# Patient Record
Sex: MALE | Race: WHITE | NOT HISPANIC OR LATINO | Employment: OTHER | URBAN - METROPOLITAN AREA
[De-identification: names, ages, dates, MRNs, and addresses within clinical notes are randomized per-mention and may not be internally consistent; named-entity substitution may affect disease eponyms.]

---

## 2017-01-04 RX ORDER — BACLOFEN 10 MG/1
TABLET ORAL
Qty: 90 TABLET | Refills: 3 | Status: SHIPPED | OUTPATIENT
Start: 2017-01-04 | End: 2017-05-17 | Stop reason: SDUPTHER

## 2017-01-09 ENCOUNTER — OFFICE VISIT (OUTPATIENT)
Dept: FAMILY MEDICINE CLINIC | Facility: CLINIC | Age: 72
End: 2017-01-09

## 2017-01-09 VITALS
BODY MASS INDEX: 26.68 KG/M2 | WEIGHT: 197 LBS | SYSTOLIC BLOOD PRESSURE: 130 MMHG | HEIGHT: 72 IN | HEART RATE: 91 BPM | OXYGEN SATURATION: 98 % | TEMPERATURE: 98.2 F | DIASTOLIC BLOOD PRESSURE: 80 MMHG

## 2017-01-09 DIAGNOSIS — R05.9 COUGH: ICD-10-CM

## 2017-01-09 DIAGNOSIS — J06.9 ACUTE URI: Primary | ICD-10-CM

## 2017-01-09 DIAGNOSIS — J43.9 PULMONARY EMPHYSEMA, UNSPECIFIED EMPHYSEMA TYPE (HCC): ICD-10-CM

## 2017-01-09 PROCEDURE — 99214 OFFICE O/P EST MOD 30 MIN: CPT | Performed by: NURSE PRACTITIONER

## 2017-01-09 RX ORDER — METHYLPREDNISOLONE 4 MG/1
TABLET ORAL
Qty: 21 TABLET | Refills: 0 | Status: SHIPPED | OUTPATIENT
Start: 2017-01-09 | End: 2017-03-02

## 2017-01-09 RX ORDER — AZITHROMYCIN 250 MG/1
TABLET, FILM COATED ORAL
Qty: 6 TABLET | Refills: 0 | Status: SHIPPED | OUTPATIENT
Start: 2017-01-09 | End: 2017-03-02

## 2017-01-09 NOTE — PROGRESS NOTES
Subjective   Cody Eldridge is a 71 y.o. male.     HPI Comments: Symptoms several days along the sinus pressure congestion tearing  Cough  Nonproductive  Without fever chills or body aches  Sinus pressure congestion mostly upper  Moderately uncomfortable no severe pain or tenderness    Ex-smoker, COPD stable        URI    This is a new problem. The current episode started in the past 7 days. The problem has been unchanged. There has been no fever. Associated symptoms include congestion, coughing, rhinorrhea, sinus pain, sneezing and a sore throat. Pertinent negatives include no neck pain, swollen glands or wheezing. He has tried nothing for the symptoms. The treatment provided no relief.        The following portions of the patient's history were reviewed and updated as appropriate: allergies, current medications, past family history, past social history, past surgical history and problem list.    Review of Systems   HENT: Positive for congestion, rhinorrhea, sneezing and sore throat.    Respiratory: Positive for cough. Negative for wheezing.    Musculoskeletal: Negative for neck pain.   All other systems reviewed and are negative.      Objective   Physical Exam   Constitutional: He is oriented to person, place, and time. He appears well-developed.   Nontoxic   HENT:   Head: Normocephalic.   Mouth/Throat: Oropharynx is clear and moist.   Turbinates congested 3-4+ clear rhinitis  Eyes mildly irritated bilateral tearing copious nasal secretions   Eyes: EOM are normal. Pupils are equal, round, and reactive to light.   Cardiovascular: Normal rate, regular rhythm and normal heart sounds.    Pulmonary/Chest: Effort normal and breath sounds normal.   Lymphadenopathy:     He has no cervical adenopathy.   Neurological: He is alert and oriented to person, place, and time.   Skin: Skin is warm and dry.   Psychiatric: He has a normal mood and affect. His behavior is normal. Judgment and thought content normal.   Vitals  reviewed.      Assessment/Plan   Cody was seen today for uri.    Diagnoses and all orders for this visit:    Acute URI    Cough    Pulmonary emphysema, unspecified emphysema type    Other orders  -     MethylPREDNISolone (MEDROL, VIPIN,) 4 MG tablet; follow package directions  -     azithromycin (ZITHROMAX Z-VIPIN) 250 MG tablet; Take 2 tablets the first day, then 1 tablet daily for 4 days. Start for productive cough                Risk-benefit Medrol discussed  Discharge instructions     Push fluids    Menthol cough drops as needed    Saline nasal spray when necessary    Medrol Dosepak for inflammation start now    Tylenol as needed    Mucinex if cough productive  DM if cough medicine needed    If cough returns more productive yellow-green phlegm start Zithromax    Chest pain shortness of breath significant worsening fever chills urgent care ER    Thank You,      James Epley,  NP

## 2017-01-09 NOTE — PATIENT INSTRUCTIONS
"Discharge instructions     Push fluids    Menthol cough drops as needed    Saline nasal spray when necessary    Medrol Dosepak for inflammation start now    Tylenol as needed    Mucinex if cough productive  DM if cough medicine needed    If cough returns more productive yellow-green phlegm start Zithromax    Chest pain shortness of breath significant worsening fever chills urgent care ER    Thank You,      James Epley, NP  Upper Respiratory Infection, Adult  Most upper respiratory infections (URIs) are a viral infection of the air passages leading to the lungs. A URI affects the nose, throat, and upper air passages. The most common type of URI is nasopharyngitis and is typically referred to as \"the common cold.\"  URIs run their course and usually go away on their own. Most of the time, a URI does not require medical attention, but sometimes a bacterial infection in the upper airways can follow a viral infection. This is called a secondary infection. Sinus and middle ear infections are common types of secondary upper respiratory infections.  Bacterial pneumonia can also complicate a URI. A URI can worsen asthma and chronic obstructive pulmonary disease (COPD). Sometimes, these complications can require emergency medical care and may be life threatening.   CAUSES  Almost all URIs are caused by viruses. A virus is a type of germ and can spread from one person to another.   RISKS FACTORS  You may be at risk for a URI if:   · You smoke.    · You have chronic heart or lung disease.  · You have a weakened defense (immune) system.    · You are very young or very old.    · You have nasal allergies or asthma.  · You work in crowded or poorly ventilated areas.  · You work in health care facilities or schools.  SIGNS AND SYMPTOMS   Symptoms typically develop 2-3 days after you come in contact with a cold virus. Most viral URIs last 7-10 days. However, viral URIs from the influenza virus (flu virus) can last 14-18 days and are " typically more severe. Symptoms may include:   · Runny or stuffy (congested) nose.    · Sneezing.    · Cough.    · Sore throat.    · Headache.    · Fatigue.    · Fever.    · Loss of appetite.    · Pain in your forehead, behind your eyes, and over your cheekbones (sinus pain).  · Muscle aches.    DIAGNOSIS   Your health care provider may diagnose a URI by:  · Physical exam.  · Tests to check that your symptoms are not due to another condition such as:  ¨ Strep throat.  ¨ Sinusitis.  ¨ Pneumonia.  ¨ Asthma.  TREATMENT   A URI goes away on its own with time. It cannot be cured with medicines, but medicines may be prescribed or recommended to relieve symptoms. Medicines may help:  · Reduce your fever.  · Reduce your cough.  · Relieve nasal congestion.  HOME CARE INSTRUCTIONS   · Take medicines only as directed by your health care provider.    · Gargle warm saltwater or take cough drops to comfort your throat as directed by your health care provider.  · Use a warm mist humidifier or inhale steam from a shower to increase air moisture. This may make it easier to breathe.  · Drink enough fluid to keep your urine clear or pale yellow.    · Eat soups and other clear broths and maintain good nutrition.    · Rest as needed.    · Return to work when your temperature has returned to normal or as your health care provider advises. You may need to stay home longer to avoid infecting others. You can also use a face mask and careful hand washing to prevent spread of the virus.  · Increase the usage of your inhaler if you have asthma.    · Do not use any tobacco products, including cigarettes, chewing tobacco, or electronic cigarettes. If you need help quitting, ask your health care provider.  PREVENTION   The best way to protect yourself from getting a cold is to practice good hygiene.   · Avoid oral or hand contact with people with cold symptoms.    · Wash your hands often if contact occurs.    There is no clear evidence that  vitamin C, vitamin E, echinacea, or exercise reduces the chance of developing a cold. However, it is always recommended to get plenty of rest, exercise, and practice good nutrition.   SEEK MEDICAL CARE IF:   · You are getting worse rather than better.    · Your symptoms are not controlled by medicine.    · You have chills.  · You have worsening shortness of breath.  · You have brown or red mucus.  · You have yellow or brown nasal discharge.  · You have pain in your face, especially when you bend forward.  · You have a fever.  · You have swollen neck glands.  · You have pain while swallowing.  · You have white areas in the back of your throat.  SEEK IMMEDIATE MEDICAL CARE IF:   · You have severe or persistent:    Headache.    Ear pain.    Sinus pain.    Chest pain.  · You have chronic lung disease and any of the following:    Wheezing.    Prolonged cough.    Coughing up blood.    A change in your usual mucus.  · You have a stiff neck.  · You have changes in your:    Vision.    Hearing.    Thinking.    Mood.  MAKE SURE YOU:   · Understand these instructions.  · Will watch your condition.  · Will get help right away if you are not doing well or get worse.     This information is not intended to replace advice given to you by your health care provider. Make sure you discuss any questions you have with your health care provider.     Document Released: 06/13/2002 Document Revised: 05/03/2016 Document Reviewed: 03/25/2015  [employer] Interactive Patient Education ©2016 [employer]

## 2017-01-09 NOTE — MR AVS SNAPSHOT
Cody Eldridge   1/9/2017 9:20 AM   Office Visit    Dept Phone:  [phone]   Encounter #:  [id number]    Provider:  James Epley, APRN   Department:  [facility]                Your Full Care Plan              Today's Medication Changes          These changes are accurate as of: 1/9/17 10:06 AM.  If you have any questions, ask your nurse or doctor.               New Medication(s)Ordered:     azithromycin 250 MG tablet   Commonly known as:  ZITHROMAX Z-VIPIN   Take 2 tablets the first day, then 1 tablet daily for 4 days. Start for productive cough   Started by:  James Epley, APRN       MethylPREDNISolone 4 MG tablet   Commonly known as:  MEDROL (VIPIN)   follow package directions   Started by:  James Epley, APRN         Stop taking medication(s)listed here:     ZOSTAVAX 02910 UNT/0.65ML injection   Generic drug:  zoster vaccine live PF   Stopped by:  James Epley, APRN                Where to Get Your Medications      These medications were sent to [place] [facility] [id number] [address] [address]     Phone:  [phone]     azithromycin 250 MG tablet    MethylPREDNISolone 4 MG tablet                  Your Updated Medication List          This list is accurate as of: 1/9/17 10:06 AM.  Always use your most recent med list.                albuterol 108 (90 BASE) MCG/ACT inhaler   Commonly known as:  PROVENTIL HFA;VENTOLIN HFA       amLODIPine 10 MG tablet   Commonly known as:  NORVASC   Take 1 tablet by mouth daily.       azelastine 0.1 % nasal spray   Commonly known as:  ASTELIN       azithromycin 250 MG tablet   Commonly known as:  ZITHROMAX Z-VIPIN   Take 2 tablets the first day, then 1 tablet daily for 4 days. Start for productive cough       baclofen 10 MG tablet   Commonly known as:  LIORESAL   take 1 tablet by mouth three times a day       fluticasone-salmeterol  "250-50 MCG/DOSE DISKUS   Commonly known as:  ADVAIR       HYDROcodone-acetaminophen 7.5-325 MG per tablet   Commonly known as:  NORCO   Take 1 tablet by mouth Every 4 (Four) Hours As Needed for moderate pain (4-6). take 1 tablet by mouth four times a day       lisinopril 20 MG tablet   Commonly known as:  PRINIVIL,ZESTRIL       MethylPREDNISolone 4 MG tablet   Commonly known as:  MEDROL (VIPIN)   follow package directions       metoprolol tartrate 50 MG tablet   Commonly known as:  LOPRESSOR   Take 1 tablet twice daily       montelukast 10 MG tablet   Commonly known as:  SINGULAIR   take 1 tablet by mouth once daily       omeprazole 40 MG capsule   Commonly known as:  priLOSEC   Take 1 capsule by mouth daily.       sertraline 50 MG tablet   Commonly known as:  ZOLOFT   take 1 tablet by mouth once daily       zolpidem 10 MG tablet   Commonly known as:  AMBIEN   take 1 tablet by mouth at bedtime               You Were Diagnosed With        Codes Comments    Acute URI    -  Primary ICD-10-CM: J06.9  ICD-9-CM: 465.9     Cough     ICD-10-CM: R05  ICD-9-CM: 786.2     Pulmonary emphysema, unspecified emphysema type     ICD-10-CM: J43.9  ICD-9-CM: 492.8       Instructions    Discharge instructions     Push fluids    Menthol cough drops as needed    Saline nasal spray when necessary    Medrol Dosepak for inflammation start now    Tylenol as needed    Mucinex if cough productive  DM if cough medicine needed    If cough returns more productive yellow-green phlegm start Zithromax    Chest pain shortness of breath significant worsening fever chills urgent care ER    Thank You,      James Epley, NP  Upper Respiratory Infection, Adult  Most upper respiratory infections (URIs) are a viral infection of the air passages leading to the lungs. A URI affects the nose, throat, and upper air passages. The most common type of URI is nasopharyngitis and is typically referred to as \"the common cold.\"  URIs run their course and usually go away " on their own. Most of the time, a URI does not require medical attention, but sometimes a bacterial infection in the upper airways can follow a viral infection. This is called a secondary infection. Sinus and middle ear infections are common types of secondary upper respiratory infections.  Bacterial pneumonia can also complicate a URI. A URI can worsen asthma and chronic obstructive pulmonary disease (COPD). Sometimes, these complications can require emergency medical care and may be life threatening.   CAUSES  Almost all URIs are caused by viruses. A virus is a type of germ and can spread from one person to another.   RISKS FACTORS  You may be at risk for a URI if:   · You smoke.    · You have chronic heart or lung disease.  · You have a weakened defense (immune) system.    · You are very young or very old.    · You have nasal allergies or asthma.  · You work in crowded or poorly ventilated areas.  · You work in health care facilities or schools.  SIGNS AND SYMPTOMS   Symptoms typically develop 2-3 days after you come in contact with a cold virus. Most viral URIs last 7-10 days. However, viral URIs from the influenza virus (flu virus) can last 14-18 days and are typically more severe. Symptoms may include:   · Runny or stuffy (congested) nose.    · Sneezing.    · Cough.    · Sore throat.    · Headache.    · Fatigue.    · Fever.    · Loss of appetite.    · Pain in your forehead, behind your eyes, and over your cheekbones (sinus pain).  · Muscle aches.    DIAGNOSIS   Your health care provider may diagnose a URI by:  · Physical exam.  · Tests to check that your symptoms are not due to another condition such as:  ¨ Strep throat.  ¨ Sinusitis.  ¨ Pneumonia.  ¨ Asthma.  TREATMENT   A URI goes away on its own with time. It cannot be cured with medicines, but medicines may be prescribed or recommended to relieve symptoms. Medicines may help:  · Reduce your fever.  · Reduce your cough.  · Relieve nasal congestion.  HOME  CARE INSTRUCTIONS   · Take medicines only as directed by your health care provider.    · Gargle warm saltwater or take cough drops to comfort your throat as directed by your health care provider.  · Use a warm mist humidifier or inhale steam from a shower to increase air moisture. This may make it easier to breathe.  · Drink enough fluid to keep your urine clear or pale yellow.    · Eat soups and other clear broths and maintain good nutrition.    · Rest as needed.    · Return to work when your temperature has returned to normal or as your health care provider advises. You may need to stay home longer to avoid infecting others. You can also use a face mask and careful hand washing to prevent spread of the virus.  · Increase the usage of your inhaler if you have asthma.    · Do not use any tobacco products, including cigarettes, chewing tobacco, or electronic cigarettes. If you need help quitting, ask your health care provider.  PREVENTION   The best way to protect yourself from getting a cold is to practice good hygiene.   · Avoid oral or hand contact with people with cold symptoms.    · Wash your hands often if contact occurs.    There is no clear evidence that vitamin C, vitamin E, echinacea, or exercise reduces the chance of developing a cold. However, it is always recommended to get plenty of rest, exercise, and practice good nutrition.   SEEK MEDICAL CARE IF:   · You are getting worse rather than better.    · Your symptoms are not controlled by medicine.    · You have chills.  · You have worsening shortness of breath.  · You have brown or red mucus.  · You have yellow or brown nasal discharge.  · You have pain in your face, especially when you bend forward.  · You have a fever.  · You have swollen neck glands.  · You have pain while swallowing.  · You have white areas in the back of your throat.  SEEK IMMEDIATE MEDICAL CARE IF:   · You have severe or persistent:    Headache.    Ear pain.    Sinus pain.    Chest  pain.  · You have chronic lung disease and any of the following:    Wheezing.    Prolonged cough.    Coughing up blood.    A change in your usual mucus.  · You have a stiff neck.  · You have changes in your:    Vision.    Hearing.    Thinking.    Mood.  MAKE SURE YOU:   · Understand these instructions.  · Will watch your condition.  · Will get help right away if you are not doing well or get worse.     This information is not intended to replace advice given to you by your health care provider. Make sure you discuss any questions you have with your health care provider.     Document Released: 2002 Document Revised: 2016 Document Reviewed: 2015  [employer] Interactive Patient Education © [employer]       Patient Instructions History      Upcoming Appointments     Visit Type Date Time Department    OFFICE VISIT 2017  9:20 AM NAVEED DIALLO      [facility] Signup     [employer] allows you to send messages to your doctor, view your test results, renew your prescriptions, schedule appointments, and more. To sign up, go to [employer] and click on the Sign Up Now link in the New User? box. Enter your [facility] Activation Code exactly as it appears below along with the last four digits of your Social Security Number and your Date of Birth () to complete the sign-up process. If you do not sign up before the expiration date, you must request a new code.    [facility] Activation Code: [email or web address]  Expires: 2017  5:38 AM    If you have questions, you can email [email or web address] or call [phone] to talk to our [facility] staff. Remember, [facility] is NOT to be used for urgent needs. For medical emergencies, dial 911.               Other Info from Your Visit           Allergies     No Known Allergies      Reason for Visit     URI right side of head, eyes watering, trouble breathing, stuffy nose      Vital Signs     Blood Pressure Pulse Temperature Height  "Weight Oxygen Saturation    130/80 (BP Location: Left arm, Patient Position: Sitting, Cuff Size: Adult) 91 98.2 °F (36.8 °C) (Oral) 72\" (182.9 cm) 197 lb (89.4 kg) 98%    Body Mass Index Smoking Status                26.72 kg/m2 Former Smoker          Problems and Diagnoses Noted     Acute upper respiratory infection    Chronic airway obstruction    Cough        "

## 2017-01-20 DIAGNOSIS — M54.2 NECK PAIN: ICD-10-CM

## 2017-01-22 RX ORDER — HYDROCODONE BITARTRATE AND ACETAMINOPHEN 7.5; 325 MG/1; MG/1
1 TABLET ORAL EVERY 4 HOURS PRN
Qty: 120 TABLET | Refills: 0 | Status: SHIPPED | OUTPATIENT
Start: 2017-01-22 | End: 2017-02-22 | Stop reason: SDUPTHER

## 2017-02-22 DIAGNOSIS — M54.2 NECK PAIN: ICD-10-CM

## 2017-02-22 RX ORDER — HYDROCODONE BITARTRATE AND ACETAMINOPHEN 7.5; 325 MG/1; MG/1
1 TABLET ORAL EVERY 4 HOURS PRN
Qty: 120 TABLET | Refills: 0 | Status: SHIPPED | OUTPATIENT
Start: 2017-02-22 | End: 2017-03-23 | Stop reason: SDUPTHER

## 2017-03-02 ENCOUNTER — OFFICE VISIT (OUTPATIENT)
Dept: FAMILY MEDICINE CLINIC | Facility: CLINIC | Age: 72
End: 2017-03-02

## 2017-03-02 VITALS
BODY MASS INDEX: 26.41 KG/M2 | RESPIRATION RATE: 17 BRPM | DIASTOLIC BLOOD PRESSURE: 70 MMHG | SYSTOLIC BLOOD PRESSURE: 140 MMHG | HEART RATE: 74 BPM | OXYGEN SATURATION: 97 % | HEIGHT: 72 IN | WEIGHT: 195 LBS

## 2017-03-02 DIAGNOSIS — J20.9 ACUTE BRONCHITIS, UNSPECIFIED ORGANISM: Primary | ICD-10-CM

## 2017-03-02 DIAGNOSIS — J43.9 PULMONARY EMPHYSEMA, UNSPECIFIED EMPHYSEMA TYPE (HCC): ICD-10-CM

## 2017-03-02 PROBLEM — R05.9 COUGH: Status: RESOLVED | Noted: 2017-01-09 | Resolved: 2017-03-02

## 2017-03-02 PROCEDURE — 99213 OFFICE O/P EST LOW 20 MIN: CPT | Performed by: FAMILY MEDICINE

## 2017-03-02 RX ORDER — CEFUROXIME AXETIL 250 MG/1
TABLET ORAL
Qty: 14 TABLET | Refills: 0 | Status: SHIPPED | OUTPATIENT
Start: 2017-03-02 | End: 2017-08-08

## 2017-03-02 NOTE — PROGRESS NOTES
"Subjective   Cody Eldridge is a 71 y.o. male.     History of Present Illness Ill for 5 days, coughing yellow brown sputum, hoarse voice. No cigs for 17 years. IRAHETA is new. Using albuterol is partially helpful Nasal discharge and sneezinzg episodes are 15 min long. No facial pain. No fever.  The following portions of the patient's history were reviewed and updated as appropriate: allergies, current medications, past social history and problem list.    Review of Systems   Constitutional: Positive for fatigue. Negative for activity change and unexpected weight change.   HENT: Positive for congestion, postnasal drip and sore throat. Negative for ear pain.    Eyes: Negative for pain and discharge.   Respiratory: Positive for cough. Negative for chest tightness, shortness of breath and wheezing.    Cardiovascular: Negative for chest pain and palpitations.   Gastrointestinal: Negative for abdominal pain, diarrhea and vomiting.   Endocrine: Negative.    Genitourinary: Negative.    Musculoskeletal: Negative for joint swelling.   Skin: Negative for color change, rash and wound.   Allergic/Immunologic: Negative.    Neurological: Negative for seizures and syncope.   Psychiatric/Behavioral: Negative.        Objective   Visit Vitals   • /70   • Pulse 74   • Resp 17   • Ht 72\" (182.9 cm)   • Wt 195 lb (88.5 kg)   • SpO2 97%   • BMI 26.45 kg/m2     Physical Exam   Constitutional: He appears well-developed and well-nourished.   HENT:   Head: Normocephalic and atraumatic.   Right Ear: Tympanic membrane, external ear and ear canal normal.   Left Ear: Tympanic membrane, external ear and ear canal normal.   Mouth/Throat: Oropharynx is clear and moist.   Eyes: Conjunctivae are normal. Right eye exhibits no discharge. Left eye exhibits no discharge.   Neck: Normal range of motion. Neck supple. No thyromegaly present.   Cardiovascular: Normal rate, regular rhythm and normal heart sounds.    Pulmonary/Chest: Effort normal and breath " sounds normal. No respiratory distress. He has no wheezes. He has no rales.   Lymphadenopathy:     He has no cervical adenopathy.   Skin: Skin is warm and dry.   Psychiatric: He has a normal mood and affect. Judgment normal.   Vitals reviewed.      Assessment/Plan   Problem List Items Addressed This Visit     None      Visit Diagnoses     Acute bronchitis, unspecified organism    -  Primary    Relevant Medications    cefuroxime (CEFTIN) 250 MG tablet           continue mucinex

## 2017-03-12 DIAGNOSIS — R10.816 EPIGASTRIC ABDOMINAL TENDERNESS: ICD-10-CM

## 2017-03-13 RX ORDER — OMEPRAZOLE 40 MG/1
CAPSULE, DELAYED RELEASE ORAL
Qty: 30 CAPSULE | Refills: 5 | Status: SHIPPED | OUTPATIENT
Start: 2017-03-13 | End: 2017-05-02 | Stop reason: SDUPTHER

## 2017-03-14 DIAGNOSIS — R10.816 EPIGASTRIC ABDOMINAL TENDERNESS: ICD-10-CM

## 2017-03-14 RX ORDER — AMLODIPINE BESYLATE 10 MG/1
TABLET ORAL
Qty: 90 TABLET | Refills: 1 | Status: SHIPPED | OUTPATIENT
Start: 2017-03-14 | End: 2017-09-01 | Stop reason: SDUPTHER

## 2017-03-15 RX ORDER — OMEPRAZOLE 40 MG/1
CAPSULE, DELAYED RELEASE ORAL
Qty: 30 CAPSULE | Refills: 5 | Status: SHIPPED | OUTPATIENT
Start: 2017-03-15 | End: 2017-08-08

## 2017-03-15 RX ORDER — LISINOPRIL 20 MG/1
20 TABLET ORAL DAILY
Qty: 90 TABLET | Refills: 1 | Status: SHIPPED | OUTPATIENT
Start: 2017-03-15 | End: 2017-07-18 | Stop reason: SDUPTHER

## 2017-03-22 ENCOUNTER — TELEPHONE (OUTPATIENT)
Dept: FAMILY MEDICINE CLINIC | Facility: CLINIC | Age: 72
End: 2017-03-22

## 2017-03-22 NOTE — TELEPHONE ENCOUNTER
Patient is requesting refill on Hydrocodone last seen 3/2/17 last written 2/22/17 last Cyrus 12/17/16 (needs Cyrus)

## 2017-03-23 DIAGNOSIS — M54.2 NECK PAIN: ICD-10-CM

## 2017-03-23 RX ORDER — HYDROCODONE BITARTRATE AND ACETAMINOPHEN 7.5; 325 MG/1; MG/1
1 TABLET ORAL EVERY 4 HOURS PRN
Qty: 120 TABLET | Refills: 0 | Status: SHIPPED | OUTPATIENT
Start: 2017-03-23 | End: 2017-04-26 | Stop reason: SDUPTHER

## 2017-04-26 DIAGNOSIS — M54.2 NECK PAIN: ICD-10-CM

## 2017-04-26 RX ORDER — HYDROCODONE BITARTRATE AND ACETAMINOPHEN 7.5; 325 MG/1; MG/1
1 TABLET ORAL EVERY 4 HOURS PRN
Qty: 120 TABLET | Refills: 0 | Status: SHIPPED | OUTPATIENT
Start: 2017-04-26 | End: 2017-05-24 | Stop reason: SDUPTHER

## 2017-05-02 DIAGNOSIS — R10.816 EPIGASTRIC ABDOMINAL TENDERNESS WITHOUT REBOUND TENDERNESS: ICD-10-CM

## 2017-05-02 RX ORDER — OMEPRAZOLE 40 MG/1
40 CAPSULE, DELAYED RELEASE ORAL DAILY
Qty: 90 CAPSULE | Refills: 3 | Status: SHIPPED | OUTPATIENT
Start: 2017-05-02 | End: 2017-12-20 | Stop reason: SDUPTHER

## 2017-05-03 RX ORDER — METOPROLOL TARTRATE 50 MG/1
50 TABLET, FILM COATED ORAL 2 TIMES DAILY
Qty: 180 TABLET | Refills: 3 | Status: SHIPPED | OUTPATIENT
Start: 2017-05-03 | End: 2017-12-05 | Stop reason: SDUPTHER

## 2017-05-05 ENCOUNTER — OFFICE VISIT (OUTPATIENT)
Dept: FAMILY MEDICINE CLINIC | Facility: CLINIC | Age: 72
End: 2017-05-05

## 2017-05-05 VITALS
OXYGEN SATURATION: 98 % | BODY MASS INDEX: 26.82 KG/M2 | SYSTOLIC BLOOD PRESSURE: 124 MMHG | WEIGHT: 198 LBS | HEIGHT: 72 IN | DIASTOLIC BLOOD PRESSURE: 86 MMHG | HEART RATE: 58 BPM | TEMPERATURE: 97.8 F

## 2017-05-05 DIAGNOSIS — M79.10 MYALGIA: ICD-10-CM

## 2017-05-05 DIAGNOSIS — R25.2 CRAMP OF BOTH LOWER EXTREMITIES: Primary | ICD-10-CM

## 2017-05-05 PROCEDURE — 99213 OFFICE O/P EST LOW 20 MIN: CPT | Performed by: NURSE PRACTITIONER

## 2017-05-05 RX ORDER — METHYLPREDNISOLONE 4 MG/1
TABLET ORAL
Qty: 21 TABLET | Refills: 0 | Status: SHIPPED | OUTPATIENT
Start: 2017-05-05 | End: 2017-06-16

## 2017-05-05 RX ORDER — ROSUVASTATIN CALCIUM 20 MG/1
10 TABLET, COATED ORAL DAILY
Qty: 30 TABLET | Refills: 5 | COMMUNITY
Start: 2017-05-05

## 2017-05-05 RX ORDER — CHLORZOXAZONE 500 MG/1
TABLET ORAL
Qty: 20 TABLET | Refills: 0 | Status: SHIPPED | OUTPATIENT
Start: 2017-05-05 | End: 2017-10-11

## 2017-05-06 LAB
BUN SERPL-MCNC: 13 MG/DL (ref 8–23)
BUN/CREAT SERPL: 14.4 (ref 7–25)
CALCIUM SERPL-MCNC: 9.8 MG/DL (ref 8.6–10.5)
CHLORIDE SERPL-SCNC: 98 MMOL/L (ref 98–107)
CK SERPL-CCNC: 192 U/L (ref 20–200)
CO2 SERPL-SCNC: 28.9 MMOL/L (ref 22–29)
CREAT SERPL-MCNC: 0.9 MG/DL (ref 0.76–1.27)
GLUCOSE SERPL-MCNC: 92 MG/DL (ref 65–99)
POTASSIUM SERPL-SCNC: 4.7 MMOL/L (ref 3.5–5.2)
SODIUM SERPL-SCNC: 139 MMOL/L (ref 136–145)

## 2017-05-08 RX ORDER — METOPROLOL TARTRATE 50 MG/1
TABLET, FILM COATED ORAL
Qty: 60 TABLET | Refills: 4 | Status: SHIPPED | OUTPATIENT
Start: 2017-05-08 | End: 2017-06-16

## 2017-05-09 ENCOUNTER — TELEPHONE (OUTPATIENT)
Dept: FAMILY MEDICINE CLINIC | Facility: CLINIC | Age: 72
End: 2017-05-09

## 2017-05-09 RX ORDER — ZOLPIDEM TARTRATE 10 MG/1
TABLET ORAL
Qty: 30 TABLET | Refills: 2 | Status: SHIPPED | OUTPATIENT
Start: 2017-05-09 | End: 2018-01-05

## 2017-05-17 RX ORDER — BACLOFEN 10 MG/1
TABLET ORAL
Qty: 90 TABLET | Refills: 3 | Status: SHIPPED | OUTPATIENT
Start: 2017-05-17 | End: 2017-09-24 | Stop reason: SDUPTHER

## 2017-05-24 DIAGNOSIS — M54.2 NECK PAIN: ICD-10-CM

## 2017-05-24 RX ORDER — HYDROCODONE BITARTRATE AND ACETAMINOPHEN 7.5; 325 MG/1; MG/1
1 TABLET ORAL EVERY 4 HOURS PRN
Qty: 120 TABLET | Refills: 0 | Status: SHIPPED | OUTPATIENT
Start: 2017-05-24 | End: 2017-06-23 | Stop reason: SDUPTHER

## 2017-06-04 ENCOUNTER — APPOINTMENT (OUTPATIENT)
Dept: GENERAL RADIOLOGY | Facility: HOSPITAL | Age: 72
End: 2017-06-04

## 2017-06-04 ENCOUNTER — HOSPITAL ENCOUNTER (EMERGENCY)
Facility: HOSPITAL | Age: 72
Discharge: HOME | End: 2017-06-04
Attending: EMERGENCY MEDICINE | Admitting: EMERGENCY MEDICINE

## 2017-06-04 VITALS
BODY MASS INDEX: 26.55 KG/M2 | SYSTOLIC BLOOD PRESSURE: 148 MMHG | DIASTOLIC BLOOD PRESSURE: 85 MMHG | HEIGHT: 72 IN | HEART RATE: 81 BPM | RESPIRATION RATE: 16 BRPM | WEIGHT: 196 LBS | TEMPERATURE: 97.6 F | OXYGEN SATURATION: 95 %

## 2017-06-04 DIAGNOSIS — S66.911A WRIST STRAIN, RIGHT, INITIAL ENCOUNTER: Primary | ICD-10-CM

## 2017-06-04 PROCEDURE — 73110 X-RAY EXAM OF WRIST: CPT

## 2017-06-04 PROCEDURE — 99283 EMERGENCY DEPT VISIT LOW MDM: CPT

## 2017-06-04 RX ORDER — NAPROXEN 250 MG/1
250 TABLET ORAL 2 TIMES DAILY PRN
Qty: 30 TABLET | Refills: 0 | Status: SHIPPED | OUTPATIENT
Start: 2017-06-04 | End: 2017-10-11

## 2017-06-04 NOTE — ED PROVIDER NOTES
"Pt is a 71 y.o. male who presents c/o R wrist pain onset yesterday. Pt states he was lifting a heavy trash can and felt a \"pop' in his wrist. Pt denies any numbness or tingling.     PE:  Resting comfortably, no distress  Vitals stable  R wrist tenderness and pain  NV intact    XR R Wrist: Negative acute    Plan to discharge with splint for follow up with orthopedist.     I supervised care provided by the midlevel provider Viky Cazares PA-C.    We have discussed this patient's history, physical exam, and treatment plan.   I have reviewed the note and personally saw and examined the patient and agree with the plan of care.    Documentation assistance provided by abdias Beard for Dr. Alvares.  Information recorded by the scribe was done at my direction and has been verified and validated by me.         Dalila Beard  06/04/17 0312       Teodoro Alvares MD  06/04/17 9903    "

## 2017-06-04 NOTE — ED TRIAGE NOTES
Pt c/o right erist pain that started about 15 hours ago. Pt reports pain started after lifting his garbage can and felt a pop in his wrist. Pain is entire wrist and he denies numbness/ tingling.

## 2017-06-04 NOTE — DISCHARGE INSTRUCTIONS
PLEASE READ AND REVIEW ALL DISCHARGE INSTRUCTIONS.     Please follow up with your primary care physician for any further evaluation/treatment and further management of your blood pressure.     Recheck in emergency department for any worsening and/or concerning symptoms.     Take all prescribed medicine as written and continue chronic medication.    Wear splint until follow-up with Dr. Cervantes.

## 2017-06-04 NOTE — ED PROVIDER NOTES
"EMERGENCY DEPARTMENT ENCOUNTER    CHIEF COMPLAINT  Chief Complaint: upper extremity pain   History given by: patient  History limited by: N/A  Room Number: 03/03  PMD: Mona Kirk MD      HPI:  Pt is a 71 y.o. male who presents with upper extremity pain. He states that at about 1830 last night, while he was throwing out heavy trash bag, he felt a \"pop\" in his right wrist and has had pain at the site since then. The pain is exacerbated by right wrist movement. He denies sensory loss, right hand pain, right forearm pain, and sustaining any other injury. Pt has no other complaints at this time.     Duration: started last night at about 1830  Timing: constant   Location: right wrist   Radiation: none  Quality: pain  Intensity/Severity: moderate  Progression: unchanged  Associated Symptoms: none  Aggravating Factors: right wrist movement  Alleviating Factors: keeping RUE still  Previous Episodes: none  Treatment before arrival: none mentioned    MEDICAL RECORD REVIEW  Pt has hx of COPD.    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   • Benign colonic polyp 01/19/2016   • Neck pain 01/19/2016   • Chronic obstructive pulmonary disease 01/19/2016   • Mixed anxiety depressive disorder 01/19/2016   • Degeneration of intervertebral disc of lumbosacral region 01/19/2016   • Elevated prostate specific antigen (PSA) 01/19/2016   • Hyperlipidemia 01/19/2016   • Hypertension 01/19/2016   • Insomnia 01/19/2016   • Lumbar radiculopathy 01/19/2016   • Osteoarthritis 01/19/2016   • Vitamin D deficiency 01/19/2016   • Abdominal pain 10/04/2016   • Acute URI 01/09/2017   • Myalgia 05/05/2017   • Cramp of both lower extremities 05/05/2017     Resolved Ambulatory Problems     Diagnosis Date Noted   • Cough 01/09/2017     Past Medical History:   Diagnosis Date   • COPD (chronic obstructive pulmonary disease)    • " Depression with anxiety    • Drug therapy    • Hyperlipidemia    • Insomnia        PAST SURGICAL HISTORY  Past Surgical History:   Procedure Laterality Date   • APPENDECTOMY     • HAND SURGERY         FAMILY HISTORY  Family History   Problem Relation Age of Onset   • Heart attack Mother    • Diabetes Mother    • Diabetes Father    • Heart failure Father    • Cancer Brother      colon       SOCIAL HISTORY  Social History     Social History   • Marital status:      Spouse name: N/A   • Number of children: N/A   • Years of education: N/A     Occupational History   • Not on file.     Social History Main Topics   • Smoking status: Former Smoker   • Smokeless tobacco: Not on file   • Alcohol use No      Comment: alcoholism in recovery   • Drug use: Not on file   • Sexual activity: Not on file     Other Topics Concern   • Not on file     Social History Narrative   • No narrative on file       ALLERGIES  Benadryl [diphenhydramine hcl]    REVIEW OF SYSTEMS  Review of Systems   Constitutional: Negative for chills and fever.   Musculoskeletal:        Right wrist pain   Neurological: Negative for numbness.   All other systems reviewed and are negative.      PHYSICAL EXAM  ED Triage Vitals   Temp Heart Rate Resp BP SpO2   06/04/17 1213 06/04/17 1213 06/04/17 1215 06/04/17 1215 06/04/17 1213   97.6 °F (36.4 °C) 97 16 141/81 94 %      Temp src Heart Rate Source Patient Position BP Location FiO2 (%)   06/04/17 1213 06/04/17 1213 06/04/17 1215 06/04/17 1215 --   Tympanic Monitor Sitting Left arm        Physical Exam   Constitutional: He is oriented to person, place, and time and well-developed, well-nourished, and in no distress. No distress.   HENT:   Head: Normocephalic and atraumatic.   Mouth/Throat: Oropharynx is clear and moist.   Eyes: EOM are normal.   Neck: Normal range of motion.   Cardiovascular:   Pulses:       Radial pulses are 2+ on the right side   Pulmonary/Chest: Effort normal. No respiratory distress.    Musculoskeletal:   Tenderness at right distal radius and right distal ulna with right snuffbox tenderness; decreased ROM to right wrist secondary to pain; decreased  strength to RUE secondary to pain; normal exam of right hand, right fingers, and right forearm; NVID to RUE   Neurological: He is alert and oriented to person, place, and time. He has normal sensation.   Skin: Skin is warm and dry. No rash noted. No pallor.   Psychiatric: Mood, memory, affect and judgment normal.   Nursing note and vitals reviewed.        RADIOLOGY  XR Wrist 3+ View Right   Final Result   Negative right wrist radiographs.       This report was finalized on 6/4/2017 4:08 PM by Dr. Godfrey Thapa MD.            Right wrist xray- no acute process, no fracture    I ordered the above noted radiological studies and reviewed the images on the PACS system.         PROCEDURES    Splint Application:  Splint Type: velcro thumb spica splint  Indication: concern for underlying right scaphoid injury   Splint placed by nurse, rechecked by me  Post splint application:   1) neurovascularly intact   2) good position  Discussed splint care with patient  Discussed PMD/orthopedic follow up      COURSE & MEDICAL DECISION MAKING  Pertinent Labs/Imaging studies that were ordered and reviewed are noted above.  Results were reviewed/discussed with the patient and they were also made aware of online assess.  Pt also made aware that some labs, such as cultures, will not be resulted during ER visit and follow up with PMD is necessary.       PROGRESS AND CONSULTS    Progress Notes:    1329- Ordered right wrist xray for further evaluation.     1600- Reviewed pt's history and workup with Dr. Alvares.  After a bedside evaluation; Dr Alvares agrees with the plan of care.    1620- Because pt has right snuffbox tenderness on exam, there is concern for underlying right scaphoid injury. Will place RUE in thumb spica splint for support, comfort, and stabilization.  "    1622- Rechecked pt. He is resting comfortably and is in no acute distress. Discussed with pt about all pertinent results (nothing acute indicated on right wrist xray), dx and plan for discharge. Informed pt of plan to place RUE in thumb spica splint as there is concern for underlying right scaphoid injury. Informed pt of plan to prescribe NSAID. Instructed to f/u with PMD and referred hand specialist for recheck and for further testing/treatment. RTER warnings given. Pt understands and agrees with plan. Addressed all questions.    The patient's history, physical exam, and lab findings were discussed with the physician, who also performed a face to face history and physical exam.  I discussed all results and noted any abnormalities with patient.  Discussed absoute need to recheck abnormalities with their family physician and referred hand specialist.  I answered any of the patient's questions.  Discussed plan for discharge, as there is no emergent indication for admission.  Pt is agreeable and understands need for follow up and repeat testing.  Pt is aware that discharge does not mean that nothing is wrong but it indicates no emergency is present and they must continue care with their family physician and referred hand specialist.  Pt is discharged with instructions to follow up with primary care doctor to have their blood pressure rechecked.         MEDICATIONS GIVEN IN ER  Medications - No data to display    /79 (BP Location: Right arm, Patient Position: Lying)  Pulse 68  Temp 97.6 °F (36.4 °C) (Tympanic)   Resp 16  Ht 72\" (182.9 cm)  Wt 196 lb (88.9 kg)  SpO2 95%  BMI 26.58 kg/m2      DIAGNOSIS  Final diagnoses:   Wrist strain, right, initial encounter       FOLLOW UP   Mona Kirk MD  [address] [facility]  [place] [id number] [phone]          Bronson Cervantes MD  [address] [facility] [address]  [place] [address]  [phone]            RX     Medication List      New " Prescriptions          naproxen 250 MG tablet   Commonly known as:  NAPROSYN   Take 1 tablet by mouth 2 (Two) Times a Day As Needed for Mild Pain (1-3).              I personally scribed for Viky Cazares PA-C on 6/4/2017 at 4:37 PM.  Electronically signed by Ilsa Almanza on 6/4/2017 at time 4:37 PM            Ilsa Almanza  06/04/17 1550       Viky Cazares PA-C  06/04/17 4043

## 2017-06-07 ENCOUNTER — TELEPHONE (OUTPATIENT)
Dept: SOCIAL WORK | Facility: HOSPITAL | Age: 72
End: 2017-06-07

## 2017-06-16 ENCOUNTER — OFFICE VISIT (OUTPATIENT)
Dept: FAMILY MEDICINE CLINIC | Facility: CLINIC | Age: 72
End: 2017-06-16

## 2017-06-16 VITALS
HEIGHT: 72 IN | DIASTOLIC BLOOD PRESSURE: 80 MMHG | OXYGEN SATURATION: 99 % | BODY MASS INDEX: 26.95 KG/M2 | WEIGHT: 199 LBS | SYSTOLIC BLOOD PRESSURE: 130 MMHG | TEMPERATURE: 97.8 F | HEART RATE: 90 BPM

## 2017-06-16 DIAGNOSIS — IMO0002 STRAIN OF TENDON OF THUMB: Primary | ICD-10-CM

## 2017-06-16 PROCEDURE — 99212 OFFICE O/P EST SF 10 MIN: CPT | Performed by: FAMILY MEDICINE

## 2017-06-16 NOTE — PROGRESS NOTES
"Subjective   Cody Eldridge is a 71 y.o. male.     History of Present Illness   June 4 pciked up a garbage can that was very heavy, and heard a pop like a cork comng out of a bottle. Severe pain for 3 hours.  The following portions of the patient's history were reviewed and updated as appropriate: allergies, current medications, past social history and problem list.    Review of Systems   Constitutional: Negative.    Respiratory: Negative.    Cardiovascular: Negative.    Musculoskeletal: Arthralgias: improved R wrist pain.   Skin: Negative.    All other systems reviewed and are negative.      Objective   /80 (BP Location: Right arm, Patient Position: Sitting, Cuff Size: Adult)  Pulse 90  Temp 97.8 °F (36.6 °C) (Oral)   Ht 72\" (182.9 cm)  Wt 199 lb (90.3 kg)  SpO2 99%  BMI 26.99 kg/m2  Physical Exam   Constitutional: He appears well-developed and well-nourished.   Cardiovascular: Normal rate.    Musculoskeletal:   R wrist nontender. FROM first and second digits. Strngth first digit 5/5. Muscle tendons themselves nontender as well. No edema or ecchymoses   Nursing note and vitals reviewed.      Assessment/Plan   Problem List Items Addressed This Visit     None      Visit Diagnoses     Strain of tendon of thumb    -  Primary           Wear splint now only for heavy jobs, leave it off for all other activities.  "

## 2017-06-23 DIAGNOSIS — M54.2 NECK PAIN: ICD-10-CM

## 2017-06-23 RX ORDER — HYDROCODONE BITARTRATE AND ACETAMINOPHEN 7.5; 325 MG/1; MG/1
1 TABLET ORAL EVERY 4 HOURS PRN
Qty: 120 TABLET | Refills: 0 | Status: SHIPPED | OUTPATIENT
Start: 2017-06-23 | End: 2017-07-26 | Stop reason: SDUPTHER

## 2017-07-19 RX ORDER — LISINOPRIL 20 MG/1
TABLET ORAL
Qty: 90 TABLET | Refills: 1 | Status: SHIPPED | OUTPATIENT
Start: 2017-07-19 | End: 2017-07-25 | Stop reason: SDUPTHER

## 2017-07-24 ENCOUNTER — TELEPHONE (OUTPATIENT)
Dept: FAMILY MEDICINE CLINIC | Facility: CLINIC | Age: 72
End: 2017-07-24

## 2017-07-25 RX ORDER — LISINOPRIL 20 MG/1
TABLET ORAL
Qty: 90 TABLET | Refills: 4 | Status: SHIPPED | OUTPATIENT
Start: 2017-07-25 | End: 2017-10-11 | Stop reason: SDUPTHER

## 2017-07-26 DIAGNOSIS — M54.2 NECK PAIN: ICD-10-CM

## 2017-07-26 RX ORDER — HYDROCODONE BITARTRATE AND ACETAMINOPHEN 7.5; 325 MG/1; MG/1
1 TABLET ORAL EVERY 4 HOURS PRN
Qty: 120 TABLET | Refills: 0 | Status: SHIPPED | OUTPATIENT
Start: 2017-07-26 | End: 2017-08-23 | Stop reason: SDUPTHER

## 2017-08-08 ENCOUNTER — OFFICE VISIT (OUTPATIENT)
Dept: FAMILY MEDICINE CLINIC | Facility: CLINIC | Age: 72
End: 2017-08-08

## 2017-08-08 VITALS
RESPIRATION RATE: 18 BRPM | DIASTOLIC BLOOD PRESSURE: 70 MMHG | SYSTOLIC BLOOD PRESSURE: 140 MMHG | BODY MASS INDEX: 26.95 KG/M2 | HEIGHT: 72 IN | WEIGHT: 199 LBS | HEART RATE: 95 BPM | OXYGEN SATURATION: 98 %

## 2017-08-08 DIAGNOSIS — M79.10 MYALGIA: ICD-10-CM

## 2017-08-08 DIAGNOSIS — K05.10 GINGIVITIS: ICD-10-CM

## 2017-08-08 DIAGNOSIS — M54.2 NECK PAIN: Primary | ICD-10-CM

## 2017-08-08 DIAGNOSIS — M54.16 LUMBAR RADICULOPATHY: ICD-10-CM

## 2017-08-08 DIAGNOSIS — J43.9 PULMONARY EMPHYSEMA, UNSPECIFIED EMPHYSEMA TYPE (HCC): ICD-10-CM

## 2017-08-08 PROCEDURE — 99214 OFFICE O/P EST MOD 30 MIN: CPT | Performed by: FAMILY MEDICINE

## 2017-08-08 RX ORDER — BUDESONIDE AND FORMOTEROL FUMARATE DIHYDRATE 160; 4.5 UG/1; UG/1
2 AEROSOL RESPIRATORY (INHALATION)
Qty: 1 INHALER | Refills: 0 | COMMUNITY
Start: 2017-08-08 | End: 2017-10-05 | Stop reason: ALTCHOICE

## 2017-08-08 NOTE — PROGRESS NOTES
"Subjective   Cody Eldridge is a 72 y.o. male.     History of Present Illness Here for fu on lowback and neck pain.No other major pains.  Wears a low back brace.     Wants to discuss breathing problems. Using only ventolin only every 12 hours.  Wheezes at work.    Gums hurt when he bites down on dentures: lower, bilat.  The following portions of the patient's history were reviewed and updated as appropriate: allergies, current medications, past social history and problem list.    Review of Systems   Constitutional: Negative for activity change, appetite change and unexpected weight change.   HENT: Positive for mouth sores (lower gums hurt. Full dentures). Negative for nosebleeds and trouble swallowing.    Eyes: Negative for pain and visual disturbance.   Respiratory: Positive for shortness of breath and wheezing. Negative for chest tightness.    Cardiovascular: Negative for chest pain and palpitations.   Gastrointestinal: Negative for abdominal pain and blood in stool.   Endocrine: Negative.    Genitourinary: Negative for difficulty urinating and hematuria.   Musculoskeletal: Positive for back pain, neck pain and neck stiffness. Negative for joint swelling.   Skin: Negative for color change and rash.   Allergic/Immunologic: Negative.    Neurological: Negative for syncope and speech difficulty.   Hematological: Negative for adenopathy.   Psychiatric/Behavioral: Negative for agitation and confusion.   All other systems reviewed and are negative.      Objective   /70  Pulse 95  Resp 18  Ht 72\" (182.9 cm)  Wt 199 lb (90.3 kg)  SpO2 98%  BMI 26.99 kg/m2  Physical Exam   Constitutional: He is oriented to person, place, and time. Vital signs are normal. He appears well-developed and well-nourished. No distress.   HENT:   Head: Normocephalic.   Slight eryth  Lower gums bilat   Neck: Carotid bruit is not present. No thyromegaly present.   Cardiovascular: Normal rate, regular rhythm and normal heart sounds.  "   Pulmonary/Chest: Effort normal and breath sounds normal.   Musculoskeletal: He exhibits tenderness (C456 and L 45S1 with SIs bilat).   C spine decreased extension and lateral rota both R and L.    Neurological: He is alert and oriented to person, place, and time. Gait normal.   Psychiatric: He has a normal mood and affect. His behavior is normal. Judgment and thought content normal.   Vitals reviewed.      Assessment/Plan   Problem List Items Addressed This Visit        Respiratory    Chronic obstructive pulmonary disease    Relevant Medications    budesonide-formoterol (SYMBICORT) 160-4.5 MCG/ACT inhaler       Nervous and Auditory    Neck pain - Primary    Lumbar radiculopathy    Myalgia       Other    Gingivitis           Gave him my low back and neck exercise sheets. Explained all exercises to him.  Encourage skipping the AM hydrocodone if at all possible- don't just take it automatically.  Consider a PFT., I watched his inhaler technique and he does hesitater too long before inhaling after he has puffed. He needs to work on that. Also the ventolin is to be used every 4 hours prn. Symbicort is every 12 hours.  Visit dentist. Soak dentures once a month in bleach.

## 2017-08-23 ENCOUNTER — TELEPHONE (OUTPATIENT)
Dept: FAMILY MEDICINE CLINIC | Facility: CLINIC | Age: 72
End: 2017-08-23

## 2017-08-23 DIAGNOSIS — M54.2 NECK PAIN: ICD-10-CM

## 2017-08-23 RX ORDER — HYDROCODONE BITARTRATE AND ACETAMINOPHEN 7.5; 325 MG/1; MG/1
1 TABLET ORAL EVERY 4 HOURS PRN
Qty: 120 TABLET | Refills: 0 | Status: SHIPPED | OUTPATIENT
Start: 2017-08-23 | End: 2017-09-20 | Stop reason: SDUPTHER

## 2017-09-01 RX ORDER — AMLODIPINE BESYLATE 10 MG/1
TABLET ORAL
Qty: 90 TABLET | Refills: 4 | Status: SHIPPED | OUTPATIENT
Start: 2017-09-01 | End: 2018-01-19 | Stop reason: SDUPTHER

## 2017-09-11 RX ORDER — MONTELUKAST SODIUM 10 MG/1
TABLET ORAL
Qty: 90 TABLET | Refills: 5 | Status: SHIPPED | OUTPATIENT
Start: 2017-09-11 | End: 2018-09-27 | Stop reason: SDUPTHER

## 2017-09-12 ENCOUNTER — TELEPHONE (OUTPATIENT)
Dept: FAMILY MEDICINE CLINIC | Facility: CLINIC | Age: 72
End: 2017-09-12

## 2017-09-12 NOTE — TELEPHONE ENCOUNTER
Called Ms. Rai back and informed her that Dr. Kirk thinks patient should go to ER patient declined and stated he would wait until Thursday Sept 14, 2017  appointment at 8:45am.

## 2017-09-12 NOTE — TELEPHONE ENCOUNTER
Ms Rai called about patient at 3pm stated patient was very sick and needed to see Dr. Kirk.  His symptoms were off balance, running into walls and knew that Dr. Kirk would be out on Wednesday.  Offered appointment Thursday 8:45am to be seen.  Per Dr. Kirk patient needs to go to ER.

## 2017-09-12 NOTE — TELEPHONE ENCOUNTER
Elise Hernandez says Sammy is not acting right, walking into walls, I told them viaDawit that he should go to the ER as this is odd. Pt declined and apparently plans to see ;me 9/14 at 8:45 am.

## 2017-09-14 ENCOUNTER — OFFICE VISIT (OUTPATIENT)
Dept: FAMILY MEDICINE CLINIC | Facility: CLINIC | Age: 72
End: 2017-09-14

## 2017-09-14 VITALS
SYSTOLIC BLOOD PRESSURE: 140 MMHG | HEIGHT: 72 IN | DIASTOLIC BLOOD PRESSURE: 80 MMHG | RESPIRATION RATE: 17 BRPM | OXYGEN SATURATION: 98 % | BODY MASS INDEX: 26.82 KG/M2 | HEART RATE: 96 BPM | WEIGHT: 198 LBS

## 2017-09-14 DIAGNOSIS — R25.2 CRAMP OF BOTH LOWER EXTREMITIES: ICD-10-CM

## 2017-09-14 DIAGNOSIS — R06.83 SNORING: ICD-10-CM

## 2017-09-14 DIAGNOSIS — M54.16 LUMBAR RADICULAR PAIN: Primary | ICD-10-CM

## 2017-09-14 DIAGNOSIS — Z23 ENCOUNTER FOR IMMUNIZATION: ICD-10-CM

## 2017-09-14 DIAGNOSIS — Z79.899 DRUG THERAPY: ICD-10-CM

## 2017-09-14 PROCEDURE — 99214 OFFICE O/P EST MOD 30 MIN: CPT | Performed by: FAMILY MEDICINE

## 2017-09-14 PROCEDURE — G0008 ADMIN INFLUENZA VIRUS VAC: HCPCS | Performed by: FAMILY MEDICINE

## 2017-09-14 NOTE — PROGRESS NOTES
"Subjective   Cody Eldridge is a 72 y.o. male.     History of Present Illness 2 days ago Huma Capone says he was falling over, he says just sleepy. She also says he was \"walking into walls\".Says does not sleep well.  Was working 70-80 hours a week. 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  R leg sciatica. Pain meds barely \"knock the edge off\" the pain.  No alcohol. \"I take too many pills\", and quit drinking 40 years ago.  Quit smoking 20 years ago.  Calf spasms at night.    The following portions of the patient's history were reviewed and updated as appropriate: allergies, current medications, past social history and problem list.    Review of Systems   Constitutional: Positive for fatigue. Negative for activity change, appetite change and unexpected weight change.   HENT: Negative for nosebleeds and trouble swallowing.    Eyes: Negative for pain and visual disturbance.   Respiratory: Negative for chest tightness, shortness of breath and wheezing.    Cardiovascular: Negative for chest pain and palpitations.   Gastrointestinal: Negative for abdominal pain and blood in stool.   Endocrine: Negative.    Genitourinary: Negative for difficulty urinating and hematuria.   Musculoskeletal: Negative for joint swelling.   Skin: Negative for color change and rash.   Allergic/Immunologic: Negative.    Neurological: Positive for weakness (Only as relates to fatigue). Negative for syncope and speech difficulty.   Hematological: Negative for adenopathy.   Psychiatric/Behavioral: Positive for sleep disturbance. Negative for agitation, confusion and decreased concentration. The patient is not nervous/anxious.    All other systems reviewed and are negative.      Objective   /80  Pulse 96  Resp 17  Ht 72\" (182.9 cm)  Wt 198 lb (89.8 kg)  SpO2 98%  BMI 26.85 kg/m2  Physical Exam   Constitutional: He is oriented to person, place, and time. Vital signs are normal. He appears well-developed and well-nourished. No " distress.   HENT:   Head: Normocephalic.   Neck: Carotid bruit is not present. No thyromegaly present.   Cardiovascular: Normal rate, regular rhythm and normal heart sounds.    Pulmonary/Chest: Effort normal and breath sounds normal.   Musculoskeletal: He exhibits tenderness (lumbar tenderness spine and muscles.).   Neurological: He is alert and oriented to person, place, and time. He displays normal reflexes. He exhibits normal muscle tone. Gait normal.   Romberg neg.  Good FTN.  Good tandem gait. Gait itself wnl   Psychiatric: He has a normal mood and affect. His behavior is normal. Judgment and thought content normal.   Vitals reviewed.      Assessment/Plan   Problem List Items Addressed This Visit        Musculoskeletal and Integument    Cramp of both lower extremities      Other Visit Diagnoses     Lumbar radicular pain    -  Primary    Relevant Orders    CT lumbar spine wo contrast    Ambulatory Referral to Pain Management    Snoring        Relevant Orders    Ambulatory Referral to Sleep Medicine    Drug therapy        Relevant Orders    ToxASSURE Select 13 (MW)    Encounter for immunization        Relevant Orders    Flu Vaccine High Dose PF 65YR+ (FLUZONE [id number]) (Completed)           Taught pt stretches of feet, ankles and calf muscles to be done twice a day.  Try not to work more than 30 hours per week.

## 2017-09-19 ENCOUNTER — HOSPITAL ENCOUNTER (OUTPATIENT)
Dept: CT IMAGING | Facility: HOSPITAL | Age: 72
Discharge: HOME | End: 2017-09-19
Admitting: FAMILY MEDICINE

## 2017-09-19 DIAGNOSIS — M54.16 LUMBAR RADICULAR PAIN: ICD-10-CM

## 2017-09-19 PROCEDURE — 72131 CT LUMBAR SPINE W/O DYE: CPT

## 2017-09-20 ENCOUNTER — TELEPHONE (OUTPATIENT)
Dept: FAMILY MEDICINE CLINIC | Facility: CLINIC | Age: 72
End: 2017-09-20

## 2017-09-20 DIAGNOSIS — M54.2 NECK PAIN: ICD-10-CM

## 2017-09-20 RX ORDER — HYDROCODONE BITARTRATE AND ACETAMINOPHEN 7.5; 325 MG/1; MG/1
1 TABLET ORAL EVERY 4 HOURS PRN
Qty: 120 TABLET | Refills: 0 | Status: SHIPPED | OUTPATIENT
Start: 2017-09-20 | End: 2017-10-23 | Stop reason: SDUPTHER

## 2017-09-21 DIAGNOSIS — J44.9 CHRONIC OBSTRUCTIVE PULMONARY DISEASE, UNSPECIFIED COPD TYPE (HCC): Primary | ICD-10-CM

## 2017-09-22 LAB — DRUGS UR: NORMAL

## 2017-09-25 ENCOUNTER — TELEPHONE (OUTPATIENT)
Dept: CARDIOLOGY | Facility: CLINIC | Age: 72
End: 2017-09-25

## 2017-09-25 ENCOUNTER — HOSPITAL ENCOUNTER (OUTPATIENT)
Dept: GENERAL RADIOLOGY | Facility: HOSPITAL | Age: 72
Discharge: HOME | End: 2017-09-25
Admitting: FAMILY MEDICINE

## 2017-09-25 ENCOUNTER — OFFICE VISIT (OUTPATIENT)
Dept: FAMILY MEDICINE CLINIC | Facility: CLINIC | Age: 72
End: 2017-09-25

## 2017-09-25 VITALS
TEMPERATURE: 98.2 F | HEIGHT: 72 IN | HEART RATE: 64 BPM | BODY MASS INDEX: 26.95 KG/M2 | WEIGHT: 199 LBS | DIASTOLIC BLOOD PRESSURE: 80 MMHG | SYSTOLIC BLOOD PRESSURE: 150 MMHG | OXYGEN SATURATION: 98 % | RESPIRATION RATE: 18 BRPM

## 2017-09-25 DIAGNOSIS — Z79.899 DRUG THERAPY: ICD-10-CM

## 2017-09-25 DIAGNOSIS — R06.02 SHORTNESS OF BREATH: ICD-10-CM

## 2017-09-25 DIAGNOSIS — I48.21 PERMANENT ATRIAL FIBRILLATION (HCC): Primary | ICD-10-CM

## 2017-09-25 DIAGNOSIS — I50.21 ACUTE SYSTOLIC HEART FAILURE (HCC): ICD-10-CM

## 2017-09-25 PROBLEM — I48.91 ATRIAL FIBRILLATION: Status: ACTIVE | Noted: 2017-09-25

## 2017-09-25 PROCEDURE — 99214 OFFICE O/P EST MOD 30 MIN: CPT | Performed by: FAMILY MEDICINE

## 2017-09-25 PROCEDURE — 93000 ELECTROCARDIOGRAM COMPLETE: CPT | Performed by: FAMILY MEDICINE

## 2017-09-25 PROCEDURE — 71020 HC CHEST PA AND LATERAL: CPT

## 2017-09-25 RX ORDER — BACLOFEN 10 MG/1
TABLET ORAL
Qty: 90 TABLET | Refills: 3 | Status: SHIPPED | OUTPATIENT
Start: 2017-09-25 | End: 2018-01-05

## 2017-09-25 RX ORDER — FUROSEMIDE 40 MG/1
40 TABLET ORAL 2 TIMES DAILY
Qty: 90 TABLET | Refills: 3 | Status: SHIPPED | OUTPATIENT
Start: 2017-09-25 | End: 2018-01-05

## 2017-09-25 NOTE — PROGRESS NOTES
Procedure     ECG 12 Lead  Date/Time: 9/25/2017 4:54 PM  Performed by: NIKOLAY HAMMOND  Authorized by: NIKOLAY HAMMOND   Comparison: not compared with previous ECG   Previous ECG: no previous ECG available  Rhythm: atrial fibrillation  Rate: tachycardic  Rate comments: rate 100  Conduction: conduction normal  ST Segments: ST segments normal  T Waves: T waves normal  QRS axis: normal  Q waves: V1 and V2  Clinical impression: abnormal ECG

## 2017-09-25 NOTE — TELEPHONE ENCOUNTER
09/25/17  11:45 AM  Cody Eldridge  1945    Home Phone [phone]   Mobile [phone]     Mr. Eldridge states he has been having episodes of his heart racing for about one week. He has one episode in which his , but he states this only lasted a few minutes. Otherwise, his HR 70s-90s with -150s/80-100s.     With these episodes he feels SOA and has been using inhalers. He reports feeling fatigued. He denies lightheadedness or chest pain. He takes 20mg lisinopril BID, 50mg metoprolol BID, 10mg amlodipine daily, and aspirin daily.    He states he is patient of Dr. Travis's. There are no records in [facility] or [facility]; he states it has been over 3 years since he was last seen. Dr. Travis does not have any appts available for a new patient until November 1st. I offered him our first available appt which is with Dr. Omer on 10/5 at 2pm at the  location; he accepted this appt.     I advised him to go to ER for sustained HR>120. He states he is seeing Dr. Kirk today.    Mary FLORIAN RN

## 2017-09-25 NOTE — PROGRESS NOTES
"Subjective   Cody Eldridge is a 72 y.o. male.     History of Present Illness Says his home /148, . That was last night.Says this has been happening all weekend. No chest pain or dizziness. Has had a HA.Gets out of breath. Chills. Hot. Is in pain when he feels hot, back and knees both. Is \"[id number]\" to work less- see the last visit to me.  Using inhalers that help \"a little\". Has not sleep bc of knee and back pain.  Used to see Dr Travis but not in 3 years. Says ankles are more swollen than usual.    The following portions of the patient's history were reviewed and updated as appropriate: allergies, current medications, past social history and problem list.    Review of Systems   Constitutional: Negative for activity change, appetite change and unexpected weight change.        BP has been elevated- hpi   HENT: Negative for nosebleeds and trouble swallowing.    Eyes: Negative for pain and visual disturbance.   Respiratory: Positive for chest tightness and shortness of breath (IRAHETA). Negative for wheezing.    Cardiovascular: Positive for leg swelling. Negative for chest pain and palpitations.   Gastrointestinal: Negative for abdominal pain and blood in stool.   Endocrine: Positive for heat intolerance.   Genitourinary: Negative for difficulty urinating and hematuria.   Musculoskeletal: Negative for joint swelling.   Skin: Negative for color change and rash.   Allergic/Immunologic: Negative.    Neurological: Positive for weakness. Negative for syncope and speech difficulty.   Hematological: Negative for adenopathy.   Psychiatric/Behavioral: Negative for agitation and confusion.   All other systems reviewed and are negative.      Objective   /80  Pulse 64  Temp 98.2 °F (36.8 °C)  Resp 18  Ht 72\" (182.9 cm)  Wt 199 lb (90.3 kg)  SpO2 98%  BMI 26.99 kg/m2  Physical Exam   Constitutional: He is oriented to person, place, and time. Vital signs are normal. He appears well-developed and " well-nourished. No distress.   HENT:   Head: Normocephalic.   Neck: No JVD present. Carotid bruit is not present. No thyromegaly present.   No bruits   Cardiovascular: Normal rate and normal heart sounds.  Exam reveals no gallop and no friction rub.    No murmur heard.  irreg irreg HR   Pulmonary/Chest: Effort normal. He has rales (bilateral bases).   Musculoskeletal: Edema: 1/2 ankle edema bilat.   Neurological: He is alert and oriented to person, place, and time. Gait normal.   Psychiatric: He has a normal mood and affect. His behavior is normal. Judgment and thought content normal.   Vitals reviewed.      Assessment/Plan   Problem List Items Addressed This Visit        Cardiovascular and Mediastinum    Atrial fibrillation - Primary      Other Visit Diagnoses     Acute systolic heart failure        Relevant Orders    proBNP    Shortness of breath        Relevant Medications    furosemide (LASIX) 40 MG tablet    Other Relevant Orders    XR Chest 2 View    Drug therapy        Relevant Orders    Comprehensive Metabolic Panel        Dx is acute CHF. He is tolerating this at this time. Hasnot been on any diruetic and have started BID lasix for now.   Has a cardiology appt Oct 2 with Dr Omer at [facility].  No work for now  Call me noon tomorrow re his breathing  I may consult cardiology tomorrow after this, and the labs and CXRT report available.

## 2017-09-26 ENCOUNTER — TELEPHONE (OUTPATIENT)
Dept: FAMILY MEDICINE CLINIC | Facility: CLINIC | Age: 72
End: 2017-09-26

## 2017-09-26 DIAGNOSIS — R59.0 HILAR ADENOPATHY: Primary | ICD-10-CM

## 2017-09-26 LAB
ALBUMIN SERPL-MCNC: 4.1 G/DL (ref 3.5–5.2)
ALBUMIN/GLOB SERPL: 1.3 G/DL
ALP SERPL-CCNC: 77 U/L (ref 39–117)
ALT SERPL-CCNC: 43 U/L (ref 1–41)
AST SERPL-CCNC: 38 U/L (ref 1–40)
BILIRUB SERPL-MCNC: 0.6 MG/DL (ref 0.1–1.2)
BUN SERPL-MCNC: 11 MG/DL (ref 8–23)
BUN/CREAT SERPL: 11.7 (ref 7–25)
CALCIUM SERPL-MCNC: 9.8 MG/DL (ref 8.6–10.5)
CHLORIDE SERPL-SCNC: 102 MMOL/L (ref 98–107)
CO2 SERPL-SCNC: 25.7 MMOL/L (ref 22–29)
CREAT SERPL-MCNC: 0.94 MG/DL (ref 0.76–1.27)
GLOBULIN SER CALC-MCNC: 3.2 GM/DL
GLUCOSE SERPL-MCNC: 98 MG/DL (ref 65–99)
NT-PROBNP SERPL-MCNC: 2547 PG/ML (ref 0–376)
POTASSIUM SERPL-SCNC: 4 MMOL/L (ref 3.5–5.2)
PROT SERPL-MCNC: 7.3 G/DL (ref 6–8.5)
SODIUM SERPL-SCNC: 141 MMOL/L (ref 136–145)

## 2017-09-26 NOTE — TELEPHONE ENCOUNTER
Spoke with pt, and he is breathing better, slept well. Not working, as I had asked. Stll awaiting lab results. Told him CXR showed enlarge lymph nodes central chest. Will order CT Chest now. Continue with furosemide, and not working for now. Rest.

## 2017-09-27 ENCOUNTER — APPOINTMENT (OUTPATIENT)
Dept: GENERAL RADIOLOGY | Facility: HOSPITAL | Age: 72
End: 2017-09-27

## 2017-09-29 ENCOUNTER — HOSPITAL ENCOUNTER (OUTPATIENT)
Dept: CT IMAGING | Facility: HOSPITAL | Age: 72
End: 2017-09-29

## 2017-10-02 ENCOUNTER — TELEPHONE (OUTPATIENT)
Dept: FAMILY MEDICINE CLINIC | Facility: CLINIC | Age: 72
End: 2017-10-02

## 2017-10-02 ENCOUNTER — HOSPITAL ENCOUNTER (OUTPATIENT)
Dept: CT IMAGING | Facility: HOSPITAL | Age: 72
Discharge: HOME | End: 2017-10-02
Admitting: FAMILY MEDICINE

## 2017-10-02 LAB — CREAT BLDA-MCNC: 1 MG/DL (ref 0.6–1.3)

## 2017-10-02 PROCEDURE — 0 IOPAMIDOL 61 % SOLUTION: Performed by: FAMILY MEDICINE

## 2017-10-02 PROCEDURE — 82565 ASSAY OF CREATININE: CPT

## 2017-10-02 PROCEDURE — 71260 CT THORAX DX C+: CPT

## 2017-10-02 RX ADMIN — IOPAMIDOL 85 ML: 612 INJECTION, SOLUTION INTRAVENOUS at 15:15

## 2017-10-02 NOTE — TELEPHONE ENCOUNTER
Patient's CT Scan is still under medical review. [facility] wants to know if they should cancel Scan or not?

## 2017-10-03 NOTE — TELEPHONE ENCOUNTER
"I don't know what the question means \"the CT scan is under review, should they cancel the Ct scan\". Try to find out. The questio came from Viky, but likely some one has to call to find out what they mean.  "

## 2017-10-05 ENCOUNTER — LAB (OUTPATIENT)
Dept: LAB | Facility: HOSPITAL | Age: 72
End: 2017-10-05

## 2017-10-05 ENCOUNTER — OFFICE VISIT (OUTPATIENT)
Dept: CARDIOLOGY | Facility: CLINIC | Age: 72
End: 2017-10-05

## 2017-10-05 VITALS
WEIGHT: 198 LBS | SYSTOLIC BLOOD PRESSURE: 120 MMHG | HEIGHT: 73 IN | RESPIRATION RATE: 16 BRPM | DIASTOLIC BLOOD PRESSURE: 86 MMHG | BODY MASS INDEX: 26.24 KG/M2 | HEART RATE: 86 BPM

## 2017-10-05 DIAGNOSIS — I48.0 PAROXYSMAL ATRIAL FIBRILLATION (HCC): ICD-10-CM

## 2017-10-05 DIAGNOSIS — I10 ESSENTIAL HYPERTENSION: ICD-10-CM

## 2017-10-05 DIAGNOSIS — I48.0 PAROXYSMAL ATRIAL FIBRILLATION (HCC): Primary | ICD-10-CM

## 2017-10-05 DIAGNOSIS — R06.02 SHORTNESS OF BREATH: ICD-10-CM

## 2017-10-05 DIAGNOSIS — E78.5 HYPERLIPIDEMIA, UNSPECIFIED HYPERLIPIDEMIA TYPE: ICD-10-CM

## 2017-10-05 DIAGNOSIS — J43.9 PULMONARY EMPHYSEMA, UNSPECIFIED EMPHYSEMA TYPE (HCC): ICD-10-CM

## 2017-10-05 LAB
ANION GAP SERPL CALCULATED.3IONS-SCNC: 12.8 MMOL/L
BUN BLD-MCNC: 12 MG/DL (ref 8–23)
BUN/CREAT SERPL: 14.6 (ref 7–25)
CALCIUM SPEC-SCNC: 9.3 MG/DL (ref 8.6–10.5)
CHLORIDE SERPL-SCNC: 101 MMOL/L (ref 98–107)
CO2 SERPL-SCNC: 27.2 MMOL/L (ref 22–29)
CREAT BLD-MCNC: 0.82 MG/DL (ref 0.76–1.27)
GFR SERPL CREATININE-BSD FRML MDRD: 92 ML/MIN/1.73
GLUCOSE BLD-MCNC: 103 MG/DL (ref 65–99)
NT-PROBNP SERPL-MCNC: 1783 PG/ML (ref 5–900)
POTASSIUM BLD-SCNC: 3.6 MMOL/L (ref 3.5–5.2)
SODIUM BLD-SCNC: 141 MMOL/L (ref 136–145)

## 2017-10-05 PROCEDURE — 99204 OFFICE O/P NEW MOD 45 MIN: CPT | Performed by: INTERNAL MEDICINE

## 2017-10-05 PROCEDURE — 93000 ELECTROCARDIOGRAM COMPLETE: CPT | Performed by: INTERNAL MEDICINE

## 2017-10-05 PROCEDURE — 83880 ASSAY OF NATRIURETIC PEPTIDE: CPT | Performed by: INTERNAL MEDICINE

## 2017-10-05 PROCEDURE — 80048 BASIC METABOLIC PNL TOTAL CA: CPT

## 2017-10-05 PROCEDURE — 36415 COLL VENOUS BLD VENIPUNCTURE: CPT

## 2017-10-05 RX ORDER — WARFARIN SODIUM 2 MG/1
4 TABLET ORAL DAILY
Qty: 70 TABLET | Refills: 3 | Status: SHIPPED | OUTPATIENT
Start: 2017-10-05 | End: 2018-05-09 | Stop reason: SDUPTHER

## 2017-10-05 NOTE — PROGRESS NOTES
PATIENTINFORMATION    Date of Office Visit: 10/05/2017  Encounter Provider: Myriam Omer MD  Place of Service: [facility] CARDIOLOGY  Patient Name: Cody Eldridge  : 1945    Subjective:     Encounter Date:10/05/2017      Patient ID: Cody Eldridge is a 72 y.o. male.      History of Present Illness    This is a nice gentleman who saw Dr. Travis in the remote past. He is a difficult historian. Per Dr. Travis's note from  he had SV[facility] with [facility]. He also has COPD, hypertension and hyperlipidemia. To me, he denies coronary disease but his old records show that he has had a non-ST elevation myocardial infarction in the past. He had an echo in 10/2009, which showed normal left ventricular systolic function with an ejection fraction of 64%. There was mild right and left atrial enlargement and mild mitral and tricuspid regurgitation with a right ventricular systolic pressure of 39 mmHg.     He was in seeing Dr. Kirk last week with shortness of breath and some swelling. His ProBNP was elevated. He was referred to see me and started on Lasix 20 mg b.i.d. He had a chest x-ray which showed mild cardiac enlargement with pleural thickening and a mass-like nodularity. This led to a CT of the chest with contrast which showed prominent changes of emphysema with scarring. There was a small pericardial effusion noted. No mention is made of changes consistent with heart failure.     He thinks he is feeling a little better since starting the Lasix. Again, it is hard to get a history out of him and he has got a very flat affect. He thinks the swelling is better. He denies any lightheadedness or chest pain. He does complain of weakness and fatigue.       Review of Systems   Constitution: Negative for fever, malaise/fatigue, weight gain and weight loss.   HENT: Negative for ear pain, hearing loss, nosebleeds and sore throat.    Eyes:  "Negative for double vision, pain, vision loss in left eye and vision loss in right eye.   Cardiovascular:        See history of present illness.   Respiratory: Positive for shortness of breath. Negative for cough, sleep disturbances due to breathing, snoring and wheezing.    Endocrine: Negative for cold intolerance, heat intolerance and polyuria.   Skin: Negative for itching, poor wound healing and rash.   Musculoskeletal: Negative for joint pain, joint swelling and myalgias.   Gastrointestinal: Negative for abdominal pain, diarrhea, hematochezia, nausea and vomiting.   Genitourinary: Negative for hematuria and hesitancy.   Neurological: Negative for numbness, paresthesias and seizures.   Psychiatric/Behavioral: Negative for depression. The patient is not nervous/anxious.            ECG 12 Lead  Date/Time: 10/5/2017 2:49 PM  Performed by: SHILOH GRIMALDO  Authorized by: SHILOH GRIMALDO   Comparison: compared with previous ECG from 2/21/2012  Comparison to previous ECG: Atrial fibrillation is new  Rhythm: atrial fibrillation  BPM: 86  Conduction: conduction normal  ST Segments: ST segments normal  T Waves: T waves normal  Clinical impression: abnormal ECG               Objective:     /86 (BP Location: Right arm, Patient Position: Sitting, Cuff Size: Adult)  Pulse 86  Resp 16  Ht 73\" (185.4 cm)  Wt 198 lb (89.8 kg)  BMI 26.12 kg/m2 Body mass index is 26.12 kg/(m^2).     Physical Exam   Constitutional: He appears well-developed.   HENT:   Head: Normocephalic and atraumatic.   Eyes: Conjunctivae and lids are normal. Pupils are equal, round, and reactive to light. Lids are everted and swept, no foreign bodies found.   Neck: Normal range of motion. No JVD present. Carotid bruit is not present. No tracheal deviation present. No thyroid mass present.   Cardiovascular: Normal rate and normal heart sounds.  An irregularly irregular rhythm present.   Pulses:       Dorsalis pedis pulses are 2+ on the right side, " and 2+ on the left side.   Pulmonary/Chest: Effort normal and breath sounds normal.   Abdominal: Normal appearance and bowel sounds are normal.   Musculoskeletal: Normal range of motion.   Neurological: He is alert. He has normal strength.   Skin: Skin is warm, dry and intact.   Psychiatric: He has a normal mood and affect. His behavior is normal.   Vitals reviewed.          Assessment/Plan:       1. Possible heart failure. I am going to check a basic metabolic panel on him today and make sure his kidneys are doing okay with the diuretic. I am going to arrange for an echocardiogram. I wonder if he may not have worsening of his pulmonary hypertension due to his lung disease and this has caused him to retain fluid.   2. Atrial fibrillation. He is in rate-controlled atrial fibrillation today. He has an elevated [id number] score. We discussed anticoagulation, and they feel most comfortable with warfarin. So I am starting him on warfarin 4 mg a day and he is going to have an INR in our office next week.   3. COPD. He is supposed to see Dr. Segovia tomorrow.   4. Coronary artery disease. I can certainly see coronary artery calcification on his CT scan. In the remote records, it is mentioned that he had a non-ST elevation myocardial infarction in the past.   5. Right heart enlargement noted on CT scan.     I will follow up with him on the results of his blood work and the echo. I am going to see him back in 3 months.       Orders Placed This Encounter   Procedures   • Basic Metabolic Panel     Standing Status:   Future     Number of Occurrences:   1     Standing Expiration Date:   10/5/2018   • proBNP     Standing Status:   Future     Number of Occurrences:   1     Standing Expiration Date:   10/5/2018   • ECG 12 Lead     This order was created via procedure documentation   • Adult Transthoracic Echo Complete W/ Cont if Necessary Per Protocol     Standing Status:   Future     Standing Expiration Date:   10/5/2018      Scheduling Instructions:      Schedule of 10/10 or 10/11 so pt can get INR     Order Specific Question:   Reason for exam?     Answer:   Dyspnea      ChenteCody   Home Medication Instructions LING:    Printed on:10/06/17 1359   Medication Information                      albuterol (PROVENTIL HFA;VENTOLIN HFA) 108 (90 BASE) MCG/ACT inhaler  ProAir  (90 Base) MCG/ACT Inhalation Aerosol Solution; Patient Sig: ProAir  (90 Base) MCG/ACT Inhalation Aerosol Solution inhale 2 puffs by mouth every 4 hours if needed; 8.5; 11; 07-Apr-2014; Active             amLODIPine (NORVASC) 10 MG tablet  take 1 tablet by mouth once daily             azelastine (ASTELIN) 0.1 % nasal spray  2 Squirts into each nostril 2 (two) times a day.             baclofen (LIORESAL) 10 MG tablet  take 1 tablet by mouth three times a day             chlorzoxazone (PARAFON FORTE DSC) 500 MG tablet  1-2 at bedtime as needed leg cramping, hold baclofen if taking             fluticasone-salmeterol (ADVAIR) 250-50 MCG/DOSE DISKUS  Advair Diskus 250-50 MCG/DOSE Inhalation Aerosol Powder Breath Activated; Patient Sig: Advair Diskus 250-50 MCG/DOSE Inhalation Aerosol Powder Breath Activated ; 0; 30-Sep-2014; Active             furosemide (LASIX) 40 MG tablet  Take 1 tablet by mouth 2 (Two) Times a Day.             HYDROcodone-acetaminophen (NORCO) 7.5-325 MG per tablet  Take 1 tablet by mouth Every 4 (Four) Hours As Needed for Moderate Pain . take 1 tablet by mouth four times a day             lisinopril (PRINIVIL,ZESTRIL) 20 MG tablet  Takes 2 tablets daily             metoprolol tartrate (LOPRESSOR) 50 MG tablet  Take 1 tablet by mouth 2 (Two) Times a Day.             montelukast (SINGULAIR) 10 MG tablet  take 1 tablet by mouth once daily             naproxen (NAPROSYN) 250 MG tablet  Take 1 tablet by mouth 2 (Two) Times a Day As Needed for Mild Pain (1-3).             omeprazole (priLOSEC) 40 MG capsule  Take 1 capsule by mouth Daily.              rosuvastatin (CRESTOR) 20 MG tablet  Take 0.5 tablets by mouth Daily.             sertraline (ZOLOFT) 50 MG tablet  Take 1 tablet by mouth Daily.             warfarin (COUMADIN) 2 MG tablet  Take 2 tablets by mouth Daily.             zolpidem (AMBIEN) 10 MG tablet  take 1 tablet by mouth at bedtime                        Myriam Omer MD  10/06/17  12:49 PM

## 2017-10-11 ENCOUNTER — OFFICE VISIT (OUTPATIENT)
Dept: PAIN MEDICINE | Facility: CLINIC | Age: 72
End: 2017-10-11

## 2017-10-11 VITALS
DIASTOLIC BLOOD PRESSURE: 89 MMHG | TEMPERATURE: 98 F | HEIGHT: 73 IN | BODY MASS INDEX: 25.92 KG/M2 | RESPIRATION RATE: 18 BRPM | WEIGHT: 195.6 LBS | SYSTOLIC BLOOD PRESSURE: 130 MMHG | OXYGEN SATURATION: 95 % | HEART RATE: 82 BPM

## 2017-10-11 DIAGNOSIS — G89.29 OTHER CHRONIC PAIN: Primary | ICD-10-CM

## 2017-10-11 DIAGNOSIS — M51.37 DEGENERATION OF INTERVERTEBRAL DISC OF LUMBOSACRAL REGION: ICD-10-CM

## 2017-10-11 DIAGNOSIS — M54.16 LUMBAR RADICULOPATHY: ICD-10-CM

## 2017-10-11 LAB
POC AMPHETAMINES: NEGATIVE
POC BARBITURATES: NEGATIVE
POC BENZODIAZEPHINES: NEGATIVE
POC COCAINE: NEGATIVE
POC METHADONE: NEGATIVE
POC METHAMPHETAMINE SCREEN URINE: NEGATIVE
POC OPIATES: POSITIVE
POC OXYCODONE: NEGATIVE
POC PHENCYCLIDINE: NEGATIVE
POC PROPOXYPHENE: NEGATIVE
POC THC: NEGATIVE
POC TRICYCLIC ANTIDEPRESSANTS: NEGATIVE

## 2017-10-11 PROCEDURE — 99214 OFFICE O/P EST MOD 30 MIN: CPT | Performed by: NURSE PRACTITIONER

## 2017-10-11 PROCEDURE — 80305 DRUG TEST PRSMV DIR OPT OBS: CPT | Performed by: NURSE PRACTITIONER

## 2017-10-11 NOTE — PATIENT INSTRUCTIONS
Epidural Steroid Injection  An epidural steroid injection is given to relieve pain in your neck, back, or legs that is caused by the irritation or swelling of a nerve root. This procedure involves injecting a steroid and numbing medicine (anesthetic) into the epidural space. The epidural space is the space between the outer covering of your spinal cord and the bones that form your backbone (vertebra).   LET YOUR HEALTH CARE PROVIDER KNOW ABOUT:   · Any allergies you have.  · All medicines you are taking, including vitamins, herbs, eye drops, creams, and over-the-counter medicines such as aspirin.  · Previous problems you or members of your family have had with the use of anesthetics.  · Any blood disorders or blood clotting disorders you have.  · Previous surgeries you have had.  · Medical conditions you have.  RISKS AND COMPLICATIONS  Generally, this is a safe procedure. However, as with any procedure, complications can occur. Possible complications of epidural steroid injection include:  · Headache.  · Bleeding.  · Infection.  · Allergic reaction to the medicines.  · Damage to your nerves.  The response to this procedure depends on the underlying cause of the pain and its duration. People who have long-term (chronic) pain are less likely to benefit from epidural steroids than are those people whose pain comes on strong and suddenly.  BEFORE THE PROCEDURE   · Ask your health care provider about changing or stopping your regular medicines. You may be advised to stop taking blood-thinning medicines a few days before the procedure.  · You may be given medicines to reduce anxiety.  · Arrange for someone to take you home after the procedure.  PROCEDURE   · You will remain awake during the procedure. You may receive medicine to make you relaxed.  · You will be asked to lie on your stomach.  · The injection site will be cleaned.  · The injection site will be numbed with a medicine (local anesthetic).  · A needle will be  injected through your skin into the epidural space.  · Your health care provider will use an X-ray machine to ensure that the steroid is delivered closest to the affected nerve. You may have minimal discomfort at this time.  · Once the needle is in the right position, the local anesthetic and the steroid will be injected into the epidural space.  · The needle will then be removed and a bandage will be applied to the injection site.  AFTER THE PROCEDURE   · You may be monitored for a short time before you go home.  · You may feel weakness or numbness in your arm or leg, which disappears within hours.  · You may be allowed to eat, drink, and take your regular medicine.  · You may have soreness at the site of the injection.     This information is not intended to replace advice given to you by your health care provider. Make sure you discuss any questions you have with your health care provider.     Document Released: 03/26/2009 Document Revised: 08/20/2014 Document Reviewed: 06/06/2014  [employer] Interactive Patient Education ©2017 [employer]
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
## 2017-10-12 ENCOUNTER — TELEPHONE (OUTPATIENT)
Dept: PAIN MEDICINE | Facility: CLINIC | Age: 72
End: 2017-10-12

## 2017-10-12 ENCOUNTER — TELEPHONE (OUTPATIENT)
Dept: CARDIOLOGY | Facility: CLINIC | Age: 72
End: 2017-10-12

## 2017-10-12 ENCOUNTER — HOSPITAL ENCOUNTER (OUTPATIENT)
Dept: CARDIOLOGY | Facility: HOSPITAL | Age: 72
Discharge: HOME | End: 2017-10-12
Attending: INTERNAL MEDICINE | Admitting: INTERNAL MEDICINE

## 2017-10-12 VITALS
DIASTOLIC BLOOD PRESSURE: 80 MMHG | WEIGHT: 195 LBS | HEART RATE: 73 BPM | BODY MASS INDEX: 25.84 KG/M2 | HEIGHT: 73 IN | SYSTOLIC BLOOD PRESSURE: 120 MMHG

## 2017-10-12 DIAGNOSIS — R06.02 SHORTNESS OF BREATH: ICD-10-CM

## 2017-10-12 DIAGNOSIS — E78.5 HYPERLIPIDEMIA, UNSPECIFIED HYPERLIPIDEMIA TYPE: ICD-10-CM

## 2017-10-12 DIAGNOSIS — I48.0 PAROXYSMAL ATRIAL FIBRILLATION (HCC): ICD-10-CM

## 2017-10-12 LAB
ASCENDING AORTA: 2.9 CM
BH CV ECHO MEAS - ACS: 2 CM
BH CV ECHO MEAS - AO MAX PG (FULL): 1.3 MMHG
BH CV ECHO MEAS - AO MAX PG: 3.2 MMHG
BH CV ECHO MEAS - AO MEAN PG (FULL): 0.65 MMHG
BH CV ECHO MEAS - AO MEAN PG: 1.8 MMHG
BH CV ECHO MEAS - AO ROOT AREA (BSA CORRECTED): 1.6
BH CV ECHO MEAS - AO ROOT AREA: 9.4 CM^2
BH CV ECHO MEAS - AO ROOT DIAM: 3.5 CM
BH CV ECHO MEAS - AO V2 MAX: 89.2 CM/SEC
BH CV ECHO MEAS - AO V2 MEAN: 62.2 CM/SEC
BH CV ECHO MEAS - AO V2 VTI: 17.1 CM
BH CV ECHO MEAS - AVA(I,A): 2.8 CM^2
BH CV ECHO MEAS - AVA(I,D): 2.8 CM^2
BH CV ECHO MEAS - AVA(V,A): 2.7 CM^2
BH CV ECHO MEAS - AVA(V,D): 2.7 CM^2
BH CV ECHO MEAS - BSA(HAYCOCK): 2.1 M^2
BH CV ECHO MEAS - BSA: 2.1 M^2
BH CV ECHO MEAS - BZI_BMI: 25.7 KILOGRAMS/M^2
BH CV ECHO MEAS - BZI_METRIC_HEIGHT: 185.4 CM
BH CV ECHO MEAS - BZI_METRIC_WEIGHT: 88.5 KG
BH CV ECHO MEAS - CONTRAST EF (2CH): 59.8 ML/M^2
BH CV ECHO MEAS - CONTRAST EF 4CH: 56.3 ML/M^2
BH CV ECHO MEAS - EDV(MOD-SP2): 102 ML
BH CV ECHO MEAS - EDV(MOD-SP4): 80 ML
BH CV ECHO MEAS - EDV(TEICH): 67.4 ML
BH CV ECHO MEAS - EF(CUBED): 44.8 %
BH CV ECHO MEAS - EF(MOD-SP2): 59.8 %
BH CV ECHO MEAS - EF(MOD-SP4): 56.3 %
BH CV ECHO MEAS - EF(TEICH): 37.9 %
BH CV ECHO MEAS - ESV(MOD-SP2): 41 ML
BH CV ECHO MEAS - ESV(MOD-SP4): 35 ML
BH CV ECHO MEAS - ESV(TEICH): 41.9 ML
BH CV ECHO MEAS - FS: 18 %
BH CV ECHO MEAS - IVS/LVPW: 0.89
BH CV ECHO MEAS - IVSD: 0.98 CM
BH CV ECHO MEAS - LAT PEAK E' VEL: 14 CM/SEC
BH CV ECHO MEAS - LV DIASTOLIC VOL/BSA (35-75): 37.6 ML/M^2
BH CV ECHO MEAS - LV MASS(C)D: 132.1 GRAMS
BH CV ECHO MEAS - LV MASS(C)DI: 62 GRAMS/M^2
BH CV ECHO MEAS - LV MAX PG: 1.8 MMHG
BH CV ECHO MEAS - LV MEAN PG: 1.1 MMHG
BH CV ECHO MEAS - LV SYSTOLIC VOL/BSA (12-30): 16.4 ML/M^2
BH CV ECHO MEAS - LV V1 MAX: 67.9 CM/SEC
BH CV ECHO MEAS - LV V1 MEAN: 50.2 CM/SEC
BH CV ECHO MEAS - LV V1 VTI: 13.2 CM
BH CV ECHO MEAS - LVIDD: 3.9 CM
BH CV ECHO MEAS - LVIDS: 3.2 CM
BH CV ECHO MEAS - LVLD AP2: 7.8 CM
BH CV ECHO MEAS - LVLD AP4: 7.9 CM
BH CV ECHO MEAS - LVLS AP2: 7 CM
BH CV ECHO MEAS - LVLS AP4: 6.7 CM
BH CV ECHO MEAS - LVOT AREA (M): 3.5 CM^2
BH CV ECHO MEAS - LVOT AREA: 3.6 CM^2
BH CV ECHO MEAS - LVOT DIAM: 2.1 CM
BH CV ECHO MEAS - LVPWD: 1.1 CM
BH CV ECHO MEAS - MED PEAK E' VEL: 11 CM/SEC
BH CV ECHO MEAS - MR MAX PG: 82 MMHG
BH CV ECHO MEAS - MR MAX VEL: 452.7 CM/SEC
BH CV ECHO MEAS - MV A MAX VEL: 96 CM/SEC
BH CV ECHO MEAS - MV DEC SLOPE: 668.1 CM/SEC^2
BH CV ECHO MEAS - MV DEC TIME: 0.14 SEC
BH CV ECHO MEAS - MV E MAX VEL: 96 CM/SEC
BH CV ECHO MEAS - MV MAX PG: 3.7 MMHG
BH CV ECHO MEAS - MV MEAN PG: 1.3 MMHG
BH CV ECHO MEAS - MV P1/2T MAX VEL: 97 CM/SEC
BH CV ECHO MEAS - MV P1/2T: 42.5 MSEC
BH CV ECHO MEAS - MV V2 MAX: 95.6 CM/SEC
BH CV ECHO MEAS - MV V2 MEAN: 52.3 CM/SEC
BH CV ECHO MEAS - MV V2 VTI: 23.6 CM
BH CV ECHO MEAS - MVA P1/2T LCG: 2.3 CM^2
BH CV ECHO MEAS - MVA(P1/2T): 5.2 CM^2
BH CV ECHO MEAS - MVA(VTI): 2 CM^2
BH CV ECHO MEAS - PA ACC TIME: 0.11 SEC
BH CV ECHO MEAS - PA MAX PG (FULL): 1.1 MMHG
BH CV ECHO MEAS - PA MAX PG: 2.5 MMHG
BH CV ECHO MEAS - PA PR(ACCEL): 29.9 MMHG
BH CV ECHO MEAS - PA V2 MAX: 78.5 CM/SEC
BH CV ECHO MEAS - PVA(V,A): 2.4 CM^2
BH CV ECHO MEAS - PVA(V,D): 2.4 CM^2
BH CV ECHO MEAS - QP/QS: 0.76
BH CV ECHO MEAS - RAP SYSTOLE: 8 MMHG
BH CV ECHO MEAS - RV MAX PG: 1.4 MMHG
BH CV ECHO MEAS - RV MEAN PG: 0.77 MMHG
BH CV ECHO MEAS - RV V1 MAX: 58.2 CM/SEC
BH CV ECHO MEAS - RV V1 MEAN: 41.9 CM/SEC
BH CV ECHO MEAS - RV V1 VTI: 11.1 CM
BH CV ECHO MEAS - RVOT AREA: 3.3 CM^2
BH CV ECHO MEAS - RVOT DIAM: 2 CM
BH CV ECHO MEAS - RVSP: 47 MMHG
BH CV ECHO MEAS - SI(AO): 75 ML/M^2
BH CV ECHO MEAS - SI(CUBED): 12.8 ML/M^2
BH CV ECHO MEAS - SI(LVOT): 22.1 ML/M^2
BH CV ECHO MEAS - SI(MOD-SP2): 28.7 ML/M^2
BH CV ECHO MEAS - SI(MOD-SP4): 21.1 ML/M^2
BH CV ECHO MEAS - SI(TEICH): 12 ML/M^2
BH CV ECHO MEAS - SUP REN AO DIAM: 2.1 CM
BH CV ECHO MEAS - SV(AO): 159.7 ML
BH CV ECHO MEAS - SV(CUBED): 27.3 ML
BH CV ECHO MEAS - SV(LVOT): 47.1 ML
BH CV ECHO MEAS - SV(MOD-SP2): 61 ML
BH CV ECHO MEAS - SV(MOD-SP4): 45 ML
BH CV ECHO MEAS - SV(RVOT): 36 ML
BH CV ECHO MEAS - SV(TEICH): 25.5 ML
BH CV ECHO MEAS - TR MAX VEL: 312.2 CM/SEC
BH CV XLRA - RV BASE: 4.2 CM
BH CV XLRA - TDI S': 11 CM/SEC
E/E' RATIO: 8
LEFT ATRIUM VOLUME INDEX: 31 ML/M2
LV EF 2D ECHO EST: 56 %
SINUS: 3.7 CM
STJ: 2.8 CM

## 2017-10-12 PROCEDURE — 93306 TTE W/DOPPLER COMPLETE: CPT

## 2017-10-12 PROCEDURE — 93306 TTE W/DOPPLER COMPLETE: CPT | Performed by: INTERNAL MEDICINE

## 2017-10-12 RX ORDER — LISINOPRIL 20 MG/1
TABLET ORAL
Qty: 360 TABLET | Refills: 0 | Status: SHIPPED | OUTPATIENT
Start: 2017-10-12 | End: 2018-01-19 | Stop reason: SDUPTHER

## 2017-10-12 NOTE — TELEPHONE ENCOUNTER
Pt needs a cardiac clearance to be sent to pain management for upcoming procedure, not yet scheduled, stating he can have epidural shots in his back. They also want to know if he can hold any and all anticoagulants 5 days prior to the procedure. Please advise    Myriam [phone]  Fax, [phone]

## 2017-10-13 ENCOUNTER — TELEPHONE (OUTPATIENT)
Dept: CARDIOLOGY | Facility: CLINIC | Age: 72
End: 2017-10-13

## 2017-10-23 DIAGNOSIS — M54.2 NECK PAIN: ICD-10-CM

## 2017-10-23 RX ORDER — HYDROCODONE BITARTRATE AND ACETAMINOPHEN 7.5; 325 MG/1; MG/1
1 TABLET ORAL EVERY 4 HOURS PRN
Qty: 120 TABLET | Refills: 0 | Status: SHIPPED | OUTPATIENT
Start: 2017-10-23 | End: 2017-11-16 | Stop reason: SDUPTHER

## 2017-10-24 ENCOUNTER — OFFICE VISIT (OUTPATIENT)
Dept: FAMILY MEDICINE CLINIC | Facility: CLINIC | Age: 72
End: 2017-10-24

## 2017-10-24 VITALS
HEART RATE: 76 BPM | BODY MASS INDEX: 25.58 KG/M2 | WEIGHT: 193 LBS | HEIGHT: 73 IN | RESPIRATION RATE: 18 BRPM | OXYGEN SATURATION: 97 % | DIASTOLIC BLOOD PRESSURE: 82 MMHG | SYSTOLIC BLOOD PRESSURE: 134 MMHG

## 2017-10-24 DIAGNOSIS — J43.9 PULMONARY EMPHYSEMA, UNSPECIFIED EMPHYSEMA TYPE (HCC): ICD-10-CM

## 2017-10-24 DIAGNOSIS — I48.0 PAROXYSMAL ATRIAL FIBRILLATION (HCC): Primary | ICD-10-CM

## 2017-10-24 DIAGNOSIS — I10 ESSENTIAL HYPERTENSION: ICD-10-CM

## 2017-10-24 DIAGNOSIS — I36.1 NON-RHEUMATIC TRICUSPID VALVE INSUFFICIENCY: ICD-10-CM

## 2017-10-24 DIAGNOSIS — M54.16 LUMBAR RADICULOPATHY: ICD-10-CM

## 2017-10-24 DIAGNOSIS — I50.42 CHRONIC COMBINED SYSTOLIC AND DIASTOLIC HEART FAILURE (HCC): ICD-10-CM

## 2017-10-24 PROCEDURE — 99214 OFFICE O/P EST MOD 30 MIN: CPT | Performed by: FAMILY MEDICINE

## 2017-10-24 NOTE — PROGRESS NOTES
"Subjective   Cody Eldridge is a 72 y.o. male.     History of Present Illness Has not worked since Sept 22.  Seeing Dr Omer for cardiology- 2 visits.   Checks PT at cardiology.  Will have epidural Nov 1 and will need to stop the warfarin then. Will be done under xray, placing a gel around the nerve (Pain Mgt)  Still has leg and back pain on the left.  Admits to shortness of breath. However he says it is not extreme, though it is not the baselfine he remembers for the last many years. No recent URI's or coughs. Can sleep in the bed, 1 pillow.  Walking to check other people's work, but not doing real work himself.  The following portions of the patient's history were reviewed and updated as appropriate: allergies, current medications, past social history and problem list.    Review of Systems   Constitutional: Positive for fatigue. Negative for activity change, appetite change and unexpected weight change.   HENT: Negative for nosebleeds and trouble swallowing.    Eyes: Negative for pain and visual disturbance.   Respiratory: Positive for shortness of breath. Negative for chest tightness and wheezing.    Cardiovascular: Positive for palpitations. Negative for chest pain and leg swelling.   Gastrointestinal: Negative for abdominal pain and blood in stool.   Endocrine: Negative.    Genitourinary: Negative for difficulty urinating and hematuria.   Musculoskeletal: Positive for arthralgias, back pain and myalgias. Negative for joint swelling.   Skin: Negative for color change and rash.   Allergic/Immunologic: Negative.    Neurological: Negative for syncope and speech difficulty.   Hematological: Negative for adenopathy.   Psychiatric/Behavioral: Negative for agitation and confusion.   All other systems reviewed and are negative.      Objective   /82  Pulse 76  Resp 18  Ht 73\" (185.4 cm)  Wt 193 lb (87.5 kg)  SpO2 97%  BMI 25.46 kg/m2  Physical Exam   Constitutional: He is oriented to person, place, and time. " He appears well-developed and well-nourished. No distress.   HENT:   Head: Normocephalic and atraumatic.   Eyes: Conjunctivae and EOM are normal. Pupils are equal, round, and reactive to light. Right eye exhibits no discharge. Left eye exhibits no discharge. No scleral icterus.   Neck: Normal range of motion. Neck supple. No tracheal deviation present. No thyromegaly present.   Cardiovascular: Normal heart sounds, intact distal pulses and normal pulses.  Exam reveals no gallop.    No murmur heard.  irreg rhythm at rate hovering    Pulmonary/Chest: Effort normal and breath sounds normal. No respiratory distress. He has no wheezes. He has no rales.   Musculoskeletal: Normal range of motion. He exhibits tenderness (Lumbar back). He exhibits no edema.   Neurological: He is alert and oriented to person, place, and time. He exhibits normal muscle tone. Coordination normal.   Skin: Skin is warm. No rash noted. No erythema. No pallor.   Psychiatric: He has a normal mood and affect. His behavior is normal. Judgment and thought content normal.   Nursing note and vitals reviewed.      Assessment/Plan   Problem List Items Addressed This Visit        Cardiovascular and Mediastinum    Hypertension    Atrial fibrillation - Primary    Non-rheumatic tricuspid valve insufficiency       Respiratory    Chronic obstructive pulmonary disease       Nervous and Auditory    Lumbar radiculopathy      Other Visit Diagnoses     Chronic combined systolic and diastolic heart failure        Relevant Orders    proBNP           Check PT at cardiology office.  Certainly he cannot be cleared for work until he finishes epidurals and is not scheduled to start until Nov 1. Given his cardiac eval I am not certain at this point that he should have a job which involves labor.He is not released at this point to return to work.

## 2017-10-25 LAB — NT-PROBNP SERPL-MCNC: 2104 PG/ML (ref 0–376)

## 2017-10-27 ENCOUNTER — HOSPITAL ENCOUNTER (OUTPATIENT)
Dept: CARDIOLOGY | Facility: HOSPITAL | Age: 72
Setting detail: RECURRING SERIES
Discharge: HOME | End: 2017-10-27

## 2017-10-27 PROCEDURE — 36416 COLLJ CAPILLARY BLOOD SPEC: CPT

## 2017-10-27 PROCEDURE — 85610 PROTHROMBIN TIME: CPT

## 2017-11-10 ENCOUNTER — APPOINTMENT (OUTPATIENT)
Dept: SLEEP MEDICINE | Facility: HOSPITAL | Age: 72
End: 2017-11-10
Attending: INTERNAL MEDICINE

## 2017-11-16 ENCOUNTER — TELEPHONE (OUTPATIENT)
Dept: FAMILY MEDICINE CLINIC | Facility: CLINIC | Age: 72
End: 2017-11-16

## 2017-11-16 DIAGNOSIS — M54.2 NECK PAIN: ICD-10-CM

## 2017-11-16 RX ORDER — HYDROCODONE BITARTRATE AND ACETAMINOPHEN 7.5; 325 MG/1; MG/1
1 TABLET ORAL EVERY 4 HOURS PRN
Qty: 120 TABLET | Refills: 0 | Status: SHIPPED | OUTPATIENT
Start: 2017-11-16 | End: 2017-12-11 | Stop reason: SDUPTHER

## 2017-11-20 ENCOUNTER — HOSPITAL ENCOUNTER (OUTPATIENT)
Dept: CARDIOLOGY | Facility: HOSPITAL | Age: 72
Setting detail: RECURRING SERIES
Discharge: HOME | End: 2017-11-20

## 2017-11-20 PROCEDURE — 85610 PROTHROMBIN TIME: CPT

## 2017-11-20 PROCEDURE — 36416 COLLJ CAPILLARY BLOOD SPEC: CPT

## 2017-11-27 ENCOUNTER — HOSPITAL ENCOUNTER (OUTPATIENT)
Dept: CARDIOLOGY | Facility: HOSPITAL | Age: 72
Setting detail: RECURRING SERIES
Discharge: HOME | End: 2017-11-27

## 2017-11-27 PROCEDURE — 85610 PROTHROMBIN TIME: CPT

## 2017-11-27 PROCEDURE — 36416 COLLJ CAPILLARY BLOOD SPEC: CPT

## 2017-11-28 ENCOUNTER — OFFICE VISIT (OUTPATIENT)
Dept: CARDIOLOGY | Facility: CLINIC | Age: 72
End: 2017-11-28

## 2017-11-28 VITALS
HEART RATE: 94 BPM | DIASTOLIC BLOOD PRESSURE: 80 MMHG | RESPIRATION RATE: 16 BRPM | SYSTOLIC BLOOD PRESSURE: 126 MMHG | HEIGHT: 73 IN | WEIGHT: 198.6 LBS | BODY MASS INDEX: 26.32 KG/M2

## 2017-11-28 DIAGNOSIS — I48.0 PAROXYSMAL ATRIAL FIBRILLATION (HCC): ICD-10-CM

## 2017-11-28 DIAGNOSIS — J43.9 PULMONARY EMPHYSEMA, UNSPECIFIED EMPHYSEMA TYPE (HCC): ICD-10-CM

## 2017-11-28 DIAGNOSIS — I10 ESSENTIAL HYPERTENSION: ICD-10-CM

## 2017-11-28 DIAGNOSIS — E78.5 HYPERLIPIDEMIA, UNSPECIFIED HYPERLIPIDEMIA TYPE: ICD-10-CM

## 2017-11-28 DIAGNOSIS — G47.33 OSA (OBSTRUCTIVE SLEEP APNEA): Primary | ICD-10-CM

## 2017-11-28 PROCEDURE — 99214 OFFICE O/P EST MOD 30 MIN: CPT | Performed by: INTERNAL MEDICINE

## 2017-11-28 PROCEDURE — 93000 ELECTROCARDIOGRAM COMPLETE: CPT | Performed by: INTERNAL MEDICINE

## 2017-12-05 ENCOUNTER — HOSPITAL ENCOUNTER (OUTPATIENT)
Dept: CARDIOLOGY | Facility: HOSPITAL | Age: 72
Setting detail: RECURRING SERIES
Discharge: HOME | End: 2017-12-05

## 2017-12-05 PROCEDURE — 36416 COLLJ CAPILLARY BLOOD SPEC: CPT

## 2017-12-05 PROCEDURE — 85610 PROTHROMBIN TIME: CPT

## 2017-12-05 RX ORDER — METOPROLOL TARTRATE 50 MG/1
50 TABLET, FILM COATED ORAL 2 TIMES DAILY
Qty: 180 TABLET | Refills: 3 | Status: SHIPPED | OUTPATIENT
Start: 2017-12-05 | End: 2018-05-29 | Stop reason: SDUPTHER

## 2017-12-11 DIAGNOSIS — M54.2 NECK PAIN: ICD-10-CM

## 2017-12-11 RX ORDER — HYDROCODONE BITARTRATE AND ACETAMINOPHEN 7.5; 325 MG/1; MG/1
1 TABLET ORAL EVERY 4 HOURS PRN
Qty: 120 TABLET | Refills: 0 | Status: SHIPPED | OUTPATIENT
Start: 2017-12-11 | End: 2018-01-05

## 2017-12-14 ENCOUNTER — HOSPITAL ENCOUNTER (OUTPATIENT)
Dept: CARDIOLOGY | Facility: HOSPITAL | Age: 72
Setting detail: RECURRING SERIES
Discharge: HOME | End: 2017-12-14

## 2017-12-14 PROCEDURE — 85610 PROTHROMBIN TIME: CPT

## 2017-12-14 PROCEDURE — 36416 COLLJ CAPILLARY BLOOD SPEC: CPT

## 2017-12-20 DIAGNOSIS — R10.816 EPIGASTRIC ABDOMINAL TENDERNESS WITHOUT REBOUND TENDERNESS: ICD-10-CM

## 2017-12-20 RX ORDER — OMEPRAZOLE 40 MG/1
40 CAPSULE, DELAYED RELEASE ORAL DAILY
Qty: 90 CAPSULE | Refills: 3 | Status: SHIPPED | OUTPATIENT
Start: 2017-12-20 | End: 2018-01-05

## 2018-01-05 ENCOUNTER — OFFICE VISIT (OUTPATIENT)
Dept: CARDIOLOGY | Facility: CLINIC | Age: 73
End: 2018-01-05

## 2018-01-05 VITALS
SYSTOLIC BLOOD PRESSURE: 130 MMHG | DIASTOLIC BLOOD PRESSURE: 80 MMHG | HEART RATE: 125 BPM | HEIGHT: 73 IN | BODY MASS INDEX: 26.51 KG/M2 | WEIGHT: 200 LBS

## 2018-01-05 DIAGNOSIS — E78.5 HYPERLIPIDEMIA, UNSPECIFIED HYPERLIPIDEMIA TYPE: ICD-10-CM

## 2018-01-05 DIAGNOSIS — I48.21 PERMANENT ATRIAL FIBRILLATION (HCC): Primary | ICD-10-CM

## 2018-01-05 DIAGNOSIS — G47.33 OSA (OBSTRUCTIVE SLEEP APNEA): ICD-10-CM

## 2018-01-05 DIAGNOSIS — I10 ESSENTIAL HYPERTENSION: ICD-10-CM

## 2018-01-05 PROCEDURE — 93000 ELECTROCARDIOGRAM COMPLETE: CPT | Performed by: INTERNAL MEDICINE

## 2018-01-05 PROCEDURE — 99214 OFFICE O/P EST MOD 30 MIN: CPT | Performed by: INTERNAL MEDICINE

## 2018-01-05 RX ORDER — RANITIDINE 150 MG/1
150 TABLET ORAL DAILY
COMMUNITY
End: 2018-07-19 | Stop reason: ALTCHOICE

## 2018-01-05 RX ORDER — MELOXICAM 15 MG/1
15 TABLET ORAL DAILY
COMMUNITY
End: 2018-07-19 | Stop reason: ALTCHOICE

## 2018-01-05 RX ORDER — HYDROCODONE BITARTRATE AND ACETAMINOPHEN 10; 325 MG/1; MG/1
1 TABLET ORAL EVERY 4 HOURS PRN
COMMUNITY
End: 2018-01-19

## 2018-01-05 RX ORDER — ASPIRIN 81 MG/1
81 TABLET ORAL DAILY
COMMUNITY
End: 2019-07-02

## 2018-01-05 RX ORDER — CARISOPRODOL 350 MG/1
350 TABLET ORAL 4 TIMES DAILY PRN
COMMUNITY
End: 2018-01-19

## 2018-01-05 NOTE — PROGRESS NOTES
PATIENTINFORMATION    Date of Office Visit: 2018  Encounter Provider: Myriam Omer MD  Place of Service: [facility] CARDIOLOGY  Patient Name: Cody Eldridge  : 1945    Subjective:     Encounter Date:2018      Patient ID: Cody Eldridge is a 72 y.o. male.      History of Present Illness    This is a nice gentleman who saw Dr. Travis in the remote past. He is a difficult historian. Per Dr. Travis's note from  he had SVT with ablation in  at the [facility]. He also has COPD, hypertension and hyperlipidemia. To me, he denies coronary disease but his old records show that he has had a non-ST elevation myocardial infarction in the past. He had an echo in 10/2009, which showed normal left ventricular systolic function with an ejection fraction of 64%. There was mild right and left atrial enlargement and mild mitral and tricuspid regurgitation with a right ventricular systolic pressure of 39 mmHg.       He was seeing Dr. Kirk in 10/2017 and was noted to be short of breath with leg swelling and an elevated proBNP.  He was started on Lasix.  A chest x-ray revealed mild cardiac enlargement.  A CT scan of the chest revealed prominent changes of emphysema with scarring.  When I saw him, I ordered an echocardiogram in 10/2017 which revealed normal left ventricular systolic function with an ejection fraction of 56%, mild-to-moderate left ventricular hypertrophy.  The right ventricle was moderate-to severely dilated with mildly reduced function.  The right atrium was also moderate-to-severely dilated.  There was moderate tricuspid regurgitation with a right ventricular systolic pressure of 47 mmHg.  He saw Dr. Segovia in the office and his medications were changed around.      He comes in today for follow-up.  He has no new symptoms.  He denies chest pain.  He says he has chronic edema of the left leg.  He does not wear compression  "socks.  Occasionally he feels his heart racing and it's better if he sits down and rests.  He feels like his breathing is stable or maybe a little bit worse than baseline.    Review of Systems   Constitution: Negative for fever, malaise/fatigue, weight gain and weight loss.   HENT: Negative for ear pain, hearing loss, nosebleeds and sore throat.    Eyes: Negative for double vision, pain, vision loss in left eye and vision loss in right eye.   Cardiovascular:        See history of present illness.   Respiratory: Negative for cough, shortness of breath, sleep disturbances due to breathing, snoring and wheezing.    Endocrine: Negative for cold intolerance, heat intolerance and polyuria.   Skin: Negative for itching, poor wound healing and rash.   Musculoskeletal: Negative for joint pain, joint swelling and myalgias.   Gastrointestinal: Negative for abdominal pain, diarrhea, hematochezia, nausea and vomiting.   Genitourinary: Negative for hematuria and hesitancy.   Neurological: Negative for numbness, paresthesias and seizures.   Psychiatric/Behavioral: Negative for depression. The patient is not nervous/anxious.            ECG 12 Lead  Date/Time: 1/5/2018 11:01 AM  Performed by: SHILOH GRIMALDO  Authorized by: SHILOH GRIMALDO   Comparison: compared with previous ECG from 11/28/2017  Comparison to previous ECG: Heart rate is faster  Rhythm: atrial fibrillation  BPM: 125  Other findings: PRWP  Clinical impression: abnormal ECG               Objective:     /80 (BP Location: Left arm)  Pulse (!) 125  Ht 185.4 cm (73\")  Wt 90.7 kg (200 lb)  BMI 26.39 kg/m2 Body mass index is 26.39 kg/(m^2).     Physical Exam   Constitutional: He appears well-developed.   HENT:   Head: Normocephalic and atraumatic.   Eyes: Conjunctivae and lids are normal. Pupils are equal, round, and reactive to light. Lids are everted and swept, no foreign bodies found.   Neck: Normal range of motion. No JVD present. Carotid bruit is not " present. No tracheal deviation present. No thyroid mass present.   Cardiovascular: Normal heart sounds.  An irregularly irregular rhythm present. Tachycardia present.    Pulses:       Dorsalis pedis pulses are 2+ on the right side, and 2+ on the left side.   Pulmonary/Chest: Effort normal and breath sounds normal.   Abdominal: Normal appearance and bowel sounds are normal.   Musculoskeletal: Normal range of motion.   Neurological: He is alert. He has normal strength.   Skin: Skin is warm, dry and intact.   Psychiatric: He has a normal mood and affect. His behavior is normal.   Vitals reviewed.          Assessment/Plan:       1. 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   he looks euvolemic today.  If a right heart catheterization with adenosine challenge as needed, I will be happy to arrange it.  2. Atrial fibrillation.   his heart rate is high today.  I am going to have him wear a 24-hour Holter monitor.  His medication may need to be adjusted.  I would most likely stop amlodipine and put him on diltiazem.  3. Chronic obstructive pulmonary disease.  He follows with Dr. Segovia.  4. History of coronary artery disease.  In remote records, it was mentioned that he had a non-ST-elevation myocardial infarction in the past.  He is not having anginal symptoms at this point in time.  Most of his shortness of breath is less with exertion and more at rest and at night.    Coronary Artery Disease  Assessment  • The patient has no angina    Subjective - Objective  • There is a history of past MI      I will call him to go over the results of his Holter monitor and determine if I need to adjust his medication.    Orders Placed This Encounter   Procedures   • Holter Monitor - 24 Hour     Standing Status:   Future     Standing Expiration Date:   1/5/2019     Order Specific Question:   Reason for exam?     Answer:   AFib   • ECG 12 Lead     This order was created via procedure documentation      Cody Eldridge   Home Medication  Instructions LING:    Printed on:01/05/18 0334   Medication Information                      albuterol (PROVENTIL HFA;VENTOLIN HFA) 108 (90 BASE) MCG/ACT inhaler  ProAir  (90 Base) MCG/ACT Inhalation Aerosol Solution; Patient Sig: ProAir  (90 Base) MCG/ACT Inhalation Aerosol Solution inhale 2 puffs by mouth every 4 hours if needed; 8.5; 11; 07-Apr-2014; Active             amLODIPine (NORVASC) 10 MG tablet  take 1 tablet by mouth once daily             ANORO ELLIPTA 62.5-25 MCG/INH aerosol powder                aspirin 81 MG EC tablet  Take 81 mg by mouth Daily.             carisoprodol (SOMA) 350 MG tablet  Take 350 mg by mouth 4 (Four) Times a Day As Needed for Muscle Spasms.             HYDROcodone-acetaminophen (NORCO)  MG per tablet  Take 1 tablet by mouth Every 4 (Four) Hours As Needed for Moderate Pain .             lisinopril (PRINIVIL,ZESTRIL) 20 MG tablet  take 2 tablets by mouth once daily             meloxicam (MOBIC) 15 MG tablet  Take 15 mg by mouth Daily.             metoprolol tartrate (LOPRESSOR) 50 MG tablet  Take 1 tablet by mouth 2 (Two) Times a Day.             montelukast (SINGULAIR) 10 MG tablet  take 1 tablet by mouth once daily             raNITIdine (ZANTAC) 150 MG tablet  Take 150 mg by mouth Daily.             rosuvastatin (CRESTOR) 20 MG tablet  Take 0.5 tablets by mouth Daily.             sertraline (ZOLOFT) 50 MG tablet  Take 1 tablet by mouth Daily.             warfarin (COUMADIN) 2 MG tablet  Take 2 tablets by mouth Daily.                        Myriam Omer MD  01/05/18  11:05 AM

## 2018-01-10 ENCOUNTER — TELEPHONE (OUTPATIENT)
Dept: CARDIOLOGY | Facility: CLINIC | Age: 73
End: 2018-01-10

## 2018-01-17 ENCOUNTER — HOSPITAL ENCOUNTER (OUTPATIENT)
Dept: CARDIOLOGY | Facility: HOSPITAL | Age: 73
Setting detail: RECURRING SERIES
Discharge: HOME | End: 2018-01-17

## 2018-01-17 PROCEDURE — 36416 COLLJ CAPILLARY BLOOD SPEC: CPT

## 2018-01-17 PROCEDURE — 85610 PROTHROMBIN TIME: CPT

## 2018-01-19 ENCOUNTER — OFFICE VISIT (OUTPATIENT)
Dept: FAMILY MEDICINE CLINIC | Facility: CLINIC | Age: 73
End: 2018-01-19

## 2018-01-19 VITALS
HEIGHT: 73 IN | HEART RATE: 100 BPM | SYSTOLIC BLOOD PRESSURE: 130 MMHG | OXYGEN SATURATION: 93 % | BODY MASS INDEX: 26.11 KG/M2 | WEIGHT: 197 LBS | TEMPERATURE: 98.2 F | DIASTOLIC BLOOD PRESSURE: 82 MMHG

## 2018-01-19 DIAGNOSIS — I48.91 ATRIAL FIBRILLATION, UNSPECIFIED TYPE (HCC): ICD-10-CM

## 2018-01-19 DIAGNOSIS — M51.37 DEGENERATION OF INTERVERTEBRAL DISC OF LUMBOSACRAL REGION: ICD-10-CM

## 2018-01-19 DIAGNOSIS — I10 ESSENTIAL HYPERTENSION: ICD-10-CM

## 2018-01-19 DIAGNOSIS — M54.16 LUMBAR RADICULOPATHY: Primary | ICD-10-CM

## 2018-01-19 PROCEDURE — 99213 OFFICE O/P EST LOW 20 MIN: CPT | Performed by: NURSE PRACTITIONER

## 2018-01-19 RX ORDER — LISINOPRIL 20 MG/1
20 TABLET ORAL DAILY
Qty: 90 TABLET | Refills: 3 | Status: SHIPPED | OUTPATIENT
Start: 2018-01-19 | End: 2018-03-07 | Stop reason: SDUPTHER

## 2018-01-19 RX ORDER — BACLOFEN 10 MG/1
10 TABLET ORAL 3 TIMES DAILY
Qty: 90 TABLET | Refills: 1 | Status: SHIPPED | OUTPATIENT
Start: 2018-01-19 | End: 2018-02-13 | Stop reason: SDUPTHER

## 2018-01-19 RX ORDER — AMLODIPINE BESYLATE 10 MG/1
10 TABLET ORAL DAILY
Qty: 90 TABLET | Refills: 3 | Status: SHIPPED | OUTPATIENT
Start: 2018-01-19 | End: 2018-05-29

## 2018-01-19 RX ORDER — HYDROCODONE BITARTRATE AND ACETAMINOPHEN 7.5; 325 MG/1; MG/1
1 TABLET ORAL 4 TIMES DAILY PRN
Qty: 120 TABLET | Refills: 0 | Status: SHIPPED | OUTPATIENT
Start: 2018-01-19 | End: 2018-02-19 | Stop reason: SDUPTHER

## 2018-01-19 NOTE — PROGRESS NOTES
Subjective   Cody Eldridge is a 72 y.o. male.     HPI Comments: Patient needs new primary care here for hypertension chronic back pain  Refills previously patient Dr. Kirk to retire      Blood pressures controlled.    Atrial fibrillation rate controlled  Takes warfarin INR checked this week 1.9 and he will follow-up in 2 weeks with cardiology    COPD presently stable  Checks his O2 sat at home    Sees Dr. Paulson    He has chronic low back pain  Degenerative disc disease lumbar radiculopathy  Bilateral osteoarthritis of his knees  He's had low back pain for 20 years at least  Anti-inflammatories analgesics such as Tylenol have been ineffective alone  His quality life is greatly affected by his back pain  Presently ranges between 6 and 8  Increases at times with flexion stooping bending as well as lying flat  Pain radiates down his right back sharp pain down his right leg elevated his toes at times  No bowel or bladder change no weakness  No incontinence related to his back  He has had appropriate studies MRI the last year or 2 of his back lumbar spine    He went to pain management to try injections, approximately last year unfortunately  His copayment was out ranges; temp several $100 per injection and he cannot afford this    He had an appointment with Dr. Mehnaz erickson with [facility] [place] today, unfortunately he got there and they would not accept his insurance    He's had a long establishment in relationship with Dr. Kirk who recently retired  I has well work together with Dr. Kirk I have known patient and his wife  Both very pleasant and have long established relationship with him    He has one day remaining today of his hydrocodone  He takes 7.5 mg 4 times a day  He will need a refill to prevent withdrawal symptoms which may be potentially life-threatening for him due to his age           The following portions of the patient's history were reviewed and updated as appropriate: allergies,  current medications, past family history, past medical history, past surgical history and problem list.    Review of Systems   Musculoskeletal: Positive for back pain.   All other systems reviewed and are negative.      Objective   Physical Exam   Constitutional: He appears well-developed.   HENT:   Mouth/Throat: Oropharynx is clear and moist.   Eyes: Pupils are equal, round, and reactive to light.   Cardiovascular: Normal rate, regular rhythm and normal heart sounds.    Pulmonary/Chest: Effort normal and breath sounds normal.   Abdominal: Soft. Bowel sounds are normal. He exhibits no distension and no mass. There is no tenderness. No hernia.   Musculoskeletal:   But the stiffness on exam table  Negative straight leg raises  Normal plantar flexion dorsal flexion   from sit to stand apparent stiffness, but normal gait for his age   Skin: Skin is warm.   Psychiatric: He has a normal mood and affect. His behavior is normal. Judgment normal.   Vitals reviewed.      Assessment/Plan   Cody was seen today for transfer of dr. conn and medication refill.    Diagnoses and all orders for this visit:    Lumbar radiculopathy  -     Ambulatory Referral to Pain Management    Atrial fibrillation, unspecified type    Essential hypertension    Degeneration of intervertebral disc of lumbosacral region  -     Ambulatory Referral to Pain Management    Other orders  -     amLODIPine (NORVASC) 10 MG tablet; Take 1 tablet by mouth Daily.  -     lisinopril (PRINIVIL,ZESTRIL) 20 MG tablet; Take 1 tablet by mouth Daily.  -     baclofen (LIORESAL) 10 MG tablet; Take 1 tablet by mouth 3 (Three) Times a Day.  -     HYDROcodone-acetaminophen (NORCO) 7.5-325 MG per tablet; Take 1 tablet by mouth 4 (Four) Times a Day As Needed for Moderate Pain  or Severe Pain .                  Patient is been taking hydrocodone for a very long time, abrupt withdrawal would most certainly end up with significant illness  I have a long history with patient  his wife and his previous prescribing provider Dr. Kirk  I reviewed his Levar  I have agreed to temporarily write his narcotic pain medication Norco  Until he sees pain management  Pain management does not take over his medication or does not believe it is in his best interest  I will need to start titrating his dose and discontinue slowly until discontinuing  Patient agrees to this and understands  Proper storage disposal  Risk and benefit  Risk of overdose not to combine with other sedating medications    It does take Ambien will need to discuss this further  Unlikely that I will continue to write Ambien as well  Office visit 40 minutes    As part of this patient's treatment plan I am prescribing controlled substances. The patient has been made aware of appropriate use of such medications, including potential risk of somnolence, limited ability to drive and/or work safely, and potential for dependence or overdose. It has also been made clear that these medications are for use by this patient only, without concomitant use of alcohol or other substances unless prescribed.    Patient has completed prescribing agreement detailing terms of continued prescribing of controlled substances, including monitoring [place] reports, urine drug screening, and pill counts if necessary. The patient is aware that inappropriate use will result in cessation of prescribing such medications.

## 2018-02-12 ENCOUNTER — HOSPITAL ENCOUNTER (OUTPATIENT)
Dept: CARDIOLOGY | Facility: HOSPITAL | Age: 73
Setting detail: RECURRING SERIES
Discharge: HOME | End: 2018-02-12

## 2018-02-12 PROCEDURE — 36416 COLLJ CAPILLARY BLOOD SPEC: CPT

## 2018-02-12 PROCEDURE — 85610 PROTHROMBIN TIME: CPT

## 2018-02-14 RX ORDER — BACLOFEN 10 MG/1
TABLET ORAL
Qty: 90 TABLET | Refills: 1 | Status: SHIPPED | OUTPATIENT
Start: 2018-02-14 | End: 2018-04-07 | Stop reason: SDUPTHER

## 2018-02-19 ENCOUNTER — OFFICE VISIT (OUTPATIENT)
Dept: FAMILY MEDICINE CLINIC | Facility: CLINIC | Age: 73
End: 2018-02-19

## 2018-02-19 ENCOUNTER — TELEPHONE (OUTPATIENT)
Dept: FAMILY MEDICINE CLINIC | Facility: CLINIC | Age: 73
End: 2018-02-19

## 2018-02-19 VITALS
TEMPERATURE: 98.5 F | BODY MASS INDEX: 25.98 KG/M2 | OXYGEN SATURATION: 96 % | DIASTOLIC BLOOD PRESSURE: 84 MMHG | HEIGHT: 73 IN | SYSTOLIC BLOOD PRESSURE: 130 MMHG | WEIGHT: 196 LBS | HEART RATE: 88 BPM

## 2018-02-19 DIAGNOSIS — M51.37 DEGENERATION OF INTERVERTEBRAL DISC OF LUMBOSACRAL REGION: ICD-10-CM

## 2018-02-19 DIAGNOSIS — R61 SWEATING INCREASE: ICD-10-CM

## 2018-02-19 DIAGNOSIS — I10 ESSENTIAL HYPERTENSION: ICD-10-CM

## 2018-02-19 DIAGNOSIS — I48.91 ATRIAL FIBRILLATION, UNSPECIFIED TYPE (HCC): Primary | ICD-10-CM

## 2018-02-19 DIAGNOSIS — M54.16 LUMBAR RADICULOPATHY: ICD-10-CM

## 2018-02-19 PROCEDURE — 93000 ELECTROCARDIOGRAM COMPLETE: CPT | Performed by: NURSE PRACTITIONER

## 2018-02-19 PROCEDURE — 99214 OFFICE O/P EST MOD 30 MIN: CPT | Performed by: NURSE PRACTITIONER

## 2018-02-19 RX ORDER — FLUTICASONE PROPIONATE 50 MCG
2 SPRAY, SUSPENSION (ML) NASAL DAILY
COMMUNITY
Start: 2018-02-19 | End: 2018-07-19 | Stop reason: ALTCHOICE

## 2018-02-19 RX ORDER — HYDROCODONE BITARTRATE AND ACETAMINOPHEN 7.5; 325 MG/1; MG/1
1 TABLET ORAL 4 TIMES DAILY PRN
Qty: 120 TABLET | Refills: 0 | Status: SHIPPED | OUTPATIENT
Start: 2018-02-19 | End: 2018-02-19 | Stop reason: SDUPTHER

## 2018-02-19 RX ORDER — CROMOLYN SODIUM 5.2 MG
1 AEROSOL, SPRAY WITH PUMP (ML) NASAL 2 TIMES DAILY PRN
Refills: 12 | COMMUNITY
Start: 2018-02-19 | End: 2018-07-19 | Stop reason: ALTCHOICE

## 2018-02-19 RX ORDER — HYDROCODONE BITARTRATE AND ACETAMINOPHEN 7.5; 325 MG/1; MG/1
1 TABLET ORAL 4 TIMES DAILY PRN
Qty: 120 TABLET | Refills: 0 | Status: SHIPPED | OUTPATIENT
Start: 2018-02-19 | End: 2018-03-21 | Stop reason: SDUPTHER

## 2018-02-19 NOTE — TELEPHONE ENCOUNTER
----- Message from Juju Chau sent at 2/19/2018  2:01 PM EST -----  Pt is an esathblished pt with Dr Wade all he needs to do is call his office to schedule . Or did you want him referred to someone else other than Dr Wade ?   ----- Message -----     From: James Epley, APRN     Sent: 2/19/2018   1:42 PM       To: Juju Chau    Referred patient to pain management last month  I don't think he received anything, this may not be correct however  If you consistent with appreciate it to make sure he has all the information  Thank you

## 2018-02-19 NOTE — PATIENT INSTRUCTIONS
Discharge instructions    If fever increased chills and weakness chest pain shortness of breath emergency room    If sweats does not improve in 3 or 4 days return office for chest x-ray and labs    For nasal allergies OTC Flonase  And if needed Nasalcrom  1-2 sprays each near twice daily    Recheck in one month  Schedule pain management service possible

## 2018-02-19 NOTE — PROGRESS NOTES
Follow-up chronic degenerative disc disease chronic low back pain opiate dependent  Previously seen Dr. Kirk he has been referred to pain management, he has not received a call  I clarified this actually is an existing patient is not followed up in some time  I don't believe they were writing  He will see pain management follow their advice, until then I'll continue to write hydrocodone with follow-ups as long as he is appropriate  If pain management does not believe he should be taking these medications then I'll need to slowly wean him to avoid withdrawal    He's had some insomnia for 3 or 4 days and some sweats at night without chest pain shortness of breath or increased weakness or fatigue  He's had no rapid heart rate no palpitations  He does have known atrial fibrillation for which he takes warfarin  He checks it once a month and it was therapeutic 2/12/2018      No sleep this week    146/94  128/94    Then normal    Sweats  No cp  No soa  No cough or fever      Feels fine  No    No increased fatigue      EKG atrial fibrillation rate controlled 96 otherwise normal  Physical exam normal    Plan if fevers chills chest pain shortness of breath weakness emergency room  If sweats continue return office in 3-4 days for further evaluation including chest x-ray  And blood work    But for now he has not had these complaints just some insomnia and sweats at night only ×3 days  No increasing fatigue or belly pain or back pain      Follow-up 1 month

## 2018-02-20 ENCOUNTER — TELEPHONE (OUTPATIENT)
Dept: FAMILY MEDICINE CLINIC | Facility: CLINIC | Age: 73
End: 2018-02-20

## 2018-02-20 NOTE — TELEPHONE ENCOUNTER
----- Message from Juju Chau sent at 2/20/2018  8:52 AM EST -----  Spoke w/ pt 2/20/18 @ 8:50 informed him to call Dr Hamm office to schedule an appt / pt said he is going to call them today   ----- Message -----     From: James Epley, APRN     Sent: 2/19/2018   4:51 PM       To: Juju Chau    If you could just notify patient call for an appointment  To make an official  Thanks!!!  ----- Message -----     From: Juju Chau     Sent: 2/19/2018   2:01 PM       To: James Epley, APRN    Pt is an esathblished pt with Dr Wade all he needs to do is call his office to schedule . Or did you want him referred to someone else other than Dr Wade ?   ----- Message -----     From: James Epley, APRN     Sent: 2/19/2018   1:42 PM       To: Juju Chau    Referred patient to pain management last month  I don't think he received anything, this may not be correct however  If you consistent with appreciate it to make sure he has all the information  Thank you

## 2018-02-22 NOTE — PROGRESS NOTES
Subjective   Cody Eldridge is a 72 y.o. male.     HPI Comments: Follow-up chronic degenerative disc disease chronic low back pain opiate dependent  Previously seen Dr. Kirk he has been referred to pain management, he has not received a call  I clarified this actually is an existing patient is not followed up in some time  I don't believe they were writing  He will see pain management follow their advice, until then I'll continue to write hydrocodone with follow-ups as long as he is appropriate  If pain management does not believe he should be taking these medications then I'll need to slowly wean him to avoid withdrawal    He's had some insomnia for 3 or 4 days and some sweats at night without chest pain shortness of breath or increased weakness or fatigue  He's had no rapid heart rate no palpitations  He does have known atrial fibrillation for which he takes warfarin  He checks it once a month and it was therapeutic 2/12/2018      No sleep this week    146/94  128/94    Then normal    Sweats  No cp  No soa  No cough or fever      Feels fine  No    No increased fatigue               The following portions of the patient's history were reviewed and updated as appropriate: allergies, current medications, past medical history, past social history, past surgical history and problem list.    Review of Systems   Constitutional: Positive for diaphoresis.   Respiratory: Negative for cough and shortness of breath.    Cardiovascular: Negative for chest pain, palpitations and leg swelling.   Musculoskeletal: Positive for back pain.        Chronic no change   All other systems reviewed and are negative.      Objective   Physical Exam   Constitutional: He is oriented to person, place, and time. He appears well-developed and well-nourished.   Pleasant appears well   HENT:   Head: Normocephalic.   Nose: Nose normal.   Mouth/Throat: Oropharynx is clear and moist.   Tm clear rober no mass canal patent without d/c   Eyes:  Conjunctivae are normal. Pupils are equal, round, and reactive to light. No scleral icterus.   Neck: Neck supple. No JVD present. No thyromegaly present.   Cardiovascular: Normal rate, regular rhythm and normal heart sounds.  Exam reveals no gallop and no friction rub.    No murmur heard.  Pulmonary/Chest: Effort normal and breath sounds normal. No stridor. No respiratory distress. He has no wheezes. He has no rales.   Abdominal: Soft. Bowel sounds are normal. He exhibits no distension. There is no tenderness.   No hepatosplenomegaly, no ascites,   Musculoskeletal: He exhibits no edema or tenderness.   Pain with lying to sitting position sitting to lying negative straight leg raise plantar flexion dorsal flexion normal     Lymphadenopathy:     He has no cervical adenopathy.   Neurological: He is alert and oriented to person, place, and time. He has normal reflexes.   Skin: Skin is warm and dry. No rash noted. No erythema.   Psychiatric: He has a normal mood and affect. His behavior is normal. Judgment and thought content normal.   Vitals reviewed.      Assessment/Plan   Cody was seen today for follow-up and night sweats.    Diagnoses and all orders for this visit:    Atrial fibrillation, unspecified type  -     ECG 12 Lead    Other orders  -     Discontinue: HYDROcodone-acetaminophen (NORCO) 7.5-325 MG per tablet; Take 1 tablet by mouth 4 (Four) Times a Day As Needed for Moderate Pain  or Severe Pain .  -     HYDROcodone-acetaminophen (NORCO) 7.5-325 MG per tablet; Take 1 tablet by mouth 4 (Four) Times a Day As Needed for Moderate Pain  or Severe Pain .  -     fluticasone (FLONASE) 50 MCG/ACT nasal spray; 2 sprays into each nostril Daily.  -     cromolyn (NASALCROM) 5.2 MG/ACT nasal spray; 1 spray into each nostril 2 (Two) Times a Day As Needed for Allergies.                  Patient's without fever chills chest pain or shortness of breath  Has some insomnia, he thinks it calls the night sweats    EKG atrial  fibrillation rate controlled 96 otherwise normal  Physical exam normal    Plan if fevers chills chest pain shortness of breath weakness emergency room  If sweats continue return office in 3-4 days for further evaluation including chest x-ray  And blood work    But for now he has not had these complaints just some insomnia and sweats at night only ×3 days  No increasing fatigue or belly pain or back pain      Follow-up 1 month    He's been referred to pain management  He will need to call us as possible to get this set up  Hydrocodone refills temporary until he sees pain management and he will follow-up with their  plan

## 2018-02-22 NOTE — PROGRESS NOTES
Procedure   Procedures     Adult ECG Report     Name: Cody Eldridge   Age: 72 y.o.   Gender: male       Rate: 96   Rhythm: atrial fibrillation   QRS Axis: nml   OK Interval: .   QRS Duration: 90   QTc: 384   Voltages: .   Conduction Disturbances: none   Other Abnormalities: none     Narrative Interpretation: Atrial fibrillation with controlled rate, no acute ST changes, indication history of A. Fib, complaints of sweats insomnia ×3 nights, no change from previous EKG 1/05/2018, other than rate controlled today

## 2018-03-07 RX ORDER — LISINOPRIL 20 MG/1
20 TABLET ORAL 2 TIMES DAILY
Qty: 180 TABLET | Refills: 1 | Status: SHIPPED | OUTPATIENT
Start: 2018-03-07 | End: 2018-08-17 | Stop reason: SDUPTHER

## 2018-03-07 RX ORDER — LISINOPRIL 20 MG/1
20 TABLET ORAL 2 TIMES DAILY
Qty: 180 TABLET | Refills: 1 | Status: SHIPPED | OUTPATIENT
Start: 2018-03-07 | End: 2018-03-07 | Stop reason: SDUPTHER

## 2018-03-13 ENCOUNTER — HOSPITAL ENCOUNTER (OUTPATIENT)
Dept: CARDIOLOGY | Facility: HOSPITAL | Age: 73
Setting detail: RECURRING SERIES
Discharge: HOME | End: 2018-03-13

## 2018-03-13 PROCEDURE — 36416 COLLJ CAPILLARY BLOOD SPEC: CPT

## 2018-03-13 PROCEDURE — 85610 PROTHROMBIN TIME: CPT

## 2018-03-21 ENCOUNTER — OFFICE VISIT (OUTPATIENT)
Dept: FAMILY MEDICINE CLINIC | Facility: CLINIC | Age: 73
End: 2018-03-21

## 2018-03-21 VITALS
HEART RATE: 87 BPM | WEIGHT: 193.2 LBS | SYSTOLIC BLOOD PRESSURE: 124 MMHG | OXYGEN SATURATION: 94 % | BODY MASS INDEX: 25.49 KG/M2 | DIASTOLIC BLOOD PRESSURE: 70 MMHG

## 2018-03-21 DIAGNOSIS — M51.37 DEGENERATION OF INTERVERTEBRAL DISC OF LUMBOSACRAL REGION: Primary | ICD-10-CM

## 2018-03-21 PROCEDURE — 99213 OFFICE O/P EST LOW 20 MIN: CPT | Performed by: NURSE PRACTITIONER

## 2018-03-21 RX ORDER — HYDROCODONE BITARTRATE AND ACETAMINOPHEN 7.5; 325 MG/1; MG/1
1 TABLET ORAL 4 TIMES DAILY PRN
Qty: 120 TABLET | Refills: 0 | Status: SHIPPED | OUTPATIENT
Start: 2018-03-21 | End: 2018-04-23 | Stop reason: SDUPTHER

## 2018-03-21 NOTE — PROGRESS NOTES
ddd  ls  Pain relieve taking the edge off  10 without 5-6 with    Was unable to afford injections previously with Dr. Wade  He will reschedule with Dr. Wade      Apt maintenance

## 2018-03-25 NOTE — PROGRESS NOTES
Subjective   Cody Eldridge is a 72 y.o. male.     Follow-up degenerative joint diseaseLumbar spine is presurgery  Pain relieve taking the edge off  10 without 5-6 with Norco    Was unable to afford injections previously with Dr. Wade  He will reschedule with Dr. Wade      Apt maintenance    Presently continuing previous plan Dr. Kirk  Until evaluation with pain management  If they do not believe this is appropriate for him  Either I, or pain management will need to wean patient from [place]               The following portions of the patient's history were reviewed and updated as appropriate: allergies, current medications, past family history, past social history, past surgical history and problem list.    Review of Systems   Constitutional: Negative.    HENT: Negative.    Respiratory: Negative.    Cardiovascular: Negative.    Gastrointestinal: Negative.    Genitourinary: Negative.    Musculoskeletal: Positive for back pain and gait problem.   Skin: Negative.    Psychiatric/Behavioral: Negative.        Objective   Physical Exam   Constitutional: He appears well-developed and well-nourished.   HENT:   Head: Normocephalic and atraumatic.   Eyes: Conjunctivae are normal. Pupils are equal, round, and reactive to light.   Neck: Neck supple.   Pulmonary/Chest: Effort normal.   Musculoskeletal:   Negative straight leg raise plantarflexion dorsal flexion normal  Pain however position change on table as well as raising legs just not radiating pain  Gait consistent with history  No focal weakness Norvasc entire     Skin: Skin is warm and dry. No rash noted. No erythema.   Psychiatric: He has a normal mood and affect. His behavior is normal. Judgment and thought content normal.   Vitals reviewed.        Assessment/Plan   Cody was seen today for med refill.    Diagnoses and all orders for this visit:    Degeneration of intervertebral disc of lumbosacral region    Other orders  -     HYDROcodone-acetaminophen  (NORCO) 7.5-325 MG per tablet; Take 1 tablet by mouth 4 (Four) Times a Day As Needed for Moderate Pain  or Severe Pain .                Reschedule appointment pain management  Continue present care  Follow-up one month

## 2018-03-29 ENCOUNTER — HOSPITAL ENCOUNTER (OUTPATIENT)
Dept: CARDIOLOGY | Facility: HOSPITAL | Age: 73
Setting detail: RECURRING SERIES
Discharge: HOME | End: 2018-03-29

## 2018-03-29 PROCEDURE — 36416 COLLJ CAPILLARY BLOOD SPEC: CPT

## 2018-03-29 PROCEDURE — 85610 PROTHROMBIN TIME: CPT

## 2018-04-05 ENCOUNTER — HOSPITAL ENCOUNTER (OUTPATIENT)
Dept: CARDIOLOGY | Facility: HOSPITAL | Age: 73
Setting detail: RECURRING SERIES
Discharge: HOME | End: 2018-04-05

## 2018-04-05 PROCEDURE — 85610 PROTHROMBIN TIME: CPT

## 2018-04-05 PROCEDURE — 36416 COLLJ CAPILLARY BLOOD SPEC: CPT

## 2018-04-09 RX ORDER — BACLOFEN 10 MG/1
TABLET ORAL
Qty: 90 TABLET | Refills: 1 | Status: SHIPPED | OUTPATIENT
Start: 2018-04-09 | End: 2022-09-30 | Stop reason: ALTCHOICE

## 2018-04-12 ENCOUNTER — HOSPITAL ENCOUNTER (OUTPATIENT)
Dept: CARDIOLOGY | Facility: HOSPITAL | Age: 73
Setting detail: RECURRING SERIES
Discharge: HOME | End: 2018-04-12

## 2018-04-12 PROCEDURE — 85610 PROTHROMBIN TIME: CPT

## 2018-04-12 PROCEDURE — 36416 COLLJ CAPILLARY BLOOD SPEC: CPT

## 2018-04-23 ENCOUNTER — OFFICE VISIT (OUTPATIENT)
Dept: FAMILY MEDICINE CLINIC | Facility: CLINIC | Age: 73
End: 2018-04-23

## 2018-04-23 VITALS
BODY MASS INDEX: 26.03 KG/M2 | HEART RATE: 77 BPM | DIASTOLIC BLOOD PRESSURE: 74 MMHG | OXYGEN SATURATION: 93 % | TEMPERATURE: 98.3 F | SYSTOLIC BLOOD PRESSURE: 126 MMHG | WEIGHT: 197.3 LBS

## 2018-04-23 DIAGNOSIS — R60.9 EDEMA, UNSPECIFIED TYPE: ICD-10-CM

## 2018-04-23 DIAGNOSIS — R06.00 DYSPNEA, UNSPECIFIED TYPE: ICD-10-CM

## 2018-04-23 DIAGNOSIS — R97.20 ELEVATED PROSTATE SPECIFIC ANTIGEN (PSA): ICD-10-CM

## 2018-04-23 DIAGNOSIS — M54.16 LUMBAR RADICULOPATHY: ICD-10-CM

## 2018-04-23 DIAGNOSIS — Z12.5 SCREENING PSA (PROSTATE SPECIFIC ANTIGEN): ICD-10-CM

## 2018-04-23 DIAGNOSIS — M51.37 DEGENERATION OF INTERVERTEBRAL DISC OF LUMBOSACRAL REGION: Primary | ICD-10-CM

## 2018-04-23 DIAGNOSIS — I10 ESSENTIAL HYPERTENSION: ICD-10-CM

## 2018-04-23 PROCEDURE — 99213 OFFICE O/P EST LOW 20 MIN: CPT | Performed by: NURSE PRACTITIONER

## 2018-04-23 RX ORDER — FUROSEMIDE 40 MG/1
40 TABLET ORAL 2 TIMES DAILY
Qty: 180 TABLET | Refills: 0 | Status: SHIPPED | OUTPATIENT
Start: 2018-04-23 | End: 2019-06-29 | Stop reason: HOSPADM

## 2018-04-23 RX ORDER — HYDROCODONE BITARTRATE AND ACETAMINOPHEN 7.5; 325 MG/1; MG/1
1 TABLET ORAL 4 TIMES DAILY PRN
Qty: 120 TABLET | Refills: 0 | Status: SHIPPED | OUTPATIENT
Start: 2018-04-23 | End: 2019-08-20 | Stop reason: HOSPADM

## 2018-04-23 RX ORDER — POTASSIUM CHLORIDE 20 MEQ/1
20 TABLET, EXTENDED RELEASE ORAL DAILY
Qty: 90 TABLET | Refills: 0 | Status: SHIPPED | OUTPATIENT
Start: 2018-04-23 | End: 2019-06-29 | Stop reason: HOSPADM

## 2018-04-23 NOTE — PROGRESS NOTES
F/u  ddd lumbar   Chronic pain  Not agreeing pain mgmnt wants inj pt ndoes not want          pulm  specialists today Dr. Khurram barker    Neb  Am    Hs            Edema 1 plus  Pain mgnt  In 30 days decrese to wean        Taking lasix 2x week

## 2018-04-23 NOTE — PATIENT INSTRUCTIONS
Discussed with Joan Chau referrals  Regarding pain management  Other locations which may be option

## 2018-04-24 LAB
ALBUMIN SERPL-MCNC: 4.1 G/DL (ref 3.5–5.2)
ALBUMIN/GLOB SERPL: 1.3 G/DL
ALP SERPL-CCNC: 67 U/L (ref 39–117)
ALT SERPL-CCNC: 20 U/L (ref 1–41)
AST SERPL-CCNC: 25 U/L (ref 1–40)
BASOPHILS # BLD AUTO: 0.05 10*3/MM3 (ref 0–0.2)
BASOPHILS NFR BLD AUTO: 0.7 % (ref 0–1.5)
BILIRUB SERPL-MCNC: 0.5 MG/DL (ref 0.1–1.2)
BUN SERPL-MCNC: 11 MG/DL (ref 8–23)
BUN/CREAT SERPL: 12.9 (ref 7–25)
CALCIUM SERPL-MCNC: 9.1 MG/DL (ref 8.6–10.5)
CHLORIDE SERPL-SCNC: 104 MMOL/L (ref 98–107)
CO2 SERPL-SCNC: 26.2 MMOL/L (ref 22–29)
CREAT SERPL-MCNC: 0.85 MG/DL (ref 0.76–1.27)
EOSINOPHIL # BLD AUTO: 0.11 10*3/MM3 (ref 0–0.7)
EOSINOPHIL NFR BLD AUTO: 1.4 % (ref 0.3–6.2)
ERYTHROCYTE [DISTWIDTH] IN BLOOD BY AUTOMATED COUNT: 13.2 % (ref 11.5–14.5)
GFR SERPLBLD CREATININE-BSD FMLA CKD-EPI: 107 ML/MIN/1.73
GFR SERPLBLD CREATININE-BSD FMLA CKD-EPI: 88 ML/MIN/1.73
GLOBULIN SER CALC-MCNC: 3.1 GM/DL
GLUCOSE SERPL-MCNC: 94 MG/DL (ref 65–99)
HCT VFR BLD AUTO: 47.6 % (ref 40.4–52.2)
HGB BLD-MCNC: 15.2 G/DL (ref 13.7–17.6)
IMM GRANULOCYTES # BLD: 0.03 10*3/MM3 (ref 0–0.03)
IMM GRANULOCYTES NFR BLD: 0.4 % (ref 0–0.5)
LYMPHOCYTES # BLD AUTO: 2.28 10*3/MM3 (ref 0.9–4.8)
LYMPHOCYTES NFR BLD AUTO: 29.8 % (ref 19.6–45.3)
MCH RBC QN AUTO: 30.4 PG (ref 27–32.7)
MCHC RBC AUTO-ENTMCNC: 31.9 G/DL (ref 32.6–36.4)
MCV RBC AUTO: 95.2 FL (ref 79.8–96.2)
MONOCYTES # BLD AUTO: 0.95 10*3/MM3 (ref 0.2–1.2)
MONOCYTES NFR BLD AUTO: 12.4 % (ref 5–12)
NEUTROPHILS # BLD AUTO: 4.22 10*3/MM3 (ref 1.9–8.1)
NEUTROPHILS NFR BLD AUTO: 55.3 % (ref 42.7–76)
NT-PROBNP SERPL-MCNC: 1871 PG/ML (ref 0–376)
PLATELET # BLD AUTO: 236 10*3/MM3 (ref 140–500)
POTASSIUM SERPL-SCNC: 3.9 MMOL/L (ref 3.5–5.2)
PROT SERPL-MCNC: 7.2 G/DL (ref 6–8.5)
PSA SERPL-MCNC: 12.78 NG/ML (ref 0–4)
RBC # BLD AUTO: 5 10*6/MM3 (ref 4.6–6)
SODIUM SERPL-SCNC: 144 MMOL/L (ref 136–145)
TSH SERPL DL<=0.005 MIU/L-ACNC: 3.12 MIU/ML (ref 0.27–4.2)
WBC # BLD AUTO: 7.64 10*3/MM3 (ref 4.5–10.7)

## 2018-04-25 ENCOUNTER — TELEPHONE (OUTPATIENT)
Dept: FAMILY MEDICINE CLINIC | Facility: CLINIC | Age: 73
End: 2018-04-25

## 2018-04-25 NOTE — TELEPHONE ENCOUNTER
----- Message from James Epley, APRN sent at 4/25/2018  9:39 AM EDT -----  Please call patient  Make sure he is taking Lasix twice a day  Is proBNP was elevated  Not quite as high as previous  He could be a little heart failure so make sure we diurese some of his fluid off  Any chest pain shortness of breath emergency room  He should recheck in a couple weeks  If he does not notice it any better in a few days he should let me know  So I can increase his diuretics    Thank you

## 2018-04-25 NOTE — TELEPHONE ENCOUNTER
----- Message from James Epley, APRN sent at 4/25/2018  9:42 AM EDT -----  Please also discussed PSA with patient  12.7  A little higher than a couple years ago it was 9.4 October 2015  Make sure he follows up with urology as we discussed  Thank you     Please document that you  spoke to him

## 2018-04-25 NOTE — PROGRESS NOTES
Subjective   Cody Eldridge is a 72 y.o. male.     Follow-up chronic low back pain degenerative disc disease lumbar  He has not followed up with pain management does not want injections  We had the same discussion last month he will need to see a new pain management if he is not happy with his present previously  I will not be able to continue to write these medications will start stepping down next month to discontinue    Not agreeing pain mgmnt wants inj pt ndoes not want    He saw pulmonary today  For some shortness of breath  No chest pain  pulm  specialists today Dr. Khurram barker  [facility]  [facility]  [facility]  Pulmonary function test    He did have some heart failure last summer   Physical exam notice he has increased edema lower extremities.  He has had some  Increased swelling over the last several weeks  He's presently coagulated warfarin and therapeutic  Procedure fibrillation  He's having no palpitations or rapid heart rate            Edema 1 plus  Pain mgnt  In 30 days decrese to wean        Taking lasix 2x week           The following portions of the patient's history were reviewed and updated as appropriate: allergies, current medications, past medical history, past social history, past surgical history and problem list.    Review of Systems   Constitutional: Negative for chills and fever.   Respiratory: Positive for shortness of breath.    Cardiovascular: Positive for leg swelling. Negative for chest pain and palpitations.   Musculoskeletal: Positive for back pain.   All other systems reviewed and are negative.      Objective   Physical Exam   Constitutional: He is oriented to person, place, and time. He appears well-developed and well-nourished. No distress.   HENT:   Head: Normocephalic and atraumatic.   Nose: Nose normal.   Mouth/Throat: Oropharynx is clear and moist.   Eyes: Conjunctivae are normal. Pupils are equal, round, and reactive to light.   Neck: Neck supple.   Cardiovascular: Normal rate, regular rhythm  and normal heart sounds.    No murmur heard.  Pulmonary/Chest: Effort normal and breath sounds normal. No respiratory distress. He has no wheezes.   Abdominal: Soft. Bowel sounds are normal. He exhibits no distension and no mass. There is no tenderness. There is no guarding. No hernia.   Musculoskeletal: He exhibits tenderness. He exhibits no edema.   Lower paravertebral tenderness no lumbar tenderness  Negative straight leg raise  One plus distal tibia edema  Or thigh tenderness  No thigh edema   Lymphadenopathy:     He has no cervical adenopathy.   Neurological: He is alert and oriented to person, place, and time.   Skin: Skin is warm and dry. He is not diaphoretic.   Psychiatric: He has a normal mood and affect. His behavior is normal. Judgment and thought content normal.   Vitals reviewed.        Assessment/Plan   Cody was seen today for follow-up.    Diagnoses and all orders for this visit:    Degeneration of intervertebral disc of lumbosacral region  -     Ambulatory Referral to Pain Management    Lumbar radiculopathy  -     Ambulatory Referral to Pain Management    Essential hypertension  -     Comprehensive Metabolic Panel  -     CBC & Differential  -     TSH Rfx On Abnormal To Free T4  -     proBNP    Dyspnea, unspecified type  -     Comprehensive Metabolic Panel  -     CBC & Differential  -     TSH Rfx On Abnormal To Free T4  -     proBNP    Elevated prostate specific antigen (PSA)  -     PSA Screen    Screening PSA (prostate specific antigen)  -     PSA Screen    Edema, unspecified type    Other orders  -     furosemide (LASIX) 40 MG tablet; Take 1 tablet by mouth 2 (Two) Times a Day.  -     potassium chloride (K-DUR,KLOR-CON) 20 MEQ CR tablet; Take 1 tablet by mouth Daily. With food if taking furosemide  -     HYDROcodone-acetaminophen (NORCO) 7.5-325 MG per tablet; Take 1 tablet by mouth 4 (Four) Times a Day As Needed for Moderate Pain  or Severe Pain .  -     CBC & Differential  -     Comprehensive  Metabolic Panel  -     proBNP  -     TSH Rfx On Abnormal To Free T4                  If chest pain increased shortness of breath  Increased leg pain swelling emergency room    Increase Lasix from 40 mg daily to twice a day  Potassium 20  Labs today  Recheck in 3 weeks  Elevate extremities  NENO hose  Refill prescription requested Cyrus  Next refill will start decreasing dose to titrate to discontinue hydrocodone as planned    Discuss with Joan Chau referrals  Regarding pain management  Other locations which may be option    He's been instructed to follow-up urology on go ahead and get the PSA today

## 2018-05-09 RX ORDER — WARFARIN SODIUM 2 MG/1
TABLET ORAL
Qty: 70 TABLET | Refills: 3 | Status: CANCELLED | OUTPATIENT
Start: 2018-05-09

## 2018-05-09 RX ORDER — WARFARIN SODIUM 2 MG/1
4 TABLET ORAL DAILY
Qty: 70 TABLET | Refills: 0 | Status: SHIPPED | OUTPATIENT
Start: 2018-05-09 | End: 2018-05-18 | Stop reason: SDUPTHER

## 2018-05-15 ENCOUNTER — OFFICE VISIT (OUTPATIENT)
Dept: FAMILY MEDICINE CLINIC | Facility: CLINIC | Age: 73
End: 2018-05-15

## 2018-05-15 VITALS
DIASTOLIC BLOOD PRESSURE: 76 MMHG | WEIGHT: 194 LBS | BODY MASS INDEX: 25.71 KG/M2 | SYSTOLIC BLOOD PRESSURE: 118 MMHG | HEART RATE: 87 BPM | HEIGHT: 73 IN | OXYGEN SATURATION: 94 %

## 2018-05-15 DIAGNOSIS — R60.9 EDEMA, UNSPECIFIED TYPE: Primary | ICD-10-CM

## 2018-05-15 DIAGNOSIS — Z13.6 ENCOUNTER FOR ABDOMINAL AORTIC ANEURYSM SCREENING: ICD-10-CM

## 2018-05-15 DIAGNOSIS — Z12.11 COLON CANCER SCREENING: ICD-10-CM

## 2018-05-15 PROCEDURE — 99213 OFFICE O/P EST LOW 20 MIN: CPT | Performed by: NURSE PRACTITIONER

## 2018-05-15 NOTE — PROGRESS NOTES
Dmaeon gentleman here today follow-up  Lower extremity edema elevated proBNP 1800s history of heart failure  Over several weeks patient had increased in lower extremities edema he was seen approximate 3 weeks ago  He was taken Lasix once a day at that time although prescription was written for twice a day but due to his occupation  Frequently unable take both doses  Since he has increased to 40 mg twice daily  And over the weekend he took a third dose as well for several days    He's had decreased swelling in his lower extremities  Presently no acute chest pain or shortness of breath  He still has some exertional dyspnea likely related more to his COPD    He has a follow-up with cardiology in a couple weeks  He's having no issues with the change in medication    Recent PSA        Dr Nik Berg mgnt   Taking over rx  inj in back      Dameon gentleman here today follow-up  Lower extremity edema elevated proBNP 1800s history of heart failure  Over several weeks patient had increased in lower extremities edema he was seen approximate 3 weeks ago  He was taken Lasix once a day at that time although prescription was written for twice a day but due to his occupation  Frequently unable take both doses  Since he has increased to 40 mg twice daily  And over the weekend he took a third dose as well for several days    He's had decreased swelling in his lower extremities  Presently no acute chest pain or shortness of breath  He still has some exertional dyspnea likely related more to his COPD    He has a follow-up with cardiology in a couple weeks  He's having no issues with the change in medication    Recent PSA

## 2018-05-15 NOTE — PATIENT INSTRUCTIONS
Discharge instructions      Continue Lasix 40 mg twice daily    May increase to 80 mg a.m. 40 mg afternoon  If needed    Keep appointment cardiology    If increased fluid retention and weight gain increased shortness of breath urgent recheck ER    Reschedule appointment with urology  Call if need referral regarding elevated PSA    Outpatient aortic aneurysm screen should be covered 100% with Medicare  However make every effort to double check with your insurance as I cannot guarantee this unfortunately    Otherwise I would be happy to send you for vascular screening bundle  Including carotid aortic and lower extremity circulation  Test which is self-pay for $75    Just let me know

## 2018-05-17 NOTE — PROGRESS NOTES
Subjective   Cody Eldridge is a 72 y.o. male.     Pleasant gentleman here today follow-up  Lower extremity edema elevated proBNP 1800s history of heart failure  Over several weeks patient had increased in lower extremities edema he was seen approximate 3 weeks ago  He was taken Lasix once a day at that time although prescription was written for twice a day but due to his occupation  Frequently unable take both doses  Since he has increased to 40 mg twice daily  And over the weekend he took a third dose as well for several days    He's had decreased swelling in his lower extremities  Presently no acute chest pain or shortness of breath  He still has some exertional dyspnea likely related more to his COPD    He has a follow-up with cardiology in a couple weeks  He's having no issues with the change in medication    Recent PSA elevate pt making f/u apt urology        Dr Zaragoza  Pain mgnt   Taking over rx  inj in back           The following portions of the patient's history were reviewed and updated as appropriate: allergies, current medications, past family history, past medical history, past social history, past surgical history and problem list.    Review of Systems   Respiratory: Negative for cough and shortness of breath.    Cardiovascular: Positive for leg swelling. Negative for chest pain and palpitations.   Musculoskeletal: Positive for back pain.   Psychiatric/Behavioral: Negative.    All other systems reviewed and are negative.      Objective   Physical Exam   Constitutional: He appears well-developed and well-nourished.   HENT:   Head: Normocephalic and atraumatic.   Eyes: Conjunctivae are normal. Pupils are equal, round, and reactive to light.   Neck: Neck supple. No JVD present.   Cardiovascular: Normal rate, regular rhythm and normal heart sounds.    Pulmonary/Chest: Effort normal.   Musculoskeletal: He exhibits edema. He exhibits no tenderness or deformity.   Significantly improved  Mild edema no ten    Skin: Skin is warm and dry. No rash noted. No erythema.   Psychiatric: He has a normal mood and affect. His behavior is normal. Judgment and thought content normal.   Vitals reviewed.        Assessment/Plan   Cody was seen today for feet and legs follow-up.    Diagnoses and all orders for this visit:    Edema, unspecified type    Encounter for abdominal aortic aneurysm screening  -     Cancel: Abdominal Aortic Aneurysm Screening Medicare CAR  -     Cancel:  AAA Screen Limited; Future  -      aaa screen limited    Colon cancer screening  -     Ambulatory Referral For Screening Colonoscopy              Discharge instructions      Continue Lasix 40 mg twice daily    May increase to 80 mg a.m. 40 mg afternoon  If needed    Keep appointment cardiology    If increased fluid retention and weight gain increased shortness of breath urgent recheck ER    Reschedule appointment with urology  Call if need referral regarding elevated PSA    Outpatient aortic aneurysm screen should be covered 100% with Medicare  However make every effort to double check with your insurance as I cannot guarantee this unfortunately    Otherwise I would be happy to send you for vascular screening bundle  Including carotid aortic and lower extremity circulation  Test which is self-pay for $75    Just let me know

## 2018-05-18 RX ORDER — WARFARIN SODIUM 2 MG/1
4 TABLET ORAL DAILY
Qty: 14 TABLET | Refills: 0 | Status: SHIPPED | OUTPATIENT
Start: 2018-05-18 | End: 2018-07-21 | Stop reason: SDUPTHER

## 2018-05-23 ENCOUNTER — HOSPITAL ENCOUNTER (OUTPATIENT)
Dept: CARDIOLOGY | Facility: HOSPITAL | Age: 73
Setting detail: RECURRING SERIES
Discharge: HOME | End: 2018-05-23

## 2018-05-23 ENCOUNTER — HOSPITAL ENCOUNTER (OUTPATIENT)
Dept: ULTRASOUND IMAGING | Facility: HOSPITAL | Age: 73
Discharge: HOME | End: 2018-05-23
Admitting: NURSE PRACTITIONER

## 2018-05-23 PROCEDURE — 36416 COLLJ CAPILLARY BLOOD SPEC: CPT

## 2018-05-23 PROCEDURE — 85610 PROTHROMBIN TIME: CPT

## 2018-05-23 PROCEDURE — 76706 US ABDL AORTA SCREEN AAA: CPT

## 2018-05-25 ENCOUNTER — HOSPITAL ENCOUNTER (OUTPATIENT)
Dept: CARDIOLOGY | Facility: HOSPITAL | Age: 73
Setting detail: RECURRING SERIES
Discharge: HOME | End: 2018-05-25

## 2018-05-25 PROCEDURE — 85610 PROTHROMBIN TIME: CPT

## 2018-05-25 PROCEDURE — 36416 COLLJ CAPILLARY BLOOD SPEC: CPT

## 2018-05-29 ENCOUNTER — TRANSCRIBE ORDERS (OUTPATIENT)
Dept: CARDIOLOGY | Facility: CLINIC | Age: 73
End: 2018-05-29

## 2018-05-29 ENCOUNTER — OFFICE VISIT (OUTPATIENT)
Dept: CARDIOLOGY | Facility: CLINIC | Age: 73
End: 2018-05-29

## 2018-05-29 VITALS
SYSTOLIC BLOOD PRESSURE: 108 MMHG | RESPIRATION RATE: 16 BRPM | WEIGHT: 195.2 LBS | BODY MASS INDEX: 25.87 KG/M2 | DIASTOLIC BLOOD PRESSURE: 74 MMHG | HEART RATE: 113 BPM | HEIGHT: 73 IN

## 2018-05-29 DIAGNOSIS — R07.2 PRECORDIAL PAIN: ICD-10-CM

## 2018-05-29 DIAGNOSIS — I25.118 CORONARY ARTERY DISEASE OF NATIVE ARTERY OF NATIVE HEART WITH STABLE ANGINA PECTORIS (HCC): Primary | ICD-10-CM

## 2018-05-29 DIAGNOSIS — Z13.6 SCREENING FOR ISCHEMIC HEART DISEASE: ICD-10-CM

## 2018-05-29 DIAGNOSIS — I48.91 ATRIAL FIBRILLATION, UNSPECIFIED TYPE (HCC): ICD-10-CM

## 2018-05-29 DIAGNOSIS — Z79.01 LONG-TERM (CURRENT) USE OF ANTICOAGULANTS: ICD-10-CM

## 2018-05-29 DIAGNOSIS — R06.09 DOE (DYSPNEA ON EXERTION): ICD-10-CM

## 2018-05-29 DIAGNOSIS — Z01.810 PRE-OPERATIVE CARDIOVASCULAR EXAMINATION: Primary | ICD-10-CM

## 2018-05-29 DIAGNOSIS — I10 ESSENTIAL HYPERTENSION: ICD-10-CM

## 2018-05-29 DIAGNOSIS — E78.5 HYPERLIPIDEMIA, UNSPECIFIED HYPERLIPIDEMIA TYPE: ICD-10-CM

## 2018-05-29 PROCEDURE — 93000 ELECTROCARDIOGRAM COMPLETE: CPT | Performed by: INTERNAL MEDICINE

## 2018-05-29 PROCEDURE — 99214 OFFICE O/P EST MOD 30 MIN: CPT | Performed by: INTERNAL MEDICINE

## 2018-05-29 RX ORDER — METOPROLOL TARTRATE 100 MG/1
100 TABLET ORAL EVERY 12 HOURS SCHEDULED
Qty: 60 TABLET | Refills: 11 | Status: SHIPPED | OUTPATIENT
Start: 2018-05-29 | End: 2018-11-06

## 2018-05-29 NOTE — PROGRESS NOTES
PATIENTINFORMATION    Date of Office Visit: 2018  Encounter Provider: Myriam Omer MD  Place of Service: [facility]  Patient Name: Cody Eldridge  : 1945    Subjective:     Encounter Date:2018      Patient ID: Cody Eldridge is a 72 y.o. male.      History of Present Illness    This is a nice gentleman who saw Dr. Travis in the remote past. He is a difficult historian. Per Dr. Travis's note from  he had SVT with ablation in  at the [facility]. He also has COPD, hypertension and hyperlipidemia. To me, he denies coronary disease but his old records show that he has had a non-ST elevation myocardial infarction in the past. He had an echo in 10/2009, which showed normal left ventricular systolic function with an ejection fraction of 64%. There was mild right and left atrial enlargement and mild mitral and tricuspid regurgitation with a right ventricular systolic pressure of 39 mmHg.       He was seeing Dr. Kirk in 10/2017 and was noted to be short of breath with leg swelling and an elevated proBNP.  He was started on Lasix.  A chest x-ray revealed mild cardiac enlargement.  A CT scan of the chest revealed prominent changes of emphysema with scarring.  When I saw him, I ordered an echocardiogram in 10/2017 which revealed normal left ventricular systolic function with an ejection fraction of 56%, mild-to-moderate left ventricular hypertrophy.  The right ventricle was moderate-to severely dilated with mildly reduced function.  The right atrium was also moderate-to-severely dilated.  There was moderate tricuspid regurgitation with a right ventricular systolic pressure of 47 mmHg.  He saw Dr. Segovia in the office and his medications were changed around.      He comes in today and says he's been feeling really bad.  He has shortness of breath most notable with exertion but can also occur at rest.  He has orthopnea.  He  "cannot lie flat.  He has some wheezing.  He denies cough.  His heart rate has been elevated.  He has chronic edema in his legs which is not responding to diuretics.  He is complaining of chest pain that she describes as a dull, deep pain.  This does not have exacerbating factors.  Will last for a few minutes and go away on its own.    Review of Systems   Constitution: Positive for malaise/fatigue. Negative for fever, weight gain and weight loss.   HENT: Negative for ear pain, hearing loss, nosebleeds and sore throat.    Eyes: Negative for double vision, pain, vision loss in left eye and vision loss in right eye.   Cardiovascular: Positive for dyspnea on exertion and leg swelling.        See history of present illness.   Respiratory: Positive for shortness of breath. Negative for cough, sleep disturbances due to breathing, snoring and wheezing.    Endocrine: Negative for cold intolerance, heat intolerance and polyuria.   Skin: Negative for itching, poor wound healing and rash.   Musculoskeletal: Negative for joint pain, joint swelling and myalgias.   Gastrointestinal: Negative for abdominal pain, diarrhea, hematochezia, nausea and vomiting.   Genitourinary: Negative for hematuria and hesitancy.   Neurological: Negative for numbness, paresthesias and seizures.   Psychiatric/Behavioral: Negative for depression. The patient is not nervous/anxious.            ECG 12 Lead  Date/Time: 5/29/2018 1:21 PM  Performed by: SHILOH GRIMALDO  Authorized by: SHILOH GRIMALDO   Comparison: compared with previous ECG from 2/19/2018  Rhythm: atrial fibrillation  BPM: 113  Conduction: conduction normal  ST Segments: ST segments normal  Clinical impression: abnormal ECG               Objective:     /74 (BP Location: Right arm, Patient Position: Sitting, Cuff Size: Adult)   Pulse 113   Resp 16   Ht 185.4 cm (73\")   Wt 88.5 kg (195 lb 3.2 oz)   BMI 25.75 kg/m²  Body mass index is 25.75 kg/m².     Physical Exam   Constitutional: " He appears well-developed.   HENT:   Head: Normocephalic and atraumatic.   Eyes: Conjunctivae and lids are normal. Pupils are equal, round, and reactive to light. Lids are everted and swept, no foreign bodies found.   Neck: Normal range of motion. No JVD present. Carotid bruit is not present. No tracheal deviation present. No thyroid mass present.   Cardiovascular: Normal rate, regular rhythm and normal heart sounds.    Pulses:       Dorsalis pedis pulses are 2+ on the right side, and 2+ on the left side.   Pulmonary/Chest: Effort normal and breath sounds normal.   Abdominal: Normal appearance and bowel sounds are normal.   Musculoskeletal: Normal range of motion.   Neurological: He is alert. He has normal strength.   Skin: Skin is warm, dry and intact.   Psychiatric: He has a normal mood and affect. His behavior is normal.   Vitals reviewed.          Assessment/Plan:       1.  Chest pain and shortness of breath.  I recommend a right and left heart catheterization with a possible adenosine challenge.  He's really miserable.  It's hard to tell how much of this is coming from lung disease and what portion of this may be cardiac.  Hopefully the right and left heart catheterization will help answer those questions.  2.  History of coronary artery disease with remote myocardial infarction.  Coronary Artery Disease  Assessment  • The patient has CCS class IV - angina with minimal activity, or at rest    Subjective - Objective  • There is a history of past MI      3.  Lung disease/COPD with right heart failure.  This is followed by Dr. Segovia.  4.  Right heart failure  5. Atrial Fibrillation and Atrial Flutter  Assessment  • The patient has permanent atrial fibrillation  • The patient's CHADS2-VASc score is 3  • A [id number] score of 2 or more is considered a high risk for a thromboembolic event    Plan  • Continue in atrial fibrillation with rate control  • Continue warfarin for antithrombotic therapy, bleeding issues  discussed  I'm going to discontinue amlodipine so we have more blood pressure to work with and increase metoprolol to 100 mg twice a day to attempt better rate control.  6.  Hypertension.  As above.    Orders Placed This Encounter   Procedures   • ECG 12 Lead     This order was created via procedure documentation      Cody Eldridge   Home Medication Instructions LING:    Printed on:05/29/18 9159   Medication Information                      albuterol (PROVENTIL HFA;VENTOLIN HFA) 108 (90 BASE) MCG/ACT inhaler  ProAir  (90 Base) MCG/ACT Inhalation Aerosol Solution; Patient Sig: ProAir  (90 Base) MCG/ACT Inhalation Aerosol Solution inhale 2 puffs by mouth every 4 hours if needed; 8.5; 11; 07-Apr-2014; Active             ANORO ELLIPTA 62.5-25 MCG/INH aerosol powder                aspirin 81 MG EC tablet  Take 81 mg by mouth Daily.             baclofen (LIORESAL) 10 MG tablet  take 1 tablet by mouth three times a day             cromolyn (NASALCROM) 5.2 MG/ACT nasal spray  1 spray into each nostril 2 (Two) Times a Day As Needed for Allergies.             fluticasone (FLONASE) 50 MCG/ACT nasal spray  2 sprays into each nostril Daily.             furosemide (LASIX) 40 MG tablet  Take 1 tablet by mouth 2 (Two) Times a Day.             HYDROcodone-acetaminophen (NORCO) 7.5-325 MG per tablet  Take 1 tablet by mouth 4 (Four) Times a Day As Needed for Moderate Pain  or Severe Pain .             lisinopril (PRINIVIL,ZESTRIL) 20 MG tablet  Take 1 tablet by mouth 2 (Two) Times a Day.             meloxicam (MOBIC) 15 MG tablet  Take 15 mg by mouth Daily.             metoprolol tartrate (LOPRESSOR) 100 MG tablet  Take 1 tablet by mouth Every 12 (Twelve) Hours.             montelukast (SINGULAIR) 10 MG tablet  take 1 tablet by mouth once daily             potassium chloride (K-DUR,KLOR-CON) 20 MEQ CR tablet  Take 1 tablet by mouth Daily. With food if taking furosemide             raNITIdine (ZANTAC) 150 MG  tablet  Take 150 mg by mouth Daily.             rosuvastatin (CRESTOR) 20 MG tablet  Take 0.5 tablets by mouth Daily.             sertraline (ZOLOFT) 50 MG tablet  Take 1 tablet by mouth Daily.             warfarin (COUMADIN) 2 MG tablet  Take 2 tablets by mouth Daily.                        Myriam Omer MD  05/29/18  2:26 PM

## 2018-06-01 ENCOUNTER — HOSPITAL ENCOUNTER (OUTPATIENT)
Dept: CARDIOLOGY | Facility: HOSPITAL | Age: 73
Setting detail: RECURRING SERIES
Discharge: HOME | End: 2018-06-01

## 2018-06-01 PROCEDURE — 36416 COLLJ CAPILLARY BLOOD SPEC: CPT

## 2018-06-01 PROCEDURE — 85610 PROTHROMBIN TIME: CPT

## 2018-06-06 ENCOUNTER — LAB (OUTPATIENT)
Dept: LAB | Facility: HOSPITAL | Age: 73
End: 2018-06-06

## 2018-06-06 DIAGNOSIS — Z13.6 SCREENING FOR ISCHEMIC HEART DISEASE: ICD-10-CM

## 2018-06-06 DIAGNOSIS — Z79.01 LONG-TERM (CURRENT) USE OF ANTICOAGULANTS: ICD-10-CM

## 2018-06-06 DIAGNOSIS — Z01.810 PRE-OPERATIVE CARDIOVASCULAR EXAMINATION: ICD-10-CM

## 2018-06-06 LAB
ANION GAP SERPL CALCULATED.3IONS-SCNC: 14.5 MMOL/L
BASOPHILS # BLD AUTO: 0.01 10*3/MM3 (ref 0–0.2)
BASOPHILS NFR BLD AUTO: 0.1 % (ref 0–1.5)
BUN BLD-MCNC: 24 MG/DL (ref 8–23)
BUN/CREAT SERPL: 25 (ref 7–25)
CALCIUM SPEC-SCNC: 8.9 MG/DL (ref 8.6–10.5)
CHLORIDE SERPL-SCNC: 99 MMOL/L (ref 98–107)
CO2 SERPL-SCNC: 26.5 MMOL/L (ref 22–29)
CREAT BLD-MCNC: 0.96 MG/DL (ref 0.76–1.27)
DEPRECATED RDW RBC AUTO: 49.7 FL (ref 37–54)
EOSINOPHIL # BLD AUTO: 0.01 10*3/MM3 (ref 0–0.7)
EOSINOPHIL NFR BLD AUTO: 0.1 % (ref 0.3–6.2)
ERYTHROCYTE [DISTWIDTH] IN BLOOD BY AUTOMATED COUNT: 13.9 % (ref 11.5–14.5)
GFR SERPL CREATININE-BSD FRML MDRD: 77 ML/MIN/1.73
GLUCOSE BLD-MCNC: 129 MG/DL (ref 65–99)
HCT VFR BLD AUTO: 53.5 % (ref 40.4–52.2)
HGB BLD-MCNC: 17 G/DL (ref 13.7–17.6)
IMM GRANULOCYTES # BLD: 0.03 10*3/MM3 (ref 0–0.03)
IMM GRANULOCYTES NFR BLD: 0.2 % (ref 0–0.5)
INR PPP: 1.76 (ref 0.9–1.1)
LYMPHOCYTES # BLD AUTO: 2.06 10*3/MM3 (ref 0.9–4.8)
LYMPHOCYTES NFR BLD AUTO: 14.9 % (ref 19.6–45.3)
MCH RBC QN AUTO: 31.1 PG (ref 27–32.7)
MCHC RBC AUTO-ENTMCNC: 31.8 G/DL (ref 32.6–36.4)
MCV RBC AUTO: 98 FL (ref 79.8–96.2)
MONOCYTES # BLD AUTO: 1.32 10*3/MM3 (ref 0.2–1.2)
MONOCYTES NFR BLD AUTO: 9.6 % (ref 5–12)
NEUTROPHILS # BLD AUTO: 10.38 10*3/MM3 (ref 1.9–8.1)
NEUTROPHILS NFR BLD AUTO: 75.1 % (ref 42.7–76)
PLATELET # BLD AUTO: 258 10*3/MM3 (ref 140–500)
PMV BLD AUTO: 10.6 FL (ref 6–12)
POTASSIUM BLD-SCNC: 4.3 MMOL/L (ref 3.5–5.2)
PROTHROMBIN TIME: 20.2 SECONDS (ref 11.7–14.2)
RBC # BLD AUTO: 5.46 10*6/MM3 (ref 4.6–6)
SODIUM BLD-SCNC: 140 MMOL/L (ref 136–145)
WBC NRBC COR # BLD: 13.81 10*3/MM3 (ref 4.5–10.7)

## 2018-06-06 PROCEDURE — 85610 PROTHROMBIN TIME: CPT

## 2018-06-06 PROCEDURE — 36415 COLL VENOUS BLD VENIPUNCTURE: CPT

## 2018-06-06 PROCEDURE — 85025 COMPLETE CBC W/AUTO DIFF WBC: CPT

## 2018-06-06 PROCEDURE — 80048 BASIC METABOLIC PNL TOTAL CA: CPT

## 2018-06-13 ENCOUNTER — HOSPITAL ENCOUNTER (OUTPATIENT)
Facility: HOSPITAL | Age: 73
Setting detail: HOSPITAL OUTPATIENT SURGERY
Discharge: HOME | End: 2018-06-13
Attending: INTERNAL MEDICINE | Admitting: INTERNAL MEDICINE

## 2018-06-13 VITALS
HEART RATE: 95 BPM | SYSTOLIC BLOOD PRESSURE: 113 MMHG | HEIGHT: 71 IN | TEMPERATURE: 96.9 F | WEIGHT: 183.31 LBS | BODY MASS INDEX: 25.66 KG/M2 | DIASTOLIC BLOOD PRESSURE: 71 MMHG | OXYGEN SATURATION: 93 % | RESPIRATION RATE: 18 BRPM

## 2018-06-13 DIAGNOSIS — R07.2 PRECORDIAL PAIN: ICD-10-CM

## 2018-06-13 DIAGNOSIS — I25.118 CORONARY ARTERY DISEASE OF NATIVE ARTERY OF NATIVE HEART WITH STABLE ANGINA PECTORIS (HCC): ICD-10-CM

## 2018-06-13 DIAGNOSIS — R06.09 DOE (DYSPNEA ON EXERTION): ICD-10-CM

## 2018-06-13 LAB
INR PPP: 1.2 (ref 0.8–1.2)
INR PPP: 1.76 (ref 0.9–1.1)
PROTHROMBIN TIME: 13.8 SECONDS
PROTHROMBIN TIME: 13.8 SECONDS (ref 12.8–15.2)

## 2018-06-13 PROCEDURE — 85610 PROTHROMBIN TIME: CPT

## 2018-06-13 RX ORDER — SODIUM CHLORIDE 9 MG/ML
75 INJECTION, SOLUTION INTRAVENOUS CONTINUOUS
Status: DISCONTINUED | OUTPATIENT
Start: 2018-06-13 | End: 2018-06-13 | Stop reason: HOSPADM

## 2018-06-13 RX ORDER — SODIUM CHLORIDE 0.9 % (FLUSH) 0.9 %
1-10 SYRINGE (ML) INJECTION AS NEEDED
Status: DISCONTINUED | OUTPATIENT
Start: 2018-06-13 | End: 2018-06-13 | Stop reason: HOSPADM

## 2018-06-13 RX ADMIN — SODIUM CHLORIDE 75 ML/HR: 9 INJECTION, SOLUTION INTRAVENOUS at 08:35

## 2018-06-13 NOTE — H&P (VIEW-ONLY)
PATIENTINFORMATION    Date of Office Visit: 2018  Encounter Provider: Myriam Omer MD  Place of Service: [facility] CARDIOLOGY  Patient Name: Cody Eldridge  : 1945    Subjective:     Encounter Date:2018      Patient ID: Cody Eldridge is a 72 y.o. male.      History of Present Illness    This is a nice gentleman who saw Dr. Travis in the remote past. He is a difficult historian. Per Dr. Travis's note from  he had SV[facility] with [facility]  [facility] [place]. He also has COPD, hypertension and hyperlipidemia. To me, he denies coronary disease but his old records show that he has had a non-ST elevation myocardial infarction in the past. He had an echo in 10/2009, which showed normal left ventricular systolic function with an ejection fraction of 64%. There was mild right and left atrial enlargement and mild mitral and tricuspid regurgitation with a right ventricular systolic pressure of 39 mmHg.       He was seeing Dr. Kirk in 10/2017 and was noted to be short of breath with leg swelling and an elevated proBNP.  He was started on Lasix.  A chest x-ray revealed mild cardiac enlargement.  A CT scan of the chest revealed prominent changes of emphysema with scarring.  When I saw him, I ordered an echocardiogram in 10/2017 which revealed normal left ventricular systolic function with an ejection fraction of 56%, mild-to-moderate left ventricular hypertrophy.  The right ventricle was moderate-to severely dilated with mildly reduced function.  The right atrium was also moderate-to-severely dilated.  There was moderate tricuspid regurgitation with a right ventricular systolic pressure of 47 mmHg.  He saw Dr. Segovia in the office and his medications were changed around.      He comes in today and says he's been feeling really bad.  He has shortness of breath most notable with exertion but can also occur at rest.  He has orthopnea.  He  "cannot lie flat.  He has some wheezing.  He denies cough.  His heart rate has been elevated.  He has chronic edema in his legs which is not responding to diuretics.  He is complaining of chest pain that she describes as a dull, deep pain.  This does not have exacerbating factors.  Will last for a few minutes and go away on its own.    Review of Systems   Constitution: Positive for malaise/fatigue. Negative for fever, weight gain and weight loss.   HENT: Negative for ear pain, hearing loss, nosebleeds and sore throat.    Eyes: Negative for double vision, pain, vision loss in left eye and vision loss in right eye.   Cardiovascular: Positive for dyspnea on exertion and leg swelling.        See history of present illness.   Respiratory: Positive for shortness of breath. Negative for cough, sleep disturbances due to breathing, snoring and wheezing.    Endocrine: Negative for cold intolerance, heat intolerance and polyuria.   Skin: Negative for itching, poor wound healing and rash.   Musculoskeletal: Negative for joint pain, joint swelling and myalgias.   Gastrointestinal: Negative for abdominal pain, diarrhea, hematochezia, nausea and vomiting.   Genitourinary: Negative for hematuria and hesitancy.   Neurological: Negative for numbness, paresthesias and seizures.   Psychiatric/Behavioral: Negative for depression. The patient is not nervous/anxious.            ECG 12 Lead  Date/Time: 5/29/2018 1:21 PM  Performed by: SHILOH GRIMALDO  Authorized by: SHILOH GRIMALDO   Comparison: compared with previous ECG from 2/19/2018  Rhythm: atrial fibrillation  BPM: 113  Conduction: conduction normal  ST Segments: ST segments normal  Clinical impression: abnormal ECG               Objective:     /74 (BP Location: Right arm, Patient Position: Sitting, Cuff Size: Adult)   Pulse 113   Resp 16   Ht 185.4 cm (73\")   Wt 88.5 kg (195 lb 3.2 oz)   BMI 25.75 kg/m²  Body mass index is 25.75 kg/m².     Physical Exam   Constitutional: " He appears well-developed.   HENT:   Head: Normocephalic and atraumatic.   Eyes: Conjunctivae and lids are normal. Pupils are equal, round, and reactive to light. Lids are everted and swept, no foreign bodies found.   Neck: Normal range of motion. No JVD present. Carotid bruit is not present. No tracheal deviation present. No thyroid mass present.   Cardiovascular: Normal rate, regular rhythm and normal heart sounds.    Pulses:       Dorsalis pedis pulses are 2+ on the right side, and 2+ on the left side.   Pulmonary/Chest: Effort normal and breath sounds normal.   Abdominal: Normal appearance and bowel sounds are normal.   Musculoskeletal: Normal range of motion.   Neurological: He is alert. He has normal strength.   Skin: Skin is warm, dry and intact.   Psychiatric: He has a normal mood and affect. His behavior is normal.   Vitals reviewed.          Assessment/Plan:       1.  Chest pain and shortness of breath.  I recommend a right and left heart catheterization with a possible adenosine challenge.  He's really miserable.  It's hard to tell how much of this is coming from lung disease and what portion of this may be cardiac.  Hopefully the right and left heart catheterization will help answer those questions.  2.  History of coronary artery disease with remote myocardial infarction.  Coronary Artery Disease  Assessment  • The patient has CCS class IV - angina with minimal activity, or at rest    Subjective - Objective  • There is a history of past MI      3.  Lung disease/COPD with right heart failure.  This is followed by Dr. Segovia.  4.  Right heart failure  5. Atrial Fibrillation and Atrial Flutter  Assessment  • The patient has permanent atrial fibrillation  • The patient's CHADS2-VASc score is 3  • A [id number] score of 2 or more is considered a high risk for a thromboembolic event    Plan  • Continue in atrial fibrillation with rate control  • Continue warfarin for antithrombotic therapy, bleeding issues  discussed  I'm going to discontinue amlodipine so we have more blood pressure to work with and increase metoprolol to 100 mg twice a day to attempt better rate control.  6.  Hypertension.  As above.    Orders Placed This Encounter   Procedures   • ECG 12 Lead     This order was created via procedure documentation      Cody Eldridge   Home Medication Instructions LING:    Printed on:05/29/18 9109   Medication Information                      albuterol (PROVENTIL HFA;VENTOLIN HFA) 108 (90 BASE) MCG/ACT inhaler  ProAir  (90 Base) MCG/ACT Inhalation Aerosol Solution; Patient Sig: ProAir  (90 Base) MCG/ACT Inhalation Aerosol Solution inhale 2 puffs by mouth every 4 hours if needed; 8.5; 11; 07-Apr-2014; Active             ANORO ELLIPTA 62.5-25 MCG/INH aerosol powder                aspirin 81 MG EC tablet  Take 81 mg by mouth Daily.             baclofen (LIORESAL) 10 MG tablet  take 1 tablet by mouth three times a day             cromolyn (NASALCROM) 5.2 MG/ACT nasal spray  1 spray into each nostril 2 (Two) Times a Day As Needed for Allergies.             fluticasone (FLONASE) 50 MCG/ACT nasal spray  2 sprays into each nostril Daily.             furosemide (LASIX) 40 MG tablet  Take 1 tablet by mouth 2 (Two) Times a Day.             HYDROcodone-acetaminophen (NORCO) 7.5-325 MG per tablet  Take 1 tablet by mouth 4 (Four) Times a Day As Needed for Moderate Pain  or Severe Pain .             lisinopril (PRINIVIL,ZESTRIL) 20 MG tablet  Take 1 tablet by mouth 2 (Two) Times a Day.             meloxicam (MOBIC) 15 MG tablet  Take 15 mg by mouth Daily.             metoprolol tartrate (LOPRESSOR) 100 MG tablet  Take 1 tablet by mouth Every 12 (Twelve) Hours.             montelukast (SINGULAIR) 10 MG tablet  take 1 tablet by mouth once daily             potassium chloride (K-DUR,KLOR-CON) 20 MEQ CR tablet  Take 1 tablet by mouth Daily. With food if taking furosemide             raNITIdine (ZANTAC) 150 MG  tablet  Take 150 mg by mouth Daily.             rosuvastatin (CRESTOR) 20 MG tablet  Take 0.5 tablets by mouth Daily.             sertraline (ZOLOFT) 50 MG tablet  Take 1 tablet by mouth Daily.             warfarin (COUMADIN) 2 MG tablet  Take 2 tablets by mouth Daily.                        Myriam Omer MD  05/29/18  2:26 PM

## 2018-07-06 ENCOUNTER — TELEPHONE (OUTPATIENT)
Dept: CARDIOLOGY | Facility: CLINIC | Age: 73
End: 2018-07-06

## 2018-07-06 NOTE — TELEPHONE ENCOUNTER
"07/06/18  3:02 PM  Cody Eldridge  1945    Home Phone [phone]   Mobile [phone]       Cody Eldridge is a patient of Dr Omer.  Inbound call from pts granddaughter, Jo, who stated they were just seen at pulmonologist, Dr Paulson office and they obtained a chest xray for c/o SOA, increase swelling to BILLE.  Jo stated Dr Paulson saw \"fluid in the vessels\" and requested him to be seen ASAP.    Patient has apt with Silvina 7/19/18    Cardiac meds reviewed with patient  Metoprolol tartrate 100mg every 12 hours  Coumadin as directed  Lasix 40mg BID (he has not been taking lasix  for about a month, he thinks. Stated he was taken off lasix during his last visit.  Dr Paulson told him to take 40mg tonight and tomorrow)- patient doesn't weigh daily  kdur  Lisinopril 20mg bid  asacrestor    Should he resume Lasix?  Does he need to be seen sooner?    I will call and request x ray from Dr Paulson.    Lana Lim RN  Triage nurse    "

## 2018-07-06 NOTE — TELEPHONE ENCOUNTER
Reviewed note and records from pulmonologist with Dr Omer.  She would like patient to resume lasix 40mg bid.  It is ok to wait for his apt with Silvina 7/19/18.  instructions given to patient and with his permission to his granddaughter, Jo.  Instructed on med change, requested patient weigh daily and call us back if the soa is not better in a few days.  R/v instructions    Lana Lim RN  Triage nurse

## 2018-07-13 ENCOUNTER — HOSPITAL ENCOUNTER (EMERGENCY)
Facility: HOSPITAL | Age: 73
Discharge: HOME | End: 2018-07-13
Attending: EMERGENCY MEDICINE | Admitting: EMERGENCY MEDICINE

## 2018-07-13 ENCOUNTER — APPOINTMENT (OUTPATIENT)
Dept: GENERAL RADIOLOGY | Facility: HOSPITAL | Age: 73
End: 2018-07-13

## 2018-07-13 VITALS
RESPIRATION RATE: 20 BRPM | HEIGHT: 72 IN | DIASTOLIC BLOOD PRESSURE: 92 MMHG | HEART RATE: 115 BPM | TEMPERATURE: 98 F | BODY MASS INDEX: 26.07 KG/M2 | WEIGHT: 192.5 LBS | SYSTOLIC BLOOD PRESSURE: 142 MMHG | OXYGEN SATURATION: 95 %

## 2018-07-13 DIAGNOSIS — I50.31 ACUTE DIASTOLIC CONGESTIVE HEART FAILURE (HCC): Primary | ICD-10-CM

## 2018-07-13 DIAGNOSIS — I48.20 CHRONIC ATRIAL FIBRILLATION (HCC): ICD-10-CM

## 2018-07-13 LAB
ALBUMIN SERPL-MCNC: 3.5 G/DL (ref 3.5–5.2)
ALBUMIN/GLOB SERPL: 1.2 G/DL
ALP SERPL-CCNC: 80 U/L (ref 39–117)
ALT SERPL W P-5'-P-CCNC: 36 U/L (ref 1–41)
ANION GAP SERPL CALCULATED.3IONS-SCNC: 9 MMOL/L
ARTERIAL PATENCY WRIST A: POSITIVE
AST SERPL-CCNC: 36 U/L (ref 1–40)
ATMOSPHERIC PRESS: 757 MMHG
BASE EXCESS BLDA CALC-SCNC: -0.7 MMOL/L (ref 0–2)
BASOPHILS # BLD AUTO: 0.04 10*3/MM3 (ref 0–0.2)
BASOPHILS NFR BLD AUTO: 0.4 % (ref 0–1.5)
BDY SITE: ABNORMAL
BILIRUB SERPL-MCNC: 0.5 MG/DL (ref 0.1–1.2)
BUN BLD-MCNC: 20 MG/DL (ref 8–23)
BUN/CREAT SERPL: 19.8 (ref 7–25)
CALCIUM SPEC-SCNC: 8.6 MG/DL (ref 8.6–10.5)
CHLORIDE SERPL-SCNC: 103 MMOL/L (ref 98–107)
CO2 SERPL-SCNC: 27 MMOL/L (ref 22–29)
CREAT BLD-MCNC: 1.01 MG/DL (ref 0.76–1.27)
DEPRECATED RDW RBC AUTO: 51 FL (ref 37–54)
EOSINOPHIL # BLD AUTO: 0.07 10*3/MM3 (ref 0–0.7)
EOSINOPHIL NFR BLD AUTO: 0.7 % (ref 0.3–6.2)
ERYTHROCYTE [DISTWIDTH] IN BLOOD BY AUTOMATED COUNT: 14.2 % (ref 11.5–14.5)
GFR SERPL CREATININE-BSD FRML MDRD: 72 ML/MIN/1.73
GLOBULIN UR ELPH-MCNC: 3 GM/DL
GLUCOSE BLD-MCNC: 150 MG/DL (ref 65–99)
HCO3 BLDA-SCNC: 24.3 MMOL/L (ref 22–28)
HCT VFR BLD AUTO: 47.7 % (ref 40.4–52.2)
HGB BLD-MCNC: 15.4 G/DL (ref 13.7–17.6)
IMM GRANULOCYTES # BLD: 0.03 10*3/MM3 (ref 0–0.03)
IMM GRANULOCYTES NFR BLD: 0.3 % (ref 0–0.5)
INR PPP: 2.99 (ref 0.9–1.1)
LYMPHOCYTES # BLD AUTO: 1.4 10*3/MM3 (ref 0.9–4.8)
LYMPHOCYTES NFR BLD AUTO: 14.4 % (ref 19.6–45.3)
MCH RBC QN AUTO: 31.8 PG (ref 27–32.7)
MCHC RBC AUTO-ENTMCNC: 32.3 G/DL (ref 32.6–36.4)
MCV RBC AUTO: 98.6 FL (ref 79.8–96.2)
MODALITY: ABNORMAL
MONOCYTES # BLD AUTO: 0.98 10*3/MM3 (ref 0.2–1.2)
MONOCYTES NFR BLD AUTO: 10.1 % (ref 5–12)
NEUTROPHILS # BLD AUTO: 7.24 10*3/MM3 (ref 1.9–8.1)
NEUTROPHILS NFR BLD AUTO: 74.4 % (ref 42.7–76)
NT-PROBNP SERPL-MCNC: 4590 PG/ML (ref 5–900)
PCO2 BLDA: 40.4 MM HG (ref 35–45)
PH BLDA: 7.39 PH UNITS (ref 7.35–7.45)
PLATELET # BLD AUTO: 227 10*3/MM3 (ref 140–500)
PMV BLD AUTO: 11.5 FL (ref 6–12)
PO2 BLDA: 65 MM HG (ref 80–100)
POTASSIUM BLD-SCNC: 3.7 MMOL/L (ref 3.5–5.2)
PROT SERPL-MCNC: 6.5 G/DL (ref 6–8.5)
PROTHROMBIN TIME: 30.6 SECONDS (ref 11.7–14.2)
RBC # BLD AUTO: 4.84 10*6/MM3 (ref 4.6–6)
SAO2 % BLDCOA: 92.1 % (ref 92–99)
SODIUM BLD-SCNC: 139 MMOL/L (ref 136–145)
TOTAL RATE: 18 BREATHS/MINUTE
TROPONIN T SERPL-MCNC: <0.01 NG/ML (ref 0–0.03)
WBC NRBC COR # BLD: 9.73 10*3/MM3 (ref 4.5–10.7)

## 2018-07-13 PROCEDURE — 25010000002 FUROSEMIDE PER 20 MG: Performed by: EMERGENCY MEDICINE

## 2018-07-13 PROCEDURE — 71046 X-RAY EXAM CHEST 2 VIEWS: CPT

## 2018-07-13 PROCEDURE — 82803 BLOOD GASES ANY COMBINATION: CPT

## 2018-07-13 PROCEDURE — 93005 ELECTROCARDIOGRAM TRACING: CPT | Performed by: EMERGENCY MEDICINE

## 2018-07-13 PROCEDURE — 36600 WITHDRAWAL OF ARTERIAL BLOOD: CPT

## 2018-07-13 PROCEDURE — 85610 PROTHROMBIN TIME: CPT | Performed by: EMERGENCY MEDICINE

## 2018-07-13 PROCEDURE — 84484 ASSAY OF TROPONIN QUANT: CPT | Performed by: EMERGENCY MEDICINE

## 2018-07-13 PROCEDURE — 80053 COMPREHEN METABOLIC PANEL: CPT | Performed by: EMERGENCY MEDICINE

## 2018-07-13 PROCEDURE — 93010 ELECTROCARDIOGRAM REPORT: CPT | Performed by: INTERNAL MEDICINE

## 2018-07-13 PROCEDURE — 99284 EMERGENCY DEPT VISIT MOD MDM: CPT

## 2018-07-13 PROCEDURE — 83880 ASSAY OF NATRIURETIC PEPTIDE: CPT | Performed by: EMERGENCY MEDICINE

## 2018-07-13 PROCEDURE — 85025 COMPLETE CBC W/AUTO DIFF WBC: CPT | Performed by: EMERGENCY MEDICINE

## 2018-07-13 PROCEDURE — 96374 THER/PROPH/DIAG INJ IV PUSH: CPT

## 2018-07-13 RX ORDER — AMLODIPINE BESYLATE 10 MG/1
10 TABLET ORAL DAILY
Status: ON HOLD | COMMUNITY
End: 2018-08-23 | Stop reason: ALTCHOICE

## 2018-07-13 RX ORDER — ECHINACEA PURPUREA EXTRACT 125 MG
1 TABLET ORAL AS NEEDED
Status: ON HOLD | COMMUNITY
End: 2018-08-23

## 2018-07-13 RX ORDER — DIGOXIN 125 MCG
125 TABLET ORAL
Status: DISCONTINUED | OUTPATIENT
Start: 2018-07-13 | End: 2018-07-13 | Stop reason: HOSPADM

## 2018-07-13 RX ORDER — FUROSEMIDE 10 MG/ML
60 INJECTION INTRAMUSCULAR; INTRAVENOUS ONCE
Status: COMPLETED | OUTPATIENT
Start: 2018-07-13 | End: 2018-07-13

## 2018-07-13 RX ORDER — OMEPRAZOLE 40 MG/1
40 CAPSULE, DELAYED RELEASE ORAL DAILY
Status: ON HOLD | COMMUNITY
End: 2018-08-23

## 2018-07-13 RX ORDER — SODIUM CHLORIDE 0.9 % (FLUSH) 0.9 %
10 SYRINGE (ML) INJECTION AS NEEDED
Status: DISCONTINUED | OUTPATIENT
Start: 2018-07-13 | End: 2018-07-13 | Stop reason: HOSPADM

## 2018-07-13 RX ORDER — DIGOXIN 125 MCG
125 TABLET ORAL
Qty: 30 TABLET | Refills: 0 | Status: SHIPPED | OUTPATIENT
Start: 2018-07-13 | End: 2018-08-13 | Stop reason: SDUPTHER

## 2018-07-13 RX ADMIN — FUROSEMIDE 60 MG: 10 INJECTION, SOLUTION INTRAMUSCULAR; INTRAVENOUS at 10:49

## 2018-07-13 RX ADMIN — DIGOXIN 125 MCG: 0.12 TABLET ORAL at 11:19

## 2018-07-13 NOTE — ED NOTES
[facility] states SOB began on 7/6. Pt noted 5lb weight gain since last Friday. Pt states he visited the pulmonologist and was told to start taking 40 mg of Lasix BID. Pt has been taking prescribed Lasix but shortness of breath has continued to worsen. Pt also reports generalized weakness and increased drowsiness.                 Hyacinth Dixon RN  07/13/18 3045

## 2018-07-13 NOTE — ED PROVIDER NOTES
EMERGENCY DEPARTMENT ENCOUNTER    CHIEF COMPLAINT  Chief Complaint: SOA  History given by: Pt  History limited by: none  Room Number: HALG/G  PMD: James Epley, APRN  Pulmonologist:   Cardiologist:     HPI:  Pt is a 73 y.o. male who presents complaining of SOA that began a week ago. Pt states he also had weight gain at the time. Pt states he saw his pulmonologist, , a week ago who consulted his cardiologist,, and put pt on Lasix which gave him relief from the weight gain. He now states his weight is back up. Pt has hx of Afib and CHF.      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   • Benign colonic polyp 01/19/2016   • Neck pain 01/19/2016   • Chronic obstructive pulmonary disease (CMS/HCC) 01/19/2016   • Mixed anxiety depressive disorder 01/19/2016   • Degeneration of intervertebral disc of lumbosacral region 01/19/2016   • Elevated prostate specific antigen (PSA) 01/19/2016   • Hyperlipidemia 01/19/2016   • Hypertension 01/19/2016   • Insomnia 01/19/2016   • Lumbar radiculopathy 01/19/2016   • Osteoarthritis 01/19/2016   • Vitamin D deficiency 01/19/2016   • Abdominal pain 10/04/2016   • Acute URI 01/09/2017   • Myalgia 05/05/2017   • Cramp of both lower extremities 05/05/2017   • Gingivitis 08/08/2017   • Atrial fibrillation (CMS/HCC) 09/25/2017   • Non-rheumatic tricuspid valve insufficiency 10/24/2017   • SHAR (obstructive sleep apnea)  11/28/2017   • Precordial pain 05/29/2018   • IRAHETA (dyspnea on exertion) 05/29/2018   • Coronary artery disease of native artery of native heart with stable angina pectoris (CMS/[facility]) 05/29/2018     Resolved Ambulatory Problems     Diagnosis Date Noted   • Cough 01/09/2017     Past Medical History:   Diagnosis Date   • CHF (congestive heart failure) (CMS/[facility])    • Chronic bronchitis (CMS/[facility])    • COPD (chronic obstructive pulmonary disease) (CMS/[facility])    • Coronary artery disease of native artery of native heart with stable angina pectoris (CMS/[facility])    • Cramps of lower extremity    • Daytime hypersomnia    • Depression with anxiety    • Drug therapy    • Dyspnea on exertion    • Encounter for immunization    • Enlarged prostate    • Fatigue    • Frequent nocturnal awakening    • Gout    • H/O cardiac radiofrequency ablation 2006   • Hyperlipidemia    • Hypertension    • Insomnia    • Lumbar radicular pain    • MI (myocardial infarction)    • Right leg pain    • Shortness of breath    • Snoring    • SVT (supraventricular tachycardia) (CMS/[facility])    • Syncope        PAST SURGICAL HISTORY  Past Surgical History:   Procedure Laterality Date   • APPENDECTOMY     • CARDIAC ABLATION  2006    [facility].   • FINGER SURGERY     • FOOT SURGERY     • HAND SURGERY         FAMILY HISTORY  Family History   Problem Relation Age of Onset   • Heart attack Mother    • Diabetes Mother    • Diabetes Father    • Heart failure Father    • Cancer Brother         colon   • Diabetes Sister    • COPD Other    • Heart failure Other    • Heart disease Other        SOCIAL HISTORY  Social History     Social History   • Marital status:      Spouse name: N/A   • Number of children: N/A   • Years of education: Some College     Occupational History   • Not on file.     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 Quit 40 yrs. ago   • Drug use: No   • Sexual activity: Defer     Other Topics Concern   • Not on file     Social History Narrative   • No narrative on file       ALLERGIES  Benadryl [diphenhydramine hcl]    REVIEW OF SYSTEMS  Review of Systems   Constitutional: Positive for unexpected weight change (gain). Negative for activity change, appetite change and fever.   HENT: Negative for congestion and sore throat.    Eyes: Negative.    Respiratory: Positive for shortness of breath. Negative for cough.    Cardiovascular: Negative for chest pain and leg swelling.   Gastrointestinal: Negative for abdominal pain, diarrhea and vomiting.   Endocrine: Negative.    Genitourinary: Negative for decreased urine volume and dysuria.   Musculoskeletal: Negative for neck pain.   Skin: Negative for rash and wound.   Allergic/Immunologic: Negative.    Neurological: Negative for weakness, numbness and headaches.   Hematological: Negative.    Psychiatric/Behavioral: Negative.    All other systems reviewed and are negative.      PHYSICAL EXAM  ED Triage Vitals   Temp Heart Rate Resp BP SpO2   07/13/18 0802 07/13/18 0802 07/13/18 0802 07/13/18 0807 07/13/18 0802   96 °F (35.6 °C) 92 22 143/95 96 %      Temp src Heart Rate Source Patient Position BP Location FiO2 (%)   07/13/18 0802 07/13/18 0802 07/13/18 0807 07/13/18 0807 --   Oral Monitor Lying Right arm        Physical Exam   Constitutional: He is oriented to person, place, and time. No distress.   HENT:   Head: Normocephalic and atraumatic.   Eyes: EOM are normal. Pupils are equal, round, and reactive to light.   Neck: Normal range of motion. Neck supple. JVD (BL) present.   Cardiovascular: Normal heart sounds.  An irregular rhythm present. Tachycardia present.    Pulmonary/Chest: Effort normal. No respiratory distress. He has rales (BL).   Abdominal: Soft.  There is no tenderness. There is no rebound and no guarding.   Musculoskeletal: Normal range of motion. He exhibits no edema.   No pedal edema   Neurological: He is alert and oriented to person, place, and time. He has normal sensation and normal strength.   Skin: Skin is warm and dry.   Psychiatric: Mood and affect normal.   Nursing note and vitals reviewed.      LAB RESULTS  Lab Results (last 24 hours)     Procedure Component Value Units Date/Time    Protime-INR [[id number]]  (Abnormal) Collected:  07/13/18 0824    Specimen:  Blood Updated:  07/13/18 0908     Protime 30.6 (H) Seconds      INR 2.99 (H)    CBC & Differential [[id number]] Collected:  07/13/18 0824    Specimen:  Blood Updated:  07/13/18 0853    Narrative:       The following orders were created for panel order CBC & Differential.  Procedure                               Abnormality         Status                     ---------                               -----------         ------                     CBC Auto Differential[[id number]]        Abnormal            Final result                 Please view results for these tests on the individual orders.    Comprehensive Metabolic Panel [[id number]]  (Abnormal) Collected:  07/13/18 0824    Specimen:  Blood Updated:  07/13/18 0907     Glucose 150 (H) mg/dL      BUN 20 mg/dL      Creatinine 1.01 mg/dL      Sodium 139 mmol/L      Potassium 3.7 mmol/L      Chloride 103 mmol/L      CO2 27.0 mmol/L      Calcium 8.6 mg/dL      Total Protein 6.5 g/dL      Albumin 3.50 g/dL      ALT (SGPT) 36 U/L      AST (SGOT) 36 U/L      Alkaline Phosphatase 80 U/L      Total Bilirubin 0.5 mg/dL      eGFR Non African Amer 72 mL/min/1.73      Globulin 3.0 gm/dL      A/G Ratio 1.2 g/dL      BUN/Creatinine Ratio 19.8     Anion Gap 9.0 mmol/L     Narrative:       The MDRD GFR formula is only valid for adults with stable renal function between ages 18 and 70.    BNP [[id number]]  (Abnormal) Collected:  07/13/18 0824    Specimen:  Blood  Updated:  07/13/18 0903     proBNP 4,590.0 (H) pg/mL     Narrative:       Among patients with dyspnea, NT-proBNP is highly sensitive for the detection of acute congestive heart failure. In addition NT-proBNP of <300 pg/ml effectively rules out acute congestive heart failure with 99% negative predictive value.    Troponin [[id number]]  (Normal) Collected:  07/13/18 0824    Specimen:  Blood Updated:  07/13/18 0906     Troponin T <0.010 ng/mL     Narrative:       Troponin T Reference Ranges:  Less than 0.03 ng/mL:    Negative for AMI  0.03 to 0.09 ng/mL:      Indeterminant for AMI  Greater than 0.09 ng/mL: Positive for AMI    CBC Auto Differential [[id number]]  (Abnormal) Collected:  07/13/18 0824    Specimen:  Blood Updated:  07/13/18 0853     WBC 9.73 10*3/mm3      RBC 4.84 10*6/mm3      Hemoglobin 15.4 g/dL      Hematocrit 47.7 %      MCV 98.6 (H) fL      MCH 31.8 pg      MCHC 32.3 (L) g/dL      RDW 14.2 %      RDW-SD 51.0 fl      MPV 11.5 fL      Platelets 227 10*3/mm3      Neutrophil % 74.4 %      Lymphocyte % 14.4 (L) %      Monocyte % 10.1 %      Eosinophil % 0.7 %      Basophil % 0.4 %      Immature Grans % 0.3 %      Neutrophils, Absolute 7.24 10*3/mm3      Lymphocytes, Absolute 1.40 10*3/mm3      Monocytes, Absolute 0.98 10*3/mm3      Eosinophils, Absolute 0.07 10*3/mm3      Basophils, Absolute 0.04 10*3/mm3      Immature Grans, Absolute 0.03 10*3/mm3     Blood Gas, Arterial [[id number]]  (Abnormal) Collected:  07/13/18 0857    Specimen:  Arterial Blood Updated:  07/13/18 0859     Site Arterial: right radial     Kyle's Test Positive     pH, Arterial 7.387 pH units      pCO2, Arterial 40.4 mm Hg      pO2, Arterial 65.0 (L) mm Hg      HCO3, Arterial 24.3 mmol/L      Base Excess, Arterial -0.7 (L) mmol/L      O2 Saturation Calculated 92.1 %      Barometric Pressure for Blood Gas 757.0 mmHg      Modality Room Air     Rate 18 Breaths/minute     Narrative:       Meter: [id number] : [id number] Rui Bliss           I ordered the above labs and reviewed the results    RADIOLOGY  CXR-NAD     I ordered the above noted radiological studies. Interpreted by radiologist. Reviewed by me in PACS.       PROCEDURES  Procedures  EKG           EKG time: 0813  Rhythm/Rate: Afib 125  QRS, axis: Left axis deviation, Poor R wave progression anteriorally   ST and T waves: nonspecific ST wave changes     Interpreted Contemporaneously by me, independently viewed  unchanged compared to prior 5/29/18      PROGRESS AND CONSULTS  9:10 AM  Ordered Lasix due to exam and proBNP level.    10:26 AM  BP- 136/98 HR- 116 Temp- 96 °F (35.6 °C) (Oral) O2 sat- 94%  Rechecked the patient who is in NAD and is resting comfortably. Informed pt of results of all testing being NAD but that it does indicate CHF. I then informed him of the plan to consult with cardiology.Pt understands and agrees with the plan, all questions answered.    10:59 AM  Discussed pt's case with Dr Omer ([facility]), who wants to add 0.125mg of digoxin and is okay with the pt going home. He already has an appointment on 7/19 for follow up    11:04 AM  Informed pt of the consult with  and the plan for discharge with digoxin. He is to keep his appointment with [facility].Pt understands and agrees with the plan, all questions answered.    MEDICAL DECISION MAKING  Results were reviewed/discussed with the patient and they were also made aware of online access. Pt also made aware that some labs, such as cultures, will not be resulted during ER visit and follow up with PMD is necessary.     MDM  Number of Diagnoses or Management Options     Amount and/or Complexity of Data Reviewed  Clinical lab tests: reviewed and ordered (proBNP 4,590.0 (H)   Troponin is <0.010  )  Tests in the radiology section of CPT®: ordered and reviewed (CXR-NAD)  Tests in the medicine section of CPT®: reviewed and ordered (See Procedure Note)  Decide to obtain previous medical records or to obtain history from someone other  than the patient: yes  Review and summarize past medical records: yes  Discuss the patient with other providers: yes  Independent visualization of images, tracings, or specimens: yes           DIAGNOSIS  Final diagnoses:   Acute diastolic congestive heart failure (CMS/HCC)   Chronic atrial fibrillation (CMS/HCC)       DISPOSITION  DISCHARGE    Patient discharged in stable condition.    Reviewed implications of results, diagnosis, meds, responsibility to follow up, warning signs and symptoms of possible worsening, potential complications and reasons to return to ER.    Patient/Family voiced understanding of above instructions.    Discussed plan for discharge, as there is no emergent indication for admission. Patient referred to primary care provider for BP management due to today's BP. Pt/family is agreeable and understands need for follow up and repeat testing.  Pt is aware that discharge does not mean that nothing is wrong but it indicates no emergency is present that requires admission and they must continue care with follow-up as given below or physician of their choice.     FOLLOW-UP  Myriam Omer MD  [address] [address]  [phone]      keep scheduled appointmtent         Medication List      New Prescriptions    digoxin 125 MCG tablet  Commonly known as:  LANOXIN  Take 1 tablet by mouth Daily.              Latest Documented Vital Signs:  As of 11:56 AM  BP- 142/92 HR- 115 Temp- 98 °F (36.7 °C) (Oral) O2 sat- 95%    --  Documentation assistance provided by abdias Fierro for .  Information recorded by the scribe was done at my direction and has been verified and validated by me.     Faisal Fierro  07/13/18 8902       Cody Ramos MD  07/13/18 8852

## 2018-07-13 NOTE — ED NOTES
Pt okay to leave ER at 1140 to ensure no reaction to newly administered Digoxin.      Hyacinth Dixon RN  07/13/18 6282

## 2018-07-16 ENCOUNTER — TELEPHONE (OUTPATIENT)
Dept: SOCIAL WORK | Facility: HOSPITAL | Age: 73
End: 2018-07-16

## 2018-07-19 ENCOUNTER — LAB (OUTPATIENT)
Dept: LAB | Facility: HOSPITAL | Age: 73
End: 2018-07-19

## 2018-07-19 ENCOUNTER — OFFICE VISIT (OUTPATIENT)
Dept: CARDIOLOGY | Facility: CLINIC | Age: 73
End: 2018-07-19

## 2018-07-19 ENCOUNTER — HOSPITAL ENCOUNTER (OUTPATIENT)
Dept: CARDIOLOGY | Facility: HOSPITAL | Age: 73
Setting detail: RECURRING SERIES
Discharge: HOME | End: 2018-07-19

## 2018-07-19 ENCOUNTER — TELEPHONE (OUTPATIENT)
Dept: CARDIOLOGY | Facility: CLINIC | Age: 73
End: 2018-07-19

## 2018-07-19 VITALS
HEART RATE: 80 BPM | BODY MASS INDEX: 24.52 KG/M2 | SYSTOLIC BLOOD PRESSURE: 120 MMHG | DIASTOLIC BLOOD PRESSURE: 70 MMHG | HEIGHT: 72 IN | WEIGHT: 181 LBS

## 2018-07-19 DIAGNOSIS — I50.31 ACUTE DIASTOLIC CONGESTIVE HEART FAILURE (HCC): ICD-10-CM

## 2018-07-19 DIAGNOSIS — Z51.81 THERAPEUTIC DRUG MONITORING: ICD-10-CM

## 2018-07-19 DIAGNOSIS — I25.118 CORONARY ARTERY DISEASE OF NATIVE ARTERY OF NATIVE HEART WITH STABLE ANGINA PECTORIS (HCC): ICD-10-CM

## 2018-07-19 DIAGNOSIS — R07.2 PRECORDIAL PAIN: ICD-10-CM

## 2018-07-19 DIAGNOSIS — G47.33 OSA (OBSTRUCTIVE SLEEP APNEA): ICD-10-CM

## 2018-07-19 DIAGNOSIS — R06.02 SHORTNESS OF BREATH: Primary | ICD-10-CM

## 2018-07-19 DIAGNOSIS — R20.9 COLD HANDS AND FEET: ICD-10-CM

## 2018-07-19 DIAGNOSIS — J43.9 PULMONARY EMPHYSEMA, UNSPECIFIED EMPHYSEMA TYPE (HCC): ICD-10-CM

## 2018-07-19 DIAGNOSIS — I48.21 PERMANENT ATRIAL FIBRILLATION (HCC): ICD-10-CM

## 2018-07-19 DIAGNOSIS — R06.09 DOE (DYSPNEA ON EXERTION): ICD-10-CM

## 2018-07-19 LAB
ANION GAP SERPL CALCULATED.3IONS-SCNC: 8.1 MMOL/L
BUN BLD-MCNC: 14 MG/DL (ref 8–23)
BUN/CREAT SERPL: 15.9 (ref 7–25)
CALCIUM SPEC-SCNC: 8.8 MG/DL (ref 8.6–10.5)
CHLORIDE SERPL-SCNC: 98 MMOL/L (ref 98–107)
CO2 SERPL-SCNC: 31.9 MMOL/L (ref 22–29)
CREAT BLD-MCNC: 0.88 MG/DL (ref 0.76–1.27)
GFR SERPL CREATININE-BSD FRML MDRD: 85 ML/MIN/1.73
GLUCOSE BLD-MCNC: 102 MG/DL (ref 65–99)
POTASSIUM BLD-SCNC: 3.6 MMOL/L (ref 3.5–5.2)
SODIUM BLD-SCNC: 138 MMOL/L (ref 136–145)
T-UPTAKE NFR SERPL: 0.97 TBI (ref 0.8–1.3)
T4 SERPL-MCNC: 5.43 MCG/DL (ref 4.5–11.7)
TSH SERPL DL<=0.05 MIU/L-ACNC: 1.5 MIU/ML (ref 0.27–4.2)

## 2018-07-19 PROCEDURE — 93000 ELECTROCARDIOGRAM COMPLETE: CPT | Performed by: NURSE PRACTITIONER

## 2018-07-19 PROCEDURE — 85610 PROTHROMBIN TIME: CPT

## 2018-07-19 PROCEDURE — 84443 ASSAY THYROID STIM HORMONE: CPT

## 2018-07-19 PROCEDURE — 84479 ASSAY OF THYROID (T3 OR T4): CPT

## 2018-07-19 PROCEDURE — 36416 COLLJ CAPILLARY BLOOD SPEC: CPT

## 2018-07-19 PROCEDURE — 36415 COLL VENOUS BLD VENIPUNCTURE: CPT

## 2018-07-19 PROCEDURE — 84436 ASSAY OF TOTAL THYROXINE: CPT

## 2018-07-19 PROCEDURE — 99214 OFFICE O/P EST MOD 30 MIN: CPT | Performed by: NURSE PRACTITIONER

## 2018-07-19 PROCEDURE — 80048 BASIC METABOLIC PNL TOTAL CA: CPT

## 2018-07-19 NOTE — PROGRESS NOTES
Date of Office Visit: 2018  Encounter Provider: JONELLE Polanco  Place of Service: [facility] CARDIOLOGY  Patient Name: Cody Eldridge  :1945    Chief Complaint   Patient presents with   • Coronary Artery Disease   • Congestive Heart Failure   • Atrial Fibrillation   • Palpitations   • Fatigue   :     HPI: Cody Eldridge is a 73 y.o. male is a patient of Dr. Omer. I am seeing him today for the first time and have reviewed his record.     Hispast medical history is significant of hypertension, hyperlipidemia, supraventricular tachycardia, syncope, coronary artery disease with History of myocardial infarction, congestive heart failure, COPD, depression and anxiety.      In  patient had a SVT ablation at the [facility].  There is some questionable history of non-STEMI in the past.  He had an echocardiogram in 2000 showed normal left ventricular systolic function with an EF of 64%.  There was mild right and left atrial enlargement as well as mild mitral and tricuspid regurgitation with a right ventricular systolic pressure 39 mmHg.    In 2017 he was short of breath with leg swelling and had an elevated proBNP.  He was started on Lasix.  Chest x-ray revealed mild cardiac enlargement.  CT scan of the chest reveal prominent changes of emphysema with scarring.  He had an echocardiogram which revealed normal left ventricle systolic function with EF 56%, mild to moderate left ventricular hypertrophy.  Right ventricle is moderate to severely dilated with mildly reduced function.  The right atrium was also moderate to severely dilated.  There was moderate tricuspid regurgitation with RVSP of 47 mmHg.  He saw Dr. Segovia with Pulmonary and his medications were adjusted.     He was last seen on 2018.  At that time he complained of chest pain and shortness of breath and was recommended that he have a left and right heart  "catheterization bulging effusion challenge.  His amlodipine was discontinued and metoprolol was increased to 100 mg twice a day to attempt better rate control his heart rate was 113.    On 6/13/2018 he was to have heart catheterization which was aborted.      On 7/13/2018 and presented to the emergency department with increased shortness of breath and weight gain.  He was given one dose of 80 mg IV Lasix.  He was discharged in stable condition and was told to take 20 mg extra at home.  Then he was to resume 40 mg twice a day.    Patient presents today for reassessment.  He has occasional palpitations.  Shortness of breath continues however this has been improved since his ER visit.  His weights have been slowly trending down since then as well.  He has some swelling in the feet and ankles which is not a new symptom and has returned to his baseline.  He does have some fatigue which is waxing and waning.  He complains that his hands and feet have been abnormally cold recently.  I do not have on the air conditioning at home.  He tells me that he has claustrophobia and was afraid inside of the catheter lab.  He reported that he went \"a little crazy\" and that that is why the case was aborted in June.      Allergies   Allergen Reactions   • Benadryl [Diphenhydramine Hcl] Other (See Comments) and Dizziness     \"I sleep for about a day\"       Past Medical History:   Diagnosis Date   • CHF (congestive heart failure) (CMS/HCC)    • Chronic bronchitis (CMS/[facility])    • COPD (chronic obstructive pulmonary disease) (CMS/[facility])    • Coronary artery disease of native artery of native heart with stable angina pectoris (CMS/HCC)    • Cramps of lower extremity    • Daytime hypersomnia    • Depression with anxiety    • Drug therapy    • Dyspnea on exertion    • Encounter for immunization     flu vaccine high dose PF 65+   • Enlarged prostate    • Fatigue    • Frequent nocturnal awakening    • Gout    • H/O cardiac radiofrequency ablation " "2006    [facility].   • Hyperlipidemia    • Hypertension    • Insomnia    • Lumbar radicular pain    • MI (myocardial infarction)    • Right leg pain     sciatica nerve pain   • Shortness of breath    • Snoring    • SVT (supraventricular tachycardia) (CMS/HCC)    • Syncope        Past Surgical History:   Procedure Laterality Date   • APPENDECTOMY     • CARDIAC ABLATION  2006    [facility].   • FINGER SURGERY     • FOOT SURGERY     • HAND SURGERY           Family and social history reviewed.     Review of Systems   Constitution: Positive for malaise/fatigue.   Cardiovascular: Positive for dyspnea on exertion (improved), leg swelling (feet and ankles - unchanged) and palpitations.     All other systems were reviewed and are negative          Objective:     Vitals:    07/19/18 0939   BP: 120/70   BP Location: Left arm   Patient Position: Sitting   Pulse: 80   Weight: 82.1 kg (181 lb)   Height: 182.9 cm (72\")     Body mass index is 24.55 kg/m².    PHYSICAL EXAM:  Physical Exam   Constitutional: He is oriented to person, place, and time. He appears well-developed and well-nourished. No distress.   HENT:   Head: Normocephalic.   Eyes: Conjunctivae are normal.   Neck: Normal range of motion. No JVD present.   Cardiovascular: Normal rate, normal heart sounds and intact distal pulses.  An irregular rhythm present.   No murmur heard.  Pulses:       Carotid pulses are 2+ on the right side, and 2+ on the left side.       Radial pulses are 2+ on the right side, and 2+ on the left side.        Posterior tibial pulses are 2+ on the right side, and 2+ on the left side.   Pulmonary/Chest: Effort normal and breath sounds normal. No respiratory distress. He has no wheezes. He has no rhonchi. He has no rales. He exhibits no tenderness.   Abdominal: Soft. Bowel sounds are normal. He exhibits no distension.   Musculoskeletal: Normal range of motion. He exhibits no edema.   Neurological: He is alert and oriented to person, place, and " time.   Skin: Skin is warm, dry and intact. No rash noted. He is not diaphoretic. No cyanosis.   Psychiatric: He has a normal mood and affect. His behavior is normal. Judgment and thought content normal.         ECG 12 Lead  Date/Time: 7/19/2018 9:49 AM  Performed by: TIFFANIE MAYEN  Authorized by: TIFFANIE MAYEN   Comparison: compared with previous ECG from 5/29/2018  Similar to previous ECG  Rhythm: atrial fibrillation  Rate: normal  BPM: 80  QRS axis: left  Other findings: PRWP  Clinical impression: abnormal ECG            Current Outpatient Prescriptions   Medication Sig Dispense Refill   • albuterol (PROVENTIL HFA;VENTOLIN HFA) 108 (90 BASE) MCG/ACT inhaler 2 puffs. ProAir  (90 Base) MCG/ACT Inhalation Aerosol Solution; Patient Sig: ProAir  (90 Base) MCG/ACT Inhalation Aerosol Solution inhale 2 puffs by mouth every 4 hours if needed; 8.5; 11; 07-Apr-2014; Act     • amLODIPine (NORVASC) 10 MG tablet Take 10 mg by mouth Daily.     • aspirin 81 MG EC tablet Take 81 mg by mouth Daily.     • baclofen (LIORESAL) 10 MG tablet take 1 tablet by mouth three times a day 90 tablet 1   • digoxin (LANOXIN) 125 MCG tablet Take 1 tablet by mouth Daily. 30 tablet 0   • furosemide (LASIX) 40 MG tablet Take 1 tablet by mouth 2 (Two) Times a Day. 180 tablet 0   • HYDROcodone-acetaminophen (NORCO) 7.5-325 MG per tablet Take 1 tablet by mouth 4 (Four) Times a Day As Needed for Moderate Pain  or Severe Pain . 120 tablet 0   • lisinopril (PRINIVIL,ZESTRIL) 20 MG tablet Take 1 tablet by mouth 2 (Two) Times a Day. 180 tablet 1   • metoprolol tartrate (LOPRESSOR) 100 MG tablet Take 1 tablet by mouth Every 12 (Twelve) Hours. 60 tablet 11   • montelukast (SINGULAIR) 10 MG tablet take 1 tablet by mouth once daily 90 tablet 5   • omeprazole (priLOSEC) 40 MG capsule Take 40 mg by mouth Daily.     • potassium chloride (K-DUR,KLOR-CON) 20 MEQ CR tablet Take 1 tablet by mouth Daily. With food if taking furosemide 90 tablet 0   •  rosuvastatin (CRESTOR) 20 MG tablet Take 0.5 tablets by mouth Daily. 30 tablet 5   • sertraline (ZOLOFT) 50 MG tablet Take 1 tablet by mouth Daily. 90 tablet 1   • sodium chloride (OCEAN) 0.65 % nasal spray 1 spray into each nostril As Needed for Congestion.     • warfarin (COUMADIN) 2 MG tablet Take 2 tablets by mouth Daily. 14 tablet 0     No current facility-administered medications for this visit.      Assessment:       Diagnosis Plan   1. Shortness of breath  Case Request Cath Lab: Right and Left Heart Cath   2. IRAHETA (dyspnea on exertion)  Case Request Cath Lab: Right and Left Heart Cath   3. Precordial pain  Case Request Cath Lab: Right and Left Heart Cath   4. Cold hands and feet  Thyroid Panel With TSH   5. Coronary artery disease of native artery of native heart with stable angina pectoris (CMS/HCC)     6. Permanent atrial fibrillation (CMS/HCC)     7. Pulmonary emphysema, unspecified emphysema type (CMS/HCC)     8. SHAR (obstructive sleep apnea)     9. Acute diastolic congestive heart failure (CMS/HCC)  Basic Metabolic Panel   10. Therapeutic drug monitoring  Basic Metabolic Panel        Orders Placed This Encounter   Procedures   • Basic Metabolic Panel     Standing Status:   Future     Standing Expiration Date:   7/20/2019   • Thyroid Panel With TSH     Standing Status:   Future     Standing Expiration Date:   7/19/2019   • ECG 12 Lead     This order was created via procedure documentation         Plan:     1.Chronic atrial fibrillation rate controlled on metoprolol tartrate 100 mg twice daily and digoxin 125 mcg.  Atrial Fibrillation and Atrial Flutter  Assessment  • The patient has permanent atrial fibrillation  • The patient's CHADS2-VASc score is 3  • A [id number]-[id number] score of 2 or more is considered a high risk for a thromboembolic event    Plan  • Continue in atrial fibrillation with rate control  • Continue warfarin for antithrombotic therapy, bleeding issues discussed    2.  History of coronary  artery disease with remote myocardial infarction.  He initially had complaints of chest discomfort in May 2018 and was set up for right and left heart catheterization.  This was aborted due to claustrophobia with the patient and in adequate sedation.  Left and right heart catheterization reorder with request for general anesthesia due to the above.  Coronary Artery Disease  Assessment  • The patient has CCS class I - angina only during strenuous or prolonged physical activity    Plan  • Lifestyle modifications discussed include avoidance of tobacco products and medication compliance    Subjective - Objective  • There is a history of past MI  • Current antiplatelet therapy includes aspirin 81 mg      3.  COPD with emphysema- continuous complaint of shortness of breath with exertion.  He did have RV dilation on echocardiogram in October 2017.  Right heart catheterization would be beneficial to obtain pulmonary pressures  4.  Acute diastolic congestive heart failure.  EF was normal in October 2017.  He required IV Lasix 80 mg last week.  He is taking 40 mg of Lasix twice daily. BMP today.  5.History of SVT with ablation remains in a fib  6.  Hypertension-well-controlled 120/70  7.  Complaint of cold hands and feet-last TSH was in 2016.  Will obtain new value.  I also think that be beneficial to get the results of left heart catheterization.  8. SHAR    Follow up in 6 weeks with Dr. Omer    Patient was instructed to call the office if new symptoms develop or report to nearest ER if heart attack or stroke is suspected.        It has been a pleasure to participate in this patient's care.      Thank you,  JNOELLE Polanco      **Bessy Disclaimer:**  Much of this encounter note is an electronic transcription/translation of spoken language to printed text. The electronic translation of spoken language may permit erroneous, or at times, nonsensical words or phrases to be inadvertently transcribed. Although I have reviewed  the note for such errors, some may still exist.

## 2018-07-23 RX ORDER — WARFARIN SODIUM 2 MG/1
TABLET ORAL
Qty: 180 TABLET | Refills: 1 | Status: SHIPPED | OUTPATIENT
Start: 2018-07-23 | End: 2018-10-28

## 2018-07-23 RX ORDER — WARFARIN SODIUM 2 MG/1
TABLET ORAL
Qty: 70 TABLET | Refills: 0 | Status: SHIPPED | OUTPATIENT
Start: 2018-07-23 | End: 2018-10-07 | Stop reason: SDUPTHER

## 2018-07-26 ENCOUNTER — HOSPITAL ENCOUNTER (OUTPATIENT)
Dept: CARDIOLOGY | Facility: HOSPITAL | Age: 73
Setting detail: RECURRING SERIES
Discharge: HOME | End: 2018-07-26

## 2018-07-26 PROCEDURE — 85610 PROTHROMBIN TIME: CPT

## 2018-07-26 PROCEDURE — 36416 COLLJ CAPILLARY BLOOD SPEC: CPT

## 2018-07-30 ENCOUNTER — HOSPITAL ENCOUNTER (OUTPATIENT)
Dept: CARDIOLOGY | Facility: HOSPITAL | Age: 73
Setting detail: RECURRING SERIES
Discharge: HOME | End: 2018-07-30

## 2018-07-30 PROCEDURE — 36416 COLLJ CAPILLARY BLOOD SPEC: CPT

## 2018-07-30 PROCEDURE — 85610 PROTHROMBIN TIME: CPT

## 2018-08-02 ENCOUNTER — TELEPHONE (OUTPATIENT)
Dept: CARDIOLOGY | Facility: CLINIC | Age: 73
End: 2018-08-02

## 2018-08-02 ENCOUNTER — TRANSCRIBE ORDERS (OUTPATIENT)
Dept: CARDIOLOGY | Facility: CLINIC | Age: 73
End: 2018-08-02

## 2018-08-02 DIAGNOSIS — R07.2 PRECORDIAL PAIN: ICD-10-CM

## 2018-08-02 DIAGNOSIS — Z01.810 PREOP CARDIOVASCULAR EXAM: Primary | ICD-10-CM

## 2018-08-02 DIAGNOSIS — Z13.6 SCREENING FOR CARDIOVASCULAR CONDITION: ICD-10-CM

## 2018-08-02 PROBLEM — R06.02 SHORTNESS OF BREATH: Status: ACTIVE | Noted: 2018-08-02

## 2018-08-02 NOTE — TELEPHONE ENCOUNTER
Dr Uribe    Mr Chente will be seeing you on 8/16 for a R&L heart cath.  He is currently on Warfarin.  What instructions should I give him for this med?    Thank you  Bernarda CROFT

## 2018-08-06 ENCOUNTER — HOSPITAL ENCOUNTER (OUTPATIENT)
Dept: CARDIOLOGY | Facility: HOSPITAL | Age: 73
Setting detail: RECURRING SERIES
Discharge: HOME | End: 2018-08-06

## 2018-08-06 PROCEDURE — 85610 PROTHROMBIN TIME: CPT

## 2018-08-06 PROCEDURE — 36416 COLLJ CAPILLARY BLOOD SPEC: CPT

## 2018-08-13 ENCOUNTER — HOSPITAL ENCOUNTER (OUTPATIENT)
Dept: CARDIOLOGY | Facility: HOSPITAL | Age: 73
Setting detail: RECURRING SERIES
Discharge: HOME | End: 2018-08-13

## 2018-08-13 ENCOUNTER — LAB (OUTPATIENT)
Dept: LAB | Facility: HOSPITAL | Age: 73
End: 2018-08-13

## 2018-08-13 DIAGNOSIS — Z13.6 SCREENING FOR CARDIOVASCULAR CONDITION: ICD-10-CM

## 2018-08-13 DIAGNOSIS — Z01.810 PREOP CARDIOVASCULAR EXAM: ICD-10-CM

## 2018-08-13 DIAGNOSIS — R07.2 PRECORDIAL PAIN: ICD-10-CM

## 2018-08-13 LAB
ANION GAP SERPL CALCULATED.3IONS-SCNC: 9.8 MMOL/L
BASOPHILS # BLD AUTO: 0.03 10*3/MM3 (ref 0–0.2)
BASOPHILS NFR BLD AUTO: 0.3 % (ref 0–1.5)
BUN BLD-MCNC: 12 MG/DL (ref 8–23)
BUN/CREAT SERPL: 12.9 (ref 7–25)
CALCIUM SPEC-SCNC: 9.4 MG/DL (ref 8.6–10.5)
CHLORIDE SERPL-SCNC: 102 MMOL/L (ref 98–107)
CO2 SERPL-SCNC: 30.2 MMOL/L (ref 22–29)
CREAT BLD-MCNC: 0.93 MG/DL (ref 0.76–1.27)
DEPRECATED RDW RBC AUTO: 46.7 FL (ref 37–54)
EOSINOPHIL # BLD AUTO: 0.21 10*3/MM3 (ref 0–0.7)
EOSINOPHIL NFR BLD AUTO: 2.4 % (ref 0.3–6.2)
ERYTHROCYTE [DISTWIDTH] IN BLOOD BY AUTOMATED COUNT: 13.1 % (ref 11.5–14.5)
GFR SERPL CREATININE-BSD FRML MDRD: 79 ML/MIN/1.73
GLUCOSE BLD-MCNC: 110 MG/DL (ref 65–99)
HCT VFR BLD AUTO: 48.1 % (ref 40.4–52.2)
HGB BLD-MCNC: 15.4 G/DL (ref 13.7–17.6)
IMM GRANULOCYTES # BLD: 0 10*3/MM3 (ref 0–0.03)
IMM GRANULOCYTES NFR BLD: 0 % (ref 0–0.5)
INR PPP: 2.39 (ref 0.9–1.1)
LYMPHOCYTES # BLD AUTO: 1.95 10*3/MM3 (ref 0.9–4.8)
LYMPHOCYTES NFR BLD AUTO: 22.1 % (ref 19.6–45.3)
MCH RBC QN AUTO: 31.3 PG (ref 27–32.7)
MCHC RBC AUTO-ENTMCNC: 32 G/DL (ref 32.6–36.4)
MCV RBC AUTO: 97.8 FL (ref 79.8–96.2)
MONOCYTES # BLD AUTO: 1.14 10*3/MM3 (ref 0.2–1.2)
MONOCYTES NFR BLD AUTO: 12.9 % (ref 5–12)
NEUTROPHILS # BLD AUTO: 5.48 10*3/MM3 (ref 1.9–8.1)
NEUTROPHILS NFR BLD AUTO: 62.3 % (ref 42.7–76)
PLATELET # BLD AUTO: 235 10*3/MM3 (ref 140–500)
PMV BLD AUTO: 11.2 FL (ref 6–12)
POTASSIUM BLD-SCNC: 4.2 MMOL/L (ref 3.5–5.2)
PROTHROMBIN TIME: 25.7 SECONDS (ref 11.7–14.2)
RBC # BLD AUTO: 4.92 10*6/MM3 (ref 4.6–6)
SODIUM BLD-SCNC: 142 MMOL/L (ref 136–145)
WBC NRBC COR # BLD: 8.81 10*3/MM3 (ref 4.5–10.7)

## 2018-08-13 PROCEDURE — 85610 PROTHROMBIN TIME: CPT

## 2018-08-13 PROCEDURE — 85025 COMPLETE CBC W/AUTO DIFF WBC: CPT

## 2018-08-13 PROCEDURE — 36415 COLL VENOUS BLD VENIPUNCTURE: CPT

## 2018-08-13 PROCEDURE — 80048 BASIC METABOLIC PNL TOTAL CA: CPT

## 2018-08-13 PROCEDURE — 36416 COLLJ CAPILLARY BLOOD SPEC: CPT

## 2018-08-13 RX ORDER — DIGOXIN 125 UG/1
TABLET ORAL
Qty: 30 TABLET | Refills: 3 | Status: SHIPPED | OUTPATIENT
Start: 2018-08-13 | End: 2018-11-07 | Stop reason: SDUPTHER

## 2018-08-17 RX ORDER — LISINOPRIL 20 MG/1
TABLET ORAL
Qty: 180 TABLET | Refills: 1 | Status: SHIPPED | OUTPATIENT
Start: 2018-08-17 | End: 2019-02-04 | Stop reason: SDUPTHER

## 2018-08-23 ENCOUNTER — HOSPITAL ENCOUNTER (OUTPATIENT)
Facility: HOSPITAL | Age: 73
Setting detail: HOSPITAL OUTPATIENT SURGERY
Discharge: HOME | End: 2018-08-23
Attending: INTERNAL MEDICINE | Admitting: INTERNAL MEDICINE

## 2018-08-23 VITALS
RESPIRATION RATE: 18 BRPM | SYSTOLIC BLOOD PRESSURE: 163 MMHG | HEIGHT: 72 IN | TEMPERATURE: 98.6 F | WEIGHT: 178.5 LBS | BODY MASS INDEX: 24.18 KG/M2 | OXYGEN SATURATION: 96 % | DIASTOLIC BLOOD PRESSURE: 94 MMHG | HEART RATE: 77 BPM

## 2018-08-23 DIAGNOSIS — R06.09 DOE (DYSPNEA ON EXERTION): ICD-10-CM

## 2018-08-23 DIAGNOSIS — R06.02 SHORTNESS OF BREATH: ICD-10-CM

## 2018-08-23 DIAGNOSIS — R07.2 PRECORDIAL PAIN: ICD-10-CM

## 2018-08-23 RX ORDER — LIDOCAINE HYDROCHLORIDE 10 MG/ML
0.1 INJECTION, SOLUTION EPIDURAL; INFILTRATION; INTRACAUDAL; PERINEURAL ONCE AS NEEDED
Status: DISCONTINUED | OUTPATIENT
Start: 2018-08-23 | End: 2018-08-23 | Stop reason: HOSPADM

## 2018-08-23 RX ORDER — SODIUM CHLORIDE 0.9 % (FLUSH) 0.9 %
1-10 SYRINGE (ML) INJECTION AS NEEDED
Status: DISCONTINUED | OUTPATIENT
Start: 2018-08-23 | End: 2018-08-23 | Stop reason: HOSPADM

## 2018-08-23 RX ORDER — SODIUM CHLORIDE 9 MG/ML
75 INJECTION, SOLUTION INTRAVENOUS CONTINUOUS
Status: DISCONTINUED | OUTPATIENT
Start: 2018-08-23 | End: 2018-08-23 | Stop reason: HOSPADM

## 2018-08-27 ENCOUNTER — HOSPITAL ENCOUNTER (OUTPATIENT)
Dept: CARDIOLOGY | Facility: HOSPITAL | Age: 73
Setting detail: RECURRING SERIES
Discharge: HOME | End: 2018-08-27

## 2018-08-27 PROCEDURE — 36416 COLLJ CAPILLARY BLOOD SPEC: CPT

## 2018-08-27 PROCEDURE — 85610 PROTHROMBIN TIME: CPT

## 2018-08-29 ENCOUNTER — HOSPITAL ENCOUNTER (OUTPATIENT)
Facility: HOSPITAL | Age: 73
Setting detail: HOSPITAL OUTPATIENT SURGERY
Discharge: HOME | End: 2018-08-29
Attending: INTERNAL MEDICINE | Admitting: INTERNAL MEDICINE

## 2018-08-29 ENCOUNTER — ANESTHESIA (OUTPATIENT)
Dept: CARDIOLOGY | Facility: HOSPITAL | Age: 73
End: 2018-08-29

## 2018-08-29 ENCOUNTER — ANESTHESIA EVENT (OUTPATIENT)
Dept: CARDIOLOGY | Facility: HOSPITAL | Age: 73
End: 2018-08-29

## 2018-08-29 VITALS
OXYGEN SATURATION: 95 % | SYSTOLIC BLOOD PRESSURE: 151 MMHG | RESPIRATION RATE: 20 BRPM | HEIGHT: 72 IN | BODY MASS INDEX: 24.71 KG/M2 | DIASTOLIC BLOOD PRESSURE: 93 MMHG | WEIGHT: 182.44 LBS | TEMPERATURE: 97 F | HEART RATE: 74 BPM

## 2018-08-29 DIAGNOSIS — R06.09 DOE (DYSPNEA ON EXERTION): ICD-10-CM

## 2018-08-29 DIAGNOSIS — R06.02 SHORTNESS OF BREATH: ICD-10-CM

## 2018-08-29 DIAGNOSIS — R07.2 PRECORDIAL PAIN: ICD-10-CM

## 2018-08-29 LAB
INR PPP: 1 (ref 0.8–1.2)
INR PPP: 1 (ref 0.9–1.1)
PROTHROMBIN TIME: 12 SECONDS
PROTHROMBIN TIME: 12 SECONDS (ref 12.8–15.2)

## 2018-08-29 PROCEDURE — 25010000002 FENTANYL CITRATE (PF) 100 MCG/2ML SOLUTION: Performed by: ANESTHESIOLOGY

## 2018-08-29 PROCEDURE — 25010000002 HEPARIN (PORCINE) PER 1000 UNITS: Performed by: INTERNAL MEDICINE

## 2018-08-29 PROCEDURE — 0 IOPAMIDOL PER 1 ML: Performed by: INTERNAL MEDICINE

## 2018-08-29 PROCEDURE — 93458 L HRT ARTERY/VENTRICLE ANGIO: CPT | Performed by: INTERNAL MEDICINE

## 2018-08-29 PROCEDURE — 25010000002 PROPOFOL 10 MG/ML EMULSION: Performed by: ANESTHESIOLOGY

## 2018-08-29 PROCEDURE — C1769 GUIDE WIRE: HCPCS | Performed by: INTERNAL MEDICINE

## 2018-08-29 PROCEDURE — 25010000002 MIDAZOLAM PER 1 MG: Performed by: ANESTHESIOLOGY

## 2018-08-29 PROCEDURE — S0260 H&P FOR SURGERY: HCPCS | Performed by: INTERNAL MEDICINE

## 2018-08-29 PROCEDURE — C1894 INTRO/SHEATH, NON-LASER: HCPCS | Performed by: INTERNAL MEDICINE

## 2018-08-29 PROCEDURE — 85610 PROTHROMBIN TIME: CPT

## 2018-08-29 RX ORDER — FENTANYL CITRATE 50 UG/ML
INJECTION, SOLUTION INTRAMUSCULAR; INTRAVENOUS AS NEEDED
Status: DISCONTINUED | OUTPATIENT
Start: 2018-08-29 | End: 2018-08-29 | Stop reason: SURG

## 2018-08-29 RX ORDER — SODIUM CHLORIDE 0.9 % (FLUSH) 0.9 %
1-10 SYRINGE (ML) INJECTION AS NEEDED
Status: DISCONTINUED | OUTPATIENT
Start: 2018-08-29 | End: 2018-08-29 | Stop reason: HOSPADM

## 2018-08-29 RX ORDER — SODIUM CHLORIDE 9 MG/ML
75 INJECTION, SOLUTION INTRAVENOUS CONTINUOUS
Status: DISCONTINUED | OUTPATIENT
Start: 2018-08-29 | End: 2018-08-29 | Stop reason: HOSPADM

## 2018-08-29 RX ORDER — LIDOCAINE HYDROCHLORIDE 10 MG/ML
0.1 INJECTION, SOLUTION EPIDURAL; INFILTRATION; INTRACAUDAL; PERINEURAL ONCE AS NEEDED
Status: DISCONTINUED | OUTPATIENT
Start: 2018-08-29 | End: 2018-08-29 | Stop reason: HOSPADM

## 2018-08-29 RX ORDER — PROPOFOL 10 MG/ML
VIAL (ML) INTRAVENOUS AS NEEDED
Status: DISCONTINUED | OUTPATIENT
Start: 2018-08-29 | End: 2018-08-29 | Stop reason: SURG

## 2018-08-29 RX ORDER — PROPOFOL 10 MG/ML
VIAL (ML) INTRAVENOUS CONTINUOUS PRN
Status: DISCONTINUED | OUTPATIENT
Start: 2018-08-29 | End: 2018-08-29 | Stop reason: SURG

## 2018-08-29 RX ORDER — SODIUM CHLORIDE 9 MG/ML
100 INJECTION, SOLUTION INTRAVENOUS CONTINUOUS
Status: DISCONTINUED | OUTPATIENT
Start: 2018-08-29 | End: 2018-08-29 | Stop reason: HOSPADM

## 2018-08-29 RX ORDER — MIDAZOLAM HYDROCHLORIDE 1 MG/ML
INJECTION INTRAMUSCULAR; INTRAVENOUS AS NEEDED
Status: DISCONTINUED | OUTPATIENT
Start: 2018-08-29 | End: 2018-08-29 | Stop reason: SURG

## 2018-08-29 RX ORDER — LIDOCAINE HYDROCHLORIDE 20 MG/ML
INJECTION, SOLUTION INFILTRATION; PERINEURAL AS NEEDED
Status: DISCONTINUED | OUTPATIENT
Start: 2018-08-29 | End: 2018-08-29 | Stop reason: HOSPADM

## 2018-08-29 RX ADMIN — FENTANYL CITRATE 50 MCG: 50 INJECTION INTRAMUSCULAR; INTRAVENOUS at 08:34

## 2018-08-29 RX ADMIN — PROPOFOL 100 MCG/KG/MIN: 10 INJECTION, EMULSION INTRAVENOUS at 08:34

## 2018-08-29 RX ADMIN — SODIUM CHLORIDE: 9 INJECTION, SOLUTION INTRAVENOUS at 08:28

## 2018-08-29 RX ADMIN — SODIUM CHLORIDE 75 ML/HR: 9 INJECTION, SOLUTION INTRAVENOUS at 07:34

## 2018-08-29 RX ADMIN — Medication 1 MG: at 08:34

## 2018-08-29 RX ADMIN — PROPOFOL 20 MG: 10 INJECTION, EMULSION INTRAVENOUS at 08:34

## 2018-08-29 NOTE — ANESTHESIA POSTPROCEDURE EVALUATION
Patient: Cody Eldridge    Procedure Summary     Date:  08/29/18 Room / Location:  [facility]  /  [facility] INVASIVE LOC[facility]    Anesthesia Start:  0828 Anesthesia Stop:  0924    Procedures:       Left Heart Cath (N/A )      Coronary angiography (N/A )      Left ventriculography (N/A ) Diagnosis:       Shortness of breath      Precordial pain      IRAHETA (dyspnea on exertion)    Provider:  Yousif Uribe MD Provider:  Hamlet Anand MD    Anesthesia Type:  MAC ASA Status:  4          Anesthesia Type: MAC  Last vitals  BP   151/93 (08/29/18 1030)   Temp   36.1 °C (97 °F) (08/29/18 0725)   Pulse   74 (08/29/18 1030)   Resp   20 (08/29/18 1030)     SpO2   95 % (08/29/18 1030)     Post Anesthesia Care and Evaluation    Patient location during evaluation: PHASE II  Patient participation: complete - patient participated  Level of consciousness: awake and alert  Pain management: adequate  Airway patency: patent  Anesthetic complications: No anesthetic complications  PONV Status: none  Cardiovascular status: acceptable  Respiratory status: acceptable  Hydration status: acceptable

## 2018-08-29 NOTE — ANESTHESIA PREPROCEDURE EVALUATION
Anesthesia Evaluation     Patient summary reviewed and Nursing notes reviewed                Airway   Mallampati: II  TM distance: >3 FB  Neck ROM: full  No difficulty expected  Dental    (+) lower dentures and upper dentures    Pulmonary - normal exam   (+) a smoker Former, COPD, shortness of breath, recent URI, sleep apnea,   Cardiovascular     Rhythm: irregular  Rate: normal    (+) hypertension, valvular problems/murmurs TI, past MI , CAD, dysrhythmias Atrial Fib, angina, CHF, IRAHETA, hyperlipidemia,     PE comment: Afib    Neuro/Psych  (+) syncope, numbness, psychiatric history Anxiety and Depression,       ROS Comment: PTSD  GI/Hepatic/Renal/Endo      Musculoskeletal     (+) myalgias, neck pain, radiculopathy Right lower extremity  Abdominal  - normal exam   Substance History      OB/GYN          Other   (+) arthritis                   Anesthesia Plan    ASA 4     MAC     intravenous induction   Anesthetic plan and risks discussed with patient.

## 2018-09-04 ENCOUNTER — HOSPITAL ENCOUNTER (OUTPATIENT)
Dept: CARDIOLOGY | Facility: HOSPITAL | Age: 73
Setting detail: RECURRING SERIES
Discharge: HOME | End: 2018-09-04

## 2018-09-04 ENCOUNTER — TELEPHONE (OUTPATIENT)
Dept: CARDIOLOGY | Facility: CLINIC | Age: 73
End: 2018-09-04

## 2018-09-04 PROCEDURE — 85610 PROTHROMBIN TIME: CPT

## 2018-09-04 PROCEDURE — 36416 COLLJ CAPILLARY BLOOD SPEC: CPT

## 2018-09-04 NOTE — TELEPHONE ENCOUNTER
Requesting to hold coumadin 5 days prior to RIGO for lumbar pain, will restart coumadin 24 hrs after injection. Date has not been scheduled. Requesting clearance before this is scheduled. Recommendations please

## 2018-09-07 ENCOUNTER — OFFICE VISIT (OUTPATIENT)
Dept: FAMILY MEDICINE CLINIC | Facility: CLINIC | Age: 73
End: 2018-09-07

## 2018-09-07 ENCOUNTER — HOSPITAL ENCOUNTER (OUTPATIENT)
Dept: CARDIOLOGY | Facility: HOSPITAL | Age: 73
Setting detail: RECURRING SERIES
Discharge: HOME | End: 2018-09-07

## 2018-09-07 VITALS
SYSTOLIC BLOOD PRESSURE: 124 MMHG | DIASTOLIC BLOOD PRESSURE: 80 MMHG | BODY MASS INDEX: 25.23 KG/M2 | OXYGEN SATURATION: 93 % | WEIGHT: 186 LBS | HEART RATE: 67 BPM

## 2018-09-07 DIAGNOSIS — J43.9 PULMONARY EMPHYSEMA, UNSPECIFIED EMPHYSEMA TYPE (HCC): ICD-10-CM

## 2018-09-07 DIAGNOSIS — E78.5 HYPERLIPIDEMIA, UNSPECIFIED HYPERLIPIDEMIA TYPE: ICD-10-CM

## 2018-09-07 DIAGNOSIS — I10 ESSENTIAL HYPERTENSION: Primary | ICD-10-CM

## 2018-09-07 DIAGNOSIS — R97.20 ELEVATED PROSTATE SPECIFIC ANTIGEN (PSA): ICD-10-CM

## 2018-09-07 DIAGNOSIS — R97.20 ELEVATED PSA: ICD-10-CM

## 2018-09-07 PROCEDURE — 99213 OFFICE O/P EST LOW 20 MIN: CPT | Performed by: NURSE PRACTITIONER

## 2018-09-07 PROCEDURE — 36416 COLLJ CAPILLARY BLOOD SPEC: CPT

## 2018-09-07 PROCEDURE — 85610 PROTHROMBIN TIME: CPT

## 2018-09-07 NOTE — PROGRESS NOTES
Subjective   Cody Eldridge is a 73 y.o. male.     Follow-up hypertension controlled essential    Recent card cath  circumflec 30% otherwise     COPD chronic dyspnea  Albuterol and nebulizer helps  Taking another inhaler blue not sure what  Will follow-up with pulmonary  Doing well overall    Quit smoking over 15 years ago    History of elevated PSA  I don't think he's followed up on his           The following portions of the patient's history were reviewed and updated as appropriate: allergies, current medications, past family history, past medical history, past surgical history and problem list.    Review of Systems   Musculoskeletal: Positive for back pain.   All other systems reviewed and are negative.      Objective   Physical Exam   Constitutional: He is oriented to person, place, and time. He appears well-developed and well-nourished. No distress.   HENT:   Head: Normocephalic and atraumatic.   Nose: Nose normal.   Mouth/Throat: Oropharynx is clear and moist.   Eyes: Pupils are equal, round, and reactive to light. Conjunctivae are normal. No scleral icterus.   Neck: Neck supple. No JVD present. No thyromegaly present.   Cardiovascular: Normal rate, regular rhythm and normal heart sounds.  Exam reveals no gallop and no friction rub.    No murmur heard.  Pulmonary/Chest: Effort normal and breath sounds normal. No respiratory distress. He has no wheezes. He has no rales.   Abdominal: Soft. Bowel sounds are normal. He exhibits no distension and no mass. There is no tenderness. There is no guarding. No hernia.   Musculoskeletal: He exhibits edema. He exhibits no tenderness.   Trace edema   Lymphadenopathy:     He has no cervical adenopathy.   Neurological: He is alert and oriented to person, place, and time. He has normal reflexes.   Skin: Skin is warm and dry. No rash noted. He is not diaphoretic. No erythema.   Psychiatric: He has a normal mood and affect. His behavior is normal. Judgment and thought content  normal.   Vitals reviewed.        Assessment/Plan   Cody was seen today for 4 mos check-up.    Diagnoses and all orders for this visit:    Essential hypertension  -     Lipid Panel With LDL / HDL Ratio; Future    Elevated PSA  -     Ambulatory Referral to Urology    Elevated prostate specific antigen (PSA)  -     Lipid Panel With LDL / HDL Ratio; Future  -     PSA DIAGNOSTIC ONLY; Future    Pulmonary emphysema, unspecified emphysema type (CMS/HCC)  -     Lipid Panel With LDL / HDL Ratio; Future    Hyperlipidemia, unspecified hyperlipidemia type  -     Lipid Panel With LDL / HDL Ratio; Future                  Discharge instructions    Outpatient urology follow-up elevated PSA  Continue present care  Fasting labs next week outpatient  Recheck in 4-6 mos

## 2018-09-07 NOTE — PATIENT INSTRUCTIONS
Discharge instructions    Outpatient urology follow-up elevated PSA  Continue present care  Fasting labs next week outpatient  Recheck in 4-6 mos

## 2018-09-12 ENCOUNTER — RESULTS ENCOUNTER (OUTPATIENT)
Dept: FAMILY MEDICINE CLINIC | Facility: CLINIC | Age: 73
End: 2018-09-12

## 2018-09-12 DIAGNOSIS — J43.9 PULMONARY EMPHYSEMA, UNSPECIFIED EMPHYSEMA TYPE (HCC): ICD-10-CM

## 2018-09-12 DIAGNOSIS — E78.5 HYPERLIPIDEMIA, UNSPECIFIED HYPERLIPIDEMIA TYPE: ICD-10-CM

## 2018-09-12 DIAGNOSIS — I10 ESSENTIAL HYPERTENSION: ICD-10-CM

## 2018-09-12 DIAGNOSIS — R97.20 ELEVATED PROSTATE SPECIFIC ANTIGEN (PSA): ICD-10-CM

## 2018-09-20 ENCOUNTER — HOSPITAL ENCOUNTER (OUTPATIENT)
Dept: CARDIOLOGY | Facility: HOSPITAL | Age: 73
Setting detail: RECURRING SERIES
Discharge: HOME | End: 2018-09-20

## 2018-09-20 ENCOUNTER — OFFICE VISIT (OUTPATIENT)
Dept: CARDIOLOGY | Facility: CLINIC | Age: 73
End: 2018-09-20

## 2018-09-20 VITALS
BODY MASS INDEX: 25.52 KG/M2 | SYSTOLIC BLOOD PRESSURE: 130 MMHG | DIASTOLIC BLOOD PRESSURE: 78 MMHG | WEIGHT: 188.4 LBS | HEART RATE: 72 BPM | HEIGHT: 72 IN

## 2018-09-20 DIAGNOSIS — I25.118 CORONARY ARTERY DISEASE OF NATIVE ARTERY OF NATIVE HEART WITH STABLE ANGINA PECTORIS (HCC): Primary | ICD-10-CM

## 2018-09-20 DIAGNOSIS — I48.21 PERMANENT ATRIAL FIBRILLATION (HCC): ICD-10-CM

## 2018-09-20 DIAGNOSIS — E78.5 HYPERLIPIDEMIA, UNSPECIFIED HYPERLIPIDEMIA TYPE: ICD-10-CM

## 2018-09-20 DIAGNOSIS — R06.09 DOE (DYSPNEA ON EXERTION): ICD-10-CM

## 2018-09-20 DIAGNOSIS — I10 ESSENTIAL HYPERTENSION: ICD-10-CM

## 2018-09-20 PROCEDURE — 85610 PROTHROMBIN TIME: CPT

## 2018-09-20 PROCEDURE — 36416 COLLJ CAPILLARY BLOOD SPEC: CPT

## 2018-09-20 PROCEDURE — 93000 ELECTROCARDIOGRAM COMPLETE: CPT | Performed by: NURSE PRACTITIONER

## 2018-09-20 PROCEDURE — 99214 OFFICE O/P EST MOD 30 MIN: CPT | Performed by: NURSE PRACTITIONER

## 2018-09-24 ENCOUNTER — HOSPITAL ENCOUNTER (OUTPATIENT)
Dept: CARDIOLOGY | Facility: HOSPITAL | Age: 73
Setting detail: RECURRING SERIES
Discharge: HOME | End: 2018-09-24

## 2018-09-24 PROCEDURE — 85610 PROTHROMBIN TIME: CPT

## 2018-09-24 PROCEDURE — 36416 COLLJ CAPILLARY BLOOD SPEC: CPT

## 2018-09-27 RX ORDER — MONTELUKAST SODIUM 10 MG/1
10 TABLET ORAL DAILY
Qty: 90 TABLET | Refills: 2 | Status: SHIPPED | OUTPATIENT
Start: 2018-09-27 | End: 2019-07-04 | Stop reason: SDUPTHER

## 2018-10-05 ENCOUNTER — HOSPITAL ENCOUNTER (OUTPATIENT)
Dept: CARDIOLOGY | Facility: HOSPITAL | Age: 73
Setting detail: RECURRING SERIES
Discharge: HOME | End: 2018-10-05

## 2018-10-05 PROCEDURE — 36416 COLLJ CAPILLARY BLOOD SPEC: CPT

## 2018-10-05 PROCEDURE — 85610 PROTHROMBIN TIME: CPT

## 2018-10-08 RX ORDER — WARFARIN SODIUM 2 MG/1
TABLET ORAL
Qty: 70 TABLET | Refills: 0 | Status: SHIPPED | OUTPATIENT
Start: 2018-10-08 | End: 2018-11-07 | Stop reason: SDUPTHER

## 2018-10-11 ENCOUNTER — TELEPHONE (OUTPATIENT)
Dept: FAMILY MEDICINE CLINIC | Facility: CLINIC | Age: 73
End: 2018-10-11

## 2018-10-11 NOTE — TELEPHONE ENCOUNTER
THERE IS AN ORDER FOR PSA AND A LIPID, ACCORDING TO MARIO KUO PT WAS TO COME BACK THAT NEXT WEEK TO HAVE THIS DRAWN LAST MONTH. CAN YOU CALL YOUR PT   THANK YOU  THIS IS IN OVERDUE TASKS

## 2018-10-17 ENCOUNTER — FLU SHOT (OUTPATIENT)
Dept: FAMILY MEDICINE CLINIC | Facility: CLINIC | Age: 73
End: 2018-10-17

## 2018-10-17 DIAGNOSIS — Z23 NEED FOR IMMUNIZATION AGAINST INFLUENZA: Primary | ICD-10-CM

## 2018-10-17 PROCEDURE — 90662 IIV NO PRSV INCREASED AG IM: CPT | Performed by: NURSE PRACTITIONER

## 2018-10-17 PROCEDURE — G0008 ADMIN INFLUENZA VIRUS VAC: HCPCS | Performed by: NURSE PRACTITIONER

## 2018-10-26 ENCOUNTER — ANTICOAGULATION VISIT (OUTPATIENT)
Dept: PHARMACY | Facility: HOSPITAL | Age: 73
End: 2018-10-26

## 2018-10-26 ENCOUNTER — TELEPHONE (OUTPATIENT)
Dept: FAMILY MEDICINE CLINIC | Facility: CLINIC | Age: 73
End: 2018-10-26

## 2018-10-26 DIAGNOSIS — I48.21 PERMANENT ATRIAL FIBRILLATION (HCC): ICD-10-CM

## 2018-10-26 LAB
INR PPP: 1.4 (ref 0.91–1.09)
PROTHROMBIN TIME: 17 SECONDS (ref 10–13.8)

## 2018-10-26 PROCEDURE — 36416 COLLJ CAPILLARY BLOOD SPEC: CPT

## 2018-10-26 PROCEDURE — G0463 HOSPITAL OUTPT CLINIC VISIT: HCPCS

## 2018-10-26 PROCEDURE — 85610 PROTHROMBIN TIME: CPT

## 2018-10-26 NOTE — PROGRESS NOTES
Anticoagulation Clinic Progress Note  Anticoagulation Summary  As of 10/26/2018    INR goal:   2.0-3.0   TTR:   --   Today's INR:   1.4!   Warfarin maintenance plan:   2 mg on e, Thu, Sat; 4 mg all other days   Weekly warfarin total:   22 mg   Plan last modified:   Nallely Stoll RPH (10/26/2018)   Next INR check:   10/29/2018   Target end date:   Indefinite    Indications    Atrial fibrillation (CMS/HCC) [I48.91]             Anticoagulation Episode Summary     INR check location:       Preferred lab:       [facility]    Comments:         Anticoagulation Care Providers     Provider Role Specialty Phone number    Myriam Omer MD Referring Cardiology [phone]    Nallely Stoll RPH Responsible Pharmacy             Drug interactions: no new meds  Diet: consistent    Clinic Interview:  Patient Findings     Negatives:   Signs/symptoms of thrombosis, Signs/symptoms of bleeding,   Laboratory test error suspected, Change in health, Change in alcohol use,   Change in activity, Upcoming invasive procedure, Emergency department   visit, Upcoming dental procedure, Missed doses, Extra doses, Change in   medications, Change in diet/appetite, Hospital admission, Bruising, Other   complaints      Clinical Outcomes     Negatives:   Major bleeding event, Thromboembolic event,   Anticoagulation-related hospital admission, Anticoagulation-related ED   visit, Anticoagulation-related fatality        Education:  Cody Eldridge is a new start in the [facility]. We discussed the followin) Warfarin's indication, mechanism, and dosing  2) Enforced the importance of taking warfarin as instructed and at the same time every day, preferably in the evening so that we can make dose adjustments more easily following subsequent clinic visits  3) What he should do about a missed dose; pts can take missed doses within about 12 hours of their usual scheduled dose, but he was  instructed on the importance of not doubling up on doses unless told to do so by the [facility]  4) Explained possible side effects of warfarin therapy, including increased risk of bleeding, s/sx of bleeding and s/sx of any additional clots/PE/CVA.   5) Discussed monitoring of warfarin, the INR, goal INR range, and the frequency of monitoring  6) Reviewed drug/food/tobacco/EtOH interactions and provided written information covering these topics in more detail, explaining that green, leafy vegetables interact most heavily with warfarin  7) Instructed the pt not to take or discontinue any medications without informing his physician/pharmacist and reminded him to inform us of any dietary changes, as well  8) Explained that he would be coming into the clinic more frequently in these first few weeks of therapy as we try to adjust his dose and achieve a therapeutic INR x 2 consecutive readings. Once that is achieved, patient will follow up in clinic every 4 weeks, on average.    He stated no problems with transportation or scheduling clinic appts in this manner. he expressed understanding of the information provided and has no additional questions at this time.    Cody Eldridge was presented with a copy of the Patients Rights and Responsibilities. he expressed verbal consent and agreement to receive care in the [facility] under the current collaborative care agreement with [facility].       INR History:  Previous INR data from [facility] is recorded in [facility] and scanned into the patient's chart in [employer]. These results have been analyzed and reviewed.      Plan:  1. INR is Subtherapeutic today- see above in Anticoagulation Summary.   Will instruct Cody Eldridge to change their warfarin regimen- see above in Anticoagulation Summary.  2. Follow up on Monday  3. Patient declines warfarin refills.  4. Verbal and written information provided. Patient  expresses understanding and has no further questions at this time.    Nallely Stoll, RPH

## 2018-10-28 ENCOUNTER — APPOINTMENT (OUTPATIENT)
Dept: GENERAL RADIOLOGY | Facility: HOSPITAL | Age: 73
End: 2018-10-28

## 2018-10-28 ENCOUNTER — HOSPITAL ENCOUNTER (EMERGENCY)
Facility: HOSPITAL | Age: 73
Discharge: HOME | End: 2018-10-28
Attending: FAMILY MEDICINE | Admitting: FAMILY MEDICINE

## 2018-10-28 ENCOUNTER — APPOINTMENT (OUTPATIENT)
Dept: CT IMAGING | Facility: HOSPITAL | Age: 73
End: 2018-10-28

## 2018-10-28 VITALS
TEMPERATURE: 97.1 F | SYSTOLIC BLOOD PRESSURE: 187 MMHG | RESPIRATION RATE: 16 BRPM | BODY MASS INDEX: 25.12 KG/M2 | WEIGHT: 185.5 LBS | DIASTOLIC BLOOD PRESSURE: 89 MMHG | OXYGEN SATURATION: 98 % | HEART RATE: 75 BPM | HEIGHT: 72 IN

## 2018-10-28 DIAGNOSIS — G47.30 SLEEP APNEA, UNSPECIFIED TYPE: ICD-10-CM

## 2018-10-28 DIAGNOSIS — I10 HYPERTENSION, UNCONTROLLED: Primary | ICD-10-CM

## 2018-10-28 LAB
ALBUMIN SERPL-MCNC: 3.7 G/DL (ref 3.5–5.2)
ALBUMIN/GLOB SERPL: 1.2 G/DL
ALP SERPL-CCNC: 63 U/L (ref 39–117)
ALT SERPL W P-5'-P-CCNC: 18 U/L (ref 1–41)
ANION GAP SERPL CALCULATED.3IONS-SCNC: 11.1 MMOL/L
ARTERIAL PATENCY WRIST A: POSITIVE
AST SERPL-CCNC: 25 U/L (ref 1–40)
ATMOSPHERIC PRESS: 743.5 MMHG
BACTERIA UR QL AUTO: ABNORMAL /HPF
BASE EXCESS BLDA CALC-SCNC: 2 MMOL/L (ref 0–2)
BASOPHILS # BLD AUTO: 0.04 10*3/MM3 (ref 0–0.2)
BASOPHILS NFR BLD AUTO: 0.6 % (ref 0–1.5)
BDY SITE: ABNORMAL
BILIRUB SERPL-MCNC: 0.7 MG/DL (ref 0.1–1.2)
BILIRUB UR QL STRIP: NEGATIVE
BUN BLD-MCNC: 14 MG/DL (ref 8–23)
BUN/CREAT SERPL: 14.3 (ref 7–25)
CALCIUM SPEC-SCNC: 9 MG/DL (ref 8.6–10.5)
CHLORIDE SERPL-SCNC: 104 MMOL/L (ref 98–107)
CLARITY UR: CLEAR
CO2 SERPL-SCNC: 24.9 MMOL/L (ref 22–29)
COLOR UR: YELLOW
CREAT BLD-MCNC: 0.98 MG/DL (ref 0.76–1.27)
DEPRECATED RDW RBC AUTO: 45.8 FL (ref 37–54)
DIGOXIN SERPL-MCNC: 0.5 NG/ML (ref 0.6–1.2)
EOSINOPHIL # BLD AUTO: 0.08 10*3/MM3 (ref 0–0.7)
EOSINOPHIL NFR BLD AUTO: 1.1 % (ref 0.3–6.2)
ERYTHROCYTE [DISTWIDTH] IN BLOOD BY AUTOMATED COUNT: 12.8 % (ref 11.5–14.5)
GFR SERPL CREATININE-BSD FRML MDRD: 75 ML/MIN/1.73
GLOBULIN UR ELPH-MCNC: 3.2 GM/DL
GLUCOSE BLD-MCNC: 101 MG/DL (ref 65–99)
GLUCOSE UR STRIP-MCNC: NEGATIVE MG/DL
HCO3 BLDA-SCNC: 26.7 MMOL/L (ref 22–28)
HCT VFR BLD AUTO: 48.9 % (ref 40.4–52.2)
HGB BLD-MCNC: 15 G/DL (ref 13.7–17.6)
HGB UR QL STRIP.AUTO: ABNORMAL
HYALINE CASTS UR QL AUTO: ABNORMAL /LPF
IMM GRANULOCYTES # BLD: 0.02 10*3/MM3 (ref 0–0.03)
IMM GRANULOCYTES NFR BLD: 0.3 % (ref 0–0.5)
INR PPP: 1.83 (ref 0.9–1.1)
KETONES UR QL STRIP: NEGATIVE
LEUKOCYTE ESTERASE UR QL STRIP.AUTO: NEGATIVE
LYMPHOCYTES # BLD AUTO: 1.6 10*3/MM3 (ref 0.9–4.8)
LYMPHOCYTES NFR BLD AUTO: 22.1 % (ref 19.6–45.3)
MCH RBC QN AUTO: 30 PG (ref 27–32.7)
MCHC RBC AUTO-ENTMCNC: 30.7 G/DL (ref 32.6–36.4)
MCV RBC AUTO: 97.8 FL (ref 79.8–96.2)
MODALITY: ABNORMAL
MONOCYTES # BLD AUTO: 0.88 10*3/MM3 (ref 0.2–1.2)
MONOCYTES NFR BLD AUTO: 12.1 % (ref 5–12)
NEUTROPHILS # BLD AUTO: 4.63 10*3/MM3 (ref 1.9–8.1)
NEUTROPHILS NFR BLD AUTO: 63.8 % (ref 42.7–76)
NITRITE UR QL STRIP: NEGATIVE
NRBC BLD MANUAL-RTO: 0 /100 WBC (ref 0–0)
NT-PROBNP SERPL-MCNC: 3188 PG/ML (ref 0–900)
PCO2 BLDA: 40.9 MM HG (ref 35–45)
PH BLDA: 7.42 PH UNITS (ref 7.35–7.45)
PH UR STRIP.AUTO: 5.5 [PH] (ref 5–8)
PLATELET # BLD AUTO: 166 10*3/MM3 (ref 140–500)
PMV BLD AUTO: 12.2 FL (ref 6–12)
PO2 BLDA: 73.6 MM HG (ref 80–100)
POTASSIUM BLD-SCNC: 3.9 MMOL/L (ref 3.5–5.2)
PROT SERPL-MCNC: 6.9 G/DL (ref 6–8.5)
PROT UR QL STRIP: ABNORMAL
PROTHROMBIN TIME: 20.8 SECONDS (ref 11.7–14.2)
RBC # BLD AUTO: 5 10*6/MM3 (ref 4.6–6)
RBC # UR: ABNORMAL /HPF
REF LAB TEST METHOD: ABNORMAL
SAO2 % BLDCOA: 94.9 % (ref 92–99)
SET MECH RESP RATE: 16
SODIUM BLD-SCNC: 140 MMOL/L (ref 136–145)
SP GR UR STRIP: 1.02 (ref 1–1.03)
SQUAMOUS #/AREA URNS HPF: ABNORMAL /HPF
TROPONIN T SERPL-MCNC: <0.01 NG/ML (ref 0–0.03)
UROBILINOGEN UR QL STRIP: ABNORMAL
WBC NRBC COR # BLD: 7.25 10*3/MM3 (ref 4.5–10.7)
WBC UR QL AUTO: ABNORMAL /HPF

## 2018-10-28 PROCEDURE — 81001 URINALYSIS AUTO W/SCOPE: CPT | Performed by: FAMILY MEDICINE

## 2018-10-28 PROCEDURE — 85610 PROTHROMBIN TIME: CPT | Performed by: FAMILY MEDICINE

## 2018-10-28 PROCEDURE — 83880 ASSAY OF NATRIURETIC PEPTIDE: CPT | Performed by: FAMILY MEDICINE

## 2018-10-28 PROCEDURE — 85025 COMPLETE CBC W/AUTO DIFF WBC: CPT | Performed by: FAMILY MEDICINE

## 2018-10-28 PROCEDURE — 84484 ASSAY OF TROPONIN QUANT: CPT | Performed by: FAMILY MEDICINE

## 2018-10-28 PROCEDURE — 80053 COMPREHEN METABOLIC PANEL: CPT | Performed by: FAMILY MEDICINE

## 2018-10-28 PROCEDURE — 70450 CT HEAD/BRAIN W/O DYE: CPT

## 2018-10-28 PROCEDURE — 93005 ELECTROCARDIOGRAM TRACING: CPT | Performed by: FAMILY MEDICINE

## 2018-10-28 PROCEDURE — 36600 WITHDRAWAL OF ARTERIAL BLOOD: CPT

## 2018-10-28 PROCEDURE — 71046 X-RAY EXAM CHEST 2 VIEWS: CPT

## 2018-10-28 PROCEDURE — 99284 EMERGENCY DEPT VISIT MOD MDM: CPT

## 2018-10-28 PROCEDURE — 80162 ASSAY OF DIGOXIN TOTAL: CPT | Performed by: FAMILY MEDICINE

## 2018-10-28 PROCEDURE — 96374 THER/PROPH/DIAG INJ IV PUSH: CPT

## 2018-10-28 PROCEDURE — 82803 BLOOD GASES ANY COMBINATION: CPT

## 2018-10-28 PROCEDURE — 93010 ELECTROCARDIOGRAM REPORT: CPT | Performed by: INTERNAL MEDICINE

## 2018-10-28 RX ORDER — LABETALOL HYDROCHLORIDE 5 MG/ML
20 INJECTION, SOLUTION INTRAVENOUS ONCE
Status: COMPLETED | OUTPATIENT
Start: 2018-10-28 | End: 2018-10-28

## 2018-10-28 RX ORDER — AMLODIPINE BESYLATE 5 MG/1
5 TABLET ORAL DAILY
Qty: 14 TABLET | Refills: 0 | Status: SHIPPED | OUTPATIENT
Start: 2018-10-28 | End: 2018-11-06 | Stop reason: SDUPTHER

## 2018-10-28 RX ADMIN — LABETALOL HYDROCHLORIDE 20 MG: 5 INJECTION, SOLUTION INTRAVENOUS at 16:14

## 2018-10-29 ENCOUNTER — ANTICOAGULATION VISIT (OUTPATIENT)
Dept: PHARMACY | Facility: HOSPITAL | Age: 73
End: 2018-10-29

## 2018-10-29 DIAGNOSIS — I48.21 PERMANENT ATRIAL FIBRILLATION (HCC): ICD-10-CM

## 2018-10-29 LAB
INR PPP: 1.7 (ref 0.91–1.09)
PROTHROMBIN TIME: 20.8 SECONDS (ref 10–13.8)

## 2018-10-29 PROCEDURE — 85610 PROTHROMBIN TIME: CPT

## 2018-10-29 PROCEDURE — 36416 COLLJ CAPILLARY BLOOD SPEC: CPT

## 2018-10-29 PROCEDURE — G0463 HOSPITAL OUTPT CLINIC VISIT: HCPCS | Performed by: PHARMACIST

## 2018-10-29 NOTE — PROGRESS NOTES
Anticoagulation Clinic Progress Note    Anticoagulation Summary  As of 10/29/2018    INR goal:   2.0-3.0   TTR:   --   Today's INR:   1.7!   Warfarin maintenance plan:   2 mg on Mon, Wed, Fri; 4 mg all other days   Weekly warfarin total:   22 mg   Plan last modified:   Joanna Odom [facility] (10/29/2018)   Next INR check:   11/5/2018   Priority:   High   Target end date:   Indefinite    Indications    Atrial fibrillation (CMS/HCC) [I48.91]             Anticoagulation Episode Summary     INR check location:       Preferred lab:       Send INR reminders to:    [facility]    Comments:         Anticoagulation Care Providers     Provider Role Specialty Phone number    Myriam mOer MD Referring Cardiology [phone]    Nallely Stoll [facility] Responsible Pharmacy           Drug interactions: has remained unchanged.  Diet: has remained unchanged.    Clinic Interview:  Patient Findings     Positives:   Upcoming invasive procedure, Change in medications    Negatives:   Signs/symptoms of thrombosis, Signs/symptoms of bleeding,   Laboratory test error suspected, Change in health, Change in alcohol use,   Change in activity, Emergency department visit, Upcoming dental procedure,   Missed doses, Extra doses, Change in diet/appetite, Hospital admission,   Bruising, Other complaints    Comments:   Has nerve burning procedure on Thursday; advised NOT to hold   warfarin per the proceduralist; started norvasc      Clinical Outcomes     Negatives:   Major bleeding event, Thromboembolic event,   Anticoagulation-related hospital admission, Anticoagulation-related ED   visit, Anticoagulation-related fatality    Comments:   Has nerve burning procedure on Thursday; advised NOT to hold   warfarin per the proceduralist; started norvasc        INR History:  Anticoagulation Monitoring 10/26/2018 10/29/2018   INR 1.4 1.7   INR Date 10/26/2018 10/29/2018   INR Goal 2.0-3.0 2.0-3.0   Trend - Same   Last Week Total 0 mg 12 mg    Next Week Total 24 mg 24 mg   Sun 4 mg 4 mg   Mon - 4 mg (10/29)   Tue - 4 mg   Wed - 2 mg   Thu - 4 mg   Fri 6 mg (10/26) 2 mg   Sat 2 mg 4 mg   Visit Report - -       Plan:  1. INR is Subtherapeutic today- see above in Anticoagulation Summary.  Will instruct Cody Harringtonley to Increase their warfarin regimen- see above in Anticoagulation Summary.  2. Follow up in 1 week  3. Patient declines warfarin refills.  4. Verbal and written information provided. Patient expresses understanding and has no further questions at this time.    Joanna Odom [facility]

## 2018-11-02 DIAGNOSIS — G47.33 OSA (OBSTRUCTIVE SLEEP APNEA): Primary | ICD-10-CM

## 2018-11-05 ENCOUNTER — TELEPHONE (OUTPATIENT)
Dept: FAMILY MEDICINE CLINIC | Facility: CLINIC | Age: 73
End: 2018-11-05

## 2018-11-05 DIAGNOSIS — G47.33 OSA (OBSTRUCTIVE SLEEP APNEA): Primary | ICD-10-CM

## 2018-11-05 NOTE — TELEPHONE ENCOUNTER
Patient is needing a referral to see Dr Turner for sleep study can you put the referral in please.

## 2018-11-06 ENCOUNTER — OFFICE VISIT (OUTPATIENT)
Dept: CARDIOLOGY | Facility: CLINIC | Age: 73
End: 2018-11-06

## 2018-11-06 VITALS
RESPIRATION RATE: 16 BRPM | HEIGHT: 72 IN | SYSTOLIC BLOOD PRESSURE: 160 MMHG | DIASTOLIC BLOOD PRESSURE: 80 MMHG | WEIGHT: 182 LBS | BODY MASS INDEX: 24.65 KG/M2 | HEART RATE: 67 BPM

## 2018-11-06 DIAGNOSIS — I48.21 PERMANENT ATRIAL FIBRILLATION (HCC): ICD-10-CM

## 2018-11-06 DIAGNOSIS — I10 ESSENTIAL HYPERTENSION: ICD-10-CM

## 2018-11-06 DIAGNOSIS — J43.9 PULMONARY EMPHYSEMA, UNSPECIFIED EMPHYSEMA TYPE (HCC): ICD-10-CM

## 2018-11-06 DIAGNOSIS — I25.118 CORONARY ARTERY DISEASE OF NATIVE ARTERY OF NATIVE HEART WITH STABLE ANGINA PECTORIS (HCC): Primary | ICD-10-CM

## 2018-11-06 DIAGNOSIS — G47.33 OSA (OBSTRUCTIVE SLEEP APNEA): ICD-10-CM

## 2018-11-06 DIAGNOSIS — E78.5 HYPERLIPIDEMIA, UNSPECIFIED HYPERLIPIDEMIA TYPE: ICD-10-CM

## 2018-11-06 PROCEDURE — 99214 OFFICE O/P EST MOD 30 MIN: CPT | Performed by: INTERNAL MEDICINE

## 2018-11-06 PROCEDURE — 93000 ELECTROCARDIOGRAM COMPLETE: CPT | Performed by: INTERNAL MEDICINE

## 2018-11-06 RX ORDER — CARVEDILOL 25 MG/1
25 TABLET ORAL 2 TIMES DAILY
Qty: 180 TABLET | Refills: 3 | Status: SHIPPED | OUTPATIENT
Start: 2018-11-06 | End: 2019-07-02

## 2018-11-06 RX ORDER — AMLODIPINE BESYLATE 5 MG/1
5 TABLET ORAL DAILY
Qty: 90 TABLET | Refills: 3 | Status: SHIPPED | OUTPATIENT
Start: 2018-11-06 | End: 2018-12-31 | Stop reason: SDUPTHER

## 2018-11-06 NOTE — PROGRESS NOTES
PATIENTINFORMATION    Date of Office Visit: 2018  Encounter Provider: Myriam Omer MD  Place of Service: [facility] CARDIOLOGY  Patient Name: Cody Eldridge  : 1945    Subjective:     Encounter Date:2018      Patient ID: Cody Eldridge is a 73 y.o. male.      History of Present Illness    This is a nice gentleman who saw Dr. Travis in the remote past. He is a difficult historian. Per Dr. Travis's note from  he had SV[facility] with [facility]  [facility]. He also has COPD, hypertension and hyperlipidemia. To me, he denies coronary disease but his old records show that he has had a non-ST elevation myocardial infarction in the past. He had an echo in 10/2009, which showed normal left ventricular systolic function with an ejection fraction of 64%. There was mild right and left atrial enlargement and mild mitral and tricuspid regurgitation with a right ventricular systolic pressure of 39 mmHg.     In the spring of 2018, he was complaining of lots of shortness of breath.  He eventually had a heart catheterization in 2018 which showed mild nonobstructive coronary disease with a left ventricular end-diastolic pressure of 10.  He has had intermittent blood pressure issues.    He comes in today for follow-up.  He was recently in the ER with high blood pressure.  They started him on amlodipine 5 mg a day.  He has not been checking his blood pressure regularly at home but says when he does check it is always elevated.  He denies any chest pain or palpitations.  He feels like his breathing is at baseline.    Review of Systems   Constitution: Positive for malaise/fatigue. Negative for fever, weight gain and weight loss.   HENT: Negative for ear pain, hearing loss, nosebleeds and sore throat.    Eyes: Negative for double vision, pain, vision loss in left eye and vision loss in right eye.   Cardiovascular:        See history of  "present illness.   Respiratory: Positive for shortness of breath and wheezing. Negative for cough, sleep disturbances due to breathing and snoring.    Endocrine: Negative for cold intolerance, heat intolerance and polyuria.   Skin: Negative for itching, poor wound healing and rash.   Musculoskeletal: Negative for joint pain, joint swelling and myalgias.   Gastrointestinal: Negative for abdominal pain, diarrhea, hematochezia, nausea and vomiting.   Genitourinary: Negative for hematuria and hesitancy.   Neurological: Negative for numbness, paresthesias and seizures.   Psychiatric/Behavioral: Positive for depression. The patient is nervous/anxious.            ECG 12 Lead  Date/Time: 11/6/2018 2:19 PM  Performed by: SHILOH GRIMALDO  Authorized by: SHILOH GRIMALDO   Comparison: compared with previous ECG from 10/28/2018  Similar to previous ECG  Rhythm: atrial fibrillation  BPM: 67  Conduction: conduction normal  ST Segments: ST segments normal  T Waves: T waves normal  Clinical impression: normal ECG               Objective:     /80 (BP Location: Right arm, Patient Position: Sitting, Cuff Size: Adult)   Pulse 67   Resp 16   Ht 182.9 cm (72\")   Wt 82.6 kg (182 lb)   BMI 24.68 kg/m²  Body mass index is 24.68 kg/m².     Physical Exam   Constitutional: He appears well-developed.   HENT:   Head: Normocephalic and atraumatic.   Eyes: Pupils are equal, round, and reactive to light. Conjunctivae and lids are normal. Lids are everted and swept, no foreign bodies found.   Neck: Normal range of motion. No JVD present. Carotid bruit is not present. No tracheal deviation present. No thyroid mass present.   Cardiovascular: Normal rate, regular rhythm and normal heart sounds.    Pulses:       Dorsalis pedis pulses are 2+ on the right side, and 2+ on the left side.   Pulmonary/Chest: Effort normal and breath sounds normal.   Abdominal: Normal appearance and bowel sounds are normal.   Musculoskeletal: Normal range of " motion.   Neurological: He is alert. He has normal strength.   Skin: Skin is warm, dry and intact.   Psychiatric: He has a normal mood and affect. His behavior is normal.   Vitals reviewed.          Assessment/Plan:       1.  Nonobstructive coronary artery disease.  Continue with risk factor management.  Coronary Artery Disease    2.  Possible obstructive sleep apnea.  I asked him to speak with his pulmonologist about this.  He is supposed to have an appointment coming up.   3.  Lung disease/COPD with right heart failure.  This is followed by Dr. Segovia.  4.  Right heart failure  5. Atrial Fibrillation and Atrial Flutter  Assessment  • The patient has permanent atrial fibrillation  • The patient's CHADS2-VASc score is 3  • A [id number] score of 2 or more is considered a high risk for a thromboembolic event     Plan  • Continue in atrial fibrillation with rate control  • Continue warfarin for antithrombotic therapy, bleeding issues discussed  6.  Hypertension.  I'm going to stop metoprolol and put him on carvedilol 25 mg twice a day.  Continue amlodipine, lisinopril and Lasix.    RTC 6 weeks.       Orders Placed This Encounter   Procedures   • ECG 12 Lead     This order was created via procedure documentation   • ECG 12 Lead     This order was created via procedure documentation        Discharge Medications          Accurate as of 11/6/18  2:20 PM. If you have any questions, ask your nurse or doctor.               New Medications      Instructions Start Date   carvedilol 25 MG tablet  Commonly known as:  COREG  Started by:  Myriam Omer MD   25 mg, Oral, 2 Times Daily         Changes to Medications      Instructions Start Date   potassium chloride 20 MEQ CR tablet  Commonly known as:  K-DUR,KLOR-SAMANTHA  What changed:  · when to take this  · additional instructions   20 mEq, Oral, Daily, With food if taking furosemide         Continue These Medications      Instructions Start Date   albuterol 108 (90 Base)  MCG/ACT inhaler  Commonly known as:  PROVENTIL HFA;VENTOLIN HFA   2 puffs, Inhalation, ProAir  (90 Base) MCG/ACT Inhalation Aerosol Solution; Patient Sig: ProAir  (90 Base) MCG/ACT Inhalation Aerosol Solution inhale 2 puffs by mouth every 4 hours if needed; 8.5; 11; 07-Apr-2014; Act      amLODIPine 5 MG tablet  Commonly known as:  NORVASC   5 mg, Oral, Daily      aspirin 81 MG EC tablet   81 mg, Oral, Daily      baclofen 10 MG tablet  Commonly known as:  LIORESAL   take 1 tablet by mouth three times a day      CRESTOR 20 MG tablet  Generic drug:  rosuvastatin   20 mg, Oral, Daily      DIGOX 125 MCG tablet  Generic drug:  digoxin   TAKE 1 TABLET BY MOUTH EVERY DAY      furosemide 40 MG tablet  Commonly known as:  LASIX   40 mg, Oral, 2 Times Daily      HYDROcodone-acetaminophen 7.5-325 MG per tablet  Commonly known as:  NORCO   1 tablet, Oral, 4 Times Daily PRN      lisinopril 20 MG tablet  Commonly known as:  PRINIVIL,ZESTRIL   TAKE 1 TABLET BY MOUTH TWICE DAILY      montelukast 10 MG tablet  Commonly known as:  SINGULAIR   10 mg, Oral, Daily      sertraline 50 MG tablet  Commonly known as:  ZOLOFT   50 mg, Oral, Daily      warfarin 2 MG tablet  Commonly known as:  COUMADIN   TAKE 2 TABLETS BY MOUTH ONCE DAILY         Stop These Medications    metoprolol tartrate 100 MG tablet  Commonly known as:  LOPRESSOR  Stopped by:  MD Myriam Mandujano MD  11/06/18  2:20 PM

## 2018-11-06 NOTE — PROGRESS NOTES
PATIENTINFORMATION    Date of Office Visit: 2018  Encounter Provider: Shiloh Omer MD  Place of Service: [facility] CARDIOLOGY  Patient Name: Cody Eldridge  : 1945    Subjective:     Encounter Date:2018      Patient ID: Cody Eldridge is a 73 y.o. male.      History of Present Illness      ROS        ECG 12 Lead  Date/Time: 2018 1:49 PM  Performed by: SHILOH OMER  Authorized by: SHILOH OMER                  Objective:     There were no vitals taken for this visit. There is no height or weight on file to calculate BMI.     Physical Exam    Lab Review:       Assessment/Plan:         No orders of the defined types were placed in this encounter.       Discharge Medications          Accurate as of 18  1:49 PM. If you have any questions, ask your nurse or doctor.               Changes to Medications      Instructions Start Date   potassium chloride 20 MEQ CR tablet  Commonly known as:  K-DUR,KLOR-CON  What changed:  · when to take this  · additional instructions   20 mEq, Oral, Daily, With food if taking furosemide         Continue These Medications      Instructions Start Date   albuterol 108 (90 Base) MCG/ACT inhaler  Commonly known as:  PROVENTIL HFA;VENTOLIN HFA   2 puffs, Inhalation, ProAir  (90 Base) MCG/ACT Inhalation Aerosol Solution; Patient Sig: ProAir  (90 Base) MCG/ACT Inhalation Aerosol Solution inhale 2 puffs by mouth every 4 hours if needed; 8.5; 11; 2014; Act      amLODIPine 5 MG tablet  Commonly known as:  NORVASC   5 mg, Oral, Daily      aspirin 81 MG EC tablet   81 mg, Oral, Daily      baclofen 10 MG tablet  Commonly known as:  LIORESAL   take 1 tablet by mouth three times a day      CRESTOR 20 MG tablet  Generic drug:  rosuvastatin   20 mg, Oral, Daily      DIGOX 125 MCG tablet  Generic drug:  digoxin   TAKE 1 TABLET BY MOUTH EVERY DAY      furosemide 40 MG tablet  Commonly known as:  LASIX   40 mg,  Oral, 2 Times Daily      HYDROcodone-acetaminophen 7.5-325 MG per tablet  Commonly known as:  NORCO   1 tablet, Oral, 4 Times Daily PRN      lisinopril 20 MG tablet  Commonly known as:  PRINIVILZESTRIL   TAKE 1 TABLET BY MOUTH TWICE DAILY      metoprolol tartrate 100 MG tablet  Commonly known as:  LOPRESSOR   100 mg, Oral, Every 12 Hours Scheduled      montelukast 10 MG tablet  Commonly known as:  SINGULAIR   10 mg, Oral, Daily      sertraline 50 MG tablet  Commonly known as:  ZOLOFT   50 mg, Oral, Daily      warfarin 2 MG tablet  Commonly known as:  COUMADIN   TAKE 2 TABLETS BY MOUTH ONCE DAILY                    Rafaela Randolph MA  11/06/18  1:49 PM

## 2018-11-07 RX ORDER — WARFARIN SODIUM 2 MG/1
TABLET ORAL
Qty: 70 TABLET | Refills: 0 | Status: SHIPPED | OUTPATIENT
Start: 2018-11-07 | End: 2018-12-06 | Stop reason: SDUPTHER

## 2018-11-08 RX ORDER — DIGOXIN 125 UG/1
TABLET ORAL
Qty: 30 TABLET | Refills: 0 | Status: SHIPPED | OUTPATIENT
Start: 2018-11-08 | End: 2018-12-20

## 2018-11-28 ENCOUNTER — TELEPHONE (OUTPATIENT)
Dept: PHARMACY | Facility: HOSPITAL | Age: 73
End: 2018-11-28

## 2018-11-30 ENCOUNTER — ANTICOAGULATION VISIT (OUTPATIENT)
Dept: PHARMACY | Facility: HOSPITAL | Age: 73
End: 2018-11-30

## 2018-11-30 LAB
INR PPP: 2.4 (ref 0.91–1.09)
PROTHROMBIN TIME: 28.7 SECONDS (ref 10–13.8)

## 2018-11-30 PROCEDURE — 36416 COLLJ CAPILLARY BLOOD SPEC: CPT

## 2018-11-30 PROCEDURE — 85610 PROTHROMBIN TIME: CPT

## 2018-11-30 NOTE — PROGRESS NOTES
Anticoagulation Clinic Progress Note    Anticoagulation Summary  As of 2018    INR goal:   2.0-3.0   TTR:   71.8 % (3.6 wk)   INR used for dosin.4 (2018)   Warfarin maintenance plan:   2 mg on Mon, Wed, Fri; 4 mg all other days   Weekly warfarin total:   22 mg   No change documented:   Stephany Siu   Plan last modified:   Joanna Odom [facility] (10/29/2018)   Next INR check:   2018   Priority:   High   Target end date:   Indefinite    Indications    Atrial fibrillation (CMS/[facility]) [I48.91]             Anticoagulation Episode Summary     INR check location:       Preferred lab:       Send INR reminders to:    [facility] [place]    Comments:         Anticoagulation Care Providers     Provider Role Specialty Phone number    Myriam Omer MD Referring Cardiology [phone]    Nallely Stoll [facility] Responsible Pharmacy [phone]          Drug interactions: has remained unchanged.  Diet: has remained unchanged.    Clinic Interview:  Patient Findings     Negatives:   Signs/symptoms of thrombosis, Signs/symptoms of bleeding,   Laboratory test error suspected, Change in health, Change in alcohol use,   Change in activity, Upcoming invasive procedure, Emergency department   visit, Upcoming dental procedure, Missed doses, Extra doses, Change in   medications, Change in diet/appetite, Hospital admission, Bruising, Other   complaints      Clinical Outcomes     Negatives:   Major bleeding event, Thromboembolic event,   Anticoagulation-related hospital admission, Anticoagulation-related ED   visit, Anticoagulation-related fatality        INR History:  Anticoagulation Monitoring 10/26/2018 10/29/2018 2018   INR 1.4 1.7 2.4   INR Date 10/26/2018 10/29/2018 2018   INR Goal 2.0-3.0 2.0-3.0 2.0-3.0   Trend - Same Same   Last Week Total 0 mg 12 mg 22 mg   Next Week Total 24 mg 24 mg 22 mg   Sun 4 mg 4 mg 4 mg   Mon - 4 mg (10/29) 2 mg   Tue - 4 mg 4 mg   Wed - 2 mg 2 mg   Thu -  4 mg 4 mg   Fri 6 mg (10/26) 2 mg 2 mg   Sat 2 mg 4 mg 4 mg   Visit Report - - -   Some recent data might be hidden       Plan:  1. INR is therapeutic today- see above in Anticoagulation Summary.   Will instruct Cody Eldridge to continue their warfarin regimen- see above in Anticoagulation Summary.  2. Follow up in 2 weeks.  3. Patient declines warfarin refills.  4. Verbal and written information provided. Patient expresses understanding and has no further questions at this time.    Stephany Siu

## 2018-12-06 RX ORDER — WARFARIN SODIUM 2 MG/1
TABLET ORAL
Qty: 70 TABLET | Refills: 0 | Status: SHIPPED | OUTPATIENT
Start: 2018-12-06 | End: 2019-01-05 | Stop reason: SDUPTHER

## 2018-12-20 ENCOUNTER — APPOINTMENT (OUTPATIENT)
Dept: SLEEP MEDICINE | Facility: HOSPITAL | Age: 73
End: 2018-12-20
Attending: INTERNAL MEDICINE

## 2018-12-20 ENCOUNTER — OFFICE VISIT (OUTPATIENT)
Dept: CARDIOLOGY | Facility: CLINIC | Age: 73
End: 2018-12-20

## 2018-12-20 VITALS
DIASTOLIC BLOOD PRESSURE: 64 MMHG | BODY MASS INDEX: 25.06 KG/M2 | HEART RATE: 39 BPM | SYSTOLIC BLOOD PRESSURE: 130 MMHG | HEIGHT: 72 IN | RESPIRATION RATE: 16 BRPM | WEIGHT: 185 LBS

## 2018-12-20 DIAGNOSIS — I36.1 NON-RHEUMATIC TRICUSPID VALVE INSUFFICIENCY: ICD-10-CM

## 2018-12-20 DIAGNOSIS — I10 ESSENTIAL HYPERTENSION: ICD-10-CM

## 2018-12-20 DIAGNOSIS — I48.19 PERSISTENT ATRIAL FIBRILLATION (HCC): ICD-10-CM

## 2018-12-20 DIAGNOSIS — E78.5 HYPERLIPIDEMIA, UNSPECIFIED HYPERLIPIDEMIA TYPE: ICD-10-CM

## 2018-12-20 DIAGNOSIS — J43.9 PULMONARY EMPHYSEMA, UNSPECIFIED EMPHYSEMA TYPE (HCC): ICD-10-CM

## 2018-12-20 DIAGNOSIS — I25.118 CORONARY ARTERY DISEASE OF NATIVE ARTERY OF NATIVE HEART WITH STABLE ANGINA PECTORIS (HCC): Primary | ICD-10-CM

## 2018-12-20 DIAGNOSIS — G47.33 OSA (OBSTRUCTIVE SLEEP APNEA): ICD-10-CM

## 2018-12-20 PROCEDURE — 99214 OFFICE O/P EST MOD 30 MIN: CPT | Performed by: INTERNAL MEDICINE

## 2018-12-20 PROCEDURE — 93000 ELECTROCARDIOGRAM COMPLETE: CPT | Performed by: INTERNAL MEDICINE

## 2018-12-20 NOTE — PROGRESS NOTES
PATIENTINFORMATION    Date of Office Visit: 2018  Encounter Provider: Myriam Omer MD  Place of Service: [facility] CARDIOLOGY  Patient Name: Cody Eldridge  : 1945    Subjective:     Encounter Date:2018      Patient ID: Cody Eldridge is a 73 y.o. male.      History of Present Illness    This is a nice gentleman who saw Dr. Travis in the remote past. He is a difficult historian. Per Dr. Travis's note from  he [facility]. He also has COPD, hypertension and hyperlipidemia. To me, he denies coronary disease but his old records show that he has had a non-ST elevation myocardial infarction in the past. He had an echo in 10/2009, which showed normal left ventricular systolic function with an ejection fraction of 64%. There was mild right and left atrial enlargement and mild mitral and tricuspid regurgitation with a right ventricular systolic pressure of 39 mmHg.      In the spring of 2018, he was complaining of lots of shortness of breath.  He eventually had a heart catheterization in 2018 which showed mild nonobstructive coronary disease with a left ventricular end-diastolic pressure of 10.  He has had intermittent blood pressure issues.     I saw him in 2018 for an ER followup for blood pressure. I stopped metoprolol and put him on carvedilol 25 mg twice a day and continued the amlodipine, lisinopril, and Lasix. He comes in today with a long list of blood pressures, which show that his blood pressures overall have been pretty well controlled. It is somewhat labile, but I think when you average it out, it looks pretty good.  His heart rate has consistently been in the 60s and 70s with an occasional 50.  I do not really see any bradycardia of the numbers he is getting.  He feels fine. He denies palpitations, shortness of breath, lightheadedness, chest pain, or fatigue. He does have some  "chronic lower extremity edema which does not appear to be heart failure.        Review of Systems   Constitution: Negative for fever, malaise/fatigue, weight gain and weight loss.   HENT: Negative for ear pain, hearing loss, nosebleeds and sore throat.    Eyes: Negative for double vision, pain, vision loss in left eye and vision loss in right eye.   Cardiovascular:        See history of present illness.   Respiratory: Positive for wheezing. Negative for cough, shortness of breath, sleep disturbances due to breathing and snoring.    Endocrine: Negative for cold intolerance, heat intolerance and polyuria.   Skin: Positive for color change. Negative for itching, poor wound healing and rash.   Musculoskeletal: Positive for joint pain, joint swelling and myalgias.   Gastrointestinal: Negative for abdominal pain, diarrhea, hematochezia, nausea and vomiting.   Genitourinary: Negative for hematuria and hesitancy.   Neurological: Negative for numbness, paresthesias and seizures.   Psychiatric/Behavioral: Positive for depression. The patient is nervous/anxious.            ECG 12 Lead  Date/Time: 12/20/2018 10:56 AM  Performed by: Myriam Omer MD  Authorized by: Myriam Omer MD   Comparison: compared with previous ECG from 11/6/2018  Comparison to previous ECG: Heart rate is slower    Rhythm: atrial fibrillation  BPM: 39  Conduction: conduction normal  ST Segments: ST segments normal  Clinical impression: abnormal ECG               Objective:     /64 (BP Location: Right arm, Patient Position: Sitting, Cuff Size: Adult)   Pulse (!) 39   Resp 16   Ht 182.9 cm (72\")   Wt 83.9 kg (185 lb)   BMI 25.09 kg/m²  Body mass index is 25.09 kg/m².     Physical Exam   Constitutional: He appears well-developed.   HENT:   Head: Normocephalic and atraumatic.   Eyes: Conjunctivae and lids are normal. Pupils are equal, round, and reactive to light. Lids are everted and swept, no foreign bodies found.   Neck: Normal range of " motion. No JVD present. Carotid bruit is not present. No tracheal deviation present. No thyroid mass present.   Cardiovascular: Normal heart sounds. An irregularly irregular rhythm present. Bradycardia present.   Pulses:       Dorsalis pedis pulses are 2+ on the right side, and 2+ on the left side.   Pulmonary/Chest: Effort normal and breath sounds normal.   Abdominal: Normal appearance and bowel sounds are normal.   Musculoskeletal: Normal range of motion.   Neurological: He is alert. He has normal strength.   Skin: Skin is warm, dry and intact.   Psychiatric: He has a normal mood and affect. His behavior is normal.   Vitals reviewed.      Lab Review:       Assessment/Plan:       1.  Nonobstructive coronary artery disease.  Continue with risk factor management.  Coronary Artery Disease     2.  Possible obstructive sleep apnea.  I asked him to speak with his pulmonologist about this.  He is supposed to have an appointment coming up.   3.  Lung disease/COPD with right heart failure.  This is followed by Dr. Segovia.  4.  Right heart failure  5. Atrial Fibrillation and Atrial Flutter  Assessment  • The patient has permanent atrial fibrillation  • The patient's CHADS2-VASc score is 3  • A [id number] score of 2 or more is considered a high risk for a thromboembolic event     Plan  • Continue in atrial fibrillation with rate control  • Continue warfarin for antithrombotic therapy, bleeding issues discussed  He is bradycardic today though I don't see any significant bradycardia on his home readings.  I am going to have him stop digoxin though.    6.  Hypertension.  His blood pressure appears to be well controlled at this point in time I'm not going to make any changes.    I will see him back in 6 months unless he has any problems sooner.      Orders Placed This Encounter   Procedures   • ECG 12 Lead     This order was created via procedure documentation        Discharge Medications           Accurate as of 12/20/18  12:51 PM. If you have any questions, ask your nurse or doctor.               Changes to Medications      Instructions Start Date   potassium chloride 20 MEQ CR tablet  Commonly known as:  K-DUR,KLOR-CON  What changed:    · when to take this  · additional instructions   20 mEq, Oral, Daily, With food if taking furosemide         Continue These Medications      Instructions Start Date   albuterol sulfate  (90 Base) MCG/ACT inhaler  Commonly known as:  PROVENTIL HFA;VENTOLIN HFA;PROAIR HFA   2 puffs, Inhalation, ProAir  (90 Base) MCG/ACT Inhalation Aerosol Solution; Patient Sig: ProAir  (90 Base) MCG/ACT Inhalation Aerosol Solution inhale 2 puffs by mouth every 4 hours if needed; 8.5; 11; 07-Apr-2014; Act      amLODIPine 5 MG tablet  Commonly known as:  NORVASC   5 mg, Oral, Daily      aspirin 81 MG EC tablet   81 mg, Oral, Daily      baclofen 10 MG tablet  Commonly known as:  LIORESAL   take 1 tablet by mouth three times a day      carvedilol 25 MG tablet  Commonly known as:  COREG   25 mg, Oral, 2 Times Daily      CRESTOR 20 MG tablet  Generic drug:  rosuvastatin   20 mg, Oral, Daily      furosemide 40 MG tablet  Commonly known as:  LASIX   40 mg, Oral, 2 Times Daily      HYDROcodone-acetaminophen 7.5-325 MG per tablet  Commonly known as:  NORCO   1 tablet, Oral, 4 Times Daily PRN      lisinopril 20 MG tablet  Commonly known as:  PRINIVIL,ZESTRIL   TAKE 1 TABLET BY MOUTH TWICE DAILY      montelukast 10 MG tablet  Commonly known as:  SINGULAIR   10 mg, Oral, Daily      sertraline 50 MG tablet  Commonly known as:  ZOLOFT   50 mg, Oral, Daily      warfarin 2 MG tablet  Commonly known as:  COUMADIN   TAKE 2 TABLETS BY MOUTH ONCE DAILY         Stop These Medications    DIGOX 125 MCG tablet  Generic drug:  digoxin  Stopped by:  MD Myriam Mandujano MD  12/20/18  12:51 PM

## 2018-12-21 ENCOUNTER — ANTICOAGULATION VISIT (OUTPATIENT)
Dept: PHARMACY | Facility: HOSPITAL | Age: 73
End: 2018-12-21

## 2018-12-21 DIAGNOSIS — I48.19 PERSISTENT ATRIAL FIBRILLATION (HCC): ICD-10-CM

## 2018-12-21 LAB
INR PPP: 2.3 (ref 0.91–1.09)
PROTHROMBIN TIME: 27.1 SECONDS (ref 10–13.8)

## 2018-12-21 PROCEDURE — 36416 COLLJ CAPILLARY BLOOD SPEC: CPT

## 2018-12-21 PROCEDURE — 85610 PROTHROMBIN TIME: CPT

## 2018-12-21 NOTE — PROGRESS NOTES
Anticoagulation Clinic Progress Note    Anticoagulation Summary  As of 2018    INR goal:   2.0-3.0   TTR:   84.5 % (1.5 mo)   INR used for dosin.3 (2018)   Warfarin maintenance plan:   2 mg on Mon, Wed, Fri; 4 mg all other days   Weekly warfarin total:   22 mg   No change documented:   Brigida Monique   Plan last modified:   Joanna Odom [facility] (10/29/2018)   Next INR check:   2019   Priority:   Maintenance   Target end date:   Indefinite    Indications    Atrial fibrillation (CMS/[facility]) [I48.91]             Anticoagulation Episode Summary     INR check location:       Preferred lab:       Send INR reminders to:    [facility]    Comments:         Anticoagulation Care Providers     Provider Role Specialty Phone number    Myriam Omer MD Referring Cardiology [phone]    Nallely Stoll [facility] Responsible Pharmacy [phone]          Clinic Interview:  Patient Findings     Negatives:   Signs/symptoms of thrombosis, Signs/symptoms of bleeding,   Laboratory test error suspected, Change in health, Change in alcohol use,   Change in activity, Upcoming invasive procedure, Emergency department   visit, Upcoming dental procedure, Missed doses, Extra doses, Change in   medications, Change in diet/appetite, Hospital admission, Bruising, Other   complaints      Clinical Outcomes     Negatives:   Major bleeding event, Thromboembolic event,   Anticoagulation-related hospital admission, Anticoagulation-related ED   visit, Anticoagulation-related fatality        INR History:  Anticoagulation Monitoring 10/29/2018 2018 2018   INR 1.7 2.4 2.3   INR Date 10/29/2018 2018 2018   INR Goal 2.0-3.0 2.0-3.0 2.0-3.0   Trend Same Same Same   Last Week Total 12 mg 22 mg 22 mg   Next Week Total 24 mg 22 mg 22 mg   Sun 4 mg 4 mg 4 mg   Mon 4 mg (10/29) 2 mg 2 mg   Tue 4 mg 4 mg 4 mg   Wed 2 mg 2 mg 2 mg   Thu 4 mg 4 mg 4 mg   Fri 2 mg 2 mg 2 mg   Sat 4 mg 4 mg 4 mg   Visit  Report - - -   Some recent data might be hidden       Plan:  1. INR is therapeutic today- see above in Anticoagulation Summary.   Will instruct Cody Harringtonley to continue their warfarin regimen- see above in Anticoagulation Summary.  2. Follow up in 4 weeks.  3. Patient declines warfarin refills.  4. Verbal and written information provided. Patient expresses understanding and has no further questions at this time.    Brigida Monique

## 2018-12-28 ENCOUNTER — TELEPHONE (OUTPATIENT)
Dept: CARDIOLOGY | Facility: CLINIC | Age: 73
End: 2018-12-28

## 2018-12-28 NOTE — TELEPHONE ENCOUNTER
Gregg is calling regarding patient's Amlodipine dose. He is stating he is on Amlodipine 5 mg bid, but his rx is for qd. I do not see anywhere that it was increased. Please advise. dmk

## 2018-12-28 NOTE — TELEPHONE ENCOUNTER
I would just confirm with him that he's sure it is 5mg BID. If that is how he has been taking it, I'm ok with him continuing at that dose.

## 2018-12-31 RX ORDER — AMLODIPINE BESYLATE 5 MG/1
10 TABLET ORAL DAILY
Qty: 180 TABLET | Refills: 3 | Status: SHIPPED | OUTPATIENT
Start: 2018-12-31 | End: 2019-06-29 | Stop reason: HOSPADM

## 2019-01-07 ENCOUNTER — OFFICE VISIT (OUTPATIENT)
Dept: FAMILY MEDICINE CLINIC | Facility: CLINIC | Age: 74
End: 2019-01-07

## 2019-01-07 VITALS
DIASTOLIC BLOOD PRESSURE: 80 MMHG | OXYGEN SATURATION: 94 % | TEMPERATURE: 97.9 F | WEIGHT: 184 LBS | HEIGHT: 72 IN | BODY MASS INDEX: 24.92 KG/M2 | HEART RATE: 61 BPM | SYSTOLIC BLOOD PRESSURE: 140 MMHG

## 2019-01-07 DIAGNOSIS — J43.9 PULMONARY EMPHYSEMA, UNSPECIFIED EMPHYSEMA TYPE (HCC): ICD-10-CM

## 2019-01-07 DIAGNOSIS — J20.9 ACUTE BRONCHITIS, UNSPECIFIED ORGANISM: Primary | ICD-10-CM

## 2019-01-07 PROCEDURE — 99214 OFFICE O/P EST MOD 30 MIN: CPT | Performed by: NURSE PRACTITIONER

## 2019-01-07 RX ORDER — DIGOXIN 125 UG/1
TABLET ORAL
COMMUNITY
Start: 2019-01-05 | End: 2019-01-07 | Stop reason: SDUPTHER

## 2019-01-07 RX ORDER — WARFARIN SODIUM 2 MG/1
TABLET ORAL
Qty: 180 TABLET | Refills: 0 | Status: SHIPPED | OUTPATIENT
Start: 2019-01-07 | End: 2019-08-03

## 2019-01-07 RX ORDER — AZITHROMYCIN 250 MG/1
TABLET, FILM COATED ORAL
Qty: 6 TABLET | Refills: 0 | Status: SHIPPED | OUTPATIENT
Start: 2019-01-07 | End: 2019-06-18 | Stop reason: HOSPADM

## 2019-01-07 RX ORDER — DIGOXIN 125 UG/1
TABLET ORAL
Qty: 90 TABLET | Refills: 2 | OUTPATIENT
Start: 2019-01-07

## 2019-01-07 RX ORDER — WARFARIN SODIUM 2 MG/1
TABLET ORAL
Qty: 70 TABLET | Refills: 0 | Status: SHIPPED | OUTPATIENT
Start: 2019-01-07 | End: 2019-01-07 | Stop reason: SDUPTHER

## 2019-01-07 NOTE — TELEPHONE ENCOUNTER
Spoke with patient he is not taking digoxin. He stop taking it when he went to see his cardiologist.

## 2019-01-07 NOTE — PATIENT INSTRUCTIONS
Discharge instructions  Push fluids  Plenty rest  Mucinex DM  Zithromax  INR  Friday call cardiology  Per their guidelines regarding Zithromax  Should be followed closer    If chest pain shortness of breath high fever  Worsening symptoms emergency room      Acute Bronchitis, Adult  Acute bronchitis is when air tubes (bronchi) in the lungs suddenly get swollen. The condition can make it hard to breathe. It can also cause these symptoms:  · A cough.  · Coughing up clear, yellow, or green mucus.  · Wheezing.  · Chest congestion.  · Shortness of breath.  · A fever.  · Body aches.  · Chills.  · A sore throat.    Follow these instructions at home:  Medicines  · Take over-the-counter and prescription medicines only as told by your doctor.  · If you were prescribed an antibiotic medicine, take it as told by your doctor. Do not stop taking the antibiotic even if you start to feel better.  General instructions  · Rest.  · Drink enough fluids to keep your pee (urine) clear or pale yellow.  · Avoid smoking and secondhand smoke. If you smoke and you need help quitting, ask your doctor. Quitting will help your lungs heal faster.  · Use an inhaler, cool mist vaporizer, or humidifier as told by your doctor.  · Keep all follow-up visits as told by your doctor. This is important.  How is this prevented?  To lower your risk of getting this condition again:  · Wash your hands often with soap and water. If you cannot use soap and water, use hand .  · Avoid contact with people who have cold symptoms.  · Try not to touch your hands to your mouth, nose, or eyes.  · Make sure to get the flu shot every year.    Contact a doctor if:  · Your symptoms do not get better in 2 weeks.  Get help right away if:  · You cough up blood.  · You have chest pain.  · You have very bad shortness of breath.  · You become dehydrated.  · You faint (pass out) or keep feeling like you are going to pass out.  · You keep throwing up (vomiting).  · You have  a very bad headache.  · Your fever or chills gets worse.  This information is not intended to replace advice given to you by your health care provider. Make sure you discuss any questions you have with your health care provider.  Document Released: 06/05/2009 Document Revised: 07/26/2017 Document Reviewed: 06/07/2017  [employer] Interactive Patient Education © 2018 [employer]

## 2019-01-07 NOTE — PROGRESS NOTES
Subjective   Cody Eldridge is a 73 y.o. male.     Cough congestion 4 days temperature 99 plus  No chest pain breathing fairly stable  Yellow-green phlegm  COPD  No longer smokes  Nebulizer helps some  Long time since taking antibiotic          Fever    Associated symptoms include coughing.        The following portions of the patient's history were reviewed and updated as appropriate: allergies, current medications, past family history, past medical history, past social history, past surgical history and problem list.    Review of Systems   Constitutional: Positive for fever.   HENT: Positive for postnasal drip.    Respiratory: Positive for cough. Negative for shortness of breath.    Genitourinary: Negative.    Skin: Negative.    Psychiatric/Behavioral: Negative.    All other systems reviewed and are negative.      Objective   Physical Exam   Constitutional: He is oriented to person, place, and time. He appears well-developed and well-nourished. No distress.   HENT:   Head: Normocephalic and atraumatic.   Nose: Nose normal.   Mouth/Throat: Oropharynx is clear and moist.   Turbinates congested   Eyes: Conjunctivae are normal. Pupils are equal, round, and reactive to light. No scleral icterus.   Neck: Neck supple. No JVD present. No thyromegaly present.   Cardiovascular: Normal rate, regular rhythm and normal heart sounds. Exam reveals no gallop and no friction rub.   No murmur heard.  Pulmonary/Chest: Effort normal. No respiratory distress. He has no wheezes. He has no rales.   Decreased breath sounds bilateral no crackles no wheezes  Unlabored respirations 18-20   Abdominal: Soft. Bowel sounds are normal. He exhibits no distension and no mass. There is no tenderness. There is no guarding. No hernia.   Musculoskeletal: He exhibits no edema or tenderness.   Lymphadenopathy:     He has no cervical adenopathy.   Neurological: He is alert and oriented to person, place, and time. He has normal reflexes.   Skin: Skin is  warm and dry. No rash noted. He is not diaphoretic. No erythema.   Psychiatric: He has a normal mood and affect. His behavior is normal. Judgment and thought content normal.   Vitals reviewed.        Assessment/Plan   Cody was seen today for head and chest congestion and fever.    Diagnoses and all orders for this visit:    Pulmonary emphysema, unspecified emphysema type (CMS/HCC)    Acute bronchitis, unspecified organism                  Discharge instructions  Push fluids  Plenty rest  Mucinex DM  Zithromax  INR  Friday call cardiology  Per their guidelines regarding Zithromax  Should be followed closer    If chest pain shortness of breath high fever  Worsening symptoms emergency room

## 2019-01-07 NOTE — TELEPHONE ENCOUNTER
I do not see this on patient's medication lists current or previous    Is this correct?    If not please clarify with patient if he is taking this and how long he's been on it

## 2019-02-04 RX ORDER — LISINOPRIL 20 MG/1
TABLET ORAL
Qty: 180 TABLET | Refills: 1 | Status: SHIPPED | OUTPATIENT
Start: 2019-02-04 | End: 2019-06-29 | Stop reason: HOSPADM

## 2019-02-05 ENCOUNTER — ANTICOAGULATION VISIT (OUTPATIENT)
Dept: PHARMACY | Facility: HOSPITAL | Age: 74
End: 2019-02-05

## 2019-02-05 DIAGNOSIS — I48.19 PERSISTENT ATRIAL FIBRILLATION (HCC): ICD-10-CM

## 2019-02-05 LAB
INR PPP: 1.5 (ref 0.91–1.09)
PROTHROMBIN TIME: 18 SECONDS (ref 10–13.8)

## 2019-02-05 PROCEDURE — G0463 HOSPITAL OUTPT CLINIC VISIT: HCPCS

## 2019-02-05 PROCEDURE — 85610 PROTHROMBIN TIME: CPT

## 2019-02-05 PROCEDURE — 36416 COLLJ CAPILLARY BLOOD SPEC: CPT

## 2019-02-05 NOTE — PROGRESS NOTES
Anticoagulation Clinic Progress Note    Anticoagulation Summary  As of 2019    INR goal:   2.0-3.0   TTR:   61.1 % (3.1 mo)   INR used for dosin.5! (2019)   Warfarin maintenance plan:   2 mg on Mon, Fri; 4 mg all other days   Weekly warfarin total:   24 mg   Plan last modified:   Kristal Cowart RPH (2019)   Next INR check:   2019   Priority:   Maintenance   Target end date:   Indefinite    Indications    Atrial fibrillation (CMS/HCC) [I48.91]             Anticoagulation Episode Summary     INR check location:       Preferred lab:       Send [facility] reminders [facility]:    [facility] [place]    Comments:         Anticoagulation Care Providers     Provider Role Specialty Phone number    Myriam Omer MD Referring Cardiology [phone]    Nallely Stoll RP Responsible Pharmacy [phone]          Clinic Interview:      INR History:  Anticoagulation Monitoring 2018   INR 2.4 2.3 1.5   INR Date 2018   INR Goal 2.0-3.0 2.0-3.0 2.0-3.0   Trend Same Same Up   Last Week Total 22 mg 22 mg 22 mg   Next Week Total 22 mg 22 mg 24 mg   Sun 4 mg 4 mg 4 mg   Mon 2 mg 2 mg 2 mg   Tue 4 mg 4 mg 4 mg   Wed 2 mg 2 mg 4 mg   Thu 4 mg 4 mg 4 mg   Fri 2 mg 2 mg 2 mg   Sat 4 mg 4 mg 4 mg   Visit Report - - -   Some recent data might be hidden       Plan:  1. INR is Subtherapeutic today- see above in Anticoagulation Summary.  Will instruct Cody SAMSON Chente to Increase their warfarin regimen- see above in Anticoagulation Summary.  2. Follow up in 2 weeks  3. Patient declines warfarin refills.  4. Verbal and written information provided. Patient expresses understanding and has no further questions at this time.    Kristal Cowart RPH

## 2019-02-08 RX ORDER — WARFARIN SODIUM 2 MG/1
TABLET ORAL
Qty: 70 TABLET | Refills: 0 | Status: SHIPPED | OUTPATIENT
Start: 2019-02-08 | End: 2019-03-16 | Stop reason: SDUPTHER

## 2019-02-19 ENCOUNTER — ANTICOAGULATION VISIT (OUTPATIENT)
Dept: PHARMACY | Facility: HOSPITAL | Age: 74
End: 2019-02-19

## 2019-02-19 DIAGNOSIS — I48.19 PERSISTENT ATRIAL FIBRILLATION (HCC): ICD-10-CM

## 2019-02-19 LAB
INR PPP: 2.6 (ref 0.91–1.09)
PROTHROMBIN TIME: 30.7 SECONDS (ref 10–13.8)

## 2019-02-19 PROCEDURE — 85610 PROTHROMBIN TIME: CPT

## 2019-02-19 PROCEDURE — 36416 COLLJ CAPILLARY BLOOD SPEC: CPT

## 2019-02-19 NOTE — PROGRESS NOTES
Anticoagulation Clinic Progress Note    Anticoagulation Summary  As of 2019    INR goal:   2.0-3.0   TTR:   60.3 % (3.5 mo)   INR used for dosin.6 (2019)   Warfarin maintenance plan:   2 mg on Mon, Fri; 4 mg all other days   Weekly warfarin total:   24 mg   Plan last modified:   Kristal Cowart RPH (2019)   Next INR check:   3/19/2019   Priority:   Maintenance   Target end date:   Indefinite    Indications    Atrial fibrillation (CMS/HCC) [I48.91]             Anticoagulation Episode Summary     INR check location:       Preferred lab:       [facility]    [facility] [place]    Comments:         Anticoagulation Care Providers     Provider Role Specialty Phone number    Myriam Omer MD Referring Cardiology [phone]    Nallely Stoll RP Responsible Pharmacy [phone]          Clinic Interview:      INR History:  Anticoagulation Monitoring 2018   INR 2.3 1.5 2.6   INR Date 2018   INR Goal 2.0-3.0 2.0-3.0 2.0-3.0   Trend Same Up Same   Last Week Total 22 mg 22 mg 24 mg   Next Week Total 22 mg 24 mg 24 mg   Sun 4 mg 4 mg 4 mg   Mon 2 mg 2 mg 2 mg   Tue 4 mg 4 mg 4 mg   Wed 2 mg 4 mg 4 mg   Thu 4 mg 4 mg 4 mg   Fri 2 mg 2 mg 2 mg   Sat 4 mg 4 mg 4 mg   Visit Report - - -   Some recent data might be hidden       Plan:  1. INR is Therapeutic today- see above in Anticoagulation Summary.  Will instruct Cody Eldridge to Continue their warfarin regimen- see above in Anticoagulation Summary.  2. Follow up in 1 month  3. Patient declines warfarin refills.  4. Verbal and written information provided. Patient expresses understanding and has no further questions at this time.    Kristal Cowart RPH

## 2019-03-18 ENCOUNTER — ANTICOAGULATION VISIT (OUTPATIENT)
Dept: PHARMACY | Facility: HOSPITAL | Age: 74
End: 2019-03-18

## 2019-03-18 LAB
INR PPP: 3.1 (ref 0.91–1.09)
PROTHROMBIN TIME: 37.5 SECONDS (ref 10–13.8)

## 2019-03-18 PROCEDURE — 85610 PROTHROMBIN TIME: CPT

## 2019-03-18 PROCEDURE — 36416 COLLJ CAPILLARY BLOOD SPEC: CPT

## 2019-03-18 PROCEDURE — G0463 HOSPITAL OUTPT CLINIC VISIT: HCPCS

## 2019-03-18 RX ORDER — WARFARIN SODIUM 2 MG/1
TABLET ORAL
Qty: 70 TABLET | Refills: 0 | Status: SHIPPED | OUTPATIENT
Start: 2019-03-18 | End: 2019-04-30 | Stop reason: SDUPTHER

## 2019-03-18 NOTE — PROGRESS NOTES
Anticoagulation Clinic Progress Note    Anticoagulation Summary  As of 3/18/2019    INR goal:   2.0-3.0   TTR:   64.3 % (4.4 mo)   INR used for dosing:   3.1! (3/18/2019)   Warfarin maintenance plan:   2 mg on Mon, Fri; 4 mg all other days   Weekly warfarin total:   24 mg   Plan last modified:   Kristal Cowart RPH (2/5/2019)   Next INR check:   4/8/2019   Priority:   Maintenance   Target end date:   Indefinite    Indications    Atrial fibrillation (CMS/HCC) [I48.91]             Anticoagulation Episode Summary     INR check location:       Preferred lab:       Send INR reminders to:    [facility]    Comments:         Anticoagulation Care Providers     Provider Role Specialty Phone number    Myriam Omer MD Referring Cardiology [phone]    Nallely Stoll [facility] Responsible Pharmacy [phone]          Clinic Interview:  Patient Findings     Negatives:   Signs/symptoms of thrombosis, Signs/symptoms of bleeding,   Laboratory test error suspected, Change in health, Change in alcohol use,   Change in activity, Upcoming invasive procedure, Emergency department   visit, Upcoming dental procedure, Missed doses, Extra doses, Change in   medications, Change in diet/appetite, Hospital admission, Bruising, Other   complaints      Clinical Outcomes     Negatives:   Major bleeding event, Thromboembolic event,   Anticoagulation-related hospital admission, Anticoagulation-related ED   visit, Anticoagulation-related fatality        INR History:  Anticoagulation Monitoring 2/5/2019 2/19/2019 3/18/2019   INR 1.5 2.6 3.1   INR Date 2/5/2019 2/19/2019 3/18/2019   INR Goal 2.0-3.0 2.0-3.0 2.0-3.0   Trend Up Same Same   Last Week Total 22 mg 24 mg 24 mg   Next Week Total 24 mg 24 mg 24 mg   Sun 4 mg 4 mg 4 mg   Mon 2 mg 2 mg 2 mg   Tue 4 mg 4 mg 4 mg   Wed 4 mg 4 mg 4 mg   Thu 4 mg 4 mg 4 mg   Fri 2 mg 2 mg 2 mg   Sat 4 mg 4 mg 4 mg   Visit Report - - -   Some recent data might be hidden       Plan:  1. INR is  Supratherapeutic today- see above in Anticoagulation Summary.  Will instruct Cody Harringtonley to Continue their warfarin regimen- see above in Anticoagulation Summary.  2. Follow up in 3 weeks  3. Patient declines warfarin refills.  4. Verbal and written information provided. Patient expresses understanding and has no further questions at this time.    Kristal Cowart [facility]

## 2019-03-29 RX ORDER — AMOXICILLIN AND CLAVULANATE POTASSIUM 875; 125 MG/1; MG/1
1 TABLET, FILM COATED ORAL 2 TIMES DAILY
Qty: 20 TABLET | Refills: 0 | Status: SHIPPED | OUTPATIENT
Start: 2019-03-29 | End: 2019-04-08

## 2019-04-03 ENCOUNTER — OFFICE VISIT (OUTPATIENT)
Dept: FAMILY MEDICINE CLINIC | Facility: CLINIC | Age: 74
End: 2019-04-03

## 2019-04-03 ENCOUNTER — TELEPHONE (OUTPATIENT)
Dept: FAMILY MEDICINE CLINIC | Facility: CLINIC | Age: 74
End: 2019-04-03

## 2019-04-03 VITALS
HEIGHT: 72 IN | OXYGEN SATURATION: 91 % | HEART RATE: 64 BPM | DIASTOLIC BLOOD PRESSURE: 81 MMHG | TEMPERATURE: 97.3 F | WEIGHT: 190 LBS | SYSTOLIC BLOOD PRESSURE: 135 MMHG | BODY MASS INDEX: 25.73 KG/M2

## 2019-04-03 DIAGNOSIS — R22.1 MASS OF RIGHT SIDE OF NECK: Primary | ICD-10-CM

## 2019-04-03 PROCEDURE — 99214 OFFICE O/P EST MOD 30 MIN: CPT | Performed by: NURSE PRACTITIONER

## 2019-04-08 ENCOUNTER — ANTICOAGULATION VISIT (OUTPATIENT)
Dept: PHARMACY | Facility: HOSPITAL | Age: 74
End: 2019-04-08

## 2019-04-08 LAB
INR PPP: 3.1 (ref 0.91–1.09)
PROTHROMBIN TIME: 37.3 SECONDS (ref 10–13.8)

## 2019-04-08 PROCEDURE — 36416 COLLJ CAPILLARY BLOOD SPEC: CPT

## 2019-04-08 PROCEDURE — 85610 PROTHROMBIN TIME: CPT

## 2019-04-08 NOTE — PROGRESS NOTES
Subjective   Cody Eldridge is a 73 y.o. male.     Follow-up neck  Right sided  Approximate two weeks  Tender no fever no difficulty swallowing  No sore throat  Given Augmentin last week and told to follow up here, as he stopped me while I was assessing his wife  And without serious complaints at that time  Not sure if he gets bigger at times or related to meals  It does not come and go    No history of parotid mass or problems or any prostatitis  No night sweats unexplained weight loss or history lymphoma    No change with Augmentin         The following portions of the patient's history were reviewed and updated as appropriate: allergies, current medications, past family history, past medical history, past social history, past surgical history and problem list.    Review of Systems   Constitutional: Negative for fever and unexpected weight loss.   HENT:        Neck mass   All other systems reviewed and are negative.      Objective   Physical Exam   Constitutional: He is oriented to person, place, and time. He appears well-developed and well-nourished. No distress.   HENT:   Head: Normocephalic and atraumatic.   Nose: Nose normal.   Mouth/Throat: Oropharynx is clear and moist. No oropharyngeal exudate.   TMJ normal speech clear  Dentures  No oral abscess  Palpated  Salivary duct's no thickening or nodules or stones   Eyes: Conjunctivae are normal. Pupils are equal, round, and reactive to light. No scleral icterus.   Neck: Neck supple. No JVD present. No thyromegaly present.   Moderate size    7 x 3  Cm  Rt  Rather firm tender without abcess increased heat or redness mass right lateral cervical region extending to the base of the proximal mandible  Does not extend to the pre-aur region  With likely parotid swelling       Cardiovascular: Normal rate, regular rhythm and normal heart sounds. Exam reveals no gallop and no friction rub.   No murmur heard.  Pulmonary/Chest: Effort normal and breath sounds normal. No  respiratory distress. He has no wheezes. He has no rales.   Abdominal: Soft. Bowel sounds are normal. He exhibits no distension and no mass. There is no tenderness. There is no guarding. No hernia.   Musculoskeletal: He exhibits no edema or tenderness.   Lymphadenopathy:     He has no cervical adenopathy.   Neurological: He is alert and oriented to person, place, and time. He has normal reflexes.   Skin: Skin is warm and dry. No rash noted. He is not diaphoretic. No erythema.   Psychiatric: He has a normal mood and affect. His behavior is normal. Judgment and thought content normal.   Vitals reviewed.        Assessment/Plan   Cody was seen today for follow-up on lymphnode.    Diagnoses and all orders for this visit:    Mass of right side of neck  Comments:  susp for parotid mass       Recommend soft tissue of the neck with contrast for further evaluation  It's quite firm and I suspect it's a parotid mass, although he does not have a history of a prolonged enlargement    Warm compresses  Try lemon drops  4 times a day  Follow-up one week  If not greatly improved schedule CT neck with contrast soft tissue to evaluate mass  Cannot rule out this is a lymph node as well  Discuss with patient he understands importance for follow-up and will come back next week  You may stop Augmentin after seven days

## 2019-04-08 NOTE — PROGRESS NOTES
Anticoagulation Clinic Progress Note    Anticoagulation Summary  As of 4/8/2019    INR goal:   2.0-3.0   TTR:   55.5 % (5.1 mo)   INR used for dosing:   3.1! (4/8/2019)   Warfarin maintenance plan:   2 mg every Mon, Fri; 4 mg all other days   Weekly warfarin total:   24 mg   No change documented:   Brigida Monique   Plan last modified:   Kristal Cowart [facility] (2/5/2019)   Next INR check:   4/22/2019   Priority:   Maintenance   Target end date:   Indefinite    Indications    Atrial fibrillation (CMS/HCC) [I48.91]             Anticoagulation Episode Summary     INR check location:       Preferred lab:       Send INR reminders to:    [facility]    Comments:         Anticoagulation Care Providers     Provider Role Specialty Phone number    Myriam Omer MD Referring Cardiology [phone]    Nallely Stoll [facility] Responsible Pharmacy [phone]          Clinic Interview:  Patient Findings     Negatives:   Signs/symptoms of thrombosis, Signs/symptoms of bleeding,   Laboratory test error suspected, Change in health, Change in alcohol use,   Change in activity, Upcoming invasive procedure, Emergency department   visit, Upcoming dental procedure, Missed doses, Extra doses, Change in   medications, Change in diet/appetite, Hospital admission, Bruising, Other   complaints      Clinical Outcomes     Negatives:   Major bleeding event, Thromboembolic event,   Anticoagulation-related hospital admission, Anticoagulation-related ED   visit, Anticoagulation-related fatality        INR History:  Anticoagulation Monitoring 2/19/2019 3/18/2019 4/8/2019   INR 2.6 3.1 3.1   INR Date 2/19/2019 3/18/2019 4/8/2019   INR Goal 2.0-3.0 2.0-3.0 2.0-3.0   Trend Same Same Same   Last Week Total 24 mg 24 mg 24 mg   Next Week Total 24 mg 24 mg 24 mg   Sun 4 mg 4 mg 4 mg   Mon 2 mg 2 mg 2 mg   Tue 4 mg 4 mg 4 mg   Wed 4 mg 4 mg 4 mg   Thu 4 mg 4 mg 4 mg   Fri 2 mg 2 mg 2 mg   Sat 4 mg 4 mg 4 mg   Visit Report - - -   Some recent  data might be hidden       Plan:  1. INR is out of range today- see above in Anticoagulation Summary.   Will instruct Cody Eldridge to continue their warfarin regimen- see above in Anticoagulation Summary.  2. Follow up in 2 weeks.  3. Patient declines warfarin refills.  4. Verbal and written information provided. Patient expresses understanding and has no further questions at this time.    Brigida Monique

## 2019-04-10 ENCOUNTER — OFFICE VISIT (OUTPATIENT)
Dept: FAMILY MEDICINE CLINIC | Facility: CLINIC | Age: 74
End: 2019-04-10

## 2019-04-10 VITALS
DIASTOLIC BLOOD PRESSURE: 80 MMHG | OXYGEN SATURATION: 94 % | SYSTOLIC BLOOD PRESSURE: 140 MMHG | WEIGHT: 190 LBS | BODY MASS INDEX: 25.73 KG/M2 | HEART RATE: 58 BPM | HEIGHT: 72 IN

## 2019-04-10 DIAGNOSIS — R22.1 NECK MASS: Primary | ICD-10-CM

## 2019-04-10 PROCEDURE — 99213 OFFICE O/P EST LOW 20 MIN: CPT | Performed by: NURSE PRACTITIONER

## 2019-04-10 RX ORDER — ALPRAZOLAM 0.5 MG/1
0.5 TABLET ORAL ONCE
Qty: 1 TABLET | Refills: 0 | Status: SHIPPED | OUTPATIENT
Start: 2019-04-10 | End: 2019-04-10

## 2019-04-10 NOTE — PATIENT INSTRUCTIONS
Discharge instructions  Outpatient CT neck with contrast as soon as possible  Push fluids the day before up to 2 days after to protect kidneys  Call me next day for results  If increased pain redness swelling shortness of breath difficulty swallowing emergency room    Will need to see ENT

## 2019-04-11 ENCOUNTER — TELEPHONE (OUTPATIENT)
Dept: FAMILY MEDICINE CLINIC | Facility: CLINIC | Age: 74
End: 2019-04-11

## 2019-04-11 DIAGNOSIS — R22.1 NECK MASS: Primary | ICD-10-CM

## 2019-04-13 NOTE — PROGRESS NOTES
Subjective   Cody Eldridge is a 73 y.o. male.     Follow-up right neck mass suspicious for parotid mass  No improvement with Augmentin  No difficulty swallowing night sweats  Present several weeks  Mildly tender  He said no fever chills             The following portions of the patient's history were reviewed and updated as appropriate: allergies, current medications, past family history, past medical history, past social history, past surgical history and problem list.    Review of Systems   Constitutional: Negative for fatigue and fever.   HENT: Negative.  Negative for trouble swallowing.         Neck mask   Eyes: Negative.    Respiratory: Negative.  Negative for cough and shortness of breath.    Cardiovascular: Negative for chest pain, palpitations and leg swelling.   Gastrointestinal: Negative.  Negative for abdominal pain.   Genitourinary: Negative.    Musculoskeletal: Negative.    Skin: Negative.    Neurological: Negative.  Negative for dizziness and confusion.   Psychiatric/Behavioral: Negative.        Objective   Physical Exam   Constitutional: He is oriented to person, place, and time. He appears well-developed and well-nourished. No distress.   HENT:   Head: Normocephalic and atraumatic.   Nose: Nose normal.   Mouth/Throat: Oropharynx is clear and moist.   Eyes: Conjunctivae are normal. Pupils are equal, round, and reactive to light.   Neck: Normal range of motion. Neck supple. No JVD present. No thyromegaly present.   Neck mass approximately 3 to 4 cm right lateral neck at the base of the mandible does not extend pre-auracle  Likely provided mass as opposed to lymphadenopathy although I am not certain   Cardiovascular: Normal rate, regular rhythm and normal heart sounds.   No murmur heard.  Pulmonary/Chest: Effort normal and breath sounds normal. No respiratory distress. He has no wheezes.   Musculoskeletal: He exhibits no edema or tenderness.   Lymphadenopathy:     He has no cervical adenopathy.    Neurological: He is alert and oriented to person, place, and time.   Skin: Skin is warm and dry. He is not diaphoretic.   Psychiatric: He has a normal mood and affect. His behavior is normal. Judgment and thought content normal.   Vitals reviewed.        Assessment/Plan   Cody was seen today for follow-up on right side neck mass.    Diagnoses and all orders for this visit:    Neck mass  -     CT soft tissue neck w wo contrast  -     Ambulatory Referral to ENT (Otolaryngology)    Other orders  -     ALPRAZolam (XANAX) 0.5 MG tablet; Take 1 tablet by mouth 1 (One) Time for 1 dose. Test anxiety 1hr prior  -     sertraline (ZOLOFT) 50 MG tablet; Take 1 tablet by mouth Daily.                  Patient Instructions   Discharge instructions  Outpatient CT neck with contrast as soon as possible  Push fluids the day before up to 2 days after to protect kidneys  Call me next day for results  If increased pain redness swelling shortness of breath difficulty swallowing emergency room    Will need to see ENT

## 2019-04-22 ENCOUNTER — APPOINTMENT (OUTPATIENT)
Dept: PHARMACY | Facility: HOSPITAL | Age: 74
End: 2019-04-22

## 2019-04-23 ENCOUNTER — HOSPITAL ENCOUNTER (OUTPATIENT)
Dept: ULTRASOUND IMAGING | Facility: HOSPITAL | Age: 74
Discharge: HOME | End: 2019-04-23
Admitting: NURSE PRACTITIONER

## 2019-04-23 PROCEDURE — 76536 US EXAM OF HEAD AND NECK: CPT

## 2019-04-24 ENCOUNTER — ANTICOAGULATION VISIT (OUTPATIENT)
Dept: PHARMACY | Facility: HOSPITAL | Age: 74
End: 2019-04-24

## 2019-04-24 ENCOUNTER — TELEPHONE (OUTPATIENT)
Dept: FAMILY MEDICINE CLINIC | Facility: CLINIC | Age: 74
End: 2019-04-24

## 2019-04-24 DIAGNOSIS — R22.1 NECK MASS: Primary | ICD-10-CM

## 2019-04-24 LAB
INR PPP: 2 (ref 0.91–1.09)
PROTHROMBIN TIME: 23.7 SECONDS (ref 10–13.8)

## 2019-04-24 PROCEDURE — 85610 PROTHROMBIN TIME: CPT

## 2019-04-24 PROCEDURE — 36416 COLLJ CAPILLARY BLOOD SPEC: CPT

## 2019-04-24 NOTE — PROGRESS NOTES
Anticoagulation Clinic Progress Note    Anticoagulation Summary  As of 2019    INR goal:   2.0-3.0   TTR:   58.9 % (5.7 mo)   INR used for dosin.0 (2019)   Warfarin maintenance plan:   2 mg every Mon, Fri; 4 mg all other days   Weekly warfarin total:   24 mg   No change documented:   Brigida Monique   Plan last modified:   Kristal Cowart [facility] (2019)   Next INR check:   2019   Priority:   Maintenance   Target end date:   Indefinite    Indications    Atrial fibrillation (CMS/HCC) [I48.91]             Anticoagulation Episode Summary     INR check location:       Preferred lab:       Send INR reminders to:    [facility]    Comments:         Anticoagulation Care Providers     Provider Role Specialty Phone number    Myriam Omer MD Referring Cardiology [phone]    Nallely Stoll [facility] Responsible Pharmacy [phone]          Clinic Interview:  Patient Findings     Negatives:   Signs/symptoms of thrombosis, Signs/symptoms of bleeding,   Laboratory test error suspected, Change in health, Change in alcohol use,   Change in activity, Upcoming invasive procedure, Emergency department   visit, Upcoming dental procedure, Missed doses, Extra doses, Change in   medications, Change in diet/appetite, Hospital admission, Bruising, Other   complaints      Clinical Outcomes     Negatives:   Major bleeding event, Thromboembolic event,   Anticoagulation-related hospital admission, Anticoagulation-related ED   visit, Anticoagulation-related fatality        INR History:  Anticoagulation Monitoring 3/18/2019 2019 2019   INR 3.1 3.1 2.0   INR Date 3/18/2019 2019 2019   INR Goal 2.0-3.0 2.0-3.0 2.0-3.0   Trend Same Same Same   Last Week Total 24 mg 24 mg 24 mg   Next Week Total 24 mg 24 mg 24 mg   Sun 4 mg 4 mg 4 mg   Mon 2 mg 2 mg 2 mg   Tue 4 mg 4 mg 4 mg   Wed 4 mg 4 mg 4 mg   Thu 4 mg 4 mg 4 mg   Fri 2 mg 2 mg 2 mg   Sat 4 mg 4 mg 4 mg   Visit Report - - -   Some recent  data might be hidden       Plan:  1. INR is therapeutic today- see above in Anticoagulation Summary.   Will instruct Cody Eldridge to continue their warfarin regimen- see above in Anticoagulation Summary.  2. Follow up in 4 weeks.  3. Patient declines warfarin refills.  4. Verbal and written information provided. Patient expresses understanding and has no further questions at this time.    Brigida Monique

## 2019-04-29 ENCOUNTER — TELEPHONE (OUTPATIENT)
Dept: FAMILY MEDICINE CLINIC | Facility: CLINIC | Age: 74
End: 2019-04-29

## 2019-04-29 NOTE — TELEPHONE ENCOUNTER
Armstrong, Kimberly J Epley, James, APRN             I have spoken with 4 people at [facility] today since the CT  has been denied by [facility] you will either have to wait 45 days to order it again or you will need to write an appeal letter and fax it to them @ [phone] or call and speak with a nurse reviewer @ [phone]     Please let me know what you decide to do     Thank You   Joan      Message 5 days ago  After several hours to no avail  ENT appointment this week

## 2019-04-29 NOTE — TELEPHONE ENCOUNTER
Patient has not called ENT Dr. Slater for appointment neck mass x2    I called patient  Says he did not have the number, but I suspect he may be having anxiety    Encouraged him to go when ASAP for evaluation for an evaluation soon as possible to get scheduled for a biopsy  Could be malignant but it could be benign no way to tell  Hopefully benign but encouraged him to get in just to get checked out well    His insurance unfortunately has not been helpful with his CT approval of his neck  We have spent several hours on the phone to get this approved hopefully  ENT will have better success    Patient will let me know if he needs anything or any difficulty getting this week to ENT

## 2019-04-30 RX ORDER — WARFARIN SODIUM 2 MG/1
TABLET ORAL
Qty: 70 TABLET | Refills: 0 | Status: SHIPPED | OUTPATIENT
Start: 2019-04-30 | End: 2019-06-06 | Stop reason: SDUPTHER

## 2019-05-15 ENCOUNTER — LAB REQUISITION (OUTPATIENT)
Dept: LAB | Facility: HOSPITAL | Age: 74
End: 2019-05-15

## 2019-05-15 DIAGNOSIS — Z00.00 ENCOUNTER FOR GENERAL ADULT MEDICAL EXAMINATION WITHOUT ABNORMAL FINDINGS: ICD-10-CM

## 2019-05-15 PROCEDURE — 88305 TISSUE EXAM BY PATHOLOGIST: CPT | Performed by: OTOLARYNGOLOGY

## 2019-05-15 PROCEDURE — 88173 CYTOPATH EVAL FNA REPORT: CPT | Performed by: OTOLARYNGOLOGY

## 2019-05-15 PROCEDURE — 88342 IMHCHEM/IMCYTCHM 1ST ANTB: CPT | Performed by: OTOLARYNGOLOGY

## 2019-05-17 ENCOUNTER — TRANSCRIBE ORDERS (OUTPATIENT)
Dept: ADMINISTRATIVE | Facility: HOSPITAL | Age: 74
End: 2019-05-17

## 2019-05-17 DIAGNOSIS — C44.42 SQUAMOUS CELL CARCINOMA, SCALP/NECK: Primary | ICD-10-CM

## 2019-05-21 ENCOUNTER — APPOINTMENT (OUTPATIENT)
Dept: PET IMAGING | Facility: HOSPITAL | Age: 74
End: 2019-05-21

## 2019-05-22 ENCOUNTER — ANTICOAGULATION VISIT (OUTPATIENT)
Dept: PHARMACY | Facility: HOSPITAL | Age: 74
End: 2019-05-22

## 2019-05-22 LAB
INR PPP: 1 (ref 0.91–1.09)
PROTHROMBIN TIME: 12.2 SECONDS (ref 10–13.8)

## 2019-05-22 PROCEDURE — 85610 PROTHROMBIN TIME: CPT

## 2019-05-22 PROCEDURE — G0463 HOSPITAL OUTPT CLINIC VISIT: HCPCS

## 2019-05-22 PROCEDURE — 36416 COLLJ CAPILLARY BLOOD SPEC: CPT

## 2019-05-23 ENCOUNTER — HOSPITAL ENCOUNTER (OUTPATIENT)
Dept: PET IMAGING | Facility: HOSPITAL | Age: 74
Discharge: HOME | End: 2019-05-23
Admitting: OTOLARYNGOLOGY

## 2019-05-23 ENCOUNTER — HOSPITAL ENCOUNTER (OUTPATIENT)
Dept: PET IMAGING | Facility: HOSPITAL | Age: 74
Discharge: HOME | End: 2019-05-23

## 2019-05-23 DIAGNOSIS — C44.42 SQUAMOUS CELL CARCINOMA, SCALP/NECK: ICD-10-CM

## 2019-05-23 PROCEDURE — A9552 F18 FDG: HCPCS | Performed by: OTOLARYNGOLOGY

## 2019-05-23 PROCEDURE — 0 FLUDEOXYGLUCOSE F18 SOLUTION: Performed by: OTOLARYNGOLOGY

## 2019-05-23 PROCEDURE — 78815 PET IMAGE W/CT SKULL-THIGH: CPT

## 2019-05-23 PROCEDURE — 82962 GLUCOSE BLOOD TEST: CPT

## 2019-05-23 RX ADMIN — FLUDEOXYGLUCOSE F18 1 DOSE: 300 INJECTION INTRAVENOUS at 12:26

## 2019-05-23 NOTE — PATIENT INSTRUCTIONS
Warfarin:  5/23 6 mg (extra 2 mg)  5/24 4 mg (extra 2 mg)  Then resume 2 mg MF, 4 mg rest of wk.

## 2019-05-23 NOTE — PROGRESS NOTES
Anticoagulation Clinic Progress Note    Anticoagulation Summary  As of 2019    INR goal:   2.0-3.0   TTR:   50.6 % (6.6 mo)   INR used for dosin.0! (2019)   Warfarin maintenance plan:   2 mg every Mon, Fri; 4 mg all other days   Weekly warfarin total:   24 mg   Plan last modified:   Kristal Cowart [facility] (2019)   Next INR check:   2019   Priority:   Maintenance   Target end date:   Indefinite    Indications    Atrial fibrillation (CMS/HCC) [I48.91]             Anticoagulation Episode Summary     INR check location:       Preferred lab:       Send INR reminders to:   [facility]    Comments:         Anticoagulation Care Providers     Provider Role Specialty Phone number    Myriam Omer MD Referring Cardiology [phone]    Nallely Stoll [facility] Responsible Pharmacy [phone]          Clinic Interview:  Patient Findings     Positives:   Missed doses, Other complaints    Negatives:   Signs/symptoms of thrombosis, Signs/symptoms of bleeding,   Laboratory test error suspected, Change in health, Change in alcohol use,   Change in activity, Upcoming invasive procedure, Emergency department   visit, Upcoming dental procedure, Extra doses, Change in medications,   Change in diet/appetite, Hospital admission, Bruising    Comments:    Instructed by Dr. Slater (oncology) to HOLD warfarin/aspirin   leading up to biopsy on 5/15. Per pt, Dr. Slater specifically advised on   5/15 not to resume warfarin/aspirin at that time. Contacted Dr. Slater's   office (P: [phone]); June confirmed plan to hold warfarin/ASA   perprocedurally; however, no documentation confirming plan to hold   warfarin/ASA further. June entered message for Dr. Slater to answer   tomorrow upon returning to office for confirmation. Will proceed with   holding  until otherwise confirmed by Dr. Slater.  ** confirmation   from Dr. Nohemy akbar to resume warfarin/ASA at this time.       Clinical Outcomes     Negatives:    Major bleeding event, Thromboembolic event,   Anticoagulation-related hospital admission, Anticoagulation-related ED   visit, Anticoagulation-related fatality    Comments:    Instructed by Dr. Slater (oncology) to HOLD warfarin/aspirin   leading up to biopsy on 5/15. Per pt, Dr. Slater specifically advised on   5/15 not to resume warfarin/aspirin at that time. Contacted Dr. Slater's   office (P: [phone]); June confirmed plan to hold warfarin/ASA   perprocedurally; however, no documentation confirming plan to hold   warfarin/ASA further. June entered message for Dr. Slater to answer   tomorrow upon returning to office for confirmation. Will proceed with   holding 5/22 until otherwise confirmed by Dr. Slater.  **5/23 confirmation   from Dr. Nohemy akbar to resume warfarin/ASA at this time.         INR History:  Anticoagulation Monitoring 4/8/2019 4/24/2019 5/22/2019   INR 3.1 2.0 1.0   INR Date 4/8/2019 4/24/2019 5/22/2019   INR Goal 2.0-3.0 2.0-3.0 2.0-3.0   Trend Same Same Same   Last Week Total 24 mg 24 mg 0 mg   Next Week Total 24 mg 24 mg 24 mg   Sun 4 mg 4 mg 4 mg   Mon 2 mg 2 mg 2 mg   Tue 4 mg 4 mg 4 mg   Wed 4 mg 4 mg Hold (5/22); Otherwise 4 mg   Thu 4 mg 4 mg 6 mg (5/23); Otherwise 4 mg   Fri 2 mg 2 mg 4 mg (5/24); Otherwise 2 mg   Sat 4 mg 4 mg 4 mg   Visit Report - - -   Some recent data might be hidden       Plan:  1. INR is Subtherapeutic today- see above in Anticoagulation Summary.  Will instruct Cody Eldridge to Resume/Change their warfarin regimen (held 5/22 while awaiting confirmation from Dr. Nohemy akbar to resume)- see above in Anticoagulation Summary.  2. Follow up in 1.5-2 weeks  3. Patient declines warfarin refills.  4. Verbal and written information provided. Patient expresses understanding and has no further questions at this time.    Georges Guadalupe [facility]

## 2019-05-24 LAB
CYTO UR: NORMAL
GLUCOSE BLDC GLUCOMTR-MCNC: 97 MG/DL (ref 70–130)
LAB AP CASE REPORT: NORMAL
LAB AP CLINICAL INFORMATION: NORMAL
LAB AP CYTOGENETICS REPORT,ADDENDUM: NORMAL
LAB AP DIAGNOSIS COMMENT: NORMAL
LAB AP FLOW CYTOMETRY REPORT,ADDENDUM: NORMAL
LAB AP NON-GYN SPECIMEN ADEQUACY: NORMAL
PATH REPORT.FINAL DX SPEC: NORMAL
PATH REPORT.GROSS SPEC: NORMAL

## 2019-06-04 ENCOUNTER — ANTICOAGULATION VISIT (OUTPATIENT)
Dept: PHARMACY | Facility: HOSPITAL | Age: 74
End: 2019-06-04

## 2019-06-04 LAB
INR PPP: 2.3 (ref 0.91–1.09)
PROTHROMBIN TIME: 27.8 SECONDS (ref 10–13.8)

## 2019-06-04 PROCEDURE — 85610 PROTHROMBIN TIME: CPT

## 2019-06-04 PROCEDURE — 36416 COLLJ CAPILLARY BLOOD SPEC: CPT

## 2019-06-05 NOTE — PROGRESS NOTES
Anticoagulation Clinic Progress Note    Anticoagulation Summary  As of 2019    INR goal:   2.0-3.0   TTR:   48.9 % (7 mo)   INR used for dosin.3 (2019)   Warfarin maintenance plan:   2 mg every Mon, Fri; 4 mg all other days   Weekly warfarin total:   24 mg   No change documented:   Vira Altamirano   Plan last modified:   Kristal Cowart [facility] (2019)   Next INR check:   2019   Priority:   Maintenance   Target end date:   Indefinite    Indications    Atrial fibrillation (CMS/HCC) [I48.91]             Anticoagulation Episode Summary     INR check location:       Preferred lab:       Send INR reminders to:    [facility]    Comments:         Anticoagulation Care Providers     Provider Role Specialty Phone number    Myriam Omer MD Referring Cardiology [phone]    Nallely Stoll [facility] Responsible Pharmacy [phone]          Clinic Interview:  Patient Findings     Negatives:   Signs/symptoms of thrombosis, Signs/symptoms of bleeding,   Laboratory test error suspected, Change in health, Change in alcohol use,   Change in activity, Upcoming invasive procedure, Emergency department   visit, Upcoming dental procedure, Missed doses, Extra doses, Change in   medications, Change in diet/appetite, Hospital admission, Bruising, Other   complaints      Clinical Outcomes     Negatives:   Major bleeding event, Thromboembolic event,   Anticoagulation-related hospital admission, Anticoagulation-related ED   visit, Anticoagulation-related fatality        INR History:  Anticoagulation Monitoring 2019   INR 2.0 1.0 2.3   INR Date 2019   INR Goal 2.0-3.0 2.0-3.0 2.0-3.0   Trend Same Same Same   Last Week Total 24 mg 0 mg 24 mg   Next Week Total 24 mg 24 mg 24 mg   Sun 4 mg 4 mg 4 mg   Mon 2 mg 2 mg 2 mg   Tue 4 mg 4 mg 4 mg   Wed 4 mg Hold (); Otherwise 4 mg 4 mg   Thu 4 mg 6 mg (); Otherwise 4 mg 4 mg   Fri 2 mg 4 mg (); Otherwise 2 mg  2 mg   Sat 4 mg 4 mg 4 mg   Visit Report - - -   Some recent data might be hidden       Plan:  1. INR is therapeutic today- see above in Anticoagulation Summary.   Will instruct Cody Eldridge to continue their warfarin regimen- see above in Anticoagulation Summary.  2. Follow up in 2 weeks.  3. Patient declines warfarin refills.  4. Verbal and written information provided. Patient expresses understanding and has no further questions at this time.    Vira Altamirano

## 2019-06-06 RX ORDER — WARFARIN SODIUM 2 MG/1
TABLET ORAL
Qty: 70 TABLET | Refills: 1 | Status: SHIPPED | OUTPATIENT
Start: 2019-06-06 | End: 2019-08-03

## 2019-06-07 ENCOUNTER — LAB (OUTPATIENT)
Dept: OTHER | Facility: HOSPITAL | Age: 74
End: 2019-06-07

## 2019-06-07 ENCOUNTER — APPOINTMENT (OUTPATIENT)
Dept: RADIATION ONCOLOGY | Facility: HOSPITAL | Age: 74
End: 2019-06-07

## 2019-06-07 ENCOUNTER — CONSULT (OUTPATIENT)
Dept: RADIATION ONCOLOGY | Facility: HOSPITAL | Age: 74
End: 2019-06-07

## 2019-06-07 ENCOUNTER — CONSULT (OUTPATIENT)
Dept: ONCOLOGY | Facility: CLINIC | Age: 74
End: 2019-06-07

## 2019-06-07 VITALS
SYSTOLIC BLOOD PRESSURE: 144 MMHG | BODY MASS INDEX: 24.68 KG/M2 | HEART RATE: 83 BPM | DIASTOLIC BLOOD PRESSURE: 93 MMHG | WEIGHT: 182 LBS | OXYGEN SATURATION: 97 %

## 2019-06-07 VITALS
TEMPERATURE: 97.6 F | HEART RATE: 82 BPM | HEIGHT: 72 IN | SYSTOLIC BLOOD PRESSURE: 136 MMHG | BODY MASS INDEX: 24.68 KG/M2 | RESPIRATION RATE: 14 BRPM | OXYGEN SATURATION: 98 % | WEIGHT: 182.2 LBS | DIASTOLIC BLOOD PRESSURE: 85 MMHG

## 2019-06-07 DIAGNOSIS — R93.89 ABNORMAL FINDINGS ON DIAGNOSTIC IMAGING OF OTHER SPECIFIED BODY STRUCTURES: ICD-10-CM

## 2019-06-07 DIAGNOSIS — R79.1 ABNORMAL COAGULATION PROFILE: ICD-10-CM

## 2019-06-07 DIAGNOSIS — C01 SQUAMOUS CELL CARCINOMA OF BASE OF TONGUE (HCC): ICD-10-CM

## 2019-06-07 DIAGNOSIS — C44.42 SQUAMOUS CELL CARCINOMA OF NECK: Primary | ICD-10-CM

## 2019-06-07 DIAGNOSIS — IMO0002 SQUAMOUS CELL CARCINOMA: Primary | ICD-10-CM

## 2019-06-07 LAB
BASOPHILS # BLD AUTO: 0.06 10*3/MM3 (ref 0–0.2)
BASOPHILS NFR BLD AUTO: 1 % (ref 0–1.5)
DEPRECATED RDW RBC AUTO: 42.8 FL (ref 37–54)
EOSINOPHIL # BLD AUTO: 0.13 10*3/MM3 (ref 0–0.4)
EOSINOPHIL NFR BLD AUTO: 2.1 % (ref 0.3–6.2)
ERYTHROCYTE [DISTWIDTH] IN BLOOD BY AUTOMATED COUNT: 12.5 % (ref 12.3–15.4)
HCT VFR BLD AUTO: 47.6 % (ref 37.5–51)
HGB BLD-MCNC: 15.8 G/DL (ref 13–17.7)
HOLD SPECIMEN: NORMAL
IMM GRANULOCYTES # BLD AUTO: 0.01 10*3/MM3 (ref 0–0.05)
IMM GRANULOCYTES NFR BLD AUTO: 0.2 % (ref 0–0.5)
LYMPHOCYTES # BLD AUTO: 1.74 10*3/MM3 (ref 0.7–3.1)
LYMPHOCYTES NFR BLD AUTO: 28.3 % (ref 19.6–45.3)
MAGNESIUM SERPL-MCNC: 2.2 MG/DL (ref 1.6–2.4)
MCH RBC QN AUTO: 30.7 PG (ref 26.6–33)
MCHC RBC AUTO-ENTMCNC: 33.2 G/DL (ref 31.5–35.7)
MCV RBC AUTO: 92.4 FL (ref 79–97)
MONOCYTES # BLD AUTO: 0.69 10*3/MM3 (ref 0.1–0.9)
MONOCYTES NFR BLD AUTO: 11.2 % (ref 5–12)
NEUTROPHILS # BLD AUTO: 3.51 10*3/MM3 (ref 1.7–7)
NEUTROPHILS NFR BLD AUTO: 57.2 % (ref 42.7–76)
NRBC BLD AUTO-RTO: 0 /100 WBC (ref 0–0.2)
PLATELET # BLD AUTO: 226 10*3/MM3 (ref 140–450)
PMV BLD AUTO: 10.1 FL (ref 6–12)
RBC # BLD AUTO: 5.15 10*6/MM3 (ref 4.14–5.8)
TSH SERPL DL<=0.05 MIU/L-ACNC: 1.9 MIU/ML (ref 0.27–4.2)
WBC NRBC COR # BLD: 6.14 10*3/MM3 (ref 3.4–10.8)
WHOLE BLOOD HOLD SPECIMEN: NORMAL

## 2019-06-07 PROCEDURE — 99205 OFFICE O/P NEW HI 60 MIN: CPT | Performed by: RADIOLOGY

## 2019-06-07 PROCEDURE — 36415 COLL VENOUS BLD VENIPUNCTURE: CPT

## 2019-06-07 PROCEDURE — 77263 THER RADIOLOGY TX PLNG CPLX: CPT | Performed by: RADIOLOGY

## 2019-06-07 PROCEDURE — 85025 COMPLETE CBC W/AUTO DIFF WBC: CPT | Performed by: INTERNAL MEDICINE

## 2019-06-07 PROCEDURE — G0463 HOSPITAL OUTPT CLINIC VISIT: HCPCS | Performed by: RADIOLOGY

## 2019-06-07 PROCEDURE — 99205 OFFICE O/P NEW HI 60 MIN: CPT | Performed by: INTERNAL MEDICINE

## 2019-06-07 PROCEDURE — 83735 ASSAY OF MAGNESIUM: CPT | Performed by: INTERNAL MEDICINE

## 2019-06-07 PROCEDURE — 84443 ASSAY THYROID STIM HORMONE: CPT | Performed by: INTERNAL MEDICINE

## 2019-06-07 RX ORDER — METOPROLOL TARTRATE 100 MG/1
TABLET ORAL
Refills: 11 | COMMUNITY
Start: 2019-05-05 | End: 2019-07-02

## 2019-06-07 RX ORDER — SODIUM CHLORIDE 9 MG/ML
250 INJECTION, SOLUTION INTRAVENOUS ONCE
Status: CANCELLED | OUTPATIENT
Start: 2019-06-17

## 2019-06-07 RX ORDER — PALONOSETRON 0.05 MG/ML
0.25 INJECTION, SOLUTION INTRAVENOUS ONCE
Status: CANCELLED | OUTPATIENT
Start: 2019-06-17

## 2019-06-07 RX ORDER — ALPRAZOLAM 2 MG/1
2 TABLET ORAL
Qty: 60 TABLET | Refills: 0 | Status: SHIPPED | OUTPATIENT
Start: 2019-06-07 | End: 2019-08-03

## 2019-06-07 NOTE — PROGRESS NOTES
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)  Staging form: Pharynx - HPV-Mediated Oropharynx, AJCC 8th Edition  - Clinical stage from 6/7/2019: Stage I (cT2, cN1, cM0, p16: Positive)  - for advice and recommendations regarding the diagnosis    Referring Provider:  Fritz Slater MD  Patient Care Team:  Epley, James, APRN as PCP - General (Family Medicine)  Payal Waters MD as Consulting Physician (Pain Medicine)  Lorna Chaidez (Nurse Practitioner)  Kristal Cowart [facility] as Pharmacist  Fritz Slater MD as Referring Physician (Otolaryngology)  Pablo Heaton MD as Consulting Physician (Hematology and Oncology)  Annie Davila MD as Consulting Physician (Radiation Oncology)  Chetan Heaton MD as Consulting Physician (Urology)    CHIEF COMPLAINT:  For advice and recommendations regarding Squamous cell carcinoma of neck    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 year old male who presented with a right-sided neck mass in April of this year.  He underwent of the right neck on April 11, 2019 which showed 2 separate masslike areas with a 2.1 x 1.2 x 1.4 cm mass in the region of the right parotid and a 1.8 x 1.0 x 0.9 cm mass suggesting a lymph node.    He was referred on to ENT and was found to have a right sided mass at the base of tongue.  He underwent a fine-needle aspiration of the right neck mass on May 15, 2019 which revealed a squamous cell carcinoma, positive for P 16 with HPV high-risk detected.    He underwent a PET scan on May 23, 2019 which showed the masslike soft tissue thickening within the right base of tongue measuring 3 cm demonstrating an SUV of 15.8, and adjacent large soft tissue mass in the right carotid space measuring 5 x 3.6 cm with an SUV of 9.3, asymmetric uptake over the left cricoid cartilage with an SUV of 8.8 and more subtle increased soft  tissue thickening within the left piriform sinus as well.  Also noted were 6 mm noncalcified pulmonary nodules which were nonspecific and a subcutaneous nodule within the left upper back measuring 1.1 cm.  Therefore he appears to have a T2 N1 M0 Squamous cell carcinoma involving the base of tongue and a questionable second lesion in the pyriform sinus. The uptake on his back correlates on exam to a resolving boil.  I was asked to see the patient at the request of the referring provider noted above for advice and recommendations regarding this diagnosis.     He sees Dr. Heaton later this afternoon and returns to see Dr. Slater on Wednesday, June 12. Clinically he is doing very well. He is eating a full diet though states his taste has changed significantly over the past several years. He has stable baseline fatigue and is having intermittent temporal headaches. He has stable SOA and cough. He also has longstanding back, neck and joint pain    Past Medical History: he  has a past medical history of Arrhythmia, Atrial fibrillation (CMS/[facility]), Cancer (CMS/[facility]) (05/2019), CHF (congestive heart failure) (CMS/[facility]), Chronic bronchitis (CMS/[facility]), COPD (chronic obstructive pulmonary disease) (CMS/[facility]), Coronary artery disease of native artery of native heart with stable angina pectoris (CMS/[facility]), Cramps of lower extremity, Daytime hypersomnia, Depression, Depression with anxiety, IRAHETA (dyspnea on exertion), Drug therapy, Dyspnea on exertion, Encounter for immunization, Enlarged prostate, Fatigue, Frequent nocturnal awakening, Gout, H/O cardiac radiofrequency ablation (2006), Hyperlipidemia, Hypertension, Insomnia, Lumbar radicular pain, MI (myocardial infarction) (CMS/[facility]), Non-rheumatic tricuspid valve insufficiency, SHAR (obstructive sleep apnea), Osteoarthritis, Precordial pain, Pulmonary emphysema (CMS/[facility]), Right leg pain, Shortness of breath, Snoring, SVT (supraventricular tachycardia) (CMS/[facility]), Syncope, and Vitamin D  deficiency.    Past Surgical History:  he has a past surgical history that includes Appendectomy; Hand surgery; Cardiac Ablation (2006); Foot surgery; Finger surgery; Cardiac catheterization (N/A, 8/29/2018); Cardiac catheterization (N/A, 8/29/2018); and Cardiac catheterization (N/A, 8/29/2018).    Meds:    Current Outpatient Medications:   •  albuterol (PROVENTIL HFA;VENTOLIN HFA) 108 (90 BASE) MCG/ACT inhaler, Inhale 2 puffs. ProAir  (90 Base) MCG/ACT Inhalation Aerosol Solution; Patient Sig: ProAir  (90 Base) MCG/ACT Inhalation Aerosol Solution inhale 2 puffs by mouth every 4 hours if needed; 8.5; 11; 07-Apr-2014; Act, Disp: , Rfl:   •  amLODIPine (NORVASC) 5 MG tablet, Take 2 tablets by mouth Daily., Disp: 180 tablet, Rfl: 3  •  aspirin 81 MG EC tablet, Take 81 mg by mouth Daily., Disp: , Rfl:   •  baclofen (LIORESAL) 10 MG tablet, take 1 tablet by mouth three times a day, Disp: 90 tablet, Rfl: 1  •  carvedilol (COREG) 25 MG tablet, Take 1 tablet by mouth 2 (Two) Times a Day., Disp: 180 tablet, Rfl: 3  •  furosemide (LASIX) 40 MG tablet, Take 1 tablet by mouth 2 (Two) Times a Day., Disp: 180 tablet, Rfl: 0  •  HYDROcodone-acetaminophen (NORCO) 7.5-325 MG per tablet, Take 1 tablet by mouth 4 (Four) Times a Day As Needed for Moderate Pain  or Severe Pain ., Disp: 120 tablet, Rfl: 0  •  lisinopril (PRINIVIL,ZESTRIL) 20 MG tablet, TAKE 1 TABLET BY MOUTH TWICE DAILY, Disp: 180 tablet, Rfl: 1  •  montelukast (SINGULAIR) 10 MG tablet, Take 1 tablet by mouth Daily., Disp: 90 tablet, Rfl: 2  •  potassium chloride (K-DUR,KLOR-CON) 20 MEQ CR tablet, Take 1 tablet by mouth Daily. With food if taking furosemide (Patient taking differently: Take 20 mEq by mouth 2 (Two) Times a Day. With food if taking furosemide), Disp: 90 tablet, Rfl: 0  •  rosuvastatin (CRESTOR) 20 MG tablet, Take 20 mg by mouth Daily., Disp: 30 tablet, Rfl: 5  •  sertraline (ZOLOFT) 50 MG tablet, TAKE 1 TABLET BY MOUTH EVERY DAY, Disp: 90  "tablet, Rfl: 1  •  warfarin (COUMADIN) 2 MG tablet, TAKE 2 TABLETS BY MOUTH ONCE DAILY, Disp: 180 tablet, Rfl: 0  •  warfarin (COUMADIN) 2 MG tablet, TAKE 2 TABLETS BY MOUTH ONCE DAILY, Disp: 70 tablet, Rfl: 1  •  azithromycin (ZITHROMAX Z-VIPIN) 250 MG tablet, Take 2 tablets the first day, then 1 tablet daily for 4 days., Disp: 6 tablet, Rfl: 0    Allergies:    Allergies   Allergen Reactions   • Benadryl [Diphenhydramine Hcl] Other (See Comments) and Dizziness     \"I sleep for about a day\"       Family History:  his family history includes COPD in his other; Cancer in his brother; Diabetes in his father, mother, and sister; Heart attack in his mother; Heart disease in his father, mother, other, and sister; Heart failure in his father and other; Hypertension in his brother, father, mother, and sister.    Social History:  he  reports that he quit smoking about 19 years ago. His smoking use included cigarettes. He smoked 4.00 packs per day. He has never used smokeless tobacco. He reports that he does not drink alcohol or use drugs.    Pertinent Findings on   Review of Systems   Constitutional: Positive for appetite change, fatigue and unexpected weight change. Negative for chills, diaphoresis and fever.   HENT:   Positive for lump/mass and mouth sores. Negative for hearing loss, nosebleeds, sore throat, tinnitus, trouble swallowing and voice change.    Eyes: Negative for eye problems.   Respiratory: Positive for shortness of breath and wheezing. Negative for chest tightness, cough and hemoptysis.    Cardiovascular: Positive for leg swelling. Negative for chest pain and palpitations.   Gastrointestinal: Negative for abdominal distention, abdominal pain, blood in stool, constipation, diarrhea, nausea, rectal pain and vomiting.   Endocrine: Negative for hot flashes.   Genitourinary: Negative for bladder incontinence, difficulty urinating, dysuria, frequency, hematuria, nocturia and pelvic pain.    Musculoskeletal: Positive " for arthralgias, back pain and neck pain. Negative for flank pain, gait problem, myalgias and neck stiffness.   Skin: Negative for itching and rash.   Neurological: Positive for headaches. Negative for dizziness, extremity weakness, gait problem, light-headedness, numbness, seizures and speech difficulty.   Hematological: Negative for adenopathy. Bruises/bleeds easily.   Psychiatric/Behavioral: Positive for depression. Negative for confusion, decreased concentration, sleep disturbance and suicidal ideas. The patient is nervous/anxious.    :  Vitals:    06/07/19 0908   BP: 144/93   Pulse: 83   SpO2: 97%   Weight: 82.6 kg (182 lb)   PainSc:   8   PainLoc: Back       Performance Status: (1) Restricted in physically strenuous activity, ambulatory and able to do work of light nature    Pertinent Findings on:  Physical Exam   Constitutional: He is oriented to person, place, and time. He appears well-developed and well-nourished.   HENT:   Head: Normocephalic and atraumatic.   Mouth/Throat: Uvula is midline and mucous membranes are normal. No oral lesions. Normal dentition. No oropharyngeal exudate, posterior oropharyngeal edema or posterior oropharyngeal erythema.   Classic baked potato speech; no hoarseness   Eyes: EOM are normal.   Neck: Normal range of motion.   Firm, palpable neck mass at angle of mandible - 5 x 3 cm; nontender and no other LAD appreciated.   Pulmonary/Chest: Effort normal.   Abdominal: Soft.   Musculoskeletal: Normal range of motion.   Neurological: He is alert and oriented to person, place, and time.   Skin: Skin is warm and dry.   Boil measuring 1.5 cm on upper left back; no sign of spreading infection; adjacent ot previous cyst excision scar.   Psychiatric: He has a normal mood and affect. His behavior is normal. Judgment and thought content normal.   Vitals reviewed.    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    Plan:   We reviewed today the diagnostic imaging, pathology reports and stage of disease at this point.  We talked thru the findings on the PET scan and specifically the need to further evaluate the pyriform sinus area with Dr. Slater. We reviewed the standard treatment recommendations, including the consideration of concurrent radiation and chemotherapy.         I discussed a course of definitive treatment encompassing the site of the primary, adjacent pharyngeal sites and the bilateral necks and supraclavicular spaces to a total dose of 5000 cGy in 25 treatments.  Following this, we will plan to continue treatment to the primary and the ipsilateral neck on to 6000 cGy in 5 additional treatments and then finally will plan on boosting the site of PET positivity in the neck and primary site on to approximately 6600 to 7000 cGy in an additional 3 to 5 treatments.  We will also encompass the pyriform sinus lesion as needed and indicated after that evaluation.  This will all be delivered using an IMRT treatment plan along with image guidance to optimize the dose delivered and protect the normal adjacent structures as much as possible.  Given the above, this course of treatment should be completed in approximately 7 weeks.    We discussed the logistics of the treatment and the importance of our treatment planning process today.  We also discussed the significant acute side effects of irritation of the treated skin, hair loss in the treatment area, mucositis, sore mouth, tongue and throat, dysphagia, increased saliva and mucus, cough, decrease in appetite and fatigue.  We also discussed the long term possibility of dry mouth, difficulties with dentition and the mandible and possible esophageal stricture down the road.       We did discuss the possibility of a feeding tube today and the ways to avoid that as well. In addition, I have made a referral to our multidisciplinary head and neck clinic for evaluation by our nutritionist, physical and occupational therapists and lymphedema specialists if needed.     We attempted today to make our immobilization mask and take our CT for treatment planning; however he was very anxious and refused to complete the process. I sent him some Xanax and we will attempt this again on Monday.  We will be fusing that scan with the PET scan to assist with localization.  We will then coordinate the start of treatment with the [facility] physicians if systemic therapy is planned.      I spent greater than 60 minutes in face-to-face time with the patient and 40 minutes of that time were spent in counseling and coordination of care, including review of imaging and pathology; indications, goals, logistics, alternatives and risks - both common and rare - for my recommendations as well as surveillance and potential outcomes.

## 2019-06-07 NOTE — PROGRESS NOTES
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    Provide an opinion on any further workup or treatment                             REQUESTING PHYSICIAN: Fritz Slater MD    RECORDS OBTAINED:  Records of the patients history including those obtained from the referring provider were reviewed and summarized in detail.    HISTORY OF PRESENT ILLNESS:  The patient is a 73 y.o. year old male who is here for an opinion about the above issue.  He is referred to us from his ENT surgeon for recommendations concerning treatment of squamous cell carcinoma the base of the tongue with biopsy-proven metastatic lymph nodes in the right neck.    He was seen earlier today by Dr. Davila of the [facility] and we agree with her assessment that combined radiation and chemotherapy would be his best therapy at this time.  He has a decent performance status but does have comorbidities including atrial fibrillation requiring anticoagulation with Coumadin and history of previous myocardial infarction and prior episodes of congestive heart failure.  He follows up with Dr. Myriam Omer at [place] [facility].    History of Present Illness     Past Medical History:   Diagnosis Date   • Arrhythmia    • Atrial fibrillation (CMS/HCC)    • Cancer (CMS/HCC) 05/2019    Neck, squamous   • CHF (congestive heart failure) (CMS/HCC)    • Chronic bronchitis (CMS/HCC)    • COPD (chronic obstructive pulmonary disease) (CMS/HCC)    • Coronary artery disease of native artery of native heart with stable angina pectoris (CMS/HCC)    • Cramps of lower extremity    • Daytime hypersomnia    • Depression    • Depression with anxiety    • IRAHETA (dyspnea on exertion)    • Drug therapy    • Dyspnea on exertion    • Encounter for immunization     flu vaccine high dose PF 65+   • Enlarged prostate    • Fatigue    • Frequent nocturnal awakening    • Gout    • H/O cardiac radiofrequency ablation  2006    [facility].   • Hyperlipidemia    • Hypertension    • Insomnia    • Lumbar radicular pain    • MI (myocardial infarction) (CMS/Formerly [facility])    • Non-rheumatic tricuspid valve insufficiency    • SHAR (obstructive sleep apnea)    • Osteoarthritis    • Precordial pain    • Pulmonary emphysema (CMS/Formerly [facility])    • Right leg pain     sciatica nerve pain   • Shortness of breath    • Snoring    • SVT (supraventricular tachycardia) (CMS/[facility])    • Syncope    • Vitamin D deficiency         Past Surgical History:   Procedure Laterality Date   • APPENDECTOMY     • CARDIAC ABLATION  2006    [facility].   • CARDIAC CATHETERIZATION N/A 8/29/2018    Procedure: Left Heart Cath;  Surgeon: Yousif Uribe MD;  Location:  [facility];  Service: Cardiology   • CARDIAC CATHETERIZATION N/A 8/29/2018    Procedure: Coronary angiography;  Surgeon: Yousif Uribe MD;  Location:  [facility];  Service: Cardiology   • CARDIAC CATHETERIZATION N/A 8/29/2018    Procedure: Left ventriculography;  Surgeon: Yousif Uribe MD;  Location:  [facility];  Service: Cardiology   • FINGER SURGERY     • FOOT SURGERY     • HAND SURGERY          Current Outpatient Medications on File Prior to Visit   Medication Sig Dispense Refill   • albuterol (PROVENTIL HFA;VENTOLIN HFA) 108 (90 BASE) MCG/ACT inhaler Inhale 2 puffs. ProAir  (90 Base) MCG/ACT Inhalation Aerosol Solution; Patient Sig: ProAir  (90 Base) MCG/ACT Inhalation Aerosol Solution inhale 2 puffs by mouth every 4 hours if needed; 8.5; 11; 07-Apr-2014; Act     • ALPRAZolam (XANAX) 2 MG tablet Take 1 tablet by mouth Daily for 60 days. 60 tablet 0   • amLODIPine (NORVASC) 5 MG tablet Take 2 tablets by mouth Daily. 180 tablet 3   • aspirin 81 MG EC tablet Take 81 mg by mouth Daily.     • azithromycin (ZITHROMAX Z-VIPIN) 250 MG tablet Take 2 tablets the first day, then 1 tablet daily for 4 days. 6 tablet 0   • baclofen (LIORESAL) 10 MG tablet  "take 1 tablet by mouth three times a day 90 tablet 1   • carvedilol (COREG) 25 MG tablet Take 1 tablet by mouth 2 (Two) Times a Day. 180 tablet 3   • furosemide (LASIX) 40 MG tablet Take 1 tablet by mouth 2 (Two) Times a Day. 180 tablet 0   • HYDROcodone-acetaminophen (NORCO) 7.5-325 MG per tablet Take 1 tablet by mouth 4 (Four) Times a Day As Needed for Moderate Pain  or Severe Pain . 120 tablet 0   • lisinopril (PRINIVIL,ZESTRIL) 20 MG tablet TAKE 1 TABLET BY MOUTH TWICE DAILY 180 tablet 1   • metoprolol tartrate (LOPRESSOR) 100 MG tablet TK 1 T PO Q 12 H  11   • montelukast (SINGULAIR) 10 MG tablet Take 1 tablet by mouth Daily. 90 tablet 2   • potassium chloride (K-DUR,KLOR-CON) 20 MEQ CR tablet Take 1 tablet by mouth Daily. With food if taking furosemide (Patient taking differently: Take 20 mEq by mouth 2 (Two) Times a Day. With food if taking furosemide) 90 tablet 0   • rosuvastatin (CRESTOR) 20 MG tablet Take 20 mg by mouth Daily. 30 tablet 5   • sertraline (ZOLOFT) 50 MG tablet TAKE 1 TABLET BY MOUTH EVERY DAY 90 tablet 1   • warfarin (COUMADIN) 2 MG tablet TAKE 2 TABLETS BY MOUTH ONCE DAILY 180 tablet 0   • warfarin (COUMADIN) 2 MG tablet TAKE 2 TABLETS BY MOUTH ONCE DAILY 70 tablet 1     No current facility-administered medications on file prior to visit.         ALLERGIES:    Allergies   Allergen Reactions   • Benadryl [Diphenhydramine Hcl] Other (See Comments) and Dizziness     \"I sleep for about a day\"        Social History     Socioeconomic History   • Marital status:      Spouse name: Elise Rai   • Number of children: Not on file   • Years of education: Some College   • Highest education level: Not on file   Occupational History     Employer: RETIRED   Tobacco Use   • Smoking status: Former Smoker     Packs/day: 4.00     Types: Cigarettes     Last attempt to quit: 2000     Years since quittin.4   • Smokeless tobacco: Never Used   • Tobacco comment: started age 11 x 53 years stopped " "2000 / daily caffiene   Substance and Sexual Activity   • Alcohol use: No     Comment: caffeine use   • Drug use: No   • Sexual activity: Defer        Family History   Problem Relation Age of Onset   • Heart attack Mother    • Diabetes Mother    • Heart disease Mother    • Hypertension Mother    • Diabetes Father    • Heart failure Father    • Heart disease Father    • Hypertension Father    • Cancer Brother         colon   • Hypertension Brother    • Diabetes Sister    • Heart disease Sister    • Hypertension Sister    • COPD Other    • Heart failure Other    • Heart disease Other         Review of Systems   Constitutional: Positive for appetite change and unexpected weight change. Negative for activity change, chills, fatigue and fever.   HENT: Negative for mouth sores, trouble swallowing and voice change.    Eyes: Negative for pain and visual disturbance.   Respiratory: Negative for cough, shortness of breath and wheezing.    Cardiovascular: Positive for leg swelling. Negative for chest pain and palpitations.   Gastrointestinal: Negative for abdominal pain, constipation, diarrhea, nausea and vomiting.   Genitourinary: Negative for difficulty urinating, frequency and urgency.   Musculoskeletal: Positive for back pain. Negative for arthralgias and joint swelling.   Skin: Negative for rash.   Neurological: Positive for headaches. Negative for dizziness, seizures and weakness.   Hematological: Negative for adenopathy. Does not bruise/bleed easily.   Psychiatric/Behavioral: Negative for behavioral problems and confusion. The patient is not nervous/anxious.         Objective     Vitals:    06/07/19 1338   Resp: 14   Temp: 97.6 °F (36.4 °C)   Weight: 82.6 kg (182 lb 3.2 oz)   Height: 182.9 cm (72.01\")   PainSc:   8   PainLoc: Back     Current Status 6/7/2019   ECOG score 0       Physical Exam   Constitutional: He is oriented to person, place, and time. He appears well-developed and well-nourished. No distress.   HENT: "   Head: Normocephalic.   Firm, 5 to 6 cm palpable mass at the angle of the mandible on the right   Eyes: Conjunctivae and EOM are normal. Pupils are equal, round, and reactive to light. No scleral icterus.   Neck: Normal range of motion. Neck supple. No JVD present. No thyromegaly present.   Cardiovascular: Normal rate. An irregularly irregular rhythm present. Exam reveals no gallop and no friction rub.   No murmur heard.  Pulmonary/Chest: Effort normal and breath sounds normal. He has no wheezes. He has no rales.   Abdominal: Soft. He exhibits no distension and no mass. There is no tenderness.   Musculoskeletal: Normal range of motion. He exhibits edema. He exhibits no deformity.   1-2+ ankle edema bilaterally   Lymphadenopathy:     He has no cervical adenopathy.   Neurological: He is alert and oriented to person, place, and time. He has normal reflexes. No cranial nerve deficit.   Skin: Skin is warm and dry. No rash noted. No erythema.   Purpura on the extremities   Psychiatric: He has a normal mood and affect. His behavior is normal. Judgment normal.         RECENT LABS:  Hematology WBC   Date Value Ref Range Status   06/07/2019 6.14 3.40 - 10.80 10*3/mm3 Final     RBC   Date Value Ref Range Status   06/07/2019 5.15 4.14 - 5.80 10*6/mm3 Final     Hemoglobin   Date Value Ref Range Status   06/07/2019 15.8 13.0 - 17.7 g/dL Final     Hematocrit   Date Value Ref Range Status   06/07/2019 47.6 37.5 - 51.0 % Final     Platelets   Date Value Ref Range Status   06/07/2019 226 140 - 450 10*3/mm3 Final        Lab Results   Component Value Date    GLUCOSE 101 (H) 10/28/2018    BUN 14 10/28/2018    CREATININE 0.98 10/28/2018    EGFRIFNONA 75 10/28/2018    EGFRIFAFRI 107 04/23/2018    BCR 14.3 10/28/2018    K 3.9 10/28/2018    CO2 24.9 10/28/2018    CALCIUM 9.0 10/28/2018    PROTENTOTREF 7.2 04/23/2018    ALBUMIN 3.70 10/28/2018    LABIL2 1.3 04/23/2018    AST 25 10/28/2018    ALT 18 10/28/2018       PATHOLOGY  5/15/2019  Final Diagnosis   1.  Right Neck, FNA:                  A.  Positive for squamous cell carcinoma.     Flow Cytometry Report, Addendum   Utilizing appropriate positive and negative controls immunohistochemical stain p16 is performed on cell block material.  The tumor is positive for p16 by immunohistochemical staining which is a surrogate for HPV infection.       F-18 FDG PET FROM SKULL BASE TO MID THIGH WITH PET/CT FUSION 5/23/2019  IMPRESSION:  1.  Asymmetric intensely FDG avid masslike soft tissue thickening within  the right base of the tongue, likely representing malignancy. There is a  large intensely FDG avid soft tissue mass centered over the right  carotid space likely representing metastatic disease.  2.  Intense asymmetric FDG uptake overlying the left cricoid cartilage  with associated asymmetric mucosal thickening within the inferior aspect  of the left pyriform sinus. Findings are concerning for malignancy and  further evaluation with endoscopy is recommended.  3.  Sub-6 mm noncalcified pulmonary nodule within the right middle lobe  which was not present on 10/02/2017. The nodule is below PET resolution  and findings are nonspecific. Continued close attention on follow up is  recommended to  exclude metastatic disease.  4.  Mild-to-moderately FDG avid subcutaneous nodule within the left  upper back measuring up to 1.1 cm, findings are nonspecific and further  evaluation with physical exam is recommended.  5.  No findings of FDG avid malignancy within the abdomen or pelvis.    Assessment/Plan     1.  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  2.  Comorbidities including ischemic heart disease with history of CHF and atrial fibrillation requiring Coumadin anticoagulation.    Recommendations  1.  We agree with Dr. Davila combined radiation and chemotherapy would be the preferred treatment here.  We had  initially thought about treating him with weekly cisplatin but I am concerned about his ability to tolerate extra fluids and ultimately recommended weekly carboplatin and Taxol along with radiation.  2.  We will be referring him to vascular surgery for placement of a MediPort next week.  3.  We also will schedule a formal chemotherapy education visit with our nurse practitioners to have more detail about the potential side effects and toxicities and schedule of the planned weekly carboplatin and Taxol treatment concurrent with radiation.  4.  We will tentatively plan on scheduling him to start his first cycle of chemotherapy on 6/17/2019 which is on Monday.  This can obviously be adjusted as needed depending on whether or not we can get all of his treatment planning and procedures performed prior to this.  5.  We discussed the stage, prognosis, and treatment options at length with the patient and his wife and his granddaughter who is a nurse practitioner.  They all understand that treatment is potentially curative but certainly there are no guarantees.  They also understand that this will be very toxic treatment and its possible that he may need placement of a feeding tube in the future.  We also discussed that HPV positive squamous cell carcinomas of the head neck have a better prognosis.    Thanks for allowing us to see this nice gentleman in his family.

## 2019-06-10 DIAGNOSIS — C01 SQUAMOUS CELL CARCINOMA OF BASE OF TONGUE (HCC): ICD-10-CM

## 2019-06-10 PROCEDURE — 77334 RADIATION TREATMENT AID(S): CPT | Performed by: RADIOLOGY

## 2019-06-12 ENCOUNTER — OFFICE VISIT (OUTPATIENT)
Dept: ONCOLOGY | Facility: CLINIC | Age: 74
End: 2019-06-12

## 2019-06-12 ENCOUNTER — APPOINTMENT (OUTPATIENT)
Dept: ONCOLOGY | Facility: CLINIC | Age: 74
End: 2019-06-12

## 2019-06-12 ENCOUNTER — APPOINTMENT (OUTPATIENT)
Dept: LAB | Facility: HOSPITAL | Age: 74
End: 2019-06-12

## 2019-06-12 ENCOUNTER — APPOINTMENT (OUTPATIENT)
Dept: OTHER | Facility: HOSPITAL | Age: 74
End: 2019-06-12

## 2019-06-12 DIAGNOSIS — C01 SQUAMOUS CELL CARCINOMA OF BASE OF TONGUE (HCC): Primary | ICD-10-CM

## 2019-06-12 PROCEDURE — G0463 HOSPITAL OUTPT CLINIC VISIT: HCPCS | Performed by: NURSE PRACTITIONER

## 2019-06-12 PROCEDURE — 99215 OFFICE O/P EST HI 40 MIN: CPT | Performed by: NURSE PRACTITIONER

## 2019-06-12 RX ORDER — ONDANSETRON 8 MG/1
8 TABLET, ORALLY DISINTEGRATING ORAL EVERY 8 HOURS PRN
Qty: 30 TABLET | Refills: 2 | Status: SHIPPED | OUTPATIENT
Start: 2019-06-12 | End: 2019-08-20 | Stop reason: HOSPADM

## 2019-06-12 NOTE — PROGRESS NOTES
Subjective     PATIENT NAME:  Cody Eldridge  YOB: 1945  PATIENTS AGE:  73 y.o.  PATIENTS SEX:  male  DATE OF SERVICE:  06/12/2019  PROVIDER:  JONELLE Farris      ____________________PATIENT EDUCATION____________________    PATIENT EDUCATION:  Today I met with the patient to discuss the chemotherapy regimen recommended for treatment of his disease.  The patient was given explanation of treatment premed side effects including office policy that prohibits patients to drive if sedating medications are administered, MD explanation given regarding benefits, side effects, toxicities and goals of treatment.  The patient received a Chemotherapy/Biotherapy Plan Summary including diagnosis and specific treatment plan.    SIDE EFFECTS:  Common side effects were discussed with the patient and daughter  Discussion included hair loss/discoloration, anemia/fatigue, infection/chills/fever, appetite, bleeding risk/precautions, constipation, diarrhea, mouth sores, taste alteration, loss of appetite,nausea/vomiting, peripheral neuropathy, skin/nail changes, rash, muscle aches/weakness, photosensitivity, weight gain/loss, high blood pressure, heart damage, liver damage, lung damage, kidney damage, DVT/PE risk, fluid retention, somnolence, electrolyte/LFT imbalance, vein exercises and/or the possible need for vascular access/port placement.  The patient was advice that although uncommon, leakage of an infused medication from the vein or venous access device (port) may lead to skin breakdown and/or other tissue damage.  The patient was advised that he/she may have pain, bleeding, and/or bruising from the insertion of a needle in their vein or venous access device (port).  The patient was further advised that, in spite of proper technique, infection with redness and irritation may rarely occur at the site where the needle was inserted.  The patient was advised that if complications occur, additional  medical treatment is available.    Discussion also included side effects specific to drugs in the treatment plan, specifically Taxol, Carboplatin.    Physical exam.  Patient is alert and oriented in no acute distress, breathing unlabored.  He does have a large right submental mass hard, nontender to palpation.  I did not measure this.    A total of 45 minutes were spent with the patient, with 100% of time spent in education and counseling.      Rafaela Lee, APRCAMERON  06/12/2019

## 2019-06-13 NOTE — PROGRESS NOTES
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                                 REQUESTING PHYSICIAN: Fritz Slater MD    RECORDS OBTAINED:  Records of the patients history including those obtained from the referring provider were reviewed and summarized in detail.    HISTORY OF PRESENT ILLNESS:  The patient is a 73 y.o. year old male who is here for follow up of the above mentioned issue and to initiate combined chemoradiation with carboplatin and Taxol weekly. He has undergone formal chemotherapy education and will have his port placed tomorrow for use with subsequent cycles of chemotherapy.    He is feeling well overall.  He remains on anticoagulation with Coumadin for history of atrial fibrillation with no complications.  This is managed by his cardiologist, Dr. Omer.  His appetite remains diminished, though he is attempting to consume small, frequent meals. Weight is stable.    His labs are adequate to begin treatment today.    He has no other concerns.    History of Present Illness     Past Medical History:   Diagnosis Date   • Arrhythmia    • Atrial fibrillation (CMS/HCC)    • Cancer (CMS/HCC) 05/2019    Neck, squamous   • CHF (congestive heart failure) (CMS/HCC)    • Chronic bronchitis (CMS/HCC)    • COPD (chronic obstructive pulmonary disease) (CMS/HCC)    • Coronary artery disease of native artery of native heart with stable angina pectoris (CMS/HCC)    • Cramps of lower extremity    • Daytime hypersomnia    • Depression    • Depression with anxiety    • IRAHETA (dyspnea on exertion)    • Drug therapy    • Dyspnea on exertion    • Emphysema of lung (CMS/HCC)    • Encounter for immunization     flu vaccine high dose PF 65+   • Enlarged prostate    • Fatigue    • Frequent nocturnal awakening    • Gout    • H/O cardiac radiofrequency ablation 2006    [facility].   • Hyperlipidemia    • Hypertension    • Insomnia    • Lumbar radicular pain    • MI  (myocardial infarction) (CMS/HCC)    • Non-rheumatic tricuspid valve insufficiency    • SHAR (obstructive sleep apnea)    • Osteoarthritis    • Precordial pain    • Pulmonary emphysema (CMS/HCC)    • Right leg pain     sciatica nerve pain   • Shortness of breath    • Snoring    • SVT (supraventricular tachycardia) (CMS/[facility])    • Syncope    • Vitamin D deficiency         Past Surgical History:   Procedure Laterality Date   • APPENDECTOMY     • CARDIAC ABLATION  2006    [facility].   • CARDIAC CATHETERIZATION N/A 8/29/2018    Procedure: Left Heart Cath;  Surgeon: Yousif Uribe MD;  Location:  [facility];  Service: Cardiology   • CARDIAC CATHETERIZATION N/A 8/29/2018    Procedure: Coronary angiography;  Surgeon: Yousif Uribe MD;  Location:  [facility];  Service: Cardiology   • CARDIAC CATHETERIZATION N/A 8/29/2018    Procedure: Left ventriculography;  Surgeon: Yousif Uribe MD;  Location:  [facility];  Service: Cardiology   • FINGER SURGERY     • FOOT SURGERY     • HAND SURGERY          Current Outpatient Medications on File Prior to Visit   Medication Sig Dispense Refill   • albuterol (PROVENTIL HFA;VENTOLIN HFA) 108 (90 BASE) MCG/ACT inhaler Inhale 2 puffs. ProAir  (90 Base) MCG/ACT Inhalation Aerosol Solution; Patient Sig: ProAir  (90 Base) MCG/ACT Inhalation Aerosol Solution inhale 2 puffs by mouth every 4 hours if needed; 8.5; 11; 07-Apr-2014; Act     • ALPRAZolam (XANAX) 2 MG tablet Take 1 tablet by mouth Daily for 60 days. 60 tablet 0   • amLODIPine (NORVASC) 5 MG tablet Take 2 tablets by mouth Daily. 180 tablet 3   • aspirin 81 MG EC tablet Take 81 mg by mouth Daily.     • azithromycin (ZITHROMAX Z-VIPIN) 250 MG tablet Take 2 tablets the first day, then 1 tablet daily for 4 days. 6 tablet 0   • baclofen (LIORESAL) 10 MG tablet take 1 tablet by mouth three times a day 90 tablet 1   • carvedilol (COREG) 25 MG tablet Take 1 tablet by  "mouth 2 (Two) Times a Day. 180 tablet 3   • furosemide (LASIX) 40 MG tablet Take 1 tablet by mouth 2 (Two) Times a Day. 180 tablet 0   • HYDROcodone-acetaminophen (NORCO) 7.5-325 MG per tablet Take 1 tablet by mouth 4 (Four) Times a Day As Needed for Moderate Pain  or Severe Pain . 120 tablet 0   • lisinopril (PRINIVIL,ZESTRIL) 20 MG tablet TAKE 1 TABLET BY MOUTH TWICE DAILY 180 tablet 1   • metoprolol tartrate (LOPRESSOR) 100 MG tablet TK 1 T PO Q 12 H  11   • montelukast (SINGULAIR) 10 MG tablet Take 1 tablet by mouth Daily. 90 tablet 2   • ondansetron ODT (ZOFRAN-ODT) 8 MG disintegrating tablet Take 1 tablet by mouth Every 8 (Eight) Hours As Needed for Nausea or Vomiting. 30 tablet 2   • potassium chloride (K-DUR,KLOR-CON) 20 MEQ CR tablet Take 1 tablet by mouth Daily. With food if taking furosemide (Patient taking differently: Take 20 mEq by mouth 2 (Two) Times a Day. With food if taking furosemide) 90 tablet 0   • rosuvastatin (CRESTOR) 20 MG tablet Take 20 mg by mouth Daily. 30 tablet 5   • sertraline (ZOLOFT) 50 MG tablet TAKE 1 TABLET BY MOUTH EVERY DAY 90 tablet 1   • warfarin (COUMADIN) 2 MG tablet TAKE 2 TABLETS BY MOUTH ONCE DAILY 180 tablet 0   • warfarin (COUMADIN) 2 MG tablet TAKE 2 TABLETS BY MOUTH ONCE DAILY 70 tablet 1     No current facility-administered medications on file prior to visit.         ALLERGIES:    Allergies   Allergen Reactions   • Benadryl [Diphenhydramine Hcl] Other (See Comments) and Dizziness     \"I sleep for about a day\"        Social History     Socioeconomic History   • Marital status:      Spouse name: Elise Rai   • Number of children: Not on file   • Years of education: Some College   • Highest education level: Not on file   Occupational History     Employer: RETIRED   Tobacco Use   • Smoking status: Former Smoker     Packs/day: 4.00     Types: Cigarettes     Last attempt to quit: 2000     Years since quittin.4   • Smokeless tobacco: Never Used   • Tobacco " comment: started age 11 x 53 years stopped 2000 / daily caffiene   Substance and Sexual Activity   • Alcohol use: No     Comment: caffeine use   • Drug use: No   • Sexual activity: Defer        Family History   Problem Relation Age of Onset   • Heart attack Mother    • Diabetes Mother    • Heart disease Mother    • Hypertension Mother    • Diabetes Father    • Heart failure Father    • Heart disease Father    • Hypertension Father    • Cancer Brother         colon   • Hypertension Brother    • Colon cancer Brother 50   • Diabetes Sister    • Heart disease Sister    • Hypertension Sister    • COPD Other    • Heart failure Other    • Heart disease Other         Review of Systems   Constitutional: Positive for appetite change and unexpected weight change. Negative for activity change, chills, fatigue and fever.   HENT: Negative for mouth sores, trouble swallowing and voice change.    Eyes: Negative for pain and visual disturbance.   Respiratory: Negative for cough, shortness of breath and wheezing.    Cardiovascular: Positive for leg swelling (stable ). Negative for chest pain and palpitations.   Gastrointestinal: Negative for abdominal pain, constipation, diarrhea, nausea and vomiting.   Genitourinary: Negative for difficulty urinating, frequency and urgency.   Musculoskeletal: Positive for back pain (stable ). Negative for arthralgias and joint swelling.   Skin: Negative for rash.   Neurological: Negative for dizziness, seizures, weakness and headaches.   Hematological: Negative for adenopathy. Does not bruise/bleed easily.   Psychiatric/Behavioral: Negative for behavioral problems and confusion. The patient is not nervous/anxious.         Objective     There were no vitals filed for this visit.  Current Status 6/7/2019   ECOG score 0       Physical Exam   Constitutional: He is oriented to person, place, and time. He appears well-developed and well-nourished. No distress.   HENT:   Head: Normocephalic.   Firm, 5 to 6  cm palpable mass at the angle of the mandible on the right   Eyes: Conjunctivae and EOM are normal. Pupils are equal, round, and reactive to light. No scleral icterus.   Neck: Normal range of motion. Neck supple. No JVD present. No thyromegaly present.   Cardiovascular: Normal rate. An irregularly irregular rhythm present. Exam reveals no gallop and no friction rub.   No murmur heard.  Pulmonary/Chest: Effort normal and breath sounds normal. He has no wheezes. He has no rales.   Abdominal: Soft. He exhibits no distension and no mass. There is no tenderness.   Musculoskeletal: Normal range of motion. He exhibits edema. He exhibits no deformity.   1-+ ankle edema bilaterally   Lymphadenopathy:     He has no cervical adenopathy.   Neurological: He is alert and oriented to person, place, and time. He has normal reflexes. No cranial nerve deficit.   Skin: Skin is warm and dry. No rash noted. No erythema.   Purpura on the extremities   Psychiatric: He has a normal mood and affect. His behavior is normal. Judgment normal.         RECENT LABS:  Hematology WBC   Date Value Ref Range Status   06/07/2019 6.14 3.40 - 10.80 10*3/mm3 Final     RBC   Date Value Ref Range Status   06/07/2019 5.15 4.14 - 5.80 10*6/mm3 Final     Hemoglobin   Date Value Ref Range Status   06/07/2019 15.8 13.0 - 17.7 g/dL Final     Hematocrit   Date Value Ref Range Status   06/07/2019 47.6 37.5 - 51.0 % Final     Platelets   Date Value Ref Range Status   06/07/2019 226 140 - 450 10*3/mm3 Final        Lab Results   Component Value Date    GLUCOSE 101 (H) 10/28/2018    BUN 14 10/28/2018    CREATININE 0.98 10/28/2018    EGFRIFNONA 75 10/28/2018    EGFRIFAFRI 107 04/23/2018    BCR 14.3 10/28/2018    K 3.9 10/28/2018    CO2 24.9 10/28/2018    CALCIUM 9.0 10/28/2018    PROTENTOTREF 7.2 04/23/2018    ALBUMIN 3.70 10/28/2018    LABIL2 1.3 04/23/2018    AST 25 10/28/2018    ALT 18 10/28/2018       PATHOLOGY 5/15/2019  Final Diagnosis   1.  Right Neck, FNA:                   A.  Positive for squamous cell carcinoma.     Flow Cytometry Report, Addendum   Utilizing appropriate positive and negative controls immunohistochemical stain p16 is performed on cell block material.  The tumor is positive for p16 by immunohistochemical staining which is a surrogate for HPV infection.       F-18 FDG PET FROM SKULL BASE TO MID THIGH WITH PET/CT FUSION 5/23/2019  IMPRESSION:  1.  Asymmetric intensely FDG avid masslike soft tissue thickening within  the right base of the tongue, likely representing malignancy. There is a  large intensely FDG avid soft tissue mass centered over the right  carotid space likely representing metastatic disease.  2.  Intense asymmetric FDG uptake overlying the left cricoid cartilage  with associated asymmetric mucosal thickening within the inferior aspect  of the left pyriform sinus. Findings are concerning for malignancy and  further evaluation with endoscopy is recommended.  3.  Sub-6 mm noncalcified pulmonary nodule within the right middle lobe  which was not present on 10/02/2017. The nodule is below PET resolution  and findings are nonspecific. Continued close attention on follow up is  recommended to  exclude metastatic disease.  4.  Mild-to-moderately FDG avid subcutaneous nodule within the left  upper back measuring up to 1.1 cm, findings are nonspecific and further  evaluation with physical exam is recommended.  5.  No findings of FDG avid malignancy within the abdomen or pelvis.    Assessment/Plan     1.  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  Patient is here to initiate combined chemoradiation with carboplatin and Taxol weekly.  His appetite remains reduced secondary to dysphagia, however his weight is stable.   Otherwise, he is doing fairly well and is ready to proceed with treatment today.  We will also initiate his first fraction of radiation today under  the direction of Dr. Davila.  He will return in 1 week for consideration of cycle #2 of carboplatin and Taxol with MD visit with Dr. Heaton prior.  Patient will undergo port placement tomorrow for use with subsequent cycles of therapy.    I have again reviewed possible side effects which patient should call our office including uncontrolled nausea, vomiting, diarrhea, and constipation.  He also knows that he should call for a fever greater than 100.5.    -Of note, the patient has had a discussion with Dr. Heaton regarding his prognosis.  He does understand that treatment is potentially curative in nature, but there is certainly no guarantee of this.  He understands that given his HPV positive squamous cell status, this typically provides better outcomes.    2.  Comorbidities including ischemic heart disease with history of CHF and atrial fibrillation requiring Coumadin anticoagulation.  Anticoagulation managed by Dr. Omer.  He has a follow-up with her on June 20, 2019.

## 2019-06-14 PROCEDURE — 77470 SPECIAL RADIATION TREATMENT: CPT | Performed by: RADIOLOGY

## 2019-06-17 ENCOUNTER — APPOINTMENT (OUTPATIENT)
Dept: ONCOLOGY | Facility: HOSPITAL | Age: 74
End: 2019-06-17

## 2019-06-17 ENCOUNTER — INFUSION (OUTPATIENT)
Dept: ONCOLOGY | Facility: HOSPITAL | Age: 74
End: 2019-06-17

## 2019-06-17 ENCOUNTER — DOCUMENTATION (OUTPATIENT)
Dept: NUTRITION | Facility: HOSPITAL | Age: 74
End: 2019-06-17

## 2019-06-17 ENCOUNTER — APPOINTMENT (OUTPATIENT)
Dept: ONCOLOGY | Facility: CLINIC | Age: 74
End: 2019-06-17

## 2019-06-17 ENCOUNTER — ANESTHESIA EVENT (OUTPATIENT)
Dept: PERIOP | Facility: HOSPITAL | Age: 74
End: 2019-06-17

## 2019-06-17 ENCOUNTER — OFFICE VISIT (OUTPATIENT)
Dept: ONCOLOGY | Facility: CLINIC | Age: 74
End: 2019-06-17

## 2019-06-17 VITALS — SYSTOLIC BLOOD PRESSURE: 144 MMHG | RESPIRATION RATE: 24 BRPM | DIASTOLIC BLOOD PRESSURE: 84 MMHG | HEART RATE: 72 BPM

## 2019-06-17 VITALS
HEIGHT: 72 IN | RESPIRATION RATE: 16 BRPM | TEMPERATURE: 97.7 F | SYSTOLIC BLOOD PRESSURE: 161 MMHG | HEART RATE: 88 BPM | OXYGEN SATURATION: 96 % | BODY MASS INDEX: 24.87 KG/M2 | DIASTOLIC BLOOD PRESSURE: 98 MMHG | WEIGHT: 183.6 LBS

## 2019-06-17 DIAGNOSIS — C01 SQUAMOUS CELL CARCINOMA OF BASE OF TONGUE (HCC): ICD-10-CM

## 2019-06-17 DIAGNOSIS — C01 SQUAMOUS CELL CARCINOMA OF BASE OF TONGUE (HCC): Primary | ICD-10-CM

## 2019-06-17 LAB
ALBUMIN SERPL-MCNC: 4.2 G/DL (ref 3.5–5.2)
ALBUMIN/GLOB SERPL: 1.2 G/DL (ref 1.1–2.4)
ALP SERPL-CCNC: 68 U/L (ref 38–116)
ALT SERPL W P-5'-P-CCNC: 16 U/L (ref 0–41)
ANION GAP SERPL CALCULATED.3IONS-SCNC: 9.4 MMOL/L
AST SERPL-CCNC: 20 U/L (ref 0–40)
BASOPHILS # BLD AUTO: 0.06 10*3/MM3 (ref 0–0.2)
BASOPHILS NFR BLD AUTO: 1.1 % (ref 0–1.5)
BILIRUB SERPL-MCNC: 0.6 MG/DL (ref 0.2–1.2)
BUN BLD-MCNC: 12 MG/DL (ref 6–20)
BUN/CREAT SERPL: 15.4 (ref 7.3–30)
CALCIUM SPEC-SCNC: 9.1 MG/DL (ref 8.5–10.2)
CHLORIDE SERPL-SCNC: 104 MMOL/L (ref 98–107)
CO2 SERPL-SCNC: 26.6 MMOL/L (ref 22–29)
CREAT BLD-MCNC: 0.78 MG/DL (ref 0.7–1.3)
DEPRECATED RDW RBC AUTO: 43.4 FL (ref 37–54)
EOSINOPHIL # BLD AUTO: 0.18 10*3/MM3 (ref 0–0.4)
EOSINOPHIL NFR BLD AUTO: 3.3 % (ref 0.3–6.2)
ERYTHROCYTE [DISTWIDTH] IN BLOOD BY AUTOMATED COUNT: 12.6 % (ref 12.3–15.4)
GFR SERPL CREATININE-BSD FRML MDRD: 97 ML/MIN/1.73
GLOBULIN UR ELPH-MCNC: 3.5 GM/DL (ref 1.8–3.5)
GLUCOSE BLD-MCNC: 116 MG/DL (ref 74–124)
HCT VFR BLD AUTO: 46.2 % (ref 37.5–51)
HGB BLD-MCNC: 15.4 G/DL (ref 13–17.7)
IMM GRANULOCYTES # BLD AUTO: 0.01 10*3/MM3 (ref 0–0.05)
IMM GRANULOCYTES NFR BLD AUTO: 0.2 % (ref 0–0.5)
LYMPHOCYTES # BLD AUTO: 1.55 10*3/MM3 (ref 0.7–3.1)
LYMPHOCYTES NFR BLD AUTO: 28.8 % (ref 19.6–45.3)
MCH RBC QN AUTO: 31 PG (ref 26.6–33)
MCHC RBC AUTO-ENTMCNC: 33.3 G/DL (ref 31.5–35.7)
MCV RBC AUTO: 93 FL (ref 79–97)
MONOCYTES # BLD AUTO: 0.64 10*3/MM3 (ref 0.1–0.9)
MONOCYTES NFR BLD AUTO: 11.9 % (ref 5–12)
NEUTROPHILS # BLD AUTO: 2.94 10*3/MM3 (ref 1.7–7)
NEUTROPHILS NFR BLD AUTO: 54.7 % (ref 42.7–76)
NRBC BLD AUTO-RTO: 0 /100 WBC (ref 0–0.2)
PLATELET # BLD AUTO: 201 10*3/MM3 (ref 140–450)
PMV BLD AUTO: 10.3 FL (ref 6–12)
POTASSIUM BLD-SCNC: 4.1 MMOL/L (ref 3.5–4.7)
PROT SERPL-MCNC: 7.7 G/DL (ref 6.3–8)
RBC # BLD AUTO: 4.97 10*6/MM3 (ref 4.14–5.8)
SODIUM BLD-SCNC: 140 MMOL/L (ref 134–145)
WBC NRBC COR # BLD: 5.38 10*3/MM3 (ref 3.4–10.8)

## 2019-06-17 PROCEDURE — 25010000002 PALONOSETRON PER 25 MCG: Performed by: INTERNAL MEDICINE

## 2019-06-17 PROCEDURE — 80053 COMPREHEN METABOLIC PANEL: CPT

## 2019-06-17 PROCEDURE — 77301 RADIOTHERAPY DOSE PLAN IMRT: CPT | Performed by: RADIOLOGY

## 2019-06-17 PROCEDURE — 77300 RADIATION THERAPY DOSE PLAN: CPT | Performed by: RADIOLOGY

## 2019-06-17 PROCEDURE — 96375 TX/PRO/DX INJ NEW DRUG ADDON: CPT | Performed by: INTERNAL MEDICINE

## 2019-06-17 PROCEDURE — 96417 CHEMO IV INFUS EACH ADDL SEQ: CPT | Performed by: INTERNAL MEDICINE

## 2019-06-17 PROCEDURE — 25010000002 DIPHENHYDRAMINE PER 50 MG: Performed by: INTERNAL MEDICINE

## 2019-06-17 PROCEDURE — 77338 DESIGN MLC DEVICE FOR IMRT: CPT | Performed by: RADIOLOGY

## 2019-06-17 PROCEDURE — 25010000002 CARBOPLATIN PER 50 MG: Performed by: INTERNAL MEDICINE

## 2019-06-17 PROCEDURE — 85025 COMPLETE CBC W/AUTO DIFF WBC: CPT

## 2019-06-17 PROCEDURE — 25010000002 DEXAMETHASONE SODIUM PHOSPHATE 100 MG/10ML SOLUTION: Performed by: INTERNAL MEDICINE

## 2019-06-17 PROCEDURE — 25010000002 PACLITAXEL PER 30 MG: Performed by: INTERNAL MEDICINE

## 2019-06-17 PROCEDURE — 96413 CHEMO IV INFUSION 1 HR: CPT | Performed by: INTERNAL MEDICINE

## 2019-06-17 PROCEDURE — 99213 OFFICE O/P EST LOW 20 MIN: CPT | Performed by: NURSE PRACTITIONER

## 2019-06-17 RX ORDER — DIPHENHYDRAMINE HYDROCHLORIDE 50 MG/ML
50 INJECTION INTRAMUSCULAR; INTRAVENOUS AS NEEDED
Status: CANCELLED | OUTPATIENT
Start: 2019-07-01

## 2019-06-17 RX ORDER — SODIUM CHLORIDE 9 MG/ML
250 INJECTION, SOLUTION INTRAVENOUS ONCE
Status: COMPLETED | OUTPATIENT
Start: 2019-06-17 | End: 2019-06-17

## 2019-06-17 RX ORDER — DIPHENHYDRAMINE HYDROCHLORIDE 50 MG/ML
50 INJECTION INTRAMUSCULAR; INTRAVENOUS AS NEEDED
Status: CANCELLED | OUTPATIENT
Start: 2019-06-24

## 2019-06-17 RX ORDER — PALONOSETRON 0.05 MG/ML
0.25 INJECTION, SOLUTION INTRAVENOUS ONCE
Status: COMPLETED | OUTPATIENT
Start: 2019-06-17 | End: 2019-06-17

## 2019-06-17 RX ORDER — PALONOSETRON 0.05 MG/ML
0.25 INJECTION, SOLUTION INTRAVENOUS ONCE
Status: CANCELLED | OUTPATIENT
Start: 2019-06-24

## 2019-06-17 RX ORDER — SODIUM CHLORIDE 9 MG/ML
250 INJECTION, SOLUTION INTRAVENOUS ONCE
Status: CANCELLED | OUTPATIENT
Start: 2019-07-01

## 2019-06-17 RX ORDER — DIPHENHYDRAMINE HYDROCHLORIDE 50 MG/ML
50 INJECTION INTRAMUSCULAR; INTRAVENOUS AS NEEDED
Status: CANCELLED | OUTPATIENT
Start: 2019-06-17

## 2019-06-17 RX ORDER — FAMOTIDINE 10 MG/ML
20 INJECTION, SOLUTION INTRAVENOUS ONCE
Status: CANCELLED | OUTPATIENT
Start: 2019-07-01

## 2019-06-17 RX ORDER — SODIUM CHLORIDE 9 MG/ML
250 INJECTION, SOLUTION INTRAVENOUS ONCE
Status: CANCELLED | OUTPATIENT
Start: 2019-06-17

## 2019-06-17 RX ORDER — FAMOTIDINE 10 MG/ML
20 INJECTION, SOLUTION INTRAVENOUS ONCE
Status: CANCELLED | OUTPATIENT
Start: 2019-06-17

## 2019-06-17 RX ORDER — PALONOSETRON 0.05 MG/ML
0.25 INJECTION, SOLUTION INTRAVENOUS ONCE
Status: CANCELLED | OUTPATIENT
Start: 2019-07-01

## 2019-06-17 RX ORDER — SODIUM CHLORIDE 9 MG/ML
250 INJECTION, SOLUTION INTRAVENOUS ONCE
Status: CANCELLED | OUTPATIENT
Start: 2019-06-24

## 2019-06-17 RX ORDER — FAMOTIDINE 10 MG/ML
20 INJECTION, SOLUTION INTRAVENOUS AS NEEDED
Status: CANCELLED | OUTPATIENT
Start: 2019-06-24

## 2019-06-17 RX ORDER — FAMOTIDINE 10 MG/ML
20 INJECTION, SOLUTION INTRAVENOUS AS NEEDED
Status: CANCELLED | OUTPATIENT
Start: 2019-07-01

## 2019-06-17 RX ORDER — FAMOTIDINE 10 MG/ML
20 INJECTION, SOLUTION INTRAVENOUS ONCE
Status: CANCELLED | OUTPATIENT
Start: 2019-06-24

## 2019-06-17 RX ORDER — PALONOSETRON 0.05 MG/ML
0.25 INJECTION, SOLUTION INTRAVENOUS ONCE
Status: CANCELLED | OUTPATIENT
Start: 2019-06-17

## 2019-06-17 RX ORDER — FAMOTIDINE 10 MG/ML
20 INJECTION, SOLUTION INTRAVENOUS ONCE
Status: COMPLETED | OUTPATIENT
Start: 2019-06-17 | End: 2019-06-17

## 2019-06-17 RX ORDER — FAMOTIDINE 10 MG/ML
20 INJECTION, SOLUTION INTRAVENOUS AS NEEDED
Status: CANCELLED | OUTPATIENT
Start: 2019-06-17

## 2019-06-17 RX ADMIN — PALONOSETRON HYDROCHLORIDE 0.25 MG: 0.25 INJECTION, SOLUTION INTRAVENOUS at 10:02

## 2019-06-17 RX ADMIN — DEXAMETHASONE SODIUM PHOSPHATE 12 MG: 10 INJECTION, SOLUTION INTRAMUSCULAR; INTRAVENOUS at 10:06

## 2019-06-17 RX ADMIN — DIPHENHYDRAMINE HYDROCHLORIDE 12.5 MG: 50 INJECTION INTRAMUSCULAR; INTRAVENOUS at 10:31

## 2019-06-17 RX ADMIN — PACLITAXEL 105 MG: 6 INJECTION, SOLUTION INTRAVENOUS at 11:36

## 2019-06-17 RX ADMIN — CARBOPLATIN 240 MG: 10 INJECTION, SOLUTION INTRAVENOUS at 12:43

## 2019-06-17 RX ADMIN — SODIUM CHLORIDE 250 ML: 9 INJECTION, SOLUTION INTRAVENOUS at 10:02

## 2019-06-17 RX ADMIN — FAMOTIDINE 20 MG: 10 INJECTION, SOLUTION INTRAVENOUS at 10:04

## 2019-06-17 NOTE — PROGRESS NOTES
Reviewed chemo drugs and possible side effects for Taxol and Carbo with patient and spouse. Verbalized understanding. Written consent confirmed prior to starting meds. RN with patient during first 10 min. of Taxol infusion.  Call bell within reach. Instructed to call for any signs of reaction. Also instructed to call for any problems at home. Verbalized understanding.

## 2019-06-17 NOTE — PROGRESS NOTES
Oncology Nutrition Screening    Patient Name:  Cody Eldridge  YOB: 1945  MRN: [id number]  Date:  06/17/19  Physician:  Lola Heaton    Type of Cancer Treatment:   Radiation/Cyberknife: Number of Treatments:  Treatment over 7 weeks  Chemotherapy:  Type:Carbo/taxol Treatment Schedule: weekly    Patient Active Problem List   Diagnosis   • Atopic rhinitis   • Arthritis of hand   • Coxitis   • Benign colonic polyp   • Neck pain   • Chronic obstructive pulmonary disease (CMS/HCC)   • Mixed anxiety depressive disorder   • Degeneration of intervertebral disc of lumbosacral region   • Elevated prostate specific antigen (PSA)   • Hyperlipidemia   • Hypertension   • Insomnia   • Lumbar radiculopathy   • Osteoarthritis   • Vitamin D deficiency   • Abdominal pain   • Acute URI   • Myalgia   • Cramp of both lower extremities   • Gingivitis   • Atrial fibrillation (CMS/HCC)   • Non-rheumatic tricuspid valve insufficiency   • SHAR (obstructive sleep apnea)   • Precordial pain   • IRAHETA (dyspnea on exertion)   • Coronary artery disease of native artery of native heart with stable angina pectoris (CMS/HCC)   • Shortness of breath   • 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)       Current Outpatient Medications   Medication Sig Dispense Refill   • albuterol (PROVENTIL HFA;VENTOLIN HFA) 108 (90 BASE) MCG/ACT inhaler Inhale 2 puffs. ProAir  (90 Base) MCG/ACT Inhalation Aerosol Solution; Patient Sig: ProAir  (90 Base) MCG/ACT Inhalation Aerosol Solution inhale 2 puffs by mouth every 4 hours if needed; 8.5; 11; 07-Apr-2014; Act     • ALPRAZolam (XANAX) 2 MG tablet Take 1 tablet by mouth Daily for 60 days. 60 tablet 0   • amLODIPine (NORVASC) 5 MG tablet Take 2 tablets by mouth Daily. 180 tablet 3   • aspirin 81 MG EC tablet Take 81 mg by mouth Daily.     • azithromycin (ZITHROMAX Z-VIPIN) 250 MG tablet Take 2 tablets the first day, then 1 tablet daily for 4 days. 6 tablet 0   • baclofen (LIORESAL) 10  "MG tablet take 1 tablet by mouth three times a day 90 tablet 1   • carvedilol (COREG) 25 MG tablet Take 1 tablet by mouth 2 (Two) Times a Day. 180 tablet 3   • furosemide (LASIX) 40 MG tablet Take 1 tablet by mouth 2 (Two) Times a Day. 180 tablet 0   • HYDROcodone-acetaminophen (NORCO) 7.5-325 MG per tablet Take 1 tablet by mouth 4 (Four) Times a Day As Needed for Moderate Pain  or Severe Pain . 120 tablet 0   • lisinopril (PRINIVIL,ZESTRIL) 20 MG tablet TAKE 1 TABLET BY MOUTH TWICE DAILY 180 tablet 1   • metoprolol tartrate (LOPRESSOR) 100 MG tablet TK 1 T PO Q 12 H  11   • montelukast (SINGULAIR) 10 MG tablet Take 1 tablet by mouth Daily. 90 tablet 2   • ondansetron ODT (ZOFRAN-ODT) 8 MG disintegrating tablet Take 1 tablet by mouth Every 8 (Eight) Hours As Needed for Nausea or Vomiting. 30 tablet 2   • potassium chloride (K-DUR,KLOR-CON) 20 MEQ CR tablet Take 1 tablet by mouth Daily. With food if taking furosemide (Patient taking differently: Take 20 mEq by mouth 2 (Two) Times a Day. With food if taking furosemide) 90 tablet 0   • rosuvastatin (CRESTOR) 20 MG tablet Take 20 mg by mouth Daily. 30 tablet 5   • sertraline (ZOLOFT) 50 MG tablet TAKE 1 TABLET BY MOUTH EVERY DAY 90 tablet 1   • warfarin (COUMADIN) 2 MG tablet TAKE 2 TABLETS BY MOUTH ONCE DAILY 180 tablet 0   • warfarin (COUMADIN) 2 MG tablet TAKE 2 TABLETS BY MOUTH ONCE DAILY 70 tablet 1     No current facility-administered medications for this visit.      Facility-Administered Medications Ordered in Other Visits   Medication Dose Route Frequency Provider Last Rate Last Dose   • CARBOplatin (PARAPLATIN) 240 mg in sodium chloride 0.9 % 124 mL chemo IVPB  240 mg Intravenous Once Pablo Heaton MD       • PACLitaxel (TAXOL) 105 mg in sodium chloride 0.9 % 267.5 mL chemo IVPB  50 mg/m2 (Treatment Plan Recorded) Intravenous Once Pablo Heaton  mL/hr at 06/17/19 1136 105 mg at 06/17/19 1136       Glycemic Risk:   Steriods    Weight:   Height: 72\" "  Weight: 182 lbs.  Usual Body Weight: 200 lbs.   BMI: 24.9  Weight has decreased 20 pounds over last 6 months    Oral Food Intake:  Regular Diet - No Restrictions  Compared to normal intake, current food intake is less than normal    Hydration Status:   How many 8 ounce glass of water of fluid do you drink per day?  6    Enteral Feeding:   n/a-- patient is open to having feeding tube placed if needed    Nutrition Symptoms:   Altered Apetite  Problems Chewing/Swallowing    Activity:   Normal with no limitations     reports that he quit smoking about 19 years ago. His smoking use included cigarettes. He smoked 4.00 packs per day. He has never used smokeless tobacco. He reports that he does not drink alcohol or use drugs.    Evaluation of Nutritional Risk:   Patient identified to be at nutritional risk and/or for nutritional consultation; will follow patient through course of treatment.   Diagnosis  Weight Change  Nutrition Impact Symptoms  Patient Education     Met patient and his wife in chemo infusion area today. He had lost about 20# over 6 months due to decreased appetite and taste changes. Today I discussed nutrition during chemoradiation.  I provided him with a handout for patient undergoing treatment for head and neck cancer. He is having a port placed tomorrow. He says that he is open to having a feeding tube placed if he needs one. Explained that I would be following him throughout his treatments.   Needs:  [id number] calories based on 30 cals/kg, 100g protein, 2400cc fluid.    Will follow throughout course of treatments.         Electronically signed by:  Ruthy Bautista RD, LD  12:34 PM

## 2019-06-18 ENCOUNTER — TELEPHONE (OUTPATIENT)
Dept: ONCOLOGY | Facility: HOSPITAL | Age: 74
End: 2019-06-18

## 2019-06-18 ENCOUNTER — APPOINTMENT (OUTPATIENT)
Dept: GENERAL RADIOLOGY | Facility: HOSPITAL | Age: 74
End: 2019-06-18

## 2019-06-18 ENCOUNTER — ANESTHESIA (OUTPATIENT)
Dept: PERIOP | Facility: HOSPITAL | Age: 74
End: 2019-06-18

## 2019-06-18 ENCOUNTER — HOSPITAL ENCOUNTER (OUTPATIENT)
Facility: HOSPITAL | Age: 74
Setting detail: HOSPITAL OUTPATIENT SURGERY
Discharge: HOME | End: 2019-06-18
Attending: SURGERY | Admitting: SURGERY

## 2019-06-18 VITALS
RESPIRATION RATE: 18 BRPM | HEART RATE: 91 BPM | DIASTOLIC BLOOD PRESSURE: 81 MMHG | BODY MASS INDEX: 24.75 KG/M2 | WEIGHT: 182.54 LBS | SYSTOLIC BLOOD PRESSURE: 141 MMHG | TEMPERATURE: 98.5 F | OXYGEN SATURATION: 94 %

## 2019-06-18 LAB
INR PPP: 1.13 (ref 0.9–1.1)
PROTHROMBIN TIME: 14.2 SECONDS (ref 11.7–14.2)

## 2019-06-18 PROCEDURE — 25010000002 FENTANYL CITRATE (PF) 100 MCG/2ML SOLUTION: Performed by: ANESTHESIOLOGY

## 2019-06-18 PROCEDURE — C1894 INTRO/SHEATH, NON-LASER: HCPCS | Performed by: SURGERY

## 2019-06-18 PROCEDURE — 25010000002 MIDAZOLAM PER 1 MG: Performed by: NURSE ANESTHETIST, CERTIFIED REGISTERED

## 2019-06-18 PROCEDURE — 85610 PROTHROMBIN TIME: CPT | Performed by: SURGERY

## 2019-06-18 PROCEDURE — 25010000003 CEFAZOLIN IN DEXTROSE 2-4 GM/100ML-% SOLUTION: Performed by: SURGERY

## 2019-06-18 PROCEDURE — 76000 FLUOROSCOPY <1 HR PHYS/QHP: CPT

## 2019-06-18 PROCEDURE — 25010000002 FENTANYL CITRATE (PF) 100 MCG/2ML SOLUTION: Performed by: NURSE ANESTHETIST, CERTIFIED REGISTERED

## 2019-06-18 PROCEDURE — C1788 PORT, INDWELLING, IMP: HCPCS | Performed by: SURGERY

## 2019-06-18 PROCEDURE — 25010000002 HEPARIN (PORCINE) PER 1000 UNITS: Performed by: SURGERY

## 2019-06-18 DEVICE — POWERPORT ISP M.R.I. IMPLANTABLE PORT WITH ATTACHABLE 8F CHRONOFLEX OPEN-ENDED SINGLE-LUMEN VENOUS CATHETER INTERMEDIATE KIT (WITH SUTURE PLUGS)
Type: IMPLANTABLE DEVICE | Site: CHEST | Status: FUNCTIONAL
Brand: POWERPORT M.R.I., CHRONOFLEX

## 2019-06-18 RX ORDER — HYDROMORPHONE HYDROCHLORIDE 1 MG/ML
0.5 INJECTION, SOLUTION INTRAMUSCULAR; INTRAVENOUS; SUBCUTANEOUS
Status: DISCONTINUED | OUTPATIENT
Start: 2019-06-18 | End: 2019-06-18 | Stop reason: HOSPADM

## 2019-06-18 RX ORDER — OXYCODONE AND ACETAMINOPHEN 7.5; 325 MG/1; MG/1
1 TABLET ORAL ONCE AS NEEDED
Status: DISCONTINUED | OUTPATIENT
Start: 2019-06-18 | End: 2019-06-18 | Stop reason: HOSPADM

## 2019-06-18 RX ORDER — HYDROCODONE BITARTRATE AND ACETAMINOPHEN 5; 325 MG/1; MG/1
1-2 TABLET ORAL EVERY 4 HOURS PRN
Qty: 20 TABLET | Refills: 0 | Status: SHIPPED | OUTPATIENT
Start: 2019-06-18 | End: 2019-07-08 | Stop reason: DRUGHIGH

## 2019-06-18 RX ORDER — PROMETHAZINE HYDROCHLORIDE 25 MG/ML
6.25 INJECTION, SOLUTION INTRAMUSCULAR; INTRAVENOUS
Status: DISCONTINUED | OUTPATIENT
Start: 2019-06-18 | End: 2019-06-18 | Stop reason: HOSPADM

## 2019-06-18 RX ORDER — SODIUM CHLORIDE 0.9 % (FLUSH) 0.9 %
1-10 SYRINGE (ML) INJECTION AS NEEDED
Status: DISCONTINUED | OUTPATIENT
Start: 2019-06-18 | End: 2019-06-18 | Stop reason: HOSPADM

## 2019-06-18 RX ORDER — LABETALOL HYDROCHLORIDE 5 MG/ML
5 INJECTION, SOLUTION INTRAVENOUS
Status: DISCONTINUED | OUTPATIENT
Start: 2019-06-18 | End: 2019-06-18 | Stop reason: HOSPADM

## 2019-06-18 RX ORDER — LIDOCAINE HYDROCHLORIDE 10 MG/ML
0.5 INJECTION, SOLUTION EPIDURAL; INFILTRATION; INTRACAUDAL; PERINEURAL ONCE AS NEEDED
Status: DISCONTINUED | OUTPATIENT
Start: 2019-06-18 | End: 2019-06-18 | Stop reason: HOSPADM

## 2019-06-18 RX ORDER — PROMETHAZINE HYDROCHLORIDE 25 MG/1
25 SUPPOSITORY RECTAL ONCE AS NEEDED
Status: DISCONTINUED | OUTPATIENT
Start: 2019-06-18 | End: 2019-06-18 | Stop reason: HOSPADM

## 2019-06-18 RX ORDER — ACETAMINOPHEN 325 MG/1
650 TABLET ORAL ONCE AS NEEDED
Status: DISCONTINUED | OUTPATIENT
Start: 2019-06-18 | End: 2019-06-18 | Stop reason: HOSPADM

## 2019-06-18 RX ORDER — HEPARIN SODIUM 1000 [USP'U]/ML
INJECTION, SOLUTION INTRAVENOUS; SUBCUTANEOUS AS NEEDED
Status: DISCONTINUED | OUTPATIENT
Start: 2019-06-18 | End: 2019-06-18 | Stop reason: HOSPADM

## 2019-06-18 RX ORDER — NALOXONE HCL 0.4 MG/ML
0.2 VIAL (ML) INJECTION AS NEEDED
Status: DISCONTINUED | OUTPATIENT
Start: 2019-06-18 | End: 2019-06-18 | Stop reason: HOSPADM

## 2019-06-18 RX ORDER — PROMETHAZINE HYDROCHLORIDE 25 MG/1
25 TABLET ORAL ONCE AS NEEDED
Status: DISCONTINUED | OUTPATIENT
Start: 2019-06-18 | End: 2019-06-18 | Stop reason: HOSPADM

## 2019-06-18 RX ORDER — SODIUM CHLORIDE, SODIUM LACTATE, POTASSIUM CHLORIDE, CALCIUM CHLORIDE 600; 310; 30; 20 MG/100ML; MG/100ML; MG/100ML; MG/100ML
9 INJECTION, SOLUTION INTRAVENOUS CONTINUOUS
Status: DISCONTINUED | OUTPATIENT
Start: 2019-06-18 | End: 2019-06-18 | Stop reason: HOSPADM

## 2019-06-18 RX ORDER — CEFAZOLIN SODIUM 2 G/100ML
2 INJECTION, SOLUTION INTRAVENOUS ONCE
Status: COMPLETED | OUTPATIENT
Start: 2019-06-18 | End: 2019-06-18

## 2019-06-18 RX ORDER — HYDROCODONE BITARTRATE AND ACETAMINOPHEN 7.5; 325 MG/1; MG/1
1 TABLET ORAL ONCE AS NEEDED
Status: DISCONTINUED | OUTPATIENT
Start: 2019-06-18 | End: 2019-06-18 | Stop reason: HOSPADM

## 2019-06-18 RX ORDER — FLUMAZENIL 0.1 MG/ML
0.2 INJECTION INTRAVENOUS AS NEEDED
Status: DISCONTINUED | OUTPATIENT
Start: 2019-06-18 | End: 2019-06-18 | Stop reason: HOSPADM

## 2019-06-18 RX ORDER — HYDRALAZINE HYDROCHLORIDE 20 MG/ML
5 INJECTION INTRAMUSCULAR; INTRAVENOUS
Status: DISCONTINUED | OUTPATIENT
Start: 2019-06-18 | End: 2019-06-18 | Stop reason: HOSPADM

## 2019-06-18 RX ORDER — FAMOTIDINE 10 MG/ML
20 INJECTION, SOLUTION INTRAVENOUS ONCE
Status: COMPLETED | OUTPATIENT
Start: 2019-06-18 | End: 2019-06-18

## 2019-06-18 RX ORDER — EPHEDRINE SULFATE 50 MG/ML
5 INJECTION, SOLUTION INTRAVENOUS ONCE AS NEEDED
Status: DISCONTINUED | OUTPATIENT
Start: 2019-06-18 | End: 2019-06-18 | Stop reason: HOSPADM

## 2019-06-18 RX ORDER — MIDAZOLAM HYDROCHLORIDE 1 MG/ML
INJECTION INTRAMUSCULAR; INTRAVENOUS AS NEEDED
Status: DISCONTINUED | OUTPATIENT
Start: 2019-06-18 | End: 2019-06-18 | Stop reason: SURG

## 2019-06-18 RX ORDER — FENTANYL CITRATE 50 UG/ML
50 INJECTION, SOLUTION INTRAMUSCULAR; INTRAVENOUS
Status: DISCONTINUED | OUTPATIENT
Start: 2019-06-18 | End: 2019-06-18 | Stop reason: HOSPADM

## 2019-06-18 RX ORDER — ONDANSETRON 2 MG/ML
4 INJECTION INTRAMUSCULAR; INTRAVENOUS ONCE AS NEEDED
Status: DISCONTINUED | OUTPATIENT
Start: 2019-06-18 | End: 2019-06-18 | Stop reason: HOSPADM

## 2019-06-18 RX ORDER — PROMETHAZINE HYDROCHLORIDE 25 MG/ML
12.5 INJECTION, SOLUTION INTRAMUSCULAR; INTRAVENOUS ONCE AS NEEDED
Status: DISCONTINUED | OUTPATIENT
Start: 2019-06-18 | End: 2019-06-18 | Stop reason: HOSPADM

## 2019-06-18 RX ADMIN — FAMOTIDINE 20 MG: 10 INJECTION INTRAVENOUS at 12:26

## 2019-06-18 RX ADMIN — FENTANYL CITRATE 50 MCG: 50 INJECTION INTRAMUSCULAR; INTRAVENOUS at 15:22

## 2019-06-18 RX ADMIN — Medication 2 MG: at 15:05

## 2019-06-18 RX ADMIN — FENTANYL CITRATE 50 MCG: 50 INJECTION INTRAMUSCULAR; INTRAVENOUS at 15:14

## 2019-06-18 RX ADMIN — Medication 1 MG: at 15:22

## 2019-06-18 RX ADMIN — CEFAZOLIN SODIUM 2 G: 2 INJECTION, SOLUTION INTRAVENOUS at 15:10

## 2019-06-18 RX ADMIN — SODIUM CHLORIDE, POTASSIUM CHLORIDE, SODIUM LACTATE AND CALCIUM CHLORIDE 9 ML/HR: 600; 310; 30; 20 INJECTION, SOLUTION INTRAVENOUS at 12:26

## 2019-06-18 RX ADMIN — Medication 1 MG: at 15:35

## 2019-06-18 RX ADMIN — FENTANYL CITRATE 50 MCG: 50 INJECTION INTRAMUSCULAR; INTRAVENOUS at 16:19

## 2019-06-18 NOTE — DISCHARGE INSTRUCTIONS
[facility]  Josep Holland, Giovanny Grigsby Rachel, Scherrer, Thomas  [address]  [place] [address]  [phone]  Discharge Instructions for Port Placement    1. Go home, rest and take it easy today.      2. You may experience some dizziness or memory loss from the anesthesia.  This may last for the next 24 hours.  Someone should plan on staying with you for the first 24 hours for your safety.    3. Do not make any important legal decisions or sign any legal papers for the next 24 hours.      4. Eat and drink lightly today.  Start off with liquids, jello, soup, crackers or other bland foods at first. You may advance your diet tomorrow as tolerated as long as you do not experience any nausea or vomiting.     5. If skin glue was used, your incision will be open to air.  No care is required.  If you have a dressing, you may remove it in 2-3 days or until your first treatment whichever comes first. If you have the little white tapes known as steri-strips, leave them alone.  They usually fall off in 1-2 weeks.  Do not worry if they come off sooner.     6. You may notice some bleeding/drainage. A little bloody drainage is normal.  Some bruising is also normal.    7. You may shower in 48hours.  No tub baths until your incisions are completely healed.    8. You have received a prescription for a narcotic pain medicine, as you may have some pain/discomfort following surgery.   You will not be totally pain free, but your pain medicine should make the pain tolerable.  Please take your pain medicine as prescribed and always take your pills with food to prevent nausea. If you are having severe pain that cannot be controlled by the pain medicine, please contact me.  If the pain is such that narcotic pain medicine is not required, you may take Tylenol or Ibuprofen as directed unless indicated otherwise.      9. No driving for 24 hours and for as long as you are taking your prescription pain  medicine.      10. You should call the office at [phone] to schedule a follow-up in about 2 weeks.      11. Remember to contact me for any of the following:    • Fever> 101 degrees  • Severe pain that cannot be controlled by taking your pain pills  • Severe nausea or vomiting   • Significant bleeding from your incision  • Drainage that has a bad smell or is yellow or green in appearance  • Any other questions or concerns

## 2019-06-18 NOTE — OP NOTE
Operative Note  Date of Admission:  6/18/2019  OR Date: 6/18/2019    Pre-op Diagnosis:   Squamous cell cancer of the tongue    Post-Op Diagnosis Codes:  Same    Procedure:   1) duplex directed access with Mediport placement under fluoroscopic guidance    Surgeon: Adolph Grigsby MD    Assistant: Ana LANGFORD CSA.  She provided critical suctioning,  retraction and operative support during the procedure.    Anesthesia: Monitor Anesthesia Care    Staff:   Circulator: Spurling, Shannon, RN  Radiology Technologist: Rachelle Ornelas  Scrub Person: Andreas Drew  Assistant: Ana Jeffrey CSA    Estimated Blood Loss: minimal    Specimens:   Order Name Source Comment Collection Info Order Time   PROTIME-INR   Collected By: Genaro Stephenson RN 6/18/2019 11:45 AM        Complications: None    Findings: Dictation    Indications:    The patient is an 74 y.o. male seen for evaluation the need for chemotherapy in the face of squamous cell cancer of the tongue.  The patient is in need of port placement.  He understands risk benefits complications and agrees to procedure.       Procedure:    The patient was prepped and draped sterilely.  Using accessed using duplex direction I accessed the right internal jugular vein.  Using a micropuncture set we are able to pass a wire centrally.  Fluoroscopic guidance was needed as well.  After we are in the right atrium with the wire we were able to place tear-away sheath.  I then used a 15 blade scalpel Bovie cutting and cautery to form a pocket on the anterior chest wall under local anesthetic.  This is connected to the wound in the neck with a tunneling device and the tubing was placed through tear-away sheath in the right atriocaval junction.  Was then attached to the PowerPort which was sewn into place with 3-0 Prolene suture in the pocket.  The port port flushed and flowed well at completion.  There was then closure of the deep tissue with 3-0 Vicryl and 4-0 Vicryl  subcuticular closure.  Skin glue was performed.  Patient tolerated procedure well.    Radiographic Findings:  Fluoroscopy used to identify structures in position catheter at the right atriocaval junction.  The extraction access all utilized to allow entry into appropriate vasculature at the neck      There are no hospital problems to display for this patient.     Elvis Grigsby MD     Date: 6/18/2019  Time: 6:28 PM

## 2019-06-18 NOTE — ADDENDUM NOTE
Addendum  created 06/18/19 1657 by Timbo Velez MD    Intraprocedure Attestations deleted, Intraprocedure Attestations filed

## 2019-06-18 NOTE — ANESTHESIA PREPROCEDURE EVALUATION
Anesthesia Evaluation     Patient summary reviewed and Nursing notes reviewed   NPO Solid Status: > 8 hours  NPO Liquid Status: > 2 hours           Airway   Mallampati: III  Difficult intubation highly probable and Small opening  Dental - normal exam     Pulmonary - normal exam   (+) COPD, sleep apnea,   Cardiovascular - normal exam    ECG reviewed    (+) hypertension, past MI , CAD, dysrhythmias Atrial Fib, angina, CHF, hyperlipidemia,       Neuro/Psych  (+) psychiatric history Anxiety and Depression,     GI/Hepatic/Renal/Endo      Musculoskeletal     Abdominal    Substance History      OB/GYN          Other      history of cancer    ROS/Med Hx Other: Large neck tumor right side involving tongue and back of throat                  Anesthesia Plan    ASA 4     MAC

## 2019-06-18 NOTE — ANESTHESIA POSTPROCEDURE EVALUATION
Patient: Cody Eldridge    Procedure Summary     Date:  06/18/19 Room / Location:  [facility] OR 09 / [facility]    Anesthesia Start:  1459 Anesthesia Stop:  1557    Procedure:  MEDIPORT PLACEMENT (Right ) Diagnosis:      Surgeon:  Elvis Grigsby MD Provider:  Lana Cardona MD    Anesthesia Type:  MAC ASA Status:  4          Anesthesia Type: MAC  Last vitals  BP   110/71 (06/18/19 1610)   Temp   36.9 °C (98.5 °F) (06/18/19 1553)   Pulse   97 (06/18/19 1610)   Resp   18 (06/18/19 1610)     SpO2   96 % (06/18/19 1610)     Post Anesthesia Care and Evaluation    Patient location during evaluation: PHASE II  Patient participation: complete - patient participated  Level of consciousness: awake and alert  Pain management: adequate  Airway patency: patent  Anesthetic complications: No anesthetic complications  PONV Status: none  Cardiovascular status: acceptable  Respiratory status: acceptable  Hydration status: acceptable

## 2019-06-18 NOTE — BRIEF OP NOTE
INSERTION VENOUS ACCESS DEVICE  Progress Note    Cody Eldridge  6/18/2019    Pre-op Diagnosis:   Esophageal cancer       Post-Op Diagnosis Codes:  Same    Procedure/CPT® Codes:      Procedure(s):  MEDIPORT PLACEMENT duplex and fluoroscopic guidance  Surgeon(s):  Elvis Grigsby MD    Anesthesia: Monitor Anesthesia Care    Staff:   Circulator: Spurling, Shannon, RN  Radiology Technologist: Rachelle Ornelas  Scrub Person: Andreas Drew  Assistant: Ana Jeffrey CSA    Estimated Blood Loss: minimal    Urine Voided: * No values recorded between 6/18/2019  2:59 PM and 6/18/2019  3:43 PM *    Specimens:                None          Drains:      Findings: See dictation    Complications: None      Elvis Grigsby MD     Date: 6/18/2019  Time: 3:43 PM

## 2019-06-19 ENCOUNTER — DOCUMENTATION (OUTPATIENT)
Dept: ONCOLOGY | Facility: CLINIC | Age: 74
End: 2019-06-19

## 2019-06-19 ENCOUNTER — APPOINTMENT (OUTPATIENT)
Dept: PHARMACY | Facility: HOSPITAL | Age: 74
End: 2019-06-19

## 2019-06-19 PROCEDURE — 77427 RADIATION TX MANAGEMENT X5: CPT | Performed by: RADIOLOGY

## 2019-06-19 PROCEDURE — 77386 CHG INTENSITY MODULATED RADIATION TX DLVR COMPLEX: CPT | Performed by: RADIOLOGY

## 2019-06-19 PROCEDURE — 77386: CPT | Performed by: RADIOLOGY

## 2019-06-19 PROCEDURE — 77014 CHG CT GUIDANCE RADIATION THERAPY FLDS PLACEMENT: CPT | Performed by: RADIOLOGY

## 2019-06-19 NOTE — PROGRESS NOTES
OSW initiated a phone call to the pt to follow-up on his Distress Questionnaire completed on 6/7/19. The pt scored 8/10. He has begun chemoradiation at the [facility] for stage 1 base of tongue cancer.     The pt reports doing well, overall. He is taking zoloft and xanax to assist with coping. He denied the current need for additional support. OSW informed the pt about Supportive Oncology Services. The pt reported that he is seen by a pain management specialist every two months for chronic back and neck pain.     The pt is accompanied to treatments by his daughter or granddaughter. His granddaughter is a nurse practitioner and he stated that he appreciates her assistance with the medical explanations. The pt's wife accompanies him to chemotherapy.     ..Advance Care Planning   Advance Care Planning Discussion:    The pt is aware that he needs a living will as he does not have one. OSW agreed to send a copy of the Informed Decisions brochure and a flyer for the monthly ACP class offered at [facility].    OSW will mail the an introductory letter explaining social work services in the event needs arise. The pt denied having any needs at this time.    Shazia Eddy, [facility]  Oncology Social Worker

## 2019-06-20 PROCEDURE — 77386: CPT | Performed by: RADIOLOGY

## 2019-06-20 PROCEDURE — 77014 CHG CT GUIDANCE RADIATION THERAPY FLDS PLACEMENT: CPT | Performed by: RADIOLOGY

## 2019-06-20 PROCEDURE — 77386 CHG INTENSITY MODULATED RADIATION TX DLVR COMPLEX: CPT | Performed by: RADIOLOGY

## 2019-06-21 PROCEDURE — 77386: CPT | Performed by: RADIOLOGY

## 2019-06-21 PROCEDURE — 77014 CHG CT GUIDANCE RADIATION THERAPY FLDS PLACEMENT: CPT | Performed by: RADIOLOGY

## 2019-06-21 PROCEDURE — 77386 CHG INTENSITY MODULATED RADIATION TX DLVR COMPLEX: CPT | Performed by: RADIOLOGY

## 2019-06-24 ENCOUNTER — TELEPHONE (OUTPATIENT)
Dept: PHARMACY | Facility: HOSPITAL | Age: 74
End: 2019-06-24

## 2019-06-24 ENCOUNTER — INFUSION (OUTPATIENT)
Dept: ONCOLOGY | Facility: HOSPITAL | Age: 74
End: 2019-06-24

## 2019-06-24 ENCOUNTER — OFFICE VISIT (OUTPATIENT)
Dept: ONCOLOGY | Facility: CLINIC | Age: 74
End: 2019-06-24

## 2019-06-24 VITALS
HEIGHT: 72 IN | BODY MASS INDEX: 24.45 KG/M2 | RESPIRATION RATE: 18 BRPM | SYSTOLIC BLOOD PRESSURE: 112 MMHG | TEMPERATURE: 98 F | OXYGEN SATURATION: 96 % | DIASTOLIC BLOOD PRESSURE: 67 MMHG | WEIGHT: 180.5 LBS | HEART RATE: 98 BPM

## 2019-06-24 VITALS — SYSTOLIC BLOOD PRESSURE: 142 MMHG | HEART RATE: 85 BPM | DIASTOLIC BLOOD PRESSURE: 85 MMHG

## 2019-06-24 DIAGNOSIS — Z79.01 CURRENT USE OF LONG TERM ANTICOAGULATION: ICD-10-CM

## 2019-06-24 DIAGNOSIS — C01 SQUAMOUS CELL CARCINOMA OF BASE OF TONGUE (HCC): Primary | ICD-10-CM

## 2019-06-24 DIAGNOSIS — C01 SQUAMOUS CELL CARCINOMA OF BASE OF TONGUE (HCC): ICD-10-CM

## 2019-06-24 DIAGNOSIS — I48.91 ATRIAL FIBRILLATION, UNSPECIFIED TYPE (HCC): ICD-10-CM

## 2019-06-24 DIAGNOSIS — R79.1 ABNORMAL COAGULATION PROFILE: ICD-10-CM

## 2019-06-24 LAB
ANION GAP SERPL CALCULATED.3IONS-SCNC: 8.8 MMOL/L
BASOPHILS # BLD AUTO: 0.04 10*3/MM3 (ref 0–0.2)
BASOPHILS NFR BLD AUTO: 0.7 % (ref 0–1.5)
BUN BLD-MCNC: 20 MG/DL (ref 6–20)
BUN/CREAT SERPL: 22 (ref 7.3–30)
CALCIUM SPEC-SCNC: 9 MG/DL (ref 8.5–10.2)
CHLORIDE SERPL-SCNC: 101 MMOL/L (ref 98–107)
CO2 SERPL-SCNC: 28.2 MMOL/L (ref 22–29)
CREAT BLD-MCNC: 0.91 MG/DL (ref 0.7–1.3)
DEPRECATED RDW RBC AUTO: 42.3 FL (ref 37–54)
EOSINOPHIL # BLD AUTO: 0.25 10*3/MM3 (ref 0–0.4)
EOSINOPHIL NFR BLD AUTO: 4.2 % (ref 0.3–6.2)
ERYTHROCYTE [DISTWIDTH] IN BLOOD BY AUTOMATED COUNT: 12.3 % (ref 12.3–15.4)
GFR SERPL CREATININE-BSD FRML MDRD: 81 ML/MIN/1.73
GLUCOSE BLD-MCNC: 113 MG/DL (ref 74–124)
HCT VFR BLD AUTO: 40.4 % (ref 37.5–51)
HGB BLD-MCNC: 13.4 G/DL (ref 13–17.7)
IMM GRANULOCYTES # BLD AUTO: 0.03 10*3/MM3 (ref 0–0.05)
IMM GRANULOCYTES NFR BLD AUTO: 0.5 % (ref 0–0.5)
INR PPP: 1.4 (ref 0.9–1.1)
LYMPHOCYTES # BLD AUTO: 1.15 10*3/MM3 (ref 0.7–3.1)
LYMPHOCYTES NFR BLD AUTO: 19.4 % (ref 19.6–45.3)
MCH RBC QN AUTO: 30.8 PG (ref 26.6–33)
MCHC RBC AUTO-ENTMCNC: 33.2 G/DL (ref 31.5–35.7)
MCV RBC AUTO: 92.9 FL (ref 79–97)
MONOCYTES # BLD AUTO: 0.33 10*3/MM3 (ref 0.1–0.9)
MONOCYTES NFR BLD AUTO: 5.6 % (ref 5–12)
NEUTROPHILS # BLD AUTO: 4.13 10*3/MM3 (ref 1.7–7)
NEUTROPHILS NFR BLD AUTO: 69.6 % (ref 42.7–76)
NRBC BLD AUTO-RTO: 0 /100 WBC (ref 0–0.2)
PLATELET # BLD AUTO: 210 10*3/MM3 (ref 140–450)
PMV BLD AUTO: 10 FL (ref 6–12)
POTASSIUM BLD-SCNC: 4.3 MMOL/L (ref 3.5–4.7)
PROTHROMBIN TIME: 17.1 SECONDS (ref 11–13.5)
RBC # BLD AUTO: 4.35 10*6/MM3 (ref 4.14–5.8)
SODIUM BLD-SCNC: 138 MMOL/L (ref 134–145)
WBC NRBC COR # BLD: 5.93 10*3/MM3 (ref 3.4–10.8)

## 2019-06-24 PROCEDURE — 25010000002 PACLITAXEL PER 30 MG: Performed by: INTERNAL MEDICINE

## 2019-06-24 PROCEDURE — 77336 RADIATION PHYSICS CONSULT: CPT | Performed by: RADIOLOGY

## 2019-06-24 PROCEDURE — 85025 COMPLETE CBC W/AUTO DIFF WBC: CPT

## 2019-06-24 PROCEDURE — 77014 CHG CT GUIDANCE RADIATION THERAPY FLDS PLACEMENT: CPT | Performed by: RADIOLOGY

## 2019-06-24 PROCEDURE — 25010000002 PALONOSETRON PER 25 MCG: Performed by: INTERNAL MEDICINE

## 2019-06-24 PROCEDURE — 25010000002 DIPHENHYDRAMINE PER 50 MG: Performed by: INTERNAL MEDICINE

## 2019-06-24 PROCEDURE — 96375 TX/PRO/DX INJ NEW DRUG ADDON: CPT | Performed by: INTERNAL MEDICINE

## 2019-06-24 PROCEDURE — 96417 CHEMO IV INFUS EACH ADDL SEQ: CPT | Performed by: INTERNAL MEDICINE

## 2019-06-24 PROCEDURE — 25010000002 CARBOPLATIN PER 50 MG: Performed by: INTERNAL MEDICINE

## 2019-06-24 PROCEDURE — 80048 BASIC METABOLIC PNL TOTAL CA: CPT

## 2019-06-24 PROCEDURE — 96413 CHEMO IV INFUSION 1 HR: CPT | Performed by: INTERNAL MEDICINE

## 2019-06-24 PROCEDURE — 25010000002 DEXAMETHASONE SODIUM PHOSPHATE 100 MG/10ML SOLUTION: Performed by: INTERNAL MEDICINE

## 2019-06-24 PROCEDURE — 85610 PROTHROMBIN TIME: CPT

## 2019-06-24 PROCEDURE — 99214 OFFICE O/P EST MOD 30 MIN: CPT | Performed by: INTERNAL MEDICINE

## 2019-06-24 PROCEDURE — 77386 CHG INTENSITY MODULATED RADIATION TX DLVR COMPLEX: CPT | Performed by: RADIOLOGY

## 2019-06-24 PROCEDURE — 77386: CPT | Performed by: RADIOLOGY

## 2019-06-24 RX ORDER — FAMOTIDINE 10 MG/ML
20 INJECTION, SOLUTION INTRAVENOUS ONCE
Status: COMPLETED | OUTPATIENT
Start: 2019-06-24 | End: 2019-06-24

## 2019-06-24 RX ORDER — FAMOTIDINE 10 MG/ML
20 INJECTION, SOLUTION INTRAVENOUS ONCE
Status: CANCELLED | OUTPATIENT
Start: 2019-07-08

## 2019-06-24 RX ORDER — SODIUM CHLORIDE 9 MG/ML
250 INJECTION, SOLUTION INTRAVENOUS ONCE
Status: CANCELLED | OUTPATIENT
Start: 2019-07-08

## 2019-06-24 RX ORDER — FAMOTIDINE 10 MG/ML
20 INJECTION, SOLUTION INTRAVENOUS AS NEEDED
Status: CANCELLED | OUTPATIENT
Start: 2019-07-08

## 2019-06-24 RX ORDER — DIPHENHYDRAMINE HYDROCHLORIDE 50 MG/ML
50 INJECTION INTRAMUSCULAR; INTRAVENOUS AS NEEDED
Status: CANCELLED | OUTPATIENT
Start: 2019-07-08

## 2019-06-24 RX ORDER — PALONOSETRON 0.05 MG/ML
0.25 INJECTION, SOLUTION INTRAVENOUS ONCE
Status: CANCELLED | OUTPATIENT
Start: 2019-07-08

## 2019-06-24 RX ORDER — PALONOSETRON 0.05 MG/ML
0.25 INJECTION, SOLUTION INTRAVENOUS ONCE
Status: COMPLETED | OUTPATIENT
Start: 2019-06-24 | End: 2019-06-24

## 2019-06-24 RX ORDER — SODIUM CHLORIDE 9 MG/ML
250 INJECTION, SOLUTION INTRAVENOUS ONCE
Status: COMPLETED | OUTPATIENT
Start: 2019-06-24 | End: 2019-06-24

## 2019-06-24 RX ADMIN — DEXAMETHASONE SODIUM PHOSPHATE 12 MG: 10 INJECTION, SOLUTION INTRAMUSCULAR; INTRAVENOUS at 08:30

## 2019-06-24 RX ADMIN — DIPHENHYDRAMINE HYDROCHLORIDE 12.5 MG: 50 INJECTION INTRAMUSCULAR; INTRAVENOUS at 08:47

## 2019-06-24 RX ADMIN — PACLITAXEL 105 MG: 6 INJECTION, SOLUTION INTRAVENOUS at 09:40

## 2019-06-24 RX ADMIN — FAMOTIDINE 20 MG: 10 INJECTION INTRAVENOUS at 08:30

## 2019-06-24 RX ADMIN — PALONOSETRON HYDROCHLORIDE 0.25 MG: 0.25 INJECTION, SOLUTION INTRAVENOUS at 08:30

## 2019-06-24 RX ADMIN — SODIUM CHLORIDE 250 ML: 9 INJECTION, SOLUTION INTRAVENOUS at 08:30

## 2019-06-24 RX ADMIN — CARBOPLATIN 220 MG: 10 INJECTION, SOLUTION INTRAVENOUS at 10:43

## 2019-06-24 NOTE — PROGRESS NOTES
Subjective     REASON FOR FOLLOW UP:  1.  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   2.  Combined radiation and low-dose carboplatin and Taxol chemotherapy weekly initiated 6/17/2019.  3.  MediPort placed by Dr. Adolph Grigsby of vascular surgery 6/18/2019    HISTORY OF PRESENT ILLNESS:  The patient is a 74 y.o. year old male who was referred to us from his ENT surgeon for recommendations concerning treatment of squamous cell carcinoma the base of the tongue with biopsy-proven metastatic lymph nodes in the right neck.    He was seen also by Dr. Davila of the [facility] and we agreed with her assessment that combined radiation and chemotherapy would be his best therapy at this time.  He has a decent performance status but does have comorbidities including atrial fibrillation requiring anticoagulation with Coumadin and history of previous myocardial infarction and prior episodes of congestive heart failure.  He follows up with Dr. Myriam Omer at [place] cardi[facility].    He has now started combined radiation therapy and low-dose weekly carboplatin and Taxol chemotherapy and is here today for week #2 of treatment.  It is functioning well.  He reports he tolerated the first week of therapy without any major problems.  He is claustrophobic and is taking a small dose of Xanax prior to each radiation treatment.  His INR today was slightly subtherapeutic at 1.4.  Also he showed me some home blood pressure readings that indicated his blood pressure is been on the low side.  He is seeing his cardiologist tomorrow and certainly may need some adjustment in his antihypertensive medications.    History of Present Illness     Past Medical History:   Diagnosis Date   • Arrhythmia    • Atrial fibrillation (CMS/HCC)    • CAD (coronary artery disease)    • Cancer (CMS/HCC) 05/2019    Neck, squamous   • CHF (congestive heart failure) (CMS/HCC)    • Chronic bronchitis (CMS/HCC)    • COPD  (chronic obstructive pulmonary disease) (CMS/[facility])    • Coronary artery disease of native artery of native heart with stable angina pectoris (CMS/[facility])    • Cramps of lower extremity    • Daytime hypersomnia    • Depression    • Depression with anxiety    • IRAHETA (dyspnea on exertion)    • Drug therapy    • Dyspnea on exertion    • Emphysema of lung (CMS/HCC)    • Encounter for immunization     flu vaccine high dose PF 65+   • Enlarged prostate    • Fatigue    • Frequent nocturnal awakening    • Gout    • H/O cardiac radiofrequency ablation 2006    [facility].   • Hyperlipidemia    • Hypertension    • Insomnia    • Lumbar radicular pain    • MI (myocardial infarction) (CMS/[facility])    • Non-rheumatic tricuspid valve insufficiency    • SHAR (obstructive sleep apnea)    • Osteoarthritis    • Precordial pain    • Pulmonary emphysema (CMS/[facility])    • Right leg pain     sciatica nerve pain   • Shortness of breath    • Snoring    • SVT (supraventricular tachycardia) (CMS/[facility])    • Syncope    • Vitamin D deficiency         Past Surgical History:   Procedure Laterality Date   • APPENDECTOMY     • CARDIAC ABLATION  2006    [facility].   • CARDIAC CATHETERIZATION N/A 8/29/2018    Procedure: Left Heart Cath;  Surgeon: Yousif Uribe MD;  Location: [facility] INVASIVE LOCATION;  Service: Cardiology   • CARDIAC CATHETERIZATION N/A 8/29/2018    Procedure: Coronary angiography;  Surgeon: Yousif Uribe MD;  Location: [facility] CATH INVASIVE LOCATION;  Service: Cardiology   • CARDIAC CATHETERIZATION N/A 8/29/2018    Procedure: Left ventriculography;  Surgeon: Yousif Uribe MD;  Location: [facility] CATH INVASIVE LOCATION;  Service: Cardiology   • FINGER SURGERY     • FOOT SURGERY     • HAND SURGERY     • VENOUS ACCESS DEVICE (PORT) INSERTION Right 6/18/2019    Procedure: MEDIPORT PLACEMENT;  Surgeon: Elvis Grigsby MD;  Location: [facility] OR;  Service: Vascular        ALLERGIES:    Allergies   Allergen Reactions   • Benadryl  "[Diphenhydramine Hcl] Other (See Comments) and Dizziness     \"I sleep for about a day\"        Review of Systems   Constitutional: Positive for appetite change and unexpected weight change. Negative for activity change, chills, fatigue and fever.   HENT: Negative for mouth sores, trouble swallowing and voice change.    Eyes: Negative for pain and visual disturbance.   Respiratory: Negative for cough, shortness of breath and wheezing.    Cardiovascular: Positive for leg swelling. Negative for chest pain and palpitations.   Gastrointestinal: Negative for abdominal pain, constipation, diarrhea, nausea and vomiting.   Genitourinary: Negative for difficulty urinating, frequency and urgency.   Musculoskeletal: Positive for back pain. Negative for arthralgias and joint swelling.   Skin: Negative for rash.   Neurological: Positive for headaches. Negative for dizziness, seizures and weakness.   Hematological: Negative for adenopathy. Does not bruise/bleed easily.   Psychiatric/Behavioral: Negative for behavioral problems and confusion. The patient is not nervous/anxious.         Objective     Vitals:    06/24/19 0754   BP: 112/67   Pulse: 98   Resp: 18   Temp: 98 °F (36.7 °C)   SpO2: 96%   Weight: 81.9 kg (180 lb 8 oz)   Height: 182.9 cm (72.01\")   PainSc:   6   PainLoc: Neck     Current Status 6/24/2019   ECOG score 0       Physical Exam   Constitutional: He is oriented to person, place, and time. He appears well-developed and well-nourished. No distress.   HENT:   Head: Normocephalic.   Firm, 5 to 6 cm palpable mass at the angle of the mandible on the right   Eyes: Conjunctivae and EOM are normal. Pupils are equal, round, and reactive to light. No scleral icterus.   Neck: Normal range of motion. Neck supple. No JVD present. No thyromegaly present.   Cardiovascular: Normal rate. An irregularly irregular rhythm present. Exam reveals no gallop and no friction rub.   No murmur heard.  Pulmonary/Chest: Effort normal and breath " sounds normal. He has no wheezes. He has no rales.   Abdominal: Soft. He exhibits no distension and no mass. There is no tenderness.   Musculoskeletal: Normal range of motion. He exhibits edema. He exhibits no deformity.   1-2+ ankle edema bilaterally   Lymphadenopathy:     He has no cervical adenopathy.   Neurological: He is alert and oriented to person, place, and time. He has normal reflexes. No cranial nerve deficit.   Skin: Skin is warm and dry. No rash noted. No erythema.   Purpura on the extremities   Psychiatric: He has a normal mood and affect. His behavior is normal. Judgment normal.         RECENT LABS:  Hematology WBC   Date Value Ref Range Status   06/24/2019 5.93 3.40 - 10.80 10*3/mm3 Final     RBC   Date Value Ref Range Status   06/24/2019 4.35 4.14 - 5.80 10*6/mm3 Final     Hemoglobin   Date Value Ref Range Status   06/24/2019 13.4 13.0 - 17.7 g/dL Final     Hematocrit   Date Value Ref Range Status   06/24/2019 40.4 37.5 - 51.0 % Final     Platelets   Date Value Ref Range Status   06/24/2019 210 140 - 450 10*3/mm3 Final        Lab Results   Component Value Date    GLUCOSE 116 06/17/2019    BUN 12 06/17/2019    CREATININE 0.78 06/17/2019    EGFRIFNONA 97 06/17/2019    EGFRIFAFRI 107 04/23/2018    BCR 15.4 06/17/2019    K 4.1 06/17/2019    CO2 26.6 06/17/2019    CALCIUM 9.1 06/17/2019    PROTENTOTREF 7.2 04/23/2018    ALBUMIN 4.20 06/17/2019    LABIL2 1.3 04/23/2018    AST 20 06/17/2019    ALT 16 06/17/2019       PATHOLOGY 5/15/2019  Final Diagnosis   1.  Right Neck, FNA:                  A.  Positive for squamous cell carcinoma.     Flow Cytometry Report, Addendum   Utilizing appropriate positive and negative controls immunohistochemical stain p16 is performed on cell block material.  The tumor is positive for p16 by immunohistochemical staining which is a surrogate for HPV infection.       F-18 FDG PET FROM SKULL BASE TO MID THIGH WITH PET/CT FUSION 5/23/2019  IMPRESSION:  1.  Asymmetric intensely  FDG avid masslike soft tissue thickening within  the right base of the tongue, likely representing malignancy. There is a  large intensely FDG avid soft tissue mass centered over the right  carotid space likely representing metastatic disease.  2.  Intense asymmetric FDG uptake overlying the left cricoid cartilage  with associated asymmetric mucosal thickening within the inferior aspect  of the left pyriform sinus. Findings are concerning for malignancy and  further evaluation with endoscopy is recommended.  3.  Sub-6 mm noncalcified pulmonary nodule within the right middle lobe  which was not present on 10/02/2017. The nodule is below PET resolution  and findings are nonspecific. Continued close attention on follow up is  recommended to  exclude metastatic disease.  4.  Mild-to-moderately FDG avid subcutaneous nodule within the left  upper back measuring up to 1.1 cm, findings are nonspecific and further  evaluation with physical exam is recommended.  5.  No findings of FDG avid malignancy within the abdomen or pelvis.    Assessment/Plan     1.  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  He is undergoing definitive treatment with radiation therapy and weekly low-dose carboplatin and Taxol chemotherapy.  He is here today for cycle #2.  2.  Comorbidities including ischemic heart disease with history of CHF and atrial fibrillation requiring Coumadin anticoagulation.    Plan  1.  Proceed with cycle #2 carboplatin and Taxol chemotherapy in conjunction with radiation therapy being delivered by Dr. Davila at the [facility] Monday through Friday.  2.  He will be scheduled for nurse practitioner assessment, lab, and carboplatin and Taxol chemotherapy weekly over the next 2 weeks.  3.  MD follow-up with lab and chemotherapy treatment in 3 weeks.  4.  He is following up with his cardiologist tomorrow.  They have been  managing his Coumadin adjustments and all of his cardiac meds.  We certainly would be happy to check his INR weekly since he is coming here weekly for chemotherapy and adjust his Coumadin for an INR target of 2.0-3.0.  We also encouraged him to show his home blood pressure readings to his cardiologist tomorrow.  He is lost weight and may need some significant adjustments in his blood pressure medications (lisinopril, amlodipine, and Lopressor).

## 2019-06-25 ENCOUNTER — RADIATION ONCOLOGY WEEKLY ASSESSMENT (OUTPATIENT)
Dept: RADIATION ONCOLOGY | Facility: HOSPITAL | Age: 74
End: 2019-06-25

## 2019-06-25 ENCOUNTER — APPOINTMENT (OUTPATIENT)
Dept: ONCOLOGY | Facility: HOSPITAL | Age: 74
End: 2019-06-25

## 2019-06-25 VITALS
BODY MASS INDEX: 24.81 KG/M2 | SYSTOLIC BLOOD PRESSURE: 109 MMHG | DIASTOLIC BLOOD PRESSURE: 73 MMHG | TEMPERATURE: 96.2 F | WEIGHT: 183 LBS | HEART RATE: 88 BPM | RESPIRATION RATE: 18 BRPM | OXYGEN SATURATION: 97 %

## 2019-06-25 DIAGNOSIS — C01 SQUAMOUS CELL CARCINOMA OF BASE OF TONGUE (HCC): Primary | ICD-10-CM

## 2019-06-25 PROCEDURE — 77338 DESIGN MLC DEVICE FOR IMRT: CPT | Performed by: RADIOLOGY

## 2019-06-25 PROCEDURE — 77014 CHG CT GUIDANCE RADIATION THERAPY FLDS PLACEMENT: CPT | Performed by: RADIOLOGY

## 2019-06-25 PROCEDURE — 77386 CHG INTENSITY MODULATED RADIATION TX DLVR COMPLEX: CPT | Performed by: RADIOLOGY

## 2019-06-25 PROCEDURE — 77386: CPT | Performed by: RADIOLOGY

## 2019-06-25 NOTE — PROGRESS NOTES
Physician Weekly Management Note    Diagnosis:     1. 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)    Cancer Staging  Stage I (cT2, cN1, cM0, p16: Positive)    Reason for Visit: Radiation (5/33)    Concurrent Chemo:       Notes on Treatment course, Films, Medical progress and Plan:  Doing well. Still struggling some with anxiety. No change in symptoms. No problems or questions, cont on.    ROS -  Other than those listed above, as follows:  Constitutional - Normal - no complaints of fatigue, denies lack of appetite, fever, night sweats or change in weight.  Neck - Normal - denies neck masses, muscle weakness, neck pain, decreased range of motion or swelling.  Cardiovascular - Normal - denies arrhythmias, chest pain, dyspnea, edema, orthopnea or palpitations.  Respiratory - Normal - denies cough, dyspnea, hemoptysis, hiccoughs, pleuritic chest pain or wheezing.  Gastrointestinal - Normal - no complaints of constipation, abdominal pain, diarrhea, heartburn/dyspepsia, hematemesis, hemorrhoids, melena or GI bleeding, nausea, pain or cramping or vomiting.  Neuro - Normal - no new complaints of vision change, headache, nausea, cranial nerve deficit, gait abnormality, syncope, seizure.    PHYSICAL EXAM - Other than changes listed above, as follows:  Vitals:    06/25/19 0837   BP: 109/73   Pulse: 88   Resp: 18   Temp: 96.2 °F (35.7 °C)   SpO2: 97%   Weight: 83 kg (183 lb)   PainSc:   8       Constitutional - Normal - no evidence of impaired alertness, inadequate appearance, premature or advanced chronologic age, uncooperativeness, altered mood, affect or disorientation.  Neck - Normal - no evidence of tender or enlarged lymph nodes, neck abnormalities, restricted range of motion or enlarged thyroid.  Chest - Normal - no evidence of chest abnormalities, tender or enlarged lymph nodes.  Respiratory - Normal - no evidence of abnormal breat sounds, chest abnormalities on palpation and chest abnormalities on percussion  "and normal breath sounds.  Hematologic/Lymphatic - Normal - no evidence of tender or enlarged axillary lymph nodes nor tender or enlarged neck nodes.  Neuro - Normal - no evidence of cranial nerve deficit, gait abnormality.    Performance Status:  (1) Restricted in physically strenuous activity, ambulatory and able to do work of light nature    Problem added:  No problems updated.  Medications added: No orders of the defined types were placed in this encounter.    Ancillary referrals made: No orders of the defined types were placed in this encounter.      Technical aspects reviewed:  Weekly OBI approved if applicable? Yes   Weekly port films approved?  Yes  Change requests noted if applicable?  Yes   Patient setup and plan reviewed?  Yes     Chart Reviewed:  Continue current treatment plan?  Yes  Treatment plan change requested? No    I have reviewed and marked as \"reviewed\" the current medications, allergies and problem list in the patients EMR.    I have reviewed the patient's medical, surgical  history in detail, reviewed any pertinent lab work  and updated the computerized patient record if needed.    Patient's Care Team:  Patient Care Team:  Epley, James, APRN as PCP - General (Family Medicine)  Payal Waters MD as Consulting Physician (Pain Medicine)  Lorna Chaidez (Nurse Practitioner)  Kristal Cowart [facility] as Pharmacist  Fritz Slater MD as Referring Physician (Otolaryngology)  Pablo Heaton MD as Consulting Physician (Hematology and Oncology)  Annie Davila MD as Consulting Physician (Radiation Oncology)  Chetan Heaton MD as Consulting Physician (Urology)      "

## 2019-06-26 ENCOUNTER — DOCUMENTATION (OUTPATIENT)
Dept: NUTRITION | Facility: HOSPITAL | Age: 74
End: 2019-06-26

## 2019-06-26 DIAGNOSIS — C01 SQUAMOUS CELL CARCINOMA OF BASE OF TONGUE (HCC): Primary | ICD-10-CM

## 2019-06-26 PROCEDURE — 77300 RADIATION THERAPY DOSE PLAN: CPT | Performed by: RADIOLOGY

## 2019-06-26 PROCEDURE — 77386 CHG INTENSITY MODULATED RADIATION TX DLVR COMPLEX: CPT | Performed by: RADIOLOGY

## 2019-06-26 PROCEDURE — 77427 RADIATION TX MANAGEMENT X5: CPT | Performed by: RADIOLOGY

## 2019-06-26 PROCEDURE — 77386: CPT | Performed by: RADIOLOGY

## 2019-06-26 PROCEDURE — 77014 CHG CT GUIDANCE RADIATION THERAPY FLDS PLACEMENT: CPT | Performed by: RADIOLOGY

## 2019-06-26 RX ORDER — POT SOR/HE-CELLULOS/POV/HYALUR
1 GEL IN PACKET (ML) MUCOUS MEMBRANE 3 TIMES DAILY
Qty: 90 EACH | Refills: 2 | Status: SHIPPED | OUTPATIENT
Start: 2019-06-26 | End: 2019-06-29 | Stop reason: HOSPADM

## 2019-06-26 NOTE — PROGRESS NOTES
ONC Nutrition Follow Up:     Weight 183#. Weighed Monday and Thursday in radiation.   Patient tells me he is eating whatever he wants but did try to eat tacos last night and it hurt his mouth. Patient also met with nurse who is providing him with lip lubricant and discussed gelclair. He is not having throat pain, just tongue and lips are dry and cracking. He has Boost at home but has not been drinking it. His wife is in the hospital. Encouraged patient to take care of himself and to be sure to eat regularly, to avoid spicy foods, probably needs to begin eating softer foods. He says he has been eating yogurt as a snack. Gave him a pack of Boost and asked him to drink 2-3 per day. He wants to avoid a feeding tube as long as he can but recognizes that he may need one.   Will continue to follow and encourage intake.

## 2019-06-27 PROCEDURE — 77386 CHG INTENSITY MODULATED RADIATION TX DLVR COMPLEX: CPT | Performed by: RADIOLOGY

## 2019-06-27 PROCEDURE — 77014 CHG CT GUIDANCE RADIATION THERAPY FLDS PLACEMENT: CPT | Performed by: RADIOLOGY

## 2019-06-27 PROCEDURE — 77386: CPT | Performed by: RADIOLOGY

## 2019-06-28 ENCOUNTER — HOSPITAL ENCOUNTER (OUTPATIENT)
Facility: HOSPITAL | Age: 74
Setting detail: OBSERVATION
Discharge: HOME | End: 2019-06-29
Attending: EMERGENCY MEDICINE | Admitting: HOSPITALIST

## 2019-06-28 ENCOUNTER — APPOINTMENT (OUTPATIENT)
Dept: CT IMAGING | Facility: HOSPITAL | Age: 74
End: 2019-06-28

## 2019-06-28 ENCOUNTER — RESULTS ENCOUNTER (OUTPATIENT)
Dept: ONCOLOGY | Facility: CLINIC | Age: 74
End: 2019-06-28

## 2019-06-28 ENCOUNTER — APPOINTMENT (OUTPATIENT)
Dept: GENERAL RADIOLOGY | Facility: HOSPITAL | Age: 74
End: 2019-06-28

## 2019-06-28 DIAGNOSIS — Z86.79 HISTORY OF CHF (CONGESTIVE HEART FAILURE): ICD-10-CM

## 2019-06-28 DIAGNOSIS — Z86.79 HISTORY OF ATRIAL FIBRILLATION: ICD-10-CM

## 2019-06-28 DIAGNOSIS — I95.9 HYPOTENSION, UNSPECIFIED HYPOTENSION TYPE: ICD-10-CM

## 2019-06-28 DIAGNOSIS — R55 SYNCOPE, UNSPECIFIED SYNCOPE TYPE: Primary | ICD-10-CM

## 2019-06-28 DIAGNOSIS — C01 SQUAMOUS CELL CARCINOMA OF BASE OF TONGUE (HCC): ICD-10-CM

## 2019-06-28 DIAGNOSIS — E86.0 DEHYDRATION: ICD-10-CM

## 2019-06-28 DIAGNOSIS — Z85.9 HISTORY OF CANCER: ICD-10-CM

## 2019-06-28 DIAGNOSIS — Z86.79 HISTORY OF CORONARY ARTERY DISEASE: ICD-10-CM

## 2019-06-28 LAB
ALBUMIN SERPL-MCNC: 2.8 G/DL (ref 3.5–5.2)
ALBUMIN/GLOB SERPL: 1 G/DL
ALP SERPL-CCNC: 47 U/L (ref 39–117)
ALT SERPL W P-5'-P-CCNC: 16 U/L (ref 1–41)
ANION GAP SERPL CALCULATED.3IONS-SCNC: 4.3 MMOL/L (ref 5–15)
AST SERPL-CCNC: 14 U/L (ref 1–40)
BASOPHILS # BLD AUTO: 0.02 10*3/MM3 (ref 0–0.2)
BASOPHILS NFR BLD AUTO: 0.3 % (ref 0–1.5)
BILIRUB SERPL-MCNC: 0.8 MG/DL (ref 0.2–1.2)
BILIRUB UR QL STRIP: NEGATIVE
BUN BLD-MCNC: 23 MG/DL (ref 8–23)
BUN/CREAT SERPL: 24.2 (ref 7–25)
CALCIUM SPEC-SCNC: 8.6 MG/DL (ref 8.6–10.5)
CHLORIDE SERPL-SCNC: 105 MMOL/L (ref 98–107)
CLARITY UR: CLEAR
CO2 SERPL-SCNC: 26.7 MMOL/L (ref 22–29)
COLOR UR: ABNORMAL
CREAT BLD-MCNC: 0.95 MG/DL (ref 0.76–1.27)
D-LACTATE SERPL-SCNC: 1.7 MMOL/L (ref 0.5–2)
DEPRECATED RDW RBC AUTO: 42.9 FL (ref 37–54)
EOSINOPHIL # BLD AUTO: 0.04 10*3/MM3 (ref 0–0.4)
EOSINOPHIL NFR BLD AUTO: 0.7 % (ref 0.3–6.2)
ERYTHROCYTE [DISTWIDTH] IN BLOOD BY AUTOMATED COUNT: 12.5 % (ref 12.3–15.4)
GFR SERPL CREATININE-BSD FRML MDRD: 77 ML/MIN/1.73
GLOBULIN UR ELPH-MCNC: 2.9 GM/DL
GLUCOSE BLD-MCNC: 129 MG/DL (ref 65–99)
GLUCOSE UR STRIP-MCNC: NEGATIVE MG/DL
HCT VFR BLD AUTO: 36.1 % (ref 37.5–51)
HGB BLD-MCNC: 11.5 G/DL (ref 13–17.7)
HGB UR QL STRIP.AUTO: NEGATIVE
IMM GRANULOCYTES # BLD AUTO: 0.03 10*3/MM3 (ref 0–0.05)
IMM GRANULOCYTES NFR BLD AUTO: 0.5 % (ref 0–0.5)
INR PPP: 3.36 (ref 0.9–1.1)
KETONES UR QL STRIP: ABNORMAL
LEUKOCYTE ESTERASE UR QL STRIP.AUTO: NEGATIVE
LYMPHOCYTES # BLD AUTO: 0.52 10*3/MM3 (ref 0.7–3.1)
LYMPHOCYTES NFR BLD AUTO: 8.9 % (ref 19.6–45.3)
MAGNESIUM SERPL-MCNC: 1.8 MG/DL (ref 1.6–2.4)
MCH RBC QN AUTO: 30.3 PG (ref 26.6–33)
MCHC RBC AUTO-ENTMCNC: 31.9 G/DL (ref 31.5–35.7)
MCV RBC AUTO: 95.3 FL (ref 79–97)
MONOCYTES # BLD AUTO: 0.17 10*3/MM3 (ref 0.1–0.9)
MONOCYTES NFR BLD AUTO: 2.9 % (ref 5–12)
NEUTROPHILS # BLD AUTO: 5.05 10*3/MM3 (ref 1.7–7)
NEUTROPHILS NFR BLD AUTO: 86.7 % (ref 42.7–76)
NITRITE UR QL STRIP: NEGATIVE
NRBC BLD AUTO-RTO: 0.2 /100 WBC (ref 0–0.2)
PH UR STRIP.AUTO: <=5 [PH] (ref 5–8)
PLATELET # BLD AUTO: 173 10*3/MM3 (ref 140–450)
PMV BLD AUTO: 11.2 FL (ref 6–12)
POTASSIUM BLD-SCNC: 4.7 MMOL/L (ref 3.5–5.2)
PROT SERPL-MCNC: 5.7 G/DL (ref 6–8.5)
PROT UR QL STRIP: ABNORMAL
PROTHROMBIN TIME: 33.8 SECONDS (ref 11.7–14.2)
RBC # BLD AUTO: 3.79 10*6/MM3 (ref 4.14–5.8)
SODIUM BLD-SCNC: 136 MMOL/L (ref 136–145)
SP GR UR STRIP: 1.02 (ref 1–1.03)
TROPONIN T SERPL-MCNC: <0.01 NG/ML (ref 0–0.03)
UROBILINOGEN UR QL STRIP: ABNORMAL
WBC NRBC COR # BLD: 5.83 10*3/MM3 (ref 3.4–10.8)

## 2019-06-28 PROCEDURE — 96361 HYDRATE IV INFUSION ADD-ON: CPT

## 2019-06-28 PROCEDURE — 83735 ASSAY OF MAGNESIUM: CPT | Performed by: EMERGENCY MEDICINE

## 2019-06-28 PROCEDURE — 96374 THER/PROPH/DIAG INJ IV PUSH: CPT

## 2019-06-28 PROCEDURE — 93005 ELECTROCARDIOGRAM TRACING: CPT | Performed by: EMERGENCY MEDICINE

## 2019-06-28 PROCEDURE — G0378 HOSPITAL OBSERVATION PER HR: HCPCS

## 2019-06-28 PROCEDURE — 80053 COMPREHEN METABOLIC PANEL: CPT | Performed by: EMERGENCY MEDICINE

## 2019-06-28 PROCEDURE — 93010 ELECTROCARDIOGRAM REPORT: CPT | Performed by: INTERNAL MEDICINE

## 2019-06-28 PROCEDURE — 99285 EMERGENCY DEPT VISIT HI MDM: CPT

## 2019-06-28 PROCEDURE — 25010000002 ONDANSETRON PER 1 MG: Performed by: EMERGENCY MEDICINE

## 2019-06-28 PROCEDURE — 84484 ASSAY OF TROPONIN QUANT: CPT | Performed by: EMERGENCY MEDICINE

## 2019-06-28 PROCEDURE — 85610 PROTHROMBIN TIME: CPT | Performed by: EMERGENCY MEDICINE

## 2019-06-28 PROCEDURE — 85025 COMPLETE CBC W/AUTO DIFF WBC: CPT | Performed by: EMERGENCY MEDICINE

## 2019-06-28 PROCEDURE — 77386 CHG INTENSITY MODULATED RADIATION TX DLVR COMPLEX: CPT | Performed by: RADIOLOGY

## 2019-06-28 PROCEDURE — 77386: CPT | Performed by: RADIOLOGY

## 2019-06-28 PROCEDURE — 77014 CHG CT GUIDANCE RADIATION THERAPY FLDS PLACEMENT: CPT | Performed by: RADIOLOGY

## 2019-06-28 PROCEDURE — 81003 URINALYSIS AUTO W/O SCOPE: CPT | Performed by: EMERGENCY MEDICINE

## 2019-06-28 PROCEDURE — 70450 CT HEAD/BRAIN W/O DYE: CPT

## 2019-06-28 PROCEDURE — 71045 X-RAY EXAM CHEST 1 VIEW: CPT

## 2019-06-28 PROCEDURE — 83605 ASSAY OF LACTIC ACID: CPT | Performed by: EMERGENCY MEDICINE

## 2019-06-28 RX ORDER — ONDANSETRON 2 MG/ML
4 INJECTION INTRAMUSCULAR; INTRAVENOUS EVERY 6 HOURS PRN
Status: DISCONTINUED | OUTPATIENT
Start: 2019-06-28 | End: 2019-06-29 | Stop reason: HOSPADM

## 2019-06-28 RX ORDER — SODIUM CHLORIDE 9 MG/ML
125 INJECTION, SOLUTION INTRAVENOUS CONTINUOUS
Status: DISCONTINUED | OUTPATIENT
Start: 2019-06-28 | End: 2019-06-29 | Stop reason: HOSPADM

## 2019-06-28 RX ORDER — HYDROCODONE BITARTRATE AND ACETAMINOPHEN 7.5; 325 MG/1; MG/1
1 TABLET ORAL 4 TIMES DAILY PRN
Status: DISCONTINUED | OUTPATIENT
Start: 2019-06-28 | End: 2019-06-29 | Stop reason: HOSPADM

## 2019-06-28 RX ORDER — ASPIRIN 81 MG/1
81 TABLET ORAL DAILY
Status: DISCONTINUED | OUTPATIENT
Start: 2019-06-28 | End: 2019-06-29 | Stop reason: HOSPADM

## 2019-06-28 RX ORDER — ROSUVASTATIN CALCIUM 20 MG/1
20 TABLET, COATED ORAL DAILY
Status: DISCONTINUED | OUTPATIENT
Start: 2019-06-28 | End: 2019-06-29 | Stop reason: HOSPADM

## 2019-06-28 RX ORDER — ONDANSETRON 4 MG/1
8 TABLET, ORALLY DISINTEGRATING ORAL EVERY 8 HOURS PRN
Status: DISCONTINUED | OUTPATIENT
Start: 2019-06-28 | End: 2019-06-29 | Stop reason: HOSPADM

## 2019-06-28 RX ORDER — CARVEDILOL 25 MG/1
25 TABLET ORAL 2 TIMES DAILY
Status: DISCONTINUED | OUTPATIENT
Start: 2019-06-28 | End: 2019-06-29 | Stop reason: HOSPADM

## 2019-06-28 RX ORDER — BACLOFEN 10 MG/1
10 TABLET ORAL 3 TIMES DAILY
Status: DISCONTINUED | OUTPATIENT
Start: 2019-06-28 | End: 2019-06-29 | Stop reason: HOSPADM

## 2019-06-28 RX ORDER — ONDANSETRON 4 MG/1
4 TABLET, FILM COATED ORAL EVERY 6 HOURS PRN
Status: DISCONTINUED | OUTPATIENT
Start: 2019-06-28 | End: 2019-06-29 | Stop reason: HOSPADM

## 2019-06-28 RX ORDER — HYDROCODONE BITARTRATE AND ACETAMINOPHEN 5; 325 MG/1; MG/1
1 TABLET ORAL EVERY 4 HOURS PRN
Status: DISCONTINUED | OUTPATIENT
Start: 2019-06-28 | End: 2019-06-29 | Stop reason: HOSPADM

## 2019-06-28 RX ORDER — ONDANSETRON 2 MG/ML
4 INJECTION INTRAMUSCULAR; INTRAVENOUS ONCE
Status: COMPLETED | OUTPATIENT
Start: 2019-06-28 | End: 2019-06-28

## 2019-06-28 RX ORDER — MONTELUKAST SODIUM 10 MG/1
10 TABLET ORAL NIGHTLY
Status: DISCONTINUED | OUTPATIENT
Start: 2019-06-28 | End: 2019-06-29 | Stop reason: HOSPADM

## 2019-06-28 RX ORDER — ALPRAZOLAM 0.5 MG/1
2 TABLET ORAL
Status: DISCONTINUED | OUTPATIENT
Start: 2019-06-28 | End: 2019-06-29 | Stop reason: HOSPADM

## 2019-06-28 RX ORDER — ACETAMINOPHEN 325 MG/1
650 TABLET ORAL EVERY 4 HOURS PRN
Status: DISCONTINUED | OUTPATIENT
Start: 2019-06-28 | End: 2019-06-29 | Stop reason: HOSPADM

## 2019-06-28 RX ORDER — SODIUM CHLORIDE 0.9 % (FLUSH) 0.9 %
3-10 SYRINGE (ML) INJECTION AS NEEDED
Status: DISCONTINUED | OUTPATIENT
Start: 2019-06-28 | End: 2019-06-29 | Stop reason: HOSPADM

## 2019-06-28 RX ORDER — SODIUM CHLORIDE 0.9 % (FLUSH) 0.9 %
3 SYRINGE (ML) INJECTION EVERY 12 HOURS SCHEDULED
Status: DISCONTINUED | OUTPATIENT
Start: 2019-06-28 | End: 2019-06-29 | Stop reason: HOSPADM

## 2019-06-28 RX ORDER — FLUCONAZOLE 200 MG/1
200 TABLET ORAL EVERY 24 HOURS
Status: DISCONTINUED | OUTPATIENT
Start: 2019-06-28 | End: 2019-06-29 | Stop reason: HOSPADM

## 2019-06-28 RX ADMIN — CARVEDILOL 25 MG: 25 TABLET, FILM COATED ORAL at 21:01

## 2019-06-28 RX ADMIN — SODIUM CHLORIDE 1000 ML: 9 INJECTION, SOLUTION INTRAVENOUS at 11:25

## 2019-06-28 RX ADMIN — Medication 10 ML: at 22:13

## 2019-06-28 RX ADMIN — SODIUM CHLORIDE 125 ML/HR: 9 INJECTION, SOLUTION INTRAVENOUS at 17:43

## 2019-06-28 RX ADMIN — SERTRALINE 50 MG: 50 TABLET, FILM COATED ORAL at 19:33

## 2019-06-28 RX ADMIN — ROSUVASTATIN CALCIUM 20 MG: 20 TABLET, FILM COATED ORAL at 19:34

## 2019-06-28 RX ADMIN — BACLOFEN 10 MG: 10 TABLET ORAL at 21:01

## 2019-06-28 RX ADMIN — ONDANSETRON 4 MG: 2 INJECTION INTRAMUSCULAR; INTRAVENOUS at 10:16

## 2019-06-28 RX ADMIN — SODIUM CHLORIDE, POTASSIUM CHLORIDE, SODIUM LACTATE AND CALCIUM CHLORIDE 1000 ML: 600; 310; 30; 20 INJECTION, SOLUTION INTRAVENOUS at 10:11

## 2019-06-28 RX ADMIN — MONTELUKAST SODIUM 10 MG: 10 TABLET, FILM COATED ORAL at 21:01

## 2019-06-28 RX ADMIN — FLUCONAZOLE 200 MG: 200 TABLET ORAL at 19:34

## 2019-06-29 VITALS
HEIGHT: 71 IN | SYSTOLIC BLOOD PRESSURE: 141 MMHG | OXYGEN SATURATION: 92 % | WEIGHT: 182.5 LBS | HEART RATE: 92 BPM | DIASTOLIC BLOOD PRESSURE: 77 MMHG | RESPIRATION RATE: 18 BRPM | TEMPERATURE: 98.5 F | BODY MASS INDEX: 25.55 KG/M2

## 2019-06-29 LAB
ANION GAP SERPL CALCULATED.3IONS-SCNC: 6.9 MMOL/L (ref 5–15)
BASOPHILS # BLD AUTO: 0.01 10*3/MM3 (ref 0–0.2)
BASOPHILS NFR BLD AUTO: 0.2 % (ref 0–1.5)
BUN BLD-MCNC: 12 MG/DL (ref 8–23)
BUN/CREAT SERPL: 21.4 (ref 7–25)
CALCIUM SPEC-SCNC: 8.2 MG/DL (ref 8.6–10.5)
CHLORIDE SERPL-SCNC: 105 MMOL/L (ref 98–107)
CO2 SERPL-SCNC: 27.1 MMOL/L (ref 22–29)
CREAT BLD-MCNC: 0.56 MG/DL (ref 0.76–1.27)
DEPRECATED RDW RBC AUTO: 43.2 FL (ref 37–54)
EOSINOPHIL # BLD AUTO: 0.07 10*3/MM3 (ref 0–0.4)
EOSINOPHIL NFR BLD AUTO: 1.6 % (ref 0.3–6.2)
ERYTHROCYTE [DISTWIDTH] IN BLOOD BY AUTOMATED COUNT: 12.5 % (ref 12.3–15.4)
GFR SERPL CREATININE-BSD FRML MDRD: 142 ML/MIN/1.73
GLUCOSE BLD-MCNC: 98 MG/DL (ref 65–99)
HCT VFR BLD AUTO: 37.5 % (ref 37.5–51)
HGB BLD-MCNC: 12.1 G/DL (ref 13–17.7)
IMM GRANULOCYTES # BLD AUTO: 0.02 10*3/MM3 (ref 0–0.05)
IMM GRANULOCYTES NFR BLD AUTO: 0.5 % (ref 0–0.5)
LYMPHOCYTES # BLD AUTO: 0.5 10*3/MM3 (ref 0.7–3.1)
LYMPHOCYTES NFR BLD AUTO: 11.7 % (ref 19.6–45.3)
MCH RBC QN AUTO: 30.8 PG (ref 26.6–33)
MCHC RBC AUTO-ENTMCNC: 32.3 G/DL (ref 31.5–35.7)
MCV RBC AUTO: 95.4 FL (ref 79–97)
MONOCYTES # BLD AUTO: 0.15 10*3/MM3 (ref 0.1–0.9)
MONOCYTES NFR BLD AUTO: 3.5 % (ref 5–12)
NEUTROPHILS # BLD AUTO: 3.53 10*3/MM3 (ref 1.7–7)
NEUTROPHILS NFR BLD AUTO: 82.5 % (ref 42.7–76)
NRBC BLD AUTO-RTO: 0 /100 WBC (ref 0–0.2)
PLATELET # BLD AUTO: 166 10*3/MM3 (ref 140–450)
PMV BLD AUTO: 11.2 FL (ref 6–12)
POTASSIUM BLD-SCNC: 4.4 MMOL/L (ref 3.5–5.2)
RBC # BLD AUTO: 3.93 10*6/MM3 (ref 4.14–5.8)
SODIUM BLD-SCNC: 139 MMOL/L (ref 136–145)
TROPONIN T SERPL-MCNC: <0.01 NG/ML (ref 0–0.03)
WBC NRBC COR # BLD: 4.28 10*3/MM3 (ref 3.4–10.8)

## 2019-06-29 PROCEDURE — G0378 HOSPITAL OBSERVATION PER HR: HCPCS

## 2019-06-29 PROCEDURE — 84484 ASSAY OF TROPONIN QUANT: CPT | Performed by: HOSPITALIST

## 2019-06-29 PROCEDURE — 80048 BASIC METABOLIC PNL TOTAL CA: CPT | Performed by: HOSPITALIST

## 2019-06-29 PROCEDURE — 96361 HYDRATE IV INFUSION ADD-ON: CPT

## 2019-06-29 PROCEDURE — 85025 COMPLETE CBC W/AUTO DIFF WBC: CPT | Performed by: HOSPITALIST

## 2019-06-29 RX ORDER — FLUCONAZOLE 200 MG/1
200 TABLET ORAL EVERY 24 HOURS
Qty: 3 TABLET | Refills: 0 | Status: SHIPPED | OUTPATIENT
Start: 2019-06-29 | End: 2019-06-29

## 2019-06-29 RX ORDER — FLUCONAZOLE 200 MG/1
200 TABLET ORAL EVERY 24 HOURS
Qty: 3 TABLET | Refills: 0 | Status: SHIPPED | OUTPATIENT
Start: 2019-06-29 | End: 2019-07-02

## 2019-06-29 RX ADMIN — HYDROCODONE BITARTRATE AND ACETAMINOPHEN 1 TABLET: 5; 325 TABLET ORAL at 07:37

## 2019-06-29 RX ADMIN — BACLOFEN 10 MG: 10 TABLET ORAL at 08:59

## 2019-06-29 RX ADMIN — Medication 10 ML: at 08:58

## 2019-06-29 RX ADMIN — SERTRALINE 50 MG: 50 TABLET, FILM COATED ORAL at 08:59

## 2019-06-29 RX ADMIN — SODIUM CHLORIDE 125 ML/HR: 9 INJECTION, SOLUTION INTRAVENOUS at 08:59

## 2019-06-29 RX ADMIN — SODIUM CHLORIDE 125 ML/HR: 9 INJECTION, SOLUTION INTRAVENOUS at 01:02

## 2019-06-29 RX ADMIN — SODIUM CHLORIDE, PRESERVATIVE FREE 3 ML: 5 INJECTION INTRAVENOUS at 08:59

## 2019-06-29 RX ADMIN — Medication 10 ML: at 11:15

## 2019-06-29 RX ADMIN — Medication 10 ML: at 07:03

## 2019-06-29 RX ADMIN — CARVEDILOL 25 MG: 25 TABLET, FILM COATED ORAL at 08:59

## 2019-06-29 RX ADMIN — ASPIRIN 81 MG: 81 TABLET, COATED ORAL at 08:59

## 2019-06-29 RX ADMIN — ROSUVASTATIN CALCIUM 20 MG: 20 TABLET, FILM COATED ORAL at 08:59

## 2019-07-01 ENCOUNTER — APPOINTMENT (OUTPATIENT)
Dept: ONCOLOGY | Facility: HOSPITAL | Age: 74
End: 2019-07-01

## 2019-07-01 ENCOUNTER — APPOINTMENT (OUTPATIENT)
Dept: RADIATION ONCOLOGY | Facility: HOSPITAL | Age: 74
End: 2019-07-01

## 2019-07-01 ENCOUNTER — INFUSION (OUTPATIENT)
Dept: ONCOLOGY | Facility: HOSPITAL | Age: 74
End: 2019-07-01

## 2019-07-01 ENCOUNTER — APPOINTMENT (OUTPATIENT)
Dept: ONCOLOGY | Facility: CLINIC | Age: 74
End: 2019-07-01

## 2019-07-01 ENCOUNTER — OFFICE VISIT (OUTPATIENT)
Dept: ONCOLOGY | Facility: CLINIC | Age: 74
End: 2019-07-01

## 2019-07-01 VITALS
HEART RATE: 102 BPM | TEMPERATURE: 98.5 F | WEIGHT: 177.3 LBS | RESPIRATION RATE: 16 BRPM | BODY MASS INDEX: 24.01 KG/M2 | OXYGEN SATURATION: 94 % | HEIGHT: 72 IN | SYSTOLIC BLOOD PRESSURE: 119 MMHG | DIASTOLIC BLOOD PRESSURE: 73 MMHG

## 2019-07-01 VITALS — DIASTOLIC BLOOD PRESSURE: 83 MMHG | HEART RATE: 77 BPM | SYSTOLIC BLOOD PRESSURE: 129 MMHG

## 2019-07-01 DIAGNOSIS — C01 SQUAMOUS CELL CARCINOMA OF BASE OF TONGUE (HCC): Primary | ICD-10-CM

## 2019-07-01 DIAGNOSIS — C01 SQUAMOUS CELL CARCINOMA OF BASE OF TONGUE (HCC): ICD-10-CM

## 2019-07-01 DIAGNOSIS — Z79.01 CURRENT USE OF LONG TERM ANTICOAGULATION: Primary | ICD-10-CM

## 2019-07-01 DIAGNOSIS — I48.91 ATRIAL FIBRILLATION, UNSPECIFIED TYPE (HCC): ICD-10-CM

## 2019-07-01 PROBLEM — Z45.2 FITTING AND ADJUSTMENT OF VASCULAR CATHETER: Status: ACTIVE | Noted: 2019-07-01

## 2019-07-01 LAB
ANION GAP SERPL CALCULATED.3IONS-SCNC: 11.2 MMOL/L (ref 5–15)
BASOPHILS # BLD AUTO: 0.03 10*3/MM3 (ref 0–0.2)
BASOPHILS NFR BLD AUTO: 0.8 % (ref 0–1.5)
BUN BLD-MCNC: 10 MG/DL (ref 6–20)
BUN/CREAT SERPL: 11.9 (ref 7.3–30)
CALCIUM SPEC-SCNC: 9 MG/DL (ref 8.5–10.2)
CHLORIDE SERPL-SCNC: 100 MMOL/L (ref 98–107)
CO2 SERPL-SCNC: 27.8 MMOL/L (ref 22–29)
CREAT BLD-MCNC: 0.84 MG/DL (ref 0.7–1.3)
DEPRECATED RDW RBC AUTO: 41.4 FL (ref 37–54)
EOSINOPHIL # BLD AUTO: 0.03 10*3/MM3 (ref 0–0.4)
EOSINOPHIL NFR BLD AUTO: 0.8 % (ref 0.3–6.2)
ERYTHROCYTE [DISTWIDTH] IN BLOOD BY AUTOMATED COUNT: 12.4 % (ref 12.3–15.4)
GFR SERPL CREATININE-BSD FRML MDRD: 89 ML/MIN/1.73
GLUCOSE BLD-MCNC: 121 MG/DL (ref 74–124)
HCT VFR BLD AUTO: 39.2 % (ref 37.5–51)
HGB BLD-MCNC: 13.4 G/DL (ref 13–17.7)
IMM GRANULOCYTES # BLD AUTO: 0.03 10*3/MM3 (ref 0–0.05)
IMM GRANULOCYTES NFR BLD AUTO: 0.8 % (ref 0–0.5)
INR PPP: 3.88 (ref 0.9–1.1)
LYMPHOCYTES # BLD AUTO: 0.65 10*3/MM3 (ref 0.7–3.1)
LYMPHOCYTES NFR BLD AUTO: 17.3 % (ref 19.6–45.3)
MCH RBC QN AUTO: 31.6 PG (ref 26.6–33)
MCHC RBC AUTO-ENTMCNC: 34.2 G/DL (ref 31.5–35.7)
MCV RBC AUTO: 92.5 FL (ref 79–97)
MONOCYTES # BLD AUTO: 0.35 10*3/MM3 (ref 0.1–0.9)
MONOCYTES NFR BLD AUTO: 9.3 % (ref 5–12)
NEUTROPHILS # BLD AUTO: 2.66 10*3/MM3 (ref 1.7–7)
NEUTROPHILS NFR BLD AUTO: 71 % (ref 42.7–76)
NRBC BLD AUTO-RTO: 0 /100 WBC (ref 0–0.2)
PLATELET # BLD AUTO: 205 10*3/MM3 (ref 140–450)
PMV BLD AUTO: 10.1 FL (ref 6–12)
POTASSIUM BLD-SCNC: 3.9 MMOL/L (ref 3.5–4.7)
PROTHROMBIN TIME: 37.8 SECONDS (ref 11.7–14.2)
RBC # BLD AUTO: 4.24 10*6/MM3 (ref 4.14–5.8)
SODIUM BLD-SCNC: 139 MMOL/L (ref 134–145)
WBC NRBC COR # BLD: 3.75 10*3/MM3 (ref 3.4–10.8)

## 2019-07-01 PROCEDURE — 25010000002 PACLITAXEL PER 30 MG: Performed by: INTERNAL MEDICINE

## 2019-07-01 PROCEDURE — 77386: CPT | Performed by: RADIOLOGY

## 2019-07-01 PROCEDURE — 25010000002 PALONOSETRON PER 25 MCG: Performed by: INTERNAL MEDICINE

## 2019-07-01 PROCEDURE — 99214 OFFICE O/P EST MOD 30 MIN: CPT | Performed by: NURSE PRACTITIONER

## 2019-07-01 PROCEDURE — 80048 BASIC METABOLIC PNL TOTAL CA: CPT

## 2019-07-01 PROCEDURE — 25010000002 CARBOPLATIN PER 50 MG: Performed by: INTERNAL MEDICINE

## 2019-07-01 PROCEDURE — 96375 TX/PRO/DX INJ NEW DRUG ADDON: CPT | Performed by: NURSE PRACTITIONER

## 2019-07-01 PROCEDURE — 96417 CHEMO IV INFUS EACH ADDL SEQ: CPT | Performed by: NURSE PRACTITIONER

## 2019-07-01 PROCEDURE — 85610 PROTHROMBIN TIME: CPT | Performed by: INTERNAL MEDICINE

## 2019-07-01 PROCEDURE — 25010000002 DEXAMETHASONE SODIUM PHOSPHATE 100 MG/10ML SOLUTION: Performed by: INTERNAL MEDICINE

## 2019-07-01 PROCEDURE — 77014 CHG CT GUIDANCE RADIATION THERAPY FLDS PLACEMENT: CPT | Performed by: RADIOLOGY

## 2019-07-01 PROCEDURE — 85025 COMPLETE CBC W/AUTO DIFF WBC: CPT

## 2019-07-01 PROCEDURE — 77336 RADIATION PHYSICS CONSULT: CPT | Performed by: RADIOLOGY

## 2019-07-01 PROCEDURE — 25010000002 DIPHENHYDRAMINE PER 50 MG: Performed by: INTERNAL MEDICINE

## 2019-07-01 PROCEDURE — 77386 CHG INTENSITY MODULATED RADIATION TX DLVR COMPLEX: CPT | Performed by: RADIOLOGY

## 2019-07-01 PROCEDURE — 96413 CHEMO IV INFUSION 1 HR: CPT | Performed by: NURSE PRACTITIONER

## 2019-07-01 RX ORDER — PALONOSETRON 0.05 MG/ML
0.25 INJECTION, SOLUTION INTRAVENOUS ONCE
Status: COMPLETED | OUTPATIENT
Start: 2019-07-01 | End: 2019-07-01

## 2019-07-01 RX ORDER — SODIUM CHLORIDE 0.9 % (FLUSH) 0.9 %
10 SYRINGE (ML) INJECTION AS NEEDED
Status: CANCELLED | OUTPATIENT
Start: 2019-07-01

## 2019-07-01 RX ORDER — SODIUM CHLORIDE 9 MG/ML
250 INJECTION, SOLUTION INTRAVENOUS ONCE
Status: COMPLETED | OUTPATIENT
Start: 2019-07-01 | End: 2019-07-01

## 2019-07-01 RX ORDER — FAMOTIDINE 10 MG/ML
20 INJECTION, SOLUTION INTRAVENOUS ONCE
Status: COMPLETED | OUTPATIENT
Start: 2019-07-01 | End: 2019-07-01

## 2019-07-01 RX ADMIN — DIPHENHYDRAMINE HYDROCHLORIDE 12.5 MG: 50 INJECTION INTRAMUSCULAR; INTRAVENOUS at 13:22

## 2019-07-01 RX ADMIN — PALONOSETRON HYDROCHLORIDE 0.25 MG: 0.25 INJECTION, SOLUTION INTRAVENOUS at 13:00

## 2019-07-01 RX ADMIN — DEXAMETHASONE SODIUM PHOSPHATE 12 MG: 10 INJECTION, SOLUTION INTRAMUSCULAR; INTRAVENOUS at 12:59

## 2019-07-01 RX ADMIN — PACLITAXEL 105 MG: 6 INJECTION, SOLUTION INTRAVENOUS at 14:13

## 2019-07-01 RX ADMIN — SODIUM CHLORIDE 250 ML: 9 INJECTION, SOLUTION INTRAVENOUS at 12:59

## 2019-07-01 RX ADMIN — CARBOPLATIN 220 MG: 10 INJECTION, SOLUTION INTRAVENOUS at 15:18

## 2019-07-01 RX ADMIN — FAMOTIDINE 20 MG: 10 INJECTION, SOLUTION INTRAVENOUS at 12:59

## 2019-07-01 NOTE — PROGRESS NOTES
Continued Stay Note  [facility]     Patient Name: Cody Eldridge  MRN: [id number]  Today's Date: 7/1/2019    Admit Date: 6/28/2019    Discharge Plan     Row Name 07/01/19 1304       Plan    Final Discharge Disposition Code  01 - home or self-care    Final Note  home        Discharge Codes    No documentation.       Expected Discharge Date and Time     Expected Discharge Date Expected Discharge Time    Jun 29, 2019             Chrissy Cordero RN

## 2019-07-01 NOTE — PROGRESS NOTES
Subjective     REASON FOR FOLLOW UP:  1.  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   2.  Combined radiation and low-dose carboplatin and Taxol chemotherapy weekly initiated 6/17/2019.  3.  MediPort placed by Dr. Adolph Grigsby of vascular surgery 6/18/2019    HISTORY OF PRESENT ILLNESS:  The patient is a 74 y.o. year old male who is undergoing treatment with weekly carboplatin and Taxol along with radiation for his squamous cell carcinoma of the base of the tongue, HPV positive.  He did present to the ER on 6/29/2019 due to lightheaded and dizziness and collapsing at home.  He was given IV fluids, found to be hypotensive, and kept overnight for hydration.  He was started on fluconazole for his oral mucositis and thrush.  He was also given Meghann's Magic mouth rinse.  The patient reports poor oral intake over the past 5 days.  He is trying to drink at least 1-2 supplemental drinks such as boost or Ensure daily along with some soft foods but still struggling.  He has met with Ruthy Bautista in the past.  The ER did discontinue some of his blood pressure medications and he is due to see cardiology this week.  He does continue on his warfarin, dose was not adjusted from the ER though his INR was elevated.    History of Present Illness     Past Medical History:   Diagnosis Date   • Arrhythmia    • Atrial fibrillation (CMS/HCC)    • CAD (coronary artery disease)    • Cancer (CMS/HCC) 05/2019    Neck, squamous   • CHF (congestive heart failure) (CMS/HCC)    • Chronic bronchitis (CMS/HCC)    • COPD (chronic obstructive pulmonary disease) (CMS/HCC)    • Coronary artery disease of native artery of native heart with stable angina pectoris (CMS/HCC)    • Cramps of lower extremity    • Daytime hypersomnia    • Depression    • Depression with anxiety    • IRAHETA (dyspnea on exertion)    • Drug therapy    • Dyspnea on exertion    • Emphysema of lung (CMS/HCC)    • Encounter for immunization     flu vaccine high  "dose PF 65+   • Enlarged prostate    • Fatigue    • Frequent nocturnal awakening    • Gout    • H/O cardiac radiofrequency ablation 2006    [facility].   • Hyperlipidemia    • Hypertension    • Insomnia    • Lumbar radicular pain    • MI (myocardial infarction) (CMS/[facility])    • Non-rheumatic tricuspid valve insufficiency    • SHAR (obstructive sleep apnea)    • Osteoarthritis    • Precordial pain    • Pulmonary emphysema (CMS/HCC)    • Right leg pain     sciatica nerve pain   • Shortness of breath    • Snoring    • SVT (supraventricular tachycardia) (CMS/[facility])    • Syncope    • Vitamin D deficiency         Past Surgical History:   Procedure Laterality Date   • APPENDECTOMY     • CARDIAC ABLATION  2006    [facility].   • CARDIAC CATHETERIZATION N/A 8/29/2018    Procedure: Left Heart Cath;  Surgeon: Yousif Uribe MD;  Location: [facility] CATH INVASIVE LOCATION;  Service: Cardiology   • CARDIAC CATHETERIZATION N/A 8/29/2018    Procedure: Coronary angiography;  Surgeon: Yousif Uribe MD;  Location: [facility] CATH INVASIVE LOCATION;  Service: Cardiology   • CARDIAC CATHETERIZATION N/A 8/29/2018    Procedure: Left ventriculography;  Surgeon: Yousif Uribe MD;  Location: [facility] CATH INVASIVE LOCATION;  Service: Cardiology   • FINGER SURGERY     • FOOT SURGERY     • HAND SURGERY     • VENOUS ACCESS DEVICE (PORT) INSERTION Right 6/18/2019    Procedure: MEDIPORT PLACEMENT;  Surgeon: Elvis Grigsby MD;  Location: [facility] OR;  Service: Vascular        ALLERGIES:    Allergies   Allergen Reactions   • Benadryl [Diphenhydramine Hcl] Other (See Comments) and Dizziness     \"I sleep for about a day\"        Review of Systems   Constitutional: Positive for appetite change, fatigue and unexpected weight change. Negative for activity change, chills and fever.   HENT: Positive for mouth sores. Negative for trouble swallowing and voice change.    Eyes: Negative for pain and visual disturbance.   Respiratory: Negative for cough, " "shortness of breath and wheezing.    Cardiovascular: Positive for leg swelling. Negative for chest pain and palpitations.   Gastrointestinal: Negative for abdominal pain, constipation, diarrhea, nausea and vomiting.   Genitourinary: Negative for difficulty urinating, frequency and urgency.   Musculoskeletal: Positive for back pain. Negative for arthralgias and joint swelling.   Skin: Negative for rash.   Neurological: Positive for dizziness, weakness and headaches. Negative for seizures.   Hematological: Negative for adenopathy. Does not bruise/bleed easily.   Psychiatric/Behavioral: Negative for behavioral problems and confusion. The patient is not nervous/anxious.         Objective     Vitals:    07/01/19 1155   BP: 119/73   Pulse: 102   Resp: 16   Temp: 98.5 °F (36.9 °C)   TempSrc: Oral   SpO2: 94%   Weight: 80.4 kg (177 lb 4.8 oz)   Height: 182.9 cm (72.01\")   PainSc:   7   PainLoc: Back  Comment: back and throat     Current Status 7/1/2019   ECOG score 0       Physical Exam   Constitutional: He is oriented to person, place, and time. He appears well-developed and well-nourished. No distress.   HENT:   Head: Normocephalic.   Mouth/Throat: Oropharyngeal exudate (mucositits with thrush present) present.   Firm, 5 to 6 cm palpable mass at the angle of the mandible on the right   Eyes: Conjunctivae and EOM are normal. Pupils are equal, round, and reactive to light. No scleral icterus.   Neck: Normal range of motion. Neck supple. No JVD present. No thyromegaly present.   Cardiovascular: Normal rate. An irregularly irregular rhythm present. Exam reveals no gallop and no friction rub.   No murmur heard.  Pulmonary/Chest: Effort normal and breath sounds normal. He has no wheezes. He has no rales.   Abdominal: Soft. He exhibits no distension and no mass. There is no tenderness.   Musculoskeletal: Normal range of motion. He exhibits edema. He exhibits no deformity.   1-2+ ankle edema bilaterally   Lymphadenopathy:     He " has no cervical adenopathy.   Neurological: He is alert and oriented to person, place, and time. He has normal reflexes. No cranial nerve deficit.   Skin: Skin is warm and dry. No rash noted. No erythema.   Purpura on the extremities   Psychiatric: He has a normal mood and affect. His behavior is normal. Judgment normal.         RECENT LABS:  Hematology WBC   Date Value Ref Range Status   07/01/2019 3.75 3.40 - 10.80 10*3/mm3 Final     RBC   Date Value Ref Range Status   07/01/2019 4.24 4.14 - 5.80 10*6/mm3 Final     Hemoglobin   Date Value Ref Range Status   07/01/2019 13.4 13.0 - 17.7 g/dL Final     Hematocrit   Date Value Ref Range Status   07/01/2019 39.2 37.5 - 51.0 % Final     Platelets   Date Value Ref Range Status   07/01/2019 205 140 - 450 10*3/mm3 Final        Lab Results   Component Value Date    GLUCOSE 121 07/01/2019    BUN 10 07/01/2019    CREATININE 0.84 07/01/2019    EGFRIFNONA 89 07/01/2019    EGFRIFAFRI 107 04/23/2018    BCR 11.9 07/01/2019    K 3.9 07/01/2019    CO2 27.8 07/01/2019    CALCIUM 9.0 07/01/2019    PROTENTOTREF 7.2 04/23/2018    ALBUMIN 2.80 (L) 06/28/2019    LABIL2 1.3 04/23/2018    AST 14 06/28/2019    ALT 16 06/28/2019       PATHOLOGY 5/15/2019  Final Diagnosis   1.  Right Neck, FNA:                  A.  Positive for squamous cell carcinoma.     Flow Cytometry Report, Addendum   Utilizing appropriate positive and negative controls immunohistochemical stain p16 is performed on cell block material.  The tumor is positive for p16 by immunohistochemical staining which is a surrogate for HPV infection.       F-18 FDG PET FROM SKULL BASE TO MID THIGH WITH PET/CT FUSION 5/23/2019  IMPRESSION:  1.  Asymmetric intensely FDG avid masslike soft tissue thickening within  the right base of the tongue, likely representing malignancy. There is a  large intensely FDG avid soft tissue mass centered over the right  carotid space likely representing metastatic disease.  2.  Intense asymmetric FDG  uptake overlying the left cricoid cartilage  with associated asymmetric mucosal thickening within the inferior aspect  of the left pyriform sinus. Findings are concerning for malignancy and  further evaluation with endoscopy is recommended.  3.  Sub-6 mm noncalcified pulmonary nodule within the right middle lobe  which was not present on 10/02/2017. The nodule is below PET resolution  and findings are nonspecific. Continued close attention on follow up is  recommended to  exclude metastatic disease.  4.  Mild-to-moderately FDG avid subcutaneous nodule within the left  upper back measuring up to 1.1 cm, findings are nonspecific and further  evaluation with physical exam is recommended.  5.  No findings of FDG avid malignancy within the abdomen or pelvis.    Assessment/Plan     1.  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  He is undergoing definitive treatment with radiation therapy and weekly low-dose carboplatin and Taxol chemotherapy.  He is here today for cycle #3.  We will proceed as discussed with Dr. Yu today.  2.  Comorbidities including ischemic heart disease with history of CHF and atrial fibrillation requiring Coumadin anticoagulation.  3.  Observational stay at [facility] on 6/29/2019 secondary to poor oral intake with lightheadedness and dizziness and syncope.  The patient has stopped his antihypertensives of lisinopril and amlodipine.  He sees cardiology later this week.  He was given IV fluids in hospital.  He was also started on fluconazole for mucositis/thrush.  We discussed today increasing his oral intake to 3 boost/ensures daily at a minimum in addition to soft foods and fluids.  He verbalized understanding and will try this.  4.  Oral mucositis/thrush secondary to chemoradiation.  Patient was given fluconazole by the ER, 200 mg daily x3 days.  He started that this morning.  We will continue to  monitor symptoms.  He does have Meghann's Magic mouth rinse to use as well.  5.  Warfarin anticoagulation.  Patient's INR was elevated in the ER, he was not instructed to hold or change his dosing.  The INR range remains elevated today and in light of him taking fluconazole, we will hold his warfarin with repeat INR with nurse review on Friday of this week as discussed with Dr. Yu today..    Plan  1.  Proceed with cycle #3 carboplatin and Taxol chemotherapy in conjunction with radiation therapy being delivered by Dr. Davila at the [facility] Monday through Friday.  2.  Hold warfarin until Friday of this week with repeat INR and RN review.  3.  Continue fluconazole.  4.  Follow-up with cardiology this week for medication management, evaluation of blood pressure, and lower extremity edema.  5.  Patient will inform radiation oncology of his stay in the hospital over the weekend and they can evaluate the need for any adjustment or holding treatment.  6.  Patient will notify the office for any decrease in oral intake.  He does know that there is a possibility of needing NG tube in the future though was not ready to do that at this point.

## 2019-07-02 ENCOUNTER — OFFICE VISIT (OUTPATIENT)
Dept: CARDIOLOGY | Facility: CLINIC | Age: 74
End: 2019-07-02

## 2019-07-02 ENCOUNTER — RADIATION ONCOLOGY WEEKLY ASSESSMENT (OUTPATIENT)
Dept: RADIATION ONCOLOGY | Facility: HOSPITAL | Age: 74
End: 2019-07-02

## 2019-07-02 VITALS
RESPIRATION RATE: 16 BRPM | HEIGHT: 72 IN | WEIGHT: 177 LBS | DIASTOLIC BLOOD PRESSURE: 70 MMHG | SYSTOLIC BLOOD PRESSURE: 100 MMHG | HEART RATE: 132 BPM | BODY MASS INDEX: 23.98 KG/M2

## 2019-07-02 VITALS
HEART RATE: 99 BPM | DIASTOLIC BLOOD PRESSURE: 74 MMHG | WEIGHT: 176 LBS | OXYGEN SATURATION: 95 % | TEMPERATURE: 98.4 F | SYSTOLIC BLOOD PRESSURE: 117 MMHG | BODY MASS INDEX: 23.86 KG/M2

## 2019-07-02 DIAGNOSIS — I48.91 ATRIAL FIBRILLATION, UNSPECIFIED TYPE (HCC): ICD-10-CM

## 2019-07-02 DIAGNOSIS — E78.5 HYPERLIPIDEMIA, UNSPECIFIED HYPERLIPIDEMIA TYPE: ICD-10-CM

## 2019-07-02 DIAGNOSIS — J43.9 PULMONARY EMPHYSEMA, UNSPECIFIED EMPHYSEMA TYPE (HCC): ICD-10-CM

## 2019-07-02 DIAGNOSIS — I25.118 CORONARY ARTERY DISEASE OF NATIVE ARTERY OF NATIVE HEART WITH STABLE ANGINA PECTORIS (HCC): Primary | ICD-10-CM

## 2019-07-02 DIAGNOSIS — I10 ESSENTIAL HYPERTENSION: ICD-10-CM

## 2019-07-02 DIAGNOSIS — C01 SQUAMOUS CELL CARCINOMA OF BASE OF TONGUE (HCC): Primary | ICD-10-CM

## 2019-07-02 PROCEDURE — 93000 ELECTROCARDIOGRAM COMPLETE: CPT | Performed by: INTERNAL MEDICINE

## 2019-07-02 PROCEDURE — 99214 OFFICE O/P EST MOD 30 MIN: CPT | Performed by: INTERNAL MEDICINE

## 2019-07-02 PROCEDURE — 77386: CPT | Performed by: RADIOLOGY

## 2019-07-02 PROCEDURE — 77014 CHG CT GUIDANCE RADIATION THERAPY FLDS PLACEMENT: CPT | Performed by: RADIOLOGY

## 2019-07-02 PROCEDURE — 77386 CHG INTENSITY MODULATED RADIATION TX DLVR COMPLEX: CPT | Performed by: RADIOLOGY

## 2019-07-02 RX ORDER — METOPROLOL TARTRATE 50 MG/1
50 TABLET, FILM COATED ORAL EVERY 12 HOURS SCHEDULED
Qty: 180 TABLET | Refills: 3 | Status: SHIPPED | OUTPATIENT
Start: 2019-07-02 | End: 2019-07-15

## 2019-07-02 NOTE — PROGRESS NOTES
Physician Weekly Management Note    Diagnosis:     1. 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)    Cancer Staging  Stage I (cT2, cN1, cM0, p16: Positive)    Reason for Visit: Radiation (10/33)    Concurrent Chemo:   Yes    Notes on Treatment course, Films, Medical progress and Plan:  Doing better. Released from hospital, dehydration. Eating and drinking well now. Taking in 5 ensure plus a day and drinking lots of water. Mouth much better. Finishes diflucan today, continue swish and swallow. Anxiety also improving. Cont on.    ROS -  Other than those listed above, as follows:  Constitutional - Normal - no complaints of fatigue, denies lack of appetite, fever, night sweats or change in weight.  Neck - Normal - denies neck masses, muscle weakness, neck pain, decreased range of motion or swelling.  Cardiovascular - Normal - denies arrhythmias, chest pain, dyspnea, edema, orthopnea or palpitations.  Respiratory - Normal - denies cough, dyspnea, hemoptysis, hiccoughs, pleuritic chest pain or wheezing.  Gastrointestinal - Normal - no complaints of constipation, abdominal pain, diarrhea, heartburn/dyspepsia, hematemesis, hemorrhoids, melena or GI bleeding, nausea, pain or cramping or vomiting.  Neuro - Normal - no new complaints of vision change, headache, nausea, cranial nerve deficit, gait abnormality, syncope, seizure.    PHYSICAL EXAM - Other than changes listed above, as follows:  Vitals:    07/02/19 0832   BP: 117/74   Pulse: 99   Temp: 98.4 °F (36.9 °C)   TempSrc: Oral   SpO2: 95%   Weight: 79.8 kg (176 lb)   PainSc:   8   PainLoc: Comment: neck and throat       Constitutional - Normal - no evidence of impaired alertness, inadequate appearance, premature or advanced chronologic age, uncooperativeness, altered mood, affect or disorientation.  Neck - Normal - no evidence of tender or enlarged lymph nodes, neck abnormalities, restricted range of motion or enlarged thyroid.  Chest - Normal - no evidence of  "chest abnormalities, tender or enlarged lymph nodes.  Respiratory - Normal - no evidence of abnormal breat sounds, chest abnormalities on palpation and chest abnormalities on percussion and normal breath sounds.  Hematologic/Lymphatic - Normal - no evidence of tender or enlarged axillary lymph nodes nor tender or enlarged neck nodes.  Neuro - Normal - no evidence of cranial nerve deficit, gait abnormality.    Performance Status:  (1) Restricted in physically strenuous activity, ambulatory and able to do work of light nature    Problem added:  No problems updated.  Medications added: No orders of the defined types were placed in this encounter.    Ancillary referrals made: No orders of the defined types were placed in this encounter.      Technical aspects reviewed:  Weekly OBI approved if applicable? Yes   Weekly port films approved?  Yes  Change requests noted if applicable?  Yes   Patient setup and plan reviewed?  Yes     Chart Reviewed:  Continue current treatment plan?  Yes  Treatment plan change requested? No    I have reviewed and marked as \"reviewed\" the current medications, allergies and problem list in the patients EMR.    I have reviewed the patient's medical, surgical  history in detail, reviewed any pertinent lab work  and updated the computerized patient record if needed.    Patient's Care Team:  Patient Care Team:  Epley, James, APRN as PCP - General (Family Medicine)  Payal Waters MD as Consulting Physician (Pain Medicine)  Lorna Chaidez (Nurse Practitioner)  Kristal Cowart [facility] as Pharmacist  Fritz Slater MD as Referring Physician (Otolaryngology)  Pablo Heaton MD as Consulting Physician (Hematology and Oncology)  Annie Davila MD as Consulting Physician (Radiation Oncology)  Chetan Heaton MD as Consulting Physician (Urology)      "

## 2019-07-03 ENCOUNTER — DOCUMENTATION (OUTPATIENT)
Dept: NUTRITION | Facility: HOSPITAL | Age: 74
End: 2019-07-03

## 2019-07-03 PROCEDURE — 77386: CPT | Performed by: RADIOLOGY

## 2019-07-03 PROCEDURE — 77386 CHG INTENSITY MODULATED RADIATION TX DLVR COMPLEX: CPT | Performed by: RADIOLOGY

## 2019-07-03 PROCEDURE — 77427 RADIATION TX MANAGEMENT X5: CPT | Performed by: RADIOLOGY

## 2019-07-03 PROCEDURE — 77014 CHG CT GUIDANCE RADIATION THERAPY FLDS PLACEMENT: CPT | Performed by: RADIOLOGY

## 2019-07-03 NOTE — PROGRESS NOTES
ONC Nutrition Follow Up:     Weight 177#, decreased 5# from beginning of treatment. Day 10/25 of radiation.  Received weekly carbo/taxol. He was in the hospital overnight with dizziness, hypotension, dehydration. Given IVF. Started on fluconazole which he has completed taking.   He is using [employer] Magic and the gelclair. He is now drinking 5 Boost plus per day. He reports that he is also drinking gatorade and room temp water. Anything too cold burns his tongue. He was able to get his dentures in yesterday but is not able to eat very much, mostly liquids and very soft foods so he really does not need to wear them.   He does say that the Meghann's magic is working and he is able to drink a Boost plus after taking it.   Encouraged intake of the Boost plus. If it gets to be too much for him we can change to Boost Very High Calorie available to order through his pharmacy. Will follow up next week and monitor weight and intake.   He wants to avoid a feeding tube but knows that it is a possibility.

## 2019-07-05 ENCOUNTER — RESULTS ENCOUNTER (OUTPATIENT)
Dept: ONCOLOGY | Facility: CLINIC | Age: 74
End: 2019-07-05

## 2019-07-05 ENCOUNTER — LAB (OUTPATIENT)
Dept: LAB | Facility: HOSPITAL | Age: 74
End: 2019-07-05

## 2019-07-05 ENCOUNTER — INFUSION (OUTPATIENT)
Dept: ONCOLOGY | Facility: HOSPITAL | Age: 74
End: 2019-07-05

## 2019-07-05 DIAGNOSIS — Z79.01 CURRENT USE OF LONG TERM ANTICOAGULATION: ICD-10-CM

## 2019-07-05 DIAGNOSIS — I48.91 ATRIAL FIBRILLATION, UNSPECIFIED TYPE (HCC): ICD-10-CM

## 2019-07-05 DIAGNOSIS — C01 SQUAMOUS CELL CARCINOMA OF BASE OF TONGUE (HCC): ICD-10-CM

## 2019-07-05 LAB
INR PPP: 1.7 (ref 0.9–1.1)
PROTHROMBIN TIME: 20.1 SECONDS (ref 11–13.5)

## 2019-07-05 PROCEDURE — 77014 CHG CT GUIDANCE RADIATION THERAPY FLDS PLACEMENT: CPT | Performed by: RADIOLOGY

## 2019-07-05 PROCEDURE — 77386: CPT | Performed by: RADIOLOGY

## 2019-07-05 PROCEDURE — 77386 CHG INTENSITY MODULATED RADIATION TX DLVR COMPLEX: CPT | Performed by: RADIOLOGY

## 2019-07-05 PROCEDURE — 85610 PROTHROMBIN TIME: CPT

## 2019-07-05 RX ORDER — MONTELUKAST SODIUM 10 MG/1
10 TABLET ORAL DAILY
Qty: 90 TABLET | Refills: 0 | Status: SHIPPED | OUTPATIENT
Start: 2019-07-05 | End: 2019-08-03

## 2019-07-05 NOTE — PROGRESS NOTES
Pt to infusion room for RN review of PT, INR  PT 1.7,   INR range 2 - 3  Pt was instructed to stop his coumadin due to high INR per Nathalie Benítez's notes 07/01/19, pt was put on 3 days of diflucan  Per standing stone dose adjusted to 3 mg on Sunday, 4 mg all other days  Copy of dosing given to patient with instructions, pt V/U   Pt has F/U scheduled for next week

## 2019-07-08 ENCOUNTER — INFUSION (OUTPATIENT)
Dept: ONCOLOGY | Facility: HOSPITAL | Age: 74
End: 2019-07-08

## 2019-07-08 ENCOUNTER — DOCUMENTATION (OUTPATIENT)
Dept: NUTRITION | Facility: HOSPITAL | Age: 74
End: 2019-07-08

## 2019-07-08 ENCOUNTER — TELEPHONE (OUTPATIENT)
Dept: CARDIOLOGY | Facility: CLINIC | Age: 74
End: 2019-07-08

## 2019-07-08 ENCOUNTER — OFFICE VISIT (OUTPATIENT)
Dept: ONCOLOGY | Facility: CLINIC | Age: 74
End: 2019-07-08

## 2019-07-08 VITALS
DIASTOLIC BLOOD PRESSURE: 60 MMHG | OXYGEN SATURATION: 93 % | RESPIRATION RATE: 18 BRPM | HEART RATE: 128 BPM | HEIGHT: 72 IN | TEMPERATURE: 98.6 F | WEIGHT: 168 LBS | BODY MASS INDEX: 22.75 KG/M2 | SYSTOLIC BLOOD PRESSURE: 90 MMHG

## 2019-07-08 VITALS — DIASTOLIC BLOOD PRESSURE: 78 MMHG | HEART RATE: 97 BPM | SYSTOLIC BLOOD PRESSURE: 122 MMHG

## 2019-07-08 DIAGNOSIS — Z45.2 FITTING AND ADJUSTMENT OF VASCULAR CATHETER: Primary | ICD-10-CM

## 2019-07-08 DIAGNOSIS — C01 SQUAMOUS CELL CARCINOMA OF BASE OF TONGUE (HCC): Primary | ICD-10-CM

## 2019-07-08 DIAGNOSIS — I48.91 ATRIAL FIBRILLATION, UNSPECIFIED TYPE (HCC): ICD-10-CM

## 2019-07-08 DIAGNOSIS — C01 SQUAMOUS CELL CARCINOMA OF BASE OF TONGUE (HCC): ICD-10-CM

## 2019-07-08 DIAGNOSIS — Z79.01 CURRENT USE OF LONG TERM ANTICOAGULATION: ICD-10-CM

## 2019-07-08 LAB
ANION GAP SERPL CALCULATED.3IONS-SCNC: 9.6 MMOL/L (ref 5–15)
BASOPHILS # BLD AUTO: 0.04 10*3/MM3 (ref 0–0.2)
BASOPHILS NFR BLD AUTO: 0.9 % (ref 0–1.5)
BUN BLD-MCNC: 30 MG/DL (ref 6–20)
BUN/CREAT SERPL: 30.6 (ref 7.3–30)
CALCIUM SPEC-SCNC: 9.1 MG/DL (ref 8.5–10.2)
CHLORIDE SERPL-SCNC: 104 MMOL/L (ref 98–107)
CO2 SERPL-SCNC: 27.4 MMOL/L (ref 22–29)
CREAT BLD-MCNC: 0.98 MG/DL (ref 0.7–1.3)
DEPRECATED RDW RBC AUTO: 43.6 FL (ref 37–54)
EOSINOPHIL # BLD AUTO: 0 10*3/MM3 (ref 0–0.4)
EOSINOPHIL NFR BLD AUTO: 0 % (ref 0.3–6.2)
ERYTHROCYTE [DISTWIDTH] IN BLOOD BY AUTOMATED COUNT: 13 % (ref 12.3–15.4)
GFR SERPL CREATININE-BSD FRML MDRD: 75 ML/MIN/1.73
GLUCOSE BLD-MCNC: 155 MG/DL (ref 74–124)
HCT VFR BLD AUTO: 40 % (ref 37.5–51)
HGB BLD-MCNC: 13.4 G/DL (ref 13–17.7)
IMM GRANULOCYTES # BLD AUTO: 0.05 10*3/MM3 (ref 0–0.05)
IMM GRANULOCYTES NFR BLD AUTO: 1.1 % (ref 0–0.5)
INR PPP: 1.9 (ref 0.9–1.1)
LYMPHOCYTES # BLD AUTO: 0.32 10*3/MM3 (ref 0.7–3.1)
LYMPHOCYTES NFR BLD AUTO: 7.1 % (ref 19.6–45.3)
MCH RBC QN AUTO: 31.9 PG (ref 26.6–33)
MCHC RBC AUTO-ENTMCNC: 33.5 G/DL (ref 31.5–35.7)
MCV RBC AUTO: 95.2 FL (ref 79–97)
MONOCYTES # BLD AUTO: 0.25 10*3/MM3 (ref 0.1–0.9)
MONOCYTES NFR BLD AUTO: 5.6 % (ref 5–12)
NEUTROPHILS # BLD AUTO: 3.82 10*3/MM3 (ref 1.7–7)
NEUTROPHILS NFR BLD AUTO: 85.3 % (ref 42.7–76)
NRBC BLD AUTO-RTO: 0 /100 WBC (ref 0–0.2)
PLATELET # BLD AUTO: 170 10*3/MM3 (ref 140–450)
PMV BLD AUTO: 10.5 FL (ref 6–12)
POTASSIUM BLD-SCNC: 4.4 MMOL/L (ref 3.5–4.7)
PROTHROMBIN TIME: 22.9 SECONDS (ref 11–13.5)
RBC # BLD AUTO: 4.2 10*6/MM3 (ref 4.14–5.8)
SODIUM BLD-SCNC: 141 MMOL/L (ref 134–145)
WBC NRBC COR # BLD: 4.48 10*3/MM3 (ref 3.4–10.8)

## 2019-07-08 PROCEDURE — 96413 CHEMO IV INFUSION 1 HR: CPT | Performed by: NURSE PRACTITIONER

## 2019-07-08 PROCEDURE — 85610 PROTHROMBIN TIME: CPT

## 2019-07-08 PROCEDURE — 25010000002 DIPHENHYDRAMINE PER 50 MG: Performed by: INTERNAL MEDICINE

## 2019-07-08 PROCEDURE — 96417 CHEMO IV INFUS EACH ADDL SEQ: CPT | Performed by: NURSE PRACTITIONER

## 2019-07-08 PROCEDURE — 25010000002 CARBOPLATIN PER 50 MG: Performed by: INTERNAL MEDICINE

## 2019-07-08 PROCEDURE — 85025 COMPLETE CBC W/AUTO DIFF WBC: CPT

## 2019-07-08 PROCEDURE — 77014 CHG CT GUIDANCE RADIATION THERAPY FLDS PLACEMENT: CPT | Performed by: RADIOLOGY

## 2019-07-08 PROCEDURE — 77336 RADIATION PHYSICS CONSULT: CPT | Performed by: RADIOLOGY

## 2019-07-08 PROCEDURE — 25010000002 PACLITAXEL PER 30 MG: Performed by: INTERNAL MEDICINE

## 2019-07-08 PROCEDURE — 25010000002 PALONOSETRON PER 25 MCG: Performed by: INTERNAL MEDICINE

## 2019-07-08 PROCEDURE — 96375 TX/PRO/DX INJ NEW DRUG ADDON: CPT | Performed by: NURSE PRACTITIONER

## 2019-07-08 PROCEDURE — 77386 CHG INTENSITY MODULATED RADIATION TX DLVR COMPLEX: CPT | Performed by: RADIOLOGY

## 2019-07-08 PROCEDURE — 25010000002 DEXAMETHASONE SODIUM PHOSPHATE 100 MG/10ML SOLUTION: Performed by: INTERNAL MEDICINE

## 2019-07-08 PROCEDURE — 77386: CPT | Performed by: RADIOLOGY

## 2019-07-08 PROCEDURE — 99214 OFFICE O/P EST MOD 30 MIN: CPT | Performed by: NURSE PRACTITIONER

## 2019-07-08 PROCEDURE — 96361 HYDRATE IV INFUSION ADD-ON: CPT | Performed by: NURSE PRACTITIONER

## 2019-07-08 PROCEDURE — 80048 BASIC METABOLIC PNL TOTAL CA: CPT

## 2019-07-08 RX ORDER — SODIUM CHLORIDE 0.9 % (FLUSH) 0.9 %
10 SYRINGE (ML) INJECTION AS NEEDED
Status: CANCELLED | OUTPATIENT
Start: 2019-07-08

## 2019-07-08 RX ORDER — FAMOTIDINE 10 MG/ML
20 INJECTION, SOLUTION INTRAVENOUS ONCE
Status: COMPLETED | OUTPATIENT
Start: 2019-07-08 | End: 2019-07-08

## 2019-07-08 RX ORDER — PALONOSETRON 0.05 MG/ML
0.25 INJECTION, SOLUTION INTRAVENOUS ONCE
Status: COMPLETED | OUTPATIENT
Start: 2019-07-08 | End: 2019-07-08

## 2019-07-08 RX ORDER — SODIUM CHLORIDE 9 MG/ML
250 INJECTION, SOLUTION INTRAVENOUS ONCE
Status: COMPLETED | OUTPATIENT
Start: 2019-07-08 | End: 2019-07-08

## 2019-07-08 RX ADMIN — DIPHENHYDRAMINE HYDROCHLORIDE 12.5 MG: 50 INJECTION, SOLUTION INTRAMUSCULAR; INTRAVENOUS at 13:11

## 2019-07-08 RX ADMIN — PACLITAXEL 105 MG: 6 INJECTION, SOLUTION INTRAVENOUS at 14:00

## 2019-07-08 RX ADMIN — PALONOSETRON HYDROCHLORIDE 0.25 MG: 0.25 INJECTION, SOLUTION INTRAVENOUS at 12:54

## 2019-07-08 RX ADMIN — CARBOPLATIN 220 MG: 10 INJECTION, SOLUTION INTRAVENOUS at 14:59

## 2019-07-08 RX ADMIN — DEXAMETHASONE SODIUM PHOSPHATE 12 MG: 10 INJECTION, SOLUTION INTRAMUSCULAR; INTRAVENOUS at 12:55

## 2019-07-08 RX ADMIN — FAMOTIDINE 20 MG: 10 INJECTION, SOLUTION INTRAVENOUS at 12:53

## 2019-07-08 RX ADMIN — SODIUM CHLORIDE 250 ML: 900 INJECTION, SOLUTION INTRAVENOUS at 12:53

## 2019-07-08 RX ADMIN — SODIUM CHLORIDE 500 ML: 900 INJECTION, SOLUTION INTRAVENOUS at 10:03

## 2019-07-08 RX ADMIN — Medication 500 UNITS: at 15:29

## 2019-07-08 NOTE — PROGRESS NOTES
ONC Nutrition Follow Up:     Weight 168# today. Receiving IVF today. I believe treatment was held due to patient being in a fib. Nurse was trying to get a hold of his cardiologist.   The patient is taking minimal po. Drinking Ensure plus or Boost plus three times per day. Complaining of nausea. He now knows he can take the zofran on a schedule. He tells me that he is not eating because he has no appetite and cannot taste anything. I asked him to drink at least 5 Boost plus per day plus whatever he can eat. He is tolerating oatmeal, soups/broth but not taking much.  We talked about a PEG today and what that would entail, answered his questions.    I gave him the coupon with the picture of Boost VHC (very high calorie) and asked him to have his pharmacy see if they can order it for him. This product is 530 calories, 22 grams protein.   Will follow closely.

## 2019-07-08 NOTE — TELEPHONE ENCOUNTER
Khadijah SAL with [facility] called back in today to see if Mr.s hancock is ok to start carboxyl. He is currently in afib earlier his HR was 128 he was given 500 cc of fluid and he is now down to 55 but stil irregular. Is he ok to proceed with treatment?     RN assisting with treatment (Ale) can be reached at [phone].     Thanks   SW

## 2019-07-08 NOTE — TELEPHONE ENCOUNTER
Dr. Omer:    JONELLE Welch from [facility] would like to speak with you about this patient.  He is in the office today and appears to be in AF but is asymptomatic.  She would like to know if they can proceed with treatment today and when you would like to see him.    Thanks    Karina JONES/Yayo

## 2019-07-08 NOTE — PROGRESS NOTES
Order for 500cc NS until Khadijah contacts Cardiology. Order placed    1210: Khadijah asked to recheck HR. 53-55 irregular, Khadijah asked me to re page Dr. Omer. Call placed to Dr. Omer's office at 1215 to see if OK for treatment.     OK'd with Dr. Heaton to continue same dose of carbo

## 2019-07-08 NOTE — PROGRESS NOTES
Subjective     REASON FOR FOLLOW UP:  1.  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   2.  Combined radiation and low-dose carboplatin and Taxol chemotherapy weekly initiated 6/17/2019.  3.  MediPort placed by Dr. Adolph Grigsby of vascular surgery 6/18/2019    HISTORY OF PRESENT ILLNESS:  The patient is a 74 y.o. year old male who is undergoing treatment with weekly carboplatin and Taxol along with radiation for his squamous cell carcinoma of the base of the tongue, HPV positive.  He is due for his fourth dose of carboplatin and Taxol along with radiation today.      When seen last week for treatment, we discussed his recent hospital visit.  His INR was supratherapeutic at the time in the hospital and started him on fluconazole.  We held his warfarin with that being rechecked on Friday.  At that time his INR was 1.7 and he was restarted on warfarin 3 mg on Sundays and 4 mg all other days.    He was seen by cardiology last week with discontinuation of his amlodipine and initiation of metoprolol tartrate 50 mg twice a day.  This is now his only antihypertensive medication.  Cardiology plans to recheck his blood pressure this week and have him follow-up in office in 6 weeks.  I have up with cardiology visit he reports he not in A. fib.  He states he typically can feel at the age.    He has been using boost plus for nutrition.  He is wanting to avoid a feeding tube.  Unfortunately he has additional 7 pounds weekly.  Patient does report nausea ondansetron.  We have asked him to start using this medication.    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   • Hyperlipidemia    • Hypertension    • Insomnia    • Lumbar radicular pain    • MI (myocardial infarction) (CMS/[facility])    • Non-rheumatic tricuspid valve insufficiency    • SHAR (obstructive sleep apnea)    • Osteoarthritis    • Precordial pain    • Pulmonary emphysema (CMS/HCC)    • Right leg pain     sciatica nerve pain   • Shortness of breath    • Snoring    • SVT (supraventricular tachycardia) (CMS/[facility])    • Syncope    • Vitamin D deficiency         Past Surgical History:   Procedure Laterality Date   • APPENDECTOMY     • CARDIAC ABLATION  2006    [facility].   • CARDIAC CATHETERIZATION N/A 8/29/2018    Procedure: Left Heart Cath;  Surgeon: Yousif Uribe MD;  Location: [facility] INVASIVE LOCATION;  Service: Cardiology   • CARDIAC CATHETERIZATION N/A 8/29/2018    Procedure: Coronary angiography;  Surgeon: Yousif Uribe MD;  Location: [facility] CATH INVASIVE LOCATION;  Service: Cardiology   • CARDIAC CATHETERIZATION N/A 8/29/2018    Procedure: Left ventriculography;  Surgeon: Yousif Uribe MD;  Location: [facility] CATH INVASIVE LOCATION;  Service: Cardiology   • FINGER SURGERY     • FOOT SURGERY     • HAND SURGERY     • VENOUS ACCESS DEVICE (PORT) INSERTION Right 6/18/2019    Procedure: MEDIPORT PLACEMENT;  Surgeon: Elvis Grigsby MD;  Location: [facility] OR;  Service: Vascular        ALLERGIES:    Allergies   Allergen Reactions   • Benadryl [Diphenhydramine Hcl] Other (See Comments) and Dizziness     \"I sleep for about a day\"        Review of " Systems   Constitutional: Positive for appetite change (lack of appetite), fatigue and unexpected weight change. Negative for activity change, chills and fever.   HENT: Positive for mouth sores (improving'). Negative for trouble swallowing and voice change.    Eyes: Negative for pain and visual disturbance.   Respiratory: Negative for cough, shortness of breath and wheezing.    Cardiovascular: Negative for chest pain, palpitations and leg swelling.   Gastrointestinal: Positive for nausea. Negative for abdominal pain, constipation, diarrhea and vomiting.   Genitourinary: Negative for difficulty urinating, frequency and urgency.   Musculoskeletal: Positive for back pain. Negative for arthralgias and joint swelling.   Skin: Negative for rash.   Neurological: Positive for dizziness and weakness. Negative for seizures.   Hematological: Negative for adenopathy. Does not bruise/bleed easily.   Psychiatric/Behavioral: Negative for behavioral problems and confusion. The patient is not nervous/anxious.         Objective     There were no vitals filed for this visit.  Current Status 7/1/2019   ECOG score 0       Physical Exam   Constitutional: He is oriented to person, place, and time. He appears well-developed and well-nourished. No distress.   HENT:   Head: Normocephalic.   Mouth/Throat: Oropharyngeal exudate (mucositits with thrush present) present.   Firm, 4.5-5.5 cm palpable mass at the angle of the mandible on the right   Eyes: Conjunctivae and EOM are normal. Pupils are equal, round, and reactive to light. No scleral icterus.   Neck: Normal range of motion. Neck supple. No JVD present. No thyromegaly present.   Cardiovascular: Normal rate. An irregularly irregular rhythm present. Exam reveals no gallop and no friction rub.   No murmur heard.  Pulmonary/Chest: Effort normal and breath sounds normal. He has no wheezes. He has no rales.   Abdominal: Soft. He exhibits no distension and no mass. There is no tenderness.    Musculoskeletal: Normal range of motion. He exhibits edema. He exhibits no deformity.   trace ankle edema bilaterally   Lymphadenopathy:     He has no cervical adenopathy.   Neurological: He is alert and oriented to person, place, and time. He has normal reflexes. No cranial nerve deficit.   Skin: Skin is warm and dry. No rash noted. No erythema.   Psychiatric: He has a normal mood and affect. His behavior is normal. Judgment normal.         RECENT LABS:  Hematology WBC   Date Value Ref Range Status   07/01/2019 3.75 3.40 - 10.80 10*3/mm3 Final     RBC   Date Value Ref Range Status   07/01/2019 4.24 4.14 - 5.80 10*6/mm3 Final     Hemoglobin   Date Value Ref Range Status   07/01/2019 13.4 13.0 - 17.7 g/dL Final     Hematocrit   Date Value Ref Range Status   07/01/2019 39.2 37.5 - 51.0 % Final     Platelets   Date Value Ref Range Status   07/01/2019 205 140 - 450 10*3/mm3 Final        Lab Results   Component Value Date    GLUCOSE 121 07/01/2019    BUN 10 07/01/2019    CREATININE 0.84 07/01/2019    EGFRIFNONA 89 07/01/2019    EGFRIFAFRI 107 04/23/2018    BCR 11.9 07/01/2019    K 3.9 07/01/2019    CO2 27.8 07/01/2019    CALCIUM 9.0 07/01/2019    PROTENTOTREF 7.2 04/23/2018    ALBUMIN 2.80 (L) 06/28/2019    LABIL2 1.3 04/23/2018    AST 14 06/28/2019    ALT 16 06/28/2019       PATHOLOGY 5/15/2019  Final Diagnosis   1.  Right Neck, FNA:                  A.  Positive for squamous cell carcinoma.     Flow Cytometry Report, Addendum   Utilizing appropriate positive and negative controls immunohistochemical stain p16 is performed on cell block material.  The tumor is positive for p16 by immunohistochemical staining which is a surrogate for HPV infection.       F-18 FDG PET FROM SKULL BASE TO MID THIGH WITH PET/CT FUSION 5/23/2019  IMPRESSION:  1.  Asymmetric intensely FDG avid masslike soft tissue thickening within  the right base of the tongue, likely representing malignancy. There is a  large intensely FDG avid soft tissue  mass centered over the right  carotid space likely representing metastatic disease.  2.  Intense asymmetric FDG uptake overlying the left cricoid cartilage  with associated asymmetric mucosal thickening within the inferior aspect  of the left pyriform sinus. Findings are concerning for malignancy and  further evaluation with endoscopy is recommended.  3.  Sub-6 mm noncalcified pulmonary nodule within the right middle lobe  which was not present on 10/02/2017. The nodule is below PET resolution  and findings are nonspecific. Continued close attention on follow up is  recommended to  exclude metastatic disease.  4.  Mild-to-moderately FDG avid subcutaneous nodule within the left  upper back measuring up to 1.1 cm, findings are nonspecific and further  evaluation with physical exam is recommended.  5.  No findings of FDG avid malignancy within the abdomen or pelvis.    Assessment/Plan     1.  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  He is undergoing definitive treatment with radiation therapy and weekly low-dose carboplatin and Taxol chemotherapy.  He is here today for cycle #4.  We will proceed as discussed with Dr. Heaton today.  2.  Comorbidities including ischemic heart disease with history of CHF and atrial fibrillation requiring Coumadin anticoagulation.  Phone call was placed to cardiology today patient was in A. fib.  His heart rate was initially 128 bpm but after 500 mL normal saline that did improved though he did remain in A. fib.  3.  Oral mucositis/thrush secondary to chemoradiation.  Patient was given fluconazole by the ER, 200 mg daily x3 days. He has since completed this. He does have Meghann's Magic mouth rinse to use as well.  4.  Warfarin anticoagulation.  Patient's INR 1.9, he will continue current dose as this was just restarted on Friday due to a previously supratherapeutic INR on Fluconazole.  5.  Weight loss  and poor nutrition.  The patient is continuing to lose weight.  He did struggle with nausea after week though has not been using his ondansetron.  I asked him to begin that.  He does understand if his nutrition is not improving we will need to proceed with a G-tube for nutrition.  Ruthy Bautista, oncology dietitian did meet with him today and also discussed this.  I will call him tomorrow to follow-up and make sure he is improving his nutrition with better control of his nausea.  If not, we will consider referral for G-tube placement at that time.    Plan  1.  Proceed with cycle #4carboplatin and Taxol chemotherapy in conjunction with radiation therapy being delivered by Dr. Davila at the [facility] Monday through Friday.  2.  Follow-up with cardiology this week for medication management, evaluation of blood pressure, and afib.  3.  Ondansetron 8mg every 8 hours as needed for nausea controk.  If this is not helping the patient will call, may consider Zyprexa.  4.  Patient will notify the office for any decrease in oral intake.  He does know that there is a possibility of needing G tube in the future though was not ready to do that at this point.  5. Return in 1 week for review by Dr. Heaton and treatment.

## 2019-07-09 PROCEDURE — 77014 CHG CT GUIDANCE RADIATION THERAPY FLDS PLACEMENT: CPT | Performed by: RADIOLOGY

## 2019-07-09 PROCEDURE — 77386 CHG INTENSITY MODULATED RADIATION TX DLVR COMPLEX: CPT | Performed by: RADIOLOGY

## 2019-07-09 PROCEDURE — 77386: CPT | Performed by: RADIOLOGY

## 2019-07-09 NOTE — TELEPHONE ENCOUNTER
Dr. Omer--  Ok to start medication (see below) as HR now controlled?  It appears patient is on warfarin chronically for persistent atrial fibrillation.

## 2019-07-10 DIAGNOSIS — C01 SQUAMOUS CELL CARCINOMA OF BASE OF TONGUE (HCC): ICD-10-CM

## 2019-07-10 PROCEDURE — 77386: CPT | Performed by: RADIOLOGY

## 2019-07-10 PROCEDURE — 77386 CHG INTENSITY MODULATED RADIATION TX DLVR COMPLEX: CPT | Performed by: RADIOLOGY

## 2019-07-10 PROCEDURE — 77014 CHG CT GUIDANCE RADIATION THERAPY FLDS PLACEMENT: CPT | Performed by: RADIOLOGY

## 2019-07-11 ENCOUNTER — RADIATION ONCOLOGY WEEKLY ASSESSMENT (OUTPATIENT)
Dept: RADIATION ONCOLOGY | Facility: HOSPITAL | Age: 74
End: 2019-07-11

## 2019-07-11 ENCOUNTER — OFFICE VISIT (OUTPATIENT)
Dept: CARDIOLOGY | Facility: CLINIC | Age: 74
End: 2019-07-11

## 2019-07-11 ENCOUNTER — DOCUMENTATION (OUTPATIENT)
Dept: NUTRITION | Facility: HOSPITAL | Age: 74
End: 2019-07-11

## 2019-07-11 ENCOUNTER — TELEPHONE (OUTPATIENT)
Dept: ONCOLOGY | Facility: CLINIC | Age: 74
End: 2019-07-11

## 2019-07-11 VITALS
HEART RATE: 143 BPM | DIASTOLIC BLOOD PRESSURE: 60 MMHG | HEIGHT: 72 IN | SYSTOLIC BLOOD PRESSURE: 96 MMHG | BODY MASS INDEX: 22.62 KG/M2 | OXYGEN SATURATION: 95 % | RESPIRATION RATE: 18 BRPM

## 2019-07-11 VITALS
HEART RATE: 86 BPM | WEIGHT: 166.8 LBS | DIASTOLIC BLOOD PRESSURE: 80 MMHG | SYSTOLIC BLOOD PRESSURE: 122 MMHG | BODY MASS INDEX: 22.62 KG/M2 | OXYGEN SATURATION: 94 %

## 2019-07-11 DIAGNOSIS — C01 SQUAMOUS CELL CARCINOMA OF BASE OF TONGUE (HCC): Primary | ICD-10-CM

## 2019-07-11 DIAGNOSIS — I48.21 PERMANENT ATRIAL FIBRILLATION (HCC): Primary | ICD-10-CM

## 2019-07-11 PROCEDURE — 93000 ELECTROCARDIOGRAM COMPLETE: CPT | Performed by: INTERNAL MEDICINE

## 2019-07-11 PROCEDURE — 77386: CPT | Performed by: RADIOLOGY

## 2019-07-11 PROCEDURE — 77427 RADIATION TX MANAGEMENT X5: CPT | Performed by: RADIOLOGY

## 2019-07-11 PROCEDURE — 77014 CHG CT GUIDANCE RADIATION THERAPY FLDS PLACEMENT: CPT | Performed by: RADIOLOGY

## 2019-07-11 PROCEDURE — 77386 CHG INTENSITY MODULATED RADIATION TX DLVR COMPLEX: CPT | Performed by: RADIOLOGY

## 2019-07-11 RX ORDER — DIPHENOXYLATE HYDROCHLORIDE AND ATROPINE SULFATE 2.5; .025 MG/1; MG/1
1 TABLET ORAL 4 TIMES DAILY PRN
Qty: 60 TABLET | Refills: 1 | Status: ON HOLD | OUTPATIENT
Start: 2019-07-11 | End: 2023-03-22

## 2019-07-11 NOTE — TELEPHONE ENCOUNTER
"S/w pt wife and daughter.  Wife states she is in house over at [facility] and expresses great concern over her  not eating or drinking and being dehydrated.  Daughter reports that after his last treatment he had to go to the er to get ivf's due to dehydration.  Pt.doing RT in combination with chemo.  Has RT tomorrow at 8:30am.  Daughter requesting pt. Come up after RT to get some ivf's .  Pt. Has a history of atrial fib and he is at his cardiology office at the present time.  Called pt; and left a message on his v/m to call office.  Daughter seems to think he will not return the call.  Wife is inquiring if someone can go to his home and \"look after him since she is in the hospital.\"  Informed wife that she would have to pay out of pocket for someone to do that.  Home health is for pts who have a skilled need such as a dressing change, moncada cath etc.  S/w dr. Bustos and Irish Morales, np  And alejandro flores, charge nurse, will have pt. Come in tomorrow for ivf's and np visit-2 units.  Will get a cbc, and a cmp.  Dr bustos states pt. May need a referral to GI for peg tube placement.  Informed daughter the appt desk will call her to give her the time to bring him in tomorrow.  V/u.  "

## 2019-07-11 NOTE — TELEPHONE ENCOUNTER
----- Message from Viky Marcus sent at 7/11/2019 10:20 AM EDT -----  Contact: [phone]  Pt daughter has questions about treatment plans.

## 2019-07-11 NOTE — PROGRESS NOTES
Physician Weekly Management Note    Diagnosis:     1. 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)    Cancer Staging  Stage I (cT2, cN1, cM0, p16: Positive)    Reason for Visit: Radiation (16/33)    Concurrent Chemo:   Yes    Notes on Treatment course, Films, Medical progress and Plan:  Doing fair. Bad diarrhea from Ensure plus. Will send in Lomotil. Weight a concern. States he is taking in 3.5-4 ensure plus a day and drinking lots of water. Mouth much better. Continue swish and swallow. Cont on.    ROS -  Other than those listed above, as follows:  Constitutional - Normal - no complaints of fatigue, denies lack of appetite, fever, night sweats or change in weight.  Neck - Normal - denies neck masses, muscle weakness, neck pain, decreased range of motion or swelling.  Cardiovascular - Normal - denies arrhythmias, chest pain, dyspnea, edema, orthopnea or palpitations.  Respiratory - Normal - denies cough, dyspnea, hemoptysis, hiccoughs, pleuritic chest pain or wheezing.  Gastrointestinal - Normal - no complaints of constipation, abdominal pain, diarrhea, heartburn/dyspepsia, hematemesis, hemorrhoids, melena or GI bleeding, nausea, pain or cramping or vomiting.  Neuro - Normal - no new complaints of vision change, headache, nausea, cranial nerve deficit, gait abnormality, syncope, seizure.    PHYSICAL EXAM - Other than changes listed above, as follows:  Vitals:    07/11/19 0851   BP: 122/80   Pulse: 86   SpO2: 94%   Weight: 75.7 kg (166 lb 12.8 oz)   PainSc:   8   PainLoc: Mouth       Constitutional - Normal - no evidence of impaired alertness, inadequate appearance, premature or advanced chronologic age, uncooperativeness, altered mood, affect or disorientation.  Neck - Normal - no evidence of tender or enlarged lymph nodes, neck abnormalities, restricted range of motion or enlarged thyroid.  Chest - Normal - no evidence of chest abnormalities, tender or enlarged lymph nodes.  Respiratory - Normal - no  "evidence of abnormal breat sounds, chest abnormalities on palpation and chest abnormalities on percussion and normal breath sounds.  Hematologic/Lymphatic - Normal - no evidence of tender or enlarged axillary lymph nodes nor tender or enlarged neck nodes.  Neuro - Normal - no evidence of cranial nerve deficit, gait abnormality.    Performance Status:  (1) Restricted in physically strenuous activity, ambulatory and able to do work of light nature    Problem added:  No problems updated.  Medications added: No orders of the defined types were placed in this encounter.    Ancillary referrals made: No orders of the defined types were placed in this encounter.      Technical aspects reviewed:  Weekly OBI approved if applicable? Yes   Weekly port films approved?  Yes  Change requests noted if applicable?  Yes   Patient setup and plan reviewed?  Yes     Chart Reviewed:  Continue current treatment plan?  Yes  Treatment plan change requested? No    I have reviewed and marked as \"reviewed\" the current medications, allergies and problem list in the patients EMR.  His regional course is a    I have reviewed the patient's medical, surgical  history in detail, reviewed any pertinent lab work  and updated the computerized patient record if needed.    Patient's Care Team:  Patient Care Team:  Epley, James, APRN as PCP - General (Family Medicine)  Payal Waters MD as Consulting Physician (Pain Medicine)  Lorna Chaidez (Nurse Practitioner)  Kristal Cowart [facility] as Pharmacist  Fritz Slater MD as Referring Physician (Otolaryngology)  Palbo Heaton MD as Consulting Physician (Hematology and Oncology)  Annie Davila MD as Consulting Physician (Radiation Oncology)  Chetan Heaton MD as Consulting Physician (Urology)      "

## 2019-07-12 ENCOUNTER — RESULTS ENCOUNTER (OUTPATIENT)
Dept: ONCOLOGY | Facility: CLINIC | Age: 74
End: 2019-07-12

## 2019-07-12 ENCOUNTER — APPOINTMENT (OUTPATIENT)
Dept: ONCOLOGY | Facility: HOSPITAL | Age: 74
End: 2019-07-12

## 2019-07-12 ENCOUNTER — OFFICE VISIT (OUTPATIENT)
Dept: ONCOLOGY | Facility: CLINIC | Age: 74
End: 2019-07-12

## 2019-07-12 ENCOUNTER — INFUSION (OUTPATIENT)
Dept: ONCOLOGY | Facility: HOSPITAL | Age: 74
End: 2019-07-12

## 2019-07-12 VITALS
OXYGEN SATURATION: 99 % | DIASTOLIC BLOOD PRESSURE: 73 MMHG | HEART RATE: 64 BPM | RESPIRATION RATE: 18 BRPM | SYSTOLIC BLOOD PRESSURE: 110 MMHG

## 2019-07-12 DIAGNOSIS — C01 SQUAMOUS CELL CARCINOMA OF BASE OF TONGUE (HCC): ICD-10-CM

## 2019-07-12 DIAGNOSIS — E86.0 DEHYDRATION: ICD-10-CM

## 2019-07-12 DIAGNOSIS — C01 SQUAMOUS CELL CARCINOMA OF BASE OF TONGUE (HCC): Primary | ICD-10-CM

## 2019-07-12 LAB
ALBUMIN SERPL-MCNC: 3.5 G/DL (ref 3.5–5.2)
ALBUMIN/GLOB SERPL: 1.1 G/DL (ref 1.1–2.4)
ALP SERPL-CCNC: 62 U/L (ref 38–116)
ALT SERPL W P-5'-P-CCNC: 26 U/L (ref 0–41)
ANION GAP SERPL CALCULATED.3IONS-SCNC: 12 MMOL/L (ref 5–15)
AST SERPL-CCNC: 22 U/L (ref 0–40)
BASOPHILS # BLD AUTO: 0.02 10*3/MM3 (ref 0–0.2)
BASOPHILS NFR BLD AUTO: 0.5 % (ref 0–1.5)
BILIRUB SERPL-MCNC: 1.1 MG/DL (ref 0.2–1.2)
BUN BLD-MCNC: 32 MG/DL (ref 6–20)
BUN/CREAT SERPL: 33.3 (ref 7.3–30)
CALCIUM SPEC-SCNC: 9 MG/DL (ref 8.5–10.2)
CHLORIDE SERPL-SCNC: 102 MMOL/L (ref 98–107)
CO2 SERPL-SCNC: 25 MMOL/L (ref 22–29)
CREAT BLD-MCNC: 0.96 MG/DL (ref 0.7–1.3)
DEPRECATED RDW RBC AUTO: 44.3 FL (ref 37–54)
EOSINOPHIL # BLD AUTO: 0 10*3/MM3 (ref 0–0.4)
EOSINOPHIL NFR BLD AUTO: 0 % (ref 0.3–6.2)
ERYTHROCYTE [DISTWIDTH] IN BLOOD BY AUTOMATED COUNT: 13.2 % (ref 12.3–15.4)
GFR SERPL CREATININE-BSD FRML MDRD: 76 ML/MIN/1.73
GLOBULIN UR ELPH-MCNC: 3.3 GM/DL (ref 1.8–3.5)
GLUCOSE BLD-MCNC: 141 MG/DL (ref 74–124)
HCT VFR BLD AUTO: 40.7 % (ref 37.5–51)
HGB BLD-MCNC: 13.3 G/DL (ref 13–17.7)
IMM GRANULOCYTES # BLD AUTO: 0.02 10*3/MM3 (ref 0–0.05)
IMM GRANULOCYTES NFR BLD AUTO: 0.5 % (ref 0–0.5)
LYMPHOCYTES # BLD AUTO: 0.28 10*3/MM3 (ref 0.7–3.1)
LYMPHOCYTES NFR BLD AUTO: 6.5 % (ref 19.6–45.3)
MCH RBC QN AUTO: 31.1 PG (ref 26.6–33)
MCHC RBC AUTO-ENTMCNC: 32.7 G/DL (ref 31.5–35.7)
MCV RBC AUTO: 95.1 FL (ref 79–97)
MONOCYTES # BLD AUTO: 0.1 10*3/MM3 (ref 0.1–0.9)
MONOCYTES NFR BLD AUTO: 2.3 % (ref 5–12)
NEUTROPHILS # BLD AUTO: 3.92 10*3/MM3 (ref 1.7–7)
NEUTROPHILS NFR BLD AUTO: 90.2 % (ref 42.7–76)
NRBC BLD AUTO-RTO: 0.5 /100 WBC (ref 0–0.2)
PLATELET # BLD AUTO: 113 10*3/MM3 (ref 140–450)
PMV BLD AUTO: 11.7 FL (ref 6–12)
POTASSIUM BLD-SCNC: 4.8 MMOL/L (ref 3.5–4.7)
PROT SERPL-MCNC: 6.8 G/DL (ref 6.3–8)
RBC # BLD AUTO: 4.28 10*6/MM3 (ref 4.14–5.8)
SODIUM BLD-SCNC: 139 MMOL/L (ref 134–145)
WBC NRBC COR # BLD: 4.34 10*3/MM3 (ref 3.4–10.8)

## 2019-07-12 PROCEDURE — 77386 CHG INTENSITY MODULATED RADIATION TX DLVR COMPLEX: CPT | Performed by: RADIOLOGY

## 2019-07-12 PROCEDURE — 96360 HYDRATION IV INFUSION INIT: CPT | Performed by: NURSE PRACTITIONER

## 2019-07-12 PROCEDURE — 77014 CHG CT GUIDANCE RADIATION THERAPY FLDS PLACEMENT: CPT | Performed by: RADIOLOGY

## 2019-07-12 PROCEDURE — 80053 COMPREHEN METABOLIC PANEL: CPT

## 2019-07-12 PROCEDURE — 77386: CPT | Performed by: RADIOLOGY

## 2019-07-12 PROCEDURE — 85025 COMPLETE CBC W/AUTO DIFF WBC: CPT

## 2019-07-12 PROCEDURE — 96361 HYDRATE IV INFUSION ADD-ON: CPT | Performed by: NURSE PRACTITIONER

## 2019-07-12 PROCEDURE — 99214 OFFICE O/P EST MOD 30 MIN: CPT | Performed by: NURSE PRACTITIONER

## 2019-07-12 RX ORDER — SODIUM CHLORIDE 9 MG/ML
1000 INJECTION, SOLUTION INTRAVENOUS ONCE
Status: CANCELLED | OUTPATIENT
Start: 2019-07-13

## 2019-07-12 RX ORDER — SODIUM CHLORIDE 9 MG/ML
500 INJECTION, SOLUTION INTRAVENOUS ONCE
Status: COMPLETED | OUTPATIENT
Start: 2019-07-12 | End: 2019-07-12

## 2019-07-12 RX ADMIN — SODIUM CHLORIDE 500 ML: 900 INJECTION, SOLUTION INTRAVENOUS at 11:00

## 2019-07-12 RX ADMIN — SODIUM CHLORIDE 500 ML: 0.9 INJECTION, SOLUTION INTRAVENOUS at 10:03

## 2019-07-12 NOTE — PROGRESS NOTES
Pt was in the lobby and felt faint. Brought to tx area. Pt c/o having 15-20 diarrhea stools through the night. Pt seen by DWAIN Miguel. Pt receiving a liter of NS IV. If pt has diarrhea while here, Lomtil to be given.    Pt felt much better after receiving fluids and had no diarrhea while here. Pt to get IVF'S Saturday and Sunday. Pt and daughter made aware and sent to scheduling.

## 2019-07-12 NOTE — PROGRESS NOTES
Subjective     REASON FOR FOLLOW UP:  1.  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   2.  Combined radiation and low-dose carboplatin and Taxol chemotherapy weekly initiated 6/17/2019.  3.  MediPort placed by Dr. Adolph Grigsby of vascular surgery 6/18/2019    HISTORY OF PRESENT ILLNESS:  The patient is a 74 y.o. year old male with the above-mentioned history here today with complaints of weakness, dizziness, and diarrhea.  Patient is currently undergoing concurrent chemoradiation therapy for his grams cell carcinoma the base of tongue.  He received his fourth cycle of weekly low-dose carboplatin Taxol on 7/8/2019.  He states that he has been struggling with diarrhea.  He was prescribed Lomotil and took 2 pills last night.  His diarrhea has slowed down.  He states that he has a lot of thick saliva which is causing him to have nausea and occasionally gets choked and vomit.  He is struggling with his nutrition.  He is only consuming about 1 boost per day.  He was advised to try to drink 3-4 boost per day in addition to soft foods and fluids.  He denies bleeding issues.         History of Present Illness     Past Medical History:   Diagnosis Date   • Arrhythmia    • Atrial fibrillation (CMS/HCC)    • CAD (coronary artery disease)    • Cancer (CMS/HCC) 05/2019    Neck, squamous   • CHF (congestive heart failure) (CMS/HCC)    • Chronic bronchitis (CMS/HCC)    • COPD (chronic obstructive pulmonary disease) (CMS/HCC)    • Coronary artery disease of native artery of native heart with stable angina pectoris (CMS/HCC)    • Cramps of lower extremity    • Daytime hypersomnia    • Depression    • Depression with anxiety    • IRAHETA (dyspnea on exertion)    • Drug therapy    • Dyspnea on exertion    • Emphysema of lung (CMS/HCC)    • Encounter for immunization     flu vaccine high dose PF 65+   • Enlarged prostate    • Fatigue    • Frequent nocturnal awakening    • Gout    • H/O cardiac radiofrequency  "ablation 2006    [facility].   • Hyperlipidemia    • Hypertension    • Insomnia    • Lumbar radicular pain    • MI (myocardial infarction) (CMS/[facility])    • Non-rheumatic tricuspid valve insufficiency    • SHAR (obstructive sleep apnea)    • Osteoarthritis    • Precordial pain    • Pulmonary emphysema (CMS/HCC)    • Right leg pain     sciatica nerve pain   • Shortness of breath    • Snoring    • SVT (supraventricular tachycardia) (CMS/[facility])    • Syncope    • Vitamin D deficiency         Past Surgical History:   Procedure Laterality Date   • APPENDECTOMY     • CARDIAC ABLATION  2006    [facility].   • CARDIAC CATHETERIZATION N/A 8/29/2018    Procedure: Left Heart Cath;  Surgeon: Yousif Uribe MD;  Location: [facility] CATH INVASIVE LOCATION;  Service: Cardiology   • CARDIAC CATHETERIZATION N/A 8/29/2018    Procedure: Coronary angiography;  Surgeon: Yousif Uribe MD;  Location: [facility] CATH INVASIVE LOCATION;  Service: Cardiology   • CARDIAC CATHETERIZATION N/A 8/29/2018    Procedure: Left ventriculography;  Surgeon: Yousif Uribe MD;  Location: [facility] CATH INVASIVE LOCATION;  Service: Cardiology   • FINGER SURGERY     • FOOT SURGERY     • HAND SURGERY     • VENOUS ACCESS DEVICE (PORT) INSERTION Right 6/18/2019    Procedure: MEDIPORT PLACEMENT;  Surgeon: Elvis Grigsby MD;  Location: [facility] MAIN OR;  Service: Vascular        ALLERGIES:    Allergies   Allergen Reactions   • Benadryl [Diphenhydramine Hcl] Other (See Comments) and Dizziness     \"I sleep for about a day\"        Review of Systems   Constitutional: Positive for appetite change (lack of appetite), fatigue and unexpected weight change. Negative for activity change, chills and fever.   HENT: Positive for mouth sores. Negative for trouble swallowing and voice change.    Eyes: Negative for pain and visual disturbance.   Respiratory: Negative for cough, shortness of breath and wheezing.    Cardiovascular: Negative for chest pain, palpitations and leg " swelling.   Gastrointestinal: Positive for diarrhea and nausea. Negative for abdominal pain, constipation and vomiting.   Genitourinary: Negative for difficulty urinating, frequency and urgency.   Musculoskeletal: Positive for back pain. Negative for arthralgias and joint swelling.   Skin: Negative for rash.   Neurological: Positive for dizziness and weakness. Negative for seizures.   Hematological: Negative for adenopathy. Does not bruise/bleed easily.   Psychiatric/Behavioral: Negative for behavioral problems and confusion. The patient is not nervous/anxious.    7/12/2019 review of systems unchanged from above except as updated.  Objective   /77    RR 19  O2 Sat 99%- RA    Current Status 7/8/2019   ECOG score 2       Physical Exam   Constitutional: He is oriented to person, place, and time. He appears well-developed and well-nourished. No distress.   HENT:   Head: Normocephalic.   Mouth/Throat: Mucous membranes are dry. Oropharyngeal exudate (mucositits with thrush present) present.   Firm, 4.5-5.5 cm palpable mass at the angle of the mandible on the right   Eyes: Conjunctivae and EOM are normal. Pupils are equal, round, and reactive to light. No scleral icterus.   Neck: Normal range of motion. Neck supple. No JVD present. No thyromegaly present.   Cardiovascular: Normal rate. An irregularly irregular rhythm present. Exam reveals no gallop and no friction rub.   No murmur heard.  Pulmonary/Chest: Effort normal and breath sounds normal. He has no wheezes. He has no rales.   Abdominal: Soft. He exhibits no distension and no mass. There is no tenderness.   Musculoskeletal: Normal range of motion. He exhibits edema. He exhibits no deformity.   trace ankle edema bilaterally   Lymphadenopathy:     He has no cervical adenopathy.   Neurological: He is alert and oriented to person, place, and time. He has normal reflexes. No cranial nerve deficit.   Skin: Skin is warm and dry. No rash noted. No erythema.    Psychiatric: He has a normal mood and affect. His behavior is normal. Judgment normal.   7/12/2019 physical exam is unchanged from above except as updated.    RECENT LABS:  Hematology WBC   Date Value Ref Range Status   07/12/2019 4.34 3.40 - 10.80 10*3/mm3 Final     RBC   Date Value Ref Range Status   07/12/2019 4.28 4.14 - 5.80 10*6/mm3 Final     Hemoglobin   Date Value Ref Range Status   07/12/2019 13.3 13.0 - 17.7 g/dL Final     Hematocrit   Date Value Ref Range Status   07/12/2019 40.7 37.5 - 51.0 % Final     Platelets   Date Value Ref Range Status   07/12/2019 113 (L) 140 - 450 10*3/mm3 Final        Lab Results   Component Value Date    GLUCOSE 141 (H) 07/12/2019    BUN 32 (H) 07/12/2019    CREATININE 0.96 07/12/2019    EGFRIFNONA 76 07/12/2019    EGFRIFAFRI 107 04/23/2018    BCR 33.3 (H) 07/12/2019    K 4.8 (H) 07/12/2019    CO2 25.0 07/12/2019    CALCIUM 9.0 07/12/2019    PROTENTOTREF 7.2 04/23/2018    ALBUMIN 3.50 07/12/2019    LABIL2 1.3 04/23/2018    AST 22 07/12/2019    ALT 26 07/12/2019       PATHOLOGY 5/15/2019  Final Diagnosis   1.  Right Neck, FNA:                  A.  Positive for squamous cell carcinoma.     Flow Cytometry Report, Addendum   Utilizing appropriate positive and negative controls immunohistochemical stain p16 is performed on cell block material.  The tumor is positive for p16 by immunohistochemical staining which is a surrogate for HPV infection.       F-18 FDG PET FROM SKULL BASE TO MID THIGH WITH PET/CT FUSION 5/23/2019  IMPRESSION:  1.  Asymmetric intensely FDG avid masslike soft tissue thickening within  the right base of the tongue, likely representing malignancy. There is a  large intensely FDG avid soft tissue mass centered over the right  carotid space likely representing metastatic disease.  2.  Intense asymmetric FDG uptake overlying the left cricoid cartilage  with associated asymmetric mucosal thickening within the inferior aspect  of the left pyriform sinus. Findings  are concerning for malignancy and  further evaluation with endoscopy is recommended.  3.  Sub-6 mm noncalcified pulmonary nodule within the right middle lobe  which was not present on 10/02/2017. The nodule is below PET resolution  and findings are nonspecific. Continued close attention on follow up is  recommended to  exclude metastatic disease.  4.  Mild-to-moderately FDG avid subcutaneous nodule within the left  upper back measuring up to 1.1 cm, findings are nonspecific and further  evaluation with physical exam is recommended.  5.  No findings of FDG avid malignancy within the abdomen or pelvis.    Assessment/Plan     1.  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  He is undergoing definitive treatment with radiation therapy and weekly low-dose carboplatin and Taxol chemotherapy.  7/8/2019 patient received cycle 4 weekly low-dose carboplatin Taxol with concurrent radiation.      2.  Comorbidities including ischemic heart disease with history of CHF and atrial fibrillation requiring Coumadin anticoagulation.  Phone call was placed to cardiology today patient was in A. fib.  His heart rate was initially 128 bpm but after 500 mL normal saline that did improved though he did remain in A. Fib.    3.  Oral mucositis/thrush secondary to chemoradiation.  Patient was given fluconazole by the ER, 200 mg daily x3 days. He has since completed this. He does have [employer]'s Magic mouth rinse to use as well.    4.  Warfarin anticoagulation.  Patient's INR 1.9, on 7/8/19, he will continue current dose as this was just restarted on Friday due to a previously supratherapeutic INR on Fluconazole.    5.  Weight loss and poor nutrition.  Patient has met with the oncology dietitian.  The patient continues to struggle with hydration and weight loss.  He is having nausea, occasional vomiting, and some diarrhea.  Is tachycardic in the office today we  will provide him with 1 L of IV normal saline.  We will also schedule him for 1 L of fluids on Saturday and Sunday this weekend.        Plan  1.  1 L of IV normal saline today.  2.  Continue Lomotil if needed for diarrhea control.  3.  Schedule patient for IV fluids, 1 L of normal saline on Saturday and Sunday this weekend.  4.  Return for follow-up visit with Dr. Heaton on 7/15/2019 for reevaluation prior to his fifth cycle of weekly carboplatin Taxol.  5.  Have encouraged the patient to increase his oral fluid intake.

## 2019-07-14 ENCOUNTER — INFUSION (OUTPATIENT)
Dept: ONCOLOGY | Facility: HOSPITAL | Age: 74
End: 2019-07-14

## 2019-07-14 VITALS
DIASTOLIC BLOOD PRESSURE: 58 MMHG | HEART RATE: 74 BPM | TEMPERATURE: 97.2 F | RESPIRATION RATE: 18 BRPM | OXYGEN SATURATION: 97 % | SYSTOLIC BLOOD PRESSURE: 102 MMHG

## 2019-07-14 DIAGNOSIS — C01 SQUAMOUS CELL CARCINOMA OF BASE OF TONGUE (HCC): ICD-10-CM

## 2019-07-14 DIAGNOSIS — E86.1 HYPOTENSION DUE TO HYPOVOLEMIA: Primary | ICD-10-CM

## 2019-07-14 DIAGNOSIS — I95.89 HYPOTENSION DUE TO HYPOVOLEMIA: Primary | ICD-10-CM

## 2019-07-14 DIAGNOSIS — Z45.2 FITTING AND ADJUSTMENT OF VASCULAR CATHETER: ICD-10-CM

## 2019-07-14 PROCEDURE — 96360 HYDRATION IV INFUSION INIT: CPT

## 2019-07-14 PROCEDURE — 96361 HYDRATE IV INFUSION ADD-ON: CPT

## 2019-07-14 PROCEDURE — 96523 IRRIG DRUG DELIVERY DEVICE: CPT

## 2019-07-14 RX ORDER — SODIUM CHLORIDE 0.9 % (FLUSH) 0.9 %
10 SYRINGE (ML) INJECTION AS NEEDED
Status: CANCELLED | OUTPATIENT
Start: 2019-07-14

## 2019-07-14 RX ORDER — SODIUM CHLORIDE 9 MG/ML
1000 INJECTION, SOLUTION INTRAVENOUS ONCE
Status: CANCELLED | OUTPATIENT
Start: 2019-07-14

## 2019-07-14 RX ORDER — SODIUM CHLORIDE 0.9 % (FLUSH) 0.9 %
10 SYRINGE (ML) INJECTION AS NEEDED
Status: DISCONTINUED | OUTPATIENT
Start: 2019-07-14 | End: 2019-07-14 | Stop reason: HOSPADM

## 2019-07-14 RX ORDER — SODIUM CHLORIDE 9 MG/ML
1000 INJECTION, SOLUTION INTRAVENOUS ONCE
Status: COMPLETED | OUTPATIENT
Start: 2019-07-14 | End: 2019-07-14

## 2019-07-14 RX ADMIN — SODIUM CHLORIDE, PRESERVATIVE FREE 10 ML: 5 INJECTION INTRAVENOUS at 10:55

## 2019-07-14 RX ADMIN — SODIUM CHLORIDE 1000 ML: 9 INJECTION, SOLUTION INTRAVENOUS at 08:38

## 2019-07-14 RX ADMIN — Medication 500 UNITS: at 10:55

## 2019-07-15 ENCOUNTER — INFUSION (OUTPATIENT)
Dept: ONCOLOGY | Facility: HOSPITAL | Age: 74
End: 2019-07-15

## 2019-07-15 ENCOUNTER — OFFICE VISIT (OUTPATIENT)
Dept: ONCOLOGY | Facility: CLINIC | Age: 74
End: 2019-07-15

## 2019-07-15 VITALS
WEIGHT: 167.2 LBS | HEIGHT: 72 IN | SYSTOLIC BLOOD PRESSURE: 99 MMHG | DIASTOLIC BLOOD PRESSURE: 58 MMHG | BODY MASS INDEX: 22.65 KG/M2 | HEART RATE: 89 BPM | OXYGEN SATURATION: 98 % | TEMPERATURE: 98.2 F | RESPIRATION RATE: 14 BRPM

## 2019-07-15 DIAGNOSIS — C44.42 SQUAMOUS CELL CARCINOMA OF NECK: ICD-10-CM

## 2019-07-15 DIAGNOSIS — Z79.01 CURRENT USE OF LONG TERM ANTICOAGULATION: ICD-10-CM

## 2019-07-15 DIAGNOSIS — Z45.2 FITTING AND ADJUSTMENT OF VASCULAR CATHETER: ICD-10-CM

## 2019-07-15 DIAGNOSIS — E86.1 HYPOTENSION DUE TO HYPOVOLEMIA: Primary | ICD-10-CM

## 2019-07-15 DIAGNOSIS — C01 SQUAMOUS CELL CARCINOMA OF BASE OF TONGUE (HCC): Primary | ICD-10-CM

## 2019-07-15 DIAGNOSIS — C01 SQUAMOUS CELL CARCINOMA OF BASE OF TONGUE (HCC): ICD-10-CM

## 2019-07-15 DIAGNOSIS — I95.89 HYPOTENSION DUE TO HYPOVOLEMIA: Primary | ICD-10-CM

## 2019-07-15 DIAGNOSIS — I48.91 ATRIAL FIBRILLATION, UNSPECIFIED TYPE (HCC): ICD-10-CM

## 2019-07-15 LAB
ANION GAP SERPL CALCULATED.3IONS-SCNC: 10.5 MMOL/L (ref 5–15)
BASOPHILS # BLD AUTO: 0.02 10*3/MM3 (ref 0–0.2)
BASOPHILS NFR BLD AUTO: 1.1 % (ref 0–1.5)
BUN BLD-MCNC: 29 MG/DL (ref 6–20)
BUN/CREAT SERPL: 26.6 (ref 7.3–30)
CALCIUM SPEC-SCNC: 8.6 MG/DL (ref 8.5–10.2)
CHLORIDE SERPL-SCNC: 103 MMOL/L (ref 98–107)
CO2 SERPL-SCNC: 25.5 MMOL/L (ref 22–29)
CREAT BLD-MCNC: 1.09 MG/DL (ref 0.7–1.3)
DEPRECATED RDW RBC AUTO: 44.8 FL (ref 37–54)
EOSINOPHIL # BLD AUTO: 0 10*3/MM3 (ref 0–0.4)
EOSINOPHIL NFR BLD AUTO: 0 % (ref 0.3–6.2)
ERYTHROCYTE [DISTWIDTH] IN BLOOD BY AUTOMATED COUNT: 13.3 % (ref 12.3–15.4)
GFR SERPL CREATININE-BSD FRML MDRD: 66 ML/MIN/1.73
GLUCOSE BLD-MCNC: 137 MG/DL (ref 74–124)
HCT VFR BLD AUTO: 37.3 % (ref 37.5–51)
HGB BLD-MCNC: 12.1 G/DL (ref 13–17.7)
IMM GRANULOCYTES # BLD AUTO: 0.02 10*3/MM3 (ref 0–0.05)
IMM GRANULOCYTES NFR BLD AUTO: 1.1 % (ref 0–0.5)
INR PPP: 3.83 (ref 0.9–1.1)
LYMPHOCYTES # BLD AUTO: 0.31 10*3/MM3 (ref 0.7–3.1)
LYMPHOCYTES NFR BLD AUTO: 16.9 % (ref 19.6–45.3)
MCH RBC QN AUTO: 31.7 PG (ref 26.6–33)
MCHC RBC AUTO-ENTMCNC: 32.4 G/DL (ref 31.5–35.7)
MCV RBC AUTO: 97.6 FL (ref 79–97)
MONOCYTES # BLD AUTO: 0.1 10*3/MM3 (ref 0.1–0.9)
MONOCYTES NFR BLD AUTO: 5.5 % (ref 5–12)
NEUTROPHILS # BLD AUTO: 1.38 10*3/MM3 (ref 1.7–7)
NEUTROPHILS NFR BLD AUTO: 75.4 % (ref 42.7–76)
NRBC BLD AUTO-RTO: 0 /100 WBC (ref 0–0.2)
PLATELET # BLD AUTO: 70 10*3/MM3 (ref 140–450)
PMV BLD AUTO: 11.5 FL (ref 6–12)
POTASSIUM BLD-SCNC: 4 MMOL/L (ref 3.5–4.7)
PROTHROMBIN TIME: 37.5 SECONDS (ref 11.7–14.2)
RBC # BLD AUTO: 3.82 10*6/MM3 (ref 4.14–5.8)
SODIUM BLD-SCNC: 139 MMOL/L (ref 134–145)
WBC NRBC COR # BLD: 1.83 10*3/MM3 (ref 3.4–10.8)

## 2019-07-15 PROCEDURE — 77386: CPT | Performed by: RADIOLOGY

## 2019-07-15 PROCEDURE — 80048 BASIC METABOLIC PNL TOTAL CA: CPT

## 2019-07-15 PROCEDURE — 77336 RADIATION PHYSICS CONSULT: CPT | Performed by: RADIOLOGY

## 2019-07-15 PROCEDURE — 85610 PROTHROMBIN TIME: CPT | Performed by: INTERNAL MEDICINE

## 2019-07-15 PROCEDURE — 96361 HYDRATE IV INFUSION ADD-ON: CPT | Performed by: INTERNAL MEDICINE

## 2019-07-15 PROCEDURE — 99214 OFFICE O/P EST MOD 30 MIN: CPT | Performed by: INTERNAL MEDICINE

## 2019-07-15 PROCEDURE — 77386 CHG INTENSITY MODULATED RADIATION TX DLVR COMPLEX: CPT | Performed by: RADIOLOGY

## 2019-07-15 PROCEDURE — 85025 COMPLETE CBC W/AUTO DIFF WBC: CPT

## 2019-07-15 PROCEDURE — 77014 CHG CT GUIDANCE RADIATION THERAPY FLDS PLACEMENT: CPT | Performed by: RADIOLOGY

## 2019-07-15 PROCEDURE — 96360 HYDRATION IV INFUSION INIT: CPT | Performed by: INTERNAL MEDICINE

## 2019-07-15 RX ORDER — SODIUM CHLORIDE 9 MG/ML
1000 INJECTION, SOLUTION INTRAVENOUS ONCE
Status: CANCELLED | OUTPATIENT
Start: 2019-07-17

## 2019-07-15 RX ORDER — SODIUM CHLORIDE 9 MG/ML
1000 INJECTION, SOLUTION INTRAVENOUS ONCE
Status: CANCELLED | OUTPATIENT
Start: 2019-07-15

## 2019-07-15 RX ORDER — SODIUM CHLORIDE 0.9 % (FLUSH) 0.9 %
10 SYRINGE (ML) INJECTION AS NEEDED
Status: CANCELLED | OUTPATIENT
Start: 2019-07-15

## 2019-07-15 RX ADMIN — SODIUM CHLORIDE 1000 ML: 9 INJECTION, SOLUTION INTRAVENOUS at 09:23

## 2019-07-15 RX ADMIN — SODIUM CHLORIDE, PRESERVATIVE FREE 500 UNITS: 5 INJECTION INTRAVENOUS at 10:55

## 2019-07-15 NOTE — PROGRESS NOTES
Patient's INR is 3.83 today. Patient is to hold his Warfarin dose Monday and Tuesday this week then return on Wednesday for a recheck per . Patient and Patient's daughter stated understanding.

## 2019-07-15 NOTE — PROGRESS NOTES
Subjective     REASON FOR FOLLOW UP:  1.  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   2.  Combined radiation and low-dose carboplatin and Taxol chemotherapy weekly initiated 6/17/2019.  3.  MediPort placed by Dr. Adolph Grigsby of vascular surgery 6/18/2019    HISTORY OF PRESENT ILLNESS:  The patient is a 74 y.o. year old male who is undergoing treatment with weekly carboplatin and Taxol along with radiation for his squamous cell carcinoma of the base of the tongue, HPV positive.  He is due for his fifth dose of carboplatin and Taxol along with radiation today.     He has been experiencing the expected toxicities with painful mucositis leading to decreased oral intake.    He has been using boost plus for nutrition.  He is wanting to avoid a feeding tube.  He has received IV fluids in the treatment area on several occasions.  He has had significant weight loss and required modification of his blood pressure medications due to low blood pressure.  He is on metoprolol 50 mg twice daily but was instructed to hold the metoprolol if his systolic is less than 120.  He took his metoprolol this morning and his systolic currently is less than 100.  I think we can further modify his metoprolol to 25 mg twice daily.    His blood counts are low today and he will not be receiving chemotherapy but he can continue his radiation therapy this week and we also will be providing IV fluid hydration every Monday Wednesday and Friday this week.  His weight has actually stabilized.  He is drinking boost and hydrating with water and Gatorade at home.  He reports he is urinating several times a day.    History of Present Illness     Past Medical History:   Diagnosis Date   • Arrhythmia    • Atrial fibrillation (CMS/[facility])    • CAD (coronary artery disease)    • Cancer (CMS/HCC) 05/2019    Neck, squamous   • CHF (congestive heart failure) (CMS/HCC)    • Chronic bronchitis (CMS/[facility])    • COPD (chronic obstructive  pulmonary disease) (CMS/[facility])    • Coronary artery disease of native artery of native heart with stable angina pectoris (CMS/[facility])    • Cramps of lower extremity    • Daytime hypersomnia    • Depression    • Depression with anxiety    • IRAHETA (dyspnea on exertion)    • Drug therapy    • Dyspnea on exertion    • Emphysema of lung (CMS/HCC)    • Encounter for immunization     flu vaccine high dose PF 65+   • Enlarged prostate    • Fainting    • Fatigue    • Frequent nocturnal awakening    • Gout    • H/O cardiac radiofrequency ablation 2006    [facility].   • Hyperlipidemia    • Hypertension    • Insomnia    • Lumbar radicular pain    • MI (myocardial infarction) (CMS/[facility])    • Non-rheumatic tricuspid valve insufficiency    • SHAR (obstructive sleep apnea)    • Osteoarthritis    • Precordial pain    • Pulmonary emphysema (CMS/[facility])    • Right leg pain     sciatica nerve pain   • Shortness of breath    • Snoring    • SVT (supraventricular tachycardia) (CMS/[facility])    • Syncope    • Vitamin D deficiency         Past Surgical History:   Procedure Laterality Date   • APPENDECTOMY     • CARDIAC ABLATION  2006    [facility].   • CARDIAC CATHETERIZATION N/A 8/29/2018    Procedure: Left Heart Cath;  Surgeon: Yousif Uribe MD;  Location: [facility] INVASIVE LOCATION;  Service: Cardiology   • CARDIAC CATHETERIZATION N/A 8/29/2018    Procedure: Coronary angiography;  Surgeon: Yousif Uribe MD;  Location: [facility] CATH INVASIVE LOCATION;  Service: Cardiology   • CARDIAC CATHETERIZATION N/A 8/29/2018    Procedure: Left ventriculography;  Surgeon: Yousif Uribe MD;  Location: [facility] CATH INVASIVE LOCATION;  Service: Cardiology   • FINGER SURGERY     • FOOT SURGERY     • HAND SURGERY     • VENOUS ACCESS DEVICE (PORT) INSERTION Right 6/18/2019    Procedure: MEDIPORT PLACEMENT;  Surgeon: Elvis Grigsby MD;  Location: [facility] OR;  Service: Vascular        ALLERGIES:    Allergies   Allergen Reactions   • Benadryl  "[Diphenhydramine Hcl] Other (See Comments) and Dizziness     \"I sleep for about a day\"        Review of Systems   Constitutional: Positive for appetite change (lack of appetite), fatigue and unexpected weight change. Negative for activity change, chills and fever.   HENT: Positive for mouth sores (improving'). Negative for trouble swallowing and voice change.    Eyes: Negative for pain and visual disturbance.   Respiratory: Negative for cough, shortness of breath and wheezing.    Cardiovascular: Negative for chest pain, palpitations and leg swelling.   Gastrointestinal: Positive for nausea. Negative for abdominal pain, constipation, diarrhea and vomiting.   Genitourinary: Negative for difficulty urinating, frequency and urgency.   Musculoskeletal: Positive for back pain. Negative for arthralgias and joint swelling.   Skin: Negative for rash.   Neurological: Positive for dizziness and weakness. Negative for seizures.   Hematological: Negative for adenopathy. Does not bruise/bleed easily.   Psychiatric/Behavioral: Negative for behavioral problems and confusion. The patient is not nervous/anxious.         Objective     Vitals:    07/15/19 0850   BP: 99/58   Pulse: 89   Resp: 14   Temp: 98.2 °F (36.8 °C)   TempSrc: Oral   SpO2: 98%   Weight: 75.8 kg (167 lb 3.2 oz)   Height: 182.9 cm (72.01\")   PainSc:   8   PainLoc: Generalized  Comment: Back and mouth     Current Status 7/15/2019   ECOG score 1       Physical Exam   Constitutional: He is oriented to person, place, and time. He appears well-developed and well-nourished. No distress.   HENT:   Head: Normocephalic.   Mouth/Throat: Oropharyngeal exudate (mucositits with thrush present) present.   Firm, 4.5-5.5 cm palpable mass at the angle of the mandible on the right   Eyes: Conjunctivae and EOM are normal. Pupils are equal, round, and reactive to light. No scleral icterus.   Neck: Normal range of motion. Neck supple. No JVD present. No thyromegaly present. "   Cardiovascular: Normal rate. An irregularly irregular rhythm present. Exam reveals no gallop and no friction rub.   No murmur heard.  Pulmonary/Chest: Effort normal and breath sounds normal. He has no wheezes. He has no rales.   Abdominal: Soft. He exhibits no distension and no mass. There is no tenderness.   Musculoskeletal: Normal range of motion. He exhibits edema. He exhibits no deformity.   trace ankle edema bilaterally   Lymphadenopathy:     He has no cervical adenopathy.   Neurological: He is alert and oriented to person, place, and time. He has normal reflexes. No cranial nerve deficit.   Skin: Skin is warm and dry. No rash noted. No erythema.   Psychiatric: He has a normal mood and affect. His behavior is normal. Judgment normal.         RECENT LABS:  Hematology WBC   Date Value Ref Range Status   07/15/2019 1.83 (L) 3.40 - 10.80 10*3/mm3 Final     RBC   Date Value Ref Range Status   07/15/2019 3.82 (L) 4.14 - 5.80 10*6/mm3 Final     Hemoglobin   Date Value Ref Range Status   07/15/2019 12.1 (L) 13.0 - 17.7 g/dL Final     Hematocrit   Date Value Ref Range Status   07/15/2019 37.3 (L) 37.5 - 51.0 % Final     Platelets   Date Value Ref Range Status   07/15/2019 70 (L) 140 - 450 10*3/mm3 Final        Lab Results   Component Value Date    GLUCOSE 137 (H) 07/15/2019    BUN 29 (H) 07/15/2019    CREATININE 1.09 07/15/2019    EGFRIFNONA 66 07/15/2019    EGFRIFAFRI 107 04/23/2018    BCR 26.6 07/15/2019    K 4.0 07/15/2019    CO2 25.5 07/15/2019    CALCIUM 8.6 07/15/2019    PROTENTOTREF 7.2 04/23/2018    ALBUMIN 3.50 07/12/2019    LABIL2 1.3 04/23/2018    AST 22 07/12/2019    ALT 26 07/12/2019     Lab Results   Component Value Date    INR 3.83 (H) 07/15/2019    INR 1.90 (H) 07/08/2019    INR 1.70 (H) 07/05/2019    PROTIME 37.5 (H) 07/15/2019    PROTIME 22.9 (H) 07/08/2019    PROTIME 20.1 (H) 07/05/2019       PATHOLOGY 5/15/2019  Final Diagnosis   1.  Right Neck, FNA:                  A.  Positive for squamous cell  carcinoma.     Flow Cytometry Report, Addendum   Utilizing appropriate positive and negative controls immunohistochemical stain p16 is performed on cell block material.  The tumor is positive for p16 by immunohistochemical staining which is a surrogate for HPV infection.         Assessment/Plan     1.  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  He is undergoing definitive treatment with radiation therapy and weekly low-dose carboplatin and Taxol chemotherapy.  He is here today for cycle #5.  Treatment will be held today due to thrombocytopenia and borderline neutropenia.  2.  Comorbidities including ischemic heart disease with history of CHF and atrial fibrillation requiring Coumadin anticoagulation.    3.  Oral mucositis.  He has a prescription for hydrocodone for pain control.  He does have [employer]'s Magic mouth rinse to use as well.  4.  Warfarin anticoagulation.  Patient's INR 3.83, he will hold his coumadin today and tomorrow and have another INR checked Wednesday 7/17/20195.    5.  Weight loss and poor nutrition.  The patient's weight has stabilized and he is utilizing boost at home and trying to hydrate with water and Gatorade.  He does understand if his nutrition is not improving we will need to proceed with a G-tube for nutrition.  Ruthy Bautista, oncology dietitian been involved in his care also.    Plan  1.  Hold carboplatin and Taxol chemotherapy today due to neutropenia and thrombocytopenia.  2.  He should continue his radiation therapy this week.  3.  He will be returning Wednesday and Friday of this week to the CBC office for further lab and IV fluid hydration.  4.  Nurse practitioner visit with lab and possible chemotherapy in 1 week.  5.  MD follow-up with lab and possible chemotherapy in 2 weeks.  6.  His INR today is supra therapeutic at 3.8.  He was instructed to hold his Coumadin today and tomorrow and we will  check another INR on Wednesday.  7.  We modified his metoprolol dose to 25 mg every 12 hours and E scribed a new prescription for 25 mg tablets to his pharmacy.

## 2019-07-16 PROCEDURE — 77014 CHG CT GUIDANCE RADIATION THERAPY FLDS PLACEMENT: CPT | Performed by: RADIOLOGY

## 2019-07-16 PROCEDURE — 77386: CPT | Performed by: RADIOLOGY

## 2019-07-16 PROCEDURE — 77386 CHG INTENSITY MODULATED RADIATION TX DLVR COMPLEX: CPT | Performed by: RADIOLOGY

## 2019-07-17 ENCOUNTER — DOCUMENTATION (OUTPATIENT)
Dept: NUTRITION | Facility: HOSPITAL | Age: 74
End: 2019-07-17

## 2019-07-17 ENCOUNTER — ANTICOAGULATION VISIT (OUTPATIENT)
Dept: PHARMACY | Facility: HOSPITAL | Age: 74
End: 2019-07-17

## 2019-07-17 ENCOUNTER — INFUSION (OUTPATIENT)
Dept: ONCOLOGY | Facility: HOSPITAL | Age: 74
End: 2019-07-17

## 2019-07-17 VITALS
OXYGEN SATURATION: 98 % | BODY MASS INDEX: 22.78 KG/M2 | TEMPERATURE: 97.8 F | DIASTOLIC BLOOD PRESSURE: 69 MMHG | SYSTOLIC BLOOD PRESSURE: 98 MMHG | RESPIRATION RATE: 16 BRPM | HEART RATE: 86 BPM | WEIGHT: 168 LBS

## 2019-07-17 DIAGNOSIS — C01 SQUAMOUS CELL CARCINOMA OF BASE OF TONGUE (HCC): ICD-10-CM

## 2019-07-17 DIAGNOSIS — I48.91 ATRIAL FIBRILLATION, UNSPECIFIED TYPE (HCC): ICD-10-CM

## 2019-07-17 DIAGNOSIS — Z79.01 CURRENT USE OF LONG TERM ANTICOAGULATION: ICD-10-CM

## 2019-07-17 DIAGNOSIS — I95.89 HYPOTENSION DUE TO HYPOVOLEMIA: Primary | ICD-10-CM

## 2019-07-17 DIAGNOSIS — E86.1 HYPOTENSION DUE TO HYPOVOLEMIA: Primary | ICD-10-CM

## 2019-07-17 LAB
INR PPP: 4.1 (ref 0.9–1.1)
PROTHROMBIN TIME: 48.9 SECONDS (ref 11–13.5)

## 2019-07-17 PROCEDURE — 77014 CHG CT GUIDANCE RADIATION THERAPY FLDS PLACEMENT: CPT | Performed by: RADIOLOGY

## 2019-07-17 PROCEDURE — 77386: CPT | Performed by: RADIOLOGY

## 2019-07-17 PROCEDURE — 96361 HYDRATE IV INFUSION ADD-ON: CPT | Performed by: INTERNAL MEDICINE

## 2019-07-17 PROCEDURE — 96360 HYDRATION IV INFUSION INIT: CPT | Performed by: INTERNAL MEDICINE

## 2019-07-17 PROCEDURE — 77386 CHG INTENSITY MODULATED RADIATION TX DLVR COMPLEX: CPT | Performed by: RADIOLOGY

## 2019-07-17 PROCEDURE — 85610 PROTHROMBIN TIME: CPT | Performed by: INTERNAL MEDICINE

## 2019-07-17 RX ORDER — SODIUM CHLORIDE 9 MG/ML
1000 INJECTION, SOLUTION INTRAVENOUS ONCE
Status: CANCELLED | OUTPATIENT
Start: 2019-07-17

## 2019-07-17 RX ADMIN — SODIUM CHLORIDE 1000 ML: 900 INJECTION, SOLUTION INTRAVENOUS at 09:12

## 2019-07-17 NOTE — PROGRESS NOTES
Pt's INR 4.1 today.  Pt has had no coumadin since Sunday evening.  Held dose on Monday and Tuesday.  Instructed to continue to hold coumadin and he will have another INR drawn on Friday.  Orders received from [place] SAL  And Dr. Heaton.  Pt v/u.

## 2019-07-17 NOTE — PROGRESS NOTES
Anticoagulation Clinic Progress Note    Anticoagulation Summary  As of 2019    INR goal:   2.0-3.0   TTR:   45.0 % (8.5 mo)   INR used for dosin.10! (2019)   Warfarin maintenance plan:   2 mg every Mon, Fri; 4 mg all other days   Weekly warfarin total:   24 mg   Plan last modified:   Kristal Cowart RPH (2019)   Next INR check:   2019   Priority:   Maintenance   Target end date:   Indefinite    Indications    Atrial fibrillation (CMS/HCC) [I48.91]             Anticoagulation Episode Summary     INR check location:       Preferred lab:       Send INR reminders to:    [facility] [place]    Comments:         Anticoagulation Care Providers     Provider Role Specialty Phone number    Myriam Omer MD Referring Cardiology [phone]    Nallely Stoll [facility] Responsible Pharmacy [phone]          Clinic Interview:  Pt f/u with CBC so will re-check on .     INR History:  Anticoagulation Monitoring 2019   INR 1.0 2.3 4.10   INR Date 2019   INR Goal 2.0-3.0 2.0-3.0 2.0-3.0   Trend Same Same Same   Last Week Total 0 mg 24 mg 18 mg   Next Week Total 24 mg 24 mg 20 mg   Sun 4 mg 2 mg (); Otherwise 4 mg -   Mon 2 mg 2 mg -   Tu 4 mg 4 mg -   Wed Hold (); Otherwise 4 mg 4 mg Hold ()   Thu 6 mg (); Otherwise 4 mg 4 mg -   Fri 4 mg (); Otherwise 2 mg 2 mg -   Sat 4 mg 2 mg (); Otherwise 4 mg -   Visit Report - - -   Some recent data might be hidden       Plan:  1. INR is Supratherapeutic today- see above in Anticoagulation Summary.   Will instruct Cody Eldridge to hold their warfarin regimen until reaching therapeutic INR- see above in Anticoagulation Summary.  2. Follow up on .   3. They have been instructed to call if any changes in medications, doses, concerns, etc. Patient expresses understanding and has no further questions at this time.    Aldair Shah [facility]

## 2019-07-17 NOTE — PROGRESS NOTES
ONC Nutrition Follow Up:     Weight 167#. Chemo held this week due to labs. IVF today and Friday.   Continues with radiation. He is drinking Boost plus, gatorade, eating baby foods, applesauce, pudding.  Not sleeping. Trying to push liquids throughout the day.   Encouraged patient, continue to follow.

## 2019-07-18 ENCOUNTER — LAB (OUTPATIENT)
Dept: LAB | Facility: HOSPITAL | Age: 74
End: 2019-07-18

## 2019-07-18 ENCOUNTER — APPOINTMENT (OUTPATIENT)
Dept: SPEECH THERAPY | Facility: HOSPITAL | Age: 74
End: 2019-07-18

## 2019-07-18 ENCOUNTER — RADIATION ONCOLOGY WEEKLY ASSESSMENT (OUTPATIENT)
Dept: RADIATION ONCOLOGY | Facility: HOSPITAL | Age: 74
End: 2019-07-18

## 2019-07-18 DIAGNOSIS — C44.42 SQUAMOUS CELL CARCINOMA OF NECK: ICD-10-CM

## 2019-07-18 DIAGNOSIS — C01 SQUAMOUS CELL CARCINOMA OF BASE OF TONGUE (HCC): ICD-10-CM

## 2019-07-18 DIAGNOSIS — C01 SQUAMOUS CELL CARCINOMA OF BASE OF TONGUE (HCC): Primary | ICD-10-CM

## 2019-07-18 LAB
BASOPHILS # BLD AUTO: 0.02 10*3/MM3 (ref 0–0.2)
BASOPHILS NFR BLD AUTO: 0.9 % (ref 0–1.5)
DEPRECATED RDW RBC AUTO: 44.1 FL (ref 37–54)
EOSINOPHIL # BLD AUTO: 0 10*3/MM3 (ref 0–0.4)
EOSINOPHIL NFR BLD AUTO: 0 % (ref 0.3–6.2)
ERYTHROCYTE [DISTWIDTH] IN BLOOD BY AUTOMATED COUNT: 14.1 % (ref 12.3–15.4)
HCT VFR BLD AUTO: 39.6 % (ref 37.5–51)
HGB BLD-MCNC: 13.1 G/DL (ref 13–17.7)
IMM GRANULOCYTES # BLD AUTO: 0.03 10*3/MM3 (ref 0–0.05)
IMM GRANULOCYTES NFR BLD AUTO: 1.4 % (ref 0–0.5)
LYMPHOCYTES # BLD AUTO: 0.45 10*3/MM3 (ref 0.7–3.1)
LYMPHOCYTES NFR BLD AUTO: 20.7 % (ref 19.6–45.3)
MCH RBC QN AUTO: 31.3 PG (ref 26.6–33)
MCHC RBC AUTO-ENTMCNC: 33.1 G/DL (ref 31.5–35.7)
MCV RBC AUTO: 94.7 FL (ref 79–97)
MONOCYTES # BLD AUTO: 0.5 10*3/MM3 (ref 0.1–0.9)
MONOCYTES NFR BLD AUTO: 23 % (ref 5–12)
NEUTROPHILS # BLD AUTO: 1.17 10*3/MM3 (ref 1.7–7)
NEUTROPHILS NFR BLD AUTO: 54 % (ref 42.7–76)
NRBC BLD AUTO-RTO: 2.3 /100 WBC (ref 0–0.2)
PLATELET # BLD AUTO: 72 10*3/MM3 (ref 140–450)
PMV BLD AUTO: 11.4 FL (ref 6–12)
RBC # BLD AUTO: 4.18 10*6/MM3 (ref 4.14–5.8)
WBC NRBC COR # BLD: 2.17 10*3/MM3 (ref 3.4–10.8)

## 2019-07-18 PROCEDURE — 85025 COMPLETE CBC W/AUTO DIFF WBC: CPT | Performed by: INTERNAL MEDICINE

## 2019-07-18 PROCEDURE — 36416 COLLJ CAPILLARY BLOOD SPEC: CPT | Performed by: INTERNAL MEDICINE

## 2019-07-18 NOTE — PROGRESS NOTES
Physician Weekly Management Note    Diagnosis:     No diagnosis found.Cancer Staging  Stage I (cT2, cN1, cM0, p16: Positive)    Reason for Visit: Radiation (21/30)    Concurrent Chemo:   Yes    Notes on Treatment course, Films, Medical progress and Plan:  Counts still very low. Will hold RT today and Friday. Recheck labs and hopefully resume on Monday. ANC 1.17. Neck ok - skin care discussed; lips cracked, discussed that. Continue drinking. Continue swish and swallow. Met with speech/swallow today as well. Cont on.    ROS -  Other than those listed above, as follows:  Constitutional - Normal - no complaints of fatigue, denies lack of appetite, fever, night sweats or change in weight.  Neck - Normal - denies neck masses, muscle weakness, neck pain, decreased range of motion or swelling.  Cardiovascular - Normal - denies arrhythmias, chest pain, dyspnea, edema, orthopnea or palpitations.  Respiratory - Normal - denies cough, dyspnea, hemoptysis, hiccoughs, pleuritic chest pain or wheezing.  Gastrointestinal - Normal - no complaints of constipation, abdominal pain, diarrhea, heartburn/dyspepsia, hematemesis, hemorrhoids, melena or GI bleeding, nausea, pain or cramping or vomiting.  Neuro - Normal - no new complaints of vision change, headache, nausea, cranial nerve deficit, gait abnormality, syncope, seizure.    PHYSICAL EXAM - Other than changes listed above, as follows:  There were no vitals filed for this visit.    Constitutional - Normal - no evidence of impaired alertness, inadequate appearance, premature or advanced chronologic age, uncooperativeness, altered mood, affect or disorientation.  Neck - Normal - no evidence of tender or enlarged lymph nodes, neck abnormalities, restricted range of motion or enlarged thyroid.  Chest - Normal - no evidence of chest abnormalities, tender or enlarged lymph nodes.  Respiratory - Normal - no evidence of abnormal breat sounds, chest abnormalities on palpation and chest  "abnormalities on percussion and normal breath sounds.  Hematologic/Lymphatic - Normal - no evidence of tender or enlarged axillary lymph nodes nor tender or enlarged neck nodes.  Neuro - Normal - no evidence of cranial nerve deficit, gait abnormality.    Performance Status:  (1) Restricted in physically strenuous activity, ambulatory and able to do work of light nature    Problem added:  No problems updated.  Medications added: No orders of the defined types were placed in this encounter.    Ancillary referrals made: No orders of the defined types were placed in this encounter.      Technical aspects reviewed:  Weekly OBI approved if applicable? Yes   Weekly port films approved?  Yes  Change requests noted if applicable?  Yes   Patient setup and plan reviewed?  Yes     Chart Reviewed:  Continue current treatment plan?  Yes  Treatment plan change requested? No    I have reviewed and marked as \"reviewed\" the current medications, allergies and problem list in the patients EMR.  His regional course is a    I have reviewed the patient's medical, surgical  history in detail, reviewed any pertinent lab work  and updated the computerized patient record if needed.    Patient's Care Team:  Patient Care Team:  Epley, James, APRN as PCP - General (Family Medicine)  Payal Waters MD as Consulting Physician (Pain Medicine)  Lorna Chaidez (Nurse Practitioner)  Kristal Cowart [facility] as Pharmacist  Fritz Slater MD as Referring Physician (Otolaryngology)  Pablo Heaton MD as Consulting Physician (Hematology and Oncology)  Annie Davila MD as Consulting Physician (Radiation Oncology)  Chetan Heaton MD as Consulting Physician (Urology)      "

## 2019-07-19 ENCOUNTER — INFUSION (OUTPATIENT)
Dept: ONCOLOGY | Facility: HOSPITAL | Age: 74
End: 2019-07-19

## 2019-07-19 VITALS
OXYGEN SATURATION: 92 % | SYSTOLIC BLOOD PRESSURE: 112 MMHG | BODY MASS INDEX: 22.64 KG/M2 | HEART RATE: 60 BPM | WEIGHT: 167 LBS | DIASTOLIC BLOOD PRESSURE: 69 MMHG | TEMPERATURE: 98.3 F

## 2019-07-19 DIAGNOSIS — I48.91 ATRIAL FIBRILLATION, UNSPECIFIED TYPE (HCC): ICD-10-CM

## 2019-07-19 DIAGNOSIS — Z79.01 CURRENT USE OF LONG TERM ANTICOAGULATION: ICD-10-CM

## 2019-07-19 DIAGNOSIS — I95.89 HYPOTENSION DUE TO HYPOVOLEMIA: ICD-10-CM

## 2019-07-19 DIAGNOSIS — E86.1 HYPOTENSION DUE TO HYPOVOLEMIA: ICD-10-CM

## 2019-07-19 DIAGNOSIS — C01 SQUAMOUS CELL CARCINOMA OF BASE OF TONGUE (HCC): Primary | ICD-10-CM

## 2019-07-19 LAB
ALBUMIN SERPL-MCNC: 3.3 G/DL (ref 3.5–5.2)
ALBUMIN/GLOB SERPL: 1.1 G/DL (ref 1.1–2.4)
ALP SERPL-CCNC: 71 U/L (ref 38–116)
ALT SERPL W P-5'-P-CCNC: 29 U/L (ref 0–41)
ANION GAP SERPL CALCULATED.3IONS-SCNC: 14.4 MMOL/L (ref 5–15)
AST SERPL-CCNC: 23 U/L (ref 0–40)
BASOPHILS # BLD AUTO: 0.01 10*3/MM3 (ref 0–0.2)
BASOPHILS NFR BLD AUTO: 0.6 % (ref 0–1.5)
BILIRUB SERPL-MCNC: 0.7 MG/DL (ref 0.2–1.2)
BUN BLD-MCNC: 19 MG/DL (ref 6–20)
BUN/CREAT SERPL: 20.2 (ref 7.3–30)
CALCIUM SPEC-SCNC: 8.7 MG/DL (ref 8.5–10.2)
CHLORIDE SERPL-SCNC: 103 MMOL/L (ref 98–107)
CO2 SERPL-SCNC: 22.6 MMOL/L (ref 22–29)
CREAT BLD-MCNC: 0.94 MG/DL (ref 0.7–1.3)
DEPRECATED RDW RBC AUTO: 45.1 FL (ref 37–54)
EOSINOPHIL # BLD AUTO: 0 10*3/MM3 (ref 0–0.4)
EOSINOPHIL NFR BLD AUTO: 0 % (ref 0.3–6.2)
ERYTHROCYTE [DISTWIDTH] IN BLOOD BY AUTOMATED COUNT: 14.5 % (ref 12.3–15.4)
GFR SERPL CREATININE-BSD FRML MDRD: 78 ML/MIN/1.73
GLOBULIN UR ELPH-MCNC: 3.1 GM/DL (ref 1.8–3.5)
GLUCOSE BLD-MCNC: 141 MG/DL (ref 74–124)
HCT VFR BLD AUTO: 38.3 % (ref 37.5–51)
HGB BLD-MCNC: 12.5 G/DL (ref 13–17.7)
IMM GRANULOCYTES # BLD AUTO: 0 10*3/MM3 (ref 0–0.05)
IMM GRANULOCYTES NFR BLD AUTO: 0 % (ref 0–0.5)
INR PPP: 2.7 (ref 0.9–1.1)
LYMPHOCYTES # BLD AUTO: 0.3 10*3/MM3 (ref 0.7–3.1)
LYMPHOCYTES NFR BLD AUTO: 16.8 % (ref 19.6–45.3)
MAGNESIUM SERPL-MCNC: 1.5 MG/DL (ref 1.8–2.5)
MCH RBC QN AUTO: 31.5 PG (ref 26.6–33)
MCHC RBC AUTO-ENTMCNC: 32.6 G/DL (ref 31.5–35.7)
MCV RBC AUTO: 96.5 FL (ref 79–97)
MONOCYTES # BLD AUTO: 0.53 10*3/MM3 (ref 0.1–0.9)
MONOCYTES NFR BLD AUTO: 29.6 % (ref 5–12)
NEUTROPHILS # BLD AUTO: 0.95 10*3/MM3 (ref 1.7–7)
NEUTROPHILS NFR BLD AUTO: 53 % (ref 42.7–76)
NRBC BLD AUTO-RTO: 1.7 /100 WBC (ref 0–0.2)
PLATELET # BLD AUTO: 77 10*3/MM3 (ref 140–450)
PMV BLD AUTO: 10.2 FL (ref 6–12)
POTASSIUM BLD-SCNC: 4 MMOL/L (ref 3.5–4.7)
PROT SERPL-MCNC: 6.4 G/DL (ref 6.3–8)
PROTHROMBIN TIME: 32.3 SECONDS (ref 11–13.5)
RBC # BLD AUTO: 3.97 10*6/MM3 (ref 4.14–5.8)
SODIUM BLD-SCNC: 140 MMOL/L (ref 134–145)
WBC NRBC COR # BLD: 1.79 10*3/MM3 (ref 3.4–10.8)

## 2019-07-19 PROCEDURE — 83735 ASSAY OF MAGNESIUM: CPT | Performed by: NURSE PRACTITIONER

## 2019-07-19 PROCEDURE — 85025 COMPLETE CBC W/AUTO DIFF WBC: CPT | Performed by: NURSE PRACTITIONER

## 2019-07-19 PROCEDURE — 85610 PROTHROMBIN TIME: CPT | Performed by: NURSE PRACTITIONER

## 2019-07-19 PROCEDURE — 96361 HYDRATE IV INFUSION ADD-ON: CPT | Performed by: NURSE PRACTITIONER

## 2019-07-19 PROCEDURE — 80053 COMPREHEN METABOLIC PANEL: CPT | Performed by: NURSE PRACTITIONER

## 2019-07-19 PROCEDURE — 25010000002 MAGNESIUM SULFATE PER 500 MG OF MAGNESIUM: Performed by: NURSE PRACTITIONER

## 2019-07-19 PROCEDURE — 96365 THER/PROPH/DIAG IV INF INIT: CPT | Performed by: NURSE PRACTITIONER

## 2019-07-19 RX ORDER — SODIUM CHLORIDE 9 MG/ML
1000 INJECTION, SOLUTION INTRAVENOUS ONCE
Status: CANCELLED | OUTPATIENT
Start: 2019-07-19

## 2019-07-19 RX ADMIN — SODIUM CHLORIDE 1000 ML: 9 INJECTION, SOLUTION INTRAVENOUS at 08:39

## 2019-07-19 RX ADMIN — MAGNESIUM SULFATE HEPTAHYDRATE: 500 INJECTION, SOLUTION INTRAMUSCULAR; INTRAVENOUS at 09:32

## 2019-07-19 NOTE — PROGRESS NOTES
Treatment plan adjusted to add 2 additional days for 7/20/19 and 7/21/19 for patient to receive one liter NS each day to be given at hospital this weekend per in-basket message RENATE SAL

## 2019-07-19 NOTE — PROGRESS NOTES
Subjective     REASON FOR FOLLOW UP:  1.  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   2.  Combined radiation and low-dose carboplatin and Taxol chemotherapy weekly initiated 6/17/2019.  3.  MediPort placed by Dr. Adolph Grisgby of vascular surgery 6/18/2019    HISTORY OF PRESENT ILLNESS:  The patient is a 74 y.o. year old male who is undergoing treatment with weekly carboplatin and Taxol along with radiation for his squamous cell carcinoma of the base of the tongue, HPV positive.  He is due for his fifth dose of carboplatin and Taxol along with radiation today.  Treatment was held last week secondary to neutropenia and thrombocytopenia. His radiation was held for 2 days.     Of note, patient's INR last Monday was supratherapeutic at 3.8 and therefore he was asked to hold his Coumadin for 2 days.  When he was rechecked, his INR remained supratherapeutic at 4.1.  The patient was asked to hold for 2 additional days.  INR finally reached therapeutic level at 2.7 on Friday, July 19, 2019.  He was asked to restart Coumadin at 2 mg daily on July 20.  INR today is at the low-end of therapeutic at 2.1. Thankfully, he denies any issues of bleeding or excess bruising.  His stools are normal in color and consistency.    The patient has struggled with hypotension over the last several weeks likely attributable to his significant weight loss.  He has required several modifications in his blood pressure medications with most recent change being a transition to metoprolol, 25 mg twice daily.  The patient has been instructed to hold metoprolol if his systolic blood pressure is less than 120.  Today, his blood pressure is 100/64 and therefore he has held his morning dose of metoprolol.    In regards to his tolerance of treatment, he is experiencing the expected toxicities including painful mucositis resulting in decreased oral intake.  With a break in his chemotherapy, the patient was able to eat better over  the last week. His weight reflects that with a  4 lb gain. He has been drinking Boost and vegetable broth primarily and has been sticking with a soft diet due to odynophagia. He has hydrocodone at home, though notes it is not been very beneficial.  He did have some mild constipation, though now this has been relieved.  He has required IV hydration in our office several times over the last few weeks and is now scheduled every Monday, Wednesday, and Friday for hydration and additionally requested fluids over the weekend.    Unfortunately, his blood counts today have only marginally improved with a borderline ANC of 1.58 and a total white blood cell count of 2.23.  Platelets have further declined to 64,000 in the absence of excess bleeding or bruising.  He denies any fevers or chills.    He has no other concerns today.    History of Present Illness     Past Medical History:   Diagnosis Date   • Arrhythmia    • Atrial fibrillation (CMS/[facility])    • CAD (coronary artery disease)    • Cancer (CMS/[facility]) 05/2019    Neck, squamous   • CHF (congestive heart failure) (CMS/[facility])    • Chronic bronchitis (CMS/[facility])    • COPD (chronic obstructive pulmonary disease) (CMS/[facility])    • Coronary artery disease of native artery of native heart with stable angina pectoris (CMS/[facility])    • Cramps of lower extremity    • Daytime hypersomnia    • Depression    • Depression with anxiety    • IRAHETA (dyspnea on exertion)    • Drug therapy    • Dyspnea on exertion    • Emphysema of lung (CMS/HCC)    • Encounter for immunization     flu vaccine high dose PF 65+   • Enlarged prostate    • Fainting    • Fatigue    • Frequent nocturnal awakening    • Gout    • H/O cardiac radiofrequency ablation 2006    [facility].   • Hyperlipidemia    • Hypertension    • Insomnia    • Lumbar radicular pain    • MI (myocardial infarction) (CMS/HCC)    • Non-rheumatic tricuspid valve insufficiency    • SHAR (obstructive sleep apnea)    • Osteoarthritis    • Precordial pain   "  • Pulmonary emphysema (CMS/HCC)    • Right leg pain     sciatica nerve pain   • Shortness of breath    • Snoring    • SVT (supraventricular tachycardia) (CMS/HCC)    • Syncope    • Vitamin D deficiency         Past Surgical History:   Procedure Laterality Date   • APPENDECTOMY     • CARDIAC ABLATION  2006    [facility].   • CARDIAC CATHETERIZATION N/A 8/29/2018    Procedure: Left Heart Cath;  Surgeon: Yousif Uribe MD;  Location: [facility] CATH INVASIVE LOCATION;  Service: Cardiology   • CARDIAC CATHETERIZATION N/A 8/29/2018    Procedure: Coronary angiography;  Surgeon: Yousif Uribe MD;  Location: [facility] CATH INVASIVE LOCATION;  Service: Cardiology   • CARDIAC CATHETERIZATION N/A 8/29/2018    Procedure: Left ventriculography;  Surgeon: Yousif Uribe MD;  Location: [facility] CATH INVASIVE LOCATION;  Service: Cardiology   • FINGER SURGERY     • FOOT SURGERY     • HAND SURGERY     • VENOUS ACCESS DEVICE (PORT) INSERTION Right 6/18/2019    Procedure: MEDIPORT PLACEMENT;  Surgeon: Elvis Grigsby MD;  Location: [facility] MAIN OR;  Service: Vascular        ALLERGIES:    Allergies   Allergen Reactions   • Benadryl [Diphenhydramine Hcl] Other (See Comments) and Dizziness     \"I sleep for about a day\"        Review of Systems   Constitutional: Positive for appetite change (slightly improved ), fatigue and unexpected weight change (increased, see HPI ). Negative for activity change, chills and fever.   HENT: Positive for mouth sores (Improved ). Negative for trouble swallowing and voice change.    Eyes: Negative for pain and visual disturbance.   Respiratory: Negative for cough, shortness of breath and wheezing.    Cardiovascular: Negative for chest pain, palpitations and leg swelling.   Gastrointestinal: Negative for abdominal pain, constipation, diarrhea, nausea and vomiting.   Genitourinary: Negative for difficulty urinating, frequency and urgency.   Musculoskeletal: Positive for back pain (stable ). Negative for " arthralgias and joint swelling.   Skin: Negative for rash.   Neurological: Positive for weakness (improved ). Negative for dizziness and seizures.   Hematological: Negative for adenopathy. Does not bruise/bleed easily.   Psychiatric/Behavioral: Negative for behavioral problems and confusion. The patient is not nervous/anxious.         Objective     There were no vitals filed for this visit.  Current Status 7/17/2019   ECOG score 0       Physical Exam   Constitutional: He is oriented to person, place, and time. He appears well-developed and well-nourished. No distress.   HENT:   Head: Normocephalic.   Mouth/Throat: Oropharyngeal exudate (improved, no evidence of mucositis ) present.   Firm, 4.5-5.5 cm palpable mass at the angle of the mandible on the right   Eyes: Conjunctivae and EOM are normal. Pupils are equal, round, and reactive to light. No scleral icterus.   Neck: Normal range of motion. Neck supple. No JVD present. No thyromegaly present.   Cardiovascular: Normal rate. An irregularly irregular rhythm present. Exam reveals no gallop and no friction rub.   No murmur heard.  Pulmonary/Chest: Effort normal and breath sounds normal. He has no wheezes. He has no rales.   Abdominal: Soft. He exhibits no distension and no mass. There is no tenderness.   Musculoskeletal: Normal range of motion. He exhibits edema. He exhibits no deformity.   trace ankle edema bilaterally   Lymphadenopathy:     He has no cervical adenopathy.   Neurological: He is alert and oriented to person, place, and time. He has normal reflexes. No cranial nerve deficit.   Skin: Skin is warm and dry. No rash noted. No erythema.   Psychiatric: He has a normal mood and affect. His behavior is normal. Judgment normal.         RECENT LABS:  Hematology WBC   Date Value Ref Range Status   07/19/2019 1.79 (L) 3.40 - 10.80 10*3/mm3 Final     RBC   Date Value Ref Range Status   07/19/2019 3.97 (L) 4.14 - 5.80 10*6/mm3 Final     Hemoglobin   Date Value Ref  Range Status   07/19/2019 12.5 (L) 13.0 - 17.7 g/dL Final     Hematocrit   Date Value Ref Range Status   07/19/2019 38.3 37.5 - 51.0 % Final     Platelets   Date Value Ref Range Status   07/19/2019 77 (L) 140 - 450 10*3/mm3 Final        Lab Results   Component Value Date    GLUCOSE 141 (H) 07/19/2019    BUN 19 07/19/2019    CREATININE 0.94 07/19/2019    EGFRIFNONA 78 07/19/2019    EGFRIFAFRI 107 04/23/2018    BCR 20.2 07/19/2019    K 4.0 07/19/2019    CO2 22.6 07/19/2019    CALCIUM 8.7 07/19/2019    PROTENTOTREF 7.2 04/23/2018    ALBUMIN 3.30 (L) 07/19/2019    LABIL2 1.3 04/23/2018    AST 23 07/19/2019    ALT 29 07/19/2019     Lab Results   Component Value Date    INR 2.70 (H) 07/19/2019    INR 4.10 (H) 07/17/2019    INR 3.83 (H) 07/15/2019    PROTIME 32.3 (H) 07/19/2019    PROTIME 48.9 (H) 07/17/2019    PROTIME 37.5 (H) 07/15/2019       PATHOLOGY 5/15/2019  Final Diagnosis   1.  Right Neck, FNA:                  A.  Positive for squamous cell carcinoma.     Flow Cytometry Report, Addendum   Utilizing appropriate positive and negative controls immunohistochemical stain p16 is performed on cell block material.  The tumor is positive for p16 by immunohistochemical staining which is a surrogate for HPV infection.         Assessment/Plan     1.  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  He is undergoing definitive treatment with radiation therapy and weekly low-dose carboplatin and Taxol chemotherapy.  He is here today for cycle #5 which was delayed last week secondary to thrombocytopenia and borderline neutropenia.  Labs have not improved significantly as noted above with worsening thrombocytopenia.  For this reason, we will have to yet again delay treatment.  We will provide IV hydration support with magnesium supplementation today and on Wednesday and Friday of this week.  .    2.  Comorbidities including ischemic heart  disease with history of CHF and atrial fibrillation requiring Coumadin anticoagulation.  Please see above.  The patient's INR today is therapeutic at 2.1.  Dosing instructions given to the patient per standing stone.  3.  Oral mucositis.  He has a prescription for hydrocodone for pain control.  He does have Meghann's Magic mouth rinse to use as well.  He has been using this frequently and has found to be beneficial  4.  Warfarin anticoagulation.  Please see above.  Dosing changes provided to the patient.  We will recheck his INR in 1 week   5.  Weight loss and poor nutrition.  The patient's weight has stabilized and he has been attempting to maintain his oral hydration with water and Gatorade.  Ruthy Bautista, oncology dietitian been involved in his care also. He has only been eating soft foods, however; due to persistent odynophagia. This will be further detailed below.   6. Hypotension: related to poor oral intake and weight loss. Metoprolol adjusted to 25 mg BID.  Blood pressure today remains low and therefore he has withheld his morning dose of metoprolol.  We will continue to monitor this very closely.  We have provided him with IV hydration in the office today and will proceed with 2 additional days this week of fluids.  7.  Odynophagia/radiation esophagitis: This has made it difficult for the patient to consume most solid foods.  He is utilizing Meghann's Magic mouthwash frequently.  Thankfully, he has been drinking plenty of supplemental Boost and is actually gained weight this week.  He does have hydrocodone, though has not found this very helpful.  After review with Dr. Heaton, we will proceed with a prescription for fentanyl, 25 mcg as well as Roxanol 20 mg/ml (0.5 mls q 2 hours).  Fentanyl is currently under review with prior authorization.  The Roxanol was not available in full quantity at the patient's pharmacy.  They can provide a partial prescription of 120 mL as opposed to the total 180 mL or the patient may  go to another pharmacy to see if they can fill the full prescription.  I have asked the patient to call me with what he decides to do as he will need a new prescription if he chooses to go to another pharmacy.  8.  Hypomagnesemia: Magnesium 1.6 today.  We will provide 2 g of IV magnesium in addition to his fluids today    Plan  1.  Delay cycle #5 of carboplatin and Taxol chemotherapy today   2.  Patient to see Dr. Davila today for determination on whether he is to proceed with radiation given his progressive thrombocytopenia  3.  Proceed with 1 L of normal saline today in the office and again on Wednesday and Friday of this week.  Today, he will receive an additional 2 g of IV magnesium  4.  He will be returning Wednesday and Friday of this week to the CBC office for further lab and IV fluid hydration????  5.. MD follow-up with lab and possible chemotherapy in 1 week  6. Coumadin dosing changes provided to patient. We will recheck INR in one week  7.  Roxanol and fentanyl prescription sent to patient's pharmacy.  Please see above details regarding both prescriptions.  8. Continue Metoprolol 25 mg BID.  Patient without his morning dose today.  I am hopeful that he will be able to take his evening dose once he is received to the IV fluids today

## 2019-07-19 NOTE — PROGRESS NOTES
INR 2.7 today.  Has been holding coumadin since Sunday.  Reviewed with ariel PRAKASH, will hold today, restart Sat and Sun at 2mg and recheck on Monday.      Pt and daughter are requesting that he get fluids Sat and Sun.  Reviewed with kaci Miguel for 1 L both days.

## 2019-07-20 ENCOUNTER — INFUSION (OUTPATIENT)
Dept: ONCOLOGY | Facility: HOSPITAL | Age: 74
End: 2019-07-20

## 2019-07-20 DIAGNOSIS — Z45.2 FITTING AND ADJUSTMENT OF VASCULAR CATHETER: ICD-10-CM

## 2019-07-20 DIAGNOSIS — C01 SQUAMOUS CELL CARCINOMA OF BASE OF TONGUE (HCC): Primary | ICD-10-CM

## 2019-07-20 PROCEDURE — 96360 HYDRATION IV INFUSION INIT: CPT

## 2019-07-20 PROCEDURE — 96361 HYDRATE IV INFUSION ADD-ON: CPT

## 2019-07-20 RX ORDER — SODIUM CHLORIDE 9 MG/ML
1000 INJECTION, SOLUTION INTRAVENOUS ONCE
Status: COMPLETED | OUTPATIENT
Start: 2019-07-20 | End: 2019-07-20

## 2019-07-20 RX ORDER — SODIUM CHLORIDE 0.9 % (FLUSH) 0.9 %
10 SYRINGE (ML) INJECTION AS NEEDED
Status: DISCONTINUED | OUTPATIENT
Start: 2019-07-20 | End: 2019-07-20 | Stop reason: HOSPADM

## 2019-07-20 RX ORDER — SODIUM CHLORIDE 0.9 % (FLUSH) 0.9 %
10 SYRINGE (ML) INJECTION AS NEEDED
Status: CANCELLED | OUTPATIENT
Start: 2019-07-20

## 2019-07-20 RX ADMIN — SODIUM CHLORIDE, PRESERVATIVE FREE 500 UNITS: 5 INJECTION INTRAVENOUS at 12:39

## 2019-07-20 RX ADMIN — Medication 10 ML: at 12:38

## 2019-07-20 RX ADMIN — SODIUM CHLORIDE 1000 ML: 9 INJECTION, SOLUTION INTRAVENOUS at 10:37

## 2019-07-21 ENCOUNTER — INFUSION (OUTPATIENT)
Dept: ONCOLOGY | Facility: HOSPITAL | Age: 74
End: 2019-07-21

## 2019-07-21 DIAGNOSIS — Z45.2 FITTING AND ADJUSTMENT OF VASCULAR CATHETER: ICD-10-CM

## 2019-07-21 DIAGNOSIS — C01 SQUAMOUS CELL CARCINOMA OF BASE OF TONGUE (HCC): Primary | ICD-10-CM

## 2019-07-21 PROCEDURE — 96360 HYDRATION IV INFUSION INIT: CPT

## 2019-07-21 PROCEDURE — 96361 HYDRATE IV INFUSION ADD-ON: CPT

## 2019-07-21 RX ORDER — SODIUM CHLORIDE 0.9 % (FLUSH) 0.9 %
10 SYRINGE (ML) INJECTION AS NEEDED
Status: DISCONTINUED | OUTPATIENT
Start: 2019-07-21 | End: 2019-07-21 | Stop reason: HOSPADM

## 2019-07-21 RX ORDER — SODIUM CHLORIDE 9 MG/ML
1000 INJECTION, SOLUTION INTRAVENOUS ONCE
Status: COMPLETED | OUTPATIENT
Start: 2019-07-21 | End: 2019-07-21

## 2019-07-21 RX ORDER — SODIUM CHLORIDE 0.9 % (FLUSH) 0.9 %
10 SYRINGE (ML) INJECTION AS NEEDED
Status: CANCELLED | OUTPATIENT
Start: 2019-07-21

## 2019-07-21 RX ADMIN — SODIUM CHLORIDE, PRESERVATIVE FREE 500 UNITS: 5 INJECTION INTRAVENOUS at 11:35

## 2019-07-21 RX ADMIN — Medication 10 ML: at 11:35

## 2019-07-21 RX ADMIN — SODIUM CHLORIDE 1000 ML: 9 INJECTION, SOLUTION INTRAVENOUS at 10:00

## 2019-07-22 ENCOUNTER — INFUSION (OUTPATIENT)
Dept: ONCOLOGY | Facility: HOSPITAL | Age: 74
End: 2019-07-22

## 2019-07-22 ENCOUNTER — APPOINTMENT (OUTPATIENT)
Dept: LAB | Facility: HOSPITAL | Age: 74
End: 2019-07-22

## 2019-07-22 ENCOUNTER — OFFICE VISIT (OUTPATIENT)
Dept: ONCOLOGY | Facility: CLINIC | Age: 74
End: 2019-07-22

## 2019-07-22 VITALS
SYSTOLIC BLOOD PRESSURE: 100 MMHG | OXYGEN SATURATION: 98 % | BODY MASS INDEX: 23.2 KG/M2 | DIASTOLIC BLOOD PRESSURE: 64 MMHG | HEART RATE: 94 BPM | RESPIRATION RATE: 16 BRPM | WEIGHT: 171.3 LBS | TEMPERATURE: 98.4 F | HEIGHT: 72 IN

## 2019-07-22 DIAGNOSIS — Z79.01 CURRENT USE OF LONG TERM ANTICOAGULATION: ICD-10-CM

## 2019-07-22 DIAGNOSIS — E86.1 HYPOTENSION DUE TO HYPOVOLEMIA: ICD-10-CM

## 2019-07-22 DIAGNOSIS — I95.89 HYPOTENSION DUE TO HYPOVOLEMIA: ICD-10-CM

## 2019-07-22 DIAGNOSIS — I48.91 ATRIAL FIBRILLATION, UNSPECIFIED TYPE (HCC): Primary | ICD-10-CM

## 2019-07-22 DIAGNOSIS — K20.80 RADIATION ESOPHAGITIS: ICD-10-CM

## 2019-07-22 DIAGNOSIS — C01 SQUAMOUS CELL CARCINOMA OF BASE OF TONGUE (HCC): Primary | ICD-10-CM

## 2019-07-22 DIAGNOSIS — E83.42 HYPOMAGNESEMIA: ICD-10-CM

## 2019-07-22 DIAGNOSIS — I48.91 ATRIAL FIBRILLATION, UNSPECIFIED TYPE (HCC): ICD-10-CM

## 2019-07-22 DIAGNOSIS — T66.XXXA RADIATION ESOPHAGITIS: ICD-10-CM

## 2019-07-22 DIAGNOSIS — R13.10 ODYNOPHAGIA: ICD-10-CM

## 2019-07-22 DIAGNOSIS — C01 SQUAMOUS CELL CARCINOMA OF BASE OF TONGUE (HCC): ICD-10-CM

## 2019-07-22 LAB
ALBUMIN SERPL-MCNC: 3 G/DL (ref 3.5–5.2)
ALBUMIN/GLOB SERPL: 1 G/DL (ref 1.1–2.4)
ALP SERPL-CCNC: 70 U/L (ref 38–116)
ALT SERPL W P-5'-P-CCNC: 20 U/L (ref 0–41)
ANION GAP SERPL CALCULATED.3IONS-SCNC: 13.7 MMOL/L (ref 5–15)
AST SERPL-CCNC: 16 U/L (ref 0–40)
BASOPHILS # BLD AUTO: 0.02 10*3/MM3 (ref 0–0.2)
BASOPHILS NFR BLD AUTO: 0.9 % (ref 0–1.5)
BILIRUB SERPL-MCNC: 0.7 MG/DL (ref 0.2–1.2)
BUN BLD-MCNC: 20 MG/DL (ref 6–20)
BUN/CREAT SERPL: 21.3 (ref 7.3–30)
CALCIUM SPEC-SCNC: 8.6 MG/DL (ref 8.5–10.2)
CHLORIDE SERPL-SCNC: 106 MMOL/L (ref 98–107)
CO2 SERPL-SCNC: 22.3 MMOL/L (ref 22–29)
CREAT BLD-MCNC: 0.94 MG/DL (ref 0.7–1.3)
DEPRECATED RDW RBC AUTO: 51 FL (ref 37–54)
EOSINOPHIL # BLD AUTO: 0 10*3/MM3 (ref 0–0.4)
EOSINOPHIL NFR BLD AUTO: 0 % (ref 0.3–6.2)
ERYTHROCYTE [DISTWIDTH] IN BLOOD BY AUTOMATED COUNT: 15.8 % (ref 12.3–15.4)
GFR SERPL CREATININE-BSD FRML MDRD: 78 ML/MIN/1.73
GLOBULIN UR ELPH-MCNC: 3.1 GM/DL (ref 1.8–3.5)
GLUCOSE BLD-MCNC: 127 MG/DL (ref 74–124)
HCT VFR BLD AUTO: 37.7 % (ref 37.5–51)
HGB BLD-MCNC: 12 G/DL (ref 13–17.7)
IMM GRANULOCYTES # BLD AUTO: 0 10*3/MM3 (ref 0–0.05)
IMM GRANULOCYTES NFR BLD AUTO: 0 % (ref 0–0.5)
INR PPP: 2.1 (ref 0.9–1.1)
LYMPHOCYTES # BLD AUTO: 0.28 10*3/MM3 (ref 0.7–3.1)
LYMPHOCYTES NFR BLD AUTO: 12.6 % (ref 19.6–45.3)
MAGNESIUM SERPL-MCNC: 1.6 MG/DL (ref 1.8–2.5)
MCH RBC QN AUTO: 31.7 PG (ref 26.6–33)
MCHC RBC AUTO-ENTMCNC: 31.8 G/DL (ref 31.5–35.7)
MCV RBC AUTO: 99.5 FL (ref 79–97)
MONOCYTES # BLD AUTO: 0.35 10*3/MM3 (ref 0.1–0.9)
MONOCYTES NFR BLD AUTO: 15.7 % (ref 5–12)
NEUTROPHILS # BLD AUTO: 1.58 10*3/MM3 (ref 1.7–7)
NEUTROPHILS NFR BLD AUTO: 70.8 % (ref 42.7–76)
NRBC BLD AUTO-RTO: 0 /100 WBC (ref 0–0.2)
PLATELET # BLD AUTO: 64 10*3/MM3 (ref 140–450)
PMV BLD AUTO: 10 FL (ref 6–12)
POTASSIUM BLD-SCNC: 4.3 MMOL/L (ref 3.5–4.7)
PROT SERPL-MCNC: 6.1 G/DL (ref 6.3–8)
PROTHROMBIN TIME: 25.7 SECONDS (ref 11–13.5)
RBC # BLD AUTO: 3.79 10*6/MM3 (ref 4.14–5.8)
SODIUM BLD-SCNC: 142 MMOL/L (ref 134–145)
WBC NRBC COR # BLD: 2.23 10*3/MM3 (ref 3.4–10.8)

## 2019-07-22 PROCEDURE — 80053 COMPREHEN METABOLIC PANEL: CPT

## 2019-07-22 PROCEDURE — 85610 PROTHROMBIN TIME: CPT

## 2019-07-22 PROCEDURE — 99215 OFFICE O/P EST HI 40 MIN: CPT | Performed by: NURSE PRACTITIONER

## 2019-07-22 PROCEDURE — 96365 THER/PROPH/DIAG IV INF INIT: CPT | Performed by: NURSE PRACTITIONER

## 2019-07-22 PROCEDURE — 85025 COMPLETE CBC W/AUTO DIFF WBC: CPT

## 2019-07-22 PROCEDURE — 83735 ASSAY OF MAGNESIUM: CPT

## 2019-07-22 PROCEDURE — 25010000002 MAGNESIUM SULFATE PER 500 MG OF MAGNESIUM: Performed by: NURSE PRACTITIONER

## 2019-07-22 RX ORDER — MORPHINE SULFATE 20 MG/ML
10 SOLUTION ORAL
Qty: 180 ML | Refills: 0 | Status: SHIPPED | OUTPATIENT
Start: 2019-07-22 | End: 2019-08-03

## 2019-07-22 RX ORDER — FENTANYL 25 UG/H
1 PATCH TRANSDERMAL
Qty: 10 PATCH | Refills: 0 | Status: SHIPPED | OUTPATIENT
Start: 2019-07-22 | End: 2019-08-03

## 2019-07-22 RX ADMIN — MAGNESIUM SULFATE HEPTAHYDRATE: 500 INJECTION, SOLUTION INTRAMUSCULAR; INTRAVENOUS at 10:24

## 2019-07-22 NOTE — PROGRESS NOTES
INR 2.10 today. Pt will take 1 mg Sun/Tue/Thurs/Sat and no coumadin M/W/F. Will recheck INR in 1 week when pt returns. Copy of dosing instructions given and pt v/u.

## 2019-07-23 ENCOUNTER — TELEPHONE (OUTPATIENT)
Dept: ONCOLOGY | Facility: CLINIC | Age: 74
End: 2019-07-23

## 2019-07-23 NOTE — TELEPHONE ENCOUNTER
Approved Fentanyl 25mcg patch PA# [id number]  From 7-22-19 till futher notice. Called to [place] [phone]  RH

## 2019-07-24 ENCOUNTER — INFUSION (OUTPATIENT)
Dept: ONCOLOGY | Facility: HOSPITAL | Age: 74
End: 2019-07-24

## 2019-07-24 ENCOUNTER — APPOINTMENT (OUTPATIENT)
Dept: ONCOLOGY | Facility: HOSPITAL | Age: 74
End: 2019-07-24

## 2019-07-24 VITALS
BODY MASS INDEX: 23.24 KG/M2 | OXYGEN SATURATION: 99 % | HEART RATE: 103 BPM | TEMPERATURE: 98.1 F | RESPIRATION RATE: 16 BRPM | DIASTOLIC BLOOD PRESSURE: 68 MMHG | SYSTOLIC BLOOD PRESSURE: 132 MMHG | WEIGHT: 171.4 LBS

## 2019-07-24 DIAGNOSIS — E86.1 HYPOTENSION DUE TO HYPOVOLEMIA: Primary | ICD-10-CM

## 2019-07-24 DIAGNOSIS — C01 SQUAMOUS CELL CARCINOMA OF BASE OF TONGUE (HCC): ICD-10-CM

## 2019-07-24 DIAGNOSIS — I95.89 HYPOTENSION DUE TO HYPOVOLEMIA: Primary | ICD-10-CM

## 2019-07-24 PROCEDURE — 96360 HYDRATION IV INFUSION INIT: CPT | Performed by: INTERNAL MEDICINE

## 2019-07-24 PROCEDURE — 96361 HYDRATE IV INFUSION ADD-ON: CPT | Performed by: INTERNAL MEDICINE

## 2019-07-24 RX ORDER — SODIUM CHLORIDE 9 MG/ML
1000 INJECTION, SOLUTION INTRAVENOUS ONCE
Status: CANCELLED | OUTPATIENT
Start: 2019-07-24

## 2019-07-24 RX ADMIN — SODIUM CHLORIDE 1000 ML: 9 INJECTION, SOLUTION INTRAVENOUS at 14:14

## 2019-07-25 ENCOUNTER — RADIATION ONCOLOGY WEEKLY ASSESSMENT (OUTPATIENT)
Dept: RADIATION ONCOLOGY | Facility: HOSPITAL | Age: 74
End: 2019-07-25

## 2019-07-25 ENCOUNTER — APPOINTMENT (OUTPATIENT)
Dept: SPEECH THERAPY | Facility: HOSPITAL | Age: 74
End: 2019-07-25

## 2019-07-25 ENCOUNTER — HOSPITAL ENCOUNTER (OUTPATIENT)
Dept: SPEECH THERAPY | Facility: HOSPITAL | Age: 74
Setting detail: THERAPIES SERIES
Discharge: HOME | End: 2019-07-25

## 2019-07-25 ENCOUNTER — DOCUMENTATION (OUTPATIENT)
Dept: NUTRITION | Facility: HOSPITAL | Age: 74
End: 2019-07-25

## 2019-07-25 ENCOUNTER — LAB (OUTPATIENT)
Dept: LAB | Facility: HOSPITAL | Age: 74
End: 2019-07-25

## 2019-07-25 VITALS
OXYGEN SATURATION: 99 % | BODY MASS INDEX: 23.32 KG/M2 | HEART RATE: 114 BPM | SYSTOLIC BLOOD PRESSURE: 120 MMHG | WEIGHT: 172 LBS | DIASTOLIC BLOOD PRESSURE: 72 MMHG

## 2019-07-25 DIAGNOSIS — C44.42 SQUAMOUS CELL CARCINOMA OF NECK: ICD-10-CM

## 2019-07-25 DIAGNOSIS — C01 SQUAMOUS CELL CARCINOMA OF BASE OF TONGUE (HCC): Primary | ICD-10-CM

## 2019-07-25 DIAGNOSIS — C01 SQUAMOUS CELL CARCINOMA OF BASE OF TONGUE (HCC): ICD-10-CM

## 2019-07-25 DIAGNOSIS — R13.10 DYSPHAGIA, UNSPECIFIED TYPE: Primary | ICD-10-CM

## 2019-07-25 LAB
BASOPHILS # BLD AUTO: 0.02 10*3/MM3 (ref 0–0.2)
BASOPHILS NFR BLD AUTO: 0.5 % (ref 0–1.5)
DEPRECATED RDW RBC AUTO: 53.8 FL (ref 37–54)
EOSINOPHIL # BLD AUTO: 0.01 10*3/MM3 (ref 0–0.4)
EOSINOPHIL NFR BLD AUTO: 0.2 % (ref 0.3–6.2)
ERYTHROCYTE [DISTWIDTH] IN BLOOD BY AUTOMATED COUNT: 16.7 % (ref 12.3–15.4)
HCT VFR BLD AUTO: 40.5 % (ref 37.5–51)
HGB BLD-MCNC: 13.4 G/DL (ref 13–17.7)
IMM GRANULOCYTES # BLD AUTO: 0.03 10*3/MM3 (ref 0–0.05)
IMM GRANULOCYTES NFR BLD AUTO: 0.7 % (ref 0–0.5)
LYMPHOCYTES # BLD AUTO: 0.55 10*3/MM3 (ref 0.7–3.1)
LYMPHOCYTES NFR BLD AUTO: 13.6 % (ref 19.6–45.3)
MCH RBC QN AUTO: 32.4 PG (ref 26.6–33)
MCHC RBC AUTO-ENTMCNC: 33.1 G/DL (ref 31.5–35.7)
MCV RBC AUTO: 98.1 FL (ref 79–97)
MONOCYTES # BLD AUTO: 0.49 10*3/MM3 (ref 0.1–0.9)
MONOCYTES NFR BLD AUTO: 12.1 % (ref 5–12)
NEUTROPHILS # BLD AUTO: 2.95 10*3/MM3 (ref 1.7–7)
NEUTROPHILS NFR BLD AUTO: 72.9 % (ref 42.7–76)
NRBC BLD AUTO-RTO: 0 /100 WBC (ref 0–0.2)
PLATELET # BLD AUTO: 84 10*3/MM3 (ref 140–450)
PMV BLD AUTO: 11.1 FL (ref 6–12)
RBC # BLD AUTO: 4.13 10*6/MM3 (ref 4.14–5.8)
WBC NRBC COR # BLD: 4.05 10*3/MM3 (ref 3.4–10.8)

## 2019-07-25 PROCEDURE — 77014 CHG CT GUIDANCE RADIATION THERAPY FLDS PLACEMENT: CPT | Performed by: RADIOLOGY

## 2019-07-25 PROCEDURE — 92610 EVALUATE SWALLOWING FUNCTION: CPT

## 2019-07-25 PROCEDURE — 77386: CPT | Performed by: RADIOLOGY

## 2019-07-25 PROCEDURE — 77386 CHG INTENSITY MODULATED RADIATION TX DLVR COMPLEX: CPT | Performed by: RADIOLOGY

## 2019-07-25 PROCEDURE — 36415 COLL VENOUS BLD VENIPUNCTURE: CPT | Performed by: INTERNAL MEDICINE

## 2019-07-25 PROCEDURE — 77427 RADIATION TX MANAGEMENT X5: CPT | Performed by: RADIOLOGY

## 2019-07-25 PROCEDURE — 85025 COMPLETE CBC W/AUTO DIFF WBC: CPT | Performed by: INTERNAL MEDICINE

## 2019-07-25 NOTE — THERAPY EVALUATION
Outpatient Speech Language Pathology   Adult Swallow Initial Evaluation  [facility]     Patient Name: Cody Eldridge  : 1945  MRN: [id number]  Today's Date: 2019         Visit Date: 2019   Patient Active Problem List   Diagnosis   • Atopic rhinitis   • Arthritis of hand   • Coxitis   • Benign colonic polyp   • Neck pain   • Chronic obstructive pulmonary disease (CMS/HCC)   • Mixed anxiety depressive disorder   • Degeneration of intervertebral disc of lumbosacral region   • Elevated prostate specific antigen (PSA)   • Hyperlipidemia   • Hypertension   • Insomnia   • Lumbar radiculopathy   • Osteoarthritis   • Vitamin D deficiency   • Abdominal pain   • Acute URI   • Myalgia   • Cramp of both lower extremities   • Gingivitis   • Atrial fibrillation (CMS/HCC)   • Non-rheumatic tricuspid valve insufficiency   • SHAR (obstructive sleep apnea)   • Precordial pain   • IRAHETA (dyspnea on exertion)   • Coronary artery disease of native artery of native heart with stable angina pectoris (CMS/HCC)   • Shortness of breath   • 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)   • Current use of long term anticoagulation   • Syncope   • Dehydration   • History of cancer   • History of atrial fibrillation   • History of CHF (congestive heart failure)   • Hypotension   • Fitting and adjustment of vascular catheter        Past Medical History:   Diagnosis Date   • Arrhythmia    • Atrial fibrillation (CMS/HCC)    • CAD (coronary artery disease)    • Cancer (CMS/HCC) 2019    Neck, squamous   • CHF (congestive heart failure) (CMS/HCC)    • Chronic bronchitis (CMS/HCC)    • COPD (chronic obstructive pulmonary disease) (CMS/HCC)    • Coronary artery disease of native artery of native heart with stable angina pectoris (CMS/HCC)    • Cramps of lower extremity    • Daytime hypersomnia    • Depression    • Depression with anxiety    • IRAHETA (dyspnea on exertion)    • Drug therapy    • Dyspnea on exertion    • Emphysema  of lung (CMS/HCC)    • Encounter for immunization     flu vaccine high dose PF 65+   • Enlarged prostate    • Fainting    • Fatigue    • Frequent nocturnal awakening    • Gout    • H/O cardiac radiofrequency ablation 2006    [facility].   • Hyperlipidemia    • Hypertension    • Insomnia    • Lumbar radicular pain    • MI (myocardial infarction) (CMS/[facility])    • Non-rheumatic tricuspid valve insufficiency    • SHAR (obstructive sleep apnea)    • Osteoarthritis    • Precordial pain    • Pulmonary emphysema (CMS/[facility])    • Right leg pain     sciatica nerve pain   • Shortness of breath    • Snoring    • SVT (supraventricular tachycardia) (CMS/[facility])    • Syncope    • Vitamin D deficiency         Past Surgical History:   Procedure Laterality Date   • APPENDECTOMY     • CARDIAC ABLATION  2006    [facility].   • CARDIAC CATHETERIZATION N/A 8/29/2018    Procedure: Left Heart Cath;  Surgeon: Yousif Uribe MD;  Location: [facility] CATH INVASIVE LOCATION;  Service: Cardiology   • CARDIAC CATHETERIZATION N/A 8/29/2018    Procedure: Coronary angiography;  Surgeon: Yousif Uribe MD;  [facility];  Service: Cardiology   • CARDIAC CATHETERIZATION N/A 8/29/2018    Procedure: Left ventriculography;  Surgeon: Yousif Uribe MD;  Location: [facility] CATH INVASIVE LOCATION;  Service: Cardiology   • FINGER SURGERY     • FOOT SURGERY     • HAND SURGERY     • VENOUS ACCESS DEVICE (PORT) INSERTION Right 6/18/2019    Procedure: MEDIPORT PLACEMENT;  Surgeon: Elvis Grigsby MD;  Location: [facility] OR;  Service: Vascular         Visit Dx:     ICD-10-CM ICD-9-CM   1. 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    -OC    Current Method of Nutrition  full liquids   -OC    Precautions/Limitations, Vision  WFL with corrective lenses   -OC    Precautions/Limitations, Hearing  hearing impairment, bilaterally   -OC    Prior Level of Function-Communication  WFL   -OC    Prior Level of Function-Swallowing  no diet consistency restrictions   -OC    Plans/Goals Discussed with  patient   -OC    Barriers to Rehab  hearing deficit   -OC    Patient's Goals for Discharge  patient did not state   -OC       Oral Motor and Function    Dentition Assessment  has dentures but does not use;other (see comments) unable to wear secondary to sores in oral cavity   -OC    Secretion Management  WNL/WFL;other (see comments) increase of thick secretions   -OC    Mucosal Quality  ulcerated;moist, healthy   -OC    Volitional Swallow  WFL   -OC       Oral Musculature and Cranial Nerve Assessment    Oral Motor General Assessment  other (see comments) suspect impacted by soreness/pain with resistance/pressure   -OC       Clinical Swallow Eval    Clinical Swallow Evaluation Summary  Pt with sore oral cavity. Reports intake of thin liquids, broths, and ensure for nutrition. Pt unable to tolerate any soft/solid consistencies secodnary to oral pain with sores. Trials not administered this date secondary to oral discomfort. Visable sores/ulceration upon oral mech exam.    -OC       Clinical Impression    SLP Swallowing Diagnosis  oral dysfunction;functional pharyngeal phase;other (see comments) risk of delince in function with further tx   -OC    Functional Impact  risk of aspiration/pneumonia   -OC    Rehab Potential/Prognosis, Swallowing  good, to achieve stated therapy goals   -OC    " Swallow Criteria for Skilled Therapeutic Interventions Met  demonstrates skilled criteria   -OC       Recommendations    Therapy Frequency (Swallow)  1 day every other week;PRN   -OC    SLP Diet Recommendation  full liquid diet;other (see comments) PO as tolerated   -OC    Recommended Precautions and Strategies  upright posture during/after eating;small bites of food and sips of liquid   -OC    SLP Rec. for Method of Medication Administration  meds whole;meds crushed;with thin liquids;with pudding or applesauce   -OC    Monitor for Signs of Aspiration  yes;notify SLP if any concerns   -OC    Anticipated Dischage Disposition  home   -OC      User Key  (r) = Recorded By, (t) = Taken By, (c) = Cosigned By    Initials Name Provider Type    OC Pia Pablo MA,CCC-SLP Speech and Language Pathologist                        OP SLP Education     Row Name 07/25/19 1020       Education    Barriers to Learning  Hearing deficit  -OC    Action Taken to Address Barriers  Written information provided  -OC    Education Provided  Described results of evaluation;Patient expressed understanding of evaluation  -OC    Assessed  Learning needs;Learning motivation;Learning preferences;Learning readiness  -OC    Learning Motivation  Moderate  -OC    Learning Method  Explanation;Demonstration;Teach back;Written materials  -OC    Teaching Response  Reinforcement needed  -OC    Education Comments  Suspect pt will need encouragment to complete daily HEP, recommend family involvement.  -OC      User Key  (r) = Recorded By, (t) = Taken By, (c) = Cosigned By    Initials Name Effective Dates    OC Pia Pablo MA,CCC-SLP 06/08/18 -           SLP OP Goals     Row Name 07/25/19 1000          Goal Type Needed    Goal Type Needed  Dysphagia  -OC        Subjective Comments    Subjective Comments  Mr. Eldridge was pleasant and cooperative. Pt reports runny nose and eyes watering.  -OC        Dysphagia Goals    Dysphagia LTG's  Patient will safely consume  the recommended diet without complications such as aspiration pneumonia  -OC     Patient will safely consume the recommended diet without complications such as aspiration pneumonia  Liquid PO diet, solids as tolerated  -OC     Status: Patient will safely consume the recommended diet without complications such as aspiration pneumonia  New  -OC     Comments: Patient will safely consume the recommended diet without complications such as aspiration pneumonia  Pt reports intake of thins, ensure, and broths.  -OC     Dysphagia STG's  Patient will improve oral skills to enhance safety and increase eating efficiency by increasing accuracy of A-P tongue movements;Patient will improve tongue elevation to enhance safety and increase eating efficiency through lingual elevation  -OC     Patient will improve tongue elevation to enhance safety and increase eating efficiency through lingual elevation  without cues  -OC     Status: Patient will improve tongue elevation to enhance safety and increase eating efficiency through lingual elevation  New  -OC     Comments: Patient will improve tongue elevation to enhance safety and increase eating efficiency through lingual elevation  Pt able to demonstrate s/p SLP model  -OC     Patient will improve oral skills to enhance safety and increase eating efficiency by increasing accuracy of A-P tongue movements  without cues  -OC     Status: Patient will improve oral skills to enhance safety and increase eating efficiency by increasing accuracy of A-P tongue movements  New  -OC     Comments: Patient will improve oral skills to enhance safety and increase eating efficiency by increasing accuracy of A-P tongue movements  Pt able to demonstrate s/p SLP model  -OC       User Key  (r) = Recorded By, (t) = Taken By, (c) = Cosigned By    Initials Name Provider Type    Pia Temple MA,[facility]-SLP Speech and Language Pathologist          OP SLP Assessment/Plan - 07/25/19 1020        SLP Assessment     Functional Problems  Swallowing   -OC    Impact on Function: Swallowing  Risk of aspiration;Risk of pneumonia;Risk of malnourishment   -OC    Clinical Impression: Swallowing  oral phase dysphagia Risk of pharyngeal dysphagia secondary to continued chemo/radiation    Risk of pharyngeal dysphagia secondary to continued chemo/radiation  -OC    Functional Problems Comment  tolerating liquid diet only   -OC    Clinical Impression Comments  oral dysfunction   -OC    Please refer to paper survey for additional self-reported information  No   -OC    Please refer to items scanned into chart for additional diagnostic informaiton and handouts as provided by clinician  No   -OC    SLP Diagnosis  Mild oral dysphagia, concern for decline in pharyngeal phase of swallow secondary to continued chemoradiation   -OC    Prognosis  Good (comment)   -OC    Patient/caregiver participated in establishment of treatment plan and goals  Yes   -OC    Patient would benefit from skilled therapy intervention  Yes   -OC       SLP Plan    Frequency  Biweekly tx PRN   -OC    Duration  2-4 months   -OC    Planned CPT's?  SLP CLINICAL SWALLOW EVAL: 60807;SLP SWALLOW THERAPY: 15510   -OC    Expected Duration Therapy Session - minutes  30-45 minutes   -OC    Plan Comments  SLP plans to follow up with pt s/p chemo/radiation for swallowing status  for further eval/intervention.   -OC      User Key  (r) = Recorded By, (t) = Taken By, (c) = Cosigned By    Initials Name Provider Type    Pia Temple MA, CCC-SLP Speech and Language Pathologist              SLP Outcome Measures (last 72 hours)      SLP Outcome Measures     Row Name 07/25/19 1100             SLP Outcome Measures    Outcome Measure Used?  Adult NOMS  -OC         Adult FCM Scores    FCM Chosen  Swallowing  -OC      Swallowing FCM Score  5  -OC        User Key  (r) = Recorded By, (t) = Taken By, (c) = Cosigned By    Initials Name Effective Dates    Pia Temple MA, CCC-SLP 06/08/18 -                 Time Calculation:   SLP Start Time: 1000  SLP Stop Time: 1030  SLP Time Calculation (min): 30 min    Therapy Charges for Today     Code Description Service Date Service Provider Modifiers Qty    [id number]  ST EVAL ORAL PHARYNG SWALLOW 2 7/25/2019 Pia Pablo MA,CCC-SLP GN 1                   Pia Pablo MA,CCC-SLP  7/25/2019

## 2019-07-25 NOTE — PROGRESS NOTES
Oncology Nutrition  - Follow up    Patient Name:  Cody Eldridge  YOB: 1945  MRN: [id number]  Date:  07/25/19  Physician:  Lola Heaton    Type of Cancer Treatment:   Radiation/Cyberknife: Number of Treatments:  30  Chemotherapy:  Type: carbo/taxol weekly    Patient Active Problem List   Diagnosis   • Atopic rhinitis   • Arthritis of hand   • Coxitis   • Benign colonic polyp   • Neck pain   • Chronic obstructive pulmonary disease (CMS/HCC)   • Mixed anxiety depressive disorder   • Degeneration of intervertebral disc of lumbosacral region   • Elevated prostate specific antigen (PSA)   • Hyperlipidemia   • Hypertension   • Insomnia   • Lumbar radiculopathy   • Osteoarthritis   • Vitamin D deficiency   • Abdominal pain   • Acute URI   • Myalgia   • Cramp of both lower extremities   • Gingivitis   • Atrial fibrillation (CMS/HCC)   • Non-rheumatic tricuspid valve insufficiency   • SHAR (obstructive sleep apnea)   • Precordial pain   • IRAHETA (dyspnea on exertion)   • Coronary artery disease of native artery of native heart with stable angina pectoris (CMS/HCC)   • Shortness of breath   • 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)   • Current use of long term anticoagulation   • Syncope   • Dehydration   • History of cancer   • History of atrial fibrillation   • History of CHF (congestive heart failure)   • Hypotension   • Fitting and adjustment of vascular catheter       Current Outpatient Medications   Medication Sig Dispense Refill   • albuterol (PROVENTIL HFA;VENTOLIN HFA) 108 (90 BASE) MCG/ACT inhaler Inhale 2 puffs. ProAir  (90 Base) MCG/ACT Inhalation Aerosol Solution; Patient Sig: ProAir  (90 Base) MCG/ACT Inhalation Aerosol Solution inhale 2 puffs by mouth every 4 hours if needed; 8.5; 11; 07-Apr-2014; Act     • ALPRAZolam (XANAX) 2 MG tablet Take 1 tablet by mouth Daily for 60 days. 60 tablet 0   • aluminum-magnesium hydroxide 200-200 MG/5ML suspension, diphenhydrAMINE 12.5  "MG/5ML liquid, lidocaine viscous 2 % solution, nystatin 225090 UNIT/ML suspension Swish and swallow 10 mL 4 (Four) Times a Day Before Meals & at Bedtime. 400 mL 1   • baclofen (LIORESAL) 10 MG tablet take 1 tablet by mouth three times a day 90 tablet 1   • diphenoxylate-atropine (LOMOTIL) 2.5-0.025 MG per tablet Take 1 tablet by mouth 4 (Four) Times a Day As Needed for Diarrhea. 60 tablet 1   • fentaNYL (DURAGESIC) 25 MCG/HR patch Place 1 patch on the skin as directed by provider Every 72 (Seventy-Two) Hours. 10 patch 0   • HYDROcodone-acetaminophen (NORCO) 7.5-325 MG per tablet Take 1 tablet by mouth 4 (Four) Times a Day As Needed for Moderate Pain  or Severe Pain . 120 tablet 0   • lidocaine viscous (XYLOCAINE) 2 % solution      • metoprolol tartrate (LOPRESSOR) 25 MG tablet Take 1 tablet by mouth 2 (Two) Times a Day. 60 tablet 5   • montelukast (SINGULAIR) 10 MG tablet TAKE 1 TABLET BY MOUTH DAILY 90 tablet 0   • morphine 100 MG/5ML solution concentrated solution Take 0.5 mL by mouth Every 2 (Two) Hours As Needed (Pain). 180 mL 0   • ondansetron ODT (ZOFRAN-ODT) 8 MG disintegrating tablet Take 1 tablet by mouth Every 8 (Eight) Hours As Needed for Nausea or Vomiting. 30 tablet 2   • rosuvastatin (CRESTOR) 20 MG tablet Take 20 mg by mouth Daily. 30 tablet 5   • sertraline (ZOLOFT) 50 MG tablet TAKE 1 TABLET BY MOUTH EVERY DAY 90 tablet 1   • silver sulfadiazine (SILVADENE) 1 % cream Apply  topically to the appropriate area as directed 2 (Two) Times a Day. 50 g 0   • warfarin (COUMADIN) 2 MG tablet TAKE 2 TABLETS BY MOUTH ONCE DAILY (Patient taking differently: TAKE ONE TABLET ON SUNDAYS AND THURSDAYS) 180 tablet 0   • warfarin (COUMADIN) 2 MG tablet TAKE 2 TABLETS BY MOUTH ONCE DAILY (Patient taking differently: TAKE 2 TABLETS BY MOUTH TWICE DAILY ON MONDAY, TUESDAY, WEDNESDAY, FRIDAY, AND SATURDAY) 70 tablet 1     No current facility-administered medications for this visit.          Weight:   Height: 72\" Weight: " 171 lbs.  Usual Body Weight: 182 lbs.   BMI: 23.2   Weight has decreased 30 pounds over last 6 months prior to treatment lost 20# x 6 months, has lost 15# since beginning treatment    Oral Food Intake:  Compared to normal intake, current food intake is less than normal    Hydration Status:   How many 8 ounce glass of water of fluid do you drink per day?  8    Enteral Feeding:   n/a    Nutrition Symptoms:   Problems Chewing/Swallowing  Altered Taste Perception  Mouth Sores  Esophagitis  Fatigue    Activity:   Able to do little activity and spend most of the day in bed or chair     reports that he quit smoking about 19 years ago. His smoking use included cigarettes. He smoked 4.00 packs per day. He has never used smokeless tobacco. He reports that he does not drink alcohol or use drugs.    Radiation held last week Thursday and Friday due to labs. Received IVF Sat/Sun, Monday, Wednesday, IVF planned tomorrow. Chemo was held Monday. Met patient as he was going up to get labs drawn.   He is taking pain meds, using law magic, drinking gatorade, broth, Ensure, was able to eat a little more over the weekend during break from treatment. Weight 171# yesterday.  Patient states he felt pretty bad today. Encouraged him. Will continue to follow.        Electronically signed by:  Ruthy Bautista RD, LD  9:06 AM

## 2019-07-25 NOTE — PROGRESS NOTES
Physician Weekly Management Note    Diagnosis:     1. 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)    Cancer Staging  Stage I (cT2, cN1, cM0, p16: Positive)    Reason for Visit: Radiation (24/33)    Concurrent Chemo:   Yes    Notes on Treatment course, Films, Medical progress and Plan:  Counts ok today to resume. Neck with small area of dry [place]. Has silvadene, skin care discussed. Continue drinking. Continue swish and swallow. Fluids tomorrow, recheck labs on Monday. Cont on.    ROS -  Other than those listed above, as follows:  Constitutional - Normal - no complaints of fatigue, denies lack of appetite, fever, night sweats or change in weight.  Neck - Normal - denies neck masses, muscle weakness, neck pain, decreased range of motion or swelling.  Cardiovascular - Normal - denies arrhythmias, chest pain, dyspnea, edema, orthopnea or palpitations.  Respiratory - Normal - denies cough, dyspnea, hemoptysis, hiccoughs, pleuritic chest pain or wheezing.  Gastrointestinal - Normal - no complaints of constipation, abdominal pain, diarrhea, heartburn/dyspepsia, hematemesis, hemorrhoids, melena or GI bleeding, nausea, pain or cramping or vomiting.  Neuro - Normal - no new complaints of vision change, headache, nausea, cranial nerve deficit, gait abnormality, syncope, seizure.    PHYSICAL EXAM - Other than changes listed above, as follows:  Vitals:    07/25/19 0951   BP: 120/72   Pulse: 114   SpO2: 99%   Weight: 78 kg (172 lb)   PainSc:   7       Constitutional - Normal - no evidence of impaired alertness, inadequate appearance, premature or advanced chronologic age, uncooperativeness, altered mood, affect or disorientation.  Neck - Normal - no evidence of tender or enlarged lymph nodes, neck abnormalities, restricted range of motion or enlarged thyroid.  Chest - Normal - no evidence of chest abnormalities, tender or enlarged lymph nodes.  Respiratory - Normal - no evidence of abnormal breat sounds, chest  "abnormalities on palpation and chest abnormalities on percussion and normal breath sounds.  Hematologic/Lymphatic - Normal - no evidence of tender or enlarged axillary lymph nodes nor tender or enlarged neck nodes.  Neuro - Normal - no evidence of cranial nerve deficit, gait abnormality.    Performance Status:  (1) Restricted in physically strenuous activity, ambulatory and able to do work of light nature    Problem added:  No problems updated.  Medications added: No orders of the defined types were placed in this encounter.    Ancillary referrals made: No orders of the defined types were placed in this encounter.      Technical aspects reviewed:  Weekly OBI approved if applicable? Yes   Weekly port films approved?  Yes  Change requests noted if applicable?  Yes   Patient setup and plan reviewed?  Yes     Chart Reviewed:  Continue current treatment plan?  Yes  Treatment plan change requested? No    I have reviewed and marked as \"reviewed\" the current medications, allergies and problem list in the patients EMR.  His regional course is a    I have reviewed the patient's medical, surgical  history in detail, reviewed any pertinent lab work  and updated the computerized patient record if needed.    Patient's Care Team:  Patient Care Team:  Epley, James, APRN as PCP - General (Family Medicine)  Payal Waters MD as Consulting Physician (Pain Medicine)  Lorna Chaidez (Nurse Practitioner)  Kristal Cowart [facility] as Pharmacist  Fritz Slater MD as Referring Physician (Otolaryngology)  Pablo Heaton MD as Consulting Physician (Hematology and Oncology)  Annie Davila MD as Consulting Physician (Radiation Oncology)  Chetan Heaton MD as Consulting Physician (Urology)      "

## 2019-07-26 ENCOUNTER — INFUSION (OUTPATIENT)
Dept: ONCOLOGY | Facility: HOSPITAL | Age: 74
End: 2019-07-26

## 2019-07-26 VITALS
SYSTOLIC BLOOD PRESSURE: 127 MMHG | DIASTOLIC BLOOD PRESSURE: 86 MMHG | OXYGEN SATURATION: 96 % | BODY MASS INDEX: 23.38 KG/M2 | RESPIRATION RATE: 18 BRPM | TEMPERATURE: 98.3 F | WEIGHT: 172.4 LBS | HEART RATE: 125 BPM

## 2019-07-26 DIAGNOSIS — R63.8 DECREASED ORAL INTAKE: ICD-10-CM

## 2019-07-26 DIAGNOSIS — Z45.2 FITTING AND ADJUSTMENT OF VASCULAR CATHETER: Primary | ICD-10-CM

## 2019-07-26 PROCEDURE — 77386: CPT | Performed by: RADIOLOGY

## 2019-07-26 PROCEDURE — 77386 CHG INTENSITY MODULATED RADIATION TX DLVR COMPLEX: CPT | Performed by: RADIOLOGY

## 2019-07-26 PROCEDURE — 96361 HYDRATE IV INFUSION ADD-ON: CPT | Performed by: INTERNAL MEDICINE

## 2019-07-26 PROCEDURE — 77014 CHG CT GUIDANCE RADIATION THERAPY FLDS PLACEMENT: CPT | Performed by: RADIOLOGY

## 2019-07-26 PROCEDURE — 96360 HYDRATION IV INFUSION INIT: CPT | Performed by: INTERNAL MEDICINE

## 2019-07-26 RX ORDER — SODIUM CHLORIDE 0.9 % (FLUSH) 0.9 %
10 SYRINGE (ML) INJECTION AS NEEDED
Status: CANCELLED | OUTPATIENT
Start: 2019-07-26

## 2019-07-26 RX ORDER — SODIUM CHLORIDE 9 MG/ML
1000 INJECTION, SOLUTION INTRAVENOUS CONTINUOUS
Status: CANCELLED
Start: 2019-07-28

## 2019-07-26 RX ADMIN — Medication 500 UNITS: at 10:51

## 2019-07-26 RX ADMIN — SODIUM CHLORIDE 1000 ML: 900 INJECTION, SOLUTION INTRAVENOUS at 08:52

## 2019-07-28 ENCOUNTER — INFUSION (OUTPATIENT)
Dept: ONCOLOGY | Facility: HOSPITAL | Age: 74
End: 2019-07-28

## 2019-07-28 VITALS
RESPIRATION RATE: 20 BRPM | TEMPERATURE: 97 F | HEART RATE: 85 BPM | DIASTOLIC BLOOD PRESSURE: 82 MMHG | SYSTOLIC BLOOD PRESSURE: 108 MMHG | OXYGEN SATURATION: 100 %

## 2019-07-28 DIAGNOSIS — C01 SQUAMOUS CELL CARCINOMA OF BASE OF TONGUE (HCC): ICD-10-CM

## 2019-07-28 DIAGNOSIS — Z45.2 FITTING AND ADJUSTMENT OF VASCULAR CATHETER: Primary | ICD-10-CM

## 2019-07-28 PROCEDURE — 96361 HYDRATE IV INFUSION ADD-ON: CPT

## 2019-07-28 PROCEDURE — 96360 HYDRATION IV INFUSION INIT: CPT

## 2019-07-28 RX ORDER — SODIUM CHLORIDE 0.9 % (FLUSH) 0.9 %
10 SYRINGE (ML) INJECTION AS NEEDED
Status: CANCELLED | OUTPATIENT
Start: 2019-07-28

## 2019-07-28 RX ORDER — SODIUM CHLORIDE 9 MG/ML
1000 INJECTION, SOLUTION INTRAVENOUS CONTINUOUS
Status: DISCONTINUED | OUTPATIENT
Start: 2019-07-28 | End: 2019-07-28 | Stop reason: HOSPADM

## 2019-07-28 RX ORDER — SODIUM CHLORIDE 0.9 % (FLUSH) 0.9 %
10 SYRINGE (ML) INJECTION AS NEEDED
Status: DISCONTINUED | OUTPATIENT
Start: 2019-07-28 | End: 2019-07-28 | Stop reason: HOSPADM

## 2019-07-28 RX ADMIN — SODIUM CHLORIDE 1000 ML: 9 INJECTION, SOLUTION INTRAVENOUS at 11:27

## 2019-07-28 RX ADMIN — Medication 500 UNITS: at 13:44

## 2019-07-29 ENCOUNTER — INFUSION (OUTPATIENT)
Dept: ONCOLOGY | Facility: HOSPITAL | Age: 74
End: 2019-07-29

## 2019-07-29 ENCOUNTER — HOSPITAL ENCOUNTER (OUTPATIENT)
Dept: CARDIOLOGY | Facility: HOSPITAL | Age: 74
Discharge: HOME | End: 2019-07-29
Admitting: INTERNAL MEDICINE

## 2019-07-29 ENCOUNTER — DOCUMENTATION (OUTPATIENT)
Dept: NUTRITION | Facility: HOSPITAL | Age: 74
End: 2019-07-29

## 2019-07-29 ENCOUNTER — OFFICE VISIT (OUTPATIENT)
Dept: ONCOLOGY | Facility: CLINIC | Age: 74
End: 2019-07-29

## 2019-07-29 ENCOUNTER — ANTICOAGULATION VISIT (OUTPATIENT)
Dept: PHARMACY | Facility: HOSPITAL | Age: 74
End: 2019-07-29

## 2019-07-29 ENCOUNTER — APPOINTMENT (OUTPATIENT)
Dept: LAB | Facility: HOSPITAL | Age: 74
End: 2019-07-29

## 2019-07-29 VITALS
HEART RATE: 175 BPM | WEIGHT: 174.2 LBS | SYSTOLIC BLOOD PRESSURE: 128 MMHG | DIASTOLIC BLOOD PRESSURE: 79 MMHG | BODY MASS INDEX: 23.6 KG/M2 | HEIGHT: 72 IN | RESPIRATION RATE: 20 BRPM | OXYGEN SATURATION: 98 % | TEMPERATURE: 97.5 F

## 2019-07-29 VITALS
WEIGHT: 177.4 LBS | HEART RATE: 145 BPM | DIASTOLIC BLOOD PRESSURE: 86 MMHG | SYSTOLIC BLOOD PRESSURE: 121 MMHG | OXYGEN SATURATION: 96 % | BODY MASS INDEX: 24.84 KG/M2 | HEIGHT: 71 IN

## 2019-07-29 DIAGNOSIS — I48.91 ATRIAL FIBRILLATION, UNSPECIFIED TYPE (HCC): ICD-10-CM

## 2019-07-29 DIAGNOSIS — Z45.2 FITTING AND ADJUSTMENT OF VASCULAR CATHETER: Primary | ICD-10-CM

## 2019-07-29 DIAGNOSIS — C76.0 HEAD AND NECK CANCER (HCC): ICD-10-CM

## 2019-07-29 DIAGNOSIS — I95.9 HYPOTENSION, UNSPECIFIED HYPOTENSION TYPE: ICD-10-CM

## 2019-07-29 DIAGNOSIS — C01 SQUAMOUS CELL CARCINOMA OF BASE OF TONGUE (HCC): Primary | ICD-10-CM

## 2019-07-29 DIAGNOSIS — Z45.2 FITTING AND ADJUSTMENT OF VASCULAR CATHETER: ICD-10-CM

## 2019-07-29 DIAGNOSIS — C01 SQUAMOUS CELL CARCINOMA OF BASE OF TONGUE (HCC): ICD-10-CM

## 2019-07-29 DIAGNOSIS — Z79.01 CURRENT USE OF LONG TERM ANTICOAGULATION: ICD-10-CM

## 2019-07-29 DIAGNOSIS — I48.21 PERMANENT ATRIAL FIBRILLATION (HCC): Primary | ICD-10-CM

## 2019-07-29 DIAGNOSIS — R63.4 WEIGHT LOSS: ICD-10-CM

## 2019-07-29 DIAGNOSIS — Z86.79 HISTORY OF CHF (CONGESTIVE HEART FAILURE): ICD-10-CM

## 2019-07-29 LAB
ALBUMIN SERPL-MCNC: 2.9 G/DL (ref 3.5–5.2)
ALBUMIN/GLOB SERPL: 0.9 G/DL (ref 1.1–2.4)
ALP SERPL-CCNC: 83 U/L (ref 38–116)
ALT SERPL W P-5'-P-CCNC: 26 U/L (ref 0–41)
ANION GAP SERPL CALCULATED.3IONS-SCNC: 11.9 MMOL/L (ref 5–15)
AST SERPL-CCNC: 29 U/L (ref 0–40)
BASOPHILS # BLD AUTO: 0.01 10*3/MM3 (ref 0–0.2)
BASOPHILS NFR BLD AUTO: 0.4 % (ref 0–1.5)
BILIRUB SERPL-MCNC: 0.6 MG/DL (ref 0.2–1.2)
BUN BLD-MCNC: 25 MG/DL (ref 6–20)
BUN/CREAT SERPL: 25 (ref 7.3–30)
CALCIUM SPEC-SCNC: 8.8 MG/DL (ref 8.5–10.2)
CHLORIDE SERPL-SCNC: 108 MMOL/L (ref 98–107)
CO2 SERPL-SCNC: 22.1 MMOL/L (ref 22–29)
CREAT BLD-MCNC: 1 MG/DL (ref 0.7–1.3)
DEPRECATED RDW RBC AUTO: 57.5 FL (ref 37–54)
EOSINOPHIL # BLD AUTO: 0 10*3/MM3 (ref 0–0.4)
EOSINOPHIL NFR BLD AUTO: 0 % (ref 0.3–6.2)
ERYTHROCYTE [DISTWIDTH] IN BLOOD BY AUTOMATED COUNT: 18.2 % (ref 12.3–15.4)
GFR SERPL CREATININE-BSD FRML MDRD: 73 ML/MIN/1.73
GLOBULIN UR ELPH-MCNC: 3.2 GM/DL (ref 1.8–3.5)
GLUCOSE BLD-MCNC: 124 MG/DL (ref 74–124)
HCT VFR BLD AUTO: 40.1 % (ref 37.5–51)
HGB BLD-MCNC: 13.1 G/DL (ref 13–17.7)
IMM GRANULOCYTES # BLD AUTO: 0.01 10*3/MM3 (ref 0–0.05)
IMM GRANULOCYTES NFR BLD AUTO: 0.4 % (ref 0–0.5)
INR PPP: 2.7 (ref 0.9–1.1)
LYMPHOCYTES # BLD AUTO: 0.29 10*3/MM3 (ref 0.7–3.1)
LYMPHOCYTES NFR BLD AUTO: 11.1 % (ref 19.6–45.3)
MCH RBC QN AUTO: 32 PG (ref 26.6–33)
MCHC RBC AUTO-ENTMCNC: 32.7 G/DL (ref 31.5–35.7)
MCV RBC AUTO: 98 FL (ref 79–97)
MONOCYTES # BLD AUTO: 0.2 10*3/MM3 (ref 0.1–0.9)
MONOCYTES NFR BLD AUTO: 7.7 % (ref 5–12)
NEUTROPHILS # BLD AUTO: 2.1 10*3/MM3 (ref 1.7–7)
NEUTROPHILS NFR BLD AUTO: 80.4 % (ref 42.7–76)
NRBC BLD AUTO-RTO: 0 /100 WBC (ref 0–0.2)
PLATELET # BLD AUTO: 183 10*3/MM3 (ref 140–450)
PMV BLD AUTO: 10.2 FL (ref 6–12)
POTASSIUM BLD-SCNC: 4.3 MMOL/L (ref 3.5–4.7)
PROT SERPL-MCNC: 6.1 G/DL (ref 6.3–8)
PROTHROMBIN TIME: 32.3 SECONDS (ref 11–13.5)
RBC # BLD AUTO: 4.09 10*6/MM3 (ref 4.14–5.8)
SODIUM BLD-SCNC: 142 MMOL/L (ref 134–145)
WBC NRBC COR # BLD: 2.61 10*3/MM3 (ref 3.4–10.8)

## 2019-07-29 PROCEDURE — 85025 COMPLETE CBC W/AUTO DIFF WBC: CPT | Performed by: INTERNAL MEDICINE

## 2019-07-29 PROCEDURE — 96374 THER/PROPH/DIAG INJ IV PUSH: CPT

## 2019-07-29 PROCEDURE — 80053 COMPREHEN METABOLIC PANEL: CPT | Performed by: INTERNAL MEDICINE

## 2019-07-29 PROCEDURE — 36415 COLL VENOUS BLD VENIPUNCTURE: CPT | Performed by: INTERNAL MEDICINE

## 2019-07-29 PROCEDURE — 85610 PROTHROMBIN TIME: CPT | Performed by: INTERNAL MEDICINE

## 2019-07-29 PROCEDURE — 77336 RADIATION PHYSICS CONSULT: CPT | Performed by: RADIOLOGY

## 2019-07-29 PROCEDURE — 93005 ELECTROCARDIOGRAM TRACING: CPT | Performed by: INTERNAL MEDICINE

## 2019-07-29 PROCEDURE — 99214 OFFICE O/P EST MOD 30 MIN: CPT | Performed by: INTERNAL MEDICINE

## 2019-07-29 PROCEDURE — 99215 OFFICE O/P EST HI 40 MIN: CPT | Performed by: INTERNAL MEDICINE

## 2019-07-29 PROCEDURE — 93010 ELECTROCARDIOGRAM REPORT: CPT | Performed by: INTERNAL MEDICINE

## 2019-07-29 PROCEDURE — 94760 N-INVAS EAR/PLS OXIMETRY 1: CPT

## 2019-07-29 PROCEDURE — 96523 IRRIG DRUG DELIVERY DEVICE: CPT

## 2019-07-29 RX ORDER — DIPHENHYDRAMINE HYDROCHLORIDE 50 MG/ML
50 INJECTION INTRAMUSCULAR; INTRAVENOUS AS NEEDED
Status: CANCELLED | OUTPATIENT
Start: 2019-07-31

## 2019-07-29 RX ORDER — SODIUM CHLORIDE 0.9 % (FLUSH) 0.9 %
10 SYRINGE (ML) INJECTION AS NEEDED
Status: CANCELLED | OUTPATIENT
Start: 2019-07-29

## 2019-07-29 RX ORDER — SODIUM CHLORIDE 9 MG/ML
125 INJECTION, SOLUTION INTRAVENOUS ONCE
Status: COMPLETED | OUTPATIENT
Start: 2019-07-29 | End: 2019-07-29

## 2019-07-29 RX ORDER — AMIODARONE HYDROCHLORIDE 200 MG/1
TABLET ORAL
Qty: 66 TABLET | Refills: 1 | Status: SHIPPED | OUTPATIENT
Start: 2019-07-29 | End: 2019-08-20 | Stop reason: HOSPADM

## 2019-07-29 RX ORDER — SODIUM CHLORIDE 9 MG/ML
250 INJECTION, SOLUTION INTRAVENOUS ONCE
Status: CANCELLED | OUTPATIENT
Start: 2019-07-31

## 2019-07-29 RX ORDER — FAMOTIDINE 10 MG/ML
20 INJECTION, SOLUTION INTRAVENOUS AS NEEDED
Status: CANCELLED | OUTPATIENT
Start: 2019-07-31

## 2019-07-29 RX ORDER — FAMOTIDINE 10 MG/ML
20 INJECTION, SOLUTION INTRAVENOUS ONCE
Status: CANCELLED | OUTPATIENT
Start: 2019-07-31

## 2019-07-29 RX ORDER — PALONOSETRON 0.05 MG/ML
0.25 INJECTION, SOLUTION INTRAVENOUS ONCE
Status: CANCELLED | OUTPATIENT
Start: 2019-07-31

## 2019-07-29 RX ADMIN — METOPROLOL TARTRATE 5 MG: 5 INJECTION INTRAVENOUS at 11:08

## 2019-07-29 RX ADMIN — METOPROLOL TARTRATE 5 MG: 5 INJECTION INTRAVENOUS at 11:13

## 2019-07-29 RX ADMIN — METOPROLOL TARTRATE 5 MG: 5 INJECTION INTRAVENOUS at 10:58

## 2019-07-29 RX ADMIN — SODIUM CHLORIDE 125 ML/HR: 9 INJECTION, SOLUTION INTRAVENOUS at 10:53

## 2019-07-29 RX ADMIN — Medication 500 UNITS: at 12:50

## 2019-07-29 RX ADMIN — Medication 500 UNITS: at 09:22

## 2019-07-29 NOTE — PROGRESS NOTES
Subjective     REASON FOR FOLLOW UP:  1.  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   2.  Combined radiation and low-dose carboplatin and Taxol chemotherapy weekly initiated 6/17/2019.  3.  MediPort placed by Dr. Adolph Grigsby of vascular surgery 6/18/2019    HISTORY OF PRESENT ILLNESS:  The patient is a 74 y.o. year old male who is undergoing treatment with weekly carboplatin and Taxol along with radiation for his squamous cell carcinoma of the base of the tongue, HPV positive.  He is due for his fifth dose of carboplatin and Taxol along with radiation today.     He has been experiencing the expected toxicities with painful mucositis leading to decreased oral intake.    He has been using boost plus for nutrition.  He is wanting to avoid a feeding tube.  He has received IV fluids in the treatment area on several occasions.  He has had significant weight loss and required modification of his blood pressure medications due to low blood pressure.  He is on metoprolol 50 mg twice daily but was instructed to hold the metoprolol if his systolic is less than 120.  He took his metoprolol this morning and his systolic currently is less than 100.  I think we can further modify his metoprolol to 25 mg twice daily.    He developed significant myelosuppression which required holding his chemotherapy treatment on 7/15/2019 and again on 7/22/2019.  He returns today for reassessment and possibly to receive further chemotherapy.  This would be his fifth week of chemotherapy total.  His radiation was also on hold but was resumed on 7/25/2019.  He still has about 7 radiation treatments to go to complete his 33 planned treatments.    He reports his mucositis is feeling better after his hiatus from treatment but in the office today( 7/29 ) he is in rapid A. fib with a heart rate of 175.  He is not having chest pain or shortness of breath.  He tells me that he stopped taking his metoprolol because his blood  pressure was running low at home.  His blood counts are good enough to resume treatment today but we will need to hold his treatment until he gets his heart rate under better control we may try to schedule him back later this week for his final chemotherapy treatment.      History of Present Illness     Past Medical History:   Diagnosis Date   • Arrhythmia    • Atrial fibrillation (CMS/HCC)    • CAD (coronary artery disease)    • Cancer (CMS/HCC) 05/2019    Neck, squamous   • CHF (congestive heart failure) (CMS/HCC)    • Chronic bronchitis (CMS/HCC)    • COPD (chronic obstructive pulmonary disease) (CMS/HCC)    • Coronary artery disease of native artery of native heart with stable angina pectoris (CMS/HCC)    • Cramps of lower extremity    • Daytime hypersomnia    • Depression    • Depression with anxiety    • IRAHETA (dyspnea on exertion)    • Drug therapy    • Dyspnea on exertion    • Emphysema of lung (CMS/HCC)    • Encounter for immunization     flu vaccine high dose PF 65+   • Enlarged prostate    • Fainting    • Fatigue    • Frequent nocturnal awakening    • Gout    • H/O cardiac radiofrequency ablation 2006    [facility].   • Hyperlipidemia    • Hypertension    • Insomnia    • Lumbar radicular pain    • MI (myocardial infarction) (CMS/HCC)    • Non-rheumatic tricuspid valve insufficiency    • SHAR (obstructive sleep apnea)    • Osteoarthritis    • Precordial pain    • Pulmonary emphysema (CMS/HCC)    • Right leg pain     sciatica nerve pain   • Shortness of breath    • Snoring    • SVT (supraventricular tachycardia) (CMS/HCC)    • Syncope    • Vitamin D deficiency         Past Surgical History:   Procedure Laterality Date   • APPENDECTOMY     • CARDIAC ABLATION  2006    [facility].   • CARDIAC CATHETERIZATION N/A 8/29/2018    Procedure: Left Heart Cath;  Surgeon: Yousif Uribe MD;  Location: [facility] CATH INVASIVE LOCATION;  Service: Cardiology   • CARDIAC CATHETERIZATION N/A 8/29/2018    Procedure: Coronary  "angiography;  Surgeon: Yousif Uribe MD;  Location: [facility] CATH INVASIVE LOCATION;  Service: Cardiology   • CARDIAC CATHETERIZATION N/A 8/29/2018    Procedure: Left ventriculography;  Surgeon: Yousif Uribe MD;  Location: [facility] CATH INVASIVE LOCATION;  Service: Cardiology   • FINGER SURGERY     • FOOT SURGERY     • HAND SURGERY     • VENOUS ACCESS DEVICE (PORT) INSERTION Right 6/18/2019    Procedure: MEDIPORT PLACEMENT;  Surgeon: Elvis Grigsby MD;  Location: [facility] MAIN OR;  Service: Vascular        ALLERGIES:    Allergies   Allergen Reactions   • Benadryl [Diphenhydramine Hcl] Other (See Comments) and Dizziness     \"I sleep for about a day\"        Review of Systems   Constitutional: Positive for appetite change (lack of appetite), fatigue and unexpected weight change. Negative for activity change, chills and fever.   HENT: Positive for mouth sores (improving'). Negative for trouble swallowing and voice change.    Eyes: Negative for pain and visual disturbance.   Respiratory: Negative for cough, shortness of breath and wheezing.    Cardiovascular: Negative for chest pain, palpitations and leg swelling.   Gastrointestinal: Positive for nausea. Negative for abdominal pain, constipation, diarrhea and vomiting.   Genitourinary: Negative for difficulty urinating, frequency and urgency.   Musculoskeletal: Positive for back pain. Negative for arthralgias and joint swelling.   Skin: Negative for rash.   Neurological: Positive for dizziness and weakness. Negative for seizures.   Hematological: Negative for adenopathy. Does not bruise/bleed easily.   Psychiatric/Behavioral: Negative for behavioral problems and confusion. The patient is not nervous/anxious.         Objective     Vitals:    07/29/19 0843   BP: 128/79   Pulse: (!) 175  Comment: PT has A-fib   Resp: 20   Temp: 97.5 °F (36.4 °C)   SpO2: 98%   Weight: 79 kg (174 lb 3.2 oz)   Height: 182.9 cm (72.01\")   PainSc:   6   PainLoc: Mouth     Current Status " 7/29/2019   ECOG score 2       Physical Exam   Constitutional: He is oriented to person, place, and time. He appears well-developed and well-nourished. No distress.   HENT:   Head: Normocephalic.   Mouth/Throat: Oropharyngeal exudate (mucositits with thrush present) present.   Firm, 4.5-5.5 cm palpable mass at the angle of the mandible on the right   Eyes: Conjunctivae and EOM are normal. Pupils are equal, round, and reactive to light. No scleral icterus.   Neck: Normal range of motion. Neck supple. No JVD present. No thyromegaly present.   Cardiovascular: Normal rate. An irregularly irregular rhythm present. Exam reveals no gallop and no friction rub.   No murmur heard.  Pulmonary/Chest: Effort normal and breath sounds normal. He has no wheezes. He has no rales.   Abdominal: Soft. He exhibits no distension and no mass. There is no tenderness.   Musculoskeletal: Normal range of motion. He exhibits edema. He exhibits no deformity.   trace ankle edema bilaterally   Lymphadenopathy:     He has no cervical adenopathy.   Neurological: He is alert and oriented to person, place, and time. He has normal reflexes. No cranial nerve deficit.   Skin: Skin is warm and dry. No rash noted. No erythema.   Psychiatric: He has a normal mood and affect. His behavior is normal. Judgment normal.         RECENT LABS:  Hematology WBC   Date Value Ref Range Status   07/29/2019 2.61 (L) 3.40 - 10.80 10*3/mm3 Final     RBC   Date Value Ref Range Status   07/29/2019 4.09 (L) 4.14 - 5.80 10*6/mm3 Final     Hemoglobin   Date Value Ref Range Status   07/29/2019 13.1 13.0 - 17.7 g/dL Final     Hematocrit   Date Value Ref Range Status   07/29/2019 40.1 37.5 - 51.0 % Final     Platelets   Date Value Ref Range Status   07/29/2019 183 140 - 450 10*3/mm3 Final        Lab Results   Component Value Date    NEUTROABS 2.10 07/29/2019       Lab Results   Component Value Date    GLUCOSE 127 (H) 07/22/2019    BUN 20 07/22/2019    CREATININE 0.94 07/22/2019     EGFRIFNONA 78 07/22/2019    EGFRIFAFRI 107 04/23/2018    BCR 21.3 07/22/2019    K 4.3 07/22/2019    CO2 22.3 07/22/2019    CALCIUM 8.6 07/22/2019    PROTENTOTREF 7.2 04/23/2018    ALBUMIN 3.00 (L) 07/22/2019    LABIL2 1.3 04/23/2018    AST 16 07/22/2019    ALT 20 07/22/2019     Lab Results   Component Value Date    INR 2.10 (H) 07/22/2019    INR 2.70 (H) 07/19/2019    INR 4.10 (H) 07/17/2019    PROTIME 25.7 (H) 07/22/2019    PROTIME 32.3 (H) 07/19/2019    PROTIME 48.9 (H) 07/17/2019       PATHOLOGY 5/15/2019  Final Diagnosis   1.  Right Neck, FNA:                  A.  Positive for squamous cell carcinoma.     Flow Cytometry Report, Addendum   Utilizing appropriate positive and negative controls immunohistochemical stain p16 is performed on cell block material.  The tumor is positive for p16 by immunohistochemical staining which is a surrogate for HPV infection.         Assessment/Plan     1.  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  He is undergoing definitive treatment with radiation therapy and weekly low-dose carboplatin and Taxol chemotherapy.  He was able to complete 4 weekly chemotherapy treatments in a row but required holding chemotherapy for the last 2 weeks due to myelosuppression.  His blood counts have recovered today but he appears to be in rapid atrial fibrillation.  2.  Comorbidities including ischemic heart disease with history of CHF and atrial fibrillation requiring Coumadin anticoagulation.    3.  Oral mucositis.  He has a prescription for hydrocodone for pain control.  He does have Meghann's Magic mouth rinse to use as well.  4.  Warfarin anticoagulation.  Patient's INR 3.83, he will hold his coumadin today and tomorrow and have another INR checked Wednesday 7/17/20195.    5.  Weight loss and poor nutrition.  The patient's weight has stabilized and he is utilizing boost at home and trying to hydrate with  water and Gatorade.  He does understand if his nutrition is not improving we will need to proceed with a G-tube for nutrition.  Ruthy Bautista, oncology dietitian been involved in his care also.  6.  Atrial fibrillation with rapid ventricular response    Plan  1.  Hold carboplatin and Taxol chemotherapy today due to rapid atrial fibrillation   2.  I spoke with his cardiologist Dr. Myriam Omer at [facility] and she requested that we send him to their office and they will evaluate him possibly in their chest pain clinic.  Try to get his heart rate under better control.  3.  If his heart rate comes under better control he could potentially proceed with radiation therapy later today.  4.  We will tentatively schedule him back to our office on Wednesday, 7/31/2019 and if his heart rates under better control we can proceed with his last dose of carboplatin and Taxol chemotherapy at that time.  5.  He received Coumadin dose adjustment today for his INR of 2.7.  6.  He will have nurse practitioner reassessment with lab in 1 week.  7.  MD follow-up in 3 weeks.  He should be finished with all of his treatment by that time and we can discuss when to arrange radiographic assessment and ENT follow-up.  8.  He does have a MediPort which will require flushing every 4 to 6 weeks at completion of therapy.

## 2019-07-29 NOTE — PROGRESS NOTES
Anticoagulation Clinic Progress Note    Anticoagulation Summary  As of 2019    INR goal:   2.0-3.0   TTR:   46.9 % (8.9 mo)   INR used for dosin.70 (2019)   Warfarin maintenance plan:   2 mg every Mon, Fri; 4 mg all other days   Weekly warfarin total:   24 mg   No change documented:   Mere Vincent [facility]   Plan last modified:   Kristal Cowart RP (2019)   Next INR check:   2019   Priority:   Maintenance   Target end date:   Indefinite    Indications    Atrial fibrillation (CMS/HCC) [I48.91]             Anticoagulation Episode Summary     INR check location:       Preferred lab:       Send INR reminders to:    [facility]    Comments:         Anticoagulation Care Providers     Provider Role Specialty Phone number    Myriam Omer MD Referring Cardiology [phone]    Nallely Stoll [facility] Responsible Pharmacy [phone]          Clinic Interview:  No pertinent clinical findings have been reported.    INR History:  Anticoagulation Monitoring 2019   INR 2.3 4.10 2.70   INR Date 2019   INR Goal 2.0-3.0 2.0-3.0 2.0-3.0   Trend Same Same Same   Last Week Total 24 mg 18 mg 24 mg   Next Week Total 24 mg 20 mg 24 mg   Sun 2 mg (); Otherwise 4 mg - 4 mg   Mon 2 mg - 2 mg   Tue 4 mg - 4 mg   Wed 4 mg Hold () 4 mg   Thu 4 mg - 4 mg   Fri 2 mg - 2 mg   Sat 2 mg (); Otherwise 4 mg - 4 mg   Visit Report - - -   Some recent data might be hidden       Plan:  1. INR is therapeutic today- see above in Anticoagulation Summary.    Cody SAMSON Eldridge to continue their warfarin regimen- see above in Anticoagulation Summary.  2. Follow up in 2 weeks  3. Pt has agreed to only be called if INR out of range. They have been instructed to call if any changes in medications, doses, concerns, etc. Patient expresses understanding and has no further questions at this time.    Mere Vincent [facility]

## 2019-07-29 NOTE — PROGRESS NOTES
Date of Office Visit: 2019  Encounter Provider: Neymar Colin MD  Place of Service: [facility] CARDIOLOGY  Patient Name: Cody Eldridge  :1945    Chief Complaint   Patient presents with   • Atrial Fibrillation     was at [facility] for chemo and found to be at a high rate with afib, sent to [facility] for evaluation   :     HPI: Cody Eldridge is a 74 y.o. male who presents today to [facility] because of elevated heart rate.  He follows with Dr. Myriam Omer.  He has known permanent atrial fibrillation.  He has been treated with a strategy of rate control and anticoagulation.     He has a history of SVT s/p ablation.  He has significant lung disease and cor pulmonale, with chronic leg swelling . He doesn't have left sided heart failure.  He had a cath in the past that showed minimal atherosclerosis.    He has head and neck cancer and has lost 52 pounds.  He has been receiving chemo and radiation.  He can't eat because of mouth sores.  His neck is red and painful.  His antihypertensives have had to be progressively stopped due to hypotension.  Most recently, metoprolol was stopped.    This morning he went to [facility] and his rate was fast.  He is unaware of it and has no palpitations.  There has been no change in his chronic shortness of breath or leg swelling.     Past Medical History:   Diagnosis Date   • Atrial fibrillation (CMS/HCC)    • Chronic bronchitis (CMS/HCC)    • COPD (chronic obstructive pulmonary disease) (CMS/[facility])    • Cor pulmonale (chronic) (CMS/[facility])    • Daytime hypersomnia    • Depression with anxiety    • Enlarged prostate    • Frequent nocturnal awakening    • Gout    • H/O cardiac radiofrequency ablation     [facility].   • Hyperlipidemia    • Hypertension    • Insomnia    • Lumbar radicular pain    • SHAR (obstructive sleep apnea)    • Osteoarthritis    • Snoring    • Squamous cell carcinoma of neck    • SVT (supraventricular tachycardia) (CMS/HCC)    • Syncope     • Vitamin D deficiency        Past Surgical History:   Procedure Laterality Date   • APPENDECTOMY     • CARDIAC ABLATION      [facility].   • CARDIAC CATHETERIZATION N/A 2018    Procedure: Left Heart Cath;  Surgeon: Yousif Uribe MD;  Location: [facility] CATH INVASIVE LOCATION;  Service: Cardiology   • CARDIAC CATHETERIZATION N/A 2018    Procedure: Coronary angiography;  Surgeon: Yousif Uribe MD;  Location: [facility] CATH INVASIVE LOCATION;  Service: Cardiology   • CARDIAC CATHETERIZATION N/A 2018    Procedure: Left ventriculography;  Surgeon: Yousif Uribe MD;  Location: [facility] CATH INVASIVE LOCATION;  Service: Cardiology   • FINGER SURGERY     • FOOT SURGERY     • HAND SURGERY     • VENOUS ACCESS DEVICE (PORT) INSERTION Right 2019    Procedure: MEDIPORT PLACEMENT;  Surgeon: Elvis Grigsby MD;  Location: [facility] OR;  Service: Vascular       Social History     Socioeconomic History   • Marital status:      Spouse name: Elise Rai   • Number of children: Not on file   • Years of education: Some College   • Highest education level: Not on file   Occupational History     Employer: RETIRED   Tobacco Use   • Smoking status: Former Smoker     Packs/day: 4.00     Types: Cigarettes     Last attempt to quit: 2000     Years since quittin.5   • Smokeless tobacco: Never Used   • Tobacco comment: started age 11 x 53 years stopped 2000 / daily caffiene   Substance and Sexual Activity   • Alcohol use: No     Comment: caffeine use   • Drug use: No   • Sexual activity: Defer       Family History   Problem Relation Age of Onset   • Heart attack Mother    • Diabetes Mother    • Heart disease Mother    • Hypertension Mother    • Diabetes Father    • Heart failure Father    • Heart disease Father    • Hypertension Father    • Cancer Brother         colon   • Hypertension Brother    • Colon cancer Brother 50   • Diabetes Sister    • Heart disease Sister    • Hypertension Sister    •  "COPD Other    • Heart failure Other    • Heart disease Other    • Malig Hyperthermia Neg Hx        Review of Systems   Constitution: Positive for decreased appetite, malaise/fatigue and weight loss.   HENT: Positive for sore throat.    Cardiovascular: Positive for dyspnea on exertion and leg swelling.   Endocrine: Positive for cold intolerance.   Skin: Positive for color change, poor wound healing and rash.   Gastrointestinal: Positive for diarrhea.   Neurological: Positive for headaches.       Allergies   Allergen Reactions   • Benadryl [Diphenhydramine Hcl] Other (See Comments) and Dizziness     \"I sleep for about a day\"         Current Outpatient Medications:   •  albuterol (PROVENTIL HFA;VENTOLIN HFA) 108 (90 BASE) MCG/ACT inhaler, Inhale 2 puffs. ProAir  (90 Base) MCG/ACT Inhalation Aerosol Solution; Patient Sig: ProAir  (90 Base) MCG/ACT Inhalation Aerosol Solution inhale 2 puffs by mouth every 4 hours if needed; 8.5; 11; 07-Apr-2014; Act, Disp: , Rfl:   •  ALPRAZolam (XANAX) 2 MG tablet, Take 1 tablet by mouth Daily for 60 days., Disp: 60 tablet, Rfl: 0  •  aluminum-magnesium hydroxide 200-200 MG/5ML suspension, diphenhydrAMINE 12.5 MG/5ML liquid, lidocaine viscous 2 % solution, nystatin 202032 UNIT/ML suspension, Swish and swallow 10 mL 4 (Four) Times a Day Before Meals & at Bedtime., Disp: 400 mL, Rfl: 1  •  baclofen (LIORESAL) 10 MG tablet, take 1 tablet by mouth three times a day, Disp: 90 tablet, Rfl: 1  •  diphenoxylate-atropine (LOMOTIL) 2.5-0.025 MG per tablet, Take 1 tablet by mouth 4 (Four) Times a Day As Needed for Diarrhea., Disp: 60 tablet, Rfl: 1  •  fentaNYL (DURAGESIC) 25 MCG/HR patch, Place 1 patch on the skin as directed by provider Every 72 (Seventy-Two) Hours., Disp: 10 patch, Rfl: 0  •  HYDROcodone-acetaminophen (NORCO) 7.5-325 MG per tablet, Take 1 tablet by mouth 4 (Four) Times a Day As Needed for Moderate Pain  or Severe Pain ., Disp: 120 tablet, Rfl: 0  •  lidocaine " "viscous (XYLOCAINE) 2 % solution, , Disp: , Rfl:   •  montelukast (SINGULAIR) 10 MG tablet, TAKE 1 TABLET BY MOUTH DAILY, Disp: 90 tablet, Rfl: 0  •  morphine 100 MG/5ML solution concentrated solution, Take 0.5 mL by mouth Every 2 (Two) Hours As Needed (Pain)., Disp: 180 mL, Rfl: 0  •  ondansetron ODT (ZOFRAN-ODT) 8 MG disintegrating tablet, Take 1 tablet by mouth Every 8 (Eight) Hours As Needed for Nausea or Vomiting., Disp: 30 tablet, Rfl: 2  •  rosuvastatin (CRESTOR) 20 MG tablet, Take 20 mg by mouth Daily., Disp: 30 tablet, Rfl: 5  •  sertraline (ZOLOFT) 50 MG tablet, TAKE 1 TABLET BY MOUTH EVERY DAY, Disp: 90 tablet, Rfl: 1  •  silver sulfadiazine (SILVADENE) 1 % cream, Apply  topically to the appropriate area as directed 2 (Two) Times a Day., Disp: 50 g, Rfl: 0  •  warfarin (COUMADIN) 2 MG tablet, TAKE 2 TABLETS BY MOUTH ONCE DAILY (Patient taking differently: TAKE ONE TABLET ON SUNDAYS AND THURSDAYS), Disp: 180 tablet, Rfl: 0  •  warfarin (COUMADIN) 2 MG tablet, TAKE 2 TABLETS BY MOUTH ONCE DAILY (Patient taking differently: TAKE 2 TABLETS BY MOUTH TWICE DAILY ON MONDAY, TUESDAY, WEDNESDAY, FRIDAY, AND SATURDAY), Disp: 70 tablet, Rfl: 1    Current Facility-Administered Medications:   •  heparin flush (porcine) 100 UNIT/ML injection 500 Units, 500 Units, Intracatheter, Q8H PRN, Neymar Colin MD  •  metoprolol tartrate (LOPRESSOR) injection 5 mg, 5 mg, Intravenous, Q5 Min PRN, Neymar Colin MD, 5 mg at 07/29/19 1113      Objective:     Vitals:    07/29/19 1003   BP: 128/92   BP Location: Right arm   Patient Position: Sitting   Pulse: (!) 169   SpO2: 96%   Weight: 80.5 kg (177 lb 6.4 oz)   Height: 180.3 cm (71\")     Body mass index is 24.74 kg/m².    Physical Exam   Constitutional: He is oriented to person, place, and time. He has a sickly appearance.   HENT:   Head: Normocephalic.   Nose: Nose normal.   Mouth/Throat: Oropharynx is clear and moist.   Eyes: Conjunctivae and EOM are normal. Pupils are equal, " round, and reactive to light.   Neck: Normal range of motion.   Redness, scaling, excoriation of right neck   Cardiovascular: Normal heart sounds and intact distal pulses. An irregularly irregular rhythm present. Tachycardia present.   No murmur heard.  Pulmonary/Chest: Effort normal and breath sounds normal.   Abdominal: Soft. There is no tenderness.   Musculoskeletal: Normal range of motion. He exhibits edema (1+).   Neurological: He is alert and oriented to person, place, and time. No cranial nerve deficit.   Skin: Skin is warm and dry.   As per Neck   Psychiatric: He has a normal mood and affect. His behavior is normal. Judgment and thought content normal.   Vitals reviewed.        ECG 12 Lead  Date/Time: 7/29/2019 1:01 PM  Performed by: Neymar Colin MD  Authorized by: Neymar Colin MD   Comparison: compared with previous ECG   Rhythm: atrial fibrillation  Rate: tachycardic  ST Segments: ST segments normal  T flattening: I and all  QRS axis: left  Other findings: poor R wave progression    Clinical impression: abnormal EKG              Assessment:       Diagnosis Plan   1. Permanent atrial fibrillation (CMS/HCC)  heparin flush (porcine) 100 UNIT/ML injection 500 Units    sodium chloride 0.9 % infusion    metoprolol tartrate (LOPRESSOR) injection 5 mg   2. Hypotension, unspecified hypotension type     3. Head and neck cancer (CMS/HCC)     4. Weight loss            Plan:       His rate is fast due to poor intake, significant weight loss, and inability to take an AVN blocker due to low blood pressure.  He is asymptomatic.  We gave him one liter of normal saline and three doses of IV metoprolol, 5mg each.  This lowered his rate to the 120s but only briefly.      We don't have a lot of options here.  In discussion with Dr Omer, we'll try oral amiodarone as a rate controlling agent.  Clearly this is something that will take a few days to exert an effect; patients is key, especially as he's asymptomatic.  In the  meantime I think he can still have XRT today.      Sincerely,       Neymar Colin MD

## 2019-07-29 NOTE — PROGRESS NOTES
[facility] Nutrition Follow Up:     Weight 177#, IVF Friday and today in cardiology office. Chemo held due to a fib. Only taking liquids now. Being seen by SLP. Radiation 26/33 completed. Will continue to follow.

## 2019-07-29 NOTE — PATIENT INSTRUCTIONS
1.) see Ne SAL in 1 week  2.) D/C metoprolol  3.) A new medication (amiodarone) will be called into your pharmacy

## 2019-07-30 PROCEDURE — 77386: CPT | Performed by: RADIOLOGY

## 2019-07-30 PROCEDURE — 77386 CHG INTENSITY MODULATED RADIATION TX DLVR COMPLEX: CPT | Performed by: RADIOLOGY

## 2019-07-30 PROCEDURE — 77014 CHG CT GUIDANCE RADIATION THERAPY FLDS PLACEMENT: CPT | Performed by: RADIOLOGY

## 2019-07-31 ENCOUNTER — DOCUMENTATION (OUTPATIENT)
Dept: RADIATION ONCOLOGY | Facility: HOSPITAL | Age: 74
End: 2019-07-31

## 2019-07-31 ENCOUNTER — INFUSION (OUTPATIENT)
Dept: ONCOLOGY | Facility: HOSPITAL | Age: 74
End: 2019-07-31

## 2019-07-31 VITALS
OXYGEN SATURATION: 98 % | WEIGHT: 174.6 LBS | HEIGHT: 71 IN | RESPIRATION RATE: 16 BRPM | HEART RATE: 108 BPM | SYSTOLIC BLOOD PRESSURE: 124 MMHG | BODY MASS INDEX: 24.44 KG/M2 | TEMPERATURE: 98.1 F | DIASTOLIC BLOOD PRESSURE: 74 MMHG

## 2019-07-31 DIAGNOSIS — Z45.2 FITTING AND ADJUSTMENT OF VASCULAR CATHETER: ICD-10-CM

## 2019-07-31 DIAGNOSIS — C01 SQUAMOUS CELL CARCINOMA OF BASE OF TONGUE (HCC): Primary | ICD-10-CM

## 2019-07-31 LAB
ALBUMIN SERPL-MCNC: 2.8 G/DL (ref 3.5–5.2)
ALBUMIN/GLOB SERPL: 0.9 G/DL (ref 1.1–2.4)
ALP SERPL-CCNC: 86 U/L (ref 38–116)
ALT SERPL W P-5'-P-CCNC: 28 U/L (ref 0–41)
ANION GAP SERPL CALCULATED.3IONS-SCNC: 13 MMOL/L (ref 5–15)
AST SERPL-CCNC: 29 U/L (ref 0–40)
BASOPHILS # BLD AUTO: 0.01 10*3/MM3 (ref 0–0.2)
BASOPHILS NFR BLD AUTO: 0.6 % (ref 0–1.5)
BILIRUB SERPL-MCNC: 0.5 MG/DL (ref 0.2–1.2)
BUN BLD-MCNC: 27 MG/DL (ref 6–20)
BUN/CREAT SERPL: 24.1 (ref 7.3–30)
CALCIUM SPEC-SCNC: 8.7 MG/DL (ref 8.5–10.2)
CHLORIDE SERPL-SCNC: 109 MMOL/L (ref 98–107)
CO2 SERPL-SCNC: 22 MMOL/L (ref 22–29)
CREAT BLD-MCNC: 1.12 MG/DL (ref 0.7–1.3)
DEPRECATED RDW RBC AUTO: 61.6 FL (ref 37–54)
EOSINOPHIL # BLD AUTO: 0 10*3/MM3 (ref 0–0.4)
EOSINOPHIL NFR BLD AUTO: 0 % (ref 0.3–6.2)
ERYTHROCYTE [DISTWIDTH] IN BLOOD BY AUTOMATED COUNT: 18.8 % (ref 12.3–15.4)
GFR SERPL CREATININE-BSD FRML MDRD: 64 ML/MIN/1.73
GLOBULIN UR ELPH-MCNC: 3.2 GM/DL (ref 1.8–3.5)
GLUCOSE BLD-MCNC: 111 MG/DL (ref 74–124)
HCT VFR BLD AUTO: 40.6 % (ref 37.5–51)
HGB BLD-MCNC: 13 G/DL (ref 13–17.7)
IMM GRANULOCYTES # BLD AUTO: 0.01 10*3/MM3 (ref 0–0.05)
IMM GRANULOCYTES NFR BLD AUTO: 0.6 % (ref 0–0.5)
LYMPHOCYTES # BLD AUTO: 0.19 10*3/MM3 (ref 0.7–3.1)
LYMPHOCYTES NFR BLD AUTO: 11.8 % (ref 19.6–45.3)
MCH RBC QN AUTO: 32.3 PG (ref 26.6–33)
MCHC RBC AUTO-ENTMCNC: 32 G/DL (ref 31.5–35.7)
MCV RBC AUTO: 101 FL (ref 79–97)
MONOCYTES # BLD AUTO: 0.25 10*3/MM3 (ref 0.1–0.9)
MONOCYTES NFR BLD AUTO: 15.5 % (ref 5–12)
NEUTROPHILS # BLD AUTO: 1.15 10*3/MM3 (ref 1.7–7)
NEUTROPHILS NFR BLD AUTO: 71.5 % (ref 42.7–76)
NRBC BLD AUTO-RTO: 0 /100 WBC (ref 0–0.2)
PLATELET # BLD AUTO: 207 10*3/MM3 (ref 140–450)
PMV BLD AUTO: 9.7 FL (ref 6–12)
POTASSIUM BLD-SCNC: 4.2 MMOL/L (ref 3.5–4.7)
PROT SERPL-MCNC: 6 G/DL (ref 6.3–8)
RBC # BLD AUTO: 4.02 10*6/MM3 (ref 4.14–5.8)
SODIUM BLD-SCNC: 144 MMOL/L (ref 134–145)
WBC NRBC COR # BLD: 1.61 10*3/MM3 (ref 3.4–10.8)

## 2019-07-31 PROCEDURE — 96417 CHEMO IV INFUS EACH ADDL SEQ: CPT | Performed by: INTERNAL MEDICINE

## 2019-07-31 PROCEDURE — 85025 COMPLETE CBC W/AUTO DIFF WBC: CPT | Performed by: INTERNAL MEDICINE

## 2019-07-31 PROCEDURE — 96375 TX/PRO/DX INJ NEW DRUG ADDON: CPT | Performed by: INTERNAL MEDICINE

## 2019-07-31 PROCEDURE — 25010000002 DIPHENHYDRAMINE PER 50 MG: Performed by: INTERNAL MEDICINE

## 2019-07-31 PROCEDURE — 25010000002 PACLITAXEL PER 30 MG: Performed by: INTERNAL MEDICINE

## 2019-07-31 PROCEDURE — 25010000002 PALONOSETRON PER 25 MCG: Performed by: INTERNAL MEDICINE

## 2019-07-31 PROCEDURE — 25010000002 DEXAMETHASONE SODIUM PHOSPHATE 100 MG/10ML SOLUTION: Performed by: INTERNAL MEDICINE

## 2019-07-31 PROCEDURE — 80053 COMPREHEN METABOLIC PANEL: CPT | Performed by: INTERNAL MEDICINE

## 2019-07-31 PROCEDURE — 96413 CHEMO IV INFUSION 1 HR: CPT | Performed by: INTERNAL MEDICINE

## 2019-07-31 PROCEDURE — 25010000002 CARBOPLATIN PER 50 MG: Performed by: INTERNAL MEDICINE

## 2019-07-31 RX ORDER — PALONOSETRON 0.05 MG/ML
0.25 INJECTION, SOLUTION INTRAVENOUS ONCE
Status: COMPLETED | OUTPATIENT
Start: 2019-07-31 | End: 2019-07-31

## 2019-07-31 RX ORDER — SODIUM CHLORIDE 9 MG/ML
250 INJECTION, SOLUTION INTRAVENOUS ONCE
Status: COMPLETED | OUTPATIENT
Start: 2019-07-31 | End: 2019-07-31

## 2019-07-31 RX ORDER — SODIUM CHLORIDE 0.9 % (FLUSH) 0.9 %
10 SYRINGE (ML) INJECTION AS NEEDED
Status: DISCONTINUED | OUTPATIENT
Start: 2019-07-31 | End: 2019-07-31 | Stop reason: HOSPADM

## 2019-07-31 RX ORDER — FAMOTIDINE 10 MG/ML
20 INJECTION, SOLUTION INTRAVENOUS ONCE
Status: COMPLETED | OUTPATIENT
Start: 2019-07-31 | End: 2019-07-31

## 2019-07-31 RX ORDER — SODIUM CHLORIDE 0.9 % (FLUSH) 0.9 %
10 SYRINGE (ML) INJECTION AS NEEDED
Status: CANCELLED | OUTPATIENT
Start: 2019-07-31

## 2019-07-31 RX ADMIN — Medication 500 UNITS: at 13:35

## 2019-07-31 RX ADMIN — DEXAMETHASONE SODIUM PHOSPHATE 12 MG: 10 INJECTION, SOLUTION INTRAMUSCULAR; INTRAVENOUS at 10:36

## 2019-07-31 RX ADMIN — FAMOTIDINE 20 MG: 10 INJECTION INTRAVENOUS at 10:24

## 2019-07-31 RX ADMIN — PALONOSETRON HYDROCHLORIDE 0.25 MG: 0.25 INJECTION, SOLUTION INTRAVENOUS at 10:24

## 2019-07-31 RX ADMIN — SODIUM CHLORIDE, PRESERVATIVE FREE 10 ML: 5 INJECTION INTRAVENOUS at 13:35

## 2019-07-31 RX ADMIN — SODIUM CHLORIDE 250 ML: 9 INJECTION, SOLUTION INTRAVENOUS at 10:16

## 2019-07-31 RX ADMIN — DIPHENHYDRAMINE HYDROCHLORIDE 12.5 MG: 50 INJECTION, SOLUTION INTRAMUSCULAR; INTRAVENOUS at 10:17

## 2019-07-31 RX ADMIN — PACLITAXEL 105 MG: 6 INJECTION, SOLUTION INTRAVENOUS at 11:33

## 2019-07-31 RX ADMIN — CARBOPLATIN 180 MG: 10 INJECTION, SOLUTION INTRAVENOUS at 12:58

## 2019-07-31 NOTE — PROGRESS NOTES
Carboplatin dose decreased to 180 mg for scheduled dose today, 7/31/19, per creatinine increase,  per communication Ana Boone RN, V.O. Dr. Heaton

## 2019-08-01 ENCOUNTER — APPOINTMENT (OUTPATIENT)
Dept: SPEECH THERAPY | Facility: HOSPITAL | Age: 74
End: 2019-08-01

## 2019-08-01 ENCOUNTER — APPOINTMENT (OUTPATIENT)
Dept: RADIATION ONCOLOGY | Facility: HOSPITAL | Age: 74
End: 2019-08-01

## 2019-08-01 ENCOUNTER — APPOINTMENT (OUTPATIENT)
Dept: LAB | Facility: HOSPITAL | Age: 74
End: 2019-08-01

## 2019-08-02 ENCOUNTER — INFUSION (OUTPATIENT)
Dept: ONCOLOGY | Facility: HOSPITAL | Age: 74
End: 2019-08-02

## 2019-08-02 VITALS
BODY MASS INDEX: 24.17 KG/M2 | TEMPERATURE: 97.4 F | DIASTOLIC BLOOD PRESSURE: 69 MMHG | HEART RATE: 128 BPM | WEIGHT: 173.2 LBS | SYSTOLIC BLOOD PRESSURE: 95 MMHG

## 2019-08-02 DIAGNOSIS — Z45.2 FITTING AND ADJUSTMENT OF VASCULAR CATHETER: Primary | ICD-10-CM

## 2019-08-02 PROCEDURE — 96360 HYDRATION IV INFUSION INIT: CPT | Performed by: INTERNAL MEDICINE

## 2019-08-02 PROCEDURE — 96361 HYDRATE IV INFUSION ADD-ON: CPT | Performed by: INTERNAL MEDICINE

## 2019-08-02 RX ORDER — SODIUM CHLORIDE 0.9 % (FLUSH) 0.9 %
10 SYRINGE (ML) INJECTION AS NEEDED
OUTPATIENT
Start: 2019-08-02

## 2019-08-02 RX ORDER — HEPARIN SODIUM (PORCINE) LOCK FLUSH IV SOLN 100 UNIT/ML 100 UNIT/ML
500 SOLUTION INTRAVENOUS AS NEEDED
OUTPATIENT
Start: 2019-08-02

## 2019-08-02 RX ORDER — SODIUM CHLORIDE 9 MG/ML
50 INJECTION, SOLUTION INTRAVENOUS CONTINUOUS
Status: DISCONTINUED | OUTPATIENT
Start: 2019-08-02 | End: 2019-08-02 | Stop reason: HOSPADM

## 2019-08-02 RX ORDER — SODIUM CHLORIDE 0.9 % (FLUSH) 0.9 %
10 SYRINGE (ML) INJECTION AS NEEDED
Status: DISCONTINUED | OUTPATIENT
Start: 2019-08-02 | End: 2019-08-02 | Stop reason: HOSPADM

## 2019-08-02 RX ADMIN — SODIUM CHLORIDE 50 ML/HR: 900 INJECTION, SOLUTION INTRAVENOUS at 11:06

## 2019-08-02 RX ADMIN — Medication 500 UNITS: at 13:03

## 2019-08-03 ENCOUNTER — APPOINTMENT (OUTPATIENT)
Dept: GENERAL RADIOLOGY | Facility: HOSPITAL | Age: 74
End: 2019-08-03

## 2019-08-03 ENCOUNTER — HOSPITAL ENCOUNTER (INPATIENT)
Facility: HOSPITAL | Age: 74
LOS: 17 days | Discharge: HOME HEALTH | End: 2019-08-20
Attending: EMERGENCY MEDICINE | Admitting: HOSPITALIST

## 2019-08-03 DIAGNOSIS — I50.9 NEW ONSET OF CONGESTIVE HEART FAILURE (HCC): Primary | ICD-10-CM

## 2019-08-03 DIAGNOSIS — T80.219A INFECTED VENOUS ACCESS PORT: ICD-10-CM

## 2019-08-03 PROBLEM — F41.8 DEPRESSION WITH ANXIETY: Status: ACTIVE | Noted: 2019-08-03

## 2019-08-03 PROBLEM — T82.7XXA VASCULAR CATHETER INFECTION: Status: ACTIVE | Noted: 2019-08-03

## 2019-08-03 PROBLEM — J44.9 COPD (CHRONIC OBSTRUCTIVE PULMONARY DISEASE): Status: ACTIVE | Noted: 2019-08-03

## 2019-08-03 PROBLEM — I25.10 CAD (CORONARY ARTERY DISEASE): Status: ACTIVE | Noted: 2019-08-03

## 2019-08-03 PROBLEM — D70.9 NEUTROPENIA: Status: ACTIVE | Noted: 2019-08-03

## 2019-08-03 PROBLEM — C44.42 SQUAMOUS CELL CARCINOMA OF NECK: Status: ACTIVE | Noted: 2019-08-03

## 2019-08-03 PROBLEM — I48.91 ATRIAL FIBRILLATION: Status: ACTIVE | Noted: 2019-08-03

## 2019-08-03 LAB
ALBUMIN SERPL-MCNC: 2.9 G/DL (ref 3.5–5.2)
ALBUMIN/GLOB SERPL: 0.7 G/DL
ALP SERPL-CCNC: 97 U/L (ref 39–117)
ALT SERPL W P-5'-P-CCNC: 36 U/L (ref 1–41)
ANION GAP SERPL CALCULATED.3IONS-SCNC: 11.9 MMOL/L (ref 5–15)
ANISOCYTOSIS BLD QL: NORMAL
AST SERPL-CCNC: 33 U/L (ref 1–40)
BASOPHILS # BLD AUTO: 0.01 10*3/MM3 (ref 0–0.2)
BASOPHILS NFR BLD AUTO: 0.9 % (ref 0–1.5)
BILIRUB SERPL-MCNC: 0.9 MG/DL (ref 0.2–1.2)
BUN BLD-MCNC: 24 MG/DL (ref 8–23)
BUN/CREAT SERPL: 22.4 (ref 7–25)
BURR CELLS BLD QL SMEAR: NORMAL
CALCIUM SPEC-SCNC: 8.9 MG/DL (ref 8.6–10.5)
CHLORIDE SERPL-SCNC: 107 MMOL/L (ref 98–107)
CO2 SERPL-SCNC: 25.1 MMOL/L (ref 22–29)
CREAT BLD-MCNC: 1.07 MG/DL (ref 0.76–1.27)
DEPRECATED RDW RBC AUTO: 67.6 FL (ref 37–54)
EOSINOPHIL # BLD AUTO: 0 10*3/MM3 (ref 0–0.4)
EOSINOPHIL NFR BLD AUTO: 0 % (ref 0.3–6.2)
ERYTHROCYTE [DISTWIDTH] IN BLOOD BY AUTOMATED COUNT: 19.9 % (ref 12.3–15.4)
GFR SERPL CREATININE-BSD FRML MDRD: 67 ML/MIN/1.73
GLOBULIN UR ELPH-MCNC: 4 GM/DL
GLUCOSE BLD-MCNC: 111 MG/DL (ref 65–99)
HCT VFR BLD AUTO: 47.2 % (ref 37.5–51)
HGB BLD-MCNC: 14.8 G/DL (ref 13–17.7)
IMM GRANULOCYTES # BLD AUTO: 0.02 10*3/MM3 (ref 0–0.05)
IMM GRANULOCYTES NFR BLD AUTO: 1.8 % (ref 0–0.5)
INR PPP: 3.97 (ref 0.9–1.1)
LYMPHOCYTES # BLD AUTO: 0.06 10*3/MM3 (ref 0.7–3.1)
LYMPHOCYTES NFR BLD AUTO: 5.5 % (ref 19.6–45.3)
MCH RBC QN AUTO: 31.6 PG (ref 26.6–33)
MCHC RBC AUTO-ENTMCNC: 31.4 G/DL (ref 31.5–35.7)
MCV RBC AUTO: 100.6 FL (ref 79–97)
MONOCYTES # BLD AUTO: 0.02 10*3/MM3 (ref 0.1–0.9)
MONOCYTES NFR BLD AUTO: 1.8 % (ref 5–12)
NEUTROPHILS # BLD AUTO: 0.98 10*3/MM3 (ref 1.7–7)
NEUTROPHILS NFR BLD AUTO: 90 % (ref 42.7–76)
NRBC BLD AUTO-RTO: 0.9 /100 WBC (ref 0–0.2)
NT-PROBNP SERPL-MCNC: 8857 PG/ML (ref 5–1800)
PLAT MORPH BLD: NORMAL
PLATELET # BLD AUTO: 196 10*3/MM3 (ref 140–450)
PMV BLD AUTO: 12.2 FL (ref 6–12)
POTASSIUM BLD-SCNC: 4.5 MMOL/L (ref 3.5–5.2)
PROT SERPL-MCNC: 6.9 G/DL (ref 6–8.5)
PROTHROMBIN TIME: 38.6 SECONDS (ref 11.7–14.2)
RBC # BLD AUTO: 4.69 10*6/MM3 (ref 4.14–5.8)
SODIUM BLD-SCNC: 144 MMOL/L (ref 136–145)
TROPONIN T SERPL-MCNC: <0.01 NG/ML (ref 0–0.03)
WBC MORPH BLD: NORMAL
WBC NRBC COR # BLD: 1.09 10*3/MM3 (ref 3.4–10.8)

## 2019-08-03 PROCEDURE — 87186 SC STD MICRODIL/AGAR DIL: CPT | Performed by: HOSPITALIST

## 2019-08-03 PROCEDURE — 83880 ASSAY OF NATRIURETIC PEPTIDE: CPT | Performed by: PHYSICIAN ASSISTANT

## 2019-08-03 PROCEDURE — 25010000002 AMIODARONE IN DEXTROSE 5% 360-4.14 MG/200ML-% SOLUTION: Performed by: NURSE PRACTITIONER

## 2019-08-03 PROCEDURE — 36415 COLL VENOUS BLD VENIPUNCTURE: CPT

## 2019-08-03 PROCEDURE — 25010000002 DIGOXIN PER 500 MCG: Performed by: NURSE PRACTITIONER

## 2019-08-03 PROCEDURE — 87040 BLOOD CULTURE FOR BACTERIA: CPT | Performed by: HOSPITALIST

## 2019-08-03 PROCEDURE — 71045 X-RAY EXAM CHEST 1 VIEW: CPT

## 2019-08-03 PROCEDURE — 87150 DNA/RNA AMPLIFIED PROBE: CPT | Performed by: HOSPITALIST

## 2019-08-03 PROCEDURE — 87147 CULTURE TYPE IMMUNOLOGIC: CPT | Performed by: HOSPITALIST

## 2019-08-03 PROCEDURE — 93005 ELECTROCARDIOGRAM TRACING: CPT | Performed by: PHYSICIAN ASSISTANT

## 2019-08-03 PROCEDURE — 80053 COMPREHEN METABOLIC PANEL: CPT | Performed by: PHYSICIAN ASSISTANT

## 2019-08-03 PROCEDURE — 85007 BL SMEAR W/DIFF WBC COUNT: CPT | Performed by: PHYSICIAN ASSISTANT

## 2019-08-03 PROCEDURE — 84484 ASSAY OF TROPONIN QUANT: CPT | Performed by: PHYSICIAN ASSISTANT

## 2019-08-03 PROCEDURE — 85025 COMPLETE CBC W/AUTO DIFF WBC: CPT | Performed by: PHYSICIAN ASSISTANT

## 2019-08-03 PROCEDURE — 85610 PROTHROMBIN TIME: CPT | Performed by: PHYSICIAN ASSISTANT

## 2019-08-03 PROCEDURE — 25010000002 FUROSEMIDE PER 20 MG: Performed by: PHYSICIAN ASSISTANT

## 2019-08-03 PROCEDURE — 99284 EMERGENCY DEPT VISIT MOD MDM: CPT

## 2019-08-03 PROCEDURE — 93010 ELECTROCARDIOGRAM REPORT: CPT | Performed by: INTERNAL MEDICINE

## 2019-08-03 PROCEDURE — 25010000002 DIGOXIN PER 500 MCG: Performed by: PHYSICIAN ASSISTANT

## 2019-08-03 RX ORDER — ONDANSETRON 4 MG/1
4 TABLET, FILM COATED ORAL EVERY 6 HOURS PRN
Status: DISCONTINUED | OUTPATIENT
Start: 2019-08-03 | End: 2019-08-20 | Stop reason: HOSPADM

## 2019-08-03 RX ORDER — SODIUM CHLORIDE 0.9 % (FLUSH) 0.9 %
10 SYRINGE (ML) INJECTION AS NEEDED
Status: DISCONTINUED | OUTPATIENT
Start: 2019-08-03 | End: 2019-08-20 | Stop reason: HOSPADM

## 2019-08-03 RX ORDER — ONDANSETRON 2 MG/ML
4 INJECTION INTRAMUSCULAR; INTRAVENOUS EVERY 6 HOURS PRN
Status: DISCONTINUED | OUTPATIENT
Start: 2019-08-03 | End: 2019-08-20 | Stop reason: HOSPADM

## 2019-08-03 RX ORDER — ALPRAZOLAM 0.5 MG/1
1 TABLET ORAL DAILY PRN
Status: DISCONTINUED | OUTPATIENT
Start: 2019-08-03 | End: 2019-08-20 | Stop reason: HOSPADM

## 2019-08-03 RX ORDER — ALBUTEROL SULFATE 2.5 MG/3ML
2.5 SOLUTION RESPIRATORY (INHALATION) EVERY 6 HOURS PRN
Status: DISCONTINUED | OUTPATIENT
Start: 2019-08-03 | End: 2019-08-09

## 2019-08-03 RX ORDER — ROSUVASTATIN CALCIUM 20 MG/1
20 TABLET, COATED ORAL DAILY
Status: DISCONTINUED | OUTPATIENT
Start: 2019-08-03 | End: 2019-08-05

## 2019-08-03 RX ORDER — DIPHENOXYLATE HYDROCHLORIDE AND ATROPINE SULFATE 2.5; .025 MG/1; MG/1
1 TABLET ORAL 4 TIMES DAILY PRN
Status: DISCONTINUED | OUTPATIENT
Start: 2019-08-03 | End: 2019-08-20 | Stop reason: HOSPADM

## 2019-08-03 RX ORDER — SODIUM CHLORIDE 0.9 % (FLUSH) 0.9 %
3 SYRINGE (ML) INJECTION EVERY 12 HOURS SCHEDULED
Status: DISCONTINUED | OUTPATIENT
Start: 2019-08-03 | End: 2019-08-20 | Stop reason: HOSPADM

## 2019-08-03 RX ORDER — MONTELUKAST SODIUM 10 MG/1
10 TABLET ORAL NIGHTLY
COMMUNITY
End: 2019-11-15 | Stop reason: SDUPTHER

## 2019-08-03 RX ORDER — FUROSEMIDE 10 MG/ML
40 INJECTION INTRAMUSCULAR; INTRAVENOUS EVERY 12 HOURS
Status: DISCONTINUED | OUTPATIENT
Start: 2019-08-03 | End: 2019-08-04

## 2019-08-03 RX ORDER — NITROGLYCERIN 0.4 MG/1
0.4 TABLET SUBLINGUAL
Status: DISCONTINUED | OUTPATIENT
Start: 2019-08-03 | End: 2019-08-20 | Stop reason: HOSPADM

## 2019-08-03 RX ORDER — BACLOFEN 10 MG/1
10 TABLET ORAL 3 TIMES DAILY
Status: DISCONTINUED | OUTPATIENT
Start: 2019-08-03 | End: 2019-08-05

## 2019-08-03 RX ORDER — ALPRAZOLAM 1 MG/1
1 TABLET ORAL DAILY PRN
COMMUNITY
End: 2019-12-03

## 2019-08-03 RX ORDER — SODIUM CHLORIDE 0.9 % (FLUSH) 0.9 %
3-10 SYRINGE (ML) INJECTION AS NEEDED
Status: DISCONTINUED | OUTPATIENT
Start: 2019-08-03 | End: 2019-08-20 | Stop reason: HOSPADM

## 2019-08-03 RX ORDER — FUROSEMIDE 10 MG/ML
40 INJECTION INTRAMUSCULAR; INTRAVENOUS ONCE
Status: COMPLETED | OUTPATIENT
Start: 2019-08-03 | End: 2019-08-03

## 2019-08-03 RX ORDER — AMIODARONE HYDROCHLORIDE 200 MG/1
200 TABLET ORAL EVERY 12 HOURS SCHEDULED
Status: DISCONTINUED | OUTPATIENT
Start: 2019-08-03 | End: 2019-08-03

## 2019-08-03 RX ORDER — HYDROCODONE BITARTRATE AND ACETAMINOPHEN 7.5; 325 MG/1; MG/1
1 TABLET ORAL 4 TIMES DAILY PRN
Status: DISCONTINUED | OUTPATIENT
Start: 2019-08-03 | End: 2019-08-05

## 2019-08-03 RX ORDER — WARFARIN SODIUM 1 MG/1
1 TABLET ORAL EVERY OTHER DAY
COMMUNITY
End: 2019-09-24

## 2019-08-03 RX ORDER — MONTELUKAST SODIUM 10 MG/1
10 TABLET ORAL NIGHTLY
Status: DISCONTINUED | OUTPATIENT
Start: 2019-08-03 | End: 2019-08-05

## 2019-08-03 RX ORDER — DIGOXIN 0.25 MG/ML
500 INJECTION INTRAMUSCULAR; INTRAVENOUS ONCE
Status: COMPLETED | OUTPATIENT
Start: 2019-08-03 | End: 2019-08-03

## 2019-08-03 RX ORDER — ACETAMINOPHEN 325 MG/1
650 TABLET ORAL EVERY 4 HOURS PRN
Status: DISCONTINUED | OUTPATIENT
Start: 2019-08-03 | End: 2019-08-20 | Stop reason: HOSPADM

## 2019-08-03 RX ADMIN — SODIUM CHLORIDE, PRESERVATIVE FREE 3 ML: 5 INJECTION INTRAVENOUS at 20:08

## 2019-08-03 RX ADMIN — METOPROLOL TARTRATE 5 MG: 5 INJECTION INTRAVENOUS at 12:26

## 2019-08-03 RX ADMIN — Medication 10 ML: at 20:08

## 2019-08-03 RX ADMIN — DIGOXIN 500 MCG: 0.25 INJECTION INTRAMUSCULAR; INTRAVENOUS at 19:43

## 2019-08-03 RX ADMIN — AMIODARONE HYDROCHLORIDE 1 MG/MIN: 1.8 INJECTION, SOLUTION INTRAVENOUS at 19:44

## 2019-08-03 RX ADMIN — SERTRALINE 50 MG: 50 TABLET, FILM COATED ORAL at 19:45

## 2019-08-03 RX ADMIN — DIGOXIN 500 MCG: 0.25 INJECTION INTRAMUSCULAR; INTRAVENOUS at 13:32

## 2019-08-03 RX ADMIN — FUROSEMIDE 40 MG: 10 INJECTION, SOLUTION INTRAMUSCULAR; INTRAVENOUS at 13:31

## 2019-08-03 RX ADMIN — BACLOFEN 10 MG: 10 TABLET ORAL at 20:08

## 2019-08-03 RX ADMIN — ROSUVASTATIN CALCIUM 20 MG: 20 TABLET, FILM COATED ORAL at 19:45

## 2019-08-03 RX ADMIN — MONTELUKAST SODIUM 10 MG: 10 TABLET, FILM COATED ORAL at 20:08

## 2019-08-03 NOTE — PROGRESS NOTES
Clinical Pharmacy Services: Medication History    Cody Eldridge is a 74 y.o. male presenting to [facility] for No admission diagnoses are documented for this encounter.    He  has a past medical history of Atrial fibrillation (CMS/Formerly [facility]), Chronic bronchitis (CMS/Formerly [facility]), COPD (chronic obstructive pulmonary disease) (CMS/Formerly [facility]), Cor pulmonale (chronic) (CMS/Formerly [facility]), Daytime hypersomnia, Depression with anxiety, Enlarged prostate, Frequent nocturnal awakening, Gout, H/O cardiac radiofrequency ablation (2006), Head and neck cancer (CMS/Formerly [facility]), Hyperlipidemia, Hypertension, Hypotension, Insomnia, Lumbar radicular pain, SHAR (obstructive sleep apnea), Osteoarthritis, Permanent atrial fibrillation (CMS/Formerly [facility]), Snoring, Squamous cell carcinoma of neck, SVT (supraventricular tachycardia) (CMS/Formerly [facility]), Syncope, Vitamin D deficiency, and Weight loss.    Allergies as of 08/03/2019 - Reviewed 08/03/2019   Allergen Reaction Noted   • Benadryl [diphenhydramine hcl] Other (See Comments) and Dizziness 06/04/2017       Medication information was obtained from: patient  Pharmacy and Phone Number:     Prior to Admission Medications     Prescriptions Last Dose Informant Patient Reported? Taking?    albuterol (PROVENTIL HFA;VENTOLIN HFA) 108 (90 BASE) MCG/ACT inhaler  Self Yes Yes    Inhale 2 puffs. ProAir  (90 Base) MCG/ACT Inhalation Aerosol Solution; Patient Sig: ProAir  (90 Base) MCG/ACT Inhalation Aerosol Solution inhale 2 puffs by mouth every 4 hours if needed; 8.5; 11; 07-Apr-2014; Act    ALPRAZolam (XANAX) 1 MG tablet   Yes Yes    Take 1 mg by mouth Daily As Needed for Anxiety (prior to radiation).    aluminum-magnesium hydroxide 200-200 MG/5ML suspension, diphenhydrAMINE 12.5 MG/5ML liquid, lidocaine viscous 2 % solution, nystatin 218123 UNIT/ML suspension   No Yes    Swish and swallow 10 mL 4 (Four) Times a Day Before Meals & at Bedtime.    amiodarone (PACERONE) 200 MG tablet   No Yes    Take one tablet three  times a day for four days, then twice daily    baclofen (LIORESAL) 10 MG tablet  Self No Yes    take 1 tablet by mouth three times a day    diphenoxylate-atropine (LOMOTIL) 2.5-0.025 MG per tablet   No Yes    Take 1 tablet by mouth 4 (Four) Times a Day As Needed for Diarrhea.    HYDROcodone-acetaminophen (NORCO) 7.5-325 MG per tablet  Self No Yes    Take 1 tablet by mouth 4 (Four) Times a Day As Needed for Moderate Pain  or Severe Pain .    montelukast (SINGULAIR) 10 MG tablet   Yes Yes    Take 10 mg by mouth Every Night.    ondansetron ODT (ZOFRAN-ODT) 8 MG disintegrating tablet   No Yes    Take 1 tablet by mouth Every 8 (Eight) Hours As Needed for Nausea or Vomiting.    rosuvastatin (CRESTOR) 20 MG tablet  Self Yes Yes    Take 20 mg by mouth Daily.    sertraline (ZOLOFT) 50 MG tablet   No Yes    TAKE 1 TABLET BY MOUTH EVERY DAY    silver sulfadiazine (SILVADENE) 1 % cream   No Yes    Apply  topically to the appropriate area as directed 2 (Two) Times a Day.    warfarin (COUMADIN) 1 MG tablet 8/2/2019  Yes Yes    Take 1 mg by mouth Every Other Day.            Medication notes: Pt reports also receiving IV fluids three times a week.   Last had radiation a week ago Friday.    This medication list is complete to the best of my knowledge as of 8/3/2019    Please call if questions.    Orlando Davis PharmD  8/3/2019 2:14 PM

## 2019-08-03 NOTE — ED PROVIDER NOTES
Pt presents to the ED complaining of bilateral leg swelling that worsened yesterday when given fluids at [facility]. Pt affirms SOA and CP. Pt states he has a Hx of throat CA.    On exam, pt is alert and oriented x 3 and has erythema to R neck, port in R chest, irreg reg HR in 140s, normal bowel sounds,  2+ pedal edema bilaterally, rhales in bases, and bilateral hand and arm swelling.    EKG    EKG time: 1230  Rhythm/Rate: Afib rat 143  No Acute Ischemia  Non-Specific ST-T changes    unchanged compared to prior on June 28, 2019 except HR has increased.    Interpreted Contemporaneously by me.  Independently viewed by me    I agree with midlevel plan to treat his Afib and CHF and admit to hospital.    The NOEMI and I have discussed this patient's history, physical exam, and treatment plan.  I have reviewed the documentation and personally had a face to face interaction with the patient. I affirm the documentation and agree with the treatment and plan.  The attached note describes my personal findings.    Documentation assistance provided by abdias Kwon for Dr. Alvares.  Information recorded by the abdias was done at my direction and has been verified and validated by me.       Maddi Kwon  08/03/19 [id number]       Teodoro Alvares MD  08/03/19 0810

## 2019-08-03 NOTE — H&P
HISTORY AND PHYSICAL   [facility]        Patient Identification:  Name: Cody Eldridge  Age: 74 y.o.  Sex: male  :  1945  MRN: [id number]                     Primary Care Physician: Epley, James, APRN    Chief Complaint:  SOA    History of Present Illness:   74-year-old gentleman with a history of atrial fibrillation which by history is been very difficult to control as for his rate is concerned.  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  He did receive a course of chemotherapy yesterday.  He states that after returning home he was becoming increasingly short of breath.  The shortness of breath would be worsened by exertion or by lying flat.  He denies any chest pain associated with this.  No palpitations, no lightheaded spells.  In the emergency room he was noted to be in A. fib with rapid ventricular response.  He was given 1 dose of labetalol as well as a dose of digoxin 0.5 mg IV.  He was also given a dose of Lasix 40 mg IV.  He is breathing a little easier now.  Still notes no palpitation despite a rapid rate noted on the monitor.  In addition he is also been noting increased tenderness, soreness around his MediPort site over the last 24 hours.  This was placed this last .  No fever sweats or chills.      Past Medical History:  Past Medical History:   Diagnosis Date   • Atrial fibrillation (CMS/HCC)    • Chronic bronchitis (CMS/HCC)    • COPD (chronic obstructive pulmonary disease) (CMS/HCC)    • Cor pulmonale (chronic) (CMS/HCC)    • Daytime hypersomnia    • Depression with anxiety    • Enlarged prostate    • Frequent nocturnal awakening    • Gout    • H/O cardiac radiofrequency ablation     [facility].   • Head and neck cancer (CMS/HCC)    • Hyperlipidemia    • Hypertension    • Hypotension    • Insomnia    • Lumbar radicular pain    • SHAR (obstructive sleep apnea)    •  Osteoarthritis    • Permanent atrial fibrillation (CMS/HCC)    • Snoring    • Squamous cell carcinoma of neck    • SVT (supraventricular tachycardia) (CMS/HCC)    • Syncope    • Vitamin D deficiency    • Weight loss      Past Surgical History:  Past Surgical History:   Procedure Laterality Date   • APPENDECTOMY     • CARDIAC ABLATION  2006    [facility].   • CARDIAC CATHETERIZATION N/A 8/29/2018    Procedure: Left Heart Cath;  Surgeon: Yousif Uribe MD;  Location: [facility] CATH INVASIVE LOCATION;  Service: Cardiology   • CARDIAC CATHETERIZATION N/A 8/29/2018    Procedure: Coronary angiography;  Surgeon: Yousif Uribe MD;  Location: [facility] CATH INVASIVE LOCATION;  Service: Cardiology   • CARDIAC CATHETERIZATION N/A 8/29/2018    Procedure: Left ventriculography;  Surgeon: Yousif Uribe MD;  Location: [facility] CATH INVASIVE LOCATION;  Service: Cardiology   • FINGER SURGERY     • FOOT SURGERY     • HAND SURGERY     • VENOUS ACCESS DEVICE (PORT) INSERTION Right 6/18/2019    Procedure: MEDIPORT PLACEMENT;  Surgeon: Elvis Grigsby MD;  Location: [facility] OR;  Service: Vascular      Home Meds:  Medications Prior to Admission   Medication Sig Dispense Refill Last Dose   • albuterol (PROVENTIL HFA;VENTOLIN HFA) 108 (90 BASE) MCG/ACT inhaler Inhale 2 puffs. ProAir  (90 Base) MCG/ACT Inhalation Aerosol Solution; Patient Sig: ProAir  (90 Base) MCG/ACT Inhalation Aerosol Solution inhale 2 puffs by mouth every 4 hours if needed; 8.5; 11; 07-Apr-2014; Act   Past Month at Unknown time   • ALPRAZolam (XANAX) 1 MG tablet Take 1 mg by mouth Daily As Needed for Anxiety (prior to radiation-takes 1/2).   Past Week at Unknown time   • aluminum-magnesium hydroxide 200-200 MG/5ML suspension, diphenhydrAMINE 12.5 MG/5ML liquid, lidocaine viscous 2 % solution, nystatin 937232 UNIT/ML suspension Swish and swallow 10 mL 4 (Four) Times a Day Before Meals & at Bedtime. 400 mL 1 8/2/2019 at Unknown time   • amiodarone  "(PACERONE) 200 MG tablet Take one tablet three times a day for four days, then twice daily (Patient taking differently: 200 mg. Take one tablet three times a day for four days, then twice daily) 66 tablet 1 8/3/2019 at Unknown time   • baclofen (LIORESAL) 10 MG tablet take 1 tablet by mouth three times a day 90 tablet 1 8/2/2019 at Unknown time   • diphenoxylate-atropine (LOMOTIL) 2.5-0.025 MG per tablet Take 1 tablet by mouth 4 (Four) Times a Day As Needed for Diarrhea. 60 tablet 1 8/3/2019 at Unknown time   • HYDROcodone-acetaminophen (NORCO) 7.5-325 MG per tablet Take 1 tablet by mouth 4 (Four) Times a Day As Needed for Moderate Pain  or Severe Pain . 120 tablet 0 Past Month at Unknown time   • montelukast (SINGULAIR) 10 MG tablet Take 10 mg by mouth Every Night.   8/2/2019 at Unknown time   • ondansetron ODT (ZOFRAN-ODT) 8 MG disintegrating tablet Take 1 tablet by mouth Every 8 (Eight) Hours As Needed for Nausea or Vomiting. 30 tablet 2 8/2/2019 at Unknown time   • rosuvastatin (CRESTOR) 20 MG tablet Take 20 mg by mouth Daily. 30 tablet 5 8/2/2019 at Unknown time   • sertraline (ZOLOFT) 50 MG tablet TAKE 1 TABLET BY MOUTH EVERY DAY 90 tablet 1 8/3/2019 at Unknown time   • silver sulfadiazine (SILVADENE) 1 % cream Apply  topically to the appropriate area as directed 2 (Two) Times a Day. 50 g 0 8/2/2019 at Unknown time   • warfarin (COUMADIN) 1 MG tablet Take 1 mg by mouth Every Other Day.   8/2/2019 at Unknown time       Allergies:  Allergies   Allergen Reactions   • Benadryl [Diphenhydramine Hcl] Other (See Comments) and Dizziness     \"I sleep for about a day\"     Immunizations:  Immunization History   Administered Date(s) Administered   • Flu Vaccine High Dose PF 65YR+ 10/21/2016, 09/14/2017, 10/17/2018   • Influenza TIV (IM) 10/29/2015   • Pneumococcal Conjugate 13-Valent (PCV13) 12/22/2014   • Zostavax 10/04/2016     Social History:   Social History     Social History Narrative   • Not on file     Social " History     Tobacco Use   • Smoking status: Former Smoker     Packs/day: 4.00     Types: Cigarettes     Last attempt to quit: 2000     Years since quittin.6   • Smokeless tobacco: Never Used   • Tobacco comment: started age 11 x 53 years stopped 2000 / daily caffiene   Substance Use Topics   • Alcohol use: No     Comment: caffeine use     Family History:  Family History   Problem Relation Age of Onset   • Heart attack Mother    • Diabetes Mother    • Heart disease Mother    • Hypertension Mother    • Diabetes Father    • Heart failure Father    • Heart disease Father    • Hypertension Father    • Cancer Brother         colon   • Hypertension Brother    • Colon cancer Brother 50   • Diabetes Sister    • Heart disease Sister    • Hypertension Sister    • COPD Other    • Heart failure Other    • Heart disease Other    • Malig Hyperthermia Neg Hx         Review of Systems  Review of Systems   Constitutional: Negative.    HENT: Negative.    Eyes: Negative.    Respiratory:        As per history of present illness.  Otherwise negative.   Cardiovascular:        As per history of present illness.  Otherwise negative.   Gastrointestinal: Negative.    Endocrine: Negative.    Genitourinary: Negative.    Musculoskeletal: Negative.    Skin:        As per history of present illness.  Otherwise negative.   Allergic/Immunologic: Negative.    Neurological: Negative.    Hematological: Negative.    Psychiatric/Behavioral: Negative.        Objective:  tMax 24 hrs: Temp (24hrs), Av.4 °F (36.3 °C), Min:96.7 °F (35.9 °C), Max:98 °F (36.7 °C)    Vitals Ranges:   Temp:  [96.7 °F (35.9 °C)-98 °F (36.7 °C)] 98 °F (36.7 °C)  Heart Rate:  [] 138  Resp:  [15-16] 16  BP: (104-131)/(85-98) 112/95      Exam:  Physical Exam   Constitutional: He is oriented to person, place, and time. He appears well-developed. No distress.   Thin, moderately frail.   HENT:   Head: Normocephalic and atraumatic.   Right Ear: External ear normal.   Left  Ear: External ear normal.   Nose: Nose normal.   Mouth/Throat: Oropharynx is clear and moist.   Eyes: Conjunctivae and EOM are normal. Right eye exhibits no discharge. Left eye exhibits no discharge. No scleral icterus.   Neck: No JVD present. No tracheal deviation present. No thyromegaly present.   Cardiovascular: Exam reveals no gallop and no friction rub.   Tachycardic and irregular.   Pulmonary/Chest: Effort normal. No respiratory distress. He exhibits no tenderness.   Dull bases.  Overall air movement somewhat suboptimal.   Abdominal: Soft. Bowel sounds are normal. He exhibits no distension and no mass. There is no tenderness. There is no rebound and no guarding.   Musculoskeletal: He exhibits edema. He exhibits no tenderness.   1+ pretibial pitting edema bilaterally.   Neurological: He is alert and oriented to person, place, and time. No cranial nerve deficit. He exhibits normal muscle tone. Coordination normal.   Skin: Skin is warm and dry. He is not diaphoretic.   Radiation burns noted about the anterior neck area.  There is some mild erythema and swelling around the Mediport site.  There is also mild to moderate tenderness in this area.  No increased warmth.   Psychiatric: He has a normal mood and affect. His behavior is normal. Judgment and thought content normal.       Data Review:  All labs and radiology reviewed.    Assessment:    New onset of congestive heart failure:  Cont IV Lasix.  Cardio consult.  Echo...    Atrial fibrillation:  Cardiology consult.     Vascular catheter inflammation, possible infection:  Vasc Surg eval.  Do not use this site.  Blood cultures.      Neutropenia:  Heme/Onc consult.      Chronic anticoagulation w Coumadin:  INR supra-theraputic.  Hold Coumadin and follow INR    Squamous cell carcinoma of neck:    COPD (chronic obstructive pulmonary disease)    Depression with anxiety    CAD (coronary artery disease)       Plan:  Please see above.     Macho Fermin,  MD  8/3/2019  4:58 PM    EMR Dragon/Transcription disclaimer:   Much of this encounter note is an electronic transcription/translation of spoken language to printed text. The electronic translation of spoken language may permit erroneous, or at times, nonsensical words or phrases to be inadvertently transcribed; Although I have reviewed the note for such errors, some may still exist.

## 2019-08-03 NOTE — ED TRIAGE NOTES
Patient presents to er via private vehicle from home.  Patient is reporting increased swelling to bilateral lower extremity that started two days ago.  Patient also has redness and swelling around right chest wall around power port.  Got iv fluids at [facility] yesterday.

## 2019-08-03 NOTE — ED PROVIDER NOTES
EMERGENCY DEPARTMENT ENCOUNTER    Room Number:  25/25  Date seen:  8/3/2019  Time seen: 12:07 PM  PCP: Epley, James, APRN  Historian: self, pt's wife      HPI:  Chief complaint: BLE Edema  Context: Cody Eldridge is a 74 y.o. male who presents to the ED c/o worsening BLE edema that was noticed yesterday. Pt also c/o SOA and CP. Per pt's wife, pt received IV fluids yesterday at the [facility]. Since then, he has had increased erythema and edema to his power port and the surrounding area along with BLE edema. Pt reports his SOA is worsened with laying flat. Pt called Dr. Davila (Radiology Oncologist) who recommended pt come to the ED for further evaluation. Pt currently receives radiation treatment for squamous cell carcinoma of base of tongue. Hx of AFIB.    MEDICAL RECORD REVIEW     Pt was seen on 7/29/19 for AFIB where pt was found to be intolerant to betablockers for hypotension. Pt was given Metoprolol and was asymptomatic.      ALLERGIES  Benadryl [diphenhydramine hcl]    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   • Benign colonic polyp 01/19/2016   • Neck pain 01/19/2016   • Chronic obstructive pulmonary disease (CMS/HCC) 01/19/2016   • Mixed anxiety depressive disorder 01/19/2016   • Degeneration of intervertebral disc of lumbosacral region 01/19/2016   • Elevated prostate specific antigen (PSA) 01/19/2016   • Hyperlipidemia 01/19/2016   • Hypertension 01/19/2016   • Insomnia 01/19/2016   • Lumbar radiculopathy 01/19/2016   • Osteoarthritis 01/19/2016   • Vitamin D deficiency 01/19/2016   • Abdominal pain 10/04/2016   • Acute URI 01/09/2017   • Myalgia 05/05/2017   • Cramp of both lower extremities 05/05/2017   • Gingivitis 08/08/2017   • Atrial fibrillation (CMS/HCC) 09/25/2017   • Non-rheumatic tricuspid valve insufficiency 10/24/2017   • SHAR (obstructive sleep apnea) 11/28/2017   • Precordial  pain 05/29/2018   • IRAHETA (dyspnea on exertion) 05/29/2018   • Coronary artery disease of native artery of native heart with stable angina pectoris (CMS/HCC) 05/29/2018   • Shortness of breath 08/02/2018   • 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) 06/07/2019   • Current use of long term anticoagulation 06/24/2019   • Syncope 06/28/2019   • Dehydration 06/28/2019   • History of cancer 06/28/2019   • History of atrial fibrillation 06/28/2019   • History of CHF (congestive heart failure) 06/28/2019   • Hypotension 06/28/2019   • Fitting and adjustment of vascular catheter 07/01/2019     Resolved Ambulatory Problems     Diagnosis Date Noted   • Cough 01/09/2017     Past Medical History:   Diagnosis Date   • Atrial fibrillation (CMS/HCC)    • Chronic bronchitis (CMS/HCC)    • COPD (chronic obstructive pulmonary disease) (CMS/HCC)    • Cor pulmonale (chronic) (CMS/HCC)    • Daytime hypersomnia    • Depression with anxiety    • Enlarged prostate    • Frequent nocturnal awakening    • Gout    • H/O cardiac radiofrequency ablation 2006   • Head and neck cancer (CMS/HCC)    • Hyperlipidemia    • Hypertension    • Hypotension    • Insomnia    • Lumbar radicular pain    • SHAR (obstructive sleep apnea)    • Osteoarthritis    • Permanent atrial fibrillation (CMS/HCC)    • Snoring    • Squamous cell carcinoma of neck    • SVT (supraventricular tachycardia) (CMS/HCC)    • Syncope    • Vitamin D deficiency    • Weight loss        PAST SURGICAL HISTORY  Past Surgical History:   Procedure Laterality Date   • APPENDECTOMY     • CARDIAC ABLATION  2006    [facility].   • CARDIAC CATHETERIZATION N/A 8/29/2018    Procedure: Left Heart Cath;  Surgeon: Yousif Uribe MD;  Location: [facility] CATH INVASIVE LOCATION;  Service: Cardiology   • CARDIAC CATHETERIZATION N/A 8/29/2018    Procedure: Coronary angiography;  Surgeon: Yousif Uribe MD;  Location: [facility] CATH INVASIVE LOCATION;  Service: Cardiology   • CARDIAC  CATHETERIZATION N/A 2018    Procedure: Left ventriculography;  Surgeon: Yousif Uribe MD;  Location: [facility] CATH INVASIVE LOCATION;  Service: Cardiology   • FINGER SURGERY     • FOOT SURGERY     • HAND SURGERY     • VENOUS ACCESS DEVICE (PORT) INSERTION Right 2019    Procedure: MEDIPORT PLACEMENT;  Surgeon: Elvis Grigsby MD;  Location: [facility] OR;  Service: Vascular       FAMILY HISTORY  Family History   Problem Relation Age of Onset   • Heart attack Mother    • Diabetes Mother    • Heart disease Mother    • Hypertension Mother    • Diabetes Father    • Heart failure Father    • Heart disease Father    • Hypertension Father    • Cancer Brother         colon   • Hypertension Brother    • Colon cancer Brother 50   • Diabetes Sister    • Heart disease Sister    • Hypertension Sister    • COPD Other    • Heart failure Other    • Heart disease Other    • Malig Hyperthermia Neg Hx        SOCIAL HISTORY  Social History     Socioeconomic History   • Marital status:      Spouse name: Elise Rai   • Number of children: Not on file   • Years of education: Some College   • Highest education level: Not on file   Occupational History     Employer: RETIRED   Tobacco Use   • Smoking status: Former Smoker     Packs/day: 4.00     Types: Cigarettes     Last attempt to quit: 2000     Years since quittin.6   • Smokeless tobacco: Never Used   • Tobacco comment: started age 11 x 53 years stopped 2000 / daily caffiene   Substance and Sexual Activity   • Alcohol use: No     Comment: caffeine use   • Drug use: No   • Sexual activity: Defer           REVIEW OF SYSTEMS  Review of Systems   Constitutional: Negative for chills and fever.   HENT: Negative.    Eyes: Negative.    Respiratory: Positive for shortness of breath.    Cardiovascular: Positive for leg swelling. Negative for chest pain and palpitations.   Genitourinary: Negative.    Musculoskeletal: Negative.    Skin: Negative.    Neurological:  Negative.    All other systems reviewed and are negative.          PHYSICAL EXAM  ED Triage Vitals [08/03/19 1153]   Temp Heart Rate Resp BP SpO2   96.7 °F (35.9 °C) 74 15 -- 97 %      Temp src Heart Rate Source Patient Position BP Location FiO2 (%)   Tympanic Monitor -- -- --     Physical Exam   Constitutional: He is well-developed, well-nourished, and in no distress.   HENT:   Head: Normocephalic.   Neck: Normal range of motion.   Cardiovascular: An irregularly irregular rhythm present. Tachycardia present.   Pulmonary/Chest: He has rhonchi (vascular).   There is a port visible to the R chest wall which is tender with surrounding erythema and scaling skin.   Abdominal: Soft. Bowel sounds are normal.   Musculoskeletal: Normal range of motion.        Right lower leg: He exhibits edema (2+).        Left lower leg: He exhibits edema (2+).   Neurological: He is alert.   Skin: Skin is warm and dry.   Psychiatric: Affect and judgment normal.         LAB RESULTS  Recent Results (from the past 24 hour(s))   Comprehensive Metabolic Panel    Collection Time: 08/03/19 12:23 PM   Result Value Ref Range    Glucose 111 (H) 65 - 99 mg/dL    BUN 24 (H) 8 - 23 mg/dL    Creatinine 1.07 0.76 - 1.27 mg/dL    Sodium 144 136 - 145 mmol/L    Potassium 4.5 3.5 - 5.2 mmol/L    Chloride 107 98 - 107 mmol/L    CO2 25.1 22.0 - 29.0 mmol/L    Calcium 8.9 8.6 - 10.5 mg/dL    Total Protein 6.9 6.0 - 8.5 g/dL    Albumin 2.90 (L) 3.50 - 5.20 g/dL    ALT (SGPT) 36 1 - 41 U/L    AST (SGOT) 33 1 - 40 U/L    Alkaline Phosphatase 97 39 - 117 U/L    Total Bilirubin 0.9 0.2 - 1.2 mg/dL    eGFR Non African Amer 67 >60 mL/min/1.73    Globulin 4.0 gm/dL    A/G Ratio 0.7 g/dL    BUN/Creatinine Ratio 22.4 7.0 - 25.0    Anion Gap 11.9 5.0 - 15.0 mmol/L   Protime-INR    Collection Time: 08/03/19 12:23 PM   Result Value Ref Range    Protime 38.6 (H) 11.7 - 14.2 Seconds    INR 3.97 (H) 0.90 - 1.10   BNP    Collection Time: 08/03/19 12:23 PM   Result Value Ref  Range    proBNP 8,857.0 (H) 5.0-1,800.0 pg/mL   Troponin    Collection Time: 08/03/19 12:23 PM   Result Value Ref Range    Troponin T <0.010 0.000 - 0.030 ng/mL   CBC Auto Differential    Collection Time: 08/03/19 12:23 PM   Result Value Ref Range    WBC 1.09 (C) 3.40 - 10.80 10*3/mm3    RBC 4.69 4.14 - 5.80 10*6/mm3    Hemoglobin 14.8 13.0 - 17.7 g/dL    Hematocrit 47.2 37.5 - 51.0 %    .6 (H) 79.0 - 97.0 fL    MCH 31.6 26.6 - 33.0 pg    MCHC 31.4 (L) 31.5 - 35.7 g/dL    RDW 19.9 (H) 12.3 - 15.4 %    RDW-SD 67.6 (H) 37.0 - 54.0 fl    MPV 12.2 (H) 6.0 - 12.0 fL    Platelets 196 140 - 450 10*3/mm3    Neutrophil % 90.0 (H) 42.7 - 76.0 %    Lymphocyte % 5.5 (L) 19.6 - 45.3 %    Monocyte % 1.8 (L) 5.0 - 12.0 %    Eosinophil % 0.0 (L) 0.3 - 6.2 %    Basophil % 0.9 0.0 - 1.5 %    Immature Grans % 1.8 (H) 0.0 - 0.5 %    Neutrophils, Absolute 0.98 (L) 1.70 - 7.00 10*3/mm3    Lymphocytes, Absolute 0.06 (L) 0.70 - 3.10 10*3/mm3    Monocytes, Absolute 0.02 (L) 0.10 - 0.90 10*3/mm3    Eosinophils, Absolute 0.00 0.00 - 0.40 10*3/mm3    Basophils, Absolute 0.01 0.00 - 0.20 10*3/mm3    Immature Grans, Absolute 0.02 0.00 - 0.05 10*3/mm3    nRBC 0.9 (H) 0.0 - 0.2 /100 WBC   Scan Slide    Collection Time: 08/03/19 12:23 PM   Result Value Ref Range    Anisocytosis Mod/2+ None Seen    Crenated RBC's Slight/1+ None Seen    WBC Morphology Normal Normal    Platelet Morphology Normal Normal       I ordered the above labs and reviewed the results      RADIOLOGY  XR Chest 1 View   Final Result   Atelectasis/infiltrate at the lung bases with left pleural   effusion, follow-up recommended. Cardiomegaly. Tortuous aorta.       This report was finalized on 8/3/2019 1:13 PM by Dr. Josep Garcia M.D.              I ordered the above noted radiological studies and reviewed the images on the PACS system.     MEDICATIONS GIVEN IN ER  Medications   sodium chloride 0.9 % flush 10 mL (not administered)   metoprolol tartrate (LOPRESSOR)  "injection 5 mg (5 mg Intravenous Given 8/3/19 1226)   digoxin (LANOXIN) injection 500 mcg (500 mcg Intravenous Given 8/3/19 1332)   furosemide (LASIX) injection 40 mg (40 mg Intravenous Given 8/3/19 1331)       EKG  Interpreted by ED Physician. Please see their document for interpretation.       COURSE & MEDICAL DECISION MAKING  Pertinent Labs and Imaging studies that were ordered and reviewed are noted above.  Results were reviewed/discussed with the patient and they were also made aware of online access.  Pt also made aware that some labs, such as cultures, will not be resulted during ER visit and follow up with PMD is necessary.         PROGRESS AND CONSULTS    Progress Notes:       1219 CXR ordered. BNP ordered. INR ordered. Troponin ordered. Labs ordered.ECG ordered.    1259 Reviewed pt's history and workup with Dr. Alvares (ER physician).  After a bedside evaluation, Dr. Alvares agrees with the plan of care.    1309 Lanoxin ordered. Lasix ordered. Lopressor ordered.    1314 Rechecked pt. Pt is resting comfortably with HR of 128. Discussed w/pt and pt's wife plan is to admit for further evaluation as pt has new onset of CHF. Pt understands and agrees with the plan. All questions were answered.     1318 patient with new onset congestive heart failure.  Patient also has uncontrolled atrial fibrillation as well as hypotension.  We will admit patient for further work-up.  Call made to Cardiology    1330 Discussed pt w/Dr. Ornelas (Cardiology) who agrees with admission.    1403 Call made to A.    1429 Discussed pt w/Dr. Fermin ([facility]) who agrees to admit pt to telemetry.     Disposition vitals:  /98   Pulse (!) 138   Temp 96.7 °F (35.9 °C) (Tympanic)   Resp 16   Ht 180.3 cm (71\")   Wt 79.9 kg (176 lb 3 oz)   SpO2 97%   BMI 24.57 kg/m²         DIAGNOSIS  Final diagnoses:   New onset of congestive heart failure (CMS/HCC)         DISPOSITION  ADMISSION    Discussed treatment plan and reason for admission " with pt/family and admitting physician.  Pt/family voiced understanding of the plan for admission for further testing/treatment as needed.     Documentation assistance provided by abdias Saleem for Akira Bradley PA-C.  Information recorded by the abdias was done at my direction and has been verified and validated by me.       Stiven Leyva  08/03/19 1718       Akira Bradley PA  08/03/19 [id number]

## 2019-08-04 ENCOUNTER — APPOINTMENT (OUTPATIENT)
Dept: ONCOLOGY | Facility: HOSPITAL | Age: 74
End: 2019-08-04

## 2019-08-04 LAB
ABO GROUP BLD: NORMAL
ANION GAP SERPL CALCULATED.3IONS-SCNC: 9.3 MMOL/L (ref 5–15)
BACTERIA BLD CULT: ABNORMAL
BASOPHILS # BLD AUTO: 0.01 10*3/MM3 (ref 0–0.2)
BASOPHILS NFR BLD AUTO: 1.8 % (ref 0–1.5)
BLD GP AB SCN SERPL QL: NEGATIVE
BUN BLD-MCNC: 21 MG/DL (ref 8–23)
BUN/CREAT SERPL: 21.9 (ref 7–25)
CALCIUM SPEC-SCNC: 8.7 MG/DL (ref 8.6–10.5)
CHLORIDE SERPL-SCNC: 103 MMOL/L (ref 98–107)
CO2 SERPL-SCNC: 30.7 MMOL/L (ref 22–29)
CREAT BLD-MCNC: 0.96 MG/DL (ref 0.76–1.27)
DEPRECATED RDW RBC AUTO: 66.9 FL (ref 37–54)
EOSINOPHIL # BLD AUTO: 0 10*3/MM3 (ref 0–0.4)
EOSINOPHIL NFR BLD AUTO: 0 % (ref 0.3–6.2)
ERYTHROCYTE [DISTWIDTH] IN BLOOD BY AUTOMATED COUNT: 19.3 % (ref 12.3–15.4)
GFR SERPL CREATININE-BSD FRML MDRD: 76 ML/MIN/1.73
GLUCOSE BLD-MCNC: 102 MG/DL (ref 65–99)
HCT VFR BLD AUTO: 44.6 % (ref 37.5–51)
HGB BLD-MCNC: 14.1 G/DL (ref 13–17.7)
IMM GRANULOCYTES # BLD AUTO: 0.01 10*3/MM3 (ref 0–0.05)
IMM GRANULOCYTES NFR BLD AUTO: 1.8 % (ref 0–0.5)
INR PPP: 5.02 (ref 0.9–1.1)
LYMPHOCYTES # BLD AUTO: 0.04 10*3/MM3 (ref 0.7–3.1)
LYMPHOCYTES NFR BLD AUTO: 7.1 % (ref 19.6–45.3)
MCH RBC QN AUTO: 31.9 PG (ref 26.6–33)
MCHC RBC AUTO-ENTMCNC: 31.6 G/DL (ref 31.5–35.7)
MCV RBC AUTO: 100.9 FL (ref 79–97)
MONOCYTES # BLD AUTO: 0.01 10*3/MM3 (ref 0.1–0.9)
MONOCYTES NFR BLD AUTO: 1.8 % (ref 5–12)
NEUTROPHILS # BLD AUTO: 0.49 10*3/MM3 (ref 1.7–7)
NEUTROPHILS NFR BLD AUTO: 87.5 % (ref 42.7–76)
NRBC BLD AUTO-RTO: 1.8 /100 WBC (ref 0–0.2)
PLATELET # BLD AUTO: 126 10*3/MM3 (ref 140–450)
PMV BLD AUTO: 12.2 FL (ref 6–12)
POTASSIUM BLD-SCNC: 4.1 MMOL/L (ref 3.5–5.2)
PROTHROMBIN TIME: 46.4 SECONDS (ref 11.7–14.2)
RBC # BLD AUTO: 4.42 10*6/MM3 (ref 4.14–5.8)
RH BLD: POSITIVE
SODIUM BLD-SCNC: 143 MMOL/L (ref 136–145)
T&S EXPIRATION DATE: NORMAL
WBC NRBC COR # BLD: 0.56 10*3/MM3 (ref 3.4–10.8)

## 2019-08-04 PROCEDURE — 99223 1ST HOSP IP/OBS HIGH 75: CPT | Performed by: INTERNAL MEDICINE

## 2019-08-04 PROCEDURE — 25010000002 AMIODARONE IN DEXTROSE 5% 360-4.14 MG/200ML-% SOLUTION: Performed by: NURSE PRACTITIONER

## 2019-08-04 PROCEDURE — 86901 BLOOD TYPING SEROLOGIC RH(D): CPT | Performed by: SURGERY

## 2019-08-04 PROCEDURE — 80048 BASIC METABOLIC PNL TOTAL CA: CPT | Performed by: HOSPITALIST

## 2019-08-04 PROCEDURE — 99222 1ST HOSP IP/OBS MODERATE 55: CPT | Performed by: INTERNAL MEDICINE

## 2019-08-04 PROCEDURE — 86850 RBC ANTIBODY SCREEN: CPT | Performed by: SURGERY

## 2019-08-04 PROCEDURE — 85025 COMPLETE CBC W/AUTO DIFF WBC: CPT | Performed by: HOSPITALIST

## 2019-08-04 PROCEDURE — 25010000002 FUROSEMIDE PER 20 MG: Performed by: HOSPITALIST

## 2019-08-04 PROCEDURE — 85610 PROTHROMBIN TIME: CPT | Performed by: HOSPITALIST

## 2019-08-04 PROCEDURE — 93005 ELECTROCARDIOGRAM TRACING: CPT | Performed by: INTERNAL MEDICINE

## 2019-08-04 PROCEDURE — 86900 BLOOD TYPING SEROLOGIC ABO: CPT | Performed by: SURGERY

## 2019-08-04 PROCEDURE — 93010 ELECTROCARDIOGRAM REPORT: CPT | Performed by: INTERNAL MEDICINE

## 2019-08-04 PROCEDURE — 25010000002 FILGRASTIM 480 MCG/0.8ML SOLUTION PREFILLED SYRINGE: Performed by: INTERNAL MEDICINE

## 2019-08-04 RX ORDER — LIDOCAINE HYDROCHLORIDE 20 MG/ML
5 SOLUTION OROPHARYNGEAL
Status: DISCONTINUED | OUTPATIENT
Start: 2019-08-04 | End: 2019-08-20 | Stop reason: HOSPADM

## 2019-08-04 RX ORDER — PHYTONADIONE 5 MG/1
5 TABLET ORAL ONCE
Status: COMPLETED | OUTPATIENT
Start: 2019-08-04 | End: 2019-08-04

## 2019-08-04 RX ORDER — METOPROLOL TARTRATE 50 MG/1
50 TABLET, FILM COATED ORAL EVERY 12 HOURS SCHEDULED
Status: DISCONTINUED | OUTPATIENT
Start: 2019-08-04 | End: 2019-08-05

## 2019-08-04 RX ADMIN — SILVER SULFADIAZINE: 10 CREAM TOPICAL at 10:22

## 2019-08-04 RX ADMIN — LIDOCAINE HYDROCHLORIDE 5 ML: 20 SOLUTION ORAL; TOPICAL at 13:43

## 2019-08-04 RX ADMIN — SODIUM CHLORIDE, PRESERVATIVE FREE 3 ML: 5 INJECTION INTRAVENOUS at 10:23

## 2019-08-04 RX ADMIN — FILGRASTIM 480 MCG: 480 INJECTION, SOLUTION INTRAVENOUS; SUBCUTANEOUS at 17:58

## 2019-08-04 RX ADMIN — BACLOFEN 10 MG: 10 TABLET ORAL at 10:21

## 2019-08-04 RX ADMIN — NAFCILLIN SODIUM 2 G: 2 INJECTION, POWDER, LYOPHILIZED, FOR SOLUTION INTRAMUSCULAR; INTRAVENOUS at 11:45

## 2019-08-04 RX ADMIN — MONTELUKAST SODIUM 10 MG: 10 TABLET, FILM COATED ORAL at 20:43

## 2019-08-04 RX ADMIN — BACLOFEN 10 MG: 10 TABLET ORAL at 17:12

## 2019-08-04 RX ADMIN — LIDOCAINE HYDROCHLORIDE 5 ML: 20 SOLUTION ORAL; TOPICAL at 20:47

## 2019-08-04 RX ADMIN — SILVER SULFADIAZINE 1 APPLICATION: 10 CREAM TOPICAL at 20:44

## 2019-08-04 RX ADMIN — NAFCILLIN SODIUM 2 G: 2 INJECTION, POWDER, LYOPHILIZED, FOR SOLUTION INTRAMUSCULAR; INTRAVENOUS at 15:41

## 2019-08-04 RX ADMIN — SILVER SULFADIAZINE: 10 CREAM TOPICAL at 00:08

## 2019-08-04 RX ADMIN — FUROSEMIDE 40 MG: 10 INJECTION, SOLUTION INTRAMUSCULAR; INTRAVENOUS at 06:34

## 2019-08-04 RX ADMIN — METOPROLOL TARTRATE 50 MG: 50 TABLET, FILM COATED ORAL at 21:58

## 2019-08-04 RX ADMIN — ROSUVASTATIN CALCIUM 20 MG: 20 TABLET, FILM COATED ORAL at 10:22

## 2019-08-04 RX ADMIN — METOPROLOL TARTRATE 50 MG: 50 TABLET, FILM COATED ORAL at 13:45

## 2019-08-04 RX ADMIN — AMIODARONE HYDROCHLORIDE 0.5 MG/MIN: 1.8 INJECTION, SOLUTION INTRAVENOUS at 14:46

## 2019-08-04 RX ADMIN — NAFCILLIN SODIUM 2 G: 2 INJECTION, POWDER, LYOPHILIZED, FOR SOLUTION INTRAMUSCULAR; INTRAVENOUS at 20:43

## 2019-08-04 RX ADMIN — Medication 10 ML: at 06:34

## 2019-08-04 RX ADMIN — AMIODARONE HYDROCHLORIDE 0.5 MG/MIN: 1.8 INJECTION, SOLUTION INTRAVENOUS at 01:58

## 2019-08-04 RX ADMIN — LIDOCAINE HYDROCHLORIDE 5 ML: 20 SOLUTION ORAL; TOPICAL at 17:12

## 2019-08-04 RX ADMIN — PHYTONADIONE 5 MG: 5 TABLET ORAL at 13:52

## 2019-08-04 RX ADMIN — BACLOFEN 10 MG: 10 TABLET ORAL at 20:43

## 2019-08-04 RX ADMIN — SERTRALINE 50 MG: 50 TABLET, FILM COATED ORAL at 10:22

## 2019-08-04 RX ADMIN — SODIUM CHLORIDE, PRESERVATIVE FREE 3 ML: 5 INJECTION INTRAVENOUS at 20:44

## 2019-08-04 NOTE — PROGRESS NOTES
"Pharmacokinetic Evaluation: Vancomycin IV  Patient: Cody Eldridge  Admission Date: 803  MRN: [id number]  : 1945    Day of vancomycin therapy: 1/10  Consult for Dr. Fermin  Treating: Bacteremia  Goal trough: 15-20 mcg/mL    Other antimicrobials: None    Relevant clinical data and objective history reviewed:  74 y.o. male 180.3 cm (71\") 70.6 kg (155 lb 11.2 oz)  Body mass index is 21.72 kg/m².  Results from last 7 days   Lab Units 19  0507 19  1223 19  0933   CREATININE mg/dL 0.96 1.07 1.12     Estimated Creatinine Clearance: 66.4 mL/min (by C-G formula based on SCr of 0.96 mg/dL).    Lab Results   Component Value Date    WBC 0.56 (C) 2019    WBC 1.09 (C) 2019     Temp:  [96.7 °F (35.9 °C)-98.8 °F (37.1 °C)] 97.9 °F (36.6 °C)  Heart Rate:  [] 137  Resp:  [15-16] 16  BP: (104-135)/(80-98) 135/96    Intake/Output Summary (Last 24 hours) at 2019 0823  Last data filed at 2019 0730  Gross per 24 hour   Intake 310 ml   Output 3800 ml   Net -3490 ml     Baseline cultures/labs/radiology:    Radiology:   8/3 CXR: Atelectasis/infiltrate at the lung bases with left pleural effusion    Cultures:   8/3 Blood cx 1/2: GPC+  8/3 Blood cx 2/2: In process      Assessment/Plan:   PMH: Atrial fibrillation, CHF, Chronic bronchitis, COPD, Cor pulmonale (chronic), Daytime hypersomnia, Depression with anxiety, Elevated cholesterol, Enlarged prostate, Frequent nocturnal awakening, Gout, H/O cardiac radiofrequency ablation (), Head and neck cancer, Hyperlipidemia, Hypertension, Hypotension, Insomnia, Lumbar radicular pain, SHAR, Osteoarthritis, Permanent atrial fibrillation, Snoring, Squamous cell carcinoma of neck, SVT, Syncope, Vitamin D deficiency, and Weight loss.    HPI: SOB, Afib w RVR & soreness/tenderness around MediPort site. Hx squamous cell carcinoma of tongue, last chemo on 19.    1. Give Vancomycin 1500 mg IV x1 loading dose  2. Start Vancomycin 750 mg IV " q12hrs  3. Will follow with vancomycin trough levels    Thank you for your consult,    Desiree Cruz RPH

## 2019-08-04 NOTE — PLAN OF CARE
Problem: Patient Care Overview  Goal: Plan of Care Review  Outcome: Ongoing (interventions implemented as appropriate)  Arrived from ER in rapid atrial fibrillation-heart rate 140-160's when pt OOB to BRP.data base completed and new orders discussed with pt and wife at side-floor orientation completed-cardiology called-new orders noted-report to next shift

## 2019-08-04 NOTE — PROGRESS NOTES
"DAILY PROGRESS NOTE  [facility]    Patient Identification:  Name: Cody Eldridge  Age: 74 y.o.  Sex: male  :  1945  MRN: [id number]         Primary Care Physician: Epley, James, APRN      Subjective  No new c/o.     Objective:  General Appearance:  Comfortable, well-appearing, in no acute distress and not in pain.    Vital signs: (most recent): Blood pressure 132/99, pulse (!) 142, temperature 97.3 °F (36.3 °C), temperature source Oral, resp. rate 16, height 180.3 cm (71\"), weight 70.6 kg (155 lb 11.2 oz), SpO2 94 %.    Lungs:  Normal effort and normal respiratory rate.  Breath sounds clear to auscultation.    Heart: Tachycardia.  Irregular rhythm.    Extremities: There is no dependent edema.    Neurological: Patient is alert and oriented to person, place and time.    Skin:  Warm and dry.  (Swelling over Port sight. )              Vital signs in last 24 hours:  Temp:  [97.3 °F (36.3 °C)-98.8 °F (37.1 °C)] 97.3 °F (36.3 °C)  Heart Rate:  [112-155] 142  Resp:  [16] 16  BP: (108-135)/(58-99) 132/99    Intake/Output:    Intake/Output Summary (Last 24 hours) at 2019 1518  Last data filed at 2019 1340  Gross per 24 hour   Intake 620 ml   Output 4650 ml   Net -4030 ml         Results from last 7 days   Lab Units 19  0507 19  1223 19  0922 19  0835   WBC 10*3/mm3 0.56* 1.09* 1.61* 2.61*   HEMOGLOBIN g/dL 14.1 14.8 13.0 13.1   PLATELETS 10*3/mm3 126* 196 207 183     Results from last 7 days   Lab Units 19  0507 19  1223 19  0933 19  0835   SODIUM mmol/L 143 144 144 142   POTASSIUM mmol/L 4.1 4.5 4.2 4.3   CHLORIDE mmol/L 103 107 109* 108*   CO2 mmol/L 30.7* 25.1 22.0 22.1   BUN mg/dL 21 24* 27* 25*   CREATININE mg/dL 0.96 1.07 1.12 1.00   GLUCOSE mg/dL 102* 111* 111 124   Estimated Creatinine Clearance: 66.4 mL/min (by C-G formula based on SCr of 0.96 mg/dL).  Results from last 7 days   Lab Units 19  0507 19  1223 19  0933 " 07/29/19  0835   CALCIUM mg/dL 8.7 8.9 8.7 8.8   ALBUMIN g/dL  --  2.90* 2.80* 2.90*     Results from last 7 days   Lab Units 08/03/19  1223 07/31/19  0933 07/29/19  0835   ALBUMIN g/dL 2.90* 2.80* 2.90*   BILIRUBIN mg/dL 0.9 0.5 0.6   ALK PHOS U/L 97 86 83   AST (SGOT) U/L 33 29 29   ALT (SGPT) U/L 36 28 26       Assessment:   New onset of congestive heart failure:       Atrial fibrillation: Cardiology input appreciated.     Vascular catheter inflammation, Probable infection:  Vasc Surg eval appreciated.    MSSA septicemia:  IV Nafcillin.  ID input appreciated.     Neutropenia:  Heme/Onc appreciated.     Chronic anticoagulation w Coumadin:  INR supra-theraputic.  Hold Coumadin and follow INR    Squamous cell carcinoma of neck:    COPD (chronic obstructive pulmonary disease)    Depression with anxiety    CAD (coronary artery disease)      Plan:  Please see above.     Macho Fermin MD  8/4/2019  3:18 PM

## 2019-08-04 NOTE — CONSULTS
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 History is obtained from the patient and review of the old medical records which I summarize/synthesize as follows: He presented to the ER on 8/3/19 with worsening BLE edema, chest pain, and SOA. He also noticed erythema and had short pain at his port site. This was placed in June 2019. He said the erythema started after a recent infusion of IVFs. His blood cultures are positive for MSSA so ID consulted for further recommendations. He has not yet received antibiotics as vancomycin was just ordered.    Per the oncology note, \"He was able to complete 4 weekly chemotherapy treatments in a row but required holding chemotherapy for the last 2 weeks due to myelosuppression.\" He has significant mucositis and oral pain. He is in Afib with RVR but not SOA.    Past Medical History:   Diagnosis Date   • Atrial fibrillation (CMS/HCC)    • CHF (congestive heart failure) (CMS/HCC)    • Chronic bronchitis (CMS/HCC)    • COPD (chronic obstructive pulmonary disease) (CMS/HCC)    • Cor pulmonale (chronic) (CMS/HCC)    • Daytime hypersomnia    • Depression with anxiety    • Elevated cholesterol    • Enlarged prostate    • Frequent nocturnal awakening    • Gout    • H/O cardiac radiofrequency ablation 2006    [facility].   • Head and neck cancer (CMS/HCC)    • Hyperlipidemia    • Hypertension    • Hypotension    • Insomnia    • Lumbar radicular pain    • SHAR (obstructive sleep apnea)    • Osteoarthritis    • Permanent atrial fibrillation (CMS/HCC)    • Snoring    • Squamous cell carcinoma of neck    • SVT (supraventricular tachycardia) (CMS/HCC)    • Syncope    • Vitamin D deficiency    • " Weight loss        Past Surgical History:   Procedure Laterality Date   • APPENDECTOMY     • CARDIAC ABLATION  2006    [facility].   • CARDIAC CATHETERIZATION N/A 8/29/2018    Procedure: Left Heart Cath;  Surgeon: Yousif Uribe MD;  Location: [facility] CATH INVASIVE LOCATION;  Service: Cardiology   • CARDIAC CATHETERIZATION N/A 8/29/2018    Procedure: Coronary angiography;  Surgeon: Yousif Uribe MD;  [facility];  Service: Cardiology   • CARDIAC CATHETERIZATION N/A 8/29/2018    Procedure: Left ventriculography;  Surgeon: Yousif Uribe MD;  [facility];  Service: Cardiology   • FINGER SURGERY     • FOOT SURGERY     • HAND SURGERY     • VENOUS ACCESS DEVICE (PORT) INSERTION Right 6/18/2019    Procedure: MEDIPORT PLACEMENT;  Surgeon: Elvis Grigsby MD;  Location: [facility] OR;  Service: Vascular       Social History:  Retired from maintenance  No illicits      Family History:  Mom: CAD    Allergies:  No Antibiotic Allergies    Medications:    Current Facility-Administered Medications:   •  acetaminophen (TYLENOL) tablet 650 mg, 650 mg, Oral, Q4H PRN, Macho Fermin MD  •  albuterol (PROVENTIL) nebulizer solution 0.083% 2.5 mg/3mL, 2.5 mg, Nebulization, Q6H PRN, Macho Fermin MD  •  ALPRAZolam (XANAX) tablet 1 mg, 1 mg, Oral, Daily PRN, Macho Fermin MD  •  amiodarone (NEXTERONE) 360 mg/200 mL (1.8 mg/mL) infusion, 1 mg/min, Intravenous, Continuous, Lou Day APRN, Last Rate: 16.67 mL/hr at 08/04/19 0158, 0.5 mg/min at 08/04/19 0158  •  baclofen (LIORESAL) tablet 10 mg, 10 mg, Oral, TID, Macho Fermin MD, 10 mg at 08/04/19 1021  •  diphenoxylate-atropine (LOMOTIL) 2.5-0.025 MG per tablet 1 tablet, 1 tablet, Oral, 4x Daily PRN, Macho Fermin MD  •  Filgrastim (NEUPOGEN) injection 480 mcg, 480 mcg, Subcutaneous, Q PM, Lowell Joseph MD  •  furosemide (LASIX) injection 40 mg, 40 mg, Intravenous, Q12H,  Macho Fermin MD, 40 mg at 08/04/19 0634  •  HYDROcodone-acetaminophen (NORCO) 7.5-325 MG per tablet 1 tablet, 1 tablet, Oral, 4x Daily PRN, Macho Fermin MD  •  Lidocaine Viscous HCl (XYLOCAINE) 2 % mouth solution 5 mL, 5 mL, Mouth/Throat, Q3H PRN, Lowell Joseph MD  •  montelukast (SINGULAIR) tablet 10 mg, 10 mg, Oral, Nightly, Macho Fermin MD, 10 mg at 08/03/19 2008  •  nitroglycerin (NITROSTAT) SL tablet 0.4 mg, 0.4 mg, Sublingual, Q5 Min PRN, Macho Fermin MD  •  ondansetron (ZOFRAN) tablet 4 mg, 4 mg, Oral, Q6H PRN **OR** ondansetron (ZOFRAN) injection 4 mg, 4 mg, Intravenous, Q6H PRN, Macho Fermin MD  •  Pharmacy to dose vancomycin, , Does not apply, Daily, Macho Fermin MD  •  phytonadione (MEPHYTON, VITAMIN K) tablet 5 mg, 5 mg, Oral, Once, Faisal Frias MD  •  rosuvastatin (CRESTOR) tablet 20 mg, 20 mg, Oral, Daily, Macho Fermin MD, 20 mg at 08/04/19 1022  •  sertraline (ZOLOFT) tablet 50 mg, 50 mg, Oral, Daily, Macho Fermin MD, 50 mg at 08/04/19 1022  •  silver sulfadiazine (SILVADENE, SSD) 1 % cream, , Topical, BID, Macho Fermin MD  •  [COMPLETED] Insert peripheral IV, , , Once **AND** sodium chloride 0.9 % flush 10 mL, 10 mL, Intravenous, PRN, Akira Bradley PA  •  sodium chloride 0.9 % flush 3 mL, 3 mL, Intravenous, Q12H, Macho Fermin MD, 3 mL at 08/04/19 1023  •  sodium chloride 0.9 % flush 3-10 mL, 3-10 mL, Intravenous, PRN, Macho Fermin MD  •  vancomycin 1500 mg/500 mL 0.9% NS IVPB ([facility]), 1,500 mg, Intravenous, Once, Macho Fermin MD  •  vancomycin 750 mg/250 mL 0.9% NS add-vantage, 750 mg, Intravenous, Q12H, Macho Fermin MD    Review of Systems  All systems were reviewed and are negative unless otherwise stated above in the HPI    Objective   Vital Signs   Temp:  [96.7 °F (35.9 °C)-98.8 °F (37.1 °C)] 97.9 °F (36.6 °C)  Heart Rate:  [] 137  Resp:  [15-16] 16  BP: (104-135)/(80-98)  135/96    Physical Exam:   General: awake, alert  Head: Normocephalic, atraumatic  Eyes: PERRL, EOMI, no scleral icterus, no conjunctival pallor, no conjunctival hemorrhages.   ENT: MM dry, + mucositis, no thrush  Neck: Supple, no visible thyromegaly  Cardiovascular: tachy, irreg irreg rhythm; trace LE edema  Respiratory: Lungs are clear to auscultation bilaterally, no rales or wheezing; normal work of breathing on ambient air  GI: Abdomen is soft, non-tender, non-distended, normal bowel sounds in all four quadrants; no hepatosplenomegaly, no masses palpated  : no Bazan catheter present  Musculoskeletal: no joint abnormalities, normal musculature  Skin: dry skin and erythema of the neck; mild erythema and swelling near port site  Neurological: Alert and oriented x 3,  motor strength 5/5 in all four extremities  Psychiatric: Normal mood and affect     Vasc: no cyanosis;    Labs:     Lab Results   Component Value Date    WBC 0.56 (C) 08/04/2019    HGB 14.1 08/04/2019    HCT 44.6 08/04/2019    .9 (H) 08/04/2019     (L) 08/04/2019       Lab Results   Component Value Date    GLUCOSE 102 (H) 08/04/2019    BUN 21 08/04/2019    CREATININE 0.96 08/04/2019    EGFRIFNONA 76 08/04/2019    EGFRIFAFRI 107 04/23/2018    BCR 21.9 08/04/2019    CO2 30.7 (H) 08/04/2019    CALCIUM 8.7 08/04/2019    PROTENTOTREF 7.2 04/23/2018    ALBUMIN 2.90 (L) 08/03/2019    LABIL2 1.3 04/23/2018    AST 33 08/03/2019    ALT 36 08/03/2019     Lab Results   Component Value Date    INR 5.02 (C) 08/04/2019    INR 3.97 (H) 08/03/2019    INR 2.70 (H) 07/29/2019    PROTIME 46.4 (C) 08/04/2019    PROTIME 38.6 (H) 08/03/2019    PROTIME 32.3 (H) 07/29/2019       Microbiology:  8/3 BCx: MSSA in 2/2 sets    Radiology (personally reviewed images/report):  CXR with LLL atelectasis    Assessment/Plan   1. MSSA septicemia  2. Neutropenic fever  3. Port in place  4. 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 Atrial fibrillation with  RVR    Stop vancomycin. Start nafcillin 2 g IV q4h for MSSA. I recommend port removal. I will repeat blood cultures after port has been removed tomorrow. Obtain TTE to rule out any vegetations. I anticipate a 4 week course of antibiotics via PICC line at discharge.     Thank you for this consult. ID will follow. I discussed the case with Dr Frias.

## 2019-08-04 NOTE — CONSULTS
Patient Name: Cody Eldridge  Age/Sex: 74 y.o. male  : 1945  MRN: [id number]    Date of Admission: 8/3/2019  Date of Encounter Visit: 19  Encounter Provider: Chetan Driver MD  Place of Service: [facility] CARDIOLOGY      Referring Provider: Macho Fermin MD  Patient Care Team:  Epley, James, APRN as PCP - General (Family Medicine)  Payal Waters MD as Consulting Physician (Pain Medicine)  Lorna Chaidez (Nurse Practitioner)  Kristal Cowart SAMSON as Pharmacist  [place], Fritz Snyder MD as Referring Physician (Otolaryngology)  Pablo Heaton MD as Consulting Physician (Hematology and Oncology)  Annie Davila MD as Consulting Physician (Radiation Oncology)  Chetan Heaton MD as Consulting Physician (Urology)    Subjective:     Admitted/Consulted for: new CHF, AF    Chief Complaint: shortness of breath, edema       History of Present Illness:  Cody Eldridge is a 74 y.o. male of Franc[place] bryan's with history of SVT and ablation in 2006 ( [facility]), nonobstructive CAD, AF (Coumadin), COPD, HTN, hyperlipidemia, throat cancer and previous non-ST elevation MI. In 2018, he underwent cardiac catheterization, after reports of shortness of breath, that revealed non obstructive coronary artery disease with a left ventricular end-diastolic pressure of 10. His last echocardiogram in 2017 showed normal LVSF with EF 56%, mild to moderate concentric LVH, mild MR and an RSVP of 47 mmHg.     He presented to the ED 8/3/19 with reports of worsening BLE edema with associated shortness of breath and chest pain. His wife reported he had received IVF's the day prior at [facility] and noticed increased erythema/edema from his power port and surrounding area. He was evaluated by Dr. Davila, who recommended he proceed to the ED. CXR upon arrival to the ED noted cardiomegaly and atelectasis/infiltarate of fam bases with a  left pleural effusion. Troponin negative. BNP 8,857. INR 3.97. WBC 1.09. EKG noted , atrial fibrillation. He received 500mvg Digoxin x2, 40mg IV Lasix and 5mg IV Lopressor prior to admission. He is currently on an Amiodarone drip for heart rate control, HR noted 120*-140's overnight with -130's. Lasix 40mg BID and echocardiogram have already been ordered per primary.       Previous testing:     Cardiac Catheterization 8/29/18  LEFT VENTRICULOGRAPHY: The LV pressure was 100/10 .  There was a small inferobasilar hypokinetic area overall ejection fraction lower limits of normal between 45 and 50% mild calcium seen in the coronaries .  There was no mitral insufficiency or gradient across the aortic valve on pullback.  CORONARY ANGIOGRAPHY:  Left main: Normal  Left anterior descending: Normal throughout the vessel diagonals are also normal  Ramus intermedius:Not present  Circumflex: This is a dominant vessel with a 30% mid lesion  RCA: This is a small nondominant and otherwise normal  SUMMARY: Mild nonobstructive coronary disease we had some difficulty exchanging over the wire and try to do a right heart so we abandon that his LV function is lower limits of normal  RECOMMENDATIONS: Continue treatment for his A. fib and risk factor modification    Echocardiogram 10/12/17  · Left ventricular systolic function is normal. Calculated EF = 56.3%. Estimated EF was in agreement with the calculated EF. Estimated EF = 56%. Normal left ventricular cavity size noted. All left ventricular wall segments contract normally. Left ventricular wall thickness is consistent with mild-to-moderate concentric hypertrophy. Left ventricular diastolic was unable to be assessed.  · Right ventricular cavity is moderate-to-severely dilated. Mildly reduced right ventricular systolic function noted.  · Left atrial volume is mildly increased.  · Right atrial cavity size is moderate-to-severely dilated.  · The aortic valve is abnormal in  structure. There is trivial thickening of the aortic valve.  · Mild mitral valve regurgitation is present.  · Moderate tricuspid valve regurgitation is present. Estimated right ventricular systolic pressure from tricuspid regurgitation is moderately elevated (45-55 mmHg). Calculated right ventricular systolic pressure from tricuspid regurgitation is 47 mmHg.    Past Medical History:  Past Medical History:   Diagnosis Date   • Atrial fibrillation (CMS/HCC)    • CHF (congestive heart failure) (CMS/HCC)    • Chronic bronchitis (CMS/HCC)    • COPD (chronic obstructive pulmonary disease) (CMS/HCC)    • Cor pulmonale (chronic) (CMS/HCC)    • Daytime hypersomnia    • Depression with anxiety    • Elevated cholesterol    • Enlarged prostate    • Frequent nocturnal awakening    • Gout    • H/O cardiac radiofrequency ablation 2006    [facility].   • Head and neck cancer (CMS/HCC)    • Hyperlipidemia    • Hypertension    • Hypotension    • Insomnia    • Lumbar radicular pain    • SHAR (obstructive sleep apnea)    • Osteoarthritis    • Permanent atrial fibrillation (CMS/HCC)    • Snoring    • Squamous cell carcinoma of neck    • SVT (supraventricular tachycardia) (CMS/HCC)    • Syncope    • Vitamin D deficiency    • Weight loss        Past Surgical History:   Procedure Laterality Date   • APPENDECTOMY     • CARDIAC ABLATION  2006    [facility].   • CARDIAC CATHETERIZATION N/A 8/29/2018    Procedure: Left Heart Cath;  Surgeon: Yousif Uribe MD;  Location: [facility] CATH INVASIVE LOCATION;  Service: Cardiology   • CARDIAC CATHETERIZATION N/A 8/29/2018    Procedure: Coronary angiography;  Surgeon: Yousif Uribe MD;  Location[facility] CATH INVASIVE LOC[facility];  Service: Cardiology   • CARDIAC CATHETERIZATION N/A 8/29/2018    Procedure: Left ventriculography;  Surgeon: Yousif Uribe MD;  Location: [facility] CATH INVASIVE LOCATION;  Service: Cardiology   • FINGER SURGERY     • FOOT SURGERY     • HAND SURGERY     • VENOUS ACCESS  DEVICE (PORT) INSERTION Right 6/18/2019    Procedure: MEDIPORT PLACEMENT;  Surgeon: Elvis Grigsby MD;  Location: [facility] OR;  Service: Vascular       Home Medications:   Medications Prior to Admission   Medication Sig Dispense Refill Last Dose   • albuterol (PROVENTIL HFA;VENTOLIN HFA) 108 (90 BASE) MCG/ACT inhaler Inhale 2 puffs. ProAir  (90 Base) MCG/ACT Inhalation Aerosol Solution; Patient Sig: ProAir  (90 Base) MCG/ACT Inhalation Aerosol Solution inhale 2 puffs by mouth every 4 hours if needed; 8.5; 11; 07-Apr-2014; Act   Past Month at Unknown time   • ALPRAZolam (XANAX) 1 MG tablet Take 1 mg by mouth Daily As Needed for Anxiety (prior to radiation-takes 1/2).   Past Week at Unknown time   • aluminum-magnesium hydroxide 200-200 MG/5ML suspension, diphenhydrAMINE 12.5 MG/5ML liquid, lidocaine viscous 2 % solution, nystatin 215037 UNIT/ML suspension Swish and swallow 10 mL 4 (Four) Times a Day Before Meals & at Bedtime. 400 mL 1 8/2/2019 at Unknown time   • amiodarone (PACERONE) 200 MG tablet Take one tablet three times a day for four days, then twice daily (Patient taking differently: 200 mg. Take one tablet three times a day for four days, then twice daily) 66 tablet 1 8/3/2019 at Unknown time   • baclofen (LIORESAL) 10 MG tablet take 1 tablet by mouth three times a day 90 tablet 1 8/2/2019 at Unknown time   • diphenoxylate-atropine (LOMOTIL) 2.5-0.025 MG per tablet Take 1 tablet by mouth 4 (Four) Times a Day As Needed for Diarrhea. 60 tablet 1 8/3/2019 at Unknown time   • HYDROcodone-acetaminophen (NORCO) 7.5-325 MG per tablet Take 1 tablet by mouth 4 (Four) Times a Day As Needed for Moderate Pain  or Severe Pain . 120 tablet 0 Past Month at Unknown time   • montelukast (SINGULAIR) 10 MG tablet Take 10 mg by mouth Every Night.   8/2/2019 at Unknown time   • ondansetron ODT (ZOFRAN-ODT) 8 MG disintegrating tablet Take 1 tablet by mouth Every 8 (Eight) Hours As Needed for Nausea or Vomiting.  "30 tablet 2 2019 at Unknown time   • rosuvastatin (CRESTOR) 20 MG tablet Take 20 mg by mouth Daily. 30 tablet 5 2019 at Unknown time   • sertraline (ZOLOFT) 50 MG tablet TAKE 1 TABLET BY MOUTH EVERY DAY 90 tablet 1 8/3/2019 at Unknown time   • silver sulfadiazine (SILVADENE) 1 % cream Apply  topically to the appropriate area as directed 2 (Two) Times a Day. 50 g 0 2019 at Unknown time   • warfarin (COUMADIN) 1 MG tablet Take 1 mg by mouth Every Other Day.   2019 at Unknown time       Allergies:  Allergies   Allergen Reactions   • Benadryl [Diphenhydramine Hcl] Other (See Comments) and Dizziness     \"I sleep for about a day\"       Past Social History:  Social History     Socioeconomic History   • Marital status:      Spouse name: Elise Rai   • Number of children: Not on file   • Years of education: Some College   • Highest education level: Not on file   Occupational History     Employer: RETIRED   Tobacco Use   • Smoking status: Former Smoker     Packs/day: 3.00     Years: 30.00     Pack years: 90.00     Types: Cigarettes     Last attempt to quit: 2000     Years since quittin.6   • Smokeless tobacco: Never Used   • Tobacco comment: started age 11 x 53 years stopped 2000 / daily caffiene   Substance and Sexual Activity   • Alcohol use: Yes     Comment: quit 30 yeas ago   • Drug use: No   • Sexual activity: Defer        Past Family History:  Family History   Problem Relation Age of Onset   • Heart attack Mother    • Diabetes Mother    • Heart disease Mother    • Hypertension Mother    • Diabetes Father    • Heart failure Father    • Heart disease Father    • Hypertension Father    • Cancer Brother         colon   • Hypertension Brother    • Colon cancer Brother 50   • Diabetes Sister    • Heart disease Sister    • Hypertension Sister    • COPD Other    • Heart failure Other    • Heart disease Other    • Malig Hyperthermia Neg Hx          Review of Systems:  All systems reviewed. " Pertinent positives identified in HPI. All other systems are negative.   REVIEW OF SYSTEMS:   CONSTITUTIONAL: No weight loss, fever, chills, weakness or fatigue.   HEENT: Eyes: No visual loss, blurred vision, double vision or yellow sclerae. Ears, Nose, Throat: No hearing loss, sneezing, congestion, runny nose or sore throat.   SKIN: No rash or itching.     RESPIRATORY: No shortness of breath, hemoptysis, cough or sputum.   GASTROINTESTINAL: No anorexia, nausea, vomiting or diarrhea. No abdominal pain, bright red blood per rectum or melena.  GENITOURINARY: No burning on urination, hematuria or increased frequency.  NEUROLOGICAL: No headache, dizziness, syncope, paralysis, ataxia, numbness or tingling in the extremities. No change in bowel or bladder control.   MUSCULOSKELETAL: No muscle, back pain, joint pain or stiffness.   HEMATOLOGIC: No anemia, bleeding or bruising.   LYMPHATICS: No enlarged nodes. No history of splenectomy.   PSYCHIATRIC: No history of depression, anxiety, hallucinations.   ENDOCRINOLOGIC: No reports of sweating, cold or heat intolerance. No polyuria or polydipsia.       Objective:     Objective:  Temp:  [96.7 °F (35.9 °C)-98.8 °F (37.1 °C)] 97.9 °F (36.6 °C)  Heart Rate:  [] 137  Resp:  [15-16] 16  BP: (104-135)/(80-98) 135/96    Intake/Output Summary (Last 24 hours) at 8/4/2019 1110  Last data filed at 8/4/2019 0933  Gross per 24 hour   Intake 620 ml   Output 4650 ml   Net -4030 ml     Body mass index is 21.72 kg/m².      08/03/19  1207 08/03/19  1619 08/04/19  0610   Weight: 79.9 kg (176 lb 3 oz) 76.6 kg (168 lb 12.8 oz) 70.6 kg (155 lb 11.2 oz)           Physical Exam:   General Appearance Well developed, cooperative and well nourished and no acute distress   Head Normocephalic, without abnormality, atraumatic   Ears Ears appear intact with no abnormalities noted   Throat No oral lesions, no thrush, oral mucosa moist   Neck No adenopathy, supple, trachea midline, no thyromegaly, no  carotid bruit, no JVD   Back No skin lesions, erythema or scars, no tenderness to percussion or palptaion, range of motion is normal   Lungs Clear to auscultation,respirations regular, even and unlabored   Heart Regular rhythm and normal rate, normal S1 and S2, no murmur, no gallop, no rub, no click   Chest wall No abnormalities observed   Abdomen Normal bowel sounds, no masses, no hepatomegaly,    Extremities Moves all extremities well, no edema, no cyanosis, no redness   Pulses Pulses palpable and equal bilaterally. Normal radial, carotid, femoral, dorsalis pedis and posterior tibial pulses bilaterally. Normal abdominal aorta   Skin No bleeding, bruising or rash   Psychiatric Alert and oriented x 3, normal mood and affect       Lab Review:     Results from last 7 days   Lab Units 08/04/19  0507 08/03/19  1223 07/31/19  0933   SODIUM mmol/L 143 144 144   POTASSIUM mmol/L 4.1 4.5 4.2   CHLORIDE mmol/L 103 107 109*   CO2 mmol/L 30.7* 25.1 22.0   BUN mg/dL 21 24* 27*   CREATININE mg/dL 0.96 1.07 1.12   GLUCOSE mg/dL 102* 111* 111   CALCIUM mg/dL 8.7 8.9 8.7       Results from last 7 days   Lab Units 08/03/19  1223   TROPONIN T ng/mL <0.010     Results from last 7 days   Lab Units 08/04/19  0507   WBC 10*3/mm3 0.56*   HEMOGLOBIN g/dL 14.1   HEMATOCRIT % 44.6   PLATELETS 10*3/mm3 126*     Results from last 7 days   Lab Units 08/04/19  0507 08/03/19  1223 07/29/19  0855   INR  5.02* 3.97* 2.70*                 Results from last 7 days   Lab Units 08/03/19  1223   PROBNP pg/mL 8,857.0*               Imaging:    Imaging Results (most recent)     Procedure Component Value Units Date/Time    XR Chest 1 View [[id number]] Collected:  08/03/19 1311     Updated:  08/03/19 1316    Narrative:       XR CHEST 1 VW-     HISTORY: Male who is 74 years-old,  short of breath, chest pain     TECHNIQUE: Frontal view of the chest     COMPARISON: 06/28/2019     FINDINGS: Stable appearing right chest port. Heart is enlarged. Aorta  is  tortuous, calcified. Pulmonary vasculature is unremarkable.  Atelectasis/infiltrate at the lung bases with left pleural effusion.  This appearance represents interval worsening. No pneumothorax. No acute  osseous process.       Impression:       Atelectasis/infiltrate at the lung bases with left pleural  effusion, follow-up recommended. Cardiomegaly. Tortuous aorta.     This report was finalized on 8/3/2019 1:13 PM by Dr. Josep Garcia M.D.             Results for orders placed during the hospital encounter of 10/12/17   Adult Transthoracic Echo Complete W/ Cont if Necessary Per Protocol    Narrative · Left ventricular systolic function is normal. Calculated EF = 56.3%.   Estimated EF was in agreement with the calculated EF. Estimated EF = 56%.   Normal left ventricular cavity size noted. All left ventricular wall   segments contract normally. Left ventricular wall thickness is consistent   with mild-to-moderate concentric hypertrophy. Left ventricular diastolic   was unable to be assessed.  · Right ventricular cavity is moderate-to-severely dilated. Mildly reduced   right ventricular systolic function noted.  · Left atrial volume is mildly increased.  · Right atrial cavity size is moderate-to-severely dilated.  · The aortic valve is abnormal in structure. There is trivial thickening   of the aortic valve.  · Mild mitral valve regurgitation is present.  · Moderate tricuspid valve regurgitation is present. Estimated right   ventricular systolic pressure from tricuspid regurgitation is moderately   elevated (45-55 mmHg). Calculated right ventricular systolic pressure from   tricuspid regurgitation is 47 mmHg.          EKG 8/4/19    Admit EKG 8/3/19        BASELINE 7/11/19        I personally viewed and interpreted the patient's EKG/Telemetry data.    Assessment:   Assessment/Plan   1. MSSA septicemia  2. Neutropenic fever  3. Atrial fibrillation with RVR  4. 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 Port in place  7.  Acute diastolic heart failure    -The patient has atrial fibrillation with a rapid ventricular rate in the setting of septicemia and neutropenia.  It is going to be very difficult controlled his heart rate.  I will place him on Lopressor 50 mg p.o. twice daily.  -I am okay leaving the patient on amiodarone today.  We will reassess the rhythm tomorrow.  INR has actually been up  -I would recommend holding diuretic today.  Patient only has mild hypoxia and mildly increased volume status.  I do not think there is a significant benefit currently and diuresing him aggressively.  He is septic and tachycardic.  He will need diuresis, however I would like to have a little bit of clinical improvement first  -I would not recommend echo today as his heart rate is poorly controlled and this will not be a high quality study      Thank you for allowing me to participate in the care of Cody Eldridge. Feel free to contact me directly with any further questions or concerns.    Chetan Driver MD  [facility]  08/04/19  11:10 AM

## 2019-08-04 NOTE — CONSULTS
Subjective     REASON FOR CONSULTATION:  Neutropenic fever, port site cellulitis, prolonged protime no clinical bleeding, severe grade iii mucositis, atrial fibrilation rvr  Provide an opinion on any further workup or treatment                             REQUESTING PHYSICIAN:  Dr DARIO guerra    RECORDS OBTAINED:  Records of the patients history including those obtained from the referring provider were reviewed and summarized in detail.    HISTORY OF PRESENT ILLNESS:  The patient is a 74 y.o. year old male who is here for an opinion about the above issue.    History of Present Illness I have been asked to see this patient in consultation today who is a 74-year-old white male who has been treated BY DR RONALDO WILLIAMSON FOR HEAD AND NECK CANCER WITH CHEMO AND RADIATION THERAPY In any event the patient has received radiation therapy to his NECK, he has been receiving IV fluids in the office in the last few days because of profound weakness and he has now developed atrial fibrillation with rapid ventricular response from a congestive heart failure, elevation of the proBNP in the 8000 category and further more significant sensitivity and redness in the skin of the anterior chest wall on the right side where the port is located that makes me to think that he has at least a cellulitis in this anatomical site. The patient has a severe degree of mucositis associated with radiation therapy. He is not able to swallow. Also the patient has developed diarrhea with no abdominal pain and in a stool culture enteropathogenic E-coli has been recovered. Clostridium was negative. The patient has lost substantial amount of weight. He has not been able to eat anything for the last 3-4 weeks. He has had swelling in his lower extremities. He has had cough and he has had shortness of breath. He has had fevers and chills as stated. He just feels terrible.         Past Medical History:   Diagnosis Date   • Atrial fibrillation (CMS/HCC)    • CHF  (congestive heart failure) (CMS/HCC)    • Chronic bronchitis (CMS/HCC)    • COPD (chronic obstructive pulmonary disease) (CMS/HCC)    • Cor pulmonale (chronic) (CMS/HCC)    • Daytime hypersomnia    • Depression with anxiety    • Elevated cholesterol    • Enlarged prostate    • Frequent nocturnal awakening    • Gout    • H/O cardiac radiofrequency ablation 2006    [facility].   • Head and neck cancer (CMS/HCC)    • Hyperlipidemia    • Hypertension    • Hypotension    • Insomnia    • Lumbar radicular pain    • SHAR (obstructive sleep apnea)    • Osteoarthritis    • Permanent atrial fibrillation (CMS/HCC)    • Snoring    • Squamous cell carcinoma of neck    • SVT (supraventricular tachycardia) (CMS/HCC)    • Syncope    • Vitamin D deficiency    • Weight loss         Past Surgical History:   Procedure Laterality Date   • APPENDECTOMY     • CARDIAC ABLATION  2006    [facility].   • CARDIAC CATHETERIZATION N/A 8/29/2018    Procedure: Left Heart Cath;  Surgeon: Yousif Uribe MD;  Location:  [facility];  Service: Cardiology   • CARDIAC CATHETERIZATION N/A 8/29/2018    Procedure: Coronary angiography;  Surgeon: Yousif Uribe MD;  Location:  [facility];  Service: Cardiology   • CARDIAC CATHETERIZATION N/A 8/29/2018    Procedure: Left ventriculography;  Surgeon: Yousif Uribe MD;  Location:  [facility];  Service: Cardiology   • FINGER SURGERY     • FOOT SURGERY     • HAND SURGERY     • VENOUS ACCESS DEVICE (PORT) INSERTION Right 6/18/2019    Procedure: MEDIPORT PLACEMENT;  Surgeon: Elvis Grigsby MD;  Location: [facility] MAIN OR;  Service: Vascular    ONCOLOGIC HISTORY DR RONALDO WILLIAMSON MD:     1.  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  He is undergoing definitive treatment with radiation therapy and weekly low-dose carboplatin and Taxol chemotherapy.  He was able to  complete 4 weekly chemotherapy treatments in a row but required holding chemotherapy for the last 2 weeks due to myelosuppression.  His blood counts have recovered today but he appears to be in rapid atrial fibrillation.  2.  Comorbidities including ischemic heart disease with history of CHF and atrial fibrillation requiring Coumadin anticoagulation.    3.  Oral mucositis.  He has a prescription for hydrocodone for pain control.  He does have Meghann's Magic mouth rinse to use as well.  4.  Warfarin anticoagulation.  Patient's INR 3.83, he will hold his coumadin today and tomorrow and have another INR checked Wednesday 7/17/20195.    5.  Weight loss and poor nutrition.  The patient's weight has stabilized and he is utilizing boost at home and trying to hydrate with water and Gatorade.  He does understand if his nutrition is not improving we will need to proceed with a G-tube for nutrition.  Ruthy Bautista, oncology dietitian been involved in his care also.  6.  Atrial fibrillation with rapid ventricular response     No current facility-administered medications on file prior to encounter.      Current Outpatient Medications on File Prior to Encounter   Medication Sig Dispense Refill   • albuterol (PROVENTIL HFA;VENTOLIN HFA) 108 (90 BASE) MCG/ACT inhaler Inhale 2 puffs. ProAir  (90 Base) MCG/ACT Inhalation Aerosol Solution; Patient Sig: ProAir  (90 Base) MCG/ACT Inhalation Aerosol Solution inhale 2 puffs by mouth every 4 hours if needed; 8.5; 11; 07-Apr-2014; Act     • ALPRAZolam (XANAX) 1 MG tablet Take 1 mg by mouth Daily As Needed for Anxiety (prior to radiation-takes 1/2).     • aluminum-magnesium hydroxide 200-200 MG/5ML suspension, diphenhydrAMINE 12.5 MG/5ML liquid, lidocaine viscous 2 % solution, nystatin [id number] UNIT/ML suspension Swish and swallow 10 mL 4 (Four) Times a Day Before Meals & at Bedtime. 400 mL 1   • amiodarone (PACERONE) 200 MG tablet Take one tablet three times a day for four days,  "then twice daily (Patient taking differently: 200 mg. Take one tablet three times a day for four days, then twice daily) 66 tablet 1   • baclofen (LIORESAL) 10 MG tablet take 1 tablet by mouth three times a day 90 tablet 1   • diphenoxylate-atropine (LOMOTIL) 2.5-0.025 MG per tablet Take 1 tablet by mouth 4 (Four) Times a Day As Needed for Diarrhea. 60 tablet 1   • HYDROcodone-acetaminophen (NORCO) 7.5-325 MG per tablet Take 1 tablet by mouth 4 (Four) Times a Day As Needed for Moderate Pain  or Severe Pain . 120 tablet 0   • montelukast (SINGULAIR) 10 MG tablet Take 10 mg by mouth Every Night.     • ondansetron ODT (ZOFRAN-ODT) 8 MG disintegrating tablet Take 1 tablet by mouth Every 8 (Eight) Hours As Needed for Nausea or Vomiting. 30 tablet 2   • rosuvastatin (CRESTOR) 20 MG tablet Take 20 mg by mouth Daily. 30 tablet 5   • sertraline (ZOLOFT) 50 MG tablet TAKE 1 TABLET BY MOUTH EVERY DAY 90 tablet 1   • silver sulfadiazine (SILVADENE) 1 % cream Apply  topically to the appropriate area as directed 2 (Two) Times a Day. 50 g 0   • warfarin (COUMADIN) 1 MG tablet Take 1 mg by mouth Every Other Day.          ALLERGIES:    Allergies   Allergen Reactions   • Benadryl [Diphenhydramine Hcl] Other (See Comments) and Dizziness     \"I sleep for about a day\"        Social History     Socioeconomic History   • Marital status:      Spouse name: Elise Rai   • Number of children: Not on file   • Years of education: Some College   • Highest education level: Not on file   Occupational History     Employer: RETIRED   Tobacco Use   • Smoking status: Former Smoker     Packs/day: 3.00     Years: 30.00     Pack years: 90.00     Types: Cigarettes     Last attempt to quit: 2000     Years since quittin.6   • Smokeless tobacco: Never Used   • Tobacco comment: started age 11 x 53 years stopped 2000 / daily caffiene   Substance and Sexual Activity   • Alcohol use: Yes     Comment: quit 30 yeas ago   • Drug use: No   • Sexual " activity: Defer        Family History   Problem Relation Age of Onset   • Heart attack Mother    • Diabetes Mother    • Heart disease Mother    • Hypertension Mother    • Diabetes Father    • Heart failure Father    • Heart disease Father    • Hypertension Father    • Cancer Brother         colon   • Hypertension Brother    • Colon cancer Brother 50   • Diabetes Sister    • Heart disease Sister    • Hypertension Sister    • COPD Other    • Heart failure Other    • Heart disease Other    • Malig Hyperthermia Neg Hx         Review of Systems   Constitutional: Positive for activity change, appetite change, chills, diaphoresis, fatigue, fever and unexpected weight change.   HENT: Positive for mouth sores, sore throat, trouble swallowing and voice change.    Eyes: Negative.    Respiratory: Positive for cough and shortness of breath.    Cardiovascular: Positive for palpitations.   Gastrointestinal: Positive for abdominal distention and diarrhea.   Endocrine: Negative.    Genitourinary: Negative.    Musculoskeletal: Negative.    Skin: Positive for pallor.   Neurological: Negative.    Hematological: Negative.    Psychiatric/Behavioral: Negative.         Objective     Vitals:    08/04/19 0031 08/04/19 0100 08/04/19 0610 08/04/19 0730   BP:  124/80  135/96   BP Location:  Left arm  Left arm   Patient Position:  Lying  Lying   Pulse: 112   (!) 137   Resp:  16  16   Temp:    97.9 °F (36.6 °C)   TempSrc:    Oral   SpO2:    92%   Weight:   70.6 kg (155 lb 11.2 oz)    Height:         Current Status 7/31/2019   ECOG score 2       Physical Exam   Constitutional: He is oriented to person, place, and time. He appears well-developed. No distress.   HENT:   Head: Normocephalic.   Nose: Nose normal.   Mouth/Throat: No oropharyngeal exudate.   Grade III mucositis   Eyes: Conjunctivae and EOM are normal. Pupils are equal, round, and reactive to light. Right eye exhibits no discharge. Left eye exhibits no discharge. No scleral icterus.    Neck: Normal range of motion. No JVD present. No tracheal deviation present. No thyromegaly present.   Erythema neck area of radiation   Cardiovascular:   Atrial fib rvr   Pulmonary/Chest: Effort normal and breath sounds normal.   Abdominal: Soft. He exhibits no distension and no mass. There is no tenderness. There is no rebound and no guarding.   Musculoskeletal: Normal range of motion. He exhibits edema.   Lymphadenopathy:     He has no cervical adenopathy.   Neurological: He is alert and oriented to person, place, and time.   Skin: Capillary refill takes 2 to 3 seconds. He is not diaphoretic.   Erythema and pain port site   Psychiatric: He has a normal mood and affect. His behavior is normal. Judgment and thought content normal.         RECENT LABS:  Hematology WBC   Date Value Ref Range Status   08/04/2019 0.56 (C) 3.40 - 10.80 10*3/mm3 Final     RBC   Date Value Ref Range Status   08/04/2019 4.42 4.14 - 5.80 10*6/mm3 Final     Hemoglobin   Date Value Ref Range Status   08/04/2019 14.1 13.0 - 17.7 g/dL Final     Hematocrit   Date Value Ref Range Status   08/04/2019 44.6 37.5 - 51.0 % Final     Platelets   Date Value Ref Range Status   08/04/2019 126 (L) 140 - 450 10*3/mm3 Final      Blood Culture - Blood, Arm, Right   Order: [id number]   Collected:  8/3/2019 17:45 Status:  Preliminary result   Visible to patient:  No (Not Released)   Specimen Information: Arm, Right; Blood        Blood Culture Abnormal Stain Abnormal               Gram Stain  Aerobic Bottle Gram positive cocci in clusters      Anaerobic Bottle Gram positive cocci in clusters            [facility]         Specimen Collected: 08/03/19 17:45 Last Resulted: 08/04/19 07:17              Contains abnormal data BNP   Order: [id number]   Status:  Final result   Visible to patient:  No (Not Released)    Ref Range & Units 1d ago   proBNP 5.0-1,800.0 pg/mL 8,857.0 Abnormally high     Resulting Agency   [facility]      Narrative   Performed  by:  [facility]   Among patients with dyspnea, NT-proBNP is highly sensitive for the detection of acute congestive heart failure. In addition NT-proBNP of <300 pg/ml effectively rules out acute congestive heart failure with 99% negative predictive value.      Specimen Collected: 08/03/19 12:23 Last Resulted: 08/03/19 12:58              Contains critical data Protime-INR   Order: [id number]   Status:  Final result   Visible to patient:  No (Not Released)    Ref Range & Units 05:07   Protime 11.7 - 14.2 Seconds 46.4 Critically high     INR 0.90 - 1.10 5.02 Critically high     Resulting Agency   [facility]         Specimen Collected: 08/04/19 05:07 Last Resulted: 08/04/19 06:12              XR CHEST 1 VW-     HISTORY: Male who is 74 years-old,  short of breath, chest pain     TECHNIQUE: Frontal view of the chest     COMPARISON: 06/28/2019     FINDINGS: Stable appearing right chest port. Heart is enlarged. Aorta is  tortuous, calcified. Pulmonary vasculature is unremarkable.  Atelectasis/infiltrate at the lung bases with left pleural effusion.  This appearance represents interval worsening. No pneumothorax. No acute  osseous process.     IMPRESSION:  Atelectasis/infiltrate at the lung bases with left pleural  effusion, follow-up recommended. Cardiomegaly. Tortuous aorta.     This report was finalized on 8/3/2019 1:13 PM by Dr. Josep Garcia M.D.         Assessment/Plan  In summary this patient is a 74-year-old who has been treated FOR HAND NECK CA He has received radiation therapy to the neck and he has been undergoing chemotherapy .  The patient has been admitted with rapid ventricular response, atrial fibrillation, fever, tremendous tenderness and erythema at the port site in anterior chest wall on the right side and associated with this diarrhea with a stool culture consistent with E-coli enteropathogenic. Also the patient has abnormal blood cultures.    The patient has neutropenia. His hemoglobin is stable, his  platelet count is marginal and otherwise the patient is doing poorly. He has terrible mucositis grade 3, he is not able to drink or swallow anything and on top of that he has significant increase in his INR for obvious reasons lack of nutrition.     The patient will be seen by infectious disease today. He is already receiving IV Vancomycin per the primary team.     RECOMMENDATIONS:  1. We will not let anybody do anything to the port, vascular surgery has been consulted and I have the strong belief that maybe the port is going to require to be removed. I do not know if this represents cellulitis in the chest wall or is a real port infection.  2. The patient will require Neupogen 480 mcg subcutaneous daily in order to bring up his white blood count.  3. He will require lidocaine solution for the mouth to numb the mouth to try to control the mucositis pain every 2-3 hours.  4. We asked the nurse to give the patient some cranberry juice that will bring given the vitamin K content his INR down.   5. Cardiology will have input in regard the treatment of his atrial fibrillation, rapid ventricular response and congestive heart failure.   6. His chest x-ray is consistent with congestive heart failure not consistent with pneumonia.   7. The patient was discussed with the wife in detail. He realizes that he is kind of ill and hopefully in the next few days he will perk up enough to feel half-way decent.     Obviously any form of radiation therapy will be put on hold waiting to see if the patient recovers significantly before any other intervention is done.

## 2019-08-04 NOTE — PLAN OF CARE
Problem: Patient Care Overview  Goal: Plan of Care Review  Outcome: Ongoing (interventions implemented as appropriate)   08/04/19 4379   Coping/Psychosocial   Plan of Care Reviewed With patient   OTHER   Outcome Summary Amiodarone drip infusing, cont AFib rate 110-140's, pt asymptomatic, VSS, c/o mouth pain med as ordered, voids per urinal at bedside, safety maintained, wife at bedside   Plan of Care Review   Progress no change

## 2019-08-04 NOTE — CONSULTS
Name: Cody Eldridge ADMIT: 8/3/2019   : 1945  PCP: Epley, James, APRN    MRN: [id number] LOS: 1 days   AGE/SEX: 74 y.o. male  ROOM: [address]         CHIEF COMPLAINT: infected mediport   HPI: Cody Eldridge is a 74 y.o. male whose previous medical records I have reviewed and summarize below in addition to the findings from today's exam.  He is a very pleasant 74-year-old male who had a right internal jugular vein Mediport placed on 2019 by  for squamous cell cancer of the tongue.  A few days ago after he received fluids through the port, he had bilateral leg swelling and noted that the port site itself seemed to be swollen and painful.  This is continued to be painful over the last few days and he has now been admitted with pancytopenia, fever, atrial fibrillation with rapid ventricular response, chest wall cellulitis, positive blood cultures with a presumed diagnosis of an infected Mediport.    Location: Left chest wall  Quality: Pain  Severity: Severe  Duration: 5 days  Timing: Constant  Context: Underlying Metaport  Modifying Factors: Pancytopenia from chemotherapy and radiation  Associated Signs and Symptoms: Atrial fibrillation, fever, positive blood cultures      PAST MEDICAL HISTORY:   Past Medical History:   Diagnosis Date   • Atrial fibrillation (CMS/HCC)    • CHF (congestive heart failure) (CMS/HCC)    • Chronic bronchitis (CMS/HCC)    • COPD (chronic obstructive pulmonary disease) (CMS/HCC)    • Cor pulmonale (chronic) (CMS/HCC)    • Daytime hypersomnia    • Depression with anxiety    • Elevated cholesterol    • Enlarged prostate    • Frequent nocturnal awakening    • Gout    • H/O cardiac radiofrequency ablation     [facility].   • Head and neck cancer (CMS/HCC)    • Hyperlipidemia    • Hypertension    • Hypotension    • Insomnia    • Lumbar radicular pain    • SHAR (obstructive sleep apnea)    • Osteoarthritis    • Permanent atrial fibrillation (CMS/HCC)    •  Snoring    • Squamous cell carcinoma of neck    • SVT (supraventricular tachycardia) (CMS/HCC)    • Syncope    • Vitamin D deficiency    • Weight loss       PAST SURGICAL HISTORY:   Past Surgical History:   Procedure Laterality Date   • APPENDECTOMY     • CARDIAC ABLATION      [facility].   • CARDIAC CATHETERIZATION N/A 2018    Procedure: Left Heart Cath;  Surgeon: Yousif Uribe MD;  Location:  [facility];  Service: Cardiology   • CARDIAC CATHETERIZATION N/A 2018    Procedure: Coronary angiography;  Surgeon: Yousif Uribe MD;  Location:  [facility];  Service: Cardiology   • CARDIAC CATHETERIZATION N/A 2018    Procedure: Left ventriculography;  Surgeon: Yousif Uribe MD;  Location:  [facility];  Service: Cardiology   • FINGER SURGERY     • FOOT SURGERY     • HAND SURGERY     • VENOUS ACCESS DEVICE (PORT) INSERTION Right 2019    Procedure: MEDIPORT PLACEMENT;  Surgeon: Elvis Grigsby MD;  Location: [facility] MAIN OR;  Service: Vascular      FAMILY HISTORY:   Family History   Problem Relation Age of Onset   • Heart attack Mother    • Diabetes Mother    • Heart disease Mother    • Hypertension Mother    • Diabetes Father    • Heart failure Father    • Heart disease Father    • Hypertension Father    • Cancer Brother         colon   • Hypertension Brother    • Colon cancer Brother 50   • Diabetes Sister    • Heart disease Sister    • Hypertension Sister    • COPD Other    • Heart failure Other    • Heart disease Other    • Malig Hyperthermia Neg Hx       SOCIAL HISTORY:   Social History     Tobacco Use   • Smoking status: Former Smoker     Packs/day: 3.00     Years: 30.00     Pack years: 90.00     Types: Cigarettes     Last attempt to quit: 2000     Years since quittin.6   • Smokeless tobacco: Never Used   • Tobacco comment: started age 11 x 53 years stopped 2000 / daily caffiene   Substance Use Topics   • Alcohol use: Yes      Comment: quit 30 yeas ago   • Drug use: No      MEDICATIONS:   No current facility-administered medications on file prior to encounter.      Current Outpatient Medications on File Prior to Encounter   Medication Sig Dispense Refill   • albuterol (PROVENTIL HFA;VENTOLIN HFA) 108 (90 BASE) MCG/ACT inhaler Inhale 2 puffs. ProAir  (90 Base) MCG/ACT Inhalation Aerosol Solution; Patient Sig: ProAir  (90 Base) MCG/ACT Inhalation Aerosol Solution inhale 2 puffs by mouth every 4 hours if needed; 8.5; 11; 07-Apr-2014; Act     • ALPRAZolam (XANAX) 1 MG tablet Take 1 mg by mouth Daily As Needed for Anxiety (prior to radiation-takes 1/2).     • aluminum-magnesium hydroxide 200-200 MG/5ML suspension, diphenhydrAMINE 12.5 MG/5ML liquid, lidocaine viscous 2 % solution, nystatin [id number] UNIT/ML suspension Swish and swallow 10 mL 4 (Four) Times a Day Before Meals & at Bedtime. 400 mL 1   • amiodarone (PACERONE) 200 MG tablet Take one tablet three times a day for four days, then twice daily (Patient taking differently: 200 mg. Take one tablet three times a day for four days, then twice daily) 66 tablet 1   • baclofen (LIORESAL) 10 MG tablet take 1 tablet by mouth three times a day 90 tablet 1   • diphenoxylate-atropine (LOMOTIL) 2.5-0.025 MG per tablet Take 1 tablet by mouth 4 (Four) Times a Day As Needed for Diarrhea. 60 tablet 1   • HYDROcodone-acetaminophen (NORCO) 7.5-325 MG per tablet Take 1 tablet by mouth 4 (Four) Times a Day As Needed for Moderate Pain  or Severe Pain . 120 tablet 0   • montelukast (SINGULAIR) 10 MG tablet Take 10 mg by mouth Every Night.     • ondansetron ODT (ZOFRAN-ODT) 8 MG disintegrating tablet Take 1 tablet by mouth Every 8 (Eight) Hours As Needed for Nausea or Vomiting. 30 tablet 2   • rosuvastatin (CRESTOR) 20 MG tablet Take 20 mg by mouth Daily. 30 tablet 5   • sertraline (ZOLOFT) 50 MG tablet TAKE 1 TABLET BY MOUTH EVERY DAY 90 tablet 1   • silver sulfadiazine (SILVADENE) 1 % cream Apply  " topically to the appropriate area as directed 2 (Two) Times a Day. 50 g 0   • warfarin (COUMADIN) 1 MG tablet Take 1 mg by mouth Every Other Day.               ALLERGIES: Benadryl [diphenhydramine hcl]     COMPLETE REVIEW OF SYSTEMS:     Review of Systems   Constitutional: Positive for activity change, fatigue and fever.   HENT: Positive for mouth sores and sore throat.    Respiratory: Negative for cough and shortness of breath.    Cardiovascular: Positive for palpitations and leg swelling. Negative for chest pain.   Gastrointestinal: Negative for abdominal pain, nausea and vomiting.   Musculoskeletal: Negative for back pain and gait problem.        Pain at Mediport site, right chest   Skin: Negative for color change and wound.   Neurological: Negative for speech difficulty and headaches.         PHYSICAL EXAM:   Patient Vitals for the past 24 hrs:   BP Temp Temp src Pulse Resp SpO2 Height Weight   08/04/19 0730 135/96 97.9 °F (36.6 °C) Oral (!) 137 16 92 % -- --   08/04/19 0610 -- -- -- -- -- -- -- 70.6 kg (155 lb 11.2 oz)   08/04/19 0100 124/80 -- -- -- 16 -- -- --   08/04/19 0031 -- -- -- 112 -- -- -- --   08/03/19 2334 -- 98.3 °F (36.8 °C) Oral (!) 155 16 93 % -- --   08/03/19 2100 -- -- -- 120 -- 98 % -- --   08/03/19 2023 128/84 98.8 °F (37.1 °C) Oral -- -- -- -- --   08/03/19 1619 112/95 98 °F (36.7 °C) Oral (!) 138 16 92 % 180.3 cm (71\") 76.6 kg (168 lb 12.8 oz)   08/03/19 1523 -- -- -- (!) 152 -- 91 % -- --   08/03/19 1509 131/94 -- -- (!) 130 -- 94 % -- --   08/03/19 1413 -- -- -- (!) 138 -- -- -- --   08/03/19 1411 121/98 -- -- 108 16 97 % -- --   08/03/19 1330 104/85 -- -- (!) 132 15 97 % -- --   08/03/19 1222 113/87 -- -- (!) 154 15 94 % -- --   08/03/19 1209 107/91 -- -- -- -- -- -- --   08/03/19 1207 -- -- -- -- -- -- -- 79.9 kg (176 lb 3 oz)   08/03/19 1153 -- 96.7 °F (35.9 °C) Tympanic 74 15 97 % 180.3 cm (71\") --        Physical Exam   Constitutional: He is oriented to person, place, and time.   I " have reviewed the vital signs listed in this exam.    Elderly, chronically ill-appearing   HENT:   No jugular venous distension  Inflammatory changes of the mouth and tongue   Eyes: Conjunctivae are normal.   Cardiovascular: Normal rate, regular rhythm and normal heart sounds.   Pulses:       Carotid pulses are 2+ on the right side, and 2+ on the left side.       Radial pulses are 2+ on the right side, and 2+ on the left side.        Femoral pulses are 2+ on the right side, and 2+ on the left side.       Popliteal pulses are 2+ on the right side, and 2+ on the left side.        Posterior tibial pulses are 2+ on the right side, and 2+ on the left side.   PMI not palpable  No Abdominal aortic aneurysm is palpable.    Pulmonary/Chest: Effort normal and breath sounds normal. No respiratory distress.   Abdominal: Soft. He exhibits no distension. There is no tenderness. There is no guarding.   No hepatosplenomegaly is palpable.      Musculoskeletal: He exhibits no deformity.   Normal muscular tone of the four extremities without flaccidity, atrophy, or abnormal movements.     Inspection of the digits and nails is negative for clubbing, cyanosis, infection, or other pathology.     Mediport in the right chest is tender to touch, minimal erythema   Neurological: He is alert and oriented to person, place, and time.   Skin: Skin is warm and dry. No rash noted.   Psychiatric: He has a normal mood and affect.             LABS:      Recent Results (from the past 24 hour(s))   Comprehensive Metabolic Panel    Collection Time: 08/03/19 12:23 PM   Result Value Ref Range    Glucose 111 (H) 65 - 99 mg/dL    BUN 24 (H) 8 - 23 mg/dL    Creatinine 1.07 0.76 - 1.27 mg/dL    Sodium 144 136 - 145 mmol/L    Potassium 4.5 3.5 - 5.2 mmol/L    Chloride 107 98 - 107 mmol/L    CO2 25.1 22.0 - 29.0 mmol/L    Calcium 8.9 8.6 - 10.5 mg/dL    Total Protein 6.9 6.0 - 8.5 g/dL    Albumin 2.90 (L) 3.50 - 5.20 g/dL    ALT (SGPT) 36 1 - 41 U/L    AST  (SGOT) 33 1 - 40 U/L    Alkaline Phosphatase 97 39 - 117 U/L    Total Bilirubin 0.9 0.2 - 1.2 mg/dL    eGFR Non African Amer 67 >60 mL/min/1.73    Globulin 4.0 gm/dL    A/G Ratio 0.7 g/dL    BUN/Creatinine Ratio 22.4 7.0 - 25.0    Anion Gap 11.9 5.0 - 15.0 mmol/L   Protime-INR    Collection Time: 08/03/19 12:23 PM   Result Value Ref Range    Protime 38.6 (H) 11.7 - 14.2 Seconds    INR 3.97 (H) 0.90 - 1.10   BNP    Collection Time: 08/03/19 12:23 PM   Result Value Ref Range    proBNP 8,857.0 (H) 5.0-1,800.0 pg/mL   Troponin    Collection Time: 08/03/19 12:23 PM   Result Value Ref Range    Troponin T <0.010 0.000 - 0.030 ng/mL   CBC Auto Differential    Collection Time: 08/03/19 12:23 PM   Result Value Ref Range    WBC 1.09 (C) 3.40 - 10.80 10*3/mm3    RBC 4.69 4.14 - 5.80 10*6/mm3    Hemoglobin 14.8 13.0 - 17.7 g/dL    Hematocrit 47.2 37.5 - 51.0 %    .6 (H) 79.0 - 97.0 fL    MCH 31.6 26.6 - 33.0 pg    MCHC 31.4 (L) 31.5 - 35.7 g/dL    RDW 19.9 (H) 12.3 - 15.4 %    RDW-SD 67.6 (H) 37.0 - 54.0 fl    MPV 12.2 (H) 6.0 - 12.0 fL    Platelets 196 140 - 450 10*3/mm3    Neutrophil % 90.0 (H) 42.7 - 76.0 %    Lymphocyte % 5.5 (L) 19.6 - 45.3 %    Monocyte % 1.8 (L) 5.0 - 12.0 %    Eosinophil % 0.0 (L) 0.3 - 6.2 %    Basophil % 0.9 0.0 - 1.5 %    Immature Grans % 1.8 (H) 0.0 - 0.5 %    Neutrophils, Absolute 0.98 (L) 1.70 - 7.00 10*3/mm3    Lymphocytes, Absolute 0.06 (L) 0.70 - 3.10 10*3/mm3    Monocytes, Absolute 0.02 (L) 0.10 - 0.90 10*3/mm3    Eosinophils, Absolute 0.00 0.00 - 0.40 10*3/mm3    Basophils, Absolute 0.01 0.00 - 0.20 10*3/mm3    Immature Grans, Absolute 0.02 0.00 - 0.05 10*3/mm3    nRBC 0.9 (H) 0.0 - 0.2 /100 WBC   Scan Slide    Collection Time: 08/03/19 12:23 PM   Result Value Ref Range    Anisocytosis Mod/2+ None Seen    Crenated RBC's Slight/1+ None Seen    WBC Morphology Normal Normal    Platelet Morphology Normal Normal   Blood Culture - Blood, Arm, Right    Collection Time: 08/03/19  5:45 PM    Result Value Ref Range    Blood Culture Abnormal Stain (A)     Gram Stain Aerobic Bottle Gram positive cocci in clusters     Gram Stain Anaerobic Bottle Gram positive cocci in clusters    Blood Culture - Blood, Arm, Right    Collection Time: 08/03/19  6:16 PM   Result Value Ref Range    Blood Culture Abnormal Stain (A)     Gram Stain Aerobic Bottle Gram positive cocci in clusters     Gram Stain Anaerobic Bottle Gram positive cocci in clusters    Protime-INR    Collection Time: 08/04/19  5:07 AM   Result Value Ref Range    Protime 46.4 (C) 11.7 - 14.2 Seconds    INR 5.02 (C) 0.90 - 1.10   Basic Metabolic Panel    Collection Time: 08/04/19  5:07 AM   Result Value Ref Range    Glucose 102 (H) 65 - 99 mg/dL    BUN 21 8 - 23 mg/dL    Creatinine 0.96 0.76 - 1.27 mg/dL    Sodium 143 136 - 145 mmol/L    Potassium 4.1 3.5 - 5.2 mmol/L    Chloride 103 98 - 107 mmol/L    CO2 30.7 (H) 22.0 - 29.0 mmol/L    Calcium 8.7 8.6 - 10.5 mg/dL    eGFR Non African Amer 76 >60 mL/min/1.73    BUN/Creatinine Ratio 21.9 7.0 - 25.0    Anion Gap 9.3 5.0 - 15.0 mmol/L   CBC Auto Differential    Collection Time: 08/04/19  5:07 AM   Result Value Ref Range    WBC 0.56 (C) 3.40 - 10.80 10*3/mm3    RBC 4.42 4.14 - 5.80 10*6/mm3    Hemoglobin 14.1 13.0 - 17.7 g/dL    Hematocrit 44.6 37.5 - 51.0 %    .9 (H) 79.0 - 97.0 fL    MCH 31.9 26.6 - 33.0 pg    MCHC 31.6 31.5 - 35.7 g/dL    RDW 19.3 (H) 12.3 - 15.4 %    RDW-SD 66.9 (H) 37.0 - 54.0 fl    MPV 12.2 (H) 6.0 - 12.0 fL    Platelets 126 (L) 140 - 450 10*3/mm3    Neutrophil % 87.5 (H) 42.7 - 76.0 %    Lymphocyte % 7.1 (L) 19.6 - 45.3 %    Monocyte % 1.8 (L) 5.0 - 12.0 %    Eosinophil % 0.0 (L) 0.3 - 6.2 %    Basophil % 1.8 (H) 0.0 - 1.5 %    Immature Grans % 1.8 (H) 0.0 - 0.5 %    Neutrophils, Absolute 0.49 (L) 1.70 - 7.00 10*3/mm3    Lymphocytes, Absolute 0.04 (L) 0.70 - 3.10 10*3/mm3    Monocytes, Absolute 0.01 (L) 0.10 - 0.90 10*3/mm3    Eosinophils, Absolute 0.00 0.00 - 0.40 10*3/mm3     Basophils, Absolute 0.01 0.00 - 0.20 10*3/mm3    Immature Grans, Absolute 0.01 0.00 - 0.05 10*3/mm3    nRBC 1.8 (H) 0.0 - 0.2 /100 WBC   ]    The following radiologic or non-invasive studies including the images have been independently reviewed by me and my impressions are as follows: Mediport in the right chest.  Port catheter without evidence of fracture or compression.       ASSESSMENT/PLAN: 74 y.o. male with probable infected Mediport of the right chest.  Awaiting infectious disease consult, but with positive blood cultures and new tenderness over the Mediport in an immunocompromised patient I think it would be reasonable to remove the port.    INR is greater than 5.  I have spoken with Dr. Stanley.  We will give vitamin K.  He wants to avoid FFP secondary to congestive heart failure and concerns for fluid overload.  We will plan for port removal tomorrow even if his INR remains somewhat elevated.    I have discussed with the patient the following five points:  1-  I have been asked to see Cody Eldridge for the treatment of the diagnosis of: Infected Mediport  2- The planned treatment of this diagnosis is: Mediport removal  3- The risks of a procedure include but are not limited to the following: bleeding, thrombosis, damage to adjacent anatomical structures including nerves or blood vessels, infections, wound healing problems, the need for further procedures, whether planned or emergent. The benefits of a procedure include but are not limited to the following: Decrease in infectious burden  4- Alternatives to the planned procedure include: Antibiotic management  5- The natural history of the diagnosis if no treatment is given is: Continued infectious problems    Problem Points:  4:  Patient has a new problem, with additional work-up planned  Total problem points:4 or more    Data Points:  1:  I have reviewed or order clinical lab test  2:  I have personally and independently review of image, tracing, or  specimen  2:  I have reviewed and summation of old records and/or discussed the patients care with another health care provider  Total data points:4 or more    Risk Points:  High:  Any illness that poses threat to life or body funciton    MDM requires 2/3 (Problem points, Data points and Risk)  MDM Prob point Data point Risk   SF 1 1 Minimal   Low 2 2 Low   Mod 3 3 Moderate   High 4 4 High     Code requires 3/3 (MDM, History and Exam)  Code MDM History Exam Time   21874 SF/Low Detailed Detailed 30   20613 Mod Comprehensive Comprehensive 50   00343 High Comprehensive Comprehensive 70     Detailed history:  4 elements HPI or status of 3 chronic problems; 2-9 system ROS  Comprehensive:  4 elements HPI or status of 3 chronic problems;  10 system ROS    Detailed Exam:  12 findings from any organ system  Comprehensive Exam:  2 findings from each of 9 systems.     [place]    Assessment/Plan       New onset of congestive heart failure (CMS/HCC)    Atrial fibrillation (CMS/HCC)    Squamous cell carcinoma of neck    COPD (chronic obstructive pulmonary disease) (CMS/HCC)    Depression with anxiety    CAD (coronary artery disease)    Neutropenia (CMS/HCC)    Vascular catheter infection (CMS/HCC)      Faisal Frias MD   19

## 2019-08-04 NOTE — PLAN OF CARE
Problem: Patient Care Overview  Goal: Plan of Care Review  Outcome: Ongoing (interventions implemented as appropriate)  Pt remains in atrial fib-amiodarone gtt continues started on metoprolol today po-heart now 1 teens to 140's with activity which is much improved from 140-190's this am.pt taking IV antibiotics, and viscous lidocaine for sore mouth. taking small amts of liquids after including cranberry juice per  request. Received vitamin K today-permits signed for removal of mediport tomorrow. Voiding frequently of small amt of urine-progress slow    Problem: Fall Risk (Adult)  Goal: Identify Related Risk Factors and Signs and Symptoms  Outcome: Ongoing (interventions implemented as appropriate)  No falls this shift

## 2019-08-05 ENCOUNTER — APPOINTMENT (OUTPATIENT)
Dept: ONCOLOGY | Facility: CLINIC | Age: 74
End: 2019-08-05

## 2019-08-05 ENCOUNTER — APPOINTMENT (OUTPATIENT)
Dept: ONCOLOGY | Facility: HOSPITAL | Age: 74
End: 2019-08-05

## 2019-08-05 ENCOUNTER — APPOINTMENT (OUTPATIENT)
Dept: GENERAL RADIOLOGY | Facility: HOSPITAL | Age: 74
End: 2019-08-05

## 2019-08-05 ENCOUNTER — ANESTHESIA EVENT (OUTPATIENT)
Dept: PERIOP | Facility: HOSPITAL | Age: 74
End: 2019-08-05

## 2019-08-05 ENCOUNTER — DOCUMENTATION (OUTPATIENT)
Dept: RADIATION ONCOLOGY | Facility: HOSPITAL | Age: 74
End: 2019-08-05

## 2019-08-05 ENCOUNTER — APPOINTMENT (OUTPATIENT)
Dept: LAB | Facility: HOSPITAL | Age: 74
End: 2019-08-05

## 2019-08-05 ENCOUNTER — ANESTHESIA (OUTPATIENT)
Dept: PERIOP | Facility: HOSPITAL | Age: 74
End: 2019-08-05

## 2019-08-05 PROBLEM — T45.1X5A CHEMOTHERAPY-INDUCED THROMBOCYTOPENIA: Status: ACTIVE | Noted: 2019-08-05

## 2019-08-05 PROBLEM — T45.1X5A CHEMOTHERAPY-INDUCED NEUTROPENIA: Status: ACTIVE | Noted: 2019-08-03

## 2019-08-05 PROBLEM — D69.59 CHEMOTHERAPY-INDUCED THROMBOCYTOPENIA: Status: ACTIVE | Noted: 2019-08-05

## 2019-08-05 PROBLEM — D70.1 CHEMOTHERAPY-INDUCED NEUTROPENIA (HCC): Status: ACTIVE | Noted: 2019-08-03

## 2019-08-05 LAB
ALBUMIN SERPL-MCNC: 2.1 G/DL (ref 3.5–5.2)
ALP SERPL-CCNC: 71 U/L (ref 39–117)
ALT SERPL W P-5'-P-CCNC: 25 U/L (ref 1–41)
ANION GAP SERPL CALCULATED.3IONS-SCNC: 12 MMOL/L (ref 5–15)
ANISOCYTOSIS BLD QL: ABNORMAL
ARTERIAL PATENCY WRIST A: POSITIVE
AST SERPL-CCNC: 32 U/L (ref 1–40)
ATMOSPHERIC PRESS: 747.6 MMHG
BASE EXCESS BLDA CALC-SCNC: 3.3 MMOL/L (ref 0–2)
BDY SITE: ABNORMAL
BILIRUB CONJ SERPL-MCNC: 2.3 MG/DL (ref 0.2–0.3)
BILIRUB INDIRECT SERPL-MCNC: 0.5 MG/DL
BILIRUB SERPL-MCNC: 2.8 MG/DL (ref 0.2–1.2)
BUN BLD-MCNC: 24 MG/DL (ref 8–23)
BUN/CREAT SERPL: 20.5 (ref 7–25)
CALCIUM SPEC-SCNC: 7.7 MG/DL (ref 8.6–10.5)
CHLORIDE SERPL-SCNC: 107 MMOL/L (ref 98–107)
CO2 SERPL-SCNC: 30 MMOL/L (ref 22–29)
CREAT BLD-MCNC: 1.17 MG/DL (ref 0.76–1.27)
D-LACTATE SERPL-SCNC: 1.8 MMOL/L (ref 0.5–2)
DEPRECATED RDW RBC AUTO: 67.5 FL (ref 37–54)
ERYTHROCYTE [DISTWIDTH] IN BLOOD BY AUTOMATED COUNT: 18.8 % (ref 12.3–15.4)
GAS FLOW AIRWAY: 2 LPM
GFR SERPL CREATININE-BSD FRML MDRD: 61 ML/MIN/1.73
GLUCOSE BLD-MCNC: 107 MG/DL (ref 65–99)
GLUCOSE BLDC GLUCOMTR-MCNC: 103 MG/DL (ref 70–130)
GLUCOSE BLDC GLUCOMTR-MCNC: 107 MG/DL (ref 70–130)
GLUCOSE BLDC GLUCOMTR-MCNC: 120 MG/DL (ref 70–130)
GLUCOSE BLDC GLUCOMTR-MCNC: 81 MG/DL (ref 70–130)
GLUCOSE BLDC GLUCOMTR-MCNC: 93 MG/DL (ref 70–130)
HCO3 BLDA-SCNC: 28 MMOL/L (ref 22–28)
HCT VFR BLD AUTO: 46 % (ref 37.5–51)
HGB BLD-MCNC: 14.8 G/DL (ref 13–17.7)
INR PPP: 2.49 (ref 0.9–1.1)
LYMPHOCYTES # BLD MANUAL: 0.03 10*3/MM3 (ref 0.7–3.1)
LYMPHOCYTES NFR BLD MANUAL: 2.1 % (ref 19.6–45.3)
LYMPHOCYTES NFR BLD MANUAL: 4.1 % (ref 5–12)
MAGNESIUM SERPL-MCNC: 1.4 MG/DL (ref 1.6–2.4)
MAGNESIUM SERPL-MCNC: 1.6 MG/DL (ref 1.6–2.4)
MCH RBC QN AUTO: 32.2 PG (ref 26.6–33)
MCHC RBC AUTO-ENTMCNC: 32.2 G/DL (ref 31.5–35.7)
MCV RBC AUTO: 100 FL (ref 79–97)
METAMYELOCYTES NFR BLD MANUAL: 1 % (ref 0–0)
MODALITY: ABNORMAL
MONOCYTES # BLD AUTO: 0.06 10*3/MM3 (ref 0.1–0.9)
NEUTROPHILS # BLD AUTO: 1.28 10*3/MM3 (ref 1.7–7)
NEUTROPHILS NFR BLD MANUAL: 92.8 % (ref 42.7–76)
NRBC SPEC MANUAL: 2.1 /100 WBC (ref 0–0.2)
PCO2 BLDA: 42 MM HG (ref 35–45)
PH BLDA: 7.43 PH UNITS (ref 7.35–7.45)
PHOSPHATE SERPL-MCNC: 3.4 MG/DL (ref 2.5–4.5)
PLAT MORPH BLD: NORMAL
PLATELET # BLD AUTO: 106 10*3/MM3 (ref 140–450)
PMV BLD AUTO: 12.1 FL (ref 6–12)
PO2 BLDA: 77.6 MM HG (ref 80–100)
POIKILOCYTOSIS BLD QL SMEAR: ABNORMAL
POTASSIUM BLD-SCNC: 3.2 MMOL/L (ref 3.5–5.2)
POTASSIUM BLD-SCNC: 3.2 MMOL/L (ref 3.5–5.2)
PROT SERPL-MCNC: 4.8 G/DL (ref 6–8.5)
PROTHROMBIN TIME: 26.6 SECONDS (ref 11.7–14.2)
RBC # BLD AUTO: 4.6 10*6/MM3 (ref 4.14–5.8)
SAO2 % BLDCOA: 95.7 % (ref 92–99)
SODIUM BLD-SCNC: 149 MMOL/L (ref 136–145)
TOTAL RATE: 20 BREATHS/MINUTE
WBC MORPH BLD: NORMAL
WBC NRBC COR # BLD: 1.38 10*3/MM3 (ref 3.4–10.8)

## 2019-08-05 PROCEDURE — 36600 WITHDRAWAL OF ARTERIAL BLOOD: CPT

## 2019-08-05 PROCEDURE — 99233 SBSQ HOSP IP/OBS HIGH 50: CPT | Performed by: INTERNAL MEDICINE

## 2019-08-05 PROCEDURE — 83605 ASSAY OF LACTIC ACID: CPT | Performed by: INTERNAL MEDICINE

## 2019-08-05 PROCEDURE — 25010000003 POTASSIUM CHLORIDE 10 MEQ/100ML SOLUTION: Performed by: INTERNAL MEDICINE

## 2019-08-05 PROCEDURE — 3E0G76Z INTRODUCTION OF NUTRITIONAL SUBSTANCE INTO UPPER GI, VIA NATURAL OR ARTIFICIAL OPENING: ICD-10-PCS | Performed by: INTERNAL MEDICINE

## 2019-08-05 PROCEDURE — 85025 COMPLETE CBC W/AUTO DIFF WBC: CPT | Performed by: HOSPITALIST

## 2019-08-05 PROCEDURE — 87147 CULTURE TYPE IMMUNOLOGIC: CPT | Performed by: SURGERY

## 2019-08-05 PROCEDURE — 80048 BASIC METABOLIC PNL TOTAL CA: CPT | Performed by: HOSPITALIST

## 2019-08-05 PROCEDURE — 25010000002 DIGOXIN PER 500 MCG: Performed by: INTERNAL MEDICINE

## 2019-08-05 PROCEDURE — 93005 ELECTROCARDIOGRAM TRACING: CPT | Performed by: INTERNAL MEDICINE

## 2019-08-05 PROCEDURE — 25010000002 PHENYLEPHRINE 10 MG/ML SOLUTION 5 ML VIAL: Performed by: INTERNAL MEDICINE

## 2019-08-05 PROCEDURE — 84132 ASSAY OF SERUM POTASSIUM: CPT | Performed by: INTERNAL MEDICINE

## 2019-08-05 PROCEDURE — 83735 ASSAY OF MAGNESIUM: CPT | Performed by: INTERNAL MEDICINE

## 2019-08-05 PROCEDURE — 94799 UNLISTED PULMONARY SVC/PX: CPT

## 2019-08-05 PROCEDURE — 87070 CULTURE OTHR SPECIMN AEROBIC: CPT | Performed by: SURGERY

## 2019-08-05 PROCEDURE — 93010 ELECTROCARDIOGRAM REPORT: CPT | Performed by: INTERNAL MEDICINE

## 2019-08-05 PROCEDURE — 02PY33Z REMOVAL OF INFUSION DEVICE FROM GREAT VESSEL, PERCUTANEOUS APPROACH: ICD-10-PCS | Performed by: SURGERY

## 2019-08-05 PROCEDURE — 82803 BLOOD GASES ANY COMBINATION: CPT

## 2019-08-05 PROCEDURE — 85007 BL SMEAR W/DIFF WBC COUNT: CPT | Performed by: HOSPITALIST

## 2019-08-05 PROCEDURE — 25010000002 AMIODARONE IN DEXTROSE 5% 360-4.14 MG/200ML-% SOLUTION: Performed by: INTERNAL MEDICINE

## 2019-08-05 PROCEDURE — P9041 ALBUMIN (HUMAN),5%, 50ML: HCPCS | Performed by: INTERNAL MEDICINE

## 2019-08-05 PROCEDURE — 25010000003 LIDOCAINE 1 % SOLUTION 20 ML VIAL: Performed by: SURGERY

## 2019-08-05 PROCEDURE — 84100 ASSAY OF PHOSPHORUS: CPT | Performed by: INTERNAL MEDICINE

## 2019-08-05 PROCEDURE — 71045 X-RAY EXAM CHEST 1 VIEW: CPT

## 2019-08-05 PROCEDURE — 25010000002 ALBUMIN HUMAN 5% PER 50 ML: Performed by: INTERNAL MEDICINE

## 2019-08-05 PROCEDURE — 87075 CULTR BACTERIA EXCEPT BLOOD: CPT | Performed by: SURGERY

## 2019-08-05 PROCEDURE — 93005 ELECTROCARDIOGRAM TRACING: CPT | Performed by: HOSPITALIST

## 2019-08-05 PROCEDURE — 82962 GLUCOSE BLOOD TEST: CPT

## 2019-08-05 PROCEDURE — 87205 SMEAR GRAM STAIN: CPT | Performed by: SURGERY

## 2019-08-05 PROCEDURE — 25010000002 FILGRASTIM 480 MCG/0.8ML SOLUTION PREFILLED SYRINGE: Performed by: INTERNAL MEDICINE

## 2019-08-05 PROCEDURE — 25010000002 VITAMIN K1 PER 1 MG: Performed by: INTERNAL MEDICINE

## 2019-08-05 PROCEDURE — 25010000002 AMIODARONE IN DEXTROSE 5% 360-4.14 MG/200ML-% SOLUTION: Performed by: NURSE PRACTITIONER

## 2019-08-05 PROCEDURE — 80076 HEPATIC FUNCTION PANEL: CPT | Performed by: INTERNAL MEDICINE

## 2019-08-05 PROCEDURE — 0JPT0WZ REMOVAL OF TOTALLY IMPLANTABLE VASCULAR ACCESS DEVICE FROM TRUNK SUBCUTANEOUS TISSUE AND FASCIA, OPEN APPROACH: ICD-10-PCS | Performed by: SURGERY

## 2019-08-05 PROCEDURE — 85610 PROTHROMBIN TIME: CPT | Performed by: HOSPITALIST

## 2019-08-05 PROCEDURE — 87186 SC STD MICRODIL/AGAR DIL: CPT | Performed by: SURGERY

## 2019-08-05 RX ORDER — PROMETHAZINE HYDROCHLORIDE 25 MG/1
25 TABLET ORAL ONCE AS NEEDED
Status: DISCONTINUED | OUTPATIENT
Start: 2019-08-05 | End: 2019-08-05

## 2019-08-05 RX ORDER — SODIUM CHLORIDE 0.9 % (FLUSH) 0.9 %
1-10 SYRINGE (ML) INJECTION AS NEEDED
Status: DISCONTINUED | OUTPATIENT
Start: 2019-08-05 | End: 2019-08-05 | Stop reason: HOSPADM

## 2019-08-05 RX ORDER — NALOXONE HCL 0.4 MG/ML
0.2 VIAL (ML) INJECTION AS NEEDED
Status: DISCONTINUED | OUTPATIENT
Start: 2019-08-05 | End: 2019-08-05

## 2019-08-05 RX ORDER — ALBUMIN, HUMAN INJ 5% 5 %
500 SOLUTION INTRAVENOUS ONCE
Status: COMPLETED | OUTPATIENT
Start: 2019-08-05 | End: 2019-08-05

## 2019-08-05 RX ORDER — MIDAZOLAM HYDROCHLORIDE 1 MG/ML
1 INJECTION INTRAMUSCULAR; INTRAVENOUS
Status: DISCONTINUED | OUTPATIENT
Start: 2019-08-05 | End: 2019-08-05 | Stop reason: HOSPADM

## 2019-08-05 RX ORDER — HYDROCODONE BITARTRATE AND ACETAMINOPHEN 5; 325 MG/1; MG/1
1 TABLET ORAL EVERY 4 HOURS PRN
Status: DISCONTINUED | OUTPATIENT
Start: 2019-08-05 | End: 2019-08-20 | Stop reason: HOSPADM

## 2019-08-05 RX ORDER — POTASSIUM CHLORIDE 7.45 MG/ML
10 INJECTION INTRAVENOUS
Status: DISCONTINUED | OUTPATIENT
Start: 2019-08-05 | End: 2019-08-07

## 2019-08-05 RX ORDER — PROMETHAZINE HYDROCHLORIDE 25 MG/ML
12.5 INJECTION, SOLUTION INTRAMUSCULAR; INTRAVENOUS ONCE AS NEEDED
Status: DISCONTINUED | OUTPATIENT
Start: 2019-08-05 | End: 2019-08-05

## 2019-08-05 RX ORDER — MAGNESIUM SULFATE 1 G/100ML
1 INJECTION INTRAVENOUS AS NEEDED
Status: DISCONTINUED | OUTPATIENT
Start: 2019-08-05 | End: 2019-08-13

## 2019-08-05 RX ORDER — FENTANYL CITRATE 50 UG/ML
50 INJECTION, SOLUTION INTRAMUSCULAR; INTRAVENOUS
Status: DISCONTINUED | OUTPATIENT
Start: 2019-08-05 | End: 2019-08-05 | Stop reason: HOSPADM

## 2019-08-05 RX ORDER — DIGOXIN 0.25 MG/ML
125 INJECTION INTRAMUSCULAR; INTRAVENOUS ONCE
Status: COMPLETED | OUTPATIENT
Start: 2019-08-05 | End: 2019-08-05

## 2019-08-05 RX ORDER — EPHEDRINE SULFATE 50 MG/ML
5 INJECTION, SOLUTION INTRAVENOUS ONCE AS NEEDED
Status: DISCONTINUED | OUTPATIENT
Start: 2019-08-05 | End: 2019-08-05

## 2019-08-05 RX ORDER — POTASSIUM CHLORIDE 750 MG/1
40 CAPSULE, EXTENDED RELEASE ORAL AS NEEDED
Status: DISCONTINUED | OUTPATIENT
Start: 2019-08-05 | End: 2019-08-07

## 2019-08-05 RX ORDER — HYDRALAZINE HYDROCHLORIDE 20 MG/ML
5 INJECTION INTRAMUSCULAR; INTRAVENOUS
Status: DISCONTINUED | OUTPATIENT
Start: 2019-08-05 | End: 2019-08-05

## 2019-08-05 RX ORDER — PRAMIPEXOLE DIHYDROCHLORIDE 0.25 MG/1
0.25 TABLET ORAL NIGHTLY PRN
Status: DISCONTINUED | OUTPATIENT
Start: 2019-08-05 | End: 2019-08-20 | Stop reason: HOSPADM

## 2019-08-05 RX ORDER — MAGNESIUM HYDROXIDE 1200 MG/15ML
LIQUID ORAL AS NEEDED
Status: DISCONTINUED | OUTPATIENT
Start: 2019-08-05 | End: 2019-08-05 | Stop reason: HOSPADM

## 2019-08-05 RX ORDER — MAGNESIUM SULFATE HEPTAHYDRATE 40 MG/ML
2 INJECTION, SOLUTION INTRAVENOUS AS NEEDED
Status: DISCONTINUED | OUTPATIENT
Start: 2019-08-05 | End: 2019-08-13

## 2019-08-05 RX ORDER — PROMETHAZINE HYDROCHLORIDE 25 MG/ML
6.25 INJECTION, SOLUTION INTRAMUSCULAR; INTRAVENOUS
Status: DISCONTINUED | OUTPATIENT
Start: 2019-08-05 | End: 2019-08-05

## 2019-08-05 RX ORDER — LIDOCAINE HYDROCHLORIDE 10 MG/ML
0.5 INJECTION, SOLUTION EPIDURAL; INFILTRATION; INTRACAUDAL; PERINEURAL ONCE AS NEEDED
Status: DISCONTINUED | OUTPATIENT
Start: 2019-08-05 | End: 2019-08-05 | Stop reason: HOSPADM

## 2019-08-05 RX ORDER — FAMOTIDINE 10 MG/ML
20 INJECTION, SOLUTION INTRAVENOUS ONCE
Status: DISCONTINUED | OUTPATIENT
Start: 2019-08-05 | End: 2019-08-05 | Stop reason: HOSPADM

## 2019-08-05 RX ORDER — SODIUM CHLORIDE, SODIUM LACTATE, POTASSIUM CHLORIDE, CALCIUM CHLORIDE 600; 310; 30; 20 MG/100ML; MG/100ML; MG/100ML; MG/100ML
9 INJECTION, SOLUTION INTRAVENOUS CONTINUOUS
Status: DISCONTINUED | OUTPATIENT
Start: 2019-08-05 | End: 2019-08-10

## 2019-08-05 RX ORDER — ONDANSETRON 2 MG/ML
4 INJECTION INTRAMUSCULAR; INTRAVENOUS ONCE AS NEEDED
Status: DISCONTINUED | OUTPATIENT
Start: 2019-08-05 | End: 2019-08-05

## 2019-08-05 RX ORDER — NOREPINEPHRINE BIT/0.9 % NACL 8 MG/250ML
INFUSION BOTTLE (ML) INTRAVENOUS
Status: COMPLETED
Start: 2019-08-05 | End: 2019-08-05

## 2019-08-05 RX ORDER — FLUMAZENIL 0.1 MG/ML
0.2 INJECTION INTRAVENOUS AS NEEDED
Status: DISCONTINUED | OUTPATIENT
Start: 2019-08-05 | End: 2019-08-05

## 2019-08-05 RX ORDER — NOREPINEPHRINE BIT/0.9 % NACL 8 MG/250ML
.02-.3 INFUSION BOTTLE (ML) INTRAVENOUS
Status: DISCONTINUED | OUTPATIENT
Start: 2019-08-05 | End: 2019-08-05

## 2019-08-05 RX ORDER — MIDAZOLAM HYDROCHLORIDE 1 MG/ML
2 INJECTION INTRAMUSCULAR; INTRAVENOUS
Status: DISCONTINUED | OUTPATIENT
Start: 2019-08-05 | End: 2019-08-05 | Stop reason: HOSPADM

## 2019-08-05 RX ORDER — PROMETHAZINE HYDROCHLORIDE 25 MG/1
25 SUPPOSITORY RECTAL ONCE AS NEEDED
Status: DISCONTINUED | OUTPATIENT
Start: 2019-08-05 | End: 2019-08-05

## 2019-08-05 RX ORDER — POTASSIUM CHLORIDE 7.45 MG/ML
10 INJECTION INTRAVENOUS ONCE
Status: COMPLETED | OUTPATIENT
Start: 2019-08-05 | End: 2019-08-05

## 2019-08-05 RX ORDER — DIGOXIN 0.25 MG/ML
125 INJECTION INTRAMUSCULAR; INTRAVENOUS
Status: DISCONTINUED | OUTPATIENT
Start: 2019-08-05 | End: 2019-08-07

## 2019-08-05 RX ORDER — POTASSIUM CHLORIDE 1.5 G/1.77G
40 POWDER, FOR SOLUTION ORAL AS NEEDED
Status: DISCONTINUED | OUTPATIENT
Start: 2019-08-05 | End: 2019-08-07

## 2019-08-05 RX ORDER — LABETALOL HYDROCHLORIDE 5 MG/ML
5 INJECTION, SOLUTION INTRAVENOUS
Status: DISCONTINUED | OUTPATIENT
Start: 2019-08-05 | End: 2019-08-05

## 2019-08-05 RX ADMIN — NAFCILLIN SODIUM 2 G: 2 INJECTION, POWDER, LYOPHILIZED, FOR SOLUTION INTRAMUSCULAR; INTRAVENOUS at 07:12

## 2019-08-05 RX ADMIN — POTASSIUM CHLORIDE 10 MEQ: 7.46 INJECTION, SOLUTION INTRAVENOUS at 11:35

## 2019-08-05 RX ADMIN — SILVER SULFADIAZINE: 10 CREAM TOPICAL at 09:18

## 2019-08-05 RX ADMIN — SILVER SULFADIAZINE 1 APPLICATION: 10 CREAM TOPICAL at 20:03

## 2019-08-05 RX ADMIN — PRAMIPEXOLE DIHYDROCHLORIDE 0.25 MG: 0.25 TABLET ORAL at 23:04

## 2019-08-05 RX ADMIN — SODIUM CHLORIDE 500 ML: 9 INJECTION, SOLUTION INTRAVENOUS at 10:39

## 2019-08-05 RX ADMIN — PHYTONADIONE 5 MG: 10 INJECTION, EMULSION INTRAMUSCULAR; INTRAVENOUS; SUBCUTANEOUS at 11:21

## 2019-08-05 RX ADMIN — POTASSIUM CHLORIDE 40 MEQ: 1.5 POWDER, FOR SOLUTION ORAL at 23:53

## 2019-08-05 RX ADMIN — NAFCILLIN SODIUM 2 G: 2 INJECTION, POWDER, LYOPHILIZED, FOR SOLUTION INTRAMUSCULAR; INTRAVENOUS at 23:04

## 2019-08-05 RX ADMIN — LIDOCAINE HYDROCHLORIDE 5 ML: 20 SOLUTION ORAL; TOPICAL at 02:58

## 2019-08-05 RX ADMIN — LIDOCAINE HYDROCHLORIDE 5 ML: 20 SOLUTION ORAL; TOPICAL at 09:18

## 2019-08-05 RX ADMIN — NAFCILLIN SODIUM 2 G: 2 INJECTION, POWDER, LYOPHILIZED, FOR SOLUTION INTRAMUSCULAR; INTRAVENOUS at 03:49

## 2019-08-05 RX ADMIN — NAFCILLIN SODIUM 2 G: 2 INJECTION, POWDER, LYOPHILIZED, FOR SOLUTION INTRAMUSCULAR; INTRAVENOUS at 12:25

## 2019-08-05 RX ADMIN — Medication 0.02 MCG/KG/MIN: at 06:34

## 2019-08-05 RX ADMIN — NAFCILLIN SODIUM 2 G: 2 INJECTION, POWDER, LYOPHILIZED, FOR SOLUTION INTRAMUSCULAR; INTRAVENOUS at 00:12

## 2019-08-05 RX ADMIN — LIDOCAINE HYDROCHLORIDE 5 ML: 20 SOLUTION ORAL; TOPICAL at 15:56

## 2019-08-05 RX ADMIN — NAFCILLIN SODIUM 2 G: 2 INJECTION, POWDER, LYOPHILIZED, FOR SOLUTION INTRAMUSCULAR; INTRAVENOUS at 19:55

## 2019-08-05 RX ADMIN — SODIUM CHLORIDE, PRESERVATIVE FREE 3 ML: 5 INJECTION INTRAVENOUS at 10:16

## 2019-08-05 RX ADMIN — LIDOCAINE HYDROCHLORIDE 5 ML: 20 SOLUTION ORAL; TOPICAL at 00:12

## 2019-08-05 RX ADMIN — DIGOXIN 125 MCG: 0.25 INJECTION INTRAMUSCULAR; INTRAVENOUS at 13:45

## 2019-08-05 RX ADMIN — LIDOCAINE HYDROCHLORIDE 5 ML: 20 SOLUTION ORAL; TOPICAL at 07:12

## 2019-08-05 RX ADMIN — SODIUM CHLORIDE, POTASSIUM CHLORIDE, SODIUM LACTATE AND CALCIUM CHLORIDE 9 ML/HR: 600; 310; 30; 20 INJECTION, SOLUTION INTRAVENOUS at 13:44

## 2019-08-05 RX ADMIN — HYDROCODONE BITARTRATE AND ACETAMINOPHEN 1 TABLET: 7.5; 325 TABLET ORAL at 16:40

## 2019-08-05 RX ADMIN — DIGOXIN 125 MCG: 0.25 INJECTION INTRAMUSCULAR; INTRAVENOUS at 11:28

## 2019-08-05 RX ADMIN — POTASSIUM CHLORIDE 10 MEQ: 7.46 INJECTION, SOLUTION INTRAVENOUS at 16:20

## 2019-08-05 RX ADMIN — PHENYLEPHRINE HYDROCHLORIDE 1 MCG/KG/MIN: 10 INJECTION INTRAVENOUS at 13:30

## 2019-08-05 RX ADMIN — POTASSIUM CHLORIDE 10 MEQ: 7.46 INJECTION, SOLUTION INTRAVENOUS at 17:56

## 2019-08-05 RX ADMIN — LIDOCAINE HYDROCHLORIDE 5 ML: 20 SOLUTION ORAL; TOPICAL at 19:59

## 2019-08-05 RX ADMIN — AMIODARONE HYDROCHLORIDE 0.5 MG/MIN: 1.8 INJECTION, SOLUTION INTRAVENOUS at 17:44

## 2019-08-05 RX ADMIN — FILGRASTIM 480 MCG: 480 INJECTION, SOLUTION INTRAVENOUS; SUBCUTANEOUS at 18:29

## 2019-08-05 RX ADMIN — POTASSIUM CHLORIDE 10 MEQ: 7.46 INJECTION, SOLUTION INTRAVENOUS at 14:02

## 2019-08-05 RX ADMIN — AMIODARONE HYDROCHLORIDE 0.5 MG/MIN: 1.8 INJECTION, SOLUTION INTRAVENOUS at 02:59

## 2019-08-05 RX ADMIN — ALBUMIN HUMAN 250 ML: 0.05 INJECTION, SOLUTION INTRAVENOUS at 10:31

## 2019-08-05 RX ADMIN — ALBUMIN HUMAN 250 ML: 0.05 INJECTION, SOLUTION INTRAVENOUS at 11:06

## 2019-08-05 RX ADMIN — HYDROCODONE BITARTRATE AND ACETAMINOPHEN 1 TABLET: 5; 325 TABLET ORAL at 20:59

## 2019-08-05 NOTE — PROGRESS NOTES
REASON FOR CONSULTATION:  Neutropenic fever, port site cellulitis, prolonged protime no clinical bleeding, severe grade iii mucositis, atrial fibrilation rvr  Provide an opinion on any further workup or treatment                             INTERVAL HISTORY:  On 8/5/2019 in the early morning,Patient was moved to intensive care unit because of hypotension, atrial fibrillation with RVR and currently on pressor and amiodarone.     In the early afternoon of 8/5/2019, patient had portacatheter removed.     HISTORY OF PRESENT ILLNESS:  The patient is a 74 y.o. year old male who is here for an opinion about the above issue.     History of Present Illness I have been asked to see this patient in consultation today who is a 74-year-old white male who has been treated BY DR RONALDO WILLIAMSON FOR HEAD AND NECK CANCER WITH CHEMO AND RADIATION THERAPY In any event the patient has received radiation therapy to his NECK, he has been receiving IV fluids in the office in the last few days because of profound weakness and he has now developed atrial fibrillation with rapid ventricular response from a congestive heart failure, elevation of the proBNP in the 8000 category and further more significant sensitivity and redness in the skin of the anterior chest wall on the right side where the port is located that makes me to think that he has at least a cellulitis in this anatomical site. The patient has a severe degree of mucositis associated with radiation therapy. He is not able to swallow. Also the patient has developed diarrhea with no abdominal pain and in a stool culture enteropathogenic E-coli has been recovered. Clostridium was negative. The patient has lost substantial amount of weight. He has not been able to eat anything for the last 3-4 weeks. He has had swelling in his lower extremities. He has had cough and he has had shortness of breath. He has had fevers and chills as stated. He just feels terrible.            Medical History         Past Medical History:   Diagnosis Date   • Atrial fibrillation (CMS/HCC)     • CHF (congestive heart failure) (CMS/HCC)     • Chronic bronchitis (CMS/HCC)     • COPD (chronic obstructive pulmonary disease) (CMS/HCC)     • Cor pulmonale (chronic) (CMS/HCC)     • Daytime hypersomnia     • Depression with anxiety     • Elevated cholesterol     • Enlarged prostate     • Frequent nocturnal awakening     • Gout     • H/O cardiac radiofrequency ablation 2006     [facility].   • Head and neck cancer (CMS/HCC)     • Hyperlipidemia     • Hypertension     • Hypotension     • Insomnia     • Lumbar radicular pain     • SHAR (obstructive sleep apnea)     • Osteoarthritis     • Permanent atrial fibrillation (CMS/HCC)     • Snoring     • Squamous cell carcinoma of neck     • SVT (supraventricular tachycardia) (CMS/HCC)     • Syncope     • Vitamin D deficiency     • Weight loss              Surgical History   Past Surgical History:   Procedure Laterality Date   • APPENDECTOMY       • CARDIAC ABLATION   2006     [facility].   • CARDIAC CATHETERIZATION N/A 8/29/2018     Procedure: Left Heart Cath;  Surgeon: Yousif Uribe MD;  Location: [facility] CATH INVASIVE LOCATION;  Service: Cardiology   • CARDIAC CATHETERIZATION N/A 8/29/2018     Procedure: Coronary angiography;  Surgeon: Yousif Uribe MD;  Location: [facility] CATH INVASIVE LOCATION;  Service: Cardiology   • CARDIAC CATHETERIZATION N/A 8/29/2018     Procedure: Left ventriculography;  Surgeon: Yousif Uribe MD;  [facility];  Service: Cardiology   • FINGER SURGERY       • FOOT SURGERY       • HAND SURGERY       • VENOUS ACCESS DEVICE (PORT) INSERTION Right 6/18/2019     Procedure: MEDIPORT PLACEMENT;  Surgeon: Elvis Grigsby MD;  Location: [facility] MAIN OR;  Service: Vascular       ONCOLOGIC HISTORY DR RONALDO WILLIAMSON MD:     1.  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  He is undergoing definitive treatment with radiation therapy and weekly low-dose carboplatin and Taxol chemotherapy.  He was able to complete 4 weekly chemotherapy treatments in a row but required holding chemotherapy for the last 2 weeks due to myelosuppression.  His blood counts have recovered today but he appears to be in rapid atrial fibrillation.  2.  Comorbidities including ischemic heart disease with history of CHF and atrial fibrillation requiring Coumadin anticoagulation.    3.  Oral mucositis.  He has a prescription for hydrocodone for pain control.  He does have Meghann's Magic mouth rinse to use as well.  4.  Warfarin anticoagulation.  Patient's INR 3.83, he will hold his coumadin today and tomorrow and have another INR checked Wednesday 7/17/20195.    5.  Weight loss and poor nutrition.  The patient's weight has stabilized and he is utilizing boost at home and trying to hydrate with water and Gatorade.  He does understand if his nutrition is not improving we will need to proceed with a G-tube for nutrition.  Ruthy Bautista, oncology dietitian been involved in his care also.  6.  Atrial fibrillation with rapid ventricular response     MEDICATIONS: see EMR.     ALLERGIES:          Allergies   Allergen Reactions   • Benadryl [Diphenhydramine Hcl] Other (See Comments) and Dizziness       \"I sleep for about a day\"       Social History no changes.      Family History;  No changes.        Review of Systems   Constitutional: Positive for activity change, appetite change, chills, diaphoresis, fatigue, fever and unexpected weight change.   HENT: Positive for mouth sores, sore throat, trouble swallowing and voice change.    Eyes: Negative.    Respiratory: Positive for cough and shortness of breath.    Cardiovascular: Positive for palpitations.   Gastrointestinal: Positive for abdominal distention and diarrhea.   Endocrine: Negative.    Genitourinary: Negative.    Musculoskeletal: Negative.    Skin: " Positive for pallor.   Neurological: Negative.    Hematological: Negative.    Psychiatric/Behavioral: Negative.               Objective      Vitals:    08/05/19 1105 08/05/19 1120 08/05/19 1135 08/05/19 1145   BP: 108/80 111/68 102/70 100/72   BP Location:       Patient Position:       Pulse: 116 110 107 (!) 121   Resp:       Temp:       TempSrc:       SpO2: 97% 94% 90%    Weight:       Height:          Physical Exam   Constitutional: He is oriented to person, place, and time. He appears well-developed. No distress.   HENT:   Head: Normocephalic.   Nose: Nose normal.  NG tube in place.  Mouth/Throat: Grade III mucositis   Eyes: Conjunctivae and EOM are normal. No scleral icterus.   Neck: Normal range of motion. No tracheal deviation present. No thyromegaly present.   Erythema neck area of radiation   Cardiovascular:   Atrial fib rvr   Pulmonary/Chest: Effort normal and breath sounds normal.   Abdominal: Soft. He exhibits no distension and no mass. There is no tenderness. There is no rebound and no guarding.   Musculoskeletal: Normal range of motion. He exhibits edema.   Lymphadenopathy:     He has no cervical adenopathy.   Neurological: He is alert and oriented to person, place, and time.   Skin: Capillary refill takes 2 to 3 seconds. He is not diaphoretic.   Erythema and pain port site   Psychiatric: He has a normal mood and affect. His behavior is normal. Judgment and thought content normal.          RECENT LABS:    Results from last 7 days   Lab Units 08/05/19  0454 08/04/19  0507 08/03/19  1223   WBC 10*3/mm3 1.38* 0.56* 1.09*   HEMOGLOBIN g/dL 14.8 14.1 14.8   HEMATOCRIT % 46.0 44.6 47.2   PLATELETS 10*3/mm3 106* 126* 196   MONOCYTES % % 4.1*  --   --      Lab Results   Component Value Date    NEUTROABS 1.28 (L) 08/05/2019     Results from last 7 days   Lab Units 08/05/19  0454 08/04/19  0507 08/03/19  1223 07/31/19  0933   SODIUM mmol/L 149* 143 144 144   POTASSIUM mmol/L 3.2* 4.1 4.5 4.2   CHLORIDE mmol/L 107  103 107 109*   CO2 mmol/L 30.0* 30.7* 25.1 22.0   BUN mg/dL 24* 21 24* 27*   CREATININE mg/dL 1.17 0.96 1.07 1.12   CALCIUM mg/dL 7.7* 8.7 8.9 8.7   BILIRUBIN mg/dL 2.8*  --  0.9 0.5   ALK PHOS U/L 71  --  97 86   ALT (SGPT) U/L 25  --  36 28   AST (SGOT) U/L 32  --  33 29   GLUCOSE mg/dL 107* 102* 111* 111     Results from last 7 days   Lab Units 08/05/19  0454 08/04/19  0507 08/03/19  1223   INR  2.49* 5.02* 3.97*         Blood Culture - Blood, Arm, Right   Order: [id number]   Collected:  8/3/2019 17:45 Status:  Preliminary result   Visible to patient:  No (Not Released)   Specimen Information: Arm, Right; Blood                  Blood Culture Abnormal Stain Abnormal                  Gram Stain  Aerobic Bottle Gram positive cocci in clusters       Anaerobic Bottle Gram positive cocci in clusters              [facility]           Specimen Collected: 08/03/19 17:45 Last Resulted: 08/04/19 07:17                 Contains abnormal data BNP   Order: [id number]   Status:  Final result   Visible to patient:  No (Not Released)     Ref Range & Units 1d ago   proBNP 5.0-1,800.0 pg/mL 8,857.0 Abnormally high     Resulting Agency   [facility]       Narrative   Performed by: [facility]   Among patients with dyspnea, NT-proBNP is highly sensitive for the detection of acute congestive heart failure. In addition NT-proBNP of <300 pg/ml effectively rules out acute congestive heart failure with 99% negative predictive value.      Specimen Collected: 08/03/19 12:23 Last Resulted: 08/03/19 12:58                Contains critical data Protime-INR   Order: [id number]   Status:  Final result   Visible to patient:  No (Not Released)     Ref Range & Units 05:07   Protime 11.7 - 14.2 Seconds 46.4 Critically high     INR 0.90 - 1.10 5.02 Critically high     Resulting Agency    [facility]          Specimen Collected: 08/04/19 05:07 Last Resulted: 08/04/19 06:12                XR CHEST 1 VW-8/5/2019   Clinical: CHF     COMPARISON  08/03/2019     FINDINGS: Mediport catheter stable in position. There is cardiomegaly  similar to the previous examination. There is again poor delineation of  the left hemidiaphragm with left lung base opacity similar to the  previous examination. This could reflect underlying effusion, infiltrate  or atelectasis. There is improved aeration at the right lung base,  persistent airspace disease at this location. Consolidation or  infiltrate.     There are monitoring leads superimposing the chest. The remainder the  examination is unremarkable.             Assessment/Plan   1.   In summary this patient is a 74-year-old who has been treated FOR HAND NECK CA He has received radiation therapy to the neck and he has been undergoing chemotherapy .  The patient has been admitted with rapid ventricular response, atrial fibrillation, fever, tremendous tenderness and erythema at the port site in anterior chest wall on the right side and associated with this diarrhea with a stool culture consistent with E-coli enteropathogenic. Also the patient has abnormal blood cultures.     Due to poor performance status, chemoradiotherapy will be on hold.    2. The patient has severe neutropenia secondary to chemotherapy.     · Continue currently on Neupogen daily.     3.  Sepsis with positive MSSA septicemia.  Patient has been followed by Dr. Cruz, currently on nafcillin.  · PowerPort was removed earlier this afternoon on 8/5/2019.    3.  Mild thrombocytopenia secondary to chemotherapy.  No bleeding.  Continue to monitor.     4.  Oral mucositis grade 3, secondary to chemoradiation therapy.  Routine therapy will be on hold for now per Dr. Davila.    5.  Coagulopathy due to vitamin K deficiency.      6.  Atrial fibrillation with RVR.  Cardiology to follow.  Patient is on digoxin.    RECOMMENDATIONS:  1.  Continue IV nafcillin, Dr. Cruz to follow.  2.  Continue Neupogen 480 mcg subcutaneous daily in order to bring up his white  blood count.  3. He will require lidocaine solution for the mouth to numb the mouth to try to control the mucositis pain every 2-3 hours.  4. We asked the nurse to give the patient some cranberry juice that will bring given the vitamin K content his INR down.   5.  Continue digoxin per cardiology service.   6. The patient was discussed with the wife in detail. He realizes that he is kind of ill and hopefully in the next few days he will perk up enough to feel custodial decent.      I spoke with his wife today at the bedside.  We will hold any active treatment for his head and neck cancer.     I discussed with my colleague Dr. Joseph who has been taking care of patient last week.    JONATHAN JAVIER M.D., Ph.D.    8/5/2019

## 2019-08-05 NOTE — PROGRESS NOTES
Pt transferred to ICU and presently in OR.  ICU/Pulm management appreciated.   Will follow peripherally while in ICU.  Pleased to see if needed.   Thank you  Macho Fermin MD  8/5/2019  16:14

## 2019-08-05 NOTE — CONSULTS
Nutrition Services    Patient Name:  Cody Eldridge  YOB: 1945  MRN: [id number]  Admit Date:  8/3/2019    CLEMENTINA tom placed and ready to use.    Electronically signed by:  Shantelle Breaux RD  08/05/19 12:18 PM

## 2019-08-05 NOTE — PAYOR COMM NOTE
"UR CONTACT:  CASEY  P: [phone]        F: [phone]        Cody Eldridge (74 y.o. Male)     Date of Birth Social Security Number Address Home Phone MRN    1945  [address] [place] [address] [phone] [id number]    Congregational Marital Status          Holiness        Admission Date Admission Type Admitting Provider Attending Provider Department, Room/Bed    8/3/19 Emergency Macho Fermin MD Broaddus, Emmett J., MD [facility], N340/1    Discharge Date Discharge Disposition Discharge Destination                       Attending Provider:  Macho Fermin MD    Allergies:  Benadryl [Diphenhydramine Hcl]    Isolation:  None   Infection:  None   Code Status:  CPR    Ht:  180.3 cm (71\")   Wt:  70.5 kg (155 lb 6.8 oz)    Admission Cmt:  None   Principal Problem:  New onset of congestive heart failure (CMS/[facility]ly [facility]) [I50.9]                 Active Insurance as of 8/3/2019     Primary Coverage     Payor Plan Insurance Group Employer/Plan Group    [employer] [place] MEDICARE REPLACEMENT [facility] REPL [id number]     Payor Plan Address Payor Plan Phone Number Payor Plan Fax Number Effective Dates    [address] [address] [phone]  2016 - None Entered    [facility] [id number]       Subscriber Name Subscriber Birth Date Member ID       CODY ELDRIDGE 1945 [id number]                 Emergency Contacts      (Rel.) Home Phone Work Phone Mobile Phone    Elise Rai (Spouse) [phone] -- --    Jo Graves (Grandchild) [phone] -- --    CHAD FERRARA (Daughter) [phone] -- --               History & Physical      Macho Fermin MD at 8/3/2019  4:58 PM          HISTORY AND PHYSICAL   [facility]        Patient Identification:  Name: Cody Eldridge  Age: 74 y.o.  Sex: male  :  1945  MRN: [id number]                     Primary Care Physician: Epley, James, APRN    Chief Complaint:  SOA    History of " Present Illness:   74-year-old gentleman with a history of atrial fibrillation which by history is been very difficult to control as for his rate is concerned.  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  He did receive a course of chemotherapy yesterday.  He states that after returning home he was becoming increasingly short of breath.  The shortness of breath would be worsened by exertion or by lying flat.  He denies any chest pain associated with this.  No palpitations, no lightheaded spells.  In the emergency room he was noted to be in A. fib with rapid ventricular response.  He was given 1 dose of labetalol as well as a dose of digoxin 0.5 mg IV.  He was also given a dose of Lasix 40 mg IV.  He is breathing a little easier now.  Still notes no palpitation despite a rapid rate noted on the monitor.  In addition he is also been noting increased tenderness, soreness around his MediPort site over the last 24 hours.  This was placed this last June.  No fever sweats or chills.      Past Medical History:  Past Medical History:   Diagnosis Date   • Atrial fibrillation (CMS/HCC)    • Chronic bronchitis (CMS/HCC)    • COPD (chronic obstructive pulmonary disease) (CMS/HCC)    • Cor pulmonale (chronic) (CMS/HCC)    • Daytime hypersomnia    • Depression with anxiety    • Enlarged prostate    • Frequent nocturnal awakening    • Gout    • H/O cardiac radiofrequency ablation 2006    [facility].   • Head and neck cancer (CMS/HCC)    • Hyperlipidemia    • Hypertension    • Hypotension    • Insomnia    • Lumbar radicular pain    • SHAR (obstructive sleep apnea)    • Osteoarthritis    • Permanent atrial fibrillation (CMS/HCC)    • Snoring    • Squamous cell carcinoma of neck    • SVT (supraventricular tachycardia) (CMS/HCC)    • Syncope    • Vitamin D deficiency    • Weight loss      Past Surgical History:  Past Surgical History:    Procedure Laterality Date   • APPENDECTOMY     • CARDIAC ABLATION  2006    [facility].   • CARDIAC CATHETERIZATION N/A 8/29/2018    Procedure: Left Heart Cath;  Surgeon: Yousif Uribe MD;  Location:  [facility];  Service: Cardiology   • CARDIAC CATHETERIZATION N/A 8/29/2018    Procedure: Coronary angiography;  Surgeon: Yousif Uribe MD;  Location:  [facility];  Service: Cardiology   • CARDIAC CATHETERIZATION N/A 8/29/2018    Procedure: Left ventriculography;  Surgeon: Yousif Uribe MD;  Location:  [facility];  Service: Cardiology   • FINGER SURGERY     • FOOT SURGERY     • HAND SURGERY     • VENOUS ACCESS DEVICE (PORT) INSERTION Right 6/18/2019    Procedure: MEDIPORT PLACEMENT;  Surgeon: Elvis Grigsby MD;  Location: [facility] OR;  Service: Vascular      Home Meds:  Medications Prior to Admission   Medication Sig Dispense Refill Last Dose   • albuterol (PROVENTIL HFA;VENTOLIN HFA) 108 (90 BASE) MCG/ACT inhaler Inhale 2 puffs. ProAir  (90 Base) MCG/ACT Inhalation Aerosol Solution; Patient Sig: ProAir  (90 Base) MCG/ACT Inhalation Aerosol Solution inhale 2 puffs by mouth every 4 hours if needed; 8.5; 11; 07-Apr-2014; Act   Past Month at Unknown time   • ALPRAZolam (XANAX) 1 MG tablet Take 1 mg by mouth Daily As Needed for Anxiety (prior to radiation-takes 1/2).   Past Week at Unknown time   • aluminum-magnesium hydroxide 200-200 MG/5ML suspension, diphenhydrAMINE 12.5 MG/5ML liquid, lidocaine viscous 2 % solution, nystatin 432024 UNIT/ML suspension Swish and swallow 10 mL 4 (Four) Times a Day Before Meals & at Bedtime. 400 mL 1 8/2/2019 at Unknown time   • amiodarone (PACERONE) 200 MG tablet Take one tablet three times a day for four days, then twice daily (Patient taking differently: 200 mg. Take one tablet three times a day for four days, then twice daily) 66 tablet 1 8/3/2019 at Unknown time   • baclofen (LIORESAL) 10 MG tablet  "take 1 tablet by mouth three times a day 90 tablet 1 2019 at Unknown time   • diphenoxylate-atropine (LOMOTIL) 2.5-0.025 MG per tablet Take 1 tablet by mouth 4 (Four) Times a Day As Needed for Diarrhea. 60 tablet 1 8/3/2019 at Unknown time   • HYDROcodone-acetaminophen (NORCO) 7.5-325 MG per tablet Take 1 tablet by mouth 4 (Four) Times a Day As Needed for Moderate Pain  or Severe Pain . 120 tablet 0 Past Month at Unknown time   • montelukast (SINGULAIR) 10 MG tablet Take 10 mg by mouth Every Night.   2019 at Unknown time   • ondansetron ODT (ZOFRAN-ODT) 8 MG disintegrating tablet Take 1 tablet by mouth Every 8 (Eight) Hours As Needed for Nausea or Vomiting. 30 tablet 2 2019 at Unknown time   • rosuvastatin (CRESTOR) 20 MG tablet Take 20 mg by mouth Daily. 30 tablet 5 2019 at Unknown time   • sertraline (ZOLOFT) 50 MG tablet TAKE 1 TABLET BY MOUTH EVERY DAY 90 tablet 1 8/3/2019 at Unknown time   • silver sulfadiazine (SILVADENE) 1 % cream Apply  topically to the appropriate area as directed 2 (Two) Times a Day. 50 g 0 2019 at Unknown time   • warfarin (COUMADIN) 1 MG tablet Take 1 mg by mouth Every Other Day.   2019 at Unknown time       Allergies:  Allergies   Allergen Reactions   • Benadryl [Diphenhydramine Hcl] Other (See Comments) and Dizziness     \"I sleep for about a day\"     Immunizations:  Immunization History   Administered Date(s) Administered   • Flu Vaccine High Dose PF 65YR+ 10/21/2016, 2017, 10/17/2018   • Influenza TIV (IM) 10/29/2015   • Pneumococcal Conjugate 13-Valent (PCV13) 2014   • Zostavax 10/04/2016     Social History:   Social History     Social History Narrative   • Not on file     Social History     Tobacco Use   • Smoking status: Former Smoker     Packs/day: 4.00     Types: Cigarettes     Last attempt to quit: 2000     Years since quittin.6   • Smokeless tobacco: Never Used   • Tobacco comment: started age 11 x 53 years stopped 2000 / daily caffiene "   Substance Use Topics   • Alcohol use: No     Comment: caffeine use     Family History:  Family History   Problem Relation Age of Onset   • Heart attack Mother    • Diabetes Mother    • Heart disease Mother    • Hypertension Mother    • Diabetes Father    • Heart failure Father    • Heart disease Father    • Hypertension Father    • Cancer Brother         colon   • Hypertension Brother    • Colon cancer Brother 50   • Diabetes Sister    • Heart disease Sister    • Hypertension Sister    • COPD Other    • Heart failure Other    • Heart disease Other    • Malig Hyperthermia Neg Hx         Review of Systems  Review of Systems   Constitutional: Negative.    HENT: Negative.    Eyes: Negative.    Respiratory:        As per history of present illness.  Otherwise negative.   Cardiovascular:        As per history of present illness.  Otherwise negative.   Gastrointestinal: Negative.    Endocrine: Negative.    Genitourinary: Negative.    Musculoskeletal: Negative.    Skin:        As per history of present illness.  Otherwise negative.   Allergic/Immunologic: Negative.    Neurological: Negative.    Hematological: Negative.    Psychiatric/Behavioral: Negative.        Objective:  tMax 24 hrs: Temp (24hrs), Av.4 °F (36.3 °C), Min:96.7 °F (35.9 °C), Max:98 °F (36.7 °C)    Vitals Ranges:   Temp:  [96.7 °F (35.9 °C)-98 °F (36.7 °C)] 98 °F (36.7 °C)  Heart Rate:  [] 138  Resp:  [15-16] 16  BP: (104-131)/(85-98) 112/95      Exam:  Physical Exam   Constitutional: He is oriented to person, place, and time. He appears well-developed. No distress.   Thin, moderately frail.   HENT:   Head: Normocephalic and atraumatic.   Right Ear: External ear normal.   Left Ear: External ear normal.   Nose: Nose normal.   Mouth/Throat: Oropharynx is clear and moist.   Eyes: Conjunctivae and EOM are normal. Right eye exhibits no discharge. Left eye exhibits no discharge. No scleral icterus.   Neck: No JVD present. No tracheal deviation present.  No thyromegaly present.   Cardiovascular: Exam reveals no gallop and no friction rub.   Tachycardic and irregular.   Pulmonary/Chest: Effort normal. No respiratory distress. He exhibits no tenderness.   Dull bases.  Overall air movement somewhat suboptimal.   Abdominal: Soft. Bowel sounds are normal. He exhibits no distension and no mass. There is no tenderness. There is no rebound and no guarding.   Musculoskeletal: He exhibits edema. He exhibits no tenderness.   1+ pretibial pitting edema bilaterally.   Neurological: He is alert and oriented to person, place, and time. No cranial nerve deficit. He exhibits normal muscle tone. Coordination normal.   Skin: Skin is warm and dry. He is not diaphoretic.   Radiation burns noted about the anterior neck area.  There is some mild erythema and swelling around the Mediport site.  There is also mild to moderate tenderness in this area.  No increased warmth.   Psychiatric: He has a normal mood and affect. His behavior is normal. Judgment and thought content normal.       Data Review:  All labs and radiology reviewed.    Assessment:    New onset of congestive heart failure:  Cont IV Lasix.  Cardio consult.  Echo...    Atrial fibrillation:  Cardiology consult.     Vascular catheter inflammation, possible infection:  Vasc Surg eval.  Do not use this site.  Blood cultures.      Neutropenia:  Heme/Onc consult.      Chronic anticoagulation w Coumadin:  INR supra-theraputic.  Hold Coumadin and follow INR    Squamous cell carcinoma of neck:    COPD (chronic obstructive pulmonary disease)    Depression with anxiety    CAD (coronary artery disease)       Plan:  Please see above.     Macho Fermin MD  8/3/2019  4:58 PM    EMR Dragon/Transcription disclaimer:   Much of this encounter note is an electronic transcription/translation of spoken language to printed text. The electronic translation of spoken language may permit erroneous, or at times, nonsensical words or phrases to be  inadvertently transcribed; Although I have reviewed the note for such errors, some may still exist.       Electronically signed by Macho Fermin MD at 8/3/2019  5:15 PM          Emergency Department Notes      Alberta Ignacio, RN at 8/3/2019 11:54 AM        Patient presents to er via private vehicle from home.  Patient is reporting increased swelling to bilateral lower extremity that started two days ago.  Patient also has redness and swelling around right chest wall around power port.  Got iv fluids at [facility] yesterday.      Electronically signed by Alberta Ignacio, RN at 8/3/2019 11:56 AM     Akira Bradley PA at 8/3/2019 12:07 PM     Attestation signed by Teodoro Alvares MD at 8/3/2019  6:38 PM          For this patient encounter, I reviewed the NP or PA documentation, treatment plan, and medical decision making. Teodoro Alvares MD 8/3/2019 6:38 PM                  EMERGENCY DEPARTMENT ENCOUNTER    Room Number:  25/25  Date seen:  8/3/2019  Time seen: 12:07 PM  PCP: Epley, James, APRN  Historian: self, pt's wife      HPI:  Chief complaint: BLE Edema  Context: Cody Eldridge is a 74 y.o. male who presents to the ED c/o worsening BLE edema that was noticed yesterday. Pt also c/o SOA and CP. Per pt's wife, pt received IV fluids yesterday at the [facility]. Since then, he has had increased erythema and edema to his power port and the surrounding area along with BLE edema. Pt reports his SOA is worsened with laying flat. Pt called Dr. Davila (Radiology Oncologist) who recommended pt come to the ED for further evaluation. Pt currently receives radiation treatment for squamous cell carcinoma of base of tongue. Hx of AFIB.    MEDICAL RECORD REVIEW     Pt was seen on 7/29/19 for AFIB where pt was found to be intolerant to betablockers for hypotension. Pt was given Metoprolol and was asymptomatic.      ALLERGIES  Benadryl [diphenhydramine hcl]    PAST MEDICAL HISTORY  Active Ambulatory Problems     Diagnosis Date Noted   •  Atopic rhinitis 01/19/2016   • Arthritis of hand 01/19/2016   • Coxitis 01/19/2016   • Benign colonic polyp 01/19/2016   • Neck pain 01/19/2016   • Chronic obstructive pulmonary disease (CMS/HCC) 01/19/2016   • Mixed anxiety depressive disorder 01/19/2016   • Degeneration of intervertebral disc of lumbosacral region 01/19/2016   • Elevated prostate specific antigen (PSA) 01/19/2016   • Hyperlipidemia 01/19/2016   • Hypertension 01/19/2016   • Insomnia 01/19/2016   • Lumbar radiculopathy 01/19/2016   • Osteoarthritis 01/19/2016   • Vitamin D deficiency 01/19/2016   • Abdominal pain 10/04/2016   • Acute URI 01/09/2017   • Myalgia 05/05/2017   • Cramp of both lower extremities 05/05/2017   • Gingivitis 08/08/2017   • Atrial fibrillation (CMS/HCC) 09/25/2017   • Non-rheumatic tricuspid valve insufficiency 10/24/2017   • SHAR (obstructive sleep apnea) 11/28/2017   • Precordial pain 05/29/2018   • IRAHETA (dyspnea on exertion) 05/29/2018   • Coronary artery disease of native artery of native heart with stable angina pectoris (CMS/HCC) 05/29/2018   • Shortness of breath 08/02/2018   • 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) 06/07/2019   • Current use of long term anticoagulation 06/24/2019   • Syncope 06/28/2019   • Dehydration 06/28/2019   • History of cancer 06/28/2019   • History of atrial fibrillation 06/28/2019   • History of CHF (congestive heart failure) 06/28/2019   • Hypotension 06/28/2019   • Fitting and adjustment of vascular catheter 07/01/2019     Resolved Ambulatory Problems     Diagnosis Date Noted   • Cough 01/09/2017     Past Medical History:   Diagnosis Date   • Atrial fibrillation (CMS/HCC)    • Chronic bronchitis (CMS/HCC)    • COPD (chronic obstructive pulmonary disease) (CMS/HCC)    • Cor pulmonale (chronic) (CMS/HCC)    • Daytime hypersomnia    • Depression with anxiety    • Enlarged prostate    • Frequent nocturnal awakening    • Gout    • H/O cardiac radiofrequency ablation 2006   • Head and  neck cancer (CMS/HCC)    • Hyperlipidemia    • Hypertension    • Hypotension    • Insomnia    • Lumbar radicular pain    • SHAR (obstructive sleep apnea)    • Osteoarthritis    • Permanent atrial fibrillation (CMS/HCC)    • Snoring    • Squamous cell carcinoma of neck    • SVT (supraventricular tachycardia) (CMS/HCC)    • Syncope    • Vitamin D deficiency    • Weight loss        PAST SURGICAL HISTORY  Past Surgical History:   Procedure Laterality Date   • APPENDECTOMY     • CARDIAC ABLATION  2006    [facility].   • CARDIAC CATHETERIZATION N/A 8/29/2018    Procedure: Left Heart Cath;  Surgeon: Yousif Uribe MD;  Location: [facility] CATH INVASIVE LOCATION;  Service: Cardiology   • CARDIAC CATHETERIZATION N/A 8/29/2018    Procedure: Coronary angiography;  Surgeon: Yousif Uribe MD;  [facility];  Service: Cardiology   • CARDIAC CATHETERIZATION N/A 8/29/2018    Procedure: Left ventriculography;  Surgeon: Yousif Uribe MD;  Location: [facility] CATH INVASIVE LOCATION;  Service: Cardiology   • FINGER SURGERY     • FOOT SURGERY     • HAND SURGERY     • VENOUS ACCESS DEVICE (PORT) INSERTION Right 6/18/2019    Procedure: MEDIPORT PLACEMENT;  Surgeon: Elvis Grigsby MD;  Location: [facility] MAIN OR;  Service: Vascular       FAMILY HISTORY  Family History   Problem Relation Age of Onset   • Heart attack Mother    • Diabetes Mother    • Heart disease Mother    • Hypertension Mother    • Diabetes Father    • Heart failure Father    • Heart disease Father    • Hypertension Father    • Cancer Brother         colon   • Hypertension Brother    • Colon cancer Brother 50   • Diabetes Sister    • Heart disease Sister    • Hypertension Sister    • COPD Other    • Heart failure Other    • Heart disease Other    • Malig Hyperthermia Neg Hx        SOCIAL HISTORY  Social History     Socioeconomic History   • Marital status:      Spouse name: Elise Delgadon   • Number of children: Not on file   • Years  of education: Some College   • Highest education level: Not on file   Occupational History     Employer: RETIRED   Tobacco Use   • Smoking status: Former Smoker     Packs/day: 4.00     Types: Cigarettes     Last attempt to quit: 2000     Years since quittin.6   • Smokeless tobacco: Never Used   • Tobacco comment: started age 11 x 53 years stopped 2000 / daily caffiene   Substance and Sexual Activity   • Alcohol use: No     Comment: caffeine use   • Drug use: No   • Sexual activity: Defer           REVIEW OF SYSTEMS  Review of Systems   Constitutional: Negative for chills and fever.   HENT: Negative.    Eyes: Negative.    Respiratory: Positive for shortness of breath.    Cardiovascular: Positive for leg swelling. Negative for chest pain and palpitations.   Genitourinary: Negative.    Musculoskeletal: Negative.    Skin: Negative.    Neurological: Negative.    All other systems reviewed and are negative.          PHYSICAL EXAM  ED Triage Vitals [19 1153]   Temp Heart Rate Resp BP SpO2   96.7 °F (35.9 °C) 74 15 -- 97 %      Temp src Heart Rate Source Patient Position BP Location FiO2 (%)   Tympanic Monitor -- -- --     Physical Exam   Constitutional: He is well-developed, well-nourished, and in no distress.   HENT:   Head: Normocephalic.   Neck: Normal range of motion.   Cardiovascular: An irregularly irregular rhythm present. Tachycardia present.   Pulmonary/Chest: He has rhonchi (vascular).   There is a port visible to the R chest wall which is tender with surrounding erythema and scaling skin.   Abdominal: Soft. Bowel sounds are normal.   Musculoskeletal: Normal range of motion.        Right lower leg: He exhibits edema (2+).        Left lower leg: He exhibits edema (2+).   Neurological: He is alert.   Skin: Skin is warm and dry.   Psychiatric: Affect and judgment normal.         LAB RESULTS  Recent Results (from the past 24 hour(s))   Comprehensive Metabolic Panel    Collection Time: 19 12:23 PM    Result Value Ref Range    Glucose 111 (H) 65 - 99 mg/dL    BUN 24 (H) 8 - 23 mg/dL    Creatinine 1.07 0.76 - 1.27 mg/dL    Sodium 144 136 - 145 mmol/L    Potassium 4.5 3.5 - 5.2 mmol/L    Chloride 107 98 - 107 mmol/L    CO2 25.1 22.0 - 29.0 mmol/L    Calcium 8.9 8.6 - 10.5 mg/dL    Total Protein 6.9 6.0 - 8.5 g/dL    Albumin 2.90 (L) 3.50 - 5.20 g/dL    ALT (SGPT) 36 1 - 41 U/L    AST (SGOT) 33 1 - 40 U/L    Alkaline Phosphatase 97 39 - 117 U/L    Total Bilirubin 0.9 0.2 - 1.2 mg/dL    eGFR Non African Amer 67 >60 mL/min/1.73    Globulin 4.0 gm/dL    A/G Ratio 0.7 g/dL    BUN/Creatinine Ratio 22.4 7.0 - 25.0    Anion Gap 11.9 5.0 - 15.0 mmol/L   Protime-INR    Collection Time: 08/03/19 12:23 PM   Result Value Ref Range    Protime 38.6 (H) 11.7 - 14.2 Seconds    INR 3.97 (H) 0.90 - 1.10   BNP    Collection Time: 08/03/19 12:23 PM   Result Value Ref Range    proBNP 8,857.0 (H) 5.0-1,800.0 pg/mL   Troponin    Collection Time: 08/03/19 12:23 PM   Result Value Ref Range    Troponin T <0.010 0.000 - 0.030 ng/mL   CBC Auto Differential    Collection Time: 08/03/19 12:23 PM   Result Value Ref Range    WBC 1.09 (C) 3.40 - 10.80 10*3/mm3    RBC 4.69 4.14 - 5.80 10*6/mm3    Hemoglobin 14.8 13.0 - 17.7 g/dL    Hematocrit 47.2 37.5 - 51.0 %    .6 (H) 79.0 - 97.0 fL    MCH 31.6 26.6 - 33.0 pg    MCHC 31.4 (L) 31.5 - 35.7 g/dL    RDW 19.9 (H) 12.3 - 15.4 %    RDW-SD 67.6 (H) 37.0 - 54.0 fl    MPV 12.2 (H) 6.0 - 12.0 fL    Platelets 196 140 - 450 10*3/mm3    Neutrophil % 90.0 (H) 42.7 - 76.0 %    Lymphocyte % 5.5 (L) 19.6 - 45.3 %    Monocyte % 1.8 (L) 5.0 - 12.0 %    Eosinophil % 0.0 (L) 0.3 - 6.2 %    Basophil % 0.9 0.0 - 1.5 %    Immature Grans % 1.8 (H) 0.0 - 0.5 %    Neutrophils, Absolute 0.98 (L) 1.70 - 7.00 10*3/mm3    Lymphocytes, Absolute 0.06 (L) 0.70 - 3.10 10*3/mm3    Monocytes, Absolute 0.02 (L) 0.10 - 0.90 10*3/mm3    Eosinophils, Absolute 0.00 0.00 - 0.40 10*3/mm3    Basophils, Absolute 0.01 0.00 - 0.20  10*3/mm3    Immature Grans, Absolute 0.02 0.00 - 0.05 10*3/mm3    nRBC 0.9 (H) 0.0 - 0.2 /100 WBC   Scan Slide    Collection Time: 08/03/19 12:23 PM   Result Value Ref Range    Anisocytosis Mod/2+ None Seen    Crenated RBC's Slight/1+ None Seen    WBC Morphology Normal Normal    Platelet Morphology Normal Normal       I ordered the above labs and reviewed the results      RADIOLOGY  XR Chest 1 View   Final Result   Atelectasis/infiltrate at the lung bases with left pleural   effusion, follow-up recommended. Cardiomegaly. Tortuous aorta.       This report was finalized on 8/3/2019 1:13 PM by Dr. Josep Garcia M.D.              I ordered the above noted radiological studies and reviewed the images on the PACS system.     MEDICATIONS GIVEN IN ER  Medications   sodium chloride 0.9 % flush 10 mL (not administered)   metoprolol tartrate (LOPRESSOR) injection 5 mg (5 mg Intravenous Given 8/3/19 1226)   digoxin (LANOXIN) injection 500 mcg (500 mcg Intravenous Given 8/3/19 1332)   furosemide (LASIX) injection 40 mg (40 mg Intravenous Given 8/3/19 1331)       EKG  Interpreted by ED Physician. Please see their document for interpretation.       COURSE & MEDICAL DECISION MAKING  Pertinent Labs and Imaging studies that were ordered and reviewed are noted above.  Results were reviewed/discussed with the patient and they were also made aware of online access.  Pt also made aware that some labs, such as cultures, will not be resulted during ER visit and follow up with PMD is necessary.         PROGRESS AND CONSULTS    Progress Notes:       1219 CXR ordered. BNP ordered. INR ordered. Troponin ordered. Labs ordered.ECG ordered.    1259 Reviewed pt's history and workup with Dr. Alvares (ER physician).  After a bedside evaluation, Dr. Alvares agrees with the plan of care.    1309 Lanoxin ordered. Lasix ordered. Lopressor ordered.    1314 Rechecked pt. Pt is resting comfortably with HR of 128. Discussed w/pt and pt's wife plan is  "to admit for further evaluation as pt has new onset of CHF. Pt understands and agrees with the plan. All questions were answered.     1318 patient with new onset congestive heart failure.  Patient also has uncontrolled atrial fibrillation as well as hypotension.  We will admit patient for further work-up.  Call made to Cardiology    1330 Discussed pt w/Dr. Ornelas (Cardiology) who agrees with admission.    1403 Call made to [facility].    1429 Discussed pt w/Dr. Fermin (A) who agrees to admit pt to telemetry.     Disposition vitals:  /98   Pulse (!) 138   Temp 96.7 °F (35.9 °C) (Tympanic)   Resp 15   Ht 180.3 cm (71\")   Wt 79.9 kg (176 lb 3 oz)   SpO2 97%   BMI 24.57 kg/m²          DIAGNOSIS  Final diagnoses:   New onset of congestive heart failure (CMS/HCC)         DISPOSITION  ADMISSION    Discussed treatment plan and reason for admission with pt/family and admitting physician.  Pt/family voiced understanding of the plan for admission for further testing/treatment as needed.     Documentation assistance provided by abdias Saleem for Akira Bradley PA-C.  Information recorded by the abdias was done at my direction and has been verified and validated by me.       Stiven Leyva  08/03/19 1451       Akira Bradley PA  08/03/19 1603      Electronically signed by Teodoro Alvares MD at 8/3/2019  6:38 PM     Teodoro Alvares MD at 8/3/2019  1:00 PM        Pt presents to the ED complaining of bilateral leg swelling that worsened yesterday when given fluids at [facility]. Pt affirms SOA and CP. Pt states he has a Hx of throat CA.    On exam, pt is alert and oriented x 3 and has erythema to R neck, port in R chest, irreg reg HR in 140s, normal bowel sounds,  2+ pedal edema bilaterally, rhales in bases, and bilateral hand and arm swelling.    EKG    EKG time: 1230  Rhythm/Rate: Afib rat 143  No Acute Ischemia  Non-Specific ST-T changes    unchanged compared to prior on June 28, 2019 except HR has " increased.    Interpreted Contemporaneously by me.  Independently viewed by me    I agree with midlevel plan to treat his Afib and CHF and admit to hospital.    The NOEMI and I have discussed this patient's history, physical exam, and treatment plan.  I have reviewed the documentation and personally had a face to face interaction with the patient. I affirm the documentation and agree with the treatment and plan.  The attached note describes my personal findings.    Documentation assistance provided by abdias Kwon for Dr. Alvares.  Information recorded by the abdias was done at my direction and has been verified and validated by me.       Maddi Kwon  08/03/19 1337       Teodoro Alvares MD  08/03/19 1838      Electronically signed by Teodoro Alvares MD at 8/3/2019  6:38 PM       ICU Vital Signs     Row Name 08/05/19 0905 08/05/19 0850 08/05/19 0835 08/05/19 0820 08/05/19 0800       Vitals    Pulse  125  (Abnormal)   116  131  (Abnormal)   146  (Abnormal)   116    BP  95/66  103/89  78/65  (Abnormal)   98/55  113/48    Noninvasive MAP (mmHg)  80  93  68  64  58       Oxygen Therapy    SpO2  91 %  89 %  (Abnormal)   93 %  96 %  96 %    Device (Oxygen Therapy)  --  --  --  --  nasal cannula    Flow (L/min)  --  --  --  --  4    Row Name 08/05/19 0750 08/05/19 0735 08/05/19 0718 08/05/19 0711 08/05/19 0703       Vitals    Temp  --  --  97.7 °F (36.5 °C)  --  --    Temp src  --  --  Oral  --  --    Pulse  147  (Abnormal)   125  (Abnormal)   146  (Abnormal)   135  (Abnormal)   103    BP  102/45  100/80  106/93  94/68  89/63  (Abnormal)     Noninvasive MAP (mmHg)  63  90  97  84  78       Oxygen Therapy    SpO2  91 %  95 %  85 %  (Abnormal)   94 %  91 %        Lines, Drains & Airways    Active LDAs     Name:   Placement date:   Placement time:   Site:   Days:    Peripheral IV 08/03/19 1223 Left Antecubital   08/03/19    1223    Antecubital   1    Peripheral IV 08/04/19 1058 Left;Posterior Hand   08/04/19     1058    Hand   1    Single Lumen Implantable Port 07/31/19 Right Chest   07/31/19    0936    Chest   [address] [place] [address] Medications (active)       Dose Frequency Start End    acetaminophen (TYLENOL) tablet 650 mg 650 mg Every 4 Hours PRN 8/3/2019     Sig - Route: Take 2 tablets by mouth Every 4 (Four) Hours As Needed for Mild Pain . - Oral    albumin human 5 % solution 500 mL 500 mL Once 8/5/2019 8/5/2019    Sig - Route: Infuse 500 mL into a venous catheter 1 (One) Time. - Intravenous    albuterol (PROVENTIL) nebulizer solution 0.083% 2.5 mg/3mL 2.5 mg Every 6 Hours PRN 8/3/2019     Sig - Route: Take 2.5 mg by nebulization Every 6 (Six) Hours As Needed for Shortness of Air. - Nebulization    ALPRAZolam (XANAX) tablet 1 mg 1 mg Daily PRN 8/3/2019     Sig - Route: Take 2 tablets by mouth Daily As Needed for Anxiety (prior to radiation-takes 1/2). - Oral    amiodarone (NEXTERONE) 360 mg/200 mL (1.8 mg/mL) infusion 0.5 mg/min Continuous 8/5/2019     Sig - Route: Infuse 0.5 mg/min into a venous catheter Continuous. - Intravenous    digoxin (LANOXIN) injection 125 mcg 125 mcg Daily Digoxin 8/5/2019     Sig - Route: Infuse 0.5 mL into a venous catheter Daily. - Intravenous    diphenoxylate-atropine (LOMOTIL) 2.5-0.025 MG per tablet 1 tablet 1 tablet 4 Times Daily PRN 8/3/2019     Sig - Route: Take 1 tablet by mouth 4 (Four) Times a Day As Needed for Diarrhea. - Oral    Filgrastim (NEUPOGEN) injection 480 mcg 480 mcg Every Evening 8/4/2019     Sig - Route: Inject 0.8 mL under the skin into the appropriate area as directed Every Evening. - Subcutaneous    HYDROcodone-acetaminophen (NORCO) 7.5-325 MG per tablet 1 tablet 1 tablet 4 Times Daily PRN 8/3/2019     Sig - Route: Take 1 tablet by mouth 4 (Four) Times a Day As Needed for Moderate Pain  or Severe Pain . - Oral    Lidocaine Viscous HCl (XYLOCAINE) 2 % mouth solution 5 mL 5 mL Every 3 Hours PRN 8/4/2019     Sig - Route: Apply 5 mL to the mouth or throat Every 3  "(Three) Hours As Needed for Mouth Pain. - Mouth/Throat    nafcillin (UNIPEN) 2 g/100 mL 0.9% NS IVPB (mbp) 2 g Every 4 Hours 8/4/2019 9/1/2019    Sig - Route: Infuse 100 mL into a venous catheter Every 4 (Four) Hours. - Intravenous    nitroglycerin (NITROSTAT) SL tablet 0.4 mg 0.4 mg Every 5 Minutes PRN 8/3/2019     Sig - Route: Place 1 tablet under the tongue Every 5 (Five) Minutes As Needed for Chest Pain (Only if SBP Greater Than 100). - Sublingual    ondansetron (ZOFRAN) injection 4 mg 4 mg Every 6 Hours PRN 8/3/2019     Sig - Route: Infuse 2 mL into a venous catheter Every 6 (Six) Hours As Needed for Nausea or Vomiting. - Intravenous    Linked Group 1:  \"Or\" Linked Group Details        ondansetron (ZOFRAN) tablet 4 mg 4 mg Every 6 Hours PRN 8/3/2019     Sig - Route: Take 1 tablet by mouth Every 6 (Six) Hours As Needed for Nausea or Vomiting. - Oral    Linked Group 1:  \"Or\" Linked Group Details        phenylephrine (SHAYNA-SYNEPHRINE) 50 mg in sodium chloride 0.9 % 250 mL (0.2 mg/mL) infusion 0.5-3 mcg/kg/min × 70.5 kg Titrated 8/5/2019     Sig - Route: Infuse 35..5 mcg/min into a venous catheter Dose Adjusted By Provider As Needed. - Intravenous    phytonadione (AQUA-MEPHYTON, VITAMIN K) 5 mg in sodium chloride 0.9 % 50 mL IVPB 5 mg Once 8/5/2019     Sig - Route: Infuse 5 mg into a venous catheter 1 (One) Time. - Intravenous    phytonadione (MEPHYTON, VITAMIN K) tablet 5 mg 5 mg Once 8/4/2019 8/4/2019    Sig - Route: Take 1 tablet by mouth 1 (One) Time. - Oral    sertraline (ZOLOFT) tablet 50 mg 50 mg Daily 8/3/2019     Sig - Route: Take 1 tablet by mouth Daily. - Oral    silver sulfadiazine (SILVADENE, SSD) 1 % cream  2 Times Daily 8/3/2019     Sig - Route: Apply  topically to the appropriate area as directed 2 (Two) Times a Day. - Topical    sodium chloride 0.9 % bolus 500 mL 500 mL Once 8/5/2019 8/5/2019    Sig - Route: Infuse 500 mL into a venous catheter 1 (One) Time. - Intravenous    sodium chloride " "0.9 % flush 10 mL 10 mL As Needed 8/3/2019     Sig - Route: Infuse 10 mL into a venous catheter As Needed for Line Care. - Intravenous    Cosign for Ordering: Accepted by Teodoro Alvares MD on 8/3/2019  7:25 PM    Linked Group 2:  \"And\" Linked Group Details        sodium chloride 0.9 % flush 3 mL 3 mL Every 12 Hours Scheduled 8/3/2019     Sig - Route: Infuse 3 mL into a venous catheter Every 12 (Twelve) Hours. - Intravenous    sodium chloride 0.9 % flush 3-10 mL 3-10 mL As Needed 8/3/2019     Sig - Route: Infuse 3-10 mL into a venous catheter As Needed for Line Care. - Intravenous       Orders (active)     Start     Ordered    08/06/19 0600  Blood Culture - Blood,  Once      08/05/19 0846    08/06/19 0600  Blood Culture - Blood,  Once     Comments:  From a different site than #1.      08/05/19 0846    08/06/19 0600  Comprehensive Metabolic Panel  Morning Draw      08/05/19 0846    08/05/19 1200  digoxin (LANOXIN) injection 125 mcg  Daily Digoxin      08/05/19 1029    08/05/19 1115  phytonadione (AQUA-MEPHYTON, VITAMIN K) 5 mg in sodium chloride 0.9 % 50 mL IVPB  Once      08/05/19 1020    08/05/19 1045  phenylephrine (SHAYNA-SYNEPHRINE) 50 mg in sodium chloride 0.9 % 250 mL (0.2 mg/mL) infusion  Titrated      08/05/19 0955    08/05/19 1005  Elevate Head Of Bed 30-45 Degrees  Continuous      08/05/19 1004    08/05/19 1005  Inpatient Consult to Nutrition Services  Once     Provider:  (Not yet assigned)    08/05/19 1004    08/05/19 1005  Feeding Tube Insertion Per Protocol  Once      08/05/19 1004    08/05/19 1005  RD to Initiate & Manage; Tube Feeding Type: RD to Manage  Diet Effective Now      08/05/19 1004    08/05/19 0730  amiodarone (NEXTERONE) 360 mg/200 mL (1.8 mg/mL) infusion  Continuous      08/05/19 0643    08/05/19 0001  NPO Diet  Diet Effective Midnight      08/04/19 1006    08/04/19 1700  Filgrastim (NEUPOGEN) injection 480 mcg  Every Evening      08/04/19 0918    08/04/19 1130  nafcillin (UNIPEN) 2 g/100 mL " 0.9% NS IVPB (mbp)  Every 4 Hours      08/04/19 1043    08/04/19 0917  Lidocaine Viscous HCl (XYLOCAINE) 2 % mouth solution 5 mL  Every 3 Hours PRN      08/04/19 0918    08/04/19 0600  Protime-INR  Daily      08/03/19 1716    08/04/19 0600  CBC & Differential  Daily      08/03/19 1720    08/04/19 0000  Inpatient Case Management  Consult  Once     Provider:  (Not yet assigned)    08/03/19 1711    08/03/19 2100  silver sulfadiazine (SILVADENE, SSD) 1 % cream  2 Times Daily      08/03/19 1720    08/03/19 2100  sodium chloride 0.9 % flush 3 mL  Every 12 Hours Scheduled      08/03/19 1720    08/03/19 2000  Vital Signs  Every 4 Hours      08/03/19 1720    08/03/19 1815  sertraline (ZOLOFT) tablet 50 mg  Daily      08/03/19 1720    08/03/19 1800  Strict Intake & Output  Every Hour      08/03/19 1716    08/03/19 1752  Measure QTc every 8 hours call cardiology if over 500 msec.  Nursing Communication  Once     Comments:  Measure QTc every 8 hours call cardiology if over 500 msec.    08/03/19 1751    08/03/19 1720  ondansetron (ZOFRAN) tablet 4 mg  Every 6 Hours PRN      08/03/19 1720    08/03/19 1720  ondansetron (ZOFRAN) injection 4 mg  Every 6 Hours PRN      08/03/19 1720    08/03/19 1720  acetaminophen (TYLENOL) tablet 650 mg  Every 4 Hours PRN      08/03/19 1720    08/03/19 1720  Telemetry - Maintain IV Access  Continuous      08/03/19 1720    08/03/19 1720  Continuous Cardiac Monitoring  Continuous      08/03/19 1720    08/03/19 1720  May Be Off Telemetry for Tests  Continuous      08/03/19 1720    08/03/19 1720  ACLS Protocol For Life Threatening Dysrhythmias (Unless Code Status Indicates Otherwise)  Continuous      08/03/19 1720    08/03/19 1720  Notify Provider if ACLS Protocol Activated  Until Discontinued      08/03/19 1720    08/03/19 1719  nitroglycerin (NITROSTAT) SL tablet 0.4 mg  Every 5 Minutes PRN      08/03/19 1720    08/03/19 1711  Code Status and Medical Interventions:  Continuous       08/03/19 1720    08/03/19 1719  Intake & Output  Every Shift      08/03/19 1720    08/03/19 1719  Weigh Patient  Once      08/03/19 1720    08/03/19 1719  Oxygen Therapy- Nasal Cannula; Titrate for SPO2: 90% - 95%  Continuous      08/03/19 1720    08/03/19 1719  Insert Peripheral IV  Once      08/03/19 1720    08/03/19 1718  sodium chloride 0.9 % flush 3-10 mL  As Needed      08/03/19 1720    08/03/19 1717  HYDROcodone-acetaminophen (NORCO) 7.5-325 MG per tablet 1 tablet  4 Times Daily PRN      08/03/19 1720    08/03/19 1717  diphenoxylate-atropine (LOMOTIL) 2.5-0.025 MG per tablet 1 tablet  4 Times Daily PRN      08/03/19 1720    08/03/19 1717  Daily Weights  Daily      08/03/19 1716    08/03/19 1716  ALPRAZolam (XANAX) tablet 1 mg  Daily PRN      08/03/19 1720    08/03/19 1716  albuterol (PROVENTIL) nebulizer solution 0.083% 2.5 mg/3mL  Every 6 Hours PRN      08/03/19 1720    08/03/19 1218  Insert peripheral IV  Once      08/03/19 1219    08/03/19 1217  sodium chloride 0.9 % flush 10 mL  As Needed      08/03/19 1219    Unscheduled  Telemetry - Pulse Oximetry  Continuous PRN     Comments:  If Patient Develops Unresponsiveness, Acute Dyspnea, Cyanosis or Suspected Hypoxemia Start Continuous Pulse Ox Monitoring, Apply Oxygen & Notify Provider    08/03/19 1720    Unscheduled  Oxygen Therapy- Nasal Cannula; Titrate for SPO2: 90% - 95%  Continuous PRN     Comments:  If Patient Develops Unresponsiveness, Acute Dyspnea, Cyanosis or Suspected Hypoxemia Start Continuous Pulse Ox Monitoring, Apply Oxygen & Notify Provider    08/03/19 1720    Unscheduled  ECG 12 Lead  As Needed     Comments:  Nurse to Release if Patient Expericences Acute Chest Pain or Dysrhythmias    08/03/19 1720    Unscheduled  Potassium  As Needed     Comments:  For Ventricular Arrhythmias      08/03/19 1720    Unscheduled  Magnesium  As Needed     Comments:  For Ventricular Arrhythmias      08/03/19 1720    Unscheduled  Troponin  As Needed     Comments:   For Chest Pain      08/03/19 1720    Unscheduled  Digoxin Level  As Needed     Comments:  For Atrial Arrhythmias      08/03/19 1720    Unscheduled  Blood Gas, Arterial  As Needed     Comments:  Per O2 PolicyNotify Physician      08/03/19 1720    Unscheduled  Up With Assistance  As Needed      08/03/19 1720    Unscheduled  Javier and Document Tube Depth (in cm)  As Needed      08/05/19 1004               Physician Progress Notes       Corby Ford MD at 8/5/2019  9:58 AM           LOS: 2 days     Chief Complaint:  Follow-up MSSA septicemia w/ port in place    Interval History:  Moved to CCU for hypotension and Afib with RVR. Now on pressors and amiodarone. No new symptoms today. BP and HR improved now with these interventions. Going for port removal this afternoon. Tolerating nafcillin w/o rash or diarrhea.     ROS: no chest pain, no rash, no vomiting    Vital Signs  Temp:  [97.3 °F (36.3 °C)-98.7 °F (37.1 °C)] 97.7 °F (36.5 °C)  Heart Rate:  [103-147] 125  Resp:  [16-18] 18  BP: ()/(35-99) 95/66    Physical Exam:  General: awake, alert  Head: Normocephalic  Eyes: no scleral icterus  ENT: MM dry, + mucositis, no thrush  Neck: Supple, no visible thyromegaly  Cardiovascular: tachy, irreg irreg rhythm; trace LE edema  Respiratory:  normal work of breathing on ambient air  GI: Abdomen is soft, non-tender, non-distended,  : no Bazan catheter present  Skin: dry skin and erythema of the neck  Neurological: Alert and oriented x 3,  motor strength 5/5 in all four extremities  Psychiatric: Normal mood and affect   Vasc: R port not access; mild erythema    Antibiotics:  •  nafcillin (UNIPEN) 2 g/100 mL 0.9% NS IVPB (mbp), 2 g, Intravenous, Q4H    LABS:  CBC, CMP, micro reviewed today  Lab Results   Component Value Date    WBC 1.38 (C) 08/05/2019    HGB 14.8 08/05/2019    HCT 46.0 08/05/2019    .0 (H) 08/05/2019     (L) 08/05/2019     Lab Results   Component Value Date    GLUCOSE 107 (H)  "2019    BUN 24 (H) 2019    CREATININE 1.17 2019    EGFRIFNONA 61 2019    EGFRIFAFRI 107 2018    BCR 20.5 2019    CO2 30.0 (H) 2019    CALCIUM 7.7 (L) 2019    PROTENTOTREF 7.2 2018    ALBUMIN 2.10 (L) 2019    LABIL2 1.3 2018    AST 32 2019    ALT 25 2019     Microbiology:  8/3 BCx: MSSA in 2/2 sets   BCx: requested     Radiology (personally reviewed report):  CXR with atelectasis    Assessment/Plan   1. Septic shock due to MSSA septicemia (worse/new)  2. Neutropenic fever  3. Port in place  4. 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 Atrial fibrillation with RVR    Moved to CCU for hypotension and Afib with RVR now improved with Levophed and amiodarone. Continue nafcillin 2 g IV q4h for MSSA. Plans to remove port later today. Will repeat BCx in AM to document sterility and will then plan a 4 week course of antibiotics. Will order TTE tomorrow since he already has other interventions planned today.      Thank you for this consult. ID will follow. Case d/w RN Courtney.              Electronically signed by Corby Ford MD at 2019 10:03 AM     Macho Fermin MD at 2019  3:18 PM          DAILY PROGRESS NOTE  [facility]    Patient Identification:  Name: Cody Eldridge  Age: 74 y.o.  Sex: male  :  1945  MRN: [id number]         Primary Care Physician: Epley, James, APRN      Subjective  No new c/o.     Objective:  General Appearance:  Comfortable, well-appearing, in no acute distress and not in pain.    Vital signs: (most recent): Blood pressure 132/99, pulse (!) 142, temperature 97.3 °F (36.3 °C), temperature source Oral, resp. rate 16, height 180.3 cm (71\"), weight 70.6 kg (155 lb 11.2 oz), SpO2 94 %.    Lungs:  Normal effort and normal respiratory rate.  Breath sounds clear to auscultation.    Heart: Tachycardia.  Irregular rhythm.    Extremities: There is no dependent edema.  "   Neurological: Patient is alert and oriented to person, place and time.    Skin:  Warm and dry.  (Swelling over Port sight. )              Vital signs in last 24 hours:  Temp:  [97.3 °F (36.3 °C)-98.8 °F (37.1 °C)] 97.3 °F (36.3 °C)  Heart Rate:  [112-155] 142  Resp:  [16] 16  BP: (108-135)/(58-99) 132/99    Intake/Output:    Intake/Output Summary (Last 24 hours) at 8/4/2019 1518  Last data filed at 8/4/2019 1340  Gross per 24 hour   Intake 620 ml   Output 4650 ml   Net -4030 ml         Results from last 7 days   Lab Units 08/04/19  0507 08/03/19 1223 07/31/19  0922 07/29/19  0835   WBC 10*3/mm3 0.56* 1.09* 1.61* 2.61*   HEMOGLOBIN g/dL 14.1 14.8 13.0 13.1   PLATELETS 10*3/mm3 126* 196 207 183     Results from last 7 days   Lab Units 08/04/19  0507 08/03/19  1223 07/31/19  0933 07/29/19  0835   SODIUM mmol/L 143 144 144 142   POTASSIUM mmol/L 4.1 4.5 4.2 4.3   CHLORIDE mmol/L 103 107 109* 108*   CO2 mmol/L 30.7* 25.1 22.0 22.1   BUN mg/dL 21 24* 27* 25*   CREATININE mg/dL 0.96 1.07 1.12 1.00   GLUCOSE mg/dL 102* 111* 111 124   Estimated Creatinine Clearance: 66.4 mL/min (by C-G formula based on SCr of 0.96 mg/dL).  Results from last 7 days   Lab Units 08/04/19  0507 08/03/19 1223 07/31/19 0933 07/29/19  0835   CALCIUM mg/dL 8.7 8.9 8.7 8.8   ALBUMIN g/dL  --  2.90* 2.80* 2.90*     Results from last 7 days   Lab Units 08/03/19  1223 07/31/19  0933 07/29/19  0835   ALBUMIN g/dL 2.90* 2.80* 2.90*   BILIRUBIN mg/dL 0.9 0.5 0.6   ALK PHOS U/L 97 86 83   AST (SGOT) U/L 33 29 29   ALT (SGPT) U/L 36 28 26       Assessment:   New onset of congestive heart failure:       Atrial fibrillation: Cardiology input appreciated.     Vascular catheter inflammation, Probable infection:  Vasc Surg eval appreciated.    MSSA septicemia:  IV Nafcillin.  ID input appreciated.     Neutropenia:  Heme/Onc  appreciated.     Chronic anticoagulation w Coumadin:  INR supra-theraputic.  Hold Coumadin and follow INR    Squamous cell carcinoma of  neck:    COPD (chronic obstructive pulmonary disease)    Depression with anxiety    CAD (coronary artery disease)      Plan:  Please see above.     Macho Fermin MD  8/4/2019  3:18 PM      Electronically signed by Macho Fermin MD at 8/4/2019  3:23 PM          Consult Notes       Danelle Galeano MD at 8/5/2019  7:59 AM      Consult Orders    1. Inpatient Pulmonology Consult [[id number]] ordered by Chetan Driver MD at 08/05/19 0609                               Referring Provider: Dr. Driver   Reason for Consultation: ICU care    Patient Care Team:  Epley, James, APRN as PCP - General (Family Medicine)  Payal Waters MD as Consulting Physician (Pain Medicine)  Lorna Chaidez (Nurse Practitioner)  Kristal Cowart SAMSON as Pharmacist  RenoFritz MD as Referring Physician (Otolaryngology)  Pablo Heaton MD as Consulting Physician (Hematology and Oncology)  Annie Davila MD as Consulting Physician (Radiation Oncology)  Chetan Heaton MD as Consulting Physician (Urology)    Chief complaint:   Hypertension    History of present illness:  Subjective   This is a 74-year-old male patient with 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    He presented to the hospital on 8/3/19 with dyspnea and was found to be in A fib RVR.  Is currently being treated with metoprolol and amiodarone.  Was also found to be bacteremic with suspected MediPort infection due to tenderness and pain around the MediPort.  He was treated initially with vancomycin but then switched to nafcillin by infectious disease when the culture grew MSSA.    Overnight, patient developed hypotension and tachycardia and was transferred to the CCU.  On my evaluation, patient was sleeping.  His wife was next to him.  She stated that he did not sleep at  night.  He was a little hard to arouse but then when he woke up he was alert and oriented.  He did not really have much complaints for me.  His wife stated that he lost about 50 pounds over the last couple of months and has not been eating at all recently.  He has pain in his mouth secondary to chemotherapy and perhaps radiation.  He has poor appetite.  He is currently on oxygen 4 L/min and he does not use oxygen at home.  He has not had fever during his stay but has been always tachycardic.    Labs reviewed:  Sodium 149; potassium 3.2; creatinine 1.1 (baseline normal) WBC 1.38; platelets 106; hemoglobin normal; direct bilirubin 2.6; albumin 2.1; INR 5 on admission down to 2.5 today.      Review of Systems  Constitutional: No fever or chills.  Significant weight loss.  Decreased appetite.  Or postnasal drip.  ENMT: No sinus congestion  Cardiovascular: No chest pain, palpitation but legs swelling.   Respiratory: No dyspnea, cough or wheezing.  Gastrointestinal: No constipation, diarrhea or abdominal pain   Neurology: No headache, weakness, numbness or dizziness.   Musculoskeletal: No joints pain, stiffness or swelling.   Psychiatry: No depression.  Genitourinary: No dysuria or frequent urination  Endo: No weight changes. No cold or warm intolerance.  Lymphatic: No swollen glands.  Integumentary: No rash.      Scheduled Meds:    baclofen 10 mg Oral TID   filgrastim (NEUPOGEN) injection 480 mcg Subcutaneous Q PM   metoprolol tartrate 50 mg Oral Q12H   montelukast 10 mg Oral Nightly   nafcillin 2 g Intravenous Q4H   rosuvastatin 20 mg Oral Daily   sertraline 50 mg Oral Daily   silver sulfadiazine  Topical BID   sodium chloride 3 mL Intravenous Q12H   vancomycin 1,500 mg Intravenous Once   , Continuous Infusions:    amiodarone 0.5 mg/min Last Rate: 0.5 mg/min (08/05/19 0644)   norepinephrine 0.02-0.3 mcg/kg/min Last Rate: 0.07 mcg/kg/min (08/05/19 0707)    and Allergies:  Benadryl [diphenhydramine hcl]    Objective      Vital Signs   Temp:  [97.3 °F (36.3 °C)-98.7 °F (37.1 °C)] 97.7 °F (36.5 °C)  Heart Rate:  [103-146] 146  Resp:  [16-18] 18  BP: ()/(35-99) 106/93    Physical Exam:  Constitutional: Not in acute distress.  Difficult to arouse but was alert when he woke up.  Eyes: Injected conjunctiva, EOMI. pupils equal reactive to light  ENMT: Mendez 3.  Small and slightly ulcerated tongue.  Small oral cavity.  Neck: Trachea midline. No thyromegaly.  Tenderness on the palpation of the MediPort the right upper chest along the base of the right neck.  Heart: Irregular rhythm and tachycardia.  No audible murmur.  Lungs: Mild crackles at the bases with decreased air entry.  No wheezing or rhonchi.  Nonlabored breathing.  Abdomen: Soft. No tenderness or dullness. No HSM.  Extremities: No cyanosis, clubbing +2 pitting edema in legs. Moves all extremities.  Neuro: Conscious, alert, oriented x3.  Strength 5/5 in arms and legs.  Psych: Appropriate mood and affect.    Integumentary: Mild excoriated rash on the right neck..  Changes consistent with actinic keratosis.  Lymphatic: No palpable cervical or supraclavicular lymph nodes.      Diagnostic imaging:  I personally and independently reviewed the following images:   Pulmonary vascular edema.        Assessment   1. Septic shock  2. MSSA bacteremia  3. Suspected infection of the MediPort  4. Acute on chronic diastolic CHF  5. Acute hypoxic respiratory failure, secondary to pulmonary edema  6. Pulmonary edema  7. JERICHO  8. A. fib with RVR  9. Stomatitis  10. Pancytopenia secondary to recent chemotherapy  11. Coagulopathy, suspect related to vit K defficiency from poor intake  12. Poor appetite secondary to stomatitis and chemotherapy  13. Weight loss  14. Hyperbilirubinemia, elevated direct bilirubin with normal liver enzymes  15. Hypernatremia  16. Hypokalemia  17. Protein calorie malnutrition, albumin 2.1  18. Severe pulmonary hypertension on echo dated 10/12/2017, RVSP 47.   Mildly dilated left atrium.  Probably group 2 secondary diastolic CHF and group 3 secondary to COPD and SHAR  19. SHAR  20. COPD, no exacerbation    Recommendations:  · Check lactic acid  · Resuscitate with 500 ml NS and 500 mL albumin bolus due to CHF.  If lactic acid is elevated we will give more crystalloids as well.  He is intravascularly volume depleted but has extravascular volume overload  · Needs a central line for pressors administration.  Will reassess after the albumin bolus.  · Insert core track for feeding. .  Consult nutrition.  Watch for refeeding syndrome  · Replace potassium  · Neupogen per oncology  · Methicillin per ID.  Agree with echocardiogram to rule out vegetations.  · Stop levo fed.  Start Eugene-Synephrine instead due to A. fib RVR  · Amiodarone for A. fib per cardiology  · Agree with MediPort removal due to highly suspected infection.  Vascular surgery consulted for that.  · Give another dose of vitamin K 5 mg today due to persistent coagulopathy and in anticipation of surgery  · DC metoprolol due to hypotension  · Check magnesium and phosphorus.  Check UA due to JERICHO but suspect this is related to sepsis and dehydration          I discussed the patients findings and my recommendations with patient, family and nursing staff    Danelle Galeano MD  08/05/19  7:59 AM    Time: Critical care 48 min          Electronically signed by Danelle Galeano MD at 8/5/2019 10:21 AM     Corby Ford MD at 8/4/2019 10:37 AM      Consult Orders    1. 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 History is obtained from the patient and review of the old medical records which I summarize/synthesize as follows: He presented to the ER on 8/3/19 with worsening BLE edema, chest pain, and SOA. He also noticed erythema and had short pain at his port site. This was placed in June 2019. He said the erythema started after a recent infusion of IVFs. His blood cultures are positive for MSSA so ID consulted for further recommendations. He has not yet received antibiotics as vancomycin was just ordered.    Per the oncology note, \"He was able to complete 4 weekly chemotherapy treatments in a row but required holding chemotherapy for the last 2 weeks due to myelosuppression.\" He has significant mucositis and oral pain. He is in Afib with RVR but not SOA.    Allergies:  No Antibiotic Allergies    Medications:    Current Facility-Administered Medications:   •  acetaminophen (TYLENOL) tablet 650 mg, 650 mg, Oral, Q4H PRN, Macho Fermin MD  •  albuterol (PROVENTIL) nebulizer solution 0.083% 2.5 mg/3mL, 2.5 mg, Nebulization, Q6H PRN, Macho Fermin MD  •  ALPRAZolam (XANAX) tablet 1 mg, 1 mg, Oral, Daily PRN, Macho Fermin MD  •  amiodarone (NEXTERONE) 360 mg/200 mL (1.8 mg/mL) infusion, 1 mg/min, Intravenous, Continuous, Lou Day APRN, Last Rate: 16.67 mL/hr at 08/04/19 0158, 0.5 mg/min at 08/04/19 0158  •  baclofen (LIORESAL) tablet 10 mg, 10 mg, Oral, TID, Macho Fermin MD, 10 mg at 08/04/19 1021  •  diphenoxylate-atropine (LOMOTIL) 2.5-0.025 MG per tablet 1 tablet, 1 tablet, Oral, 4x Daily PRN, Macho Fermin MD  •  Filgrastim (NEUPOGEN) injection 480 mcg, 480 mcg, Subcutaneous, Q PM, Lowell Joseph MD  •  furosemide (LASIX) injection 40 mg, 40 mg, Intravenous, Q12H, Macho Fermin MD, 40 mg at 08/04/19 0634  •  HYDROcodone-acetaminophen (NORCO) 7.5-325 MG per tablet 1 tablet, 1 tablet, Oral, 4x Daily PRN, Macho Fermin MD  •  Lidocaine Viscous HCl " (XYLOCAINE) 2 % mouth solution 5 mL, 5 mL, Mouth/Throat, Q3H PRN, Lowell Joseph MD  •  montelukast (SINGULAIR) tablet 10 mg, 10 mg, Oral, Nightly, Macho Fermin MD, 10 mg at 08/03/19 2008  •  nitroglycerin (NITROSTAT) SL tablet 0.4 mg, 0.4 mg, Sublingual, Q5 Min PRN, Macho Fermin MD  •  ondansetron (ZOFRAN) tablet 4 mg, 4 mg, Oral, Q6H PRN **OR** ondansetron (ZOFRAN) injection 4 mg, 4 mg, Intravenous, Q6H PRN, Macho Fermin MD  •  Pharmacy to dose vancomycin, , Does not apply, Daily, Macho Fermin MD  •  phytonadione (MEPHYTON, VITAMIN K) tablet 5 mg, 5 mg, Oral, Once, Faisal Frias MD  •  rosuvastatin (CRESTOR) tablet 20 mg, 20 mg, Oral, Daily, Macho Fermin MD, 20 mg at 08/04/19 1022  •  sertraline (ZOLOFT) tablet 50 mg, 50 mg, Oral, Daily, Macho Fermin MD, 50 mg at 08/04/19 1022  •  silver sulfadiazine (SILVADENE, SSD) 1 % cream, , Topical, BID, Macho Fermin MD  •  [COMPLETED] Insert peripheral IV, , , Once **AND** sodium chloride 0.9 % flush 10 mL, 10 mL, Intravenous, PRN, Akira Bradley PA  •  sodium chloride 0.9 % flush 3 mL, 3 mL, Intravenous, Q12H, Macho Fermin MD, 3 mL at 08/04/19 1023  •  sodium chloride 0.9 % flush 3-10 mL, 3-10 mL, Intravenous, PRN, Macho Fermin MD  •  vancomycin 1500 mg/500 mL 0.9% NS IVPB ([facility]), 1,500 mg, Intravenous, Once, Macho Fermin MD  •  vancomycin 750 mg/250 mL 0.9% NS add-vantage, 750 mg, Intravenous, Q12H, Macho Fermin MD    Review of Systems  All systems were reviewed and are negative unless otherwise stated above in the HPI    Objective   Vital Signs   Temp:  [96.7 °F (35.9 °C)-98.8 °F (37.1 °C)] 97.9 °F (36.6 °C)  Heart Rate:  [] 137  Resp:  [15-16] 16  BP: (104-135)/(80-98) 135/96    Physical Exam:   General: awake, alert  Head: Normocephalic, atraumatic  Eyes: PERRL, EOMI, no scleral icterus, no conjunctival pallor, no conjunctival hemorrhages.   ENT: MM dry, +  mucositis, no thrush  Neck: Supple, no visible thyromegaly  Cardiovascular: tachy, irreg irreg rhythm; trace LE edema  Respiratory: Lungs are clear to auscultation bilaterally, no rales or wheezing; normal work of breathing on ambient air  GI: Abdomen is soft, non-tender, non-distended, normal bowel sounds in all four quadrants; no hepatosplenomegaly, no masses palpated  : no Bazan catheter present  Musculoskeletal: no joint abnormalities, normal musculature  Skin: dry skin and erythema of the neck; mild erythema and swelling near port site  Neurological: Alert and oriented x 3,  motor strength 5/5 in all four extremities  Psychiatric: Normal mood and affect     Vasc: no cyanosis;    Labs:     Lab Results   Component Value Date    WBC 0.56 (C) 08/04/2019    HGB 14.1 08/04/2019    HCT 44.6 08/04/2019    .9 (H) 08/04/2019     (L) 08/04/2019       Lab Results   Component Value Date    GLUCOSE 102 (H) 08/04/2019    BUN 21 08/04/2019    CREATININE 0.96 08/04/2019    EGFRIFNONA 76 08/04/2019    EGFRIFAFRI 107 04/23/2018    BCR 21.9 08/04/2019    CO2 30.7 (H) 08/04/2019    CALCIUM 8.7 08/04/2019    PROTENTOTREF 7.2 04/23/2018    ALBUMIN 2.90 (L) 08/03/2019    LABIL2 1.3 04/23/2018    AST 33 08/03/2019    ALT 36 08/03/2019     Lab Results   Component Value Date    INR 5.02 (C) 08/04/2019    INR 3.97 (H) 08/03/2019    INR 2.70 (H) 07/29/2019    PROTIME 46.4 (C) 08/04/2019    PROTIME 38.6 (H) 08/03/2019    PROTIME 32.3 (H) 07/29/2019       Microbiology:  8/3 BCx: MSSA in 2/2 sets    Radiology (personally reviewed images/report):  CXR with LLL atelectasis    Assessment/Plan   1. MSSA septicemia  2. Neutropenic fever  3. Port in place  4. 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 Atrial fibrillation with RVR    Stop vancomycin. Start nafcillin 2 g IV q4h for MSSA. I recommend port removal. I will repeat blood cultures after port has been removed tomorrow. Obtain TTE to rule out any vegetations. I  anticipate a 4 week course of antibiotics via PICC line at discharge.     Thank you for this consult. ID will follow. I discussed the case with Dr Frias.      Electronically signed by Corby Ford MD at 2019 11:09 AM     Faisal Frias MD at 2019  9:54 AM      Consult Orders    1. Inpatient Vascular Surgery Consult [[id number]] ordered by Macho Fermin MD at 19 1655                Name: Cody Eldridge ADMIT: 8/3/2019   : 1945  PCP: Epley, James, APRN    MRN: [id number] LOS: 1 days   AGE/SEX: 74 y.o. male  ROOM: [address]         CHIEF COMPLAINT: infected mediport   HPI: Cody Eldridge is a 74 y.o. male whose previous medical records I have reviewed and summarize below in addition to the findings from today's exam.  He is a very pleasant 74-year-old male who had a right internal jugular vein Mediport placed on 2019 by  for squamous cell cancer of the tongue.  A few days ago after he received fluids through the port, he had bilateral leg swelling and noted that the port site itself seemed to be swollen and painful.  This is continued to be painful over the last few days and he has now been admitted with pancytopenia, fever, atrial fibrillation with rapid ventricular response, chest wall cellulitis, positive blood cultures with a presumed diagnosis of an infected Mediport.    Location: Left chest wall  Quality: Pain  Severity: Severe  Duration: 5 days  Timing: Constant  Context: Underlying Metaport  Modifying Factors: Pancytopenia from chemotherapy and radiation  Associated Signs and Symptoms: Atrial fibrillation, fever, positive blood cultures                ALLERGIES: Benadryl [diphenhydramine hcl]     COMPLETE REVIEW OF SYSTEMS:     Review of Systems   Constitutional: Positive for activity change, fatigue and fever.   HENT: Positive for mouth sores and sore throat.    Respiratory: Negative for cough and shortness of breath.   "  Cardiovascular: Positive for palpitations and leg swelling. Negative for chest pain.   Gastrointestinal: Negative for abdominal pain, nausea and vomiting.   Musculoskeletal: Negative for back pain and gait problem.        Pain at Mediport site, right chest   Skin: Negative for color change and wound.   Neurological: Negative for speech difficulty and headaches.         PHYSICAL EXAM:   Patient Vitals for the past 24 hrs:   BP Temp Temp src Pulse Resp SpO2 Height Weight   08/04/19 0730 135/96 97.9 °F (36.6 °C) Oral (!) 137 16 92 % -- --   08/04/19 0610 -- -- -- -- -- -- -- 70.6 kg (155 lb 11.2 oz)   08/04/19 0100 124/80 -- -- -- 16 -- -- --   08/04/19 0031 -- -- -- 112 -- -- -- --   08/03/19 2334 -- 98.3 °F (36.8 °C) Oral (!) 155 16 93 % -- --   08/03/19 2100 -- -- -- 120 -- 98 % -- --   08/03/19 2023 128/84 98.8 °F (37.1 °C) Oral -- -- -- -- --   08/03/19 1619 112/95 98 °F (36.7 °C) Oral (!) 138 16 92 % 180.3 cm (71\") 76.6 kg (168 lb 12.8 oz)   08/03/19 1523 -- -- -- (!) 152 -- 91 % -- --   08/03/19 1509 131/94 -- -- (!) 130 -- 94 % -- --   08/03/19 1413 -- -- -- (!) 138 -- -- -- --   08/03/19 1411 121/98 -- -- 108 16 97 % -- --   08/03/19 1330 104/85 -- -- (!) 132 15 97 % -- --   08/03/19 1222 113/87 -- -- (!) 154 15 94 % -- --   08/03/19 1209 107/91 -- -- -- -- -- -- --   08/03/19 1207 -- -- -- -- -- -- -- 79.9 kg (176 lb 3 oz)   08/03/19 1153 -- 96.7 °F (35.9 °C) Tympanic 74 15 97 % 180.3 cm (71\") --        Physical Exam   Constitutional: He is oriented to person, place, and time.   I have reviewed the vital signs listed in this exam.    Elderly, chronically ill-appearing   HENT:   No jugular venous distension  Inflammatory changes of the mouth and tongue   Eyes: Conjunctivae are normal.   Cardiovascular: Normal rate, regular rhythm and normal heart sounds.   Pulses:       Carotid pulses are 2+ on the right side, and 2+ on the left side.       Radial pulses are 2+ on the right side, and 2+ on the left side.     "    Femoral pulses are 2+ on the right side, and 2+ on the left side.       Popliteal pulses are 2+ on the right side, and 2+ on the left side.        Posterior tibial pulses are 2+ on the right side, and 2+ on the left side.   PMI not palpable  No Abdominal aortic aneurysm is palpable.    Pulmonary/Chest: Effort normal and breath sounds normal. No respiratory distress.   Abdominal: Soft. He exhibits no distension. There is no tenderness. There is no guarding.   No hepatosplenomegaly is palpable.      Musculoskeletal: He exhibits no deformity.   Normal muscular tone of the four extremities without flaccidity, atrophy, or abnormal movements.     Inspection of the digits and nails is negative for clubbing, cyanosis, infection, or other pathology.     Mediport in the right chest is tender to touch, minimal erythema   Neurological: He is alert and oriented to person, place, and time.   Skin: Skin is warm and dry. No rash noted.   Psychiatric: He has a normal mood and affect.             LABS:      Recent Results (from the past 24 hour(s))   Comprehensive Metabolic Panel    Collection Time: 08/03/19 12:23 PM   Result Value Ref Range    Glucose 111 (H) 65 - 99 mg/dL    BUN 24 (H) 8 - 23 mg/dL    Creatinine 1.07 0.76 - 1.27 mg/dL    Sodium 144 136 - 145 mmol/L    Potassium 4.5 3.5 - 5.2 mmol/L    Chloride 107 98 - 107 mmol/L    CO2 25.1 22.0 - 29.0 mmol/L    Calcium 8.9 8.6 - 10.5 mg/dL    Total Protein 6.9 6.0 - 8.5 g/dL    Albumin 2.90 (L) 3.50 - 5.20 g/dL    ALT (SGPT) 36 1 - 41 U/L    AST (SGOT) 33 1 - 40 U/L    Alkaline Phosphatase 97 39 - 117 U/L    Total Bilirubin 0.9 0.2 - 1.2 mg/dL    eGFR Non African Amer 67 >60 mL/min/1.73    Globulin 4.0 gm/dL    A/G Ratio 0.7 g/dL    BUN/Creatinine Ratio 22.4 7.0 - 25.0    Anion Gap 11.9 5.0 - 15.0 mmol/L   Protime-INR    Collection Time: 08/03/19 12:23 PM   Result Value Ref Range    Protime 38.6 (H) 11.7 - 14.2 Seconds    INR 3.97 (H) 0.90 - 1.10   BNP    Collection Time:  08/03/19 12:23 PM   Result Value Ref Range    proBNP 8,857.0 (H) 5.0-1,800.0 pg/mL   Troponin    Collection Time: 08/03/19 12:23 PM   Result Value Ref Range    Troponin T <0.010 0.000 - 0.030 ng/mL   CBC Auto Differential    Collection Time: 08/03/19 12:23 PM   Result Value Ref Range    WBC 1.09 (C) 3.40 - 10.80 10*3/mm3    RBC 4.69 4.14 - 5.80 10*6/mm3    Hemoglobin 14.8 13.0 - 17.7 g/dL    Hematocrit 47.2 37.5 - 51.0 %    .6 (H) 79.0 - 97.0 fL    MCH 31.6 26.6 - 33.0 pg    MCHC 31.4 (L) 31.5 - 35.7 g/dL    RDW 19.9 (H) 12.3 - 15.4 %    RDW-SD 67.6 (H) 37.0 - 54.0 fl    MPV 12.2 (H) 6.0 - 12.0 fL    Platelets 196 140 - 450 10*3/mm3    Neutrophil % 90.0 (H) 42.7 - 76.0 %    Lymphocyte % 5.5 (L) 19.6 - 45.3 %    Monocyte % 1.8 (L) 5.0 - 12.0 %    Eosinophil % 0.0 (L) 0.3 - 6.2 %    Basophil % 0.9 0.0 - 1.5 %    Immature Grans % 1.8 (H) 0.0 - 0.5 %    Neutrophils, Absolute 0.98 (L) 1.70 - 7.00 10*3/mm3    Lymphocytes, Absolute 0.06 (L) 0.70 - 3.10 10*3/mm3    Monocytes, Absolute 0.02 (L) 0.10 - 0.90 10*3/mm3    Eosinophils, Absolute 0.00 0.00 - 0.40 10*3/mm3    Basophils, Absolute 0.01 0.00 - 0.20 10*3/mm3    Immature Grans, Absolute 0.02 0.00 - 0.05 10*3/mm3    nRBC 0.9 (H) 0.0 - 0.2 /100 WBC   Scan Slide    Collection Time: 08/03/19 12:23 PM   Result Value Ref Range    Anisocytosis Mod/2+ None Seen    Crenated RBC's Slight/1+ None Seen    WBC Morphology Normal Normal    Platelet Morphology Normal Normal   Blood Culture - Blood, Arm, Right    Collection Time: 08/03/19  5:45 PM   Result Value Ref Range    Blood Culture Abnormal Stain (A)     Gram Stain Aerobic Bottle Gram positive cocci in clusters     Gram Stain Anaerobic Bottle Gram positive cocci in clusters    Blood Culture - Blood, Arm, Right    Collection Time: 08/03/19  6:16 PM   Result Value Ref Range    Blood Culture Abnormal Stain (A)     Gram Stain Aerobic Bottle Gram positive cocci in clusters     Gram Stain Anaerobic Bottle Gram positive cocci in  clusters    Protime-INR    Collection Time: 08/04/19  5:07 AM   Result Value Ref Range    Protime 46.4 (C) 11.7 - 14.2 Seconds    INR 5.02 (C) 0.90 - 1.10   Basic Metabolic Panel    Collection Time: 08/04/19  5:07 AM   Result Value Ref Range    Glucose 102 (H) 65 - 99 mg/dL    BUN 21 8 - 23 mg/dL    Creatinine 0.96 0.76 - 1.27 mg/dL    Sodium 143 136 - 145 mmol/L    Potassium 4.1 3.5 - 5.2 mmol/L    Chloride 103 98 - 107 mmol/L    CO2 30.7 (H) 22.0 - 29.0 mmol/L    Calcium 8.7 8.6 - 10.5 mg/dL    eGFR Non African Amer 76 >60 mL/min/1.73    BUN/Creatinine Ratio 21.9 7.0 - 25.0    Anion Gap 9.3 5.0 - 15.0 mmol/L   CBC Auto Differential    Collection Time: 08/04/19  5:07 AM   Result Value Ref Range    WBC 0.56 (C) 3.40 - 10.80 10*3/mm3    RBC 4.42 4.14 - 5.80 10*6/mm3    Hemoglobin 14.1 13.0 - 17.7 g/dL    Hematocrit 44.6 37.5 - 51.0 %    .9 (H) 79.0 - 97.0 fL    MCH 31.9 26.6 - 33.0 pg    MCHC 31.6 31.5 - 35.7 g/dL    RDW 19.3 (H) 12.3 - 15.4 %    RDW-SD 66.9 (H) 37.0 - 54.0 fl    MPV 12.2 (H) 6.0 - 12.0 fL    Platelets 126 (L) 140 - 450 10*3/mm3    Neutrophil % 87.5 (H) 42.7 - 76.0 %    Lymphocyte % 7.1 (L) 19.6 - 45.3 %    Monocyte % 1.8 (L) 5.0 - 12.0 %    Eosinophil % 0.0 (L) 0.3 - 6.2 %    Basophil % 1.8 (H) 0.0 - 1.5 %    Immature Grans % 1.8 (H) 0.0 - 0.5 %    Neutrophils, Absolute 0.49 (L) 1.70 - 7.00 10*3/mm3    Lymphocytes, Absolute 0.04 (L) 0.70 - 3.10 10*3/mm3    Monocytes, Absolute 0.01 (L) 0.10 - 0.90 10*3/mm3    Eosinophils, Absolute 0.00 0.00 - 0.40 10*3/mm3    Basophils, Absolute 0.01 0.00 - 0.20 10*3/mm3    Immature Grans, Absolute 0.01 0.00 - 0.05 10*3/mm3    nRBC 1.8 (H) 0.0 - 0.2 /100 WBC   ]    The following radiologic or non-invasive studies including the images have been independently reviewed by me and my impressions are as follows: Mediport in the right chest.  Port catheter without evidence of fracture or compression.       ASSESSMENT/PLAN: 74 y.o. male with probable infected Mediport  of the right chest.  Awaiting infectious disease consult, but with positive blood cultures and new tenderness over the Mediport in an immunocompromised patient I think it would be reasonable to remove the port.    INR is greater than 5.  I have spoken with Dr. Stanley.  We will give vitamin K.  He wants to avoid FFP secondary to congestive heart failure and concerns for fluid overload.  We will plan for port removal tomorrow even if his INR remains somewhat elevated.    I have discussed with the patient the following five points:  1-  I have been asked to see Cody Eldridge for the treatment of the diagnosis of: Infected Mediport  2- The planned treatment of this diagnosis is: Mediport removal  3- The risks of a procedure include but are not limited to the following: bleeding, thrombosis, damage to adjacent anatomical structures including nerves or blood vessels, infections, wound healing problems, the need for further procedures, whether planned or emergent. The benefits of a procedure include but are not limited to the following: Decrease in infectious burden  4- Alternatives to the planned procedure include: Antibiotic management  5- The natural history of the diagnosis if no treatment is given is: Continued infectious problems    Problem Points:  4:  Patient has a new problem, with additional work-up planned  Total problem points:4 or more    Data Points:  1:  I have reviewed or order clinical lab test  2:  I have personally and independently review of image, tracing, or specimen  2:  I have reviewed and summation of old records and/or discussed the patients care with another health care provider  Total data points:4 or more    Risk Points:  High:  Any illness that poses threat to life or body funciton    MDM requires 2/3 (Problem points, Data points and Risk)  MDM Prob point Data point Risk   SF 1 1 Minimal   Low 2 2 Low   Mod 3 3 Moderate   High 4 4 High     Code requires 3/3 (MDM, History and Exam)  Code MDM  History Exam Time   55760 SF/Low Detailed Detailed 30   13853 Mod Comprehensive Comprehensive 50   29530 High Comprehensive Comprehensive 70     Detailed history:  4 elements HPI or status of 3 chronic problems; 2-9 system ROS  Comprehensive:  4 elements HPI or status of 3 chronic problems;  10 system ROS    Detailed Exam:  12 findings from any organ system  Comprehensive Exam:  2 findings from each of 9 systems.     Billin    Assessment/Plan       New onset of congestive heart failure (CMS/HCC)    Atrial fibrillation (CMS/HCC)    Squamous cell carcinoma of neck    COPD (chronic obstructive pulmonary disease) (CMS/HCC)    Depression with anxiety    CAD (coronary artery disease)    Neutropenia (CMS/HCC)    Vascular catheter infection (CMS/HCC)      Faisal Frias MD   19       Electronically signed by Faisal Frias MD at 2019 10:05 AM     Lowell Joseph MD at 2019  8:56 AM      Consult Orders    1. Hematology & Oncology Inpatient Consult [[id number]] ordered by Macho Fermin MD at 19 [id number]                Subjective     REASON FOR CONSULTATION:  Neutropenic fever, port site cellulitis, prolonged protime no clinical bleeding, severe grade iii mucositis, atrial fibrilation rvr  Provide an opinion on any further workup or treatment                             REQUESTING PHYSICIAN:  Dr DARIO guerra    RECORDS OBTAINED:  Records of the patients history including those obtained from the referring provider were reviewed and summarized in detail.    HISTORY OF PRESENT ILLNESS:  The patient is a 74 y.o. year old male who is here for an opinion about the above issue.    History of Present Illness I have been asked to see this patient in consultation today who is a 74-year-old white male who has been treated BY DR RONALDO WILLIAMSON FOR HEAD AND NECK CANCER WITH CHEMO AND RADIATION THERAPY In any event the patient has received radiation therapy to his NECK, he has been receiving IV  fluids in the office in the last few days because of profound weakness and he has now developed atrial fibrillation with rapid ventricular response from a congestive heart failure, elevation of the proBNP in the 8000 category and further more significant sensitivity and redness in the skin of the anterior chest wall on the right side where the port is located that makes me to think that he has at least a cellulitis in this anatomical site. The patient has a severe degree of mucositis associated with radiation therapy. He is not able to swallow. Also the patient has developed diarrhea with no abdominal pain and in a stool culture enteropathogenic E-coli has been recovered. Clostridium was negative. The patient has lost substantial amount of weight. He has not been able to eat anything for the last 3-4 weeks. He has had swelling in his lower extremities. He has had cough and he has had shortness of breath. He has had fevers and chills as stated. He just feels terrible.              Review of Systems   Constitutional: Positive for activity change, appetite change, chills, diaphoresis, fatigue, fever and unexpected weight change.   HENT: Positive for mouth sores, sore throat, trouble swallowing and voice change.    Eyes: Negative.    Respiratory: Positive for cough and shortness of breath.    Cardiovascular: Positive for palpitations.   Gastrointestinal: Positive for abdominal distention and diarrhea.   Endocrine: Negative.    Genitourinary: Negative.    Musculoskeletal: Negative.    Skin: Positive for pallor.   Neurological: Negative.    Hematological: Negative.    Psychiatric/Behavioral: Negative.         Objective     Vitals:    08/04/19 0031 08/04/19 0100 08/04/19 0610 08/04/19 0730   BP:  124/80  135/96   BP Location:  Left arm  Left arm   Patient Position:  Lying  Lying   Pulse: 112   (!) 137   Resp:  16  16   Temp:    97.9 °F (36.6 °C)   TempSrc:    Oral   SpO2:    92%   Weight:   70.6 kg (155 lb 11.2 oz)     Height:         Current Status 7/31/2019   ECOG score 2       Physical Exam   Constitutional: He is oriented to person, place, and time. He appears well-developed. No distress.   HENT:   Head: Normocephalic.   Nose: Nose normal.   Mouth/Throat: No oropharyngeal exudate.   Grade III mucositis   Eyes: Conjunctivae and EOM are normal. Pupils are equal, round, and reactive to light. Right eye exhibits no discharge. Left eye exhibits no discharge. No scleral icterus.   Neck: Normal range of motion. No JVD present. No tracheal deviation present. No thyromegaly present.   Erythema neck area of radiation   Cardiovascular:   Atrial fib rvr   Pulmonary/Chest: Effort normal and breath sounds normal.   Abdominal: Soft. He exhibits no distension and no mass. There is no tenderness. There is no rebound and no guarding.   Musculoskeletal: Normal range of motion. He exhibits edema.   Lymphadenopathy:     He has no cervical adenopathy.   Neurological: He is alert and oriented to person, place, and time.   Skin: Capillary refill takes 2 to 3 seconds. He is not diaphoretic.   Erythema and pain port site   Psychiatric: He has a normal mood and affect. His behavior is normal. Judgment and thought content normal.         RECENT LABS:  Hematology WBC   Date Value Ref Range Status   08/04/2019 0.56 (C) 3.40 - 10.80 10*3/mm3 Final     RBC   Date Value Ref Range Status   08/04/2019 4.42 4.14 - 5.80 10*6/mm3 Final     Hemoglobin   Date Value Ref Range Status   08/04/2019 14.1 13.0 - 17.7 g/dL Final     Hematocrit   Date Value Ref Range Status   08/04/2019 44.6 37.5 - 51.0 % Final     Platelets   Date Value Ref Range Status   08/04/2019 126 (L) 140 - 450 10*3/mm3 Final      Blood Culture - Blood, Arm, Right   Order: [id number]   Collected:  8/3/2019 17:45 Status:  Preliminary result   Visible to patient:  No (Not Released)   Specimen Information: Arm, Right; Blood        Blood Culture Abnormal Stain Abnormal               Gram Stain  Aerobic  Bottle Gram positive cocci in clusters      Anaerobic Bottle Gram positive cocci in clusters            Resulting Agency: [facility]         Specimen Collected: 08/03/19 17:45 Last Resulted: 08/04/19 07:17              Contains abnormal data BNP   Order: [id number]   Status:  Final result   Visible to patient:  No (Not Released)    Ref Range & Units 1d ago   proBNP 5.0-1,800.0 pg/mL 8,857.0 Abnormally high     Resulting Agency  [facility]      Narrative   Performed by: [facility]   Among patients with dyspnea, NT-proBNP is highly sensitive for the detection of acute congestive heart failure. In addition NT-proBNP of <300 pg/ml effectively rules out acute congestive heart failure with 99% negative predictive value.      Specimen Collected: 08/03/19 12:23 Last Resulted: 08/03/19 12:58              Contains critical data Protime-INR   Order: [id number]   Status:  Final result   Visible to patient:  No (Not Released)    Ref Range & Units 05:07   Protime 11.7 - 14.2 Seconds 46.4 Critically high     INR 0.90 - 1.10 5.02 Critically high     Resulting Agency  [facility]         Specimen Collected: 08/04/19 05:07 Last Resulted: 08/04/19 06:12              XR CHEST 1 VW-     HISTORY: Male who is 74 years-old,  short of breath, chest pain     TECHNIQUE: Frontal view of the chest     COMPARISON: 06/28/2019     FINDINGS: Stable appearing right chest port. Heart is enlarged. Aorta is  tortuous, calcified. Pulmonary vasculature is unremarkable.  Atelectasis/infiltrate at the lung bases with left pleural effusion.  This appearance represents interval worsening. No pneumothorax. No acute  osseous process.     IMPRESSION:  Atelectasis/infiltrate at the lung bases with left pleural  effusion, follow-up recommended. Cardiomegaly. Tortuous aorta.     This report was finalized on 8/3/2019 1:13 PM by Dr. Josep Garcia M.D.         Assessment/Plan  In summary this patient is a 74-year-old who has been treated FOR HAND NECK CA He has  received radiation therapy to the neck and he has been undergoing chemotherapy .  The patient has been admitted with rapid ventricular response, atrial fibrillation, fever, tremendous tenderness and erythema at the port site in anterior chest wall on the right side and associated with this diarrhea with a stool culture consistent with E-coli enteropathogenic. Also the patient has abnormal blood cultures.    The patient has neutropenia. His hemoglobin is stable, his platelet count is marginal and otherwise the patient is doing poorly. He has terrible mucositis grade 3, he is not able to drink or swallow anything and on top of that he has significant increase in his INR for obvious reasons lack of nutrition.     The patient will be seen by infectious disease today. He is already receiving IV Vancomycin per the primary team.     RECOMMENDATIONS:  1. We will not let anybody do anything to the port, vascular surgery has been consulted and I have the strong belief that maybe the port is going to require to be removed. I do not know if this represents cellulitis in the chest wall or is a real port infection.  2. The patient will require Neupogen 480 mcg subcutaneous daily in order to bring up his white blood count.  3. He will require lidocaine solution for the mouth to numb the mouth to try to control the mucositis pain every 2-3 hours.  4. We asked the nurse to give the patient some cranberry juice that will bring given the vitamin K content his INR down.   5. Cardiology will have input in regard the treatment of his atrial fibrillation, rapid ventricular response and congestive heart failure.   6. His chest x-ray is consistent with congestive heart failure not consistent with pneumonia.   7. The patient was discussed with the wife in detail. He realizes that he is kind of ill and hopefully in the next few days he will perk up enough to feel FPC decent.     Obviously any form of radiation therapy will be put on hold  waiting to see if the patient recovers significantly before any other intervention is done.       Electronically signed by Lowell Joseph MD at 2019  1:39 PM     Chetan Driver MD at 2019  7:42 AM      Consult Orders    1. Inpatient Cardiology Consult [[id number]] ordered by Macho Fermin MD at 19 1716                         Patient Name: Cody Eldridge  Age/Sex: 74 y.o. male  : 1945  MRN: [id number]    Date of Admission: 8/3/2019  Date of Encounter Visit: 19  Encounter Provider: Chetan Driver MD  Place of Service: [facility] CARDIOLOGY      Referring Provider: Macho Fermin MD  Patient Care Team:  Epley, James, APRN as PCP - General (Family Medicine)  Evelin, Payal Zaragoza MD as Consulting Physician (Pain Medicine)  Lorna Chaidez (Nurse Practitioner)  Kristal Cowart SAMSON as Pharmacist  [place], Fritz Snyder MD as Referring Physician (Otolaryngology)  Pablo Heaton MD as Consulting Physician (Hematology and Oncology)  Annie Davila MD as Consulting Physician (Radiation Oncology)  Chetan Heaton MD as Consulting Physician (Urology)    Subjective:     Admitted/Consulted for: new CHF, AF    Chief Complaint: shortness of breath, edema       History of Present Illness:  Cody Eldridge is a 74 y.o. male of / randi's with history of SVT and ablation in  ( [place] [facility]), nonobstructive CAD, AF (Coumadin), COPD, HTN, hyperlipidemia, throat cancer and previous non-ST elevation MI. In 2018, he underwent cardiac catheterization, after reports of shortness of breath, that revealed non obstructive coronary artery disease with a left ventricular end-diastolic pressure of 10. His last echocardiogram in 2017 showed normal LVSF with EF 56%, mild to moderate concentric LVH, mild MR and an RSVP of 47 mmHg.     He presented to the ED 8/3/19 with reports of worsening BLE edema with  associated shortness of breath and chest pain. His wife reported he had received IVF's the day prior at [facility] and noticed increased erythema/edema from his power port and surrounding area. He was evaluated by Dr. Davila, who recommended he proceed to the ED. CXR upon arrival to the ED noted cardiomegaly and atelectasis/infiltarate of fam bases with a left pleural effusion. Troponin negative. BNP 8,857. INR 3.97. WBC 1.09. EKG noted , atrial fibrillation. He received 500mvg Digoxin x2, 40mg IV Lasix and 5mg IV Lopressor prior to admission. He is currently on an Amiodarone drip for heart rate control, HR noted 120*-140's overnight with -130's. Lasix 40mg BID and echocardiogram have already been ordered per primary.       Previous testing:     Cardiac Catheterization 8/29/18  LEFT VENTRICULOGRAPHY: The LV pressure was 100/10 .  There was a small inferobasilar hypokinetic area overall ejection fraction lower limits of normal between 45 and 50% mild calcium seen in the coronaries .  There was no mitral insufficiency or gradient across the aortic valve on pullback.  CORONARY ANGIOGRAPHY:  Left main: Normal  Left anterior descending: Normal throughout the vessel diagonals are also normal  Ramus intermedius:Not present  Circumflex: This is a dominant vessel with a 30% mid lesion  RCA: This is a small nondominant and otherwise normal  SUMMARY: Mild nonobstructive coronary disease we had some difficulty exchanging over the wire and try to do a right heart so we abandon that his LV function is lower limits of normal  RECOMMENDATIONS: Continue treatment for his A. fib and risk factor modification    Review of Systems:  All systems reviewed. Pertinent positives identified in HPI. All other systems are negative.   REVIEW OF SYSTEMS:   CONSTITUTIONAL: No weight loss, fever, chills, weakness or fatigue.   HEENT: Eyes: No visual loss, blurred vision, double vision or yellow sclerae. Ears, Nose, Throat: No hearing loss,  sneezing, congestion, runny nose or sore throat.   SKIN: No rash or itching.     RESPIRATORY: No shortness of breath, hemoptysis, cough or sputum.   GASTROINTESTINAL: No anorexia, nausea, vomiting or diarrhea. No abdominal pain, bright red blood per rectum or melena.  GENITOURINARY: No burning on urination, hematuria or increased frequency.  NEUROLOGICAL: No headache, dizziness, syncope, paralysis, ataxia, numbness or tingling in the extremities. No change in bowel or bladder control.   MUSCULOSKELETAL: No muscle, back pain, joint pain or stiffness.   HEMATOLOGIC: No anemia, bleeding or bruising.   LYMPHATICS: No enlarged nodes. No history of splenectomy.   PSYCHIATRIC: No history of depression, anxiety, hallucinations.   ENDOCRINOLOGIC: No reports of sweating, cold or heat intolerance. No polyuria or polydipsia.       Objective:     Objective:  Temp:  [96.7 °F (35.9 °C)-98.8 °F (37.1 °C)] 97.9 °F (36.6 °C)  Heart Rate:  [] 137  Resp:  [15-16] 16  BP: (104-135)/(80-98) 135/96    Intake/Output Summary (Last 24 hours) at 8/4/2019 1110  Last data filed at 8/4/2019 0933  Gross per 24 hour   Intake 620 ml   Output 4650 ml   Net -4030 ml     Body mass index is 21.72 kg/m².      08/03/19  1207 08/03/19  1619 08/04/19  0610   Weight: 79.9 kg (176 lb 3 oz) 76.6 kg (168 lb 12.8 oz) 70.6 kg (155 lb 11.2 oz)           Physical Exam:   General Appearance Well developed, cooperative and well nourished and no acute distress   Head Normocephalic, without abnormality, atraumatic   Ears Ears appear intact with no abnormalities noted   Throat No oral lesions, no thrush, oral mucosa moist   Neck No adenopathy, supple, trachea midline, no thyromegaly, no carotid bruit, no JVD   Back No skin lesions, erythema or scars, no tenderness to percussion or palptaion, range of motion is normal   Lungs Clear to auscultation,respirations regular, even and unlabored   Heart Regular rhythm and normal rate, normal S1 and S2, no murmur, no  gallop, no rub, no click   Chest wall No abnormalities observed   Abdomen Normal bowel sounds, no masses, no hepatomegaly,    Extremities Moves all extremities well, no edema, no cyanosis, no redness   Pulses Pulses palpable and equal bilaterally. Normal radial, carotid, femoral, dorsalis pedis and posterior tibial pulses bilaterally. Normal abdominal aorta   Skin No bleeding, bruising or rash   Psychiatric Alert and oriented x 3, normal mood and affect       Lab Review:     Results from last 7 days   Lab Units 08/04/19  0507 08/03/19  1223 07/31/19  0933   SODIUM mmol/L 143 144 144   POTASSIUM mmol/L 4.1 4.5 4.2   CHLORIDE mmol/L 103 107 109*   CO2 mmol/L 30.7* 25.1 22.0   BUN mg/dL 21 24* 27*   CREATININE mg/dL 0.96 1.07 1.12   GLUCOSE mg/dL 102* 111* 111   CALCIUM mg/dL 8.7 8.9 8.7       Results from last 7 days   Lab Units 08/03/19  1223   TROPONIN T ng/mL <0.010     Results from last 7 days   Lab Units 08/04/19  0507   WBC 10*3/mm3 0.56*   HEMOGLOBIN g/dL 14.1   HEMATOCRIT % 44.6   PLATELETS 10*3/mm3 126*     Results from last 7 days   Lab Units 08/04/19  0507 08/03/19  1223 07/29/19  0855   INR  5.02* 3.97* 2.70*                 Results from last 7 days   Lab Units 08/03/19  1223   PROBNP pg/mL 8,857.0*               Imaging:    Imaging Results (most recent)     Procedure Component Value Units Date/Time    XR Chest 1 View [[id number]] Collected:  08/03/19 1311     Updated:  08/03/19 1316    Narrative:       XR CHEST 1 VW-     HISTORY: Male who is 74 years-old,  short of breath, chest pain     TECHNIQUE: Frontal view of the chest     COMPARISON: 06/28/2019     FINDINGS: Stable appearing right chest port. Heart is enlarged. Aorta is  tortuous, calcified. Pulmonary vasculature is unremarkable.  Atelectasis/infiltrate at the lung bases with left pleural effusion.  This appearance represents interval worsening. No pneumothorax. No acute  osseous process.       Impression:       Atelectasis/infiltrate at the lung  bases with left pleural  effusion, follow-up recommended. Cardiomegaly. Tortuous aorta.     This report was finalized on 8/3/2019 1:13 PM by Dr. Josep Garcia M.D.             Results for orders placed during the hospital encounter of 10/12/17   Adult Transthoracic Echo Complete W/ Cont if Necessary Per Protocol    Narrative · Left ventricular systolic function is normal. Calculated EF = 56.3%.   Estimated EF was in agreement with the calculated EF. Estimated EF = 56%.   Normal left ventricular cavity size noted. All left ventricular wall   segments contract normally. Left ventricular wall thickness is consistent   with mild-to-moderate concentric hypertrophy. Left ventricular diastolic   was unable to be assessed.  · Right ventricular cavity is moderate-to-severely dilated. Mildly reduced   right ventricular systolic function noted.  · Left atrial volume is mildly increased.  · Right atrial cavity size is moderate-to-severely dilated.  · The aortic valve is abnormal in structure. There is trivial thickening   of the aortic valve.  · Mild mitral valve regurgitation is present.  · Moderate tricuspid valve regurgitation is present. Estimated right   ventricular systolic pressure from tricuspid regurgitation is moderately   elevated (45-55 mmHg). Calculated right ventricular systolic pressure from   tricuspid regurgitation is 47 mmHg.          EKG 8/4/19    Admit EKG 8/3/19        BASELINE 7/11/19        I personally viewed and interpreted the patient's EKG/Telemetry data.    Assessment:   Assessment/Plan   1. MSSA septicemia  2. Neutropenic fever  3. Atrial fibrillation with RVR  4. 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 Port in place  7.  Acute diastolic heart failure    -The patient has atrial fibrillation with a rapid ventricular rate in the setting of septicemia and neutropenia.  It is going to be very difficult controlled his heart rate.  I will place him on Lopressor 50 mg p.o. twice  daily.  -I am okay leaving the patient on amiodarone today.  We will reassess the rhythm tomorrow.  INR has actually been up  -I would recommend holding diuretic today.  Patient only has mild hypoxia and mildly increased volume status.  I do not think there is a significant benefit currently and diuresing him aggressively.  He is septic and tachycardic.  He will need diuresis, however I would like to have a little bit of clinical improvement first  -I would not recommend echo today as his heart rate is poorly controlled and this will not be a high quality study      Thank you for allowing me to participate in the care of Cody Eldridge. Feel free to contact me directly with any further questions or concerns.    Chetan Driver MD  [facility]  08/04/19  11:10 AM    Electronically signed by Chetan Driver MD at 8/4/2019 11:32 AM           Lhotsky, Rachel Corinne, RN   Registered Nurse      Plan of Care   Signed   Date of Service:  8/5/2019  6:53 AM   Creation Time:  8/5/2019  6:53 AM            Signed           Problem: Patient Care Overview  Goal: Plan of Care Review  Outcome: Ongoing (interventions implemented as appropriate)    08/05/19 0651   Coping/Psychosocial   Plan of Care Reviewed With patient   OTHER   Outcome Summary Transferred to CCU for sepsis/afib RVR. R chest port infection present, to be removed in OR today. On amio gtt per cardiology. Levophed gtt started. CXR ordered. Patient lethargic. C/O mouth pain d/t lesions from radiation therapy. See labs. Continue to monitor.                  Marga Glalegos RN   Registered Nurse      Plan of Care   Addendum   Date of Service:  8/5/2019  6:15 AM   Creation Time:  8/5/2019  9:28 AM               Problem: Patient Care Overview  Goal: Plan of Care Review  Outcome: Ongoing (interventions implemented as appropriate)    08/05/19 0615   Coping/Psychosocial   Plan of Care Reviewed With patient   OTHER   Outcome Summary AFbib RVR  on Amio gtt. 's at rest, 130's with activity. Afebrile, B/P trending down. RRT called for hypotension. Dr. Driver notifed and order noted to transfer pt to CCU. Pt NPO due to infected port and removal planned in OR today. Mouth lesions from radiation txt for tongue/jaw cancer. Tx to CCU per RRT.            Marga Gallegos, RN   Registered Nurse      Significant Note   Addendum   Date of Service:  8/5/2019  6:00 AM   Creation Time:  8/5/2019  8:20 AM                     B/P trending down , Afebrile. RRT called. Dr. Driver notified of pt status and hypotension.Order to Transfer pt to CCU for furthur eval of sepsis. Wife at bedside, reviewed transfer with pt and wife.             Dorene Camarena RN   Registered Nurse      Plan of Care   Signed   Date of Service:  8/4/2019  3:01 AM   Creation Time:  8/4/2019  3:01 AM            Signed           Problem: Patient Care Overview  Goal: Plan of Care Review  Outcome: Ongoing (interventions implemented as appropriate)    08/04/19 1351   Coping/Psychosocial   Plan of Care Reviewed With patient   OTHER   Outcome Summary Amiodarone drip infusing, cont AFib rate 110-140's, pt asymptomatic, VSS, c/o mouth pain med as ordered, voids per urinal at bedside, safety maintained, wife at bedside

## 2019-08-05 NOTE — PROGRESS NOTES
Patient currently under treatment for head and neck ca as documented. Held radiation since July 30th due to toxicities, neutropenia. Patient phone on Saturday with obvious increase in SOA, bilateral lower extremity edema, tender and red port. He has been struggling with AFib thru diagnosis and I explained could be evidence of CHF after receiving fluids and also possible port infection. Recommended he go to ER immediately and he is now admitted. Reviewed progress since admit. Will continue to hold radiation treatment for now and follow along.

## 2019-08-05 NOTE — PROGRESS NOTES
LOS: 2 days     Chief Complaint:  Follow-up MSSA septicemia w/ port in place    Interval History:  Moved to CCU for hypotension and Afib with RVR. Now on pressors and amiodarone. No new symptoms today. BP and HR improved now with these interventions. Going for port removal this afternoon. Tolerating nafcillin w/o rash or diarrhea.     ROS: no chest pain, no rash, no vomiting    Vital Signs  Temp:  [97.3 °F (36.3 °C)-98.7 °F (37.1 °C)] 97.7 °F (36.5 °C)  Heart Rate:  [103-147] 125  Resp:  [16-18] 18  BP: ()/(35-99) 95/66    Physical Exam:  General: awake, alert  Head: Normocephalic  Eyes: no scleral icterus  ENT: MM dry, + mucositis, no thrush  Neck: Supple, no visible thyromegaly  Cardiovascular: tachy, irreg irreg rhythm; trace LE edema  Respiratory:  normal work of breathing on ambient air  GI: Abdomen is soft, non-tender, non-distended,  : no Bazan catheter present  Skin: dry skin and erythema of the neck  Neurological: Alert and oriented x 3,  motor strength 5/5 in all four extremities  Psychiatric: Normal mood and affect   Vasc: R port not access; mild erythema    Antibiotics:  •  nafcillin (UNIPEN) 2 g/100 mL 0.9% NS IVPB (mbp), 2 g, Intravenous, Q4H    LABS:  CBC, CMP, micro reviewed today  Lab Results   Component Value Date    WBC 1.38 (C) 08/05/2019    HGB 14.8 08/05/2019    HCT 46.0 08/05/2019    .0 (H) 08/05/2019     (L) 08/05/2019     Lab Results   Component Value Date    GLUCOSE 107 (H) 08/05/2019    BUN 24 (H) 08/05/2019    CREATININE 1.17 08/05/2019    EGFRIFNONA 61 08/05/2019    EGFRIFAFRI 107 04/23/2018    BCR 20.5 08/05/2019    CO2 30.0 (H) 08/05/2019    CALCIUM 7.7 (L) 08/05/2019    PROTENTOTREF 7.2 04/23/2018    ALBUMIN 2.10 (L) 08/05/2019    LABIL2 1.3 04/23/2018    AST 32 08/05/2019    ALT 25 08/05/2019     Microbiology:  8/3 BCx: MSSA in 2/2 sets  8/6 BCx: requested     Radiology (personally reviewed report):  CXR with atelectasis    Assessment/Plan   1. Septic shock due  to MSSA septicemia (worse/new)  2. Neutropenic fever  3. Port in place  4. 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 Atrial fibrillation with RVR    Moved to CCU for hypotension and Afib with RVR now improved with Levophed and amiodarone. Continue nafcillin 2 g IV q4h for MSSA. Plans to remove port later today. Will repeat BCx in AM to document sterility and will then plan a 4 week course of antibiotics. Will order TTE tomorrow since he already has other interventions planned today.      Thank you for this consult. ID will follow. Case d/w RN Courtney.

## 2019-08-05 NOTE — PLAN OF CARE
Problem: Patient Care Overview  Goal: Plan of Care Review  Outcome: Ongoing (interventions implemented as appropriate)   08/05/19 0601   Coping/Psychosocial   Plan of Care Reviewed With patient   OTHER   Outcome Summary Transferred to CCU for sepsis/afib RVR. R chest port infection present, to be removed in OR today. On amio gtt per cardiology. Levophed gtt started. CXR ordered. Patient lethargic. C/O mouth pain d/t lesions from radiation therapy. See labs. Continue to monitor.    Plan of Care Review   Progress declining       Problem: Fall Risk (Adult)  Goal: Absence of Fall  Outcome: Ongoing (interventions implemented as appropriate)      Problem: Cardiac: Heart Failure (Adult)  Goal: Signs and Symptoms of Listed Potential Problems Will be Absent, Minimized or Managed (Cardiac: Heart Failure)  Outcome: Ongoing (interventions implemented as appropriate)      Problem: Skin Injury Risk (Adult)  Goal: Skin Health and Integrity  Outcome: Ongoing (interventions implemented as appropriate)

## 2019-08-05 NOTE — PLAN OF CARE
Problem: Patient Care Overview  Goal: Plan of Care Review  Outcome: Ongoing (interventions implemented as appropriate)   08/05/19 0615   Coping/Psychosocial   Plan of Care Reviewed With patient   OTHER   Outcome Summary AFbib RVR on Amio gtt. 's at rest, 130's with activity. Afebrile, B/P trending down. RRT called for hypotension. Dr. Driver notifed and order noted to transfer pt to CCU. Pt NPO due to infected port and removal planned in OR today. Mouth lesions from radiation txt for tongue/jaw cancer. Tx to CCU per RRT.    Plan of Care Review   Progress declining     Goal: Individualization and Mutuality  Outcome: Ongoing (interventions implemented as appropriate)    Goal: Discharge Needs Assessment  Outcome: Ongoing (interventions implemented as appropriate)      Problem: Fall Risk (Adult)  Goal: Identify Related Risk Factors and Signs and Symptoms  Outcome: Ongoing (interventions implemented as appropriate)    Goal: Absence of Fall  Outcome: Ongoing (interventions implemented as appropriate)      Problem: Cardiac: Heart Failure (Adult)  Goal: Signs and Symptoms of Listed Potential Problems Will be Absent, Minimized or Managed (Cardiac: Heart Failure)  Outcome: Ongoing (interventions implemented as appropriate)      Problem: Skin Injury Risk (Adult)  Goal: Identify Related Risk Factors and Signs and Symptoms  Outcome: Outcome(s) achieved Date Met: 08/05/19    Goal: Skin Health and Integrity  Outcome: Ongoing (interventions implemented as appropriate)

## 2019-08-05 NOTE — PLAN OF CARE
Problem: Patient Care Overview  Goal: Plan of Care Review  Outcome: Ongoing (interventions implemented as appropriate)   08/05/19 3399   Coping/Psychosocial   Plan of Care Reviewed With patient   OTHER   Outcome Summary Pt to OR today to remove infected Mediport, Plan- Blood cultures in am, 4 weeks antibiotics with PICC, ARIEL ?tomorrow?. K+ replaced today, med x1 for pain with decent results. C/O oral pain with viscious lidocaine swish applied. Vit K given this am prior to OR.  TF started per order. Remains on small dose of pressors to keep MAP >65 through peripheral IV. 500 cc bolus and 500cc albumin given this am. Cardiology aware of continued A Fib RVR with amiodarone gtt, and dig dose, requested to keep rate equal to or < 140 given pt status.    Plan of Care Review   Progress no change       Problem: Fall Risk (Adult)  Goal: Absence of Fall  Outcome: Ongoing (interventions implemented as appropriate)      Problem: Cardiac: Heart Failure (Adult)  Goal: Signs and Symptoms of Listed Potential Problems Will be Absent, Minimized or Managed (Cardiac: Heart Failure)  Outcome: Ongoing (interventions implemented as appropriate)      Problem: Skin Injury Risk (Adult)  Goal: Skin Health and Integrity  Outcome: Ongoing (interventions implemented as appropriate)      Problem: Pain, Acute (Adult)  Goal: Identify Related Risk Factors and Signs and Symptoms  Outcome: Ongoing (interventions implemented as appropriate)    Goal: Acceptable Pain Control/Comfort Level  Outcome: Ongoing (interventions implemented as appropriate)

## 2019-08-05 NOTE — ANESTHESIA PREPROCEDURE EVALUATION
Anesthesia Evaluation     Patient summary reviewed and Nursing notes reviewed   NPO Solid Status: > 8 hours  NPO Liquid Status: > 8 hours           Airway   Mallampati: II  TM distance: >3 FB  Neck ROM: full  no difficulty expected  Dental - normal exam   (+) edentulous    Pulmonary     breath sounds clear to auscultation  (+) pneumonia stable , a smoker, COPD severe, shortness of breath, sleep apnea, rhonchi, decreased breath sounds,   Cardiovascular     Rhythm: irregular  Rate: abnormal    (+) hypertension, CAD, dysrhythmias (RVR today - on amiodarone) Atrial Fib, angina, CHF, orthopnea, IRAHETA, hyperlipidemia,       Neuro/Psych  (+) syncope, numbness, psychiatric history,     GI/Hepatic/Renal/Endo      Musculoskeletal     (+) neck pain,   Abdominal  - normal exam   Substance History      OB/GYN          Other   (+) arthritis   history of cancer (tongue) active                    Anesthesia Plan    ASA 4     MAC     intravenous induction   Anesthetic plan, all risks, benefits, and alternatives have been provided, discussed and informed consent has been obtained with: patient.    Plan discussed with CRNA.

## 2019-08-05 NOTE — NURSING NOTE
The patient arrived to holding with CCU RN, 5L O2 NC, IV amio, and monitor, very drowsy, A&O to self, place, not entirely oriented to situation.  Family requested from .  The patient was difficult to arouse and only maintained eyes open and verbal responses with constant verbal stimuli.  Dr Warren saw pt at bedside.    BP progressively more hypotensive.  RN cancelled request for family to Pt poorly arousable, and only to noxious stimuli at this point, pale in color and no longer oriented to situation or place.  #20 gauge L AC bleeding, pink, swollen/removed and #20g IV R forearm placed, by LETTY Alcaraz.  Dr Warren called back to bedside to reassess and Dr Omer paged via RN Edgardo at 13:18.  18g PIV obtained by BRYSON Haeton RN @ 13:35.  Eugene gtt initiated per VO by Dr Warren at 14:01.  With removal of the L AC #20g IV, there was bleeding noted.  Dr Patel called and updated via phone as to elevated INR and MAR report of meds given today r/t this.  No New Orders, expected outcome per MD, no recheck of INR, RN will continue to monitor.    At 13:35, Cardiology RN Edgardo arrived at bedside and updated by LETTY Medellin.  At 13:39, Dr Omer arrived to bedside, assessed pt and was updated by LETTY Medellin and LETTY Reynoso.  VO entered for digoxin 125mcg, potassium 10meq, and as minimal sedation as possible requested for procedure.  Pt is snoring/appears asleep and pain free thus far and no sedation or pain control has been given in holding r/t this assessment.      13:50 Family called back  13:55 Dr Castaneda stopped by bedside, on way to another post conference.  14:00 Dr Castaneda at bedside again, family at bedside, pt hypertensive at this time, Eugene decreased per report given/update given with Dr Castaneda.  MD and family and patient spoke together.  Patient awake and responsive at this time, no longer difficult to arouse or as disoriented.      LETTY Medellin accompanied patient to OR, secondary report/status update given to RN Ivan and OR CRNA  in OR hallway.  Pt arrived to OR awake and in more stable condition.

## 2019-08-05 NOTE — OP NOTE
Cody Eldridge  Admission date: 8/3/2019  Date of operation: 8/5/2019    Location: [facility]    Pre-op Diagnosis:      * Vascular catheter infection (CMS/HCC) [T82.7XXA]    Post-Op Diagnosis Codes:     * Vascular catheter infection (CMS/HCC) [T82.7XXA]    Procedure performed: Removal infected right jugular Mediport    Surgeon: Dr. Prince Castaneda    Assistants:  Vira FLORES , provided critical assistance in exposure, retraction, and suctioning that overall decrease blood loss and operative time.    Anesthesia: Monitor Anesthesia Care    Staff:   Circulator: Coco Love RN  Scrub Person: Cassi Henley  Assistant: Vira Ramirez CSA    Indications: 74-year-old gentleman with a port placed few months ago.  Patient is septic hypotensive on pressors.  Patient for removal of infected port.  Plans and risks discussed with patient and family and agreed to proceed.       Procedure Details right neck and chest prepped and draped in usual fashion.  1% Xylocaine with Marcaine used local anesthesia.  Transverse incision made through previous scar used to insert the catheter and port through skin and subcutaneous tissue.  Purulent fluid in the cavity for the port was encountered.  Cultures were taken of the fluid and abscess cavity around the port.  The medial and lateral Prolene sutures were divided and removed.  The port was extracted from the cavity.  3-0 Vicryl figure-of-eight suture placed around the catheter as it went into the subcutaneous tunnel to the deep veins.  The catheter was removed.  The tip was inspected and was intact.  The tip of the catheter was sent for culture.  The Vicryl suture was tied and hemostasis achieved with the tunnel tract.  The wound was irrigated.  Betadine 4 x 4 placed in the port cavity and packed open.  Dressing applied.  Patient tolerated procedure well and taken to recovery area in stable condition.    Radiographic interpretation: Not  applicable    Findings: See above, cultures taken of the abscess around the port in the pocket and the tip of the port was also sent for culture.    Estimated Blood Loss: minimal    Specimens:   Order Name Source Comment Collection Info Order Time   ANAEROBIC CULTURE Chest, Right  Collected By: Prince Castaneda MD 8/5/2019  2:43 PM   WOUND CULTURE Chest, Right  Collected By: Prince Castaneda MD 8/5/2019  2:43 PM   HARDWARE / FOREIGN BODY CULTURE Chest, Right  Collected By: Prince Castaneda MD 8/5/2019  2:48 PM         Drains:   NG/OG Tube Nasogastric Left nostril (Active)   Secured at (cm) 65 8/5/2019 12:00 PM       Complications: None    Condition: stable    Disposition: To recovery room    Prince Castaneda MD     Date: 8/5/2019  Time: 3:00 PM    Active Hospital Problems    Diagnosis  POA   • **New onset of congestive heart failure (CMS/HCC) [I50.9]  Yes   • Atrial fibrillation (CMS/HCC) [I48.91]  Yes   • Squamous cell carcinoma of neck [C44.42]  Yes   • COPD (chronic obstructive pulmonary disease) (CMS/HCC) [J44.9]  Yes   • Depression with anxiety [F41.8]  Yes   • CAD (coronary artery disease) [I25.10]  Yes   • Neutropenia (CMS/HCC) [D70.9]  Yes   • Vascular catheter infection (CMS/HCC) [T82.7XXA]  Yes      Resolved Hospital Problems   No resolved problems to display.

## 2019-08-05 NOTE — CONSULTS
Adult Nutrition  Assessment/PES    Patient Name:  Cody Eldridge  YOB: 1945  MRN: [id number]  Admit Date:  8/3/2019    Assessment Date:  8/5/2019    Comments:  Assessment for TF's completed. Rec Isosource 1.5 with goal agt 60ml/hr and water flush 30cc/q4hr.    Reason for Assessment     Row Name 08/05/19 1316          Reason for Assessment    Reason For Assessment  physician consult     Diagnosis  -- CHF, Ca of tongue, depression,CAD,AFIB           Anthropometrics     Row Name 08/05/19 1317 08/05/19 0600       Anthropometrics    Weight  --  70.5 kg (155 lb 6.8 oz)       Usual Body Weight (UBW)    Weight Loss  unintentional  --    Weight Loss Time Frame  50 lb wt loss  --       Body Mass Index (BMI)    BMI Assessment  BMI 18.5-24.9: normal  --        Labs/Tests/Procedures/Meds     Row Name 08/05/19 1317          Labs/Procedures/Meds    Lab Results Reviewed  reviewed     Lab Results Comments  na, k, alb,tbili        Diagnostic Tests/Procedures    Diagnostic Test/Procedure Reviewed  reviewed        Medications    Pertinent Medications Reviewed  reviewed     Pertinent Medications Comments  pepcid, abx, phenylephrine         Physical Findings     Row Name 08/05/19 1318          Physical Findings    Overall Physical Appearance  on oxygen therapy     Gastrointestinal  feeding tube     Tubes  nasogastric tube     Skin  -- clover 14         Estimated/Assessed Needs     Row Name 08/05/19 1319          Calculation Measurements    Weight Used For Calculations  70.5 kg (155 lb 6.8 oz)        Estimated/Assessed Needs    Additional Documentation  KCAL/KG (Group);Fluid Requirements (Group);Protein Requirements (Group)        KCAL/KG    KCAL/KG  30 Kcal/Kg (kcal)     30 Kcal/Kg (kcal)  2115        Protein Requirements    Est Protein Requirement Amount (gms/kg)  1.2 gm protein     Estimated Protein Requirements (gms/day)  84.6        Fluid Requirements    Estimated Fluid Requirements (mL/day)  2115     RDA Method  (mL)  2115     Meri-Muratza Method (over 20 kg)  2910         Nutrition Prescription Ordered     Row Name 08/05/19 1320          Nutrition Prescription PO    Current PO Diet  NPO        Nutrition Prescription EN    Enteral Route  NG     Product  Isosource 1.5 (Jevity 1.5)     TF Delivery Method  Continuous     Continuous TF Goal Rate (mL/hr)  60 mL/hr     Water flush (mL)   30 mL     Water Flush Frequency  Every 4 hours         Evaluation of Received Nutrient/Fluid Intake     Row Name 08/05/19 1319          Calculation Measurements    Weight Used For Calculations  70.5 kg (155 lb 6.8 oz)        Calories Evaluation    Enteral Calories (kcal)  2160     % of Kcal Needs  100        Protein Evaluation    Enteral Protein (gm)  97     % of Protein Needs  100        Fluid Intake Evaluation    Enteral (Free Water) Fluid (mL)  1094     Free Water Flush Fluid (mL)  180     Total Free Water Intake (mL)  1274 mL         Evaluation of Prescribed Nutrient/Fluid Intake     Row Name 08/05/19 1319          Calculation Measurements    Weight Used For Calculations  70.5 kg (155 lb 6.8 oz)             Problem/Interventions:  Problem 1     Row Name 08/05/19 1321          Nutrition Diagnoses Problem 1    Problem 1  Needs Alternate Route     Etiology (related to)  Medical Diagnosis CHF, tongue cancer                 Intervention Goal     Row Name 08/05/19 1322          Intervention Goal    General  Maintain nutrition     TF/PN  Inititiate TF/PN     Transition  TF to PO     Weight  Maintain weight         Nutrition Intervention     Row Name 08/05/19 1322          Nutrition Intervention    RD/Tech Action  Follow Tx progress;Care plan reviewd           Education/Evaluation     Row Name 08/05/19 1323          Education    Education  Will Instruct as appropriate        Monitor/Evaluation    Monitor  Per protocol           Electronically signed by:  Shantelle Breaux RD  08/05/19 1:25 PM

## 2019-08-05 NOTE — SIGNIFICANT NOTE
B/P trending down , Afebrile. RRT called. Dr. Driver notified of pt status and hypotension.Order to Transfer pt to CCU for furthur eval of sepsis. Wife at bedside, reviewed transfer with pt and wife.

## 2019-08-05 NOTE — CONSULTS
Referring Provider: Dr. Driver   Reason for Consultation: ICU care    Patient Care Team:  Epley, James, APRN as PCP - General (Family Medicine)  Payal Waters MD as Consulting Physician (Pain Medicine)  Lorna Chaidez (Nurse Practitioner)  Kristal Cowart [facility] as Pharmacist  Fritz Slater MD as Referring Physician (Otolaryngology)  Pablo Heaton MD as Consulting Physician (Hematology and Oncology)  Annie Davila MD as Consulting Physician (Radiation Oncology)  Chetan Heaton MD as Consulting Physician (Urology)    Chief complaint:   Hypertension    History of present illness:  Subjective   This is a 74-year-old male patient with 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    He presented to the hospital on 8/3/19 with dyspnea and was found to be in A fib RVR.  Is currently being treated with metoprolol and amiodarone.  Was also found to be bacteremic with suspected MediPort infection due to tenderness and pain around the MediPort.  He was treated initially with vancomycin but then switched to nafcillin by infectious disease when the culture grew MSSA.    Overnight, patient developed hypotension and tachycardia and was transferred to the CCU.  On my evaluation, patient was sleeping.  His wife was next to him.  She stated that he did not sleep at night.  He was a little hard to arouse but then when he woke up he was alert and oriented.  He did not really have much complaints for me.  His wife stated that he lost about 50 pounds over the last couple of months and has not been eating at all recently.  He has pain in his mouth secondary to chemotherapy and perhaps radiation.  He has poor appetite.  He is currently on oxygen 4 L/min and he does not use oxygen at home.  He has not had fever during his stay but has  been always tachycardic.    Labs reviewed:  Sodium 149; potassium 3.2; creatinine 1.1 (baseline normal) WBC 1.38; platelets 106; hemoglobin normal; direct bilirubin 2.6; albumin 2.1; INR 5 on admission down to 2.5 today.      Review of Systems  Constitutional: No fever or chills.  Significant weight loss.  Decreased appetite.  Or postnasal drip.  ENMT: No sinus congestion  Cardiovascular: No chest pain, palpitation but legs swelling.   Respiratory: No dyspnea, cough or wheezing.  Gastrointestinal: No constipation, diarrhea or abdominal pain   Neurology: No headache, weakness, numbness or dizziness.   Musculoskeletal: No joints pain, stiffness or swelling.   Psychiatry: No depression.  Genitourinary: No dysuria or frequent urination  Endo: No weight changes. No cold or warm intolerance.  Lymphatic: No swollen glands.  Integumentary: No rash.    History  Past Medical History:   Diagnosis Date   • Atrial fibrillation (CMS/HCC)    • CHF (congestive heart failure) (CMS/HCC)    • Chronic bronchitis (CMS/HCC)    • COPD (chronic obstructive pulmonary disease) (CMS/HCC)    • Cor pulmonale (chronic) (CMS/HCC)    • Daytime hypersomnia    • Depression with anxiety    • Elevated cholesterol    • Enlarged prostate    • Frequent nocturnal awakening    • Gout    • H/O cardiac radiofrequency ablation 2006    [facility].   • Head and neck cancer (CMS/HCC)    • Hyperlipidemia    • Hypertension    • Hypotension    • Insomnia    • Lumbar radicular pain    • SHAR (obstructive sleep apnea)    • Osteoarthritis    • Permanent atrial fibrillation (CMS/HCC)    • Snoring    • Squamous cell carcinoma of neck    • SVT (supraventricular tachycardia) (CMS/HCC)    • Syncope    • Vitamin D deficiency    • Weight loss    ,   Past Surgical History:   Procedure Laterality Date   • APPENDECTOMY     • CARDIAC ABLATION  2006    [facility].   • CARDIAC CATHETERIZATION N/A 8/29/2018    Procedure: Left Heart Cath;  Surgeon: Yousif Uribe MD;  [facility];  Service: Cardiology   • CARDIAC CATHETERIZATION N/A 2018    Procedure: Coronary angiography;  Surgeon: Yousif Uribe MD;  Location: [facility] CATH INVASIVE LOCATION;  Service: Cardiology   • CARDIAC CATHETERIZATION N/A 2018    Procedure: Left ventriculography;  Surgeon: Yousif Uribe MD;  Location: [facility] CATH INVASIVE LOCATION;  Service: Cardiology   • FINGER SURGERY     • FOOT SURGERY     • HAND SURGERY     • VENOUS ACCESS DEVICE (PORT) INSERTION Right 2019    Procedure: MEDIPORT PLACEMENT;  Surgeon: Elvis Grigsby MD;  Location: [facility] MAIN OR;  Service: Vascular   ,   Family History   Problem Relation Age of Onset   • Heart attack Mother    • Diabetes Mother    • Heart disease Mother    • Hypertension Mother    • Diabetes Father    • Heart failure Father    • Heart disease Father    • Hypertension Father    • Cancer Brother         colon   • Hypertension Brother    • Colon cancer Brother 50   • Diabetes Sister    • Heart disease Sister    • Hypertension Sister    • COPD Other    • Heart failure Other    • Heart disease Other    • Malig Hyperthermia Neg Hx    ,   Social History     Tobacco Use   • Smoking status: Former Smoker     Packs/day: 3.00     Years: 30.00     Pack years: 90.00     Types: Cigarettes     Last attempt to quit: 2000     Years since quittin.6   • Smokeless tobacco: Never Used   • Tobacco comment: started age 11 x 53 years stopped 2000 / daily caffiene   Substance Use Topics   • Alcohol use: Yes     Comment: quit 30 yeas ago   • Drug use: No   ,   Medications Prior to Admission   Medication Sig Dispense Refill Last Dose   • albuterol (PROVENTIL HFA;VENTOLIN HFA) 108 (90 BASE) MCG/ACT inhaler Inhale 2 puffs. ProAir  (90 Base) MCG/ACT Inhalation Aerosol Solution; Patient Sig: ProAir  (90 Base) MCG/ACT Inhalation Aerosol Solution inhale 2 puffs by mouth every 4 hours if needed; 8.5; 11; 2014; Act   Past Month at Unknown  time   • ALPRAZolam (XANAX) 1 MG tablet Take 1 mg by mouth Daily As Needed for Anxiety (prior to radiation-takes 1/2).   Past Week at Unknown time   • aluminum-magnesium hydroxide 200-200 MG/5ML suspension, diphenhydrAMINE 12.5 MG/5ML liquid, lidocaine viscous 2 % solution, nystatin [id number] UNIT/ML suspension Swish and swallow 10 mL 4 (Four) Times a Day Before Meals & at Bedtime. 400 mL 1 8/2/2019 at Unknown time   • amiodarone (PACERONE) 200 MG tablet Take one tablet three times a day for four days, then twice daily (Patient taking differently: 200 mg. Take one tablet three times a day for four days, then twice daily) 66 tablet 1 8/3/2019 at Unknown time   • baclofen (LIORESAL) 10 MG tablet take 1 tablet by mouth three times a day 90 tablet 1 8/2/2019 at Unknown time   • diphenoxylate-atropine (LOMOTIL) 2.5-0.025 MG per tablet Take 1 tablet by mouth 4 (Four) Times a Day As Needed for Diarrhea. 60 tablet 1 8/3/2019 at Unknown time   • HYDROcodone-acetaminophen (NORCO) 7.5-325 MG per tablet Take 1 tablet by mouth 4 (Four) Times a Day As Needed for Moderate Pain  or Severe Pain . 120 tablet 0 Past Month at Unknown time   • montelukast (SINGULAIR) 10 MG tablet Take 10 mg by mouth Every Night.   8/2/2019 at Unknown time   • ondansetron ODT (ZOFRAN-ODT) 8 MG disintegrating tablet Take 1 tablet by mouth Every 8 (Eight) Hours As Needed for Nausea or Vomiting. 30 tablet 2 8/2/2019 at Unknown time   • rosuvastatin (CRESTOR) 20 MG tablet Take 20 mg by mouth Daily. 30 tablet 5 8/2/2019 at Unknown time   • sertraline (ZOLOFT) 50 MG tablet TAKE 1 TABLET BY MOUTH EVERY DAY 90 tablet 1 8/3/2019 at Unknown time   • silver sulfadiazine (SILVADENE) 1 % cream Apply  topically to the appropriate area as directed 2 (Two) Times a Day. 50 g 0 8/2/2019 at Unknown time   • warfarin (COUMADIN) 1 MG tablet Take 1 mg by mouth Every Other Day.   8/2/2019 at Unknown time   , Scheduled Meds:    baclofen 10 mg Oral TID   filgrastim (NEUPOGEN)  injection 480 mcg Subcutaneous Q PM   metoprolol tartrate 50 mg Oral Q12H   montelukast 10 mg Oral Nightly   nafcillin 2 g Intravenous Q4H   rosuvastatin 20 mg Oral Daily   sertraline 50 mg Oral Daily   silver sulfadiazine  Topical BID   sodium chloride 3 mL Intravenous Q12H   vancomycin 1,500 mg Intravenous Once   , Continuous Infusions:    amiodarone 0.5 mg/min Last Rate: 0.5 mg/min (08/05/19 0644)   norepinephrine 0.02-0.3 mcg/kg/min Last Rate: 0.07 mcg/kg/min (08/05/19 0707)    and Allergies:  Benadryl [diphenhydramine hcl]    Objective     Vital Signs   Temp:  [97.3 °F (36.3 °C)-98.7 °F (37.1 °C)] 97.7 °F (36.5 °C)  Heart Rate:  [103-146] 146  Resp:  [16-18] 18  BP: ()/(35-99) 106/93    Physical Exam:  Constitutional: Not in acute distress.  Difficult to arouse but was alert when he woke up.  Eyes: Injected conjunctiva, EOMI. pupils equal reactive to light  ENMT: Mendez 3.  Small and slightly ulcerated tongue.  Small oral cavity.  Neck: Trachea midline. No thyromegaly.  Tenderness on the palpation of the MediPort the right upper chest along the base of the right neck.  Heart: Irregular rhythm and tachycardia.  No audible murmur.  Lungs: Mild crackles at the bases with decreased air entry.  No wheezing or rhonchi.  Nonlabored breathing.  Abdomen: Soft. No tenderness or dullness. No HSM.  Extremities: No cyanosis, clubbing +2 pitting edema in legs. Moves all extremities.  Neuro: Conscious, alert, oriented x3.  Strength 5/5 in arms and legs.  Psych: Appropriate mood and affect.    Integumentary: Mild excoriated rash on the right neck..  Changes consistent with actinic keratosis.  Lymphatic: No palpable cervical or supraclavicular lymph nodes.      Diagnostic imaging:  I personally and independently reviewed the following images:   Pulmonary vascular edema.        Assessment   1. Septic shock  2. MSSA bacteremia  3. Suspected infection of the MediPort  4. Acute on chronic diastolic CHF  5. Acute hypoxic  respiratory failure, secondary to pulmonary edema  6. Pulmonary edema  7. JERICHO  8. A. fib with RVR  9. Stomatitis  10. Pancytopenia secondary to recent chemotherapy  11. Coagulopathy, suspect related to vit K defficiency from poor intake  12. Poor appetite secondary to stomatitis and chemotherapy  13. Weight loss  14. Hyperbilirubinemia, elevated direct bilirubin with normal liver enzymes  15. Hypernatremia  16. Hypokalemia  17. Protein calorie malnutrition, albumin 2.1  18. Severe pulmonary hypertension on echo dated 10/12/2017, RVSP 47.  Mildly dilated left atrium.  Probably group 2 secondary diastolic CHF and group 3 secondary to COPD and SHAR  19. SHAR  20. COPD, no exacerbation    Recommendations:  · Check lactic acid  · Resuscitate with 500 ml NS and 500 mL albumin bolus due to CHF.  If lactic acid is elevated we will give more crystalloids as well.  He is intravascularly volume depleted but has extravascular volume overload  · Needs a central line for pressors administration.  Will reassess after the albumin bolus.  · Insert core track for feeding. .  Consult nutrition.  Watch for refeeding syndrome  · Replace potassium  · Neupogen per oncology  · Methicillin per ID.  Agree with echocardiogram to rule out vegetations.  · Stop levo fed.  Start Eugene-Synephrine instead due to A. fib RVR  · Amiodarone for A. fib per cardiology  · Agree with MediPort removal due to highly suspected infection.  Vascular surgery consulted for that.  · Give another dose of vitamin K 5 mg today due to persistent coagulopathy and in anticipation of surgery  · DC metoprolol due to hypotension  · Check magnesium and phosphorus.  Check UA due to JERICHO but suspect this is related to sepsis and dehydration          I discussed the patients findings and my recommendations with patient, family and nursing staff    Danelle Galeano MD  08/05/19  7:59 AM    Time: Critical care 48 min

## 2019-08-05 NOTE — PROGRESS NOTES
"Patient Name: Cody Eldridge  :1945  74 y.o.      Patient Care Team:  Epley, James, APRN as PCP - General (Family Medicine)  Payal Waters MD as Consulting Physician (Pain Medicine)  Lorna Chaidez (Nurse Practitioner)  Kristal Cowart [facility] as Pharmacist  [place], Fritz Snyder MD as Referring Physician (Otolaryngology)  Pablo Heaton MD as Consulting Physician (Hematology and Oncology)  Annie Davila MD as Consulting Physician (Radiation Oncology)  Chetan Heaton MD as Consulting Physician (Urology)    Interval History:   Moved to the CCU for hypotension and atrial fibrillation    Subjective:  Following for atrial fibrillation    Objective   Vital Signs  Temp:  [97.3 °F (36.3 °C)-98.7 °F (37.1 °C)] 97.7 °F (36.5 °C)  Heart Rate:  [103-147] 125  Resp:  [16-18] 18  BP: ()/(35-99) 95/66    Intake/Output Summary (Last 24 hours) at 2019 1024  Last data filed at 2019 0900  Gross per 24 hour   Intake 670 ml   Output 1620 ml   Net -950 ml     Flowsheet Rows      First Filed Value   Admission Height  180.3 cm (71\") Documented at 2019 1153   Admission Weight  79.9 kg (176 lb 3 oz) Documented at 2019 1207          Physical Exam:   General Appearance:   Weak and sleepy but wakes up to voice   Lungs:     Clear to auscultation.  Normal respiratory effort and rate.      Heart:   Tachycardic and irregularly irregular.     Chest Wall:    No abnormalities observed   Abdomen:     Soft, nontender, positive bowel sounds.     Extremities:   no cyanosis, clubbing or edema.  No marked joint deformities.     Results Review:    Results from last 7 days   Lab Units 19  0454   SODIUM mmol/L 149*   POTASSIUM mmol/L 3.2*   CHLORIDE mmol/L 107   CO2 mmol/L 30.0*   BUN mg/dL 24*   CREATININE mg/dL 1.17   GLUCOSE mg/dL 107*   CALCIUM mg/dL 7.7*     Results from last 7 days   Lab Units 19  1223   TROPONIN T ng/mL <0.010     Results from last 7 days   Lab Units 19  0454 "   WBC 10*3/mm3 1.38*   HEMOGLOBIN g/dL 14.8   HEMATOCRIT % 46.0   PLATELETS 10*3/mm3 106*     Results from last 7 days   Lab Units 08/05/19  0454 08/04/19  0507 08/03/19  1223   INR  2.49* 5.02* 3.97*                     Medication Review:     albumin human 500 mL Intravenous Once   filgrastim (NEUPOGEN) injection 480 mcg Subcutaneous Q PM   nafcillin 2 g Intravenous Q4H   phytonadione (VITAMIN K) IVPB 5 mg Intravenous Once   sertraline 50 mg Oral Daily   silver sulfadiazine  Topical BID   sodium chloride 500 mL Intravenous Once   sodium chloride 3 mL Intravenous Q12H          amiodarone 0.5 mg/min Last Rate: 0.5 mg/min (08/05/19 0644)   phenylephrine 0.5-3 mcg/kg/min        Assessment/Plan     1.  Atrial fibrillation with rapid ventricular response.  He received 2 doses of IV digoxin on August 3.  None on August 4.  I agree with IV amiodarone.  I will also dose IV digoxin.  Resume metoprolol when blood pressure will tolerate.  2.  Nonobstructive coronary artery disease  3.  COPD  4.  Systemic hypertension  5.  Sepsis.  His port is supposed to be removed today.  6.  Hypokalemia.  Replace.    Myriam Omer MD, [facility]  08/05/19  10:24 AM

## 2019-08-06 ENCOUNTER — APPOINTMENT (OUTPATIENT)
Dept: CARDIOLOGY | Facility: HOSPITAL | Age: 74
End: 2019-08-06

## 2019-08-06 ENCOUNTER — APPOINTMENT (OUTPATIENT)
Dept: GENERAL RADIOLOGY | Facility: HOSPITAL | Age: 74
End: 2019-08-06

## 2019-08-06 PROBLEM — D64.81 ANEMIA ASSOCIATED WITH CHEMOTHERAPY: Status: ACTIVE | Noted: 2019-08-06

## 2019-08-06 PROBLEM — T45.1X5A ANEMIA ASSOCIATED WITH CHEMOTHERAPY: Status: ACTIVE | Noted: 2019-08-06

## 2019-08-06 PROBLEM — N17.9 ACUTE RENAL INJURY (HCC): Status: ACTIVE | Noted: 2019-08-06

## 2019-08-06 LAB
ALBUMIN SERPL-MCNC: 1.2 G/DL (ref 3.5–5.2)
ALBUMIN/GLOB SERPL: 0.4 G/DL
ALP SERPL-CCNC: 58 U/L (ref 39–117)
ALT SERPL W P-5'-P-CCNC: 19 U/L (ref 1–41)
ANION GAP SERPL CALCULATED.3IONS-SCNC: 13.3 MMOL/L (ref 5–15)
ANISOCYTOSIS BLD QL: ABNORMAL
AST SERPL-CCNC: 22 U/L (ref 1–40)
BACTERIA SPEC AEROBE CULT: ABNORMAL
BACTERIA SPEC AEROBE CULT: ABNORMAL
BH CV ECHO MEAS - ACS: 2 CM
BH CV ECHO MEAS - AO MAX PG (FULL): 1.9 MMHG
BH CV ECHO MEAS - AO MAX PG: 3.8 MMHG
BH CV ECHO MEAS - AO MEAN PG (FULL): 1 MMHG
BH CV ECHO MEAS - AO MEAN PG: 2 MMHG
BH CV ECHO MEAS - AO ROOT AREA (BSA CORRECTED): 2
BH CV ECHO MEAS - AO ROOT AREA: 11.9 CM^2
BH CV ECHO MEAS - AO ROOT DIAM: 3.9 CM
BH CV ECHO MEAS - AO V2 MAX: 97.6 CM/SEC
BH CV ECHO MEAS - AO V2 MEAN: 67.7 CM/SEC
BH CV ECHO MEAS - AO V2 VTI: 16.5 CM
BH CV ECHO MEAS - AVA(I,A): 2.1 CM^2
BH CV ECHO MEAS - AVA(I,D): 2.1 CM^2
BH CV ECHO MEAS - AVA(V,A): 2 CM^2
BH CV ECHO MEAS - AVA(V,D): 2 CM^2
BH CV ECHO MEAS - BSA(HAYCOCK): 1.9 M^2
BH CV ECHO MEAS - BSA: 1.9 M^2
BH CV ECHO MEAS - BZI_BMI: 22.3 KILOGRAMS/M^2
BH CV ECHO MEAS - BZI_METRIC_HEIGHT: 180.3 CM
BH CV ECHO MEAS - BZI_METRIC_WEIGHT: 72.6 KG
BH CV ECHO MEAS - EDV(MOD-SP2): 66 ML
BH CV ECHO MEAS - EDV(MOD-SP4): 62 ML
BH CV ECHO MEAS - EDV(TEICH): 106.5 ML
BH CV ECHO MEAS - EF(CUBED): 55.5 %
BH CV ECHO MEAS - EF(MOD-BP): 44 %
BH CV ECHO MEAS - EF(MOD-SP2): 40.9 %
BH CV ECHO MEAS - EF(MOD-SP4): 48.4 %
BH CV ECHO MEAS - EF(TEICH): 47.2 %
BH CV ECHO MEAS - ESV(MOD-SP2): 39 ML
BH CV ECHO MEAS - ESV(MOD-SP4): 32 ML
BH CV ECHO MEAS - ESV(TEICH): 56.3 ML
BH CV ECHO MEAS - FS: 23.6 %
BH CV ECHO MEAS - IVS/LVPW: 1.1
BH CV ECHO MEAS - IVSD: 0.78 CM
BH CV ECHO MEAS - LAT PEAK E' VEL: 8 CM/SEC
BH CV ECHO MEAS - LV DIASTOLIC VOL/BSA (35-75): 32.3 ML/M^2
BH CV ECHO MEAS - LV MASS(C)D: 117.8 GRAMS
BH CV ECHO MEAS - LV MASS(C)DI: 61.4 GRAMS/M^2
BH CV ECHO MEAS - LV MAX PG: 1.9 MMHG
BH CV ECHO MEAS - LV MEAN PG: 1 MMHG
BH CV ECHO MEAS - LV SYSTOLIC VOL/BSA (12-30): 16.7 ML/M^2
BH CV ECHO MEAS - LV V1 MAX: 68.7 CM/SEC
BH CV ECHO MEAS - LV V1 MEAN: 42.4 CM/SEC
BH CV ECHO MEAS - LV V1 VTI: 12.4 CM
BH CV ECHO MEAS - LVIDD: 4.8 CM
BH CV ECHO MEAS - LVIDS: 3.7 CM
BH CV ECHO MEAS - LVLD AP2: 6.9 CM
BH CV ECHO MEAS - LVLD AP4: 7.1 CM
BH CV ECHO MEAS - LVLS AP2: 6.4 CM
BH CV ECHO MEAS - LVLS AP4: 6.1 CM
BH CV ECHO MEAS - LVOT AREA (M): 2.8 CM^2
BH CV ECHO MEAS - LVOT AREA: 2.8 CM^2
BH CV ECHO MEAS - LVOT DIAM: 1.9 CM
BH CV ECHO MEAS - LVPWD: 0.74 CM
BH CV ECHO MEAS - MED PEAK E' VEL: 13 CM/SEC
BH CV ECHO MEAS - MR MAX PG: 77.1 MMHG
BH CV ECHO MEAS - MR MAX VEL: 439 CM/SEC
BH CV ECHO MEAS - MV A MAX VEL: 57.2 CM/SEC
BH CV ECHO MEAS - MV E MAX VEL: 56.8 CM/SEC
BH CV ECHO MEAS - MV E/A: 0.99
BH CV ECHO MEAS - MV MAX PG: 1.1 MMHG
BH CV ECHO MEAS - MV MEAN PG: 1 MMHG
BH CV ECHO MEAS - MV V2 MAX: 51.7 CM/SEC
BH CV ECHO MEAS - MV V2 MEAN: 35.3 CM/SEC
BH CV ECHO MEAS - MV V2 VTI: 8.5 CM
BH CV ECHO MEAS - MVA(VTI): 4.1 CM^2
BH CV ECHO MEAS - PA ACC TIME: 0.08 SEC
BH CV ECHO MEAS - PA MAX PG (FULL): 1 MMHG
BH CV ECHO MEAS - PA MAX PG: 2.1 MMHG
BH CV ECHO MEAS - PA PR(ACCEL): 44.4 MMHG
BH CV ECHO MEAS - PA V2 MAX: 72.4 CM/SEC
BH CV ECHO MEAS - PULM A REVS DUR: 0.07 SEC
BH CV ECHO MEAS - PULM A REVS VEL: 20.7 CM/SEC
BH CV ECHO MEAS - PULM DIAS VEL: 28 CM/SEC
BH CV ECHO MEAS - PULM S/D: 1.2
BH CV ECHO MEAS - PULM SYS VEL: 34.4 CM/SEC
BH CV ECHO MEAS - RAP SYSTOLE: 8 MMHG
BH CV ECHO MEAS - RV MAX PG: 1.1 MMHG
BH CV ECHO MEAS - RV MEAN PG: 1 MMHG
BH CV ECHO MEAS - RV V1 MAX: 51.7 CM/SEC
BH CV ECHO MEAS - RV V1 MEAN: 36.1 CM/SEC
BH CV ECHO MEAS - RV V1 VTI: 7.5 CM
BH CV ECHO MEAS - RVSP: 49 MMHG
BH CV ECHO MEAS - SI(AO): 102.8 ML/M^2
BH CV ECHO MEAS - SI(CUBED): 31.6 ML/M^2
BH CV ECHO MEAS - SI(LVOT): 18.3 ML/M^2
BH CV ECHO MEAS - SI(MOD-SP2): 14.1 ML/M^2
BH CV ECHO MEAS - SI(MOD-SP4): 15.6 ML/M^2
BH CV ECHO MEAS - SI(TEICH): 26.2 ML/M^2
BH CV ECHO MEAS - SV(AO): 197.1 ML
BH CV ECHO MEAS - SV(CUBED): 60.6 ML
BH CV ECHO MEAS - SV(LVOT): 35.2 ML
BH CV ECHO MEAS - SV(MOD-SP2): 27 ML
BH CV ECHO MEAS - SV(MOD-SP4): 30 ML
BH CV ECHO MEAS - SV(TEICH): 50.2 ML
BH CV ECHO MEAS - TAPSE (>1.6): 1.1 CM2
BH CV ECHO MEAS - TR MAX VEL: 318 CM/SEC
BH CV ECHO MEASUREMENTS AVERAGE E/E' RATIO: 5.41
BH CV XLRA - RV BASE: 3.8 CM
BH CV XLRA - TDI S': 11 CM/SEC
BILIRUB SERPL-MCNC: 2.5 MG/DL (ref 0.2–1.2)
BUN BLD-MCNC: 33 MG/DL (ref 8–23)
BUN/CREAT SERPL: 18 (ref 7–25)
BURR CELLS BLD QL SMEAR: ABNORMAL
CALCIUM SPEC-SCNC: 5.6 MG/DL (ref 8.6–10.5)
CHLORIDE SERPL-SCNC: 115 MMOL/L (ref 98–107)
CO2 SERPL-SCNC: 22.7 MMOL/L (ref 22–29)
CREAT BLD-MCNC: 1.83 MG/DL (ref 0.76–1.27)
CREAT UR-MCNC: 56.7 MG/DL
DEPRECATED RDW RBC AUTO: 69.3 FL (ref 37–54)
ERYTHROCYTE [DISTWIDTH] IN BLOOD BY AUTOMATED COUNT: 19 % (ref 12.3–15.4)
GFR SERPL CREATININE-BSD FRML MDRD: 36 ML/MIN/1.73
GLOBULIN UR ELPH-MCNC: 2.8 GM/DL
GLUCOSE BLD-MCNC: 133 MG/DL (ref 65–99)
GLUCOSE BLDC GLUCOMTR-MCNC: 123 MG/DL (ref 70–130)
GLUCOSE BLDC GLUCOMTR-MCNC: 134 MG/DL (ref 70–130)
GLUCOSE BLDC GLUCOMTR-MCNC: 140 MG/DL (ref 70–130)
GLUCOSE BLDC GLUCOMTR-MCNC: 143 MG/DL (ref 70–130)
GRAM STN SPEC: ABNORMAL
HCT VFR BLD AUTO: 39.7 % (ref 37.5–51)
HGB BLD-MCNC: 12.7 G/DL (ref 13–17.7)
INR PPP: 1.6 (ref 0.9–1.1)
LEFT ATRIUM VOLUME INDEX: 39 ML/M2
LYMPHOCYTES # BLD MANUAL: 0.1 10*3/MM3 (ref 0.7–3.1)
LYMPHOCYTES NFR BLD MANUAL: 3.2 % (ref 5–12)
LYMPHOCYTES NFR BLD MANUAL: 4.3 % (ref 19.6–45.3)
MACROCYTES BLD QL SMEAR: ABNORMAL
MAGNESIUM SERPL-MCNC: 2.3 MG/DL (ref 1.6–2.4)
MAXIMAL PREDICTED HEART RATE: 145 BPM
MCH RBC QN AUTO: 32.7 PG (ref 26.6–33)
MCHC RBC AUTO-ENTMCNC: 32 G/DL (ref 31.5–35.7)
MCV RBC AUTO: 102.3 FL (ref 79–97)
METAMYELOCYTES NFR BLD MANUAL: 1.1 % (ref 0–0)
MONOCYTES # BLD AUTO: 0.07 10*3/MM3 (ref 0.1–0.9)
NEUTROPHILS # BLD AUTO: 2.03 10*3/MM3 (ref 1.7–7)
NEUTROPHILS NFR BLD MANUAL: 91.5 % (ref 42.7–76)
NRBC SPEC MANUAL: 2.1 /100 WBC (ref 0–0.2)
PLAT MORPH BLD: NORMAL
PLATELET # BLD AUTO: 79 10*3/MM3 (ref 140–450)
PMV BLD AUTO: 12.8 FL (ref 6–12)
POTASSIUM BLD-SCNC: 3.3 MMOL/L (ref 3.5–5.2)
POTASSIUM BLD-SCNC: 3.9 MMOL/L (ref 3.5–5.2)
PROT SERPL-MCNC: 4 G/DL (ref 6–8.5)
PROTHROMBIN TIME: 18.7 SECONDS (ref 11.7–14.2)
RBC # BLD AUTO: 3.88 10*6/MM3 (ref 4.14–5.8)
SODIUM BLD-SCNC: 151 MMOL/L (ref 136–145)
SODIUM UR-SCNC: 70 MMOL/L
SPHEROCYTES BLD QL SMEAR: ABNORMAL
STRESS TARGET HR: 123 BPM
WBC MORPH BLD: NORMAL
WBC NRBC COR # BLD: 2.22 10*3/MM3 (ref 3.4–10.8)

## 2019-08-06 PROCEDURE — 87040 BLOOD CULTURE FOR BACTERIA: CPT | Performed by: INTERNAL MEDICINE

## 2019-08-06 PROCEDURE — 25010000002 DIGOXIN PER 500 MCG: Performed by: INTERNAL MEDICINE

## 2019-08-06 PROCEDURE — 82570 ASSAY OF URINE CREATININE: CPT | Performed by: INTERNAL MEDICINE

## 2019-08-06 PROCEDURE — 25010000002 PHENYLEPHRINE 10 MG/ML SOLUTION 5 ML VIAL: Performed by: INTERNAL MEDICINE

## 2019-08-06 PROCEDURE — 83735 ASSAY OF MAGNESIUM: CPT | Performed by: INTERNAL MEDICINE

## 2019-08-06 PROCEDURE — 84132 ASSAY OF SERUM POTASSIUM: CPT | Performed by: HOSPITALIST

## 2019-08-06 PROCEDURE — 84300 ASSAY OF URINE SODIUM: CPT | Performed by: INTERNAL MEDICINE

## 2019-08-06 PROCEDURE — 25010000002 CALCIUM GLUCONATE PER 10 ML: Performed by: INTERNAL MEDICINE

## 2019-08-06 PROCEDURE — 85610 PROTHROMBIN TIME: CPT | Performed by: HOSPITALIST

## 2019-08-06 PROCEDURE — 25010000003 CEFAZOLIN IN DEXTROSE 2-4 GM/100ML-% SOLUTION: Performed by: INTERNAL MEDICINE

## 2019-08-06 PROCEDURE — 93306 TTE W/DOPPLER COMPLETE: CPT | Performed by: INTERNAL MEDICINE

## 2019-08-06 PROCEDURE — 85025 COMPLETE CBC W/AUTO DIFF WBC: CPT | Performed by: HOSPITALIST

## 2019-08-06 PROCEDURE — 82962 GLUCOSE BLOOD TEST: CPT

## 2019-08-06 PROCEDURE — 25010000002 MIDAZOLAM PER 1 MG

## 2019-08-06 PROCEDURE — 25010000002 AMIODARONE IN DEXTROSE 5% 360-4.14 MG/200ML-% SOLUTION: Performed by: INTERNAL MEDICINE

## 2019-08-06 PROCEDURE — 99233 SBSQ HOSP IP/OBS HIGH 50: CPT | Performed by: INTERNAL MEDICINE

## 2019-08-06 PROCEDURE — 25010000002 MAGNESIUM SULFATE 2 GM/50ML SOLUTION: Performed by: INTERNAL MEDICINE

## 2019-08-06 PROCEDURE — 85007 BL SMEAR W/DIFF WBC COUNT: CPT | Performed by: HOSPITALIST

## 2019-08-06 PROCEDURE — 80053 COMPREHEN METABOLIC PANEL: CPT | Performed by: INTERNAL MEDICINE

## 2019-08-06 PROCEDURE — 93306 TTE W/DOPPLER COMPLETE: CPT

## 2019-08-06 PROCEDURE — 74018 RADEX ABDOMEN 1 VIEW: CPT

## 2019-08-06 PROCEDURE — 99232 SBSQ HOSP IP/OBS MODERATE 35: CPT | Performed by: INTERNAL MEDICINE

## 2019-08-06 PROCEDURE — 25010000002 FENTANYL CITRATE (PF) 100 MCG/2ML SOLUTION: Performed by: INTERNAL MEDICINE

## 2019-08-06 RX ORDER — MIDAZOLAM HYDROCHLORIDE 1 MG/ML
1 INJECTION INTRAMUSCULAR; INTRAVENOUS ONCE
Status: COMPLETED | OUTPATIENT
Start: 2019-08-06 | End: 2019-08-06

## 2019-08-06 RX ORDER — CEFAZOLIN SODIUM 2 G/100ML
2 INJECTION, SOLUTION INTRAVENOUS EVERY 12 HOURS
Status: DISCONTINUED | OUTPATIENT
Start: 2019-08-06 | End: 2019-08-14

## 2019-08-06 RX ORDER — LISINOPRIL 20 MG/1
TABLET ORAL
Qty: 180 TABLET | Refills: 0 | OUTPATIENT
Start: 2019-08-06 | End: 2019-08-20 | Stop reason: HOSPADM

## 2019-08-06 RX ORDER — POTASSIUM CHLORIDE 1.5 G/1.77G
40 POWDER, FOR SOLUTION ORAL ONCE
Status: DISCONTINUED | OUTPATIENT
Start: 2019-08-06 | End: 2019-08-20 | Stop reason: HOSPADM

## 2019-08-06 RX ORDER — MIDAZOLAM HYDROCHLORIDE 1 MG/ML
INJECTION INTRAMUSCULAR; INTRAVENOUS
Status: COMPLETED
Start: 2019-08-06 | End: 2019-08-06

## 2019-08-06 RX ORDER — FENTANYL CITRATE 50 UG/ML
25 INJECTION, SOLUTION INTRAMUSCULAR; INTRAVENOUS ONCE
Status: COMPLETED | OUTPATIENT
Start: 2019-08-06 | End: 2019-08-06

## 2019-08-06 RX ORDER — POTASSIUM CHLORIDE 1.5 G/1.77G
40 POWDER, FOR SOLUTION ORAL 2 TIMES DAILY
Status: DISCONTINUED | OUTPATIENT
Start: 2019-08-06 | End: 2019-08-06

## 2019-08-06 RX ADMIN — NAFCILLIN SODIUM 2 G: 2 INJECTION, POWDER, LYOPHILIZED, FOR SOLUTION INTRAMUSCULAR; INTRAVENOUS at 06:48

## 2019-08-06 RX ADMIN — PHENYLEPHRINE HYDROCHLORIDE 1.6 MCG/KG/MIN: 10 INJECTION INTRAVENOUS at 11:13

## 2019-08-06 RX ADMIN — Medication 5 ML: at 17:41

## 2019-08-06 RX ADMIN — LIDOCAINE HYDROCHLORIDE 5 ML: 20 SOLUTION ORAL; TOPICAL at 16:10

## 2019-08-06 RX ADMIN — PHENYLEPHRINE HYDROCHLORIDE 0.6 MCG/KG/MIN: 10 INJECTION INTRAVENOUS at 18:17

## 2019-08-06 RX ADMIN — METOPROLOL TARTRATE 12.5 MG: 25 TABLET ORAL at 21:06

## 2019-08-06 RX ADMIN — LIDOCAINE HYDROCHLORIDE 5 ML: 20 SOLUTION ORAL; TOPICAL at 11:16

## 2019-08-06 RX ADMIN — LIDOCAINE HYDROCHLORIDE 5 ML: 20 SOLUTION ORAL; TOPICAL at 01:51

## 2019-08-06 RX ADMIN — NAFCILLIN SODIUM 2 G: 2 INJECTION, POWDER, LYOPHILIZED, FOR SOLUTION INTRAMUSCULAR; INTRAVENOUS at 04:03

## 2019-08-06 RX ADMIN — CEFAZOLIN SODIUM 2 G: 2 INJECTION, SOLUTION INTRAVENOUS at 21:08

## 2019-08-06 RX ADMIN — HYDROCODONE BITARTRATE AND ACETAMINOPHEN 1 TABLET: 5; 325 TABLET ORAL at 22:09

## 2019-08-06 RX ADMIN — HYDROCODONE BITARTRATE AND ACETAMINOPHEN 1 TABLET: 5; 325 TABLET ORAL at 00:49

## 2019-08-06 RX ADMIN — CALCIUM GLUCONATE 1 G: 98 INJECTION, SOLUTION INTRAVENOUS at 13:49

## 2019-08-06 RX ADMIN — AMIODARONE HYDROCHLORIDE 0.5 MG/MIN: 1.8 INJECTION, SOLUTION INTRAVENOUS at 06:10

## 2019-08-06 RX ADMIN — SODIUM HYPOCHLORITE 946 ML: 1.25 SOLUTION TOPICAL at 09:39

## 2019-08-06 RX ADMIN — MAGNESIUM SULFATE HEPTAHYDRATE 2 G: 40 INJECTION, SOLUTION INTRAVENOUS at 00:51

## 2019-08-06 RX ADMIN — LIDOCAINE HYDROCHLORIDE 5 ML: 20 SOLUTION ORAL; TOPICAL at 08:01

## 2019-08-06 RX ADMIN — PHENYLEPHRINE HYDROCHLORIDE 1.4 MCG/KG/MIN: 10 INJECTION INTRAVENOUS at 01:49

## 2019-08-06 RX ADMIN — DIGOXIN 125 MCG: 0.25 INJECTION INTRAMUSCULAR; INTRAVENOUS at 13:14

## 2019-08-06 RX ADMIN — MIDAZOLAM HYDROCHLORIDE 1 MG: 1 INJECTION INTRAMUSCULAR; INTRAVENOUS at 12:05

## 2019-08-06 RX ADMIN — POTASSIUM CHLORIDE 40 MEQ: 1.5 POWDER, FOR SOLUTION ORAL at 04:03

## 2019-08-06 RX ADMIN — MIDAZOLAM HYDROCHLORIDE 1 MG: 2 INJECTION, SOLUTION INTRAMUSCULAR; INTRAVENOUS at 12:05

## 2019-08-06 RX ADMIN — SODIUM CHLORIDE 2000 ML: 9 INJECTION, SOLUTION INTRAVENOUS at 09:06

## 2019-08-06 RX ADMIN — CEFAZOLIN SODIUM 2 G: 2 INJECTION, SOLUTION INTRAVENOUS at 09:30

## 2019-08-06 RX ADMIN — SERTRALINE 50 MG: 50 TABLET, FILM COATED ORAL at 08:01

## 2019-08-06 RX ADMIN — POTASSIUM CHLORIDE 40 MEQ: 1.5 POWDER, FOR SOLUTION ORAL at 09:30

## 2019-08-06 RX ADMIN — HYDROCODONE BITARTRATE AND ACETAMINOPHEN 1 TABLET: 5; 325 TABLET ORAL at 06:13

## 2019-08-06 RX ADMIN — SILVER SULFADIAZINE: 10 CREAM TOPICAL at 21:09

## 2019-08-06 RX ADMIN — AMIODARONE HYDROCHLORIDE 0.5 MG/MIN: 1.8 INJECTION, SOLUTION INTRAVENOUS at 14:22

## 2019-08-06 RX ADMIN — FENTANYL CITRATE 25 MCG: 50 INJECTION, SOLUTION INTRAMUSCULAR; INTRAVENOUS at 11:37

## 2019-08-06 RX ADMIN — SILVER SULFADIAZINE 1 APPLICATION: 10 CREAM TOPICAL at 08:01

## 2019-08-06 RX ADMIN — SODIUM HYPOCHLORITE 946 ML: 1.25 SOLUTION TOPICAL at 21:06

## 2019-08-06 RX ADMIN — Medication 5 ML: at 21:20

## 2019-08-06 RX ADMIN — SODIUM CHLORIDE, PRESERVATIVE FREE 3 ML: 5 INJECTION INTRAVENOUS at 08:01

## 2019-08-06 NOTE — PLAN OF CARE
Problem: Patient Care Overview  Goal: Plan of Care Review   08/06/19 1810   Coping/Psychosocial   Plan of Care Reviewed With patient   OTHER   Outcome Summary Disoriented to situation and very restless througout the day. Amio dc. Eugene titrated down HR staying 110s-120s. Abx changed. Frequent bms. No urine output so Nephrology consulted.    Plan of Care Review   Progress no change

## 2019-08-06 NOTE — PROGRESS NOTES
LOS: 3 days     Chief Complaint:  Follow-up MSSA septicemia w/ port in place    Interval History:  Confused overnight. Slightly better this AM. Still on vasopressors. Crt is worse today. Port has been removed. Still in Afib with RVR. Rate in 120s.     ROS: no chest pain, no rash, no vomiting    Vital Signs  Temp:  [97.4 °F (36.3 °C)-98.2 °F (36.8 °C)] 97.8 °F (36.6 °C)  Heart Rate:  [] 122  Resp:  [14-18] 18  BP: ()/() 95/67    Physical Exam:  General: awake, alert  Head: Normocephalic  Eyes: no scleral icterus  ENT: MM dry, + mucositis, no thrush  Neck: Supple, no visible thyromegaly  Cardiovascular: tachy, irreg irreg rhythm; trace LE edema  Respiratory:  normal work of breathing on ambient air  GI: Abdomen is soft, non-tender, non-distended,  : no Bazan catheter present  Skin: dry skin and erythema of the neck  Psychiatric: moderate confusion  Vasc: R port site is clean; no purulence after removal    Antibiotics:  •  nafcillin (UNIPEN) 2 g/100 mL 0.9% NS IVPB (mbp), 2 g, Intravenous, Q4H    LABS:  CBC, CMP, micro reviewed today  Lab Results   Component Value Date    WBC 2.22 (L) 08/06/2019    HGB 12.7 (L) 08/06/2019    HCT 39.7 08/06/2019    .3 (H) 08/06/2019    PLT 79 (L) 08/06/2019     Lab Results   Component Value Date    GLUCOSE 133 (H) 08/06/2019    BUN 33 (H) 08/06/2019    CREATININE 1.83 (H) 08/06/2019    EGFRIFNONA 36 (L) 08/06/2019    EGFRIFAFRI 107 04/23/2018    BCR 18.0 08/06/2019    CO2 22.7 08/06/2019    CALCIUM 5.6 (C) 08/06/2019    PROTENTOTREF 7.2 04/23/2018    ALBUMIN 1.20 (L) 08/06/2019    LABIL2 1.3 04/23/2018    AST 22 08/06/2019    ALT 19 08/06/2019     Microbiology:  8/3 BCx: MSSA in 2/2 sets  8/5 Catheter and Wound Cx: pending  8/6 BCx: pending     Radiology (personally reviewed report):  TTE ordered    Assessment/Plan   1. Septic shock due to MSSA septicemia  2. Neutropenic fever  3. Port in place -- removed 8/5/19  4. 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 Atrial fibrillation with RVR  7. Acute kidney injury (new)    Creatinine is elevated to 1.8 today. Will stop nafcillin and start renally dosed cefazolin 2 g IV q12h. Repeat BCx are pending. TTE ordered today. If TTE is negative, will plan a 4 week course of cefazolin (final dose depends on renal function) with stop date 9/3/19. I will request follow-up in my office at that time.     Still very ill and on pressors. WBC is coming up with GCSF. Appreciate ICU team, vascular surgery, hematology/oncology, and cardiology care for this patient.     Thank you for this consult. ID will follow.

## 2019-08-06 NOTE — PROGRESS NOTES
"                                              LOS: 3 days   Patient Care Team:  Epley, James, APRN as PCP - General (Family Medicine)  Payal Waters MD as Consulting Physician (Pain Medicine)  Lorna Chaidez (Nurse Practitioner)  Kristal Cowart RPH as Pharmacist  [place] MD as Referring Physician (Otolaryngology)  Pablo Heaton MD as Consulting Physician (Hematology and Oncology)  Annie Davila MD as Consulting Physician (Radiation Oncology)  Chetan Heaton MD as Consulting Physician (Urology)    Chief Complaint:  F/up septic shock, respiratory failure, pulmonary edema, pancytopenia, bacteremia    Subjective   Interval History  More hypertensive today.  Requiring Eugene-Synephrine about 1.5 mics per KG per hour.  Also decreased urine output.  His sodium is higher at 151.  Creatinine increased as well.  He is currently on tube feeding and receiving free water 30 mL every 4 hours.  He also appears to be confused and intermittently somnolent.  He underwent removal of the MediPort yesterday.  He is more tachycardic.  Requiring oxygen 3 L/min.    REVIEW OF SYSTEMS:   Cannot obtain due to confusion.  No reports of diarrhea, fever, vomiting.  Restless overnight and has received a dose of Mirapex.    Ventilator/Non-Invasive Ventilation Settings (From admission, onward)    None                Physical Exam:     Vital Signs  Temp:  [97.4 °F (36.3 °C)-98.2 °F (36.8 °C)] 97.5 °F (36.4 °C)  Heart Rate:  [] 117  Resp:  [14-18] 18  BP: ()/() 101/55    Intake/Output Summary (Last 24 hours) at 8/6/2019 1254  Last data filed at 8/6/2019 0527  Gross per 24 hour   Intake 3562 ml   Output 50 ml   Net 3512 ml     Flowsheet Rows      First Filed Value   Admission Height  180.3 cm (71\") Documented at 08/03/2019 1153   Admission Weight  79.9 kg (176 lb 3 oz) Documented at 08/03/2019 1207          General Appearance:   Somnolent.  Intermittently falls asleep.  Restless.   ENMT:  " Mallampati score 4.  Red discoloration of the mucosa of the tongue with secretions and mild ulcerations.   Eyes: Pupils equals and reactive to light. EOMI.  Injected conjunctivo-   Neck:   Trachea midline. No thyromegaly.   Lungs:    Decreased breath sounds with mild crackles at the bases.  No wheezing.  Nonlabored breathing at rest    Heart:    Regular rhythm and normal rate, normal S1 and S2, no            murmur   Skin:   Erythema and sloughing of the skin on the neck.  Otherwise no rash   Abdomen:     Soft. No tenderness. No HSM.  Positive bowel sounds.   Neuro:  Somnolent.  Strength 5/5 in the arms and legs.  Oriented on my assessment.   Extremities:  No cyanosis, clubbing or edema.  Warm extremities and well-perfused.          Results Review:        Results from last 7 days   Lab Units 08/06/19  0511 08/05/19  2313 08/05/19  0454 08/04/19  0507   SODIUM mmol/L 151*  --  149* 143   POTASSIUM mmol/L 3.3* 3.2* 3.2* 4.1   CHLORIDE mmol/L 115*  --  107 103   CO2 mmol/L 22.7  --  30.0* 30.7*   BUN mg/dL 33*  --  24* 21   CREATININE mg/dL 1.83*  --  1.17 0.96   GLUCOSE mg/dL 133*  --  107* 102*   CALCIUM mg/dL 5.6*  --  7.7* 8.7     Results from last 7 days   Lab Units 08/03/19  1223   TROPONIN T ng/mL <0.010     Results from last 7 days   Lab Units 08/06/19  0510 08/05/19  0454 08/04/19  0507   WBC 10*3/mm3 2.22* 1.38* 0.56*   HEMOGLOBIN g/dL 12.7* 14.8 14.1   HEMATOCRIT % 39.7 46.0 44.6   PLATELETS 10*3/mm3 79* 106* 126*     Results from last 7 days   Lab Units 08/06/19  0511 08/05/19  0454 08/04/19  0507   INR  1.60* 2.49* 5.02*     Results from last 7 days   Lab Units 08/03/19  1223   PROBNP pg/mL 8,857.0*       I reviewed the patient's new clinical results.  I personally viewed and interpreted the patient's CXR        Medication Review:     calcium gluconate 1 g Intravenous Once   ceFAZolin 2 g Intravenous Q12H   digoxin 125 mcg Intravenous Daily   filgrastim (NEUPOGEN) injection 480 mcg Subcutaneous Q PM    potassium chloride 40 mEq Nasogastric Once   sertraline 50 mg Oral Daily   silver sulfadiazine  Topical BID   sodium chloride 3 mL Intravenous Q12H   Sodium Hypochlorite 0.0625 % (Dakin's 1/8th Strength) topical solution 946 mL Irrigation Q12H         amiodarone 0.5 mg/min Last Rate: 0.5 mg/min (08/06/19 0610)   lactated ringers 9 mL/hr Last Rate: 9 mL/hr (08/05/19 1344)   phenylephrine 0.5-3 mcg/kg/min Last Rate: 1.6 mcg/kg/min (08/06/19 1113)       Diagnostic imaging:  I personally and independently reviewed the following images:  CXR 8/6/2019 after central line insertion: Line in good position.  Slight worsening bilateral edema in the lower lobes.    Assessment   1. Septic shock  2. MSSA bacteremia  3. Suspected infection of the MediPort  4. Acute on chronic diastolic CHF  5. Acute hypoxic respiratory failure, secondary to pulmonary edema  6. Pulmonary edema  7. JERICHO, WORSE  8. A. fib with RVR, WORSE  9. Stomatitis  10. Pancytopenia secondary to recent chemotherapy  11. Metabolic encephalopathy, NEW  12. Coagulopathy, suspect related to vit K defficiency from poor intake  13. Poor appetite secondary to stomatitis and chemotherapy  14. Weight loss  15. Hyperbilirubinemia, elevated direct bilirubin with normal liver enzymes  16. Hypernatremia, WORSE  17. Hypokalemia, WORSE  18. Hypocalcemia, NEW  19. Protein calorie malnutrition, albumin 2.1  20. Severe pulmonary hypertension on echo dated 10/12/2017, RVSP 47.  Mildly dilated left atrium.  Probably group 2 secondary diastolic CHF and group 3 secondary to COPD and SHAR  21. SHAR  22. COPD, no exacerbation      Plan     · Bedside sono revealed collapsible IVC consistent with intravascular volume depletion.  2 L NS administered.   · Continue tube feeding.  Watch for refeeding syndrome.  · Give potassium 40 rené x1.  Repeat KCl in the afternoon.  · Increase free water to 50 mL q. one hour due to hypernatremia.  · Neosynephrine   · Antibiotics per ID.  Methicillin stopped  due to to JERICHO and cefazolin and started instead.  · Dig and amiodarone for A. fib  · Neupogen for pancytopenia  · Repeat labs in a.m.  · Oxygen by nasal cannula    Discussed with CCU staff during the multidisciplinary round.  He is critically ill with multiorgan dysfunction.  Discussed with the patient's wife.  Addressed the CODE STATUS.  She first thought that he would not want to be resuscitated but then after she talked to her daughter, they elected to continue with full code.     Danelle Galeano MD  08/06/19  12:54 PM      Time: Critical care 41 min excluding separately billed procedures      This note was dictated utilizing Dragon dictation

## 2019-08-06 NOTE — PROGRESS NOTES
REASON FOR CONSULTATION:  Neutropenic fever, port site cellulitis, prolonged protime no clinical bleeding, severe grade iii mucositis, atrial fibrilation rvr  Provide an opinion on any further workup or treatment                             INTERVAL HISTORY:  On 8/5/2019 in the early morning,Patient was moved to intensive care unit because of hypotension, atrial fibrillation with RVR and currently on pressor and amiodarone.     In the early afternoon of 8/5/2019, patient had portacatheter removed.     On 8/6/2019, nurse and family member reports patient is restless, patient himself is coherent and oriented.  Nurse reports patient had no urine output, bladder scan shows residual urine 150 mL.  It was 100 mL earlier this morning.  In the meantime patient has worsening renal function with creatinine 1.83 up from 1.17 the previous day.     The tip of portacatheter grew out Staphylococcus aureus within 24 hours.  Sensitivity study is not available.     HISTORY OF PRESENT ILLNESS:  The patient is a 74 y.o. year old male who is here for an opinion about the above issue.     History of Present Illness I have been asked to see this patient in consultation today who is a 74-year-old white male who has been treated BY DR RONALDO WILLIAMSON FOR HEAD AND NECK CANCER WITH CHEMO AND RADIATION THERAPY In any event the patient has received radiation therapy to his NECK, he has been receiving IV fluids in the office in the last few days because of profound weakness and he has now developed atrial fibrillation with rapid ventricular response from a congestive heart failure, elevation of the proBNP in the 8000 category and further more significant sensitivity and redness in the skin of the anterior chest wall on the right side where the port is located that makes me to think that he has at least a cellulitis in this anatomical site. The patient has a severe degree of mucositis associated with radiation therapy. He is not able to swallow.  Also the patient has developed diarrhea with no abdominal pain and in a stool culture enteropathogenic E-coli has been recovered. Clostridium was negative. The patient has lost substantial amount of weight. He has not been able to eat anything for the last 3-4 weeks. He has had swelling in his lower extremities. He has had cough and he has had shortness of breath. He has had fevers and chills as stated. He just feels terrible.            Medical History        Past Medical History:   Diagnosis Date   • Atrial fibrillation (CMS/HCC)     • CHF (congestive heart failure) (CMS/HCC)     • Chronic bronchitis (CMS/HCC)     • COPD (chronic obstructive pulmonary disease) (CMS/HCC)     • Cor pulmonale (chronic) (CMS/HCC)     • Daytime hypersomnia     • Depression with anxiety     • Elevated cholesterol     • Enlarged prostate     • Frequent nocturnal awakening     • Gout     • H/O cardiac radiofrequency ablation 2006     [facility].   • Head and neck cancer (CMS/HCC)     • Hyperlipidemia     • Hypertension     • Hypotension     • Insomnia     • Lumbar radicular pain     • SHAR (obstructive sleep apnea)     • Osteoarthritis     • Permanent atrial fibrillation (CMS/HCC)     • Snoring     • Squamous cell carcinoma of neck     • SVT (supraventricular tachycardia) (CMS/HCC)     • Syncope     • Vitamin D deficiency     • Weight loss              Surgical History         Past Surgical History:   Procedure Laterality Date   • APPENDECTOMY       • CARDIAC ABLATION   2006     [facility].   • CARDIAC CATHETERIZATION N/A 8/29/2018     Procedure: Left Heart Cath;  Surgeon: Yousif Uribe MD;  Location: [facility] CATH INVASIVE LOCATION;  Service: Cardiology   • CARDIAC CATHETERIZATION N/A 8/29/2018     Procedure: Coronary angiography;  Surgeon: Yousif Uribe MD;  Location: [facility] INVASIVE LOCATION;  Service: Cardiology   • CARDIAC CATHETERIZATION N/A 8/29/2018     Procedure: Left ventriculography;  Surgeon: Yousif Uribe MD;   "Location: [facility] INVASIVE LOCATION;  Service: Cardiology   • FINGER SURGERY       • FOOT SURGERY       • HAND SURGERY       • VENOUS ACCESS DEVICE (PORT) INSERTION Right 6/18/2019     Procedure: MEDIPORT PLACEMENT;  Surgeon: Elvis Grigsby MD;  Location: [facility] OR;  Service: Vascular           ONCOLOGIC HISTORY DR RONALDO WILLIAMSON MD:     1.  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  He is undergoing definitive treatment with radiation therapy and weekly low-dose carboplatin and Taxol chemotherapy.  He was able to complete 4 weekly chemotherapy treatments in a row but required holding chemotherapy for the last 2 weeks due to myelosuppression.  His blood counts have recovered today but he appears to be in rapid atrial fibrillation.  2.  Comorbidities including ischemic heart disease with history of CHF and atrial fibrillation requiring Coumadin anticoagulation.    3.  Oral mucositis.  He has a prescription for hydrocodone for pain control.  He does have Meghann's Magic mouth rinse to use as well.  4.  Warfarin anticoagulation.  Patient's INR 3.83, he will hold his coumadin today and tomorrow and have another INR checked Wednesday 7/17/20195.    5.  Weight loss and poor nutrition.  The patient's weight has stabilized and he is utilizing boost at home and trying to hydrate with water and Gatorade.  He does understand if his nutrition is not improving we will need to proceed with a G-tube for nutrition.  Ruthy Bautista, oncology dietitian been involved in his care also.  6.  Atrial fibrillation with rapid ventricular response     MEDICATIONS: see EMR.     ALLERGIES:          Allergies   Allergen Reactions   • Benadryl [Diphenhydramine Hcl] Other (See Comments) and Dizziness       \"I sleep for about a day\"       Social History no changes.      Family History;  No changes.        Review of Systems   Constitutional: Positive for " "activity change, appetite change, chills, diaphoresis, fatigue, fever and unexpected weight change.   HENT: Positive for mouth sores, sore throat, trouble swallowing and voice change.    Eyes: Negative.    Respiratory: Positive for cough and shortness of breath.    Cardiovascular: Positive for palpitations.   Gastrointestinal: Positive for abdominal distention and diarrhea.   Endocrine: Negative.    Genitourinary: Negative.    Musculoskeletal: Negative.    Skin: Positive for pallor.   Neurological: Negative.    Hematological: Negative.    Psychiatric/Behavioral: Negative.               Objective      Vitals:    08/06/19 1333 08/06/19 1347 08/06/19 1402 08/06/19 1504   BP: 131/86 125/84 112/83 112/83   BP Location:       Patient Position:       Pulse: 112 112 115 115   Resp:       Temp:       TempSrc:       SpO2: 97% 97% 94% 94%   Weight:    72.6 kg (160 lb)   Height:    180.3 cm (71\")      Physical Exam   Constitutional: He is oriented to person, place, and time. He appears restless, however not in acute distress..   HENT:   Head: Normocephalic.   Nose: Nose normal.  NG tube in place.  Mouth/Throat: Grade III mucositis   Eyes: Conjunctivae and EOM are normal. No scleral icterus.   Neck: Normal range of motion. No tracheal deviation present. No thyromegaly present.   Erythema neck area of radiation   Cardiovascular:   Atrial fib rvr   Pulmonary/Chest: Effort normal and breath sounds normal.   Abdominal: Soft. He exhibits no distension and no mass. There is no tenderness. There is no rebound and no guarding.   Musculoskeletal: Normal range of motion. He exhibits edema.   Lymphadenopathy:     He has no cervical adenopathy.   Neurological: He is alert and oriented to person, place, and time.   Skin: Portacatheter has been removed.   Psychiatric: He has a normal mood and affect.  Patient seems restless not in acute distress. Judgment and thought content normal.          RECENT LABS:    Results from last 7 days   Lab " Units 08/06/19  0510 08/05/19  0454 08/04/19  0507   WBC 10*3/mm3 2.22* 1.38* 0.56*   HEMOGLOBIN g/dL 12.7* 14.8 14.1   HEMATOCRIT % 39.7 46.0 44.6   PLATELETS 10*3/mm3 79* 106* 126*   MONOCYTES % % 3.2* 4.1*  --      Lab Results   Component Value Date    NEUTROABS 2.03 08/06/2019     Results from last 7 days   Lab Units 08/06/19  1414 08/06/19  0511 08/05/19  2313 08/05/19  0454 08/04/19  0507 08/03/19  1223   SODIUM mmol/L  --  151*  --  149* 143 144   POTASSIUM mmol/L 3.9 3.3* 3.2* 3.2* 4.1 4.5   CHLORIDE mmol/L  --  115*  --  107 103 107   CO2 mmol/L  --  22.7  --  30.0* 30.7* 25.1   BUN mg/dL  --  33*  --  24* 21 24*   CREATININE mg/dL  --  1.83*  --  1.17 0.96 1.07   CALCIUM mg/dL  --  5.6*  --  7.7* 8.7 8.9   BILIRUBIN mg/dL  --  2.5*  --  2.8*  --  0.9   ALK PHOS U/L  --  58  --  71  --  97   ALT (SGPT) U/L  --  19  --  25  --  36   AST (SGOT) U/L  --  22  --  32  --  33   GLUCOSE mg/dL  --  133*  --  107* 102* 111*     Results from last 7 days   Lab Units 08/06/19  0511 08/05/19  0454 08/04/19  0507   INR  1.60* 2.49* 5.02*     Contains abnormal data Catheter Culture - Cath Tip, Chest, Right   Order: [id number]   Collected:  8/5/2019 14:47 Status:  Preliminary result   Visible to patient:  No (Not Released)   Specimen Information: Chest, Right; Cath Tip        CATHETER CULTURE >15 CFU/mL - Indicative of infection. Staphylococcus aureus Abnormal           Resulting Agency: [facility]         Specimen Collected: 08/05/19 14:47 Last Resulted: 08/06/19 09:40        Order Details       View Encounter       Lab and Collection Details                     XR CHEST 1 VW-8/5/2019   Clinical: CHF     COMPARISON 08/03/2019     FINDINGS: Mediport catheter stable in position. There is cardiomegaly  similar to the previous examination. There is again poor delineation of  the left hemidiaphragm with left lung base opacity similar to the  previous examination. This could reflect underlying effusion, infiltrate  or  atelectasis. There is improved aeration at the right lung base,  persistent airspace disease at this location. Consolidation or  infiltrate.     There are monitoring leads superimposing the chest. The remainder the  examination is unremarkable.             Assessment/Plan   1.   In summary this patient is a 74-year-old who has been treated FOR HAND NECK CA He has received radiation therapy to the neck and he has been undergoing chemotherapy .  The patient has been admitted with rapid ventricular response, atrial fibrillation, fever, tremendous tenderness and erythema at the port site in anterior chest wall on the right side and associated with this diarrhea with a stool culture consistent with E-coli enteropathogenic. Also the patient has abnormal blood cultures.     Due to poor performance status, chemoradiotherapy will be on hold.    2. The patient has severe neutropenia secondary to chemotherapy.     · Continue currently on Neupogen daily.   · Improved neutrophil counts 2030 on 8/6/2019.     3.  Sepsis with positive MSSA septicemia.  Patient has been followed by Dr. Cruz, was on nafcillin.  · PowerPort was removed earlier this afternoon on 8/5/2019.  · Catheter tip culture was positive for Staphylococcus aureus on 8/6/2019.   · Antibiotics being switched to cefazolin 8/6/2019.     4.  Acute renal injury, with creatinine 1.83 today, up from 1.17 yesterday.  Patient does not have urination.  Residual urine in the bladder is only 150 mL this afternoon.  I recommended consult nephrology service for evaluation and management.     5.  Mild thrombocytopenia secondary to chemotherapy.    · Worsening platelets at 79,000 on 8/6/2019.  No bleeding.  Continue to monitor.     6.  Oral mucositis grade 3, secondary to chemoradiation therapy.  Routine therapy will be on hold for now per Dr. Davila.   · Nurse reports 8/6/2019 lidocaine viscous is not really helping.  We will try meds Magic mouthwash.    5.  Coagulopathy due  to vitamin K deficiency.  This is likely due to poor oral intake.  Patient currently is on tube feeding.  · INR has improved, currently 1.6 today on 8/6/2019.,    6.  Atrial fibrillation with RVR.  Cardiology to follow.  Patient is on digoxin.    7.  Mild anemia.  Hb 12.7, newly developed on 8/6/2019.     RECOMMENDATIONS:  1.  Continue IV cefazolin, Dr. Cruz to follow.  2.  Continue Neupogen 480 mcg subcutaneous daily in order to bring up his white blood count.   3.  Consult nephrology service for acute renal injury and anuria.  4.  Start Magic mouthwash, every 4 hours swish and spit.    5.   Continue tube feeding.    6.  Continue digoxin per cardiology service.     I spoke with his wife today at the bedside.  And also his granddaughter who is a nurse practitioner today.      I discussed with his nurse today for patient care.    JONATHAN JAVIER M.D., Ph.D.    8/6/2019

## 2019-08-06 NOTE — PROGRESS NOTES
Patient Name: Cody Eldridge  Patient : 1945        Date of Service:19  Provider of Service: Micha Bundy MD  Place of Service: [facility]  Referral Provider: Macho Fermin MD          Chief Complaint: atrial fib, hypotension    Interval Hx: Patient s/p port removal.  Remains confused.  Hemodynamics better        CURRENT MEDS    ceFAZolin 2 g Intravenous Q12H   digoxin 125 mcg Intravenous Daily   filgrastim (NEUPOGEN) injection 480 mcg Subcutaneous Q PM   potassium chloride 40 mEq Nasogastric Once   sertraline 50 mg Oral Daily   silver sulfadiazine  Topical BID   sodium chloride 3 mL Intravenous Q12H   Sodium Hypochlorite 0.0625 % (Dakin's  Strength) topical solution 946 mL Irrigation Q12H         OBJECTIVE  Vitals:    19 1319 19 1333 19 1347 19 1402   BP: 135/80 131/86 125/84 112/83   BP Location:       Patient Position:       Pulse:  112 112 115   Resp:       Temp:       TempSrc:       SpO2: 94% 97% 97% 94%   Weight:       Height:             Physical Exam:  Physical Exam   Constitutional: He is oriented to person, place, and time. He appears well-developed and well-nourished.   HENT:   Head: Normocephalic.   Eyes: Pupils are equal, round, and reactive to light.   Neck: Normal range of motion. No JVD present. Carotid bruit is not present. No thyromegaly present.   Cardiovascular: Regular rhythm, S1 normal, S2 normal, normal heart sounds and intact distal pulses. Tachycardia present. Exam reveals no gallop and no friction rub.   No murmur heard.  Pulmonary/Chest: Effort normal and breath sounds normal.   Abdominal: Soft. Bowel sounds are normal.   Musculoskeletal: He exhibits no edema.   Neurological: He is alert and oriented to person, place, and time.   Skin: Skin is warm, dry and intact. No erythema.   Vitals reviewed.          I personally reviewed the patient's ECG and telemetry data    ASSESSMENT & PLAN    New onset of congestive heart  failure (CMS/HCC)    Atrial fibrillation (CMS/HCC)    Squamous cell carcinoma of neck    COPD (chronic obstructive pulmonary disease) (CMS/HCC)    Depression with anxiety    CAD (coronary artery disease)    Chemotherapy-induced neutropenia (CMS/HCC)    Vascular catheter infection (CMS/HCC)    Chemotherapy-induced thrombocytopenia      1. Atrial Fib with RVR: rate better.  Start metoprolol and stop amio  2. Sepsis: port removed, hemodynamics better  3. COPD  4. Diastolic heart failure: volume depleted per IVC collapse    Micha Bundy MD  08/06/19

## 2019-08-06 NOTE — PLAN OF CARE
"Problem: Patient Care Overview  Goal: Plan of Care Review  Outcome: Ongoing (interventions implemented as appropriate)   08/06/19 0600   Coping/Psychosocial   Plan of Care Reviewed With patient   OTHER   Outcome Summary Pt oriented, falling asleep in middle of conversation. Remains afib/flutter; highest rate sustaining 140s. Cardiology notified of rate and increased restlessness, Gisselle APRN ordered to CTM and return call if not lower than 130 within the hour. Pain medication given, Mirapex for restless legs. -125 remainder of night. K and Mg replaced. 50cc UO, bladder scan 107. Eugene remains, increased from 1 to 1.6 mcg/kg/min throughout shift. Pt appears to be increasingly restless, but denies anxiety and states pain \"all over\". Blood cultures sent this AM. ABG obtained and WNL. 3L NC and denies SOA. No residual TF, increased to goal. Awaiting AM labs. Will CTM.    Plan of Care Review   Progress declining       Problem: Fall Risk (Adult)  Goal: Absence of Fall  Outcome: Ongoing (interventions implemented as appropriate)      Problem: Cardiac: Heart Failure (Adult)  Goal: Signs and Symptoms of Listed Potential Problems Will be Absent, Minimized or Managed (Cardiac: Heart Failure)  Outcome: Ongoing (interventions implemented as appropriate)      Problem: Skin Injury Risk (Adult)  Goal: Skin Health and Integrity  Outcome: Ongoing (interventions implemented as appropriate)      Problem: Pain, Acute (Adult)  Goal: Identify Related Risk Factors and Signs and Symptoms  Outcome: Ongoing (interventions implemented as appropriate)    Goal: Acceptable Pain Control/Comfort Level  Outcome: Ongoing (interventions implemented as appropriate)        "

## 2019-08-06 NOTE — PROGRESS NOTES
The patient is postoperative day #1 status post removal of right chest Mediport.  Incision check by me, no new purulence.  No bleeding.  Packing change.  Needs twice daily Dakin's dressing changes to the port and site which was left open secondary to purulence.  Culture is in process.    Will check the wound periodically

## 2019-08-06 NOTE — PROCEDURES
Central Venous Catheter Insertion Procedure Note          Indications:   1) Vascular access   2) Pressors administration    Site:  Left subclavian.  The right subclavian, right jugular and left jugular carry a higher risk of infection due to overlying skin lesions from radiation therapy.    Medications:  First, fentanyl 50 mcg was administered by patient remained very restless in bed and therefore  Versed 1 mg was given     Procedure Details:  Informed consent was obtained for the procedure, including sedation. Risks of lung perforation, hemorrhage, arrhythmia, and adverse drug reaction were discussed.       Maximum sterile technique was used including usual patient drapes, antiseptics and physician sterile garments.       Under sterile conditions the skin above the on the left subclavian vein was prepped with Chlorhexidine and covered with a sterile drape. Local anesthesia was applied to the skin and subcutaneous tissues with lidocaine 1%. An 18-gauge needle was then inserted into the vein. A guide wire was then passed easily through the catheter. A quad-lumen was then inserted into the vessel over the guide wire. The catheter was sutured into place and dressed following sterile protocol with Biopatch placed. Ultrasound used to place needle and visualized in vein prior to dilation      Findings:  There were no changes to vital signs. All catheter ports were flushed with saline. Patient did tolerate procedure well.    Complications:  Profound hypoxemia with SPO2 down to the 60% occurred during the procedure when patient developed apneas.  This was corrected with increasing supplemental oxygen and placement of a nasal trumpet.

## 2019-08-07 ENCOUNTER — APPOINTMENT (OUTPATIENT)
Dept: SPEECH THERAPY | Facility: HOSPITAL | Age: 74
End: 2019-08-07

## 2019-08-07 LAB
ALBUMIN SERPL-MCNC: 2.3 G/DL (ref 3.5–5.2)
ANION GAP SERPL CALCULATED.3IONS-SCNC: 11.4 MMOL/L (ref 5–15)
ANISOCYTOSIS BLD QL: ABNORMAL
BACTERIA SPEC AEROBE CULT: ABNORMAL
BUN BLD-MCNC: 56 MG/DL (ref 8–23)
BUN/CREAT SERPL: 20.1 (ref 7–25)
BURR CELLS BLD QL SMEAR: ABNORMAL
CALCIUM SPEC-SCNC: 7.5 MG/DL (ref 8.6–10.5)
CATHETER CULTURE: ABNORMAL
CHLORIDE SERPL-SCNC: 111 MMOL/L (ref 98–107)
CO2 SERPL-SCNC: 24.6 MMOL/L (ref 22–29)
CREAT BLD-MCNC: 2.79 MG/DL (ref 0.76–1.27)
DEPRECATED RDW RBC AUTO: 66.9 FL (ref 37–54)
DIGOXIN SERPL-MCNC: 2.3 NG/ML (ref 0.6–1.2)
DIGOXIN SERPL-MCNC: 3 NG/ML (ref 0.6–1.2)
ERYTHROCYTE [DISTWIDTH] IN BLOOD BY AUTOMATED COUNT: 19 % (ref 12.3–15.4)
GFR SERPL CREATININE-BSD FRML MDRD: 22 ML/MIN/1.73
GLUCOSE BLD-MCNC: 141 MG/DL (ref 65–99)
GLUCOSE BLDC GLUCOMTR-MCNC: 108 MG/DL (ref 70–130)
GLUCOSE BLDC GLUCOMTR-MCNC: 108 MG/DL (ref 70–130)
GLUCOSE BLDC GLUCOMTR-MCNC: 115 MG/DL (ref 70–130)
GLUCOSE BLDC GLUCOMTR-MCNC: 128 MG/DL (ref 70–130)
GLUCOSE BLDC GLUCOMTR-MCNC: 93 MG/DL (ref 70–130)
GRAM STN SPEC: ABNORMAL
GRAM STN SPEC: ABNORMAL
HCT VFR BLD AUTO: 38.5 % (ref 37.5–51)
HGB BLD-MCNC: 12.6 G/DL (ref 13–17.7)
INR PPP: 1.33 (ref 0.9–1.1)
LYMPHOCYTES # BLD MANUAL: 0.06 10*3/MM3 (ref 0.7–3.1)
LYMPHOCYTES NFR BLD MANUAL: 14 % (ref 5–12)
LYMPHOCYTES NFR BLD MANUAL: 3 % (ref 19.6–45.3)
MACROCYTES BLD QL SMEAR: ABNORMAL
MAGNESIUM SERPL-MCNC: 2.2 MG/DL (ref 1.6–2.4)
MCH RBC QN AUTO: 32.5 PG (ref 26.6–33)
MCHC RBC AUTO-ENTMCNC: 32.7 G/DL (ref 31.5–35.7)
MCV RBC AUTO: 99.2 FL (ref 79–97)
MONOCYTES # BLD AUTO: 0.27 10*3/MM3 (ref 0.1–0.9)
NEUTROPHILS # BLD AUTO: 1.63 10*3/MM3 (ref 1.7–7)
NEUTROPHILS NFR BLD MANUAL: 83 % (ref 42.7–76)
NRBC SPEC MANUAL: 1 /100 WBC (ref 0–0.2)
PHOSPHATE SERPL-MCNC: 1 MG/DL (ref 2.5–4.5)
PLAT MORPH BLD: NORMAL
PLATELET # BLD AUTO: 41 10*3/MM3 (ref 140–450)
PMV BLD AUTO: 13.1 FL (ref 6–12)
POTASSIUM BLD-SCNC: 3.8 MMOL/L (ref 3.5–5.2)
POTASSIUM BLD-SCNC: 3.9 MMOL/L (ref 3.5–5.2)
PROTHROMBIN TIME: 16.2 SECONDS (ref 11.7–14.2)
RBC # BLD AUTO: 3.88 10*6/MM3 (ref 4.14–5.8)
SODIUM BLD-SCNC: 147 MMOL/L (ref 136–145)
URATE SERPL-MCNC: 5.4 MG/DL (ref 3.4–7)
WBC MORPH BLD: NORMAL
WBC NRBC COR # BLD: 1.96 10*3/MM3 (ref 3.4–10.8)

## 2019-08-07 PROCEDURE — 82962 GLUCOSE BLOOD TEST: CPT

## 2019-08-07 PROCEDURE — 84550 ASSAY OF BLOOD/URIC ACID: CPT | Performed by: INTERNAL MEDICINE

## 2019-08-07 PROCEDURE — 85025 COMPLETE CBC W/AUTO DIFF WBC: CPT | Performed by: HOSPITALIST

## 2019-08-07 PROCEDURE — 80162 ASSAY OF DIGOXIN TOTAL: CPT | Performed by: INTERNAL MEDICINE

## 2019-08-07 PROCEDURE — 99233 SBSQ HOSP IP/OBS HIGH 50: CPT | Performed by: INTERNAL MEDICINE

## 2019-08-07 PROCEDURE — 85610 PROTHROMBIN TIME: CPT | Performed by: HOSPITALIST

## 2019-08-07 PROCEDURE — 25010000003 CEFAZOLIN IN DEXTROSE 2-4 GM/100ML-% SOLUTION: Performed by: INTERNAL MEDICINE

## 2019-08-07 PROCEDURE — 85007 BL SMEAR W/DIFF WBC COUNT: CPT | Performed by: HOSPITALIST

## 2019-08-07 PROCEDURE — 84132 ASSAY OF SERUM POTASSIUM: CPT | Performed by: INTERNAL MEDICINE

## 2019-08-07 PROCEDURE — 99232 SBSQ HOSP IP/OBS MODERATE 35: CPT | Performed by: INTERNAL MEDICINE

## 2019-08-07 PROCEDURE — 80069 RENAL FUNCTION PANEL: CPT | Performed by: INTERNAL MEDICINE

## 2019-08-07 PROCEDURE — 83735 ASSAY OF MAGNESIUM: CPT | Performed by: INTERNAL MEDICINE

## 2019-08-07 PROCEDURE — 25010000002 FILGRASTIM 480 MCG/0.8ML SOLUTION PREFILLED SYRINGE: Performed by: INTERNAL MEDICINE

## 2019-08-07 RX ORDER — TERAZOSIN 1 MG/1
1 CAPSULE ORAL NIGHTLY
Status: DISCONTINUED | OUTPATIENT
Start: 2019-08-07 | End: 2019-08-20 | Stop reason: HOSPADM

## 2019-08-07 RX ADMIN — METOPROLOL TARTRATE 25 MG: 25 TABLET ORAL at 20:16

## 2019-08-07 RX ADMIN — POTASSIUM PHOSPHATE, MONOBASIC AND POTASSIUM PHOSPHATE, DIBASIC 10 MMOL: 224; 236 INJECTION, SOLUTION, CONCENTRATE INTRAVENOUS at 10:30

## 2019-08-07 RX ADMIN — SODIUM HYPOCHLORITE 946 ML: 1.25 SOLUTION TOPICAL at 20:16

## 2019-08-07 RX ADMIN — LIDOCAINE HYDROCHLORIDE 5 ML: 20 SOLUTION ORAL; TOPICAL at 12:02

## 2019-08-07 RX ADMIN — METOPROLOL TARTRATE 25 MG: 25 TABLET ORAL at 08:17

## 2019-08-07 RX ADMIN — CEFAZOLIN SODIUM 2 G: 2 INJECTION, SOLUTION INTRAVENOUS at 10:31

## 2019-08-07 RX ADMIN — SODIUM CHLORIDE, PRESERVATIVE FREE 3 ML: 5 INJECTION INTRAVENOUS at 08:28

## 2019-08-07 RX ADMIN — ONDANSETRON 4 MG: 4 TABLET, FILM COATED ORAL at 11:47

## 2019-08-07 RX ADMIN — Medication 5 ML: at 04:54

## 2019-08-07 RX ADMIN — SILVER SULFADIAZINE: 10 CREAM TOPICAL at 08:19

## 2019-08-07 RX ADMIN — LIDOCAINE HYDROCHLORIDE 5 ML: 20 SOLUTION ORAL; TOPICAL at 15:07

## 2019-08-07 RX ADMIN — LIDOCAINE HYDROCHLORIDE 5 ML: 20 SOLUTION ORAL; TOPICAL at 02:25

## 2019-08-07 RX ADMIN — DIPHENOXYLATE HYDROCHLORIDE AND ATROPINE SULFATE 1 TABLET: 2.5; .025 TABLET ORAL at 04:54

## 2019-08-07 RX ADMIN — SODIUM HYPOCHLORITE 946 ML: 1.25 SOLUTION TOPICAL at 08:20

## 2019-08-07 RX ADMIN — SILVER SULFADIAZINE: 10 CREAM TOPICAL at 20:16

## 2019-08-07 RX ADMIN — Medication 5 ML: at 14:48

## 2019-08-07 RX ADMIN — TERAZOSIN HYDROCHLORIDE 1 MG: 1 CAPSULE ORAL at 20:16

## 2019-08-07 RX ADMIN — CEFAZOLIN SODIUM 2 G: 2 INJECTION, SOLUTION INTRAVENOUS at 21:29

## 2019-08-07 RX ADMIN — LIDOCAINE HYDROCHLORIDE 5 ML: 20 SOLUTION ORAL; TOPICAL at 20:16

## 2019-08-07 RX ADMIN — HYDROCODONE BITARTRATE AND ACETAMINOPHEN 1 TABLET: 5; 325 TABLET ORAL at 04:53

## 2019-08-07 RX ADMIN — ONDANSETRON 4 MG: 4 TABLET, FILM COATED ORAL at 17:49

## 2019-08-07 RX ADMIN — Medication 5 ML: at 09:32

## 2019-08-07 RX ADMIN — FILGRASTIM 480 MCG: 480 INJECTION, SOLUTION INTRAVENOUS; SUBCUTANEOUS at 16:26

## 2019-08-07 RX ADMIN — SERTRALINE 50 MG: 50 TABLET, FILM COATED ORAL at 08:19

## 2019-08-07 NOTE — CONSULTS
Adult Nutrition  Assessment/PES    Patient Name:  Cody Eldridge  YOB: 1945  MRN: [id number]  Admit Date:  8/3/2019    Assessment Date:  8/7/2019    Comments:  TF's at goal rate with Isosource 1.5 at 60ml/hr. Discussed pt care in rounds. Will continue to follow.    Reason for Assessment     Row Name 08/07/19 0839          Reason for Assessment    Reason For Assessment  follow-up protocol;TF/PN         Nutrition/Diet History     Row Name 08/07/19 0856          Nutrition/Diet History    Typical Food/Fluid Intake  renal fx worse, pt c/o nausea and loose stools(lomotil given)           Labs/Tests/Procedures/Meds     Row Name 08/07/19 0840          Labs/Procedures/Meds    Lab Results Reviewed  reviewed     Lab Results Comments  na, bun, cr, phos, alb, wbc, h/h        Diagnostic Tests/Procedures    Diagnostic Test/Procedure Reviewed  reviewed        Medications    Pertinent Medications Reviewed  reviewed     Pertinent Medications Comments  abx,          Physical Findings     Row Name 08/07/19 0841          Physical Findings    Overall Physical Appearance  on oxygen therapy     Gastrointestinal  feeding tube;nausea;diarrhea     Tubes  nasogastric tube     Oral/Mouth Cavity  other (see comments) mouth hurting and has had copious amounts of oral secretions     Skin  -- incision           Nutrition Prescription Ordered     Row Name 08/07/19 0842          Nutrition Prescription PO    Current PO Diet  NPO        Nutrition Prescription EN    Enteral Route  NG     Product  Isosource 1.5 (Jevity 1.5)     TF Delivery Method  Continuous     Continuous TF Goal Rate (mL/hr)  60 mL/hr     Continuous TF Current Rate (mL/hr)  60 mL/hr     Water flush (mL)   100 mL     Water Flush Frequency  Per hour         Evaluation of Received Nutrient/Fluid Intake     Row Name 08/07/19 0842          Calories Evaluation    Enteral Calories (kcal)  2160     % of Kcal Needs  100        Protein Evaluation    Enteral Protein (gm)  97      % of Protein Needs  100        Fluid Intake Evaluation    Enteral (Free Water) Fluid (mL)  1094     Free Water Flush Fluid (mL)  2400     Total Free Water Intake (mL)  3494 mL        EN Evaluation    TF Changes  Increase free water     TF Tolerance  -- BM+     HOB  Greater than or equal to 30 degress               Problem/Interventions:            Intervention Goal     Row Name 08/07/19 0849          Intervention Goal    General  Maintain nutrition     TF/PN  Tolerate TF at goal     Transition  TF to PO     Weight  Maintain weight         Nutrition Intervention     Row Name 08/07/19 0849          Nutrition Intervention    RD/Tech Action  Follow Tx progress;Care plan reviewd           Education/Evaluation     Row Name 08/07/19 0850          Monitor/Evaluation    Monitor  Per protocol;I&O;Pertinent labs;TF delivery/tolerance;Weight;Skin status           Electronically signed by:  Shantelle Breaux RD  08/07/19 1:52 PM

## 2019-08-07 NOTE — PROGRESS NOTES
Patient Name: Cody Eldridge  Patient : 1945        Date of Service:19  Provider of Service: Micha Bundy MD  Place of Service: [facility]  Referral Provider: Macho Fermin MD          Chief Complaint: Atrial flutter, hypotension    Interval Hx: The patient is more awake and alert.  He is complaining of abdominal discomfort but no shortness of breath.  He denies any palpitations.  He remains in atrial flutter with a rapid ventricular response.        CURRENT MEDS    Scheduled Meds:  ceFAZolin 2 g Intravenous Q12H   digoxin 125 mcg Intravenous Daily   filgrastim (NEUPOGEN) injection 480 mcg Subcutaneous Q PM   metoprolol tartrate 12.5 mg Oral Q12H   potassium chloride 40 mEq Nasogastric Once   sertraline 50 mg Oral Daily   silver sulfadiazine  Topical BID   sodium chloride 3 mL Intravenous Q12H   Sodium Hypochlorite 0.0625 % (Dakin's 1/8th Strength) topical solution 946 mL Irrigation Q12H     Continuous Infusions:  lactated ringers 9 mL/hr Last Rate: 9 mL/hr (19 1344)   phenylephrine 0.5-3 mcg/kg/min Last Rate: Stopped (19 2110)       OBJECTIVE  Vitals:    19 0447 19 0503 19 0518 19 0532   BP: 118/85 130/89 114/75 105/80   Pulse: (!) 128  (!) 129 (!) 130   Resp:       Temp:       TempSrc:       SpO2: 93%  91% 90%   Weight:       Height:             Physical Exam:  Physical Exam   Constitutional: He is oriented to person, place, and time. He appears well-developed and well-nourished.   HENT:   Head: Normocephalic.   Eyes: Pupils are equal, round, and reactive to light.   Neck: Normal range of motion. No JVD present. Carotid bruit is not present. No thyromegaly present.   Cardiovascular: Regular rhythm, S1 normal, S2 normal, normal heart sounds and intact distal pulses. Tachycardia present. Exam reveals no gallop and no friction rub.   No murmur heard.  Pulmonary/Chest: Effort normal and breath sounds normal.   Abdominal: Soft. Bowel  sounds are normal.   Musculoskeletal: He exhibits no edema.   Neurological: He is alert and oriented to person, place, and time.   Skin: Skin is warm, dry and intact. No erythema.   Psychiatric: He has a normal mood and affect.   Vitals reviewed.        Lab Review:   Results from last 7 days   Lab Units 08/07/19  0444 08/06/19  1414 08/06/19  0511  08/05/19  0454   SODIUM mmol/L 147*  --  151*  --  149*   POTASSIUM mmol/L 3.8 3.9 3.3*   < > 3.2*   CHLORIDE mmol/L 111*  --  115*  --  107   CO2 mmol/L 24.6  --  22.7  --  30.0*   BUN mg/dL 56*  --  33*  --  24*   CREATININE mg/dL 2.79*  --  1.83*  --  1.17   GLUCOSE mg/dL 141*  --  133*  --  107*   CALCIUM mg/dL 7.5*  --  5.6*  --  7.7*   AST (SGOT) U/L  --   --  22  --  32   ALT (SGPT) U/L  --   --  19  --  25    < > = values in this interval not displayed.     Results from last 7 days   Lab Units 08/03/19  1223   TROPONIN T ng/mL <0.010     Results from last 7 days   Lab Units 08/07/19  0444 08/06/19  0510   WBC 10*3/mm3 1.96* 2.22*   HEMOGLOBIN g/dL 12.6* 12.7*   HEMATOCRIT % 38.5 39.7   PLATELETS 10*3/mm3 41* 79*     Results from last 7 days   Lab Units 08/07/19  0444 08/06/19  0511   INR  1.33* 1.60*     Results from last 7 days   Lab Units 08/07/19  0444 08/06/19  0511   MAGNESIUM mg/dL 2.2 2.3         Results from last 7 days   Lab Units 08/03/19  1223   PROBNP pg/mL 8,857.0*           I personally reviewed the patient's ECG and telemetry data    ASSESSMENT & PLAN    New onset of congestive heart failure (CMS/HCC)    Atrial fibrillation (CMS/HCC)    Squamous cell carcinoma of neck    COPD (chronic obstructive pulmonary disease) (CMS/HCC)    Depression with anxiety    CAD (coronary artery disease)    Chemotherapy-induced neutropenia (CMS/HCC)    Vascular catheter infection (CMS/HCC)    Chemotherapy-induced thrombocytopenia    Acute renal injury (CMS/HCC)    Anemia associated with chemotherapy    1.  Atrial flutter with rapid ventricular response: On beta-blocker  and digoxin.  Will probably need to discontinue digoxin due to renal failure    2.  Sepsis: Blood pressure improved.  Port removed    3.  COPD: Per pulmonary    4.  Diastolic heart failure    5.  Neutropenia    6.  Acute renal failure: Nephrology consulted.  Renal function worse today.    Micha Bundy MD  08/07/19

## 2019-08-07 NOTE — PLAN OF CARE
Problem: Patient Care Overview  Goal: Plan of Care Review   08/07/19 0651   OTHER   Outcome Summary This nurse took over care of this patient at 0130, the patient has rested intermittently throughout the late morning. The patient has complained of his mouth hurting and has had copious amounts of oral secretions that he has been suctioning. The patient did do the syl's magic mouthwash and also did the lidocaine for his oral mucous discomfort. There was no relief with either one of the oral medications. Lab contacted this nurse to notify that the patient's phos was 1.4, This nurse did contact nephrology with Dr. Espinal returning my page. Dr. Espinal wanted to wait until all of the patient's labs were resulted before giving orders.      Goal: Individualization and Mutuality  Outcome: Ongoing (interventions implemented as appropriate)    Goal: Discharge Needs Assessment  Outcome: Ongoing (interventions implemented as appropriate)    Goal: Interprofessional Rounds/Family Conf  Outcome: Ongoing (interventions implemented as appropriate)      Problem: Fall Risk (Adult)  Goal: Absence of Fall  Outcome: Ongoing (interventions implemented as appropriate)      Problem: Cardiac: Heart Failure (Adult)  Goal: Signs and Symptoms of Listed Potential Problems Will be Absent, Minimized or Managed (Cardiac: Heart Failure)  Outcome: Ongoing (interventions implemented as appropriate)      Problem: Skin Injury Risk (Adult)  Goal: Skin Health and Integrity  Outcome: Ongoing (interventions implemented as appropriate)      Problem: Pain, Acute (Adult)  Goal: Identify Related Risk Factors and Signs and Symptoms  Outcome: Ongoing (interventions implemented as appropriate)    Goal: Acceptable Pain Control/Comfort Level  Outcome: Ongoing (interventions implemented as appropriate)

## 2019-08-07 NOTE — CONSULTS
Referring Provider: Dr. Anibal Valdivia  Reason for Consultation: JERICHO    Subjective     Chief complaint   Chief Complaint   Patient presents with   • Leg Swelling     bilateral       History of present illness:  75 yo WM with normal renal function at baseline admitted 8/3/2019 for further evaluation of shortness of breath.  He was found to have atrial fibrillation with rapid ventricular rate.  Renal consulted due to rising SCR while here, with value 1.0 mg/dL 8/4/2019 and today 1.8.  PMH outlined below; pertinent is known atrial fibrillation; metastatic head and neck cancer diagnosed in June this year on chemotherapy and radiation; COPD; and prior alcohol abuse.  Hospital course: sepsis syndrome requiring removal of infected chemotherapy port yesterday; neutropenic fever; hypotension requiring pressors; continued rapid-rate atrial fibrillation; aspiratory compromise with CHF.  · He reports the sensation that he must urinate, but he is unable to void; bladder scan done earlier today detected only 100 mL of urine in the bladder  · Breathing is comfortable now on 2 L/min; no chest pain; no orthopnea  · No leg swelling  · He denies any palpitations  · Diarrhea for much of yesterday and today  · He is very thirsty; appetite is low; he is lost over 50 pounds of weight in the last few months alone    Past Medical History:   Diagnosis Date   • Atrial fibrillation (CMS/HCC)    • CHF (congestive heart failure) (CMS/HCC)    • Chronic bronchitis (CMS/HCC)    • COPD (chronic obstructive pulmonary disease) (CMS/HCC)    • Cor pulmonale (chronic) (CMS/HCC)    • Daytime hypersomnia    • Depression with anxiety    • Elevated cholesterol    • Enlarged prostate    • Frequent nocturnal awakening    • Gout    • H/O cardiac radiofrequency ablation 2006    [facility].   • Head and neck cancer (CMS/HCC)    • Hyperlipidemia    • Hypertension    • Hypotension    • Insomnia    • Lumbar radicular pain    • SHAR (obstructive sleep apnea)    •  Osteoarthritis    • Permanent atrial fibrillation (CMS/HCC)    • Snoring    • Squamous cell carcinoma of neck    • SVT (supraventricular tachycardia) (CMS/HCC)    • Syncope    • Vitamin D deficiency    • Weight loss      Past Surgical History:   Procedure Laterality Date   • APPENDECTOMY     • CARDIAC ABLATION  2006    [facility].   • CARDIAC CATHETERIZATION N/A 8/29/2018    Procedure: Left Heart Cath;  Surgeon: Yousif Uribe MD;  Location: [facility] CATH INVASIVE LOCATION;  Service: Cardiology   • CARDIAC CATHETERIZATION N/A 8/29/2018    Procedure: Coronary angiography;  Surgeon: Yousif Uribe MD;  Location: [facility] CATH INVASIVE LOCATION;  Service: Cardiology   • CARDIAC CATHETERIZATION N/A 8/29/2018    Procedure: Left ventriculography;  Surgeon: Yousif Uribe MD;  Location: [facility] CATH INVASIVE LOCATION;  Service: Cardiology   • FINGER SURGERY     • FOOT SURGERY     • HAND SURGERY     • VENOUS ACCESS DEVICE (PORT) INSERTION Right 6/18/2019    Procedure: MEDIPORT PLACEMENT;  Surgeon: Elvis Grigsby MD;  Location: [facility] OR;  Service: Vascular   • VENOUS ACCESS DEVICE (PORT) REMOVAL Right 8/5/2019    Procedure: REMOVAL VENOUS ACCESS DEVICE;  Surgeon: Prince Castaneda MD;  Location: [facility] MAIN OR;  Service: Vascular     Family History   Problem Relation Age of Onset   • Heart attack Mother    • Diabetes Mother    • Heart disease Mother    • Hypertension Mother    • Diabetes Father    • Heart failure Father    • Heart disease Father    • Hypertension Father    • Cancer Brother         colon   • Hypertension Brother    • Colon cancer Brother 50   • Diabetes Sister    • Heart disease Sister    • Hypertension Sister    • COPD Other    • Heart failure Other    • Heart disease Other    • Malig Hyperthermia Neg Hx      Social History     Tobacco Use   • Smoking status: Former Smoker     Packs/day: 3.00     Years: 30.00     Pack years: 90.00     Types: Cigarettes     Last attempt to quit: 2000     Years  since quittin.6   • Smokeless tobacco: Never Used   • Tobacco comment: started age 11 x 53 years stopped 2000 / daily caffiene   Substance Use Topics   • Alcohol use: Yes     Comment: quit 30 yeas ago   • Drug use: No     Medications Prior to Admission   Medication Sig Dispense Refill Last Dose   • albuterol (PROVENTIL HFA;VENTOLIN HFA) 108 (90 BASE) MCG/ACT inhaler Inhale 2 puffs. ProAir  (90 Base) MCG/ACT Inhalation Aerosol Solution; Patient Sig: ProAir  (90 Base) MCG/ACT Inhalation Aerosol Solution inhale 2 puffs by mouth every 4 hours if needed; 8.5; 11; -2014; Act   Past Month at Unknown time   • ALPRAZolam (XANAX) 1 MG tablet Take 1 mg by mouth Daily As Needed for Anxiety (prior to radiation-takes ).   Past Week at Unknown time   • aluminum-magnesium hydroxide 200-200 MG/5ML suspension, diphenhydrAMINE 12.5 MG/5ML liquid, lidocaine viscous 2 % solution, nystatin [id number] UNIT/ML suspension Swish and swallow 10 mL 4 (Four) Times a Day Before Meals & at Bedtime. 400 mL 1 2019 at Unknown time   • amiodarone (PACERONE) 200 MG tablet Take one tablet three times a day for four days, then twice daily (Patient taking differently: 200 mg. Take one tablet three times a day for four days, then twice daily) 66 tablet 1 8/3/2019 at Unknown time   • baclofen (LIORESAL) 10 MG tablet take 1 tablet by mouth three times a day 90 tablet 1 2019 at Unknown time   • diphenoxylate-atropine (LOMOTIL) 2.5-0.025 MG per tablet Take 1 tablet by mouth 4 (Four) Times a Day As Needed for Diarrhea. 60 tablet 1 8/3/2019 at Unknown time   • HYDROcodone-acetaminophen (NORCO) 7.5-325 MG per tablet Take 1 tablet by mouth 4 (Four) Times a Day As Needed for Moderate Pain  or Severe Pain . 120 tablet 0 Past Month at Unknown time   • montelukast (SINGULAIR) 10 MG tablet Take 10 mg by mouth Every Night.   2019 at Unknown time   • ondansetron ODT (ZOFRAN-ODT) 8 MG disintegrating tablet Take 1 tablet by mouth Every  "8 (Eight) Hours As Needed for Nausea or Vomiting. 30 tablet 2 8/2/2019 at Unknown time   • rosuvastatin (CRESTOR) 20 MG tablet Take 20 mg by mouth Daily. 30 tablet 5 8/2/2019 at Unknown time   • sertraline (ZOLOFT) 50 MG tablet TAKE 1 TABLET BY MOUTH EVERY DAY 90 tablet 1 8/3/2019 at Unknown time   • silver sulfadiazine (SILVADENE) 1 % cream Apply  topically to the appropriate area as directed 2 (Two) Times a Day. 50 g 0 8/2/2019 at Unknown time   • warfarin (COUMADIN) 1 MG tablet Take 1 mg by mouth Every Other Day.   8/2/2019 at Unknown time     Allergies:  Benadryl [diphenhydramine hcl]    Review of Systems  14-point ROS performed and all negative except for pertinent +/-'s detailed in HPI.     Objective     Vital Signs  Temp:  [97.4 °F (36.3 °C)-98.1 °F (36.7 °C)] 97.4 °F (36.3 °C)  Heart Rate:  [] 128  Resp:  [16-18] 16  BP: ()/(34-99) 124/79    Flowsheet Rows      First Filed Value   Admission Height  180.3 cm (71\") Documented at 08/03/2019 1153   Admission Weight  79.9 kg (176 lb 3 oz) Documented at 08/03/2019 1207           No intake/output data recorded.  I/O last 3 completed shifts:  In: 3562 [I.V.:2698; Other:60; NG/GT:404; IV Piggyback:400]  Out: 200 [Urine:200]    Intake/Output Summary (Last 24 hours) at 8/6/2019 2004  Last data filed at 8/6/2019 0527  Gross per 24 hour   Intake 1359 ml   Output --   Net 1359 ml       Physical Exam:  NAD; pleasant; oriented; looks older than stated age  Chronically ill-appearing  Dry MM; AT/NC   No eye discharge; no scleral icterus  No JVD; no carotid bruits  Coarse bilat; crackles in bases; not labored on 2 L/min  Irregularly irregular, tachycardic, no rub  Soft, NT, +D, BS+  Dressing over removed port by the right clavicle; left subclavian central line  No acne frequent edema  No clubbing  No asterixis  Moves all extremities   Mood and affect are normal  Speech is fluent    Results Review:  Results from last 7 days   Lab Units 08/06/19  1414 08/06/19  0511 " 08/05/19 2313 08/05/19 0454 08/04/19  0507 08/03/19  1223   SODIUM mmol/L  --  151*  --  149* 143 144   POTASSIUM mmol/L 3.9 3.3* 3.2* 3.2* 4.1 4.5   CHLORIDE mmol/L  --  115*  --  107 103 107   CO2 mmol/L  --  22.7  --  30.0* 30.7* 25.1   BUN mg/dL  --  33*  --  24* 21 24*   CREATININE mg/dL  --  1.83*  --  1.17 0.96 1.07   CALCIUM mg/dL  --  5.6*  --  7.7* 8.7 8.9   BILIRUBIN mg/dL  --  2.5*  --  2.8*  --  0.9   ALK PHOS U/L  --  58  --  71  --  97   ALT (SGPT) U/L  --  19  --  25  --  36   AST (SGOT) U/L  --  22  --  32  --  33   GLUCOSE mg/dL  --  133*  --  107* 102* 111*       Estimated Creatinine Clearance: 35.8 mL/min (A) (by C-G formula based on SCr of 1.83 mg/dL (H)).    Results from last 7 days   Lab Units 08/06/19 0511 08/05/19 2313 08/05/19 0454   MAGNESIUM mg/dL 2.3 1.4* 1.6   PHOSPHORUS mg/dL  --   --  3.4       Results from last 7 days   Lab Units 08/06/19  0510 08/05/19  0454 08/04/19  0507 08/03/19  1223 07/31/19  0922   WBC 10*3/mm3 2.22* 1.38* 0.56* 1.09* 1.61*   HEMOGLOBIN g/dL 12.7* 14.8 14.1 14.8 13.0   PLATELETS 10*3/mm3 79* 106* 126* 196 207       Results from last 7 days   Lab Units 08/06/19 0511 08/05/19 0454 08/04/19  0507 08/03/19  1223   INR  1.60* 2.49* 5.02* 3.97*       Active Medications    ceFAZolin 2 g Intravenous Q12H   digoxin 125 mcg Intravenous Daily   filgrastim (NEUPOGEN) injection 480 mcg Subcutaneous Q PM   metoprolol tartrate 12.5 mg Oral Q12H   potassium chloride 40 mEq Nasogastric Once   sertraline 50 mg Oral Daily   silver sulfadiazine  Topical BID   sodium chloride 3 mL Intravenous Q12H   Sodium Hypochlorite 0.0625 % (Dakin's 1/8th Strength) topical solution 946 mL Irrigation Q12H       lactated ringers 9 mL/hr Last Rate: 9 mL/hr (08/05/19 1344)   phenylephrine 0.5-3 mcg/kg/min Last Rate: 0.6 mcg/kg/min (08/06/19 1817)       Assessment/Plan   Assessment  1.  JERICHO, oliguric, prerenal related to hypotension, tachy-arrhythmia, and sepsis syndrome limiting renal  perfusion.  Chest x-ray difficult to interpret with bilateral infiltrates representing possible effusions versus pneumonia versus both.  IVC collapse seen with bedside sonogram, consistent with hypovolemia.  Significant hypernatremia, with suspected large water deficit.  Hypokalemia in the setting of diarrhea and poor nutrition  2.  Head and neck cancer on radiation and chemotherapy  3.  Rapid atrial fibrillation  4.  Neutropenic fever with sepsis syndrome and MSSA bacteremia  5.  Coagulopathy  6.  Anemia and thrombocytopenia  7.  History of diastolic CHF: Inspected to have hypovolemia presently      New onset of congestive heart failure (CMS/HCC)    Atrial fibrillation (CMS/HCC)    Squamous cell carcinoma of neck    COPD (chronic obstructive pulmonary disease) (CMS/HCC)    Depression with anxiety    CAD (coronary artery disease)    Chemotherapy-induced neutropenia (CMS/HCC)    Vascular catheter infection (CMS/HCC)    Chemotherapy-induced thrombocytopenia    Acute renal injury (CMS/HCC)    Anemia associated with chemotherapy      Plan  1.  Will administer free water at 100 mL/h via feeding tube (once re-placed)  2.  Cefazolin  3.  Rate-control  4.  Urine studies: UA and FENa  5.  Surveillance labs; check uric acid    I discussed the patient's findings and my recommendations with patient's wife at the bedside    Medardo Espinal MD  08/06/19  8:04 PM

## 2019-08-07 NOTE — PLAN OF CARE
"Problem: Patient Care Overview  Goal: Plan of Care Review   08/07/19 9184   Coping/Psychosocial   Plan of Care Reviewed With patient;spouse   OTHER   Outcome Summary Pt complained of mouth pain and nausea throughout shift. Pt recived PRN meds for the symptoms. Dig level 3.0 at 0400. Phos low and Nephrology ordered Potassium Phosphorus IV. Pt complained of \"dust\" air and had increased nausea, Dr Johnson notified and Dig and K labs ordered STAT. Dig trending down and no action taken.      Goal: Individualization and Mutuality  Outcome: Ongoing (interventions implemented as appropriate)    Goal: Discharge Needs Assessment  Outcome: Ongoing (interventions implemented as appropriate)    Goal: Interprofessional Rounds/Family Conf  Outcome: Ongoing (interventions implemented as appropriate)      Problem: Fall Risk (Adult)  Goal: Absence of Fall  Outcome: Ongoing (interventions implemented as appropriate)      Problem: Cardiac: Heart Failure (Adult)  Goal: Signs and Symptoms of Listed Potential Problems Will be Absent, Minimized or Managed (Cardiac: Heart Failure)  Outcome: Ongoing (interventions implemented as appropriate)      Problem: Skin Injury Risk (Adult)  Goal: Skin Health and Integrity  Outcome: Ongoing (interventions implemented as appropriate)      Problem: Pain, Acute (Adult)  Goal: Identify Related Risk Factors and Signs and Symptoms  Outcome: Ongoing (interventions implemented as appropriate)    Goal: Acceptable Pain Control/Comfort Level  Outcome: Ongoing (interventions implemented as appropriate)      Problem: Pain, Chronic (Adult)  Goal: Identify Related Risk Factors and Signs and Symptoms  Outcome: Ongoing (interventions implemented as appropriate)    Goal: Acceptable Pain/Comfort Level and Functional Ability  Outcome: Ongoing (interventions implemented as appropriate)        "

## 2019-08-07 NOTE — PROGRESS NOTES
"                                              LOS: 4 days   Patient Care Team:  Epley, James, APRN as PCP - General (Family Medicine)  Payal Waters MD as Consulting Physician (Pain Medicine)  Lorna Chaidez (Nurse Practitioner)  Kristal Cowart RPH as Pharmacist  MouthcardFritz MD as Referring Physician (Otolaryngology)  Pablo Heaton MD as Consulting Physician (Hematology and Oncology)  Annei Davila MD as Consulting Physician (Radiation Oncology)  Chetan Heaton MD as Consulting Physician (Urology)    Chief Complaint:  F/up septic shock, respiratory failure, pulmonary edema, pancytopenia, bacteremia    Subjective   Interval History  Remains on oxygen 3 L/min.  Seems to be less restless today.  Has been having frequent cough and spitting up bloody secretions coming from his mouth.  He has pain and discomfort in his mouth with significant inflammation.  He was weaned off Eugene-Synephrine 12 hours ago.  He is having trouble urinating.  He had about 50 mL urine spontaneously but that bladder scan after urination showed 237 cc.  He required catheterization x1 last night.  He stated that he does have issues urinating at baseline and has to push also has weak stream.  He does not take medications for BPH.    REVIEW OF SYSTEMS:   cardioVascular: No chest pain or palpitation.  No leg swelling.  Constitutional: No fever or chills.  Neuro: More awake.  Still slightly restless.  Not as confused.      Ventilator/Non-Invasive Ventilation Settings (From admission, onward)    None                Physical Exam:     Vital Signs  Temp:  [97.4 °F (36.3 °C)-98.7 °F (37.1 °C)] 97.6 °F (36.4 °C)  Heart Rate:  [] 129  Resp:  [16-18] 16  BP: ()/(53-99) 110/83    Intake/Output Summary (Last 24 hours) at 8/7/2019 1232  Last data filed at 8/7/2019 0936  Gross per 24 hour   Intake 3737 ml   Output 280 ml   Net 3457 ml     Flowsheet Rows      First Filed Value   Admission Height  180.3 cm (71\") " Documented at 08/03/2019 1153   Admission Weight  79.9 kg (176 lb 3 oz) Documented at 08/03/2019 1207          General Appearance:   Awake.  Not in acute distress.   ENMT:  Mallampati score 4.  Red discoloration of the mucosa of the tongue with secretions and mild ulcerations.   Eyes: Pupils equals and reactive to light. EOMI.  Injected conjunctivo-   Neck:   Trachea midline. No thyromegaly.   Lungs:    Decreased breath sounds with mild crackles at the bases.  No wheezing.  Nonlabored breathing at rest    Heart:    Regular rhythm and normal rate, normal S1 and S2, no            murmur   Skin:   Erythema and sloughing of the skin on the neck.  Otherwise no rash   Abdomen:     Soft. No tenderness. No HSM.  Positive bowel sounds.   Neuro:  Awake, alert and oriented.  Strength 5/5 in arms and legs..   Extremities:  No cyanosis, clubbing or edema.  Warm extremities and well-perfused.          Results Review:        Results from last 7 days   Lab Units 08/07/19 0444 08/06/19  1414 08/06/19 0511 08/05/19  0454   SODIUM mmol/L 147*  --  151*  --  149*   POTASSIUM mmol/L 3.8 3.9 3.3*   < > 3.2*   CHLORIDE mmol/L 111*  --  115*  --  107   CO2 mmol/L 24.6  --  22.7  --  30.0*   BUN mg/dL 56*  --  33*  --  24*   CREATININE mg/dL 2.79*  --  1.83*  --  1.17   GLUCOSE mg/dL 141*  --  133*  --  107*   CALCIUM mg/dL 7.5*  --  5.6*  --  7.7*    < > = values in this interval not displayed.     Results from last 7 days   Lab Units 08/03/19  1223   TROPONIN T ng/mL <0.010     Results from last 7 days   Lab Units 08/07/19 0444 08/06/19  0510 08/05/19  0454   WBC 10*3/mm3 1.96* 2.22* 1.38*   HEMOGLOBIN g/dL 12.6* 12.7* 14.8   HEMATOCRIT % 38.5 39.7 46.0   PLATELETS 10*3/mm3 41* 79* 106*     Results from last 7 days   Lab Units 08/07/19 0444 08/06/19  0511 08/05/19  0454   INR  1.33* 1.60* 2.49*     Results from last 7 days   Lab Units 08/03/19  1223   PROBNP pg/mL 8,857.0*       I reviewed the patient's new clinical results.  I  personally viewed and interpreted the patient's CXR        Medication Review:     ceFAZolin 2 g Intravenous Q12H   filgrastim (NEUPOGEN) injection 480 mcg Subcutaneous Q PM   metoprolol tartrate 25 mg Oral Q12H   potassium chloride 40 mEq Nasogastric Once   sertraline 50 mg Oral Daily   silver sulfadiazine  Topical BID   sodium chloride 3 mL Intravenous Q12H   Sodium Hypochlorite 0.0625 % (Dakin's 1/8th Strength) topical solution 946 mL Irrigation Q12H   terazosin 1 mg Nasogastric Nightly         lactated ringers 9 mL/hr Last Rate: 9 mL/hr (08/05/19 1344)   phenylephrine 0.5-3 mcg/kg/min Last Rate: Stopped (08/06/19 2110)       Diagnostic imaging:  I personally and independently reviewed the following images:  CXR 8/6/2019 after central line insertion: Line in good position.  Slight worsening bilateral edema in the lower lobes.    Assessment   1. Septic shock, shock resolved  2. MSSA bacteremia  3. Suspected infection of the MediPort  4. Acute on chronic diastolic CHF  5. Acute hypoxic respiratory failure, secondary to pulmonary edema  6. Pulmonary edema  7. JERICHO, WORSE  8. A. fib with RVR, WORSE  9. Stomatitis  10. Pancytopenia secondary to recent chemotherapy  11. Metabolic encephalopathy, improved  12. Coagulopathy, suspect related to vit K defficiency from poor intake  13. Poor appetite secondary to stomatitis and chemotherapy  14. Weight loss  15. Hyperbilirubinemia, elevated direct bilirubin with normal liver enzymes  16. Hypernatremia, better  17. Hypokalemia, resolved  18. Hypophosphatemia, NEW  19. Protein calorie malnutrition, albumin 2.1  20. Severe pulmonary hypertension on echo dated 10/12/2017, RVSP 47.  Mildly dilated left atrium.  Probably group 2 secondary diastolic CHF and group 3 secondary to COPD and SHAR  21. SHAR  22. COPD, no exacerbation  23. Urinary retention, NEW  24. Elevated digoxin level, NEW  25. Diarrhea, NEW- Likely from tube feeding      Plan     · No objection for fluid resuscitation  if needed. Looks better today. Oxygen requirement not too bad   · Continue tube feeding and free water for hypernatremia  · Start Terazocin for urinary retention.  Blood pressure allows it.  Will discontinue it if hypotension.  PRN catheterization  · Antibiotics per ID.  On cefazolin now  · Eugene-Synephrine for hypotension to keep MAP>65- off now  · Hold digoxin.  Level is elevated.  · Metoprolol for A. Fib  · PRN Zofran  · Neupogen for pancytopenia  · Repeat labs in a.m.  · Oxygen by nasal cannula  · PRN Imodium for diarrhea.  Doubt C. difficile.    Discussed with CCU staff during the multidisciplinary round.      Danelle Galeano MD  08/07/19  12:32 PM      Time: Critical care 33 min excluding separately billed procedures      This note was dictated utilizing Dragon dictation

## 2019-08-07 NOTE — PROGRESS NOTES
Discharge Planning Assessment  [facility]     Patient Name: Cody Eldridge  MRN: [id number]  Today's Date: 8/7/2019    Admit Date: 8/3/2019    Discharge Needs Assessment     Row Name 08/07/19 1319       Living Environment    Lives With  spouse    Current Living Arrangements  home/apartment/condo    Primary Care Provided by  self;spouse/significant other    Provides Primary Care For  no one    Family Caregiver if Needed  none    Able to Return to Prior Arrangements  yes       Resource/Environmental Concerns    Resource/Environmental Concerns  none    Transportation Concerns  car, none       Transition Planning    Patient/Family Anticipates Transition to  home with family    Patient/Family Anticipated Services at Transition  none    Transportation Anticipated  family or friend will provide       Discharge Needs Assessment    Concerns to be Addressed  denies needs/concerns at this time;discharge planning    Equipment Currently Used at Home  wheelchair        Discharge Plan     Row Name 08/07/19 1320       Plan    Plan  Home  declines referral to Home health    Plan Comments  IMM noted.  CCP spoke to patient at bedside to discuss discharge planning.  CCP role explained.  Face sheet verified.  His wife Elise [phone], is his emergency contact. His PCP is  James Epley APRN.   Pt lives in a first floor apartment with his wife.  He uses a wheel chair  to ambulate.  He is independent with ALD's.  His pharmacy is [facility] on [facility].  He has no physical rehab history. He has no history of HH.   Plan is Home.  Pt states he will notify Home health at [facility] following        Destination      No service coordination in this encounter.      Durable Medical Equipment      No service coordination in this encounter.      Dialysis/Infusion      No service coordination in this encounter.      Home Medical Care      No service coordination in this encounter.      Therapy      No service  coordination in this encounter.      Community Resources      No service coordination in this encounter.          Demographic Summary    No documentation.       Functional Status    No documentation.       Psychosocial    No documentation.       Abuse/Neglect    No documentation.       Legal    No documentation.       Substance Abuse    No documentation.       Patient Forms    No documentation.           Mansi Sotomayor RN

## 2019-08-07 NOTE — PROGRESS NOTES
LOS: 4 days     Chief Complaint:  Follow-up MSSA septicemia w/ port in place    Interval History: Afebrile, continues to be in A. fib with RVR.  Off pressors.  Having diarrhea on tube feeds.  Continues to report mouth pain with copious secretions.  Tolerating antibiotics without a rash    Vital Signs  Temp:  [97.4 °F (36.3 °C)-98.7 °F (37.1 °C)] 97.6 °F (36.4 °C)  Heart Rate:  [] 131  Resp:  [16-18] 16  BP: ()/(53-99) 115/73    Physical Exam:  General: awake, alert  ENT:+ mucositis  Cardiovascular: tachy, irreg irreg rhythm; trace LE edema  Respiratory:  Crackles at the bases  GI: Abdomen is soft, non-tender, non-distended,  Skin: no rashes     Antibiotics:  Cefazolin 2 g IV every 12 hours    LABS:  CBC, CMP, micro reviewed today  Lab Results   Component Value Date    WBC 1.96 (C) 08/07/2019    HGB 12.6 (L) 08/07/2019    HCT 38.5 08/07/2019    MCV 99.2 (H) 08/07/2019    PLT 41 (C) 08/07/2019     Lab Results   Component Value Date    GLUCOSE 141 (H) 08/07/2019    BUN 56 (H) 08/07/2019    CREATININE 2.79 (H) 08/07/2019    EGFRIFNONA 22 (L) 08/07/2019    EGFRIFAFRI 107 04/23/2018    BCR 20.1 08/07/2019    CO2 24.6 08/07/2019    CALCIUM 7.5 (L) 08/07/2019    PROTENTOTREF 7.2 04/23/2018    ALBUMIN 2.30 (L) 08/07/2019    LABIL2 1.3 04/23/2018    AST 22 08/06/2019    ALT 19 08/06/2019     Microbiology:  8/3 BCx: MSSA in 2/2 sets  8/5 Catheter and Wound Cx: MSSA  8/6 BCx: NGTD x 2      Radiology (personally reviewed report):  TTE hypokinetic left ventricular wall segments.  Moderately dilated left atrium.  EF of 36 to 40%.  Moderate TR    Assessment/Plan   1. Septic shock due to MSSA septicemia  2. Neutropenic fever  3. Port in place -- removed 8/5/19  4. 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 Atrial fibrillation with RVR  7. Acute kidney injury (new)    Creatinine further elevated to 2.79  Patient is off pressors and afebrile.  No longer neutropenic but lymphopenic  Repeat blood cultures  negative to date  TTE negative for endocarditis  Continue cefazolin 2 g IV every 12 hours (if renal function improves can increase the dose to every 8 hours) for 4 weeks with an antibiotic stop date of September 3.

## 2019-08-07 NOTE — PROGRESS NOTES
REASON FOR CONSULTATION:  Neutropenic fever, port site cellulitis, prolonged protime no clinical bleeding, severe grade iii mucositis, atrial fibrilation rvr  Provide an opinion on any further workup or treatment                             INTERVAL HISTORY:  On 8/5/2019 in the early morning,Patient was moved to intensive care unit because of hypotension, atrial fibrillation with RVR and currently on pressor and amiodarone.     In the early afternoon of 8/5/2019, patient had portacatheter removed.     On 8/6/2019, nurse and family member reports patient is restless, patient himself is coherent and oriented.  Nurse reports patient had no urine output, bladder scan shows residual urine 150 mL.  It was 100 mL earlier this morning.  In the meantime patient has worsening renal function with creatinine 1.83 up from 1.17 the previous day.     The tip of portacatheter grew out Staphylococcus aureus within 24 hours.  Sensitivity study is not available.     On 8/6/2019, patient has further decreased platelets 41,000, and his WBC and neutrophils both actually slightly decreased at 1960 including ANC 1630.  His renal function is further worsening at creatinine 2.79.  Patient has been seen by nephrologist.  Patient is off pressor, and seems in sinus rhythm.     HISTORY OF PRESENT ILLNESS:  The patient is a 74 y.o. year old male who is here for an opinion about the above issue.     History of Present Illness I have been asked to see this patient in consultation today who is a 74-year-old white male who has been treated BY DR RONALDO WILLIAMSON FOR HEAD AND NECK CANCER WITH CHEMO AND RADIATION THERAPY In any event the patient has received radiation therapy to his NECK, he has been receiving IV fluids in the office in the last few days because of profound weakness and he has now developed atrial fibrillation with rapid ventricular response from a congestive heart failure, elevation of the proBNP in the 8000 category and further more  significant sensitivity and redness in the skin of the anterior chest wall on the right side where the port is located that makes me to think that he has at least a cellulitis in this anatomical site. The patient has a severe degree of mucositis associated with radiation therapy. He is not able to swallow. Also the patient has developed diarrhea with no abdominal pain and in a stool culture enteropathogenic E-coli has been recovered. Clostridium was negative. The patient has lost substantial amount of weight. He has not been able to eat anything for the last 3-4 weeks. He has had swelling in his lower extremities. He has had cough and he has had shortness of breath. He has had fevers and chills as stated. He just feels terrible.            Medical History        Past Medical History:   Diagnosis Date   • Atrial fibrillation (CMS/HCC)     • CHF (congestive heart failure) (CMS/HCC)     • Chronic bronchitis (CMS/HCC)     • COPD (chronic obstructive pulmonary disease) (CMS/HCC)     • Cor pulmonale (chronic) (CMS/HCC)     • Daytime hypersomnia     • Depression with anxiety     • Elevated cholesterol     • Enlarged prostate     • Frequent nocturnal awakening     • Gout     • H/O cardiac radiofrequency ablation 2006     [facility].   • Head and neck cancer (CMS/HCC)     • Hyperlipidemia     • Hypertension     • Hypotension     • Insomnia     • Lumbar radicular pain     • SHAR (obstructive sleep apnea)     • Osteoarthritis     • Permanent atrial fibrillation (CMS/HCC)     • Snoring     • Squamous cell carcinoma of neck     • SVT (supraventricular tachycardia) (CMS/HCC)     • Syncope     • Vitamin D deficiency     • Weight loss              Surgical History         Past Surgical History:   Procedure Laterality Date   • APPENDECTOMY       • CARDIAC ABLATION   2006     [facility].   • CARDIAC CATHETERIZATION N/A 8/29/2018     Procedure: Left Heart Cath;  Surgeon: Yousif Uribe MD;  Location:  INVASIVE  LOCATION;  Service: Cardiology   • CARDIAC CATHETERIZATION N/A 8/29/2018     Procedure: Coronary angiography;  Surgeon: Yousif Uribe MD;  Location:  [facility];  Service: Cardiology   • CARDIAC CATHETERIZATION N/A 8/29/2018     Procedure: Left ventriculography;  Surgeon: Yousif Uribe MD;  Location:  [facility];  Service: Cardiology   • FINGER SURGERY       • FOOT SURGERY       • HAND SURGERY       • VENOUS ACCESS DEVICE (PORT) INSERTION Right 6/18/2019     Procedure: MEDIPORT PLACEMENT;  Surgeon: Elvis Grigsby MD;  Location: [facility] MAIN OR;  Service: Vascular           ONCOLOGIC HISTORY DR RONALDO WILLIAMSON MD:     1.  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  He is undergoing definitive treatment with radiation therapy and weekly low-dose carboplatin and Taxol chemotherapy.  He was able to complete 4 weekly chemotherapy treatments in a row but required holding chemotherapy for the last 2 weeks due to myelosuppression.   he appears to be in rapid atrial fibrillation.  2.  Comorbidities including ischemic heart disease with history of CHF and atrial fibrillation requiring Coumadin anticoagulation.    3.  Oral mucositis.  He has a prescription for hydrocodone for pain control.  He does have Meghann's Magic mouth rinse to use as well.  4.  Warfarin anticoagulation.  Patient's INR 3.83, he will hold his coumadin today and tomorrow and have another INR checked Wednesday 7/17/20195.    5.  Weight loss and poor nutrition.  The patient's weight has stabilized and he is utilizing boost at home and trying to hydrate with water and Gatorade.  He does understand if his nutrition is not improving we will need to proceed with a G-tube for nutrition.  Ruthy Bautista, oncology dietitian been involved in his care also.  6.  Atrial fibrillation with rapid ventricular response     MEDICATIONS: see  "EMR.     ALLERGIES:          Allergies   Allergen Reactions   • Benadryl [Diphenhydramine Hcl] Other (See Comments) and Dizziness       \"I sleep for about a day\"       Social History no changes.      Family History;  No changes.        Review of Systems   Constitutional: Positive for activity change, appetite change, chills, diaphoresis, fatigue, fever and unexpected weight change.   HENT: Positive for mouth sores, sore throat, trouble swallowing and voice change.    Eyes: Negative.    Respiratory: Positive for cough and shortness of breath.    Cardiovascular: Positive for palpitations.   Gastrointestinal: Positive for abdominal distention and diarrhea.   Endocrine: Negative.    Genitourinary: Negative.    Musculoskeletal: Negative.    Skin: Positive for pallor.   Neurological: Negative.    Hematological: Negative.    Psychiatric/Behavioral: Negative.               Objective      Vitals:    08/07/19 1745 08/07/19 1805 08/07/19 1830 08/07/19 1855   BP:  113/74 118/84    Pulse:  (!) 135 (!) 134 (!) 134   Resp:       Temp:       TempSrc:       SpO2: (!) 89% (!) 88% 97% 92%   Weight:       Height:          Physical Exam   Constitutional: He is oriented to person, place, and time. He has resolution of restless, lying in the bed comfortable, carries conversation well.   HENT:   Head: Normocephalic.   Nose: Nose normal.  NG tube in place.  Mouth/Throat: Grade III mucositis   Eyes: Conjunctivae and EOM are normal. No scleral icterus.   Neck: Normal range of motion. No tracheal deviation present. No thyromegaly present.   Erythema neck area of radiation   Cardiovascular:  Tachycardia but in sinus rhythm.  Pulmonary/Chest: Effort normal and breath sounds normal.   Abdominal: Soft. He exhibits no distension and no mass. There is no tenderness. There is no rebound and no guarding.   Musculoskeletal: Normal range of motion. He exhibits edema.   Lymphadenopathy:     He has no cervical adenopathy.   Neurological: He is alert and " oriented to person, place, and time.   Skin: Portacatheter has been removed.   Psychiatric: He has a normal mood and affect.  Patient seems restless not in acute distress. Judgment and thought content normal.          RECENT LABS:    Results from last 7 days   Lab Units 08/07/19  0444 08/06/19  0510 08/05/19  0454   WBC 10*3/mm3 1.96* 2.22* 1.38*   HEMOGLOBIN g/dL 12.6* 12.7* 14.8   HEMATOCRIT % 38.5 39.7 46.0   PLATELETS 10*3/mm3 41* 79* 106*   MONOCYTES % % 14.0* 3.2* 4.1*     Lab Results   Component Value Date    NEUTROABS 1.63 (L) 08/07/2019     Results from last 7 days   Lab Units 08/07/19  1721 08/07/19  0444 08/06/19  1414 08/06/19  0511  08/05/19  0454  08/03/19  1223   SODIUM mmol/L  --  147*  --  151*  --  149*   < > 144   POTASSIUM mmol/L 3.9 3.8 3.9 3.3*   < > 3.2*   < > 4.5   CHLORIDE mmol/L  --  111*  --  115*  --  107   < > 107   CO2 mmol/L  --  24.6  --  22.7  --  30.0*   < > 25.1   BUN mg/dL  --  56*  --  33*  --  24*   < > 24*   CREATININE mg/dL  --  2.79*  --  1.83*  --  1.17   < > 1.07   CALCIUM mg/dL  --  7.5*  --  5.6*  --  7.7*   < > 8.9   BILIRUBIN mg/dL  --   --   --  2.5*  --  2.8*  --  0.9   ALK PHOS U/L  --   --   --  58  --  71  --  97   ALT (SGPT) U/L  --   --   --  19  --  25  --  36   AST (SGOT) U/L  --   --   --  22  --  32  --  33   GLUCOSE mg/dL  --  141*  --  133*  --  107*   < > 111*    < > = values in this interval not displayed.     Results from last 7 days   Lab Units 08/07/19 0444 08/06/19  0511 08/05/19  0454   INR  1.33* 1.60* 2.49*     Contains abnormal data Catheter Culture - Cath Tip, Chest, Right   Order: [id number]   Collected:  8/5/2019 14:47 Status:  Preliminary result   Visible to patient:  No (Not Released)   Specimen Information: Chest, Right; Cath Tip        CATHETER CULTURE >15 CFU/mL - Indicative of infection. Staphylococcus aureus Abnormal           Resulting Agency:  [facility]         Specimen Collected: 08/05/19 14:47 Last Resulted: 08/06/19 09:40         Order Details       View Encounter       Lab and Collection Details                    Assessment/Plan   1.   In summary this patient is a 74-year-old who has been treated FOR HAND NECK CA He has received radiation therapy to the neck and he has been undergoing chemotherapy .  The patient has been admitted with rapid ventricular response, atrial fibrillation, fever, tremendous tenderness and erythema at the port site in anterior chest wall on the right side and associated with this diarrhea with a stool culture consistent with E-coli enteropathogenic. Also the patient has abnormal blood cultures.     Due to poor performance status, chemoradiotherapy will be on hold.    · On 8/7/2019, I discussed with the patient and his wife, will reevaluate in the future to see whether he needs more systematic chemotherapy after he recovered from sepsis and the improvement of performance status.    2. The patient has severe neutropenia secondary to chemotherapy.     · Continue currently on Neupogen daily.   · Improved neutrophil counts 2030 on 8/6/2019.   · Slightly worsening neutrophil at 1630 on 8/7/2019.  We will continue to monitor.    3.  Sepsis with positive MSSA septicemia.  Patient has been followed by Dr. Cruz, was on nafcillin.  · PowerPort was removed earlier this afternoon on 8/5/2019.  · Catheter tip culture was positive for Staphylococcus aureus on 8/6/2019.   · Antibiotics being switched to cefazolin 8/6/2019.    · Patient is improving, off pressor on 8/7/2019.    4.  Acute renal injury, with creatinine 1.83 today, up from 1.17 yesterday.  Patient does not have urination.  Residual urine in the bladder is only 150 mL this afternoon on 8/6/2019.   · Patient has been seen by nephrology service.     5.  Thrombocytopenia secondary to chemotherapy.    · Worsening platelets at 79,000 on 8/6/2019.  No bleeding.    · Further deteriorating platelets 41,000 on 8/7/2019.  His thrombocytopenia possibly also contributed by  his infection and now antibiotic.  Continue to monitor.  We will check IPF in the morning.     6.  Oral mucositis grade 3, secondary to chemoradiation therapy.  Routine therapy will be on hold for now per Dr. Davila.   · Nurse reports 8/6/2019 lidocaine viscous is not really helping.  We will try Magic mouthwash.    5.  Coagulopathy due to vitamin K deficiency.  This is likely due to poor oral intake.  Patient currently is on tube feeding.  · INR has further improved, currently 1.33 today on 8//2019.,    6.  Atrial fibrillation with RVR.  Cardiology to follow.  Patient is on digoxin.   · Patient seems in sinus rhythm 8/7/2019.  Continues to have mild tachycardia.    7.  Mild anemia.  Hb 12.7, newly developed on 8/6/2019.   · Stable on 8/7/2019    RECOMMENDATIONS:  1.  Continue IV cefazolin, per ID service.  2.  Continue Neupogen 480 mcg subcutaneous daily in order to bring up his white blood count.   3.  Appreciated consult from nephrology service for acute renal injury.  4.  Continue Magic mouthwash, every 4 hours swish and spit.    5.   Continue tube feeding.    6.  Continue follow up by cardiology service.   7.  Monitor CBC in the morning.  We will check IPF and LDH.     I spoke with his wife today at the bedside.      I discussed with his nurse today for patient care.    JONATHAN JAVIER M.D., Ph.D.    8/7/2019

## 2019-08-07 NOTE — PROGRESS NOTES
NEPHROLOGY PROGRESS NOTE    PATIENT IDENTIFICATION:   Name:  Cody Eldridge      MRN:  [id number]     74 y.o.  male             Reason for visit: JERIHCO    SUBJECTIVE:   He feels better; Eugene-Synephrine discontinued earlier this morning; breathing is comfortable on 2 L/min; tube feeds infusing with water flushes at 100 mL/h; urine output improving though still mediocre    OBJECTIVE:  Vitals:    08/07/19 0503 08/07/19 0518 08/07/19 0532 08/07/19 0817   BP: 130/89 114/75 105/80 115/73   Pulse:  (!) 129 (!) 130 (!) 131   Resp:       Temp:       TempSrc:       SpO2:  91% 90%    Weight:       Height:               Body mass index is 23.86 kg/m².    Intake/Output Summary (Last 24 hours) at 8/7/2019 0853  Last data filed at 8/7/2019 0400  Gross per 24 hour   Intake 3737 ml   Output 230 ml   Net 3507 ml     Wt Readings from Last 1 Encounters:   08/07/19 0300 77.6 kg (171 lb 1.2 oz)   08/07/19 0146 76 kg (167 lb 8.8 oz)   08/06/19 1504 72.6 kg (160 lb)   08/06/19 0526 72.8 kg (160 lb 7.9 oz)   08/05/19 0600 70.5 kg (155 lb 6.8 oz)   08/05/19 0448 70.9 kg (156 lb 6.4 oz)   08/04/19 0610 70.6 kg (155 lb 11.2 oz)   08/03/19 1619 76.6 kg (168 lb 12.8 oz)   08/03/19 1207 79.9 kg (176 lb 3 oz)     Wt Readings from Last 3 Encounters:   08/07/19 77.6 kg (171 lb 1.2 oz)   08/02/19 78.6 kg (173 lb 3.2 oz)   07/31/19 79.2 kg (174 lb 9.6 oz)         Exam:  NAD; pleasant; oriented; looks older than stated age  Chronically ill-appearing  Dry MM; AT/NC   No eye discharge; no scleral icterus  No JVD; no carotid bruits  Coarse bilat; crackles in bases; not labored on 2 L/min  Irregularly irregular, tachycardic, no rub  Soft, NT, +D, BS+  Dressing by the right clavicle is C/D/I; left subclavian central line  No significant edema  No clubbing  No asterixis  Moves all extremities   Mood and affect are normal  Speech is fluent        Scheduled meds:    ceFAZolin 2 g Intravenous Q12H   filgrastim (NEUPOGEN) injection 480 mcg Subcutaneous Q PM    metoprolol tartrate 25 mg Oral Q12H   potassium chloride 40 mEq Nasogastric Once   potassium phosphate 10 mmol Intravenous Once   sertraline 50 mg Oral Daily   silver sulfadiazine  Topical BID   sodium chloride 3 mL Intravenous Q12H   Sodium Hypochlorite 0.0625 % (Dakin's 1/8th Strength) topical solution 946 mL Irrigation Q12H     IV meds:                        lactated ringers 9 mL/hr Last Rate: 9 mL/hr (08/05/19 1344)   phenylephrine 0.5-3 mcg/kg/min Last Rate: Stopped (08/06/19 2110)       Data Review:    Results from last 7 days   Lab Units 08/07/19  0444 08/06/19  1414 08/06/19  0511  08/05/19  0454  08/03/19  1223   SODIUM mmol/L 147*  --  151*  --  149*   < > 144   POTASSIUM mmol/L 3.8 3.9 3.3*   < > 3.2*   < > 4.5   CHLORIDE mmol/L 111*  --  115*  --  107   < > 107   CO2 mmol/L 24.6  --  22.7  --  30.0*   < > 25.1   BUN mg/dL 56*  --  33*  --  24*   < > 24*   CREATININE mg/dL 2.79*  --  1.83*  --  1.17   < > 1.07   CALCIUM mg/dL 7.5*  --  5.6*  --  7.7*   < > 8.9   BILIRUBIN mg/dL  --   --  2.5*  --  2.8*  --  0.9   ALK PHOS U/L  --   --  58  --  71  --  97   ALT (SGPT) U/L  --   --  19  --  25  --  36   AST (SGOT) U/L  --   --  22  --  32  --  33   GLUCOSE mg/dL 141*  --  133*  --  107*   < > 111*    < > = values in this interval not displayed.     Estimated Creatinine Clearance: 25.1 mL/min (A) (by C-G formula based on SCr of 2.79 mg/dL (H)).  Results from last 7 days   Lab Units 08/07/19 0444 08/06/19  2105   SODIUM UR mmol/L  --  70   CREATININE UR mg/dL  --  56.7   URIC ACID mg/dL 5.4  --      Results from last 7 days   Lab Units 08/07/19  0444 08/06/19  0511 08/05/19  2313 08/05/19  0454   MAGNESIUM mg/dL 2.2 2.3 1.4* 1.6   PHOSPHORUS mg/dL 1.0*  --   --  3.4       Results from last 7 days   Lab Units 08/07/19  0444 08/06/19  0510 08/05/19  0454 08/04/19  0507 08/03/19  1223   WBC 10*3/mm3 1.96* 2.22* 1.38* 0.56* 1.09*   HEMOGLOBIN g/dL 12.6* 12.7* 14.8 14.1 14.8   PLATELETS 10*3/mm3 41* 79* 106*  126* 196       Results from last 7 days   Lab Units 08/07/19  0444 08/06/19  0511 08/05/19  0454 08/04/19  0507 08/03/19  1223   INR  1.33* 1.60* 2.49* 5.02* 3.97*             ASSESSMENT:     New onset of congestive heart failure (CMS/HCC)    Atrial fibrillation (CMS/HCC)    Squamous cell carcinoma of neck    COPD (chronic obstructive pulmonary disease) (CMS/HCC)    Depression with anxiety    CAD (coronary artery disease)    Chemotherapy-induced neutropenia (CMS/HCC)    Vascular catheter infection (CMS/HCC)    Chemotherapy-induced thrombocytopenia    Acute renal injury (CMS/HCC)    Anemia associated with chemotherapy    1.  JERICHO, oliguric, worsening: Prerenal related to hypotension, tachy-arrhythmia, and sepsis syndrome limiting renal perfusion.  Chest x-ray difficult to interpret with bilateral infiltrates representing possible effusions versus pneumonia versus both.  IVC collapse seen with bedside sonogram, consistent with hypovolemia.  Significant hypernatremia, with suspected large water deficit.  Hypokalemia and hypophosphatemia in the setting of diarrhea and poor nutrition.  Although serum creatinine continues to rise, urine output is improving and hemodynamics are better  2.  Head and neck cancer on radiation and chemotherapy  3.  Rapid atrial fibrillation  4.  Neutropenic fever with sepsis syndrome and MSSA bacteremia  5.  Coagulopathy  6.  Anemia and thrombocytopenia  7.  History of diastolic CHF: Inspected to have hypovolemia presently        PLAN:  1.  Continue water flushes at 100 mL/h with tube feeds  2.  One dose of potassium phosphate IV  3.  Control heart rate  4.  Surveillance labs  5.  No indication for dialysis, and expect that renal function should improve now that hemodynamics are more stable    Medardo Espinal MD  8/7/2019    8:53 AM

## 2019-08-08 PROBLEM — B37.0 ORAL THRUSH: Status: ACTIVE | Noted: 2019-08-08

## 2019-08-08 LAB
ALBUMIN SERPL-MCNC: 2 G/DL (ref 3.5–5.2)
ALBUMIN SERPL-MCNC: 2.3 G/DL (ref 3.5–5.2)
ALBUMIN/GLOB SERPL: 0.9 G/DL
ALP SERPL-CCNC: 75 U/L (ref 39–117)
ALT SERPL W P-5'-P-CCNC: <5 U/L (ref 1–41)
ANION GAP SERPL CALCULATED.3IONS-SCNC: 9.2 MMOL/L (ref 5–15)
ANION GAP SERPL CALCULATED.3IONS-SCNC: 9.3 MMOL/L (ref 5–15)
ANISOCYTOSIS BLD QL: ABNORMAL
AST SERPL-CCNC: 21 U/L (ref 1–40)
BILIRUB SERPL-MCNC: 0.7 MG/DL (ref 0.2–1.2)
BUN BLD-MCNC: 64 MG/DL (ref 8–23)
BUN BLD-MCNC: 68 MG/DL (ref 8–23)
BUN/CREAT SERPL: 21.5 (ref 7–25)
BUN/CREAT SERPL: 23.4 (ref 7–25)
BURR CELLS BLD QL SMEAR: ABNORMAL
CALCIUM SPEC-SCNC: 7.6 MG/DL (ref 8.6–10.5)
CALCIUM SPEC-SCNC: 8 MG/DL (ref 8.6–10.5)
CHLORIDE SERPL-SCNC: 106 MMOL/L (ref 98–107)
CHLORIDE SERPL-SCNC: 110 MMOL/L (ref 98–107)
CO2 SERPL-SCNC: 24.7 MMOL/L (ref 22–29)
CO2 SERPL-SCNC: 25.8 MMOL/L (ref 22–29)
CREAT BLD-MCNC: 2.91 MG/DL (ref 0.76–1.27)
CREAT BLD-MCNC: 2.98 MG/DL (ref 0.76–1.27)
DEPRECATED RDW RBC AUTO: 71.7 FL (ref 37–54)
ERYTHROCYTE [DISTWIDTH] IN BLOOD BY AUTOMATED COUNT: 19.8 % (ref 12.3–15.4)
GFR SERPL CREATININE-BSD FRML MDRD: 21 ML/MIN/1.73
GFR SERPL CREATININE-BSD FRML MDRD: 21 ML/MIN/1.73
GLOBULIN UR ELPH-MCNC: 2.3 GM/DL
GLUCOSE BLD-MCNC: 120 MG/DL (ref 65–99)
GLUCOSE BLD-MCNC: 149 MG/DL (ref 65–99)
GLUCOSE BLDC GLUCOMTR-MCNC: 134 MG/DL (ref 70–130)
HCT VFR BLD AUTO: 36.1 % (ref 37.5–51)
HGB BLD-MCNC: 11.5 G/DL (ref 13–17.7)
INR PPP: 1.19 (ref 0.9–1.1)
LDH SERPL-CCNC: 419 U/L (ref 135–225)
LYMPHOCYTES # BLD MANUAL: 0.16 10*3/MM3 (ref 0.7–3.1)
LYMPHOCYTES NFR BLD MANUAL: 2 % (ref 19.6–45.3)
LYMPHOCYTES NFR BLD MANUAL: 25 % (ref 5–12)
MAGNESIUM SERPL-MCNC: 2 MG/DL (ref 1.6–2.4)
MCH RBC QN AUTO: 32 PG (ref 26.6–33)
MCHC RBC AUTO-ENTMCNC: 31.9 G/DL (ref 31.5–35.7)
MCV RBC AUTO: 100.6 FL (ref 79–97)
MICROCYTES BLD QL: ABNORMAL
MONOCYTES # BLD AUTO: 2.01 10*3/MM3 (ref 0.1–0.9)
NEUTROPHILS # BLD AUTO: 5.8 10*3/MM3 (ref 1.7–7)
NEUTROPHILS NFR BLD MANUAL: 72 % (ref 42.7–76)
NRBC BLD AUTO-RTO: 0.1 /100 WBC (ref 0–0.2)
PHOSPHATE SERPL-MCNC: 0.7 MG/DL (ref 2.5–4.5)
PHOSPHATE SERPL-MCNC: 1.7 MG/DL (ref 2.5–4.5)
PLAT MORPH BLD: NORMAL
PLATELET # BLD AUTO: 29 10*3/MM3 (ref 140–450)
PLATELET # BLD AUTO: 29 10*3/MM3 (ref 140–450)
PLATELETS.RETICULATED NFR BLD AUTO: 10.1 % (ref 0.9–6.5)
PMV BLD AUTO: 13.6 FL (ref 6–12)
POTASSIUM BLD-SCNC: 3.8 MMOL/L (ref 3.5–5.2)
POTASSIUM BLD-SCNC: 4.2 MMOL/L (ref 3.5–5.2)
PROT SERPL-MCNC: 4.3 G/DL (ref 6–8.5)
PROTHROMBIN TIME: 14.8 SECONDS (ref 11.7–14.2)
RBC # BLD AUTO: 3.59 10*6/MM3 (ref 4.14–5.8)
SODIUM BLD-SCNC: 140 MMOL/L (ref 136–145)
SODIUM BLD-SCNC: 145 MMOL/L (ref 136–145)
VARIANT LYMPHS NFR BLD MANUAL: 1 % (ref 0–5)
WBC MORPH BLD: NORMAL
WBC NRBC COR # BLD: 8.05 10*3/MM3 (ref 3.4–10.8)

## 2019-08-08 PROCEDURE — 80053 COMPREHEN METABOLIC PANEL: CPT | Performed by: INTERNAL MEDICINE

## 2019-08-08 PROCEDURE — 85055 RETICULATED PLATELET ASSAY: CPT | Performed by: INTERNAL MEDICINE

## 2019-08-08 PROCEDURE — 82962 GLUCOSE BLOOD TEST: CPT

## 2019-08-08 PROCEDURE — 93005 ELECTROCARDIOGRAM TRACING: CPT | Performed by: INTERNAL MEDICINE

## 2019-08-08 PROCEDURE — 25010000003 CEFAZOLIN IN DEXTROSE 2-4 GM/100ML-% SOLUTION: Performed by: INTERNAL MEDICINE

## 2019-08-08 PROCEDURE — 99233 SBSQ HOSP IP/OBS HIGH 50: CPT | Performed by: INTERNAL MEDICINE

## 2019-08-08 PROCEDURE — 85610 PROTHROMBIN TIME: CPT | Performed by: HOSPITALIST

## 2019-08-08 PROCEDURE — 84100 ASSAY OF PHOSPHORUS: CPT | Performed by: INTERNAL MEDICINE

## 2019-08-08 PROCEDURE — 93010 ELECTROCARDIOGRAM REPORT: CPT | Performed by: INTERNAL MEDICINE

## 2019-08-08 PROCEDURE — 83615 LACTATE (LD) (LDH) ENZYME: CPT | Performed by: INTERNAL MEDICINE

## 2019-08-08 PROCEDURE — 85025 COMPLETE CBC W/AUTO DIFF WBC: CPT | Performed by: HOSPITALIST

## 2019-08-08 PROCEDURE — 83735 ASSAY OF MAGNESIUM: CPT | Performed by: INTERNAL MEDICINE

## 2019-08-08 PROCEDURE — 80069 RENAL FUNCTION PANEL: CPT | Performed by: INTERNAL MEDICINE

## 2019-08-08 PROCEDURE — 85007 BL SMEAR W/DIFF WBC COUNT: CPT | Performed by: HOSPITALIST

## 2019-08-08 RX ORDER — FLUCONAZOLE 40 MG/ML
200 POWDER, FOR SUSPENSION ORAL DAILY
Status: COMPLETED | OUTPATIENT
Start: 2019-08-08 | End: 2019-08-10

## 2019-08-08 RX ORDER — LANSOPRAZOLE
30 KIT DAILY
Status: DISCONTINUED | OUTPATIENT
Start: 2019-08-08 | End: 2019-08-20 | Stop reason: HOSPADM

## 2019-08-08 RX ADMIN — CEFAZOLIN SODIUM 2 G: 2 INJECTION, SOLUTION INTRAVENOUS at 21:03

## 2019-08-08 RX ADMIN — SODIUM HYPOCHLORITE 5 ML: 1.25 SOLUTION TOPICAL at 08:00

## 2019-08-08 RX ADMIN — SODIUM PHOSPHATE, MONOBASIC, MONOHYDRATE 40 MMOL: 276; 142 INJECTION, SOLUTION INTRAVENOUS at 06:10

## 2019-08-08 RX ADMIN — DIPHENOXYLATE HYDROCHLORIDE AND ATROPINE SULFATE 1 TABLET: 2.5; .025 TABLET ORAL at 12:10

## 2019-08-08 RX ADMIN — CEFAZOLIN SODIUM 2 G: 2 INJECTION, SOLUTION INTRAVENOUS at 11:09

## 2019-08-08 RX ADMIN — DIPHENOXYLATE HYDROCHLORIDE AND ATROPINE SULFATE 1 TABLET: 2.5; .025 TABLET ORAL at 19:49

## 2019-08-08 RX ADMIN — LIDOCAINE HYDROCHLORIDE 5 ML: 20 SOLUTION ORAL; TOPICAL at 19:48

## 2019-08-08 RX ADMIN — LIDOCAINE HYDROCHLORIDE 5 ML: 20 SOLUTION ORAL; TOPICAL at 13:11

## 2019-08-08 RX ADMIN — SODIUM CHLORIDE, PRESERVATIVE FREE 3 ML: 5 INJECTION INTRAVENOUS at 08:00

## 2019-08-08 RX ADMIN — TERAZOSIN HYDROCHLORIDE 1 MG: 1 CAPSULE ORAL at 20:13

## 2019-08-08 RX ADMIN — LANSOPRAZOLE 30 MG: KIT at 16:33

## 2019-08-08 RX ADMIN — SERTRALINE 50 MG: 50 TABLET, FILM COATED ORAL at 08:00

## 2019-08-08 RX ADMIN — METOPROLOL TARTRATE 25 MG: 25 TABLET ORAL at 09:43

## 2019-08-08 RX ADMIN — LIDOCAINE HYDROCHLORIDE 5 ML: 20 SOLUTION ORAL; TOPICAL at 16:32

## 2019-08-08 RX ADMIN — METOPROLOL TARTRATE 25 MG: 25 TABLET ORAL at 18:09

## 2019-08-08 RX ADMIN — LIDOCAINE HYDROCHLORIDE 5 ML: 20 SOLUTION ORAL; TOPICAL at 23:06

## 2019-08-08 RX ADMIN — SILVER SULFADIAZINE: 10 CREAM TOPICAL at 08:00

## 2019-08-08 RX ADMIN — SODIUM HYPOCHLORITE 946 ML: 1.25 SOLUTION TOPICAL at 20:13

## 2019-08-08 RX ADMIN — SILVER SULFADIAZINE: 10 CREAM TOPICAL at 20:12

## 2019-08-08 RX ADMIN — FLUCONAZOLE 200 MG: 40 POWDER, FOR SUSPENSION ORAL at 15:16

## 2019-08-08 RX ADMIN — LIDOCAINE HYDROCHLORIDE 5 ML: 20 SOLUTION ORAL; TOPICAL at 02:24

## 2019-08-08 RX ADMIN — SODIUM PHOSPHATE, MONOBASIC, MONOHYDRATE 30 MMOL: 276; 142 INJECTION, SOLUTION INTRAVENOUS at 23:04

## 2019-08-08 RX ADMIN — LIDOCAINE HYDROCHLORIDE 5 ML: 20 SOLUTION ORAL; TOPICAL at 09:42

## 2019-08-08 RX ADMIN — LIDOCAINE HYDROCHLORIDE 5 ML: 20 SOLUTION ORAL; TOPICAL at 05:28

## 2019-08-08 NOTE — PROGRESS NOTES
NEPHROLOGY PROGRESS NOTE    PATIENT IDENTIFICATION:   Name:  Cody Eldridge      MRN:  [id number]     74 y.o.  male             Reason for visit: JERICHO    SUBJECTIVE:   Atrial fibrillation with rapid response continues; metoprolol given; digoxin levels are starting to reseed; denies shortness of breath, though requiring 4 L/min; no chest pain; significant pain with swallowing; tube feeds infusing, with water flushes at 100 mL/h.  Modest urinary retention noted last night, though in and out catheterization not yet needed    OBJECTIVE:  Vitals:    08/08/19 0731 08/08/19 0805 08/08/19 0900 08/08/19 1005   BP:  110/82 124/77 111/71   Pulse:  (!) 138 (!) 140 (!) 133   Resp:       Temp: 98 °F (36.7 °C)      TempSrc: Oral      SpO2:  96% (!) 88% 94%   Weight:       Height:               Body mass index is 24.11 kg/m².    Intake/Output Summary (Last 24 hours) at 8/8/2019 1052  Last data filed at 8/8/2019 0802  Gross per 24 hour   Intake 5090 ml   Output 575 ml   Net 4515 ml     Wt Readings from Last 1 Encounters:   08/08/19 0600 78.4 kg (172 lb 13.5 oz)   08/08/19 0210 78.4 kg (172 lb 13.5 oz)   08/07/19 0300 77.6 kg (171 lb 1.2 oz)   08/07/19 0146 76 kg (167 lb 8.8 oz)   08/06/19 1504 72.6 kg (160 lb)   08/06/19 0526 72.8 kg (160 lb 7.9 oz)   08/05/19 0600 70.5 kg (155 lb 6.8 oz)   08/05/19 0448 70.9 kg (156 lb 6.4 oz)   08/04/19 0610 70.6 kg (155 lb 11.2 oz)   08/03/19 1619 76.6 kg (168 lb 12.8 oz)   08/03/19 1207 79.9 kg (176 lb 3 oz)     Wt Readings from Last 3 Encounters:   08/08/19 78.4 kg (172 lb 13.5 oz)   08/02/19 78.6 kg (173 lb 3.2 oz)   07/31/19 79.2 kg (174 lb 9.6 oz)         Exam:  NAD; pleasant; oriented; looks older than stated age  Chronically ill-appearing  Dry MM; AT/NC ; feeding tube present in nose  No eye discharge; no scleral icterus  No JVD; no carotid bruits  Coarse bilat; crackles in bases; not labored on 4 L/min  Irregularly irregular, tachycardic, no rub  Soft, NT, +D, BS+  Dressing by the  right clavicle is C/D/I; left subclavian central line  No significant edema  No clubbing  No asterixis  Moves all extremities   Mood and affect are normal  Speech is fluent        Scheduled meds:      ceFAZolin 2 g Intravenous Q12H   metoprolol tartrate 25 mg Oral Q12H   potassium chloride 40 mEq Nasogastric Once   sertraline 50 mg Oral Daily   silver sulfadiazine  Topical BID   sodium chloride 3 mL Intravenous Q12H   Sodium Hypochlorite 0.0625 % (Dakin's 1/8th Strength) topical solution 946 mL Irrigation Q12H   sodium phosphate IVPB 40 mmol Intravenous Once   terazosin 1 mg Nasogastric Nightly     IV meds:                          lactated ringers 9 mL/hr Last Rate: 9 mL/hr (08/05/19 1344)   phenylephrine 0.5-3 mcg/kg/min Last Rate: Stopped (08/06/19 2110)       Data Review:    Results from last 7 days   Lab Units 08/08/19  0430 08/07/19  1721 08/07/19  0444  08/06/19  0511  08/05/19  0454   SODIUM mmol/L 140  --  147*  --  151*  --  149*   POTASSIUM mmol/L 3.8 3.9 3.8   < > 3.3*   < > 3.2*   CHLORIDE mmol/L 106  --  111*  --  115*  --  107   CO2 mmol/L 24.7  --  24.6  --  22.7  --  30.0*   BUN mg/dL 64*  --  56*  --  33*  --  24*   CREATININE mg/dL 2.98*  --  2.79*  --  1.83*  --  1.17   CALCIUM mg/dL 7.6*  --  7.5*  --  5.6*  --  7.7*   BILIRUBIN mg/dL 0.7  --   --   --  2.5*  --  2.8*   ALK PHOS U/L 75  --   --   --  58  --  71   ALT (SGPT) U/L <5  --   --   --  19  --  25   AST (SGOT) U/L 21  --   --   --  22  --  32   GLUCOSE mg/dL 149*  --  141*  --  133*  --  107*    < > = values in this interval not displayed.     Estimated Creatinine Clearance: 23.8 mL/min (A) (by C-G formula based on SCr of 2.98 mg/dL (H)).  Results from last 7 days   Lab Units 08/07/19  0444 08/06/19  2105   SODIUM UR mmol/L  --  70   CREATININE UR mg/dL  --  56.7   URIC ACID mg/dL 5.4  --      Results from last 7 days   Lab Units 08/08/19  0430 08/07/19  0444 08/06/19  0511  08/05/19  0454   MAGNESIUM mg/dL 2.0 2.2 2.3   < > 1.6    PHOSPHORUS mg/dL 0.7* 1.0*  --   --  3.4    < > = values in this interval not displayed.       Results from last 7 days   Lab Units 08/08/19  0430 08/07/19  0444 08/06/19  0510 08/05/19  0454 08/04/19  0507   WBC 10*3/mm3 8.05 1.96* 2.22* 1.38* 0.56*   HEMOGLOBIN g/dL 11.5* 12.6* 12.7* 14.8 14.1   PLATELETS 10*3/mm3 29*  29* 41* 79* 106* 126*       Results from last 7 days   Lab Units 08/08/19  0430 08/07/19  0444 08/06/19  0511 08/05/19  0454 08/04/19  0507   INR  1.19* 1.33* 1.60* 2.49* 5.02*             ASSESSMENT:     New onset of congestive heart failure (CMS/HCC)    Atrial fibrillation (CMS/HCC)    Squamous cell carcinoma of neck    COPD (chronic obstructive pulmonary disease) (CMS/HCC)    Depression with anxiety    CAD (coronary artery disease)    Chemotherapy-induced neutropenia (CMS/HCC)    Vascular catheter infection (CMS/HCC)    Chemotherapy-induced thrombocytopenia    Acute renal injury (CMS/HCC)    Anemia associated with chemotherapy    1.  JERICHO, now non-oliguric, stable: prerenal related to hypotension, tachy-arrhythmia, and sepsis syndrome limiting renal perfusion.   SCR at a plateau.  Resolved hypernatremia.   Severe hypophosphatemia in the setting of diarrhea and poor nutrition.    2.  Head and neck cancer on radiation and chemotherapy  3.  Rapid atrial fibrillation  4.  Neutropenic fever with sepsis syndrome and MSSA bacteremia  5.  Coagulopathy  6.  Anemia and thrombocytopenia  7.  History of diastolic CHF: Inspected to have hypovolemia presently        PLAN:  1.  Reduce water flushes to 100 mL every 4 hours  2.  Sodium phosphate IV (infusing); will re-check later this afternoon  3.  Control heart rate  4.  Bladder scan q shift  5.  No indication for dialysis, and expect that renal function should improve now that hemodynamics are more stable    Medardo Espinal MD  8/8/2019    10:52 AM

## 2019-08-08 NOTE — PROGRESS NOTES
"Patient Name: Cody Eldridge  :1945  74 y.o.      Patient Care Team:  Epley, James, APRN as PCP - General (Family Medicine)  Payal Waters MD as Consulting Physician (Pain Medicine)  Lorna Chaidez (Nurse Practitioner)  Kristal Cowart [facility] as Pharmacist  [place], Fritz Snyder MD as Referring Physician (Otolaryngology)  Pablo Heaton MD as Consulting Physician (Hematology and Oncology)  Annie Davila MD as Consulting Physician (Radiation Oncology)  Chetan Heaton MD as Consulting Physician (Urology)    Interval History:   Remains tachycardic.  Blood pressure dropped with metoprolol last night.    Subjective:  Following for atrial fibrillation    Objective   Vital Signs  Temp:  [97.3 °F (36.3 °C)-98.5 °F (36.9 °C)] 98 °F (36.7 °C)  Heart Rate:  [128-140] 131  BP: ()/(47-93) 111/78    Intake/Output Summary (Last 24 hours) at 2019 1249  Last data filed at 2019 1204  Gross per 24 hour   Intake 5010 ml   Output 725 ml   Net 4285 ml     Flowsheet Rows      First Filed Value   Admission Height  180.3 cm (71\") Documented at 2019 1153   Admission Weight  79.9 kg (176 lb 3 oz) Documented at 2019 1207          Physical Exam:   General Appearance:    Alert, cooperative, in no acute distress   Lungs:     Clear to auscultation.  Normal respiratory effort and rate.      Heart:   Tachycardic and regular   Chest Wall:    No abnormalities observed   Abdomen:     Soft, nontender, positive bowel sounds.     Extremities:   no cyanosis, clubbing or edema.  No marked joint deformities.  Adequate musculoskeletal strength.       Results Review:    Results from last 7 days   Lab Units 19  0430   SODIUM mmol/L 140   POTASSIUM mmol/L 3.8   CHLORIDE mmol/L 106   CO2 mmol/L 24.7   BUN mg/dL 64*   CREATININE mg/dL 2.98*   GLUCOSE mg/dL 149*   CALCIUM mg/dL 7.6*     Results from last 7 days   Lab Units 19  1223   TROPONIN T ng/mL <0.010     Results from last 7 days   Lab " Units 08/08/19  0430   WBC 10*3/mm3 8.05   HEMOGLOBIN g/dL 11.5*   HEMATOCRIT % 36.1*   PLATELETS 10*3/mm3 29*  29*     Results from last 7 days   Lab Units 08/08/19  0430 08/07/19  0444 08/06/19  0511   INR  1.19* 1.33* 1.60*         Results from last 7 days   Lab Units 08/08/19  0430   MAGNESIUM mg/dL 2.0             Medication Review:     ceFAZolin 2 g Intravenous Q12H   fluconazole 200 mg Oral Daily   metoprolol tartrate 25 mg Oral Q12H   potassium chloride 40 mEq Nasogastric Once   sertraline 50 mg Oral Daily   silver sulfadiazine  Topical BID   sodium chloride 3 mL Intravenous Q12H   Sodium Hypochlorite 0.0625 % (Dakin's 1/8th Strength) topical solution 946 mL Irrigation Q12H   terazosin 1 mg Nasogastric Nightly          lactated ringers 9 mL/hr Last Rate: 9 mL/hr (08/05/19 1344)   phenylephrine 0.5-3 mcg/kg/min Last Rate: Stopped (08/06/19 2110)       Assessment/Plan     1.  Atrial fibrillation with rapid ventricular response.  I think is going to be hard to control his heart rates until clinically he has improved.  I would like for him to try to get his metoprolol.  He is off amiodarone and digoxin at this time.  2.  Nonobstructive coronary artery disease  3.  COPD  4.  Systemic hypertension  5.  Sepsis.  His port is supposed to be removed today.  6.  Hypokalemia.  Replaced.    I will continue to monitor him.    Check EKG    Myriam Omer MD, [facility]  08/08/19  12:49 PM

## 2019-08-08 NOTE — PROGRESS NOTES
The patient is postoperative day #3 status post removal of right chest Mediport.  Incision check by me, no new purulence.  No bleeding.  Packing changed- has improved.  Needs twice daily Dakin's dressing changes to the port and site which was left open secondary to purulence.  Change to normal saline packing in a few days.     Operative cultures with staph aureus, not MRSA    Will check the wound periodically

## 2019-08-08 NOTE — PLAN OF CARE
Problem: Patient Care Overview  Goal: Plan of Care Review  Outcome: Ongoing (interventions implemented as appropriate)   08/08/19 4396   Coping/Psychosocial   Plan of Care Reviewed With patient   OTHER   Outcome Summary Pt remains in CCU, tele overflow awaiting bed. C/O mouth pain, denies soa. On 2L nc, HR remains elevated at 130-140 and BP WNL. AAO X 4, tube feeds continued but water flushes decreased. No nausea or vomiting this shift.   Plan of Care Review   Progress improving     Goal: Individualization and Mutuality  Outcome: Ongoing (interventions implemented as appropriate)    Goal: Discharge Needs Assessment  Outcome: Ongoing (interventions implemented as appropriate)    Goal: Interprofessional Rounds/Family Conf  Outcome: Ongoing (interventions implemented as appropriate)      Problem: Fall Risk (Adult)  Goal: Absence of Fall  Outcome: Ongoing (interventions implemented as appropriate)      Problem: Cardiac: Heart Failure (Adult)  Goal: Signs and Symptoms of Listed Potential Problems Will be Absent, Minimized or Managed (Cardiac: Heart Failure)  Outcome: Ongoing (interventions implemented as appropriate)      Problem: Skin Injury Risk (Adult)  Goal: Skin Health and Integrity  Outcome: Ongoing (interventions implemented as appropriate)      Problem: Pain, Acute (Adult)  Goal: Identify Related Risk Factors and Signs and Symptoms  Outcome: Ongoing (interventions implemented as appropriate)    Goal: Acceptable Pain Control/Comfort Level  Outcome: Ongoing (interventions implemented as appropriate)      Problem: Pain, Chronic (Adult)  Goal: Identify Related Risk Factors and Signs and Symptoms  Outcome: Ongoing (interventions implemented as appropriate)    Goal: Acceptable Pain/Comfort Level and Functional Ability  Outcome: Ongoing (interventions implemented as appropriate)

## 2019-08-08 NOTE — PROGRESS NOTES
"                                              LOS: 5 days   Patient Care Team:  Epley, James, APRN as PCP - General (Family Medicine)  Payal Waters MD as Consulting Physician (Pain Medicine)  Lorna Chaidez (Nurse Practitioner)  Kristal Cowart RPH as Pharmacist  [place], Fritz Snyder MD as Referring Physician (Otolaryngology)  Pablo Heaton MD as Consulting Physician (Hematology and Oncology)  Annie Davila MD as Consulting Physician (Radiation Oncology)  Chetan Heaton MD as Consulting Physician (Urology)    Chief Complaint:  F/up septic shock, respiratory failure, pulmonary edema, pancytopenia, bacteremia    Subjective   Interval History  Off pressors.  On oxygen 4 L/min.  Reported cough but could not tell me what color of phlegm he has.  Still having significant pain in his mouth.  His wife stated that he has been refusing the Meghann Magic mouthwash due to burning.  On tube feeding and tolerating well.  He had bowel movements.    REVIEW OF SYSTEMS:   cardioVascular: No chest pain or palpitation.  No leg swelling.  Constitutional: No fever or chills.  Neuro: More awake.   Not as confused but seems to be forgetful per his wife.  Gastrointestinal: No nausea or vomiting.  Denies abdominal pain.      Ventilator/Non-Invasive Ventilation Settings (From admission, onward)    None                Physical Exam:     Vital Signs  Temp:  [97.3 °F (36.3 °C)-98.5 °F (36.9 °C)] 97.7 °F (36.5 °C)  Heart Rate:  [] 138  BP: ()/(47-93) 105/71    Intake/Output Summary (Last 24 hours) at 8/8/2019 1734  Last data filed at 8/8/2019 1456  Gross per 24 hour   Intake 3160 ml   Output 775 ml   Net 2385 ml     Flowsheet Rows      First Filed Value   Admission Height  180.3 cm (71\") Documented at 08/03/2019 1153   Admission Weight  79.9 kg (176 lb 3 oz) Documented at 08/03/2019 1207          General Appearance:   Awake.  Not in acute distress.   ENMT:  Mallampati score 4.  Red discoloration of the mucosa " of the tongue with secretions and mild ulcerations.   Eyes: Pupils equals and reactive to light. EOMI.  Injected conjunctivo-   Neck:   Trachea midline. No thyromegaly.   Lungs:    Bilateral crackles up to third of the chest anteriorly.  No wheezing.  Nonlabored breathing at rest    Heart:    Regular rhythm but tachycardia, normal S1 and S2, mild systolic murmur over the mitral area.   Skin:   Erythema and sloughing of the skin on the neck.  Otherwise no rash   Abdomen:     Soft. No tenderness. No HSM.  Positive bowel sounds.   Neuro:  Awake, alert and oriented.  Strength 5/5 in arms and legs..   Extremities:  No cyanosis, clubbing but trace edema in legs.  Warm extremities and well-perfused.          Results Review:        Results from last 7 days   Lab Units 08/08/19 0430 08/07/19  1721 08/07/19 0444 08/06/19  0511   SODIUM mmol/L 140  --  147*  --  151*   POTASSIUM mmol/L 3.8 3.9 3.8   < > 3.3*   CHLORIDE mmol/L 106  --  111*  --  115*   CO2 mmol/L 24.7  --  24.6  --  22.7   BUN mg/dL 64*  --  56*  --  33*   CREATININE mg/dL 2.98*  --  2.79*  --  1.83*   GLUCOSE mg/dL 149*  --  141*  --  133*   CALCIUM mg/dL 7.6*  --  7.5*  --  5.6*    < > = values in this interval not displayed.     Results from last 7 days   Lab Units 08/03/19  1223   TROPONIN T ng/mL <0.010     Results from last 7 days   Lab Units 08/08/19 0430 08/07/19 0444 08/06/19  0510   WBC 10*3/mm3 8.05 1.96* 2.22*   HEMOGLOBIN g/dL 11.5* 12.6* 12.7*   HEMATOCRIT % 36.1* 38.5 39.7   PLATELETS 10*3/mm3 29*  29* 41* 79*     Results from last 7 days   Lab Units 08/08/19 0430 08/07/19 0444 08/06/19  0511   INR  1.19* 1.33* 1.60*     Results from last 7 days   Lab Units 08/03/19  1223   PROBNP pg/mL 8,857.0*       I reviewed the patient's new clinical results.  I personally viewed and interpreted the patient's CXR        Medication Review:     ceFAZolin 2 g Intravenous Q12H   fluconazole 200 mg Oral Daily   lansoprazole 30 mg Nasogastric Daily    metoprolol tartrate 25 mg Oral Q8H   potassium chloride 40 mEq Nasogastric Once   sertraline 50 mg Oral Daily   silver sulfadiazine  Topical BID   sodium chloride 3 mL Intravenous Q12H   Sodium Hypochlorite 0.0625 % (Dakin's 1/8th Strength) topical solution 946 mL Irrigation Q12H   terazosin 1 mg Nasogastric Nightly         lactated ringers 9 mL/hr Last Rate: 9 mL/hr (08/05/19 1344)   phenylephrine 0.5-3 mcg/kg/min Last Rate: Stopped (08/06/19 2110)       Diagnostic imaging:  I personally and independently reviewed the following images:  CXR 8/6/2019 after central line insertion: Line in good position.  Slight worsening bilateral edema in the lower lobes.    Assessment   1. Septic shock, shock resolved  2. MSSA bacteremia  3. Suspected infection of the MediPort  4. Acute on chronic diastolic CHF  5. Acute hypoxic respiratory failure, secondary to pulmonary edema  6. Pulmonary edema  7. JERICHO, WORSE  8. A. fib with RVR, same  9. Stomatitis  10. Pancytopenia secondary to recent chemotherapy  11. Metabolic encephalopathy, improving  12. Coagulopathy, suspect related to vit K defficiency from poor intake  13. Poor appetite secondary to stomatitis and chemotherapy  14. Weight loss  15. Hyperbilirubinemia, elevated direct bilirubin with normal liver enzymes  16. Hypernatremia, resolved    17. Hypophosphatemia, WORSE (refeeding)  18. Protein calorie malnutrition, albumin 2.1  19. Severe pulmonary hypertension on echo dated 10/12/2017, RVSP 47.  Mildly dilated left atrium.  Probably group 2 secondary diastolic CHF and group 3 secondary to COPD and SHAR  20. SHAR  21. COPD, no exacerbation    22. Elevated digoxin level, improved  23. Thrombocytopenia, worse        Plan     · Replace phosphorus IV  · Continue tube feeding and free water for hypernatremia  · Continue terazosin for urinary retention.   · Antibiotics per ID.  On cefazolin now  · Metoprolol for A. Fib.  Off dig due to toxicity  · PRN Zofran  · Repeat labs in  a.m.  · Oxygen by nasal cannula.  · Diflucan 200 mg daily for 3 days for oral thrush  · Start lidocaine mouthwash for pain        Discussed with CCU staff during the multidisciplinary round.  Discussed with his wife at bedside.  Transfer to floor    Time>35 min face to face and on the allan >1/2 directed toward counseling and coordination of care    Danelle Galeano MD  08/08/19  5:34 PM            This note was dictated utilizing Dragon dictation

## 2019-08-08 NOTE — PROGRESS NOTES
REASON FOR CONSULTATION:  Neutropenic fever, port site cellulitis, prolonged protime no clinical bleeding, severe grade iii mucositis, atrial fibrilation rvr  Provide an opinion on any further workup or treatment                             INTERVAL HISTORY:  On 8/5/2019 in the early morning,Patient was moved to intensive care unit because of hypotension, atrial fibrillation with RVR and currently on pressor and amiodarone.     In the early afternoon of 8/5/2019, patient had portacatheter removed.     On 8/6/2019, nurse and family member reports patient is restless, patient himself is coherent and oriented.  Nurse reports patient had no urine output, bladder scan shows residual urine 150 mL.  It was 100 mL earlier this morning.  In the meantime patient has worsening renal function with creatinine 1.83 up from 1.17 the previous day.     The tip of portacatheter grew out Staphylococcus aureus within 24 hours.  Sensitivity study is not available.     On 8/7/2019, patient has further decreased platelets 41,000, and his WBC and neutrophils both actually slightly decreased at 1960 including ANC 1630.  His renal function is further worsening at creatinine 2.79.  Patient has been seen by nephrologist.  Patient is off pressor, and seems in sinus rhythm.     On 8/8/2019, patient had a further deteriorating platelets 29,000 without active bleeding.  Patient has resolution of neutropenia with ANC 5800 out of WBC 8000.  Slightly trending down hemoglobin 11.5.  Renal function no improvement creatinine 2.98.  Continues to have soreness in mouth, on tube feeding.  Patient was not able to tolerate Meghann's Magic portion for oral mucositis.     HISTORY OF PRESENT ILLNESS:  The patient is a 74 y.o. year old male who is here for an opinion about the above issue.     History of Present Illness I have been asked to see this patient in consultation today who is a 74-year-old white male who has been treated BY DR RONALDO WILLIAMSON FOR HEAD AND  NECK CANCER WITH CHEMO AND RADIATION THERAPY In any event the patient has received radiation therapy to his NECK, he has been receiving IV fluids in the office in the last few days because of profound weakness and he has now developed atrial fibrillation with rapid ventricular response from a congestive heart failure, elevation of the proBNP in the 8000 category and further more significant sensitivity and redness in the skin of the anterior chest wall on the right side where the port is located that makes me to think that he has at least a cellulitis in this anatomical site. The patient has a severe degree of mucositis associated with radiation therapy. He is not able to swallow. Also the patient has developed diarrhea with no abdominal pain and in a stool culture enteropathogenic E-coli has been recovered. Clostridium was negative. The patient has lost substantial amount of weight. He has not been able to eat anything for the last 3-4 weeks. He has had swelling in his lower extremities. He has had cough and he has had shortness of breath. He has had fevers and chills as stated. He just feels terrible.            Medical History        Past Medical History:   Diagnosis Date   • Atrial fibrillation (CMS/HCC)     • CHF (congestive heart failure) (CMS/HCC)     • Chronic bronchitis (CMS/HCC)     • COPD (chronic obstructive pulmonary disease) (CMS/HCC)     • Cor pulmonale (chronic) (CMS/HCC)     • Daytime hypersomnia     • Depression with anxiety     • Elevated cholesterol     • Enlarged prostate     • Frequent nocturnal awakening     • Gout     • H/O cardiac radiofrequency ablation 2006     [facility].   • Head and neck cancer (CMS/HCC)     • Hyperlipidemia     • Hypertension     • Hypotension     • Insomnia     • Lumbar radicular pain     • SHAR (obstructive sleep apnea)     • Osteoarthritis     • Permanent atrial fibrillation (CMS/HCC)     • Snoring     • Squamous cell carcinoma of neck     • SVT (supraventricular  tachycardia) (CMS/HCC)     • Syncope     • Vitamin D deficiency     • Weight loss              Surgical History         Past Surgical History:   Procedure Laterality Date   • APPENDECTOMY       • CARDIAC ABLATION   2006     [facility].   • CARDIAC CATHETERIZATION N/A 8/29/2018     Procedure: Left Heart Cath;  Surgeon: Yousif Uribe MD;  Location:  [facility];  Service: Cardiology   • CARDIAC CATHETERIZATION N/A 8/29/2018     Procedure: Coronary angiography;  Surgeon: Yousif Uribe MD;  Location:  [facility];  Service: Cardiology   • CARDIAC CATHETERIZATION N/A 8/29/2018     Procedure: Left ventriculography;  Surgeon: Yousif Uribe MD;  Location:  [facility];  Service: Cardiology   • FINGER SURGERY       • FOOT SURGERY       • HAND SURGERY       • VENOUS ACCESS DEVICE (PORT) INSERTION Right 6/18/2019     Procedure: MEDIPORT PLACEMENT;  Surgeon: Elvis Grigsby MD;  Location: [facility] MAIN OR;  Service: Vascular           ONCOLOGIC HISTORY DR RONALDO WILLIAMSON MD:     1.  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  He is undergoing definitive treatment with radiation therapy and weekly low-dose carboplatin and Taxol chemotherapy.  He was able to complete 4 weekly chemotherapy treatments in a row but required holding chemotherapy for the last 2 weeks due to myelosuppression.   he appears to be in rapid atrial fibrillation.  2.  Comorbidities including ischemic heart disease with history of CHF and atrial fibrillation requiring Coumadin anticoagulation.    3.  Oral mucositis.  He has a prescription for hydrocodone for pain control.  He does have Meghann's Magic mouth rinse to use as well.  4.  Warfarin anticoagulation.  Patient's INR 3.83, he will hold his coumadin today and tomorrow and have another INR checked Wednesday 7/17/20195.    5.  Weight loss and poor nutrition.  " The patient's weight has stabilized and he is utilizing boost at home and trying to hydrate with water and Gatorade.  He does understand if his nutrition is not improving we will need to proceed with a G-tube for nutrition.  Ruthy Bautista, oncology dietitian been involved in his care also.  6.  Atrial fibrillation with rapid ventricular response     MEDICATIONS: see EMR.     ALLERGIES:          Allergies   Allergen Reactions   • Benadryl [Diphenhydramine Hcl] Other (See Comments) and Dizziness       \"I sleep for about a day\"       Social History no changes.      Family History;  No changes.        Review of Systems   Constitutional: Positive for activity change, appetite change, chills, diaphoresis, fatigue, fever and unexpected weight change.   HENT: Positive for mouth sores, sore throat, trouble swallowing and voice change.    Eyes: Negative.    Respiratory: Positive for cough and shortness of breath.    Cardiovascular: Positive for palpitations.   Gastrointestinal: Positive for abdominal distention and diarrhea.   Endocrine: Negative.    Genitourinary: Negative.    Musculoskeletal: Negative.    Skin: Positive for pallor.   Neurological: Negative.    Hematological: Negative.    Psychiatric/Behavioral: Negative.               Objective      Vitals:    08/08/19 0633 08/08/19 0701 08/08/19 0731 08/08/19 0805   BP: 106/67 (!) 86/71  110/82   Pulse: (!) 137 (!) 138  (!) 138   Resp:       Temp:   98 °F (36.7 °C)    TempSrc:   Oral    SpO2: 93% 94%  96%   Weight:       Height:          Physical Exam   Constitutional: He is oriented to person, place, and time. He has resolution of restless, lying in the bed comfortable, carries conversation well.   HENT:   Head: Normocephalic.   Nose: Nose normal.  NG tube in place.  Mouth/Throat: Grade III mucositis.  There is oral thrush discovered on 8/8/2019.  Eyes: Conjunctivae and EOM are normal. No scleral icterus.   Neck: Normal range of motion. No tracheal deviation present. No " thyromegaly present.   Erythema neck area of radiation   Cardiovascular:  Tachycardia, irregular irregular rhythm.   Pulmonary/Chest: Effort normal and breath sounds normal.   Abdominal: Soft. He exhibits no distension and no mass. There is no tenderness. There is no rebound and no guarding.   Musculoskeletal: None he exhibits edema.   Lymphadenopathy:     He has no cervical adenopathy.   Neurological: He is alert and oriented to person, place, and time.   Skin: Portacatheter has been removed.   Psychiatric: He has a normal mood and affect.  Patient seems restless not in acute distress. Judgment and thought content normal.          RECENT LABS:    Results from last 7 days   Lab Units 08/08/19  0430 08/07/19  0444 08/06/19  0510   WBC 10*3/mm3 8.05 1.96* 2.22*   HEMOGLOBIN g/dL 11.5* 12.6* 12.7*   HEMATOCRIT % 36.1* 38.5 39.7   PLATELETS 10*3/mm3 29*  29* 41* 79*   MONOCYTES % % 25.0* 14.0* 3.2*     Lab Results   Component Value Date    NEUTROABS 5.80 08/08/2019     Results from last 7 days   Lab Units 08/08/19  0430 08/07/19  1721 08/07/19  0444  08/06/19  0511  08/05/19  0454   SODIUM mmol/L 140  --  147*  --  151*  --  149*   POTASSIUM mmol/L 3.8 3.9 3.8   < > 3.3*   < > 3.2*   CHLORIDE mmol/L 106  --  111*  --  115*  --  107   CO2 mmol/L 24.7  --  24.6  --  22.7  --  30.0*   BUN mg/dL 64*  --  56*  --  33*  --  24*   CREATININE mg/dL 2.98*  --  2.79*  --  1.83*  --  1.17   CALCIUM mg/dL 7.6*  --  7.5*  --  5.6*  --  7.7*   BILIRUBIN mg/dL 0.7  --   --   --  2.5*  --  2.8*   ALK PHOS U/L 75  --   --   --  58  --  71   ALT (SGPT) U/L <5  --   --   --  19  --  25   AST (SGOT) U/L 21  --   --   --  22  --  32   GLUCOSE mg/dL 149*  --  141*  --  133*  --  107*    < > = values in this interval not displayed.     Results from last 7 days   Lab Units 08/08/19  0430 08/07/19  0444 08/06/19  0511   INR  1.19* 1.33* 1.60*      on 8/8/2019.    Contains abnormal data Catheter Culture - Cath Tip, Chest, Right   Order:  [id number]   Collected:  8/5/2019 14:47 Status:  Preliminary result   Visible to patient:  No (Not Released)   Specimen Information: Chest, Right; Cath Tip        CATHETER CULTURE >15 CFU/mL - Indicative of infection. Staphylococcus aureus Abnormal           Resulting Agency: [facility]         Specimen Collected: 08/05/19 14:47 Last Resulted: 08/06/19 09:40        Order Details       View Encounter       Lab and Collection Details                    Assessment/Plan   1.   In summary this patient is a 74-year-old who has been treated FOR HAND NECK CA He has received radiation therapy to the neck and he has been undergoing chemotherapy .  The patient has been admitted with rapid ventricular response, atrial fibrillation, fever, tremendous tenderness and erythema at the port site in anterior chest wall on the right side and associated with this diarrhea with a stool culture consistent with E-coli enteropathogenic. Also the patient has abnormal blood cultures.     Due to poor performance status, chemoradiotherapy will be on hold.    · On 8/7/2019, I discussed with the patient and his wife, will reevaluate in the future to see whether he needs more systematic chemotherapy after he recovered from sepsis and the improvement of performance status.    2. The patient has severe neutropenia secondary to chemotherapy.     · Continue currently on Neupogen daily.   · Improved neutrophil counts 2030 on 8/6/2019.   · Slightly worsening neutrophil at 1630 on 8/7/2019.  We will continue Neupogen and monitor.   · Significantly improved and normalized neutrophils 500 on 8/82019.  Neupogen will be discontinued.    3.  Sepsis with positive MSSA septicemia.  Patient has been followed by Dr. Cruz, was on nafcillin.  · PowerPort was removed earlier this afternoon on 8/5/2019.  · Catheter tip culture was positive for Staphylococcus aureus on 8/6/2019.   · Antibiotics being switched to cefazolin 8/6/2019.    · Patient is improving, off  pressor on 8/7/2019.     4.  Acute renal injury, with creatinine 1.83 today, up from 1.17 yesterday.  Patient does not have urination.  Residual urine in the bladder is only 150 mL this afternoon on 8/6/2019.   · Patient has been seen by nephrology service.   · Creatinine 2.98 on 8/8/2019.    5.  Thrombocytopenia secondary to chemotherapy.    · Worsening platelets at 79,000 on 8/6/2019.  No bleeding.    · Further deteriorating platelets 41,000 on 8/7/2019.  His thrombocytopenia possibly also contributed by his infection and now antibiotic.    · On 8/8/2019, platelets trending down and 29,000, with IPF 10.1%.  Patient has no active bleeding.  Continue to monitor.      6.  Oral mucositis grade 3, secondary to chemoradiation therapy.  Routine therapy will be on hold for now per Dr. Davila.   · Nurse reports 8/6/2019 lidocaine viscous is not really helping.  We will try Magic mouthwash.   · Nurse reports patient was not able to tolerate Magic mouthwash.     7.  Oral thrush discovered on 8/8/2019.  We will start the patient on Diflucan 200 mg daily for 3 days per feeding tube.    8.  Coagulopathy due to vitamin K deficiency.  This is likely due to poor oral intake.  Patient currently is on tube feeding.  · INR has further improved after starting tube feeding, currently 1.19 today on 8/8/2019.     9.  Atrial fibrillation with RVR.  Cardiology to follow.  Patient was on digoxin.   · Patient continues atrial fibrillation with heart rate in the 130s.  Off digoxin due to elevated level.  Per cardiology, will start the patient on metoprolol..    10.  Mild anemia.  Hb 12.7, newly developed on 8/6/2019.   · Stable on 8/7/2019.   · Hemoglobin drops at 11.5 on 8/8/2019.  Continue to monitor.    RECOMMENDATIONS:  1.  Continue IV cefazolin, per ID service.  2.  Discontinue due to resolution of neutropenia.   3.  Start Diflucan 200 mg daily for 3 days for oral thrush.  4.   Continue tube feeding.    5.  Continue follow-up by  nephrology service.  6.  Continue follow up by cardiology service.   7.  Monitor CBC in the morning.  We will check IPF and LDH.     I spoke with patient and his wife today at the bedside.      I discussed with his nurse today for patient care.    JONATHAN JAVIER M.D., Ph.D.    8/8/2019

## 2019-08-08 NOTE — PLAN OF CARE
Problem: Patient Care Overview  Goal: Plan of Care Review  Outcome: Ongoing (interventions implemented as appropriate)   08/08/19 0550   Coping/Psychosocial   Plan of Care Reviewed With patient   OTHER   Outcome Summary A/Ox4. VSS. No pressors needed tonight. Patient complains of mouth pain, viscious lidocaine given x3.  BMx3, diarrhea. Complaints of nausea with vomiting, TF held for an hour, restarted after relief of N+V. Phos replacement this AM. Hopefully able to remove central line this AM. UOP >500, light brown in color. See AM labs. Continue to monitor.    Plan of Care Review   Progress improving       Problem: Fall Risk (Adult)  Goal: Absence of Fall  Outcome: Ongoing (interventions implemented as appropriate)      Problem: Cardiac: Heart Failure (Adult)  Goal: Signs and Symptoms of Listed Potential Problems Will be Absent, Minimized or Managed (Cardiac: Heart Failure)  Outcome: Ongoing (interventions implemented as appropriate)      Problem: Pain, Acute (Adult)  Goal: Acceptable Pain Control/Comfort Level  Outcome: Ongoing (interventions implemented as appropriate)      Problem: Pain, Chronic (Adult)  Goal: Acceptable Pain/Comfort Level and Functional Ability  Outcome: Ongoing (interventions implemented as appropriate)

## 2019-08-09 ENCOUNTER — APPOINTMENT (OUTPATIENT)
Dept: ONCOLOGY | Facility: HOSPITAL | Age: 74
End: 2019-08-09

## 2019-08-09 PROBLEM — B95.61 MSSA BACTEREMIA: Status: ACTIVE | Noted: 2019-08-09

## 2019-08-09 PROBLEM — R78.81 MSSA BACTEREMIA: Status: ACTIVE | Noted: 2019-08-09

## 2019-08-09 PROBLEM — R65.21 SHOCK, SEPTIC: Status: ACTIVE | Noted: 2019-08-09

## 2019-08-09 PROBLEM — A41.9 SHOCK, SEPTIC: Status: ACTIVE | Noted: 2019-08-09

## 2019-08-09 LAB
ALBUMIN SERPL-MCNC: 1.9 G/DL (ref 3.5–5.2)
ALBUMIN/GLOB SERPL: 0.7 G/DL
ALP SERPL-CCNC: 99 U/L (ref 39–117)
ALT SERPL W P-5'-P-CCNC: <5 U/L (ref 1–41)
ANION GAP SERPL CALCULATED.3IONS-SCNC: 13.2 MMOL/L (ref 5–15)
ANISOCYTOSIS BLD QL: ABNORMAL
AST SERPL-CCNC: 27 U/L (ref 1–40)
BILIRUB SERPL-MCNC: 0.7 MG/DL (ref 0.2–1.2)
BUN BLD-MCNC: 71 MG/DL (ref 8–23)
BUN/CREAT SERPL: 24.9 (ref 7–25)
CALCIUM SPEC-SCNC: 7.2 MG/DL (ref 8.6–10.5)
CHLORIDE SERPL-SCNC: 108 MMOL/L (ref 98–107)
CO2 SERPL-SCNC: 21.8 MMOL/L (ref 22–29)
CREAT BLD-MCNC: 2.85 MG/DL (ref 0.76–1.27)
DEPRECATED RDW RBC AUTO: 70.8 FL (ref 37–54)
EOSINOPHIL # BLD MANUAL: 0.29 10*3/MM3 (ref 0–0.4)
EOSINOPHIL NFR BLD MANUAL: 2 % (ref 0.3–6.2)
ERYTHROCYTE [DISTWIDTH] IN BLOOD BY AUTOMATED COUNT: 19.7 % (ref 12.3–15.4)
GFR SERPL CREATININE-BSD FRML MDRD: 22 ML/MIN/1.73
GLOBULIN UR ELPH-MCNC: 2.8 GM/DL
GLUCOSE BLD-MCNC: 125 MG/DL (ref 65–99)
HCT VFR BLD AUTO: 36.5 % (ref 37.5–51)
HGB BLD-MCNC: 11.6 G/DL (ref 13–17.7)
INR PPP: 1.12 (ref 0.9–1.1)
LYMPHOCYTES # BLD MANUAL: 0.29 10*3/MM3 (ref 0.7–3.1)
LYMPHOCYTES NFR BLD MANUAL: 2 % (ref 19.6–45.3)
LYMPHOCYTES NFR BLD MANUAL: 5 % (ref 5–12)
MCH RBC QN AUTO: 32 PG (ref 26.6–33)
MCHC RBC AUTO-ENTMCNC: 31.8 G/DL (ref 31.5–35.7)
MCV RBC AUTO: 100.6 FL (ref 79–97)
MONOCYTES # BLD AUTO: 0.74 10*3/MM3 (ref 0.1–0.9)
NEUTROPHILS # BLD AUTO: 13.4 10*3/MM3 (ref 1.7–7)
NEUTROPHILS NFR BLD MANUAL: 91 % (ref 42.7–76)
NRBC BLD AUTO-RTO: 0.2 /100 WBC (ref 0–0.2)
NRBC SPEC MANUAL: 1 /100 WBC (ref 0–0.2)
PHOSPHATE SERPL-MCNC: 3 MG/DL (ref 2.5–4.5)
PLAT MORPH BLD: NORMAL
PLATELET # BLD AUTO: 21 10*3/MM3 (ref 140–450)
POTASSIUM BLD-SCNC: 3.4 MMOL/L (ref 3.5–5.2)
PROT SERPL-MCNC: 4.7 G/DL (ref 6–8.5)
PROTHROMBIN TIME: 14.1 SECONDS (ref 11.7–14.2)
RBC # BLD AUTO: 3.63 10*6/MM3 (ref 4.14–5.8)
SODIUM BLD-SCNC: 143 MMOL/L (ref 136–145)
WBC MORPH BLD: NORMAL
WBC NRBC COR # BLD: 14.73 10*3/MM3 (ref 3.4–10.8)

## 2019-08-09 PROCEDURE — 97110 THERAPEUTIC EXERCISES: CPT

## 2019-08-09 PROCEDURE — 99232 SBSQ HOSP IP/OBS MODERATE 35: CPT | Performed by: NURSE PRACTITIONER

## 2019-08-09 PROCEDURE — 97162 PT EVAL MOD COMPLEX 30 MIN: CPT

## 2019-08-09 PROCEDURE — 85007 BL SMEAR W/DIFF WBC COUNT: CPT | Performed by: HOSPITALIST

## 2019-08-09 PROCEDURE — 99231 SBSQ HOSP IP/OBS SF/LOW 25: CPT | Performed by: INTERNAL MEDICINE

## 2019-08-09 PROCEDURE — 80053 COMPREHEN METABOLIC PANEL: CPT | Performed by: INTERNAL MEDICINE

## 2019-08-09 PROCEDURE — 85025 COMPLETE CBC W/AUTO DIFF WBC: CPT | Performed by: HOSPITALIST

## 2019-08-09 PROCEDURE — 99232 SBSQ HOSP IP/OBS MODERATE 35: CPT | Performed by: INTERNAL MEDICINE

## 2019-08-09 PROCEDURE — 84100 ASSAY OF PHOSPHORUS: CPT | Performed by: INTERNAL MEDICINE

## 2019-08-09 PROCEDURE — 85610 PROTHROMBIN TIME: CPT | Performed by: HOSPITALIST

## 2019-08-09 PROCEDURE — 92610 EVALUATE SWALLOWING FUNCTION: CPT

## 2019-08-09 PROCEDURE — 25010000003 CEFAZOLIN IN DEXTROSE 2-4 GM/100ML-% SOLUTION: Performed by: INTERNAL MEDICINE

## 2019-08-09 RX ORDER — POTASSIUM CHLORIDE 1.5 G/1.77G
40 POWDER, FOR SOLUTION ORAL ONCE
Status: COMPLETED | OUTPATIENT
Start: 2019-08-09 | End: 2019-08-09

## 2019-08-09 RX ADMIN — SODIUM HYPOCHLORITE 946 ML: 1.25 SOLUTION TOPICAL at 22:33

## 2019-08-09 RX ADMIN — LIDOCAINE HYDROCHLORIDE 5 ML: 20 SOLUTION ORAL; TOPICAL at 11:27

## 2019-08-09 RX ADMIN — METOPROLOL TARTRATE 25 MG: 25 TABLET ORAL at 09:21

## 2019-08-09 RX ADMIN — LIDOCAINE HYDROCHLORIDE 5 ML: 20 SOLUTION ORAL; TOPICAL at 06:25

## 2019-08-09 RX ADMIN — SODIUM CHLORIDE, PRESERVATIVE FREE 3 ML: 5 INJECTION INTRAVENOUS at 09:22

## 2019-08-09 RX ADMIN — POTASSIUM CHLORIDE 40 MEQ: 1.5 POWDER, FOR SOLUTION ORAL at 20:21

## 2019-08-09 RX ADMIN — FLUCONAZOLE 200 MG: 40 POWDER, FOR SUSPENSION ORAL at 09:22

## 2019-08-09 RX ADMIN — SODIUM CHLORIDE, PRESERVATIVE FREE 3 ML: 5 INJECTION INTRAVENOUS at 20:21

## 2019-08-09 RX ADMIN — TERAZOSIN HYDROCHLORIDE 1 MG: 1 CAPSULE ORAL at 20:21

## 2019-08-09 RX ADMIN — LIDOCAINE HYDROCHLORIDE 5 ML: 20 SOLUTION ORAL; TOPICAL at 20:20

## 2019-08-09 RX ADMIN — LIDOCAINE HYDROCHLORIDE 5 ML: 20 SOLUTION ORAL; TOPICAL at 01:52

## 2019-08-09 RX ADMIN — SILVER SULFADIAZINE: 10 CREAM TOPICAL at 22:32

## 2019-08-09 RX ADMIN — LIDOCAINE HYDROCHLORIDE 5 ML: 20 SOLUTION ORAL; TOPICAL at 16:18

## 2019-08-09 RX ADMIN — CEFAZOLIN SODIUM 2 G: 2 INJECTION, SOLUTION INTRAVENOUS at 22:33

## 2019-08-09 RX ADMIN — SODIUM HYPOCHLORITE 946 ML: 1.25 SOLUTION TOPICAL at 10:48

## 2019-08-09 RX ADMIN — METOPROLOL TARTRATE 25 MG: 25 TABLET ORAL at 00:50

## 2019-08-09 RX ADMIN — LANSOPRAZOLE 30 MG: KIT at 09:20

## 2019-08-09 RX ADMIN — SERTRALINE 50 MG: 50 TABLET, FILM COATED ORAL at 09:21

## 2019-08-09 RX ADMIN — CEFAZOLIN SODIUM 2 G: 2 INJECTION, SOLUTION INTRAVENOUS at 11:03

## 2019-08-09 RX ADMIN — METOPROLOL TARTRATE 37.5 MG: 25 TABLET ORAL at 17:33

## 2019-08-09 NOTE — THERAPY EVALUATION
Acute Care - Speech Language Pathology   Swallow Initial Evaluation [facility]     Patient Name: Cody Eldridge  : 1945  MRN: [id number]  Today's Date: 2019               Admit Date: 8/3/2019    Visit Dx:     ICD-10-CM ICD-9-CM   1. New onset of congestive heart failure (CMS/HCC) I50.9 428.0   2. Infected venous access port T80.219A 999.31     Patient Active Problem List   Diagnosis   • Atopic rhinitis   • Arthritis of hand   • Coxitis   • Benign colonic polyp   • Neck pain   • Chronic obstructive pulmonary disease (CMS/HCC)   • Mixed anxiety depressive disorder   • Degeneration of intervertebral disc of lumbosacral region   • Elevated prostate specific antigen (PSA)   • Hyperlipidemia   • Hypertension   • Insomnia   • Lumbar radiculopathy   • Osteoarthritis   • Vitamin D deficiency   • Abdominal pain   • Acute URI   • Myalgia   • Cramp of both lower extremities   • Gingivitis   • Atrial fibrillation (CMS/HCC)   • Non-rheumatic tricuspid valve insufficiency   • SHAR (obstructive sleep apnea)   • Precordial pain   • IRAHETA (dyspnea on exertion)   • Coronary artery disease of native artery of native heart with stable angina pectoris (CMS/HCC)   • Shortness of breath   • 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)   • Current use of long term anticoagulation   • Syncope   • Dehydration   • History of cancer   • History of atrial fibrillation   • History of CHF (congestive heart failure)   • Hypotension   • Fitting and adjustment of vascular catheter   • New onset of congestive heart failure (CMS/HCC)   • Atrial fibrillation (CMS/HCC)   • Squamous cell carcinoma of neck   • COPD (chronic obstructive pulmonary disease) (CMS/HCC)   • Depression with anxiety   • CAD (coronary artery disease)   • Chemotherapy-induced neutropenia (CMS/HCC)   • Vascular catheter infection (CMS/HCC)   • Chemotherapy-induced thrombocytopenia   • Acute renal injury (CMS/HCC)   • Anemia associated with chemotherapy   • Oral  thrush   • MSSA bacteremia   • Shock, septic (CMS/HCC)     Past Medical History:   Diagnosis Date   • Atrial fibrillation (CMS/HCC)    • CHF (congestive heart failure) (CMS/HCC)    • Chronic bronchitis (CMS/HCC)    • COPD (chronic obstructive pulmonary disease) (CMS/HCC)    • Cor pulmonale (chronic) (CMS/HCC)    • Daytime hypersomnia    • Depression with anxiety    • Elevated cholesterol    • Enlarged prostate    • Frequent nocturnal awakening    • Gout    • H/O cardiac radiofrequency ablation 2006    [facility].   • Head and neck cancer (CMS/[facility])    • Hyperlipidemia    • Hypertension    • Hypotension    • Insomnia    • Lumbar radicular pain    • SHAR (obstructive sleep apnea)    • Osteoarthritis    • Permanent atrial fibrillation (CMS/[facility])    • Snoring    • Squamous cell carcinoma of neck    • SVT (supraventricular tachycardia) (CMS/[facility])    • Syncope    • Vitamin D deficiency    • Weight loss      Past Surgical History:   Procedure Laterality Date   • APPENDECTOMY     • CARDIAC ABLATION  2006    [facility].   • CARDIAC CATHETERIZATION N/A 8/29/2018    Procedure: Left Heart Cath;  Surgeon: Yousif Uribe MD;  Location: [facility] INVASIVE LOCATION;  Service: Cardiology   • CARDIAC CATHETERIZATION N/A 8/29/2018    Procedure: Coronary angiography;  Surgeon: Yousif Uribe MD;  Location: [facility] CATH INVASIVE LOCATION;  Service: Cardiology   • CARDIAC CATHETERIZATION N/A 8/29/2018    Procedure: Left ventriculography;  Surgeon: Yousif Uribe MD;  Location: [facility] CATH INVASIVE LOCATION;  Service: Cardiology   • FINGER SURGERY     • FOOT SURGERY     • HAND SURGERY     • VENOUS ACCESS DEVICE (PORT) INSERTION Right 6/18/2019    Procedure: MEDIPORT PLACEMENT;  Surgeon: Elvis Grigsby MD;  Location: [facility] OR;  Service: Vascular   • VENOUS ACCESS DEVICE (PORT) REMOVAL Right 8/5/2019    Procedure: REMOVAL VENOUS ACCESS DEVICE;  Surgeon: Prince Castaneda MD;  Location: [facility] OR;  Service: Vascular  "       SWALLOW EVALUATION (last 72 hours)      SLP Adult Swallow Evaluation     Row Name 08/09/19 1200          Document Type  evaluation  -OC    Subjective Information  no complaints  -OC    Patient Observations  alert;agree to therapy;cooperative  -OC    Patient Effort  good  -OC    Symptoms Noted During/After Treatment  none  -OC          Patient Profile Reviewed  yes  -OC    Pertinent History Of Current Problem  Pt admitted for \"HEAD NECK CA He has received radiation therapy to the neck and he has been undergoing chemotherapy .  The patient has been admitted with rapid ventricular response, atrial fibrillation, fever, tremendous tenderness and erythema at the port site in anterior chest wall on the right side and associated with this diarrhea with a stool culture consistent with E-coli enteropathogenic. Also the patient has abnormal blood cultures.\"  -OC    Current Method of Nutrition  nasogastric feedings  -OC    Precautions/Limitations, Vision  WFL with corrective lenses  -OC    Precautions/Limitations, Hearing  WFL  -OC    Prior Level of Function-Communication  WFL  -OC    Prior Level of Function-Swallowing  thin liquids;other (see comments) primarily drinking boost at this point in tx  -OC    Plans/Goals Discussed with  patient  -OC    Barriers to Rehab  medically complex  -OC    Patient's Goals for Discharge  patient did not state  -OC          Additional Documentation  -- sore oral cavity  -OC          Oral Lesions or Structural Abnormalities and/or variants  sores throughout oral cavity  -OC    Dentition Assessment  has dentures but unable to use them to poor fit/pain  -OC    Secretion Management  problems swallowing secretions;other (see comments) pooling, difficult to differientiate with sores  -OC    Mucosal Quality  ulcerated  -OC    Volitional Swallow  weak  -OC    Volitional Cough  weak  -OC          Oral Motor General Assessment  generalized oral motor weakness;other (see comments) versus limited " effort with pain  -OC          Oral Prep Phase  WFL  -OC    Oral Transit  WFL  -OC    Oral Residue  WFL  -OC    Pharyngeal Phase  suspected pharyngeal impairment  -OC    Clinical Swallow Evaluation Summary  Pt demonstrated consistent throat clear/cough with thins. No overt s/s aspiration with nectar thick and puree.   -OC          SLP Swallowing Diagnosis  oral dysfunction;pharyngeal dysfunction  -OC    Functional Impact  risk of aspiration/pneumonia;risk of malnutrition;risk of dehydration  -OC    Rehab Potential/Prognosis, Swallowing  good, to achieve stated therapy goals  -OC    Swallow Criteria for Skilled Therapeutic Interventions Met  demonstrates skilled criteria  -OC          Therapy Frequency (Swallow)  PRN  -OC    Predicted Duration Therapy Intervention (Days)  until discharge;other (see comments) resume as OP  -OC    SLP Diet Recommendation  nectar thick liquids;puree;other (see comments) nectar thick clears or nectar with puree, MD advise  -OC    Recommended Diagnostics  reassess via clinical swallow evaluation  -OC    Recommended Precautions and Strategies  upright posture during/after eating;small bites of food and sips of liquid;no straw;alternate between small bites of food and sips of liquid  -OC    SLP Rec. for Method of Medication Administration  meds crushed;with pudding or applesauce;meds via alternate route;as tolerated  -OC    Monitor for Signs of Aspiration  yes;notify SLP if any concerns  -OC    Anticipated Dischage Disposition  anticipate therapy at next level of care  -OC          Oral Nutrition/Hydration Goal Selection (SLP)  oral nutrition/hydration, SLP goal 1  -OC          Oral Nutrition/Hydration Goal 1, SLP  Tolerate least restrictive diet with no overt s/s aspiration.   -OC    Time Frame (Oral Nutrition/Hydration Goal 1, SLP)  by discharge  -OC      User Key  (r) = Recorded By, (t) = Taken By, (c) = Cosigned By    Initials Name Effective Dates    OC Pia Pablo MA,[facility]-SLP 06/08/18  -           EDUCATION  The patient has been educated in the following areas:   Dysphagia (Swallowing Impairment).    SLP Recommendation and Plan  SLP Swallowing Diagnosis: oral dysfunction, pharyngeal dysfunction  SLP Diet Recommendation: nectar thick liquids, puree, other (see comments)(nectar thick clears or nectar with puree, MD advise)  Recommended Precautions and Strategies: upright posture during/after eating, small bites of food and sips of liquid, no straw, alternate between small bites of food and sips of liquid  SLP Rec. for Method of Medication Administration: meds crushed, with pudding or applesauce, meds via alternate route, as tolerated     Monitor for Signs of Aspiration: yes, notify SLP if any concerns  Recommended Diagnostics: reassess via clinical swallow evaluation  Swallow Criteria for Skilled Therapeutic Interventions Met: demonstrates skilled criteria  Anticipated Dischage Disposition: anticipate therapy at next level of care  Rehab Potential/Prognosis, Swallowing: good, to achieve stated therapy goals  Therapy Frequency (Swallow): PRN  Predicted Duration Therapy Intervention (Days): until discharge, other (see comments)(resume as OP)       Plan of Care Reviewed With: patient  Plan of Care Review  Plan of Care Reviewed With: patient  Outcome Summary: Bedside swallow eval completed. Pt familiar to this therapist as outpatient for head and neck CA. Recommend nectar thick liquids and puree or nectar thick liquids clears, MD please advise. Water protocol and boost/ensure ok. Meds crushed with puree or via alternate means. Recommend continue with TFs, concern regarding pt ability to meet nutritional needs. Recommend upright and slow rate with PO intake, double swallow. ST to follow for diet tolerance and need for re-eval.     SLP GOALS     Row Name 08/09/19 1200             Oral Nutrition/Hydration Goal 1 (SLP)    Oral Nutrition/Hydration Goal 1, SLP  Tolerate least restrictive diet with no overt  s/s aspiration.   -OC      Time Frame (Oral Nutrition/Hydration Goal 1, SLP)  by discharge  -OC        User Key  (r) = Recorded By, (t) = Taken By, (c) = Cosigned By    Initials Name Provider Type    Pia Temple MA,CCC-SLP Speech and Language Pathologist           SLP Outcome Measures (last 72 hours)      SLP Outcome Measures     Row Name 08/09/19 1200             SLP Outcome Measures    Outcome Measure Used?  Adult NOMS  -OC         Adult FCM Scores    FCM Chosen  Swallowing  -OC      Swallowing FCM Score  4  -OC        User Key  (r) = Recorded By, (t) = Taken By, (c) = Cosigned By    Initials Name Effective Dates    Pia Temple MA,ADELIA-SLP 06/08/18 -            Time Calculation:   Time Calculation- SLP     Row Name 08/09/19 1355             Time Calculation- SLP    SLP Start Time  1100  -OC      SLP Received On  08/09/19  -OC        User Key  (r) = Recorded By, (t) = Taken By, (c) = Cosigned By    Initials Name Provider Type    Pia Temple MA,CCC-SLP Speech and Language Pathologist          Therapy Charges for Today     Code Description Service Date Service Provider Modifiers Qty    [id number]  ST EVAL ORAL PHARYNG SWALLOW 4 8/9/2019 Pia Pablo MA,ADELIA-SLP GN 1               Pia Pablo MA, CCC-SLP  8/9/2019

## 2019-08-09 NOTE — THERAPY EVALUATION
Patient Name: Cody Eldridge  : 1945    MRN: [id number]                              Today's Date: 2019       Admit Date: 8/3/2019    Visit Dx:     ICD-10-CM ICD-9-CM   1. New onset of congestive heart failure (CMS/HCC) I50.9 428.0   2. Infected venous access port T80.219A 999.31     Patient Active Problem List   Diagnosis   • Atopic rhinitis   • Arthritis of hand   • Coxitis   • Benign colonic polyp   • Neck pain   • Chronic obstructive pulmonary disease (CMS/HCC)   • Mixed anxiety depressive disorder   • Degeneration of intervertebral disc of lumbosacral region   • Elevated prostate specific antigen (PSA)   • Hyperlipidemia   • Hypertension   • Insomnia   • Lumbar radiculopathy   • Osteoarthritis   • Vitamin D deficiency   • Abdominal pain   • Acute URI   • Myalgia   • Cramp of both lower extremities   • Gingivitis   • Atrial fibrillation (CMS/HCC)   • Non-rheumatic tricuspid valve insufficiency   • SHAR (obstructive sleep apnea)   • Precordial pain   • IRAHETA (dyspnea on exertion)   • Coronary artery disease of native artery of native heart with stable angina pectoris (CMS/HCC)   • Shortness of breath   • 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)   • Current use of long term anticoagulation   • Syncope   • Dehydration   • History of cancer   • History of atrial fibrillation   • History of CHF (congestive heart failure)   • Hypotension   • Fitting and adjustment of vascular catheter   • New onset of congestive heart failure (CMS/HCC)   • Atrial fibrillation (CMS/HCC)   • Squamous cell carcinoma of neck   • COPD (chronic obstructive pulmonary disease) (CMS/HCC)   • Depression with anxiety   • CAD (coronary artery disease)   • Chemotherapy-induced neutropenia (CMS/HCC)   • Vascular catheter infection (CMS/HCC)   • Chemotherapy-induced thrombocytopenia   • Acute renal injury (CMS/HCC)   • Anemia associated with chemotherapy   • Oral thrush   • MSSA bacteremia   • Shock, septic (CMS/HCC)     Past  Medical History:   Diagnosis Date   • Atrial fibrillation (CMS/HCC)    • CHF (congestive heart failure) (CMS/HCC)    • Chronic bronchitis (CMS/HCC)    • COPD (chronic obstructive pulmonary disease) (CMS/HCC)    • Cor pulmonale (chronic) (CMS/HCC)    • Daytime hypersomnia    • Depression with anxiety    • Elevated cholesterol    • Enlarged prostate    • Frequent nocturnal awakening    • Gout    • H/O cardiac radiofrequency ablation 2006    [facility].   • Head and neck cancer (CMS/HCC)    • Hyperlipidemia    • Hypertension    • Hypotension    • Insomnia    • Lumbar radicular pain    • SHAR (obstructive sleep apnea)    • Osteoarthritis    • Permanent atrial fibrillation (CMS/HCC)    • Snoring    • Squamous cell carcinoma of neck    • SVT (supraventricular tachycardia) (CMS/HCC)    • Syncope    • Vitamin D deficiency    • Weight loss      Past Surgical History:   Procedure Laterality Date   • APPENDECTOMY     • CARDIAC ABLATION  2006    [facility].   • CARDIAC CATHETERIZATION N/A 8/29/2018    Procedure: Left Heart Cath;  Surgeon: Yousif Uribe MD;  Location: [facility] CATH INVASIVE LOCATION;  Service: Cardiology   • CARDIAC CATHETERIZATION N/A 8/29/2018    Procedure: Coronary angiography;  Surgeon: Yousif Uribe MD;  Location: [facility] CATH INVASIVE LOCATION;  Service: Cardiology   • CARDIAC CATHETERIZATION N/A 8/29/2018    Procedure: Left ventriculography;  Surgeon: Yousif Uribe MD;  Location: [facility] CATH INVASIVE LOCATION;  Service: Cardiology   • FINGER SURGERY     • FOOT SURGERY     • HAND SURGERY     • VENOUS ACCESS DEVICE (PORT) INSERTION Right 6/18/2019    Procedure: MEDIPORT PLACEMENT;  Surgeon: Elvis Grigsby MD;  Location: [facility] OR;  Service: Vascular   • VENOUS ACCESS DEVICE (PORT) REMOVAL Right 8/5/2019    Procedure: REMOVAL VENOUS ACCESS DEVICE;  Surgeon: Prince Castaneda MD;  Location: [facility] OR;  Service: Vascular     General Information     Row Name 08/09/19 1611 08/09/19 1200        PT Evaluation Time/Intention    Subjective Information  --  no complaints  -OC    Document Type  evaluation  -EM  evaluation  -OC    Mode of Treatment  physical therapy;individual therapy  -EM  --    Patient Effort  --  good  -OC    Row Name 08/09/19 1611 08/09/19 1200       General Information    Patient Profile Reviewed?  yes  -EM  --    Patient Observations  --  alert;agree to therapy;cooperative  -OC    Prior Level of Function  independent:  -EM  --    Existing Precautions/Restrictions  fall  -EM  --    Row Name 08/09/19 1611          Relationship/Environment    Lives With  spouse  -EM     Row Name 08/09/19 1611          Resource/Environmental Concerns    Current Living Arrangements  home/apartment/condo  -EM     Row Name 08/09/19 1611          Home Main Entrance    Number of Stairs, Main Entrance  one  -EM     Row Name 08/09/19 1611          Stairs Within Home, Primary    Number of Stairs, Within Home, Primary  none  -EM     Row Name 08/09/19 1611          Cognitive Assessment/Intervention- PT/OT    Orientation Status (Cognition)  oriented x 4  -EM     Row Name 08/09/19 1611          Safety Issues, Functional Mobility    Impairments Affecting Function (Mobility)  endurance/activity tolerance;strength  -EM       User Key  (r) = Recorded By, (t) = Taken By, (c) = Cosigned By    Initials Name Provider Type    OC Pia Pablo MA,CCC-SLP Speech and Language Pathologist    EM Yessi Jackson, PT Physical Therapist        Mobility     Row Name 08/09/19 1612          Bed Mobility Assessment/Treatment    Bed Mobility Assessment/Treatment  supine-sit  -EM     Supine-Sit [place] (Bed Mobility)  minimum assist (75% patient effort)  -EM     Row Name 08/09/19 1612          Sit-Stand Transfer    Sit-Stand [place] (Transfers)  minimum assist (75% patient effort)  -EM     Assistive Device (Sit-Stand Transfers)  walker, front-wheeled  -EM     Row Name 08/09/19 1612          Gait/Stairs Assessment/Training     Victor Level (Gait)  minimum assist (75% patient effort)  -EM     Assistive Device (Gait)  walker, front-wheeled  -EM     Distance in Feet (Gait)  50  -EM     Deviations/Abnormal Patterns (Gait)  gait speed decreased;stride length decreased  -EM     Comment (Gait/Stairs)  fatigued quickly   -EM       User Key  (r) = Recorded By, (t) = Taken By, (c) = Cosigned By    Initials Name Provider Type    EM Yessi Jackson PT Physical Therapist        Obj/Interventions     Row Name 08/09/19 1615          General ROM    GENERAL ROM COMMENTS  ROM WNL   -EM     Row Name 08/09/19 1615          MMT (Manual Muscle Testing)    General MMT Comments  no focal deficits identified, generally weak   -EM     Row Name 08/09/19 1615          Therapeutic Exercise    Lower Extremity (Therapeutic Exercise)  LAQ (long arc quad), bilateral  -EM     Lower Extremity Range of Motion (Therapeutic Exercise)  ankle dorsiflexion/plantar flexion, bilateral  -EM     Position (Therapeutic Exercise)  seated  -EM     Sets/Reps (Therapeutic Exercise)  1/10  -EM     Row Name 08/09/19 1615          Static Sitting Balance    Level of Victor (Unsupported Sitting, Static Balance)  supervision  -EM     Sitting Position (Unsupported Sitting, Static Balance)  sitting on edge of bed  -EM     Row Name 08/09/19 1615          Static Standing Balance    Level of Victor (Supported Standing, Static Balance)  contact guard assist  -EM     Assistive Device Utilized (Supported Standing, Static Balance)  walker, rolling  -EM     Row Name 08/09/19 1615          Sensory Assessment/Intervention    Sensory General Assessment  no sensation deficits identified  -EM       User Key  (r) = Recorded By, (t) = Taken By, (c) = Cosigned By    Initials Name Provider Type    EM Yessi Jackson PT Physical Therapist        Goals/Plan     Row Name 08/09/19 1618          Bed Mobility Goal 1 (PT)    Activity/Assistive Device (Bed Mobility Goal 1, PT)  bed  mobility activities, all  -EM     Caroline Level/Cues Needed (Bed Mobility Goal 1, PT)  contact guard assist  -EM     Time Frame (Bed Mobility Goal 1, PT)  1 week  -EM     Row Name 08/09/19 1618          Transfer Goal 1 (PT)    Activity/Assistive Device (Transfer Goal 1, PT)  transfers, all;walker, rolling  -EM     Caroline Level/Cues Needed (Transfer Goal 1, PT)  contact guard assist  -EM     Time Frame (Transfer Goal 1, PT)  1 week  -EM     Row Name 08/09/19 1618          Gait Training Goal 1 (PT)    Activity/Assistive Device (Gait Training Goal 1, PT)  walker, rolling  -EM     Caroline Level (Gait Training Goal 1, PT)  contact guard assist  -EM     Distance (Gait Goal 1, PT)  150  -EM     Time Frame (Gait Training Goal 1, PT)  1 week  -EM       User Key  (r) = Recorded By, (t) = Taken By, (c) = Cosigned By    Initials Name Provider Type    EM Yessi Jackson, PT Physical Therapist        Clinical Impression     Row Name 08/09/19 1616          Pain Assessment    Additional Documentation  Pain Scale: Numbers Pre/Post-Treatment (Group)  -EM     Row Name 08/09/19 1616          Pain Scale: Numbers Pre/Post-Treatment    Pain Scale: Numbers, Pretreatment  8/10  -EM     Pain Location  mouth (non-dental)  -EM     Pain Intervention(s)  Medication (See MAR) notified RN patient asking for mouth meds  -EM     Row Name 08/09/19 1616 08/09/19 1437       Plan of Care Review    Plan of Care Reviewed With  patient  -EM  patient  -LY    Row Name 08/09/19 1200          Plan of Care Review    Plan of Care Reviewed With  patient  -OC     Row Name 08/09/19 1616          Physical Therapy Clinical Impression    Patient/Family Goals Statement (PT Clinical Impression)  get stronger   -EM     Criteria for Skilled Interventions Met (PT Clinical Impression)  yes;treatment indicated  -EM     Rehab Potential (PT Clinical Summary)  good, to achieve stated therapy goals  -EM     Row Name 08/09/19 1616          Vital Signs     Pretreatment Heart Rate (beats/min)  100  -EM     Posttreatment Heart Rate (beats/min)  142  -EM     Pre SpO2 (%)  92  -EM     O2 Delivery Pre Treatment  supplemental O2  -EM     Post SpO2 (%)  93  -EM     O2 Delivery Post Treatment  supplemental O2  -EM     Row Name 08/09/19 1616          Positioning and Restraints    Pre-Treatment Position  in bed  -EM     Post Treatment Position  chair  -EM     In Chair  reclined;call light within reach;exit alarm on;notified nsg  -EM       User Key  (r) = Recorded By, (t) = Taken By, (c) = Cosigned By    Initials Name Provider Type    Pia Temple MA,CCC-SLP Speech and Language Pathologist    Yessi Lund, PT Physical Therapist    Sommer Mckeon RN Registered Nurse        Outcome Measures     Row Name 08/09/19 1622          How much help from another person do you currently need...    Turning from your back to your side while in flat bed without using bedrails?  3  -EM     Moving from lying on back to sitting on the side of a flat bed without bedrails?  3  -EM     Moving to and from a bed to a chair (including a wheelchair)?  3  -EM     Standing up from a chair using your arms (e.g., wheelchair, bedside chair)?  3  -EM     Climbing 3-5 steps with a railing?  2  -EM     To walk in hospital room?  3  -EM     AM-PAC 6 Clicks Score (PT)  17  -EM     Row Name 08/09/19 1622          Functional Assessment    Outcome Measure Options  AM-PAC 6 Clicks Basic Mobility (PT)  -EM       User Key  (r) = Recorded By, (t) = Taken By, (c) = Cosigned By    Initials Name Provider Type    Yessi Lund PT Physical Therapist        Physical Therapy Education     Title: PT OT SLP Therapies (In Progress)     Topic: Physical Therapy (In Progress)     Point: Mobility training (In Progress)     Learning Progress Summary           Patient Acceptance, E, NR by EM at 8/9/2019  4:20 PM                               User Key     Initials Effective Dates Name Provider Type  Discipline    EM 04/03/18 -  Yessi Jackson PT Physical Therapist PT              PT Recommendation and Plan  Planned Therapy Interventions (PT Eval): bed mobility training, gait training, home exercise program, transfer training  Outcome Summary/Treatment Plan (PT)  Anticipated Discharge Disposition (PT): skilled nursing facility, home with home health(pending progress and level of assist available at home )  Plan of Care Reviewed With: patient  Outcome Summary: Patient is a 74 y.o. male admitted to [facility] for CHF exacerbation, MSSA septicemia on 8/3/2019. PMHx includes squamous cell carcinoma of tongue, HTN. Patient is independent at baseline and lives with his spouse. Patient reports one step to enter home, no use of AD prior to admission. Today, patient performed bed mobility with Leoncio, required Leoncio for transfers, and ambulated 50 feet with rwx and Leoncio.  Patient demonstrates impairments consisting of generalized weakness, decreased activity tolerance. Patient may benefit from skilled PT services acutely to address functional deficits as well as improve level of independence prior to discharge. Anticipate SNU or home with assist upon DC, pending progress and available help at home.      Time Calculation:   PT Charges     Row Name 08/09/19 1623             Time Calculation    Start Time  1542  -EM      Stop Time  1607  -EM      Time Calculation (min)  25 min  -EM      PT Received On  08/09/19  -EM      PT - Next Appointment  08/10/19  -EM      PT Goal Re-Cert Due Date  08/16/19  -EM         Time Calculation- PT    Total Timed Code Minutes- PT  10 minute(s)  -EM        User Key  (r) = Recorded By, (t) = Taken By, (c) = Cosigned By    Initials Name Provider Type    EM Yessi Jackson PT Physical Therapist        Therapy Charges for Today     Code Description Service Date Service Provider Modifiers Qty    20503317441 HC PT EVAL MOD COMPLEXITY 2 8/9/2019 Yessi Jackson, PT GP 1    66301091039 HC PT  THER PROC EA 15 MIN 8/9/2019 Yessi Jackson, PT GP 1          PT G-Codes  Outcome Measure Options: AM-PAC 6 Clicks Basic Mobility (PT)  AM-PAC 6 Clicks Score (PT): 17    Yessi Jackson, PT  8/9/2019

## 2019-08-09 NOTE — PLAN OF CARE
Problem: Patient Care Overview  Goal: Plan of Care Review  Outcome: Ongoing (interventions implemented as appropriate)   08/09/19 1200   Coping/Psychosocial   Plan of Care Reviewed With patient   OTHER   Outcome Summary Bedside swallow eval completed. Pt familiar to this therapist as outpatient for head and neck CA. Recommend nectar thick liquids and puree or nectar thick liquids clears, MD please advise. Water protocol and boost/ensure ok. Meds crushed with puree or via alternate means. Recommend continue with TFs, concern regarding pt ability to meet nutritional needs. Recommend upright and slow rate with PO intake, double swallow. ST to follow for diet tolerance and need for re-eval.

## 2019-08-09 NOTE — PROGRESS NOTES
LOS: 6 days     Chief Complaint:  Follow-up MSSA septicemia w/ port in place    Interval History: Afebrile.  Continues to report mouth pain with copious secretions.  Tolerating antibiotics without a rash. Loose stools persist. No abd pain, no N/V. No rashes     Vital Signs  Temp:  [97.5 °F (36.4 °C)-98.5 °F (36.9 °C)] 98.1 °F (36.7 °C)  Heart Rate:  [] 90  Resp:  [18] 18  BP: ()/(57-94) 148/83    Physical Exam:  General: awake, alert  ENT:+ mucositis  Cardiovascular: tachy, irreg irreg rhythm; trace LE edema  Respiratory:  Crackles at the bases  GI: Abdomen is soft, non-tender, non-distended,  Skin: no rashes     Antibiotics:  Cefazolin 2 g IV every 12 hours    LABS:  CBC, CMP, micro reviewed today  Lab Results   Component Value Date    WBC 14.73 (H) 08/09/2019    HGB 11.6 (L) 08/09/2019    HCT 36.5 (L) 08/09/2019    .6 (H) 08/09/2019    PLT 21 (C) 08/09/2019     Lab Results   Component Value Date    GLUCOSE 125 (H) 08/09/2019    BUN 71 (H) 08/09/2019    CREATININE 2.85 (H) 08/09/2019    EGFRIFNONA 22 (L) 08/09/2019    EGFRIFAFRI 107 04/23/2018    BCR 24.9 08/09/2019    CO2 21.8 (L) 08/09/2019    CALCIUM 7.2 (L) 08/09/2019    PROTENTOTREF 7.2 04/23/2018    ALBUMIN 1.90 (L) 08/09/2019    LABIL2 1.3 04/23/2018    AST 27 08/09/2019    ALT <5 08/09/2019     Microbiology:  8/3 BCx: MSSA in 2/2 sets  8/5 Catheter and Wound Cx: MSSA  8/6 BCx: NGTD x 2      Radiology (personally reviewed report):  TTE hypokinetic left ventricular wall segments.  Moderately dilated left atrium.  EF of 36 to 40%.  Moderate TR    Assessment/Plan   1. Septic shock due to MSSA septicemia  2. Neutropenic fever  3. Port in place -- removed 8/5/19  4. 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 Atrial fibrillation with RVR  7. Acute kidney injury (new)    Creatinine has plateaued  Repeat blood cultures negative to date, TTE negative for endocarditis  Elevated WBC in the setting of Neupogen (last dose 8/8)      Continue cefazolin 2 g IV every 12 hours (if renal function improves can increase the dose to every 8 hours) for 4 weeks with an antibiotic stop date of September 3.  Please monitor weekly CBC with differential and creatinine and fax the results to infectious disease clinic at [phone]  ID follow-up arranged for 9/3  Okay to place PICC line at any time for home IV antibiotic

## 2019-08-09 NOTE — PROGRESS NOTES
" LOS: 6 days     Name: Cody Eldridge  Age: 74 y.o.  Sex: male  :  1945  MRN: [id number]         Primary Care Physician: Epley, James, APRN    Subjective   Subjective  Transferred out of the ICU last night.  Complains of mouth pain but really nothing else at the moment.  Chart reviewed.    Objective   Vital Signs  Temp:  [97.5 °F (36.4 °C)-98.5 °F (36.9 °C)] 97.8 °F (36.6 °C)  Heart Rate:  [] 92  Resp:  [18] 18  BP: ()/(57-94) 134/94  Body mass index is 24.11 kg/m².    Objective:  General Appearance:  Comfortable, in no acute distress and ill-appearing (Elderly, frail, weak and deconditioned appearing).    Vital signs: (most recent): Blood pressure 134/94, pulse 92, temperature 97.8 °F (36.6 °C), temperature source Oral, resp. rate 18, height 180.3 cm (71\"), weight 78.4 kg (172 lb 13.5 oz), SpO2 93 %.    HEENT: (Cortrak in place)    Lungs:  Normal effort and normal respiratory rate.  There are decreased breath sounds.    Heart: Normal rate.  Regular rhythm.    Chest: (Right upper chest wall incision from port removal is dressed)  Abdomen: Abdomen is soft.  Bowel sounds are normal.   There is no abdominal tenderness.     Extremities: There is no dependent edema or local swelling.    Neurological: Patient is alert and oriented to person, place and time.    Skin:  Warm and dry.              Results Review:       I reviewed the patient's new clinical results.    Results from last 7 days   Lab Units 19  0338 19  0430 19  0444 19  0510 19  0454 19  0507 19  1223   WBC 10*3/mm3 14.73* 8.05 1.96* 2.22* 1.38* 0.56* 1.09*   HEMOGLOBIN g/dL 11.6* 11.5* 12.6* 12.7* 14.8 14.1 14.8   PLATELETS 10*3/mm3 21* 29*  29* 41* 79* 106* 126* 196     Results from last 7 days   Lab Units 19  0338 19  1906 19  0430 19  1721 19  0444 19  1414 19  0511  19  0454 19  0507   SODIUM mmol/L 143 145 140  --  147*  --  151*  --  " 149* 143   POTASSIUM mmol/L 3.4* 4.2 3.8 3.9 3.8 3.9 3.3*   < > 3.2* 4.1   CHLORIDE mmol/L 108* 110* 106  --  111*  --  115*  --  107 103   CO2 mmol/L 21.8* 25.8 24.7  --  24.6  --  22.7  --  30.0* 30.7*   BUN mg/dL 71* 68* 64*  --  56*  --  33*  --  24* 21   CREATININE mg/dL 2.85* 2.91* 2.98*  --  2.79*  --  1.83*  --  1.17 0.96   CALCIUM mg/dL 7.2* 8.0* 7.6*  --  7.5*  --  5.6*  --  7.7* 8.7   GLUCOSE mg/dL 125* 120* 149*  --  141*  --  133*  --  107* 102*    < > = values in this interval not displayed.     Results from last 7 days   Lab Units 08/09/19  0338 08/08/19  0430 08/07/19  0444 08/06/19  0511 08/05/19  0454 08/04/19  0507 08/03/19  1223   INR  1.12* 1.19* 1.33* 1.60* 2.49* 5.02* 3.97*             Scheduled Meds:     ceFAZolin 2 g Intravenous Q12H   fluconazole 200 mg Oral Daily   lansoprazole 30 mg Nasogastric Daily   metoprolol tartrate 25 mg Oral Q8H   potassium chloride 40 mEq Nasogastric Once   sertraline 50 mg Oral Daily   silver sulfadiazine  Topical BID   sodium chloride 3 mL Intravenous Q12H   Sodium Hypochlorite 0.0625 % (Dakin's 1/8th Strength) topical solution 946 mL Irrigation Q12H   terazosin 1 mg Nasogastric Nightly     PRN Meds:   •  acetaminophen  •  albuterol  •  ALPRAZolam  •  diphenoxylate-atropine  •  HYDROcodone-acetaminophen  •  Lidocaine Viscous HCl  •  magic mouthwash  •  magnesium sulfate **OR** magnesium sulfate in D5W 1g/100mL (PREMIX)  •  nitroglycerin  •  ondansetron **OR** ondansetron  •  pramipexole  •  [COMPLETED] Insert peripheral IV **AND** sodium chloride  •  sodium chloride  Continuous Infusions:    lactated ringers 9 mL/hr Last Rate: 9 mL/hr (08/05/19 1344)       Assessment/Plan   Active Hospital Problems    Diagnosis  POA   • **MSSA bacteremia [R78.81]  Unknown   • Shock, septic (CMS/HCC) [A41.9, R65.21]  Unknown   • Oral thrush [B37.0]  Unknown   • Acute renal injury (CMS/HCC) [N17.9]  Unknown   • Anemia associated with chemotherapy [D64.81, T45.1X5A]  Unknown   •  Chemotherapy-induced thrombocytopenia [D69.59, T45.1X5A]  Unknown   • New onset of congestive heart failure (CMS/HCC) [I50.9]  Yes   • Atrial fibrillation (CMS/HCC) [I48.91]  Yes   • Squamous cell carcinoma of neck [C44.42]  Yes   • COPD (chronic obstructive pulmonary disease) (CMS/HCC) [J44.9]  Yes   • Depression with anxiety [F41.8]  Yes   • CAD (coronary artery disease) [I25.10]  Yes   • Chemotherapy-induced neutropenia (CMS/HCC) [D70.1, T45.1X5A]  Yes   • Vascular catheter infection (CMS/HCC) [T82.7XXA]  Yes      Resolved Hospital Problems   No resolved problems to display.       Assessment & Plan    -Septic shock has resolved and now off pressors.  Transferred to the floor last evening.    -Remains on cefazolin with plans for a 4-week course for treatment of MSSA septicemia, per infectious disease  -Received Neupogen with improvement of WBCs.  Remains thrombocytopenic.  Hematology/oncology following  -Atrial fibrillation with rapid ventricular response appears better controlled at the moment on oral metoprolol.  Cardiology following.  -Renal function starting to improve with better hemodynamics.  Nephrology following.  -Electrolytes improved today  -Continue supportive care for oral mucositis secondary to chemoradiation.  Continue tube feedings via cortrak for now.  Speech therapy reevaluate the patient today and cleared him for a diet.  Will try full liquids.  -On a course of oral fluconazole for treatment of thrush discovered on 8/8/2019  -Appreciate assistance from consulting services  -Consult physical therapy    Pedrito Toledo MD  [facility]  08/09/19  1:28 PM

## 2019-08-09 NOTE — PROGRESS NOTES
Adult Nutrition  Assessment/PES    Patient Name:  Cody Eldridge  YOB: 1945  MRN: [id number]  Admit Date:  8/3/2019    Assessment Date:  8/9/2019    Nutrition follow up. Tolerating TF through NG-Isosource 1.5 @ 60 cc/hr. Speech therapy evaluated patient and advanced to full liquids. Continues with mouth pain- lidocaine and magic mouthwash has helped.  Provided nutrition therapy. Patient declined boost nutrition supplement. He agreed to try Mighty shakes-ordered TID.    Reason for Assessment     Row Name 08/09/19 1346          Reason for Assessment    Reason For Assessment  follow-up protocol;TF/PN         Nutrition/Diet History     Row Name 08/09/19 1346          Nutrition/Diet History    Typical Food/Fluid Intake  pt complains of mouth pain           Labs/Tests/Procedures/Meds     Row Name 08/09/19 1347          Labs/Procedures/Meds    Lab Results Reviewed  reviewed, pertinent     Lab Results Comments  K, Creat, BUN, platelets         Diagnostic Tests/Procedures    Diagnostic Test/Procedure Reviewed  reviewed, pertinent        Medications    Pertinent Medications Reviewed  reviewed, pertinent         Physical Findings     Row Name 08/09/19 1347          Physical Findings    Gastrointestinal  feeding tube     Tubes  nasogastric tube Oral thrush          Nutrition Prescription Ordered     Row Name 08/09/19 1347          Nutrition Prescription PO    Current PO Diet  Full Liquid        Nutrition Prescription EN    Enteral Route  NG     Product  Isosource 1.5 (Jevity 1.5)     TF Delivery Method  Continuous     Continuous TF Goal Rate (mL/hr)  60 mL/hr     Water flush (mL)   100 mL     Water Flush Frequency  Every 4 hours         Evaluation of Received Nutrient/Fluid Intake     Row Name 08/09/19 1345          Calories Evaluation    Enteral Calories (kcal)  2160     % of Kcal Needs  100        Protein Evaluation    Enteral Protein (gm)  97     % of Protein Needs  100        Fluid Intake Evaluation     Enteral (Free Water) Fluid (mL)  1094     Free Water Flush Fluid (mL)  600     Total Free Water Intake (mL)  1694 mL         Problem/Interventions:    Intervention Goal     Row Name 08/09/19 1348          Intervention Goal    General  Maintain nutrition;Nutrition support treatment     PO  Tolerate PO     TF/PN  Maintain TF/PN;Tolerate TF at goal     Weight  Maintain weight         Nutrition Intervention     Row Name 08/09/19 1348          Nutrition Intervention    RD/Tech Action  Follow Tx progress;Care plan reviewd;Interview for preference;Recommend/ordered;Encourage intake     Recommended/Ordered  Supplement         Nutrition Prescription     Row Name 08/09/19 1348          Nutrition Prescription PO    PO Prescription  Begin/change supplement     Supplement  Mighty Shake     Supplement Frequency  3 times a day     New PO Prescription Ordered?  Yes         Education/Evaluation     Row Name 08/09/19 1348          Education    Education  Will Instruct as appropriate        Monitor/Evaluation    Monitor  Per protocol;I&O;Pertinent labs;TF delivery/tolerance;Weight;Skin status;Symptoms           Electronically signed by:  Ela Read RD  08/09/19 1:48 PM

## 2019-08-09 NOTE — PROGRESS NOTES
REASON FOR CONSULTATION:  Neutropenic fever, port site cellulitis, prolonged protime no clinical bleeding, severe grade iii mucositis, atrial fibrilation rvr  Provide an opinion on any further workup or treatment                             INTERVAL HISTORY:  On 8/5/2019 in the early morning,Patient was moved to intensive care unit because of hypotension, atrial fibrillation with RVR and currently on pressor and amiodarone.     In the early afternoon of 8/5/2019, patient had portacatheter removed.     On 8/6/2019, nurse and family member reports patient is restless, patient himself is coherent and oriented.  Nurse reports patient had no urine output, bladder scan shows residual urine 150 mL.  It was 100 mL earlier this morning.  In the meantime patient has worsening renal function with creatinine 1.83 up from 1.17 the previous day.     The tip of portacatheter grew out Staphylococcus aureus within 24 hours.  Sensitivity study is not available.     On 8/7/2019, patient has further decreased platelets 41,000, and his WBC and neutrophils both actually slightly decreased at 1960 including ANC 1630.  His renal function is further worsening at creatinine 2.79.  Patient has been seen by nephrologist.  Patient is off pressor, and seems in sinus rhythm.     On 8/8/2019, patient had a further deteriorating platelets 29,000 without active bleeding.  Patient has resolution of neutropenia with ANC 5800 out of WBC 8000.  Slightly trending down hemoglobin 11.5.  Renal function no improvement creatinine 2.98.  Continues to have soreness in mouth, on tube feeding.  Patient was not able to tolerate Meghann's Magic portion for oral mucositis.     On 8/9/2019, relates further decreased at 21,000.  Hb stable 11.6, WBC further improved at 14,700 including ANC 13,000.  Patient is afebrile.  Was able to participate in rehabilitation.      HISTORY OF PRESENT ILLNESS:  The patient is a 74 y.o. year old male who is here for an opinion about the  above issue.     History of Present Illness I have been asked to see this patient in consultation today who is a 74-year-old white male who has been treated BY DR RONALDO WILLIAMSON FOR HEAD AND NECK CANCER WITH CHEMO AND RADIATION THERAPY In any event the patient has received radiation therapy to his NECK, he has been receiving IV fluids in the office in the last few days because of profound weakness and he has now developed atrial fibrillation with rapid ventricular response from a congestive heart failure, elevation of the proBNP in the 8000 category and further more significant sensitivity and redness in the skin of the anterior chest wall on the right side where the port is located that makes me to think that he has at least a cellulitis in this anatomical site. The patient has a severe degree of mucositis associated with radiation therapy. He is not able to swallow. Also the patient has developed diarrhea with no abdominal pain and in a stool culture enteropathogenic E-coli has been recovered. Clostridium was negative. The patient has lost substantial amount of weight. He has not been able to eat anything for the last 3-4 weeks. He has had swelling in his lower extremities. He has had cough and he has had shortness of breath. He has had fevers and chills as stated. He just feels terrible.            Medical History        Past Medical History:   Diagnosis Date   • Atrial fibrillation (CMS/HCC)     • CHF (congestive heart failure) (CMS/HCC)     • Chronic bronchitis (CMS/HCC)     • COPD (chronic obstructive pulmonary disease) (CMS/HCC)     • Cor pulmonale (chronic) (CMS/HCC)     • Daytime hypersomnia     • Depression with anxiety     • Elevated cholesterol     • Enlarged prostate     • Frequent nocturnal awakening     • Gout     • H/O cardiac radiofrequency ablation 2006     [facility].   • Head and neck cancer (CMS/HCC)     • Hyperlipidemia     • Hypertension     • Hypotension     • Insomnia     • Lumbar radicular  pain     • SHAR (obstructive sleep apnea)     • Osteoarthritis     • Permanent atrial fibrillation (CMS/HCC)     • Snoring     • Squamous cell carcinoma of neck     • SVT (supraventricular tachycardia) (CMS/HCC)     • Syncope     • Vitamin D deficiency     • Weight loss              Surgical History         Past Surgical History:   Procedure Laterality Date   • APPENDECTOMY       • CARDIAC ABLATION   2006     [facility].   • CARDIAC CATHETERIZATION N/A 8/29/2018     Procedure: Left Heart Cath;  Surgeon: Yousif Uribe MD;  [facility];  Service: Cardiology   • CARDIAC CATHETERIZATION N/A 8/29/2018     Procedure: Coronary angiography;  Surgeon: Yousif Uribe MD;  Location:  [facility] CATH INVASIVE LOCATION;  Service: Cardiology   • CARDIAC CATHETERIZATION N/A 8/29/2018     Procedure: Left ventriculography;  Surgeon: Yousif Uribe MD;  [facility];  Service: Cardiology   • FINGER SURGERY       • FOOT SURGERY       • HAND SURGERY       • VENOUS ACCESS DEVICE (PORT) INSERTION Right 6/18/2019     Procedure: MEDIPORT PLACEMENT;  Surgeon: Elvis Grigsby MD;  Location: [facility] OR;  Service: Vascular           ONCOLOGIC HISTORY DR RONALDO WILLIAMSON MD:     1.  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  He is undergoing definitive treatment with radiation therapy and weekly low-dose carboplatin and Taxol chemotherapy.  He was able to complete 4 weekly chemotherapy treatments in a row but required holding chemotherapy for the last 2 weeks due to myelosuppression.   he appears to be in rapid atrial fibrillation.  2.  Comorbidities including ischemic heart disease with history of CHF and atrial fibrillation requiring Coumadin anticoagulation.    3.  Oral mucositis.  He has a prescription for hydrocodone for pain control.  He does have Meghann's Magic mouth rinse to use as  "well.  4.  Warfarin anticoagulation.  Patient's INR 3.83, he will hold his coumadin today and tomorrow and have another INR checked Wednesday 7/17/20195.    5.  Weight loss and poor nutrition.  The patient's weight has stabilized and he is utilizing boost at home and trying to hydrate with water and Gatorade.  He does understand if his nutrition is not improving we will need to proceed with a G-tube for nutrition.  Ruthy Bautista, oncology dietitian been involved in his care also.  6.  Atrial fibrillation with rapid ventricular response     MEDICATIONS: see EMR.     ALLERGIES:          Allergies   Allergen Reactions   • Benadryl [Diphenhydramine Hcl] Other (See Comments) and Dizziness       \"I sleep for about a day\"       Social History no changes.      Family History;  No changes.        Review of Systems   Constitutional: Positive for activity change, appetite change, chills, diaphoresis, fatigue, fever and unexpected weight change.   HENT: Positive for mouth sores, sore throat, trouble swallowing and voice change.    Eyes: Negative.    Respiratory: Positive for cough and shortness of breath.    Cardiovascular: Positive for palpitations.   Gastrointestinal: Positive for abdominal distention and diarrhea.   Endocrine: Negative.    Genitourinary: Negative.    Musculoskeletal: Negative.    Skin: Positive for pallor.   Neurological: Negative.    Hematological: Negative.    Psychiatric/Behavioral: Negative.               Objective      Vitals:    08/09/19 0515 08/09/19 0741 08/09/19 0921 08/09/19 1333   BP:  129/88 134/94 148/83   BP Location:  Left arm  Left arm   Patient Position:  Lying  Lying   Pulse: 102 (!) 140 92 90   Resp:  18  18   Temp:  97.8 °F (36.6 °C)  98.1 °F (36.7 °C)   TempSrc:  Oral  Oral   SpO2:  93%  93%   Weight:       Height:          Physical Exam   Constitutional: He is oriented to person, place, and time. He has resolution of restless, lying in the bed comfortable, carries conversation well. "   HENT:   Head: Normocephalic.   Nose: Nose normal.  NG tube in place.  Mouth/Throat: Grade III mucositis.  There is oral thrush discovered on 8/8/2019.  Eyes: Conjunctivae and EOM are normal. No scleral icterus.   Neck: Normal range of motion. No tracheal deviation present. No thyromegaly present.   Erythema neck area of radiation   Cardiovascular:  Tachycardia, irregular irregular rhythm.   Pulmonary/Chest: Effort normal and breath sounds normal.   Abdominal: Soft. He exhibits no distension and no mass. There is no tenderness. There is no rebound and no guarding.   Musculoskeletal: None he exhibits edema.   Lymphadenopathy:     He has no cervical adenopathy.   Neurological: He is alert and oriented to person, place, and time.   Skin: Portacatheter has been removed.   Psychiatric: He has a normal mood and affect.  Patient seems restless not in acute distress. Judgment and thought content normal.          RECENT LABS:    Results from last 7 days   Lab Units 08/09/19  0338 08/08/19  0430 08/07/19  0444   WBC 10*3/mm3 14.73* 8.05 1.96*   HEMOGLOBIN g/dL 11.6* 11.5* 12.6*   HEMATOCRIT % 36.5* 36.1* 38.5   PLATELETS 10*3/mm3 21* 29*  29* 41*   MONOCYTES % % 5.0 25.0* 14.0*     Lab Results   Component Value Date    NEUTROABS 13.40 (H) 08/09/2019     Results from last 7 days   Lab Units 08/09/19  0338 08/08/19  1906 08/08/19  0430  08/06/19  0511   SODIUM mmol/L 143 145 140   < > 151*   POTASSIUM mmol/L 3.4* 4.2 3.8   < > 3.3*   CHLORIDE mmol/L 108* 110* 106   < > 115*   CO2 mmol/L 21.8* 25.8 24.7   < > 22.7   BUN mg/dL 71* 68* 64*   < > 33*   CREATININE mg/dL 2.85* 2.91* 2.98*   < > 1.83*   CALCIUM mg/dL 7.2* 8.0* 7.6*   < > 5.6*   BILIRUBIN mg/dL 0.7  --  0.7  --  2.5*   ALK PHOS U/L 99  --  75  --  58   ALT (SGPT) U/L <5  --  <5  --  19   AST (SGOT) U/L 27  --  21  --  22   GLUCOSE mg/dL 125* 120* 149*   < > 133*    < > = values in this interval not displayed.     Results from last 7 days   Lab Units 08/09/19  2043  08/08/19  0430 08/07/19  0444   INR  1.12* 1.19* 1.33*      on 8/8/2019.    Contains abnormal data Catheter Culture - Cath Tip, Chest, Right   Order: [id number]   Collected:  8/5/2019 14:47 Status:  Preliminary result   Visible to patient:  No (Not Released)   Specimen Information: Chest, Right; Cath Tip        CATHETER CULTURE >15 CFU/mL - Indicative of infection. Staphylococcus aureus Abnormal           Resulting Agency: [facility]         Specimen Collected: 08/05/19 14:47 Last Resulted: 08/06/19 09:40        Order Details       View Encounter       Lab and Collection Details                    Assessment/Plan   1.   In summary this patient is a 74-year-old who has been treated FOR HAND NECK CA He has received radiation therapy to the neck and he has been undergoing chemotherapy .  The patient has been admitted with rapid ventricular response, atrial fibrillation, fever, tremendous tenderness and erythema at the port site in anterior chest wall on the right side and associated with this diarrhea with a stool culture consistent with E-coli enteropathogenic. Also the patient has abnormal blood cultures.     Due to poor performance status, chemoradiotherapy will be on hold.    · On 8/7/2019, I discussed with the patient and his wife, will reevaluate in the future to see whether he needs more systematic chemotherapy after he recovered from sepsis and the improvement of performance status.    2. The patient has severe neutropenia secondary to chemotherapy.     · Continue currently on Neupogen daily.   · Improved neutrophil counts 2030 on 8/6/2019.   · Slightly worsening neutrophil at 1630 on 8/7/2019.  We will continue Neupogen and monitor.   · Significantly improved and normalized neutrophils 500 on 8/82019.  Neupogen will be discontinued.  · On 8/9/2019, patient become leukocytosis WBC 14,700 and ANC 13,400 which is expected secondary to Neupogen.     3.  Sepsis with positive MSSA septicemia.  Patient has been  followed by Dr. Cruz, was on nafcillin.  · PowerPort was removed earlier this afternoon on 8/5/2019.  · Catheter tip culture was positive for Staphylococcus aureus on 8/6/2019.   · Antibiotics being switched to cefazolin 8/6/2019.    · Patient is improving, off pressor on 8/7/2019.       4.  Acute renal injury, with creatinine 1.83 today, up from 1.17 yesterday.  Patient does not have urination.  Residual urine in the bladder is only 150 mL this afternoon on 8/6/2019.   · Patient has been seen by nephrology service.   · Creatinine 2.98 on 8/8/2019.  · Improved renal function creatinine 2.85 on 8/9/2019    5.  Thrombocytopenia secondary to chemotherapy.    · Worsening platelets at 79,000 on 8/6/2019.  No bleeding.    · Further deteriorating platelets 41,000 on 8/7/2019.  His thrombocytopenia possibly also contributed by his infection and now antibiotic.    · On 8/8/2019, platelets trending down and 29,000, with IPF 10.1%.  Patient has no active bleeding.    · On 8/9/2019, platelets decreased to 21,000.  No bleeding.  Continue to monitor.      6.  Oral mucositis grade 3, secondary to chemoradiation therapy.  Routine therapy will be on hold for now per Dr. Davila.   · Nurse reports 8/6/2019 lidocaine viscous is not really helping.  We will try Magic mouthwash.   · Nurse reports patient was able to tolerate Magic mouthwash and lidocaine viscous today on 8/9/2019.     7.  Oral thrush discovered on 8/8/2019.  We started patient on Diflucan 200 mg daily for 3 days per feeding tube.    8.  Coagulopathy due to vitamin K deficiency.  This is likely due to poor oral intake.  Patient currently is on tube feeding.  · INR has further improved after starting tube feeding, currently 1.19 today on 8/8/2019.   · No need for daily INR check anymore.    9.  Atrial fibrillation with RVR.  Cardiology to follow.  Patient was on digoxin.   · Patient continues atrial fibrillation with heart rate in the 130s.  Off digoxin due to  elevated level.  Per cardiology, will start the patient on metoprolol..    10.  Mild anemia.  Hb 12.7, newly developed on 8/6/2019.   · Stable on 8/7/2019.   · Hemoglobin drops at 11.5 on 8/8/2019.  Continue to monitor.   · Stable hemoglobin 809 2019.    RECOMMENDATIONS:  1.  Continue IV cefazolin, per ID service.  2.  Continue Diflucan 200 mg daily for 3 days (2/3) for oral thrush.  3.   Continue tube feeding.    4.  Monitor CBC in the morning.  No need for WBC differentiation.  We will check IPF and LDH.     I discussed with his nurse today for patient care.    JONATHAN JAVIER M.D., Ph.D.    8/9/2019

## 2019-08-09 NOTE — PROGRESS NOTES
"    Patient Name: Cody Elrdidge  :1945  74 y.o.      Patient Care Team:  Epley, James, APRN as PCP - General (Family Medicine)  Payal Waters MD as Consulting Physician (Pain Medicine)  Lorna Chaidez (Nurse Practitioner)  Kristal Cowart [facility] as Pharmacist  [place], Fritz Snyder MD as Referring Physician (Otolaryngology)  Pablo Heaton MD as Consulting Physician (Hematology and Oncology)  Annie Davila MD as Consulting Physician (Radiation Oncology)  Chetan Heaton MD as Consulting Physician (Urology)    Chief Complaint: follow up atrial fibrillation, septic shock    Interval History: Patient states he feels ok. HR was in the 80's while I was in the room. He doesn't really have any complaints.       Objective   Vital Signs  Temp:  [97.5 °F (36.4 °C)-98.5 °F (36.9 °C)] 97.8 °F (36.6 °C)  Heart Rate:  [] 92  Resp:  [18] 18  BP: ()/(57-94) 134/94    Intake/Output Summary (Last 24 hours) at 2019 1318  Last data filed at 2019 1000  Gross per 24 hour   Intake 1753 ml   Output 625 ml   Net 1128 ml     Flowsheet Rows      First Filed Value   Admission Height  180.3 cm (71\") Documented at 2019 1153   Admission Weight  79.9 kg (176 lb 3 oz) Documented at 2019 1207          Physical Exam:   General Appearance:    Alert, cooperative, in no acute distress   Lungs:     Clear to auscultation.  Normal respiratory effort and rate.      Heart:    irregular rhythm and normal rate, normal S1 and S2, no murmurs, gallops or rubs.     Chest Wall:    No abnormalities observed   Abdomen:     Soft, nontender, positive bowel sounds.     Extremities:   no cyanosis, clubbing or edema.  No marked joint deformities.  Adequate musculoskeletal strength.       Results Review:    Results from last 7 days   Lab Units 19  0338   SODIUM mmol/L 143   POTASSIUM mmol/L 3.4*   CHLORIDE mmol/L 108*   CO2 mmol/L 21.8*   BUN mg/dL 71*   CREATININE mg/dL 2.85*   GLUCOSE mg/dL 125* "   CALCIUM mg/dL 7.2*     Results from last 7 days   Lab Units 08/03/19  1223   TROPONIN T ng/mL <0.010     Results from last 7 days   Lab Units 08/09/19  0338   WBC 10*3/mm3 14.73*   HEMOGLOBIN g/dL 11.6*   HEMATOCRIT % 36.5*   PLATELETS 10*3/mm3 21*     Results from last 7 days   Lab Units 08/09/19  0338 08/08/19  0430 08/07/19  0444   INR  1.12* 1.19* 1.33*     Results from last 7 days   Lab Units 08/08/19  0430   MAGNESIUM mg/dL 2.0                   Medication Review:     ceFAZolin 2 g Intravenous Q12H   fluconazole 200 mg Oral Daily   lansoprazole 30 mg Nasogastric Daily   metoprolol tartrate 25 mg Oral Q8H   potassium chloride 40 mEq Nasogastric Once   sertraline 50 mg Oral Daily   silver sulfadiazine  Topical BID   sodium chloride 3 mL Intravenous Q12H   Sodium Hypochlorite 0.0625 % (Dakin's 1/8th Strength) topical solution 946 mL Irrigation Q12H   terazosin 1 mg Nasogastric Nightly          lactated ringers 9 mL/hr Last Rate: 9 mL/hr (08/05/19 1344)       Assessment/Plan   1. Persistent atrial fibrillation - digoxin stopped due to elevated dig level on 8/7. He is off amiodarone. He is tolerating low dose beta blocker. HR will improve as he does. I would continue same for now. No AC due to severe thrombocytopenia. He was on warfarin prior to admission.  [facility] ([facility]).  3. Acute on chronic combined systolic diastolic heart failure. Cardiomyopathy with EF 36-40%. likely nonischemic. Will need GDMT prior to discharge if BP will allow. For now, rate control with metoprolol takes precedence.   4. JERICHO on CKD  5. Thrombocytopenia   6. Nonobstructive coronary artery disease  7. Supra therapeutic INR on admission - resolved.   8. Moderate pulmonary HTN    Continue current dose of metoprolol for now. Will follow.     JONELLE Sanders  [facility]  08/09/19  1:18 PM

## 2019-08-09 NOTE — PROGRESS NOTES
NEPHROLOGY PROGRESS NOTE    PATIENT IDENTIFICATION:   Name:  oCdy Eldridge      MRN:  [id number]     74 y.o.  male             Reason for visit: JERICHO    SUBJECTIVE:   Breathing is comfortable on 1.5 L/min; tube feeds infusing without problem; denies palpitations; atrial fibrillation still fast but better than yesterday    OBJECTIVE:  Vitals:    08/09/19 0515 08/09/19 0741 08/09/19 0921 08/09/19 1333   BP:  129/88 134/94 148/83   BP Location:  Left arm  Left arm   Patient Position:  Lying  Lying   Pulse: 102 (!) 140 92 90   Resp:  18  18   Temp:  97.8 °F (36.6 °C)  98.1 °F (36.7 °C)   TempSrc:  Oral  Oral   SpO2:  93%  93%   Weight:       Height:               Body mass index is 24.11 kg/m².    Intake/Output Summary (Last 24 hours) at 8/9/2019 1728  Last data filed at 8/9/2019 1430  Gross per 24 hour   Intake 1753 ml   Output 575 ml   Net 1178 ml     Wt Readings from Last 1 Encounters:   08/08/19 0600 78.4 kg (172 lb 13.5 oz)   08/08/19 0210 78.4 kg (172 lb 13.5 oz)   08/07/19 0300 77.6 kg (171 lb 1.2 oz)   08/07/19 0146 76 kg (167 lb 8.8 oz)   08/06/19 1504 72.6 kg (160 lb)   08/06/19 0526 72.8 kg (160 lb 7.9 oz)   08/05/19 0600 70.5 kg (155 lb 6.8 oz)   08/05/19 0448 70.9 kg (156 lb 6.4 oz)   08/04/19 0610 70.6 kg (155 lb 11.2 oz)   08/03/19 1619 76.6 kg (168 lb 12.8 oz)   08/03/19 1207 79.9 kg (176 lb 3 oz)     Wt Readings from Last 3 Encounters:   08/08/19 78.4 kg (172 lb 13.5 oz)   08/02/19 78.6 kg (173 lb 3.2 oz)   07/31/19 79.2 kg (174 lb 9.6 oz)         Exam:  NAD; pleasant; oriented; looks older than stated age  Chronically ill-appearing; flat affect  Dry MM; AT/NC ; feeding tube present in nose  No eye discharge; no scleral icterus  No JVD; no carotid bruits  Coarse bilat; crackles in bases; not labored on 1.5 L/min  Irregularly irregular, tachycardic, no rub  Soft, NT, +D, BS+  Dressing by the right clavicle is C/D/I; left subclavian central line  Trace-+1 edema both legs  No clubbing  No  asterixis  Moves all extremities   Mood normal  Speech is fluent        Scheduled meds:      ceFAZolin 2 g Intravenous Q12H   fluconazole 200 mg Oral Daily   lansoprazole 30 mg Nasogastric Daily   metoprolol tartrate 37.5 mg Oral Q8H   potassium chloride 40 mEq Nasogastric Once   sertraline 50 mg Oral Daily   silver sulfadiazine  Topical BID   sodium chloride 3 mL Intravenous Q12H   Sodium Hypochlorite 0.0625 % (Dakin's 1/8th Strength) topical solution 946 mL Irrigation Q12H   terazosin 1 mg Nasogastric Nightly     IV meds:                          lactated ringers 9 mL/hr Last Rate: 9 mL/hr (08/05/19 1344)       Data Review:    Results from last 7 days   Lab Units 08/09/19 0338 08/08/19 1906 08/08/19 0430 08/06/19  0511   SODIUM mmol/L 143 145 140   < > 151*   POTASSIUM mmol/L 3.4* 4.2 3.8   < > 3.3*   CHLORIDE mmol/L 108* 110* 106   < > 115*   CO2 mmol/L 21.8* 25.8 24.7   < > 22.7   BUN mg/dL 71* 68* 64*   < > 33*   CREATININE mg/dL 2.85* 2.91* 2.98*   < > 1.83*   CALCIUM mg/dL 7.2* 8.0* 7.6*   < > 5.6*   BILIRUBIN mg/dL 0.7  --  0.7  --  2.5*   ALK PHOS U/L 99  --  75  --  58   ALT (SGPT) U/L <5  --  <5  --  19   AST (SGOT) U/L 27  --  21  --  22   GLUCOSE mg/dL 125* 120* 149*   < > 133*    < > = values in this interval not displayed.     Estimated Creatinine Clearance: 24.8 mL/min (A) (by C-G formula based on SCr of 2.85 mg/dL (H)).  Results from last 7 days   Lab Units 08/07/19 0444 08/06/19  2105   SODIUM UR mmol/L  --  70   CREATININE UR mg/dL  --  56.7   URIC ACID mg/dL 5.4  --      Results from last 7 days   Lab Units 08/09/19 0338 08/08/19 1906 08/08/19 0430 08/07/19 0444 08/06/19  0511   MAGNESIUM mg/dL  --   --  2.0 2.2 2.3   PHOSPHORUS mg/dL 3.0 1.7* 0.7* 1.0*  --        Results from last 7 days   Lab Units 08/09/19  0338 08/08/19  0430 08/07/19  0444 08/06/19  0510 08/05/19  0454   WBC 10*3/mm3 14.73* 8.05 1.96* 2.22* 1.38*   HEMOGLOBIN g/dL 11.6* 11.5* 12.6* 12.7* 14.8   PLATELETS 10*3/mm3  21* 29*  29* 41* 79* 106*       Results from last 7 days   Lab Units 08/09/19  0338 08/08/19  0430 08/07/19  0444 08/06/19  0511 08/05/19  0454   INR  1.12* 1.19* 1.33* 1.60* 2.49*             ASSESSMENT:     MSSA bacteremia    New onset of congestive heart failure (CMS/HCC)    Atrial fibrillation (CMS/HCC)    Squamous cell carcinoma of neck    COPD (chronic obstructive pulmonary disease) (CMS/HCC)    Depression with anxiety    CAD (coronary artery disease)    Chemotherapy-induced neutropenia (CMS/HCC)    Vascular catheter infection (CMS/HCC)    Chemotherapy-induced thrombocytopenia    Acute renal injury (CMS/HCC)    Anemia associated with chemotherapy    Oral thrush    Shock, septic (CMS/HCC)    1.  JERICHO, non-oliguric, stable: prerenal related to hypotension, tachy-arrhythmia, and sepsis syndrome limiting renal perfusion.   SCR at a plateau.  Improving hypernatremia.   Hypokalemia noted   2.  Head and neck cancer on radiation and chemotherapy  3.  Rapid atrial fibrillation  4.  Neutropenic fever with sepsis syndrome and MSSA bacteremia  5.  Coagulopathy  6.  Anemia and thrombocytopenia  7.  History of diastolic CHF:  Compensated for now        PLAN:  1.  Continue water flushes at 100 mL every 4 hours  2.  Oral potassium via feeding tube  3.  Control heart rate  4.  Bladder scan q shift  5.  No indication for dialysis, and expect that renal function should improve now that hemodynamics are more stable    Medardo Espinal MD  8/9/2019    5:28 PM

## 2019-08-09 NOTE — PLAN OF CARE
Problem: Patient Care Overview  Goal: Plan of Care Review  Outcome: Ongoing (interventions implemented as appropriate)   08/09/19 6357   Coping/Psychosocial   Plan of Care Reviewed With patient   OTHER   Outcome Summary pt mouth not much better today. upgraded to nectar thickl liquid meals but can have thin water between meals. oral care completed many times. will cont to monitor.    Plan of Care Review   Progress improving

## 2019-08-09 NOTE — PLAN OF CARE
Problem: Patient Care Overview  Goal: Plan of Care Review   08/09/19 1620   OTHER   Outcome Summary Patient is a 74 y.o. male admitted to [facility] for CHF exacerbation, MSSA septicemia on 8/3/2019. PMHx includes squamous cell carcinoma of tongue, HTN. Patient is independent at baseline and lives with his spouse. Patient reports one step to enter home, no use of AD prior to admission. Today, patient performed bed mobility with Leoncio, required Leoncio for transfers, and ambulated 50 feet with rwx and Leoncio. Patient demonstrates impairments consisting of generalized weakness, decreased activity tolerance. Patient may benefit from skilled PT services acutely to address functional deficits as well as improve level of independence prior to discharge. Anticipate SNU or home with assist upon DC, pending progress and available help at home.

## 2019-08-10 ENCOUNTER — APPOINTMENT (OUTPATIENT)
Dept: GENERAL RADIOLOGY | Facility: HOSPITAL | Age: 74
End: 2019-08-10

## 2019-08-10 LAB
ALBUMIN SERPL-MCNC: 2.3 G/DL (ref 3.5–5.2)
ANION GAP SERPL CALCULATED.3IONS-SCNC: 8.1 MMOL/L (ref 5–15)
BACTERIA SPEC ANAEROBE CULT: NORMAL
BUN BLD-MCNC: 77 MG/DL (ref 8–23)
BUN/CREAT SERPL: 27.5 (ref 7–25)
CALCIUM SPEC-SCNC: 7.3 MG/DL (ref 8.6–10.5)
CHLORIDE SERPL-SCNC: 108 MMOL/L (ref 98–107)
CO2 SERPL-SCNC: 26.9 MMOL/L (ref 22–29)
CREAT BLD-MCNC: 2.8 MG/DL (ref 0.76–1.27)
DEPRECATED RDW RBC AUTO: 70.9 FL (ref 37–54)
ERYTHROCYTE [DISTWIDTH] IN BLOOD BY AUTOMATED COUNT: 20.2 % (ref 12.3–15.4)
GFR SERPL CREATININE-BSD FRML MDRD: 22 ML/MIN/1.73
GLUCOSE BLD-MCNC: 125 MG/DL (ref 65–99)
HCT VFR BLD AUTO: 33.3 % (ref 37.5–51)
HGB BLD-MCNC: 10.7 G/DL (ref 13–17.7)
LDH SERPL-CCNC: 580 U/L (ref 135–225)
MAGNESIUM SERPL-MCNC: 1.9 MG/DL (ref 1.6–2.4)
MCH RBC QN AUTO: 31.7 PG (ref 26.6–33)
MCHC RBC AUTO-ENTMCNC: 32.1 G/DL (ref 31.5–35.7)
MCV RBC AUTO: 98.5 FL (ref 79–97)
PHOSPHATE SERPL-MCNC: 1.4 MG/DL (ref 2.5–4.5)
PLATELET # BLD AUTO: 23 10*3/MM3 (ref 140–450)
PLATELET # BLD AUTO: 23 10*3/MM3 (ref 140–450)
PLATELETS.RETICULATED NFR BLD AUTO: 14.8 % (ref 0.9–6.5)
POTASSIUM BLD-SCNC: 3.4 MMOL/L (ref 3.5–5.2)
RBC # BLD AUTO: 3.38 10*6/MM3 (ref 4.14–5.8)
SODIUM BLD-SCNC: 143 MMOL/L (ref 136–145)
WBC NRBC COR # BLD: 14.98 10*3/MM3 (ref 3.4–10.8)

## 2019-08-10 PROCEDURE — 97110 THERAPEUTIC EXERCISES: CPT

## 2019-08-10 PROCEDURE — 85055 RETICULATED PLATELET ASSAY: CPT | Performed by: INTERNAL MEDICINE

## 2019-08-10 PROCEDURE — 99233 SBSQ HOSP IP/OBS HIGH 50: CPT | Performed by: INTERNAL MEDICINE

## 2019-08-10 PROCEDURE — 83615 LACTATE (LD) (LDH) ENZYME: CPT | Performed by: INTERNAL MEDICINE

## 2019-08-10 PROCEDURE — 83735 ASSAY OF MAGNESIUM: CPT | Performed by: INTERNAL MEDICINE

## 2019-08-10 PROCEDURE — 99232 SBSQ HOSP IP/OBS MODERATE 35: CPT | Performed by: INTERNAL MEDICINE

## 2019-08-10 PROCEDURE — 85027 COMPLETE CBC AUTOMATED: CPT | Performed by: INTERNAL MEDICINE

## 2019-08-10 PROCEDURE — 63710000001 PREDNISONE PER 5 MG: Performed by: INTERNAL MEDICINE

## 2019-08-10 PROCEDURE — 71046 X-RAY EXAM CHEST 2 VIEWS: CPT

## 2019-08-10 PROCEDURE — 25010000002 MAGNESIUM SULFATE IN D5W 1G/100ML (PREMIX) 1-5 GM/100ML-% SOLUTION: Performed by: INTERNAL MEDICINE

## 2019-08-10 PROCEDURE — 80069 RENAL FUNCTION PANEL: CPT | Performed by: INTERNAL MEDICINE

## 2019-08-10 PROCEDURE — 25010000003 CEFAZOLIN IN DEXTROSE 2-4 GM/100ML-% SOLUTION: Performed by: INTERNAL MEDICINE

## 2019-08-10 RX ADMIN — CEFAZOLIN SODIUM 2 G: 2 INJECTION, SOLUTION INTRAVENOUS at 09:30

## 2019-08-10 RX ADMIN — MAGNESIUM SULFATE HEPTAHYDRATE 1 G: 1 INJECTION, SOLUTION INTRAVENOUS at 09:31

## 2019-08-10 RX ADMIN — DIPHENOXYLATE HYDROCHLORIDE AND ATROPINE SULFATE 1 TABLET: 2.5; .025 TABLET ORAL at 21:59

## 2019-08-10 RX ADMIN — LIDOCAINE HYDROCHLORIDE 5 ML: 20 SOLUTION ORAL; TOPICAL at 20:28

## 2019-08-10 RX ADMIN — FLUCONAZOLE 200 MG: 40 POWDER, FOR SUSPENSION ORAL at 09:30

## 2019-08-10 RX ADMIN — CEFAZOLIN SODIUM 2 G: 2 INJECTION, SOLUTION INTRAVENOUS at 21:50

## 2019-08-10 RX ADMIN — METOPROLOL TARTRATE 37.5 MG: 25 TABLET ORAL at 21:50

## 2019-08-10 RX ADMIN — SODIUM HYPOCHLORITE 946 ML: 1.25 SOLUTION TOPICAL at 09:30

## 2019-08-10 RX ADMIN — SILVER SULFADIAZINE: 10 CREAM TOPICAL at 09:30

## 2019-08-10 RX ADMIN — LIDOCAINE HYDROCHLORIDE 5 ML: 20 SOLUTION ORAL; TOPICAL at 04:33

## 2019-08-10 RX ADMIN — TERAZOSIN HYDROCHLORIDE 1 MG: 1 CAPSULE ORAL at 21:51

## 2019-08-10 RX ADMIN — SILVER SULFADIAZINE: 10 CREAM TOPICAL at 21:50

## 2019-08-10 RX ADMIN — SODIUM CHLORIDE, PRESERVATIVE FREE 3 ML: 5 INJECTION INTRAVENOUS at 09:29

## 2019-08-10 RX ADMIN — LIDOCAINE HYDROCHLORIDE 5 ML: 20 SOLUTION ORAL; TOPICAL at 10:27

## 2019-08-10 RX ADMIN — POTASSIUM PHOSPHATE, MONOBASIC AND POTASSIUM PHOSPHATE, DIBASIC 15 MMOL: 224; 236 INJECTION, SOLUTION, CONCENTRATE INTRAVENOUS at 15:53

## 2019-08-10 RX ADMIN — SODIUM CHLORIDE, PRESERVATIVE FREE 3 ML: 5 INJECTION INTRAVENOUS at 21:50

## 2019-08-10 RX ADMIN — LANSOPRAZOLE 30 MG: KIT at 09:29

## 2019-08-10 RX ADMIN — SODIUM CHLORIDE, PRESERVATIVE FREE 10 ML: 5 INJECTION INTRAVENOUS at 15:53

## 2019-08-10 RX ADMIN — SERTRALINE 50 MG: 50 TABLET, FILM COATED ORAL at 09:30

## 2019-08-10 RX ADMIN — SODIUM HYPOCHLORITE 946 ML: 1.25 SOLUTION TOPICAL at 21:52

## 2019-08-10 RX ADMIN — LIDOCAINE HYDROCHLORIDE 5 ML: 20 SOLUTION ORAL; TOPICAL at 00:29

## 2019-08-10 RX ADMIN — LIDOCAINE HYDROCHLORIDE 5 ML: 20 SOLUTION ORAL; TOPICAL at 15:55

## 2019-08-10 RX ADMIN — PREDNISONE 60 MG: 50 TABLET ORAL at 21:51

## 2019-08-10 RX ADMIN — METOPROLOL TARTRATE 37.5 MG: 25 TABLET ORAL at 02:19

## 2019-08-10 RX ADMIN — METOPROLOL TARTRATE 37.5 MG: 25 TABLET ORAL at 09:29

## 2019-08-10 NOTE — PROGRESS NOTES
NEPHROLOGY PROGRESS NOTE    PATIENT IDENTIFICATION:   Name:  Cody Eldridge      MRN:  [id number]     74 y.o.  male             Reason for visit: JERICHO    SUBJECTIVE:   Breathing is comfortable on supplemental oxygen; tube feeds infusing without problem; denies palpitations; atrial fibrillation still fast but better than yesterday    OBJECTIVE:  Vitals:    08/10/19 0524 08/10/19 0716 08/10/19 1243 08/10/19 1844   BP:  144/76 128/82 109/59   BP Location:  Left arm Left arm Left arm   Patient Position:  Lying Lying Sitting   Pulse:  95 70 105   Resp:  18 18    Temp:   96.8 °F (36 °C)    TempSrc:  Oral Oral    SpO2:  92% 94%    Weight: 80 kg (176 lb 6.4 oz)      Height:               Body mass index is 24.6 kg/m².    Intake/Output Summary (Last 24 hours) at 8/10/2019 1927  Last data filed at 8/10/2019 1846  Gross per 24 hour   Intake 4116 ml   Output 450 ml   Net 3666 ml     Wt Readings from Last 1 Encounters:   08/10/19 0524 80 kg (176 lb 6.4 oz)   08/08/19 0600 78.4 kg (172 lb 13.5 oz)   08/08/19 0210 78.4 kg (172 lb 13.5 oz)   08/07/19 0300 77.6 kg (171 lb 1.2 oz)   08/07/19 0146 76 kg (167 lb 8.8 oz)   08/06/19 1504 72.6 kg (160 lb)   08/06/19 0526 72.8 kg (160 lb 7.9 oz)   08/05/19 0600 70.5 kg (155 lb 6.8 oz)   08/05/19 0448 70.9 kg (156 lb 6.4 oz)   08/04/19 0610 70.6 kg (155 lb 11.2 oz)   08/03/19 1619 76.6 kg (168 lb 12.8 oz)   08/03/19 1207 79.9 kg (176 lb 3 oz)     Wt Readings from Last 3 Encounters:   08/10/19 80 kg (176 lb 6.4 oz)   08/02/19 78.6 kg (173 lb 3.2 oz)   07/31/19 79.2 kg (174 lb 9.6 oz)         Exam:  NAD; pleasant; oriented; looks older than stated age  Chronically ill-appearing; flat affect  Dry MM; AT/NC ; feeding tube present in nose  No eye discharge; no scleral icterus  No JVD; no carotid bruits  No rales or rhonchi appreciated today not labored on O2 per nasal cannula  Irregularly irregular, tachycardic, no rub  Soft, NT, +D, BS+  Dressing by the right clavicle is C/D/I; left  subclavian central line  Trace-+1 edema both legs  No clubbing  No asterixis  Moves all extremities   Mood normal  Speech is fluent        Scheduled meds:      ceFAZolin 2 g Intravenous Q12H   lansoprazole 30 mg Nasogastric Daily   metoprolol tartrate 37.5 mg Oral Q8H   potassium chloride 40 mEq Nasogastric Once   predniSONE 60 mg Oral Daily With Breakfast   sertraline 50 mg Oral Daily   silver sulfadiazine  Topical BID   sodium chloride 3 mL Intravenous Q12H   Sodium Hypochlorite 0.0625 % (Dakin's 1/8th Strength) topical solution 946 mL Irrigation Q12H   terazosin 1 mg Nasogastric Nightly     IV meds:                          lactated ringers 9 mL/hr Last Rate: 9 mL/hr (08/05/19 1344)       Data Review:    Results from last 7 days   Lab Units 08/10/19  0517 08/09/19  0338 08/08/19  1906 08/08/19  0430  08/06/19  0511   SODIUM mmol/L 143 143 145 140   < > 151*   POTASSIUM mmol/L 3.4* 3.4* 4.2 3.8   < > 3.3*   CHLORIDE mmol/L 108* 108* 110* 106   < > 115*   CO2 mmol/L 26.9 21.8* 25.8 24.7   < > 22.7   BUN mg/dL 77* 71* 68* 64*   < > 33*   CREATININE mg/dL 2.80* 2.85* 2.91* 2.98*   < > 1.83*   CALCIUM mg/dL 7.3* 7.2* 8.0* 7.6*   < > 5.6*   BILIRUBIN mg/dL  --  0.7  --  0.7  --  2.5*   ALK PHOS U/L  --  99  --  75  --  58   ALT (SGPT) U/L  --  <5  --  <5  --  19   AST (SGOT) U/L  --  27  --  21  --  22   GLUCOSE mg/dL 125* 125* 120* 149*   < > 133*    < > = values in this interval not displayed.     Estimated Creatinine Clearance: 25.8 mL/min (A) (by C-G formula based on SCr of 2.8 mg/dL (H)).  Results from last 7 days   Lab Units 08/07/19  0444 08/06/19  2105   SODIUM UR mmol/L  --  70   CREATININE UR mg/dL  --  56.7   URIC ACID mg/dL 5.4  --      Results from last 7 days   Lab Units 08/10/19  0517 08/09/19  0338 08/08/19  1906 08/08/19  0430 08/07/19  0444   MAGNESIUM mg/dL 1.9  --   --  2.0 2.2   PHOSPHORUS mg/dL 1.4* 3.0 1.7* 0.7* 1.0*       Results from last 7 days   Lab Units 08/10/19  0517 08/09/19  0338  08/08/19  0430 08/07/19  0444 08/06/19  0510   WBC 10*3/mm3 14.98* 14.73* 8.05 1.96* 2.22*   HEMOGLOBIN g/dL 10.7* 11.6* 11.5* 12.6* 12.7*   PLATELETS 10*3/mm3 23*  23* 21* 29*  29* 41* 79*       Results from last 7 days   Lab Units 08/09/19  0338 08/08/19  0430 08/07/19  0444 08/06/19  0511 08/05/19  0454   INR  1.12* 1.19* 1.33* 1.60* 2.49*             ASSESSMENT:   1.  JERICHO, non-oliguric, stable: prerenal related to hypotension, tachy-arrhythmia, and sepsis syndrome limiting renal perfusion.   SCR at a plateau.  Improving hypernatremia.   Hypokalemia noted   2.  Head and neck cancer on radiation and chemotherapy  3.  Rapid atrial fibrillation  4.  Neutropenic fever with sepsis syndrome and MSSA bacteremia  5.  Coagulopathy  6.  Anemia and thrombocytopenia  7.  History of diastolic CHF:  Compensated for now        PLAN:  Continue water flushes at 100 mL every 4 hours  Oral potassium via feeding tube  Control heart rate  Bladder scan q shift  No indication for dialysis, and expect that renal function should improve now that hemodynamics are more stable  We will monitor fluid electrolyte status.  We will follow.      Pineda Blum MD  8/10/2019    7:27 PM

## 2019-08-10 NOTE — PLAN OF CARE
Problem: Patient Care Overview  Goal: Plan of Care Review  Outcome: Ongoing (interventions implemented as appropriate)   08/10/19 6230   Coping/Psychosocial   Plan of Care Reviewed With patient   OTHER   Outcome Summary Pt continues to c/o mouth, medicated per MAR. Tube feeds continue. Oral care provided. Dressing changed. Continue to monitor.   Plan of Care Review   Progress no change       Problem: Fall Risk (Adult)  Goal: Absence of Fall  Outcome: Ongoing (interventions implemented as appropriate)      Problem: Skin Injury Risk (Adult)  Goal: Skin Health and Integrity  Outcome: Ongoing (interventions implemented as appropriate)      Problem: Pain, Acute (Adult)  Goal: Acceptable Pain Control/Comfort Level  Outcome: Ongoing (interventions implemented as appropriate)

## 2019-08-10 NOTE — PROGRESS NOTES
" LOS: 7 days     Name: Cody Eldridge  Age: 74 y.o.  Sex: male  :  1945  MRN: [id number]         Primary Care Physician: Epley, James, APRN    Subjective   Subjective  No new complaints or overnight events.  States that he is drinking liquids and mouth pain is improving somewhat.    Objective   Vital Signs  Temp:  [97.5 °F (36.4 °C)-98.1 °F (36.7 °C)] 98.1 °F (36.7 °C)  Heart Rate:  [73-95] 95  Resp:  [18] 18  BP: (122-157)/(66-84) 144/76  Body mass index is 24.6 kg/m².    Objective:  General Appearance:  Comfortable and in no acute distress (Elderly and chronically ill-appearing).    Vital signs: (most recent): Blood pressure 144/76, pulse 95, temperature 98.1 °F (36.7 °C), temperature source Oral, resp. rate 18, height 180.3 cm (71\"), weight 80 kg (176 lb 6.4 oz), SpO2 92 %.    HEENT: (Cortrak in place)    Lungs:  Normal effort and normal respiratory rate.    Heart: Normal rate.  Regular rhythm.    Abdomen: Abdomen is soft.  Bowel sounds are normal.   There is no abdominal tenderness.     Extremities: There is no dependent edema or local swelling.    Neurological: Patient is alert and oriented to person, place and time.    Skin:  Warm and dry.              Results Review:       I reviewed the patient's new clinical results.    Results from last 7 days   Lab Units 08/10/19  0519  0338 19  0430 19  0444 19  0510 19  0454 19  0507   WBC 10*3/mm3 14.98* 14.73* 8.05 1.96* 2.22* 1.38* 0.56*   HEMOGLOBIN g/dL 10.7* 11.6* 11.5* 12.6* 12.7* 14.8 14.1   PLATELETS 10*3/mm3 23*  23* 21* 29*  29* 41* 79* 106* 126*     Results from last 7 days   Lab Units 08/10/19  0519  0338 19  1906 19  0430 19  1721 19  0444 19  1414 19  0511  19  0454   SODIUM mmol/L 143 143 145 140  --  147*  --  151*  --  149*   POTASSIUM mmol/L 3.4* 3.4* 4.2 3.8 3.9 3.8 3.9 3.3*   < > 3.2*   CHLORIDE mmol/L 108* 108* 110* 106  --  111*  --  115*  --  107 "   CO2 mmol/L 26.9 21.8* 25.8 24.7  --  24.6  --  22.7  --  30.0*   BUN mg/dL 77* 71* 68* 64*  --  56*  --  33*  --  24*   CREATININE mg/dL 2.80* 2.85* 2.91* 2.98*  --  2.79*  --  1.83*  --  1.17   CALCIUM mg/dL 7.3* 7.2* 8.0* 7.6*  --  7.5*  --  5.6*  --  7.7*   GLUCOSE mg/dL 125* 125* 120* 149*  --  141*  --  133*  --  107*    < > = values in this interval not displayed.     Results from last 7 days   Lab Units 08/09/19  0338 08/08/19  0430 08/07/19  0444 08/06/19  0511 08/05/19  0454 08/04/19  0507 08/03/19  1223   INR  1.12* 1.19* 1.33* 1.60* 2.49* 5.02* 3.97*       Scheduled Meds:     ceFAZolin 2 g Intravenous Q12H   lansoprazole 30 mg Nasogastric Daily   metoprolol tartrate 37.5 mg Oral Q8H   potassium chloride 40 mEq Nasogastric Once   sertraline 50 mg Oral Daily   silver sulfadiazine  Topical BID   sodium chloride 3 mL Intravenous Q12H   Sodium Hypochlorite 0.0625 % (Dakin's 1/8th Strength) topical solution 946 mL Irrigation Q12H   terazosin 1 mg Nasogastric Nightly     PRN Meds:   •  acetaminophen  •  ALPRAZolam  •  diphenoxylate-atropine  •  HYDROcodone-acetaminophen  •  ipratropium  •  Lidocaine Viscous HCl  •  magic mouthwash  •  magnesium sulfate **OR** magnesium sulfate in D5W 1g/100mL (PREMIX)  •  metoprolol tartrate  •  nitroglycerin  •  ondansetron **OR** ondansetron  •  potassium phosphate infusion greater than 15 mMoles **OR** potassium phosphate **OR** potassium phosphate  •  pramipexole  •  [COMPLETED] Insert peripheral IV **AND** sodium chloride  •  sodium chloride  Continuous Infusions:    lactated ringers 9 mL/hr Last Rate: 9 mL/hr (08/05/19 1344)       Assessment/Plan   Active Hospital Problems    Diagnosis  POA   • **MSSA bacteremia [R78.81]  Unknown   • Shock, septic (CMS/HCC) [A41.9, R65.21]  Unknown   • Oral thrush [B37.0]  Unknown   • Acute renal injury (CMS/HCC) [N17.9]  Unknown   • Anemia associated with chemotherapy [D64.81, T45.1X5A]  Unknown   • Chemotherapy-induced thrombocytopenia  [D69.59, T45.1X5A]  Unknown   • New onset of congestive heart failure (CMS/HCC) [I50.9]  Yes   • Atrial fibrillation (CMS/HCC) [I48.91]  Yes   • Squamous cell carcinoma of neck [C44.42]  Yes   • COPD (chronic obstructive pulmonary disease) (CMS/HCC) [J44.9]  Yes   • Depression with anxiety [F41.8]  Yes   • CAD (coronary artery disease) [I25.10]  Yes   • Chemotherapy-induced neutropenia (CMS/HCC) [D70.1, T45.1X5A]  Yes   • Vascular catheter infection (CMS/HCC) [T82.7XXA]  Yes      Resolved Hospital Problems   No resolved problems to display.       Assessment & Plan    -Septic shock has resolved and now off pressors.   -Remains on cefazolin with plans for a 4-week course for treatment of MSSA septicemia, per infectious disease  -Received Neupogen with improvement of WBCs.  Remains thrombocytopenic.  Hematology/oncology following  -Atrial fibrillation with rapid ventricular.  Now in sinus rhythm and will maintain current dose of metoprolol, per cardiology.  -Renal function starting to improve with better/more stable hemodynamics.  Nephrology following.  -Continue supportive care for oral mucositis secondary to chemoradiation.  Continue tube feedings via cortrak for now.  Appears to be tolerating thickened liquids.  -On a course of oral fluconazole for treatment of thrush discovered on 8/8/2019  -Appreciate assistance from consulting services  -PT    Pedrito Toledo MD  [facility]  08/10/19  12:09 PM

## 2019-08-10 NOTE — PROGRESS NOTES
"                                              LOS: 6 days   Patient Care Team:  Epley, James, APRN as PCP - General (Family Medicine)  Payal Waters MD as Consulting Physician (Pain Medicine)  Lorna Chaidez (Nurse Practitioner)  Kristal Cowart SAMSON as Pharmacist  [place], Fritz Snyder MD as Referring Physician (Otolaryngology)  Pablo Heaton MD as Consulting Physician (Hematology and Oncology)  Annie Davila MD as Consulting Physician (Radiation Oncology)  Chetan Heaton MD as Consulting Physician (Urology)    Chief Complaint:  F/up septic shock, respiratory failure, pulmonary edema, pancytopenia, bacteremia    Subjective   Interval History  Mild cough. No dyspnea at rest. Requiring minimal Oxygen.     REVIEW OF SYSTEMS:     Constitutional: No fever or chills.  ENMT: Not spitting up blood as before but still has mouth pain and discomfort.      Ventilator/Non-Invasive Ventilation Settings (From admission, onward)    None                Physical Exam:     Vital Signs  Temp:  [97.5 °F (36.4 °C)-98.1 °F (36.7 °C)] 98.1 °F (36.7 °C)  Heart Rate:  [] 92  Resp:  [18] 18  BP: (114-157)/(66-94) 126/66    Intake/Output Summary (Last 24 hours) at 8/9/2019 2330  Last data filed at 8/9/2019 2130  Gross per 24 hour   Intake --   Output 725 ml   Net -725 ml     Flowsheet Rows      First Filed Value   Admission Height  180.3 cm (71\") Documented at 08/03/2019 1153   Admission Weight  79.9 kg (176 lb 3 oz) Documented at 08/03/2019 1207          General Appearance:   Awake.  Not in acute distress.   ENMT:  Mallampati score 4.  Red discoloration of the mucosa of the tongue with secretions and mild ulcerations.   Eyes: Pupils equals and reactive to light. EOMI.  Injected conjunctivo-   Neck:   Trachea midline. No thyromegaly.   Lungs:    improved and very mild Bilateral crackles at the bases  No wheezing.  Nonlabored breathing at rest    Heart:    Regular rhythm but tachycardia, normal S1 and S2, mild " systolic murmur over the mitral area.   Skin:   Erythema and sloughing of the skin on the neck.  Otherwise no rash   Abdomen:     Soft. No tenderness. No HSM.  Positive bowel sounds.   Neuro:  Awake, alert and oriented.  Strength 5/5 in arms and legs..   Extremities:  No cyanosis, clubbing but trace edema in legs.  Warm extremities and well-perfused.          Results Review:        Results from last 7 days   Lab Units 08/09/19  0338 08/08/19  1906 08/08/19  0430   SODIUM mmol/L 143 145 140   POTASSIUM mmol/L 3.4* 4.2 3.8   CHLORIDE mmol/L 108* 110* 106   CO2 mmol/L 21.8* 25.8 24.7   BUN mg/dL 71* 68* 64*   CREATININE mg/dL 2.85* 2.91* 2.98*   GLUCOSE mg/dL 125* 120* 149*   CALCIUM mg/dL 7.2* 8.0* 7.6*     Results from last 7 days   Lab Units 08/03/19  1223   TROPONIN T ng/mL <0.010     Results from last 7 days   Lab Units 08/09/19  0338 08/08/19  0430 08/07/19  0444   WBC 10*3/mm3 14.73* 8.05 1.96*   HEMOGLOBIN g/dL 11.6* 11.5* 12.6*   HEMATOCRIT % 36.5* 36.1* 38.5   PLATELETS 10*3/mm3 21* 29*  29* 41*     Results from last 7 days   Lab Units 08/09/19  0338 08/08/19  0430 08/07/19  0444   INR  1.12* 1.19* 1.33*     Results from last 7 days   Lab Units 08/03/19  1223   PROBNP pg/mL 8,857.0*       I reviewed the patient's new clinical results.  I personally viewed and interpreted the patient's CXR        Medication Review:     ceFAZolin 2 g Intravenous Q12H   fluconazole 200 mg Oral Daily   lansoprazole 30 mg Nasogastric Daily   metoprolol tartrate 37.5 mg Oral Q8H   potassium chloride 40 mEq Nasogastric Once   sertraline 50 mg Oral Daily   silver sulfadiazine  Topical BID   sodium chloride 3 mL Intravenous Q12H   Sodium Hypochlorite 0.0625 % (Dakin's 1/8th Strength) topical solution 946 mL Irrigation Q12H   terazosin 1 mg Nasogastric Nightly         lactated ringers 9 mL/hr Last Rate: 9 mL/hr (08/05/19 6615)       Diagnostic imaging:  I personally and independently reviewed the following images:  CXR 8/6/2019 after  central line insertion: Line in good position.  Slight worsening bilateral edema in the lower lobes.    Assessment   1. Septic shock, shock resolved  2. MSSA bacteremia  3. Suspected infection of the [employer]  4. Acute on chronic diastolic CHF  5. Acute hypoxic respiratory failure, secondary to pulmonary edema  6. Pulmonary edema  7. JERICHO  8. A. fib with RVR  9. Stomatitis  10. Pancytopenia secondary to recent chemotherapy  11. Metabolic encephalopathy, improved  12. Coagulopathy, suspect related to vit K defficiency from poor intake  13. Poor appetite secondary to stomatitis and chemotherapy  14. Weight loss  15. Hyperbilirubinemia, elevated direct bilirubin with normal liver enzymes  16. Protein calorie malnutrition, albumin 2.1  17. Severe pulmonary hypertension on echo dated 10/12/2017, RVSP 47.  Mildly dilated left atrium.  Probably group 2 secondary diastolic CHF and group 3 secondary to COPD and SHAR  18. SHAR  19. COPD, no exacerbation    20. Elevated digoxin level  21. Thrombocytopenia        Plan   FOLLOWING FOR RESPIRATORY ISSUES NOW after transfer to floor.     · Continue Oxygen and IS  · Would not diurese unless oxygen requirement increase significantly. He sounds better on exam.   · Start nebs with Ipratropium to improve mucous clearance and for COPD. No beta agonist due to A fib  · Keep holding CPAP now due to tube feeding.   · Off note, tube feeding was started in the unit due to very poor intake 2ary to stomatitis form radiation therapy and rather than for dysphagia.       Danelle Galeano MD  08/09/19  11:30 PM            This note was dictated utilizing [employer] dictation

## 2019-08-10 NOTE — PROGRESS NOTES
[facility] Follow Up    Chief Complaint:  Follow up atrial fibrillation, chf    Interval History: No chest pain.  Breathing ok.  Appears to be in sinus rhythm.     Objective:     Objective:  Temp:  [97.5 °F (36.4 °C)-98.1 °F (36.7 °C)] 98.1 °F (36.7 °C)  Heart Rate:  [73-95] 95  Resp:  [18] 18  BP: (122-157)/(66-84) 144/76     Intake/Output Summary (Last 24 hours) at 8/10/2019 1120  Last data filed at 8/10/2019 0518  Gross per 24 hour   Intake 2671 ml   Output 350 ml   Net 2321 ml     Body mass index is 24.6 kg/m².      08/08/19  0210 08/08/19  0600 08/10/19  0524   Weight: 78.4 kg (172 lb 13.5 oz) 78.4 kg (172 lb 13.5 oz) 80 kg (176 lb 6.4 oz)     Weight change:       Physical Exam:   General : Alert, cooperative, in no acute distress.  Neuro: Alert,cooperative and oriented.  Lungs: CTAB. Normal respiratory effort and rate.  CV: regular rate and rhythm, normal S1 and S2, no murmurs, gallops or rubs.  ABD: Soft, nontender, nondistended. Positive bowel sounds.  Extr: No edema or cyanosis, moves all extremities.    Lab Review:   Results from last 7 days   Lab Units 08/10/19  0517 08/09/19  0338  08/08/19  0430   SODIUM mmol/L 143 143   < > 140   POTASSIUM mmol/L 3.4* 3.4*   < > 3.8   CHLORIDE mmol/L 108* 108*   < > 106   CO2 mmol/L 26.9 21.8*   < > 24.7   BUN mg/dL 77* 71*   < > 64*   CREATININE mg/dL 2.80* 2.85*   < > 2.98*   GLUCOSE mg/dL 125* 125*   < > 149*   CALCIUM mg/dL 7.3* 7.2*   < > 7.6*   AST (SGOT) U/L  --  27  --  21   ALT (SGPT) U/L  --  <5  --  <5    < > = values in this interval not displayed.     Results from last 7 days   Lab Units 08/03/19  1223   TROPONIN T ng/mL <0.010     Results from last 7 days   Lab Units 08/10/19  0517 08/09/19  0338   WBC 10*3/mm3 14.98* 14.73*   HEMOGLOBIN g/dL 10.7* 11.6*   HEMATOCRIT % 33.3* 36.5*   PLATELETS 10*3/mm3 23*  23* 21*     Results from last 7 days   Lab Units 08/09/19  0338 08/08/19  0430   INR  1.12* 1.19*     Results from last 7 days  "  Lab Units 08/10/19  0517 08/08/19  0430   MAGNESIUM mg/dL 1.9 2.0           Invalid input(s): LDLCALC  Results from last 7 days   Lab Units 08/03/19  1223   PROBNP pg/mL 8,857.0*         I reviewed the patient's new clinical results.  I personally viewed and interpreted the patient's EKG  Current Medications:   Scheduled Meds:  ceFAZolin 2 g Intravenous Q12H   lansoprazole 30 mg Nasogastric Daily   metoprolol tartrate 37.5 mg Oral Q8H   potassium chloride 40 mEq Nasogastric Once   sertraline 50 mg Oral Daily   silver sulfadiazine  Topical BID   sodium chloride 3 mL Intravenous Q12H   Sodium Hypochlorite 0.0625 % (Dakin's 1/8th Strength) topical solution 946 mL Irrigation Q12H   terazosin 1 mg Nasogastric Nightly     Continuous Infusions:  lactated ringers 9 mL/hr Last Rate: 9 mL/hr (08/05/19 1344)       Allergies:  Allergies   Allergen Reactions   • Benadryl [Diphenhydramine Hcl] Other (See Comments) and Dizziness     \"I sleep for about a day\"       Assessment/Plan:     1. Paroxsymal atrial fibrillation.  Appears to be back in sinus rhythm today.  digoxin stopped due to elevated dig level on 8/7. He is off amiodarone. He is tolerating beta blocker.  No AC due to severe thrombocytopenia. He was on warfarin prior to admission.  2. MSSA sepsis.  power port has been removed (8/5).  3. Acute on chronic combined systolic/diastolic heart failure. Cardiomyopathy with EF 36-40%. likely nonischemic. Will need GDMT prior to discharge if BP will allow. For now, rate control with metoprolol takes precedence.   4. JERICHO on CKD. Nephrology following.   5. Thrombocytopenia   6. Nonobstructive coronary artery disease  7. Supra therapeutic INR on admission - resolved.   8. Moderate pulmonary HTN    -  Continue current dose of metoprolol  -  Will see again Monday    Valencia Camargo MD  08/10/19  11:20 AM  "

## 2019-08-10 NOTE — THERAPY TREATMENT NOTE
Acute Care - Physical Therapy Treatment Note  [facility]     Patient Name: Cody Eldridge  : 1945  MRN: [id number]  Today's Date: 8/10/2019             Admit Date: 8/3/2019    Visit Dx:    ICD-10-CM ICD-9-CM   1. New onset of congestive heart failure (CMS/HCC) I50.9 428.0   2. Infected venous access port T80.219A 999.31     Patient Active Problem List   Diagnosis   • Atopic rhinitis   • Arthritis of hand   • Coxitis   • Benign colonic polyp   • Neck pain   • Chronic obstructive pulmonary disease (CMS/HCC)   • Mixed anxiety depressive disorder   • Degeneration of intervertebral disc of lumbosacral region   • Elevated prostate specific antigen (PSA)   • Hyperlipidemia   • Hypertension   • Insomnia   • Lumbar radiculopathy   • Osteoarthritis   • Vitamin D deficiency   • Abdominal pain   • Acute URI   • Myalgia   • Cramp of both lower extremities   • Gingivitis   • Atrial fibrillation (CMS/HCC)   • Non-rheumatic tricuspid valve insufficiency   • SHAR (obstructive sleep apnea)   • Precordial pain   • IRAHETA (dyspnea on exertion)   • Coronary artery disease of native artery of native heart with stable angina pectoris (CMS/HCC)   • Shortness of breath   • 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)   • Current use of long term anticoagulation   • Syncope   • Dehydration   • History of cancer   • History of atrial fibrillation   • History of CHF (congestive heart failure)   • Hypotension   • Fitting and adjustment of vascular catheter   • New onset of congestive heart failure (CMS/HCC)   • Atrial fibrillation (CMS/HCC)   • Squamous cell carcinoma of neck   • COPD (chronic obstructive pulmonary disease) (CMS/HCC)   • Depression with anxiety   • CAD (coronary artery disease)   • Chemotherapy-induced neutropenia (CMS/HCC)   • Vascular catheter infection (CMS/HCC)   • Chemotherapy-induced thrombocytopenia   • Acute renal injury (CMS/HCC)   • Anemia associated with chemotherapy   • Oral thrush   • MSSA  bacteremia   • Shock, septic (CMS/HCC)       Therapy Treatment    Rehabilitation Treatment Summary     Row Name 08/10/19 1600             Treatment Time/Intention    Discipline  physical therapy assistant  -      Document Type  therapy note (daily note)  -      Subjective Information  complains of;weakness;fatigue  -RH      Mode of Treatment  physical therapy  -RH      Patient/Family Observations  pt spouse present in room.  -RH      Patient Effort  good  -RH      Existing Precautions/Restrictions  fall;oxygen therapy device and L/min  -RH      Recorded by [RH] Blaze Delong, PTA 08/10/19 1635      Row Name 08/10/19 1600             Vital Signs    Intra SpO2 (%)  96  -RH      O2 Delivery Intra Treatment  supplemental O2  -RH      Recorded by [RH] Blaze Delong, [facility] 08/10/19 1635      Row Name 08/10/19 1600             Cognitive Assessment/Intervention- PT/OT    Orientation Status (Cognition)  oriented x 4  -RH      Recorded by [RH] Blaze Delong, PTA 08/10/19 1635      Row Name 08/10/19 1600             Safety Issues, Functional Mobility    Impairments Affecting Function (Mobility)  balance;endurance/activity tolerance  -RH      Recorded by [RH] Blaze Delong, PTA 08/10/19 1635      Row Name 08/10/19 1600             Sit-Stand Transfer    Sit-Stand [place] (Transfers)  contact guard  -RH      Recorded by [RH] Blaze Delong, PTA 08/10/19 1635      Row Name 08/10/19 1600             Stand-Sit Transfer    Stand-Sit [place] (Transfers)  contact guard  -RH      Recorded by [RH] Blaze Delong, PTA 08/10/19 1635      Row Name 08/10/19 1600             Gait/Stairs Assessment/Training    [place] Level (Gait)  verbal cues;contact guard  -RH      Assistive Device (Gait)  walker, front-wheeled  -RH      Distance in Feet (Gait)  27 plus 18  -RH      Pattern (Gait)  step-through  -RH      Recorded by [RH] Blaze Delong, PTA 08/10/19 1635      Row Name 08/10/19 1600              Static Sitting Balance    Level of [place] (Unsupported Sitting, Static Balance)  supervision  -RH      Sitting Position (Unsupported Sitting, Static Balance)  sitting on edge of bed  -RH      Recorded by [RH] Blaze Delong, PTA 08/10/19 1635      Row Name 08/10/19 1600             Static Standing Balance    Level of [place] (Supported Standing, Static Balance)  contact guard assist  -RH      Assistive Device Utilized (Supported Standing, Static Balance)  walker, rolling  -RH      Recorded by [RH] Blaze Delong, PTA 08/10/19 1635      Row Name 08/10/19 1600             Positioning and Restraints    Pre-Treatment Position  in bed  -RH      Post Treatment Position  bed  -RH      In Bed  fowlers;call light within reach;exit alarm on;with family/caregiver  -RH      Recorded by [RH] Blaze Delong, PTA 08/10/19 1635      Row Name 08/10/19 1600             Pain Scale: Numbers Pre/Post-Treatment    Pain Scale: Numbers, Pretreatment  0/10 - no pain  -RH      Recorded by [RH] Blaze Delong, PTA 08/10/19 1635      Row Name                Wound 08/05/19 1451 Right chest Other (comment)    Wound - Properties Group Date first assessed: 08/05/19 [KB] Time first assessed: 1451 [KB] Side: Right [KB] Location: chest [KB] Primary Wound Type: Other [SS], Previous port  Type: incision [KB] Recorded by:  [KB] Coco Love RN 08/05/19 1451 [SS] Jaki Hennessy RN 08/07/19 1657    Row Name 08/10/19 1600             Coping    Observed Emotional State  accepting  -RH      Recorded by [RH] Blaze Delong, PTA 08/10/19 1635        User Key  (r) = Recorded By, (t) = Taken By, (c) = Cosigned By    Initials Name Effective Dates Discipline    KB Coco Love RN 06/16/16 -  Nurse    SS Jaki Hennessy RN 06/30/16 -  Nurse     Blaze Delong, PTA 03/07/18 -  PT          Wound 08/05/19 1451 Right chest Other (comment) (Active)   Dressing Appearance dry;intact;no drainage 8/10/2019  9:25 AM   Closure RUBEN 8/10/2019   9:25 AM   Base dressing in place, unable to visualize 8/10/2019  9:25 AM   Periwound Temperature warm 8/9/2019 10:35 PM   Periwound Skin Turgor soft 8/9/2019 10:35 PM   Drainage Amount none 8/10/2019  9:25 AM   Care, Wound other (see comments) 8/10/2019  9:25 AM   Dressing Care, Wound dressing changed;gauze, wet-to-dry 8/9/2019 10:35 PM           Physical Therapy Education     Title: PT OT SLP Therapies (In Progress)     Topic: Physical Therapy (In Progress)     Point: Mobility training (In Progress)     Learning Progress Summary           Patient Acceptance, E, NR by EM at 8/9/2019  4:20 PM                               User Key     Initials Effective Dates Name Provider Type Discipline     04/03/18 -  Yessi Jackson, PT Physical Therapist PT                PT Recommendation and Plan        Outcome Measures     Row Name 08/10/19 1600             How much help from another person do you currently need...    Turning from your back to your side while in flat bed without using bedrails?  3  -RH      Moving from lying on back to sitting on the side of a flat bed without bedrails?  3  -RH      Moving to and from a bed to a chair (including a wheelchair)?  3  -RH      Standing up from a chair using your arms (e.g., wheelchair, bedside chair)?  3  -RH      Climbing 3-5 steps with a railing?  2  -RH      To walk in hospital room?  3  -RH      AM-PAC 6 Clicks Score (PT)  17  -RH        User Key  (r) = Recorded By, (t) = Taken By, (c) = Cosigned By    Initials Name Provider Type     Blaze Delong, PTA Physical Therapy Assistant         Time Calculation:   PT Charges     Row Name 08/10/19 1636             Time Calculation    Start Time  1510  -RH      Stop Time  1530  -RH      Time Calculation (min)  20 min  -RH      PT Received On  08/10/19  -RH      PT - Next Appointment  08/11/19  -        User Key  (r) = Recorded By, (t) = Taken By, (c) = Cosigned By    Initials Name Provider Type     Blaze Delong  TAMMY, PTA Physical Therapy Assistant        Therapy Charges for Today     Code Description Service Date Service Provider Modifiers Qty    05952020601 HC PT THER PROC EA 15 MIN 8/10/2019 Blaze Delong, PTA GP 1          PT G-Codes  Outcome Measure Options: AM-PAC 6 Clicks Basic Mobility (PT)  AM-PAC 6 Clicks Score (PT): 17    Blaze Delong, JASON  8/10/2019

## 2019-08-10 NOTE — PLAN OF CARE
Problem: Patient Care Overview  Goal: Plan of Care Review  Outcome: Ongoing (interventions implemented as appropriate)   08/10/19 7393   Coping/Psychosocial   Plan of Care Reviewed With patient   OTHER   Outcome Summary VSS, drsg changed, converted to ST, lidocaine x 2 doses, magnesium 1.9 rec'd 1 gram replacement, phoshorus 1.4 & 15mmol K phos replacement in process, xray completed, CTM   Plan of Care Review   Progress no change       Problem: Cardiac: Heart Failure (Adult)  Goal: Signs and Symptoms of Listed Potential Problems Will be Absent, Minimized or Managed (Cardiac: Heart Failure)  Outcome: Ongoing (interventions implemented as appropriate)      Problem: Skin Injury Risk (Adult)  Goal: Skin Health and Integrity  Outcome: Ongoing (interventions implemented as appropriate)      Problem: Pain, Acute (Adult)  Goal: Identify Related Risk Factors and Signs and Symptoms  Outcome: Ongoing (interventions implemented as appropriate)      Problem: Pain, Chronic (Adult)  Goal: Identify Related Risk Factors and Signs and Symptoms  Outcome: Ongoing (interventions implemented as appropriate)

## 2019-08-10 NOTE — PROGRESS NOTES
[facility] INPATIENT PROGRESS NOTE         [address]    8/10/2019      PATIENT IDENTIFICATION:  Name: Cody Eldridge ADMIT: 8/3/2019   : 1945  PCP: Epley, James, APRN    MRN: [id number] LOS: 7 days   AGE/SEX: 74 y.o. male  ROOM: [facility]                     LOS 7    Reason for visit: Follow-up respiratory failure with septic shock and pulmonary edema      SUBJECTIVE:      Denies chest pain or productive cough.  Tolerating tube feeds at goal.  Diminished breath sounds.  No wheezing or rhonchi.  Nonlabored.    Objective   OBJECTIVE:    Vital Sign Min/Max for last 24 hours  Temp  Min: 96.8 °F (36 °C)  Max: 98.1 °F (36.7 °C)   BP  Min: 122/68  Max: 157/84   Pulse  Min: 70  Max: 95   Resp  Min: 18  Max: 18   SpO2  Min: 90 %  Max: 94 %   No Data Recorded   Weight  Min: 80 kg (176 lb 6.4 oz)  Max: 80 kg (176 lb 6.4 oz)                         Body mass index is 24.6 kg/m².    Intake/Output Summary (Last 24 hours) at 8/10/2019 1247  Last data filed at 8/10/2019 1127  Gross per 24 hour   Intake 2671 ml   Output 550 ml   Net 2121 ml         Exam:  GEN:  No distress, appears stated age  EYES:   PERRL, anicteric sclera  ENT:    External ears/nose normal, OP clear  NECK:  No adenopathy, midline trachea  LUNGS: Normal chest on inspection, palpation and diminished bilaterally on auscultation  CV:  Normal S1S2, without murmur  ABD:  Non tender, non distended, no hepatosplenomegaly, +BS  EXT:  No edema, cyanosis or clubbing    Scheduled meds:    ceFAZolin 2 g Intravenous Q12H   lansoprazole 30 mg Nasogastric Daily   metoprolol tartrate 37.5 mg Oral Q8H   potassium chloride 40 mEq Nasogastric Once   sertraline 50 mg Oral Daily   silver sulfadiazine  Topical BID   sodium chloride 3 mL Intravenous Q12H   Sodium Hypochlorite 0.0625 % (Dakin's /8th Strength) topical solution 946 mL Irrigation Q12H   terazosin 1 mg Nasogastric Nightly     IV meds:                        lactated ringers 9 mL/hr Last Rate: 9  mL/hr (08/05/19 1344)     Data Review:  Results from last 7 days   Lab Units 08/10/19  0517 08/09/19  0338 08/08/19  1906 08/08/19  0430 08/07/19  1721 08/07/19  0444   SODIUM mmol/L 143 143 145 140  --  147*   POTASSIUM mmol/L 3.4* 3.4* 4.2 3.8 3.9 3.8   CHLORIDE mmol/L 108* 108* 110* 106  --  111*   CO2 mmol/L 26.9 21.8* 25.8 24.7  --  24.6   BUN mg/dL 77* 71* 68* 64*  --  56*   CREATININE mg/dL 2.80* 2.85* 2.91* 2.98*  --  2.79*   GLUCOSE mg/dL 125* 125* 120* 149*  --  141*   CALCIUM mg/dL 7.3* 7.2* 8.0* 7.6*  --  7.5*         Estimated Creatinine Clearance: 25.8 mL/min (A) (by C-G formula based on SCr of 2.8 mg/dL (H)).  Results from last 7 days   Lab Units 08/10/19  0517 08/09/19  0338 08/08/19  0430 08/07/19  0444 08/06/19  0510   WBC 10*3/mm3 14.98* 14.73* 8.05 1.96* 2.22*   HEMOGLOBIN g/dL 10.7* 11.6* 11.5* 12.6* 12.7*   PLATELETS 10*3/mm3 23*  23* 21* 29*  29* 41* 79*     Results from last 7 days   Lab Units 08/09/19  0338 08/08/19  0430 08/07/19  0444 08/06/19  0511 08/05/19  0454   INR  1.12* 1.19* 1.33* 1.60* 2.49*     Results from last 7 days   Lab Units 08/09/19  0338 08/08/19  0430 08/06/19  0511 08/05/19  0454   ALT (SGPT) U/L <5 <5 19 25   AST (SGOT) U/L 27 21 22 32     Results from last 7 days   Lab Units 08/05/19  2334   PH, ARTERIAL pH units 7.432   PO2 ART mm Hg 77.6*   PCO2, ARTERIAL mm Hg 42.0   HCO3 ART mmol/L 28.0     Results from last 7 days   Lab Units 08/05/19  1030   LACTATE mmol/L 1.8         Chest x-ray 8/6/2019 reviewed shows increased density mid and lower lung fields with layering effusion.    )Assessment   ASSESSMENT:      Active Hospital Problems    Diagnosis  POA   • **MSSA bacteremia [R78.81]  Unknown   • Shock, septic (CMS/HCC) [A41.9, R65.21]  Unknown   • Oral thrush [B37.0]  Unknown   • Acute renal injury (CMS/HCC) [N17.9]  Unknown   • Anemia associated with chemotherapy [D64.81, T45.1X5A]  Unknown   • Chemotherapy-induced thrombocytopenia [D69.59, T45.1X5A]  Unknown   •  New onset of congestive heart failure (CMS/HCC) [I50.9]  Yes   • Atrial fibrillation (CMS/HCC) [I48.91]  Yes   • Squamous cell carcinoma of neck [C44.42]  Yes   • COPD (chronic obstructive pulmonary disease) (CMS/HCC) [J44.9]  Yes   • Depression with anxiety [F41.8]  Yes   • CAD (coronary artery disease) [I25.10]  Yes   • Chemotherapy-induced neutropenia (CMS/HCC) [D70.1, T45.1X5A]  Yes   • Vascular catheter infection (CMS/HCC) [T82.7XXA]  Yes      Resolved Hospital Problems   No resolved problems to display.     Sepsis with shock  MSSA bacteremia  Suspected infection of Mediport  Acute on chronic diastolic CHF  Acute hypoxemic respiratory failure with pulmonary edema  Acute kidney injury  A. 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  Wean oxygen as able.  Encourage incentive spirometer use.  Bronchodilators prn for COPD symptoms and help with clearance.  Plan 4 weeks antibiotic for MSSA bacteremia per ID recommendations.  Repeat chest x-ray for follow-up with increasing infiltrate and effusion noted on most recent x-ray 8/6/2019.        Javier Segovia MD  Pulmonary and Critical Care Medicine  [facility], [facility]  8/10/2019    12:47 PM

## 2019-08-11 LAB
ALBUMIN SERPL-MCNC: 2.1 G/DL (ref 3.5–5.2)
ALBUMIN/GLOB SERPL: 0.7 G/DL
ALP SERPL-CCNC: 101 U/L (ref 39–117)
ALT SERPL W P-5'-P-CCNC: <5 U/L (ref 1–41)
ANION GAP SERPL CALCULATED.3IONS-SCNC: 11.8 MMOL/L (ref 5–15)
AST SERPL-CCNC: 25 U/L (ref 1–40)
BACTERIA SPEC AEROBE CULT: NORMAL
BACTERIA SPEC AEROBE CULT: NORMAL
BILIRUB SERPL-MCNC: 0.6 MG/DL (ref 0.2–1.2)
BUN BLD-MCNC: 72 MG/DL (ref 8–23)
BUN/CREAT SERPL: 29.9 (ref 7–25)
CALCIUM SPEC-SCNC: 7.6 MG/DL (ref 8.6–10.5)
CHLORIDE SERPL-SCNC: 113 MMOL/L (ref 98–107)
CO2 SERPL-SCNC: 24.2 MMOL/L (ref 22–29)
CREAT BLD-MCNC: 2.41 MG/DL (ref 0.76–1.27)
DEPRECATED RDW RBC AUTO: 70.4 FL (ref 37–54)
ERYTHROCYTE [DISTWIDTH] IN BLOOD BY AUTOMATED COUNT: 20.7 % (ref 12.3–15.4)
GFR SERPL CREATININE-BSD FRML MDRD: 26 ML/MIN/1.73
GLOBULIN UR ELPH-MCNC: 3 GM/DL
GLUCOSE BLD-MCNC: 157 MG/DL (ref 65–99)
HCT VFR BLD AUTO: 31 % (ref 37.5–51)
HGB BLD-MCNC: 10 G/DL (ref 13–17.7)
LDH SERPL-CCNC: 524 U/L (ref 135–225)
MAGNESIUM SERPL-MCNC: 2.1 MG/DL (ref 1.6–2.4)
MCH RBC QN AUTO: 31.8 PG (ref 26.6–33)
MCHC RBC AUTO-ENTMCNC: 32.3 G/DL (ref 31.5–35.7)
MCV RBC AUTO: 98.7 FL (ref 79–97)
PHOSPHATE SERPL-MCNC: 2.2 MG/DL (ref 2.5–4.5)
PHOSPHATE SERPL-MCNC: 2.2 MG/DL (ref 2.5–4.5)
PLATELET # BLD AUTO: 37 10*3/MM3 (ref 140–450)
PLATELET # BLD AUTO: 37 10*3/MM3 (ref 140–450)
PLATELETS.RETICULATED NFR BLD AUTO: 11.4 % (ref 0.9–6.5)
PMV BLD AUTO: 13.2 FL (ref 6–12)
POTASSIUM BLD-SCNC: 4.2 MMOL/L (ref 3.5–5.2)
PROT SERPL-MCNC: 5.1 G/DL (ref 6–8.5)
RBC # BLD AUTO: 3.14 10*6/MM3 (ref 4.14–5.8)
SODIUM BLD-SCNC: 149 MMOL/L (ref 136–145)
WBC NRBC COR # BLD: 10.33 10*3/MM3 (ref 3.4–10.8)

## 2019-08-11 PROCEDURE — 99233 SBSQ HOSP IP/OBS HIGH 50: CPT | Performed by: INTERNAL MEDICINE

## 2019-08-11 PROCEDURE — 80053 COMPREHEN METABOLIC PANEL: CPT | Performed by: INTERNAL MEDICINE

## 2019-08-11 PROCEDURE — 83735 ASSAY OF MAGNESIUM: CPT | Performed by: INTERNAL MEDICINE

## 2019-08-11 PROCEDURE — 97110 THERAPEUTIC EXERCISES: CPT

## 2019-08-11 PROCEDURE — 85055 RETICULATED PLATELET ASSAY: CPT | Performed by: INTERNAL MEDICINE

## 2019-08-11 PROCEDURE — 84100 ASSAY OF PHOSPHORUS: CPT | Performed by: INTERNAL MEDICINE

## 2019-08-11 PROCEDURE — 85027 COMPLETE CBC AUTOMATED: CPT | Performed by: INTERNAL MEDICINE

## 2019-08-11 PROCEDURE — 63710000001 PREDNISONE PER 5 MG: Performed by: INTERNAL MEDICINE

## 2019-08-11 PROCEDURE — 83615 LACTATE (LD) (LDH) ENZYME: CPT | Performed by: INTERNAL MEDICINE

## 2019-08-11 PROCEDURE — 25010000003 CEFAZOLIN IN DEXTROSE 2-4 GM/100ML-% SOLUTION: Performed by: INTERNAL MEDICINE

## 2019-08-11 RX ADMIN — SERTRALINE 50 MG: 50 TABLET, FILM COATED ORAL at 09:59

## 2019-08-11 RX ADMIN — SODIUM CHLORIDE, PRESERVATIVE FREE 3 ML: 5 INJECTION INTRAVENOUS at 22:32

## 2019-08-11 RX ADMIN — POTASSIUM PHOSPHATE, MONOBASIC AND POTASSIUM PHOSPHATE, DIBASIC 9 MMOL: 224; 236 INJECTION, SOLUTION, CONCENTRATE INTRAVENOUS at 09:59

## 2019-08-11 RX ADMIN — CEFAZOLIN SODIUM 2 G: 2 INJECTION, SOLUTION INTRAVENOUS at 23:49

## 2019-08-11 RX ADMIN — DIPHENOXYLATE HYDROCHLORIDE AND ATROPINE SULFATE 1 TABLET: 2.5; .025 TABLET ORAL at 16:21

## 2019-08-11 RX ADMIN — METOPROLOL TARTRATE 37.5 MG: 25 TABLET ORAL at 22:27

## 2019-08-11 RX ADMIN — LANSOPRAZOLE 30 MG: KIT at 10:10

## 2019-08-11 RX ADMIN — METOPROLOL TARTRATE 37.5 MG: 25 TABLET ORAL at 14:08

## 2019-08-11 RX ADMIN — CEFAZOLIN SODIUM 2 G: 2 INJECTION, SOLUTION INTRAVENOUS at 10:10

## 2019-08-11 RX ADMIN — SODIUM CHLORIDE, PRESERVATIVE FREE 3 ML: 5 INJECTION INTRAVENOUS at 10:29

## 2019-08-11 RX ADMIN — SILVER SULFADIAZINE 1 APPLICATION: 10 CREAM TOPICAL at 10:28

## 2019-08-11 RX ADMIN — LIDOCAINE HYDROCHLORIDE 5 ML: 20 SOLUTION ORAL; TOPICAL at 13:09

## 2019-08-11 RX ADMIN — SILVER SULFADIAZINE: 10 CREAM TOPICAL at 22:32

## 2019-08-11 RX ADMIN — LIDOCAINE HYDROCHLORIDE 5 ML: 20 SOLUTION ORAL; TOPICAL at 21:14

## 2019-08-11 RX ADMIN — LIDOCAINE HYDROCHLORIDE 5 ML: 20 SOLUTION ORAL; TOPICAL at 09:42

## 2019-08-11 RX ADMIN — TERAZOSIN HYDROCHLORIDE 1 MG: 1 CAPSULE ORAL at 22:31

## 2019-08-11 RX ADMIN — SODIUM HYPOCHLORITE 946 ML: 1.25 SOLUTION TOPICAL at 22:32

## 2019-08-11 RX ADMIN — SODIUM HYPOCHLORITE 946 ML: 1.25 SOLUTION TOPICAL at 10:29

## 2019-08-11 RX ADMIN — METOPROLOL TARTRATE 37.5 MG: 25 TABLET ORAL at 07:03

## 2019-08-11 RX ADMIN — LIDOCAINE HYDROCHLORIDE 5 ML: 20 SOLUTION ORAL; TOPICAL at 15:58

## 2019-08-11 RX ADMIN — LIDOCAINE HYDROCHLORIDE 5 ML: 20 SOLUTION ORAL; TOPICAL at 00:35

## 2019-08-11 RX ADMIN — PREDNISONE 60 MG: 50 TABLET ORAL at 09:59

## 2019-08-11 RX ADMIN — LIDOCAINE HYDROCHLORIDE 5 ML: 20 SOLUTION ORAL; TOPICAL at 04:09

## 2019-08-11 NOTE — THERAPY TREATMENT NOTE
Acute Care - Physical Therapy Treatment Note  [facility]     Patient Name: Cody Eldridge  : 1945  MRN: [id number]  Today's Date: 2019             Admit Date: 8/3/2019    Visit Dx:    ICD-10-CM ICD-9-CM   1. New onset of congestive heart failure (CMS/HCC) I50.9 428.0   2. Infected venous access port T80.219A 999.31     Patient Active Problem List   Diagnosis   • Atopic rhinitis   • Arthritis of hand   • Coxitis   • Benign colonic polyp   • Neck pain   • Chronic obstructive pulmonary disease (CMS/HCC)   • Mixed anxiety depressive disorder   • Degeneration of intervertebral disc of lumbosacral region   • Elevated prostate specific antigen (PSA)   • Hyperlipidemia   • Hypertension   • Insomnia   • Lumbar radiculopathy   • Osteoarthritis   • Vitamin D deficiency   • Abdominal pain   • Acute URI   • Myalgia   • Cramp of both lower extremities   • Gingivitis   • Atrial fibrillation (CMS/HCC)   • Non-rheumatic tricuspid valve insufficiency   • SHAR (obstructive sleep apnea)   • Precordial pain   • IRAHETA (dyspnea on exertion)   • Coronary artery disease of native artery of native heart with stable angina pectoris (CMS/HCC)   • Shortness of breath   • 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)   • Current use of long term anticoagulation   • Syncope   • Dehydration   • History of cancer   • History of atrial fibrillation   • History of CHF (congestive heart failure)   • Hypotension   • Fitting and adjustment of vascular catheter   • New onset of congestive heart failure (CMS/HCC)   • Atrial fibrillation (CMS/HCC)   • Squamous cell carcinoma of neck   • COPD (chronic obstructive pulmonary disease) (CMS/HCC)   • Depression with anxiety   • CAD (coronary artery disease)   • Chemotherapy-induced neutropenia (CMS/HCC)   • Vascular catheter infection (CMS/HCC)   • Chemotherapy-induced thrombocytopenia   • Acute renal injury (CMS/HCC)   • Anemia associated with chemotherapy   • Oral thrush   • MSSA  bacteremia   • Shock, septic (CMS/HCC)       Therapy Treatment    Rehabilitation Treatment Summary     Row Name 08/11/19 1500             Treatment Time/Intention    Discipline  physical therapy assistant  -CW      Document Type  therapy note (daily note)  -CW      Subjective Information  complains of;weakness;fatigue  -CW      Mode of Treatment  physical therapy  -CW      Therapy Frequency (PT Clinical Impression)  daily  -CW      Patient Effort  good  -CW      Existing Precautions/Restrictions  fall;oxygen therapy device and L/min  -CW      Recorded by [CW] Jeet Yuen, PTA 08/11/19 1554      Row Name 08/11/19 1500             Vital Signs    O2 Delivery Pre Treatment  supplemental O2  -CW      O2 Delivery Intra Treatment  supplemental O2  -CW      O2 Delivery Post Treatment  supplemental O2  -CW      Recorded by [CW] Jeet Yuen, PTA 08/11/19 1554      Row Name 08/11/19 1500             Cognitive Assessment/Intervention- PT/OT    Orientation Status (Cognition)  oriented x 4  -CW      Follows Commands (Cognition)  WFL  -CW      Personal Safety Interventions  fall prevention program maintained;gait belt;muscle strengthening facilitated;nonskid shoes/slippers when out of bed  -CW      Recorded by [CW] Jeet Yuen, PTA 08/11/19 1554      Row Name 08/11/19 1500             Safety Issues, Functional Mobility    Impairments Affecting Function (Mobility)  balance;endurance/activity tolerance  -CW      Recorded by [CW] Jeet Yuen, PTA 08/11/19 1554      Row Name 08/11/19 1500             Bed Mobility Assessment/Treatment    Bed Mobility Assessment/Treatment  supine-sit;sit-supine  -CW      Supine-Sit [place] (Bed Mobility)  minimum assist (75% patient effort)  -CW      Sit-Supine [place] (Bed Mobility)  not tested  -CW      Assistive Device (Bed Mobility)  bed rails  -CW      Comment (Bed Mobility)  Pt EOB with nursing  -CW      Recorded by [CW] Jeet Yuen, PTA 08/11/19  1554      Row Name 08/11/19 1500             Sit-Stand Transfer    Sit-Stand [place] (Transfers)  contact guard  -CW      Assistive Device (Sit-Stand Transfers)  walker, front-wheeled  -CW      Recorded by [CW] Jeet Yuen, PTA 08/11/19 1554      Row Name 08/11/19 1500             Stand-Sit Transfer    Stand-Sit [place] (Transfers)  contact guard  -CW      Assistive Device (Stand-Sit Transfers)  walker, esperanza  -CW      Recorded by [CW] Jeet Yuen, PTA 08/11/19 1554      Row Name 08/11/19 1500             Gait/Stairs Assessment/Training    [place] Level (Gait)  verbal cues;contact guard  -CW      Assistive Device (Gait)  walker, front-wheeled  -CW      Distance in Feet (Gait)  60  -CW      Pattern (Gait)  step-through  -CW      Deviations/Abnormal Patterns (Gait)  gait speed decreased;stride length decreased  -CW      Recorded by [CW] Jeet Yuen, PTA 08/11/19 1554      Row Name 08/11/19 1500             Therapeutic Exercise    Lower Extremity (Therapeutic Exercise)  gluteal sets;LAQ (long arc quad), bilateral;marching while seated  -CW      Lower Extremity Range of Motion (Therapeutic Exercise)  ankle dorsiflexion/plantar flexion, bilateral  -CW      Position (Therapeutic Exercise)  seated  -CW      Sets/Reps (Therapeutic Exercise)  10  -CW      Recorded by [CW] Jeet Yuen, PTA 08/11/19 1554      Row Name 08/11/19 1500             Positioning and Restraints    Pre-Treatment Position  in bed  -CW      Post Treatment Position  bed  -CW      In Bed  notified nsg;sitting EOB;call light within reach;encouraged to call for assist;with family/caregiver;with nsg  -CW      Recorded by [CW] Jeet Yuen, PTA 08/11/19 1554      Row Name 08/11/19 1500             Pain Scale: Numbers Pre/Post-Treatment    Pain Scale: Numbers, Pretreatment  0/10 - no pain  -CW      Recorded by [CW] Jeet Yuen, PTA 08/11/19 1554      Row Name                Wound 08/05/19 1451 Right  chest Other (comment)    Wound - Properties Group Date first assessed: 08/05/19 [KB] Time first assessed: 1451 [KB] Side: Right [KB] Location: chest [KB] Primary Wound Type: Other [SS], Previous port  Type: incision [KB] Recorded by:  [KB] Coco Love RN 08/05/19 1451 [SS] Jaki Hennessy RN 08/07/19 1657    Row Name 08/11/19 1500             Coping    Observed Emotional State  accepting  -CW      Recorded by [CW] Jeet Yuen, PTA 08/11/19 1554      Row Name 08/11/19 1500             Outcome Summary/Treatment Plan (PT)    Anticipated Discharge Disposition (PT)  skilled nursing facility;home with home health  -CW      Recorded by [CW] Jeet Yuen, PTA 08/11/19 1558        User Key  (r) = Recorded By, (t) = Taken By, (c) = Cosigned By    Initials Name Effective Dates Discipline    KB Coco Love RN 06/16/16 -  Nurse    SS Jaki Hennessy RN 06/30/16 -  Nurse    CW Jeet Yuen, PTA 03/07/18 -  PT          Wound 08/05/19 1451 Right chest Other (comment) (Active)   Dressing Appearance dry;intact 8/11/2019  2:16 PM   Closure None 8/11/2019  2:16 PM   Base dry;non-granulating;moist;slough;pink 8/11/2019  9:18 AM   Periwound intact;pink;redness 8/11/2019  2:16 PM   Periwound Temperature warm 8/11/2019  2:16 PM   Periwound Skin Turgor soft 8/11/2019  2:16 PM   Edges rolled/closed 8/11/2019  2:16 PM   Drainage Characteristics/Odor yellow;serosanguineous 8/11/2019  9:18 AM   Drainage Amount small 8/11/2019  9:18 AM   Care, Wound cleansed with;dressing removed;pressure dressing applied;moist wound environment maintained 8/11/2019  9:18 AM   Dressing Care, Wound dressing changed;dressing moistened;gauze, dry 8/11/2019  9:18 AM   Periwound Care, Wound topical treatment applied 8/11/2019  9:18 AM           Physical Therapy Education     Title: PT OT SLP Therapies (In Progress)     Topic: Physical Therapy (In Progress)     Point: Mobility training (In Progress)     Learning Progress Summary           Patient  Acceptance, E, NR by EM at 8/9/2019  4:20 PM                               User Key     Initials Effective Dates Name Provider Type Discipline    EM 04/03/18 -  Yessi Jackson, PT Physical Therapist PT                PT Recommendation and Plan  Anticipated Discharge Disposition (PT): skilled nursing facility, home with home health  Therapy Frequency (PT Clinical Impression): daily  Outcome Summary/Treatment Plan (PT)  Anticipated Discharge Disposition (PT): skilled nursing facility, home with home health  Outcome Measures     Row Name 08/10/19 1600             How much help from another person do you currently need...    Turning from your back to your side while in flat bed without using bedrails?  3  -RH      Moving from lying on back to sitting on the side of a flat bed without bedrails?  3  -RH      Moving to and from a bed to a chair (including a wheelchair)?  3  -RH      Standing up from a chair using your arms (e.g., wheelchair, bedside chair)?  3  -RH      Climbing 3-5 steps with a railing?  2  -RH      To walk in hospital room?  3  -RH      AM-PAC 6 Clicks Score (PT)  17  -RH        User Key  (r) = Recorded By, (t) = Taken By, (c) = Cosigned By    Initials Name Provider Type     Blaze Delong, JASON Physical Therapy Assistant         Time Calculation:   PT Charges     Row Name 08/11/19 1554             Time Calculation    Start Time  1537  -CW      Stop Time  1554  -CW      Time Calculation (min)  17 min  -CW      PT Received On  08/11/19  -CW      PT - Next Appointment  08/12/19  -CW        User Key  (r) = Recorded By, (t) = Taken By, (c) = Cosigned By    Initials Name Provider Type     Jeet Yuen PTA Physical Therapy Assistant        Therapy Charges for Today     Code Description Service Date Service Provider Modifiers Qty    96764608677 HC PT THER PROC EA 15 MIN 8/11/2019 Jeet Yuen PTA GP 1    44803964022 HC PT THER SUPP EA 15 MIN 8/11/2019 Jeet Yuen PTA GP 1           PT G-Codes  Outcome Measure Options: AM-PAC 6 Clicks Basic Mobility (PT)  AM-PAC 6 Clicks Score (PT): 17    Jeet Yuen, PTA  8/11/2019

## 2019-08-11 NOTE — PROGRESS NOTES
" LOS: 8 days     Name: Cody Eldridge  Age: 74 y.o.  Sex: male  :  1945  MRN: [id number]         Primary Care Physician: Epley, James, APRN    Subjective   Subjective  No new complaints or overnight events.  Currently sitting on the toilet trying to have a bowel movement.  Denies shortness of breath or chest pain.  States that he feels better today.    Objective   Vital Signs  Temp:  [97.6 °F (36.4 °C)-98.1 °F (36.7 °C)] 97.6 °F (36.4 °C)  Heart Rate:  [] 77  Resp:  [17-20] 20  BP: (109-143)/(59-87) 143/87  Body mass index is 24.66 kg/m².    Objective:  General Appearance:  Comfortable and in no acute distress (Elderly, frail).    Vital signs: (most recent): Blood pressure 143/87, pulse 77, temperature 97.6 °F (36.4 °C), temperature source Oral, resp. rate 20, height 180.3 cm (71\"), weight 80.2 kg (176 lb 12.8 oz), SpO2 95 %.    HEENT: (Cortrak in place)    Lungs:  Normal effort and normal respiratory rate.  He is not in respiratory distress.  There are decreased breath sounds.    Heart: Normal rate.  Regular rhythm.    Abdomen: Abdomen is soft.  Bowel sounds are normal.   There is no abdominal tenderness.     Extremities: There is no dependent edema or local swelling.    Neurological: Patient is alert and oriented to person, place and time.    Skin:  Warm and dry.              Results Review:       I reviewed the patient's new clinical results.    Results from last 7 days   Lab Units 19  0522 08/10/19  0517 19  0338 19  0430 19  0444 19  0510 19  0454   WBC 10*3/mm3 10.33 14.98* 14.73* 8.05 1.96* 2.22* 1.38*   HEMOGLOBIN g/dL 10.0* 10.7* 11.6* 11.5* 12.6* 12.7* 14.8   PLATELETS 10*3/mm3 37*  37* 23*  23* 21* 29*  29* 41* 79* 106*     Results from last 7 days   Lab Units 19  0522 08/10/19  0517 19  0338 19  1906 19  0430 19  1721 19  0444  19  0511   SODIUM mmol/L 149* 143 143 145 140  --  147*  --  151*   POTASSIUM " mmol/L 4.2 3.4* 3.4* 4.2 3.8 3.9 3.8   < > 3.3*   CHLORIDE mmol/L 113* 108* 108* 110* 106  --  111*  --  115*   CO2 mmol/L 24.2 26.9 21.8* 25.8 24.7  --  24.6  --  22.7   BUN mg/dL 72* 77* 71* 68* 64*  --  56*  --  33*   CREATININE mg/dL 2.41* 2.80* 2.85* 2.91* 2.98*  --  2.79*  --  1.83*   CALCIUM mg/dL 7.6* 7.3* 7.2* 8.0* 7.6*  --  7.5*  --  5.6*   GLUCOSE mg/dL 157* 125* 125* 120* 149*  --  141*  --  133*    < > = values in this interval not displayed.     Results from last 7 days   Lab Units 08/09/19  0338 08/08/19  0430 08/07/19  0444 08/06/19  0511 08/05/19  0454   INR  1.12* 1.19* 1.33* 1.60* 2.49*       Scheduled Meds:     ceFAZolin 2 g Intravenous Q12H   lansoprazole 30 mg Nasogastric Daily   metoprolol tartrate 37.5 mg Oral Q8H   potassium chloride 40 mEq Nasogastric Once   predniSONE 60 mg Oral Daily With Breakfast   sertraline 50 mg Oral Daily   silver sulfadiazine  Topical BID   sodium chloride 3 mL Intravenous Q12H   Sodium Hypochlorite 0.0625 % (Dakin's 1/8th Strength) topical solution 946 mL Irrigation Q12H   terazosin 1 mg Nasogastric Nightly     PRN Meds:   •  acetaminophen  •  ALPRAZolam  •  diphenoxylate-atropine  •  HYDROcodone-acetaminophen  •  ipratropium  •  Lidocaine Viscous HCl  •  magic mouthwash  •  magnesium sulfate **OR** magnesium sulfate in D5W 1g/100mL (PREMIX)  •  metoprolol tartrate  •  nitroglycerin  •  ondansetron **OR** ondansetron  •  potassium phosphate infusion greater than 15 mMoles **OR** potassium phosphate **OR** potassium phosphate  •  pramipexole  •  [COMPLETED] Insert peripheral IV **AND** sodium chloride  •  sodium chloride  Continuous Infusions:       Assessment/Plan   Active Hospital Problems    Diagnosis  POA   • **MSSA bacteremia [R78.81]  Unknown   • Shock, septic (CMS/HCC) [A41.9, R65.21]  Unknown   • Oral thrush [B37.0]  Unknown   • Acute renal injury (CMS/HCC) [N17.9]  Unknown   • Anemia associated with chemotherapy [D64.81, T45.1X5A]  Unknown   •  Chemotherapy-induced thrombocytopenia [D69.59, T45.1X5A]  Unknown   • New onset of congestive heart failure (CMS/HCC) [I50.9]  Yes   • Atrial fibrillation (CMS/HCC) [I48.91]  Yes   • Squamous cell carcinoma of neck [C44.42]  Yes   • COPD (chronic obstructive pulmonary disease) (CMS/HCC) [J44.9]  Yes   • Depression with anxiety [F41.8]  Yes   • CAD (coronary artery disease) [I25.10]  Yes   • Chemotherapy-induced neutropenia (CMS/HCC) [D70.1, T45.1X5A]  Yes   • Vascular catheter infection (CMS/HCC) [T82.7XXA]  Yes      Resolved Hospital Problems   No resolved problems to display.       Assessment & Plan    -Septic shock has resolved and now off pressors.   -Remains on cefazolin with plans for a 4-week course for treatment of MSSA septicemia, per infectious disease  -Received Neupogen with improvement of WBCs. Hematology/oncology following  -Remains thrombocytopenic.  Prednisone has been initiated, per oncology  -Atrial fibrillation with rapid ventricular.  Now in sinus rhythm and will maintain current dose of metoprolol, per cardiology.  -Renal function starting to improve with better/more stable hemodynamics.  Nephrology following.  -Free water flushes have been adjusted today for treatment of the hypernatremia seen on labs this morning.  -Continue supportive care for oral mucositis secondary to chemoradiation.  Continue tube feedings via cortrak for now.  Appears to be tolerating thickened liquids.  -Is finished a 3-day course of oral fluconazole for treatment of thrush  -Hematology monitoring his pleural effusion.  Attempting to avoid thoracentesis if possible.  -Appreciate assistance from consulting services  -PT following    Pedrito Toledo MD  [facility]  08/11/19  3:06 PM

## 2019-08-11 NOTE — PLAN OF CARE
Problem: Fall Risk (Adult)  Goal: Absence of Fall  Outcome: Outcome(s) achieved Date Met: 08/11/19

## 2019-08-11 NOTE — PLAN OF CARE
Problem: Pain, Chronic (Adult)  Goal: Identify Related Risk Factors and Signs and Symptoms  Outcome: Outcome(s) achieved Date Met: 08/11/19

## 2019-08-11 NOTE — PROGRESS NOTES
[facility] INPATIENT PROGRESS NOTE         [address]    2019      PATIENT IDENTIFICATION:  Name: Cody Eldridge ADMIT: 8/3/2019   : 1945  PCP: Epley, James, APRN    MRN: [id number] LOS: 8 days   AGE/SEX: 74 y.o. male  ROOM: [facility]                     LOS 8    Reason for visit: Follow-up respiratory failure with septic shock and pulmonary edema      SUBJECTIVE:      Says that he slept well last night.  Denies chest pain.  Scattered coarse breath sounds on auscultation.  No wheezing.    Objective   OBJECTIVE:    Vital Sign Min/Max for last 24 hours  Temp  Min: 96.8 °F (36 °C)  Max: 98.1 °F (36.7 °C)   BP  Min: 109/59  Max: 129/82   Pulse  Min: 70  Max: 105   Resp  Min: 17  Max: 20   SpO2  Min: 94 %  Max: 95 %   No Data Recorded   Weight  Min: 80.2 kg (176 lb 12.8 oz)  Max: 80.2 kg (176 lb 12.8 oz)                         Body mass index is 24.66 kg/m².    Intake/Output Summary (Last 24 hours) at 2019 09  Last data filed at 2019 0215  Gross per 24 hour   Intake 1505 ml   Output 845 ml   Net 660 ml         Exam:  GEN:  No distress, appears stated age  EYES:   PERRL, anicteric sclera  ENT:    External ears/nose normal, OP clear  NECK:  No adenopathy, midline trachea  LUNGS: Normal chest on inspection, palpation and diminished bilaterally on auscultation  CV:  Normal S1S2, without murmur  ABD:  Non tender, non distended, no hepatosplenomegaly, +BS  EXT:  No edema, cyanosis or clubbing    Scheduled meds:      ceFAZolin 2 g Intravenous Q12H   lansoprazole 30 mg Nasogastric Daily   metoprolol tartrate 37.5 mg Oral Q8H   potassium chloride 40 mEq Nasogastric Once   predniSONE 60 mg Oral Daily With Breakfast   sertraline 50 mg Oral Daily   silver sulfadiazine  Topical BID   sodium chloride 3 mL Intravenous Q12H   Sodium Hypochlorite 0.0625 % (Dakin's  Strength) topical solution 946 mL Irrigation Q12H   terazosin 1 mg Nasogastric Nightly     IV meds:                          Data Review:  Results from last 7 days   Lab Units 08/11/19  0522 08/10/19  0517 08/09/19  0338 08/08/19  1906 08/08/19  0430   SODIUM mmol/L 149* 143 143 145 140   POTASSIUM mmol/L 4.2 3.4* 3.4* 4.2 3.8   CHLORIDE mmol/L 113* 108* 108* 110* 106   CO2 mmol/L 24.2 26.9 21.8* 25.8 24.7   BUN mg/dL 72* 77* 71* 68* 64*   CREATININE mg/dL 2.41* 2.80* 2.85* 2.91* 2.98*   GLUCOSE mg/dL 157* 125* 125* 120* 149*   CALCIUM mg/dL 7.6* 7.3* 7.2* 8.0* 7.6*         Estimated Creatinine Clearance: 30 mL/min (A) (by C-G formula based on SCr of 2.41 mg/dL (H)).  Results from last 7 days   Lab Units 08/11/19  0522 08/10/19  0517 08/09/19  0338 08/08/19  0430 08/07/19  0444   WBC 10*3/mm3 10.33 14.98* 14.73* 8.05 1.96*   HEMOGLOBIN g/dL 10.0* 10.7* 11.6* 11.5* 12.6*   PLATELETS 10*3/mm3 37*  37* 23*  23* 21* 29*  29* 41*     Results from last 7 days   Lab Units 08/09/19 0338 08/08/19  0430 08/07/19  0444 08/06/19  0511 08/05/19  0454   INR  1.12* 1.19* 1.33* 1.60* 2.49*     Results from last 7 days   Lab Units 08/11/19 0522 08/09/19  0338 08/08/19  0430 08/06/19  0511 08/05/19  0454   ALT (SGPT) U/L <5 <5 <5 19 25   AST (SGOT) U/L 25 27 21 22 32     Results from last 7 days   Lab Units 08/05/19  2334   PH, ARTERIAL pH units 7.432   PO2 ART mm Hg 77.6*   PCO2, ARTERIAL mm Hg 42.0   HCO3 ART mmol/L 28.0     Results from last 7 days   Lab Units 08/05/19  1030   LACTATE mmol/L 1.8         Chest x-ray 8/10/2019 reviewed shows density lower lung fields with layering effusion.            )Assessment   ASSESSMENT:      Active Hospital Problems    Diagnosis  POA   • **MSSA bacteremia [R78.81]  Unknown   • Shock, septic (CMS/HCC) [A41.9, R65.21]  Unknown   • Oral thrush [B37.0]  Unknown   • Acute renal injury (CMS/HCC) [N17.9]  Unknown   • Anemia associated with chemotherapy [D64.81, T45.1X5A]  Unknown   • Chemotherapy-induced thrombocytopenia [D69.59, T45.1X5A]  Unknown   • New onset of congestive heart failure (CMS/HCC) [I50.9]  Yes    • Atrial fibrillation (CMS/HCC) [I48.91]  Yes   • Squamous cell carcinoma of neck [C44.42]  Yes   • COPD (chronic obstructive pulmonary disease) (CMS/HCC) [J44.9]  Yes   • Depression with anxiety [F41.8]  Yes   • CAD (coronary artery disease) [I25.10]  Yes   • Chemotherapy-induced neutropenia (CMS/HCC) [D70.1, T45.1X5A]  Yes   • Vascular catheter infection (CMS/HCC) [T82.7XXA]  Yes      Resolved Hospital Problems   No resolved problems to display.     Sepsis with shock  MSSA bacteremia  Suspected infection of Mediport  Acute on chronic diastolic CHF  Acute hypoxemic respiratory failure with pulmonary edema  Left moderate pleural effusion   Acute kidney injury  A. 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  Wean oxygen as able.  Encourage incentive spirometer use.  Bronchodilators prn for COPD symptoms and help with clearance.  Plan 4 weeks antibiotic for MSSA bacteremia per ID recommendations.  Steroid per oncology.  If effusion worsens may require thoracentesis but with platelets would like to avoid.        Javier Segovia MD  Pulmonary and Critical Care Medicine  [facility], [facility]  8/11/2019    9:22 AM

## 2019-08-11 NOTE — PLAN OF CARE
Problem: Patient Care Overview  Goal: Plan of Care Review  Outcome: Ongoing (interventions implemented as appropriate)   08/11/19 3403   Coping/Psychosocial   Plan of Care Reviewed With patient   OTHER   Outcome Summary Pt in aflutter. Dressing changed. Lidocaine given for oral pain. Q2 turn. Tube feeds remain. Continues on IV abx. VSS   Plan of Care Review   Progress no change       Problem: Fall Risk (Adult)  Goal: Absence of Fall  Outcome: Ongoing (interventions implemented as appropriate)      Problem: Cardiac: Heart Failure (Adult)  Goal: Signs and Symptoms of Listed Potential Problems Will be Absent, Minimized or Managed (Cardiac: Heart Failure)  Outcome: Ongoing (interventions implemented as appropriate)      Problem: Skin Injury Risk (Adult)  Goal: Skin Health and Integrity  Outcome: Ongoing (interventions implemented as appropriate)      Problem: Pain, Chronic (Adult)  Goal: Identify Related Risk Factors and Signs and Symptoms  Outcome: Ongoing (interventions implemented as appropriate)    Goal: Acceptable Pain/Comfort Level and Functional Ability  Outcome: Ongoing (interventions implemented as appropriate)

## 2019-08-11 NOTE — PROGRESS NOTES
REASON FOR CONSULTATION:  Neutropenic fever, port site cellulitis, prolonged protime no clinical bleeding, severe grade iii mucositis, atrial fibrilation rvr  Provide an opinion on any further workup or treatment                             INTERVAL HISTORY:  On 8/5/2019 in the early morning,Patient was moved to intensive care unit because of hypotension, atrial fibrillation with RVR and currently on pressor and amiodarone.     In the early afternoon of 8/5/2019, patient had portacatheter removed.     On 8/6/2019, nurse and family member reports patient is restless, patient himself is coherent and oriented.  Nurse reports patient had no urine output, bladder scan shows residual urine 150 mL.  It was 100 mL earlier this morning.  In the meantime patient has worsening renal function with creatinine 1.83 up from 1.17 the previous day.     The tip of portacatheter grew out Staphylococcus aureus within 24 hours.  Sensitivity study is not available.     On 8/7/2019, patient has further decreased platelets 41,000, and his WBC and neutrophils both actually slightly decreased at 1960 including ANC 1630.  His renal function is further worsening at creatinine 2.79.  Patient has been seen by nephrologist.  Patient is off pressor, and seems in sinus rhythm.     On 8/8/2019, patient had a further deteriorating platelets 29,000 without active bleeding.  Patient has resolution of neutropenia with ANC 5800 out of WBC 8000.  Slightly trending down hemoglobin 11.5.  Renal function no improvement creatinine 2.98.  Continues to have soreness in mouth, on tube feeding.  Patient was not able to tolerate Meghann's Magic portion for oral mucositis.     On 8/9/2019, further decreased Plts 21,000.  Hb stable 11.6, WBC further improved at 14,700 including ANC 13,000.  Patient is afebrile.  Was able to participate in rehabilitation.      On 8/10/2019, worsening anemia Hb 10.7, low platelets 23,000, IPF 14.8%, worsening .  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  We started patient on oral prednisone 60 mg daily.     On 8/11/2019, platelets improved at 37,000, decreased IPF 11.4%, slightly improved , however hemoglobin further down at 10.0.  His kidney function improved creatinine 2.41.  Patient continued to have significant oral mucositis, with small quantity of blood.  Oral thrush resolved.  We will change his Magic mouthwash to scheduled every 4 hours.         HISTORY OF PRESENT ILLNESS:  The patient is a 74 y.o. year old male who is here for an opinion about the above issue.     History of Present Illness I have been asked to see this patient in consultation today who is a 74-year-old white male who has been treated BY DR RONALDO WILLIAMSON FOR HEAD AND NECK CANCER WITH CHEMO AND RADIATION THERAPY In any event the patient has received radiation therapy to his NECK, he has been receiving IV fluids in the office in the last few days because of profound weakness and he has now developed atrial fibrillation with rapid ventricular response from a congestive heart failure, elevation of the proBNP in the 8000 category and further more significant sensitivity and redness in the skin of the anterior chest wall on the right side where the port is located that makes me to think that he has at least a cellulitis in this anatomical site. The patient has a severe degree of mucositis associated with radiation therapy. He is not able to swallow. Also the patient has developed diarrhea with no abdominal pain and in a stool culture enteropathogenic E-coli has been recovered. Clostridium was negative. The patient has lost substantial amount of weight. He has not been able to eat anything for the last 3-4 weeks. He has had swelling in his lower extremities. He has had cough and he has had shortness of breath. He has had fevers and chills as stated. He just feels terrible.            Medical History        Past Medical  History:   Diagnosis Date   • Atrial fibrillation (CMS/HCC)     • CHF (congestive heart failure) (CMS/HCC)     • Chronic bronchitis (CMS/HCC)     • COPD (chronic obstructive pulmonary disease) (CMS/HCC)     • Cor pulmonale (chronic) (CMS/HCC)     • Daytime hypersomnia     • Depression with anxiety     • Elevated cholesterol     • Enlarged prostate     • Frequent nocturnal awakening     • Gout     • H/O cardiac radiofrequency ablation 2006     [facility].   • Head and neck cancer (CMS/HCC)     • Hyperlipidemia     • Hypertension     • Hypotension     • Insomnia     • Lumbar radicular pain     • SHAR (obstructive sleep apnea)     • Osteoarthritis     • Permanent atrial fibrillation (CMS/HCC)     • Snoring     • Squamous cell carcinoma of neck     • SVT (supraventricular tachycardia) (CMS/HCC)     • Syncope     • Vitamin D deficiency     • Weight loss              Surgical History         Past Surgical History:   Procedure Laterality Date   • APPENDECTOMY       • CARDIAC ABLATION   2006     [facility].   • CARDIAC CATHETERIZATION N/A 8/29/2018     Procedure: Left Heart Cath;  Surgeon: Yousif Uribe MD;  Location: [facility] CATH INVASIVE LOCATION;  Service: Cardiology   • CARDIAC CATHETERIZATION N/A 8/29/2018     Procedure: Coronary angiography;  Surgeon: Yousif Uribe MD;  Location: [facility] CATH INVASIVE LOCATION;  Service: Cardiology   • CARDIAC CATHETERIZATION N/A 8/29/2018     Procedure: Left ventriculography;  Surgeon: Yousif Uribe MD;  Location: [facility] CATH INVASIVE LOCATION;  Service: Cardiology   • FINGER SURGERY       • FOOT SURGERY       • HAND SURGERY       • VENOUS ACCESS DEVICE (PORT) INSERTION Right 6/18/2019     Procedure: MEDIPORT PLACEMENT;  Surgeon: Elvis Grigsby MD;  Location: [facility] MAIN OR;  Service: Vascular           ONCOLOGIC HISTORY DR RONALDO WILLIAMSON MD:     1.  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  He is undergoing definitive treatment with radiation therapy and weekly low-dose carboplatin and Taxol chemotherapy.  He was able to complete 4 weekly chemotherapy treatments in a row but required holding chemotherapy for the last 2 weeks due to myelosuppression.   he appears to be in rapid atrial fibrillation.  2.  Comorbidities including ischemic heart disease with history of CHF and atrial fibrillation requiring Coumadin anticoagulation.    3.  Oral mucositis.  He has a prescription for hydrocodone for pain control.  He does have Meghann's Magic mouth rinse to use as well.  4.  Warfarin anticoagulation.  Patient's INR 3.83, he will hold his coumadin today and tomorrow and have another INR checked Wednesday 7/17/20195.    5.  Weight loss and poor nutrition.  The patient's weight has stabilized and he is utilizing boost at home and trying to hydrate with water and Gatorade.  He does understand if his nutrition is not improving we will need to proceed with a G-tube for nutrition.  Ruthy Bautista, oncology dietitian been involved in his care also.  6.  Atrial fibrillation with rapid ventricular response     MEDICATIONS: see EMR.     ALLERGIES:          Allergies   Allergen Reactions   • Benadryl [Diphenhydramine Hcl] Other (See Comments) and Dizziness       \"I sleep for about a day\"       Social History no changes.      Family History;  No changes.        Review of Systems   Constitutional: Positive for activity change, appetite change, chills, diaphoresis, fatigue, fever and unexpected weight change.   HENT: Positive for mouth sores, sore throat, trouble swallowing and voice change.    Eyes: Negative.    Respiratory: Positive for cough and shortness of breath.    Cardiovascular: Positive for palpitations.   Gastrointestinal: Positive for abdominal distention and diarrhea.   Endocrine: Negative.    Genitourinary: Negative.    Musculoskeletal: Negative.    Skin: Positive for pallor.   Neurological: Negative.  "   Hematological: Negative.    Psychiatric/Behavioral: Negative.               Objective      Vitals:    08/11/19 0703 08/11/19 0750 08/11/19 1300 08/11/19 1408   BP: 125/75 113/61 138/75 143/87   BP Location:  Right arm Right arm    Patient Position:  Lying Sitting    Pulse: 95 85 97 77   Resp:  20 20    Temp:  98.1 °F (36.7 °C) 97.6 °F (36.4 °C)    TempSrc:  Oral Oral    SpO2:  95% 95%    Weight:       Height:          Physical Exam   Constitutional: He is oriented to person, place, and time. He is lying in the bed comfortable, carries conversation well.   HENT:   Head: Normocephalic.   Nose: NG tube in place.  Mouth/Throat: Grade III mucositis.  There is oral thrush discovered on 8/8/2019.  Treated with Diflucan and resolution of oral thrush.  Eyes: Conjunctivae and EOM are normal. No scleral icterus.   Neck: Normal range of motion. No tracheal deviation present. No thyromegaly present.   Erythema neck area of radiation   Cardiovascular:  Tachycardia, irregular irregular rhythm.   Pulmonary/Chest: Effort normal and breath sounds normal.   Abdominal: Soft. He exhibits no distension and no mass. There is no tenderness. There is no rebound and no guarding.   Musculoskeletal: No leg edema   Lymphadenopathy:     He has no cervical adenopathy.   Neurological: He is alert and oriented to person, place, and time.   Skin: Portacatheter has been removed.   Psychiatric: He has a normal mood and affect.  Patient seems restless not in acute distress. Judgment and thought content normal.          RECENT LABS:    Results from last 7 days   Lab Units 08/11/19  0522 08/10/19  0517 08/09/19  0338 08/08/19  0430 08/07/19  0444   WBC 10*3/mm3 10.33 14.98* 14.73* 8.05 1.96*   HEMOGLOBIN g/dL 10.0* 10.7* 11.6* 11.5* 12.6*   HEMATOCRIT % 31.0* 33.3* 36.5* 36.1* 38.5   PLATELETS 10*3/mm3 37*  37* 23*  23* 21* 29*  29* 41*   MONOCYTES % %  --   --  5.0 25.0* 14.0*     Lab Results   Component Value Date    NEUTROABS 13.40 (H) 08/09/2019    IPF 10.1% and  on 8/8/2019.  IPF 14.8% and  on 8/10/2019   IPF 11.4% and  on 8/11/2019    Results from last 7 days   Lab Units 08/11/19  0522 08/10/19  0517 08/09/19 0338  08/08/19  0430   SODIUM mmol/L 149* 143 143   < > 140   POTASSIUM mmol/L 4.2 3.4* 3.4*   < > 3.8   CHLORIDE mmol/L 113* 108* 108*   < > 106   CO2 mmol/L 24.2 26.9 21.8*   < > 24.7   BUN mg/dL 72* 77* 71*   < > 64*   CREATININE mg/dL 2.41* 2.80* 2.85*   < > 2.98*   CALCIUM mg/dL 7.6* 7.3* 7.2*   < > 7.6*   BILIRUBIN mg/dL 0.6  --  0.7  --  0.7   ALK PHOS U/L 101  --  99  --  75   ALT (SGPT) U/L <5  --  <5  --  <5   AST (SGOT) U/L 25  --  27  --  21   GLUCOSE mg/dL 157* 125* 125*   < > 149*    < > = values in this interval not displayed.     Results from last 7 days   Lab Units 08/09/19 0338 08/08/19 0430 08/07/19  0444   INR  1.12* 1.19* 1.33*           Contains abnormal data Catheter Culture - Cath Tip, Chest, Right   Order: [id number]   Collected:  8/5/2019 14:47 Status:  Preliminary result   Visible to patient:  No (Not Released)   Specimen Information: Chest, Right; Cath Tip        CATHETER CULTURE >15 CFU/mL - Indicative of infection. Staphylococcus aureus Abnormal           Resulting Agency: [facility]         Specimen Collected: 08/05/19 14:47 Last Resulted: 08/06/19 09:40        Order Details       View Encounter       Lab and Collection Details                    Assessment/Plan   1.   In summary this patient is a 74-year-old who has been treated FOR HEAD NECK CA He has received radiation therapy to the neck and he has been undergoing chemotherapy .  The patient has been admitted with rapid ventricular response, atrial fibrillation, fever, tremendous tenderness and erythema at the port site in anterior chest wall on the right side and associated with this diarrhea with a stool culture consistent with E-coli enteropathogenic. Also the patient has abnormal blood cultures.     Due to poor performance status,  chemoradiotherapy will be on hold.    · On 8/7/2019, I discussed with the patient and his wife, will reevaluate in the future to see whether he needs more systematic chemotherapy after he recovered from sepsis and the improvement of performance status.    2. The patient has severe neutropenia secondary to chemotherapy.     · Continue currently on Neupogen daily.   · Improved neutrophil counts 2030 on 8/6/2019.   · Slightly worsening neutrophil at 1630 on 8/7/2019.  We will continue Neupogen and monitor.   · Significantly improved and normalized neutrophils 500 on 8/82019.  Neupogen will be discontinued.  · On 8/9/2019, patient become leukocytosis WBC 14,700 and ANC 13,400 which is expected secondary to Neupogen.   · WBC normalized 10,300 on 8/11/2019.    3.  Sepsis with positive MSSA septicemia.  Patient has been followed by Dr. Cruz, was on nafcillin.  · PowerPort was removed earlier this afternoon on 8/5/2019.  · Catheter tip culture was positive for Staphylococcus aureus on 8/6/2019.   · Antibiotics being switched to cefazolin 8/6/2019.    · Patient is improving, off pressor on 8/7/2019.       4.  Acute renal injury, with creatinine 1.83 today, up from 1.17 yesterday.  Patient does not have urination.  Residual urine in the bladder is only 150 mL this afternoon on 8/6/2019.   · Patient has been seen by nephrology service.   · Creatinine 2.98 on 8/8/2019.  · Improved renal function creatinine 2.85 on 8/9/2019   · Creatinine 2.41 8/11/2019.    5.  Thrombocytopenia secondary to chemotherapy.    · Worsening platelets at 79,000 on 8/6/2019.  No bleeding.    · Further deteriorating platelets 41,000 on 8/7/2019.  His thrombocytopenia possibly also contributed by his infection and now antibiotic.    · On 8/8/2019, platelets trending down and 29,000, with IPF 10.1%.  Patient has no active bleeding.    · On 8/9/2019, platelets decreased to 21,000.  No bleeding.  Continue to monitor.    · On 8/10/2019, platelets  23,000, IPF 14.8%, elevated.  Suspect the patient may have elements of ITP.  I recommended to start steroids 60 mg daily.    · On 8/11/2019 improved platelets 37,000 IPF 11.4% and .  Hemoglobin down at 10.0.  Continue prednisone.    6.  Anemia.  Hb 12.7, newly developed on 8/6/2019.   · Stable Hb 12.6 on 8/7/2019.   · Hemoglobin drops at 11.5 on 8/8/2019.    · Stable hemoglobin 11.6 on 8/09/2019.    · On 8/10/2019, hemoglobin trending down to 10.7.  LDH further increased at 580.  Suspect the patient may have elements of hemolysis.  We will start steroids also.  See the above section for thrombocytopenia.      7.  Oral mucositis grade 3, secondary to chemoradiation therapy.  Routine therapy will be on hold for now per Dr. Davila.   · Nurse reports 8/6/2019 lidocaine viscous is not really helping.  We will try Magic mouthwash.   · Nurse reports patient was able to tolerate Magic mouthwash and lidocaine viscous today on 8/9/2019.    · Persistent oral mucositis, will schedule Magic mouthwash every 4 hours.  Oral thrush has resolved.    8.  Oral thrush discovered on 8/8/2019.  We started patient on Diflucan 200 mg daily for 3 days per feeding tube.  Oral thrush resolved as examined on 8/11/2019.     9.  Coagulopathy due to vitamin K deficiency.  This is likely due to poor oral intake.  Patient currently is on tube feeding.  · INR has further improved after starting tube feeding, currently 1.19 today on 8/8/2019.   · No need for daily INR check anymore.    10.  Atrial fibrillation with RVR.  Cardiology to follow.  Patient was on digoxin.   · Patient continues atrial fibrillation with heart rate in the 130s.  Off digoxin due to elevated level.  Per cardiology, started patient on metoprolol..    11.  Continue proton pump inhibitor lansoprazole.      RECOMMENDATIONS:  1.  Continue IV cefazolin, per ID service.  2.  Continue prednisone 60 mg daily per feeding tube.   3.  Change Magic mouthwash to every 4 hours as  scheduled.  4.   Continue tube feeding.    5.  Monitor CBC in the morning.  No need for WBC differentiation.  We will check IPF and LDH.     I discussed with patient and his wife.  I also spoke with his nurse today for patient care.    JONATHAN JAVIER M.D., Ph.D.    8/11/2019

## 2019-08-11 NOTE — PROGRESS NOTES
NEPHROLOGY PROGRESS NOTE    PATIENT IDENTIFICATION:   Name:  Cody Eldridge      MRN:  [id number]     74 y.o.  male             Reason for visit: JERICHO    SUBJECTIVE:   Breathing is comfortable on supplemental oxygen; tube feeds infusing without problem; denies palpitations; feels better today.  He denies any palpitations.    OBJECTIVE:  Vitals:    08/11/19 0703 08/11/19 0750 08/11/19 1300 08/11/19 1408   BP: 125/75 113/61 138/75 143/87   BP Location:  Right arm Right arm    Patient Position:  Lying Sitting    Pulse: 95 85 97 77   Resp:  20 20    Temp:  98.1 °F (36.7 °C) 97.6 °F (36.4 °C)    TempSrc:  Oral Oral    SpO2:  95% 95%    Weight:       Height:               Body mass index is 24.66 kg/m².    Intake/Output Summary (Last 24 hours) at 8/11/2019 1424  Last data filed at 8/11/2019 0918  Gross per 24 hour   Intake 1396 ml   Output 645 ml   Net 751 ml     Wt Readings from Last 1 Encounters:   08/11/19 0330 80.2 kg (176 lb 12.8 oz)   08/10/19 0524 80 kg (176 lb 6.4 oz)   08/08/19 0600 78.4 kg (172 lb 13.5 oz)   08/08/19 0210 78.4 kg (172 lb 13.5 oz)   08/07/19 0300 77.6 kg (171 lb 1.2 oz)   08/07/19 0146 76 kg (167 lb 8.8 oz)   08/06/19 1504 72.6 kg (160 lb)   08/06/19 0526 72.8 kg (160 lb 7.9 oz)   08/05/19 0600 70.5 kg (155 lb 6.8 oz)   08/05/19 0448 70.9 kg (156 lb 6.4 oz)   08/04/19 0610 70.6 kg (155 lb 11.2 oz)   08/03/19 1619 76.6 kg (168 lb 12.8 oz)   08/03/19 1207 79.9 kg (176 lb 3 oz)     Wt Readings from Last 3 Encounters:   08/11/19 80.2 kg (176 lb 12.8 oz)   08/02/19 78.6 kg (173 lb 3.2 oz)   07/31/19 79.2 kg (174 lb 9.6 oz)         Exam:  NAD; pleasant; oriented; looks older than stated age  Chronically ill-appearing; flat affect  Dry MM; AT/NC ; feeding tube present in nose  No eye discharge; no scleral icterus  No JVD; no carotid bruits  No rales or rhonchi appreciated today not labored on O2 per nasal cannula  Irregularly irregular, tachycardic, no rub  Soft, NT, +D, BS+  Dressing by the right  clavicle is C/D/I; left subclavian central line  Trace-+1 edema both legs  No clubbing  No asterixis  Moves all extremities   Mood normal  Speech is fluent        Scheduled meds:      ceFAZolin 2 g Intravenous Q12H   lansoprazole 30 mg Nasogastric Daily   metoprolol tartrate 37.5 mg Oral Q8H   potassium chloride 40 mEq Nasogastric Once   predniSONE 60 mg Oral Daily With Breakfast   sertraline 50 mg Oral Daily   silver sulfadiazine  Topical BID   sodium chloride 3 mL Intravenous Q12H   Sodium Hypochlorite 0.0625 % (Dakin's 1/8th Strength) topical solution 946 mL Irrigation Q12H   terazosin 1 mg Nasogastric Nightly     IV meds:                             Data Review:    Results from last 7 days   Lab Units 08/11/19  0522 08/10/19  0517 08/09/19  0338  08/08/19  0430   SODIUM mmol/L 149* 143 143   < > 140   POTASSIUM mmol/L 4.2 3.4* 3.4*   < > 3.8   CHLORIDE mmol/L 113* 108* 108*   < > 106   CO2 mmol/L 24.2 26.9 21.8*   < > 24.7   BUN mg/dL 72* 77* 71*   < > 64*   CREATININE mg/dL 2.41* 2.80* 2.85*   < > 2.98*   CALCIUM mg/dL 7.6* 7.3* 7.2*   < > 7.6*   BILIRUBIN mg/dL 0.6  --  0.7  --  0.7   ALK PHOS U/L 101  --  99  --  75   ALT (SGPT) U/L <5  --  <5  --  <5   AST (SGOT) U/L 25  --  27  --  21   GLUCOSE mg/dL 157* 125* 125*   < > 149*    < > = values in this interval not displayed.     Estimated Creatinine Clearance: 30 mL/min (A) (by C-G formula based on SCr of 2.41 mg/dL (H)).  Results from last 7 days   Lab Units 08/07/19 0444 08/06/19  2105   SODIUM UR mmol/L  --  70   CREATININE UR mg/dL  --  56.7   URIC ACID mg/dL 5.4  --      Results from last 7 days   Lab Units 08/11/19  0522 08/10/19  0517 08/09/19  0338  08/08/19  0430   MAGNESIUM mg/dL 2.1 1.9  --   --  2.0   PHOSPHORUS mg/dL 2.2* 1.4* 3.0   < > 0.7*    < > = values in this interval not displayed.       Results from last 7 days   Lab Units 08/11/19  0522 08/10/19  0517 08/09/19  0338 08/08/19  0430 08/07/19  0444   WBC 10*3/mm3 10.33 14.98* 14.73* 8.05  1.96*   HEMOGLOBIN g/dL 10.0* 10.7* 11.6* 11.5* 12.6*   PLATELETS 10*3/mm3 37*  37* 23*  23* 21* 29*  29* 41*       Results from last 7 days   Lab Units 08/09/19  0338 08/08/19  0430 08/07/19  0444 08/06/19  0511 08/05/19  0454   INR  1.12* 1.19* 1.33* 1.60* 2.49*             ASSESSMENT:   1.  JERICHO, non-oliguric, stable: prerenal related to hypotension, tachy-arrhythmia, and sepsis syndrome limiting renal perfusion  2.  Head and neck cancer on radiation and chemotherapy  3.  Rapid atrial fibrillation  4.  Neutropenic fever with sepsis syndrome and MSSA bacteremia  5.  Coagulopathy  6.  Anemia and thrombocytopenia  7.  History of diastolic CHF:  Compensated for now  8.  Hypernatremia      PLAN:  We will increase free water flushes every 4 hours.  Potassium has been repleted.  Phosphorus is normal.  Bladder scan q shift  No indication for dialysis, and expect that renal function should improve now that hemodynamics are more stable  We will monitor fluid electrolyte status.  We will follow.      Pineda Blum MD  8/11/2019    2:24 PM

## 2019-08-12 ENCOUNTER — APPOINTMENT (OUTPATIENT)
Dept: LAB | Facility: HOSPITAL | Age: 74
End: 2019-08-12

## 2019-08-12 LAB
ALBUMIN SERPL-MCNC: 2.3 G/DL (ref 3.5–5.2)
ALBUMIN/GLOB SERPL: 0.9 G/DL
ALP SERPL-CCNC: 93 U/L (ref 39–117)
ALT SERPL W P-5'-P-CCNC: <5 U/L (ref 1–41)
ANION GAP SERPL CALCULATED.3IONS-SCNC: 10 MMOL/L (ref 5–15)
AST SERPL-CCNC: 27 U/L (ref 1–40)
BILIRUB SERPL-MCNC: 0.6 MG/DL (ref 0.2–1.2)
BUN BLD-MCNC: 73 MG/DL (ref 8–23)
BUN/CREAT SERPL: 37.1 (ref 7–25)
CALCIUM SPEC-SCNC: 7.3 MG/DL (ref 8.6–10.5)
CHLORIDE SERPL-SCNC: 115 MMOL/L (ref 98–107)
CO2 SERPL-SCNC: 27 MMOL/L (ref 22–29)
CREAT BLD-MCNC: 1.97 MG/DL (ref 0.76–1.27)
DEPRECATED RDW RBC AUTO: 74.4 FL (ref 37–54)
ERYTHROCYTE [DISTWIDTH] IN BLOOD BY AUTOMATED COUNT: 21.5 % (ref 12.3–15.4)
GFR SERPL CREATININE-BSD FRML MDRD: 33 ML/MIN/1.73
GLOBULIN UR ELPH-MCNC: 2.5 GM/DL
GLUCOSE BLD-MCNC: 115 MG/DL (ref 65–99)
HCT VFR BLD AUTO: 29.5 % (ref 37.5–51)
HGB BLD-MCNC: 9.3 G/DL (ref 13–17.7)
LDH SERPL-CCNC: 478 U/L (ref 135–225)
MCH RBC QN AUTO: 32 PG (ref 26.6–33)
MCHC RBC AUTO-ENTMCNC: 31.5 G/DL (ref 31.5–35.7)
MCV RBC AUTO: 101.4 FL (ref 79–97)
PLATELET # BLD AUTO: 55 10*3/MM3 (ref 140–450)
PLATELET # BLD AUTO: 55 10*3/MM3 (ref 140–450)
PLATELETS.RETICULATED NFR BLD AUTO: 11.1 % (ref 0.9–6.5)
PMV BLD AUTO: 13.6 FL (ref 6–12)
POTASSIUM BLD-SCNC: 3.9 MMOL/L (ref 3.5–5.2)
PROT SERPL-MCNC: 4.8 G/DL (ref 6–8.5)
RBC # BLD AUTO: 2.91 10*6/MM3 (ref 4.14–5.8)
SODIUM BLD-SCNC: 152 MMOL/L (ref 136–145)
WBC NRBC COR # BLD: 9.15 10*3/MM3 (ref 3.4–10.8)

## 2019-08-12 PROCEDURE — 97110 THERAPEUTIC EXERCISES: CPT

## 2019-08-12 PROCEDURE — 25010000003 CEFAZOLIN IN DEXTROSE 2-4 GM/100ML-% SOLUTION: Performed by: INTERNAL MEDICINE

## 2019-08-12 PROCEDURE — 99233 SBSQ HOSP IP/OBS HIGH 50: CPT | Performed by: INTERNAL MEDICINE

## 2019-08-12 PROCEDURE — 99232 SBSQ HOSP IP/OBS MODERATE 35: CPT | Performed by: INTERNAL MEDICINE

## 2019-08-12 PROCEDURE — 63710000001 PREDNISONE PER 5 MG: Performed by: INTERNAL MEDICINE

## 2019-08-12 PROCEDURE — 80053 COMPREHEN METABOLIC PANEL: CPT | Performed by: INTERNAL MEDICINE

## 2019-08-12 PROCEDURE — 85027 COMPLETE CBC AUTOMATED: CPT | Performed by: INTERNAL MEDICINE

## 2019-08-12 PROCEDURE — 85055 RETICULATED PLATELET ASSAY: CPT | Performed by: INTERNAL MEDICINE

## 2019-08-12 PROCEDURE — 25010000002 MORPHINE PER 10 MG: Performed by: INTERNAL MEDICINE

## 2019-08-12 PROCEDURE — 83615 LACTATE (LD) (LDH) ENZYME: CPT | Performed by: INTERNAL MEDICINE

## 2019-08-12 RX ORDER — WARFARIN SODIUM 2 MG/1
TABLET ORAL
Qty: 70 TABLET | Refills: 0 | OUTPATIENT
Start: 2019-08-12 | End: 2019-08-20

## 2019-08-12 RX ORDER — MORPHINE SULFATE 2 MG/ML
1 INJECTION, SOLUTION INTRAMUSCULAR; INTRAVENOUS
Status: DISCONTINUED | OUTPATIENT
Start: 2019-08-12 | End: 2019-08-20 | Stop reason: HOSPADM

## 2019-08-12 RX ADMIN — Medication 5 ML: at 00:51

## 2019-08-12 RX ADMIN — LIDOCAINE HYDROCHLORIDE 5 ML: 20 SOLUTION ORAL; TOPICAL at 08:02

## 2019-08-12 RX ADMIN — METOPROLOL TARTRATE 37.5 MG: 25 TABLET ORAL at 23:19

## 2019-08-12 RX ADMIN — METOPROLOL TARTRATE 37.5 MG: 25 TABLET ORAL at 14:04

## 2019-08-12 RX ADMIN — SERTRALINE 50 MG: 50 TABLET, FILM COATED ORAL at 08:19

## 2019-08-12 RX ADMIN — SILVER SULFADIAZINE 1 APPLICATION: 10 CREAM TOPICAL at 23:43

## 2019-08-12 RX ADMIN — Medication 5 ML: at 12:55

## 2019-08-12 RX ADMIN — Medication 5 ML: at 23:21

## 2019-08-12 RX ADMIN — TERAZOSIN HYDROCHLORIDE 1 MG: 1 CAPSULE ORAL at 23:43

## 2019-08-12 RX ADMIN — SILVER SULFADIAZINE: 10 CREAM TOPICAL at 08:24

## 2019-08-12 RX ADMIN — METOPROLOL TARTRATE 37.5 MG: 25 TABLET ORAL at 05:28

## 2019-08-12 RX ADMIN — SODIUM CHLORIDE, PRESERVATIVE FREE 10 ML: 5 INJECTION INTRAVENOUS at 23:13

## 2019-08-12 RX ADMIN — Medication 5 ML: at 05:27

## 2019-08-12 RX ADMIN — SODIUM CHLORIDE, PRESERVATIVE FREE 3 ML: 5 INJECTION INTRAVENOUS at 22:43

## 2019-08-12 RX ADMIN — PREDNISONE 60 MG: 50 TABLET ORAL at 08:19

## 2019-08-12 RX ADMIN — SODIUM CHLORIDE, PRESERVATIVE FREE 3 ML: 5 INJECTION INTRAVENOUS at 08:15

## 2019-08-12 RX ADMIN — LIDOCAINE HYDROCHLORIDE 5 ML: 20 SOLUTION ORAL; TOPICAL at 14:04

## 2019-08-12 RX ADMIN — LIDOCAINE HYDROCHLORIDE 5 ML: 20 SOLUTION ORAL; TOPICAL at 20:51

## 2019-08-12 RX ADMIN — LANSOPRAZOLE 30 MG: KIT at 08:24

## 2019-08-12 RX ADMIN — CEFAZOLIN SODIUM 2 G: 2 INJECTION, SOLUTION INTRAVENOUS at 10:28

## 2019-08-12 RX ADMIN — SODIUM HYPOCHLORITE 946 ML: 1.25 SOLUTION TOPICAL at 23:35

## 2019-08-12 RX ADMIN — CEFAZOLIN SODIUM 2 G: 2 INJECTION, SOLUTION INTRAVENOUS at 22:43

## 2019-08-12 RX ADMIN — SODIUM HYPOCHLORITE 946 ML: 1.25 SOLUTION TOPICAL at 08:25

## 2019-08-12 RX ADMIN — LIDOCAINE HYDROCHLORIDE 5 ML: 20 SOLUTION ORAL; TOPICAL at 16:51

## 2019-08-12 RX ADMIN — Medication 5 ML: at 08:02

## 2019-08-12 RX ADMIN — MORPHINE SULFATE 1 MG: 2 INJECTION, SOLUTION INTRAMUSCULAR; INTRAVENOUS at 23:13

## 2019-08-12 RX ADMIN — Medication 5 ML: at 16:51

## 2019-08-12 NOTE — PAYOR COMM NOTE
"Cody Eldridge (74 y.o. Male)       PLEASE SEE ATTACHED CLINICAL REVIEW. PT. REMAINS ON A MONITORED TELEM FLOOR.    REF#[id number]    PLEASE CALL  OR  [phone] WITH INPT AUTH CONTINUED STAY AND DAYS APPROVED.     THANK YOU    ANTONETTE KNUTSON LPN CCP    Date of Birth Social Security Number Address Home Phone MRN    1945  [address] [place] [address] [address] [place] [address] [phone] [id number]    [employer] Marital Status          Jewish        Admission Date Admission Type Admitting Provider Attending Provider Department, Room/Bed    8/3/19 Emergency MacArthurMacho grant MD McCracken, Robert Russell, MD [address], [address]    Discharge Date Discharge Disposition Discharge Destination                       Attending Provider:  Pedrito Toledo MD    Allergies:  Benadryl [Diphenhydramine Hcl]    Isolation:  None   Infection:  None   Code Status:  CPR    Ht:  180.3 cm (71\")   Wt:  79.9 kg (176 lb 1.6 oz)    Admission Cmt:  None   Principal Problem:  MSSA bacteremia [R78.81]                 Active Insurance as of 8/3/2019     Primary Coverage     Payor Plan Insurance Group Employer/Plan Group    [employer] [place] MEDICARE REPLACEMENT [facility] REPL [id number]     Payor Plan Address Payor Plan Phone Number Payor Plan Fax Number Effective Dates    [address] [address] [phone]  1/20/2016 - None Entered    [facility] [id number]       Subscriber Name Subscriber Birth Date Member ID       CODY ELDRIDGE 1945 [id number]                 Emergency Contacts      (Rel.) Home Phone Work Phone Mobile Phone    Elise Rai (Spouse) [phone] -- --    Jo Graves (Grandchild) [phone] -- --    JOSIAS CHAD (Daughter) [phone] -- --            Intake & Output (last day)       08/11 0701 - 08/12 0700 08/12 0701 - 08/13 0700    P.O. 120     I.V. (mL/kg)      Other 990 1701    NG/GT  2220    Total Intake(mL/kg) 1110 (13.9) 3921 (49.1)    " Urine (mL/kg/hr) 1300 (0.7)     Stool 0     Total Output 1300     Net -190 +3921          Urine Unmeasured Occurrence 2 x 3 x    Stool Unmeasured Occurrence 8 x 2 x        Lines, Drains & Airways    Active LDAs     Name:   Placement date:   Placement time:   Site:   Days:    Peripheral IV 08/09/19 2211 Distal;Left;Posterior Forearm   08/09/19 2211    Forearm   2    NG/OG Tube Nasogastric Left nostril   08/06/19 2000    Left nostril   5                  Lab Results (last 24 hours)     Procedure Component Value Units Date/Time    Comprehensive Metabolic Panel [[id number]]  (Abnormal) Collected:  08/12/19 0306    Specimen:  Blood Updated:  08/12/19 0425     Glucose 115 mg/dL      BUN 73 mg/dL      Creatinine 1.97 mg/dL      Sodium 152 mmol/L      Potassium 3.9 mmol/L      Chloride 115 mmol/L      CO2 27.0 mmol/L      Calcium 7.3 mg/dL      Total Protein 4.8 g/dL      Albumin 2.30 g/dL      ALT (SGPT) <5 U/L      AST (SGOT) 27 U/L      Alkaline Phosphatase 93 U/L      Total Bilirubin 0.6 mg/dL      eGFR Non African Amer 33 mL/min/1.73      Globulin 2.5 gm/dL      A/G Ratio 0.9 g/dL      BUN/Creatinine Ratio 37.1     Anion Gap 10.0 mmol/L     Narrative:       Lactate Dehydrogenase [[id number]]  (Abnormal) Collected:  08/12/19 0306    Specimen:  Blood Updated:  08/12/19 0425      U/L     CBC (No Diff) [[id number]]  (Abnormal) Collected:  08/12/19 0306    Specimen:  Blood Updated:  08/12/19 0419     WBC 9.15 10*3/mm3      RBC 2.91 10*6/mm3      Hemoglobin 9.3 g/dL      Hematocrit 29.5 %      .4 fL      MCH 32.0 pg      MCHC 31.5 g/dL      RDW 21.5 %      RDW-SD 74.4 fl      MPV 13.6 fL      Platelets 55 10*3/mm3     Immature Platelet Fraction [[id number]]  (Abnormal) Collected:  08/12/19 0306    Specimen:  Blood Updated:  08/12/19 0414     IPF 11.10 %      Platelets 55 10*3/mm3     Phosphorus [[id number]]  (Abnormal) Collected:  08/11/19 2003    Specimen:  Blood from Arm, Right Updated:  08/11/19 2100      "Phosphorus 2.2 mg/dL             Operative/Procedure Notes (last 24 hours) (Notes from 2019  2:04 PM through 2019  2:04 PM)     No notes of this type exist for this encounter.           Physician Progress Notes (last 24 hours) (Notes from 2019  2:04 PM through 2019  2:04 PM)      Pedrito Toledo MD at 2019 12:33 PM           LOS: 9 days     Name: Cody Eldridge  Age: 74 y.o.  Sex: male  :  1945  MRN: [id number]         Primary Care Physician: Epley, James, APRN    Subjective   Subjective  No new complaints or overnight events.  States that his mouth pain actually feels somewhat better today.  He is agreeable to trying more by mouth today.    Objective   Vital Signs  Temp:  [97.6 °F (36.4 °C)-97.8 °F (36.6 °C)] 97.8 °F (36.6 °C)  Heart Rate:  [58-97] 58  Resp:  [18-20] 18  BP: (122-143)/(62-87) 122/64  Body mass index is 24.56 kg/m².    Objective:  General Appearance:  Comfortable, in no acute distress and ill-appearing.    Vital signs: (most recent): Blood pressure 122/64, pulse 58, temperature 97.8 °F (36.6 °C), temperature source Oral, resp. rate 18, height 180.3 cm (71\"), weight 79.9 kg (176 lb 1.6 oz), SpO2 97 %.    HEENT: (Cortrak in place with ongoing evidence of mucositis)    Lungs:  Normal effort and normal respiratory rate.    Heart: Normal rate.  Regular rhythm.    Abdomen: Abdomen is soft.  Bowel sounds are normal.   There is no abdominal tenderness.     Extremities: There is no dependent edema or local swelling.    Neurological: Patient is alert and oriented to person, place and time.    Skin:  Warm and dry.              Results Review:       I reviewed the patient's new clinical results.    Results from last 7 days   Lab Units 19  0306 19  0522 08/10/19  0517 19  0338 19  0430 19  0444 19  0510   WBC 10*3/mm3 9.15 10.33 14.98* 14.73* 8.05 1.96* 2.22*   HEMOGLOBIN g/dL 9.3* 10.0* 10.7* 11.6* 11.5* 12.6* 12.7*   PLATELETS " 10*3/mm3 55*  55* 37*  37* 23*  23* 21* 29*  29* 41* 79*     Results from last 7 days   Lab Units 08/12/19  0306 08/11/19  0522 08/10/19  0517 08/09/19  0338 08/08/19  1906 08/08/19  0430 08/07/19  1721 08/07/19  0444   SODIUM mmol/L 152* 149* 143 143 145 140  --  147*   POTASSIUM mmol/L 3.9 4.2 3.4* 3.4* 4.2 3.8 3.9 3.8   CHLORIDE mmol/L 115* 113* 108* 108* 110* 106  --  111*   CO2 mmol/L 27.0 24.2 26.9 21.8* 25.8 24.7  --  24.6   BUN mg/dL 73* 72* 77* 71* 68* 64*  --  56*   CREATININE mg/dL 1.97* 2.41* 2.80* 2.85* 2.91* 2.98*  --  2.79*   CALCIUM mg/dL 7.3* 7.6* 7.3* 7.2* 8.0* 7.6*  --  7.5*   GLUCOSE mg/dL 115* 157* 125* 125* 120* 149*  --  141*     Results from last 7 days   Lab Units 08/09/19  0338 08/08/19  0430 08/07/19  0444 08/06/19  0511   INR  1.12* 1.19* 1.33* 1.60*     Scheduled Meds:     ceFAZolin 2 g Intravenous Q12H   lansoprazole 30 mg Nasogastric Daily   magic mouthwash 5 mL Swish & Spit Q4H   metoprolol tartrate 37.5 mg Oral Q8H   potassium chloride 40 mEq Nasogastric Once   predniSONE 60 mg Oral Daily With Breakfast   sertraline 50 mg Oral Daily   silver sulfadiazine  Topical BID   sodium chloride 3 mL Intravenous Q12H   Sodium Hypochlorite 0.0625 % (Dakin's 1/8th Strength) topical solution 946 mL Irrigation Q12H   terazosin 1 mg Nasogastric Nightly     PRN Meds:   •  acetaminophen  •  ALPRAZolam  •  diphenoxylate-atropine  •  HYDROcodone-acetaminophen  •  ipratropium  •  Lidocaine Viscous HCl  •  magnesium sulfate **OR** magnesium sulfate in D5W 1g/100mL (PREMIX)  •  metoprolol tartrate  •  nitroglycerin  •  ondansetron **OR** ondansetron  •  potassium phosphate infusion greater than 15 mMoles **OR** potassium phosphate **OR** potassium phosphate  •  pramipexole  •  [COMPLETED] Insert peripheral IV **AND** sodium chloride  •  sodium chloride  Continuous Infusions:       Assessment/Plan   Active Hospital Problems    Diagnosis  POA   • **MSSA bacteremia [R78.81]  Unknown   • Shock, septic  (CMS/HCC) [A41.9, R65.21]  Unknown   • Oral thrush [B37.0]  Unknown   • Acute renal injury (CMS/HCC) [N17.9]  Unknown   • Anemia associated with chemotherapy [D64.81, T45.1X5A]  Unknown   • Chemotherapy-induced thrombocytopenia [D69.59, T45.1X5A]  Unknown   • New onset of congestive heart failure (CMS/HCC) [I50.9]  Yes   • Atrial fibrillation (CMS/HCC) [I48.91]  Yes   • Squamous cell carcinoma of neck [C44.42]  Yes   • COPD (chronic obstructive pulmonary disease) (CMS/HCC) [J44.9]  Yes   • Depression with anxiety [F41.8]  Yes   • CAD (coronary artery disease) [I25.10]  Yes   • Chemotherapy-induced neutropenia (CMS/HCC) [D70.1, T45.1X5A]  Yes   • Vascular catheter infection (CMS/[facility]) [T82.7XXA]  Yes      Resolved Hospital Problems   No resolved problems to display.       Assessment & Plan       -Septic shock has resolved and now off pressors.   -Remains on cefazolin with plans for a 4-week course for treatment of MSSA septicemia, per infectious disease  -Received Neupogen with improvement of WBCs. Hematology/oncology following  -Remains thrombocytopenic and somewhat improved today.  Prednisone has been initiated, per oncology  -Atrial fibrillation with rapid ventricular.  Now in sinus rhythm and will maintain current dose of metoprolol, per cardiology.  -Renal function starting to improve with better/more stable hemodynamics.  Nephrology following.  -Becoming more hypernatremic.  Free water flushes were increased yesterday.  Will defer to nephrology.  -Continue supportive care for oral mucositis secondary to chemoradiation.  Continue tube feedings via cortrak for now.  Discussed with his nurse who states he really is not eating or drinking anything.  I told the patient he must try and test this today otherwise the only option will be to place a PEG tube.  -Is finished a 3-day course of oral fluconazole for treatment of thrush  -Hematology monitoring his pleural effusion.  Attempting to avoid thoracentesis if  "possible.  -Appreciate assistance from consulting services  -PT following      Pedrito Toledo MD  [facility]  19  12:33 PM      Electronically signed by Pedrito Toledo MD at 2019 12:37 PM     Myriam Omer MD at 2019  9:31 AM          Patient Name: Cody Eldridge  :1945  74 y.o.      Patient Care Team:  Epley, James, APRN as PCP - General (Family Medicine)  Evelin, Payal Zaragoza MD as Consulting Physician (Pain Medicine)  Lorna Chaidez (Nurse Practitioner)  Kristal Cowart [facility] as Pharmacist  [place], Fritz Snyder MD as Referring Physician (Otolaryngology)  Pablo Heaton MD as Consulting Physician (Hematology and Oncology)  Annie Davila MD as Consulting Physician (Radiation Oncology)  Chetan Heaton MD as Consulting Physician (Urology)    Interval History:   Moved to the floor since last time I saw him.    Subjective:  Following for atrial fibrillation    Objective   Vital Signs  Temp:  [97.6 °F (36.4 °C)-97.8 °F (36.6 °C)] 97.8 °F (36.6 °C)  Heart Rate:  [58-97] 58  Resp:  [18-20] 18  BP: (122-143)/(62-87) 122/64    Intake/Output Summary (Last 24 hours) at 2019 0931  Last data filed at 2019 0357  Gross per 24 hour   Intake 870 ml   Output 1300 ml   Net -430 ml     Flowsheet Rows      First Filed Value   Admission Height  180.3 cm (71\") Documented at 2019 1153   Admission Weight  79.9 kg (176 lb 3 oz) Documented at 2019 1207          Physical Exam:   General Appearance:    Alert, cooperative, in no acute distress   Lungs:     Clear to auscultation.  Normal respiratory effort and rate.      Heart:     Reg reg     Chest Wall:    No abnormalities observed   Abdomen:     Soft, nontender, positive bowel sounds.     Extremities:   no cyanosis, clubbing or edema.  No marked joint deformities.  Adequate musculoskeletal strength.     Assessment/Plan     1.  Atrial fibrillation with rapid ventricular response. "  Continue current medications.  2.  Nonobstructive coronary artery disease  3.  COPD  4.  Systemic hypertension  5.  Sepsis.   6.  Hypokalemia.  Replaced.  7.  Acute on chronic kidney disease.  Creatinine improving.  8.  Anemia.  Stable.    Stable from a cardiac standpoint.  Continue current medications.  We will see as needed if his symptoms change.    Myriam Omer MD, [facility]  19  9:31 AM          Electronically signed by Myriam Omer MD at 2019  9:56 AM     Javier Segovia MD at 2019  9:02 AM              [facility] INPATIENT PROGRESS NOTE         [address]    2019      PATIENT IDENTIFICATION:  Name: Cody Eldridge ADMIT: 8/3/2019   : 1945  PCP: Epley, James, APRN    MRN: [id number] LOS: 9 days   AGE/SEX: 74 y.o. male  ROOM: [facility]                     LOS 9    Reason for visit: Follow-up respiratory failure with septic shock and pulmonary edema      SUBJECTIVE:      Appears a bit brighter today.  Denies productive cough or chest pain.  No wheezing.  Diminished at bases.    Objective   OBJECTIVE:    Vital Sign Min/Max for last 24 hours  Temp  Min: 97.6 °F (36.4 °C)  Max: 97.8 °F (36.6 °C)   BP  Min: 122/64  Max: 143/87   Pulse  Min: 58  Max: 97   Resp  Min: 18  Max: 20   SpO2  Min: 95 %  Max: 97 %   No Data Recorded   Weight  Min: 79.9 kg (176 lb 1.6 oz)  Max: 79.9 kg (176 lb 1.6 oz)                         Body mass index is 24.56 kg/m².    Intake/Output Summary (Last 24 hours) at 2019 1212  Last data filed at 2019 0357  Gross per 24 hour   Intake 870 ml   Output 1300 ml   Net -430 ml         Exam:  GEN:  No distress, appears stated age  EYES:   PERRL, anicteric sclera  ENT:    External ears/nose normal, OP clear  NECK:  No adenopathy, midline trachea  LUNGS: Normal chest on inspection, palpation and diminished bilaterally on auscultation  CV:  Normal S1S2, without murmur  ABD:  Non tender, non distended, no  hepatosplenomegaly, +BS  EXT:  No edema, cyanosis or clubbing  )Assessment   ASSESSMENT:     Active Hospital Problems    Diagnosis  POA   • **MSSA bacteremia [R78.81]  Unknown   • Shock, septic (CMS/HCC) [A41.9, R65.21]  Unknown   • Oral thrush [B37.0]  Unknown   • Acute renal injury (CMS/HCC) [N17.9]  Unknown   • Anemia associated with chemotherapy [D64.81, T45.1X5A]  Unknown   • Chemotherapy-induced thrombocytopenia [D69.59, T45.1X5A]  Unknown   • New onset of congestive heart failure (CMS/HCC) [I50.9]  Yes   • Atrial fibrillation (CMS/HCC) [I48.91]  Yes   • Squamous cell carcinoma of neck [C44.42]  Yes   • COPD (chronic obstructive pulmonary disease) (CMS/HCC) [J44.9]  Yes   • Depression with anxiety [F41.8]  Yes   • CAD (coronary artery disease) [I25.10]  Yes   • Chemotherapy-induced neutropenia (CMS/HCC) [D70.1, T45.1X5A]  Yes   • Vascular catheter infection (CMS/HCC) [T82.7XXA]  Yes      Resolved Hospital Problems   No resolved problems to display.     Sepsis with shock  MSSA bacteremia  Suspected infection of Mediport  Acute on chronic diastolic CHF  Acute hypoxemic respiratory failure with pulmonary edema  Left moderate pleural effusion   Acute kidney injury  A. 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  Wean oxygen as able.  Encourage incentive spirometer use.  Bronchodilators prn for COPD symptoms and help with clearance.  Plan 4 weeks antibiotic for MSSA bacteremia per ID recommendations.  Steroid per oncology.  If effusion worsens may require thoracentesis but with platelets would like to avoid.        Javier Segovia MD  Pulmonary and Critical Care Medicine  [facility]  8/12/2019    12:12 PM       Electronically signed by  Javier Segovia MD at 8/12/2019 12:13 PM     Zita Valdivia MD PhD at 8/11/2019  5:18 PM          REASON FOR CONSULTATION:  Neutropenic fever, port site cellulitis, prolonged protime no clinical bleeding, severe grade iii mucositis, atrial fibrilation rvr  Provide an opinion on any further workup or treatment                             INTERVAL HISTORY:  On 8/5/2019 in the early morning,Patient was moved to intensive care unit because of hypotension, atrial fibrillation with RVR and currently on pressor and amiodarone.     In the early afternoon of 8/5/2019, patient had portacatheter removed.     On 8/6/2019, nurse and family member reports patient is restless, patient himself is coherent and oriented.  Nurse reports patient had no urine output, bladder scan shows residual urine 150 mL.  It was 100 mL earlier this morning.  In the meantime patient has worsening renal function with creatinine 1.83 up from 1.17 the previous day.     The tip of portacatheter grew out Staphylococcus aureus within 24 hours.  Sensitivity study is not available.     On 8/7/2019, patient has further decreased platelets 41,000, and his WBC and neutrophils both actually slightly decreased at 1960 including ANC 1630.  His renal function is further worsening at creatinine 2.79.  Patient has been seen by nephrologist.  Patient is off pressor, and seems in sinus rhythm.     On 8/8/2019, patient had a further deteriorating platelets 29,000 without active bleeding.  Patient has resolution of neutropenia with ANC 5800 out of WBC 8000.  Slightly trending down hemoglobin 11.5.  Renal function no improvement creatinine 2.98.  Continues to have soreness in mouth, on tube feeding.  Patient was not able to tolerate Meghann's Magic portion for oral mucositis.     On 8/9/2019, further decreased Plts 21,000.  Hb stable 11.6, WBC further improved at 14,700 including ANC 13,000.  Patient is afebrile.  Was able to participate in rehabilitation.      On  8/10/2019, worsening anemia Hb 10.7, low platelets 23,000, IPF 14.8%, worsening .  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  We started patient on oral prednisone 60 mg daily.     On 8/11/2019, platelets improved at 37,000, decreased IPF 11.4%, slightly improved , however hemoglobin further down at 10.0.  His kidney function improved creatinine 2.41.  Patient continued to have significant oral mucositis, with small quantity of blood.  Oral thrush resolved.  We will change his Magic mouthwash to scheduled every 4 hours.         HISTORY OF PRESENT ILLNESS:  The patient is a 74 y.o. year old male who is here for an opinion about the above issue.     History of Present Illness I have been asked to see this patient in consultation today who is a 74-year-old white male who has been treated BY DR RONALDO WILLIAMSON FOR HEAD AND NECK CANCER WITH CHEMO AND RADIATION THERAPY In any event the patient has received radiation therapy to his NECK, he has been receiving IV fluids in the office in the last few days because of profound weakness and he has now developed atrial fibrillation with rapid ventricular response from a congestive heart failure, elevation of the proBNP in the 8000 category and further more significant sensitivity and redness in the skin of the anterior chest wall on the right side where the port is located that makes me to think that he has at least a cellulitis in this anatomical site. The patient has a severe degree of mucositis associated with radiation therapy. He is not able to swallow. Also the patient has developed diarrhea with no abdominal pain and in a stool culture enteropathogenic E-coli has been recovered. Clostridium was negative. The patient has lost substantial amount of weight. He has not been able to eat anything for the last 3-4 weeks. He has had swelling in his lower extremities. He has had cough and he has had shortness of breath. He has had  fevers and chills as stated. He just feels terrible.            Medical History        Past Medical History:   Diagnosis Date   • Atrial fibrillation (CMS/HCC)     • CHF (congestive heart failure) (CMS/HCC)     • Chronic bronchitis (CMS/HCC)     • COPD (chronic obstructive pulmonary disease) (CMS/HCC)     • Cor pulmonale (chronic) (CMS/HCC)     • Daytime hypersomnia     • Depression with anxiety     • Elevated cholesterol     • Enlarged prostate     • Frequent nocturnal awakening     • Gout     • H/O cardiac radiofrequency ablation 2006     [facility].   • Head and neck cancer (CMS/HCC)     • Hyperlipidemia     • Hypertension     • Hypotension     • Insomnia     • Lumbar radicular pain     • SHAR (obstructive sleep apnea)     • Osteoarthritis     • Permanent atrial fibrillation (CMS/HCC)     • Snoring     • Squamous cell carcinoma of neck     • SVT (supraventricular tachycardia) (CMS/HCC)     • Syncope     • Vitamin D deficiency     • Weight loss              Surgical History         Past Surgical History:   Procedure Laterality Date   • APPENDECTOMY       • CARDIAC ABLATION   2006     [facility].   • CARDIAC CATHETERIZATION N/A 8/29/2018     Procedure: Left Heart Cath;  Surgeon: Yousif Uribe MD;  Location: [facility] CATH INVASIVE LOCATION;  Service: Cardiology   • CARDIAC CATHETERIZATION N/A 8/29/2018     Procedure: Coronary angiography;  Surgeon: Yousif Uribe MD;  Location: [facility] CATH INVASIVE LOCATION;  Service: Cardiology   • CARDIAC CATHETERIZATION N/A 8/29/2018     Procedure: Left ventriculography;  Surgeon: Yousif Uribe MD;  Location: [facility] CATH INVASIVE LOCATION;  Service: Cardiology   • FINGER SURGERY       • FOOT SURGERY       • HAND SURGERY       • VENOUS ACCESS DEVICE (PORT) INSERTION Right 6/18/2019     Procedure: MEDIPORT PLACEMENT;  Surgeon: Elvis Grigsby MD;  Location: [facility] OR;  Service: Vascular           ONCOLOGIC HISTORY DR RONALDO WILLIAMSON MD:     1.  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  He is undergoing definitive treatment with radiation therapy and weekly low-dose carboplatin and Taxol chemotherapy.  He was able to complete 4 weekly chemotherapy treatments in a row but required holding chemotherapy for the last 2 weeks due to myelosuppression.   he appears to be in rapid atrial fibrillation.  2.  Comorbidities including ischemic heart disease with history of CHF and atrial fibrillation requiring Coumadin anticoagulation.    3.  Oral mucositis.  He has a prescription for hydrocodone for pain control.  He does have Meghann's Magic mouth rinse to use as well.  4.  Warfarin anticoagulation.  Patient's INR 3.83, he will hold his coumadin today and tomorrow and have another INR checked Wednesday 7/17/20195.    5.  Weight loss and poor nutrition.  The patient's weight has stabilized and he is utilizing boost at home and trying to hydrate with water and Gatorade.  He does understand if his nutrition is not improving we will need to proceed with a G-tube for nutrition.  Ruthy Bautista, oncology dietitian been involved in his care also.  6.  Atrial fibrillation with rapid ventricular response     MEDICATIONS: see EMR.     ALLERGIES:          Allergies   Allergen Reactions   • Benadryl [Diphenhydramine Hcl] Other (See Comments) and Dizziness       \"I sleep for about a day\"       Social History no changes.      Family History;  No changes.        Review of Systems   Constitutional: Positive for activity change, appetite change, chills, diaphoresis, fatigue, fever and unexpected weight change.   HENT: Positive for mouth sores, sore throat, trouble swallowing and voice change.    Eyes: Negative.    Respiratory: Positive for cough and shortness of breath.    Cardiovascular: Positive for palpitations.   Gastrointestinal: Positive for abdominal distention and diarrhea.   Endocrine: Negative.  "   Genitourinary: Negative.    Musculoskeletal: Negative.    Skin: Positive for pallor.   Neurological: Negative.    Hematological: Negative.    Psychiatric/Behavioral: Negative.               Objective      Vitals:    08/11/19 0703 08/11/19 0750 08/11/19 1300 08/11/19 1408   BP: 125/75 113/61 138/75 143/87   BP Location:  Right arm Right arm    Patient Position:  Lying Sitting    Pulse: 95 85 97 77   Resp:  20 20    Temp:  98.1 °F (36.7 °C) 97.6 °F (36.4 °C)    TempSrc:  Oral Oral    SpO2:  95% 95%    Weight:       Height:          Physical Exam   Constitutional: He is oriented to person, place, and time. He is lying in the bed comfortable, carries conversation well.   HENT:   Head: Normocephalic.   Nose: NG tube in place.  Mouth/Throat: Grade III mucositis.  There is oral thrush discovered on 8/8/2019.  Treated with Diflucan and resolution of oral thrush.  Eyes: Conjunctivae and EOM are normal. No scleral icterus.   Neck: Normal range of motion. No tracheal deviation present. No thyromegaly present.   Erythema neck area of radiation   Cardiovascular:  Tachycardia, irregular irregular rhythm.   Pulmonary/Chest: Effort normal and breath sounds normal.   Abdominal: Soft. He exhibits no distension and no mass. There is no tenderness. There is no rebound and no guarding.   Musculoskeletal: No leg edema   Lymphadenopathy:     He has no cervical adenopathy.   Neurological: He is alert and oriented to person, place, and time.   Skin: Portacatheter has been removed.   Psychiatric: He has a normal mood and affect.  Patient seems restless not in acute distress. Judgment and thought content normal.               Assessment/Plan   1.   In summary this patient is a 74-year-old who has been treated FOR HEAD NECK CA He has received radiation therapy to the neck and he has been undergoing chemotherapy .  The patient has been admitted with rapid ventricular response, atrial fibrillation, fever, tremendous tenderness and erythema at  the port site in anterior chest wall on the right side and associated with this diarrhea with a stool culture consistent with E-coli enteropathogenic. Also the patient has abnormal blood cultures.     Due to poor performance status, chemoradiotherapy will be on hold.    · On 8/7/2019, I discussed with the patient and his wife, will reevaluate in the future to see whether he needs more systematic chemotherapy after he recovered from sepsis and the improvement of performance status.    2. The patient has severe neutropenia secondary to chemotherapy.     · Continue currently on Neupogen daily.   · Improved neutrophil counts 2030 on 8/6/2019.   · Slightly worsening neutrophil at 1630 on 8/7/2019.  We will continue Neupogen and monitor.   · Significantly improved and normalized neutrophils 500 on 8/82019.  Neupogen will be discontinued.  · On 8/9/2019, patient become leukocytosis WBC 14,700 and ANC 13,400 which is expected secondary to Neupogen.   · WBC normalized 10,300 on 8/11/2019.    3.  Sepsis with positive MSSA septicemia.  Patient has been followed by Dr. Cruz, was on nafcillin.  · PowerPort was removed earlier this afternoon on 8/5/2019.  · Catheter tip culture was positive for Staphylococcus aureus on 8/6/2019.   · Antibiotics being switched to cefazolin 8/6/2019.    · Patient is improving, off pressor on 8/7/2019.       4.  Acute renal injury, with creatinine 1.83 today, up from 1.17 yesterday.  Patient does not have urination.  Residual urine in the bladder is only 150 mL this afternoon on 8/6/2019.   · Patient has been seen by nephrology service.   · Creatinine 2.98 on 8/8/2019.  · Improved renal function creatinine 2.85 on 8/9/2019   · Creatinine 2.41 8/11/2019.    5.  Thrombocytopenia secondary to chemotherapy.    · Worsening platelets at 79,000 on 8/6/2019.  No bleeding.    · Further deteriorating platelets 41,000 on 8/7/2019.  His thrombocytopenia possibly also contributed by his infection and now  antibiotic.    · On 8/8/2019, platelets trending down and 29,000, with IPF 10.1%.  Patient has no active bleeding.    · On 8/9/2019, platelets decreased to 21,000.  No bleeding.  Continue to monitor.    · On 8/10/2019, platelets 23,000, IPF 14.8%, elevated.  Suspect the patient may have elements of ITP.  I recommended to start steroids 60 mg daily.    · On 8/11/2019 improved platelets 37,000 IPF 11.4% and .  Hemoglobin down at 10.0.  Continue prednisone.    6.  Anemia.  Hb 12.7, newly developed on 8/6/2019.   · Stable Hb 12.6 on 8/7/2019.   · Hemoglobin drops at 11.5 on 8/8/2019.    · Stable hemoglobin 11.6 on 8/09/2019.    · On 8/10/2019, hemoglobin trending down to 10.7.  LDH further increased at 580.  Suspect the patient may have elements of hemolysis.  We will start steroids also.  See the above section for thrombocytopenia.      7.  Oral mucositis grade 3, secondary to chemoradiation therapy.  Routine therapy will be on hold for now per Dr. Davila.   · Nurse reports 8/6/2019 lidocaine viscous is not really helping.  We will try Magic mouthwash.   · Nurse reports patient was able to tolerate Magic mouthwash and lidocaine viscous today on 8/9/2019.    · Persistent oral mucositis, will schedule Magic mouthwash every 4 hours.  Oral thrush has resolved.    8.  Oral thrush discovered on 8/8/2019.  We started patient on Diflucan 200 mg daily for 3 days per feeding tube.  Oral thrush resolved as examined on 8/11/2019.     9.  Coagulopathy due to vitamin K deficiency.  This is likely due to poor oral intake.  Patient currently is on tube feeding.  · INR has further improved after starting tube feeding, currently 1.19 today on 8/8/2019.   · No need for daily INR check anymore.    10.  Atrial fibrillation with RVR.  Cardiology to follow.  Patient was on digoxin.   · Patient continues atrial fibrillation with heart rate in the 130s.  Off digoxin due to elevated level.  Per cardiology, started patient on  metoprolol..    11.  Continue proton pump inhibitor lansoprazole.      RECOMMENDATIONS:  1.  Continue IV cefazolin, per ID service.  2.  Continue prednisone 60 mg daily per feeding tube.   3.  Change Magic mouthwash to every 4 hours as scheduled.  4.   Continue tube feeding.    5.  Monitor CBC in the morning.  No need for WBC differentiation.  We will check IPF and LDH.     I discussed with patient and his wife.  I also spoke with his nurse today for patient care.    JONATHAN JAVIER M.D., Ph.D.    8/11/2019      Electronically signed by Jonathan Javier MD PhD at 8/11/2019  7:57 PM         All medication doses during the admission are shown, including meds that are no longer on order.   Scheduled Meds Sorted by Name   for Cody Eldridge as of 8/10/19 through 8/12/19     1 Day 3 Days 7 Days 10 Days < Today >    Legend:                          Inactive     Active     Other Encounter    Linked               Medications 08/10/19 08/11/19 08/12/19   albumin human 5 % solution 500 mL   Dose: 500 mL  Freq: Once Route: IV  Indications of Use: Crystalloid Refractory Shock  Start: 08/05/19 1100 End: 08/05/19 1106         aluminum-magnesium hydroxide 200-200 MG/5ML, diphenhydrAMINE (BENADRYL) 12.5 MG/5ML, lidocaine viscous (XYLOCAINE) 2 %, nystatin (MYCOSTATIN) [id number] UNIT/ML compound   Dose: 10 mL  Freq: 4 Times Daily Before Meals & Nightly Route: SWISH & SWAL  Start: 08/03/19 2100 End: 08/03/19 1752         amiodarone (PACERONE) tablet 200 mg   Dose: 200 mg  Freq: Every 12 Hours Scheduled Route: PO  Start: 08/03/19 2100 End: 08/03/19 1749    Admin Instructions:   Avoid grapefruit juice while taking this medication.         baclofen (LIORESAL) tablet 10 mg   Dose: 10 mg  Freq: 3 Times Daily Route: PO  Start: 08/03/19 2100 End: 08/05/19 1012    Admin Instructions:   Take with food if GI upset occurs.         calcium gluconate 1 g in sodium chloride 0.9 % 100 mL IVPB   Dose: 1 g  Freq: Once Route: IV  Start: 08/06/19 1330  End: 08/06/19 1349         ceFAZolin in dextrose (ANCEF) IVPB solution 2 g   Dose: 2 g  Freq: Every 12 Hours Route: IV  Indications of Use: BACTEREMIA  Last Dose: 2 g (08/12/19 1028)  Start: 08/06/19 1000 End: 09/02/19 0959    Admin Instructions:   Caution: Look alike/sound alike drug alert    0930   2150        1010   2349        1028   2200          digoxin (LANOXIN) injection 125 mcg   Dose: 125 mcg  Freq: Once Route: IV  Start: 08/05/19 1345 End: 08/05/19 1345    Admin Instructions:    Check and record heart rate. Use filter needle to withdraw dose from ampule. Dose may be pushed over 5 minutes.         digoxin (LANOXIN) injection 125 mcg   Dose: 125 mcg  Freq: Daily Digoxin Route: IV  Start: 08/05/19 1200 End: 08/07/19 0755    Admin Instructions:    Check and record heart rate. Use filter needle to withdraw dose from ampule. Dose may be pushed over 5 minutes.         digoxin (LANOXIN) injection 500 mcg   Dose: 500 mcg  Freq: Once Route: IV  Start: 08/03/19 1845 End: 08/03/19 1943    Admin Instructions:    Check and record heart rate. Use filter needle to withdraw dose from ampule. Dose may be pushed over 5 minutes.         digoxin (LANOXIN) injection 500 mcg   Dose: 500 mcg  Freq: Once Route: IV  Start: 08/03/19 1311 End: 08/03/19 1332    Admin Instructions:    Check and record heart rate. Use filter needle to withdraw dose from ampule. Dose may be pushed over 5 minutes.         famotidine (PEPCID) injection 20 mg   Dose: 20 mg  Freq: Once Route: IV  Start: 08/05/19 1300 End: 08/05/19 1507    Admin Instructions:   Dilute to 10 mL total volume and give IV push over 2 minutes.         fentaNYL citrate (PF) (SUBLIMAZE) injection 25 mcg   Dose: 25 mcg  Freq: Once Route: IV  Start: 08/06/19 1000 End: 08/06/19 1137    Admin Instructions:   Use filter needle to withdraw dose from ampule.  If given for pain, use the following pain scale:  Mild Pain = Pain Score of 1-3, CPOT 1-2  Moderate Pain = Pain Score of 4-6, CPOT  3-4  Severe Pain = Pain Score of 7-10, CPOT 5-8         Filgrastim (NEUPOGEN) injection 480 mcg   Dose: 480 mcg  Freq: Every Evening Route: SC  Start: 08/04/19 1700 End: 08/08/19 0846         fluconazole (DIFLUCAN) 40 MG/ML suspension 200 mg   Dose: 200 mg  Freq: Daily Route: PO  Indications Comment: oral candidiasis  Start: 08/08/19 1300 End: 08/10/19 0930    Admin Instructions:   Per feeding tube  Shake well immediately prior to administration. Store at room temp. Discard unused portion 14 days after reconstitution.    0930              furosemide (LASIX) injection 40 mg   Dose: 40 mg  Freq: Every 12 Hours Route: IV  Start: 08/03/19 1815 End: 08/04/19 1127         furosemide (LASIX) injection 40 mg   Dose: 40 mg  Freq: Once Route: IV  Start: 08/03/19 1311 End: 08/03/19 1331         lansoprazole (FIRST) oral suspension 30 mg   Dose: 30 mg  Freq: Daily Route: NG  Start: 08/08/19 1700    Admin Instructions:   Shake well. Refrigerate.    0937          1015          0824            magic mouthwash oral supsension 10 mL   Dose: 10 mL  Freq: 4 Times Daily Before Meals & Nightly Route: SWISH & SWAL  Start: 08/03/19 2100 End: 08/04/19 0918         magic mouthwash oral supsension 5 mL   Dose: 5 mL  Freq: Every 4 Hours Route: SWISH & SPIT  Start: 08/11/19 2000     [phone]   0885 [phone]     2000            metoprolol tartrate (LOPRESSOR) injection 5 mg   Dose: 5 mg  Freq: Once Route: IV  Start: 08/03/19 1221 End: 08/03/19 1226         metoprolol tartrate (LOPRESSOR) tablet 12.5 mg   Dose: 12.5 mg  Freq: Every 12 Hours Scheduled Route: PO  Start: 08/06/19 2100 End: 08/07/19 0755         metoprolol tartrate (LOPRESSOR) tablet 25 mg   Dose: 25 mg  Freq: Every 8 Hours Route: PO  Start: 08/08/19 1743 End: 08/09/19 1622         metoprolol tartrate (LOPRESSOR) tablet 25 mg   Dose: 25 mg  Freq: Every 12 Hours Scheduled Route: PO  Start: 08/07/19 0900 End: 08/08/19 1518         metoprolol tartrate  (LOPRESSOR) tablet 37.5 mg   Dose: 37.5 mg  Freq: Every 8 Hours Route: PO  Start: 08/09/19 1745    0219   0929   2150      0703   1408   2227      0528   1404   2200        metoprolol tartrate (LOPRESSOR) tablet 50 mg   Dose: 50 mg  Freq: Every 12 Hours Scheduled Route: PO  Start: 08/04/19 1215 End: 08/05/19 1011    Admin Instructions:   Hold for systolic <100mmHg         midazolam (VERSED) injection 1 mg   Dose: 1 mg  Freq: Once Route: IV  Start: 08/06/19 1300 End: 08/06/19 1205    Admin Instructions:   Line placement  Maximum total dose of midazolam is 4 mg.           montelukast (SINGULAIR) tablet 10 mg   Dose: 10 mg  Freq: Nightly Route: PO  Start: 08/03/19 2100 End: 08/05/19 1012         nafcillin (UNIPEN) 2 g/100 mL 0.9% NS IVPB (mbp)   Dose: 2 g  Freq: Every 4 Hours Route: IV  Indications of Use: BACTEREMIA  Start: 08/04/19 1130 End: 08/06/19 0747         Pharmacy to dose vancomycin   Freq: Daily Route: XX  Indications of Use: BACTEREMIA  Start: 08/04/19 0900 End: 08/04/19 1043         phytonadione (AQUA-MEPHYTON, VITAMIN K) 5 mg in sodium chloride 0.9 % 50 mL IVPB   Dose: 5 mg  Freq: Once Route: IV  Last Dose: 5 mg (08/05/19 1121)  Start: 08/05/19 1115 End: 08/05/19 1221         phytonadione (MEPHYTON, VITAMIN K) tablet 5 mg   Dose: 5 mg  Freq: Once Route: PO  Start: 08/04/19 1100 End: 08/04/19 1352         potassium chloride (KLOR-CON) packet 40 mEq   Dose: 40 mEq  Freq: Once Route: PO  Start: 08/09/19 1830 End: 08/09/19 2021    Admin Instructions:   For use with a feeding tube. Mix in at least 4 oz. of liquid.         potassium chloride (KLOR-CON) packet 40 mEq   Dose: 40 mEq  Freq: Once Route: NG  Start: 08/06/19 1000    Admin Instructions:   For use with a feeding tube. Mix in at least 4 oz. of liquid.         potassium chloride (KLOR-CON) packet 40 mEq   Dose: 40 mEq  Freq: 2 Times Daily Route: NG  Start: 08/06/19 1000 End: 08/06/19 0946    Admin Instructions:   For use with a feeding tube. Mix in at  least 4 oz. of liquid.         potassium chloride 10 mEq in 100 mL IVPB   Dose: 10 mEq  Freq: Once Route: IV  Last Dose: 10 mEq (08/05/19 1402)  Start: 08/05/19 1345 End: 08/05/19 1502    Admin Instructions:   OUTPATIENT/NON-MONITORED UNITS: Potassium Chloride standard bolus infusion rate is a maximum of 10 mEq/hr on unmonitored patients    MONITORED UNITS: Potassium Chloride standard bolus infusion rate is a maximum of 20 mEq/hr on ECG monitored patients ONLY         potassium phosphate 10 mmol in sodium chloride 0.9 % 100 mL infusion   Dose: 10 mmol  Freq: Once Route: IV  Start: 08/07/19 0915 End: 08/07/19 1030    Admin Instructions:   Refrigerate         predniSONE (DELTASONE) tablet 60 mg   Dose: 60 mg  Freq: Daily With Breakfast Route: PO  Start: 08/10/19 1915    Admin Instructions:   Take with food.     2151 0959          0819            rosuvastatin (CRESTOR) tablet 20 mg   Dose: 20 mg  Freq: Daily Route: PO  Start: 08/03/19 1815 End: 08/05/19 1012    Admin Instructions:   Avoid grapefruit juice.         sertraline (ZOLOFT) tablet 50 mg   Dose: 50 mg  Freq: Daily Route: PO  Start: 08/03/19 1815 0930          0959          0819            silver sulfadiazine (SILVADENE, SSD) 1 % cream   Freq: 2 Times Daily Route: TOP  Start: 08/03/19 2100    Admin Instructions:       0930   2150        1028   2232        0824   2100          sodium chloride 0.9 % bolus 2,000 mL   Dose: 2,000 mL  Freq: Once Route: IV  Last Dose: 2,000 mL (08/06/19 0906)  Start: 08/06/19 1000 End: 08/06/19 1106         sodium chloride 0.9 % bolus 500 mL   Dose: 500 mL  Freq: Once Route: IV  Last Dose: 500 mL (08/05/19 1039)  Start: 08/05/19 1115 End: 08/05/19 1054         sodium chloride 0.9 % flush 3 mL   Dose: 3 mL  Freq: Every 12 Hours Scheduled Route: IV  Start: 08/03/19 2100    0929   2150        1029   2232        0815   2100          Sodium Hypochlorite 0.0625 % (Dakin's 1/8th Strength) topical solution   Dose: 946 mL  Freq:  Every 12 Hours Scheduled Route: IR  Start: 08/06/19 0915    Admin Instructions:   Apply to right chest wound, moistened 4 x 4 with Dakin's and place in Mediport pocket.  Change twice daily, cover with clean dry gauze..    0930 2152        1029   2232        0825   2100          sodium phosphates 30 mmol in sodium chloride 0.9 % 250 mL IVPB   Dose: 30 mmol  Freq: Once Route: IV  Last Dose: 30 mmol (08/08/19 2304)  Start: 08/08/19 2300 End: 08/09/19 0504         sodium phosphates 40 mmol in sodium chloride 0.9 % 250 mL IVPB   Dose: 40 mmol  Freq: Once Route: IV  Last Dose: 40 mmol (08/08/19 0610)  Start: 08/08/19 0630 End: 08/08/19 1210         terazosin (HYTRIN) capsule 1 mg   Dose: 1 mg  Freq: Nightly Route: NG  Start: 08/07/19 2100 2151          2231          2100            vancomycin 1500 mg/500 mL 0.9% NS IVPB ([facility])   Dose: 1,500 mg  Freq: Once Route: IV  Indications of Use: BACTEREMIA  Start: 08/04/19 0930 End: 08/05/19 1001         vancomycin 750 mg/250 mL 0.9% NS add-vantage   Dose: 750 mg  Freq: Every 12 Hours Route: IV  Indications of Use: BACTEREMIA  Start: 08/04/19 2100 End: 08/04/19 1043         Medications 08/10/19 08/11/19 08/12/19       Continuous Meds Sorted by Name   for Cody Eldridge as of 8/10/19 through 8/12/19    Legend:                          Inactive     Active     Other Encounter    Linked               Medications 08/10/19 08/11/19 08/12/19   amiodarone (NEXTERONE) 360 mg/200 mL (1.8 mg/mL) infusion   Rate: 16.67 mL/hr Dose: 0.5 mg/min  Freq: Continuous Route: IV  Last Dose: Stopped (08/06/19 1547)  Start: 08/05/19 0730 End: 08/06/19 1503    Admin Instructions:   IV med requiring filtration 0.22 micron inline filter         amiodarone (NEXTERONE) 360 mg/200 mL (1.8 mg/mL) infusion   Rate: 33.3 mL/hr Dose: 1 mg/min  Freq: Continuous Route: IV  Last Dose: 0.5 mg/min (08/05/19 0259)  Start: 08/03/19 1845 End: 08/05/19 0643    Admin Instructions:   1 mg /min for 6 hours then decrease  to  0.5 mg /min thereafter.  No bolus.  IV med requiring filtration 0.22 micron inline filter         lactated ringers infusion   Rate: 9 mL/hr Dose: 9 mL/hr  Freq: Continuous Route: IV  Last Dose: 9 mL/hr (08/05/19 1344)  Start: 08/05/19 1300 End: 08/10/19 1930    1930-D/C'd            norepinephrine (LEVOPHED) 8 mg/250 mL (32 mcg/mL) in sodium chloride 0.9% infusion (premix)   Rate: 2.64-39.66 mL/hr Dose: 0.02-0.3 mcg/kg/min  Weight Dosing Info: 70.5 kg  Freq: Titrated Route: IV  Last Dose: Stopped (08/05/19 1136)  Start: 08/05/19 0730 End: 08/05/19 0955    Admin Instructions:   Initiate Infusion at 0.02 mcg/kg/min & Titrate By 0.02 mcg/kg/min Every 5 Minutes to use the lowest dose possible up to 0.3 mcg/kg/min to maintain a MAP Greater Than or Equal To 65 mm Hg. Contact provider, if unable to maintain MAP target at the prescribed dose or if MAP is Greater Than 95 mm Hg. Once MAP target achieved obtain vitals a minimum of every 30 minutes. With Provider order, may titrate to maximum dose of 0.5 mcg/kg/min.  Concentration 8 mg/250 mL         phenylephrine (SHAYNA-SYNEPHRINE) 50 mg in sodium chloride 0.9 % 250 mL (0.2 mg/mL) infusion   Rate: 10.58-63.45 mL/hr Dose: 0.5-3 mcg/kg/min  Weight Dosing Info: 70.5 kg  Freq: Titrated Route: IV  Last Dose: Stopped (08/06/19 2110)  Start: 08/05/19 1045 End: 08/09/19 0819    Admin Instructions:   Initiate Infusion at 0.5 mcg/kg/min & Titrate By 0.3 mcg/kg/min Every 5 Minutes to use the lowest dose possible to maintain MAP Greater Than or Equal To 65 mm Hg. Maximum Dose 3 mcg/kg/min. Contact provider if unable to maintain MAP Greater Than or Equal To 65 mm Hg on Max Dose or if MAP is Greater Than 95 mm Hg. Once MAP target achieved obtain vitals a minimum of every 30 minutes.           Medications 08/10/19 08/11/19 08/12/19       PRN Meds Sorted by Name   for Cody Eldridge as of 8/10/19 through 8/12/19    Legend:                          Inactive     Active     Other Encounter     Linked               Medications 08/10/19 08/11/19 08/12/19   acetaminophen (TYLENOL) tablet 650 mg   Dose: 650 mg  Freq: Every 4 Hours PRN Route: PO  PRN Reason: Mild Pain   Start: 08/03/19 1720    Admin Instructions:   Do not exceed 4 grams of acetaminophen in a 24 hr period.    If given for pain, use the following pain scale:   Mild Pain = Pain Score of 1-3, CPOT 1-2  Moderate Pain = Pain Score of 4-6, CPOT 3-4  Severe Pain = Pain Score of 7-10, CPOT 5-8         albuterol (PROVENTIL) nebulizer solution 0.083% 2.5 mg/3mL   Dose: 2.5 mg  Freq: Every 6 Hours PRN Route: NEBULIZATION  PRN Reason: Shortness of Air  Start: 08/03/19 1716 End: 08/09/19 2338         ALPRAZolam (XANAX) tablet 1 mg   Dose: 1 mg  Freq: Daily PRN Route: PO  PRN Reason: Anxiety  PRN Comment: prior to radiation-takes 1/2  Start: 08/03/19 1716    Admin Instructions:    Caution: Look alike/sound alike drug alert. Avoid grapefruit juice         bupivacaine PF 30 mL, lidocaine 1 % 30 mL mixture   Freq: As Needed  Start: 08/05/19 1441 End: 08/05/19 1458         diphenoxylate-atropine (LOMOTIL) 2.5-0.025 MG per tablet 1 tablet   Dose: 1 tablet  Freq: 4 Times Daily PRN Route: PO  PRN Reason: Diarrhea  Start: 08/03/19 1717    Admin Instructions:   Maximum 8 tablets per 24 hours.      2154          1621             ePHEDrine injection 5 mg   Dose: 5 mg  Freq: Once As Needed Route: IV  PRN Comment: symptomatic hypotension - Notify attending anesthesiologist if this needs to be given  Start: 08/05/19 1453 End: 08/05/19 1558    Admin Instructions:   Caution: Look alike/sound alike drug alert               Dilute with NS to 5-10 mg/mL.         fentaNYL citrate (PF) (SUBLIMAZE) injection 50 mcg   Dose: 50 mcg  Freq: Every 10 Minutes PRN Route: IV  PRN Reason: Severe Pain   Start: 08/05/19 1258 End: 08/05/19 1507    Admin Instructions:   Maximum total dose of fentanyl is 100 mcg.  If given for pain, use the following pain scale:  Mild Pain = Pain Score of  1-3, CPOT 1-2  Moderate Pain = Pain Score of 4-6, CPOT 3-4  Severe Pain = Pain Score of 7-10, CPOT 5-8         flumazenil (ROMAZICON) injection 0.2 mg   Dose: 0.2 mg  Freq: As Needed Route: IV  PRN Comment: for benzodiazepine induced unresponsiveness or sedation  Indications of Use: BENZODIAZEPINE-INDUCED SEDATION  Start: 08/05/19 1453 End: 08/05/19 1558    Admin Instructions:   Notify Anesthesia if given  ** give IV over 15-30 seconds **         hydrALAZINE (APRESOLINE) injection 5 mg   Dose: 5 mg  Freq: Every 10 Minutes PRN Route: IV  PRN Reason: High Blood Pressure  PRN Comment: for systolic blood pressure greater than 180 mmHg or diastolic blood pressure greater than 105 mmHg  Start: 08/05/19 1453 End: 08/05/19 1558    Admin Instructions:   Use labetalol first THEN hydralazine second if labetalol fails to reduce systolic blood pressure to less than 180 mmHg or diastolic blood pressure less than 105 mmHg    Maximum dose of hydralazine is 20 mg  Caution: Look alike/sound alike drug alert         HYDROcodone-acetaminophen (NORCO) 5-325 MG per tablet 1 tablet   Dose: 1 tablet  Freq: Every 4 Hours PRN Route: PO  PRN Reason: Moderate Pain   Start: 08/05/19 2054 End: 08/15/19 2053    Admin Instructions:   [DIANNA]    Do not exceed 4 grams of acetaminophen in a 24 hr period.    If given for pain, use the following pain scale:   Mild Pain = Pain Score of 1-3, CPOT 1-2  Moderate Pain = Pain Score of 4-6, CPOT 3-4  Severe Pain = Pain Score of 7-10, CPOT 5-8         HYDROcodone-acetaminophen (NORCO) 7.5-325 MG per tablet 1 tablet   Dose: 1 tablet  Freq: 4 Times Daily PRN Route: PO  PRN Reasons: Moderate Pain ,Severe Pain   Start: 08/03/19 1717 End: 08/05/19 2055    Admin Instructions:   [DIANNA]    Do not exceed 4 grams of acetaminophen in a 24 hr period.    If given for pain, use the following pain scale:   Mild Pain = Pain Score of 1-3, CPOT 1-2  Moderate Pain = Pain Score of 4-6, CPOT 3-4  Severe Pain = Pain Score of 7-10,  CPOT 5-8         ipratropium (ATROVENT) nebulizer solution 0.5 mg   Dose: 0.5 mg  Freq: Every 6 Hours PRN Route: NEBULIZATION  PRN Reason: Shortness of Air  Start: 08/10/19 0600    Admin Instructions:            labetalol (NORMODYNE,TRANDATE) injection 5 mg   Dose: 5 mg  Freq: Every 5 Minutes PRN Route: IV  PRN Reason: High Blood Pressure  PRN Comment: for systolic blood pressure greater than 180 mmHg or diastolic blood pressure greater than 105 mmHg  Start: 08/05/19 1453 End: 08/05/19 1558    Admin Instructions:   Use labetalol first THEN hydralazine second if labetalol fails to reduce systolic blood pressure to less than 180 mmHg or diastolic blood pressure less than 105 mmHg    Hold for heart rate less than 60.    Maximum dose of labetalol is 20 mg  Give by slow IV Push each 20mg (or less) over 2 minutes         lidocaine PF 1% (XYLOCAINE) injection 0.5 mL   Dose: 0.5 mL  Freq: Once As Needed Route: IJ  PRN Comment: IV Start  Start: 08/05/19 1258 End: 08/05/19 1507         Lidocaine Viscous HCl (XYLOCAINE) 2 % mouth solution 5 mL   Dose: 5 mL  Freq: Every 3 Hours PRN Route: MT  PRN Reason: Mouth Pain  Start: 08/04/19 0917    Admin Instructions:   If given for pain, use the following pain scale:  Mild Pain = Pain Score of 1-3, CPOT 1-2  Moderate Pain = Pain Score of 4-6, CPOT 3-4  Severe Pain = Pain Score of 7-10, CPOT 5-8    0029   0433   1027     1555 [C]   2028        0035   0409   0942     1309   1558   2114      0802   1404          magic mouthwash oral supsension 5 mL   Dose: 5 mL  Freq: Every 4 Hours PRN Route: SWISH & SPIT  PRN Reason: Mouth Pain  Start: 08/06/19 1605 End: 08/11/19 1734     1734-D/C'd           Magnesium Sulfate 2 gram infusion - Mg less than or equal to 1.5 mg/dL   Dose: 2 g  Freq: As Needed Route: IV  PRN Comment: for Mg less than or equal 1.5 mg/dL  Start: 08/05/19 2255    Admin Instructions:   Recheck Mg level in the AM.                Or  Magnesium Sulfate 1 gram infusion - Mg 1.6-1.9  mg/dL   Dose: 1 g  Freq: As Needed Route: IV  PRN Comment: Mg 1.6-1.9 mg/dL.  Start: 08/05/19 2255    Admin Instructions:   Recheck Mg level in the AM.    0931 [C]              metoprolol tartrate (LOPRESSOR) injection 5 mg   Dose: 5 mg  Freq: Every 4 Hours PRN Route: IV  PRN Reason: Heart Rate  PRN Comment: H >120  Start: 08/09/19 1622         midazolam (VERSED) injection 1 mg   Dose: 1 mg  Freq: Every 5 Minutes PRN Route: IV  PRN Comment: Mild to Moderate Anxiety  Start: 08/05/19 1258 End: 08/05/19 1507    Admin Instructions:   Max 4 mg of midazolam TOTAL           Or  midazolam (VERSED) injection 2 mg   Dose: 2 mg  Freq: Every 5 Minutes PRN Route: IV  PRN Comment: Moderate to Severe Anxiety  Start: 08/05/19 1258 End: 08/05/19 1507    Admin Instructions:   Max 4 mg of midazolam TOTAL           naloxone (NARCAN) injection 0.2 mg   Dose: 0.2 mg  Freq: As Needed Route: IV  PRN Reasons: Opioid Reversal,Respiratory Depression  PRN Comment: unresponsiveness, decrease oxygen saturation  Indications of Use: ACUTE RESPIRATORY FAILURE,OPIOID-INDUCED RESPIRATORY DEPRESSION  Start: 08/05/19 1453 End: 08/05/19 1558    Admin Instructions:   Notify Anesthesia if given         nitroglycerin (NITROSTAT) SL tablet 0.4 mg   Dose: 0.4 mg  Freq: Every 5 Minutes PRN Route: SL  PRN Reason: Chest Pain  PRN Comment: Only if SBP Greater Than 100  Start: 08/03/19 1719    Admin Instructions:   If Pain Unrelieved After 3 Doses Notify MD         ondansetron (ZOFRAN) injection 4 mg   Dose: 4 mg  Freq: Once As Needed Route: IV  PRN Reasons: Nausea,Vomiting  Indications of Use: POSTOPERATIVE NAUSEA AND VOMITING  Start: 08/05/19 1453 End: 08/05/19 1558    Admin Instructions:   If BOTH ondansetron (ZOFRAN) and promethazine (PHENERGAN) are ordered use ondansetron first and THEN promethazine IF ondansetron is ineffective.         ondansetron (ZOFRAN) tablet 4 mg   Dose: 4 mg  Freq: Every 6 Hours PRN Route: PO  PRN Reasons: Nausea,Vomiting  Start:  08/03/19 1720    Admin Instructions:   If BOTH ondansetron (ZOFRAN) and promethazine (PHENERGAN) are ordered use ondansetron first and THEN promethazine IF ondansetron is ineffective.         Or  ondansetron (ZOFRAN) injection 4 mg   Dose: 4 mg  Freq: Every 6 Hours PRN Route: IV  PRN Reasons: Nausea,Vomiting  Start: 08/03/19 1720    Admin Instructions:   If BOTH ondansetron (ZOFRAN) and promethazine (PHENERGAN) are ordered use ondansetron first and THEN promethazine IF ondansetron is ineffective.         potassium chloride (MICRO-K) CR capsule 40 mEq   Dose: 40 mEq  Freq: As Needed Route: PO  PRN Comment: Potassium Replacement.  See Admin Instructions  Start: 08/05/19 1123 End: 08/07/19 0817    Admin Instructions:   Potassium 3.1 or Less Give KCl 40 mEq q4h x3 Doses   Potassium 3.2 - 3.6 Give KCl 40 mEq q4h x2 Doses     Check Potassium 4 Hours After Last Dose Given   Check Magnesium if Potassium Level Remains Low After Replacement   DO NOT GIVE if CrCl is Less Than 30 mL/minute or Urine Output Less Than 30 mL/hr         Or  potassium chloride (KLOR-CON) packet 40 mEq   Dose: 40 mEq  Freq: As Needed Route: PO  PRN Comment: potassium replacement, see admin instructions  Start: 08/05/19 1123 End: 08/07/19 0817    Admin Instructions:   Potassium 3.1 or Less Give KCl 40 mEq q4h x3 Doses   Potassium 3.2 - 3.6 Give KCl 40 mEq q4h x2 Doses     Check Potassium 4 Hours After Last Dose Given   Check Magnesium if Potassium Level Remains Low After Replacement   DO NOT GIVE if CrCl is Less Than 30 mL/minute or Urine Output Less Than 30 mL/hr         Or  potassium chloride 10 mEq in 100 mL IVPB   Dose: 10 mEq  Freq: Every 1 Hour PRN Route: IV  PRN Comment: Potassium Replacement - See Admin Instructions  Start: 08/05/19 1123 End: 08/07/19 0817    Admin Instructions:   Peripheral or Central IV  Potassium 3.1 or Less Give KCl 10 mEq/100 mL NS IV q1h x6 Doses  Potassium 3.2 - 3.6 Give KCl 10 mEq/100 mL NS q1h x4 Doses    Check  Potassium 4 Hours After Last Dose Given  Check Magnesium if Potassium Remains Low After Replacement  DO NOT GIVE if CrCl is Less Than 30 mL/minute or Urine Output Less Than 30 mL/hr.     Rates Greater Than 10 mEq/hr Require ECG Monitoring.  OUTPATIENT/NON-MONITORED UNITS: Potassium Chloride standard bolus infusion rate is a maximum of 10 mEq/hr on unmonitored patients    MONITORED UNITS: Potassium Chloride standard bolus infusion rate is a maximum of 20 mEq/hr on ECG monitored patients ONLY         potassium phosphate 21 mmol in sodium chloride 0.9 % 250 mL infusion   Dose: 21 mmol  Freq: As Needed Route: IV  PRN Comment: Peripheral IV - Phosphorus Less Than 1.3 mg/dL  Start: 08/10/19 0654    Admin Instructions:   Recheck Phosphorus level 6 hours after replacement complete.  Refrigerate                       Or  potassium phosphate 15 mmol in sodium chloride 0.9 % 100 mL infusion   Dose: 15 mmol  Freq: As Needed Route: IV  PRN Comment: Peripheral IV - Phosphorus 1.3 - 1.7 mg/dL  Start: 08/10/19 0654    Admin Instructions:   Recheck Phosphorus level 6 hours after replacement complete.  Refrigerate    1553 [C]                     Or  potassium phosphate 9 mmol in sodium chloride 0.9 % 100 mL infusion   Dose: 9 mmol  Freq: As Needed Route: IV  PRN Comment: Peripheral IV - Phosphorus 1.8 - 2.5 mg/dL  Start: 08/10/19 0654    Admin Instructions:   Recheck Phosphorus level 6 hours after replacement complete.  Refrigerate            0959             pramipexole (MIRAPEX) tablet 0.25 mg   Dose: 0.25 mg  Freq: Nightly PRN Route: PO  PRN Comment: Restless legs  Start: 08/05/19 2055         promethazine (PHENERGAN) injection 6.25 mg   Dose: 6.25 mg  Freq: Every 10 Minutes PRN Route: IV  PRN Reasons: Nausea,Vomiting  Start: 08/05/19 1453 End: 08/05/19 1558    Admin Instructions:   If BOTH ondansetron (ZOFRAN) and promethazine (PHENERGAN) are ordered use ondansetron first and THEN promethazine IF ondansetron is  ineffective.    Maximum total dose of IV phenergan is 12.5mg  If giving IV, dilute dose in 20 mL NS and slow IV push over 3-5 minutes. Administer through large bore vein (not hand or wrist) through running IV line at port furthest from patient's vein.         Or  promethazine (PHENERGAN) injection 12.5 mg   Dose: 12.5 mg  Freq: Once As Needed Route: IM  PRN Reasons: Nausea,Vomiting  Start: 08/05/19 1453 End: 08/05/19 1558    Admin Instructions:   If BOTH ondansetron (ZOFRAN) and promethazine (PHENERGAN) are ordered use ondansetron first and THEN promethazine IF ondansetron is ineffective.  If giving IV, dilute dose in 20 mL NS and slow IV push over 3-5 minutes.  Administer through large bore vein (not hand or wrist) through running IV line at port furthest from patient's vein.         Or  promethazine (PHENERGAN) suppository 25 mg   Dose: 25 mg  Freq: Once As Needed Route: RE  PRN Reasons: Nausea,Vomiting  Start: 08/05/19 1453 End: 08/05/19 1558    Admin Instructions:   If BOTH ondansetron (ZOFRAN) and promethazine (PHENERGAN) are ordered use ondansetron first and THEN promethazine IF ondansetron is ineffective.         Or  promethazine (PHENERGAN) tablet 25 mg   Dose: 25 mg  Freq: Once As Needed Route: PO  PRN Reasons: Nausea,Vomiting  Start: 08/05/19 1453 End: 08/05/19 1558    Admin Instructions:   If BOTH ondansetron (ZOFRAN) and promethazine (PHENERGAN) are ordered use ondansetron first and THEN promethazine IF ondansetron is ineffective.           sodium chloride (NS) irrigation solution   Freq: As Needed  Start: 08/05/19 1441 End: 08/05/19 1458         sodium chloride 0.9 % flush 1-10 mL   Dose: 1-10 mL  Freq: As Needed Route: IV  PRN Reason: Line Care  Start: 08/05/19 1258 End: 08/05/19 1507         sodium chloride 0.9 % flush 10 mL   Dose: 10 mL  Freq: As Needed Route: IV  PRN Reason: Line Care  Start: 08/03/19 1217         sodium chloride 0.9 % flush 3-10 mL   Dose: 3-10 mL  Freq: As Needed Route: IV  PRN  Reason: Line Care  Start: 08/03/19 1718    1553              Medications 08/10/19 08/11/19 08/12/19

## 2019-08-12 NOTE — PROGRESS NOTES
Adult Nutrition  Assessment/PES    Patient Name:  Cody Eldridge  YOB: 1945  MRN: [id number]  Admit Date:  8/3/2019    Assessment Date:  8/12/2019    Nutrition follow up. Tolerating TF-Isosource 1.5 @ 60 cc/hr. Diet: full liquids, nectar-thick liquids. Patient consumed 75% of soup and milk for lunch. Encouraged patient to try po intake q 2-3 hours. Mouth pain improving.  RD to continue to follow closely.   Reason for Assessment     Row Name 08/12/19 1410          Reason for Assessment    Reason For Assessment  follow-up protocol;TF/PN         Nutrition/Diet History     Row Name 08/12/19 1410          Nutrition/Diet History    Typical Food/Fluid Intake  patient consumed 75% of soup and milk for lunch           Labs/Tests/Procedures/Meds     Row Name 08/12/19 1411          Labs/Procedures/Meds    Lab Results Reviewed  reviewed, pertinent        Diagnostic Tests/Procedures    Diagnostic Test/Procedure Reviewed  reviewed, pertinent        Medications    Pertinent Medications Reviewed  reviewed, pertinent         Physical Findings     Row Name 08/12/19 1411          Physical Findings    Gastrointestinal  feeding tube     Tubes  nasogastric tube           Nutrition Prescription Ordered     Row Name 08/12/19 1411          Nutrition Prescription PO    Current PO Diet  Full Liquid     Fluid Consistency  Nectar/syrup thick     Supplement  Mighty Shake     Supplement Frequency  3 times a day        Nutrition Prescription EN    Enteral Route  NG     Product  Isosource 1.5 (Jevity 1.5)     TF Delivery Method  Continuous     Continuous TF Goal Rate (mL/hr)  60 mL/hr     Water flush (mL)   250 mL     Water Flush Frequency  Every 4 hours         Evaluation of Received Nutrient/Fluid Intake     Row Name 08/12/19 1411          Calories Evaluation    Enteral Calories (kcal)  2160     % of Kcal Needs  100        Protein Evaluation    Enteral Protein (gm)  97     % of Protein Needs  100        Fluid Intake Evaluation     Enteral (Free Water) Fluid (mL)  1094     Free Water Flush Fluid (mL)  1500     Total Free Water Intake (mL)  2594 mL         Problem/Interventions:    Intervention Goal     Row Name 08/12/19 1411          Intervention Goal    General  Maintain nutrition;Meet nutritional needs for age/condition     PO  Tolerate PO;Increase intake;Advance diet     TF/PN  Maintain TF/PN     Weight  Maintain weight         Nutrition Intervention     Row Name 08/12/19 1412          Nutrition Intervention    RD/Tech Action  Interview for preference;Encourage intake;Follow Tx progress;Care plan reviewd               Electronically signed by:  Ela Read RD  08/12/19 2:12 PM

## 2019-08-12 NOTE — PROGRESS NOTES
NEPHROLOGY PROGRESS NOTE    PATIENT IDENTIFICATION:   Name:  Cody Eldridge      MRN:  [id number]     74 y.o.  male             Reason for visit: JERICHO    SUBJECTIVE:   Just finished walking with PT around the nurses station; no dizziness; breathing is comfortable on supplemental O2; tolerating liquid diet; tube feeds infusing via feeding tube through nose    OBJECTIVE:  Vitals:    08/12/19 0527 08/12/19 0720 08/12/19 1312 08/12/19 1404   BP: 133/74 122/64 133/71    BP Location:  Right arm Right arm    Patient Position:  Lying Lying    Pulse: 91 58  95   Resp:  18 18    Temp:  97.8 °F (36.6 °C) 97.6 °F (36.4 °C)    TempSrc:  Oral Oral    SpO2:  97%     Weight:       Height:               Body mass index is 24.56 kg/m².    Intake/Output Summary (Last 24 hours) at 8/12/2019 1649  Last data filed at 8/12/2019 1600  Gross per 24 hour   Intake 4597 ml   Output 1300 ml   Net 3297 ml     Wt Readings from Last 1 Encounters:   08/12/19 0357 79.9 kg (176 lb 1.6 oz)   08/11/19 0330 80.2 kg (176 lb 12.8 oz)   08/10/19 0524 80 kg (176 lb 6.4 oz)   08/08/19 0600 78.4 kg (172 lb 13.5 oz)   08/08/19 0210 78.4 kg (172 lb 13.5 oz)   08/07/19 0300 77.6 kg (171 lb 1.2 oz)   08/07/19 0146 76 kg (167 lb 8.8 oz)   08/06/19 1504 72.6 kg (160 lb)   08/06/19 0526 72.8 kg (160 lb 7.9 oz)   08/05/19 0600 70.5 kg (155 lb 6.8 oz)   08/05/19 0448 70.9 kg (156 lb 6.4 oz)   08/04/19 0610 70.6 kg (155 lb 11.2 oz)   08/03/19 1619 76.6 kg (168 lb 12.8 oz)   08/03/19 1207 79.9 kg (176 lb 3 oz)     Wt Readings from Last 3 Encounters:   08/12/19 79.9 kg (176 lb 1.6 oz)   08/02/19 78.6 kg (173 lb 3.2 oz)   07/31/19 79.2 kg (174 lb 9.6 oz)         Exam:  NAD; pleasant; oriented; looks older than stated age  Chronically ill-appearing; flat affect  Dry MM; AT/NC ; feeding tube present in nose  No eye discharge; no scleral icterus  No JVD; no carotid bruits  No rales or rhonchi; not labored on O2 per nasal cannula  Irregularly irregular, tachycardic, no  rub  Soft, NT, +D, BS+  Dressing by the right clavicle is C/D/I; left subclavian central line  Trace-+1 edema both legs  No clubbing  No asterixis  Moves all extremities   Mood normal  Speech is fluent        Scheduled meds:      ceFAZolin 2 g Intravenous Q12H   lansoprazole 30 mg Nasogastric Daily   magic mouthwash 5 mL Swish & Spit Q4H   metoprolol tartrate 37.5 mg Oral Q8H   potassium chloride 40 mEq Nasogastric Once   predniSONE 60 mg Oral Daily With Breakfast   sertraline 50 mg Oral Daily   silver sulfadiazine  Topical BID   sodium chloride 3 mL Intravenous Q12H   Sodium Hypochlorite 0.0625 % (Dakin's 1/8th Strength) topical solution 946 mL Irrigation Q12H   terazosin 1 mg Nasogastric Nightly     IV meds:                             Data Review:    Results from last 7 days   Lab Units 08/12/19  0306 08/11/19  0522 08/10/19  0517 08/09/19  0338   SODIUM mmol/L 152* 149* 143 143   POTASSIUM mmol/L 3.9 4.2 3.4* 3.4*   CHLORIDE mmol/L 115* 113* 108* 108*   CO2 mmol/L 27.0 24.2 26.9 21.8*   BUN mg/dL 73* 72* 77* 71*   CREATININE mg/dL 1.97* 2.41* 2.80* 2.85*   CALCIUM mg/dL 7.3* 7.6* 7.3* 7.2*   BILIRUBIN mg/dL 0.6 0.6  --  0.7   ALK PHOS U/L 93 101  --  99   ALT (SGPT) U/L <5 <5  --  <5   AST (SGOT) U/L 27 25  --  27   GLUCOSE mg/dL 115* 157* 125* 125*     Estimated Creatinine Clearance: 36.6 mL/min (A) (by C-G formula based on SCr of 1.97 mg/dL (H)).  Results from last 7 days   Lab Units 08/07/19  0444 08/06/19  2105   SODIUM UR mmol/L  --  70   CREATININE UR mg/dL  --  56.7   URIC ACID mg/dL 5.4  --      Results from last 7 days   Lab Units 08/11/19  2003 08/11/19  0522 08/10/19  0517 08/08/19  0430   MAGNESIUM mg/dL  --  2.1 1.9  --  2.0   PHOSPHORUS mg/dL 2.2* 2.2* 1.4*   < > 0.7*    < > = values in this interval not displayed.       Results from last 7 days   Lab Units 08/12/19  0306 08/11/19  0522 08/10/19  0517 08/09/19  0338 08/08/19  0430   WBC 10*3/mm3 9.15 10.33 14.98* 14.73* 8.05   HEMOGLOBIN g/dL 9.3*  10.0* 10.7* 11.6* 11.5*   PLATELETS 10*3/mm3 55*  55* 37*  37* 23*  23* 21* 29*  29*       Results from last 7 days   Lab Units 08/09/19  0338 08/08/19  0430 08/07/19  0444 08/06/19  0511   INR  1.12* 1.19* 1.33* 1.60*             ASSESSMENT:   1.  JERICHO, non-oliguric, improving: prerenal related to hypotension, tachy-arrhythmia, and sepsis syndrome limiting renal perfusion.  Rising serum sodium with insufficient volume of water flushes; low phosphorus   2.  Head and neck cancer on radiation and chemotherapy  3.  Rapid atrial fibrillation  4.  Neutropenic fever with sepsis syndrome and MSSA bacteremia: BP's are stable  5.  Coagulopathy  6.  Anemia and thrombocytopenia  7.  History of diastolic CHF:  Compensated for now        PLAN:  1.  Increase free water flushes to 100 ml/hr  2.  K-phos iv already given  3.  Discussed with wife at bedside  4.  Surveillance labs      Medardo Espinal MD  8/12/2019    4:49 PM

## 2019-08-12 NOTE — PLAN OF CARE
Problem: Patient Care Overview  Goal: Plan of Care Review  Outcome: Ongoing (interventions implemented as appropriate)      Problem: Cardiac: Heart Failure (Adult)  Goal: Signs and Symptoms of Listed Potential Problems Will be Absent, Minimized or Managed (Cardiac: Heart Failure)  Outcome: Ongoing (interventions implemented as appropriate)      Problem: Skin Injury Risk (Adult)  Goal: Skin Health and Integrity  Outcome: Ongoing (interventions implemented as appropriate)      Problem: Pain, Acute (Adult)  Goal: Identify Related Risk Factors and Signs and Symptoms  Outcome: Ongoing (interventions implemented as appropriate)    Goal: Acceptable Pain Control/Comfort Level  Outcome: Ongoing (interventions implemented as appropriate)

## 2019-08-12 NOTE — PROGRESS NOTES
"Patient Name: Cody Eldridge  :1945  74 y.o.      Patient Care Team:  Epley, James, APRN as PCP - General (Family Medicine)  Payal Waters MD as Consulting Physician (Pain Medicine)  Lorna Chaidez (Nurse Practitioner)  Kristal Cowart [facility] as Pharmacist  [place], Fritz Snyder MD as Referring Physician (Otolaryngology)  Pablo Heaton MD as Consulting Physician (Hematology and Oncology)  Annie Davila MD as Consulting Physician (Radiation Oncology)  Chetan Heaton MD as Consulting Physician (Urology)    Interval History:   Moved to the floor since last time I saw him.    Subjective:  Following for atrial fibrillation    Objective   Vital Signs  Temp:  [97.6 °F (36.4 °C)-97.8 °F (36.6 °C)] 97.8 °F (36.6 °C)  Heart Rate:  [58-97] 58  Resp:  [18-20] 18  BP: (122-143)/(62-87) 122/64    Intake/Output Summary (Last 24 hours) at 2019 0931  Last data filed at 2019 0357  Gross per 24 hour   Intake 870 ml   Output 1300 ml   Net -430 ml     Flowsheet Rows      First Filed Value   Admission Height  180.3 cm (71\") Documented at 2019 1153   Admission Weight  79.9 kg (176 lb 3 oz) Documented at 2019 1207          Physical Exam:   General Appearance:    Alert, cooperative, in no acute distress   Lungs:     Clear to auscultation.  Normal respiratory effort and rate.      Heart:     Reg reg     Chest Wall:    No abnormalities observed   Abdomen:     Soft, nontender, positive bowel sounds.     Extremities:   no cyanosis, clubbing or edema.  No marked joint deformities.  Adequate musculoskeletal strength.       Results Review:    Results from last 7 days   Lab Units 19  0306   SODIUM mmol/L 152*   POTASSIUM mmol/L 3.9   CHLORIDE mmol/L 115*   CO2 mmol/L 27.0   BUN mg/dL 73*   CREATININE mg/dL 1.97*   GLUCOSE mg/dL 115*   CALCIUM mg/dL 7.3*         Results from last 7 days   Lab Units 19  0306   WBC 10*3/mm3 9.15   HEMOGLOBIN g/dL 9.3*   HEMATOCRIT % 29.5*   PLATELETS " 10*3/mm3 55*  55*     Results from last 7 days   Lab Units 08/09/19  0338 08/08/19  0430 08/07/19  0444   INR  1.12* 1.19* 1.33*         Results from last 7 days   Lab Units 08/11/19  0522   MAGNESIUM mg/dL 2.1             Medication Review:     ceFAZolin 2 g Intravenous Q12H   lansoprazole 30 mg Nasogastric Daily   magic mouthwash 5 mL Swish & Spit Q4H   metoprolol tartrate 37.5 mg Oral Q8H   potassium chloride 40 mEq Nasogastric Once   predniSONE 60 mg Oral Daily With Breakfast   sertraline 50 mg Oral Daily   silver sulfadiazine  Topical BID   sodium chloride 3 mL Intravenous Q12H   Sodium Hypochlorite 0.0625 % (Dakin's 1/8th Strength) topical solution 946 mL Irrigation Q12H   terazosin 1 mg Nasogastric Nightly             Assessment/Plan     1.  Atrial fibrillation with rapid ventricular response.  Continue current medications.  2.  Nonobstructive coronary artery disease  3.  COPD  4.  Systemic hypertension  5.  Sepsis.   6.  Hypokalemia.  Replaced.  7.  Acute on chronic kidney disease.  Creatinine improving.  8.  Anemia.  Stable.    Stable from a cardiac standpoint.  Continue current medications.  We will see as needed if his symptoms change.    Myriam Omer MD, [facility]  08/12/19  9:31 AM

## 2019-08-12 NOTE — PLAN OF CARE
Problem: Patient Care Overview  Goal: Plan of Care Review  Outcome: Ongoing (interventions implemented as appropriate)   08/12/19 5995   Coping/Psychosocial   Plan of Care Reviewed With patient;spouse   OTHER   Outcome Summary VSS, but consistently in a.fib/flutter; frequent complaints of mouth pain, helped somewhat by Lidocaine rinse; multiple loose/watery stools (5+) with urgency; some PO intake (soup, ice chips); elevated BUN (73) and Cre (1.97); Na+ 152, Hgb 9.3, Plt 55; amount of fluid used to flush tube feed adjusted upward (100/hr from 250/4hrs); will continue to monitor   Plan of Care Review   Progress improving

## 2019-08-12 NOTE — THERAPY TREATMENT NOTE
Patient Name: Cody Eldridge  : 1945    MRN: [id number]                              Today's Date: 2019       Admit Date: 8/3/2019    Visit Dx:     ICD-10-CM ICD-9-CM   1. New onset of congestive heart failure (CMS/HCC) I50.9 428.0   2. Infected venous access port T80.219A 999.31     Patient Active Problem List   Diagnosis   • Atopic rhinitis   • Arthritis of hand   • Coxitis   • Benign colonic polyp   • Neck pain   • Chronic obstructive pulmonary disease (CMS/HCC)   • Mixed anxiety depressive disorder   • Degeneration of intervertebral disc of lumbosacral region   • Elevated prostate specific antigen (PSA)   • Hyperlipidemia   • Hypertension   • Insomnia   • Lumbar radiculopathy   • Osteoarthritis   • Vitamin D deficiency   • Abdominal pain   • Acute URI   • Myalgia   • Cramp of both lower extremities   • Gingivitis   • Atrial fibrillation (CMS/HCC)   • Non-rheumatic tricuspid valve insufficiency   • SHAR (obstructive sleep apnea)   • Precordial pain   • IRAHETA (dyspnea on exertion)   • Coronary artery disease of native artery of native heart with stable angina pectoris (CMS/HCC)   • Shortness of breath   • 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)   • Current use of long term anticoagulation   • Syncope   • Dehydration   • History of cancer   • History of atrial fibrillation   • History of CHF (congestive heart failure)   • Hypotension   • Fitting and adjustment of vascular catheter   • New onset of congestive heart failure (CMS/HCC)   • Atrial fibrillation (CMS/HCC)   • Squamous cell carcinoma of neck   • COPD (chronic obstructive pulmonary disease) (CMS/HCC)   • Depression with anxiety   • CAD (coronary artery disease)   • Chemotherapy-induced neutropenia (CMS/HCC)   • Vascular catheter infection (CMS/HCC)   • Chemotherapy-induced thrombocytopenia   • Acute renal injury (CMS/HCC)   • Anemia associated with chemotherapy   • Oral thrush   • MSSA bacteremia   • Shock, septic (CMS/HCC)     Past  Medical History:   Diagnosis Date   • Atrial fibrillation (CMS/HCC)    • CHF (congestive heart failure) (CMS/HCC)    • Chronic bronchitis (CMS/HCC)    • COPD (chronic obstructive pulmonary disease) (CMS/HCC)    • Cor pulmonale (chronic) (CMS/HCC)    • Daytime hypersomnia    • Depression with anxiety    • Elevated cholesterol    • Enlarged prostate    • Frequent nocturnal awakening    • Gout    • H/O cardiac radiofrequency ablation 2006    [facility].   • Head and neck cancer (CMS/HCC)    • Hyperlipidemia    • Hypertension    • Hypotension    • Insomnia    • Lumbar radicular pain    • SHAR (obstructive sleep apnea)    • Osteoarthritis    • Permanent atrial fibrillation (CMS/HCC)    • Snoring    • Squamous cell carcinoma of neck    • SVT (supraventricular tachycardia) (CMS/HCC)    • Syncope    • Vitamin D deficiency    • Weight loss      Past Surgical History:   Procedure Laterality Date   • APPENDECTOMY     • CARDIAC ABLATION  2006    [facility].   • CARDIAC CATHETERIZATION N/A 8/29/2018    Procedure: Left Heart Cath;  Surgeon: Yousif Uribe MD;  Location: [facility] CATH INVASIVE LOCATION;  Service: Cardiology   • CARDIAC CATHETERIZATION N/A 8/29/2018    Procedure: Coronary angiography;  Surgeon: Yousif Uribe MD;  Location: [facility] CATH INVASIVE LOCATION;  Service: Cardiology   • CARDIAC CATHETERIZATION N/A 8/29/2018    Procedure: Left ventriculography;  Surgeon: Yousif Uribe MD;  Location: [facility] CATH INVASIVE LOCATION;  Service: Cardiology   • FINGER SURGERY     • FOOT SURGERY     • HAND SURGERY     • VENOUS ACCESS DEVICE (PORT) INSERTION Right 6/18/2019    Procedure: MEDIPORT PLACEMENT;  Surgeon: Elvis Grigsby MD;  Location: [facility] OR;  Service: Vascular   • VENOUS ACCESS DEVICE (PORT) REMOVAL Right 8/5/2019    Procedure: REMOVAL VENOUS ACCESS DEVICE;  Surgeon: Prince Castaneda MD;  Location: [facility] OR;  Service: Vascular     General Information     Row Name 08/12/19 1626          PT  Evaluation Time/Intention    Document Type  therapy note (daily note)  -MS     Mode of Treatment  physical therapy;individual therapy  -MS     Row Name 08/12/19 1626          General Information    Patient Profile Reviewed?  yes  -MS     Existing Precautions/Restrictions  fall;oxygen therapy device and L/min  (Significant)  NG Feeding tube; 2.5 liters oxygen  -MS     Barriers to Rehab  none identified  -MS     Row Name 08/12/19 1626          Cognitive Assessment/Intervention- PT/OT    Orientation Status (Cognition)  oriented x 3  -MS     Personal Safety Interventions  fall prevention program maintained;gait belt;nonskid shoes/slippers when out of bed;supervised activity  -MS       User Key  (r) = Recorded By, (t) = Taken By, (c) = Cosigned By    Initials Name Provider Type    Elvis Jimenez, PT Physical Therapist        Mobility     Row Name 08/12/19 1626          Bed Mobility Assessment/Treatment    Comment (Bed Mobility)  Upon entering room, pt. sitting EOB, Independently with family present.  -MS     Row Name 08/12/19 1626          Sit-Stand Transfer    Sit-Stand [place] (Transfers)  contact guard  -MS     Assistive Device (Sit-Stand Transfers)  walker, front-wheeled  -MS     Row Name 08/12/19 1626          Gait/Stairs Assessment/Training    [place] Level (Gait)  contact guard;2 person assist;1 person to manage equipment  -MS     Assistive Device (Gait)  walker, front-wheeled  -MS     Distance in Feet (Gait)  100 feet  -MS     Bilateral Gait Deviations  forward flexed posture  -MS     Comment (Gait/Stairs)  Verbal/tactile cues for posture correction. Limited in gait distance by overall fatigue, weakness.   -MS       User Key  (r) = Recorded By, (t) = Taken By, (c) = Cosigned By    Initials Name Provider Type    Elvis Jimenez, PT Physical Therapist        Obj/Interventions    No documentation.       Goals/Plan    No documentation.       Clinical Impression     Row Name 08/12/19 4015           Pain Assessment    Additional Documentation  Pain Scale: Numbers Pre/Post-Treatment (Group)  -MS     Row Name 08/12/19 1629          Pain Scale: Numbers Pre/Post-Treatment    Pain Scale: Numbers, Pretreatment  0/10 - no pain  -MS     Pain Scale: Numbers, Post-Treatment  0/10 - no pain  -MS     Row Name 08/12/19 1630 08/12/19 1600       Plan of Care Review    Plan of Care Reviewed With  patient  -MS  patient;spouse  -NP    Row Name 08/12/19 1006 08/12/19 0816       Plan of Care Review    Plan of Care Reviewed With  patient  -EP  patient  -NP    Row Name 08/12/19 1629          Positioning and Restraints    Pre-Treatment Position  in bed  -MS     Post Treatment Position  bed  -MS     In Bed  notified nsg;sitting EOB;call light within reach;encouraged to call for assist;with other staff;with family/caregiver All lines intact.  -MS       User Key  (r) = Recorded By, (t) = Taken By, (c) = Cosigned By    Initials Name Provider Type    Aileen Rice RN Registered Nurse    Elvis Jimenez, PT Physical Therapist    Mamta Ruiz RN Registered Nurse        Outcome Measures     Row Name 08/12/19 1632          How much help from another person do you currently need...    Turning from your back to your side while in flat bed without using bedrails?  3  -MS     Moving from lying on back to sitting on the side of a flat bed without bedrails?  3  -MS     Moving to and from a bed to a chair (including a wheelchair)?  3  -MS     Standing up from a chair using your arms (e.g., wheelchair, bedside chair)?  3  -MS     Climbing 3-5 steps with a railing?  2  -MS     To walk in hospital room?  3  -MS     AM-PAC 6 Clicks Score (PT)  17  -MS     Row Name 08/12/19 1632          Functional Assessment    Outcome Measure Options  AM-PAC 6 Clicks Basic Mobility (PT)  -MS       User Key  (r) = Recorded By, (t) = Taken By, (c) = Cosigned By    Initials Name Provider Type    Elvis Jimenez, PT Physical Therapist         Physical Therapy Education     Title: PT OT SLP Therapies (Done)     Topic: Physical Therapy (Done)     Point: Mobility training (Done)     Learning Progress Summary           Patient Acceptance, E,D, VU,NR by MS at 8/12/2019  4:30 PM    Acceptance, E, NR by EM at 8/9/2019  4:20 PM                   Point: Home exercise program (Done)     Learning Progress Summary           Patient Acceptance, E,D, VU,NR by MS at 8/12/2019  4:30 PM                   Point: Body mechanics (Done)     Learning Progress Summary           Patient Acceptance, E,D, VU,NR by MS at 8/12/2019  4:30 PM                   Point: Precautions (Done)     Learning Progress Summary           Patient Acceptance, E,D, VU,NR by MS at 8/12/2019  4:30 PM                               User Key     Initials Effective Dates Name Provider Type Discipline     04/03/18 -  Yessi Jackson, PT Physical Therapist PT    MS 04/03/18 -  Elvis Kessler, PT Physical Therapist PT              PT Recommendation and Plan     Plan of Care Reviewed With: patient  Progress: improving  Outcome Summary: Pt. able to ambulate 100 feet, CGA x 2, with use of Rwx this day. Pt. requires CGA x 1 for sit <-> stand transfers.  Verbal/tactile cues given during ambulation for posture correction and occ. Rwx guidance.  Limited in overall gait distance this PM due to fatigue, weakness.      Time Calculation:   PT Charges     Row Name 08/12/19 1632             Time Calculation    Start Time  1535  -MS      Stop Time  1552  -MS      Time Calculation (min)  17 min  -MS      PT Received On  08/12/19  -MS      PT - Next Appointment  08/13/19  -MS         Time Calculation- PT    Total Timed Code Minutes- PT  15 minute(s)  -MS        User Key  (r) = Recorded By, (t) = Taken By, (c) = Cosigned By    Initials Name Provider Type    MS Elvis Kessler, PT Physical Therapist        Therapy Charges for Today     Code Description Service Date Service Provider Modifiers Qty    33671594087  HC PT THER PROC EA 15 MIN 8/12/2019 Elvis Kessler, PT GP 1    11530353177 HC PT THER SUPP EA 15 MIN 8/12/2019 Elvis Kessler, PT GP 1          PT G-Codes  Outcome Measure Options: AM-PAC 6 Clicks Basic Mobility (PT)  AM-PAC 6 Clicks Score (PT): 17    Elvis Kessler, PT  8/12/2019

## 2019-08-12 NOTE — PLAN OF CARE
Problem: Patient Care Overview  Goal: Plan of Care Review   08/12/19 1506   Coping/Psychosocial   Plan of Care Reviewed With patient   OTHER   Outcome Summary Pt. able to ambulate 100 feet, CGA x 2, with use of Rwx this day. Pt. requires CGA x 1 for sit <-> stand transfers. Verbal/tactile cues given during ambulation for posture correction and occ. Rwx guidance. Limited in overall gait distance this PM due to fatigue, weakness.    Plan of Care Review   Progress improving          Subjective:      Patient ID: Misha Marte Jr. is a 88 y.o. male.    Chief Complaint: Low-back Pain    Referred by: No ref. provider found     Mr Marte is an 87 yo male here for follow up of his gait instability and leg pain.  He was last seen by me on 5/18/2018 and he was having more back pain and he was sent to PT and we did a RLE US because of LE swelling it was negative for DVT.  Today, he is doing well. He feels like therapy has been helpful.  He took some nsaids for a few days and the pain went away and it has not come back.  He feels like the therapy helped.  He is going to do HEP.  He does not have the gym.  His wife says he is walking better.  He is using cane outside.  He uses the wheelchair for long distances and coming to doctor.  He sometimes pushes the wheelchair at times    X-ray lumbar 5/2/2016  Examination of the lumbar spine a five-view examination with flexion/extension views demonstrates  that all of the lumbar vertebral bodies are of normal size and stature with loss of of intervertebral disk space height predominantly at the L2-L3 level.  Subchondral sclerosis L2-L3.  Pedicles are intact. .  Atherosclerotic change present within the aorta.  Mild anterolisthesis of L4 on L5 with retrolisthesis of L2 on L3 retrolisthesis of which is accentuated with extension. Paravertebral soft tissues are unremarkable.  Impression    Degenerative changes as noted above.     X-ray pelvis 5/2/2016  Finding: AP radiographs of the pelvis demonstrate moderate bilateral joint space loss of the hips.  No fracture or dislocation.  Normal mineralization is present.  No osseous destructive process.    Past Medical History:  No date: Heart murmur  No date: Hyperlipidemia  No date: Hypertension  No date: Insomnia    Past Surgical History:  No date: APPENDECTOMY  No date: CARPAL TUNNEL RELEASE  No date: COLONOSCOPY  No date: TONSILLECTOMY, ADENOIDECTOMY    Review of patient's family history indicates:  Problem:  Heart disease      Relation: Mother       Age of Onset: (Not Specified)   Problem: Heart attack      Relation: Mother       Age of Onset: (Not Specified)   Problem: Stroke      Relation: Father       Age of Onset: (Not Specified)   Problem: Hypertension      Relation: Neg Hx       Age of Onset: (Not Specified)       Social History    Marital status:              Spouse name:                       Years of education:                 Number of children:               Social History Main Topics    Smoking status: Never Smoker                                                                Smokeless tobacco: Never Used                        Alcohol use: Yes           1.2 oz/week       Glasses of wine: 2 per week    Drug use: No              Sexual activity: No                       Current Outpatient Prescriptions:  aspirin 81 mg Tab, Take 81 mg by mouth once daily. , Disp: , Rfl:   CALCIUM CARBONATE/VITAMIN D3 (CALCIUM 600 + D,3, ORAL), Take 1 capsule by mouth once daily., Disp: , Rfl:   clobetasol (TEMOVATE) 0.05 % external solution, Apply topically 2 (two) times daily. , Disp: , Rfl:   coenzyme Q10 (CO Q-10) 10 mg capsule, Take 10 mg by mouth once daily. , Disp: , Rfl:   desoximetasone (TOPICORT) 0.25 % cream, Apply topically as needed., Disp: , Rfl:   doxycycline (MONODOX) 100 MG capsule, Take 1 capsule (100 mg total) by mouth 2 (two) times daily., Disp: 14 capsule, Rfl: 0  ergocalciferol (ERGOCALCIFEROL) 50,000 unit Cap, TAKE 1 CAPSULE BY MOUTH EVERY 2 WEEKS, Disp: 6 capsule, Rfl: 6  furosemide (LASIX) 20 MG tablet, TAKE 1 TABLET(20 MG) BY MOUTH DAILY AS NEEDED (Patient taking differently: TAKE 1 TABLET(20 MG) BY MOUTH DAILY), Disp: 90 tablet, Rfl: 3  multivitamin-minerals-lutein (CENTRUM SILVER) Tab, Take 1 tablet by mouth once daily. , Disp: , Rfl:   omeprazole (PRILOSEC) 20 MG capsule, Take 1 capsule (20 mg total) by mouth once daily., Disp: 30 capsule, Rfl: 5  polyethylene glycol (GLYCOLAX) 17 gram PwPk,  Take by mouth as needed. , Disp: , Rfl:   pravastatin (PRAVACHOL) 20 MG tablet, TAKE 1 TABLET BY MOUTH AT NIGHT, Disp: 90 tablet, Rfl: 0  ramipril (ALTACE) 10 MG capsule, TAKE 1 CAPSULE(10 MG) BY MOUTH EVERY DAY, Disp: 90 capsule, Rfl: 1  tretinoin (RETIN-A) 0.05 % cream, Apply 1 application topically nightly. , Disp: , Rfl:   VIT A,C & E/LUTEIN/MINERALS (OCUVITE WITH LUTEIN ORAL), Take 1 capsule by mouth once daily., Disp: , Rfl:     No current facility-administered medications for this visit.       Review of patient's allergies indicates:   -- Cortisone -- Other (See Comments)    --  hiccups   -- Dexamethasone -- Other (See Comments)    --  Constant hiccups for days   -- Codeine     --  Other reaction(s): Nausea            Review of Systems   Constitution: Negative for weight gain and weight loss.   Eyes: Positive for visual disturbance.   Cardiovascular: Positive for leg swelling (right). Negative for chest pain.   Respiratory: Negative for shortness of breath.    Musculoskeletal: Negative for back pain, joint pain and joint swelling.   Gastrointestinal: Negative for abdominal pain and bowel incontinence.   Genitourinary: Negative for bladder incontinence.   Neurological: Negative for numbness and paresthesias.           Objective:          General    Vitals reviewed.  Constitutional: He is oriented to person, place, and time. He appears well-developed and well-nourished.   HENT:   Head: Normocephalic and atraumatic.   Pulmonary/Chest: Effort normal.   Neurological: He is alert and oriented to person, place, and time.   Psychiatric: He has a normal mood and affect. His behavior is normal. Judgment and thought content normal.     General Musculoskeletal Exam   Gait: abnormal (slow with flexed posture and flexed hips and knees, short step length)         Right Hip Exam     Range of Motion   Extension: abnormal   External rotation: abnormal   Internal rotation: abnormal   Left Hip Exam     Range of Motion    Extension: abnormal   External rotation: abnormal   Internal rotation: abnormal       Back (L-Spine & T-Spine) / Neck (C-Spine) Exam     Back (L-Spine & T-Spine) Range of Motion   Extension: 0   Flexion: 50   Lateral bend right: 10   Lateral bend left: 10   Rotation right: 20   Rotation left: 20     Spinal Sensation   Right Side Sensation  C-Spine Level: normal   L-Spine Level: normal  S-Spine Level: normal  Left Side Sensation  C-Spine Level: normal  L-Spine Level: normal  S-Spine Level: normal    Back (L-Spine & T-Spine) Tests   Right Side Tests  Straight leg raise:      Sitting SLR: > 70 degrees      Left Side Tests  Straight leg raise:     Sitting SLR: > 70 degrees          Other He has no scoliosis .  Spinal Kyphosis:  present    Comments:  Right LE swelling       Muscle Strength   Right Upper Extremity   Biceps: 5/5/5   Deltoid:  5/5  Triceps:  5/5  Wrist extension: 5/5/5   Finger Flexors:  5/5  Left Upper Extremity  Biceps: 5/5/5   Deltoid:  5/5  Triceps:  5/5  Wrist extension: 5/5/5   Finger Flexors:  5/5  Right Lower Extremity   Hip Flexion: 5/5   Quadriceps:  5/5   Anterior tibial:  5/5/5  EHL:  5/5  Left Lower Extremity   Hip Flexion: 5/5   Quadriceps:  5/5   Anterior tibial:  5/5/5   EHL:  5/5    Reflexes     Left Side  Biceps:  2+  Triceps:  2+  Brachioradialis:  2+  Quadriceps:  2+  Achilles:  2+  Left Jenkins's Sign:  Absent  Babinski Sign:  absent    Right Side   Biceps:  2+  Triceps:  2+  Brachioradialis:  2+  Quadriceps:  2+  Achilles:  2+  Right Jenkins's Sign:  absent  Babinski Sign:  absent    Vascular Exam     Right Pulses        Carotid:                  2+    Left Pulses        Carotid:                  2+        Assessment:       Encounter Diagnoses   Name Primary?    Osteoarthritis of spine with radiculopathy, lumbar region Yes    DISH (diffuse idiopathic skeletal hyperostosis)     Gait instability     Primary osteoarthritis of both hips          Plan:       Misha was seen today for  low-back pain.    Diagnoses and all orders for this visit:    Osteoarthritis of spine with radiculopathy, lumbar region    DISH (diffuse idiopathic skeletal hyperostosis)    Gait instability    Primary osteoarthritis of both hips         1. continue PT HEP , we discussed the importance of continuing activity and exercise  2.  aleve for a few days was really helpful.  He has not had back pain.  He is no longer taking it  3.  Tylenol 1-2 as needed   4.   Cane as needed, and wheelchair as needed for distance  5.  We discussed joining a gym, where he can continue to exercise.  We discussed trying to do 30 min 5 days a week.  We disucssed 10 min increments  6.  RTC 6 months

## 2019-08-12 NOTE — PROGRESS NOTES
[facility] INPATIENT PROGRESS NOTE         [address]    2019      PATIENT IDENTIFICATION:  Name: Cody Eldridge ADMIT: 8/3/2019   : 1945  PCP: Epley, James, APRN    MRN: [id number] LOS: 9 days   AGE/SEX: 74 y.o. male  ROOM: [facility]                     LOS 9    Reason for visit: Follow-up respiratory failure with septic shock and pulmonary edema      SUBJECTIVE:      Appears a bit brighter today.  Denies productive cough or chest pain.  No wheezing.  Diminished at bases.    Objective   OBJECTIVE:    Vital Sign Min/Max for last 24 hours  Temp  Min: 97.6 °F (36.4 °C)  Max: 97.8 °F (36.6 °C)   BP  Min: 122/64  Max: 143/87   Pulse  Min: 58  Max: 97   Resp  Min: 18  Max: 20   SpO2  Min: 95 %  Max: 97 %   No Data Recorded   Weight  Min: 79.9 kg (176 lb 1.6 oz)  Max: 79.9 kg (176 lb 1.6 oz)                         Body mass index is 24.56 kg/m².    Intake/Output Summary (Last 24 hours) at 2019 1212  Last data filed at 2019 0357  Gross per 24 hour   Intake 870 ml   Output 1300 ml   Net -430 ml         Exam:  GEN:  No distress, appears stated age  EYES:   PERRL, anicteric sclera  ENT:    External ears/nose normal, OP clear  NECK:  No adenopathy, midline trachea  LUNGS: Normal chest on inspection, palpation and diminished bilaterally on auscultation  CV:  Normal S1S2, without murmur  ABD:  Non tender, non distended, no hepatosplenomegaly, +BS  EXT:  No edema, cyanosis or clubbing    Scheduled meds:      ceFAZolin 2 g Intravenous Q12H   lansoprazole 30 mg Nasogastric Daily   magic mouthwash 5 mL Swish & Spit Q4H   metoprolol tartrate 37.5 mg Oral Q8H   potassium chloride 40 mEq Nasogastric Once   predniSONE 60 mg Oral Daily With Breakfast   sertraline 50 mg Oral Daily   silver sulfadiazine  Topical BID   sodium chloride 3 mL Intravenous Q12H   Sodium Hypochlorite 0.0625 % (Dakin's /8th Strength) topical solution 946 mL Irrigation Q12H   terazosin 1 mg Nasogastric Nightly     IV  meds:                         Data Review:  Results from last 7 days   Lab Units 08/12/19  0306 08/11/19  0522 08/10/19  0517 08/09/19  0338 08/08/19  1906   SODIUM mmol/L 152* 149* 143 143 145   POTASSIUM mmol/L 3.9 4.2 3.4* 3.4* 4.2   CHLORIDE mmol/L 115* 113* 108* 108* 110*   CO2 mmol/L 27.0 24.2 26.9 21.8* 25.8   BUN mg/dL 73* 72* 77* 71* 68*   CREATININE mg/dL 1.97* 2.41* 2.80* 2.85* 2.91*   GLUCOSE mg/dL 115* 157* 125* 125* 120*   CALCIUM mg/dL 7.3* 7.6* 7.3* 7.2* 8.0*         Estimated Creatinine Clearance: 36.6 mL/min (A) (by C-G formula based on SCr of 1.97 mg/dL (H)).  Results from last 7 days   Lab Units 08/12/19  0306 08/11/19  0522 08/10/19  0517 08/09/19  0338 08/08/19  0430   WBC 10*3/mm3 9.15 10.33 14.98* 14.73* 8.05   HEMOGLOBIN g/dL 9.3* 10.0* 10.7* 11.6* 11.5*   PLATELETS 10*3/mm3 55*  55* 37*  37* 23*  23* 21* 29*  29*     Results from last 7 days   Lab Units 08/09/19  0338 08/08/19  0430 08/07/19  0444 08/06/19  0511   INR  1.12* 1.19* 1.33* 1.60*     Results from last 7 days   Lab Units 08/12/19  0306 08/11/19  0522 08/09/19  0338 08/08/19  0430 08/06/19  0511   ALT (SGPT) U/L <5 <5 <5 <5 19   AST (SGOT) U/L 27 25 27 21 22     Results from last 7 days   Lab Units 08/05/19  2334   PH, ARTERIAL pH units 7.432   PO2 ART mm Hg 77.6*   PCO2, ARTERIAL mm Hg 42.0   HCO3 ART mmol/L 28.0             Chest x-ray 8/10/2019 reviewed shows density lower lung fields with layering effusion.            )Assessment   ASSESSMENT:      Active Hospital Problems    Diagnosis  POA   • **MSSA bacteremia [R78.81]  Unknown   • Shock, septic (CMS/HCC) [A41.9, R65.21]  Unknown   • Oral thrush [B37.0]  Unknown   • Acute renal injury (CMS/HCC) [N17.9]  Unknown   • Anemia associated with chemotherapy [D64.81, T45.1X5A]  Unknown   • Chemotherapy-induced thrombocytopenia [D69.59, T45.1X5A]  Unknown   • New onset of congestive heart failure (CMS/HCC) [I50.9]  Yes   • Atrial fibrillation (CMS/HCC) [I48.91]  Yes   • Squamous  cell carcinoma of neck [C44.42]  Yes   • COPD (chronic obstructive pulmonary disease) (CMS/HCC) [J44.9]  Yes   • Depression with anxiety [F41.8]  Yes   • CAD (coronary artery disease) [I25.10]  Yes   • Chemotherapy-induced neutropenia (CMS/HCC) [D70.1, T45.1X5A]  Yes   • Vascular catheter infection (CMS/HCC) [T82.7XXA]  Yes      Resolved Hospital Problems   No resolved problems to display.     Sepsis with shock  MSSA bacteremia  Suspected infection of Mediport  Acute on chronic diastolic CHF  Acute hypoxemic respiratory failure with pulmonary edema  Left moderate pleural effusion   Acute kidney injury  A. 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  Wean oxygen as able.  Encourage incentive spirometer use.  Bronchodilators prn for COPD symptoms and help with clearance.  Plan 4 weeks antibiotic for MSSA bacteremia per ID recommendations.  Steroid per oncology.  If effusion worsens may require thoracentesis but with platelets would like to avoid.        Javier Segovia MD  Pulmonary and Critical Care Medicine  [facility], [facility]  8/12/2019    12:12 PM

## 2019-08-12 NOTE — PROGRESS NOTES
" LOS: 9 days     Name: Cody Eldridge  Age: 74 y.o.  Sex: male  :  1945  MRN: [id number]         Primary Care Physician: Epley, James, APRN    Subjective   Subjective  No new complaints or overnight events.  States that his mouth pain actually feels somewhat better today.  He is agreeable to trying more by mouth today.    Objective   Vital Signs  Temp:  [97.6 °F (36.4 °C)-97.8 °F (36.6 °C)] 97.8 °F (36.6 °C)  Heart Rate:  [58-97] 58  Resp:  [18-20] 18  BP: (122-143)/(62-87) 122/64  Body mass index is 24.56 kg/m².    Objective:  General Appearance:  Comfortable, in no acute distress and ill-appearing.    Vital signs: (most recent): Blood pressure 122/64, pulse 58, temperature 97.8 °F (36.6 °C), temperature source Oral, resp. rate 18, height 180.3 cm (71\"), weight 79.9 kg (176 lb 1.6 oz), SpO2 97 %.    HEENT: (Cortrak in place with ongoing evidence of mucositis)    Lungs:  Normal effort and normal respiratory rate.    Heart: Normal rate.  Regular rhythm.    Abdomen: Abdomen is soft.  Bowel sounds are normal.   There is no abdominal tenderness.     Extremities: There is no dependent edema or local swelling.    Neurological: Patient is alert and oriented to person, place and time.    Skin:  Warm and dry.              Results Review:       I reviewed the patient's new clinical results.    Results from last 7 days   Lab Units 19  0306 19  0522 08/10/19  0517 19  0338 19  0430 19  0444 19  0510   WBC 10*3/mm3 9.15 10.33 14.98* 14.73* 8.05 1.96* 2.22*   HEMOGLOBIN g/dL 9.3* 10.0* 10.7* 11.6* 11.5* 12.6* 12.7*   PLATELETS 10*3/mm3 55*  55* 37*  37* 23*  23* 21* 29*  29* 41* 79*     Results from last 7 days   Lab Units 19  0306 19  0522 08/10/19  0517 19  0338 19  1906 19  0430 19  1721 19  0444   SODIUM mmol/L 152* 149* 143 143 145 140  --  147*   POTASSIUM mmol/L 3.9 4.2 3.4* 3.4* 4.2 3.8 3.9 3.8   CHLORIDE mmol/L 115* 113* 108* 108* " 110* 106  --  111*   CO2 mmol/L 27.0 24.2 26.9 21.8* 25.8 24.7  --  24.6   BUN mg/dL 73* 72* 77* 71* 68* 64*  --  56*   CREATININE mg/dL 1.97* 2.41* 2.80* 2.85* 2.91* 2.98*  --  2.79*   CALCIUM mg/dL 7.3* 7.6* 7.3* 7.2* 8.0* 7.6*  --  7.5*   GLUCOSE mg/dL 115* 157* 125* 125* 120* 149*  --  141*     Results from last 7 days   Lab Units 08/09/19  0338 08/08/19  0430 08/07/19  0444 08/06/19  0511   INR  1.12* 1.19* 1.33* 1.60*     Scheduled Meds:     ceFAZolin 2 g Intravenous Q12H   lansoprazole 30 mg Nasogastric Daily   magic mouthwash 5 mL Swish & Spit Q4H   metoprolol tartrate 37.5 mg Oral Q8H   potassium chloride 40 mEq Nasogastric Once   predniSONE 60 mg Oral Daily With Breakfast   sertraline 50 mg Oral Daily   silver sulfadiazine  Topical BID   sodium chloride 3 mL Intravenous Q12H   Sodium Hypochlorite 0.0625 % (Dakin's 1/8th Strength) topical solution 946 mL Irrigation Q12H   terazosin 1 mg Nasogastric Nightly     PRN Meds:   •  acetaminophen  •  ALPRAZolam  •  diphenoxylate-atropine  •  HYDROcodone-acetaminophen  •  ipratropium  •  Lidocaine Viscous HCl  •  magnesium sulfate **OR** magnesium sulfate in D5W 1g/100mL (PREMIX)  •  metoprolol tartrate  •  nitroglycerin  •  ondansetron **OR** ondansetron  •  potassium phosphate infusion greater than 15 mMoles **OR** potassium phosphate **OR** potassium phosphate  •  pramipexole  •  [COMPLETED] Insert peripheral IV **AND** sodium chloride  •  sodium chloride  Continuous Infusions:       Assessment/Plan   Active Hospital Problems    Diagnosis  POA   • **MSSA bacteremia [R78.81]  Unknown   • Shock, septic (CMS/HCC) [A41.9, R65.21]  Unknown   • Oral thrush [B37.0]  Unknown   • Acute renal injury (CMS/HCC) [N17.9]  Unknown   • Anemia associated with chemotherapy [D64.81, T45.1X5A]  Unknown   • Chemotherapy-induced thrombocytopenia [D69.59, T45.1X5A]  Unknown   • New onset of congestive heart failure (CMS/HCC) [I50.9]  Yes   • Atrial fibrillation (CMS/HCC) [I48.91]  Yes    • Squamous cell carcinoma of neck [C44.42]  Yes   • COPD (chronic obstructive pulmonary disease) (CMS/[facility]) [J44.9]  Yes   • Depression with anxiety [F41.8]  Yes   • CAD (coronary artery disease) [I25.10]  Yes   • Chemotherapy-induced neutropenia (CMS/[facility]) [D70.1, T45.1X5A]  Yes   • Vascular catheter infection (CMS/[facility]) [T82.7XXA]  Yes      Resolved Hospital Problems   No resolved problems to display.       Assessment & Plan       -Septic shock has resolved and now off pressors.   -Remains on cefazolin with plans for a 4-week course for treatment of MSSA septicemia, per infectious disease  -Received Neupogen with improvement of WBCs. Hematology/oncology following  -Remains thrombocytopenic and somewhat improved today.  Prednisone has been initiated, per oncology  -Atrial fibrillation with rapid ventricular.  Now in sinus rhythm and will maintain current dose of metoprolol, per cardiology.  -Renal function starting to improve with better/more stable hemodynamics.  Nephrology following.  -Becoming more hypernatremic.  Free water flushes were increased yesterday.  Will defer to nephrology.  -Continue supportive care for oral mucositis secondary to chemoradiation.  Continue tube feedings via cortrak for now.  Discussed with his nurse who states he really is not eating or drinking anything.  I told the patient he must try and test this today otherwise the only option will be to place a PEG tube.  -Is finished a 3-day course of oral fluconazole for treatment of thrush  -Hematology monitoring his pleural effusion.  Attempting to avoid thoracentesis if possible.  -Appreciate assistance from consulting services  -PT following      Pedrito Toledo MD  [facility]  08/12/19  12:33 PM

## 2019-08-13 ENCOUNTER — APPOINTMENT (OUTPATIENT)
Dept: CARDIOLOGY | Facility: HOSPITAL | Age: 74
End: 2019-08-13

## 2019-08-13 LAB
ALBUMIN SERPL-MCNC: 1.9 G/DL (ref 3.5–5.2)
ALBUMIN/GLOB SERPL: 0.7 G/DL
ALP SERPL-CCNC: 83 U/L (ref 39–117)
ALT SERPL W P-5'-P-CCNC: <5 U/L (ref 1–41)
ANION GAP SERPL CALCULATED.3IONS-SCNC: 8.1 MMOL/L (ref 5–15)
AST SERPL-CCNC: 35 U/L (ref 1–40)
BH CV LOWER VASCULAR LEFT COMMON FEMORAL AUGMENT: NORMAL
BH CV LOWER VASCULAR LEFT COMMON FEMORAL COMPETENT: NORMAL
BH CV LOWER VASCULAR LEFT COMMON FEMORAL COMPRESS: NORMAL
BH CV LOWER VASCULAR LEFT COMMON FEMORAL PHASIC: NORMAL
BH CV LOWER VASCULAR LEFT COMMON FEMORAL SPONT: NORMAL
BH CV LOWER VASCULAR LEFT DISTAL FEMORAL COMPRESS: NORMAL
BH CV LOWER VASCULAR LEFT GASTRONEMIUS COMPRESS: NORMAL
BH CV LOWER VASCULAR LEFT GREATER SAPH AK COMPRESS: NORMAL
BH CV LOWER VASCULAR LEFT GREATER SAPH BK COMPRESS: NORMAL
BH CV LOWER VASCULAR LEFT MID FEMORAL AUGMENT: NORMAL
BH CV LOWER VASCULAR LEFT MID FEMORAL COMPETENT: NORMAL
BH CV LOWER VASCULAR LEFT MID FEMORAL COMPRESS: NORMAL
BH CV LOWER VASCULAR LEFT MID FEMORAL PHASIC: NORMAL
BH CV LOWER VASCULAR LEFT MID FEMORAL SPONT: NORMAL
BH CV LOWER VASCULAR LEFT PERONEAL COMPRESS: NORMAL
BH CV LOWER VASCULAR LEFT POPLITEAL AUGMENT: NORMAL
BH CV LOWER VASCULAR LEFT POPLITEAL COMPETENT: NORMAL
BH CV LOWER VASCULAR LEFT POPLITEAL COMPRESS: NORMAL
BH CV LOWER VASCULAR LEFT POPLITEAL PHASIC: NORMAL
BH CV LOWER VASCULAR LEFT POPLITEAL SPONT: NORMAL
BH CV LOWER VASCULAR LEFT POSTERIOR TIBIAL COMPRESS: NORMAL
BH CV LOWER VASCULAR LEFT PROXIMAL FEMORAL COMPRESS: NORMAL
BH CV LOWER VASCULAR RIGHT COMMON FEMORAL AUGMENT: NORMAL
BH CV LOWER VASCULAR RIGHT COMMON FEMORAL COMPETENT: NORMAL
BH CV LOWER VASCULAR RIGHT COMMON FEMORAL COMPRESS: NORMAL
BH CV LOWER VASCULAR RIGHT COMMON FEMORAL PHASIC: NORMAL
BH CV LOWER VASCULAR RIGHT COMMON FEMORAL SPONT: NORMAL
BH CV LOWER VASCULAR RIGHT DISTAL FEMORAL COMPRESS: NORMAL
BH CV LOWER VASCULAR RIGHT GASTRONEMIUS COMPRESS: NORMAL
BH CV LOWER VASCULAR RIGHT GREATER SAPH AK COMPRESS: NORMAL
BH CV LOWER VASCULAR RIGHT GREATER SAPH BK COMPRESS: NORMAL
BH CV LOWER VASCULAR RIGHT MID FEMORAL AUGMENT: NORMAL
BH CV LOWER VASCULAR RIGHT MID FEMORAL COMPETENT: NORMAL
BH CV LOWER VASCULAR RIGHT MID FEMORAL COMPRESS: NORMAL
BH CV LOWER VASCULAR RIGHT MID FEMORAL PHASIC: NORMAL
BH CV LOWER VASCULAR RIGHT MID FEMORAL SPONT: NORMAL
BH CV LOWER VASCULAR RIGHT PERONEAL COMPRESS: NORMAL
BH CV LOWER VASCULAR RIGHT POPLITEAL AUGMENT: NORMAL
BH CV LOWER VASCULAR RIGHT POPLITEAL COMPETENT: NORMAL
BH CV LOWER VASCULAR RIGHT POPLITEAL COMPRESS: NORMAL
BH CV LOWER VASCULAR RIGHT POPLITEAL PHASIC: NORMAL
BH CV LOWER VASCULAR RIGHT POPLITEAL SPONT: NORMAL
BH CV LOWER VASCULAR RIGHT POSTERIOR TIBIAL COMPRESS: NORMAL
BH CV LOWER VASCULAR RIGHT PROXIMAL FEMORAL COMPRESS: NORMAL
BH CV LOWER VASCULAR RIGHT SAPHENOFEMORAL JUNCTION COMPRESS: NORMAL
BH CV POP FLUID COLLECT LEFT: 1
BILIRUB SERPL-MCNC: 0.6 MG/DL (ref 0.2–1.2)
BUN BLD-MCNC: 63 MG/DL (ref 8–23)
BUN/CREAT SERPL: 39.4 (ref 7–25)
CALCIUM SPEC-SCNC: 7.2 MG/DL (ref 8.6–10.5)
CHLORIDE SERPL-SCNC: 113 MMOL/L (ref 98–107)
CO2 SERPL-SCNC: 26.9 MMOL/L (ref 22–29)
CREAT BLD-MCNC: 1.6 MG/DL (ref 0.76–1.27)
DEPRECATED RDW RBC AUTO: 78.9 FL (ref 37–54)
ERYTHROCYTE [DISTWIDTH] IN BLOOD BY AUTOMATED COUNT: 22.1 % (ref 12.3–15.4)
GFR SERPL CREATININE-BSD FRML MDRD: 42 ML/MIN/1.73
GLOBULIN UR ELPH-MCNC: 2.7 GM/DL
GLUCOSE BLD-MCNC: 126 MG/DL (ref 65–99)
HCT VFR BLD AUTO: 26.6 % (ref 37.5–51)
HGB BLD-MCNC: 8.3 G/DL (ref 13–17.7)
LDH SERPL-CCNC: 475 U/L (ref 135–225)
MAGNESIUM SERPL-MCNC: 1.8 MG/DL (ref 1.6–2.4)
MCH RBC QN AUTO: 32 PG (ref 26.6–33)
MCHC RBC AUTO-ENTMCNC: 31.2 G/DL (ref 31.5–35.7)
MCV RBC AUTO: 102.7 FL (ref 79–97)
PHOSPHATE SERPL-MCNC: 1.8 MG/DL (ref 2.5–4.5)
PLATELET # BLD AUTO: 82 10*3/MM3 (ref 140–450)
PLATELET # BLD AUTO: 82 10*3/MM3 (ref 140–450)
PLATELETS.RETICULATED NFR BLD AUTO: 9.5 % (ref 0.9–6.5)
PMV BLD AUTO: 12.7 FL (ref 6–12)
POTASSIUM BLD-SCNC: 3.6 MMOL/L (ref 3.5–5.2)
PROT SERPL-MCNC: 4.6 G/DL (ref 6–8.5)
RBC # BLD AUTO: 2.59 10*6/MM3 (ref 4.14–5.8)
RETICS # AUTO: 0.1 10*6/MM3 (ref 0.02–0.13)
RETICS/RBC NFR AUTO: 3.73 % (ref 0.7–1.9)
SODIUM BLD-SCNC: 148 MMOL/L (ref 136–145)
WBC NRBC COR # BLD: 7.46 10*3/MM3 (ref 3.4–10.8)

## 2019-08-13 PROCEDURE — 25010000002 MORPHINE PER 10 MG: Performed by: INTERNAL MEDICINE

## 2019-08-13 PROCEDURE — 97110 THERAPEUTIC EXERCISES: CPT

## 2019-08-13 PROCEDURE — 99232 SBSQ HOSP IP/OBS MODERATE 35: CPT | Performed by: INTERNAL MEDICINE

## 2019-08-13 PROCEDURE — 80053 COMPREHEN METABOLIC PANEL: CPT | Performed by: INTERNAL MEDICINE

## 2019-08-13 PROCEDURE — 25010000003 CEFAZOLIN IN DEXTROSE 2-4 GM/100ML-% SOLUTION: Performed by: INTERNAL MEDICINE

## 2019-08-13 PROCEDURE — 25010000002 MAGNESIUM SULFATE IN D5W 1G/100ML (PREMIX) 1-5 GM/100ML-% SOLUTION: Performed by: INTERNAL MEDICINE

## 2019-08-13 PROCEDURE — 85055 RETICULATED PLATELET ASSAY: CPT | Performed by: INTERNAL MEDICINE

## 2019-08-13 PROCEDURE — 84100 ASSAY OF PHOSPHORUS: CPT | Performed by: INTERNAL MEDICINE

## 2019-08-13 PROCEDURE — 85045 AUTOMATED RETICULOCYTE COUNT: CPT | Performed by: INTERNAL MEDICINE

## 2019-08-13 PROCEDURE — 83615 LACTATE (LD) (LDH) ENZYME: CPT | Performed by: INTERNAL MEDICINE

## 2019-08-13 PROCEDURE — 93970 EXTREMITY STUDY: CPT

## 2019-08-13 PROCEDURE — 85027 COMPLETE CBC AUTOMATED: CPT | Performed by: INTERNAL MEDICINE

## 2019-08-13 PROCEDURE — 83735 ASSAY OF MAGNESIUM: CPT | Performed by: INTERNAL MEDICINE

## 2019-08-13 RX ADMIN — METOPROLOL TARTRATE 37.5 MG: 25 TABLET ORAL at 21:22

## 2019-08-13 RX ADMIN — LIDOCAINE HYDROCHLORIDE 5 ML: 20 SOLUTION ORAL; TOPICAL at 08:49

## 2019-08-13 RX ADMIN — CEFAZOLIN SODIUM 2 G: 2 INJECTION, SOLUTION INTRAVENOUS at 21:23

## 2019-08-13 RX ADMIN — LANSOPRAZOLE 30 MG: KIT at 08:37

## 2019-08-13 RX ADMIN — DIBASIC SODIUM PHOSPHATE, MONOBASIC POTASSIUM PHOSPHATE AND MONOBASIC SODIUM PHOSPHATE 1 TABLET: 852; 155; 130 TABLET ORAL at 21:22

## 2019-08-13 RX ADMIN — METOPROLOL TARTRATE 37.5 MG: 25 TABLET ORAL at 13:10

## 2019-08-13 RX ADMIN — LIDOCAINE HYDROCHLORIDE 5 ML: 20 SOLUTION ORAL; TOPICAL at 17:24

## 2019-08-13 RX ADMIN — SILVER SULFADIAZINE: 10 CREAM TOPICAL at 08:37

## 2019-08-13 RX ADMIN — SODIUM CHLORIDE, PRESERVATIVE FREE 3 ML: 5 INJECTION INTRAVENOUS at 08:38

## 2019-08-13 RX ADMIN — Medication 5 ML: at 21:22

## 2019-08-13 RX ADMIN — Medication 5 ML: at 17:24

## 2019-08-13 RX ADMIN — MAGNESIUM SULFATE HEPTAHYDRATE 1 G: 1 INJECTION, SOLUTION INTRAVENOUS at 13:05

## 2019-08-13 RX ADMIN — SODIUM HYPOCHLORITE 946 ML: 1.25 SOLUTION TOPICAL at 21:23

## 2019-08-13 RX ADMIN — Medication 5 ML: at 05:42

## 2019-08-13 RX ADMIN — SILVER SULFADIAZINE: 10 CREAM TOPICAL at 21:23

## 2019-08-13 RX ADMIN — SODIUM HYPOCHLORITE 946 ML: 1.25 SOLUTION TOPICAL at 08:38

## 2019-08-13 RX ADMIN — SODIUM CHLORIDE, PRESERVATIVE FREE 3 ML: 5 INJECTION INTRAVENOUS at 21:23

## 2019-08-13 RX ADMIN — METOPROLOL TARTRATE 37.5 MG: 25 TABLET ORAL at 06:43

## 2019-08-13 RX ADMIN — Medication 5 ML: at 08:37

## 2019-08-13 RX ADMIN — SERTRALINE 50 MG: 50 TABLET, FILM COATED ORAL at 08:37

## 2019-08-13 RX ADMIN — CEFAZOLIN SODIUM 2 G: 2 INJECTION, SOLUTION INTRAVENOUS at 10:35

## 2019-08-13 RX ADMIN — MORPHINE SULFATE 1 MG: 2 INJECTION, SOLUTION INTRAMUSCULAR; INTRAVENOUS at 08:18

## 2019-08-13 RX ADMIN — TERAZOSIN HYDROCHLORIDE 1 MG: 1 CAPSULE ORAL at 21:22

## 2019-08-13 NOTE — THERAPY TREATMENT NOTE
Patient Name: Cody Eldridge  : 1945    MRN: [id number]                              Today's Date: 2019       Admit Date: 8/3/2019    Visit Dx:     ICD-10-CM ICD-9-CM   1. New onset of congestive heart failure (CMS/HCC) I50.9 428.0   2. Infected venous access port T80.219A 999.31     Patient Active Problem List   Diagnosis   • Atopic rhinitis   • Arthritis of hand   • Coxitis   • Benign colonic polyp   • Neck pain   • Chronic obstructive pulmonary disease (CMS/HCC)   • Mixed anxiety depressive disorder   • Degeneration of intervertebral disc of lumbosacral region   • Elevated prostate specific antigen (PSA)   • Hyperlipidemia   • Hypertension   • Insomnia   • Lumbar radiculopathy   • Osteoarthritis   • Vitamin D deficiency   • Abdominal pain   • Acute URI   • Myalgia   • Cramp of both lower extremities   • Gingivitis   • Atrial fibrillation (CMS/HCC)   • Non-rheumatic tricuspid valve insufficiency   • SHAR (obstructive sleep apnea)   • Precordial pain   • IRAHETA (dyspnea on exertion)   • Coronary artery disease of native artery of native heart with stable angina pectoris (CMS/HCC)   • Shortness of breath   • 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)   • Current use of long term anticoagulation   • Syncope   • Dehydration   • History of cancer   • History of atrial fibrillation   • History of CHF (congestive heart failure)   • Hypotension   • Fitting and adjustment of vascular catheter   • New onset of congestive heart failure (CMS/HCC)   • Atrial fibrillation (CMS/HCC)   • Squamous cell carcinoma of neck   • COPD (chronic obstructive pulmonary disease) (CMS/HCC)   • Depression with anxiety   • CAD (coronary artery disease)   • Chemotherapy-induced neutropenia (CMS/HCC)   • Vascular catheter infection (CMS/HCC)   • Chemotherapy-induced thrombocytopenia   • Acute renal injury (CMS/HCC)   • Anemia associated with chemotherapy   • Oral thrush   • MSSA bacteremia   • Shock, septic (CMS/HCC)     Past  Medical History:   Diagnosis Date   • Atrial fibrillation (CMS/HCC)    • CHF (congestive heart failure) (CMS/HCC)    • Chronic bronchitis (CMS/HCC)    • COPD (chronic obstructive pulmonary disease) (CMS/HCC)    • Cor pulmonale (chronic) (CMS/HCC)    • Daytime hypersomnia    • Depression with anxiety    • Elevated cholesterol    • Enlarged prostate    • Frequent nocturnal awakening    • Gout    • H/O cardiac radiofrequency ablation 2006    [facility].   • Head and neck cancer (CMS/HCC)    • Hyperlipidemia    • Hypertension    • Hypotension    • Insomnia    • Lumbar radicular pain    • SHAR (obstructive sleep apnea)    • Osteoarthritis    • Permanent atrial fibrillation (CMS/HCC)    • Snoring    • Squamous cell carcinoma of neck    • SVT (supraventricular tachycardia) (CMS/HCC)    • Syncope    • Vitamin D deficiency    • Weight loss      Past Surgical History:   Procedure Laterality Date   • APPENDECTOMY     • CARDIAC ABLATION  2006    [facility].   • CARDIAC CATHETERIZATION N/A 8/29/2018    Procedure: Left Heart Cath;  Surgeon: Yousif Uribe MD;  Location: [facility] CATH INVASIVE LOCATION;  Service: Cardiology   • CARDIAC CATHETERIZATION N/A 8/29/2018    Procedure: Coronary angiography;  Surgeon: Yousif Uribe MD;  Location: [facility] CATH INVASIVE LOCATION;  Service: Cardiology   • CARDIAC CATHETERIZATION N/A 8/29/2018    Procedure: Left ventriculography;  Surgeon: Yousif Uribe MD;  Location: [facility] CATH INVASIVE LOCATION;  Service: Cardiology   • FINGER SURGERY     • FOOT SURGERY     • HAND SURGERY     • VENOUS ACCESS DEVICE (PORT) INSERTION Right 6/18/2019    Procedure: MEDIPORT PLACEMENT;  Surgeon: Elvis Grigsby MD;  Location: [facility] OR;  Service: Vascular   • VENOUS ACCESS DEVICE (PORT) REMOVAL Right 8/5/2019    Procedure: REMOVAL VENOUS ACCESS DEVICE;  Surgeon: Prince Castaneda MD;  Location: [facility] OR;  Service: Vascular     General Information     Row Name 08/13/19 1126          PT  Evaluation Time/Intention    Document Type  therapy note (daily note)  -MS     Mode of Treatment  physical therapy;individual therapy  -MS     Row Name 08/13/19 1126          General Information    Patient Profile Reviewed?  yes  -MS     Existing Precautions/Restrictions  fall NG Feeding Tube;  Exit alarm  -MS     Row Name 08/13/19 1126          Safety Issues, Functional Mobility    Comment, Safety Issues/Impairments (Mobility)  Gait belt used for safety  -MS       User Key  (r) = Recorded By, (t) = Taken By, (c) = Cosigned By    Initials Name Provider Type    Elvis Jimenez, PT Physical Therapist        Mobility     Row Name 08/13/19 1127          Bed Mobility Assessment/Treatment    Comment (Bed Mobility)  Upon entering room, pt. sitting EOB, Independently  -MS     Row Name 08/13/19 1127          Sit-Stand Transfer    Sit-Stand [place] (Transfers)  contact guard;1 person to manage equipment;2 person assist  -MS     Row Name 08/13/19 1127          Gait/Stairs Assessment/Training    [place] Level (Gait)  minimum assist (75% patient effort);2 person assist;1 person to manage equipment  -MS     Distance in Feet (Gait)  150 feet  -MS     Bilateral Gait Deviations  forward flexed posture  -MS     Comment (Gait/Stairs)  No use of A.D. this day.  Verbal/tactile cues for posture correction.  -MS       User Key  (r) = Recorded By, (t) = Taken By, (c) = Cosigned By    Initials Name Provider Type    Elvis Jimenez, PT Physical Therapist        Obj/Interventions     Row Name 08/13/19 1128          Therapeutic Exercise    Comment (Therapeutic Exercise)  BLE ther. ex. program x 10 reps completed (Ankle Pumps, LAQ's, Hip Flexion)  -MS       User Key  (r) = Recorded By, (t) = Taken By, (c) = Cosigned By    Initials Name Provider Type    Elvis Jimenez, PT Physical Therapist        Goals/Plan    No documentation.       Clinical Impression     Row Name 08/13/19 1129          Pain Assessment    Additional  Documentation  Pain Scale: Numbers Pre/Post-Treatment (Group)  -MS     Row Name 08/13/19 1129          Pain Scale: Numbers Pre/Post-Treatment    Pain Scale: Numbers, Pretreatment  0/10 - no pain  -MS     Pain Scale: Numbers, Post-Treatment  0/10 - no pain  -MS     Row Name 08/13/19 1129          Plan of Care Review    Plan of Care Reviewed With  patient  -MS     Row Name 08/13/19 1129          Positioning and Restraints    Pre-Treatment Position  in bed  -MS     Post Treatment Position  bed  -MS     In Bed  notified nsg;sitting EOB;call light within reach;encouraged to call for assist;exit alarm on All lines intact.  -MS       User Key  (r) = Recorded By, (t) = Taken By, (c) = Cosigned By    Initials Name Provider Type    Elvis Jimenez PT Physical Therapist        Outcome Measures     Row Name 08/13/19 1130          How much help from another person do you currently need...    Turning from your back to your side while in flat bed without using bedrails?  3  -MS     Moving from lying on back to sitting on the side of a flat bed without bedrails?  3  -MS     Moving to and from a bed to a chair (including a wheelchair)?  3  -MS     Standing up from a chair using your arms (e.g., wheelchair, bedside chair)?  3  -MS     Climbing 3-5 steps with a railing?  2  -MS     To walk in hospital room?  3  -MS     AM-PAC 6 Clicks Score (PT)  17  -MS     Row Name 08/13/19 1130          Functional Assessment    Outcome Measure Options  AM-PAC 6 Clicks Basic Mobility (PT)  -MS       User Key  (r) = Recorded By, (t) = Taken By, (c) = Cosigned By    Initials Name Provider Type    Elvis Jimenez, PT Physical Therapist        Physical Therapy Education     Title: PT OT SLP Therapies (Done)     Topic: Physical Therapy (Done)     Point: Mobility training (Done)     Learning Progress Summary           Patient Acceptance, E,D, VU,NR by MS at 8/13/2019 11:29 AM    Acceptance, E,D, VU,NR by MS at 8/12/2019  4:30 PM    Acceptance,  E, NR by EM at 8/9/2019  4:20 PM                   Point: Home exercise program (Done)     Learning Progress Summary           Patient Acceptance, E,D, VU,NR by MS at 8/13/2019 11:29 AM    Acceptance, E,D, VU,NR by MS at 8/12/2019  4:30 PM                   Point: Body mechanics (Done)     Learning Progress Summary           Patient Acceptance, E,D, VU,NR by MS at 8/13/2019 11:29 AM    Acceptance, E,D, VU,NR by MS at 8/12/2019  4:30 PM                   Point: Precautions (Done)     Learning Progress Summary           Patient Acceptance, E,D, VU,NR by MS at 8/13/2019 11:29 AM    Acceptance, E,D, VU,NR by MS at 8/12/2019  4:30 PM                               User Key     Initials Effective Dates Name Provider Type Discipline     04/03/18 -  Yessi Jackson, PT Physical Therapist PT    MS 04/03/18 -  Elvis Kessler, PT Physical Therapist PT              PT Recommendation and Plan     Plan of Care Reviewed With: patient  Progress: improving  Outcome Summary: Pt. able to ambulate 150 feet, Min. assist, with no use of A.D. this day. Pt. requires CGA x1 for sit <-> stand transfers.  Verbal/tactile cues for posture correction during ambulation.  BLE ther. ex. program x 10 reps completed for general strengthening.      Time Calculation:   PT Charges     Row Name 08/13/19 1131             Time Calculation    Start Time  1045  -MS      Stop Time  1100  -MS      Time Calculation (min)  15 min  -MS      PT Received On  08/13/19  -MS      PT - Next Appointment  08/14/19  -MS         Time Calculation- PT    Total Timed Code Minutes- PT  13 minute(s)  -MS        User Key  (r) = Recorded By, (t) = Taken By, (c) = Cosigned By    Initials Name Provider Type    MS Elvis Kessler, PT Physical Therapist        Therapy Charges for Today     Code Description Service Date Service Provider Modifiers Qty    73311185669 HC PT THER PROC EA 15 MIN 8/12/2019 Elvis Kessler, PT GP 1    04025652050 HC PT THER SUPP EA 15 MIN  8/12/2019 Elvis Kessler, PT GP 1    40059856123 HC PT THER PROC EA 15 MIN 8/13/2019 Elvis Kessler, PT GP 1    07276634979 HC PT THER SUPP EA 15 MIN 8/13/2019 Elvis Kessler, PT GP 1          PT G-Codes  Outcome Measure Options: AM-PAC 6 Clicks Basic Mobility (PT)  AM-PAC 6 Clicks Score (PT): 17    Elvis Kessler, PT  8/13/2019

## 2019-08-13 NOTE — PROGRESS NOTES
LOS: 10 days       Chief Complaint: Head and neck squamous cell carcinoma, sepsis secondary to MSSA, febrile neutropenia, anemia, thrombocytopenia, severe mucositis, atrial fibrillation, acute kidney injury    Interval History: The patient reports that he was able to walk around the nurses station today which was a significant improvement.  He notes that his mucositis has improved and his oral intake has improved as well.  No new complaints.        Review of Systems:    Review of systems was obtained with pertinent positive findings as noted in the interval history.  All other systems negative.    Objective     Vital Signs  Temp:  [97.6 °F (36.4 °C)-98.4 °F (36.9 °C)] 97.9 °F (36.6 °C)  Heart Rate:  [69-97] 97  Resp:  [18-20] 20  BP: (118-147)/(59-93) 134/82        Physical Exam:     GENERAL: Elderly gentleman lying in bed no distress  MOUTH: Improvement in mucositis today  CHEST:  Lungs clear to percussion and auscultation. Good airflow.  Prior MediPort site right chest wall with dressing overlying.  CARDIAC: Irregularly irregular  ABDOMEN: Slightly distended, nontender, bowel sounds present  EXTREMITIES: 1+ bilateral lower extremity edema  NEUROLOGICAL:  Cranial Nerves II-XII grossly intact.  No focal neurological deficits.  PSYCHIATRIC:  Normal affect and mood.           Results Review:     I reviewed the patient's new clinical results.    Results from last 7 days   Lab Units 08/13/19  0328   WBC 10*3/mm3 7.46   HEMOGLOBIN g/dL 8.3*   HEMATOCRIT % 26.6*   PLATELETS 10*3/mm3 82*  82*     Lab Results   Component Value Date    NEUTROABS 13.40 (H) 08/09/2019     Results from last 7 days   Lab Units 08/13/19  0328   SODIUM mmol/L 148*   POTASSIUM mmol/L 3.6   CHLORIDE mmol/L 113*   CO2 mmol/L 26.9   BUN mg/dL 63*   CREATININE mg/dL 1.60*   GLUCOSE mg/dL 126*   CALCIUM mg/dL 7.2*     Results from last 7 days   Lab Units 08/09/19  0338 08/08/19  0430 08/07/19  0444   INR  1.12* 1.19* 1.33*     Results from last 7  days   Lab Units 08/13/19  0328   MAGNESIUM mg/dL 1.8         Medication Review: Yes     Assessment/Plan     1. 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  Also developed atrial fibrillation with rapid ventricular response.  · Administered week 5 carboplatin/Taxol at significant delay on 7/31/2019  · Current hospitalization 8/3/2019 with febrile neutropenia, MSSA sepsis requiring MediPort removal on 8/5/2019.  Further treatment held.  No plans for further chemotherapy.  5 radiation fractions remaining.  2. MSSA neutropenic sepsis:  · WBC on admission 8/3/2019 1.09 with ANC 1.15, subsequently declined to WBC 0.56 on 8/4/2019 with ANC 0.49  · Required MediPort removal 8/5/2019  · Subsequent recovery of WBC with Neupogen support 480 mcg on 8/4, 8/5, 8/7/2019  · Continuation of cefazolin 2 g every 12 hours x4 weeks with antibiotic stop date of 9/3/2019 per infectious disease consultation.  3. Severe treatment related mucositis with associated pain:  · Patient receiving scheduled Magic mouthwash every 4 hours along with as needed viscous lidocaine  · Added morphine 1 mg every 3 hours IV as needed.  · Today, patient reports that his mucositis pain has improved.  He did try morphine which led to relief but also sedation.  4. JERICHO:  · Previous creatinine 0.8-1.0  · Max creatinine 2.98 on 8/8/2019  · Nephrology consulted, felt to be related to hypotension, sepsis with reduced perfusion  · Creatinine with continued gradual improvement, 1.60 today  5. Severe thrombocytopenia:  · Platelet  count had recovered prior to administration of week 5 carboplatin/Taxol on 7/31/2019 at which time platelet count was 207,000  · On admission 8/3/2019 per the count 196,000  · Significant decline in platelet count with mabel 21,000 on 8/9/2019  · Elevated IPF at 14.8% on 8/10/2019, concern for possible ITP, received prednisone 60 mg daily beginning 8/10/2019  · Persistently elevated IPF is felt to be more related to marrow recovery.  Thrombocytopenia most likely secondary to chemotherapy and recent sepsis with consumption.  Prednisone discontinued.  · Platelet count today further improved at 82,000.  6. Anemia:  · Elevated LDH but no other evidence to support hemolysis  · Secondary to chemotherapy, sepsis  · Check reticulocyte count today and in a.m., also check haptoglobin in a.m.  · Hemoglobin with further decline to 8.3.  We will check stool for occult blood.  7. Nutrition:  · Currently receiving continuous tube feeds via NG tube due to severe mucositis and weight loss on concurrent chemoradiation.  8. Atrial fibrillation with rapid ventricular response:  · Rate improved  9. Lower extremity edema:  · Bilateral lower extremity Dopplers negative for DVT 8/13/2019    Plan:  1. Check stool for occult blood  2. Check reticulocyte count today  3. CBC, CMP, LDH, IPF, reticulocyte count, haptoglobin in a.m.      Pineda Santos MD  08/13/19  3:21 PM

## 2019-08-13 NOTE — PROGRESS NOTES
Continued Stay Note  [facility]     Patient Name: Cody Eldridge  MRN: [id number]  Today's Date: 8/13/2019    Admit Date: 8/3/2019    Discharge Plan     Row Name 08/13/19 1511       Plan    Plan  Plans are home with [facility] for IV abx's infusion.      Patient/Family in Agreement with Plan  yes    Plan Comments  [facility] noted plans for IV abx's till 10/3.  Spoke with pt and wife @ bedside, they agree they will need HH for abx infusion.  Pt / wife are willing to learn to do abx's.  Wife states she has used  HH in the past and would like them again.  Call to [facility] intake to follow. Ana Skelton RN        Discharge Codes    No documentation.             Ana Skelton RN

## 2019-08-13 NOTE — PROGRESS NOTES
The patient is status post removal of right chest Mediport.  Incision check by me, no new purulence.  No bleeding.  Packing changed- has improved.  DC the Dakins and use NS now, expect improved granulation.     Operative cultures with staph aureus, not MRSA    Will check the wound periodically

## 2019-08-13 NOTE — PROGRESS NOTES
" LOS: 10 days     Name: Cody Eldridge  Age: 74 y.o.  Sex: male  :  1945  MRN: [id number]         Primary Care Physician: Epley, James, APRN    Subjective   Subjective  Was able to eat more yesterday with less mouth pain.  No new complaints today.    Objective   Vital Signs  Temp:  [97.6 °F (36.4 °C)-98.4 °F (36.9 °C)] 97.9 °F (36.6 °C)  Heart Rate:  [69-97] 97  Resp:  [18-20] 20  BP: (118-147)/(59-93) 134/82  Body mass index is 24.64 kg/m².    Objective:  General Appearance:  Comfortable, in no acute distress and ill-appearing (Weak and deconditioned appearing).    Vital signs: (most recent): Blood pressure 134/82, pulse 97, temperature 97.9 °F (36.6 °C), temperature source Oral, resp. rate 20, height 180.3 cm (71\"), weight 80.2 kg (176 lb 11.2 oz), SpO2 96 %.    HEENT: (Cortrak in place)    Lungs:  Normal effort and normal respiratory rate.    Heart: Normal rate.  Regular rhythm.    Abdomen: Abdomen is soft.  Bowel sounds are normal.   There is no abdominal tenderness.     Extremities: There is no dependent edema or local swelling.    Neurological: Patient is alert and oriented to person, place and time.    Skin:  Warm and dry.              Results Review:       I reviewed the patient's new clinical results.    Results from last 7 days   Lab Units 19  0306 19  0522 08/10/19  0517 19  0338 19  0430 19  0444   WBC 10*3/mm3 7.46 9.15 10.33 14.98* 14.73* 8.05 1.96*   HEMOGLOBIN g/dL 8.3* 9.3* 10.0* 10.7* 11.6* 11.5* 12.6*   PLATELETS 10*3/mm3 82*  82* 55*  55* 37*  37* 23*  23* 21* 29*  29* 41*     Results from last 7 days   Lab Units 19  0306 19  0522 08/10/19  0517 19  0338 19  1906 19  0430   SODIUM mmol/L 148* 152* 149* 143 143 145 140   POTASSIUM mmol/L 3.6 3.9 4.2 3.4* 3.4* 4.2 3.8   CHLORIDE mmol/L 113* 115* 113* 108* 108* 110* 106   CO2 mmol/L 26.9 27.0 24.2 26.9 21.8* 25.8 24.7   BUN mg/dL 63* 73* 72* 77* " 71* 68* 64*   CREATININE mg/dL 1.60* 1.97* 2.41* 2.80* 2.85* 2.91* 2.98*   CALCIUM mg/dL 7.2* 7.3* 7.6* 7.3* 7.2* 8.0* 7.6*   GLUCOSE mg/dL 126* 115* 157* 125* 125* 120* 149*     Results from last 7 days   Lab Units 08/09/19  0338 08/08/19  0430 08/07/19  0444   INR  1.12* 1.19* 1.33*     Scheduled Meds:     ceFAZolin 2 g Intravenous Q12H   lansoprazole 30 mg Nasogastric Daily   magic mouthwash 5 mL Swish & Spit Q4H   metoprolol tartrate 37.5 mg Oral Q8H   phosphorus 250 mg Nasogastric BID   potassium chloride 40 mEq Nasogastric Once   sertraline 50 mg Oral Daily   silver sulfadiazine  Topical BID   sodium chloride 3 mL Intravenous Q12H   Sodium Hypochlorite 0.0625 % (Dakin's 1/8th Strength) topical solution 946 mL Irrigation Q12H   terazosin 1 mg Nasogastric Nightly     PRN Meds:   •  acetaminophen  •  ALPRAZolam  •  diphenoxylate-atropine  •  HYDROcodone-acetaminophen  •  ipratropium  •  Lidocaine Viscous HCl  •  metoprolol tartrate  •  Morphine  •  nitroglycerin  •  ondansetron **OR** ondansetron  •  potassium phosphate infusion greater than 15 mMoles **OR** potassium phosphate **OR** potassium phosphate  •  pramipexole  •  [COMPLETED] Insert peripheral IV **AND** sodium chloride  •  sodium chloride  Continuous Infusions:       Assessment/Plan   Active Hospital Problems    Diagnosis  POA   • **MSSA bacteremia [R78.81]  Unknown   • Shock, septic (CMS/HCC) [A41.9, R65.21]  Unknown   • Oral thrush [B37.0]  Unknown   • Acute renal injury (CMS/HCC) [N17.9]  Unknown   • Anemia associated with chemotherapy [D64.81, T45.1X5A]  Unknown   • Chemotherapy-induced thrombocytopenia [D69.59, T45.1X5A]  Unknown   • New onset of congestive heart failure (CMS/HCC) [I50.9]  Yes   • Atrial fibrillation (CMS/HCC) [I48.91]  Yes   • Squamous cell carcinoma of neck [C44.42]  Yes   • COPD (chronic obstructive pulmonary disease) (CMS/HCC) [J44.9]  Yes   • Depression with anxiety [F41.8]  Yes   • CAD (coronary artery disease) [I25.10]  Yes    • Chemotherapy-induced neutropenia (CMS/HCC) [D70.1, T45.1X5A]  Yes   • Vascular catheter infection (CMS/HCC) [T82.7XXA]  Yes      Resolved Hospital Problems   No resolved problems to display.       Assessment & Plan      -Remains on cefazolin with plans for a 4-week course for treatment of MSSA septicemia, per infectious disease  -Septic shock has resolved and now off pressors.   -Received Neupogen with improvement of WBCs. Hematology/oncology following  -Thrombocytopenia improving, taken off prednisone per Heme/Onc  -Atrial fibrillation with rapid ventricular.  Now in sinus rhythm and will maintain current dose of metoprolol, per cardiology.  -Renal function starting to improve with better/more stable hemodynamics.  Nephrology following.  -Hypernatremia correcting with increase in free water  -He is starting to eat and drink more.  I have asked the staff to document how much he eats with each meal and if looks appropriate over the next 24-48 hours then we can hopefully d/c the adarak  -Finished a 3-day course of oral fluconazole for treatment of thrush  -Pulmonary monitoring his pleural effusion.  Attempting to avoid thoracentesis if possible.  -Appreciate assistance from consulting services  -PT following      Pedrito Toledo MD  [facility]  08/13/19  2:39 PM

## 2019-08-13 NOTE — PLAN OF CARE
Problem: Patient Care Overview  Goal: Plan of Care Review   08/13/19 1129   Coping/Psychosocial   Plan of Care Reviewed With patient   OTHER   Outcome Summary Pt. able to ambulate 150 feet, Min. assist, with no use of A.D. this day. Pt. requires CGA x1 for sit <-> stand transfers. Verbal/tactile cues for posture correction during ambulation. BLE ther. ex. program x 10 reps completed for general strengthening.

## 2019-08-13 NOTE — PROGRESS NOTES
[facility] INPATIENT PROGRESS NOTE         [address]    2019      PATIENT IDENTIFICATION:  Name: Cody Eldridge ADMIT: 8/3/2019   : 1945  PCP: Epley, James, APRN    MRN: [id number] LOS: 10 days   AGE/SEX: 74 y.o. male  ROOM: [facility]                     LOS 10    Reason for visit: Follow-up respiratory failure with septic shock and pulmonary edema      SUBJECTIVE:      Looking much better.  Tolerating p.o. diet.  On tube feeds as well and hopefully can be discontinued.   Denies nausea, vomiting or chest pain.    Objective   OBJECTIVE:    Vital Sign Min/Max for last 24 hours  Temp  Min: 97.6 °F (36.4 °C)  Max: 98.4 °F (36.9 °C)   BP  Min: 118/59  Max: 147/93   Pulse  Min: 69  Max: 97   Resp  Min: 18  Max: 20   SpO2  Min: 94 %  Max: 97 %   No Data Recorded   Weight  Min: 80.2 kg (176 lb 11.2 oz)  Max: 80.2 kg (176 lb 11.2 oz)                         Body mass index is 24.64 kg/m².    Intake/Output Summary (Last 24 hours) at 2019 1044  Last data filed at 2019 1013  Gross per 24 hour   Intake 7719 ml   Output 800 ml   Net 6919 ml         Exam:  GEN:  No distress, appears stated age  EYES:   PERRL, anicteric sclera  ENT:    External ears/nose normal, OP clear  NECK:  No adenopathy, midline trachea  LUNGS: Normal chest on inspection, palpation and diminished bilaterally on auscultation  CV:  Normal S1S2, without murmur  ABD:  Non tender, non distended, no hepatosplenomegaly, +BS  EXT:  No edema, cyanosis or clubbing    Scheduled meds:      ceFAZolin 2 g Intravenous Q12H   lansoprazole 30 mg Nasogastric Daily   magic mouthwash 5 mL Swish & Spit Q4H   metoprolol tartrate 37.5 mg Oral Q8H   potassium chloride 40 mEq Nasogastric Once   sertraline 50 mg Oral Daily   silver sulfadiazine  Topical BID   sodium chloride 3 mL Intravenous Q12H   Sodium Hypochlorite 0.0625 % (Dakin's 8th Strength) topical solution 946 mL Irrigation Q12H   terazosin 1 mg Nasogastric Nightly     IV meds:                          Data Review:  Results from last 7 days   Lab Units 08/13/19  0328 08/12/19  0306 08/11/19  0522 08/10/19  0517 08/09/19  0338   SODIUM mmol/L 148* 152* 149* 143 143   POTASSIUM mmol/L 3.6 3.9 4.2 3.4* 3.4*   CHLORIDE mmol/L 113* 115* 113* 108* 108*   CO2 mmol/L 26.9 27.0 24.2 26.9 21.8*   BUN mg/dL 63* 73* 72* 77* 71*   CREATININE mg/dL 1.60* 1.97* 2.41* 2.80* 2.85*   GLUCOSE mg/dL 126* 115* 157* 125* 125*   CALCIUM mg/dL 7.2* 7.3* 7.6* 7.3* 7.2*         Estimated Creatinine Clearance: 45.3 mL/min (A) (by C-G formula based on SCr of 1.6 mg/dL (H)).  Results from last 7 days   Lab Units 08/13/19  0328 08/12/19  0306 08/11/19  0522 08/10/19  0517 08/09/19  0338   WBC 10*3/mm3 7.46 9.15 10.33 14.98* 14.73*   HEMOGLOBIN g/dL 8.3* 9.3* 10.0* 10.7* 11.6*   PLATELETS 10*3/mm3 82*  82* 55*  55* 37*  37* 23*  23* 21*     Results from last 7 days   Lab Units 08/09/19  0338 08/08/19  0430 08/07/19  0444   INR  1.12* 1.19* 1.33*     Results from last 7 days   Lab Units 08/13/19  0328 08/12/19  0306 08/11/19  0522 08/09/19  0338 08/08/19  0430   ALT (SGPT) U/L <5 <5 <5 <5 <5   AST (SGOT) U/L 35 27 25 27 21                 Chest x-ray 8/10/2019 reviewed shows density lower lung fields with layering effusion.            )Assessment   ASSESSMENT:      Active Hospital Problems    Diagnosis  POA   • **MSSA bacteremia [R78.81]  Unknown   • Shock, septic (CMS/HCC) [A41.9, R65.21]  Unknown   • Oral thrush [B37.0]  Unknown   • Acute renal injury (CMS/HCC) [N17.9]  Unknown   • Anemia associated with chemotherapy [D64.81, T45.1X5A]  Unknown   • Chemotherapy-induced thrombocytopenia [D69.59, T45.1X5A]  Unknown   • New onset of congestive heart failure (CMS/HCC) [I50.9]  Yes   • Atrial fibrillation (CMS/HCC) [I48.91]  Yes   • Squamous cell carcinoma of neck [C44.42]  Yes   • COPD (chronic obstructive pulmonary disease) (CMS/HCC) [J44.9]  Yes   • Depression with anxiety [F41.8]  Yes   • CAD (coronary artery disease)  [I25.10]  Yes   • Chemotherapy-induced neutropenia (CMS/HCC) [D70.1, T45.1X5A]  Yes   • Vascular catheter infection (CMS/HCC) [T82.7XXA]  Yes      Resolved Hospital Problems   No resolved problems to display.     Sepsis with shock  MSSA bacteremia  Suspected infection of Mediport  Acute on chronic diastolic CHF  Acute hypoxemic respiratory failure with pulmonary edema  Left moderate pleural effusion   Acute kidney injury  A. 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  Wean oxygen as able.  Encourage incentive spirometer use.  Bronchodilators prn for COPD symptoms and help with clearance.  Plan 4 weeks antibiotic for MSSA bacteremia per ID recommendations.  Steroid per oncology.  If effusion worsens may require thoracentesis but with platelets would like to avoid.  Tolerating p.o. diet and hopefully can discontinue tube feeds.        Javier Segovia MD  Pulmonary and Critical Care Medicine  [facility], [facility]  8/13/2019    10:44 AM

## 2019-08-13 NOTE — PROGRESS NOTES
NEPHROLOGY PROGRESS NOTE    PATIENT IDENTIFICATION:   Name:  Cody Eldridge      MRN:  [id number]     74 y.o.  male             Reason for visit: JERICHO    SUBJECTIVE:   Feels ok.  Says his mouth is still very sore, but trying to eat more. Not all urine recorded due to moving his bowels .Walked today with less support.     OBJECTIVE:  Vitals:    08/13/19 0355 08/13/19 0640 08/13/19 0747 08/13/19 1226   BP:  118/59 135/74 134/82   BP Location:  Right arm Right arm Right arm   Patient Position:  Lying Lying Sitting   Pulse:  92 88 97   Resp:   20 20   Temp:   97.6 °F (36.4 °C) 97.9 °F (36.6 °C)   TempSrc:   Oral Oral   SpO2:  95% 97% 96%   Weight: 80.2 kg (176 lb 11.2 oz)      Height:               Body mass index is 24.64 kg/m².    Intake/Output Summary (Last 24 hours) at 8/13/2019 1454  Last data filed at 8/13/2019 1305  Gross per 24 hour   Intake 3588 ml   Output 950 ml   Net 2638 ml     Wt Readings from Last 1 Encounters:   08/13/19 0355 80.2 kg (176 lb 11.2 oz)   08/12/19 0357 79.9 kg (176 lb 1.6 oz)   08/11/19 0330 80.2 kg (176 lb 12.8 oz)   08/10/19 0524 80 kg (176 lb 6.4 oz)   08/08/19 0600 78.4 kg (172 lb 13.5 oz)   08/08/19 0210 78.4 kg (172 lb 13.5 oz)   08/07/19 0300 77.6 kg (171 lb 1.2 oz)   08/07/19 0146 76 kg (167 lb 8.8 oz)   08/06/19 1504 72.6 kg (160 lb)   08/06/19 0526 72.8 kg (160 lb 7.9 oz)   08/05/19 0600 70.5 kg (155 lb 6.8 oz)   08/05/19 0448 70.9 kg (156 lb 6.4 oz)   08/04/19 0610 70.6 kg (155 lb 11.2 oz)   08/03/19 1619 76.6 kg (168 lb 12.8 oz)   08/03/19 1207 79.9 kg (176 lb 3 oz)     Wt Readings from Last 3 Encounters:   08/13/19 80.2 kg (176 lb 11.2 oz)   08/02/19 78.6 kg (173 lb 3.2 oz)   07/31/19 79.2 kg (174 lb 9.6 oz)         Exam:  NAD; oriented; looks older than stated age  Chronically ill-appearing; flat affect  HEENT Dry MM; AT/NC ; feeding tube present in nose  No eye discharge; no scleral icterus  NeckNo JVD  Lungs No rales or rhonchi; not labored on O2 per nasal  cannula  Heart Irregularly irregular, tachycardic, no rub  Abd Soft,+ bs, nontender  Dressing by the right clavicle is C/D/I; left subclavian central line  Ext Trace-+1 edema both legs          Scheduled meds:      ceFAZolin 2 g Intravenous Q12H   lansoprazole 30 mg Nasogastric Daily   magic mouthwash 5 mL Swish & Spit Q4H   metoprolol tartrate 37.5 mg Oral Q8H   phosphorus 250 mg Nasogastric BID   potassium chloride 40 mEq Nasogastric Once   sertraline 50 mg Oral Daily   silver sulfadiazine  Topical BID   sodium chloride 3 mL Intravenous Q12H   Sodium Hypochlorite 0.0625 % (Dakin's 1/8th Strength) topical solution 946 mL Irrigation Q12H   terazosin 1 mg Nasogastric Nightly     IV meds:                             Data Review:    Results from last 7 days   Lab Units 08/13/19  0328 08/12/19  0306 08/11/19  0522   SODIUM mmol/L 148* 152* 149*   POTASSIUM mmol/L 3.6 3.9 4.2   CHLORIDE mmol/L 113* 115* 113*   CO2 mmol/L 26.9 27.0 24.2   BUN mg/dL 63* 73* 72*   CREATININE mg/dL 1.60* 1.97* 2.41*   CALCIUM mg/dL 7.2* 7.3* 7.6*   BILIRUBIN mg/dL 0.6 0.6 0.6   ALK PHOS U/L 83 93 101   ALT (SGPT) U/L <5 <5 <5   AST (SGOT) U/L 35 27 25   GLUCOSE mg/dL 126* 115* 157*     Estimated Creatinine Clearance: 45.3 mL/min (A) (by C-G formula based on SCr of 1.6 mg/dL (H)).  Results from last 7 days   Lab Units 08/07/19  0444 08/06/19  2105   SODIUM UR mmol/L  --  70   CREATININE UR mg/dL  --  56.7   URIC ACID mg/dL 5.4  --      Results from last 7 days   Lab Units 08/13/19  0328 08/11/19 2003 08/11/19  0522 08/10/19  0517   MAGNESIUM mg/dL 1.8  --  2.1 1.9   PHOSPHORUS mg/dL 1.8* 2.2* 2.2* 1.4*       Results from last 7 days   Lab Units 08/13/19  0328 08/12/19  0306 08/11/19  0522 08/10/19  0517 08/09/19  0338   WBC 10*3/mm3 7.46 9.15 10.33 14.98* 14.73*   HEMOGLOBIN g/dL 8.3* 9.3* 10.0* 10.7* 11.6*   PLATELETS 10*3/mm3 82*  82* 55*  55* 37*  37* 23*  23* 21*       Results from last 7 days   Lab Units 08/09/19  0338  08/08/19  0430 08/07/19  0444   INR  1.12* 1.19* 1.33*             ASSESSMENT:   1.  JERICHO, non-oliguric, improving: prerenal related to hypotension, tachy-arrhythmia, and sepsis syndrome limiting renal perfusion.  Still needs free water.  Replacing Phos .  2.  Head and neck cancer on radiation and chemotherapy  3.  Rapid atrial fibrillation  4.  Neutropenic fever with sepsis syndrome and MSSA bacteremia: BP's are stable  5.  Coagulopathy resolved.   6.  Anemia and thrombocytopenia  7. Chronic diastolic CHF:  Compensated.        PLAN:  1.  Continue free water flushes .  2.  K-phos per vaishali  3.  Discussed with wife at bedside        Ana Gomez MD  8/13/2019    2:54 PM

## 2019-08-13 NOTE — PROGRESS NOTES
LOS: 9 days       Chief Complaint: Head and neck squamous cell carcinoma, sepsis secondary to MSSA, febrile neutropenia, anemia, thrombocytopenia, severe mucositis, atrial fibrillation, acute kidney injury    Interval History: Patient reports that he has been able to ambulate in the hallway for the past 4 days.  He does continue with significant mucositis pain that is not relieved with Magic mouthwash or viscous lidocaine.  He continues with NG tube in place and is on tube feeds.  He does note loose to diarrheal stools which are chronic.        Review of Systems:    Review of systems was obtained with pertinent positive findings as noted in the interval history.  All other systems negative.    Objective     Vital Signs  Temp:  [97.6 °F (36.4 °C)-98.4 °F (36.9 °C)] 98.4 °F (36.9 °C)  Heart Rate:  [58-97] 97  Resp:  [18] 18  BP: (122-140)/(64-86) 140/67        Physical Exam:     GENERAL: Elderly gentleman lying in bed no distress  MOUTH: Significant mucositis with minimal evidence of mucosal bleeding  CHEST:  Lungs clear to percussion and auscultation. Good airflow.  Prior MediPort site right chest wall with dressing overlying.  CARDIAC: Irregularly irregular, borderline tachycardic  ABDOMEN: Slightly distended, nontender, bowel sounds present  EXTREMITIES: Trace to 1+ bilateral lower extremity edema  NEUROLOGICAL:  Cranial Nerves II-XII grossly intact.  No focal neurological deficits.  PSYCHIATRIC:  Normal affect and mood.           Results Review:     I reviewed the patient's new clinical results.    Results from last 7 days   Lab Units 08/12/19  0306   WBC 10*3/mm3 9.15   HEMOGLOBIN g/dL 9.3*   HEMATOCRIT % 29.5*   PLATELETS 10*3/mm3 55*  55*     Lab Results   Component Value Date    NEUTROABS 13.40 (H) 08/09/2019     Results from last 7 days   Lab Units 08/12/19  0306   SODIUM mmol/L 152*   POTASSIUM mmol/L 3.9   CHLORIDE mmol/L 115*   CO2 mmol/L 27.0   BUN mg/dL 73*   CREATININE mg/dL 1.97*   GLUCOSE mg/dL  115*   CALCIUM mg/dL 7.3*     Results from last 7 days   Lab Units 08/09/19  0338 08/08/19  0430 08/07/19  0444   INR  1.12* 1.19* 1.33*     Results from last 7 days   Lab Units 08/11/19  0522   MAGNESIUM mg/dL 2.1         Medication Review: Yes     Assessment/Plan     1. 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  Also developed atrial fibrillation with rapid ventricular response.  · Administered week 5 carboplatin/Taxol at significant delay on 7/31/2019  · Current hospitalization 8/3/2019 with febrile neutropenia, MSSA sepsis requiring MediPort removal on 8/5/2019.  Further treatment held.  No plans for further chemotherapy.  5 radiation fractions remaining.  2. MSSA neutropenic sepsis:  · WBC on admission 8/3/2019 1.09 with ANC 1.15, subsequently declined to WBC 0.56 on 8/4/2019 with ANC 0.49  · Required MediPort removal 8/5/2019  · Subsequent recovery of WBC with Neupogen support 480 mcg on 8/4, 8/5, 8/7/2019  · Continuation of cefazolin 2 g every 12 hours x4 weeks with antibiotic stop date of 9/3/2019 per infectious disease consultation.  3. Severe treatment related mucositis with associated pain:  · Patient receiving scheduled Magic mouthwash every 4 hours along with as needed viscous lidocaine  · Pain is not currently controlled.  We will add morphine 1 mg IV every 3 hours as needed  4. JERICHO:  · Previous creatinine 0.8-1.0  · Max creatinine 2.98 on 8/8/2019  · Nephrology consulted, felt to be related to hypotension, sepsis with reduced perfusion  · Creatinine with gradual  improvement, 1.97 today  5. Severe thrombocytopenia:  · Platelet count had recovered prior to administration of week 5 carboplatin/Taxol on 7/31/2019 at which time platelet count was 207,000  · On admission 8/3/2019 per the count 196,000  · Significant decline in platelet count with mabel 21,000 on 8/9/2019  · Elevated IPF at 14.8% on 8/10/2019, concern for possible ITP, received prednisone 60 mg daily beginning 8/10/2019  · Gradual improvement in platelet count, 55,000 today with persistently elevated IPF.  This is felt to be more related to marrow recovery.  Thrombocytopenia most likely secondary to chemotherapy and recent sepsis with consumption.  Will discontinue further prednisone and follow-up later count daily.  6. Anemia:  · Hemoglobin has declined to 9.3 today  · Elevated LDH but no other evidence to support hemolysis  · Secondary to chemotherapy, sepsis  · Monitor daily  7. Nutrition:  · Currently receiving continuous tube feeds via NG tube due to severe mucositis and weight loss on concurrent chemoradiation.  8. Atrial fibrillation with rapid ventricular response:  · Rate improved  9. Lower extremity edema:  · Check bilateral lower extremity Dopplers    Plan:  1. Discontinue prednisone  2. Add IV morphine 1 mg every 3 hours for additional pain control regarding severe mucositis  3. Bilateral lower extremity Dopplers  4. Further radiation therapy on hold pending recovery of mucositis.  No plans for further chemotherapy  5. CBC, CMP, IPF, LDH in a.m.    The patient and all the above issues were new to me today.  Reviewed multiple prior records including PET scan, pathology report, progress notes, laboratory studies as outlined above.  Discussed with patient and wife at bedside.      Pineda Santos MD  08/12/19  8:42 PM

## 2019-08-14 LAB
ALBUMIN SERPL-MCNC: 2.1 G/DL (ref 3.5–5.2)
ALBUMIN/GLOB SERPL: 0.7 G/DL
ALP SERPL-CCNC: 93 U/L (ref 39–117)
ALT SERPL W P-5'-P-CCNC: <5 U/L (ref 1–41)
ANION GAP SERPL CALCULATED.3IONS-SCNC: 10.7 MMOL/L (ref 5–15)
AST SERPL-CCNC: 47 U/L (ref 1–40)
BILIRUB SERPL-MCNC: 0.8 MG/DL (ref 0.2–1.2)
BUN BLD-MCNC: 54 MG/DL (ref 8–23)
BUN/CREAT SERPL: 42.2 (ref 7–25)
CALCIUM SPEC-SCNC: 7.5 MG/DL (ref 8.6–10.5)
CHLORIDE SERPL-SCNC: 111 MMOL/L (ref 98–107)
CO2 SERPL-SCNC: 27.3 MMOL/L (ref 22–29)
CREAT BLD-MCNC: 1.28 MG/DL (ref 0.76–1.27)
DAT POLY-SP REAG RBC QL: NEGATIVE
DEPRECATED RDW RBC AUTO: 84.4 FL (ref 37–54)
ERYTHROCYTE [DISTWIDTH] IN BLOOD BY AUTOMATED COUNT: 22.4 % (ref 12.3–15.4)
GFR SERPL CREATININE-BSD FRML MDRD: 55 ML/MIN/1.73
GLOBULIN UR ELPH-MCNC: 3.2 GM/DL
GLUCOSE BLD-MCNC: 115 MG/DL (ref 65–99)
HAPTOGLOB SERPL-MCNC: <10 MG/DL (ref 30–200)
HCT VFR BLD AUTO: 28.2 % (ref 37.5–51)
HEMOCCULT STL QL: POSITIVE
HGB BLD-MCNC: 8.6 G/DL (ref 13–17.7)
LDH SERPL-CCNC: 528 U/L (ref 135–225)
MCH RBC QN AUTO: 32.1 PG (ref 26.6–33)
MCHC RBC AUTO-ENTMCNC: 30.5 G/DL (ref 31.5–35.7)
MCV RBC AUTO: 105.2 FL (ref 79–97)
PHOSPHATE SERPL-MCNC: 1.6 MG/DL (ref 2.5–4.5)
PLATELET # BLD AUTO: 110 10*3/MM3 (ref 140–450)
PLATELET # BLD AUTO: 110 10*3/MM3 (ref 140–450)
PLATELETS.RETICULATED NFR BLD AUTO: 9.1 % (ref 0.9–6.5)
PMV BLD AUTO: 12.5 FL (ref 6–12)
POTASSIUM BLD-SCNC: 3.6 MMOL/L (ref 3.5–5.2)
PROT SERPL-MCNC: 5.3 G/DL (ref 6–8.5)
RBC # BLD AUTO: 2.68 10*6/MM3 (ref 4.14–5.8)
RETICS # AUTO: 0.13 10*6/MM3 (ref 0.02–0.13)
RETICS/RBC NFR AUTO: 4.87 % (ref 0.7–1.9)
SODIUM BLD-SCNC: 149 MMOL/L (ref 136–145)
WBC NRBC COR # BLD: 8.45 10*3/MM3 (ref 3.4–10.8)

## 2019-08-14 PROCEDURE — 85055 RETICULATED PLATELET ASSAY: CPT | Performed by: INTERNAL MEDICINE

## 2019-08-14 PROCEDURE — 99232 SBSQ HOSP IP/OBS MODERATE 35: CPT | Performed by: INTERNAL MEDICINE

## 2019-08-14 PROCEDURE — 85045 AUTOMATED RETICULOCYTE COUNT: CPT | Performed by: INTERNAL MEDICINE

## 2019-08-14 PROCEDURE — 86880 COOMBS TEST DIRECT: CPT | Performed by: INTERNAL MEDICINE

## 2019-08-14 PROCEDURE — 84100 ASSAY OF PHOSPHORUS: CPT | Performed by: INTERNAL MEDICINE

## 2019-08-14 PROCEDURE — 25010000002 MORPHINE PER 10 MG: Performed by: INTERNAL MEDICINE

## 2019-08-14 PROCEDURE — 25010000003 CEFAZOLIN IN DEXTROSE 2-4 GM/100ML-% SOLUTION: Performed by: INTERNAL MEDICINE

## 2019-08-14 PROCEDURE — 82272 OCCULT BLD FECES 1-3 TESTS: CPT | Performed by: INTERNAL MEDICINE

## 2019-08-14 PROCEDURE — 83615 LACTATE (LD) (LDH) ENZYME: CPT | Performed by: INTERNAL MEDICINE

## 2019-08-14 PROCEDURE — 83010 ASSAY OF HAPTOGLOBIN QUANT: CPT | Performed by: INTERNAL MEDICINE

## 2019-08-14 PROCEDURE — 97110 THERAPEUTIC EXERCISES: CPT

## 2019-08-14 PROCEDURE — 80053 COMPREHEN METABOLIC PANEL: CPT | Performed by: INTERNAL MEDICINE

## 2019-08-14 PROCEDURE — 85027 COMPLETE CBC AUTOMATED: CPT | Performed by: INTERNAL MEDICINE

## 2019-08-14 RX ORDER — CEFAZOLIN SODIUM 2 G/100ML
2 INJECTION, SOLUTION INTRAVENOUS EVERY 8 HOURS
Status: DISCONTINUED | OUTPATIENT
Start: 2019-08-14 | End: 2019-08-14 | Stop reason: CLARIF

## 2019-08-14 RX ADMIN — SILVER SULFADIAZINE: 10 CREAM TOPICAL at 20:40

## 2019-08-14 RX ADMIN — Medication 5 ML: at 16:27

## 2019-08-14 RX ADMIN — POTASSIUM PHOSPHATE, MONOBASIC AND POTASSIUM PHOSPHATE, DIBASIC 20 MMOL: 224; 236 INJECTION, SOLUTION, CONCENTRATE INTRAVENOUS at 12:39

## 2019-08-14 RX ADMIN — SODIUM CHLORIDE, PRESERVATIVE FREE 3 ML: 5 INJECTION INTRAVENOUS at 09:17

## 2019-08-14 RX ADMIN — Medication 5 ML: at 07:43

## 2019-08-14 RX ADMIN — TERAZOSIN HYDROCHLORIDE 1 MG: 1 CAPSULE ORAL at 20:40

## 2019-08-14 RX ADMIN — SERTRALINE 50 MG: 50 TABLET, FILM COATED ORAL at 09:16

## 2019-08-14 RX ADMIN — LANSOPRAZOLE 30 MG: KIT at 09:16

## 2019-08-14 RX ADMIN — METOPROLOL TARTRATE 37.5 MG: 25 TABLET ORAL at 20:39

## 2019-08-14 RX ADMIN — Medication 5 ML: at 12:03

## 2019-08-14 RX ADMIN — DIBASIC SODIUM PHOSPHATE, MONOBASIC POTASSIUM PHOSPHATE AND MONOBASIC SODIUM PHOSPHATE 1 TABLET: 852; 155; 130 TABLET ORAL at 09:16

## 2019-08-14 RX ADMIN — SILVER SULFADIAZINE: 10 CREAM TOPICAL at 09:16

## 2019-08-14 RX ADMIN — Medication 5 ML: at 20:40

## 2019-08-14 RX ADMIN — CEFAZOLIN SODIUM 2 G: 2 INJECTION, SOLUTION INTRAVENOUS at 16:26

## 2019-08-14 RX ADMIN — LIDOCAINE HYDROCHLORIDE 5 ML: 20 SOLUTION ORAL; TOPICAL at 07:43

## 2019-08-14 RX ADMIN — LIDOCAINE HYDROCHLORIDE 5 ML: 20 SOLUTION ORAL; TOPICAL at 16:26

## 2019-08-14 RX ADMIN — CEFAZOLIN SODIUM 2 G: 2 INJECTION, SOLUTION INTRAVENOUS at 09:16

## 2019-08-14 RX ADMIN — SODIUM CHLORIDE, PRESERVATIVE FREE 3 ML: 5 INJECTION INTRAVENOUS at 20:40

## 2019-08-14 RX ADMIN — LIDOCAINE HYDROCHLORIDE 5 ML: 20 SOLUTION ORAL; TOPICAL at 12:03

## 2019-08-14 RX ADMIN — METOPROLOL TARTRATE 37.5 MG: 25 TABLET ORAL at 15:06

## 2019-08-14 RX ADMIN — MORPHINE SULFATE 1 MG: 2 INJECTION, SOLUTION INTRAMUSCULAR; INTRAVENOUS at 09:26

## 2019-08-14 RX ADMIN — METOPROLOL TARTRATE 37.5 MG: 25 TABLET ORAL at 05:24

## 2019-08-14 NOTE — PROGRESS NOTES
" LOS: 11 days     Name: Cody Eldridge  Age: 74 y.o.  Sex: male  :  1945  MRN: [id number]         Primary Care Physician: Epley, James, APRN    Subjective   Subjective  Feels as if his mouth pain is slowly improving.  No new complaints.    Objective   Vital Signs  Temp:  [97.9 °F (36.6 °C)-98.3 °F (36.8 °C)] 98.3 °F (36.8 °C)  Heart Rate:  [] 81  Resp:  [18] 18  BP: (109-127)/(58-83) 109/58  Body mass index is 24.92 kg/m².    Objective:  General Appearance:  Comfortable and in no acute distress.    Vital signs: (most recent): Blood pressure 109/58, pulse 81, temperature 98.3 °F (36.8 °C), temperature source Oral, resp. rate 18, height 180.3 cm (71\"), weight 81.1 kg (178 lb 11.2 oz), SpO2 96 %.    HEENT: (Mucositis with small ulcerations present on the tongue.  Cortrak in place)    Lungs:  Normal effort and normal respiratory rate.    Heart: Normal rate.  Regular rhythm.    Abdomen: Abdomen is soft.  Bowel sounds are normal.   There is no abdominal tenderness.     Extremities: There is no dependent edema or local swelling.    Neurological: Patient is alert and oriented to person, place and time.    Skin:  Warm and dry.              Results Review:       I reviewed the patient's new clinical results.    Results from last 7 days   Lab Units 196 19  0522 08/10/19  0517 19  0338 19  0430   WBC 10*3/mm3 8.45 7.46 9.15 10.33 14.98* 14.73* 8.05   HEMOGLOBIN g/dL 8.6* 8.3* 9.3* 10.0* 10.7* 11.6* 11.5*   PLATELETS 10*3/mm3 110*  110* 82*  82* 55*  55* 37*  37* 23*  23* 21* 29*  29*     Results from last 7 days   Lab Units 198 19  0306 19  0522 08/10/19  0517 19  0338 19  1906   SODIUM mmol/L 149* 148* 152* 149* 143 143 145   POTASSIUM mmol/L 3.6 3.6 3.9 4.2 3.4* 3.4* 4.2   CHLORIDE mmol/L 111* 113* 115* 113* 108* 108* 110*   CO2 mmol/L 27.3 26.9 27.0 24.2 26.9 21.8* 25.8   BUN mg/dL 54* 63* 73* " 72* 77* 71* 68*   CREATININE mg/dL 1.28* 1.60* 1.97* 2.41* 2.80* 2.85* 2.91*   CALCIUM mg/dL 7.5* 7.2* 7.3* 7.6* 7.3* 7.2* 8.0*   GLUCOSE mg/dL 115* 126* 115* 157* 125* 125* 120*     Results from last 7 days   Lab Units 08/09/19  0338 08/08/19  0430   INR  1.12* 1.19*             Scheduled Meds:     ceFAZolin 2 g Intravenous Q8H   lansoprazole 30 mg Nasogastric Daily   magic mouthwash 5 mL Swish & Spit Q4H   metoprolol tartrate 37.5 mg Oral Q8H   potassium chloride 40 mEq Nasogastric Once   sertraline 50 mg Oral Daily   silver sulfadiazine  Topical BID   sodium chloride 3 mL Intravenous Q12H   terazosin 1 mg Nasogastric Nightly     PRN Meds:   •  acetaminophen  •  ALPRAZolam  •  diphenoxylate-atropine  •  HYDROcodone-acetaminophen  •  ipratropium  •  Lidocaine Viscous HCl  •  metoprolol tartrate  •  Morphine  •  nitroglycerin  •  ondansetron **OR** ondansetron  •  pramipexole  •  [COMPLETED] Insert peripheral IV **AND** sodium chloride  •  sodium chloride  Continuous Infusions:       Assessment/Plan   Active Hospital Problems    Diagnosis  POA   • **MSSA bacteremia [R78.81]  Unknown   • Shock, septic (CMS/HCC) [A41.9, R65.21]  Unknown   • Oral thrush [B37.0]  Unknown   • Acute renal injury (CMS/HCC) [N17.9]  Unknown   • Anemia associated with chemotherapy [D64.81, T45.1X5A]  Unknown   • Chemotherapy-induced thrombocytopenia [D69.59, T45.1X5A]  Unknown   • New onset of congestive heart failure (CMS/HCC) [I50.9]  Yes   • Atrial fibrillation (CMS/HCC) [I48.91]  Yes   • Squamous cell carcinoma of neck [C44.42]  Yes   • COPD (chronic obstructive pulmonary disease) (CMS/HCC) [J44.9]  Yes   • Depression with anxiety [F41.8]  Yes   • CAD (coronary artery disease) [I25.10]  Yes   • Chemotherapy-induced neutropenia (CMS/HCC) [D70.1, T45.1X5A]  Yes   • Vascular catheter infection (CMS/HCC) [T82.7XXA]  Yes      Resolved Hospital Problems   No resolved problems to display.       Assessment & Plan    -Remains on cefazolin with  plans for a 4-week course for treatment of MSSA septicemia, per infectious disease, with stop date of 9/3/2019  -Septic shock has resolved and now off pressors.   -Received Neupogen with improvement of WBCs. Hematology/oncology following  -Thrombocytopenia improving, taken off prednisone per Heme/Onc  -Atrial fibrillation with rapid ventricular.  Now in sinus rhythm and will maintain current dose of metoprolol, per cardiology.  -Renal function improved with better/more stable hemodynamics.  Nephrology following.  -Hypernatremia correcting with increase in free water, per nephrology  -Oral intake is slowly improving but still going to take some time and will need to see that he is able to maintain adequate sodium levels with oral intake as well.  Anticipate it is going to be several more days at least before we can get the cortrak out  -Finished a 3-day course of oral fluconazole for treatment of thrush  -Pulmonary monitoring his pleural effusion.  Attempting to avoid thoracentesis if possible.  -Appreciate assistance from consulting services  -PT following  -Discussed with Dr. Santos today    Pedrito Toledo MD  [facility]  08/14/19  12:43 PM

## 2019-08-14 NOTE — PLAN OF CARE
Problem: Patient Care Overview  Goal: Plan of Care Review   08/14/19 1420   Coping/Psychosocial   Plan of Care Reviewed With patient   OTHER   Outcome Summary Pt. able to ambulate 150 feet, Min. assist x1, with no A.D. this date. Pt. requires CGA x 1 for sit <-> stand transfers. Pt. exhibited x 1 loss of balance during ambulation and required x 2 standing rest breaks due to fatigue, weakness, SOA. Verbal/tactile cues for posture correction during ambulation.

## 2019-08-14 NOTE — PROGRESS NOTES
[facility] INPATIENT PROGRESS NOTE         [address]    2019      PATIENT IDENTIFICATION:  Name: Cody Eldridge ADMIT: 8/3/2019   : 1945  PCP: Epley, James, APRN    MRN: [id number] LOS: 11 days   AGE/SEX: 74 y.o. male  ROOM: [facility]                     LOS 11    Reason for visit: Follow-up respiratory failure with septic shock and pulmonary edema      SUBJECTIVE:      Sitting in bedside chair.  Eating breakfast with family.  Denies productive cough or chest pain.  Has to take his time and carefully swallowing but seems to be doing well.     Objective   OBJECTIVE:    Vital Sign Min/Max for last 24 hours  Temp  Min: 97.9 °F (36.6 °C)  Max: 98.3 °F (36.8 °C)   BP  Min: 109/58  Max: 134/82   Pulse  Min: 81  Max: 106   Resp  Min: 18  Max: 20   SpO2  Min: 93 %  Max: 96 %   No Data Recorded   Weight  Min: 81.1 kg (178 lb 11.2 oz)  Max: 81.1 kg (178 lb 11.2 oz)                         Body mass index is 24.92 kg/m².    Intake/Output Summary (Last 24 hours) at 2019 0937  Last data filed at 2019 0925  Gross per 24 hour   Intake 3548 ml   Output 1575 ml   Net 1973 ml         Exam:  GEN:  No distress, appears stated age  EYES:   PERRL, anicteric sclera  ENT:    External ears/nose normal, OP clear  NECK:  No adenopathy, midline trachea  LUNGS: Normal chest on inspection, palpation and diminished bilaterally on auscultation  CV:  Normal S1S2, without murmur  ABD:  Non tender, non distended, no hepatosplenomegaly, +BS  EXT:  No edema, cyanosis or clubbing    Scheduled meds:      ceFAZolin 2 g Intravenous Q8H   lansoprazole 30 mg Nasogastric Daily   magic mouthwash 5 mL Swish & Spit Q4H   metoprolol tartrate 37.5 mg Oral Q8H   phosphorus 250 mg Nasogastric BID   potassium chloride 40 mEq Nasogastric Once   sertraline 50 mg Oral Daily   silver sulfadiazine  Topical BID   sodium chloride 3 mL Intravenous Q12H   terazosin 1 mg Nasogastric Nightly     IV meds:                         Data  Review:  Results from last 7 days   Lab Units 08/14/19  0339 08/13/19  0328 08/12/19  0306 08/11/19  0522 08/10/19  0517   SODIUM mmol/L 149* 148* 152* 149* 143   POTASSIUM mmol/L 3.6 3.6 3.9 4.2 3.4*   CHLORIDE mmol/L 111* 113* 115* 113* 108*   CO2 mmol/L 27.3 26.9 27.0 24.2 26.9   BUN mg/dL 54* 63* 73* 72* 77*   CREATININE mg/dL 1.28* 1.60* 1.97* 2.41* 2.80*   GLUCOSE mg/dL 115* 126* 115* 157* 125*   CALCIUM mg/dL 7.5* 7.2* 7.3* 7.6* 7.3*         Estimated Creatinine Clearance: 57.2 mL/min (A) (by C-G formula based on SCr of 1.28 mg/dL (H)).  Results from last 7 days   Lab Units 08/14/19  0339 08/13/19  0328 08/12/19  0306 08/11/19  0522 08/10/19  0517   WBC 10*3/mm3 8.45 7.46 9.15 10.33 14.98*   HEMOGLOBIN g/dL 8.6* 8.3* 9.3* 10.0* 10.7*   PLATELETS 10*3/mm3 110*  110* 82*  82* 55*  55* 37*  37* 23*  23*     Results from last 7 days   Lab Units 08/09/19  0338 08/08/19  0430   INR  1.12* 1.19*     Results from last 7 days   Lab Units 08/14/19  0339 08/13/19  0328 08/12/19  0306 08/11/19  0522 08/09/19  0338   ALT (SGPT) U/L <5 <5 <5 <5 <5   AST (SGOT) U/L 47* 35 27 25 27                 Chest x-ray 8/10/2019 reviewed shows density lower lung fields with layering effusion.            )Assessment   ASSESSMENT:      Active Hospital Problems    Diagnosis  POA   • **MSSA bacteremia [R78.81]  Unknown   • Shock, septic (CMS/HCC) [A41.9, R65.21]  Unknown   • Oral thrush [B37.0]  Unknown   • Acute renal injury (CMS/HCC) [N17.9]  Unknown   • Anemia associated with chemotherapy [D64.81, T45.1X5A]  Unknown   • Chemotherapy-induced thrombocytopenia [D69.59, T45.1X5A]  Unknown   • New onset of congestive heart failure (CMS/HCC) [I50.9]  Yes   • Atrial fibrillation (CMS/HCC) [I48.91]  Yes   • Squamous cell carcinoma of neck [C44.42]  Yes   • COPD (chronic obstructive pulmonary disease) (CMS/HCC) [J44.9]  Yes   • Depression with anxiety [F41.8]  Yes   • CAD (coronary artery disease) [I25.10]  Yes   • Chemotherapy-induced  neutropenia (CMS/HCC) [D70.1, T45.1X5A]  Yes   • Vascular catheter infection (CMS/HCC) [T82.7XXA]  Yes      Resolved Hospital Problems   No resolved problems to display.     Sepsis with shock  MSSA bacteremia  Suspected infection of Mediport  Acute on chronic diastolic CHF  Acute hypoxemic respiratory failure with pulmonary edema  Left moderate pleural effusion   Acute kidney injury  A. 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  Wean oxygen as able.  Encourage incentive spirometer use.  Bronchodilators prn for COPD symptoms and help with clearance.  Plan 4 weeks antibiotic for MSSA bacteremia per ID recommendations.  Steroid per oncology.  If effusion worsens may require thoracentesis but with platelets would like to avoid.  Tolerating p.o. diet and hopefully can discontinue tube feeds.    Discussed with family at bedside.    Javier Segovia MD  Pulmonary and Critical Care Medicine  [facility], [facility]  8/14/2019    9:37 AM

## 2019-08-14 NOTE — THERAPY TREATMENT NOTE
Patient Name: Cody Eldridge  : 1945    MRN: [id number]                              Today's Date: 2019       Admit Date: 8/3/2019    Visit Dx:     ICD-10-CM ICD-9-CM   1. New onset of congestive heart failure (CMS/HCC) I50.9 428.0   2. Infected venous access port T80.219A 999.31     Patient Active Problem List   Diagnosis   • Atopic rhinitis   • Arthritis of hand   • Coxitis   • Benign colonic polyp   • Neck pain   • Chronic obstructive pulmonary disease (CMS/HCC)   • Mixed anxiety depressive disorder   • Degeneration of intervertebral disc of lumbosacral region   • Elevated prostate specific antigen (PSA)   • Hyperlipidemia   • Hypertension   • Insomnia   • Lumbar radiculopathy   • Osteoarthritis   • Vitamin D deficiency   • Abdominal pain   • Acute URI   • Myalgia   • Cramp of both lower extremities   • Gingivitis   • Atrial fibrillation (CMS/HCC)   • Non-rheumatic tricuspid valve insufficiency   • SHAR (obstructive sleep apnea)   • Precordial pain   • IRAHETA (dyspnea on exertion)   • Coronary artery disease of native artery of native heart with stable angina pectoris (CMS/HCC)   • Shortness of breath   • 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)   • Current use of long term anticoagulation   • Syncope   • Dehydration   • History of cancer   • History of atrial fibrillation   • History of CHF (congestive heart failure)   • Hypotension   • Fitting and adjustment of vascular catheter   • New onset of congestive heart failure (CMS/HCC)   • Atrial fibrillation (CMS/HCC)   • Squamous cell carcinoma of neck   • COPD (chronic obstructive pulmonary disease) (CMS/HCC)   • Depression with anxiety   • CAD (coronary artery disease)   • Chemotherapy-induced neutropenia (CMS/HCC)   • Vascular catheter infection (CMS/HCC)   • Chemotherapy-induced thrombocytopenia   • Acute renal injury (CMS/HCC)   • Anemia associated with chemotherapy   • Oral thrush   • MSSA bacteremia   • Shock, septic (CMS/HCC)     Past  Medical History:   Diagnosis Date   • Atrial fibrillation (CMS/HCC)    • CHF (congestive heart failure) (CMS/HCC)    • Chronic bronchitis (CMS/HCC)    • COPD (chronic obstructive pulmonary disease) (CMS/HCC)    • Cor pulmonale (chronic) (CMS/HCC)    • Daytime hypersomnia    • Depression with anxiety    • Elevated cholesterol    • Enlarged prostate    • Frequent nocturnal awakening    • Gout    • H/O cardiac radiofrequency ablation 2006    [facility].   • Head and neck cancer (CMS/HCC)    • Hyperlipidemia    • Hypertension    • Hypotension    • Insomnia    • Lumbar radicular pain    • SHAR (obstructive sleep apnea)    • Osteoarthritis    • Permanent atrial fibrillation (CMS/HCC)    • Snoring    • Squamous cell carcinoma of neck    • SVT (supraventricular tachycardia) (CMS/HCC)    • Syncope    • Vitamin D deficiency    • Weight loss      Past Surgical History:   Procedure Laterality Date   • APPENDECTOMY     • CARDIAC ABLATION  2006    [facility].   • CARDIAC CATHETERIZATION N/A 8/29/2018    Procedure: Left Heart Cath;  Surgeon: Yousif Uribe MD;  Location: [facility] CATH INVASIVE LOCATION;  Service: Cardiology   • CARDIAC CATHETERIZATION N/A 8/29/2018    Procedure: Coronary angiography;  Surgeon: Yousif Uribe MD;  Location: [facility] CATH INVASIVE LOCATION;  Service: Cardiology   • CARDIAC CATHETERIZATION N/A 8/29/2018    Procedure: Left ventriculography;  Surgeon: Yousif Uribe MD;  Location: [facility] CATH INVASIVE LOCATION;  Service: Cardiology   • FINGER SURGERY     • FOOT SURGERY     • HAND SURGERY     • VENOUS ACCESS DEVICE (PORT) INSERTION Right 6/18/2019    Procedure: MEDIPORT PLACEMENT;  Surgeon: Elvis Grigsby MD;  Location: [facility] OR;  Service: Vascular   • VENOUS ACCESS DEVICE (PORT) REMOVAL Right 8/5/2019    Procedure: REMOVAL VENOUS ACCESS DEVICE;  Surgeon: Prince Castaneda MD;  Location: [facility] OR;  Service: Vascular     General Information     Row Name 08/14/19 1417          PT  "Evaluation Time/Intention    Document Type  therapy note (daily note) Pt. reports feeling \"pretty good\" this day.  Agreeable to work with P.T.  -MS     Mode of Treatment  individual therapy;physical therapy  -MS     Row Name 08/14/19 1417          General Information    Patient Profile Reviewed?  yes  -MS     Existing Precautions/Restrictions  fall  (Significant)  NG Feeding tube  -MS     Row Name 08/14/19 1417          Cognitive Assessment/Intervention- PT/OT    Orientation Status (Cognition)  oriented x 3  -MS     Row Name 08/14/19 1417          Safety Issues, Functional Mobility    Comment, Safety Issues/Impairments (Mobility)  Gait belt used for safety  -MS       User Key  (r) = Recorded By, (t) = Taken By, (c) = Cosigned By    Initials Name Provider Type    Elvis Jimenez PT Physical Therapist        Mobility     Row Name 08/14/19 1418          Bed Mobility Assessment/Treatment    Comment (Bed Mobility)  Pt. up in chair this PM.  -MS     Row Name 08/14/19 1418          Sit-Stand Transfer    Sit-Stand [place] (Transfers)  contact guard  -MS     Row Name 08/14/19 1418          Gait/Stairs Assessment/Training    [place] Level (Gait)  minimum assist (75% patient effort)  -MS     Assistive Device (Gait)  -- No use of A.D. this date.  -MS     Distance in Feet (Gait)  150 feet  -MS     Deviations/Abnormal Patterns (Gait)  base of support, narrow  -MS     Bilateral Gait Deviations  forward flexed posture  -MS     Comment (Gait/Stairs)  Unsteady at times with x 1 loss of balance.  x 2 standing rest breaks due to overall fatigue, weakness, SOA.  Verbal/tactile cues for posture correction.   -MS       User Key  (r) = Recorded By, (t) = Taken By, (c) = Cosigned By    Initials Name Provider Type    Elvis Jimenez PT Physical Therapist        Obj/Interventions    No documentation.       Goals/Plan    No documentation.       Clinical Impression     Row Name 08/14/19 1419          Pain Assessment    " Additional Documentation  Pain Scale: Numbers Pre/Post-Treatment (Group)  -MS     Row Name 08/14/19 1419          Pain Scale: Numbers Pre/Post-Treatment    Pain Scale: Numbers, Pretreatment  0/10 - no pain  -MS     Pain Scale: Numbers, Post-Treatment  0/10 - no pain  -MS     Row Name 08/14/19 1420          Plan of Care Review    Plan of Care Reviewed With  patient  -MS     Row Name 08/14/19 1419          Positioning and Restraints    Pre-Treatment Position  sitting in chair/recliner  -MS     Post Treatment Position  chair  -MS     In Chair  notified nsg;sitting;call light within reach;encouraged to call for assist All lines intact.  -MS       User Key  (r) = Recorded By, (t) = Taken By, (c) = Cosigned By    Initials Name Provider Type    Elvis Jimenez, PT Physical Therapist        Outcome Measures     Row Name 08/14/19 1422          How much help from another person do you currently need...    Turning from your back to your side while in flat bed without using bedrails?  3  -MS     Moving from lying on back to sitting on the side of a flat bed without bedrails?  3  -MS     Moving to and from a bed to a chair (including a wheelchair)?  3  -MS     Standing up from a chair using your arms (e.g., wheelchair, bedside chair)?  3  -MS     Climbing 3-5 steps with a railing?  2  -MS     To walk in hospital room?  3  -MS     AM-PAC 6 Clicks Score (PT)  17  -MS       User Key  (r) = Recorded By, (t) = Taken By, (c) = Cosigned By    Initials Name Provider Type    Elvis Jimenez, PT Physical Therapist        Physical Therapy Education     Title: PT OT SLP Therapies (Done)     Topic: Physical Therapy (Done)     Point: Mobility training (Done)     Learning Progress Summary           Patient Acceptance, E,D, VU,NR by MS at 8/14/2019  2:20 PM    Acceptance, E,D, VU,NR by MS at 8/13/2019 11:29 AM    Acceptance, E,D, VU,NR by MS at 8/12/2019  4:30 PM    Acceptance, E, NR by EM at 8/9/2019  4:20 PM                   Point:  Home exercise program (Done)     Learning Progress Summary           Patient Acceptance, E,D, VU,NR by MS at 8/14/2019  2:20 PM    Acceptance, E,D, VU,NR by MS at 8/13/2019 11:29 AM    Acceptance, E,D, VU,NR by MS at 8/12/2019  4:30 PM                   Point: Body mechanics (Done)     Learning Progress Summary           Patient Acceptance, E,D, VU,NR by MS at 8/14/2019  2:20 PM    Acceptance, E,D, VU,NR by MS at 8/13/2019 11:29 AM    Acceptance, E,D, VU,NR by MS at 8/12/2019  4:30 PM                   Point: Precautions (Done)     Learning Progress Summary           Patient Acceptance, E,D, VU,NR by MS at 8/14/2019  2:20 PM    Acceptance, E,D, VU,NR by MS at 8/13/2019 11:29 AM    Acceptance, E,D, VU,NR by MS at 8/12/2019  4:30 PM                               User Key     Initials Effective Dates Name Provider Type [facility] 04/03/18 -  Yessi Jackson, PT Physical Therapist PT    MS 04/03/18 -  Elvis Kessler, PT Physical Therapist PT              PT Recommendation and Plan     Plan of Care Reviewed With: patient  Progress: improving  Outcome Summary: Pt. able to ambulate 150 feet, Min. assist x1, with no A.D. this date. Pt. requires CGA x 1 for sit <-> stand transfers.  Pt. exhibited x 1 loss of balance during ambulation and required x 2 standing rest breaks due to fatigue, weakness, SOA.  Verbal/tactile cues for posture correction during ambulation.      Time Calculation:   PT Charges     Row Name 08/14/19 1422             Time Calculation    Start Time  1400  -MS      Stop Time  1415  -MS      Time Calculation (min)  15 min  -MS      PT Received On  08/14/19  -MS      PT - Next Appointment  08/15/19  -MS         Time Calculation- PT    Total Timed Code Minutes- PT  13 minute(s)  -MS        User Key  (r) = Recorded By, (t) = Taken By, (c) = Cosigned By    Initials Name Provider Type    MS Elvis Kessler, PT Physical Therapist        Therapy Charges for Today     Code Description Service Date  Service Provider Modifiers Qty    95885231836  PT THER PROC EA 15 MIN 8/13/2019 Elvis Kessler, PT GP 1    19167542077 HC PT THER SUPP EA 15 MIN 8/13/2019 Elvis Kessler, PT GP 1    51399060505 HC PT THER PROC EA 15 MIN 8/14/2019 Elvis Kessler, PT GP 1          PT G-Codes  Outcome Measure Options: AM-PAC 6 Clicks Basic Mobility (PT)  AM-PAC 6 Clicks Score (PT): 17    Elvis Kessler, PT  8/14/2019

## 2019-08-14 NOTE — PLAN OF CARE
Problem: Patient Care Overview  Goal: Plan of Care Review  Outcome: Ongoing (interventions implemented as appropriate)   08/14/19 4357   Coping/Psychosocial   Plan of Care Reviewed With patient;family   OTHER   Outcome Summary Continues to work towards increasing and advancing diet. Dentures no longer fit patient.    Plan of Care Review   Progress improving       Problem: Skin Injury Risk (Adult)  Goal: Skin Health and Integrity  Outcome: Ongoing (interventions implemented as appropriate)      Problem: Pain, Acute (Adult)  Goal: Acceptable Pain Control/Comfort Level  Outcome: Ongoing (interventions implemented as appropriate)      Problem: Pain, Chronic (Adult)  Goal: Acceptable Pain/Comfort Level and Functional Ability  Outcome: Ongoing (interventions implemented as appropriate)

## 2019-08-14 NOTE — PLAN OF CARE
Problem: Patient Care Overview  Goal: Plan of Care Review  Outcome: Ongoing (interventions implemented as appropriate)   08/13/19 2026   Coping/Psychosocial   Plan of Care Reviewed With patient;family   OTHER   Outcome Summary Patient under strict I&Os; O2- 2L; ambulated from bed to chair;        Problem: Skin Injury Risk (Adult)  Goal: Skin Health and Integrity  Outcome: Ongoing (interventions implemented as appropriate)      Problem: Pain, Acute (Adult)  Goal: Acceptable Pain Control/Comfort Level  Outcome: Ongoing (interventions implemented as appropriate)

## 2019-08-14 NOTE — PROGRESS NOTES
LOS: 11 days       Chief Complaint: Head and neck squamous cell carcinoma, sepsis secondary to MSSA, febrile neutropenia, anemia, thrombocytopenia, severe mucositis, atrial fibrillation, acute kidney injury    Interval History: Today, the patient is getting ready to get up to the chair.  He is tired of lying in bed.  He reports that his mucositis has improved slightly.  He has no new complaints.        Review of Systems:    Review of systems was obtained with pertinent positive findings as noted in the interval history.  All other systems negative.    Objective     Vital Signs  Temp:  [97.9 °F (36.6 °C)-98.3 °F (36.8 °C)] 98.3 °F (36.8 °C)  Heart Rate:  [] 81  Resp:  [18-20] 18  BP: (109-134)/(58-83) 109/58        Physical Exam:     GENERAL: Elderly gentleman lying in bed no distress  MOUTH: Stable appearance from yesterday, scattered ulcerations noted on the tongue only.  CHEST:  Lungs clear to percussion and auscultation. Good airflow.  Prior MediPort site right chest wall with dressing overlying.  CARDIAC: Irregularly irregular  ABDOMEN: Slightly distended, nontender, bowel sounds present  EXTREMITIES: 1+ bilateral lower extremity edema  NEUROLOGICAL:  Cranial Nerves II-XII grossly intact.  No focal neurological deficits.  PSYCHIATRIC:  Normal affect and mood.           Results Review:     I reviewed the patient's new clinical results.    Results from last 7 days   Lab Units 08/14/19  0339   WBC 10*3/mm3 8.45   HEMOGLOBIN g/dL 8.6*   HEMATOCRIT % 28.2*   PLATELETS 10*3/mm3 110*  110*     Lab Results   Component Value Date    NEUTROABS 13.40 (H) 08/09/2019     Results from last 7 days   Lab Units 08/14/19  0339   SODIUM mmol/L 149*   POTASSIUM mmol/L 3.6   CHLORIDE mmol/L 111*   CO2 mmol/L 27.3   BUN mg/dL 54*   CREATININE mg/dL 1.28*   GLUCOSE mg/dL 115*   CALCIUM mg/dL 7.5*     Results from last 7 days   Lab Units 08/09/19  0338 08/08/19  0430   INR  1.12* 1.19*     Results from last 7 days   Lab Units  08/13/19  0328   MAGNESIUM mg/dL 1.8         Medication Review: Yes     Assessment/Plan     1. 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  Also developed atrial fibrillation with rapid ventricular response.  · Administered week 5 carboplatin/Taxol at significant delay on 7/31/2019  · Current hospitalization 8/3/2019 with febrile neutropenia, MSSA sepsis requiring MediPort removal on 8/5/2019.  Further treatment held.  No plans for further chemotherapy.  5 radiation fractions remaining pending recovery of mucositis symptoms.  2. MSSA neutropenic sepsis:  · WBC on admission 8/3/2019 1.09 with ANC 1.15, subsequently declined to WBC 0.56 on 8/4/2019 with ANC 0.49  · Required MediPort removal 8/5/2019  · Subsequent recovery of WBC with Neupogen support 480 mcg on 8/4, 8/5, 8/7/2019  · Continuation of cefazolin 2 g every 12 hours x4 weeks with antibiotic stop date of 9/3/2019 per infectious disease consultation.  3. Severe treatment related mucositis with associated pain:  · Patient receiving scheduled Magic mouthwash every 4 hours along with as needed viscous lidocaine  · Added morphine 1 mg every 3 hours IV as needed.  · Mucositis has gradually improved over the past few days, still precluding adequate oral intake.  4. JERICHO:  · Previous creatinine 0.8-1.0  · Max creatinine 2.98 on 8/8/2019  · Nephrology consulted, felt to be related to hypotension, sepsis with reduced perfusion  · Creatinine with continued gradual improvement, 1.28 today  5. Severe thrombocytopenia:  · Platelet count  had recovered prior to administration of week 5 carboplatin/Taxol on 7/31/2019 at which time platelet count was 207,000  · On admission 8/3/2019 per the count 196,000  · Significant decline in platelet count with mabel 21,000 on 8/9/2019  · Elevated IPF at 14.8% on 8/10/2019, concern for possible ITP, received prednisone 60 mg daily beginning 8/10/2019  · Persistently elevated IPF is felt to be more related to marrow recovery.  Thrombocytopenia most likely secondary to chemotherapy and recent sepsis with consumption.  Prednisone discontinued.  · Platelet count today further improved at 110,000.  6. Anemia:  · Elevated LDH but no other evidence to support hemolysis  · Secondary to chemotherapy, sepsis  · Labs on 8/13/2019 with haptoglobin less than 10.  AST minimally elevated today at 47 although total bilirubin normal at 0.8.  We will check a Corky test.  Noted that patient does have moderate tricuspid regurgitation, unclear if valvular hemolysis may be a possibility.  Hemoglobin today slightly improved at 8.6.  7. Nutrition:  · Currently receiving continuous tube feeds via NG tube due to severe mucositis and weight loss on concurrent chemoradiation.  8. Atrial fibrillation with rapid ventricular response:  · Rate improved  · As platelet count continues to improve, may need input from cardiology regarding need for anticoagulation.  9. Lower extremity edema:  · Bilateral lower extremity Dopplers negative for DVT 8/13/2019    Plan:  1. Stool for occult blood pending  2. Check Corky test today  3. We will need opinion from cardiology regarding need for anticoagulation for atrial fibrillation as platelet count is improving  4. CBC, CMP, LDH, IPF in a.m.      Pineda Santos MD  08/14/19  11:19 AM

## 2019-08-14 NOTE — PROGRESS NOTES
Adult Nutrition  Assessment/PES    Patient Name:  Cody Eldridge  YOB: 1945  MRN: [id number]  Admit Date:  8/3/2019    Assessment Date:  8/14/2019    Follow up-tolerating TF through NG Isosource 1.5 @ 60 cc/hr. Hypernatremia-Nephrology ordering fluid flushes. Mouth pain is improving. Patient's spouse is bringing in dentures today. Hopeful to tolerate a regular diet. Spoke with RN, called Speech therapy to follow up. Ordered boost TID.  RD to continue to follow closely.    Reason for Assessment     Row Name 08/14/19 1512          Reason for Assessment    Reason For Assessment  follow-up protocol;TF/PN         Nutrition/Diet History     Row Name 08/14/19 1512          Nutrition/Diet History    Typical Food/Fluid Intake  po intake fair- wife bringing in dentures           Labs/Tests/Procedures/Meds     Row Name 08/14/19 1513          Labs/Procedures/Meds    Lab Results Reviewed  reviewed, pertinent     Lab Results Comments  Na, Creat, BUN, Phos        Diagnostic Tests/Procedures    Diagnostic Test/Procedure Reviewed  reviewed, pertinent        Medications    Pertinent Medications Reviewed  reviewed, pertinent         Physical Findings     Row Name 08/14/19 1514          Physical Findings    Gastrointestinal  feeding tube     Tubes  nasogastric tube           Nutrition Prescription Ordered     Row Name 08/14/19 1514          Nutrition Prescription PO    Current PO Diet  Regular     Fluid Consistency  Nectar/syrup thick        Nutrition Prescription EN    Enteral Route  NG     Product  Isosource 1.5 (Jevity 1.5)     TF Delivery Method  Continuous     Continuous TF Goal Rate (mL/hr)  60 mL/hr     Water flush (mL)   100 mL     Water Flush Frequency  Per hour         Evaluation of Received Nutrient/Fluid Intake     Row Name 08/14/19 1514          Calories Evaluation    Enteral Calories (kcal)  2160        Protein Evaluation    Enteral Protein (gm)  97        Fluid Intake Evaluation    Enteral (Free Water)  Fluid (mL)  1094     Free Water Flush Fluid (mL)  2400     Total Free Water Intake (mL)  3494 mL               Problem/Interventions:            Intervention Goal     Row Name 08/14/19 1515          Intervention Goal    General  Maintain nutrition;Meet nutritional needs for age/condition;Nutrition support treatment     PO  Tolerate PO;Increase intake     TF/PN  Maintain TF/PN     Weight  Maintain weight         Nutrition Intervention     Row Name 08/14/19 1515          Nutrition Intervention    RD/Tech Action  Care plan reviewd;Follow Tx progress;Interview for preference;Recommend/ordered     Recommended/Ordered  Supplement         Nutrition Prescription     Row Name 08/14/19 1515          Nutrition Prescription PO    PO Prescription  Begin/change supplement     Supplement  Boost     Supplement Frequency  3 times a day     New PO Prescription Ordered?  Yes         Education/Evaluation     Row Name 08/14/19 1515          Education    Education  Will Instruct as appropriate        Monitor/Evaluation    Monitor  Per protocol           Electronically signed by:  Ela Read RD  08/14/19 3:16 PM

## 2019-08-14 NOTE — PROGRESS NOTES
NEPHROLOGY PROGRESS NOTE    PATIENT IDENTIFICATION:   Name:  Cody Eldridge      MRN:  [id number]     74 y.o.  male             Reason for visit: JERICHO    SUBJECTIVE:   Feels a little better. Tried to eat lunch, but no teeth so meat too hard. Did have mashed potatoes. Walked with PT.  Bowels slowing down.     OBJECTIVE:  Vitals:    08/13/19 2318 08/14/19 0521 08/14/19 0715 08/14/19 1344   BP: 124/62 117/83 109/58 143/75   BP Location: Right arm Right arm Right arm Right arm   Patient Position: Lying Sitting Lying Sitting   Pulse: 99 106 81 98   Resp: 18  18 18   Temp: 98.3 °F (36.8 °C)  98.3 °F (36.8 °C) 98.5 °F (36.9 °C)   TempSrc: Oral  Oral Oral   SpO2:   96% 95%   Weight:  81.1 kg (178 lb 11.2 oz)     Height:               Body mass index is 24.92 kg/m².    Intake/Output Summary (Last 24 hours) at 8/14/2019 1437  Last data filed at 8/14/2019 0938  Gross per 24 hour   Intake 3348 ml   Output 1175 ml   Net 2173 ml     Wt Readings from Last 1 Encounters:   08/14/19 0521 81.1 kg (178 lb 11.2 oz)   08/13/19 0355 80.2 kg (176 lb 11.2 oz)   08/12/19 0357 79.9 kg (176 lb 1.6 oz)   08/11/19 0330 80.2 kg (176 lb 12.8 oz)   08/10/19 0524 80 kg (176 lb 6.4 oz)   08/08/19 0600 78.4 kg (172 lb 13.5 oz)   08/08/19 0210 78.4 kg (172 lb 13.5 oz)   08/07/19 0300 77.6 kg (171 lb 1.2 oz)   08/07/19 0146 76 kg (167 lb 8.8 oz)   08/06/19 1504 72.6 kg (160 lb)   08/06/19 0526 72.8 kg (160 lb 7.9 oz)   08/05/19 0600 70.5 kg (155 lb 6.8 oz)   08/05/19 0448 70.9 kg (156 lb 6.4 oz)   08/04/19 0610 70.6 kg (155 lb 11.2 oz)   08/03/19 1619 76.6 kg (168 lb 12.8 oz)   08/03/19 1207 79.9 kg (176 lb 3 oz)     Wt Readings from Last 3 Encounters:   08/14/19 81.1 kg (178 lb 11.2 oz)   08/02/19 78.6 kg (173 lb 3.2 oz)   07/31/19 79.2 kg (174 lb 9.6 oz)         Exam:  NAD; oriented; looks older than stated age. Sitting in chair.  Chronically ill-appearing; flat affect  HEENT Dry MM; AT/NC ; feeding tube present in nose  No eye discharge; no  scleral icterus  Neck No JVD  Lungs No rales or rhonchi; not labored on O2 per nasal cannula  Heart Irregularly irregular, tachycardic, no rub  Abd Soft,+ bs, nontender  Dressing by the right clavicle is C/D/I; erythema, firm RIJ vein. Not tender.   Ext Trace-+1 edema both legs  SKin pale.          Scheduled meds:      ceFAZolin 2 g Intravenous Q8H   lansoprazole 30 mg Nasogastric Daily   magic mouthwash 5 mL Swish & Spit Q4H   metoprolol tartrate 37.5 mg Oral Q8H   potassium chloride 40 mEq Nasogastric Once   sertraline 50 mg Oral Daily   silver sulfadiazine  Topical BID   sodium chloride 3 mL Intravenous Q12H   terazosin 1 mg Nasogastric Nightly     IV meds:                             Data Review:    Results from last 7 days   Lab Units 08/14/19 0339 08/13/19 0328 08/12/19  0306   SODIUM mmol/L 149* 148* 152*   POTASSIUM mmol/L 3.6 3.6 3.9   CHLORIDE mmol/L 111* 113* 115*   CO2 mmol/L 27.3 26.9 27.0   BUN mg/dL 54* 63* 73*   CREATININE mg/dL 1.28* 1.60* 1.97*   CALCIUM mg/dL 7.5* 7.2* 7.3*   BILIRUBIN mg/dL 0.8 0.6 0.6   ALK PHOS U/L 93 83 93   ALT (SGPT) U/L <5 <5 <5   AST (SGOT) U/L 47* 35 27   GLUCOSE mg/dL 115* 126* 115*     Estimated Creatinine Clearance: 57.2 mL/min (A) (by C-G formula based on SCr of 1.28 mg/dL (H)).      Results from last 7 days   Lab Units 08/14/19 0339 08/13/19  0328 08/11/19 2003 08/11/19  0522 08/10/19  0517   MAGNESIUM mg/dL  --  1.8  --  2.1 1.9   PHOSPHORUS mg/dL 1.6* 1.8* 2.2* 2.2* 1.4*       Results from last 7 days   Lab Units 08/14/19  0339 08/13/19 0328 08/12/19  0306 08/11/19  0522 08/10/19  0517   WBC 10*3/mm3 8.45 7.46 9.15 10.33 14.98*   HEMOGLOBIN g/dL 8.6* 8.3* 9.3* 10.0* 10.7*   PLATELETS 10*3/mm3 110*  110* 82*  82* 55*  55* 37*  37* 23*  23*       Results from last 7 days   Lab Units 08/09/19  0338 08/08/19  0430   INR  1.12* 1.19*             ASSESSMENT:   1.  JERICHO, non-oliguric, improving: prerenal related to hypotension, tachy-arrhythmia, and sepsis  syndrome limiting renal perfusion.  Still needs free water.  Replacing Phos IV since poform can add to diarrhea .  2.  Head and neck cancer on radiation and chemotherapy  3.  Rapid atrial fibrillation. Rate controlled.   4.  Neutropenic fever with sepsis syndrome and MSSA bacteremia: BP stable  5.  Coagulopathy resolved.   6.  Anemia and thrombocytopenia.  7. Chronic diastolic CHF:  Compensated.        PLAN:  1.  Continue free water per cortrak.  2.  K-phos IV to replace Phos.          Ana Gomez MD  8/14/2019    2:37 PM

## 2019-08-15 LAB
ABO GROUP BLD: NORMAL
ALBUMIN SERPL-MCNC: 2.1 G/DL (ref 3.5–5.2)
ALBUMIN/GLOB SERPL: 0.8 G/DL
ALP SERPL-CCNC: 75 U/L (ref 39–117)
ALT SERPL W P-5'-P-CCNC: 5 U/L (ref 1–41)
ANION GAP SERPL CALCULATED.3IONS-SCNC: 9.6 MMOL/L (ref 5–15)
AST SERPL-CCNC: 44 U/L (ref 1–40)
BILIRUB SERPL-MCNC: 0.7 MG/DL (ref 0.2–1.2)
BLD GP AB SCN SERPL QL: NEGATIVE
BUN BLD-MCNC: 43 MG/DL (ref 8–23)
BUN/CREAT SERPL: 36.4 (ref 7–25)
CALCIUM SPEC-SCNC: 7.4 MG/DL (ref 8.6–10.5)
CHLORIDE SERPL-SCNC: 113 MMOL/L (ref 98–107)
CO2 SERPL-SCNC: 26.4 MMOL/L (ref 22–29)
CREAT BLD-MCNC: 1.18 MG/DL (ref 0.76–1.27)
DEPRECATED RDW RBC AUTO: 83.5 FL (ref 37–54)
ERYTHROCYTE [DISTWIDTH] IN BLOOD BY AUTOMATED COUNT: 22.9 % (ref 12.3–15.4)
GFR SERPL CREATININE-BSD FRML MDRD: 60 ML/MIN/1.73
GLOBULIN UR ELPH-MCNC: 2.6 GM/DL
GLUCOSE BLD-MCNC: 97 MG/DL (ref 65–99)
HCT VFR BLD AUTO: 22.7 % (ref 37.5–51)
HGB BLD-MCNC: 7.1 G/DL (ref 13–17.7)
LDH SERPL-CCNC: 458 U/L (ref 135–225)
MCH RBC QN AUTO: 32.9 PG (ref 26.6–33)
MCHC RBC AUTO-ENTMCNC: 31.3 G/DL (ref 31.5–35.7)
MCV RBC AUTO: 105.1 FL (ref 79–97)
PHOSPHATE SERPL-MCNC: 2.5 MG/DL (ref 2.5–4.5)
PLATELET # BLD AUTO: 111 10*3/MM3 (ref 140–450)
PLATELET # BLD AUTO: 111 10*3/MM3 (ref 140–450)
PLATELETS.RETICULATED NFR BLD AUTO: 8.4 % (ref 0.9–6.5)
PMV BLD AUTO: 12.9 FL (ref 6–12)
POTASSIUM BLD-SCNC: 3.5 MMOL/L (ref 3.5–5.2)
PROT SERPL-MCNC: 4.7 G/DL (ref 6–8.5)
RBC # BLD AUTO: 2.16 10*6/MM3 (ref 4.14–5.8)
RH BLD: POSITIVE
SODIUM BLD-SCNC: 149 MMOL/L (ref 136–145)
T&S EXPIRATION DATE: NORMAL
WBC NRBC COR # BLD: 7.13 10*3/MM3 (ref 3.4–10.8)

## 2019-08-15 PROCEDURE — 99232 SBSQ HOSP IP/OBS MODERATE 35: CPT | Performed by: INTERNAL MEDICINE

## 2019-08-15 PROCEDURE — 83615 LACTATE (LD) (LDH) ENZYME: CPT | Performed by: INTERNAL MEDICINE

## 2019-08-15 PROCEDURE — 85055 RETICULATED PLATELET ASSAY: CPT | Performed by: INTERNAL MEDICINE

## 2019-08-15 PROCEDURE — 86900 BLOOD TYPING SEROLOGIC ABO: CPT

## 2019-08-15 PROCEDURE — 80053 COMPREHEN METABOLIC PANEL: CPT | Performed by: INTERNAL MEDICINE

## 2019-08-15 PROCEDURE — P9016 RBC LEUKOCYTES REDUCED: HCPCS

## 2019-08-15 PROCEDURE — 86900 BLOOD TYPING SEROLOGIC ABO: CPT | Performed by: INTERNAL MEDICINE

## 2019-08-15 PROCEDURE — 84100 ASSAY OF PHOSPHORUS: CPT | Performed by: INTERNAL MEDICINE

## 2019-08-15 PROCEDURE — 85027 COMPLETE CBC AUTOMATED: CPT | Performed by: INTERNAL MEDICINE

## 2019-08-15 PROCEDURE — 86923 COMPATIBILITY TEST ELECTRIC: CPT

## 2019-08-15 PROCEDURE — 86901 BLOOD TYPING SEROLOGIC RH(D): CPT | Performed by: INTERNAL MEDICINE

## 2019-08-15 PROCEDURE — 25010000003 CEFAZOLIN PER 500 MG: Performed by: INTERNAL MEDICINE

## 2019-08-15 PROCEDURE — 36430 TRANSFUSION BLD/BLD COMPNT: CPT

## 2019-08-15 PROCEDURE — 86850 RBC ANTIBODY SCREEN: CPT | Performed by: INTERNAL MEDICINE

## 2019-08-15 RX ADMIN — Medication 5 ML: at 23:05

## 2019-08-15 RX ADMIN — CEFAZOLIN SODIUM 2 G: 10 INJECTION, POWDER, FOR SOLUTION INTRAVENOUS at 00:01

## 2019-08-15 RX ADMIN — LANSOPRAZOLE 30 MG: KIT at 08:30

## 2019-08-15 RX ADMIN — Medication 5 ML: at 15:51

## 2019-08-15 RX ADMIN — CEFAZOLIN SODIUM 2 G: 10 INJECTION, POWDER, FOR SOLUTION INTRAVENOUS at 23:01

## 2019-08-15 RX ADMIN — SERTRALINE 50 MG: 50 TABLET, FILM COATED ORAL at 08:30

## 2019-08-15 RX ADMIN — Medication 5 ML: at 08:30

## 2019-08-15 RX ADMIN — METOPROLOL TARTRATE 37.5 MG: 25 TABLET ORAL at 15:05

## 2019-08-15 RX ADMIN — SODIUM CHLORIDE, PRESERVATIVE FREE 3 ML: 5 INJECTION INTRAVENOUS at 20:56

## 2019-08-15 RX ADMIN — Medication 5 ML: at 12:22

## 2019-08-15 RX ADMIN — SODIUM CHLORIDE, PRESERVATIVE FREE 10 ML: 5 INJECTION INTRAVENOUS at 23:37

## 2019-08-15 RX ADMIN — LIDOCAINE HYDROCHLORIDE 5 ML: 20 SOLUTION ORAL; TOPICAL at 17:02

## 2019-08-15 RX ADMIN — TERAZOSIN HYDROCHLORIDE 1 MG: 1 CAPSULE ORAL at 20:53

## 2019-08-15 RX ADMIN — CEFAZOLIN SODIUM 2 G: 10 INJECTION, POWDER, FOR SOLUTION INTRAVENOUS at 18:16

## 2019-08-15 RX ADMIN — LIDOCAINE HYDROCHLORIDE 5 ML: 20 SOLUTION ORAL; TOPICAL at 12:23

## 2019-08-15 RX ADMIN — HYDROCODONE BITARTRATE AND ACETAMINOPHEN 1 TABLET: 5; 325 TABLET ORAL at 20:53

## 2019-08-15 RX ADMIN — LIDOCAINE HYDROCHLORIDE 5 ML: 20 SOLUTION ORAL; TOPICAL at 20:53

## 2019-08-15 RX ADMIN — SODIUM CHLORIDE, PRESERVATIVE FREE 3 ML: 5 INJECTION INTRAVENOUS at 08:30

## 2019-08-15 RX ADMIN — CEFAZOLIN SODIUM 2 G: 10 INJECTION, POWDER, FOR SOLUTION INTRAVENOUS at 10:34

## 2019-08-15 RX ADMIN — METOPROLOL TARTRATE 37.5 MG: 25 TABLET ORAL at 22:44

## 2019-08-15 RX ADMIN — METOPROLOL TARTRATE 37.5 MG: 25 TABLET ORAL at 06:18

## 2019-08-15 NOTE — PLAN OF CARE
Problem: Patient Care Overview  Goal: Plan of Care Review  Outcome: Ongoing (interventions implemented as appropriate)   08/15/19 1611   Coping/Psychosocial   Plan of Care Reviewed With patient;spouse   OTHER   Outcome Summary Cortrak left out, taking some po diet, encouraged to drink water, take ice chips, magic cup sub for boost; rec'ing 2 u prbcs today, up to bsc several times with loose stool; tele continues Afib/Flutter.; safety maintained, Continue to monitor.   Plan of Care Review   Progress declining       Problem: Fall Risk (Adult)  Goal: Identify Related Risk Factors and Signs and Symptoms  Outcome: Ongoing (interventions implemented as appropriate)      Problem: Cardiac: Heart Failure (Adult)  Goal: Signs and Symptoms of Listed Potential Problems Will be Absent, Minimized or Managed (Cardiac: Heart Failure)  Outcome: Ongoing (interventions implemented as appropriate)      Problem: Skin Injury Risk (Adult)  Goal: Skin Health and Integrity  Outcome: Ongoing (interventions implemented as appropriate)      Problem: Pain, Acute (Adult)  Goal: Identify Related Risk Factors and Signs and Symptoms  Outcome: Ongoing (interventions implemented as appropriate)    Goal: Acceptable Pain Control/Comfort Level  Outcome: Ongoing (interventions implemented as appropriate)      Problem: Pain, Chronic (Adult)  Goal: Acceptable Pain/Comfort Level and Functional Ability  Outcome: Ongoing (interventions implemented as appropriate)      Problem: Nutrition, Imbalanced: Inadequate Oral Intake (Adult)  Goal: Identify Related Risk Factors and Signs and Symptoms  Outcome: Ongoing (interventions implemented as appropriate)    Goal: Improved Oral Intake  Outcome: Ongoing (interventions implemented as appropriate)    Goal: Prevent Further Weight Loss  Outcome: Ongoing (interventions implemented as appropriate)

## 2019-08-15 NOTE — NURSING NOTE
Discussed with Dr. Toledo patient's cortrak out last shift.  He said will assess need for reinsertion when he sees patient.    Imani Puri RN

## 2019-08-15 NOTE — PROGRESS NOTES
[facility] INPATIENT PROGRESS NOTE         [address]    8/15/2019      PATIENT IDENTIFICATION:  Name: Cody Eldridge ADMIT: 8/3/2019   : 1945  PCP: Epley, James, APRN    MRN: [id number] LOS: 12 days   AGE/SEX: 74 y.o. male  ROOM: [facility]                     LOS 12    Reason for visit: Follow-up respiratory failure with septic shock and pulmonary edema      SUBJECTIVE:      Dobbhoff tube has been removed.  Not requiring supplemental oxygen.  Scattered coarse breath sounds at bases which improved with cough.  No wheezing.    Objective   OBJECTIVE:    Vital Sign Min/Max for last 24 hours  Temp  Min: 97.9 °F (36.6 °C)  Max: 98.5 °F (36.9 °C)   BP  Min: 119/66  Max: 143/75   Pulse  Min: 98  Max: 120   Resp  Min: 16  Max: 18   SpO2  Min: 92 %  Max: 95 %   No Data Recorded   Weight  Min: 80.7 kg (178 lb)  Max: 80.7 kg (178 lb)                         Body mass index is 24.83 kg/m².    Intake/Output Summary (Last 24 hours) at 8/15/2019 0828  Last data filed at 8/15/2019 0753  Gross per 24 hour   Intake 160 ml   Output 1800 ml   Net -1640 ml         Exam:  GEN:  No distress, appears stated age  EYES:   PERRL, anicteric sclera  ENT:    External ears/nose normal, OP clear  NECK:  No adenopathy, midline trachea  LUNGS: Normal chest on inspection, palpation and diminished bilaterally on auscultation  CV:  Normal S1S2, without murmur  ABD:  Non tender, non distended, no hepatosplenomegaly, +BS  EXT:  No edema, cyanosis or clubbing    Scheduled meds:      ceFAZolin 2 g Intravenous Q8H   lansoprazole 30 mg Nasogastric Daily   magic mouthwash 5 mL Swish & Spit Q4H   metoprolol tartrate 37.5 mg Oral Q8H   potassium chloride 40 mEq Nasogastric Once   sertraline 50 mg Oral Daily   silver sulfadiazine  Topical BID   sodium chloride 3 mL Intravenous Q12H   terazosin 1 mg Nasogastric Nightly     IV meds:                         Data Review:  Results from last 7 days   Lab Units 08/15/19  0317 19  2133  08/13/19  0328 08/12/19  0306 08/11/19  0522   SODIUM mmol/L 149* 149* 148* 152* 149*   POTASSIUM mmol/L 3.5 3.6 3.6 3.9 4.2   CHLORIDE mmol/L 113* 111* 113* 115* 113*   CO2 mmol/L 26.4 27.3 26.9 27.0 24.2   BUN mg/dL 43* 54* 63* 73* 72*   CREATININE mg/dL 1.18 1.28* 1.60* 1.97* 2.41*   GLUCOSE mg/dL 97 115* 126* 115* 157*   CALCIUM mg/dL 7.4* 7.5* 7.2* 7.3* 7.6*         Estimated Creatinine Clearance: 61.7 mL/min (by C-G formula based on SCr of 1.18 mg/dL).  Results from last 7 days   Lab Units 08/15/19  0317 08/14/19  0339 08/13/19  0328 08/12/19  0306 08/11/19  0522   WBC 10*3/mm3 7.13 8.45 7.46 9.15 10.33   HEMOGLOBIN g/dL 7.1* 8.6* 8.3* 9.3* 10.0*   PLATELETS 10*3/mm3 111*  111* 110*  110* 82*  82* 55*  55* 37*  37*     Results from last 7 days   Lab Units 08/09/19  0338   INR  1.12*     Results from last 7 days   Lab Units 08/15/19  0317 08/14/19  0339 08/13/19  0328 08/12/19  0306 08/11/19  0522   ALT (SGPT) U/L 5 <5 <5 <5 <5   AST (SGOT) U/L 44* 47* 35 27 25                 Chest x-ray 8/10/2019 reviewed shows density lower lung fields with layering effusion.            )Assessment   ASSESSMENT:      Active Hospital Problems    Diagnosis  POA   • **MSSA bacteremia [R78.81]  Unknown   • Shock, septic (CMS/HCC) [A41.9, R65.21]  Unknown   • Oral thrush [B37.0]  Unknown   • Acute renal injury (CMS/HCC) [N17.9]  Unknown   • Anemia associated with chemotherapy [D64.81, T45.1X5A]  Unknown   • Chemotherapy-induced thrombocytopenia [D69.59, T45.1X5A]  Unknown   • New onset of congestive heart failure (CMS/HCC) [I50.9]  Yes   • Atrial fibrillation (CMS/HCC) [I48.91]  Yes   • Squamous cell carcinoma of neck [C44.42]  Yes   • COPD (chronic obstructive pulmonary disease) (CMS/HCC) [J44.9]  Yes   • Depression with anxiety [F41.8]  Yes   • CAD (coronary artery disease) [I25.10]  Yes   • Chemotherapy-induced neutropenia (CMS/HCC) [D70.1, T45.1X5A]  Yes   • Vascular catheter infection (CMS/HCC) [T82.7XXA]  Yes       Resolved Hospital Problems   No resolved problems to display.     Sepsis with shock  MSSA bacteremia  Suspected infection of Mediport  Acute on chronic diastolic CHF  Acute hypoxemic respiratory failure with pulmonary edema  Left moderate pleural effusion   Acute kidney injury  A. 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  Wean oxygen as able.  Encourage incentive spirometer use.  Bronchodilators prn for COPD symptoms and help with clearance.  Plan 4 weeks antibiotic for MSSA bacteremia per ID recommendations.  Steroid per oncology.  If effusion worsens may require thoracentesis but with platelets would like to avoid.  Tolerating p.o. Diet.    Discussed with family at bedside.    Javier Segovia MD  Pulmonary and Critical Care Medicine  [facility], [facility]  8/15/2019    8:28 AM

## 2019-08-15 NOTE — PLAN OF CARE
Problem: Nutrition, Imbalanced: Inadequate Oral Intake (Adult)  Intervention: Promote/Optimize Nutrition   08/15/19 6234   Nutrition Interventions   Oral Nutrition Promotion calorie dense liquids provided;calorie dense foods provided;nutritional therapy counseling provided   Follow up. NG is out. Po intake is still minimal. Some tomato soup at lunch.  Provided nutrition therapy. Encouraged small, frequent meals to meet needs.  He agreed to try magic cup-ordered BID. D/c'd boost-stated it causes diarrhea.

## 2019-08-15 NOTE — PROGRESS NOTES
LOS: 12 days       Chief Complaint: Head and neck squamous cell carcinoma, sepsis secondary to MSSA, febrile neutropenia, anemia, thrombocytopenia, severe mucositis, atrial fibrillation, acute kidney injury    Interval History: The patient reports that he was able to walk around the nurses station today which was a significant improvement.  He notes that his mucositis has improved and his oral intake has improved as well.  No new complaints.    Receiving transfusion 2 units packed red blood cells for hemoglobin of 7.1    Review of Systems:    Review of systems was obtained with pertinent positive findings as noted in the interval history.  All other systems negative.    Objective     Vital Signs  Temp:  [97.6 °F (36.4 °C)-98.6 °F (37 °C)] 97.6 °F (36.4 °C)  Heart Rate:  [] 92  Resp:  [16-18] 18  BP: (119-144)/(59-82) 144/70        Physical Exam:     GENERAL: Elderly gentleman lying in bed no distress  MOUTH: Improvement in mucositis today  CHEST:  Lungs clear to percussion and auscultation. Good airflow.  Prior MediPort site right chest wall with dressing overlying.  CARDIAC: Irregularly irregular  ABDOMEN: Slightly distended, nontender, bowel sounds present  EXTREMITIES: 1+ bilateral lower extremity edema  NEUROLOGICAL:  Cranial Nerves II-XII grossly intact.  No focal neurological deficits.  PSYCHIATRIC:  Normal affect and mood.           Results Review:     I reviewed the patient's new clinical results.    Results from last 7 days   Lab Units 08/15/19  0317   WBC 10*3/mm3 7.13   HEMOGLOBIN g/dL 7.1*   HEMATOCRIT % 22.7*   PLATELETS 10*3/mm3 111*  111*     Lab Results   Component Value Date    NEUTROABS 13.40 (H) 08/09/2019     Results from last 7 days   Lab Units 08/15/19  0317   SODIUM mmol/L 149*   POTASSIUM mmol/L 3.5   CHLORIDE mmol/L 113*   CO2 mmol/L 26.4   BUN mg/dL 43*   CREATININE mg/dL 1.18   GLUCOSE mg/dL 97   CALCIUM mg/dL 7.4*     Results from last 7 days   Lab Units 08/09/19  0338   INR   1.12*     Results from last 7 days   Lab Units 08/13/19  0328   MAGNESIUM mg/dL 1.8         Medication Review: Yes     Assessment/Plan     1. 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  Also developed atrial fibrillation with rapid ventricular response.  · Administered week 5 carboplatin/Taxol at significant delay on 7/31/2019  · Current hospitalization 8/3/2019 with febrile neutropenia, MSSA sepsis requiring MediPort removal on 8/5/2019.  Further treatment held.  No plans for further chemotherapy.  5 radiation fractions remaining.  2. MSSA neutropenic sepsis:  · WBC on admission 8/3/2019 1.09 with ANC 1.15, subsequently declined to WBC 0.56 on 8/4/2019 with ANC 0.49  · Required MediPort removal 8/5/2019  · Subsequent recovery of WBC with Neupogen support 480 mcg on 8/4, 8/5, 8/7/2019  · Continuation of cefazolin 2 g every 12 hours x4 weeks with antibiotic stop date of 9/3/2019 per infectious disease consultation.  3. Severe treatment related mucositis with associated pain:  · Patient receiving scheduled Magic mouthwash every 4 hours along with as needed viscous lidocaine  · Added morphine 1 mg every 3 hours IV as needed.  · Today, patient reports that his mucositis pain has improved.  He did try morphine which led to relief but also sedation.  4. JERICHO:  · Previous creatinine 0.8-1.0  · Max creatinine 2.98 on 8/8/2019  · Nephrology consulted, felt to be related to hypotension, sepsis with reduced perfusion  · Creatinine with continued gradual improvement, 1.18 today  5. Severe  thrombocytopenia:  · Platelet count had recovered prior to administration of week 5 carboplatin/Taxol on 7/31/2019 at which time platelet count was 207,000  · On admission 8/3/2019 per the count 196,000  · Significant decline in platelet count with mabel 21,000 on 8/9/2019  · Elevated IPF at 14.8% on 8/10/2019, concern for possible ITP, received prednisone 60 mg daily beginning 8/10/2019  · Persistently elevated IPF is felt to be more related to marrow recovery.  Thrombocytopenia most likely secondary to chemotherapy and recent sepsis with consumption.  Prednisone discontinued.  · Platelet count today further improved at 111,000.  6. Anemia:  · Elevated LDH but no other evidence to support hemolysis  · Secondary to chemotherapy, sepsis  · Check reticulocyte count today and in a.m., also check haptoglobin in a.m.  · Hemoglobin with further decline to 8.3.  Stool for occult blood was positive.  7. Nutrition:  · Previously required continuous tube feeds via NG tube due to severe mucositis and weight loss on concurrent chemoradiation.  8. Atrial fibrillation with rapid ventricular response:  · Rate improved  9. Lower extremity edema:  · Bilateral lower extremity Dopplers negative for DVT 8/13/2019    Plan:  1. Agree with transfusion 2 units packed red blood cells.  Continue to monitor blood counts daily  2. Continue supportive care and nutrition support  3. No plans for further chemotherapy.  If patient improves he may hopefully be able to finish out his planned radiation.  4. Consider GI evaluation for heme positive stool      Pablo Heaton MD  08/15/19  12:45 PM

## 2019-08-15 NOTE — PROGRESS NOTES
" LOS: 12 days     Name: Cody Eldridge  Age: 74 y.o.  Sex: male  :  1945  MRN: [id number]         Primary Care Physician: Epley, James, APRN    Subjective   Subjective  Says that his mouth is still very sore.  Oswaldo was found out by the nurse overnight last night.  Exact mechanism of this is unclear.  He ate a little bit of breakfast.    Objective   Vital Signs  Temp:  [97.6 °F (36.4 °C)-98.6 °F (37 °C)] 97.6 °F (36.4 °C)  Heart Rate:  [] 92  Resp:  [16-18] 18  BP: (119-144)/(59-82) 144/70  Body mass index is 24.83 kg/m².    Objective:  General Appearance:  Comfortable, in no acute distress and ill-appearing (Elderly, frail, chronically ill-appearing).    Vital signs: (most recent): Blood pressure 144/70, pulse 92, temperature 97.6 °F (36.4 °C), resp. rate 18, height 180.3 cm (71\"), weight 80.7 kg (178 lb), SpO2 93 %.    HEENT: (Still with extensive mucositis)    Lungs:  Normal effort and normal respiratory rate.    Heart: Normal rate.  Regular rhythm.    Abdomen: Abdomen is soft.  Bowel sounds are normal.   There is no abdominal tenderness.     Extremities: There is no dependent edema or local swelling.    Neurological: Patient is alert and oriented to person, place and time.    Skin:  Warm and dry.              Results Review:       I reviewed the patient's new clinical results.    Results from last 7 days   Lab Units 08/15/19  0317 19  0339 19  0328 19  0306 19  0522 08/10/19  0517 19  0338   WBC 10*3/mm3 7.13 8.45 7.46 9.15 10.33 14.98* 14.73*   HEMOGLOBIN g/dL 7.1* 8.6* 8.3* 9.3* 10.0* 10.7* 11.6*   PLATELETS 10*3/mm3 111*  111* 110*  110* 82*  82* 55*  55* 37*  37* 23*  23* 21*     Results from last 7 days   Lab Units 08/15/19  0317 19  0339 19  0328 19  0306 19  0522 08/10/19  0517 19  0338   SODIUM mmol/L 149* 149* 148* 152* 149* 143 143   POTASSIUM mmol/L 3.5 3.6 3.6 3.9 4.2 3.4* 3.4*   CHLORIDE mmol/L 113* 111* 113* 115* " 113* 108* 108*   CO2 mmol/L 26.4 27.3 26.9 27.0 24.2 26.9 21.8*   BUN mg/dL 43* 54* 63* 73* 72* 77* 71*   CREATININE mg/dL 1.18 1.28* 1.60* 1.97* 2.41* 2.80* 2.85*   CALCIUM mg/dL 7.4* 7.5* 7.2* 7.3* 7.6* 7.3* 7.2*   GLUCOSE mg/dL 97 115* 126* 115* 157* 125* 125*     Results from last 7 days   Lab Units 08/09/19  0338   INR  1.12*             Scheduled Meds:     ceFAZolin 2 g Intravenous Q8H   lansoprazole 30 mg Nasogastric Daily   magic mouthwash 5 mL Swish & Spit Q4H   metoprolol tartrate 37.5 mg Oral Q8H   potassium chloride 40 mEq Nasogastric Once   sertraline 50 mg Oral Daily   silver sulfadiazine  Topical BID   sodium chloride 3 mL Intravenous Q12H   terazosin 1 mg Nasogastric Nightly     PRN Meds:   •  acetaminophen  •  ALPRAZolam  •  diphenoxylate-atropine  •  HYDROcodone-acetaminophen  •  ipratropium  •  Lidocaine Viscous HCl  •  metoprolol tartrate  •  Morphine  •  nitroglycerin  •  ondansetron **OR** ondansetron  •  pramipexole  •  [COMPLETED] Insert peripheral IV **AND** sodium chloride  •  sodium chloride  Continuous Infusions:       Assessment/Plan   Active Hospital Problems    Diagnosis  POA   • **MSSA bacteremia [R78.81]  Unknown   • Shock, septic (CMS/HCC) [A41.9, R65.21]  Unknown   • Oral thrush [B37.0]  Unknown   • Acute renal injury (CMS/HCC) [N17.9]  Unknown   • Anemia associated with chemotherapy [D64.81, T45.1X5A]  Unknown   • Chemotherapy-induced thrombocytopenia [D69.59, T45.1X5A]  Unknown   • New onset of congestive heart failure (CMS/HCC) [I50.9]  Yes   • Atrial fibrillation (CMS/HCC) [I48.91]  Yes   • Squamous cell carcinoma of neck [C44.42]  Yes   • COPD (chronic obstructive pulmonary disease) (CMS/HCC) [J44.9]  Yes   • Depression with anxiety [F41.8]  Yes   • CAD (coronary artery disease) [I25.10]  Yes   • Chemotherapy-induced neutropenia (CMS/HCC) [D70.1, T45.1X5A]  Yes   • Vascular catheter infection (CMS/HCC) [T82.7XXA]  Yes      Resolved Hospital Problems   No resolved problems to  display.       Assessment & Plan  -Still with extensive mucositis and complains of mouth pain that is very slowly improving.  Oswaldo came out last night (mechanism unclear) and we will leave this out for now and see how he does with oral intake.  We have encouraged him to drink fluids and we will see how his sodium response.  -Remains on cefazolin with plans for a 4-week course for treatment of MSSA septicemia, per infectious disease, with stop date of 9/3/2019.  Septic shock has resolved and now off pressors.   -Received Neupogen with improvement of WBCs. Hematology/oncology following  -Thrombocytopenia improving, taken off prednisone per Heme/Onc  -Hemoglobin down further today and he will receive packed red blood cells.  May need to consider GI evaluation given his stool was positive for occult blood.  We will see how he responds to the transfusion.  -Atrial fibrillation with rapid ventricular.  Now in sinus rhythm and will maintain current dose of metoprolol, per cardiology.  Would be reasonable to consider reinitiation of anticoagulation when hemoglobin is stable.  -Renal function improved with better/more stable hemodynamics.  Nephrology following.  -Finished a 3-day course of oral fluconazole for treatment of thrush  -Pulmonary monitoring his pleural effusion.  Attempting to avoid thoracentesis if possible.  -Appreciate assistance from consulting services  -PT following      Pedrito Toledo MD  [facility]  08/15/19  12:49 PM

## 2019-08-15 NOTE — PROGRESS NOTES
"    NEPHROLOGY PROGRESS NOTE    PATIENT IDENTIFICATION:   Name:  Cody Eldridge      MRN:  [id number]     74 y.o.  male             Reason for visit: JERICHO    SUBJECTIVE:   Exhausted.  \" sick of this place\".  Ate a little breakfast. Cortrak out.  Secured at shift change last night, but RN found it out later .  Says bowels have slowed down, but 6 BM recorded yesterday.  Mouth still very sore.     OBJECTIVE:  Vitals:    08/14/19 2310 08/15/19 0618 08/15/19 0649 08/15/19 0707   BP: 134/82 119/66  129/71   BP Location: Right arm   Right arm   Patient Position: Lying   Lying   Pulse:  114  120   Resp: 16   16   Temp: 97.9 °F (36.6 °C)   98.1 °F (36.7 °C)   TempSrc: Oral   Oral   SpO2:    92%   Weight:   80.7 kg (178 lb)    Height:               Body mass index is 24.83 kg/m².    Intake/Output Summary (Last 24 hours) at 8/15/2019 0900  Last data filed at 8/15/2019 0753  Gross per 24 hour   Intake 160 ml   Output 1800 ml   Net -1640 ml     Wt Readings from Last 1 Encounters:   08/15/19 0649 80.7 kg (178 lb)   08/14/19 0521 81.1 kg (178 lb 11.2 oz)   08/13/19 0355 80.2 kg (176 lb 11.2 oz)   08/12/19 0357 79.9 kg (176 lb 1.6 oz)   08/11/19 0330 80.2 kg (176 lb 12.8 oz)   08/10/19 0524 80 kg (176 lb 6.4 oz)   08/08/19 0600 78.4 kg (172 lb 13.5 oz)   08/08/19 0210 78.4 kg (172 lb 13.5 oz)   08/07/19 0300 77.6 kg (171 lb 1.2 oz)   08/07/19 0146 76 kg (167 lb 8.8 oz)   08/06/19 1504 72.6 kg (160 lb)   08/06/19 0526 72.8 kg (160 lb 7.9 oz)   08/05/19 0600 70.5 kg (155 lb 6.8 oz)   08/05/19 0448 70.9 kg (156 lb 6.4 oz)   08/04/19 0610 70.6 kg (155 lb 11.2 oz)   08/03/19 1619 76.6 kg (168 lb 12.8 oz)   08/03/19 1207 79.9 kg (176 lb 3 oz)     Wt Readings from Last 3 Encounters:   08/15/19 80.7 kg (178 lb)   08/02/19 78.6 kg (173 lb 3.2 oz)   07/31/19 79.2 kg (174 lb 9.6 oz)         Exam:  Lying in bed. Oriented; looks older than stated age. Fatigued. Falls asleep mid exam.   Chronically ill-appearing; flat affect  HEENT Dry oral " mucosa.   No eye discharge; no scleral icterus  Neck No JVD  Lungs No rales or rhonchi; not labored on O2 per nasal cannula  Heart Irregularly irregular, tachycardic, no rub  Abd Soft,+ bs, nontender  Dressing by the right clavicle is C/D/I; less erythema, firm RIJ vein. Not tender.   Ext Trace-+1 edema both legs  Skin pale.          Scheduled meds:      ceFAZolin 2 g Intravenous Q8H   lansoprazole 30 mg Nasogastric Daily   magic mouthwash 5 mL Swish & Spit Q4H   metoprolol tartrate 37.5 mg Oral Q8H   potassium chloride 40 mEq Nasogastric Once   sertraline 50 mg Oral Daily   silver sulfadiazine  Topical BID   sodium chloride 3 mL Intravenous Q12H   terazosin 1 mg Nasogastric Nightly     IV meds:                             Data Review:    Results from last 7 days   Lab Units 08/15/19  0317 08/14/19 0339 08/13/19  0328   SODIUM mmol/L 149* 149* 148*   POTASSIUM mmol/L 3.5 3.6 3.6   CHLORIDE mmol/L 113* 111* 113*   CO2 mmol/L 26.4 27.3 26.9   BUN mg/dL 43* 54* 63*   CREATININE mg/dL 1.18 1.28* 1.60*   CALCIUM mg/dL 7.4* 7.5* 7.2*   BILIRUBIN mg/dL 0.7 0.8 0.6   ALK PHOS U/L 75 93 83   ALT (SGPT) U/L 5 <5 <5   AST (SGOT) U/L 44* 47* 35   GLUCOSE mg/dL 97 115* 126*     Estimated Creatinine Clearance: 61.7 mL/min (by C-G formula based on SCr of 1.18 mg/dL).      Results from last 7 days   Lab Units 08/14/19  0339 08/13/19  0328 08/11/19 2003 08/11/19  0522 08/10/19  0517   MAGNESIUM mg/dL  --  1.8  --  2.1 1.9   PHOSPHORUS mg/dL 1.6* 1.8* 2.2* 2.2* 1.4*       Results from last 7 days   Lab Units 08/15/19  0317 08/14/19  0339 08/13/19  0328 08/12/19  0306 08/11/19  0522   WBC 10*3/mm3 7.13 8.45 7.46 9.15 10.33   HEMOGLOBIN g/dL 7.1* 8.6* 8.3* 9.3* 10.0*   PLATELETS 10*3/mm3 111*  111* 110*  110* 82*  82* 55*  55* 37*  37*       Results from last 7 days   Lab Units 08/09/19  0338   INR  1.12*             ASSESSMENT:   1.  JERICHO, non-oliguric, creatinine down to normal.  Sodium high because cortrak out,  so no free  water overnight.  Encourage po fluids.  Check phos.  Replacing IV as needed since po phos causes diarrhea. Calcium corrects to 9 with albumin 2.1  2.  Head and neck cancer on radiation and chemotherapy.  3.  Rapid atrial fibrillation. Rate back up today, possibly due to anemia.   4.  Neutropenic fever with sepsis syndrome and MSSA bacteremia: No fever.  BP stable.   5.  Coagulopathy resolved.   6.  Anemia and thrombocytopenia. Platelets stable. HG down. Will discuss PRBC with oncology.   7. Chronic diastolic CHF:  Compensated.        PLAN:  1. Recheck phos  2. Encourage po fluids today.  RN informed.         Ana Gomez MD  8/15/2019    9:00 AM

## 2019-08-16 LAB
ABO + RH BLD: NORMAL
ABO + RH BLD: NORMAL
ALBUMIN SERPL-MCNC: 2.4 G/DL (ref 3.5–5.2)
ALBUMIN/GLOB SERPL: 0.9 G/DL
ALP SERPL-CCNC: 85 U/L (ref 39–117)
ALT SERPL W P-5'-P-CCNC: 6 U/L (ref 1–41)
ANION GAP SERPL CALCULATED.3IONS-SCNC: 11.9 MMOL/L (ref 5–15)
AST SERPL-CCNC: 53 U/L (ref 1–40)
BH BB BLOOD EXPIRATION DATE: NORMAL
BH BB BLOOD EXPIRATION DATE: NORMAL
BH BB BLOOD TYPE BARCODE: 7300
BH BB BLOOD TYPE BARCODE: 7300
BH BB DISPENSE STATUS: NORMAL
BH BB DISPENSE STATUS: NORMAL
BH BB PRODUCT CODE: NORMAL
BH BB PRODUCT CODE: NORMAL
BH BB UNIT NUMBER: NORMAL
BH BB UNIT NUMBER: NORMAL
BILIRUB SERPL-MCNC: 0.6 MG/DL (ref 0.2–1.2)
BUN BLD-MCNC: 30 MG/DL (ref 8–23)
BUN/CREAT SERPL: 28 (ref 7–25)
CALCIUM SPEC-SCNC: 7.8 MG/DL (ref 8.6–10.5)
CHLORIDE SERPL-SCNC: 113 MMOL/L (ref 98–107)
CO2 SERPL-SCNC: 26.1 MMOL/L (ref 22–29)
CREAT BLD-MCNC: 1.07 MG/DL (ref 0.76–1.27)
DEPRECATED RDW RBC AUTO: 82.2 FL (ref 37–54)
ERYTHROCYTE [DISTWIDTH] IN BLOOD BY AUTOMATED COUNT: 23.3 % (ref 12.3–15.4)
GFR SERPL CREATININE-BSD FRML MDRD: 67 ML/MIN/1.73
GLOBULIN UR ELPH-MCNC: 2.7 GM/DL
GLUCOSE BLD-MCNC: 79 MG/DL (ref 65–99)
HCT VFR BLD AUTO: 27.7 % (ref 37.5–51)
HGB BLD-MCNC: 8.8 G/DL (ref 13–17.7)
LDH SERPL-CCNC: 453 U/L (ref 135–225)
MCH RBC QN AUTO: 32.2 PG (ref 26.6–33)
MCHC RBC AUTO-ENTMCNC: 31.8 G/DL (ref 31.5–35.7)
MCV RBC AUTO: 101.5 FL (ref 79–97)
PHOSPHATE SERPL-MCNC: 3.4 MG/DL (ref 2.5–4.5)
PLATELET # BLD AUTO: 135 10*3/MM3 (ref 140–450)
PLATELET # BLD AUTO: 135 10*3/MM3 (ref 140–450)
PLATELETS.RETICULATED NFR BLD AUTO: 7.3 % (ref 0.9–6.5)
PMV BLD AUTO: 12.5 FL (ref 6–12)
POTASSIUM BLD-SCNC: 3.1 MMOL/L (ref 3.5–5.2)
PROT SERPL-MCNC: 5.1 G/DL (ref 6–8.5)
RBC # BLD AUTO: 2.73 10*6/MM3 (ref 4.14–5.8)
SODIUM BLD-SCNC: 151 MMOL/L (ref 136–145)
UNIT  ABO: NORMAL
UNIT  ABO: NORMAL
UNIT  RH: NORMAL
UNIT  RH: NORMAL
WBC NRBC COR # BLD: 7.03 10*3/MM3 (ref 3.4–10.8)

## 2019-08-16 PROCEDURE — 85055 RETICULATED PLATELET ASSAY: CPT | Performed by: INTERNAL MEDICINE

## 2019-08-16 PROCEDURE — 84100 ASSAY OF PHOSPHORUS: CPT | Performed by: INTERNAL MEDICINE

## 2019-08-16 PROCEDURE — 25010000002 CEFAZOLIN PER 500 MG: Performed by: INTERNAL MEDICINE

## 2019-08-16 PROCEDURE — 97110 THERAPEUTIC EXERCISES: CPT

## 2019-08-16 PROCEDURE — 83615 LACTATE (LD) (LDH) ENZYME: CPT | Performed by: INTERNAL MEDICINE

## 2019-08-16 PROCEDURE — 92526 ORAL FUNCTION THERAPY: CPT | Performed by: SPEECH-LANGUAGE PATHOLOGIST

## 2019-08-16 PROCEDURE — 99232 SBSQ HOSP IP/OBS MODERATE 35: CPT | Performed by: INTERNAL MEDICINE

## 2019-08-16 PROCEDURE — 85027 COMPLETE CBC AUTOMATED: CPT | Performed by: INTERNAL MEDICINE

## 2019-08-16 PROCEDURE — 25010000003 CEFAZOLIN PER 500 MG: Performed by: INTERNAL MEDICINE

## 2019-08-16 PROCEDURE — 80053 COMPREHEN METABOLIC PANEL: CPT | Performed by: INTERNAL MEDICINE

## 2019-08-16 RX ORDER — POTASSIUM CHLORIDE 7.45 MG/ML
10 INJECTION INTRAVENOUS
Status: DISCONTINUED | OUTPATIENT
Start: 2019-08-16 | End: 2019-08-20 | Stop reason: HOSPADM

## 2019-08-16 RX ORDER — DEXTROSE MONOHYDRATE 50 MG/ML
100 INJECTION, SOLUTION INTRAVENOUS CONTINUOUS
Status: ACTIVE | OUTPATIENT
Start: 2019-08-16 | End: 2019-08-17

## 2019-08-16 RX ORDER — POTASSIUM CHLORIDE 1.5 G/1.77G
40 POWDER, FOR SOLUTION ORAL AS NEEDED
Status: DISCONTINUED | OUTPATIENT
Start: 2019-08-16 | End: 2019-08-20 | Stop reason: HOSPADM

## 2019-08-16 RX ORDER — POTASSIUM CHLORIDE 750 MG/1
40 CAPSULE, EXTENDED RELEASE ORAL AS NEEDED
Status: DISCONTINUED | OUTPATIENT
Start: 2019-08-16 | End: 2019-08-20 | Stop reason: HOSPADM

## 2019-08-16 RX ADMIN — LANSOPRAZOLE 30 MG: KIT at 09:37

## 2019-08-16 RX ADMIN — POTASSIUM CHLORIDE 40 MEQ: 750 CAPSULE, EXTENDED RELEASE ORAL at 17:11

## 2019-08-16 RX ADMIN — Medication 5 ML: at 09:36

## 2019-08-16 RX ADMIN — POTASSIUM CHLORIDE 40 MEQ: 750 CAPSULE, EXTENDED RELEASE ORAL at 22:29

## 2019-08-16 RX ADMIN — POTASSIUM CHLORIDE 40 MEQ: 750 CAPSULE, EXTENDED RELEASE ORAL at 12:02

## 2019-08-16 RX ADMIN — LIDOCAINE HYDROCHLORIDE 5 ML: 20 SOLUTION ORAL; TOPICAL at 21:32

## 2019-08-16 RX ADMIN — SODIUM CHLORIDE, PRESERVATIVE FREE 3 ML: 5 INJECTION INTRAVENOUS at 21:30

## 2019-08-16 RX ADMIN — SERTRALINE 50 MG: 50 TABLET, FILM COATED ORAL at 09:36

## 2019-08-16 RX ADMIN — TERAZOSIN HYDROCHLORIDE 1 MG: 1 CAPSULE ORAL at 21:34

## 2019-08-16 RX ADMIN — DEXTROSE MONOHYDRATE 100 ML/HR: 50 INJECTION, SOLUTION INTRAVENOUS at 17:16

## 2019-08-16 RX ADMIN — METOPROLOL TARTRATE 37.5 MG: 25 TABLET ORAL at 21:33

## 2019-08-16 RX ADMIN — METOPROLOL TARTRATE 5 MG: 5 INJECTION, SOLUTION INTRAVENOUS at 22:28

## 2019-08-16 RX ADMIN — METOPROLOL TARTRATE 37.5 MG: 25 TABLET ORAL at 13:48

## 2019-08-16 RX ADMIN — CEFAZOLIN SODIUM 2 G: 10 INJECTION, POWDER, FOR SOLUTION INTRAVENOUS at 16:56

## 2019-08-16 RX ADMIN — CEFAZOLIN SODIUM 2 G: 10 INJECTION, POWDER, FOR SOLUTION INTRAVENOUS at 09:36

## 2019-08-16 RX ADMIN — SODIUM CHLORIDE, PRESERVATIVE FREE 10 ML: 5 INJECTION INTRAVENOUS at 22:29

## 2019-08-16 RX ADMIN — METOPROLOL TARTRATE 37.5 MG: 25 TABLET ORAL at 05:27

## 2019-08-16 RX ADMIN — LIDOCAINE HYDROCHLORIDE 5 ML: 20 SOLUTION ORAL; TOPICAL at 16:59

## 2019-08-16 RX ADMIN — LIDOCAINE HYDROCHLORIDE 5 ML: 20 SOLUTION ORAL; TOPICAL at 08:03

## 2019-08-16 RX ADMIN — SODIUM CHLORIDE, PRESERVATIVE FREE 3 ML: 5 INJECTION INTRAVENOUS at 09:37

## 2019-08-16 RX ADMIN — LIDOCAINE HYDROCHLORIDE 5 ML: 20 SOLUTION ORAL; TOPICAL at 12:02

## 2019-08-16 NOTE — PROGRESS NOTES
NEPHROLOGY PROGRESS NOTE    PATIENT IDENTIFICATION:   Name:  Cody Eldridge      MRN:  [id number]     74 y.o.  male             Reason for visit: JERICHO    SUBJECTIVE:   States his swallowing has improved a little; taking in some liquids; breathing is comfortable    OBJECTIVE:  Vitals:    08/16/19 0524 08/16/19 0719 08/16/19 1244 08/16/19 1348   BP: 136/98 136/84 144/76 155/97   BP Location: Left arm Left arm Left arm    Patient Position: Lying Lying Sitting    Pulse: 70  104 (!) 126   Resp:  18 18    Temp:  98.2 °F (36.8 °C) 98.1 °F (36.7 °C)    TempSrc:  Oral Oral    SpO2: 92% 91% 93%    Weight: 79.4 kg (175 lb 1.6 oz)      Height:               Body mass index is 24.42 kg/m².    Intake/Output Summary (Last 24 hours) at 8/16/2019 1610  Last data filed at 8/16/2019 1244  Gross per 24 hour   Intake 929.98 ml   Output 1900 ml   Net -970.02 ml     Wt Readings from Last 1 Encounters:   08/16/19 0524 79.4 kg (175 lb 1.6 oz)   08/15/19 0649 80.7 kg (178 lb)   08/14/19 0521 81.1 kg (178 lb 11.2 oz)   08/13/19 0355 80.2 kg (176 lb 11.2 oz)   08/12/19 0357 79.9 kg (176 lb 1.6 oz)   08/11/19 0330 80.2 kg (176 lb 12.8 oz)   08/10/19 0524 80 kg (176 lb 6.4 oz)   08/08/19 0600 78.4 kg (172 lb 13.5 oz)   08/08/19 0210 78.4 kg (172 lb 13.5 oz)   08/07/19 0300 77.6 kg (171 lb 1.2 oz)   08/07/19 0146 76 kg (167 lb 8.8 oz)   08/06/19 1504 72.6 kg (160 lb)   08/06/19 0526 72.8 kg (160 lb 7.9 oz)   08/05/19 0600 70.5 kg (155 lb 6.8 oz)   08/05/19 0448 70.9 kg (156 lb 6.4 oz)   08/04/19 0610 70.6 kg (155 lb 11.2 oz)   08/03/19 1619 76.6 kg (168 lb 12.8 oz)   08/03/19 1207 79.9 kg (176 lb 3 oz)     Wt Readings from Last 3 Encounters:   08/16/19 79.4 kg (175 lb 1.6 oz)   08/02/19 78.6 kg (173 lb 3.2 oz)   07/31/19 79.2 kg (174 lb 9.6 oz)         Exam:  Lying in bed. Oriented; looks older than stated age. Fatigued.   Chronically ill-appearing; flat affect  HEENT Dry oral mucosa.   No eye discharge; no scleral icterus  Neck No  JVD  Lungs No rales or rhonchi; not labored on O2 per nasal cannula  Heart Irregularly irregular, tachycardic, no rub  Abd Soft,+ bs, nontender  Dressing by the right clavicle is C/D/I  Ext Trace-+1 edema both legs  Skin pale.          Scheduled meds:      ceFAZolin 2 g Intravenous Q8H   lansoprazole 30 mg Nasogastric Daily   magic mouthwash 5 mL Swish & Spit Q4H   metoprolol tartrate 37.5 mg Oral Q8H   potassium chloride 40 mEq Nasogastric Once   sertraline 50 mg Oral Daily   sodium chloride 3 mL Intravenous Q12H   terazosin 1 mg Nasogastric Nightly     IV meds:                             Data Review:    Results from last 7 days   Lab Units 08/16/19  0532 08/15/19  0317 08/14/19  0339   SODIUM mmol/L 151* 149* 149*   POTASSIUM mmol/L 3.1* 3.5 3.6   CHLORIDE mmol/L 113* 113* 111*   CO2 mmol/L 26.1 26.4 27.3   BUN mg/dL 30* 43* 54*   CREATININE mg/dL 1.07 1.18 1.28*   CALCIUM mg/dL 7.8* 7.4* 7.5*   BILIRUBIN mg/dL 0.6 0.7 0.8   ALK PHOS U/L 85 75 93   ALT (SGPT) U/L 6 5 <5   AST (SGOT) U/L 53* 44* 47*   GLUCOSE mg/dL 79 97 115*     Estimated Creatinine Clearance: 67 mL/min (by C-G formula based on SCr of 1.07 mg/dL).      Results from last 7 days   Lab Units 08/16/19  0532 08/15/19  0317 08/14/19  0339 08/13/19  0328  08/11/19  0522 08/10/19  0517   MAGNESIUM mg/dL  --   --   --  1.8  --  2.1 1.9   PHOSPHORUS mg/dL 3.4 2.5 1.6* 1.8*   < > 2.2* 1.4*    < > = values in this interval not displayed.       Results from last 7 days   Lab Units 08/16/19  0532 08/15/19  0317 08/14/19  0339 08/13/19  0328 08/12/19  0306   WBC 10*3/mm3 7.03 7.13 8.45 7.46 9.15   HEMOGLOBIN g/dL 8.8* 7.1* 8.6* 8.3* 9.3*   PLATELETS 10*3/mm3 135*  135* 111*  111* 110*  110* 82*  82* 55*  55*                   ASSESSMENT:   1.  JERICHO, non-oliguric, creatinine stable.  Worsening hypernatremia due to insufficient water intake  2.  Head and neck cancer on radiation and chemotherapy.  3.  Rapid atrial fibrillation.   4.  Neutropenic fever with  sepsis syndrome and MSSA bacteremia: No fever.  BP stable.   5.  Coagulopathy resolved.   6.  Anemia and thrombocytopenia. Platelets stable.   7.  Chronic diastolic CHF:  Compensated.        PLAN:  1.  D5W  2.  Encourage po fluids today        Medardo Espinal MD  8/16/2019    4:10 PM

## 2019-08-16 NOTE — PROGRESS NOTES
[facility]     LOS: 13 days     Subjective:    CC:Leg Swelling (bilateral)    DIET:  Diet Order   Procedures   • Diet Regular; Nectar / Syrup Thick     Eating and drinking better  No cp no soa  Tachycardia overnight and started on diltiazem  Objective:    Vital Signs:  Temp:  [97.6 °F (36.4 °C)-98.6 °F (37 °C)] 98.2 °F (36.8 °C)  Heart Rate:  [64-93] 70  Resp:  [16-18] 18  BP: (122-153)/(66-98) 136/84    SpO2:  [90 %-95 %] 91 %  on   ;   Device (Oxygen Therapy): room air  Body mass index is 24.42 kg/m².    Physical Exam   Constitutional: He appears well-developed and well-nourished.   HENT:   Head: Normocephalic and atraumatic.   Cardiovascular: Exam reveals no friction rub.   No murmur heard.  irregular   Pulmonary/Chest: Effort normal and breath sounds normal.   Abdominal: Soft. Bowel sounds are normal. He exhibits no distension. There is no tenderness.   Neurological: He is alert.   Skin: Skin is warm and dry.       Results Review:    Glucose   Date Value Ref Range Status   08/16/2019 79 65 - 99 mg/dL Final   08/15/2019 97 65 - 99 mg/dL Final   08/14/2019 115 (H) 65 - 99 mg/dL Final     Results from last 7 days   Lab Units 08/16/19  0532   WBC 10*3/mm3 7.03   HEMOGLOBIN g/dL 8.8*   HEMATOCRIT % 27.7*   PLATELETS 10*3/mm3 135*  135*     Results from last 7 days   Lab Units 08/16/19  0532   SODIUM mmol/L 151*   POTASSIUM mmol/L 3.1*   CHLORIDE mmol/L 113*   CO2 mmol/L 26.1   BUN mg/dL 30*   CREATININE mg/dL 1.07   CALCIUM mg/dL 7.8*   BILIRUBIN mg/dL 0.6   ALK PHOS U/L 85   ALT (SGPT) U/L 6   AST (SGOT) U/L 53*   GLUCOSE mg/dL 79         Results from last 7 days   Lab Units 08/13/19  0328   MAGNESIUM mg/dL 1.8         Cultures:       I have reviewed daily medications and changes in CPOE    Scheduled meds    ceFAZolin 2 g Intravenous Q8H   lansoprazole 30 mg Nasogastric Daily   magic mouthwash 5 mL Swish & Spit Q4H   metoprolol tartrate 37.5 mg Oral Q8H   potassium chloride 40 mEq  Nasogastric Once   sertraline 50 mg Oral Daily   silver sulfadiazine  Topical BID   sodium chloride 3 mL Intravenous Q12H   terazosin 1 mg Nasogastric Nightly          PRN meds  •  acetaminophen  •  ALPRAZolam  •  diphenoxylate-atropine  •  HYDROcodone-acetaminophen  •  ipratropium  •  Lidocaine Viscous HCl  •  metoprolol tartrate  •  Morphine  •  nitroglycerin  •  ondansetron **OR** ondansetron  •  pramipexole  •  [COMPLETED] Insert peripheral IV **AND** sodium chloride  •  sodium chloride        MSSA bacteremia    New onset of congestive heart failure (CMS/HCC)    Atrial fibrillation (CMS/HCC)    Squamous cell carcinoma of neck    COPD (chronic obstructive pulmonary disease) (CMS/HCC)    Depression with anxiety    CAD (coronary artery disease)    Chemotherapy-induced neutropenia (CMS/HCC)    Vascular catheter infection (CMS/HCC)    Chemotherapy-induced thrombocytopenia    Acute renal injury (CMS/HCC)    Anemia associated with chemotherapy    Oral thrush    Shock, septic (CMS/HCC)        Assessment/Plan:      MSSA bacteremia  -cefazolin  -per ID:Continue cefazolin 2 g IV every 12 hours (if renal function improves can increase the dose to every 8 hours) [cefazolin changed to q8] for 4 weeks with an antibiotic stop date of September 3. Please monitor weekly CBC with differential and creatinine and fax the results to infectious disease clinic at [phone]  ID follow-up arranged for 9/3; Okay to place PICC line at any time for home IV antibiotic     Hypernatremia- d5w given by nephrology  -improving  -needs to increase his oral intake      New onset of congestive heart failure (CMS/HCC), history of SVT and ablation in 2006 ( [place] [employer] Administration), nonobstructive CA      Atrial fibrillation (CMS/HCC)-sinus, metoprolol, anticoagulation to be restarted when hb stable  -Hr was elevated last night and patient is requiring heparin gtt  -digoxin given last night      Squamous cell carcinoma of neck  -Plan per  oncology  -Chemotherapy-induced neutropenia (CMS/HCC)  -Chemotherapy-induced thrombocytopenia  -Anemia associated with chemotherapy- improved, good reticulocyte count      COPD (chronic obstructive pulmonary disease) (CMS/HCC)      Depression with anxiety      Vascular catheter infection (CMS/HCC) with port removal      Acute renal injury (CMS/HCC)-creatinine peaked at 2.98 on 8/8, patient was seen during hospitalization by nephrology      Oral thrush      Shock, septic (CMS/HCC)      Pleural effusion-noted     DVT PPX: lovenox      Fredy Patton MD  08/16/19  8:51 AM

## 2019-08-16 NOTE — PLAN OF CARE
Problem: Patient Care Overview  Goal: Plan of Care Review  Outcome: Ongoing (interventions implemented as appropriate)   08/15/19 2357   Coping/Psychosocial   Plan of Care Reviewed With patient   OTHER   Outcome Summary continue to monitor vs, labs, pain, dressing changed per md order, sterile technique used throughout procedure, turn q2, safety maintained   Plan of Care Review   Progress no change     Goal: Individualization and Mutuality  Outcome: Ongoing (interventions implemented as appropriate)   08/15/19 2357   Individualization   Patient Specific Interventions monitor s/s wound infection during dressing changes       Problem: Fall Risk (Adult)  Goal: Absence of Fall  Outcome: Ongoing (interventions implemented as appropriate)   08/15/19 2357   Fall Risk (Adult)   Absence of Fall making progress toward outcome       Problem: Cardiac: Heart Failure (Adult)  Goal: Signs and Symptoms of Listed Potential Problems Will be Absent, Minimized or Managed (Cardiac: Heart Failure)  Outcome: Ongoing (interventions implemented as appropriate)   08/15/19 2357   Goal/Outcome Evaluation   Problems Assessed (Heart Failure) all   Problems Present (Heart Failure) cardiac pump dysfunction;dysrhythmia/arrhythmia;situational response       Problem: Skin Injury Risk (Adult)  Goal: Skin Health and Integrity  Outcome: Ongoing (interventions implemented as appropriate)   08/15/19 2357   Skin Injury Risk (Adult)   Skin Health and Integrity making progress toward outcome       Problem: Pain, Acute (Adult)  Goal: Acceptable Pain Control/Comfort Level  Outcome: Ongoing (interventions implemented as appropriate)   08/15/19 2357   Pain, Acute (Adult)   Acceptable Pain Control/Comfort Level making progress toward outcome       Problem: Pain, Chronic (Adult)  Goal: Acceptable Pain/Comfort Level and Functional Ability  Outcome: Ongoing (interventions implemented as appropriate)   08/15/19 2357   Pain, Chronic (Adult)   Acceptable Pain/Comfort  Level and Functional Ability making progress toward outcome       Problem: Nutrition, Imbalanced: Inadequate Oral Intake (Adult)  Goal: Improved Oral Intake  Outcome: Ongoing (interventions implemented as appropriate)   08/15/19 9385   Nutrition, Imbalanced: Inadequate Oral Intake (Adult)   Improved Oral Intake making progress toward outcome     Goal: Prevent Further Weight Loss  Outcome: Ongoing (interventions implemented as appropriate)   08/15/19 1263   Nutrition, Imbalanced: Inadequate Oral Intake (Adult)   Prevent Further Weight Loss making progress toward outcome

## 2019-08-16 NOTE — PLAN OF CARE
Problem: Patient Care Overview  Goal: Plan of Care Review  Outcome: Ongoing (interventions implemented as appropriate)   08/16/19 2397   Coping/Psychosocial   Plan of Care Reviewed With patient   OTHER   Outcome Summary re-eval: Pt demonstrated no overt s/s aspiration (throat clear, cough, or wet vocal quality) with thin liquids by cup or straw. With straw, pt noted to belch after swallows andreported burning in mouth. Pt upgraded to regular solids and noted with improved swallowing per MD. REC upgrade of liquids to thin liquids. No straws.

## 2019-08-16 NOTE — THERAPY RE-EVALUATION
Acute Care - Speech Language Pathology   Swallow Treatment Note [facility]     Patient Name: Cody Eldridge  : 1945  MRN: [id number]  Today's Date: 2019               Admit Date: 8/3/2019    Visit Dx:      ICD-10-CM ICD-9-CM   1. New onset of congestive heart failure (CMS/HCC) I50.9 428.0   2. Infected venous access port T80.219A 999.31     Patient Active Problem List   Diagnosis   • Atopic rhinitis   • Arthritis of hand   • Coxitis   • Benign colonic polyp   • Neck pain   • Chronic obstructive pulmonary disease (CMS/HCC)   • Mixed anxiety depressive disorder   • Degeneration of intervertebral disc of lumbosacral region   • Elevated prostate specific antigen (PSA)   • Hyperlipidemia   • Hypertension   • Insomnia   • Lumbar radiculopathy   • Osteoarthritis   • Vitamin D deficiency   • Abdominal pain   • Acute URI   • Myalgia   • Cramp of both lower extremities   • Gingivitis   • Atrial fibrillation (CMS/HCC)   • Non-rheumatic tricuspid valve insufficiency   • SHAR (obstructive sleep apnea)   • Precordial pain   • IRAHETA (dyspnea on exertion)   • Coronary artery disease of native artery of native heart with stable angina pectoris (CMS/HCC)   • Shortness of breath   • 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)   • Current use of long term anticoagulation   • Syncope   • Dehydration   • History of cancer   • History of atrial fibrillation   • History of CHF (congestive heart failure)   • Hypotension   • Fitting and adjustment of vascular catheter   • New onset of congestive heart failure (CMS/HCC)   • Atrial fibrillation (CMS/HCC)   • Squamous cell carcinoma of neck   • COPD (chronic obstructive pulmonary disease) (CMS/HCC)   • Depression with anxiety   • CAD (coronary artery disease)   • Chemotherapy-induced neutropenia (CMS/HCC)   • Vascular catheter infection (CMS/HCC)   • Chemotherapy-induced thrombocytopenia   • Acute renal injury (CMS/HCC)   • Anemia associated with chemotherapy   • Oral  thrush   • MSSA bacteremia   • Shock, septic (CMS/HCC)       Therapy Treatment  Rehabilitation Treatment Summary     Row Name 08/16/19 1600             Treatment Time/Intention    Discipline  speech language pathologist  -SA      Document Type  re-evaluation  -SA      Subjective Information  no complaints  -SA      Mode of Treatment  speech-language pathology  -SA      Patient Effort  good  -SA      Recorded by [SA] Karina Lainez MS CCC-SLP 08/16/19 1643      Row Name                Wound 08/05/19 1451 Right chest Other (comment)    Wound - Properties Group Date first assessed: 08/05/19 [KB] Time first assessed: 1451 [KB] Side: Right [KB] Location: chest [KB] Primary Wound Type: Other [SS], Previous port  Type: incision [KB] Recorded by:  [KB] Coco Love RN 08/05/19 1451 [SS] Jaki Hennessy RN 08/07/19 1657      User Key  (r) = Recorded By, (t) = Taken By, (c) = Cosigned By    Initials Name Effective Dates Discipline    SA Karina Lainez MS CCC-SLP 03/07/18 -  SLP    Coco River, RN 06/16/16 -  Nurse    SS Jaki Hennessy RN 06/30/16 -  Nurse          Outcome Summary         SLP GOALS     Row Name 08/16/19 1600             Oral Nutrition/Hydration Goal 1 (SLP)    Oral Nutrition/Hydration Goal 1, SLP  Tolerate least restrictive diet with no overt s/s aspiration.   -SA      Time Frame (Oral Nutrition/Hydration Goal 1, SLP)  by discharge  -SA      Barriers (Oral Nutrition/Hydration Goal 1, SLP)  re-eval:  Pt demonstrated no overt s/s aspiration (throat clear, cough, or wet vocal quality) with thin liquids by cup or straw.  With straw, pt noted to belch after swallows andreported burning in mouth.  Pt upgraded to regular solids and noted with improved swallowing per MD.  REC upgrade of liquids to thin.   -SA      Progress/Outcomes (Oral Nutrition/Hydration Goal 1, SLP)  good progress toward goal  -SA        User Key  (r) = Recorded By, (t) = Taken By, (c) = Cosigned By    Initials Name Provider Type    SA  Karina Lainez MS CCC-SLP Speech and Language Pathologist          EDUCATION  The patient has been educated in the following areas:   Dysphagia (Swallowing Impairment).    SLP Recommendation and Plan                            SLP Outcome Measures (last 72 hours)      SLP Outcome Measures     Row Name 08/16/19 1600             SLP Outcome Measures    Outcome Measure Used?  Adult NOMS  -SA         Adult FCM Scores    Swallowing FCM Score  7  -        User Key  (r) = Recorded By, (t) = Taken By, (c) = Cosigned By    Initials Name Effective Dates    Karina Dominique MS CCC-CHITO 03/07/18 -              Time Calculation:   Time Calculation- SLP     Row Name 08/16/19 1645             Time Calculation- SLP    SLP Start Time  1600  -      SLP Received On  08/16/19  -        User Key  (r) = Recorded By, (t) = Taken By, (c) = Cosigned By    Initials Name Provider Type    Karina Dominique MS CCC-SLP Speech and Language Pathologist          Therapy Charges for Today     Code Description Service Date Service Provider Modifiers Qty    [id number] HC ST TREATMENT SWALLOW 3 8/16/2019 Karina Lainez MS CCC-SLP GN 1                 Karina Lainez MS CCC-CHITO  8/16/2019

## 2019-08-16 NOTE — PROGRESS NOTES
Adult Nutrition  Assessment/PES    Patient Name:  Cody Eldridge  YOB: 1945  MRN: [id number]  Admit Date:  8/3/2019    Assessment Date:  8/16/2019    Nutrition follow up. NG remains out. Patient reported consuming almost 100% breakfast. Hypernatremia continues-encouraged fluids. Nutrition supplement-magic cup ordered.   RD to continue to follow.  Reason for Assessment     Row Name 08/16/19 1142          Reason for Assessment    Reason For Assessment  follow-up protocol         Nutrition/Diet History     Row Name 08/16/19 1142          Nutrition/Diet History    Typical Food/Fluid Intake  appetite has improved, consumed 100% for breakfast, NG is out         Anthropometrics     Row Name 08/16/19 0524          Anthropometrics    Weight  79.4 kg (175 lb 1.6 oz)         Labs/Tests/Procedures/Meds     Row Name 08/16/19 1142          Labs/Procedures/Meds    Lab Results Reviewed  reviewed, pertinent     Lab Results Comments  Na, platelets        Diagnostic Tests/Procedures    Diagnostic Test/Procedure Reviewed  reviewed, pertinent        Medications    Pertinent Medications Reviewed  reviewed, pertinent             Nutrition Prescription Ordered     Row Name 08/16/19 1143          Nutrition Prescription PO    Current PO Diet  Regular     Fluid Consistency  Nectar/syrup thick     Supplement  Magic Cup     Supplement Frequency  3 times a day         Evaluation of Received Nutrient/Fluid Intake     Row Name 08/16/19 1144          PO Evaluation    Number of Meals  1     % PO Intake  100         Problem/Interventions:      Intervention Goal     Row Name 08/16/19 1144          Intervention Goal    General  Maintain nutrition;Meet nutritional needs for age/condition     PO  Tolerate PO;Increase intake     Weight  Maintain weight         Nutrition Intervention     Row Name 08/16/19 1144          Nutrition Intervention    RD/Tech Action  Interview for preference;Follow Tx progress;Care plan reviewd;Encourage  intake;Supplement provided         Nutrition Prescription     Row Name 08/16/19 1144          Nutrition Prescription PO    PO Prescription  Begin/change supplement     Supplement  Magic Cup     Supplement Frequency  3 times a day     New PO Prescription Ordered?  Yes         Education/Evaluation     Row Name 08/16/19 1144          Education    Education  Will Instruct as appropriate        Monitor/Evaluation    Monitor  Per protocol           Electronically signed by:  Ela Read RD  08/16/19 11:44 AM

## 2019-08-16 NOTE — PROGRESS NOTES
[facility] INPATIENT PROGRESS NOTE         [address]    2019      PATIENT IDENTIFICATION:  Name: Cody Eldridge ADMIT: 8/3/2019   : 1945  PCP: Epley, James, APRN    MRN: [id number] LOS: 13 days   AGE/SEX: 74 y.o. male  ROOM: [facility]                     LOS 13    Reason for visit: Follow-up respiratory failure with septic shock and pulmonary edema      SUBJECTIVE:      Resting comfortably.  Denies productive cough or chest pain.  Able to keep his feeding tube out.  Okay for discharge from my standpoint.    Objective   OBJECTIVE:    Vital Sign Min/Max for last 24 hours  Temp  Min: 97.6 °F (36.4 °C)  Max: 98.6 °F (37 °C)   BP  Min: 122/69  Max: 153/84   Pulse  Min: 64  Max: 93   Resp  Min: 16  Max: 18   SpO2  Min: 90 %  Max: 95 %   No Data Recorded   Weight  Min: 79.4 kg (175 lb 1.6 oz)  Max: 79.4 kg (175 lb 1.6 oz)                         Body mass index is 24.42 kg/m².    Intake/Output Summary (Last 24 hours) at 2019 0847  Last data filed at 2019 0719  Gross per 24 hour   Intake 1469.98 ml   Output 2300 ml   Net -830.02 ml         Exam:  GEN:  No distress, appears stated age  EYES:   PERRL, anicteric sclera  ENT:    External ears/nose normal, OP clear  NECK:  No adenopathy, midline trachea  LUNGS: Normal chest on inspection, palpation and diminished bilaterally on auscultation  CV:  Normal S1S2, without murmur  ABD:  Non tender, non distended, no hepatosplenomegaly, +BS  EXT:  No edema, cyanosis or clubbing    Scheduled meds:      ceFAZolin 2 g Intravenous Q8H   lansoprazole 30 mg Nasogastric Daily   magic mouthwash 5 mL Swish & Spit Q4H   metoprolol tartrate 37.5 mg Oral Q8H   potassium chloride 40 mEq Nasogastric Once   sertraline 50 mg Oral Daily   silver sulfadiazine  Topical BID   sodium chloride 3 mL Intravenous Q12H   terazosin 1 mg Nasogastric Nightly     IV meds:                         Data Review:  Results from last 7 days   Lab Units 19  0506  08/15/19  0317 08/14/19  0339 08/13/19  0328 08/12/19  0306   SODIUM mmol/L 151* 149* 149* 148* 152*   POTASSIUM mmol/L 3.1* 3.5 3.6 3.6 3.9   CHLORIDE mmol/L 113* 113* 111* 113* 115*   CO2 mmol/L 26.1 26.4 27.3 26.9 27.0   BUN mg/dL 30* 43* 54* 63* 73*   CREATININE mg/dL 1.07 1.18 1.28* 1.60* 1.97*   GLUCOSE mg/dL 79 97 115* 126* 115*   CALCIUM mg/dL 7.8* 7.4* 7.5* 7.2* 7.3*         Estimated Creatinine Clearance: 67 mL/min (by C-G formula based on SCr of 1.07 mg/dL).  Results from last 7 days   Lab Units 08/16/19  0532 08/15/19  0317 08/14/19  0339 08/13/19  0328 08/12/19  0306   WBC 10*3/mm3 7.03 7.13 8.45 7.46 9.15   HEMOGLOBIN g/dL 8.8* 7.1* 8.6* 8.3* 9.3*   PLATELETS 10*3/mm3 135*  135* 111*  111* 110*  110* 82*  82* 55*  55*         Results from last 7 days   Lab Units 08/16/19  0532 08/15/19  0317 08/14/19  0339 08/13/19 0328 08/12/19  0306   ALT (SGPT) U/L 6 5 <5 <5 <5   AST (SGOT) U/L 53* 44* 47* 35 27                 Chest x-ray 8/10/2019 reviewed shows density lower lung fields with layering effusion.            )Assessment   ASSESSMENT:      Active Hospital Problems    Diagnosis  POA   • **MSSA bacteremia [R78.81]  Unknown   • Shock, septic (CMS/HCC) [A41.9, R65.21]  Unknown   • Oral thrush [B37.0]  Unknown   • Acute renal injury (CMS/HCC) [N17.9]  Unknown   • Anemia associated with chemotherapy [D64.81, T45.1X5A]  Unknown   • Chemotherapy-induced thrombocytopenia [D69.59, T45.1X5A]  Unknown   • New onset of congestive heart failure (CMS/HCC) [I50.9]  Yes   • Atrial fibrillation (CMS/HCC) [I48.91]  Yes   • Squamous cell carcinoma of neck [C44.42]  Yes   • COPD (chronic obstructive pulmonary disease) (CMS/HCC) [J44.9]  Yes   • Depression with anxiety [F41.8]  Yes   • CAD (coronary artery disease) [I25.10]  Yes   • Chemotherapy-induced neutropenia (CMS/HCC) [D70.1, T45.1X5A]  Yes   • Vascular catheter infection (CMS/HCC) [T82.7XXA]  Yes      Resolved Hospital Problems   No resolved problems to  display.     Sepsis with shock  MSSA bacteremia  Suspected infection of Mediport  Acute on chronic diastolic CHF  Acute hypoxemic respiratory failure with pulmonary edema  Left moderate pleural effusion   Acute kidney injury  A. 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  Wean oxygen as able.  Encourage incentive spirometer use.  Bronchodilators prn for COPD symptoms and help with clearance.  Plan 4 weeks antibiotic for MSSA bacteremia per ID recommendations.  Steroid per oncology.  Respiratory status stable.  Okay for dc from my standpoint.    Discussed with family at bedside.    Javire Segovia MD  Pulmonary and Critical Care Medicine  [facility], [facility]  8/16/2019    8:47 AM

## 2019-08-16 NOTE — PLAN OF CARE
Problem: Patient Care Overview  Goal: Plan of Care Review   08/16/19 1042   Coping/Psychosocial   Plan of Care Reviewed With patient   OTHER   Outcome Summary Pt. able to ambulate 200 feet, CGA x 1, with use of Rwx. Pt. requires CGA x 1 for sit <-> stand transfers and Conditional [place] with bed mobility. Verbal/tactile cues for posture correction and Rwx guidance during ambulation.    Plan of Care Review   Progress improving

## 2019-08-16 NOTE — THERAPY TREATMENT NOTE
Patient Name: Cody Eldridge  : 1945    MRN: [id number]                              Today's Date: 2019       Admit Date: 8/3/2019    Visit Dx:     ICD-10-CM ICD-9-CM   1. New onset of congestive heart failure (CMS/HCC) I50.9 428.0   2. Infected venous access port T80.219A 999.31     Patient Active Problem List   Diagnosis   • Atopic rhinitis   • Arthritis of hand   • Coxitis   • Benign colonic polyp   • Neck pain   • Chronic obstructive pulmonary disease (CMS/HCC)   • Mixed anxiety depressive disorder   • Degeneration of intervertebral disc of lumbosacral region   • Elevated prostate specific antigen (PSA)   • Hyperlipidemia   • Hypertension   • Insomnia   • Lumbar radiculopathy   • Osteoarthritis   • Vitamin D deficiency   • Abdominal pain   • Acute URI   • Myalgia   • Cramp of both lower extremities   • Gingivitis   • Atrial fibrillation (CMS/HCC)   • Non-rheumatic tricuspid valve insufficiency   • SHAR (obstructive sleep apnea)   • Precordial pain   • IRAHETA (dyspnea on exertion)   • Coronary artery disease of native artery of native heart with stable angina pectoris (CMS/HCC)   • Shortness of breath   • 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)   • Current use of long term anticoagulation   • Syncope   • Dehydration   • History of cancer   • History of atrial fibrillation   • History of CHF (congestive heart failure)   • Hypotension   • Fitting and adjustment of vascular catheter   • New onset of congestive heart failure (CMS/HCC)   • Atrial fibrillation (CMS/HCC)   • Squamous cell carcinoma of neck   • COPD (chronic obstructive pulmonary disease) (CMS/HCC)   • Depression with anxiety   • CAD (coronary artery disease)   • Chemotherapy-induced neutropenia (CMS/HCC)   • Vascular catheter infection (CMS/HCC)   • Chemotherapy-induced thrombocytopenia   • Acute renal injury (CMS/HCC)   • Anemia associated with chemotherapy   • Oral thrush   • MSSA bacteremia   • Shock, septic (CMS/HCC)     Past  Medical History:   Diagnosis Date   • Atrial fibrillation (CMS/HCC)    • CHF (congestive heart failure) (CMS/HCC)    • Chronic bronchitis (CMS/HCC)    • COPD (chronic obstructive pulmonary disease) (CMS/HCC)    • Cor pulmonale (chronic) (CMS/HCC)    • Daytime hypersomnia    • Depression with anxiety    • Elevated cholesterol    • Enlarged prostate    • Frequent nocturnal awakening    • Gout    • H/O cardiac radiofrequency ablation 2006    [facility].   • Head and neck cancer (CMS/HCC)    • Hyperlipidemia    • Hypertension    • Hypotension    • Insomnia    • Lumbar radicular pain    • SHAR (obstructive sleep apnea)    • Osteoarthritis    • Permanent atrial fibrillation (CMS/HCC)    • Snoring    • Squamous cell carcinoma of neck    • SVT (supraventricular tachycardia) (CMS/HCC)    • Syncope    • Vitamin D deficiency    • Weight loss      Past Surgical History:   Procedure Laterality Date   • APPENDECTOMY     • CARDIAC ABLATION  2006    [facility].   • CARDIAC CATHETERIZATION N/A 8/29/2018    Procedure: Left Heart Cath;  Surgeon: Yousif Uribe MD;  Location: [facility] CATH INVASIVE LOCATION;  Service: Cardiology   • CARDIAC CATHETERIZATION N/A 8/29/2018    Procedure: Coronary angiography;  Surgeon: Yousif Uribe MD;  Location: [facility] CATH INVASIVE LOCATION;  Service: Cardiology   • CARDIAC CATHETERIZATION N/A 8/29/2018    Procedure: Left ventriculography;  Surgeon: Yousif Uribe MD;  Location: [facility] CATH INVASIVE LOCATION;  Service: Cardiology   • FINGER SURGERY     • FOOT SURGERY     • HAND SURGERY     • VENOUS ACCESS DEVICE (PORT) INSERTION Right 6/18/2019    Procedure: MEDIPORT PLACEMENT;  Surgeon: Elvis Grigsby MD;  Location: [facility] OR;  Service: Vascular   • VENOUS ACCESS DEVICE (PORT) REMOVAL Right 8/5/2019    Procedure: REMOVAL VENOUS ACCESS DEVICE;  Surgeon: Prince Castaneda MD;  Location: [facility] OR;  Service: Vascular     General Information     Row Name 08/16/19 1038          PT  "Evaluation Time/Intention    Document Type  therapy note (daily note) Pt reports feeling \"good\" this day other than fatigue.  Otherwise, pt. agreeable to work with P.T.  -MS     Mode of Treatment  individual therapy;physical therapy  -MS     Row Name 08/16/19 1038          General Information    Patient Profile Reviewed?  yes  -MS     Existing Precautions/Restrictions  fall  -MS     Barriers to Rehab  none identified  -MS     Row Name 08/16/19 1038          Cognitive Assessment/Intervention- PT/OT    Orientation Status (Cognition)  oriented x 3  -MS     Row Name 08/16/19 1038          Safety Issues, Functional Mobility    Comment, Safety Issues/Impairments (Mobility)  Gait belt used for safety  -MS       User Key  (r) = Recorded By, (t) = Taken By, (c) = Cosigned By    Initials Name Provider Type    MS [place] Elvis MICHELLE, PT Physical Therapist        Mobility     Row Name 08/16/19 1039          Bed Mobility Assessment/Treatment    Sit-Supine [place] (Bed Mobility)  conditional independence  -MS     Assistive Device (Bed Mobility)  bed rails  -MS     Row Name 08/16/19 1039          Sit-Stand Transfer    Sit-Stand [place] (Transfers)  contact guard  -MS     Assistive Device (Sit-Stand Transfers)  walker, front-wheeled  -MS     Row Name 08/16/19 1039          Gait/Stairs Assessment/Training    [place] Level (Gait)  contact guard  -MS     Assistive Device (Gait)  walker, front-wheeled Pt. reports he will be using a Rwx at home and would prefer to ambulate with it while here in the hospital.   -MS     Distance in Feet (Gait)  200 feet  -MS     Deviations/Abnormal Patterns (Gait)  eric decreased  -MS     Bilateral Gait Deviations  forward flexed posture  -MS     Comment (Gait/Stairs)  Verbal/tactile cues for posture correction and Rwx guidance.  Limited in gait distance due to overall fatigue, weakness.   -MS       User Key  (r) = Recorded By, (t) = Taken By, (c) = Cosigned By    Initials Name Provider " Type    Elvis Jimenez, PT Physical Therapist        Obj/Interventions    No documentation.       Goals/Plan    No documentation.       Clinical Impression     Row Name 08/16/19 1041          Pain Assessment    Additional Documentation  Pain Scale: Numbers Pre/Post-Treatment (Group)  -MS     Row Name 08/16/19 1041          Pain Scale: Numbers Pre/Post-Treatment    Pain Scale: Numbers, Pretreatment  0/10 - no pain  -MS     Pain Scale: Numbers, Post-Treatment  0/10 - no pain  -MS     Row Name 08/16/19 1042 08/16/19 0748       Plan of Care Review    Plan of Care Reviewed With  patient  -MS  patient  -TK    Row Name 08/15/19 2357 08/15/19 2253       Plan of Care Review    Plan of Care Reviewed With  patient  -KM  patient  -KM    Row Name 08/16/19 1041          Positioning and Restraints    Pre-Treatment Position  sitting in chair/recliner  -MS     Post Treatment Position  bed  -MS     In Bed  notified nsg;supine;call light within reach;encouraged to call for assist;exit alarm on All lines intact.  -MS       User Key  (r) = Recorded By, (t) = Taken By, (c) = Cosigned By    Initials Name Provider Type    TK Liliam London RN Registered Nurse    Ana Guerin RN Registered Nurse    Elvis Jimenez, PT Physical Therapist        Outcome Measures     Row Name 08/16/19 1045          How much help from another person do you currently need...    Turning from your back to your side while in flat bed without using bedrails?  4  -MS     Moving from lying on back to sitting on the side of a flat bed without bedrails?  3  -MS     Moving to and from a bed to a chair (including a wheelchair)?  3  -MS     Standing up from a chair using your arms (e.g., wheelchair, bedside chair)?  3  -MS     Climbing 3-5 steps with a railing?  3  -MS     To walk in hospital room?  3  -MS     AM-PAC 6 Clicks Score (PT)  19  -MS     Row Name 08/16/19 1045          Functional Assessment    Outcome Measure Options  AM-PAC 6 Clicks  Basic Mobility (PT)  -MS       User Key  (r) = Recorded By, (t) = Taken By, (c) = Cosigned By    Initials Name Provider Type    MS Elvis Kessler, PT Physical Therapist        Physical Therapy Education     Title: PT OT SLP Therapies (Done)     Topic: Physical Therapy (Done)     Point: Mobility training (Done)     Learning Progress Summary           Patient Acceptance, E,D, VU,NR by MS at 8/16/2019 10:42 AM    Acceptance, E,D, VU,NR by MS at 8/14/2019  2:20 PM    Acceptance, E,D, VU,NR by MS at 8/13/2019 11:29 AM    Acceptance, E,D, VU,NR by MS at 8/12/2019  4:30 PM    Acceptance, E, NR by EM at 8/9/2019  4:20 PM                   Point: Home exercise program (Done)     Learning Progress Summary           Patient Acceptance, E,D, VU,NR by MS at 8/16/2019 10:42 AM    Acceptance, E,D, VU,NR by MS at 8/14/2019  2:20 PM    Acceptance, E,D, VU,NR by MS at 8/13/2019 11:29 AM    Acceptance, E,D, VU,NR by MS at 8/12/2019  4:30 PM                   Point: Body mechanics (Done)     Learning Progress Summary           Patient Acceptance, E,D, VU,NR by MS at 8/16/2019 10:42 AM    Acceptance, E,D, VU,NR by MS at 8/14/2019  2:20 PM    Acceptance, E,D, VU,NR by MS at 8/13/2019 11:29 AM    Acceptance, E,D, VU,NR by MS at 8/12/2019  4:30 PM                   Point: Precautions (Done)     Learning Progress Summary           Patient Acceptance, E,D, VU,NR by MS at 8/16/2019 10:42 AM    Acceptance, E,D, VU,NR by MS at 8/14/2019  2:20 PM    Acceptance, E,D, VU,NR by MS at 8/13/2019 11:29 AM    Acceptance, E,D, VU,NR by MS at 8/12/2019  4:30 PM                               User Key     Initials Effective Dates Name Provider Type Discipline    EM 04/03/18 -  Yessi Jackson, PT Physical Therapist PT    MS 04/03/18 -  Elvis Kessler, PT Physical Therapist PT              PT Recommendation and Plan     Plan of Care Reviewed With: patient  Progress: improving  Outcome Summary: Pt. able to ambulate 200 feet, CGA x 1, with use of  Rwx. Pt. requires CGA x 1 for sit <-> stand transfers and Conditional [place] with bed mobility.  Verbal/tactile cues for posture correction and Rwx guidance during ambulation.      Time Calculation:   PT Charges     Row Name 08/16/19 1045             Time Calculation    Start Time  1015  -MS      Stop Time  1030  -MS      Time Calculation (min)  15 min  -MS      PT Received On  08/16/19  -MS      PT - Next Appointment  08/17/19  -MS         Time Calculation- PT    Total Timed Code Minutes- PT  13 minute(s)  -MS        User Key  (r) = Recorded By, (t) = Taken By, (c) = Cosigned By    Initials Name Provider Type    Elvis Jimenez, PT Physical Therapist        Therapy Charges for Today     Code Description Service Date Service Provider Modifiers Qty    [id number] HC PT THER PROC EA 15 MIN 8/16/2019 Elvis Kessler, PT GP 1          PT G-Codes  Outcome Measure Options: AM-PAC 6 Clicks Basic Mobility (PT)  AM-PAC 6 Clicks Score (PT): 19    Elvis Kessler PT  8/16/2019

## 2019-08-16 NOTE — PROGRESS NOTES
LOS: 13 days       Chief Complaint: Head and neck squamous cell carcinoma, sepsis secondary to MSSA, febrile neutropenia, anemia, thrombocytopenia, severe mucositis, atrial fibrillation, acute kidney injury    Interval History: The patient reports that he was able to walk around the nurses station today which was a significant improvement.  He notes that his mucositis has improved and his oral intake has improved as well.  No new complaints.    Receiving transfusion 2 units packed red blood cells for hemoglobin of 7.1    8/16  Hgb 8.8 after transfusion. Platelets continue to improve.    Review of Systems:    Review of systems was obtained with pertinent positive findings as noted in the interval history.  All other systems negative.    Objective     Vital Signs  Temp:  [97.9 °F (36.6 °C)-98.6 °F (37 °C)] 98.1 °F (36.7 °C)  Heart Rate:  [] 104  Resp:  [16-18] 18  BP: (122-153)/(69-98) 144/76        Physical Exam:     GENERAL: Elderly gentleman lying in bed no distress  MOUTH: Improvement in mucositis today  CHEST:  Lungs clear to percussion and auscultation. Good airflow.  Prior MediPort site right chest wall with dressing overlying.  CARDIAC: Irregularly irregular  ABDOMEN: Slightly distended, nontender, bowel sounds present  EXTREMITIES: 1+ bilateral lower extremity edema  NEUROLOGICAL:  Cranial Nerves II-XII grossly intact.  No focal neurological deficits.  PSYCHIATRIC:  Normal affect and mood.           Results Review:     I reviewed the patient's new clinical results.    Results from last 7 days   Lab Units 08/16/19  0532   WBC 10*3/mm3 7.03   HEMOGLOBIN g/dL 8.8*   HEMATOCRIT % 27.7*   PLATELETS 10*3/mm3 135*  135*     Lab Results   Component Value Date    NEUTROABS 13.40 (H) 08/09/2019     Results from last 7 days   Lab Units 08/16/19  0532   SODIUM mmol/L 151*   POTASSIUM mmol/L 3.1*   CHLORIDE mmol/L 113*   CO2 mmol/L 26.1   BUN mg/dL 30*   CREATININE mg/dL 1.07   GLUCOSE mg/dL 79   CALCIUM mg/dL  7.8*         Results from last 7 days   Lab Units 08/13/19  0328   MAGNESIUM mg/dL 1.8         Medication Review: Yes     Assessment/Plan     1. 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  Also developed atrial fibrillation with rapid ventricular response.  · Administered week 5 carboplatin/Taxol at significant delay on 7/31/2019  · Current hospitalization 8/3/2019 with febrile neutropenia, MSSA sepsis requiring MediPort removal on 8/5/2019.  Further treatment held.  No plans for further chemotherapy.  5 radiation fractions remaining.  2. MSSA neutropenic sepsis:  · WBC on admission 8/3/2019 1.09 with ANC 1.15, subsequently declined to WBC 0.56 on 8/4/2019 with ANC 0.49  · Required MediPort removal 8/5/2019  · Subsequent recovery of WBC with Neupogen support 480 mcg on 8/4, 8/5, 8/7/2019  · Continuation of cefazolin 2 g every 12 hours x4 weeks with antibiotic stop date of 9/3/2019 per infectious disease consultation.  3. Severe treatment related mucositis with associated pain:  · Patient receiving scheduled Magic mouthwash every 4 hours along with as needed viscous lidocaine  · Added morphine 1 mg every 3 hours IV as needed.  · Today, patient reports that his mucositis pain has improved.  He did try morphine which led to relief but also sedation.  4. JERICHO:  · Previous creatinine 0.8-1.0  · Max creatinine 2.98 on 8/8/2019  · Nephrology consulted, felt to be related to hypotension, sepsis with reduced perfusion  · Creatinine with continued gradual improvement, 1.07 today  5. Severe  thrombocytopenia:  · Platelet count had recovered prior to administration of week 5 carboplatin/Taxol on 7/31/2019 at which time platelet count was 207,000  · On admission 8/3/2019 per the count 196,000  · Significant decline in platelet count with mabel 21,000 on 8/9/2019  · Elevated IPF at 14.8% on 8/10/2019, concern for possible ITP, received prednisone 60 mg daily beginning 8/10/2019  · Persistently elevated IPF is felt to be more related to marrow recovery.  Thrombocytopenia most likely secondary to chemotherapy and recent sepsis with consumption.  Prednisone discontinued.  · Platelet count today further improved at 135,000.  6. Anemia:  · Elevated LDH but no other evidence to support hemolysis  · Secondary to chemotherapy, sepsis  · Check reticulocyte count today and in a.m., also check haptoglobin in a.m.  · Hemoglobin with further decline to 8.3.  Stool for occult blood was positive.  7. Nutrition:  · Previously required continuous tube feeds via NG tube due to severe mucositis and weight loss on concurrent chemoradiation.  8. Atrial fibrillation with rapid ventricular response:  · Rate improved  9. Lower extremity edema:  · Bilateral lower extremity Dopplers negative for DVT 8/13/2019    Plan:  1. Agree with transfusion 2 units packed red blood cells.  Continue to monitor blood counts daily  2. Continue supportive care and nutrition support  3. No plans for further chemotherapy.  If patient improves he may hopefully be able to finish out his planned radiation.  4. Discussed with Dr. Patton.  Patient should be able to be discharged in the near future once he gets a PICC line placed and home IV antibiotics arranged.  We will contact the radiation center to let them know that he may be able to resume his radiation as an outpatient on Monday, 8/19/2019.  We will plan to see him again at CBC office in 2 weeks but will also schedule him for outpatient lab with RN review in 1 week to monitor his blood  counts.    We will sign off.  Please call us again if we can be of further assistance prior to discharge.      Pablo Heaton MD  08/16/19  1:12 PM

## 2019-08-16 NOTE — PLAN OF CARE
Problem: Fall Risk (Adult)  Goal: Absence of Fall  Outcome: Ongoing (interventions implemented as appropriate)      Problem: Skin Injury Risk (Adult)  Goal: Skin Health and Integrity  Outcome: Ongoing (interventions implemented as appropriate)      Problem: Pain, Acute (Adult)  Goal: Identify Related Risk Factors and Signs and Symptoms  Outcome: Ongoing (interventions implemented as appropriate)      Problem: Pain, Chronic (Adult)  Goal: Acceptable Pain/Comfort Level and Functional Ability  Outcome: Ongoing (interventions implemented as appropriate)      Problem: Nutrition, Imbalanced: Inadequate Oral Intake (Adult)  Goal: Improved Oral Intake  Outcome: Ongoing (interventions implemented as appropriate)    Goal: Prevent Further Weight Loss  Outcome: Ongoing (interventions implemented as appropriate)

## 2019-08-17 LAB
ALBUMIN SERPL-MCNC: 2.6 G/DL (ref 3.5–5.2)
ALBUMIN/GLOB SERPL: 0.9 G/DL
ALP SERPL-CCNC: 104 U/L (ref 39–117)
ALT SERPL W P-5'-P-CCNC: <5 U/L (ref 1–41)
ANION GAP SERPL CALCULATED.3IONS-SCNC: 11.3 MMOL/L (ref 5–15)
AST SERPL-CCNC: 50 U/L (ref 1–40)
BILIRUB SERPL-MCNC: 0.7 MG/DL (ref 0.2–1.2)
BUN BLD-MCNC: 24 MG/DL (ref 8–23)
BUN/CREAT SERPL: 20.5 (ref 7–25)
CALCIUM SPEC-SCNC: 7.8 MG/DL (ref 8.6–10.5)
CHLORIDE SERPL-SCNC: 113 MMOL/L (ref 98–107)
CO2 SERPL-SCNC: 23.7 MMOL/L (ref 22–29)
CREAT BLD-MCNC: 1.17 MG/DL (ref 0.76–1.27)
DEPRECATED RDW RBC AUTO: 86.6 FL (ref 37–54)
ERYTHROCYTE [DISTWIDTH] IN BLOOD BY AUTOMATED COUNT: 23.2 % (ref 12.3–15.4)
GFR SERPL CREATININE-BSD FRML MDRD: 61 ML/MIN/1.73
GLOBULIN UR ELPH-MCNC: 3 GM/DL
GLUCOSE BLD-MCNC: 110 MG/DL (ref 65–99)
HCT VFR BLD AUTO: 29.9 % (ref 37.5–51)
HGB BLD-MCNC: 9.3 G/DL (ref 13–17.7)
INR PPP: 1.28 (ref 0.9–1.1)
LDH SERPL-CCNC: 503 U/L (ref 135–225)
MAGNESIUM SERPL-MCNC: 1.6 MG/DL (ref 1.6–2.4)
MCH RBC QN AUTO: 32.7 PG (ref 26.6–33)
MCHC RBC AUTO-ENTMCNC: 31.1 G/DL (ref 31.5–35.7)
MCV RBC AUTO: 105.3 FL (ref 79–97)
PHOSPHATE SERPL-MCNC: 2.8 MG/DL (ref 2.5–4.5)
PLATELET # BLD AUTO: 146 10*3/MM3 (ref 140–450)
PLATELET # BLD AUTO: 146 10*3/MM3 (ref 140–450)
PLATELETS.RETICULATED NFR BLD AUTO: 7 % (ref 0.9–6.5)
PMV BLD AUTO: 11.9 FL (ref 6–12)
POTASSIUM BLD-SCNC: 3.9 MMOL/L (ref 3.5–5.2)
PROT SERPL-MCNC: 5.6 G/DL (ref 6–8.5)
PROTHROMBIN TIME: 15.7 SECONDS (ref 11.7–14.2)
RBC # BLD AUTO: 2.84 10*6/MM3 (ref 4.14–5.8)
SODIUM BLD-SCNC: 148 MMOL/L (ref 136–145)
WBC NRBC COR # BLD: 8.1 10*3/MM3 (ref 3.4–10.8)

## 2019-08-17 PROCEDURE — 25010000003 CEFAZOLIN IN DEXTROSE 2-4 GM/100ML-% SOLUTION: Performed by: INTERNAL MEDICINE

## 2019-08-17 PROCEDURE — 80053 COMPREHEN METABOLIC PANEL: CPT | Performed by: INTERNAL MEDICINE

## 2019-08-17 PROCEDURE — 25010000002 MAGNESIUM SULFATE 2 GM/50ML SOLUTION: Performed by: INTERNAL MEDICINE

## 2019-08-17 PROCEDURE — 83735 ASSAY OF MAGNESIUM: CPT | Performed by: INTERNAL MEDICINE

## 2019-08-17 PROCEDURE — 25010000002 DIGOXIN PER 500 MCG: Performed by: INTERNAL MEDICINE

## 2019-08-17 PROCEDURE — 25010000002 ENOXAPARIN PER 10 MG: Performed by: HOSPITALIST

## 2019-08-17 PROCEDURE — 84100 ASSAY OF PHOSPHORUS: CPT | Performed by: INTERNAL MEDICINE

## 2019-08-17 PROCEDURE — 85027 COMPLETE CBC AUTOMATED: CPT | Performed by: INTERNAL MEDICINE

## 2019-08-17 PROCEDURE — 85055 RETICULATED PLATELET ASSAY: CPT | Performed by: INTERNAL MEDICINE

## 2019-08-17 PROCEDURE — 25010000003 CEFAZOLIN PER 500 MG: Performed by: INTERNAL MEDICINE

## 2019-08-17 PROCEDURE — 99232 SBSQ HOSP IP/OBS MODERATE 35: CPT | Performed by: INTERNAL MEDICINE

## 2019-08-17 PROCEDURE — 85610 PROTHROMBIN TIME: CPT | Performed by: HOSPITALIST

## 2019-08-17 PROCEDURE — 83615 LACTATE (LD) (LDH) ENZYME: CPT | Performed by: INTERNAL MEDICINE

## 2019-08-17 RX ORDER — MAGNESIUM SULFATE 1 G/100ML
2 INJECTION INTRAVENOUS ONCE
Status: DISCONTINUED | OUTPATIENT
Start: 2019-08-17 | End: 2019-08-17 | Stop reason: CLARIF

## 2019-08-17 RX ORDER — DEXTROSE MONOHYDRATE 50 MG/ML
100 INJECTION, SOLUTION INTRAVENOUS CONTINUOUS
Status: ACTIVE | OUTPATIENT
Start: 2019-08-17 | End: 2019-08-18

## 2019-08-17 RX ORDER — CEFAZOLIN SODIUM 2 G/100ML
2 INJECTION, SOLUTION INTRAVENOUS EVERY 8 HOURS
Status: DISCONTINUED | OUTPATIENT
Start: 2019-08-17 | End: 2019-08-20 | Stop reason: HOSPADM

## 2019-08-17 RX ORDER — DIGOXIN 0.25 MG/ML
500 INJECTION INTRAMUSCULAR; INTRAVENOUS ONCE AS NEEDED
Status: COMPLETED | OUTPATIENT
Start: 2019-08-17 | End: 2019-08-18

## 2019-08-17 RX ORDER — WARFARIN SODIUM 1 MG/1
1 TABLET ORAL
Status: DISCONTINUED | OUTPATIENT
Start: 2019-08-17 | End: 2019-08-18 | Stop reason: DRUGHIGH

## 2019-08-17 RX ORDER — DIGOXIN 0.25 MG/ML
0.25 INJECTION INTRAMUSCULAR; INTRAVENOUS
Status: DISCONTINUED | OUTPATIENT
Start: 2019-08-17 | End: 2019-08-17

## 2019-08-17 RX ORDER — DILTIAZEM HYDROCHLORIDE 5 MG/ML
20 INJECTION INTRAVENOUS ONCE
Status: COMPLETED | OUTPATIENT
Start: 2019-08-17 | End: 2019-08-17

## 2019-08-17 RX ORDER — METOPROLOL TARTRATE 50 MG/1
50 TABLET, FILM COATED ORAL EVERY 8 HOURS
Status: DISCONTINUED | OUTPATIENT
Start: 2019-08-17 | End: 2019-08-18

## 2019-08-17 RX ORDER — METOPROLOL TARTRATE 50 MG/1
50 TABLET, FILM COATED ORAL EVERY 12 HOURS SCHEDULED
Status: DISCONTINUED | OUTPATIENT
Start: 2019-08-17 | End: 2019-08-17

## 2019-08-17 RX ORDER — DIGOXIN 0.25 MG/ML
0.25 INJECTION INTRAMUSCULAR; INTRAVENOUS ONCE
Status: COMPLETED | OUTPATIENT
Start: 2019-08-17 | End: 2019-08-17

## 2019-08-17 RX ORDER — MAGNESIUM SULFATE HEPTAHYDRATE 40 MG/ML
2 INJECTION, SOLUTION INTRAVENOUS ONCE
Status: COMPLETED | OUTPATIENT
Start: 2019-08-17 | End: 2019-08-17

## 2019-08-17 RX ADMIN — DEXTROSE MONOHYDRATE 100 ML/HR: 50 INJECTION, SOLUTION INTRAVENOUS at 19:47

## 2019-08-17 RX ADMIN — SODIUM CHLORIDE 10 MG/HR: 900 INJECTION, SOLUTION INTRAVENOUS at 10:18

## 2019-08-17 RX ADMIN — LIDOCAINE HYDROCHLORIDE 5 ML: 20 SOLUTION ORAL; TOPICAL at 11:38

## 2019-08-17 RX ADMIN — METOPROLOL TARTRATE 37.5 MG: 25 TABLET ORAL at 06:38

## 2019-08-17 RX ADMIN — METOPROLOL TARTRATE 5 MG: 5 INJECTION, SOLUTION INTRAVENOUS at 18:38

## 2019-08-17 RX ADMIN — DEXTROSE MONOHYDRATE 100 ML/HR: 50 INJECTION, SOLUTION INTRAVENOUS at 03:46

## 2019-08-17 RX ADMIN — SODIUM CHLORIDE, PRESERVATIVE FREE 3 ML: 5 INJECTION INTRAVENOUS at 21:06

## 2019-08-17 RX ADMIN — SODIUM CHLORIDE, PRESERVATIVE FREE 3 ML: 5 INJECTION INTRAVENOUS at 09:17

## 2019-08-17 RX ADMIN — LANSOPRAZOLE 30 MG: KIT at 08:58

## 2019-08-17 RX ADMIN — ENOXAPARIN SODIUM 40 MG: 40 INJECTION SUBCUTANEOUS at 11:37

## 2019-08-17 RX ADMIN — SODIUM CHLORIDE 10 MG/HR: 900 INJECTION, SOLUTION INTRAVENOUS at 10:44

## 2019-08-17 RX ADMIN — METOPROLOL TARTRATE 50 MG: 50 TABLET, FILM COATED ORAL at 21:07

## 2019-08-17 RX ADMIN — METOPROLOL TARTRATE 5 MG: 5 INJECTION, SOLUTION INTRAVENOUS at 19:09

## 2019-08-17 RX ADMIN — SODIUM CHLORIDE 10 MG/HR: 900 INJECTION, SOLUTION INTRAVENOUS at 02:38

## 2019-08-17 RX ADMIN — METOPROLOL TARTRATE 5 MG: 5 INJECTION, SOLUTION INTRAVENOUS at 19:47

## 2019-08-17 RX ADMIN — CEFAZOLIN SODIUM 2 G: 2 INJECTION, SOLUTION INTRAVENOUS at 18:04

## 2019-08-17 RX ADMIN — SERTRALINE 50 MG: 50 TABLET, FILM COATED ORAL at 08:58

## 2019-08-17 RX ADMIN — MAGNESIUM SULFATE 2 G: 2 INJECTION INTRAVENOUS at 20:07

## 2019-08-17 RX ADMIN — METOPROLOL TARTRATE 50 MG: 50 TABLET, FILM COATED ORAL at 17:57

## 2019-08-17 RX ADMIN — METOPROLOL TARTRATE 5 MG: 5 INJECTION, SOLUTION INTRAVENOUS at 18:32

## 2019-08-17 RX ADMIN — DIGOXIN 0.25 MG: 0.25 INJECTION INTRAMUSCULAR; INTRAVENOUS at 01:12

## 2019-08-17 RX ADMIN — TERAZOSIN HYDROCHLORIDE 1 MG: 1 CAPSULE ORAL at 21:06

## 2019-08-17 RX ADMIN — LIDOCAINE HYDROCHLORIDE 5 ML: 20 SOLUTION ORAL; TOPICAL at 17:17

## 2019-08-17 RX ADMIN — CEFAZOLIN SODIUM 2 G: 10 INJECTION, POWDER, FOR SOLUTION INTRAVENOUS at 00:00

## 2019-08-17 RX ADMIN — SODIUM CHLORIDE, PRESERVATIVE FREE 10 ML: 5 INJECTION INTRAVENOUS at 01:14

## 2019-08-17 RX ADMIN — WARFARIN SODIUM 1 MG: 1 TABLET ORAL at 17:17

## 2019-08-17 RX ADMIN — CEFAZOLIN SODIUM 2 G: 2 INJECTION, SOLUTION INTRAVENOUS at 11:38

## 2019-08-17 RX ADMIN — DILTIAZEM HYDROCHLORIDE 20 MG: 5 INJECTION INTRAVENOUS at 01:52

## 2019-08-17 NOTE — PROGRESS NOTES
Pharmacy Consult: Warfarin Dosing/ Monitoring    Cody Eldridge is a 74 y.o. male, estimated creatinine clearance is 61 mL/min (by C-G formula based on SCr of 1.17 mg/dL). weighing 79.1 kg (174 lb 6.4 oz).     has a past medical history of Atrial fibrillation (CMS/HCC), CHF (congestive heart failure) (CMS/HCC), Chronic bronchitis (CMS/HCC), COPD (chronic obstructive pulmonary disease) (CMS/HCC), Cor pulmonale (chronic) (CMS/HCC), Daytime hypersomnia, Depression with anxiety, Elevated cholesterol, Enlarged prostate, Frequent nocturnal awakening, Gout, H/O cardiac radiofrequency ablation (), Head and neck cancer (CMS/HCC), Hyperlipidemia, Hypertension, Hypotension, Insomnia, Lumbar radicular pain, SHAR (obstructive sleep apnea), Osteoarthritis, Permanent atrial fibrillation (CMS/HCC), Snoring, Squamous cell carcinoma of neck, SVT (supraventricular tachycardia) (CMS/HCC), Syncope, Vitamin D deficiency, and Weight loss.    Social History     Tobacco Use   • Smoking status: Former Smoker     Packs/day: 3.00     Years: 30.00     Pack years: 90.00     Types: Cigarettes     Last attempt to quit: 2000     Years since quittin.6   • Smokeless tobacco: Never Used   • Tobacco comment: started age 11 x 53 years stopped 2000 / daily caffiene   Substance Use Topics   • Alcohol use: Yes     Comment: quit 30 yeas ago   • Drug use: No       Results from last 7 days   Lab Units 19  03219  0532 08/15/19  0317 19  0339 19  0328 19  0306 19  0522   HEMOGLOBIN g/dL 9.3* 8.8* 7.1* 8.6* 8.3* 9.3* 10.0*   HEMATOCRIT % 29.9* 27.7* 22.7* 28.2* 26.6* 29.5* 31.0*   PLATELETS 10*3/mm3 146  146 135*  135* 111*  111* 110*  110* 82*  82* 55*  55* 37*  37*     Results from last 7 days   Lab Units 19  0321 19  0532 08/15/19  0317   SODIUM mmol/L 148* 151* 149*   POTASSIUM mmol/L 3.9 3.1* 3.5   CHLORIDE mmol/L 113* 113* 113*   CO2 mmol/L 23.7 26.1 26.4   BUN mg/dL 24* 30* 43*    CREATININE mg/dL 1.17 1.07 1.18   CALCIUM mg/dL 7.8* 7.8* 7.4*   BILIRUBIN mg/dL 0.7 0.6 0.7   ALK PHOS U/L 104 85 75   ALT (SGPT) U/L <5 6 5   AST (SGOT) U/L 50* 53* 44*   GLUCOSE mg/dL 110* 79 97     Anticoagulation history: PTA - Warfarin 1 mg po QOD (Dr. Myriam Omer, cardiology)    Hospital Anticoagulation:  Consulting provider: Dr Fredy Patton  Start date: 8/17/2019  Indication: AFIB - full anticoagulation  Target INR: 2-3  Expected duration: TBD   Bridge Therapy: No              and enoxapain 40 mg SQ qday for DVT prophylaxis  Date 8/17            INR ---            Warfarin dose 1 mg                Relevant nutrition status: Regular diet + supplements    Education complete?/ Date: pending    Assessment/Plan:  Dose :  1 mg daily  Monitor :  daily INR  Follow up - 8/18/2019    Pharmacy will continue to follow until discharge or discontinuation of warfarin.     Lana White [facility]  Staff Clinical Pharmacist  8/17/2019

## 2019-08-17 NOTE — PROGRESS NOTES
[facility]  [phone]  Kj Raymundo MD    Subjective:  LOS: 14    His heart rate climbed up again this evening he was given additional medicines by cardiology.  He does not feel as well as previously but denies any specific respiratory problems.    Objective   Vital Signs past 24hrs    Temp range: Temp (24hrs), Av.9 °F (36.6 °C), Min:97.6 °F (36.4 °C), Max:98.1 °F (36.7 °C)    BP range: BP: (109-155)/() 133/68  Pulse range: Heart Rate:  [] 74  Resp rate range: Resp:  [18] 18    Device (Oxygen Therapy): room airFlow (L/min):  [2] 2  Oxygen range:SpO2:  [91 %-97 %] 92 %      79.1 kg (174 lb 6.4 oz); Body mass index is 24.32 kg/m².    Intake/Output Summary (Last 24 hours) at 2019 1708  Last data filed at 2019 1105  Gross per 24 hour   Intake 1603.04 ml   Output 750 ml   Net 853.04 ml       Physical Exam   Constitutional: He appears well-developed.   HENT:   Head: Normocephalic.   Cardiovascular: Normal rate. An irregular rhythm present.   No murmur heard.  Pulmonary/Chest: He has decreased breath sounds in the right lower field and the left lower field. He has no wheezes. He has no rales.   Dull to percuss in the bases   Abdominal: Soft. Bowel sounds are normal. There is no tenderness.   Musculoskeletal: He exhibits edema.   Neurological: He is alert.   Nursing note and vitals reviewed.    Results Review:    I have reviewed the laboratory and imaging data since the last note by [facility] physician.  My annotations are noted in assessment and plan.    Medication Review:  I have reviewed the current MAR.  My annotations are noted in assessment and plan.      Pharmacy to dose warfarin      Plan   PCCM Problems  Bilateral pleural effusions  Acute on chronic diastolic congestive heart failure  A. fib with RVR  Acute kidney injury, resolving  SHAR  COPD  Septic shock, resolved  MSSA bacteremia from infected MediPort, on abx  Squamous cell cancer of the tongue      Plan of  Treatment  Continued evidence of fluid overload.  Diuresis as per cardiology and nephrology.    COPD stable without exacerbation.    Will follow peripherally for any new pulmonary issues that may arise.    Kj Raymundo MD  08/17/19  5:08 PM    Part of this note may be an electronic transcription/translation of spoken language to printed text using the Dragon Dictation System.

## 2019-08-17 NOTE — PROGRESS NOTES
"Cody Eldridge  1945 74 y.o.  [id number]      Patient Care Team:  Epley, James, APRN as PCP - General (Family Medicine)  Payal Waters MD as Consulting Physician (Pain Medicine)  Lorna Chaidez (Nurse Practitioner)  Kristal Cowart [facility] as Pharmacist  Atkinson, Fritz Snyder MD as Referring Physician (Otolaryngology)  Pablo Heaton MD as Consulting Physician (Hematology and Oncology)  Annie Davila MD as Consulting Physician (Radiation Oncology)  Chetan Heaton MD as Consulting Physician (Urology)    CC: A. fib    Interval History: He went into A. fib last night with RVR      Objective   Vital Signs  Temp:  [97.6 °F (36.4 °C)-98.1 °F (36.7 °C)] 98.1 °F (36.7 °C)  Heart Rate:  [] 74  Resp:  [18] 18  BP: (109-155)/() 133/68    Intake/Output Summary (Last 24 hours) at 8/17/2019 1633  Last data filed at 8/17/2019 1105  Gross per 24 hour   Intake 1603.04 ml   Output 750 ml   Net 853.04 ml     Flowsheet Rows      First Filed Value   Admission Height  180.3 cm (71\") Documented at 08/03/2019 1153   Admission Weight  79.9 kg (176 lb 3 oz) Documented at 08/03/2019 1207          Physical Exam:   General Appearance:    Alert,oriented, in no acute distress   Lungs:     Clear to auscultation,BS are equal    Heart:    Normal S1 and S2, iRRR without murmur, gallop or rub   HEENT:    Sclerae are clear, no JVD or adenopathy   Abdomen:     Normal bowel sounds, soft non-tender, non-distended, no HSM   Extremities:   Moves all extremities well, no edema, no cyanosis, no             Redness, no rash     Medication Review:        ceFAZolin 2 g Intravenous Q8H   enoxaparin 40 mg Subcutaneous Q24H   lansoprazole 30 mg Nasogastric Daily   magic mouthwash 5 mL Swish & Spit Q4H   metoprolol tartrate 50 mg Oral Q12H   potassium chloride 40 mEq Nasogastric Once   sertraline 50 mg Oral Daily   sodium chloride 3 mL Intravenous Q12H   terazosin 1 mg Nasogastric Nightly   warfarin 1 mg Oral Daily "       Pharmacy to dose warfarin          I reviewed the patient's new clinical results.  I personally viewed and interpreted the patient's EKG/Telemetry data    Assessment/Plan  Active Hospital Problems    Diagnosis  POA   • **MSSA bacteremia [R78.81]  Unknown   • Shock, septic (CMS/HCC) [A41.9, R65.21]  Unknown   • Oral thrush [B37.0]  Unknown   • Acute renal injury (CMS/HCC) [N17.9]  Unknown   • Anemia associated with chemotherapy [D64.81, T45.1X5A]  Unknown   • Chemotherapy-induced thrombocytopenia [D69.59, T45.1X5A]  Unknown   • New onset of congestive heart failure (CMS/HCC) [I50.9]  Yes   • Atrial fibrillation (CMS/HCC) [I48.91]  Yes   • Squamous cell carcinoma of neck [C44.42]  Yes   • COPD (chronic obstructive pulmonary disease) (CMS/HCC) [J44.9]  Yes   • Depression with anxiety [F41.8]  Yes   • CAD (coronary artery disease) [I25.10]  Yes   • Chemotherapy-induced neutropenia (CMS/HCC) [D70.1, T45.1X5A]  Yes   • Vascular catheter infection (CMS/HCC) [T82.7XXA]  Yes      Resolved Hospital Problems   No resolved problems to display.       We will stop his IV Cardizem and put him on metoprolol 50 twice a day if his rate goes fast again we will have to put him back on IV Cardizem he is back on his warfarin    Yousif Uribe MD  08/17/19  4:33 PM

## 2019-08-17 NOTE — PROGRESS NOTES
NEPHROLOGY PROGRESS NOTE    PATIENT IDENTIFICATION:   Name:  Cody Eldridge      MRN:  [id number]     74 y.o.  male             Reason for visit: JERICHO    SUBJECTIVE:   Still not eating or drinking all that much; denies palpitations despite heart rates in the 140's for much of today    OBJECTIVE:  Vitals:    08/17/19 1755 08/17/19 1757 08/17/19 1812 08/17/19 1830   BP: (!) 119/102 (!) 116/102 140/76 145/99   BP Location:       Patient Position:       Pulse: (!) 140 (!) 140 (!) 141 (!) 140   Resp:       Temp:       TempSrc:       SpO2:    92%   Weight:       Height:               Body mass index is 24.32 kg/m².    Intake/Output Summary (Last 24 hours) at 8/17/2019 1841  Last data filed at 8/17/2019 1105  Gross per 24 hour   Intake 1473.04 ml   Output 750 ml   Net 723.04 ml     Wt Readings from Last 1 Encounters:   08/17/19 0525 79.1 kg (174 lb 6.4 oz)   08/16/19 0524 79.4 kg (175 lb 1.6 oz)   08/15/19 0649 80.7 kg (178 lb)   08/14/19 0521 81.1 kg (178 lb 11.2 oz)   08/13/19 0355 80.2 kg (176 lb 11.2 oz)   08/12/19 0357 79.9 kg (176 lb 1.6 oz)   08/11/19 0330 80.2 kg (176 lb 12.8 oz)   08/10/19 0524 80 kg (176 lb 6.4 oz)   08/08/19 0600 78.4 kg (172 lb 13.5 oz)   08/08/19 0210 78.4 kg (172 lb 13.5 oz)   08/07/19 0300 77.6 kg (171 lb 1.2 oz)   08/07/19 0146 76 kg (167 lb 8.8 oz)   08/06/19 1504 72.6 kg (160 lb)   08/06/19 0526 72.8 kg (160 lb 7.9 oz)   08/05/19 0600 70.5 kg (155 lb 6.8 oz)   08/05/19 0448 70.9 kg (156 lb 6.4 oz)   08/04/19 0610 70.6 kg (155 lb 11.2 oz)   08/03/19 1619 76.6 kg (168 lb 12.8 oz)   08/03/19 1207 79.9 kg (176 lb 3 oz)     Wt Readings from Last 3 Encounters:   08/17/19 79.1 kg (174 lb 6.4 oz)   08/02/19 78.6 kg (173 lb 3.2 oz)   07/31/19 79.2 kg (174 lb 9.6 oz)         Exam:  Lying in bed. Oriented; looks older than stated age. Fatigued.   Chronically ill-appearing; flat affect  HEENT Dry oral mucosa.   No eye discharge; no scleral icterus  Neck No JVD  Lungs No rales or rhonchi;  very  poor air movement lower left lung; not labored on O2 per nasal cannula  Heart Irregularly irregular, tachycardic, no rub  Abd Soft,+ bs, nontender  Dressing by the right clavicle is C/D/I  Ext Trace-+1 edema both legs  Skin pale.          Scheduled meds:      ceFAZolin 2 g Intravenous Q8H   enoxaparin 40 mg Subcutaneous Q24H   lansoprazole 30 mg Nasogastric Daily   magic mouthwash 5 mL Swish & Spit Q4H   metoprolol tartrate 50 mg Oral Q8H   potassium chloride 40 mEq Nasogastric Once   sertraline 50 mg Oral Daily   sodium chloride 3 mL Intravenous Q12H   terazosin 1 mg Nasogastric Nightly   warfarin 1 mg Oral Daily     IV meds:                          Pharmacy to dose warfarin        Data Review:    Results from last 7 days   Lab Units 08/17/19  0321 08/16/19  0532 08/15/19  0317   SODIUM mmol/L 148* 151* 149*   POTASSIUM mmol/L 3.9 3.1* 3.5   CHLORIDE mmol/L 113* 113* 113*   CO2 mmol/L 23.7 26.1 26.4   BUN mg/dL 24* 30* 43*   CREATININE mg/dL 1.17 1.07 1.18   CALCIUM mg/dL 7.8* 7.8* 7.4*   BILIRUBIN mg/dL 0.7 0.6 0.7   ALK PHOS U/L 104 85 75   ALT (SGPT) U/L <5 6 5   AST (SGOT) U/L 50* 53* 44*   GLUCOSE mg/dL 110* 79 97     Estimated Creatinine Clearance: 61 mL/min (by C-G formula based on SCr of 1.17 mg/dL).      Results from last 7 days   Lab Units 08/17/19  0321 08/16/19  0532 08/15/19  0317  08/13/19  0328  08/11/19  0522   MAGNESIUM mg/dL 1.6  --   --   --  1.8  --  2.1   PHOSPHORUS mg/dL 2.8 3.4 2.5   < > 1.8*   < > 2.2*    < > = values in this interval not displayed.       Results from last 7 days   Lab Units 08/17/19 0321 08/16/19  0532 08/15/19  0317 08/14/19  0339 08/13/19 0328   WBC 10*3/mm3 8.10 7.03 7.13 8.45 7.46   HEMOGLOBIN g/dL 9.3* 8.8* 7.1* 8.6* 8.3*   PLATELETS 10*3/mm3 146  146 135*  135* 111*  111* 110*  110* 82*  82*       Results from last 7 days   Lab Units 08/17/19  0938   INR  1.28*             ASSESSMENT:   1.  JERICHO, non-oliguric, creatinine stable.  Stable hypernatremia due to  insufficient water intake; low magnesium  2.  Head and neck cancer on radiation and chemotherapy.  3.  Rapid atrial fibrillation.   4.  Neutropenic fever with sepsis syndrome and MSSA bacteremia: No fever.  BP stable.   5.  Coagulopathy resolved.   6.  Anemia and thrombocytopenia. Platelets stable.   7.  Chronic diastolic CHF:  Compensated.        PLAN:  1.  D5W again today  2.  Control heart rate  3.  Replete magnesium        Medardo Espinal MD  8/17/2019    6:41 PM

## 2019-08-17 NOTE — PLAN OF CARE
Problem: Patient Care Overview  Goal: Plan of Care Review  Outcome: Ongoing (interventions implemented as appropriate)   08/17/19 0449   Coping/Psychosocial   Plan of Care Reviewed With patient   OTHER   Outcome Summary continue to monitor vs, labs, heart rate, pt given IV lopressor, digoxin, cardizem bolus given & cardizem gtt started, turn q2, safety maintained, bed alarm on   Plan of Care Review   Progress improving     Goal: Individualization and Mutuality  Outcome: Ongoing (interventions implemented as appropriate)   08/17/19 0449   Individualization   Patient Specific Interventions monitor s/s wound infection during dressing change       Problem: Fall Risk (Adult)  Goal: Identify Related Risk Factors and Signs and Symptoms  Outcome: Outcome(s) achieved Date Met: 08/17/19 08/17/19 0449   Fall Risk (Adult)   Related Risk Factors (Fall Risk) age-related changes;bladder function altered;sleep pattern alteration   Signs and Symptoms (Fall Risk) presence of risk factors     Goal: Absence of Fall  Outcome: Ongoing (interventions implemented as appropriate)   08/17/19 0449   Fall Risk (Adult)   Absence of Fall making progress toward outcome       Problem: Cardiac: Heart Failure (Adult)  Goal: Signs and Symptoms of Listed Potential Problems Will be Absent, Minimized or Managed (Cardiac: Heart Failure)  Outcome: Ongoing (interventions implemented as appropriate)   08/17/19 0449   Goal/Outcome Evaluation   Problems Assessed (Heart Failure) all   Problems Present (Heart Failure) cardiac pump dysfunction;dysrhythmia/arrhythmia;situational response       Problem: Skin Injury Risk (Adult)  Goal: Skin Health and Integrity  Outcome: Ongoing (interventions implemented as appropriate)   08/17/19 0449   Skin Injury Risk (Adult)   Skin Health and Integrity making progress toward outcome       Problem: Pain, Acute (Adult)  Goal: Acceptable Pain Control/Comfort Level  Outcome: Ongoing (interventions implemented as appropriate)    08/17/19 0449   Pain, Acute (Adult)   Acceptable Pain Control/Comfort Level making progress toward outcome       Problem: Pain, Chronic (Adult)  Goal: Acceptable Pain/Comfort Level and Functional Ability  Outcome: Ongoing (interventions implemented as appropriate)   08/17/19 0449   Pain, Chronic (Adult)   Acceptable Pain/Comfort Level and Functional Ability making progress toward outcome       Problem: Nutrition, Imbalanced: Inadequate Oral Intake (Adult)  Goal: Improved Oral Intake  Outcome: Ongoing (interventions implemented as appropriate)   08/17/19 0449   Nutrition, Imbalanced: Inadequate Oral Intake (Adult)   Improved Oral Intake making progress toward outcome     Goal: Prevent Further Weight Loss  Outcome: Ongoing (interventions implemented as appropriate)   08/17/19 0449   Nutrition, Imbalanced: Inadequate Oral Intake (Adult)   Prevent Further Weight Loss making progress toward outcome

## 2019-08-17 NOTE — PLAN OF CARE
Problem: Patient Care Overview  Goal: Plan of Care Review  Outcome: Ongoing (interventions implemented as appropriate)   08/17/19 4521   OTHER   Outcome Summary Cardizem d/c and metprolol dosage increased BID; Continue to monitor Heart rate and BP.

## 2019-08-17 NOTE — SIGNIFICANT NOTE
Patient's heart rate elevated to 140's to 150's Afib sustained. Gave BB 37.5mg, no change in heart rate, gave lopressor 5 mg IV, no change in heart rate. Informed Dr. Carmona no change in heart rate gave Digoxin 0.25 mg IV x one, no change in heart rate. Informed Dr. Carmona no change in heart rate, ordered Cardizem bolus and Cardizem gtt which controlled the patient's heart rate.

## 2019-08-18 PROBLEM — E87.6 HYPOKALEMIA: Status: ACTIVE | Noted: 2019-08-18

## 2019-08-18 LAB
ALBUMIN SERPL-MCNC: 2.2 G/DL (ref 3.5–5.2)
ALBUMIN/GLOB SERPL: 0.8 G/DL
ALP SERPL-CCNC: 102 U/L (ref 39–117)
ALT SERPL W P-5'-P-CCNC: 5 U/L (ref 1–41)
ANION GAP SERPL CALCULATED.3IONS-SCNC: 11.3 MMOL/L (ref 5–15)
AST SERPL-CCNC: 43 U/L (ref 1–40)
BILIRUB SERPL-MCNC: 0.6 MG/DL (ref 0.2–1.2)
BUN BLD-MCNC: 16 MG/DL (ref 8–23)
BUN/CREAT SERPL: 15.1 (ref 7–25)
CALCIUM SPEC-SCNC: 7.2 MG/DL (ref 8.6–10.5)
CHLORIDE SERPL-SCNC: 108 MMOL/L (ref 98–107)
CO2 SERPL-SCNC: 24.7 MMOL/L (ref 22–29)
CREAT BLD-MCNC: 1.06 MG/DL (ref 0.76–1.27)
DEPRECATED RDW RBC AUTO: 83.4 FL (ref 37–54)
ERYTHROCYTE [DISTWIDTH] IN BLOOD BY AUTOMATED COUNT: 22.5 % (ref 12.3–15.4)
GFR SERPL CREATININE-BSD FRML MDRD: 68 ML/MIN/1.73
GLOBULIN UR ELPH-MCNC: 2.9 GM/DL
GLUCOSE BLD-MCNC: 104 MG/DL (ref 65–99)
HCT VFR BLD AUTO: 29 % (ref 37.5–51)
HGB BLD-MCNC: 9 G/DL (ref 13–17.7)
INR PPP: 1.29 (ref 0.9–1.1)
LDH SERPL-CCNC: 519 U/L (ref 135–225)
MAGNESIUM SERPL-MCNC: 1.7 MG/DL (ref 1.6–2.4)
MCH RBC QN AUTO: 32.5 PG (ref 26.6–33)
MCHC RBC AUTO-ENTMCNC: 31 G/DL (ref 31.5–35.7)
MCV RBC AUTO: 104.7 FL (ref 79–97)
PHOSPHATE SERPL-MCNC: 2.6 MG/DL (ref 2.5–4.5)
PLATELET # BLD AUTO: 174 10*3/MM3 (ref 140–450)
PLATELET # BLD AUTO: 174 10*3/MM3 (ref 140–450)
PLATELETS.RETICULATED NFR BLD AUTO: 7.2 % (ref 0.9–6.5)
PMV BLD AUTO: 12 FL (ref 6–12)
POTASSIUM BLD-SCNC: 3.3 MMOL/L (ref 3.5–5.2)
POTASSIUM BLD-SCNC: 4.1 MMOL/L (ref 3.5–5.2)
PROT SERPL-MCNC: 5.1 G/DL (ref 6–8.5)
PROTHROMBIN TIME: 15.8 SECONDS (ref 11.7–14.2)
RBC # BLD AUTO: 2.77 10*6/MM3 (ref 4.14–5.8)
SODIUM BLD-SCNC: 144 MMOL/L (ref 136–145)
WBC NRBC COR # BLD: 7.17 10*3/MM3 (ref 3.4–10.8)

## 2019-08-18 PROCEDURE — 84100 ASSAY OF PHOSPHORUS: CPT | Performed by: INTERNAL MEDICINE

## 2019-08-18 PROCEDURE — 25010000003 CEFAZOLIN IN DEXTROSE 2-4 GM/100ML-% SOLUTION: Performed by: INTERNAL MEDICINE

## 2019-08-18 PROCEDURE — 25010000002 ENOXAPARIN PER 10 MG: Performed by: HOSPITALIST

## 2019-08-18 PROCEDURE — 83735 ASSAY OF MAGNESIUM: CPT | Performed by: INTERNAL MEDICINE

## 2019-08-18 PROCEDURE — 25010000002 ONDANSETRON PER 1 MG: Performed by: HOSPITALIST

## 2019-08-18 PROCEDURE — 25010000002 MAGNESIUM SULFATE IN D5W 1G/100ML (PREMIX) 1-5 GM/100ML-% SOLUTION: Performed by: INTERNAL MEDICINE

## 2019-08-18 PROCEDURE — 25010000002 DIGOXIN PER 500 MCG: Performed by: NURSE PRACTITIONER

## 2019-08-18 PROCEDURE — 85027 COMPLETE CBC AUTOMATED: CPT | Performed by: INTERNAL MEDICINE

## 2019-08-18 PROCEDURE — 85055 RETICULATED PLATELET ASSAY: CPT | Performed by: INTERNAL MEDICINE

## 2019-08-18 PROCEDURE — 83615 LACTATE (LD) (LDH) ENZYME: CPT | Performed by: INTERNAL MEDICINE

## 2019-08-18 PROCEDURE — 80053 COMPREHEN METABOLIC PANEL: CPT | Performed by: INTERNAL MEDICINE

## 2019-08-18 PROCEDURE — 85610 PROTHROMBIN TIME: CPT | Performed by: HOSPITALIST

## 2019-08-18 PROCEDURE — 84132 ASSAY OF SERUM POTASSIUM: CPT | Performed by: INTERNAL MEDICINE

## 2019-08-18 PROCEDURE — 99232 SBSQ HOSP IP/OBS MODERATE 35: CPT | Performed by: INTERNAL MEDICINE

## 2019-08-18 RX ORDER — WARFARIN SODIUM 2 MG/1
2 TABLET ORAL
Status: COMPLETED | OUTPATIENT
Start: 2019-08-18 | End: 2019-08-18

## 2019-08-18 RX ORDER — WARFARIN SODIUM 1 MG/1
1 TABLET ORAL
Status: DISCONTINUED | OUTPATIENT
Start: 2019-08-19 | End: 2019-08-19

## 2019-08-18 RX ORDER — MAGNESIUM SULFATE 1 G/100ML
2 INJECTION INTRAVENOUS ONCE
Status: COMPLETED | OUTPATIENT
Start: 2019-08-18 | End: 2019-08-18

## 2019-08-18 RX ORDER — CARVEDILOL 25 MG/1
25 TABLET ORAL EVERY 12 HOURS SCHEDULED
Status: DISCONTINUED | OUTPATIENT
Start: 2019-08-18 | End: 2019-08-20 | Stop reason: HOSPADM

## 2019-08-18 RX ADMIN — CEFAZOLIN SODIUM 2 G: 2 INJECTION, SOLUTION INTRAVENOUS at 18:01

## 2019-08-18 RX ADMIN — DIPHENOXYLATE HYDROCHLORIDE AND ATROPINE SULFATE 1 TABLET: 2.5; .025 TABLET ORAL at 13:25

## 2019-08-18 RX ADMIN — DIGOXIN 500 MCG: 0.25 INJECTION INTRAMUSCULAR; INTRAVENOUS at 01:33

## 2019-08-18 RX ADMIN — CEFAZOLIN SODIUM 2 G: 2 INJECTION, SOLUTION INTRAVENOUS at 11:06

## 2019-08-18 RX ADMIN — ENOXAPARIN SODIUM 40 MG: 40 INJECTION SUBCUTANEOUS at 11:06

## 2019-08-18 RX ADMIN — SODIUM CHLORIDE, PRESERVATIVE FREE 3 ML: 5 INJECTION INTRAVENOUS at 08:34

## 2019-08-18 RX ADMIN — CARVEDILOL 25 MG: 25 TABLET, FILM COATED ORAL at 14:31

## 2019-08-18 RX ADMIN — DEXTROSE MONOHYDRATE 100 ML/HR: 50 INJECTION, SOLUTION INTRAVENOUS at 06:12

## 2019-08-18 RX ADMIN — METOPROLOL TARTRATE 50 MG: 50 TABLET, FILM COATED ORAL at 06:14

## 2019-08-18 RX ADMIN — MAGNESIUM SULFATE HEPTAHYDRATE 2 G: 1 INJECTION, SOLUTION INTRAVENOUS at 20:54

## 2019-08-18 RX ADMIN — SERTRALINE 50 MG: 50 TABLET, FILM COATED ORAL at 08:05

## 2019-08-18 RX ADMIN — LIDOCAINE HYDROCHLORIDE 5 ML: 20 SOLUTION ORAL; TOPICAL at 11:40

## 2019-08-18 RX ADMIN — LANSOPRAZOLE 30 MG: KIT at 08:05

## 2019-08-18 RX ADMIN — POTASSIUM CHLORIDE 40 MEQ: 750 CAPSULE, EXTENDED RELEASE ORAL at 08:05

## 2019-08-18 RX ADMIN — TERAZOSIN HYDROCHLORIDE 1 MG: 1 CAPSULE ORAL at 20:55

## 2019-08-18 RX ADMIN — ONDANSETRON 4 MG: 2 INJECTION INTRAMUSCULAR; INTRAVENOUS at 09:16

## 2019-08-18 RX ADMIN — SODIUM CHLORIDE, PRESERVATIVE FREE 3 ML: 5 INJECTION INTRAVENOUS at 20:54

## 2019-08-18 RX ADMIN — WARFARIN SODIUM 2 MG: 2 TABLET ORAL at 17:20

## 2019-08-18 RX ADMIN — LIDOCAINE HYDROCHLORIDE 5 ML: 20 SOLUTION ORAL; TOPICAL at 08:05

## 2019-08-18 RX ADMIN — POTASSIUM CHLORIDE 40 MEQ: 750 CAPSULE, EXTENDED RELEASE ORAL at 13:25

## 2019-08-18 RX ADMIN — CEFAZOLIN SODIUM 2 G: 2 INJECTION, SOLUTION INTRAVENOUS at 03:19

## 2019-08-18 RX ADMIN — Medication 5 ML: at 20:55

## 2019-08-18 NOTE — PLAN OF CARE
Problem: Patient Care Overview  Goal: Plan of Care Review  Outcome: Ongoing (interventions implemented as appropriate)   08/18/19 0634   Coping/Psychosocial   Plan of Care Reviewed With patient   OTHER   Outcome Summary EARLY EVENING HR WENT DOWN TO 'S AFTER RECEIVING 4 DOSES OF IV LOPRESSOR. THEN HR BACK UP DURING NIGHT 'S AND THEN IV DIGOXIN GIVEN X 1 WITH HR DOWN TO 68-90'S REMAINDER OF MORNING... AND HAS REMAINED ASYMPTOMATIC THROUGHOUT.   Plan of Care Review   Progress improving       Problem: Fall Risk (Adult)  Goal: Absence of Fall  Outcome: Ongoing (interventions implemented as appropriate)      Problem: Cardiac: Heart Failure (Adult)  Goal: Signs and Symptoms of Listed Potential Problems Will be Absent, Minimized or Managed (Cardiac: Heart Failure)  Outcome: Ongoing (interventions implemented as appropriate)      Problem: Skin Injury Risk (Adult)  Goal: Skin Health and Integrity  Outcome: Ongoing (interventions implemented as appropriate)      Problem: Pain, Acute (Adult)  Goal: Acceptable Pain Control/Comfort Level  Outcome: Ongoing (interventions implemented as appropriate)      Problem: Pain, Chronic (Adult)  Goal: Acceptable Pain/Comfort Level and Functional Ability  Outcome: Ongoing (interventions implemented as appropriate)      Problem: Nutrition, Imbalanced: Inadequate Oral Intake (Adult)  Goal: Prevent Further Weight Loss  Outcome: Ongoing (interventions implemented as appropriate)

## 2019-08-18 NOTE — PROGRESS NOTES
Nephrology    Previous notes read  Stable kidney function; low potassium, on potassium protocol  Low magnesium noted; repletion ordered  Improving hypernatremia following D5W  Will obtain chemistries tomorrow and re-assess then    --Medardo Espinal MD

## 2019-08-18 NOTE — PLAN OF CARE
Problem: Patient Care Overview  Goal: Plan of Care Review  Outcome: Ongoing (interventions implemented as appropriate)   08/18/19 1700   Coping/Psychosocial   Plan of Care Reviewed With patient;spouse   OTHER   Outcome Summary Pauses in cardiac rhythm noted- all shorter than 3 seconds; medication changed from metoprolol to carvedilol; continues in atrial rhythm;

## 2019-08-18 NOTE — PROGRESS NOTES
Pharmacy Consult: Warfarin Dosing/ Monitoring    Cody Eldridge is a 74 y.o. male, estimated creatinine clearance is 67.6 mL/min (by C-G formula based on SCr of 1.06 mg/dL). weighing 79.4 kg (175 lb).      Results from last 7 days   Lab Units 08/18/19  0549 08/17/19  0938 08/17/19  0321 08/16/19  0532 08/15/19  0317 08/14/19  0339 08/13/19  0328 08/12/19  0306   INR  1.29* 1.28*  --   --   --   --   --   --    HEMOGLOBIN g/dL 9.0*  --  9.3* 8.8* 7.1* 8.6* 8.3* 9.3*   HEMATOCRIT % 29.0*  --  29.9* 27.7* 22.7* 28.2* 26.6* 29.5*   PLATELETS 10*3/mm3 174  174  --  146  146 135*  135* 111*  111* 110*  110* 82*  82* 55*  55*     Results from last 7 days   Lab Units 08/18/19  0549 08/17/19  0321 08/16/19  0532   SODIUM mmol/L 144 148* 151*   POTASSIUM mmol/L 3.3* 3.9 3.1*   CHLORIDE mmol/L 108* 113* 113*   CO2 mmol/L 24.7 23.7 26.1   BUN mg/dL 16 24* 30*   CREATININE mg/dL 1.06 1.17 1.07   CALCIUM mg/dL 7.2* 7.8* 7.8*   BILIRUBIN mg/dL 0.6 0.7 0.6   ALK PHOS U/L 102 104 85   ALT (SGPT) U/L 5 <5 6   AST (SGOT) U/L 43* 50* 53*   GLUCOSE mg/dL 104* 110* 79     Anticoagulation history: PTA - Warfarin 1 mg po QOD (Dr Myriam Omer, cardiology)    Hospital Anticoagulation:  Consulting provider: Dr Fredy Patton  Start date: 8/17/2019  Indication: AFIB - full anticoagulation  Target INR: 2-3  Expected duration: TBD   Bridge Therapy: No              and enoxapain 40 mg SQ qday for DVT prophylaxis  Date  8/17 8/18           INR  1.28  1.29           Warfarin dose  1 mg  2 mg             Relevant nutrition status: Regular diet + supplements    Education complete?/ Date: pending    Assessment/Plan:  Dose  2 mg today  Monitor INR  Follow up 8-19    Pharmacy will continue to follow until discharge or discontinuation of warfarin.     Lana White, [facility]  Staff Clinical Pharmacist  8/18/2019

## 2019-08-18 NOTE — PROGRESS NOTES
"Cody Eldridge  1945 74 y.o.  [id number]      Patient Care Team:  Epley, James, APRN as PCP - General (Family Medicine)  Payal Waters MD as Consulting Physician (Pain Medicine)  Lorna Chaidez (Nurse Practitioner)  Kristal Cowart [facility] as Pharmacist  [place], Fritz Snyder MD as Referring Physician (Otolaryngology)  Pablo Heaton MD as Consulting Physician (Hematology and Oncology)  Annie Davila MD as Consulting Physician (Radiation Oncology)  Chetan Heaton MD as Consulting Physician (Urology)    CC: A. Fib, reduced LV systolic function    Interval History: His heart rate is much improved      Objective   Vital Signs  Temp:  [97.8 °F (36.6 °C)-98.2 °F (36.8 °C)] 97.8 °F (36.6 °C)  Heart Rate:  [] 77  Resp:  [16-18] 18  BP: (109-155)/() 145/96    Intake/Output Summary (Last 24 hours) at 8/18/2019 1234  Last data filed at 8/18/2019 0808  Gross per 24 hour   Intake 290 ml   Output 1675 ml   Net -1385 ml     Flowsheet Rows      First Filed Value   Admission Height  180.3 cm (71\") Documented at 08/03/2019 1153   Admission Weight  79.9 kg (176 lb 3 oz) Documented at 08/03/2019 1207          Physical Exam:   General Appearance:    Alert,oriented, in no acute distress   Lungs:     Clear to auscultation,BS are equal    Heart:    Normal S1 and S2, iRRR without murmur, gallop or rub   HEENT:    Sclerae are clear, no JVD or adenopathy   Abdomen:     Normal bowel sounds, soft non-tender, non-distended, no HSM   Extremities:   Moves all extremities well, no edema, no cyanosis, no             Redness, no rash     Medication Review:        ceFAZolin 2 g Intravenous Q8H   enoxaparin 40 mg Subcutaneous Q24H   lansoprazole 30 mg Nasogastric Daily   magic mouthwash 5 mL Swish & Spit Q4H   metoprolol tartrate 50 mg Oral Q8H   potassium chloride 40 mEq Nasogastric Once   sertraline 50 mg Oral Daily   sodium chloride 3 mL Intravenous Q12H   terazosin 1 mg Nasogastric Nightly   warfarin 1 mg " Oral Daily       dextrose 100 mL/hr Last Rate: 100 mL/hr (08/18/19 0612)   Pharmacy to dose warfarin           I reviewed the patient's new clinical results.  I personally viewed and interpreted the patient's EKG/Telemetry data    Assessment/Plan  Active Hospital Problems    Diagnosis  POA   • **MSSA bacteremia [R78.81]  Unknown   • Shock, septic (CMS/HCC) [A41.9, R65.21]  Unknown   • Oral thrush [B37.0]  Unknown   • Acute renal injury (CMS/HCC) [N17.9]  Unknown   • Anemia associated with chemotherapy [D64.81, T45.1X5A]  Unknown   • Chemotherapy-induced thrombocytopenia [D69.59, T45.1X5A]  Unknown   • New onset of congestive heart failure (CMS/HCC) [I50.9]  Yes   • Atrial fibrillation (CMS/HCC) [I48.91]  Yes   • Squamous cell carcinoma of neck [C44.42]  Yes   • COPD (chronic obstructive pulmonary disease) (CMS/HCC) [J44.9]  Yes   • Depression with anxiety [F41.8]  Yes   • CAD (coronary artery disease) [I25.10]  Yes   • Chemotherapy-induced neutropenia (CMS/HCC) [D70.1, T45.1X5A]  Yes   • Vascular catheter infection (CMS/HCC) [T82.7XXA]  Yes      Resolved Hospital Problems   No resolved problems to display.     His rate is improved his LV function is decreased I am to switch him to carvedilol to help control his rate as well as treat his cardiomyopathy continue with his anticoagulation    Yousif Uribe MD  08/18/19  12:34 PM

## 2019-08-18 NOTE — PROGRESS NOTES
RN called. Patient having 2-2.5 sec asymptomatic pauses. He is currently sleep. To continue monitoring as long as pauses remain under 3 seconds and asymptomatic. No changes to medications.      AL

## 2019-08-18 NOTE — PROGRESS NOTES
Name: Cody Eldridge ADMIT: 8/3/2019   : 1945  PCP: Epley, James, APRN    MRN: [id number] LOS: 15 days   AGE/SEX: 74 y.o. male  ROOM: [facility]     Subjective   Subjective   CC: BLE edema  No acute events. Patient has no new complaints. HR is better.  He is taking PO.  No CP/dyspnea/f/c/n/v/d.    Objective   Objective   Vital Signs  Temp:  [97.8 °F (36.6 °C)-98.2 °F (36.8 °C)] 98.2 °F (36.8 °C)  Heart Rate:  [] 84  Resp:  [16-18] 18  BP: (109-155)/() 147/97  SpO2:  [83 %-97 %] 94 %  on  Flow (L/min):  [2] 2;   Device (Oxygen Therapy): room air  Body mass index is 24.41 kg/m².  Physical Exam   Constitutional: No distress.   HENT:   Head: Normocephalic and atraumatic.   Mouth/Throat: Oropharynx is clear and moist.   Eyes: Conjunctivae and EOM are normal. Pupils are equal, round, and reactive to light.   Neck: Normal range of motion. Neck supple.   Cardiovascular: Normal rate and intact distal pulses. An irregularly irregular rhythm present.   Pulmonary/Chest: Effort normal and breath sounds normal.   Abdominal: Soft. Bowel sounds are normal.   Musculoskeletal: He exhibits no edema or tenderness.   Neurological: He is alert.   Skin: Skin is warm and dry. He is not diaphoretic.   Psychiatric: He has a normal mood and affect. His behavior is normal.   Nursing note and vitals reviewed.      Results Review:       I reviewed the patient's new clinical results.  Results from last 7 days   Lab Units 19  0549 19  0321 19  0532 08/15/19  0317   WBC 10*3/mm3 7.17 8.10 7.03 7.13   HEMOGLOBIN g/dL 9.0* 9.3* 8.8* 7.1*   PLATELETS 10*3/mm3 174  174 146  146 135*  135* 111*  111*     Results from last 7 days   Lab Units 19  0549 19  0321 19  0532 08/15/19  0317   SODIUM mmol/L 144 148* 151* 149*   POTASSIUM mmol/L 3.3* 3.9 3.1* 3.5   CHLORIDE mmol/L 108* 113* 113* 113*   CO2 mmol/L 24.7 23.7 26.1 26.4   BUN mg/dL 16 24* 30* 43*   CREATININE mg/dL 1.06 1.17 1.07 1.18    GLUCOSE mg/dL 104* 110* 79 97   Estimated Creatinine Clearance: 67.6 mL/min (by C-G formula based on SCr of 1.06 mg/dL).  Results from last 7 days   Lab Units 08/18/19  0549 08/17/19  0321 08/16/19  0532 08/15/19  0317   ALBUMIN g/dL 2.20* 2.60* 2.40* 2.10*   BILIRUBIN mg/dL 0.6 0.7 0.6 0.7   ALK PHOS U/L 102 104 85 75   AST (SGOT) U/L 43* 50* 53* 44*   ALT (SGPT) U/L 5 <5 6 5     Results from last 7 days   Lab Units 08/18/19  0549 08/17/19  0321 08/16/19  0532 08/15/19  0317  08/13/19  0328   CALCIUM mg/dL 7.2* 7.8* 7.8* 7.4*   < > 7.2*   ALBUMIN g/dL 2.20* 2.60* 2.40* 2.10*   < > 1.90*   MAGNESIUM mg/dL 1.7 1.6  --   --   --  1.8   PHOSPHORUS mg/dL 2.6 2.8 3.4 2.5   < > 1.8*    < > = values in this interval not displayed.       No results found for: HGBA1C, POCGLU      carvedilol 25 mg Oral Q12H   ceFAZolin 2 g Intravenous Q8H   enoxaparin 40 mg Subcutaneous Q24H   lansoprazole 30 mg Nasogastric Daily   magic mouthwash 5 mL Swish & Spit Q4H   potassium chloride 40 mEq Nasogastric Once   sertraline 50 mg Oral Daily   sodium chloride 3 mL Intravenous Q12H   terazosin 1 mg Nasogastric Nightly   [START ON 8/19/2019] warfarin 1 mg Oral Daily   warfarin 2 mg Oral Once       dextrose 100 mL/hr Last Rate: 100 mL/hr (08/18/19 0612)   Pharmacy to dose warfarin     Diet Regular; Thin       Assessment/Plan     Active Hospital Problems    Diagnosis  POA   • **MSSA bacteremia [R78.81]  Unknown   • Shock, septic (CMS/HCC) [A41.9, R65.21]  Unknown   • Oral thrush [B37.0]  Unknown   • Acute renal injury (CMS/HCC) [N17.9]  Unknown   • Anemia associated with chemotherapy [D64.81, T45.1X5A]  Unknown   • Chemotherapy-induced thrombocytopenia [D69.59, T45.1X5A]  Unknown   • New onset of congestive heart failure (CMS/HCC) [I50.9]  Yes   • Atrial fibrillation (CMS/HCC) [I48.91]  Yes   • Squamous cell carcinoma of neck [C44.42]  Yes   • COPD (chronic obstructive pulmonary disease) (CMS/HCC) [J44.9]  Yes   • Depression with anxiety  [F41.8]  Yes   • CAD (coronary artery disease) [I25.10]  Yes   • Chemotherapy-induced neutropenia (CMS/HCC) [D70.1, T45.1X5A]  Yes   • Vascular catheter infection (CMS/HCC) [T82.7XXA]  Yes      Resolved Hospital Problems   No resolved problems to display.   MSSA Bacteremia/Sepsis  - had septic shock shortly after admission requiring temporary care in the ICU and pressor therapy-hemodynamics now stable; appreciate LPC attention to patient  - this was complicated by neutropenia-he has done better with neupogen  - had JERICHO secondary to sepsis which has now resolved  - f/u cultures NGTD  - port removed 8/5/19 with Dr. Castaneda  - continue on cefazolin 2gIV Q8H for 4wk course (stop date 9/3/19 per ID)  - weekly cbc/diff and creatinine faxed to ID clinic at [phone]  - appreciate ID recs-ID arranging follow up in their clinic on 9/3/19  - PICC tomorrow    Atrial Fibrillation with RVR  - doing much better  - continue on coreg  - having <3 second sinus pauses which are asymptomatic-cardiology is monitoring  - on AC with warfarin  - appreciate cardiology recs    Acute Diastolic CHF  - presenting problem with mild volume overload-now resolved  - he has actually been more on the dry side now    Hypernatremia  - better after D5  - appreciate nephrology recs    SCC of the Neck  - 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      Elvis Pan MD  [facility]  08/18/19  3:04 PM

## 2019-08-18 NOTE — PROGRESS NOTES
[facility]     LOS: 14 days     Subjective:    CC:Leg Swelling (bilateral)    DIET:  Diet Order   Procedures   • Diet Regular; Thin     Eating and drinking better  No cp no soa  Tachycardia overnight and started on diltiazem  Objective:    Vital Signs:  Temp:  [97.9 °F (36.6 °C)-98.1 °F (36.7 °C)] 98 °F (36.7 °C)  Heart Rate:  [] 109  Resp:  [18] 18  BP: (109-155)/() 144/80    SpO2:  [89 %-97 %] 90 %  on  Flow (L/min):  [2] 2;   Device (Oxygen Therapy): room air  Body mass index is 24.32 kg/m².    Physical Exam   Constitutional: He appears well-developed and well-nourished.   HENT:   Head: Normocephalic and atraumatic.   Cardiovascular: Exam reveals no friction rub.   No murmur heard.  irregular   Pulmonary/Chest: Effort normal and breath sounds normal.   Abdominal: Soft. Bowel sounds are normal. He exhibits no distension. There is no tenderness.   Neurological: He is alert.   Skin: Skin is warm and dry.       Results Review:    Glucose   Date Value Ref Range Status   08/17/2019 110 (H) 65 - 99 mg/dL Final   08/16/2019 79 65 - 99 mg/dL Final   08/15/2019 97 65 - 99 mg/dL Final     Results from last 7 days   Lab Units 08/17/19  0321   WBC 10*3/mm3 8.10   HEMOGLOBIN g/dL 9.3*   HEMATOCRIT % 29.9*   PLATELETS 10*3/mm3 146  146     Results from last 7 days   Lab Units 08/17/19  0321   SODIUM mmol/L 148*   POTASSIUM mmol/L 3.9   CHLORIDE mmol/L 113*   CO2 mmol/L 23.7   BUN mg/dL 24*   CREATININE mg/dL 1.17   CALCIUM mg/dL 7.8*   BILIRUBIN mg/dL 0.7   ALK PHOS U/L 104   ALT (SGPT) U/L <5   AST (SGOT) U/L 50*   GLUCOSE mg/dL 110*     Results from last 7 days   Lab Units 08/17/19  0938   INR  1.28*     Results from last 7 days   Lab Units 08/17/19  0321   MAGNESIUM mg/dL 1.6         Cultures:       I have reviewed daily medications and changes in CPOE    Scheduled meds    ceFAZolin 2 g Intravenous Q8H   enoxaparin 40 mg Subcutaneous Q24H   lansoprazole 30 mg Nasogastric Daily    magic mouthwash 5 mL Swish & Spit Q4H   magnesium sulfate 2 g Intravenous Once   metoprolol tartrate 50 mg Oral Q8H   potassium chloride 40 mEq Nasogastric Once   sertraline 50 mg Oral Daily   sodium chloride 3 mL Intravenous Q12H   terazosin 1 mg Nasogastric Nightly   warfarin 1 mg Oral Daily         dextrose 100 mL/hr Last Rate: 100 mL/hr (08/17/19 1947)   Pharmacy to dose warfarin       PRN meds  •  acetaminophen  •  ALPRAZolam  •  digoxin  •  diphenoxylate-atropine  •  HYDROcodone-acetaminophen  •  ipratropium  •  Lidocaine Viscous HCl  •  metoprolol tartrate  •  metoprolol tartrate  •  Morphine  •  nitroglycerin  •  ondansetron **OR** ondansetron  •  Pharmacy to dose warfarin  •  potassium chloride **OR** potassium chloride **OR** potassium chloride  •  pramipexole  •  silver sulfadiazine  •  [COMPLETED] Insert peripheral IV **AND** sodium chloride  •  sodium chloride        MSSA bacteremia    New onset of congestive heart failure (CMS/HCC)    Atrial fibrillation (CMS/HCC)    Squamous cell carcinoma of neck    COPD (chronic obstructive pulmonary disease) (CMS/HCC)    Depression with anxiety    CAD (coronary artery disease)    Chemotherapy-induced neutropenia (CMS/HCC)    Vascular catheter infection (CMS/HCC)    Chemotherapy-induced thrombocytopenia    Acute renal injury (CMS/HCC)    Anemia associated with chemotherapy    Oral thrush    Shock, septic (CMS/HCC)        Assessment/Plan:      MSSA bacteremia  -cefazolin  -per ID:Continue cefazolin 2 g IV every 12 hours (if renal function improves can increase the dose to every 8 hours) [cefazolin changed to q8] for 4 weeks with an antibiotic stop date of September 3. Please monitor weekly CBC with differential and creatinine and fax the results to infectious disease clinic at [phone]  ID follow-up arranged for 9/3; Okay to place PICC line at any time for home IV antibiotic     Hypernatremia- d5w given by nephrology  -improving  -needs to increase his oral  intake      New onset of congestive heart failure (CMS/HCC), history of SVT and ablation in 2006 ( [facility]), nonobstructive CA      Atrial fibrillation (CMS/HCC)-sinus, metoprolol, anticoagulation to be restarted when hb stable  -Hr was elevated last night and patient is requiring heparin gtt  -digoxin given last night      Squamous cell carcinoma of neck  -Plan per oncology  -Chemotherapy-induced neutropenia (CMS/HCC)  -Chemotherapy-induced thrombocytopenia  -Anemia associated with chemotherapy- improved, good reticulocyte count      COPD (chronic obstructive pulmonary disease) (CMS/HCC)      Depression with anxiety      Vascular catheter infection (CMS/HCC) with port removal      Acute renal injury (CMS/HCC)-creatinine peaked at 2.98 on 8/8, patient was seen during hospitalization by nephrology      Oral thrush      Shock, septic (CMS/HCC)      Pleural effusion-noted     DVT PPX: lovenox      Fredy Patton MD  08/17/19  8:34 PM

## 2019-08-19 LAB
ALBUMIN SERPL-MCNC: 2.1 G/DL (ref 3.5–5.2)
ALBUMIN/GLOB SERPL: 0.7 G/DL
ALP SERPL-CCNC: 101 U/L (ref 39–117)
ALT SERPL W P-5'-P-CCNC: 6 U/L (ref 1–41)
ANION GAP SERPL CALCULATED.3IONS-SCNC: 9.5 MMOL/L (ref 5–15)
AST SERPL-CCNC: 39 U/L (ref 1–40)
BILIRUB SERPL-MCNC: 0.6 MG/DL (ref 0.2–1.2)
BUN BLD-MCNC: 12 MG/DL (ref 8–23)
BUN/CREAT SERPL: 11.9 (ref 7–25)
CALCIUM SPEC-SCNC: 7.2 MG/DL (ref 8.6–10.5)
CHLORIDE SERPL-SCNC: 105 MMOL/L (ref 98–107)
CO2 SERPL-SCNC: 24.5 MMOL/L (ref 22–29)
CREAT BLD-MCNC: 1.01 MG/DL (ref 0.76–1.27)
DEPRECATED RDW RBC AUTO: 86.9 FL (ref 37–54)
ERYTHROCYTE [DISTWIDTH] IN BLOOD BY AUTOMATED COUNT: 22.2 % (ref 12.3–15.4)
GFR SERPL CREATININE-BSD FRML MDRD: 72 ML/MIN/1.73
GLOBULIN UR ELPH-MCNC: 3 GM/DL
GLUCOSE BLD-MCNC: 90 MG/DL (ref 65–99)
HCT VFR BLD AUTO: 29 % (ref 37.5–51)
HGB BLD-MCNC: 8.7 G/DL (ref 13–17.7)
INR PPP: 1.49 (ref 0.9–1.1)
LDH SERPL-CCNC: 402 U/L (ref 135–225)
MAGNESIUM SERPL-MCNC: 1.9 MG/DL (ref 1.6–2.4)
MCH RBC QN AUTO: 32.8 PG (ref 26.6–33)
MCHC RBC AUTO-ENTMCNC: 30 G/DL (ref 31.5–35.7)
MCV RBC AUTO: 109.4 FL (ref 79–97)
PHOSPHATE SERPL-MCNC: 3 MG/DL (ref 2.5–4.5)
PLATELET # BLD AUTO: 168 10*3/MM3 (ref 140–450)
PLATELET # BLD AUTO: 168 10*3/MM3 (ref 140–450)
PLATELETS.RETICULATED NFR BLD AUTO: 7 % (ref 0.9–6.5)
PMV BLD AUTO: 11.8 FL (ref 6–12)
POTASSIUM BLD-SCNC: 3.9 MMOL/L (ref 3.5–5.2)
PROT SERPL-MCNC: 5.1 G/DL (ref 6–8.5)
PROTHROMBIN TIME: 17.7 SECONDS (ref 11.7–14.2)
RBC # BLD AUTO: 2.65 10*6/MM3 (ref 4.14–5.8)
SODIUM BLD-SCNC: 139 MMOL/L (ref 136–145)
WBC NRBC COR # BLD: 7.19 10*3/MM3 (ref 3.4–10.8)

## 2019-08-19 PROCEDURE — 25010000002 ENOXAPARIN PER 10 MG: Performed by: HOSPITALIST

## 2019-08-19 PROCEDURE — 85055 RETICULATED PLATELET ASSAY: CPT | Performed by: INTERNAL MEDICINE

## 2019-08-19 PROCEDURE — 85027 COMPLETE CBC AUTOMATED: CPT | Performed by: INTERNAL MEDICINE

## 2019-08-19 PROCEDURE — 94799 UNLISTED PULMONARY SVC/PX: CPT

## 2019-08-19 PROCEDURE — 85610 PROTHROMBIN TIME: CPT | Performed by: HOSPITALIST

## 2019-08-19 PROCEDURE — 83615 LACTATE (LD) (LDH) ENZYME: CPT | Performed by: INTERNAL MEDICINE

## 2019-08-19 PROCEDURE — 97110 THERAPEUTIC EXERCISES: CPT

## 2019-08-19 PROCEDURE — 83735 ASSAY OF MAGNESIUM: CPT | Performed by: INTERNAL MEDICINE

## 2019-08-19 PROCEDURE — 25010000003 CEFAZOLIN IN DEXTROSE 2-4 GM/100ML-% SOLUTION: Performed by: INTERNAL MEDICINE

## 2019-08-19 PROCEDURE — 84100 ASSAY OF PHOSPHORUS: CPT | Performed by: INTERNAL MEDICINE

## 2019-08-19 PROCEDURE — 99231 SBSQ HOSP IP/OBS SF/LOW 25: CPT | Performed by: NURSE PRACTITIONER

## 2019-08-19 PROCEDURE — 80053 COMPREHEN METABOLIC PANEL: CPT | Performed by: INTERNAL MEDICINE

## 2019-08-19 RX ORDER — WARFARIN SODIUM 2.5 MG/1
2.5 TABLET ORAL
Status: COMPLETED | OUTPATIENT
Start: 2019-08-19 | End: 2019-08-19

## 2019-08-19 RX ADMIN — Medication 5 ML: at 12:22

## 2019-08-19 RX ADMIN — ENOXAPARIN SODIUM 40 MG: 40 INJECTION SUBCUTANEOUS at 10:41

## 2019-08-19 RX ADMIN — LIDOCAINE HYDROCHLORIDE 5 ML: 20 SOLUTION ORAL; TOPICAL at 16:56

## 2019-08-19 RX ADMIN — CARVEDILOL 25 MG: 25 TABLET, FILM COATED ORAL at 09:11

## 2019-08-19 RX ADMIN — LANSOPRAZOLE 30 MG: KIT at 09:11

## 2019-08-19 RX ADMIN — SODIUM CHLORIDE, PRESERVATIVE FREE 3 ML: 5 INJECTION INTRAVENOUS at 21:06

## 2019-08-19 RX ADMIN — Medication 5 ML: at 21:06

## 2019-08-19 RX ADMIN — WARFARIN SODIUM 2.5 MG: 2.5 TABLET ORAL at 17:00

## 2019-08-19 RX ADMIN — Medication 5 ML: at 05:00

## 2019-08-19 RX ADMIN — CEFAZOLIN SODIUM 2 G: 2 INJECTION, SOLUTION INTRAVENOUS at 21:05

## 2019-08-19 RX ADMIN — SERTRALINE 50 MG: 50 TABLET, FILM COATED ORAL at 09:11

## 2019-08-19 RX ADMIN — CARVEDILOL 25 MG: 25 TABLET, FILM COATED ORAL at 03:46

## 2019-08-19 RX ADMIN — CARVEDILOL 25 MG: 25 TABLET, FILM COATED ORAL at 21:08

## 2019-08-19 RX ADMIN — TERAZOSIN HYDROCHLORIDE 1 MG: 1 CAPSULE ORAL at 21:08

## 2019-08-19 RX ADMIN — CEFAZOLIN SODIUM 2 G: 2 INJECTION, SOLUTION INTRAVENOUS at 03:46

## 2019-08-19 RX ADMIN — SODIUM CHLORIDE, PRESERVATIVE FREE 3 ML: 5 INJECTION INTRAVENOUS at 10:47

## 2019-08-19 RX ADMIN — Medication 5 ML: at 16:56

## 2019-08-19 RX ADMIN — CEFAZOLIN SODIUM 2 G: 2 INJECTION, SOLUTION INTRAVENOUS at 10:41

## 2019-08-19 RX ADMIN — LIDOCAINE HYDROCHLORIDE 5 ML: 20 SOLUTION ORAL; TOPICAL at 12:22

## 2019-08-19 NOTE — PLAN OF CARE
Problem: Patient Care Overview  Goal: Plan of Care Review  Outcome: Ongoing (interventions implemented as appropriate)   08/19/19 0613   Coping/Psychosocial   Plan of Care Reviewed With patient;spouse   OTHER   Outcome Summary pt amb well w/rollator w/o LOB, but family concerned about doing his IV at home; fam wants SNU; on 3L of O2

## 2019-08-19 NOTE — PROGRESS NOTES
Patient status post removal of infected Mediport from right chest.  Currently the wound is being packed with normal saline.    Is concerned about wound healing.    The wound is clean with granulation in the base.  There is a very thin amount of exudate but no purulence.  No erythema or cellulitis.  Periwound is doing satisfactorily.  Continue the same dressing changes.

## 2019-08-19 NOTE — PROGRESS NOTES
NEPHROLOGY PROGRESS NOTE    PATIENT IDENTIFICATION:   Name:  Cody Eldridge      MRN:  [id number]     74 y.o.  male             Reason for visit: JERICHO    SUBJECTIVE:   States that he is eating better today, though wife rolls her eyes; he denies shortness of breath on room air    OBJECTIVE:  Vitals:    08/19/19 0429 08/19/19 0729 08/19/19 0813 08/19/19 1310   BP:  (!) 120/108  122/81   BP Location:  Left arm  Left arm   Patient Position:  Lying  Sitting   Pulse:  73 70 98   Resp:  18  18   Temp:  97.6 °F (36.4 °C)  98 °F (36.7 °C)   TempSrc:  Oral  Oral   SpO2:  95% 95% 98%   Weight: 79.4 kg (175 lb)      Height:               Body mass index is 24.41 kg/m².    Intake/Output Summary (Last 24 hours) at 8/19/2019 1859  Last data filed at 8/19/2019 1715  Gross per 24 hour   Intake 880 ml   Output 1000 ml   Net -120 ml     Wt Readings from Last 1 Encounters:   08/19/19 0429 79.4 kg (175 lb)   08/18/19 0428 79.4 kg (175 lb)   08/17/19 0525 79.1 kg (174 lb 6.4 oz)   08/16/19 0524 79.4 kg (175 lb 1.6 oz)   08/15/19 0649 80.7 kg (178 lb)   08/14/19 0521 81.1 kg (178 lb 11.2 oz)   08/13/19 0355 80.2 kg (176 lb 11.2 oz)   08/12/19 0357 79.9 kg (176 lb 1.6 oz)   08/11/19 0330 80.2 kg (176 lb 12.8 oz)   08/10/19 0524 80 kg (176 lb 6.4 oz)   08/08/19 0600 78.4 kg (172 lb 13.5 oz)   08/08/19 0210 78.4 kg (172 lb 13.5 oz)   08/07/19 0300 77.6 kg (171 lb 1.2 oz)   08/07/19 0146 76 kg (167 lb 8.8 oz)   08/06/19 1504 72.6 kg (160 lb)   08/06/19 0526 72.8 kg (160 lb 7.9 oz)   08/05/19 0600 70.5 kg (155 lb 6.8 oz)   08/05/19 0448 70.9 kg (156 lb 6.4 oz)   08/04/19 0610 70.6 kg (155 lb 11.2 oz)   08/03/19 1619 76.6 kg (168 lb 12.8 oz)   08/03/19 1207 79.9 kg (176 lb 3 oz)     Wt Readings from Last 3 Encounters:   08/19/19 79.4 kg (175 lb)   08/02/19 78.6 kg (173 lb 3.2 oz)   07/31/19 79.2 kg (174 lb 9.6 oz)         Exam:  Sitting up in bed. Oriented; looks older than stated age. Fatigued.   Chronically ill-appearing; flat  affect  HEENT Dry oral mucosa.   No eye discharge; no scleral icterus  Neck No JVD  Lungs No rales or rhonchi;  very poor air movement lower left lung; not labored on O2 per nasal cannula  Heart Irregularly irregular, tachycardic, no rub  Abd Soft,+ bs, nontender  Dressing by the right clavicle is C/D/I  Ext Trace-+1 edema both legs  Skin pale.          Scheduled meds:      carvedilol 25 mg Oral Q12H   ceFAZolin 2 g Intravenous Q8H   enoxaparin 40 mg Subcutaneous Q24H   lansoprazole 30 mg Nasogastric Daily   magic mouthwash 5 mL Swish & Spit Q4H   potassium chloride 40 mEq Nasogastric Once   sertraline 50 mg Oral Daily   sodium chloride 3 mL Intravenous Q12H   terazosin 1 mg Nasogastric Nightly     IV meds:                          Pharmacy to dose warfarin        Data Review:    Results from last 7 days   Lab Units 08/19/19  0432 08/18/19  1715 08/18/19  0549 08/17/19  0321   SODIUM mmol/L 139  --  144 148*   POTASSIUM mmol/L 3.9 4.1 3.3* 3.9   CHLORIDE mmol/L 105  --  108* 113*   CO2 mmol/L 24.5  --  24.7 23.7   BUN mg/dL 12  --  16 24*   CREATININE mg/dL 1.01  --  1.06 1.17   CALCIUM mg/dL 7.2*  --  7.2* 7.8*   BILIRUBIN mg/dL 0.6  --  0.6 0.7   ALK PHOS U/L 101  --  102 104   ALT (SGPT) U/L 6  --  5 <5   AST (SGOT) U/L 39  --  43* 50*   GLUCOSE mg/dL 90  --  104* 110*     Estimated Creatinine Clearance: 71 mL/min (by C-G formula based on SCr of 1.01 mg/dL).      Results from last 7 days   Lab Units 08/19/19  0432 08/18/19  0549 08/17/19  0321   MAGNESIUM mg/dL 1.9 1.7 1.6   PHOSPHORUS mg/dL 3.0 2.6 2.8       Results from last 7 days   Lab Units 08/19/19  0432 08/18/19  0549 08/17/19  0321 08/16/19  0532 08/15/19  0317   WBC 10*3/mm3 7.19 7.17 8.10 7.03 7.13   HEMOGLOBIN g/dL 8.7* 9.0* 9.3* 8.8* 7.1*   PLATELETS 10*3/mm3 168  168 174  174 146  146 135*  135* 111*  111*       Results from last 7 days   Lab Units 08/19/19  0432 08/18/19  0549 08/17/19  0938   INR  1.49* 1.29* 1.28*             ASSESSMENT:   1.   JERICHO, non-oliguric, creatinine stable.  Hypernatremia due to insufficient water intake, resolved  2.  Head and neck cancer on radiation and chemotherapy.  3.  Rapid atrial fibrillation.   4.  Neutropenic fever with sepsis syndrome and MSSA bacteremia: No fever.  BP stable.   5.  Coagulopathy resolved.   6.  Anemia and thrombocytopenia. Platelets stable.   7.  Chronic diastolic CHF:  Compensated.        PLAN:  1.  Encouraged him to eat  2.  Control heart rate  3.  Will sign off, but please call if any questions        Medardo Espinal MD  8/19/2019    6:59 PM

## 2019-08-19 NOTE — PROGRESS NOTES
LOS: 16 days   Patient Care Team:  Epley, James, APRN as PCP - General (Family Medicine)  Payal Waters MD as Consulting Physician (Pain Medicine)  Lorna Chaidez (Nurse Practitioner)  Kristal Cowart RPH as Pharmacist  Wilsey, Fritz Snyder MD as Referring Physician (Otolaryngology)  Pablo Heaton MD as Consulting Physician (Hematology and Oncology)  Annie Davila MD as Consulting Physician (Radiation Oncology)  Chetan Heaton MD as Consulting Physician (Urology)      Chief Complaint:  Following for atrial fibrillation with RVR       Interval History:   HR well-controlled this AM.  Remains in atrial fibrillation with some brief 2.0-2.7 second pauses.  Floor to notify cardiology for any pauses >/= 3.0 seconds.  Patient denies CP or SOA.           Objective   Vital Signs  Temp:  [97.1 °F (36.2 °C)-98.2 °F (36.8 °C)] 97.6 °F (36.4 °C)  Heart Rate:  [] 73  Resp:  [18] 18  BP: (120-157)/() 120/108    Intake/Output Summary (Last 24 hours) at 8/19/2019 0800  Last data filed at 8/19/2019 0729  Gross per 24 hour   Intake 120 ml   Output 625 ml   Net -505 ml           Physical Exam   Constitutional: He is oriented to person, place, and time. He appears well-developed and well-nourished. No distress.   Neck: No JVD present.   Cardiovascular: Normal rate, normal heart sounds and intact distal pulses. An irregularly irregular rhythm present.   Pulmonary/Chest: Effort normal and breath sounds normal. No respiratory distress.   Abdominal: Soft. Bowel sounds are normal. There is no guarding.   Musculoskeletal: He exhibits no edema.   Neurological: He is alert and oriented to person, place, and time.   Skin: Skin is warm and dry. No erythema.   Psychiatric: He has a normal mood and affect.           Results Review:      Results from last 7 days   Lab Units 08/19/19  0432 08/18/19  1715 08/18/19  0549 08/17/19  0321   SODIUM mmol/L 139  --  144 148*   POTASSIUM mmol/L 3.9 4.1 3.3* 3.9    CHLORIDE mmol/L 105  --  108* 113*   CO2 mmol/L 24.5  --  24.7 23.7   BUN mg/dL 12  --  16 24*   CREATININE mg/dL 1.01  --  1.06 1.17   GLUCOSE mg/dL 90  --  104* 110*   CALCIUM mg/dL 7.2*  --  7.2* 7.8*         Results from last 7 days   Lab Units 08/19/19  0432 08/18/19  0549 08/17/19  0321   WBC 10*3/mm3 7.19 7.17 8.10   HEMOGLOBIN g/dL 8.7* 9.0* 9.3*   HEMATOCRIT % 29.0* 29.0* 29.9*   PLATELETS 10*3/mm3 168  168 174  174 146  146     Results from last 7 days   Lab Units 08/19/19  0432 08/18/19  0549 08/17/19  0938   INR  1.49* 1.29* 1.28*         Results from last 7 days   Lab Units 08/19/19  0432   MAGNESIUM mg/dL 1.9           I reviewed the patient's new clinical results.  I personally viewed telemetry data.            Medication Review:     carvedilol 25 mg Oral Q12H   ceFAZolin 2 g Intravenous Q8H   enoxaparin 40 mg Subcutaneous Q24H   lansoprazole 30 mg Nasogastric Daily   magic mouthwash 5 mL Swish & Spit Q4H   potassium chloride 40 mEq Nasogastric Once   sertraline 50 mg Oral Daily   sodium chloride 3 mL Intravenous Q12H   terazosin 1 mg Nasogastric Nightly   warfarin 1 mg Oral Daily         Pharmacy to dose warfarin                Assessment/Plan     1.  Atrial fibrillation with RVR: Heart rate well controlled this morning.  Patient remains on carvedilol.  Remains in atrial fibrillation.  Remains on warfarin.  Continue anticoagulation.      2.  Nonobstructive coronary artery disease    3.  COPD    4.  Systemic hypertension    5.  MSSA sepsis    6.  Hypokalemia        -Heart rate well controlled on carvedilol.  -Will sign off for now.  -Please notify cardiology for pauses of greater than or equal to 3 seconds in duration or feel free to contact with any questions or concerns or reconsult at any time.          Thanks,    Angelique Espinal, JAMILAH, APRN  08/19/19  8:00 AM        The above plan of treatment was reviewed with Dr. Negra MD.

## 2019-08-19 NOTE — PLAN OF CARE
Problem: Patient Care Overview  Goal: Plan of Care Review  Outcome: Ongoing (interventions implemented as appropriate)   08/19/19 7713   Coping/Psychosocial   Plan of Care Reviewed With patient   OTHER   Outcome Summary AFib with pauses 1-2 seconds noted, rate in 60's - 90's, Safety maintained.    Plan of Care Review   Progress no change     Goal: Individualization and Mutuality  Outcome: Ongoing (interventions implemented as appropriate)    Goal: Discharge Needs Assessment  Outcome: Ongoing (interventions implemented as appropriate)      Problem: Fall Risk (Adult)  Goal: Absence of Fall  Outcome: Ongoing (interventions implemented as appropriate)      Problem: Cardiac: Heart Failure (Adult)  Goal: Signs and Symptoms of Listed Potential Problems Will be Absent, Minimized or Managed (Cardiac: Heart Failure)  Outcome: Ongoing (interventions implemented as appropriate)      Problem: Skin Injury Risk (Adult)  Goal: Skin Health and Integrity  Outcome: Ongoing (interventions implemented as appropriate)      Problem: Pain, Acute (Adult)  Goal: Acceptable Pain Control/Comfort Level  Outcome: Ongoing (interventions implemented as appropriate)      Problem: Pain, Chronic (Adult)  Goal: Acceptable Pain/Comfort Level and Functional Ability  Outcome: Ongoing (interventions implemented as appropriate)      Problem: Nutrition, Imbalanced: Inadequate Oral Intake (Adult)  Goal: Identify Related Risk Factors and Signs and Symptoms  Outcome: Outcome(s) achieved Date Met: 08/19/19    Goal: Improved Oral Intake  Outcome: Ongoing (interventions implemented as appropriate)    Goal: Prevent Further Weight Loss  Outcome: Ongoing (interventions implemented as appropriate)

## 2019-08-19 NOTE — PROGRESS NOTES
Pharmacy Consult: Warfarin Dosing/ Monitoring    Cody Eldridge is a 74 y.o. male, estimated creatinine clearance is 71 mL/min (by C-G formula based on SCr of 1.01 mg/dL). weighing 79.4 kg (175 lb).      Results from last 7 days   Lab Units 08/19/19  0432 08/18/19  0549 08/17/19  0938 08/17/19  0321 08/16/19  0532 08/15/19  0317 08/14/19  0339 08/13/19  0328   INR  1.49* 1.29* 1.28*  --   --   --   --   --    HEMOGLOBIN g/dL 8.7* 9.0*  --  9.3* 8.8* 7.1* 8.6* 8.3*   HEMATOCRIT % 29.0* 29.0*  --  29.9* 27.7* 22.7* 28.2* 26.6*   PLATELETS 10*3/mm3 168  168 174  174  --  146  146 135*  135* 111*  111* 110*  110* 82*  82*     Results from last 7 days   Lab Units 08/19/19  0432 08/18/19  1715 08/18/19  0549 08/17/19  0321   SODIUM mmol/L 139  --  144 148*   POTASSIUM mmol/L 3.9 4.1 3.3* 3.9   CHLORIDE mmol/L 105  --  108* 113*   CO2 mmol/L 24.5  --  24.7 23.7   BUN mg/dL 12  --  16 24*   CREATININE mg/dL 1.01  --  1.06 1.17   CALCIUM mg/dL 7.2*  --  7.2* 7.8*   BILIRUBIN mg/dL 0.6  --  0.6 0.7   ALK PHOS U/L 101  --  102 104   ALT (SGPT) U/L 6  --  5 <5   AST (SGOT) U/L 39  --  43* 50*   GLUCOSE mg/dL 90  --  104* 110*     Anticoagulation history: PTA - Warfarin 1 mg po QOD (Dr Myriam Omer, cardiology)    [facility] Anticoagulation:  Consulting provider: Dr Fredy Patton  Start date: 8/17/2019  Indication: AFIB - full anticoagulation  Target INR: 2-3  Expected duration: TBD   Bridge Therapy: No              and enoxapain 40 mg SQ qday for DVT prophylaxis  Date  8/17 8/18 8/19          INR  1.28  1.29 1.49          Warfarin dose  1 mg  2 mg             Relevant nutrition status: Regular diet + supplements    Education complete?/ Date: pending    Assessment/Plan:  Dose  2.5 mg today  Monitor INR  Follow up 8-20    Pharmacy will continue to follow until discharge or discontinuation of warfarin.     Kelvin Colby [facility]  Staff Clinical Pharmacist  8/19/2019

## 2019-08-19 NOTE — PROGRESS NOTES
[place] [facility]     LOS: 16 days     Subjective:    CC:Leg Swelling (bilateral)    DIET:  Diet Order   Procedures   • Diet Regular; Thin     Eating and drinking better  No cp no soa  No nausea vomiting diarrhea, overall patient is doing much better.  Patient is very tired of being in the hospital for 16 days    Objective:    Vital Signs:  Temp:  [97.1 °F (36.2 °C)-98 °F (36.7 °C)] 98 °F (36.7 °C)  Heart Rate:  [] 98  Resp:  [18] 18  BP: (120-157)/() 122/81    SpO2:  [93 %-98 %] 98 %  on  Flow (L/min):  [2] 2;   Device (Oxygen Therapy): nasal cannula  Body mass index is 24.41 kg/m².    Physical Exam   Constitutional: He appears well-developed and well-nourished.   HENT:   Head: Normocephalic and atraumatic.   Cardiovascular: Exam reveals no friction rub.   No murmur heard.  irregular   Pulmonary/Chest: Effort normal and breath sounds normal.   Abdominal: Soft. Bowel sounds are normal. He exhibits no distension. There is no tenderness.   Neurological: He is alert.   Skin: Skin is warm and dry.       Results Review:    Glucose   Date Value Ref Range Status   08/19/2019 90 65 - 99 mg/dL Final   08/18/2019 104 (H) 65 - 99 mg/dL Final   08/17/2019 110 (H) 65 - 99 mg/dL Final     Results from last 7 days   Lab Units 08/19/19  0432   WBC 10*3/mm3 7.19   HEMOGLOBIN g/dL 8.7*   HEMATOCRIT % 29.0*   PLATELETS 10*3/mm3 168  168     Results from last 7 days   Lab Units 08/19/19  0432   SODIUM mmol/L 139   POTASSIUM mmol/L 3.9   CHLORIDE mmol/L 105   CO2 mmol/L 24.5   BUN mg/dL 12   CREATININE mg/dL 1.01   CALCIUM mg/dL 7.2*   BILIRUBIN mg/dL 0.6   ALK PHOS U/L 101   ALT (SGPT) U/L 6   AST (SGOT) U/L 39   GLUCOSE mg/dL 90     Results from last 7 days   Lab Units 08/19/19  0432   INR  1.49*     Results from last 7 days   Lab Units 08/19/19  0432   MAGNESIUM mg/dL 1.9         Cultures:       I have reviewed daily medications and changes in CPOE    Scheduled meds    carvedilol 25 mg Oral Q12H    ceFAZolin 2 g Intravenous Q8H   enoxaparin 40 mg Subcutaneous Q24H   lansoprazole 30 mg Nasogastric Daily   magic mouthwash 5 mL Swish & Spit Q4H   potassium chloride 40 mEq Nasogastric Once   sertraline 50 mg Oral Daily   sodium chloride 3 mL Intravenous Q12H   terazosin 1 mg Nasogastric Nightly         Pharmacy to dose warfarin      PRN meds  •  acetaminophen  •  ALPRAZolam  •  diphenoxylate-atropine  •  HYDROcodone-acetaminophen  •  ipratropium  •  Lidocaine Viscous HCl  •  metoprolol tartrate  •  metoprolol tartrate  •  Morphine  •  nitroglycerin  •  ondansetron **OR** ondansetron  •  Pharmacy to dose warfarin  •  potassium chloride **OR** potassium chloride **OR** potassium chloride  •  pramipexole  •  silver sulfadiazine  •  [COMPLETED] Insert peripheral IV **AND** sodium chloride  •  sodium chloride        MSSA bacteremia    New onset of congestive heart failure (CMS/HCC)    Atrial fibrillation (CMS/HCC)    Squamous cell carcinoma of neck    COPD (chronic obstructive pulmonary disease) (CMS/HCC)    Depression with anxiety    CAD (coronary artery disease)    Chemotherapy-induced neutropenia (CMS/HCC)    Vascular catheter infection (CMS/HCC)    Chemotherapy-induced thrombocytopenia    Acute renal injury (CMS/HCC)    Anemia associated with chemotherapy    Oral thrush    Shock, septic (CMS/HCC)    Hypokalemia        Assessment/Plan:      MSSA bacteremia  -Patient was in septic shock after admission to the hospital and required ICU stay with pressors  -Neutropenia  -Acute renal failure  -Port removal by Dr. Castaneda 8/5/2019  -Ceftezole and until 9/3  -Weekly CBCs and creatinines to be faxed to the ID clinic  -PICC line when near discharge    Hypernatremia-much improved with better oral intake  -Nephrology has signed off      New onset of congestive heart failure (CMS/HCC), history of SVT and ablation in 2006 ( [employer]), nonobstructive CA      Atrial fibrillation  (CMS/HCC)-anticoagulation has been started  -INR 1.49  -Warfarin 2.5      Squamous cell carcinoma of neck  -Plan per oncology  -Chemotherapy-induced neutropenia (CMS/HCC)  -Chemotherapy-induced thrombocytopenia  -Anemia associated with chemotherapy- improved, good reticulocyte count      COPD (chronic obstructive pulmonary disease) (CMS/HCC)      Depression with anxiety      Vascular catheter infection (CMS/HCC) with port removal      Acute renal injury (CMS/HCC)-creatinine peaked at 2.98 on 8/8, patient was seen during hospitalization by nephrology      Oral thrush      Shock, septic (CMS/HCC)      Pleural effusion-noted     DVT PPX: lovenox    >35 minutes spent, > 1/2 time spent counseling and coordination of care on bacteremia    Fredy Patton MD  08/19/19  5:37 PM

## 2019-08-19 NOTE — THERAPY TREATMENT NOTE
Acute Care - Physical Therapy Treatment Note  [facility]     Patient Name: Cody Eldridge  : 1945  MRN: [id number]  Today's Date: 2019             Admit Date: 8/3/2019    Visit Dx:    ICD-10-CM ICD-9-CM   1. New onset of congestive heart failure (CMS/HCC) I50.9 428.0   2. Infected venous access port T80.219A 999.31     Patient Active Problem List   Diagnosis   • Atopic rhinitis   • Arthritis of hand   • Coxitis   • Benign colonic polyp   • Neck pain   • Chronic obstructive pulmonary disease (CMS/HCC)   • Mixed anxiety depressive disorder   • Degeneration of intervertebral disc of lumbosacral region   • Elevated prostate specific antigen (PSA)   • Hyperlipidemia   • Hypertension   • Insomnia   • Lumbar radiculopathy   • Osteoarthritis   • Vitamin D deficiency   • Abdominal pain   • Acute URI   • Myalgia   • Cramp of both lower extremities   • Gingivitis   • Atrial fibrillation (CMS/HCC)   • Non-rheumatic tricuspid valve insufficiency   • SHAR (obstructive sleep apnea)   • Precordial pain   • IRAHETA (dyspnea on exertion)   • Coronary artery disease of native artery of native heart with stable angina pectoris (CMS/HCC)   • Shortness of breath   • 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)   • Current use of long term anticoagulation   • Syncope   • Dehydration   • History of cancer   • History of atrial fibrillation   • History of CHF (congestive heart failure)   • Hypotension   • Fitting and adjustment of vascular catheter   • New onset of congestive heart failure (CMS/HCC)   • Atrial fibrillation (CMS/HCC)   • Squamous cell carcinoma of neck   • COPD (chronic obstructive pulmonary disease) (CMS/HCC)   • Depression with anxiety   • CAD (coronary artery disease)   • Chemotherapy-induced neutropenia (CMS/HCC)   • Vascular catheter infection (CMS/HCC)   • Chemotherapy-induced thrombocytopenia   • Acute renal injury (CMS/HCC)   • Anemia associated with chemotherapy   • Oral thrush   • MSSA  bacteremia   • Shock, septic (CMS/HCC)   • Hypokalemia       Therapy Treatment    Rehabilitation Treatment Summary     Row Name 08/19/19 1321             Treatment Time/Intention    Subjective Information  complains of;fatigue  -      Care Plan Review  patient/other agree to care plan  -      Care Plan Review, Other Participant(s)  spouse  -      Existing Precautions/Restrictions  fall;oxygen therapy device and L/min 3L  -      Recorded by [] Myriam Henley, [facility] 08/19/19 1347      Row Name 08/19/19 1321             Bed Mobility Assessment/Treatment    Comment (Bed Mobility)  in chair  -      Recorded by [] Myriam Henley [facility] 08/19/19 1347      Row Name 08/19/19 1321             Sit-Stand Transfer    Sit-Stand [place] (Transfers)  contact guard  -      Assistive Device (Sit-Stand Transfers)  walker, front-wheeled  -      Recorded by [] Myriam Henley [facility] 08/19/19 1347      Row Name 08/19/19 1321             Stand-Sit Transfer    Stand-Sit [place] (Transfers)  supervision;verbal cues  -      Assistive Device (Stand-Sit Transfers)  walker, front-wheeled  -      Recorded by [] Myriam Henley, [facility] 08/19/19 1347      Row Name 08/19/19 1321             Gait/Stairs Assessment/Training    Dixon Level (Gait)  contact guard  -      Assistive Device (Gait)  walker, 4-wheeled adjusted to his height  -      Distance in Feet (Gait)  200  -JM      Deviations/Abnormal Patterns (Gait)  stride length decreased  -      Recorded by [] Myriam Henley, [facility] 08/19/19 1347      Row Name 08/19/19 1321             Positioning and Restraints    Pre-Treatment Position  sitting in chair/recliner  -      In Chair  reclined;call light within reach;encouraged to call for assist;with family/caregiver no alarm upon entry  -      Recorded by [] Myriam Henley, [facility] 08/19/19 1347      Row Name 08/19/19 1321             Pain Scale: Numbers Pre/Post-Treatment    Pain Scale: Numbers,  Pretreatment  0/10 - no pain  -      Pain Scale: Numbers, Post-Treatment  0/10 - no pain  -      Recorded by [JM] Myriam Henley, JASON 08/19/19 1347      Row Name                Wound 08/05/19 1451 Right chest Other (comment)    Wound - Properties Group Date first assessed: 08/05/19 [KB] Time first assessed: 1451 [KB] Side: Right [KB] Location: chest [KB] Primary Wound Type: Other [SS], Previous port  Type: incision [KB] Recorded by:  [KB] Coco Love RN 08/05/19 1451 [SS] Jaki Hennessy RN 08/07/19 1657      User Key  (r) = Recorded By, (t) = Taken By, (c) = Cosigned By    Initials Name Effective Dates Discipline    KB Coco Love RN 06/16/16 -  Nurse    Myriam Isaacs PTA 03/07/18 -  PT    SS Jaki Hennessy RN 06/30/16 -  Nurse          Wound 08/05/19 1451 Right chest Other (comment) (Active)   Dressing Appearance dry;intact 8/19/2019  9:15 AM   Closure RUBEN 8/18/2019  9:13 PM   Base other (see comments) 8/19/2019  9:15 AM   Periwound intact 8/18/2019  6:35 PM   Periwound Temperature warm 8/18/2019  6:35 PM   Periwound Skin Turgor soft 8/18/2019  6:35 PM   Edges rolled/closed 8/19/2019  9:15 AM   Drainage Characteristics/Odor yellow;creamy 8/18/2019  6:35 PM   Drainage Amount none 8/19/2019  9:15 AM   Care, Wound sterile normal saline 8/19/2019  9:15 AM   Dressing Care, Wound dressing changed;dressing applied 8/19/2019  9:15 AM           Physical Therapy Education     Title: PT OT SLP Therapies (Done)     Topic: Physical Therapy (Done)     Point: Mobility training (Done)     Learning Progress Summary           Patient Eager, E,TB,D, VU by DIANA at 8/19/2019  1:49 PM    Acceptance, E,D, VU,NR by MS at 8/16/2019 10:42 AM    Acceptance, E,D, VU,NR by MS at 8/14/2019  2:20 PM    Acceptance, E,D, VU,NR by MS at 8/13/2019 11:29 AM    AcceptanceLIBIA D, VU, NR by MS at 8/12/2019  4:30 PM    AcceptanceLIBIA NR by EM at 8/9/2019  4:20 PM   Family LIBIA Myrick TB, D, VU by JM at 8/19/2019  1:49 PM                   Point:  Home exercise program (Done)     Learning Progress Summary           Patient Eager, E,TB,D, VU by  at 8/19/2019  1:49 PM    Acceptance, E,D, VU,NR by MS at 8/16/2019 10:42 AM    Acceptance, E,D, VU,NR by MS at 8/14/2019  2:20 PM    Acceptance, E,D, VU,NR by MS at 8/13/2019 11:29 AM    Acceptance, E,D, VU,NR by MS at 8/12/2019  4:30 PM   Family Eager, E,TB,D, VU by JM at 8/19/2019  1:49 PM                   Point: Body mechanics (Done)     Learning Progress Summary           Patient Eager, E,TB,D, VU by  at 8/19/2019  1:49 PM    Acceptance, E,D, VU,NR by MS at 8/16/2019 10:42 AM    Acceptance, E,D, VU,NR by MS at 8/14/2019  2:20 PM    Acceptance, E,D, VU,NR by MS at 8/13/2019 11:29 AM    Acceptance, E,D, VU,NR by MS at 8/12/2019  4:30 PM   Family Eager, E,TB,D, VU by JM at 8/19/2019  1:49 PM                   Point: Precautions (Done)     Learning Progress Summary           Patient Eager, E,TB,D, VU by  at 8/19/2019  1:49 PM    Acceptance, E,D, VU,NR by MS at 8/16/2019 10:42 AM    Acceptance, E,D, VU,NR by MS at 8/14/2019  2:20 PM    Acceptance, E,D, VU,NR by MS at 8/13/2019 11:29 AM    Acceptance, E,D, VU,NR by MS at 8/12/2019  4:30 PM   Family Eager, E,TB,D, VU by  at 8/19/2019  1:49 PM                               User Key     Initials Effective Dates Name Provider Type Discipline    EM 04/03/18 -  Yessi Jackson, PT Physical Therapist PT     03/07/18 -  Myriam Henley PTA Physical Therapy Assistant PT    MS 04/03/18 -  Elvis Kessler, PT Physical Therapist PT                PT Recommendation and Plan     Plan of Care Reviewed With: patient, spouse  Outcome Summary: pt amb well w/rollator w/o LOB, but family concerned about doing his IV at home; fam wants SNU; on 3L of O2  Outcome Measures     Row Name 08/19/19 1300             How much help from another person do you currently need...    Turning from your back to your side while in flat bed without using bedrails?  4  -JM      Moving from  lying on back to sitting on the side of a flat bed without bedrails?  3  -JM      Moving to and from a bed to a chair (including a wheelchair)?  3  -JM      Standing up from a chair using your arms (e.g., wheelchair, bedside chair)?  3  -JM      Climbing 3-5 steps with a railing?  2  -JM      To walk in hospital room?  3  -JM      AM-PAC 6 Clicks Score (PT)  18  -        User Key  (r) = Recorded By, (t) = Taken By, (c) = Cosigned By    Initials Name Provider Type    Myriam Isaacs PTA Physical Therapy Assistant         Time Calculation:   PT Charges     Row Name 08/19/19 1349             Time Calculation    Start Time  1320  -      Stop Time  1340  -      Time Calculation (min)  20 min  -      PT Received On  08/19/19  -IDANA      PT - Next Appointment  08/20/19  -DIANA        User Key  (r) = Recorded By, (t) = Taken By, (c) = Cosigned By    Initials Name Provider Type    Myriam Isaacs PTA Physical Therapy Assistant        Therapy Charges for Today     Code Description Service Date Service Provider Modifiers Qty    [id number] HC PT THER PROC EA 15 MIN 8/19/2019 Myriam Henley PTA GP 1          PT G-Codes  Outcome Measure Options: AM-PAC 6 Clicks Basic Mobility (PT)  AM-PAC 6 Clicks Score (PT): 18    Myriam Henley PTA  8/19/2019

## 2019-08-20 ENCOUNTER — APPOINTMENT (OUTPATIENT)
Dept: GENERAL RADIOLOGY | Facility: HOSPITAL | Age: 74
End: 2019-08-20

## 2019-08-20 ENCOUNTER — TELEPHONE (OUTPATIENT)
Dept: FAMILY MEDICINE CLINIC | Facility: CLINIC | Age: 74
End: 2019-08-20

## 2019-08-20 VITALS
HEIGHT: 71 IN | WEIGHT: 174.2 LBS | OXYGEN SATURATION: 94 % | RESPIRATION RATE: 18 BRPM | BODY MASS INDEX: 24.39 KG/M2 | HEART RATE: 72 BPM | SYSTOLIC BLOOD PRESSURE: 173 MMHG | DIASTOLIC BLOOD PRESSURE: 97 MMHG | TEMPERATURE: 98.2 F

## 2019-08-20 LAB
ALBUMIN SERPL-MCNC: 2.4 G/DL (ref 3.5–5.2)
ALBUMIN/GLOB SERPL: 0.9 G/DL
ALP SERPL-CCNC: 102 U/L (ref 39–117)
ALT SERPL W P-5'-P-CCNC: <5 U/L (ref 1–41)
ANION GAP SERPL CALCULATED.3IONS-SCNC: 8.9 MMOL/L (ref 5–15)
AST SERPL-CCNC: 34 U/L (ref 1–40)
BILIRUB SERPL-MCNC: 0.6 MG/DL (ref 0.2–1.2)
BUN BLD-MCNC: 12 MG/DL (ref 8–23)
BUN/CREAT SERPL: 12.5 (ref 7–25)
CALCIUM SPEC-SCNC: 7.6 MG/DL (ref 8.6–10.5)
CHLORIDE SERPL-SCNC: 103 MMOL/L (ref 98–107)
CO2 SERPL-SCNC: 27.1 MMOL/L (ref 22–29)
CREAT BLD-MCNC: 0.96 MG/DL (ref 0.76–1.27)
DEPRECATED RDW RBC AUTO: 82.9 FL (ref 37–54)
ERYTHROCYTE [DISTWIDTH] IN BLOOD BY AUTOMATED COUNT: 21.9 % (ref 12.3–15.4)
GFR SERPL CREATININE-BSD FRML MDRD: 76 ML/MIN/1.73
GLOBULIN UR ELPH-MCNC: 2.6 GM/DL
GLUCOSE BLD-MCNC: 80 MG/DL (ref 65–99)
HCT VFR BLD AUTO: 28.1 % (ref 37.5–51)
HGB BLD-MCNC: 8.9 G/DL (ref 13–17.7)
INR PPP: 2.03 (ref 0.9–1.1)
LDH SERPL-CCNC: 361 U/L (ref 135–225)
MCH RBC QN AUTO: 33.7 PG (ref 26.6–33)
MCHC RBC AUTO-ENTMCNC: 31.7 G/DL (ref 31.5–35.7)
MCV RBC AUTO: 106.4 FL (ref 79–97)
PLATELET # BLD AUTO: 171 10*3/MM3 (ref 140–450)
PLATELET # BLD AUTO: 171 10*3/MM3 (ref 140–450)
PLATELETS.RETICULATED NFR BLD AUTO: 5.4 % (ref 0.9–6.5)
PMV BLD AUTO: 11.3 FL (ref 6–12)
POTASSIUM BLD-SCNC: 3.6 MMOL/L (ref 3.5–5.2)
PROT SERPL-MCNC: 5 G/DL (ref 6–8.5)
PROTHROMBIN TIME: 22.6 SECONDS (ref 11.7–14.2)
RBC # BLD AUTO: 2.64 10*6/MM3 (ref 4.14–5.8)
SODIUM BLD-SCNC: 139 MMOL/L (ref 136–145)
WBC NRBC COR # BLD: 7.35 10*3/MM3 (ref 3.4–10.8)

## 2019-08-20 PROCEDURE — 80053 COMPREHEN METABOLIC PANEL: CPT | Performed by: INTERNAL MEDICINE

## 2019-08-20 PROCEDURE — 83615 LACTATE (LD) (LDH) ENZYME: CPT | Performed by: INTERNAL MEDICINE

## 2019-08-20 PROCEDURE — 85055 RETICULATED PLATELET ASSAY: CPT | Performed by: INTERNAL MEDICINE

## 2019-08-20 PROCEDURE — 85610 PROTHROMBIN TIME: CPT | Performed by: HOSPITALIST

## 2019-08-20 PROCEDURE — 85027 COMPLETE CBC AUTOMATED: CPT | Performed by: INTERNAL MEDICINE

## 2019-08-20 PROCEDURE — C1751 CATH, INF, PER/CENT/MIDLINE: HCPCS

## 2019-08-20 PROCEDURE — 25010000003 CEFAZOLIN IN DEXTROSE 2-4 GM/100ML-% SOLUTION: Performed by: INTERNAL MEDICINE

## 2019-08-20 PROCEDURE — 25010000002 ENOXAPARIN PER 10 MG: Performed by: HOSPITALIST

## 2019-08-20 RX ORDER — CEFAZOLIN SODIUM 2 G/100ML
2 INJECTION, SOLUTION INTRAVENOUS EVERY 8 HOURS
Qty: 4200 ML | Refills: 0 | Status: SHIPPED | OUTPATIENT
Start: 2019-08-20 | End: 2019-09-03

## 2019-08-20 RX ORDER — SODIUM CHLORIDE 0.9 % (FLUSH) 0.9 %
10 SYRINGE (ML) INJECTION EVERY 12 HOURS SCHEDULED
Status: CANCELLED | OUTPATIENT
Start: 2019-08-20

## 2019-08-20 RX ORDER — LIDOCAINE HYDROCHLORIDE 20 MG/ML
5 SOLUTION OROPHARYNGEAL
Start: 2019-08-20 | End: 2019-09-06 | Stop reason: SDUPTHER

## 2019-08-20 RX ORDER — TERAZOSIN 1 MG/1
1 CAPSULE ORAL NIGHTLY
Start: 2019-08-20 | End: 2019-09-19

## 2019-08-20 RX ORDER — SODIUM CHLORIDE 0.9 % (FLUSH) 0.9 %
20 SYRINGE (ML) INJECTION AS NEEDED
Status: CANCELLED | OUTPATIENT
Start: 2019-08-20

## 2019-08-20 RX ORDER — WARFARIN SODIUM 2.5 MG/1
2.5 TABLET ORAL DAILY
Start: 2019-08-20 | End: 2019-08-20 | Stop reason: HOSPADM

## 2019-08-20 RX ORDER — WARFARIN SODIUM 1 MG/1
1 TABLET ORAL
Status: DISCONTINUED | OUTPATIENT
Start: 2019-08-20 | End: 2019-08-20 | Stop reason: HOSPADM

## 2019-08-20 RX ORDER — CARVEDILOL 25 MG/1
25 TABLET ORAL EVERY 12 HOURS SCHEDULED
Qty: 60 TABLET | Refills: 0 | Status: SHIPPED | OUTPATIENT
Start: 2019-08-20 | End: 2019-09-19

## 2019-08-20 RX ORDER — SODIUM CHLORIDE 0.9 % (FLUSH) 0.9 %
10 SYRINGE (ML) INJECTION AS NEEDED
Status: CANCELLED | OUTPATIENT
Start: 2019-08-20

## 2019-08-20 RX ORDER — WARFARIN SODIUM 1 MG/1
TABLET ORAL
Qty: 30 TABLET | Refills: 0
Start: 2019-08-20 | End: 2019-11-01 | Stop reason: DRUGHIGH

## 2019-08-20 RX ORDER — CARVEDILOL 25 MG/1
25 TABLET ORAL EVERY 12 HOURS SCHEDULED
Qty: 60 TABLET | Refills: 0 | Status: SHIPPED | OUTPATIENT
Start: 2019-08-20 | End: 2019-08-20

## 2019-08-20 RX ADMIN — SERTRALINE 50 MG: 50 TABLET, FILM COATED ORAL at 08:34

## 2019-08-20 RX ADMIN — SODIUM CHLORIDE, PRESERVATIVE FREE 3 ML: 5 INJECTION INTRAVENOUS at 08:34

## 2019-08-20 RX ADMIN — CEFAZOLIN SODIUM 2 G: 2 INJECTION, SOLUTION INTRAVENOUS at 12:09

## 2019-08-20 RX ADMIN — ENOXAPARIN SODIUM 40 MG: 40 INJECTION SUBCUTANEOUS at 12:08

## 2019-08-20 RX ADMIN — Medication 5 ML: at 12:08

## 2019-08-20 RX ADMIN — Medication 5 ML: at 08:34

## 2019-08-20 RX ADMIN — CEFAZOLIN SODIUM 2 G: 2 INJECTION, SOLUTION INTRAVENOUS at 02:50

## 2019-08-20 RX ADMIN — LANSOPRAZOLE 30 MG: KIT at 08:34

## 2019-08-20 RX ADMIN — CARVEDILOL 25 MG: 25 TABLET, FILM COATED ORAL at 08:34

## 2019-08-20 NOTE — PLAN OF CARE
Problem: Patient Care Overview  Goal: Plan of Care Review  Outcome: Ongoing (interventions implemented as appropriate)   08/20/19 0286   Coping/Psychosocial   Plan of Care Reviewed With patient   OTHER   Outcome Summary Pt will get PICC line on day of D/C for out pt abx, Safety maintained, skin intact. Pt rest well through out shift. Continues on 02 at 2L N/C.    Plan of Care Review   Progress improving     Goal: Individualization and Mutuality  Outcome: Ongoing (interventions implemented as appropriate)    Goal: Discharge Needs Assessment  Outcome: Ongoing (interventions implemented as appropriate)      Problem: Fall Risk (Adult)  Goal: Absence of Fall  Outcome: Ongoing (interventions implemented as appropriate)      Problem: Cardiac: Heart Failure (Adult)  Goal: Signs and Symptoms of Listed Potential Problems Will be Absent, Minimized or Managed (Cardiac: Heart Failure)  Outcome: Ongoing (interventions implemented as appropriate)      Problem: Skin Injury Risk (Adult)  Goal: Skin Health and Integrity  Outcome: Ongoing (interventions implemented as appropriate)      Problem: Pain, Chronic (Adult)  Goal: Acceptable Pain/Comfort Level and Functional Ability  Outcome: Ongoing (interventions implemented as appropriate)      Problem: Nutrition, Imbalanced: Inadequate Oral Intake (Adult)  Goal: Improved Oral Intake  Outcome: Ongoing (interventions implemented as appropriate)    Goal: Prevent Further Weight Loss  Outcome: Ongoing (interventions implemented as appropriate)

## 2019-08-20 NOTE — PLAN OF CARE
Pt educated on how to change right upper chest dressing.  2x2 gauze wetted with sterile NS, and covered with mepilex. Also informed pt on washing hands before and after changing dressing. Pt able to demonstrate dressing change.

## 2019-08-20 NOTE — NURSING NOTE
Spoke with patient with Deandra, primary nurse, regarding concerns of patient being able to manage IV antibiotics, PICC line care and dressing changes at home. Discussed high risk of infection with PICC lines. Patient declined any consideration of going to subacute rehab. Patient insisted on going home.

## 2019-08-20 NOTE — PROGRESS NOTES
Continued Stay Note  [facility]     Patient Name: Cody Eldridge  MRN: [id number]  Today's Date: 8/20/2019    Admit Date: 8/3/2019    Discharge Plan     Row Name 08/20/19 1654       Plan    Plan  Home with [facility]    Patient/Family in Agreement with Plan  yes    Plan Comments  Met with patient at bedside, patient states that he will be able to do his own administration of his IV antibiotics.   Spoke to wife Elise and she says now that she can't do the dressing packing, it makes her physically sick.  Spoke with patient, patient states that he could do the dressing changes. Nurse Deandra states that she did educate the patient on dressing change, and patient expressed understanding or the procedure. Spoke with Maria M with [facility] they will follow at home, for IV antbx teaching, picc line dressing changes and oversight of the wound. Jaki unit manager also spoke to patient about his ability to go home and manage his care needs; again, he reassured her that he could do it.  Patient and wife repeatedly refused rehab, since only [facility].  Patient has oxygen at home.  Patient requires oxygen for transport, no portable oxygen available at the patient's home, allow patient to use a portable oxygen tank, with the expectation that the tank be returned. Friends to transport home.        Discharge Codes    No documentation.       Expected Discharge Date and Time     Expected Discharge Date Expected Discharge Time    Aug 20, 2019             Pia Márquez RN

## 2019-08-20 NOTE — PAYOR COMM NOTE
"Cody Eldridge (74 y.o. Male)     PLEASE SEE ATTACHED CLINICAL REVIEW. PT. REMAINS INPT AT [facility].    REF#[id number]    PLEASE CALL   OR  [phone] WITH CONTINUED STAY AUTH AND DAYS APPROVED.     THANK YOU    ANTONETTE KNUTSON LPN CCP        Date of Birth Social Security Number Address Home Phone MRN    [id number]  [address] [place] [address] [address] [place] [address] [phone] [id number]    Nondenominational Marital Status          P[employer]ism        Admission Date Admission Type Admitting Provider Attending Provider Department, Room/Bed    8/3/19 Emergency Macho Fermin MD Edling, Stephen A, MD [address], [address]    Discharge Date Discharge Disposition Discharge Destination         Home or Self Care              Attending Provider:  Fredy Patton MD    Allergies:  Benadryl [Diphenhydramine Hcl]    Isolation:  None   Infection:  None   Code Status:  CPR    Ht:  180.3 cm (71\")   Wt:  79 kg (174 lb 3.2 oz)    Admission Cmt:  None   Principal Problem:  MSSA bacteremia [R78.81]                 Active Insurance as of 8/3/2019     Primary Coverage     Payor Plan Insurance Group Employer/Plan Group    [employer] [place] MEDICARE REPLACEMENT [facility] REPL [id number]     Payor Plan Address Payor Plan Phone Number Payor Plan Fax Number Effective Dates    [address] [address] [phone]  1/20/2016 - None Entered    [facility] [id number]       Subscriber Name Subscriber Birth Date Member ID       CODY ELDRIDGE 1945 [id number]                 Emergency Contacts      (Rel.) Home Phone Work Phone Mobile Phone    Elise Rai (Spouse) [phone] -- --    Jo Graves (Grandchild) [phone] -- --    CHAD FERRARA (Daughter) [phone] -- --               Intake and Output (last 48 hours)      Intake/Output     Row Name 08/20/19 0844 08/20/19 0731 08/20/19 0450 08/20/19 0250 08/20/19 0247       Weights    Weight  --  --  79 kg (174 lb 3.2 oz)  --  --    Weight " Method  --  --  Standing scale  --  --       ceFAZolin in dextrose (ANCEF) IVPB solution 2 g - Peripheral IV 08/15/19 1116 Right;Upper Arm     Start: 08/17/19 1100    Dose  --  --  --  *2 g  --       Urine Output    Urine  --  500 mL  --  --  300 mL       Wound 08/05/19 1451 Right chest Other (comment)    Wound - Properties Group Date first assessed: 08/05/19 Time first assessed: 1451 Side: Right Location: chest Primary Wound Type: Other (comment) , Previous port  Type: incision    Dressing Appearance  dry;intact  --  --  --  --    Base  dressing in place, unable to visualize  --  --  --  --    Row Name 08/20/19 0023 08/19/19 2346 08/19/19 2105 08/19/19 2015 08/19/19 2009       Intake    P.O.  --  --  --  120 mL  --       ceFAZolin in dextrose (ANCEF) IVPB solution 2 g - Peripheral IV 08/15/19 1116 Right;Upper Arm     Start: 08/17/19 1100    Dose  --  --  *2 g  --  --       Urine Output    Urine  --  350 mL  --  --  150 mL       Wound 08/05/19 1451 Right chest Other (comment)    Wound - Properties Group Date first assessed: 08/05/19 Time first assessed: 1451 Side: Right Location: chest Primary Wound Type: Other (comment) , Previous port  Type: incision    Dressing Appearance  dry;intact  --  --  dry;intact  --    Closure  Unable to assess  --  --  Unable to assess  --    Row Name 08/19/19 1715 08/19/19 1400 08/19/19 1310 08/19/19 1041 08/19/19 0920       Intake    P.O.  310 mL  --  240 mL  --  210 mL    Intake (%)  Dinner;75%  --  Lunch;75%  --  Breakfast;75%       ceFAZolin in dextrose (ANCEF) IVPB solution 2 g - Peripheral IV 08/15/19 1116 Right;Upper Arm     Start: 08/17/19 1100    Dose  --  --  --  *2 g  --       Urine Output    Urine  300 mL  --  200 mL  --  --       Wound 08/05/19 1451 Right chest Other (comment)    Wound - Properties Group Date first assessed: 08/05/19 Time first assessed: 1451 Side: Right Location: chest Primary Wound Type: Other (comment) , Previous port  Type: incision    Dressing  Appearance  --  dry;intact  --  --  --    Base  --  other (see comments) pale  --  --  --    Edges  --  rolled/closed  --  --  --    Drainage Characteristics/Odor  --  -- none  --  --  --    Drainage Amount  --  none  --  --  --    Row Name 08/19/19 0915 08/19/19 0729 08/19/19 0432 08/19/19 0429 08/19/19 0400       Weights    Weight  --  --  --  79.4 kg (175 lb)  --    Weight Method  --  --  --  Standing scale  --       Magnesium Sulfate    Latest MgSO4 serum level  --  --  1.9  --  --       Urine Output    Urine  --  100 mL  --  --  400 mL       Wound 08/05/19 1451 Right chest Other (comment)    Wound - Properties Group Date first assessed: 08/05/19 Time first assessed: 1451 Side: Right Location: chest Primary Wound Type: Other (comment) , Previous port  Type: incision    Dressing Appearance  dry;intact  --  --  --  --    Base  other (see comments) pale  --  --  --  --    Edges  rolled/closed  --  --  --  --    Drainage Characteristics/Odor  -- none  --  --  --  --    Drainage Amount  none  --  --  --  --    Care, Wound  sterile normal saline  --  --  --  --    Dressing Care, Wound  dressing changed;dressing applied NS W-D  --  --  --  --    Row Name 08/19/19 0346 08/18/19 2113 08/18/19 2054 08/18/19 1835 08/18/19 1801       Intake    P.O.  --  120 mL  --  --  --       ceFAZolin in dextrose (ANCEF) IVPB solution 2 g - Peripheral IV 08/15/19 1116 Right;Upper Arm     Start: 08/17/19 1100    Dose  *2 g  --  --  --  *2 g       Magnesium Sulfate    Dose (g) Magnesium  --  --  2 g  --  --    Rate Magnesium   --  --  --  --  --    Concentration Magnesium   --  --  0.01 g/mL  --  --       Wound 08/05/19 1451 Right chest Other (comment)    Wound - Properties Group Date first assessed: 08/05/19 Time first assessed: 1451 Side: Right Location: chest Primary Wound Type: Other (comment) , Previous port  Type: incision    Dressing Appearance  --  dry;intact  --  dry;intact  --    Closure  --  Unable to assess  --  --  --     Periwound  --  --  --  intact  --    Periwound Temperature  --  --  --  warm  --    Periwound Skin Turgor  --  --  --  soft  --    Edges  --  --  --  rolled/closed UTV southern border to assess for undermining  --    Drainage Characteristics/Odor  --  --  --  yellow;creamy  --    Drainage Amount  --  --  --  none  --    Row Name 08/18/19 1700 08/18/19 1659 08/18/19 1507 08/18/19 1329 08/18/19 1308       Intake    Intake (%)  Dinner;50%  --  --  --  Lunch;25%       dextrose (D5W) 5 % infusion     Start: 08/17/19 1930    Rate  --  0 mL/hr  --  --  --       Urine Output    Urine  --  --  --  50 mL  --       Wound 08/05/19 1451 Right chest Other (comment)    Wound - Properties Group Date first assessed: 08/05/19 Time first assessed: 1451 Side: Right Location: chest Primary Wound Type: Other (comment) , Previous port  Type: incision    Dressing Appearance  --  --  dry;intact  --  --    Closure  --  --  Unable to assess  --  --    Row Name 08/18/19 1106                   ceFAZolin in dextrose (ANCEF) IVPB solution 2 g - Peripheral IV 08/15/19 1116 Right;Upper Arm     Start: 08/17/19 1100    Dose  *2 g           Wound 08/05/19 1451 Right chest Other (comment)    Wound - Properties Group Date first assessed: 08/05/19 Time first assessed: 1451 Side: Right Location: chest Primary Wound Type: Other (comment) , Previous port  Type: incision        Lines, Drains & Airways    Active LDAs     Name:   Placement date:   Placement time:   Site:   Days:    Peripheral IV 08/15/19 1116 Right;Upper Arm   08/15/19    1116    Arm   4                   Physician Progress Notes (last 48 hours) (Notes from 8/18/2019  9:59 AM through 8/20/2019  9:59 AM)      Medardo Espinal MD at 8/19/2019  6:59 PM              NEPHROLOGY PROGRESS NOTE    PATIENT IDENTIFICATION:   Name:  Cody Eldridge      MRN:  [id number]     74 y.o.  male             Reason for visit: JERICHO    SUBJECTIVE:   States that he is eating better today, though wife rolls  her eyes; he denies shortness of breath on room air    OBJECTIVE:  Vitals:    08/19/19 0429 08/19/19 0729 08/19/19 0813 08/19/19 1310   BP:  (!) 120/108  122/81   BP Location:  Left arm  Left arm   Patient Position:  Lying  Sitting   Pulse:  73 70 98   Resp:  18  18   Temp:  97.6 °F (36.4 °C)  98 °F (36.7 °C)   TempSrc:  Oral  Oral   SpO2:  95% 95% 98%   Weight: 79.4 kg (175 lb)      Height:               Body mass index is 24.41 kg/m².    Intake/Output Summary (Last 24 hours) at 8/19/2019 1859  Last data filed at 8/19/2019 1715  Gross per 24 hour   Intake 880 ml   Output 1000 ml   Net -120 ml     Wt Readings from Last 1 Encounters:   08/19/19 0429 79.4 kg (175 lb)   08/18/19 0428 79.4 kg (175 lb)   08/17/19 0525 79.1 kg (174 lb 6.4 oz)   08/16/19 0524 79.4 kg (175 lb 1.6 oz)   08/15/19 0649 80.7 kg (178 lb)   08/14/19 0521 81.1 kg (178 lb 11.2 oz)   08/13/19 0355 80.2 kg (176 lb 11.2 oz)   08/12/19 0357 79.9 kg (176 lb 1.6 oz)   08/11/19 0330 80.2 kg (176 lb 12.8 oz)   08/10/19 0524 80 kg (176 lb 6.4 oz)   08/08/19 0600 78.4 kg (172 lb 13.5 oz)   08/08/19 0210 78.4 kg (172 lb 13.5 oz)   08/07/19 0300 77.6 kg (171 lb 1.2 oz)   08/07/19 0146 76 kg (167 lb 8.8 oz)   08/06/19 1504 72.6 kg (160 lb)   08/06/19 0526 72.8 kg (160 lb 7.9 oz)   08/05/19 0600 70.5 kg (155 lb 6.8 oz)   08/05/19 0448 70.9 kg (156 lb 6.4 oz)   08/04/19 0610 70.6 kg (155 lb 11.2 oz)   08/03/19 1619 76.6 kg (168 lb 12.8 oz)   08/03/19 1207 79.9 kg (176 lb 3 oz)     Wt Readings from Last 3 Encounters:   08/19/19 79.4 kg (175 lb)   08/02/19 78.6 kg (173 lb 3.2 oz)   07/31/19 79.2 kg (174 lb 9.6 oz)         Exam:  Sitting up in bed. Oriented; looks older than stated age. Fatigued.   Chronically ill-appearing; flat affect  HEENT Dry oral mucosa.   No eye discharge; no scleral icterus  Neck No JVD  Lungs No rales or rhonchi;  very poor air movement lower left lung; not labored on O2 per nasal cannula  Heart Irregularly irregular, tachycardic, no  rub  Abd Soft,+ bs, nontender  Dressing by the right clavicle is C/D/I  Ext Trace-+1 edema both legs  Skin pale.          Scheduled meds:      carvedilol 25 mg Oral Q12H   ceFAZolin 2 g Intravenous Q8H   enoxaparin 40 mg Subcutaneous Q24H   lansoprazole 30 mg Nasogastric Daily   magic mouthwash 5 mL Swish & Spit Q4H   potassium chloride 40 mEq Nasogastric Once   sertraline 50 mg Oral Daily   sodium chloride 3 mL Intravenous Q12H   terazosin 1 mg Nasogastric Nightly     IV meds:                          Pharmacy to dose warfarin        Data Review:    Results from last 7 days   Lab Units 08/19/19  0432 08/18/19  1715 08/18/19  0549 08/17/19  0321   SODIUM mmol/L 139  --  144 148*   POTASSIUM mmol/L 3.9 4.1 3.3* 3.9   CHLORIDE mmol/L 105  --  108* 113*   CO2 mmol/L 24.5  --  24.7 23.7   BUN mg/dL 12  --  16 24*   CREATININE mg/dL 1.01  --  1.06 1.17   CALCIUM mg/dL 7.2*  --  7.2* 7.8*   BILIRUBIN mg/dL 0.6  --  0.6 0.7   ALK PHOS U/L 101  --  102 104   ALT (SGPT) U/L 6  --  5 <5   AST (SGOT) U/L 39  --  43* 50*   GLUCOSE mg/dL 90  --  104* 110*     Estimated Creatinine Clearance: 71 mL/min (by C-G formula based on SCr of 1.01 mg/dL).      Results from last 7 days   Lab Units 08/19/19  0432 08/18/19  0549 08/17/19  0321   MAGNESIUM mg/dL 1.9 1.7 1.6   PHOSPHORUS mg/dL 3.0 2.6 2.8       Results from last 7 days   Lab Units 08/19/19  0432 08/18/19  0549 08/17/19  0321 08/16/19  0532 08/15/19  0317   WBC 10*3/mm3 7.19 7.17 8.10 7.03 7.13   HEMOGLOBIN g/dL 8.7* 9.0* 9.3* 8.8* 7.1*   PLATELETS 10*3/mm3 168  168 174  174 146  146 135*  135* 111*  111*       Results from last 7 days   Lab Units 08/19/19  0432 08/18/19  0549 08/17/19  0938   INR  1.49* 1.29* 1.28*             ASSESSMENT:   1.  JERICHO, non-oliguric, creatinine stable.  Hypernatremia due to insufficient water intake, resolved  2.  Head and neck cancer on radiation and chemotherapy.  3.  Rapid atrial fibrillation.   4.  Neutropenic fever with sepsis syndrome  and MSSA bacteremia: No fever.  BP stable.   5.  Coagulopathy resolved.   6.  Anemia and thrombocytopenia. Platelets stable.   7.  Chronic diastolic CHF:  Compensated.        PLAN:  1.  Encouraged him to eat  2.  Control heart rate  3.  Will sign off, but please call if any questions        Medardo Espinal MD  8/19/2019    6:59 PM     Electronically signed by Medardo Espinal MD at 8/19/2019  7:01 PM     Fredy Patton MD at 8/19/2019  5:37 PM              [facility]     LOS: 16 days     Subjective:    CC:Leg Swelling (bilateral)    DIET:  Diet Order   Procedures   • Diet Regular; Thin     Eating and drinking better  No cp no soa  No nausea vomiting diarrhea, overall patient is doing much better.  Patient is very tired of being in the hospital for 16 days    Objective:    Vital Signs:  Temp:  [97.1 °F (36.2 °C)-98 °F (36.7 °C)] 98 °F (36.7 °C)  Heart Rate:  [] 98  Resp:  [18] 18  BP: (120-157)/() 122/81    SpO2:  [93 %-98 %] 98 %  on  Flow (L/min):  [2] 2;   Device (Oxygen Therapy): nasal cannula  Body mass index is 24.41 kg/m².    Physical Exam   Constitutional: He appears well-developed and well-nourished.   HENT:   Head: Normocephalic and atraumatic.   Cardiovascular: Exam reveals no friction rub.   No murmur heard.  irregular   Pulmonary/Chest: Effort normal and breath sounds normal.   Abdominal: Soft. Bowel sounds are normal. He exhibits no distension. There is no tenderness.   Neurological: He is alert.   Skin: Skin is warm and dry.       Results Review:    Glucose   Date Value Ref Range Status   08/19/2019 90 65 - 99 mg/dL Final   08/18/2019 104 (H) 65 - 99 mg/dL Final   08/17/2019 110 (H) 65 - 99 mg/dL Final     Results from last 7 days   Lab Units 08/19/19  0432   WBC 10*3/mm3 7.19   HEMOGLOBIN g/dL 8.7*   HEMATOCRIT % 29.0*   PLATELETS 10*3/mm3 168  168     Results from last 7 days   Lab Units 08/19/19  0432   SODIUM mmol/L 139   POTASSIUM mmol/L 3.9    CHLORIDE mmol/L 105   CO2 mmol/L 24.5   BUN mg/dL 12   CREATININE mg/dL 1.01   CALCIUM mg/dL 7.2*   BILIRUBIN mg/dL 0.6   ALK PHOS U/L 101   ALT (SGPT) U/L 6   AST (SGOT) U/L 39   GLUCOSE mg/dL 90     Results from last 7 days   Lab Units 08/19/19  0432   INR  1.49*     Results from last 7 days   Lab Units 08/19/19  0432   MAGNESIUM mg/dL 1.9         Cultures:       I have reviewed daily medications and changes in CPOE    Scheduled meds    carvedilol 25 mg Oral Q12H   ceFAZolin 2 g Intravenous Q8H   enoxaparin 40 mg Subcutaneous Q24H   lansoprazole 30 mg Nasogastric Daily   magic mouthwash 5 mL Swish & Spit Q4H   potassium chloride 40 mEq Nasogastric Once   sertraline 50 mg Oral Daily   sodium chloride 3 mL Intravenous Q12H   terazosin 1 mg Nasogastric Nightly         Pharmacy to dose warfarin      PRN meds  •  acetaminophen  •  ALPRAZolam  •  diphenoxylate-atropine  •  HYDROcodone-acetaminophen  •  ipratropium  •  Lidocaine Viscous HCl  •  metoprolol tartrate  •  metoprolol tartrate  •  Morphine  •  nitroglycerin  •  ondansetron **OR** ondansetron  •  Pharmacy to dose warfarin  •  potassium chloride **OR** potassium chloride **OR** potassium chloride  •  pramipexole  •  silver sulfadiazine  •  [COMPLETED] Insert peripheral IV **AND** sodium chloride  •  sodium chloride        MSSA bacteremia    New onset of congestive heart failure (CMS/HCC)    Atrial fibrillation (CMS/HCC)    Squamous cell carcinoma of neck    COPD (chronic obstructive pulmonary disease) (CMS/HCC)    Depression with anxiety    CAD (coronary artery disease)    Chemotherapy-induced neutropenia (CMS/HCC)    Vascular catheter infection (CMS/HCC)    Chemotherapy-induced thrombocytopenia    Acute renal injury (CMS/HCC)    Anemia associated with chemotherapy    Oral thrush    Shock, septic (CMS/HCC)    Hypokalemia        Assessment/Plan:      MSSA bacteremia  -Patient was in septic shock after admission to the hospital and required ICU stay with  pressors  -Neutropenia  -Acute renal failure  -Port removal by Dr. Castaneda 8/5/2019  -Ceftezole and until 9/3  -Weekly CBCs and creatinines to be faxed to the ID clinic  -PICC line when near discharge    Hypernatremia-much improved with better oral intake  -Nephrology has signed off      New onset of congestive heart failure (CMS/HCC), history of SVT and [facility]), nonobstructive CA      Atrial fibrillation (CMS/HCC)-anticoagulation has been started  -INR 1.49  -Warfarin 2.5      Squamous cell carcinoma of neck  -Plan per oncology  -Chemotherapy-induced neutropenia (CMS/HCC)  -Chemotherapy-induced thrombocytopenia  -Anemia associated with chemotherapy- improved, good reticulocyte count      COPD (chronic obstructive pulmonary disease) (CMS/HCC)      Depression with anxiety      Vascular catheter infection (CMS/HCC) with port removal      Acute renal injury (CMS/HCC)-creatinine peaked at 2.98 on 8/8, patient was seen during hospitalization by nephrology      Oral thrush      Shock, septic (CMS/HCC)      Pleural effusion-noted     DVT PPX: lovenox    >35 minutes spent, > 1/2 time spent counseling and coordination of care on bacteremia    Fredy Patton MD  08/19/19  5:37 PM        Electronically signed by Fredy Patton MD at 8/19/2019  5:42 PM     Ervin Car Jr., MD at 8/19/2019 11:01 AM        Patient status post removal of infected Mediport from right chest.  Currently the wound is being packed with normal saline.    Is concerned about wound healing.    The wound is clean with granulation in the base.  There is a very thin amount of exudate but no purulence.  No erythema or cellulitis.  Periwound is doing satisfactorily.  Continue the same dressing changes.    Electronically signed by Ervin Car Jr., MD at 8/19/2019 11:03 AM     Angelique Espinal, JAMILAH, APRN at 8/19/2019  8:30 AM     Attestation signed by Myriam Omer MD at 8/19/2019   2:47 PM    I have reviewed the documentation above and agree.                       LOS: 16 days   Patient Care Team:  Epley, James, APRN as PCP - General (Family Medicine)  Evelin, Payal Zaragoza MD as Consulting Physician (Pain Medicine)  Lorna Chaidez (Nurse Practitioner)  Kristal Cowart [facility] as Pharmacist  Garfield, Fritz Snyder MD as Referring Physician (Otolaryngology)  Pablo Heaton MD as Consulting Physician (Hematology and Oncology)  Annie Davila MD as Consulting Physician (Radiation Oncology)  Chetan Heaton MD as Consulting Physician (Urology)      Chief Complaint:  Following for atrial fibrillation with RVR       Interval History:   HR well-controlled this AM.  Remains in atrial fibrillation with some brief 2.0-2.7 second pauses.  Floor to notify cardiology for any pauses >/= 3.0 seconds.  Patient denies CP or SOA.           Objective   Vital Signs  Temp:  [97.1 °F (36.2 °C)-98.2 °F (36.8 °C)] 97.6 °F (36.4 °C)  Heart Rate:  [] 73  Resp:  [18] 18  BP: (120-157)/() 120/108    Intake/Output Summary (Last 24 hours) at 8/19/2019 0800  Last data filed at 8/19/2019 0729  Gross per 24 hour   Intake 120 ml   Output 625 ml   Net -505 ml           Physical Exam   Constitutional: He is oriented to person, place, and time. He appears well-developed and well-nourished. No distress.   Neck: No JVD present.   Cardiovascular: Normal rate, normal heart sounds and intact distal pulses. An irregularly irregular rhythm present.   Pulmonary/Chest: Effort normal and breath sounds normal. No respiratory distress.   Abdominal: Soft. Bowel sounds are normal. There is no guarding.   Musculoskeletal: He exhibits no edema.   Neurological: He is alert and oriented to person, place, and time.   Skin: Skin is warm and dry. No erythema.   Psychiatric: He has a normal mood and affect.           Results Review:      Results from last 7 days   Lab Units 08/19/19  0432 08/18/19  1715 08/18/19  0550  08/17/19  0321   SODIUM mmol/L 139  --  144 148*   POTASSIUM mmol/L 3.9 4.1 3.3* 3.9   CHLORIDE mmol/L 105  --  108* 113*   CO2 mmol/L 24.5  --  24.7 23.7   BUN mg/dL 12  --  16 24*   CREATININE mg/dL 1.01  --  1.06 1.17   GLUCOSE mg/dL 90  --  104* 110*   CALCIUM mg/dL 7.2*  --  7.2* 7.8*         Results from last 7 days   Lab Units 08/19/19  0432 08/18/19  0549 08/17/19  0321   WBC 10*3/mm3 7.19 7.17 8.10   HEMOGLOBIN g/dL 8.7* 9.0* 9.3*   HEMATOCRIT % 29.0* 29.0* 29.9*   PLATELETS 10*3/mm3 168  168 174  174 146  146     Results from last 7 days   Lab Units 08/19/19  0432 08/18/19  0549 08/17/19  0938   INR  1.49* 1.29* 1.28*         Results from last 7 days   Lab Units 08/19/19  0432   MAGNESIUM mg/dL 1.9           I reviewed the patient's new clinical results.  I personally viewed telemetry data.            Medication Review:     carvedilol 25 mg Oral Q12H   ceFAZolin 2 g Intravenous Q8H   enoxaparin 40 mg Subcutaneous Q24H   lansoprazole 30 mg Nasogastric Daily   magic mouthwash 5 mL Swish & Spit Q4H   potassium chloride 40 mEq Nasogastric Once   sertraline 50 mg Oral Daily   sodium chloride 3 mL Intravenous Q12H   terazosin 1 mg Nasogastric Nightly   warfarin 1 mg Oral Daily         Pharmacy to dose warfarin                Assessment/Plan     1.  Atrial fibrillation with RVR: Heart rate well controlled this morning.  Patient remains on carvedilol.  Remains in atrial fibrillation.  Remains on warfarin.  Continue anticoagulation.      2.  Nonobstructive coronary artery disease    3.  COPD    4.  Systemic hypertension    5.  MSSA sepsis    6.  Hypokalemia        -Heart rate well controlled on carvedilol.  -Will sign off for now.  -Please notify cardiology for pauses of greater than or equal to 3 seconds in duration or feel free to contact with any questions or concerns or reconsult at any time.          Thanks,    Angelique Espinal, DNP, APRN  08/19/19  8:00 AM        The above plan of treatment was reviewed  with Dr. Negra MD.     Electronically signed by Myriam Omer MD at 2019  2:47 PM     Medardo Espinal MD at 2019  6:13 PM        Nephrology    Previous notes read  Stable kidney function; low potassium, on potassium protocol  Low magnesium noted; repletion ordered  Improving hypernatremia following D5W  Will obtain chemistries tomorrow and re-assess then    --Medardo Espinal MD    Electronically signed by Medardo Espinal MD at 2019  6:14 PM     Elvis Pan MD at 2019  3:03 PM              Name: Cody Eldridge ADMIT: 8/3/2019   : 1945  PCP: Epley, James, APRN    MRN: [id number] LOS: 15 days   AGE/SEX: 74 y.o. male  ROOM: [facility]     Subjective   Subjective   CC: BLE edema  No acute events. Patient has no new complaints. HR is better.  He is taking PO.  No CP/dyspnea/f/c/n/v/d.    Objective   Objective   Vital Signs  Temp:  [97.8 °F (36.6 °C)-98.2 °F (36.8 °C)] 98.2 °F (36.8 °C)  Heart Rate:  [] 84  Resp:  [16-18] 18  BP: (109-155)/() 147/97  SpO2:  [83 %-97 %] 94 %  on  Flow (L/min):  [2] 2;   Device (Oxygen Therapy): room air  Body mass index is 24.41 kg/m².  Physical Exam   Constitutional: No distress.   HENT:   Head: Normocephalic and atraumatic.   Mouth/Throat: Oropharynx is clear and moist.   Eyes: Conjunctivae and EOM are normal. Pupils are equal, round, and reactive to light.   Neck: Normal range of motion. Neck supple.   Cardiovascular: Normal rate and intact distal pulses. An irregularly irregular rhythm present.   Pulmonary/Chest: Effort normal and breath sounds normal.   Abdominal: Soft. Bowel sounds are normal.   Musculoskeletal: He exhibits no edema or tenderness.   Neurological: He is alert.   Skin: Skin is warm and dry. He is not diaphoretic.   Psychiatric: He has a normal mood and affect. His behavior is normal.   Nursing note and vitals reviewed.      Results Review:       I reviewed the patient's new clinical results.  Results  from last 7 days   Lab Units 08/18/19  0549 08/17/19  0321 08/16/19  0532 08/15/19  0317   WBC 10*3/mm3 7.17 8.10 7.03 7.13   HEMOGLOBIN g/dL 9.0* 9.3* 8.8* 7.1*   PLATELETS 10*3/mm3 174  174 146  146 135*  135* 111*  111*     Results from last 7 days   Lab Units 08/18/19  0549 08/17/19  0321 08/16/19  0532 08/15/19  0317   SODIUM mmol/L 144 148* 151* 149*   POTASSIUM mmol/L 3.3* 3.9 3.1* 3.5   CHLORIDE mmol/L 108* 113* 113* 113*   CO2 mmol/L 24.7 23.7 26.1 26.4   BUN mg/dL 16 24* 30* 43*   CREATININE mg/dL 1.06 1.17 1.07 1.18   GLUCOSE mg/dL 104* 110* 79 97   Estimated Creatinine Clearance: 67.6 mL/min (by C-G formula based on SCr of 1.06 mg/dL).  Results from last 7 days   Lab Units 08/18/19  0549 08/17/19 0321 08/16/19  0532 08/15/19  0317   ALBUMIN g/dL 2.20* 2.60* 2.40* 2.10*   BILIRUBIN mg/dL 0.6 0.7 0.6 0.7   ALK PHOS U/L 102 104 85 75   AST (SGOT) U/L 43* 50* 53* 44*   ALT (SGPT) U/L 5 <5 6 5     Results from last 7 days   Lab Units 08/18/19  0549 08/17/19  0321 08/16/19  0532 08/15/19  0317  08/13/19  0328   CALCIUM mg/dL 7.2* 7.8* 7.8* 7.4*   < > 7.2*   ALBUMIN g/dL 2.20* 2.60* 2.40* 2.10*   < > 1.90*   MAGNESIUM mg/dL 1.7 1.6  --   --   --  1.8   PHOSPHORUS mg/dL 2.6 2.8 3.4 2.5   < > 1.8*    < > = values in this interval not displayed.       No results found for: HGBA1C, POCGLU      carvedilol 25 mg Oral Q12H   ceFAZolin 2 g Intravenous Q8H   enoxaparin 40 mg Subcutaneous Q24H   lansoprazole 30 mg Nasogastric Daily   magic mouthwash 5 mL Swish & Spit Q4H   potassium chloride 40 mEq Nasogastric Once   sertraline 50 mg Oral Daily   sodium chloride 3 mL Intravenous Q12H   terazosin 1 mg Nasogastric Nightly   [START ON 8/19/2019] warfarin 1 mg Oral Daily   warfarin 2 mg Oral Once       dextrose 100 mL/hr Last Rate: 100 mL/hr (08/18/19 0612)   Pharmacy to dose warfarin     Diet Regular; Thin      Assessment/Plan     Active Hospital Problems    Diagnosis  POA   • **MSSA bacteremia [R78.81]  Unknown   •  Shock, septic (CMS/HCC) [A41.9, R65.21]  Unknown   • Oral thrush [B37.0]  Unknown   • Acute renal injury (CMS/HCC) [N17.9]  Unknown   • Anemia associated with chemotherapy [D64.81, T45.1X5A]  Unknown   • Chemotherapy-induced thrombocytopenia [D69.59, T45.1X5A]  Unknown   • New onset of congestive heart failure (CMS/HCC) [I50.9]  Yes   • Atrial fibrillation (CMS/HCC) [I48.91]  Yes   • Squamous cell carcinoma of neck [C44.42]  Yes   • COPD (chronic obstructive pulmonary disease) (CMS/HCC) [J44.9]  Yes   • Depression with anxiety [F41.8]  Yes   • CAD (coronary artery disease) [I25.10]  Yes   • Chemotherapy-induced neutropenia (CMS/HCC) [D70.1, T45.1X5A]  Yes   • Vascular catheter infection (CMS/HCC) [T82.7XXA]  Yes      Resolved Hospital Problems   No resolved problems to display.   MSSA Bacteremia/Sepsis  - had septic shock shortly after admission requiring temporary care in the ICU and pressor therapy-hemodynamics now stable; appreciate LPC attention to patient  - this was complicated by neutropenia-he has done better with neupogen  - had JERICHO secondary to sepsis which has now resolved  - f/u cultures NGTD  - port removed 8/5/19 with Dr. Castaneda  - continue on cefazolin 2gIV Q8H for 4wk course (stop date 9/3/19 per ID)  - weekly cbc/diff and creatinine faxed to ID clinic at [phone]  - appreciate ID recs-ID arranging follow up in their clinic on 9/3/19  - PICC tomorrow    Atrial Fibrillation with RVR  - doing much better  - continue on coreg  - having <3 second sinus pauses which are asymptomatic-cardiology is monitoring  - on AC with warfarin  - appreciate cardiology recs    Acute Diastolic CHF  - presenting problem with mild volume overload-now resolved  - he has actually been more on the dry side now    Hypernatremia  - better after D5  - appreciate nephrology recs    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      Elvis Pan MD  [facility]  08/18/19  3:04 PM          Electronically signed by Elvis Pan MD at 8/18/2019  5:42 PM     Silvina Tejada APRN at 8/18/2019  2:40 PM        RN called. Patient having 2-2.5 sec asymptomatic pauses. He is currently sleep. To continue monitoring as long as pauses remain under 3 seconds and asymptomatic. No changes to medications.      AL    Electronically signed by Silvina Tejada APRN at 8/18/2019  2:41 PM     Yousif Uribe MD at 8/18/2019 12:34 PM          Cody Eldridge  1945 74 y.o.  [id number]      Patient Care Team:  Epley, James, APRN as PCP - General (Family Medicine)  Payal Waters MD as Consulting Physician (Pain Medicine)  Lorna Chaidez (Nurse Practitioner)  Kristal Cowart SAMSON as Pharmacist  [place], Fritz Snyder MD as Referring Physician (Otolaryngology)  Pablo Heaton MD as Consulting Physician (Hematology and Oncology)  Annie Davila MD as Consulting Physician (Radiation Oncology)  Chetan Heaton MD as Consulting Physician (Urology)    CC: A. Fib, reduced LV systolic function    Interval History: His heart rate is much improved      Objective   Vital Signs  Temp:  [97.8 °F (36.6 °C)-98.2 °F (36.8 °C)] 97.8 °F (36.6 °C)  Heart Rate:  [] 77  Resp:  [16-18] 18  BP: (109-155)/() 145/96    Intake/Output Summary (Last 24 hours) at 8/18/2019 1234  Last data filed at 8/18/2019 0808  Gross per 24 hour   Intake 290 ml   Output 1675 ml   Net -1385 ml     Flowsheet Rows      First  "Filed Value   Admission Height  180.3 cm (71\") Documented at 08/03/2019 1153   Admission Weight  79.9 kg (176 lb 3 oz) Documented at 08/03/2019 1207          Physical Exam:   General Appearance:    Alert,oriented, in no acute distress   Lungs:     Clear to auscultation,BS are equal    Heart:    Normal S1 and S2, iRRR without murmur, gallop or rub   HEENT:    Sclerae are clear, no JVD or adenopathy   Abdomen:     Normal bowel sounds, soft non-tender, non-distended, no HSM   Extremities:   Moves all extremities well, no edema, no cyanosis, no             Redness, no rash     Medication Review:        ceFAZolin 2 g Intravenous Q8H   enoxaparin 40 mg Subcutaneous Q24H   lansoprazole 30 mg Nasogastric Daily   magic mouthwash 5 mL Swish & Spit Q4H   metoprolol tartrate 50 mg Oral Q8H   potassium chloride 40 mEq Nasogastric Once   sertraline 50 mg Oral Daily   sodium chloride 3 mL Intravenous Q12H   terazosin 1 mg Nasogastric Nightly   warfarin 1 mg Oral Daily       dextrose 100 mL/hr Last Rate: 100 mL/hr (08/18/19 0612)   Pharmacy to dose warfarin           I reviewed the patient's new clinical results.  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     His rate is improved his LV function is decreased I am to switch him to carvedilol to help control his rate as well as treat his cardiomyopathy continue with his anticoagulation    Yousif Uribe MD  08/18/19  12:34 PM    Electronically signed by Yousif Uribe MD at 8/18/2019 12:35 PM       All medication doses during the admission are shown, including meds that are no longer on order.   Scheduled Meds Sorted by Name   for Cody Eldridge as of 8/18/19 through 8/20/19     1 Day 3 Days 7 Days 10 Days < Today >    Legend:                          Inactive     Active     Other Encounter    Linked               Medications 08/18/19 08/19/19 08/20/19   albumin human 5 % solution 500 mL   Dose: 500 mL  Freq: Once Route: IV  Indications of Use: Crystalloid Refractory Shock  Start: 08/05/19 1100 End: 08/05/19 1106         aluminum-magnesium hydroxide 200-200 MG/5ML, diphenhydrAMINE (BENADRYL) 12.5 MG/5ML, lidocaine viscous (XYLOCAINE) 2 %, nystatin (MYCOSTATIN) 172587 UNIT/ML compound   Dose: 10 mL  Freq: 4 Times Daily Before Meals & Nightly Route: SWISH & SWAL  Start: 08/03/19 2100 End: 08/03/19 1752         amiodarone (PACERONE) tablet 200 mg   Dose: 200 mg  Freq: Every 12 Hours Scheduled Route: PO  Start: 08/03/19 2100 End: 08/03/19 1749    Admin Instructions:   Avoid grapefruit juice while taking this medication.         baclofen (LIORESAL) tablet 10 mg   Dose: 10 mg  Freq: 3 Times Daily Route: PO  Start: 08/03/19 2100 End: 08/05/19 1012    Admin Instructions:   Take with food if GI upset occurs.         calcium gluconate 1 g in sodium chloride 0.9 % 100 mL IVPB   Dose: 1 g  Freq: Once Route: IV  Start: 08/06/19 1330 End: 08/06/19 1349         carvedilol (COREG) tablet 25 mg   Dose: 25 mg  Freq: Every 12 Hours Scheduled Route: PO  Start: 08/18/19 1330    Admin  Instructions:   Give with food.    1431          0346   0911   2108      0834   2100          ceFAZolin (ANCEF) 2 g in sodium chloride 0.9 % 100 mL IVPB   Dose: 2 g  Freq: Every 8 Hours Route: IV  Indications of Use: BACTEREMIA  Start: 08/15/19 0000 End: 08/17/19 0921    Admin Instructions:   Changed to Q8h for improved renal function per protocol.  Caution: Look alike/sound alike drug alert         ceFAZolin in dextrose (ANCEF) IVPB solution 2 g   Dose: 2 g  Freq: Every 8 Hours Route: IV  Indications of Use: BACTEREMIA  Start: 08/17/19 1100 End: 09/03/19 1059    Admin Instructions:   Changed to Q8h for improved renal function per protocol.  Caution: Look alike/sound alike drug alert    0319   1106   1801      0346   1041   2105      0250   1100   1900        ceFAZolin in dextrose (ANCEF) IVPB solution 2 g   Dose: 2 g  Freq: Every 8 Hours Route: IV  Indications of Use: BACTEREMIA  Start: 08/14/19 0830 End: 08/14/19 2057    Admin Instructions:   Changed to Q8h for improved renal function per protocol  Caution: Look alike/sound alike drug alert         ceFAZolin in dextrose (ANCEF) IVPB solution 2 g   Dose: 2 g  Freq: Every 12 Hours Route: IV  Indications of Use: BACTEREMIA  Last Dose: 2 g (08/12/19 1028)  Start: 08/06/19 1000 End: 08/14/19 0830    Admin Instructions:   Caution: Look alike/sound alike drug alert         digoxin (LANOXIN) injection 0.25 mg   Dose: 0.25 mg  Freq: Once Route: IV  Start: 08/17/19 0145 End: 08/17/19 0112    Admin Instructions:    Check and record heart rate. Use filter needle to withdraw dose from ampule. Dose may be pushed over 5 minutes.         digoxin (LANOXIN) injection 0.25 mg   Dose: 0.25 mg  Freq: Daily Digoxin Route: IV  Start: 08/17/19 1200 End: 08/17/19 0055    Admin Instructions:    Check and record heart rate. Use filter needle to withdraw dose from ampule. Dose may be pushed over 5 minutes.         digoxin (LANOXIN) injection 125 mcg   Dose: 125 mcg  Freq: Once Route:  IV  Start: 08/05/19 1345 End: 08/05/19 1345    Admin Instructions:    Check and record heart rate. Use filter needle to withdraw dose from ampule. Dose may be pushed over 5 minutes.         digoxin (LANOXIN) injection 125 mcg   Dose: 125 mcg  Freq: Daily Digoxin Route: IV  Start: 08/05/19 1200 End: 08/07/19 0755    Admin Instructions:    Check and record heart rate. Use filter needle to withdraw dose from ampule. Dose may be pushed over 5 minutes.         digoxin (LANOXIN) injection 500 mcg   Dose: 500 mcg  Freq: Once Route: IV  Start: 08/03/19 1845 End: 08/03/19 1943    Admin Instructions:    Check and record heart rate. Use filter needle to withdraw dose from ampule. Dose may be pushed over 5 minutes.         digoxin (LANOXIN) injection 500 mcg   Dose: 500 mcg  Freq: Once Route: IV  Start: 08/03/19 1311 End: 08/03/19 1332    Admin Instructions:    Check and record heart rate. Use filter needle to withdraw dose from ampule. Dose may be pushed over 5 minutes.         diltiaZEM (CARDIZEM) injection 20 mg   Dose: 20 mg  Freq: Once Route: IV  Start: 08/17/19 0230 End: 08/17/19 0152    Admin Instructions:   Caution: Look alike/sound alike drug alert. Refrigerate. May give IV push over 2 minutes.         enoxaparin (LOVENOX) syringe 40 mg   Dose: 40 mg  Freq: Every 24 Hours Route: SC  Indications of Use: PROPHYLAXIS OF VENOUS THROMBOEMBOLISM  Start: 08/17/19 1030    Admin Instructions:   Give subcutaneous in abdomen only. Do not massage site after injection.    1106          1041          1030            famotidine (PEPCID) injection 20 mg   Dose: 20 mg  Freq: Once Route: IV  Start: 08/05/19 1300 End: 08/05/19 1507    Admin Instructions:   Dilute to 10 mL total volume and give IV push over 2 minutes.         fentaNYL citrate (PF) (SUBLIMAZE) injection 25 mcg   Dose: 25 mcg  Freq: Once Route: IV  Start: 08/06/19 1000 End: 08/06/19 1137    Admin Instructions:   Use filter needle to withdraw dose from ampule.  If given for  pain, use the following pain scale:  Mild Pain = Pain Score of 1-3, CPOT 1-2  Moderate Pain = Pain Score of 4-6, CPOT 3-4  Severe Pain = Pain Score of 7-10, CPOT 5-8         Filgrastim (NEUPOGEN) injection 480 mcg   Dose: 480 mcg  Freq: Every Evening Route: SC  Start: 08/04/19 1700 End: 08/08/19 0846         fluconazole (DIFLUCAN) 40 MG/ML suspension 200 mg   Dose: 200 mg  Freq: Daily Route: PO  Indications Comment: oral candidiasis  Start: 08/08/19 1300 End: 08/10/19 0930    Admin Instructions:   Per feeding tube  Shake well immediately prior to administration. Store at room temp. Discard unused portion 14 days after reconstitution.         furosemide (LASIX) injection 40 mg   Dose: 40 mg  Freq: Every 12 Hours Route: IV  Start: 08/03/19 1815 End: 08/04/19 1127         furosemide (LASIX) injection 40 mg   Dose: 40 mg  Freq: Once Route: IV  Start: 08/03/19 1311 End: 08/03/19 1331         lansoprazole (FIRST) oral suspension 30 mg   Dose: 30 mg  Freq: Daily Route: NG  Start: 08/08/19 1700    Admin Instructions:   Shake well. Refrigerate.    0805          0911          0834            magic mouthwash oral supsension 10 mL   Dose: 10 mL  Freq: 4 Times Daily Before Meals & Nightly Route: SWISH & SWAL  Start: 08/03/19 2100 End: 08/04/19 0918         magic mouthwash oral supsension 5 mL   Dose: 5 mL  Freq: Every 4 Hours Route: SWISH & SPIT  Start: 08/11/19 2000    (9697)   (4240)   (8330)     (1105)   (7629)   2053 (7795)   0500   (5800) [C]     1222   1656   2106      (9348)   (2771) 6973     1200   1600   2000        magnesium sulfate 2g/50 mL (PREMIX) infusion   Dose: 2 g  Freq: Once Route: IV  Last Dose: 2 g (08/17/19 2007)  Start: 08/17/19 1945 End: 08/17/19 2207         magnesium sulfate in D5W 1g/100mL (PREMIX)   Dose: 2 g  Freq: Once Route: IV  Start: 08/18/19 1900 End: 08/18/19 2054 2054              magnesium sulfate in D5W 1g/100mL (PREMIX)   Dose: 2 g  Freq: Once Route: IV  Start: 08/17/19 1930 End:  08/17/19 1847         metoprolol tartrate (LOPRESSOR) injection 5 mg   Dose: 5 mg  Freq: Once Route: IV  Start: 08/03/19 1221 End: 08/03/19 1226         metoprolol tartrate (LOPRESSOR) tablet 12.5 mg   Dose: 12.5 mg  Freq: Every 12 Hours Scheduled Route: PO  Start: 08/06/19 2100 End: 08/07/19 0755         metoprolol tartrate (LOPRESSOR) tablet 25 mg   Dose: 25 mg  Freq: Every 8 Hours Route: PO  Start: 08/08/19 1743 End: 08/09/19 1622         metoprolol tartrate (LOPRESSOR) tablet 25 mg   Dose: 25 mg  Freq: Every 12 Hours Scheduled Route: PO  Start: 08/07/19 0900 End: 08/08/19 1518         metoprolol tartrate (LOPRESSOR) tablet 37.5 mg   Dose: 37.5 mg  Freq: Every 8 Hours Route: PO  Start: 08/09/19 1745 End: 08/17/19 1632         metoprolol tartrate (LOPRESSOR) tablet 50 mg   Dose: 50 mg  Freq: Every 8 Hours Route: PO  Start: 08/17/19 2200 End: 08/18/19 1236    Admin Instructions:   Give for SBP > 110    0614   1236-D/C'd          metoprolol tartrate (LOPRESSOR) tablet 50 mg   Dose: 50 mg  Freq: Every 12 Hours Scheduled Route: PO  Start: 08/17/19 2100 End: 08/17/19 1812         metoprolol tartrate (LOPRESSOR) tablet 50 mg   Dose: 50 mg  Freq: Every 12 Hours Scheduled Route: PO  Start: 08/04/19 1215 End: 08/05/19 1011    Admin Instructions:   Hold for systolic <100mmHg         midazolam (VERSED) injection 1 mg   Dose: 1 mg  Freq: Once Route: IV  Start: 08/06/19 1300 End: 08/06/19 1205    Admin Instructions:   Line placement  Maximum total dose of midazolam is 4 mg.           montelukast (SINGULAIR) tablet 10 mg   Dose: 10 mg  Freq: Nightly Route: PO  Start: 08/03/19 2100 End: 08/05/19 1012         nafcillin (UNIPEN) 2 g/100 mL 0.9% NS IVPB (mbp)   Dose: 2 g  Freq: Every 4 Hours Route: IV  Indications of Use: BACTEREMIA  Start: 08/04/19 1130 End: 08/06/19 0747         Pharmacy to dose vancomycin   Freq: Daily Route: XX  Indications of Use: BACTEREMIA  Start: 08/04/19 0900 End: 08/04/19 1043         phosphorus (K PHOS  NEUTRAL) tablet 1 tablet   Dose: 250 mg  Freq: 2 Times Daily Route: NG  Start: 08/13/19 2100 End: 08/14/19 1012         phytonadione (AQUA-MEPHYTON, VITAMIN K) 5 mg in sodium chloride 0.9 % 50 mL IVPB   Dose: 5 mg  Freq: Once Route: IV  Last Dose: 5 mg (08/05/19 1121)  Start: 08/05/19 1115 End: 08/05/19 1221         phytonadione (MEPHYTON, VITAMIN K) tablet 5 mg   Dose: 5 mg  Freq: Once Route: PO  Start: 08/04/19 1100 End: 08/04/19 1352         potassium chloride (KLOR-CON) packet 40 mEq   Dose: 40 mEq  Freq: Once Route: PO  Start: 08/09/19 1830 End: 08/09/19 2021    Admin Instructions:   For use with a feeding tube. Mix in at least 4 oz. of liquid.         potassium chloride (KLOR-CON) packet 40 mEq   Dose: 40 mEq  Freq: Once Route: NG  Start: 08/06/19 1000    Admin Instructions:   For use with a feeding tube. Mix in at least 4 oz. of liquid.         potassium chloride (KLOR-CON) packet 40 mEq   Dose: 40 mEq  Freq: 2 Times Daily Route: NG  Start: 08/06/19 1000 End: 08/06/19 0946    Admin Instructions:   For use with a feeding tube. Mix in at least 4 oz. of liquid.         potassium chloride 10 mEq in 100 mL IVPB   Dose: 10 mEq  Freq: Once Route: IV  Last Dose: 10 mEq (08/05/19 1402)  Start: 08/05/19 1345 End: 08/05/19 1502    Admin Instructions:   OUTPATIENT/NON-MONITORED UNITS: Potassium Chloride standard bolus infusion rate is a maximum of 10 mEq/hr on unmonitored patients    MONITORED UNITS: Potassium Chloride standard bolus infusion rate is a maximum of 20 mEq/hr on ECG monitored patients ONLY         potassium phosphate 10 mmol in sodium chloride 0.9 % 100 mL infusion   Dose: 10 mmol  Freq: Once Route: IV  Start: 08/07/19 0915 End: 08/07/19 1030    Admin Instructions:   Refrigerate         potassium phosphate 20 mmol in sodium chloride 0.9 % 500 mL infusion   Dose: 20 mmol  Freq: Once Route: IV  Start: 08/14/19 1100 End: 08/14/19 1239    Admin Instructions:   Refrigerate         predniSONE (DELTASONE) tablet  60 mg   Dose: 60 mg  Freq: Daily With Breakfast Route: PO  Start: 08/10/19 1915 End: 08/12/19 2042    Admin Instructions:   Take with food.         rosuvastatin (CRESTOR) tablet 20 mg   Dose: 20 mg  Freq: Daily Route: PO  Start: 08/03/19 1815 End: 08/05/19 1012    Admin Instructions:   Avoid grapefruit juice.         sertraline (ZOLOFT) tablet 50 mg   Dose: 50 mg  Freq: Daily Route: PO  Start: 08/03/19 1815    0805          0911          0834            silver sulfadiazine (SILVADENE, SSD) 1 % cream   Freq: 2 Times Daily Route: TOP  Start: 08/03/19 2100 End: 08/16/19 1157    Admin Instructions:            sodium chloride 0.9 % bolus 2,000 mL   Dose: 2,000 mL  Freq: Once Route: IV  Last Dose: 2,000 mL (08/06/19 0906)  Start: 08/06/19 1000 End: 08/06/19 1106         sodium chloride 0.9 % bolus 500 mL   Dose: 500 mL  Freq: Once Route: IV  Last Dose: 500 mL (08/05/19 1039)  Start: 08/05/19 1115 End: 08/05/19 1054         sodium chloride 0.9 % flush 3 mL   Dose: 3 mL  Freq: Every 12 Hours Scheduled Route: IV  Start: 08/03/19 2100    0834   2054        1047   2106        0834   2100          Sodium Hypochlorite 0.0625 % (Dakin's 1/8th Strength) topical solution   Dose: 946 mL  Freq: Every 12 Hours Scheduled Route: IR  Start: 08/06/19 0915 End: 08/13/19 2356    Admin Instructions:   Apply to right chest wound, moistened 4 x 4 with Dakin's and place in Mediport pocket.  Change twice daily, cover with clean dry gauze..         sodium phosphates 30 mmol in sodium chloride 0.9 % 250 mL IVPB   Dose: 30 mmol  Freq: Once Route: IV  Last Dose: 30 mmol (08/08/19 2304)  Start: 08/08/19 2300 End: 08/09/19 0504         sodium phosphates 40 mmol in sodium chloride 0.9 % 250 mL IVPB   Dose: 40 mmol  Freq: Once Route: IV  Last Dose: 40 mmol (08/08/19 0610)  Start: 08/08/19 0630 End: 08/08/19 1210         terazosin (HYTRIN) capsule 1 mg   Dose: 1 mg  Freq: Nightly Route: NG  Start: 08/07/19 2100 2055 2108 2100             vancomycin 1500 mg/500 mL 0.9% NS IVPB ([facility])   Dose: 1,500 mg  Freq: Once Route: IV  Indications of Use: BACTEREMIA  Start: 08/04/19 0930 End: 08/05/19 1001         vancomycin 750 mg/250 mL 0.9% NS add-vantage   Dose: 750 mg  Freq: Every 12 Hours Route: IV  Indications of Use: BACTEREMIA  Start: 08/04/19 2100 End: 08/04/19 1043         warfarin (COUMADIN) tablet 1 mg   Dose: 1 mg  Freq: Daily Warfarin Route: PO  Indications of Use: ATRIAL FIBRILLATION  Start: 08/19/19 1800 End: 08/19/19 0945    Admin Instructions:   Please ask oncology if ok to restart  If INR Greater Than Target, Contact Pharmacy Prior to Giving Dose.  Acutely Hazardous. Waste BOTH Residual Medication and/or Empty Package. .    Order specific questions:   Target INR 2 - 3     0945-D/C'd           warfarin (COUMADIN) tablet 1 mg   Dose: 1 mg  Freq: Daily Warfarin Route: PO  Indications of Use: ATRIAL FIBRILLATION  Start: 08/17/19 1800 End: 08/18/19 1332    Admin Instructions:   Please ask oncology if ok to restart  If INR Greater Than Target, Contact Pharmacy Prior to Giving Dose.  Acutely Hazardous. Waste BOTH Residual Medication and/or Empty Package. .    Order specific questions:   Target INR 2 - 3    1332-D/C'd            warfarin (COUMADIN) tablet 2 mg   Dose: 2 mg  Freq: Once (Warfarin) Route: PO  Indications of Use: ATRIAL FIBRILLATION  Start: 08/18/19 1700 End: 08/18/19 1720    Admin Instructions:   Food-Drug Interaction  If INR Greater Than Target, Contact Pharmacy Prior to Giving Dose.  Acutely Hazardous. Waste BOTH Residual Medication and/or Empty Package. .    Order specific questions:   Target INR 2 - 3    1720              warfarin (COUMADIN) tablet 2.5 mg   Dose: 2.5 mg  Freq: Once (Warfarin) Route: PO  Indications of Use: ATRIAL FIBRILLATION  Start: 08/19/19 1700 End: 08/19/19 1700    Admin Instructions:   If INR Greater Than Target, Contact Pharmacy Prior to Giving Dose.  Acutely Hazardous. Waste BOTH Residual Medication and/or  Empty Package. .    Order specific questions:   Target INR 2 - 3     1700             Medications 08/18/19 08/19/19 08/20/19       Continuous Meds Sorted by Name   for Cody Eldridge as of 8/18/19 through 8/20/19    Legend:                          Inactive     Active     Other Encounter    Linked               Medications 08/18/19 08/19/19 08/20/19   amiodarone (NEXTERONE) 360 mg/200 mL (1.8 mg/mL) infusion   Rate: 16.67 mL/hr Dose: 0.5 mg/min  Freq: Continuous Route: IV  Last Dose: Stopped (08/06/19 1547)  Start: 08/05/19 0730 End: 08/06/19 1503    Admin Instructions:   IV med requiring filtration 0.22 micron inline filter         amiodarone (NEXTERONE) 360 mg/200 mL (1.8 mg/mL) infusion   Rate: 33.3 mL/hr Dose: 1 mg/min  Freq: Continuous Route: IV  Last Dose: 0.5 mg/min (08/05/19 0259)  Start: 08/03/19 1845 End: 08/05/19 0643    Admin Instructions:   1 mg /min for 6 hours then decrease to  0.5 mg /min thereafter.  No bolus.  IV med requiring filtration 0.22 micron inline filter         dextrose (D5W) 5 % infusion   Rate: 100 mL/hr Dose: 100 mL/hr  Freq: Continuous Route: IV  Last Dose: Stopped (08/18/19 1659)  Start: 08/17/19 1930 End: 08/18/19 1546    0612   1546-D/C'd  1659            dextrose (D5W) 5 % infusion   Rate: 100 mL/hr Dose: 100 mL/hr  Freq: Continuous Route: IV  Last Dose: 100 mL/hr (08/17/19 0346)  Start: 08/16/19 1715 End: 08/17/19 1315         diltiaZEM (CARDIZEM) 100 mg/100 mL (1 mg/mL) 0.9% NS   Rate: 10 mL/hr Dose: 10 mg/hr  Freq: Titrated Route: IV  Last Dose: Stopped (08/17/19 1641)  Start: 08/17/19 0230 End: 08/17/19 1632    Admin Instructions:   Caution: Look alike/sound alike drug alert.         lactated ringers infusion   Rate: 9 mL/hr Dose: 9 mL/hr  Freq: Continuous Route: IV  Last Dose: 9 mL/hr (08/05/19 1344)  Start: 08/05/19 1300 End: 08/10/19 1930         norepinephrine (LEVOPHED) 8 mg/250 mL (32 mcg/mL) in sodium chloride 0.9% infusion (premix)   Rate: 2.64-39.66 mL/hr Dose:  0.02-0.3 mcg/kg/min  Weight Dosing Info: 70.5 kg  Freq: Titrated Route: IV  Last Dose: Stopped (08/05/19 1136)  Start: 08/05/19 0730 End: 08/05/19 0955    Admin Instructions:   Initiate Infusion at 0.02 mcg/kg/min & Titrate By 0.02 mcg/kg/min Every 5 Minutes to use the lowest dose possible up to 0.3 mcg/kg/min to maintain a MAP Greater Than or Equal To 65 mm Hg. Contact provider, if unable to maintain MAP target at the prescribed dose or if MAP is Greater Than 95 mm Hg. Once MAP target achieved obtain vitals a minimum of every 30 minutes. With Provider order, may titrate to maximum dose of 0.5 mcg/kg/min.  Concentration 8 mg/250 mL         Pharmacy to dose warfarin   Freq: Continuous PRN Route: XX  PRN Reason: Consult  Indications of Use: ATRIAL FIBRILLATION  Start: 08/17/19 0916    Admin Instructions:   If INR Greater Than Target, Contact Pharmacy Prior to Giving Dose.  Acutely Hazardous. Waste BOTH Residual Medication and/or Empty Package. .    Order specific questions:   Clinician to Dose - Pharmacist to Dose         phenylephrine (SHAYNA-SYNEPHRINE) 50 mg in sodium chloride 0.9 % 250 mL (0.2 mg/mL) infusion   Rate: 10.58-63.45 mL/hr Dose: 0.5-3 mcg/kg/min  Weight Dosing Info: 70.5 kg  Freq: Titrated Route: IV  Last Dose: Stopped (08/06/19 2110)  Start: 08/05/19 1045 End: 08/09/19 0819    Admin Instructions:   Initiate Infusion at 0.5 mcg/kg/min & Titrate By 0.3 mcg/kg/min Every 5 Minutes to use the lowest dose possible to maintain MAP Greater Than or Equal To 65 mm Hg. Maximum Dose 3 mcg/kg/min. Contact provider if unable to maintain MAP Greater Than or Equal To 65 mm Hg on Max Dose or if MAP is Greater Than 95 mm Hg. Once MAP target achieved obtain vitals a minimum of every 30 minutes.           Medications 08/18/19 08/19/19 08/20/19       PRN Meds Sorted by Name   for Cody Eldridge as of 8/18/19 through 8/20/19    Legend:                          Inactive     Active     Other Encounter    Linked                Medications 08/18/19 08/19/19 08/20/19   acetaminophen (TYLENOL) tablet 650 mg   Dose: 650 mg  Freq: Every 4 Hours PRN Route: PO  PRN Reason: Mild Pain   Start: 08/03/19 1720    Admin Instructions:   Do not exceed 4 grams of acetaminophen in a 24 hr period.    If given for pain, use the following pain scale:   Mild Pain = Pain Score of 1-3, CPOT 1-2  Moderate Pain = Pain Score of 4-6, CPOT 3-4  Severe Pain = Pain Score of 7-10, CPOT 5-8         albuterol (PROVENTIL) nebulizer solution 0.083% 2.5 mg/3mL   Dose: 2.5 mg  Freq: Every 6 Hours PRN Route: NEBULIZATION  PRN Reason: Shortness of Air  Start: 08/03/19 1716 End: 08/09/19 2338         ALPRAZolam (XANAX) tablet 1 mg   Dose: 1 mg  Freq: Daily PRN Route: PO  PRN Reason: Anxiety  PRN Comment: prior to radiation-takes 1/2  Start: 08/03/19 1716    Admin Instructions:    Caution: Look alike/sound alike drug alert. Avoid grapefruit juice         bupivacaine PF 30 mL, lidocaine 1 % 30 mL mixture   Freq: As Needed  Start: 08/05/19 1441 End: 08/05/19 1458         digoxin (LANOXIN) injection 500 mcg   Dose: 500 mcg  Freq: Once As Needed Route: IV  PRN Comment: Hr > 130  Start: 08/17/19 1902 End: 08/18/19 0133    Admin Instructions:    Check and record heart rate. Use filter needle to withdraw dose from ampule. Dose may be pushed over 5 minutes.    0133              diphenoxylate-atropine (LOMOTIL) 2.5-0.025 MG per tablet 1 tablet   Dose: 1 tablet  Freq: 4 Times Daily PRN Route: PO  PRN Reason: Diarrhea  Start: 08/03/19 1717    Admin Instructions:   Maximum 8 tablets per 24 hours.      1325              ePHEDrine injection 5 mg   Dose: 5 mg  Freq: Once As Needed Route: IV  PRN Comment: symptomatic hypotension - Notify attending anesthesiologist if this needs to be given  Start: 08/05/19 1453 End: 08/05/19 1558    Admin Instructions:   Caution: Look alike/sound alike drug alert               Dilute with NS to 5-10 mg/mL.         fentaNYL citrate (PF) (SUBLIMAZE) injection  50 mcg   Dose: 50 mcg  Freq: Every 10 Minutes PRN Route: IV  PRN Reason: Severe Pain   Start: 08/05/19 1258 End: 08/05/19 1507    Admin Instructions:   Maximum total dose of fentanyl is 100 mcg.  If given for pain, use the following pain scale:  Mild Pain = Pain Score of 1-3, CPOT 1-2  Moderate Pain = Pain Score of 4-6, CPOT 3-4  Severe Pain = Pain Score of 7-10, CPOT 5-8         flumazenil (ROMAZICON) injection 0.2 mg   Dose: 0.2 mg  Freq: As Needed Route: IV  PRN Comment: for benzodiazepine induced unresponsiveness or sedation  Indications of Use: BENZODIAZEPINE-INDUCED SEDATION  Start: 08/05/19 1453 End: 08/05/19 1558    Admin Instructions:   Notify Anesthesia if given  ** give IV over 15-30 seconds **         hydrALAZINE (APRESOLINE) injection 5 mg   Dose: 5 mg  Freq: Every 10 Minutes PRN Route: IV  PRN Reason: High Blood Pressure  PRN Comment: for systolic blood pressure greater than 180 mmHg or diastolic blood pressure greater than 105 mmHg  Start: 08/05/19 1453 End: 08/05/19 1558    Admin Instructions:   Use labetalol first THEN hydralazine second if labetalol fails to reduce systolic blood pressure to less than 180 mmHg or diastolic blood pressure less than 105 mmHg    Maximum dose of hydralazine is 20 mg  Caution: Look alike/sound alike drug alert         HYDROcodone-acetaminophen (NORCO) 5-325 MG per tablet 1 tablet   Dose: 1 tablet  Freq: Every 4 Hours PRN Route: PO  PRN Reason: Moderate Pain   Start: 08/05/19 2054 End: 08/25/19 1246    Admin Instructions:   [DIANNA]    Do not exceed 4 grams of acetaminophen in a 24 hr period.    If given for pain, use the following pain scale:   Mild Pain = Pain Score of 1-3, CPOT 1-2  Moderate Pain = Pain Score of 4-6, CPOT 3-4  Severe Pain = Pain Score of 7-10, CPOT 5-8         HYDROcodone-acetaminophen (NORCO) 7.5-325 MG per tablet 1 tablet   Dose: 1 tablet  Freq: 4 Times Daily PRN Route: PO  PRN Reasons: Moderate Pain ,Severe Pain   Start: 08/03/19 1717 End: 08/05/19  2055    Admin Instructions:   [DIANNA]    Do not exceed 4 grams of acetaminophen in a 24 hr period.    If given for pain, use the following pain scale:   Mild Pain = Pain Score of 1-3, CPOT 1-2  Moderate Pain = Pain Score of 4-6, CPOT 3-4  Severe Pain = Pain Score of 7-10, CPOT 5-8         ipratropium (ATROVENT) nebulizer solution 0.5 mg   Dose: 0.5 mg  Freq: Every 6 Hours PRN Route: NEBULIZATION  PRN Reason: Shortness of Air  Start: 08/10/19 0600    Admin Instructions:            labetalol (NORMODYNE,TRANDATE) injection 5 mg   Dose: 5 mg  Freq: Every 5 Minutes PRN Route: IV  PRN Reason: High Blood Pressure  PRN Comment: for systolic blood pressure greater than 180 mmHg or diastolic blood pressure greater than 105 mmHg  Start: 08/05/19 1453 End: 08/05/19 1558    Admin Instructions:   Use labetalol first THEN hydralazine second if labetalol fails to reduce systolic blood pressure to less than 180 mmHg or diastolic blood pressure less than 105 mmHg    Hold for heart rate less than 60.    Maximum dose of labetalol is 20 mg  Give by slow IV Push each 20mg (or less) over 2 minutes         lidocaine PF 1% (XYLOCAINE) injection 0.5 mL   Dose: 0.5 mL  Freq: Once As Needed Route: IJ  PRN Comment: IV Start  Start: 08/05/19 1258 End: 08/05/19 1507         Lidocaine Viscous HCl (XYLOCAINE) 2 % mouth solution 5 mL   Dose: 5 mL  Freq: Every 3 Hours PRN Route: MT  PRN Reason: Mouth Pain  Start: 08/04/19 0917    Admin Instructions:   If given for pain, use the following pain scale:  Mild Pain = Pain Score of 1-3, CPOT 1-2  Moderate Pain = Pain Score of 4-6, CPOT 3-4  Severe Pain = Pain Score of 7-10, CPOT 5-8    0805   1140        1222   1656           magic mouthwash oral supsension 5 mL   Dose: 5 mL  Freq: Every 4 Hours PRN Route: SWISH & SPIT  PRN Reason: Mouth Pain  Start: 08/06/19 1605 End: 08/11/19 1734         Magnesium Sulfate 2 gram infusion - Mg less than or equal to 1.5 mg/dL   Dose: 2 g  Freq: As Needed Route: IV  PRN  Comment: for Mg less than or equal 1.5 mg/dL  Start: 08/05/19 2255 End: 08/13/19 1401    Admin Instructions:   Recheck Mg level in the AM.         Or  Magnesium Sulfate 1 gram infusion - Mg 1.6-1.9 mg/dL   Dose: 1 g  Freq: As Needed Route: IV  PRN Comment: Mg 1.6-1.9 mg/dL.  Start: 08/05/19 2255 End: 08/13/19 1401    Admin Instructions:   Recheck Mg level in the AM.         metoprolol tartrate (LOPRESSOR) injection 5 mg   Dose: 5 mg  Freq: Every 5 Minutes PRN Route: IV  PRN Reason: Heart Rate  PRN Comment: Hr > 120  Start: 08/17/19 1901    Admin Instructions:   For SBP > 110         metoprolol tartrate (LOPRESSOR) injection 5 mg   Dose: 5 mg  Freq: Every 5 Minutes PRN Route: IV  PRN Reason: Heart Rate  PRN Comment: Hr > 120  Start: 08/17/19 1813 End: 08/17/19 1838    Admin Instructions:   For SBP > 110         metoprolol tartrate (LOPRESSOR) injection 5 mg   Dose: 5 mg  Freq: Every 4 Hours PRN Route: IV  PRN Reason: Heart Rate  PRN Comment: H >120  Start: 08/09/19 1622         midazolam (VERSED) injection 1 mg   Dose: 1 mg  Freq: Every 5 Minutes PRN Route: IV  PRN Comment: Mild to Moderate Anxiety  Start: 08/05/19 1258 End: 08/05/19 1507    Admin Instructions:   Max 4 mg of midazolam TOTAL           Or  midazolam (VERSED) injection 2 mg   Dose: 2 mg  Freq: Every 5 Minutes PRN Route: IV  PRN Comment: Moderate to Severe Anxiety  Start: 08/05/19 1258 End: 08/05/19 1507    Admin Instructions:   Max 4 mg of midazolam TOTAL           morphine injection 1 mg   Dose: 1 mg  Freq: Every 3 Hours PRN Route: IV  PRN Reasons: Moderate Pain ,Severe Pain   Start: 08/12/19 2041 End: 08/22/19 2040    Admin Instructions:   If given for pain, use the following pain scale:  Mild Pain = Pain Score of 1-3, CPOT 1-2  Moderate Pain = Pain Score of 4-6, CPOT 3-4  Severe Pain = Pain Score of 7-10, CPOT 5-8         naloxone (NARCAN) injection 0.2 mg   Dose: 0.2 mg  Freq: As Needed Route: IV  PRN Reasons: Opioid Reversal,Respiratory  Depression  PRN Comment: unresponsiveness, decrease oxygen saturation  Indications of Use: ACUTE RESPIRATORY FAILURE,OPIOID-INDUCED RESPIRATORY DEPRESSION  Start: 08/05/19 1453 End: 08/05/19 1558    Admin Instructions:   Notify Anesthesia if given         nitroglycerin (NITROSTAT) SL tablet 0.4 mg   Dose: 0.4 mg  Freq: Every 5 Minutes PRN Route: SL  PRN Reason: Chest Pain  PRN Comment: Only if SBP Greater Than 100  Start: 08/03/19 1719    Admin Instructions:   If Pain Unrelieved After 3 Doses Notify MD         ondansetron (ZOFRAN) injection 4 mg   Dose: 4 mg  Freq: Once As Needed Route: IV  PRN Reasons: Nausea,Vomiting  Indications of Use: POSTOPERATIVE NAUSEA AND VOMITING  Start: 08/05/19 1453 End: 08/05/19 1558    Admin Instructions:   If BOTH ondansetron (ZOFRAN) and promethazine (PHENERGAN) are ordered use ondansetron first and THEN promethazine IF ondansetron is ineffective.         ondansetron (ZOFRAN) tablet 4 mg   Dose: 4 mg  Freq: Every 6 Hours PRN Route: PO  PRN Reasons: Nausea,Vomiting  Start: 08/03/19 1720    Admin Instructions:   If BOTH ondansetron (ZOFRAN) and promethazine (PHENERGAN) are ordered use ondansetron first and THEN promethazine IF ondansetron is ineffective.                Or  ondansetron (ZOFRAN) injection 4 mg   Dose: 4 mg  Freq: Every 6 Hours PRN Route: IV  PRN Reasons: Nausea,Vomiting  Start: 08/03/19 1720    Admin Instructions:   If BOTH ondansetron (ZOFRAN) and promethazine (PHENERGAN) are ordered use ondansetron first and THEN promethazine IF ondansetron is ineffective.    0916              Pharmacy to dose warfarin   Freq: Continuous PRN Route: XX  PRN Reason: Consult  Indications of Use: ATRIAL FIBRILLATION  Start: 08/17/19 0916    Admin Instructions:   If INR Greater Than Target, Contact Pharmacy Prior to Giving Dose.  Acutely Hazardous. Waste BOTH Residual Medication and/or Empty Package. .    Order specific questions:   Clinician to Dose - Pharmacist to Dose         potassium  chloride (MICRO-K) CR capsule 40 mEq   Dose: 40 mEq  Freq: As Needed Route: PO  PRN Comment: Potassium Replacement.  See Admin Instructions  Start: 08/16/19 1154    Admin Instructions:   Potassium 3.1 or Less Give KCl 40 mEq q4h x3 Doses   Potassium 3.2 - 3.6 Give KCl 40 mEq q4h x2 Doses     Check Potassium 4 Hours After Last Dose Given   Check Magnesium if Potassium Level Remains Low After Replacement   DO NOT GIVE if CrCl is Less Than 30 mL/minute or Urine Output Less Than 30 mL/hr    0805    1325          Or  potassium chloride (KLOR-CON) packet 40 mEq   Dose: 40 mEq  Freq: As Needed Route: PO  PRN Comment: potassium replacement, see admin instructions  Start: 08/16/19 1154    Admin Instructions:   Potassium 3.1 or Less Give KCl 40 mEq q4h x3 Doses   Potassium 3.2 - 3.6 Give KCl 40 mEq q4h x2 Doses     Check Potassium 4 Hours After Last Dose Given   Check Magnesium if Potassium Level Remains Low After Replacement   DO NOT GIVE if CrCl is Less Than 30 mL/minute or Urine Output Less Than 30 mL/hr              Or  potassium chloride 10 mEq in 100 mL IVPB   Dose: 10 mEq  Freq: Every 1 Hour PRN Route: IV  PRN Comment: Potassium Replacement - See Admin Instructions  Start: 08/16/19 1154    Admin Instructions:   Peripheral or Central IV  Potassium 3.1 or Less Give KCl 10 mEq/100 mL NS IV q1h x6 Doses  Potassium 3.2 - 3.6 Give KCl 10 mEq/100 mL NS q1h x4 Doses    Check Potassium 4 Hours After Last Dose Given  Check Magnesium if Potassium Remains Low After Replacement  DO NOT GIVE if CrCl is Less Than 30 mL/minute or Urine Output Less Than 30 mL/hr.     Rates Greater Than 10 mEq/hr Require ECG Monitoring.  OUTPATIENT/NON-MONITORED UNITS: Potassium Chloride standard bolus infusion rate is a maximum of 10 mEq/hr on unmonitored patients    MONITORED UNITS: Potassium Chloride standard bolus infusion rate is a maximum of 20 mEq/hr on ECG monitored patients ONLY              potassium chloride (MICRO-K) CR capsule 40 mEq    Dose: 40 mEq  Freq: As Needed Route: PO  PRN Comment: Potassium Replacement.  See Admin Instructions  Start: 08/05/19 1123 End: 08/07/19 0817    Admin Instructions:   Potassium 3.1 or Less Give KCl 40 mEq q4h x3 Doses   Potassium 3.2 - 3.6 Give KCl 40 mEq q4h x2 Doses     Check Potassium 4 Hours After Last Dose Given   Check Magnesium if Potassium Level Remains Low After Replacement   DO NOT GIVE if CrCl is Less Than 30 mL/minute or Urine Output Less Than 30 mL/hr         Or  potassium chloride (KLOR-CON) packet 40 mEq   Dose: 40 mEq  Freq: As Needed Route: PO  PRN Comment: potassium replacement, see admin instructions  Start: 08/05/19 1123 End: 08/07/19 0817    Admin Instructions:   Potassium 3.1 or Less Give KCl 40 mEq q4h x3 Doses   Potassium 3.2 - 3.6 Give KCl 40 mEq q4h x2 Doses     Check Potassium 4 Hours After Last Dose Given   Check Magnesium if Potassium Level Remains Low After Replacement   DO NOT GIVE if CrCl is Less Than 30 mL/minute or Urine Output Less Than 30 mL/hr         Or  potassium chloride 10 mEq in 100 mL IVPB   Dose: 10 mEq  Freq: Every 1 Hour PRN Route: IV  PRN Comment: Potassium Replacement - See Admin Instructions  Start: 08/05/19 1123 End: 08/07/19 0817    Admin Instructions:   Peripheral or Central IV  Potassium 3.1 or Less Give KCl 10 mEq/100 mL NS IV q1h x6 Doses  Potassium 3.2 - 3.6 Give KCl 10 mEq/100 mL NS q1h x4 Doses    Check Potassium 4 Hours After Last Dose Given  Check Magnesium if Potassium Remains Low After Replacement  DO NOT GIVE if CrCl is Less Than 30 mL/minute or Urine Output Less Than 30 mL/hr.     Rates Greater Than 10 mEq/hr Require ECG Monitoring.  OUTPATIENT/NON-MONITORED UNITS: Potassium Chloride standard bolus infusion rate is a maximum of 10 mEq/hr on unmonitored patients    MONITORED UNITS: Potassium Chloride standard bolus infusion rate is a maximum of 20 mEq/hr on ECG monitored patients ONLY         potassium phosphate 21 mmol in sodium chloride 0.9 % 250 mL  infusion   Dose: 21 mmol  Freq: As Needed Route: IV  PRN Comment: Peripheral IV - Phosphorus Less Than 1.3 mg/dL  Start: 08/10/19 0654 End: 08/13/19 1453    Admin Instructions:   Recheck Phosphorus level 6 hours after replacement complete.  Refrigerate         Or  potassium phosphate 15 mmol in sodium chloride 0.9 % 100 mL infusion   Dose: 15 mmol  Freq: As Needed Route: IV  PRN Comment: Peripheral IV - Phosphorus 1.3 - 1.7 mg/dL  Start: 08/10/19 0654 End: 08/13/19 1453    Admin Instructions:   Recheck Phosphorus level 6 hours after replacement complete.  Refrigerate         Or  potassium phosphate 9 mmol in sodium chloride 0.9 % 100 mL infusion   Dose: 9 mmol  Freq: As Needed Route: IV  PRN Comment: Peripheral IV - Phosphorus 1.8 - 2.5 mg/dL  Start: 08/10/19 0654 End: 08/13/19 1453    Admin Instructions:   Recheck Phosphorus level 6 hours after replacement complete.  Refrigerate         pramipexole (MIRAPEX) tablet 0.25 mg   Dose: 0.25 mg  Freq: Nightly PRN Route: PO  PRN Comment: Restless legs  Start: 08/05/19 2055         promethazine (PHENERGAN) injection 6.25 mg   Dose: 6.25 mg  Freq: Every 10 Minutes PRN Route: IV  PRN Reasons: Nausea,Vomiting  Start: 08/05/19 1453 End: 08/05/19 1558    Admin Instructions:   If BOTH ondansetron (ZOFRAN) and promethazine (PHENERGAN) are ordered use ondansetron first and THEN promethazine IF ondansetron is ineffective.    Maximum total dose of IV phenergan is 12.5mg  If giving IV, dilute dose in 20 mL NS and slow IV push over 3-5 minutes. Administer through large bore vein (not hand or wrist) through running IV line at port furthest from patient's vein.         Or  promethazine (PHENERGAN) injection 12.5 mg   Dose: 12.5 mg  Freq: Once As Needed Route: IM  PRN Reasons: Nausea,Vomiting  Start: 08/05/19 1453 End: 08/05/19 1558    Admin Instructions:   If BOTH ondansetron (ZOFRAN) and promethazine (PHENERGAN) are ordered use ondansetron first and THEN promethazine IF ondansetron is  ineffective.  If giving IV, dilute dose in 20 mL NS and slow IV push over 3-5 minutes.  Administer through large bore vein (not hand or wrist) through running IV line at port furthest from patient's vein.         Or  promethazine (PHENERGAN) suppository 25 mg   Dose: 25 mg  Freq: Once As Needed Route: RE  PRN Reasons: Nausea,Vomiting  Start: 08/05/19 1453 End: 08/05/19 1558    Admin Instructions:   If BOTH ondansetron (ZOFRAN) and promethazine (PHENERGAN) are ordered use ondansetron first and THEN promethazine IF ondansetron is ineffective.         Or  promethazine (PHENERGAN) tablet 25 mg   Dose: 25 mg  Freq: Once As Needed Route: PO  PRN Reasons: Nausea,Vomiting  Start: 08/05/19 1453 End: 08/05/19 1558    Admin Instructions:   If BOTH ondansetron (ZOFRAN) and promethazine (PHENERGAN) are ordered use ondansetron first and THEN promethazine IF ondansetron is ineffective.           silver sulfadiazine (SILVADENE, SSD) 1 % cream   Freq: 2 Times Daily PRN Route: TOP  PRN Reason: Wound Care  Start: 08/16/19 1200    Admin Instructions:   Redness from radiation treatments.           sodium chloride (NS) irrigation solution   Freq: As Needed  Start: 08/05/19 1441 End: 08/05/19 1458         sodium chloride 0.9 % flush 1-10 mL   Dose: 1-10 mL  Freq: As Needed Route: IV  PRN Reason: Line Care  Start: 08/05/19 1258 End: 08/05/19 1507         sodium chloride 0.9 % flush 10 mL   Dose: 10 mL  Freq: As Needed Route: IV  PRN Reason: Line Care  Start: 08/03/19 1217         sodium chloride 0.9 % flush 3-10 mL   Dose: 3-10 mL  Freq: As Needed Route: IV  PRN Reason: Line Care  Start: 08/03/19 1718         Medications 08/18/19 08/19/19 08/20/19

## 2019-08-20 NOTE — DISCHARGE SUMMARY
[facility]  [phone]    DISCHARGE SUMMARY  [facility]    Patient Identification:  Name: Cody Eldridge  Age: 74 y.o.  Sex: male  :  1945  MRN: [id number]  Primary Care Physician: Epley, James, APRN    Admit date: 8/3/2019  Discharge date and time:      Discharge Diagnoses:  MSSA bacteremia    New onset of congestive heart failure (CMS/HCC)    Atrial fibrillation (CMS/HCC)    Squamous cell carcinoma of neck    COPD (chronic obstructive pulmonary disease) (CMS/HCC)    Depression with anxiety    CAD (coronary artery disease)    Chemotherapy-induced neutropenia (CMS/HCC)    Vascular catheter infection (CMS/HCC)    Chemotherapy-induced thrombocytopenia    Acute renal injury (CMS/HCC)    Anemia associated with chemotherapy    Oral thrush    Shock, septic (CMS/HCC)    Hypokalemia       History of present illness from H&P:    74-year-old gentleman with a history of atrial fibrillation which by history is been very difficult to control as for his rate is concerned.  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  He did receive a course of chemotherapy yesterday.  He states that after returning home he was becoming increasingly short of breath.  The shortness of breath would be worsened by exertion or by lying flat.  He denies any chest pain associated with this.  No palpitations, no lightheaded spells.  In the emergency room he was noted to be in A. fib with rapid ventricular response.  He was given 1 dose of labetalol as well as a dose of digoxin 0.5 mg IV.  He was also given a dose of Lasix 40 mg IV.  He is breathing a little easier now.  Still notes no palpitation despite a rapid rate noted on the monitor.  In addition he is also been noting increased tenderness, soreness around his MediPort site over the last 24 hours.  This was placed this last .  No fever  sweats or chills.     Hospital Course:   The patient was admitted to the hospital.  He has a history of head neck cancer patient on chemotherapy and followed by the [facility], he presents with sepsis secondary to infected MediPort.  The patient worsened on admission and was transferred to ICU.      He was stabilized in the unit and transferred to the floor.  He has recovered from septic shock.  Early in the hospitalization 1 of the major factors was his mucositis with very poor oral intake and hypernatremia, which is improving each day.  The patient is tolerating p.o. well now and his sodium level has normalized.  He did require a core track during the hospitalization for oral intake.    The patient developed atrial fibrillation with rapid ventricular response and cardiology saw him.  Heart rate has stabilized on Coreg and the patient is anxious for discharge.     The patient is still oxygen requiring at 2 L.  Patient is encouraged to use incentive spirometry when he leaves the hospital.  He states he was using oxygen occasionally outpatient.  Prior to the hospitalization.    Patient has a wound from his port removal on the right upper chest and was seen by vascular surgery today prior to discharge and it saw there is good granulation tissue wound is healing appropriately.    The patient will need to continue with cefazolin until 9/3.    Further hospital course by problem list:    MSSA bacteremia  - had septic shock shortly after admission requiring temporary care in the ICU and pressor therapy-hemodynamics now stable; appreciate LPC attention to patient  - this was complicated by neutropenia-he has done better with neupogen  - had JERICHO secondary to sepsis which has now resolved  - f/u cultures NGTD  - port removed 8/5/19 with Dr. Castaneda  - continue on cefazolin 2gIV Q8H for 4wk course (stop date 9/3/19 per ID)  - weekly cbc/diff and creatinine faxed to ID clinic at [phone]  - appreciate ID recs-ID arranging  follow up in their clinic on 9/3/19  - PICC done today     Hypernatremia-much improved with better oral intake  -Nephrology has signed off       New onset of congestive heart failure (CMS/HCC), history of SVT and ablation in 2006 ( [facility]), nonobstructive CA       Atrial fibrillation (CMS/HCC)-anticoagulation has been started  -INR 2.03 today  -Warfarin 2.5 daily on discharge       Squamous cell carcinoma of neck  -Plan per oncology  -Chemotherapy-induced neutropenia (CMS/HCC)  -Chemotherapy-induced thrombocytopenia  -Anemia associated with chemotherapy- improved, good reticulocyte count       COPD (chronic obstructive pulmonary disease) (CMS/HCC)       Depression with anxiety       Vascular catheter infection (CMS/HCC) with port removal       Acute renal injury (CMS/HCC)-creatinine peaked at 2.98 on 8/8, patient was seen during hospitalization by nephrology       Oral thrush-improving       Shock, septic (CMS/HCC)-resolved       Pleural effusion-noted, minimal oxygen requirement on exam patient has reasonable breath sounds bilaterally.  Sound equal.    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  Patient is currently on week 5 of carboplatin and Taxol had significant delay on 7/31/2019        The patient was seen and examined on the day of discharge.    Consults:   Consults     Date and Time Order Name Status Description    8/6/2019 1558 Inpatient Nephrology Consult      8/5/2019 0609 Inpatient Pulmonology Consult Completed     8/4/2019 0810 Inpatient Infectious Diseases Consult Completed     8/3/2019 1716 Inpatient Cardiology Consult Completed     8/3/2019 1716 Hematology & Oncology Inpatient Consult Completed     8/3/2019 1655 Inpatient Vascular Surgery Consult Completed     8/3/2019 1403 [facility] (on-call MD unless specified) Details Completed     8/3/2019 1319 Cardiology (on-call MD  unless specified) Completed           Results from last 7 days   Lab Units 08/20/19  0552   WBC 10*3/mm3 7.35   HEMOGLOBIN g/dL 8.9*   HEMATOCRIT % 28.1*   PLATELETS 10*3/mm3 171  171       Results from last 7 days   Lab Units 08/20/19  0552   SODIUM mmol/L 139   POTASSIUM mmol/L 3.6   CHLORIDE mmol/L 103   CO2 mmol/L 27.1   BUN mg/dL 12   CREATININE mg/dL 0.96   GLUCOSE mg/dL 80   CALCIUM mg/dL 7.6*       Significant Diagnostic Studies:   Lab Results   Component Value Date    WBC 7.35 08/20/2019    HGB 8.9 (L) 08/20/2019    HCT 28.1 (L) 08/20/2019     08/20/2019     08/20/2019     Lab Results   Component Value Date     08/20/2019    K 3.6 08/20/2019     08/20/2019    CO2 27.1 08/20/2019    BUN 12 08/20/2019    CREATININE 0.96 08/20/2019    GLUCOSE 80 08/20/2019     Lab Results   Component Value Date    CALCIUM 7.6 (L) 08/20/2019    MG 1.9 08/19/2019    PHOS 3.0 08/19/2019     Lab Results   Component Value Date    AST 34 08/20/2019    ALT <5 08/20/2019    ALKPHOS 102 08/20/2019     Lab Results   Component Value Date    INR 2.03 (H) 08/20/2019     No results found for: COLORU, CLARITYU, SPECGRAV, PHUR, PROTEINUR, GLUCOSEU, KETONESU, BLOODU, NITRITE, LEUKOCYTESUR, BILIRUBINUR, UROBILINOGEN, RBCUA, WBCUA, BACTERIA, UACOMMENT  No results found for: TROPONINT, TROPONINI, BNP  No components found for: HGBA1C;2  No components found for: TSH;2    Imaging Results (all)     Procedure Component Value Units Date/Time    XR Chest PA & Lateral [[id number]] Collected:  08/10/19 1505     Updated:  08/10/19 1548    Narrative:       TWO-VIEW CHEST     HISTORY: Shortness of breath.     FINDINGS: The lungs are well expanded with persistent increased density  at the left base that obscures the diaphragm and this has the appearance  of both pleural effusion and dense atelectasis and possible pneumonia  and is quite similar to the study of 4 days ago. There is also a small  right pleural effusion and some minimal  atelectasis that is unchanged.  The heart remains mildly enlarged. A feeding tube ends below the  diaphragm.     This report was finalized on 8/10/2019 3:45 PM by Dr. Braydon Bahena M.D.       XR Abdomen KUB [[id number]] Collected:  08/06/19 2043     Updated:  08/06/19 2047    Narrative:       ONE VIEW PORTABLE ABDOMEN 8:14 PM     HISTORY: Cortrak tube placement     FINDINGS: The Cortrak tube ends in the lower stomach.     This report was finalized on 8/6/2019 8:44 PM by Dr. Braydon Bahena M.D.       XR Chest Post CVA Port [[id number]] Collected:  08/06/19 1335     Updated:  08/06/19 1404    Narrative:       XR CHEST POST CVA PORT-     HISTORY: 74-year-old male with left-sided line placement. Head and neck  cancer and multiple medical problems.     FINDINGS: The left MediPort catheter tip is within the SVC. Right  MediPort catheter has been removed. There is no convincing evidence for  pneumothorax. There is increased density of both the mid and lower lung  fields which may be secondary to layering effusions, but pneumonia  cannot be excluded on this single projection. The density at the mid and  lower lung fields is more prominent than on yesterday's chest radiograph  and new since the chest radiograph from 06/28/2019. There is more  prominent pulmonary vascular congestion than previously as well.     This report was finalized on 8/6/2019 2:01 PM by Dr. Ruby Duggan M.D.       XR Chest 1 View [[id number]] Collected:  08/05/19 0724     Updated:  08/05/19 0729    Narrative:       XR CHEST 1 VW-     Clinical: CHF     COMPARISON 08/03/2019     FINDINGS: Mediport catheter stable in position. There is cardiomegaly  similar to the previous examination. There is again poor delineation of  the left hemidiaphragm with left lung base opacity similar to the  previous examination. This could reflect underlying effusion, infiltrate  or atelectasis. There is improved aeration at the right lung base,  persistent airspace disease at  this location. Consolidation or  infiltrate.     There are monitoring leads superimposing the chest. The remainder the  examination is unremarkable.     This report was finalized on 8/5/2019 7:26 AM by Dr. Roosevelt Fernando M.D.       XR Chest 1 View [[id number]] Collected:  08/03/19 1311     Updated:  08/03/19 1316    Narrative:       XR CHEST 1 VW-     HISTORY: Male who is 74 years-old,  short of breath, chest pain     TECHNIQUE: Frontal view of the chest     COMPARISON: 06/28/2019     FINDINGS: Stable appearing right chest port. Heart is enlarged. Aorta is  tortuous, calcified. Pulmonary vasculature is unremarkable.  Atelectasis/infiltrate at the lung bases with left pleural effusion.  This appearance represents interval worsening. No pneumothorax. No acute  osseous process.       Impression:       Atelectasis/infiltrate at the lung bases with left pleural  effusion, follow-up recommended. Cardiomegaly. Tortuous aorta.     This report was finalized on 8/3/2019 1:13 PM by Dr. Josep Garcia M.D.         No results found for: SITE, ALLENTEST, PHART, UCN0XNO, PO2ART, MQW0JBJ, BASEEXCESS, H3OPBCJL, HGBBG, HCTABG, OXYHEMOGLOBI, METHHGBN, CARBOXYHGB, CO2CT, BAROMETRIC, MODALITY, FIO2       Discharge Medications      New Medications      Instructions Start Date   carvedilol 25 MG tablet  Commonly known as:  COREG   25 mg, Oral, Every 12 Hours Scheduled      ceFAZolin in dextrose 2-4 GM/100ML-% solution IVPB  Commonly known as:  ANCEF   2 g, Intravenous, Every 8 Hours      Lidocaine Viscous HCl 2 % solution  Commonly known as:  XYLOCAINE   5 mL, Oral, Every 3 Hours PRN      magic mouthwash  Notes to patient:  Every 4 hours   5 mL, Swish & Spit, Every 4 Hours      terazosin 1 MG capsule  Commonly known as:  HYTRIN   1 mg, Oral, Nightly         Changes to Medications      Instructions Start Date   warfarin 1 MG tablet  Commonly known as:  COUMADIN  What changed:  Another medication with the same name was added. Make sure  you understand how and when to take each.   1 mg, Oral, Every Other Day      warfarin 1 MG tablet  Commonly known as:  COUMADIN  What changed:  You were already taking a medication with the same name, and this prescription was added. Make sure you understand how and when to take each.   2 mg po every other day on days not taking 1 mg         Continue These Medications      Instructions Start Date   albuterol sulfate  (90 Base) MCG/ACT inhaler  Commonly known as:  PROVENTIL HFA;VENTOLIN HFA;PROAIR HFA   2 puffs, Inhalation, ProAir  (90 Base) MCG/ACT Inhalation Aerosol Solution; Patient Sig: ProAir  (90 Base) MCG/ACT Inhalation Aerosol Solution inhale 2 puffs by mouth every 4 hours if needed; 8.5; 11; 07-Apr-2014; Act      ALPRAZolam 1 MG tablet  Commonly known as:  XANAX   1 mg, Oral, Daily PRN      baclofen 10 MG tablet  Commonly known as:  LIORESAL   take 1 tablet by mouth three times a day      CRESTOR 20 MG tablet  Generic drug:  rosuvastatin   20 mg, Oral, Daily      diphenoxylate-atropine 2.5-0.025 MG per tablet  Commonly known as:  LOMOTIL   1 tablet, Oral, 4 Times Daily PRN      montelukast 10 MG tablet  Commonly known as:  SINGULAIR   10 mg, Oral, Nightly      sertraline 50 MG tablet  Commonly known as:  ZOLOFT   TAKE 1 TABLET BY MOUTH EVERY DAY      silver sulfadiazine 1 % cream  Commonly known as:  SILVADENE   Topical, 2 Times Daily         Stop These Medications    aluminum-magnesium hydroxide 200-200 MG/5ML suspension, diphenhydrAMINE 12.5 MG/5ML liquid, lidocaine viscous 2 % solution, nystatin 038683 UNIT/ML suspension     amiodarone 200 MG tablet  Commonly known as:  PACERONE     HYDROcodone-acetaminophen 7.5-325 MG per tablet  Commonly known as:  NORCO     ondansetron ODT 8 MG disintegrating tablet  Commonly known as:  ZOFRAN-ODT          Antibiotics until 9/3    Patient Instructions:       Future Appointments   Date Time Provider Department Center   8/29/2019  1:40 PM LAB CHAIR 1  [facility]  LAB [facility]   8/29/2019  2:20 PM Irish Torres, APRN [facility]   9/3/2019 11:00 AM Corby Ford MD [facility] ID [facility] None        Follow-up Information     Faisal Frias MD. Schedule an appointment as soon as possible for a visit in 1 month(s).    Specialty:  Vascular Surgery  Why:  With Dr. Frias for a wound check   Contact information:  [address] [facility]  [address]  [facility] [id number] [phone]             Epley, James, APRN Follow up.    Specialty:  Family Medicine  Contact information:  [address]  [address]  [facility] [id number] [phone]                   Discharge Order (From admission, onward)    Start     Ordered    08/20/19 0902  Discharge patient  Once     Comments:  If oxygen arranged sats >92% on 2 liters, dbp <100   Expected Discharge Date:  08/20/19    Discharge Disposition:  Home or Self Care    Physician of Record for Attribution - Please select from Treatment Team:  FREDY PATTON [[id number]]    Review needed by CMO to determine Physician of Record:  No       Question Answer Comment   Physician of Record for Attribution - Please select from Treatment Team FREDY PATTON    Review needed by CMO to determine Physician of Record No        08/20/19 0909          Diet Order   Procedures   • Diet Regular; Thin         Discharge instructions:  Follow up with your primary care provider in 1-2 weeks with a cbc and cmp     dressing changes to left upper chest    2 liters of oxygen at all times on discharge    INR in the next couple days at home    Home health    Total time spent discharging patient including evaluation, post hospitalization follow up,  medication and post hospitalization instructions and education, total time exceeds 30 minutes.    Signed:  Fredy Patton MD  8/20/2019  12:32 PM

## 2019-08-20 NOTE — SIGNIFICANT NOTE
08/20/19 1315   PICC Single Lumen 08/20/19 Right Basilic   Placement Date/Time: 08/20/19 1315   Hand Hygiene Completed: Yes  Size (Fr): 4  Description (optional): single lumen picc  Length (cm): 38 cm  Orientation: Right  Location: Basilic  Site Prep: Chlorhexidine  All 5 Sterile Barriers Used (Gloves, Gown, ...   Site Assessment Clean;Dry;Intact   #1 Lumen Status Blood return noted;Capped;Flushed;Normal saline locked   Length apolonia (cm) 0 cm  (TRIMMED 38CM)   Extremity Circumference (cm) 26 cm   Dressing Type Border Dressing;Antimicrobial dressing/disc;Securing device   Dressing Status Clean;Dry;Intact   Dressing Intervention New dressing   Liquid Adhesive Contraindicated (comment)   Dressing Change Due 08/27/19   Indication/Daily Review of Necessity intravenous fluid therapy;intravenous medication therapy;blood sampling  (HOME IV ABT)   LOT# [id number] EXPIRES 2020-07-31    CHEST XRAY ORDERED TO VERIFY PLACEMENT. DO NOT USE THE PICC UNTIL NOTIFIED PLACEMENT IS OK.

## 2019-08-20 NOTE — PROGRESS NOTES
Pharmacy Consult: Warfarin Dosing/ Monitoring    Cody Eldridge is a 74 y.o. male, estimated creatinine clearance is 74.3 mL/min (by C-G formula based on SCr of 0.96 mg/dL). weighing 79 kg (174 lb 3.2 oz).      Results from last 7 days   Lab Units 08/20/19  0552 08/19/19  0432 08/18/19  0549 08/17/19  0938 08/17/19  0321 08/16/19  0532 08/15/19  0317 08/14/19  0339   INR  2.03* 1.49* 1.29* 1.28*  --   --   --   --    HEMOGLOBIN g/dL 8.9* 8.7* 9.0*  --  9.3* 8.8* 7.1* 8.6*   HEMATOCRIT % 28.1* 29.0* 29.0*  --  29.9* 27.7* 22.7* 28.2*   PLATELETS 10*3/mm3 171  171 168  168 174  174  --  146  146 135*  135* 111*  111* 110*  110*     Results from last 7 days   Lab Units 08/20/19  0552 08/19/19  0432 08/18/19  1715 08/18/19  0549   SODIUM mmol/L 139 139  --  144   POTASSIUM mmol/L 3.6 3.9 4.1 3.3*   CHLORIDE mmol/L 103 105  --  108*   CO2 mmol/L 27.1 24.5  --  24.7   BUN mg/dL 12 12  --  16   CREATININE mg/dL 0.96 1.01  --  1.06   CALCIUM mg/dL 7.6* 7.2*  --  7.2*   BILIRUBIN mg/dL 0.6 0.6  --  0.6   ALK PHOS U/L 102 101  --  102   ALT (SGPT) U/L <5 6  --  5   AST (SGOT) U/L 34 39  --  43*   GLUCOSE mg/dL 80 90  --  104*     Anticoagulation history: PTA - Warfarin 1 mg po QOD (Dr Myriam Omer, cardiology)    [facility] Anticoagulation:  Consulting provider: Dr Fredy Patton  Start date: 8/17/2019  Indication: AFIB - full anticoagulation  Target INR: 2-3  Expected duration: TBD   Bridge Therapy: No              and enoxapain 40 mg SQ qday for DVT prophylaxis  Date  8/17 8/18 8/19 8/20         INR  1.28  1.29 1.49 2.03         Warfarin dose  1 mg  2 mg 2.5 mg            Potential DI's with Warfarin:  - Sertraline: may cause increased bleed risk due to antiplatelet effects of SSRIs  - cefazolin: may cause increased AC effects of warfarin   - Lansoprazole: possible increased serum concentration of warfarin     Relevant nutrition status: Regular diet     Education complete?/ Date: pending    Assessment/Plan:  1)  Decrease warfarin to 1 mg today.   2) INR ordered daily with AM labs.     Pharmacy will continue to follow until discharge or discontinuation of warfarin.     Ziyad Wright PharmD, ZEHRA, BCPS  08/20/19 1:51 PM

## 2019-08-20 NOTE — TELEPHONE ENCOUNTER
Hospital called requesting home health for the patient. The patient is being released today and will need assistance tonight. Please call Maria M at [phone]. Thank you.

## 2019-08-21 ENCOUNTER — READMISSION MANAGEMENT (OUTPATIENT)
Dept: CALL CENTER | Facility: HOSPITAL | Age: 74
End: 2019-08-21

## 2019-08-21 NOTE — OUTREACH NOTE
Prep Survey      Responses   Facility patient discharged from?  [facility]   Is patient eligible?  Yes   Discharge diagnosis  New onset CHF, afib., squamous cell carcinoma of neck, COPD, depression with anxiety, CAD, chemo induced neutropenia, Vasc. cath infection, chemo induced thrombocytopenia, acute renal injury, anemia of chemo, oral thrush, septic shock, hypokalemia   Does the patient have one of the following disease processes/diagnoses(primary or secondary)?  Sepsis [And CHF]   Does the patient have Home health ordered?  Yes   What is the Home health agency?   [facility]   Is there a DME ordered?  No   Comments regarding appointments  Pt to schedule follow up appointments.   Prep survey completed?  Yes          Meghann Crouch RN

## 2019-08-21 NOTE — PROGRESS NOTES
Case Management Discharge Note    Final Note: Discharged home with family and friend support.  Home IV antbx, Home oxygen and dressing changes.     Destination      No service has been selected for the patient.      Durable Medical Equipment      No service has been selected for the patient.      Dialysis/Infusion      Service Provider Request Status Selected Services Address Phone Number Fax Number    [facility] Selected Infusion and IV Therapy [address], [place] [id number] [phone]      Home Medical Care      No service has been selected for the patient.      Therapy      No service has been selected for the patient.      Community Resources      No service has been selected for the patient.        Transportation Services  Private: Car(Friends transported)    Final Discharge Disposition Code: 06 - home with home health care

## 2019-08-22 ENCOUNTER — READMISSION MANAGEMENT (OUTPATIENT)
Dept: CALL CENTER | Facility: HOSPITAL | Age: 74
End: 2019-08-22

## 2019-08-22 ENCOUNTER — DOCUMENTATION (OUTPATIENT)
Dept: NUTRITION | Facility: HOSPITAL | Age: 74
End: 2019-08-22

## 2019-08-22 NOTE — PROGRESS NOTES
"Outpatient Oncology Nutrition Follow Up:     Called patient at home today after he was discharged from the hospital. He is eating \"a little\", drinking Boost plus. He says that eating is getting a little better. He was able to put his dentures in and is getting used to eating with them although they do not fit very well. He some adhesive to use.  Encouraged intake. Will continue to follow up.   "

## 2019-08-22 NOTE — OUTREACH NOTE
Sepsis Week 1 Survey      Responses   Facility patient discharged from?  [facility]   Does the patient have one of the following disease processes/diagnoses(primary or secondary)?  Sepsis   Is there a successful TCM telephone encounter documented?  No   Week 1 attempt successful?  Yes   Call start time  1256   Call end time  1300   Discharge diagnosis  New onset CHF, afib., squamous cell carcinoma of neck, COPD, depression with anxiety, CAD, chemo induced neutropenia, Vasc. cath infection, chemo induced thrombocytopenia, acute renal injury, anemia of chemo, oral thrush, septic shock, hypokalemia   Is patient permission given to speak with other caregiver?  Yes   List who call center can speak with  spouse- Elise   Person spoke with today (if not patient) and relationship  spouse- Elise   Meds reviewed with patient/caregiver?  Yes   Is the patient having any side effects they believe may be caused by any medication additions or changes?  No   Does the patient have all medications related to this admission filled (includes all antibiotics, inhalers, nebulizers,steroids,etc.)  Yes   Is the patient taking all medications as directed (includes completed medication regime)?  Yes   Does the patient have a primary care provider?   Yes   Does the patient have an appointment with their PCP within 7 days of discharge?  Yes   Has the patient kept scheduled appointments due by today?  N/A   What is the Home health agency?   [facility]   Has home health visited the patient within 72 hours of discharge?  Call prior to 72 hours   Psychosocial issues?  No   Did the patient receive a copy of their discharge instructions?  Yes   Nursing interventions  Reviewed instructions with patient   What is the patient's perception of their health status since discharge?  Improving   Nursing interventions  Nurse provided patient education   Is the patient/caregiver able to teach back Sepsis?  S - Shivering,fever or very cold, E - Extreme pain or  generalized discomfort (worst ever,especially abdomen), P - Pale or discolored skin, S - Sleepy, difficult to arouse,confused, I -   I feel like I might die-a feeling of hopelessness, S - Short of breath   Nursing interventions  Nurse provided patient education   Is patient/caregiver able to teach back steps to recovery at home?  Set small, achievable goals for return to baseline health, Rest and regain strength   Is the patient/caregiver able to teach back signs and symptoms of worsening condition:  Fever, Hyperthermia, Rapid heart rate (>90), Shortness of breath/rapid respiratory rate, Altered mental status(confusion/coma), Edema   Is the patient/caregiver able to teach back the hierarchy of who to call/visit for symptoms/problems? PCP, Specialist, Home health nurse, Urgent Care, ED, 911  Yes   Additional teach back comments  Per spouse, pt is doing well, he is doing his IV antibiotics at home with no trouble, no questions or concerns at this time.   Week 1 call completed?  Yes          Juju Caicedo RN

## 2019-08-22 NOTE — PAYOR COMM NOTE
"Cody Eldridge (74 y.o. Male)     PLEASE SEE ATTACHED DC SUMMARY    REF#[id number]    THANK YOU    ANTONETTE KNUTSON LPN CCP        Date of Birth Social Security Number Address Home Phone MRN    1945  [address] [place], [place] [address] [phone] [id number]    Rastafarian Marital Status          Orthodox        Admission Date Admission Type Admitting Provider Attending Provider Department, Room/Bed    8/3/19 Emergency Macho Fermin MD  [address], [address]    Discharge Date Discharge Disposition Discharge Destination        2019 Home or Self Care              Attending Provider:  (none)   Allergies:  Benadryl [Diphenhydramine Hcl]    Isolation:  None   Infection:  None   Code Status:  Prior    Ht:  180.3 cm (71\")   Wt:  79 kg (174 lb 3.2 oz)    Admission Cmt:  None   Principal Problem:  MSSA bacteremia [R78.81]                 Active Insurance as of 8/3/2019     Primary Coverage     Payor Plan Insurance Group Employer/Plan Group    [employer] [place] MEDICARE REPLACEMENT [facility] REPL [id number]     Payor Plan Address Payor Plan Phone Number Payor Plan Fax Number Effective Dates    [address] [address] [phone]  2016 - None Entered    [facility] [id number]       Subscriber Name Subscriber Birth Date Member ID       CODY ELDRIDGE 1945 [id number]                 Emergency Contacts      (Rel.) Home Phone Work Phone Mobile Phone    Elise Rai (Spouse) [phone] -- --    StarJo (Grandchild) [phone] -- --    JOSIAS CHAD (Daughter) [phone] -- --               Discharge Summary      Fredy Patton MD at 2019 12:18 PM                       [facility]    [facility]  [phone]    DISCHARGE SUMMARY  [facility]    Patient Identification:  Name: Cody Eldridge  Age: 74 y.o.  Sex: male  :  1945  MRN: [id number]  Primary Care Physician: Epley, James, APRN    Admit date: " 8/3/2019  Discharge date and time:      Discharge Diagnoses:  MSSA bacteremia    New onset of congestive heart failure (CMS/HCC)    Atrial fibrillation (CMS/HCC)    Squamous cell carcinoma of neck    COPD (chronic obstructive pulmonary disease) (CMS/HCC)    Depression with anxiety    CAD (coronary artery disease)    Chemotherapy-induced neutropenia (CMS/HCC)    Vascular catheter infection (CMS/HCC)    Chemotherapy-induced thrombocytopenia    Acute renal injury (CMS/HCC)    Anemia associated with chemotherapy    Oral thrush    Shock, septic (CMS/HCC)    Hypokalemia       History of present illness from H&P:    74-year-old gentleman with a history of atrial fibrillation which by history is been very difficult to control as for his rate is concerned.  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  He did receive a course of chemotherapy yesterday.  He states that after returning home he was becoming increasingly short of breath.  The shortness of breath would be worsened by exertion or by lying flat.  He denies any chest pain associated with this.  No palpitations, no lightheaded spells.  In the emergency room he was noted to be in A. fib with rapid ventricular response.  He was given 1 dose of labetalol as well as a dose of digoxin 0.5 mg IV.  He was also given a dose of Lasix 40 mg IV.  He is breathing a little easier now.  Still notes no palpitation despite a rapid rate noted on the monitor.  In addition he is also been noting increased tenderness, soreness around his MediPort site over the last 24 hours.  This was placed this last June.  No fever sweats or chills.     Hospital Course:   The patient was admitted to the hospital.  He has a history of head neck cancer patient on chemotherapy and followed by the [facility], he presents with sepsis secondary to infected MediPort.  The patient worsened on admission and was  transferred to ICU.      He was stabilized in the unit and transferred to the floor.  He has recovered from septic shock.  Early in the hospitalization 1 of the major factors was his mucositis with very poor oral intake and hypernatremia, which is improving each day.  The patient is tolerating p.o. well now and his sodium level has normalized.  He did require a core track during the hospitalization for oral intake.    The patient developed atrial fibrillation with rapid ventricular response and cardiology saw him.  Heart rate has stabilized on Coreg and the patient is anxious for discharge.     The patient is still oxygen requiring at 2 L.  Patient is encouraged to use incentive spirometry when he leaves the hospital.  He states he was using oxygen occasionally outpatient.  Prior to the hospitalization.    Patient has a wound from his port removal on the right upper chest and was seen by vascular surgery today prior to discharge and it saw there is good granulation tissue wound is healing appropriately.    The patient will need to continue with cefazolin until 9/3.    Further hospital course by problem list:    MSSA bacteremia  - had septic shock shortly after admission requiring temporary care in the ICU and pressor therapy-hemodynamics now stable; appreciate LPC attention to patient  - this was complicated by neutropenia-he has done better with neupogen  - had JERICHO secondary to sepsis which has now resolved  - f/u cultures NGTD  - port removed 8/5/19 with Dr. Castaneda  - continue on cefazolin 2gIV Q8H for 4wk course (stop date 9/3/19 per ID)  - weekly cbc/diff and creatinine faxed to ID clinic at [phone]  - appreciate ID recs-ID arranging follow up in their clinic on 9/3/19  - PICC done today     Hypernatremia-much improved with better oral intake  -Nephrology has signed off       New onset of congestive heart failure (CMS/HCC), history of SVT and ablation in 2006 ( [facility]),  nonobstructive CA       Atrial fibrillation (CMS/HCC)-anticoagulation has been started  -INR 2.03 today  -Warfarin 2.5 daily on discharge       Squamous cell carcinoma of neck  -Plan per oncology  -Chemotherapy-induced neutropenia (CMS/HCC)  -Chemotherapy-induced thrombocytopenia  -Anemia associated with chemotherapy- improved, good reticulocyte count       COPD (chronic obstructive pulmonary disease) (CMS/HCC)       Depression with anxiety       Vascular catheter infection (CMS/HCC) with port removal       Acute renal injury (CMS/HCC)-creatinine peaked at 2.98 on 8/8, patient was seen during hospitalization by nephrology       Oral thrush-improving       Shock, septic (CMS/HCC)-resolved       Pleural effusion-noted, minimal oxygen requirement on exam patient has reasonable breath sounds bilaterally.  Sound equal.    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  Patient is currently on week 5 of carboplatin and Taxol had significant delay on 7/31/2019        The patient was seen and examined on the day of discharge.    Consults:   Consults     Date and Time Order Name Status Description    8/6/2019 1558 Inpatient Nephrology Consult      8/5/2019 0609 Inpatient Pulmonology Consult Completed     8/4/2019 0810 Inpatient Infectious Diseases Consult Completed     8/3/2019 1716 Inpatient Cardiology Consult Completed     8/3/2019 1716 Hematology & Oncology Inpatient Consult Completed     8/3/2019 1655 Inpatient Vascular Surgery Consult Completed     8/3/2019 1403 L[facility] (on-call MD unless specified) Details Completed     8/3/2019 1319 Cardiology (on-call MD unless specified) Completed           Results from last 7 days   Lab Units 08/20/19  0552   WBC 10*3/mm3 7.35   HEMOGLOBIN g/dL 8.9*   HEMATOCRIT % 28.1*   PLATELETS 10*3/mm3 171  171       Results from last 7 days   Lab Units 08/20/19  0552   SODIUM mmol/L 139    POTASSIUM mmol/L 3.6   CHLORIDE mmol/L 103   CO2 mmol/L 27.1   BUN mg/dL 12   CREATININE mg/dL 0.96   GLUCOSE mg/dL 80   CALCIUM mg/dL 7.6*       Significant Diagnostic Studies:   Lab Results   Component Value Date    WBC 7.35 08/20/2019    HGB 8.9 (L) 08/20/2019    HCT 28.1 (L) 08/20/2019     08/20/2019     08/20/2019     Lab Results   Component Value Date     08/20/2019    K 3.6 08/20/2019     08/20/2019    CO2 27.1 08/20/2019    BUN 12 08/20/2019    CREATININE 0.96 08/20/2019    GLUCOSE 80 08/20/2019     Lab Results   Component Value Date    CALCIUM 7.6 (L) 08/20/2019    MG 1.9 08/19/2019    PHOS 3.0 08/19/2019     Lab Results   Component Value Date    AST 34 08/20/2019    ALT <5 08/20/2019    ALKPHOS 102 08/20/2019     Lab Results   Component Value Date    INR 2.03 (H) 08/20/2019     No results found for: COLORU, CLARITYU, SPECGRAV, PHUR, PROTEINUR, GLUCOSEU, KETONESU, BLOODU, NITRITE, LEUKOCYTESUR, BILIRUBINUR, UROBILINOGEN, RBCUA, WBCUA, BACTERIA, UACOMMENT  No results found for: TROPONINT, TROPONINI, BNP  No components found for: HGBA1C;2  No components found for: TSH;2    Imaging Results (all)     Procedure Component Value Units Date/Time    XR Chest PA & Lateral [[id number]] Collected:  08/10/19 1505     Updated:  08/10/19 1548    Narrative:       TWO-VIEW CHEST     HISTORY: Shortness of breath.     FINDINGS: The lungs are well expanded with persistent increased density  at the left base that obscures the diaphragm and this has the appearance  of both pleural effusion and dense atelectasis and possible pneumonia  and is quite similar to the study of 4 days ago. There is also a small  right pleural effusion and some minimal atelectasis that is unchanged.  The heart remains mildly enlarged. A feeding tube ends below the  diaphragm.     This report was finalized on 8/10/2019 3:45 PM by Dr. Braydon Bahena M.D.       XR Abdomen KUB [[id number]] Collected:  08/06/19 2043     Updated:   08/06/19 2047    Narrative:       ONE VIEW PORTABLE ABDOMEN 8:14 PM     HISTORY: Cortrak tube placement     FINDINGS: The Cortrak tube ends in the lower stomach.     This report was finalized on 8/6/2019 8:44 PM by Dr. Braydon Bahena M.D.       XR Chest Post CVA Port [[id number]] Collected:  08/06/19 1335     Updated:  08/06/19 1404    Narrative:       XR CHEST POST CVA PORT-     HISTORY: 74-year-old male with left-sided line placement. Head and neck  cancer and multiple medical problems.     FINDINGS: The left MediPort catheter tip is within the SVC. Right  MediPort catheter has been removed. There is no convincing evidence for  pneumothorax. There is increased density of both the mid and lower lung  fields which may be secondary to layering effusions, but pneumonia  cannot be excluded on this single projection. The density at the mid and  lower lung fields is more prominent than on yesterday's chest radiograph  and new since the chest radiograph from 06/28/2019. There is more  prominent pulmonary vascular congestion than previously as well.     This report was finalized on 8/6/2019 2:01 PM by Dr. Ruby Duggan M.D.       XR Chest 1 View [[id number]] Collected:  08/05/19 0724     Updated:  08/05/19 0729    Narrative:       XR CHEST 1 VW-     Clinical: CHF     COMPARISON 08/03/2019     FINDINGS: Mediport catheter stable in position. There is cardiomegaly  similar to the previous examination. There is again poor delineation of  the left hemidiaphragm with left lung base opacity similar to the  previous examination. This could reflect underlying effusion, infiltrate  or atelectasis. There is improved aeration at the right lung base,  persistent airspace disease at this location. Consolidation or  infiltrate.     There are monitoring leads superimposing the chest. The remainder the  examination is unremarkable.     This report was finalized on 8/5/2019 7:26 AM by Dr. Roosevelt Fernando M.D.       XR Chest 1 View [[id number]]  Collected:  08/03/19 1311     Updated:  08/03/19 1316    Narrative:       XR CHEST 1 VW-     HISTORY: Male who is 74 years-old,  short of breath, chest pain     TECHNIQUE: Frontal view of the chest     COMPARISON: 06/28/2019     FINDINGS: Stable appearing right chest port. Heart is enlarged. Aorta is  tortuous, calcified. Pulmonary vasculature is unremarkable.  Atelectasis/infiltrate at the lung bases with left pleural effusion.  This appearance represents interval worsening. No pneumothorax. No acute  osseous process.       Impression:       Atelectasis/infiltrate at the lung bases with left pleural  effusion, follow-up recommended. Cardiomegaly. Tortuous aorta.     This report was finalized on 8/3/2019 1:13 PM by Dr. Josep Garcia M.D.         No results found for: SITE, ALLENTEST, PHART, TOM4MHF, PO2ART, OCM0VEW, BASEEXCESS, F7ORTMAO, HGBBG, HCTABG, OXYHEMOGLOBI, METHHGBN, CARBOXYHGB, CO2CT, BAROMETRIC, MODALITY, FIO2       Discharge Medications      New Medications      Instructions Start Date   carvedilol 25 MG tablet  Commonly known as:  COREG   25 mg, Oral, Every 12 Hours Scheduled      ceFAZolin in dextrose 2-4 GM/100ML-% solution IVPB  Commonly known as:  ANCEF   2 g, Intravenous, Every 8 Hours      Lidocaine Viscous HCl 2 % solution  Commonly known as:  XYLOCAINE   5 mL, Oral, Every 3 Hours PRN      magic mouthwash   5 mL, Swish & Spit, Every 4 Hours      terazosin 1 MG capsule  Commonly known as:  HYTRIN   1 mg, Oral, Nightly         Changes to Medications      Instructions Start Date   warfarin 2.5 MG tablet  Commonly known as:  COUMADIN  What changed:    · medication strength  · how much to take  · Another medication with the same name was removed. Continue taking this medication, and follow the directions you see here.   2.5 mg, Oral, Daily         Continue These Medications      Instructions Start Date   albuterol sulfate  (90 Base) MCG/ACT inhaler  Commonly known as:  PROVENTIL  HFA;VENTOLIN HFA;PROAIR HFA   2 puffs, Inhalation, ProAir  (90 Base) MCG/ACT Inhalation Aerosol Solution; Patient Sig: ProAir  (90 Base) MCG/ACT Inhalation Aerosol Solution inhale 2 puffs by mouth every 4 hours if needed; 8.5; 11; 07-Apr-2014; Act      ALPRAZolam 1 MG tablet  Commonly known as:  XANAX   1 mg, Oral, Daily PRN      baclofen 10 MG tablet  Commonly known as:  LIORESAL   take 1 tablet by mouth three times a day      CRESTOR 20 MG tablet  Generic drug:  rosuvastatin   20 mg, Oral, Daily      diphenoxylate-atropine 2.5-0.025 MG per tablet  Commonly known as:  LOMOTIL   1 tablet, Oral, 4 Times Daily PRN      montelukast 10 MG tablet  Commonly known as:  SINGULAIR   10 mg, Oral, Nightly      sertraline 50 MG tablet  Commonly known as:  ZOLOFT   TAKE 1 TABLET BY MOUTH EVERY DAY      silver sulfadiazine 1 % cream  Commonly known as:  SILVADENE   Topical, 2 Times Daily         Stop These Medications    aluminum-magnesium hydroxide 200-200 MG/5ML suspension, diphenhydrAMINE 12.5 MG/5ML liquid, lidocaine viscous 2 % solution, nystatin 803923 UNIT/ML suspension     amiodarone 200 MG tablet  Commonly known as:  PACERONE     HYDROcodone-acetaminophen 7.5-325 MG per tablet  Commonly known as:  NORCO     ondansetron ODT 8 MG disintegrating tablet  Commonly known as:  ZOFRAN-ODT          Antibiotics until 9/3    Patient Instructions:       Future Appointments   Date Time Provider Department Center   8/29/2019  1:40 PM [facility]   8/29/2019  2:20 PM Irish Torres APRN [facility]  [facility]   9/3/2019 11:00 AM Corby Ford MD [facility] ID [facility] None        Follow-up Information     Faisal Frias MD. Schedule an appointment as soon as possible for a visit in 1 month(s).    Specialty:  Vascular Surgery  Why:  With Dr. Frias for a wound check   Contact information:  [address] [place] [address]  [phone]             Epley, James, APRN  Follow up.    Specialty:  Family Medicine  Contact information:  [address]  [place] [id number]  [phone]                   Discharge Order (From admission, onward)    Start     Ordered    08/20/19 0902  Discharge patient  Once     Comments:  If oxygen arranged sats >92% on 2 liters, dbp <100   Expected Discharge Date:  08/20/19    Discharge Disposition:  Home or Self Care    Physician of Record for Attribution - Please select from Treatment Team:  FREDY PATTON [[id number]]    Review needed by CMO to determine Physician of Record:  No       Question Answer Comment   Physician of Record for Attribution - Please select from Treatment Team FREDY PATTON    Review needed by CMO to determine Physician of Record No        08/20/19 0909          Diet Order   Procedures   • Diet Regular; Thin         Discharge instructions:  Follow up with your primary care provider in 1-2 weeks with a cbc and cmp     dressing changes to left upper chest    2 liters of oxygen at all times on discharge    INR in the next couple days at home    Home health    Total time spent discharging patient including evaluation, post hospitalization follow up,  medication and post hospitalization instructions and education, total time exceeds 30 minutes.    Signed:  Fredy Patton MD  8/20/2019  12:32 PM      Electronically signed by Fredy Patton MD at 8/20/2019  1:59 PM

## 2019-08-23 ENCOUNTER — DOCUMENTATION (OUTPATIENT)
Dept: RADIATION ONCOLOGY | Facility: HOSPITAL | Age: 74
End: 2019-08-23

## 2019-08-23 ENCOUNTER — APPOINTMENT (OUTPATIENT)
Dept: ONCOLOGY | Facility: CLINIC | Age: 74
End: 2019-08-23

## 2019-08-23 ENCOUNTER — APPOINTMENT (OUTPATIENT)
Dept: LAB | Facility: HOSPITAL | Age: 74
End: 2019-08-23

## 2019-08-23 ENCOUNTER — ANTICOAGULATION VISIT (OUTPATIENT)
Dept: PHARMACY | Facility: HOSPITAL | Age: 74
End: 2019-08-23

## 2019-08-23 LAB — INR PPP: 1.4

## 2019-08-23 NOTE — PROGRESS NOTES
Patient: Cody Eldridge  YOB: 1945    RADIATION THERAPY TREATMENT SUMMARY  Diagnosis: 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)    Patient Care Team:  Epley, James, APRN as PCP - General (Family Medicine)  Evelin, Payal Zaragoza MD as Consulting Physician (Pain Medicine)  Lorna Chaidez (Nurse Practitioner)  Kristal Cowart [facility] as Pharmacist  Latta, Fritz Snyder MD as Referring Physician (Otolaryngology)  Pablo Heaton MD as Consulting Physician (Hematology and Oncology)  Annie Davila MD as Consulting Physician (Radiation Oncology)  Chetan Heaton MD as Consulting Physician (Urology)    Cody Eldridge recently discontinued his course of radiation therapy prescribed for the above-mentioned diagnosis.  Below, please find the specifics of the course of treatment delivered:    Radiation Details:    Dates of treatment:  6/19/2019 - 7/30/2019.  Treatment Site - PRIMARY AND BILATERAL NECKS   Treatment Intent - Curative  Total Dose in cGy - 4600  Number of Treatments - 23  Dose per fraction - 200 cGy per fraction  Fractions per day - 1 fx/day  Fractions per week - 5 fx/week  Treatment Type - Rapid Arc  Energy - 6 MVP  Normalization - Prescribed at 100%, Volumetric Normalization-- see below, See below  Imaging/Field Verification - Simulation before first treatment to verify field, blocking, placement and positioning, Simulation for all ports of change, IGRT using CBCT daily    Tolerance and Toxicities: he tolerated the treatments well initially but then developed significant cytopenias, requiring 2 treatment breaks from 7/18 to 7/24 & rom 7/27 to 7/29 both for low labs.  He was concurrently developing the expected mucositis, sore throat and dysphagia. He then required admission for port infection with neutropenia, sepsis, atrial fibrillation with CHF and remained in house from 8/3 thru 8/20. He then discontinued both his chemo and radiation. he completed these treatments in  41 elapsed days.    Follow-up Plans:  The patient has follow up plans established with [facility] and we have asked to let us know if we can be of any further help or if he should decide to resume care.

## 2019-08-23 NOTE — PROGRESS NOTES
Anticoagulation Clinic Progress Note    Anticoagulation Summary  As of 2019    INR goal:   2.0-3.0   TTR:   46.4 % (9 mo)   INR used for dosin.40! (2019)   Warfarin maintenance plan:   2 mg every day   Weekly warfarin total:   14 mg   Plan last modified:   Mere Vincent [facility] (2019)   Next INR check:   2019   Priority:   Maintenance   Target end date:   Indefinite    Indications    Atrial fibrillation (CMS/HCC) [I48.91]             Anticoagulation Episode Summary     INR check location:       Preferred lab:       Send INR reminders to:   [facility] CLINICAL [place]    Comments:         Anticoagulation Care Providers     Provider Role Specialty Phone number    Myriam Omer MD Referring Cardiology [phone]    Nallely Stoll [facility] Responsible Pharmacy [phone]          Drug interactions: has remained unchanged.  Diet: has remained unchanged.    Clinic Interview:  No pertinent clinical findings have been reported.    INR History:  Anticoagulation Monitoring 2019   INR 4.10 2.70 1.40   INR Date 2019   INR Goal 2.0-3.0 2.0-3.0 2.0-3.0   Trend Same Same Down   Last Week Total 18 mg 24 mg 11.5 mg   Next Week Total 20 mg 24 mg 14 mg   Sun - 4 mg 2 mg   Mon - 2 mg 2 mg   Tue - 4 mg -   Wed Hold () 4 mg -   Thu - 4 mg -   Fri - 2 mg 2 mg   Sat - 4 mg 2 mg   Visit Report - - -   Some recent data might be hidden       Plan:  1. INR is 1.4 today- see above in Anticoagulation Summary.   Will instruct Cody Eldridge to take 2mg daily - see above in Anticoagulation Summary.  2. Follow up with Home Health RN to check level in 19   Spoke with patient to clarify dosage regimen and instruct patient. Also spoke with GABE Salvador RN on instructions to draw INR on .  3. Pt has agreed to only be called if INR out of range. They have been instructed to call if any changes in medications, doses, concerns, etc. Patient expresses  understanding and has no further questions at this time.    Mere Vincent, [facility]

## 2019-08-26 ENCOUNTER — LAB REQUISITION (OUTPATIENT)
Dept: LAB | Facility: HOSPITAL | Age: 74
End: 2019-08-26

## 2019-08-26 DIAGNOSIS — Z00.00 ENCOUNTER FOR GENERAL ADULT MEDICAL EXAMINATION WITHOUT ABNORMAL FINDINGS: ICD-10-CM

## 2019-08-26 LAB
ALBUMIN SERPL-MCNC: 2.5 G/DL (ref 3.5–5.2)
ALBUMIN/GLOB SERPL: 0.8 G/DL
ALP SERPL-CCNC: 91 U/L (ref 39–117)
ALT SERPL W P-5'-P-CCNC: <5 U/L (ref 1–41)
ANION GAP SERPL CALCULATED.3IONS-SCNC: 12.9 MMOL/L (ref 5–15)
AST SERPL-CCNC: 36 U/L (ref 1–40)
BASOPHILS # BLD AUTO: 0.04 10*3/MM3 (ref 0–0.2)
BASOPHILS NFR BLD AUTO: 0.8 % (ref 0–1.5)
BILIRUB SERPL-MCNC: 0.9 MG/DL (ref 0.2–1.2)
BUN BLD-MCNC: 8 MG/DL (ref 8–23)
BUN/CREAT SERPL: 10.4 (ref 7–25)
CALCIUM SPEC-SCNC: 6.8 MG/DL (ref 8.6–10.5)
CHLORIDE SERPL-SCNC: 92 MMOL/L (ref 98–107)
CO2 SERPL-SCNC: 32.1 MMOL/L (ref 22–29)
CREAT BLD-MCNC: 0.77 MG/DL (ref 0.76–1.27)
DEPRECATED RDW RBC AUTO: 74.5 FL (ref 37–54)
EOSINOPHIL # BLD AUTO: 0.01 10*3/MM3 (ref 0–0.4)
EOSINOPHIL NFR BLD AUTO: 0.2 % (ref 0.3–6.2)
ERYTHROCYTE [DISTWIDTH] IN BLOOD BY AUTOMATED COUNT: 19.8 % (ref 12.3–15.4)
GFR SERPL CREATININE-BSD FRML MDRD: 98 ML/MIN/1.73
GLOBULIN UR ELPH-MCNC: 3.3 GM/DL
GLUCOSE BLD-MCNC: 90 MG/DL (ref 65–99)
HCT VFR BLD AUTO: 27.6 % (ref 37.5–51)
HGB BLD-MCNC: 9 G/DL (ref 13–17.7)
IMM GRANULOCYTES # BLD AUTO: 0.02 10*3/MM3 (ref 0–0.05)
IMM GRANULOCYTES NFR BLD AUTO: 0.4 % (ref 0–0.5)
LYMPHOCYTES # BLD AUTO: 0.35 10*3/MM3 (ref 0.7–3.1)
LYMPHOCYTES NFR BLD AUTO: 6.8 % (ref 19.6–45.3)
MCH RBC QN AUTO: 33.8 PG (ref 26.6–33)
MCHC RBC AUTO-ENTMCNC: 32.6 G/DL (ref 31.5–35.7)
MCV RBC AUTO: 103.8 FL (ref 79–97)
MONOCYTES # BLD AUTO: 0.69 10*3/MM3 (ref 0.1–0.9)
MONOCYTES NFR BLD AUTO: 13.4 % (ref 5–12)
NEUTROPHILS # BLD AUTO: 4.03 10*3/MM3 (ref 1.7–7)
NEUTROPHILS NFR BLD AUTO: 78.4 % (ref 42.7–76)
NRBC BLD AUTO-RTO: 0.2 /100 WBC (ref 0–0.2)
PLATELET # BLD AUTO: 141 10*3/MM3 (ref 140–450)
PMV BLD AUTO: 11.5 FL (ref 6–12)
POTASSIUM BLD-SCNC: 2.2 MMOL/L (ref 3.5–5.2)
PROT SERPL-MCNC: 5.8 G/DL (ref 6–8.5)
RBC # BLD AUTO: 2.66 10*6/MM3 (ref 4.14–5.8)
SODIUM BLD-SCNC: 137 MMOL/L (ref 136–145)
WBC NRBC COR # BLD: 5.14 10*3/MM3 (ref 3.4–10.8)

## 2019-08-26 PROCEDURE — 85025 COMPLETE CBC W/AUTO DIFF WBC: CPT | Performed by: INTERNAL MEDICINE

## 2019-08-26 PROCEDURE — 80053 COMPREHEN METABOLIC PANEL: CPT | Performed by: INTERNAL MEDICINE

## 2019-08-26 RX ORDER — POTASSIUM CHLORIDE 20 MEQ/1
40 TABLET, EXTENDED RELEASE ORAL DAILY
Qty: 10 TABLET | Refills: 0 | Status: SHIPPED | OUTPATIENT
Start: 2019-08-26 | End: 2019-08-29 | Stop reason: SDUPTHER

## 2019-08-26 NOTE — TELEPHONE ENCOUNTER
Informed patient that his K+ came back a little low and therefore Dr. Ford sent in a prescription to [facility] for a weeks worth of potassium.

## 2019-08-27 ENCOUNTER — ANTICOAGULATION VISIT (OUTPATIENT)
Dept: PHARMACY | Facility: HOSPITAL | Age: 74
End: 2019-08-27

## 2019-08-27 LAB — INR PPP: 1.4

## 2019-08-27 RX ORDER — POTASSIUM CHLORIDE 20 MEQ/1
40 TABLET, EXTENDED RELEASE ORAL DAILY
Qty: 180 TABLET | Refills: 0 | OUTPATIENT
Start: 2019-08-27 | End: 2019-09-01

## 2019-08-27 NOTE — PROGRESS NOTES
Anticoagulation Clinic Progress Note    Anticoagulation Summary  As of 2019    INR goal:   2.0-3.0   TTR:   45.8 % (9.2 mo)   INR used for dosin.40! (2019)   Warfarin maintenance plan:   2 mg every day   Weekly warfarin total:   14 mg   Plan last modified:   Mere Vincent [facility] (2019)   Next INR check:   2019   Priority:   Maintenance   Target end date:   Indefinite    Indications    Atrial fibrillation (CMS/HCC) [I48.91]             Anticoagulation Episode Summary     INR check location:       Preferred lab:       Send INR reminders to:   [facility] CLINICAL [place]    Comments:         Anticoagulation Care Providers     Provider Role Specialty Phone number    Myriam Omer MD Referring Cardiology [phone]    Nallely Stoll [facility] Responsible Pharmacy [phone]          INR History:  Anticoagulation Monitoring 2019   INR 2.70 1.40 1.40   INR Date 2019   INR Goal 2.0-3.0 2.0-3.0 2.0-3.0   Trend Same Down Same   Last Week Total 24 mg 11.5 mg 12 mg   Next Week Total 24 mg 14 mg 18 mg   Sun 4 mg 2 mg -   Mon 2 mg 2 mg -   Tue 4 mg - 4 mg ()   Wed 4 mg - 4 mg ()   Thu 4 mg - 2 mg   Fri 2 mg 2 mg -   Sat 4 mg 2 mg -   Visit Report - - -   Some recent data might be hidden       Plan:  1. INR is Subtherapeutic today- see above in Anticoagulation Summary.   Faxed instructions to [facility] to Increase their warfarin regimen- see above in Anticoagulation Summary.  2. Follow up in 3 days      Kristal Cowart [facility]

## 2019-08-29 ENCOUNTER — LAB (OUTPATIENT)
Dept: LAB | Facility: HOSPITAL | Age: 74
End: 2019-08-29

## 2019-08-29 ENCOUNTER — READMISSION MANAGEMENT (OUTPATIENT)
Dept: CALL CENTER | Facility: HOSPITAL | Age: 74
End: 2019-08-29

## 2019-08-29 ENCOUNTER — OFFICE VISIT (OUTPATIENT)
Dept: ONCOLOGY | Facility: CLINIC | Age: 74
End: 2019-08-29

## 2019-08-29 VITALS
SYSTOLIC BLOOD PRESSURE: 129 MMHG | BODY MASS INDEX: 21.29 KG/M2 | HEIGHT: 71 IN | RESPIRATION RATE: 16 BRPM | WEIGHT: 152.1 LBS | HEART RATE: 102 BPM | TEMPERATURE: 98.5 F | OXYGEN SATURATION: 96 % | DIASTOLIC BLOOD PRESSURE: 73 MMHG

## 2019-08-29 DIAGNOSIS — C01 SQUAMOUS CELL CARCINOMA OF BASE OF TONGUE (HCC): Primary | ICD-10-CM

## 2019-08-29 DIAGNOSIS — C01 SQUAMOUS CELL CARCINOMA OF BASE OF TONGUE (HCC): ICD-10-CM

## 2019-08-29 DIAGNOSIS — Z79.01 CURRENT USE OF LONG TERM ANTICOAGULATION: ICD-10-CM

## 2019-08-29 DIAGNOSIS — I48.91 ATRIAL FIBRILLATION, UNSPECIFIED TYPE (HCC): ICD-10-CM

## 2019-08-29 LAB
ALBUMIN SERPL-MCNC: 2.9 G/DL (ref 3.5–5.2)
ALBUMIN/GLOB SERPL: 0.7 G/DL (ref 1.1–2.4)
ALP SERPL-CCNC: 106 U/L (ref 38–116)
ALT SERPL W P-5'-P-CCNC: <5 U/L (ref 0–41)
ANION GAP SERPL CALCULATED.3IONS-SCNC: 15.8 MMOL/L (ref 5–15)
AST SERPL-CCNC: 23 U/L (ref 0–40)
BASOPHILS # BLD AUTO: 0.05 10*3/MM3 (ref 0–0.2)
BASOPHILS NFR BLD AUTO: 0.6 % (ref 0–1.5)
BILIRUB SERPL-MCNC: 0.7 MG/DL (ref 0.2–1.2)
BUN BLD-MCNC: 10 MG/DL (ref 6–20)
BUN/CREAT SERPL: 13 (ref 7.3–30)
CALCIUM SPEC-SCNC: 7.3 MG/DL (ref 8.5–10.2)
CHLORIDE SERPL-SCNC: 93 MMOL/L (ref 98–107)
CO2 SERPL-SCNC: 29.2 MMOL/L (ref 22–29)
CREAT BLD-MCNC: 0.77 MG/DL (ref 0.7–1.3)
DEPRECATED RDW RBC AUTO: 72.6 FL (ref 37–54)
EOSINOPHIL # BLD AUTO: 0.02 10*3/MM3 (ref 0–0.4)
EOSINOPHIL NFR BLD AUTO: 0.2 % (ref 0.3–6.2)
ERYTHROCYTE [DISTWIDTH] IN BLOOD BY AUTOMATED COUNT: 18.5 % (ref 12.3–15.4)
GFR SERPL CREATININE-BSD FRML MDRD: 98 ML/MIN/1.73
GLOBULIN UR ELPH-MCNC: 4.4 GM/DL (ref 1.8–3.5)
GLUCOSE BLD-MCNC: 93 MG/DL (ref 74–124)
HCT VFR BLD AUTO: 34.6 % (ref 37.5–51)
HGB BLD-MCNC: 11.2 G/DL (ref 13–17.7)
IMM GRANULOCYTES # BLD AUTO: 0.06 10*3/MM3 (ref 0–0.05)
IMM GRANULOCYTES NFR BLD AUTO: 0.7 % (ref 0–0.5)
INR PPP: 3.3 (ref 0.9–1.1)
LYMPHOCYTES # BLD AUTO: 0.55 10*3/MM3 (ref 0.7–3.1)
LYMPHOCYTES NFR BLD AUTO: 6.2 % (ref 19.6–45.3)
MCH RBC QN AUTO: 34.7 PG (ref 26.6–33)
MCHC RBC AUTO-ENTMCNC: 32.4 G/DL (ref 31.5–35.7)
MCV RBC AUTO: 107.1 FL (ref 79–97)
MONOCYTES # BLD AUTO: 1.16 10*3/MM3 (ref 0.1–0.9)
MONOCYTES NFR BLD AUTO: 13.2 % (ref 5–12)
NEUTROPHILS # BLD AUTO: 6.98 10*3/MM3 (ref 1.7–7)
NEUTROPHILS NFR BLD AUTO: 79.1 % (ref 42.7–76)
NRBC BLD AUTO-RTO: 0 /100 WBC (ref 0–0.2)
PLATELET # BLD AUTO: 148 10*3/MM3 (ref 140–450)
PMV BLD AUTO: 11.1 FL (ref 6–12)
POTASSIUM BLD-SCNC: 2.9 MMOL/L (ref 3.5–4.7)
PROT SERPL-MCNC: 7.3 G/DL (ref 6.3–8)
PROTHROMBIN TIME: 39.5 SECONDS (ref 11–13.5)
RBC # BLD AUTO: 3.23 10*6/MM3 (ref 4.14–5.8)
SODIUM BLD-SCNC: 138 MMOL/L (ref 134–145)
WBC NRBC COR # BLD: 8.82 10*3/MM3 (ref 3.4–10.8)

## 2019-08-29 PROCEDURE — G0463 HOSPITAL OUTPT CLINIC VISIT: HCPCS | Performed by: NURSE PRACTITIONER

## 2019-08-29 PROCEDURE — 36415 COLL VENOUS BLD VENIPUNCTURE: CPT

## 2019-08-29 PROCEDURE — 85025 COMPLETE CBC W/AUTO DIFF WBC: CPT

## 2019-08-29 PROCEDURE — 80053 COMPREHEN METABOLIC PANEL: CPT

## 2019-08-29 PROCEDURE — 99214 OFFICE O/P EST MOD 30 MIN: CPT | Performed by: NURSE PRACTITIONER

## 2019-08-29 PROCEDURE — 85610 PROTHROMBIN TIME: CPT

## 2019-08-29 RX ORDER — AMIODARONE HYDROCHLORIDE 200 MG/1
TABLET ORAL
COMMUNITY
End: 2019-08-29

## 2019-08-29 RX ORDER — POTASSIUM CHLORIDE 20 MEQ/1
TABLET, EXTENDED RELEASE ORAL
Qty: 60 TABLET | Refills: 0 | Status: SHIPPED | OUTPATIENT
Start: 2019-08-29 | End: 2019-11-01

## 2019-08-29 NOTE — PROGRESS NOTES
Subjective     REASON FOR FOLLOW UP:  1.  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   2.  Combined radiation and low-dose carboplatin and Taxol chemotherapy weekly initiated 6/17/2019.  3.  MediPort placed by Dr. Adolph Grigsby of vascular surgery 6/18/2019  4.  Hospital admission from 8/3/2019 to 8/20/2019 for MSSA bacteremia, new onset congestive heart failure, atrial fibrillation.  Carbo Taxol and radiation held.      HISTORY OF PRESENT ILLNESS:  The patient is a 74 y.o. year old male with the above medical history here today for scheduled lab review.  He was hospitalized for 17 days, 8/3/2019 through 8/20/2019 for MSSA bacteremia, new onset congestive heart failure, atrial fibrillation.  His plan Taxol regimen that he did receive concurrently with radiation has been put on hold with no imminent plans to resume.  He reports feeling slightly stronger today and explains that every day energy level gets better.  Continues to receive vancomycin through his PICC line.  His course is planned for the next several weeks.  He denies fever, chills or acute signs of infection.  His CBC is improved today with a hemoglobin of 11.2 normalized white count and platelet count.    He continues to take oral potassium 40 mEq twice daily.    Started on Coumadin which has been monitored by Dr. Omer. His home health nurse has been checking his INR and corresponding with Dr. Omer's office.  His INR in the office today is 3.3, however, he was dosed by Dr. last office on 8/27/2019 so they will be managing his Coumadin at the time being.  Denies chest pain, heart palpitations, bleeding or bruising.    Appetite is fair.  He denies new pain.  He denies skin changes or swelling.  His bowels are moving regularly.             Past Medical History:   Diagnosis Date   • Acute renal injury (CMS/HCC)    • Anemia associated with chemotherapy    • Atrial fibrillation (CMS/HCC)    • CAD (coronary artery disease)    • CHF  (congestive heart failure) (CMS/HCC)    • Chronic bronchitis (CMS/HCC)    • COPD (chronic obstructive pulmonary disease) (CMS/HCC)    • Cor pulmonale (chronic) (CMS/HCC)    • Daytime hypersomnia    • Depression with anxiety    • IRAHETA (dyspnea on exertion)    • Elevated cholesterol    • Enlarged prostate    • Frequent nocturnal awakening    • Gout    • H/O cardiac radiofrequency ablation 2006    [facility].   • Head and neck cancer (CMS/HCC)    • Hyperlipidemia    • Hypertension    • Hypokalemia    • Hypotension    • Insomnia    • Lumbar radicular pain    • SHAR (obstructive sleep apnea)    • Osteoarthritis    • Permanent atrial fibrillation (CMS/HCC)    • Shock, septic (CMS/HCC)    • Snoring    • Squamous cell carcinoma of neck    • SVT (supraventricular tachycardia) (CMS/HCC)    • Syncope    • Vitamin D deficiency    • Weight loss         Past Surgical History:   Procedure Laterality Date   • APPENDECTOMY     • CARDIAC ABLATION  2006    [facility].   • CARDIAC CATHETERIZATION N/A 8/29/2018    Procedure: Left Heart Cath;  Surgeon: Yousif Uribe MD;  Location: [facility] CATH INVASIVE LOCATION;  Service: Cardiology   • CARDIAC CATHETERIZATION N/A 8/29/2018    Procedure: Coronary angiography;  Surgeon: Yousif Uribe MD;  Location: [facility] CATH INVASIVE LOCATION;  Service: Cardiology   • CARDIAC CATHETERIZATION N/A 8/29/2018    Procedure: Left ventriculography;  Surgeon: Yousif Uribe MD;  Location: [facility] CATH INVASIVE LOCATION;  Service: Cardiology   • FINGER SURGERY     • FOOT SURGERY     • HAND SURGERY     • VENOUS ACCESS DEVICE (PORT) INSERTION Right 6/18/2019    Procedure: MEDIPORT PLACEMENT;  Surgeon: Elvis Grigsby MD;  Location: [facility] OR;  Service: Vascular   • VENOUS ACCESS DEVICE (PORT) REMOVAL Right 8/5/2019    Procedure: REMOVAL VENOUS ACCESS DEVICE;  Surgeon: Prince Castaneda MD;  Location: [facility] OR;  Service: Vascular        ALLERGIES:    Allergies   Allergen Reactions   •  "Benadryl [Diphenhydramine Hcl] Other (See Comments) and Dizziness     \"I sleep for about a day\"        Review of Systems   Constitutional: Positive for fatigue. Negative for activity change, chills and fever.        See HPI   HENT: Positive for mouth sores (improving). Negative for trouble swallowing and voice change.    Eyes: Negative for pain and visual disturbance.   Respiratory: Negative for cough, shortness of breath and wheezing.    Cardiovascular: Negative for chest pain, palpitations and leg swelling.   Gastrointestinal: Negative for abdominal pain, constipation, diarrhea and vomiting.   Genitourinary: Negative for difficulty urinating, frequency and urgency.   Musculoskeletal: Positive for back pain (stable). Negative for arthralgias and joint swelling.   Skin: Negative for rash.   Neurological: Positive for weakness (improving). Negative for seizures.   Hematological: Negative for adenopathy. Does not bruise/bleed easily.   Psychiatric/Behavioral: Negative for behavioral problems and confusion. The patient is not nervous/anxious.         Objective     Vitals:    08/29/19 1358   BP: 129/73   Pulse: 102   Resp: 16   Temp: 98.5 °F (36.9 °C)   SpO2: 96%   Weight: 69 kg (152 lb 1.6 oz)   Height: 180.3 cm (70.98\")   PainSc:   6   PainLoc: Neck  Comment: neck and left shoulder     Current Status 8/29/2019   ECOG score 0       Physical Exam   Constitutional: He is oriented to person, place, and time. He appears well-developed and well-nourished. No distress.   HENT:   Head: Normocephalic.   Firm, 4.5-5.5 cm palpable mass at the angle of the mandible on the right   Eyes: Conjunctivae and EOM are normal. Pupils are equal, round, and reactive to light. No scleral icterus.   Neck: Normal range of motion. Neck supple. No JVD present. No thyromegaly present.   Cardiovascular: Normal rate. An irregularly irregular rhythm present. Exam reveals no gallop and no friction rub.   No murmur heard.  Pulmonary/Chest: Effort " normal and breath sounds normal. He has no wheezes. He has no rales.   Abdominal: Soft. He exhibits no distension and no mass. There is no tenderness.   Musculoskeletal: Normal range of motion. He exhibits edema (improved). He exhibits no deformity.   Lymphadenopathy:     He has no cervical adenopathy.   Neurological: He is alert and oriented to person, place, and time. He has normal reflexes. No cranial nerve deficit.   Skin: Skin is warm and dry. No rash noted. No erythema.   Psychiatric: He has a normal mood and affect. His behavior is normal. Judgment normal.         RECENT LABS:  Hematology WBC   Date Value Ref Range Status   08/29/2019 8.82 3.40 - 10.80 10*3/mm3 Final     RBC   Date Value Ref Range Status   08/29/2019 3.23 (L) 4.14 - 5.80 10*6/mm3 Final     Hemoglobin   Date Value Ref Range Status   08/29/2019 11.2 (L) 13.0 - 17.7 g/dL Final     Hematocrit   Date Value Ref Range Status   08/29/2019 34.6 (L) 37.5 - 51.0 % Final     Platelets   Date Value Ref Range Status   08/29/2019 148 140 - 450 10*3/mm3 Final        Lab Results   Component Value Date    NEUTROABS 6.98 08/29/2019       Lab Results   Component Value Date    GLUCOSE 93 08/29/2019    BUN 10 08/29/2019    CREATININE 0.77 08/29/2019    EGFRIFNONA 98 08/29/2019    EGFRIFAFRI 107 04/23/2018    BCR 13.0 08/29/2019    K 2.9 (C) 08/29/2019    CO2 29.2 (H) 08/29/2019    CALCIUM 7.3 (L) 08/29/2019    PROTENTOTREF 7.2 04/23/2018    ALBUMIN 2.90 (L) 08/29/2019    LABIL2 1.3 04/23/2018    AST 23 08/29/2019    ALT <5 08/29/2019     Lab Results   Component Value Date    INR 3.30 (H) 08/29/2019    INR 1.40 08/27/2019    INR 1.40 08/23/2019    PROTIME 39.5 (H) 08/29/2019    PROTIME 22.6 (H) 08/20/2019    PROTIME 17.7 (H) 08/19/2019       PATHOLOGY 5/15/2019  Final Diagnosis   1.  Right Neck, FNA:                  A.  Positive for squamous cell carcinoma.     Flow Cytometry Report, Addendum   Utilizing appropriate positive and negative controls  immunohistochemical stain p16 is performed on cell block material.  The tumor is positive for p16 by immunohistochemical staining which is a surrogate for HPV infection.         Assessment/Plan     1.  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  Did receive 4 cycles of carboplatin Taxol with concurrent.  This was discontinued secondary to hospitalization for congestive heart failure, atrial fibrillation and bacteremia  2.   Hospital admission from 8/3/2019 to 8/20/2019 for MSSA bacteremia, new onset congestive heart failure, atrial fibrillation.  He was discharged on IV vancomycin with plans to complete in the upcoming weeks.  3.  Oral mucositis.  Improved.  4.  Warfarin anticoagulation.  Has been managed since discharge by Dr. Omer.  He does have home health obtaining INR is in corresponding with the office.  His most recent dose adjustment was 8/27/2019.  5.  Weight loss and poor nutrition.  His appetite has improved since discharge..  6.  Hypokalemia.  He is currently taking 40 mEq of oral potassium twice daily.    Plan  1.  I have reviewed lab work with the patient and his daughter today.  He will return in 1 week to see Dr. Heaton for formal hospital return.  We will obtain lab work this day as well.  2.  He will follow-up with Dr. Omer as scheduled.  3.  Patient's potassium resulted after he left the office today.  He remains low at 2.9.  I did call the patient to instructed to take 3 40 milliequivalent tablets in the morning and 2 in the evenings.  We will recheck when he returns next week and adjust his dose accordingly.  I did send a new prescription for potassium to his pharmacy today.    I have asked the patient to call the office with any new or worsening symptoms before his next scheduled visit.

## 2019-08-29 NOTE — OUTREACH NOTE
Sepsis Week 2 Survey      Responses   Facility patient discharged from?  [place]   Does the patient have one of the following disease processes/diagnoses(primary or secondary)?  Sepsis   Week 2 attempt successful?  No   Unsuccessful attempts  Attempt 1          Gail Sebastian RN

## 2019-08-30 ENCOUNTER — READMISSION MANAGEMENT (OUTPATIENT)
Dept: CALL CENTER | Facility: HOSPITAL | Age: 74
End: 2019-08-30

## 2019-08-30 ENCOUNTER — ANTICOAGULATION VISIT (OUTPATIENT)
Dept: PHARMACY | Facility: HOSPITAL | Age: 74
End: 2019-08-30

## 2019-08-30 LAB — INR PPP: 3.1

## 2019-08-30 NOTE — OUTREACH NOTE
Sepsis Week 2 Survey      Responses   Facility patient discharged from?  [place]   Does the patient have one of the following disease processes/diagnoses(primary or secondary)?  Sepsis   Week 2 attempt successful?  Yes   Call start time  1213   Call end time  1215   Discharge diagnosis  New onset CHF, afib., squamous cell carcinoma of neck, COPD, depression with anxiety, CAD, chemo induced neutropenia, Vasc. cath infection, chemo induced thrombocytopenia, acute renal injury, anemia of chemo, oral thrush, septic shock, hypokalemia   Meds reviewed with patient/caregiver?  Yes   Is the patient taking all medications as directed (includes completed medication regime)?  Yes   Has the patient kept scheduled appointments due by today?  Yes   Comments  Pt states he has several upcoming appts   What is the Home health agency?   [facility]   Home health comments  HH changing his bandage to his port site   Psychosocial issues?  No   What is the patient's perception of their health status since discharge?  Improving   Is the patient/caregiver able to teach back Sepsis?  S - Shivering,fever or very cold, E - Extreme pain or generalized discomfort (worst ever,especially abdomen), P - Pale or discolored skin   Is the patient/caregiver able to teach back signs and symptoms of worsening condition:  Fever, Rapid heart rate (>90), Shortness of breath/rapid respiratory rate   Is the patient/caregiver able to teach back the hierarchy of who to call/visit for symptoms/problems? PCP, Specialist, Home health nurse, Urgent Care, ED, 911  Yes   Week 2 call completed?  Yes          Luisana Iniguez RN

## 2019-08-30 NOTE — PROGRESS NOTES
Anticoagulation Clinic Progress Note    Anticoagulation Summary  As of 8/30/2019    INR goal:   2.0-3.0   TTR:   45.8 % (9.3 mo)   INR used for dosing:   3.10! (8/30/2019)   Warfarin maintenance plan:   2 mg every day   Weekly warfarin total:   14 mg   Plan last modified:   Mere Vincent [facility] (8/23/2019)   Next INR check:   9/3/2019   Priority:   Maintenance   Target end date:   Indefinite    Indications    Atrial fibrillation (CMS/HCC) [I48.91]             Anticoagulation Episode Summary     INR check location:       Preferred lab:       Send INR reminders to:   [facility] CLINICAL [place]    Comments:         Anticoagulation Care Providers     Provider Role Specialty Phone number    Myriam Omer MD Referring Cardiology [phone]    Nallely Stoll [facility] Responsible Pharmacy [phone]          INR History:  Anticoagulation Monitoring 8/23/2019 8/27/2019 8/30/2019   INR 1.40 1.40 3.10   INR Date 8/23/2019 8/27/2019 8/30/2019   INR Goal 2.0-3.0 2.0-3.0 2.0-3.0   Trend Down Same Same   Last Week Total 11.5 mg 12 mg 18 mg   Next Week Total 14 mg 18 mg 14 mg   Sun 2 mg - 2 mg   Mon 2 mg - 2 mg   Tue - 4 mg (8/27) -   Wed - 4 mg (8/28) -   Thu - 2 mg -   Fri 2 mg - 2 mg   Sat 2 mg - 2 mg   Visit Report - - -   Some recent data might be hidden       Plan:  1. INR is Supratherapeutic today- see above in Anticoagulation Summary.   Provided instructions to spouse at his phone number and Gabby  with [facility] Intake to Continue their warfarin regimen of 2mg daily- see above in Anticoagulation Summary.  2. Follow up in 5 days      Kristal Cowart [facility]

## 2019-09-02 ENCOUNTER — LAB REQUISITION (OUTPATIENT)
Dept: LAB | Facility: HOSPITAL | Age: 74
End: 2019-09-02

## 2019-09-02 DIAGNOSIS — Z00.00 ENCOUNTER FOR GENERAL ADULT MEDICAL EXAMINATION WITHOUT ABNORMAL FINDINGS: ICD-10-CM

## 2019-09-02 LAB
ALBUMIN SERPL-MCNC: 2.9 G/DL (ref 3.5–5.2)
ALBUMIN/GLOB SERPL: 0.8 G/DL
ALP SERPL-CCNC: 98 U/L (ref 39–117)
ALT SERPL W P-5'-P-CCNC: <5 U/L (ref 1–41)
ANION GAP SERPL CALCULATED.3IONS-SCNC: 6.6 MMOL/L (ref 5–15)
AST SERPL-CCNC: 42 U/L (ref 1–40)
BILIRUB SERPL-MCNC: 0.4 MG/DL (ref 0.2–1.2)
BUN BLD-MCNC: 8 MG/DL (ref 8–23)
BUN/CREAT SERPL: 11.6 (ref 7–25)
CALCIUM SPEC-SCNC: 7.8 MG/DL (ref 8.6–10.5)
CHLORIDE SERPL-SCNC: 92 MMOL/L (ref 98–107)
CO2 SERPL-SCNC: 32.4 MMOL/L (ref 22–29)
CREAT BLD-MCNC: 0.69 MG/DL (ref 0.76–1.27)
DEPRECATED RDW RBC AUTO: 66.4 FL (ref 37–54)
EOSINOPHIL # BLD MANUAL: 0.07 10*3/MM3 (ref 0–0.4)
EOSINOPHIL NFR BLD MANUAL: 2 % (ref 0.3–6.2)
ERYTHROCYTE [DISTWIDTH] IN BLOOD BY AUTOMATED COUNT: 16.8 % (ref 12.3–15.4)
GFR SERPL CREATININE-BSD FRML MDRD: 112 ML/MIN/1.73
GLOBULIN UR ELPH-MCNC: 3.5 GM/DL
GLUCOSE BLD-MCNC: 90 MG/DL (ref 65–99)
HCT VFR BLD AUTO: 27.7 % (ref 37.5–51)
HGB BLD-MCNC: 8.8 G/DL (ref 13–17.7)
LYMPHOCYTES # BLD MANUAL: 0.19 10*3/MM3 (ref 0.7–3.1)
LYMPHOCYTES NFR BLD MANUAL: 3 % (ref 5–12)
LYMPHOCYTES NFR BLD MANUAL: 5.1 % (ref 19.6–45.3)
MCH RBC QN AUTO: 34 PG (ref 26.6–33)
MCHC RBC AUTO-ENTMCNC: 31.8 G/DL (ref 31.5–35.7)
MCV RBC AUTO: 106.9 FL (ref 79–97)
MONOCYTES # BLD AUTO: 0.11 10*3/MM3 (ref 0.1–0.9)
NEUTROPHILS # BLD AUTO: 3.36 10*3/MM3 (ref 1.7–7)
NEUTROPHILS NFR BLD MANUAL: 89.9 % (ref 42.7–76)
PLAT MORPH BLD: NORMAL
PLATELET # BLD AUTO: 168 10*3/MM3 (ref 140–450)
PMV BLD AUTO: 10.4 FL (ref 6–12)
POTASSIUM BLD-SCNC: 2.9 MMOL/L (ref 3.5–5.2)
PROT SERPL-MCNC: 6.4 G/DL (ref 6–8.5)
RBC # BLD AUTO: 2.59 10*6/MM3 (ref 4.14–5.8)
RBC MORPH BLD: NORMAL
SODIUM BLD-SCNC: 131 MMOL/L (ref 136–145)
WBC MORPH BLD: NORMAL
WBC NRBC COR # BLD: 3.74 10*3/MM3 (ref 3.4–10.8)

## 2019-09-02 PROCEDURE — 85025 COMPLETE CBC W/AUTO DIFF WBC: CPT | Performed by: INTERNAL MEDICINE

## 2019-09-02 PROCEDURE — 80053 COMPREHEN METABOLIC PANEL: CPT | Performed by: INTERNAL MEDICINE

## 2019-09-03 ENCOUNTER — ANTICOAGULATION VISIT (OUTPATIENT)
Dept: PHARMACY | Facility: HOSPITAL | Age: 74
End: 2019-09-03

## 2019-09-03 ENCOUNTER — DOCUMENTATION (OUTPATIENT)
Dept: INFECTIOUS DISEASES | Facility: CLINIC | Age: 74
End: 2019-09-03

## 2019-09-03 LAB — INR PPP: 3.5

## 2019-09-03 NOTE — PROGRESS NOTES
Patient's HH nurse called last minute and says patient doesn't have a ride to his appt today despite this being on the schedule for at least 3 weeks now. DC IV cefazolin today as he has completed a 4 week course after port removal. He may follow-up with me on an as needed basis.

## 2019-09-03 NOTE — PROGRESS NOTES
Anticoagulation Clinic Progress Note    Anticoagulation Summary  As of 9/3/2019    INR goal:   2.0-3.0   TTR:   45.1 % (9.4 mo)   INR used for dosing:   3.50! (9/3/2019)   Warfarin maintenance plan:   2 mg every day   Weekly warfarin total:   14 mg   Plan last modified:   Mere Vincent [facility] (8/23/2019)   Next INR check:   9/6/2019   Priority:   Maintenance   Target end date:   Indefinite    Indications    Atrial fibrillation (CMS/HCC) [I48.91]             Anticoagulation Episode Summary     INR check location:       Preferred lab:       Send INR reminders to:   [facility] CLINICAL [place]    Comments:         Anticoagulation Care Providers     Provider Role Specialty Phone number    Myriam Omer MD Referring Cardiology [phone]    Nallely Stoll [facility] Responsible Pharmacy [phone]          INR History:  Anticoagulation Monitoring 8/27/2019 8/30/2019 9/3/2019   INR 1.40 3.10 3.50   INR Date 8/27/2019 8/30/2019 9/3/2019   INR Goal 2.0-3.0 2.0-3.0 2.0-3.0   Trend Same Same Same   Last Week Total 12 mg 18 mg 18 mg   Next Week Total 18 mg 14 mg 13 mg   Sun - 2 mg -   Mon - 2 mg -   Tue 4 mg (8/27) - 1 mg (9/3)   Wed 4 mg (8/28) - 2 mg   Thu 2 mg - 2 mg   Fri - 2 mg -   Sat - 2 mg -   Visit Report - - -   Some recent data might be hidden       Plan:  1. INR is Supratherapeutic today- see above in Anticoagulation Summary.   Provided instructions via secure voicemail to [facility] to Change their warfarin regimen- see above in Anticoagulation Summary.  2. Follow up in 3 days      Georges Guadalupe [facility]

## 2019-09-06 ENCOUNTER — READMISSION MANAGEMENT (OUTPATIENT)
Dept: CALL CENTER | Facility: HOSPITAL | Age: 74
End: 2019-09-06

## 2019-09-06 ENCOUNTER — TELEPHONE (OUTPATIENT)
Dept: ONCOLOGY | Facility: HOSPITAL | Age: 74
End: 2019-09-06

## 2019-09-06 ENCOUNTER — LAB (OUTPATIENT)
Dept: OTHER | Facility: HOSPITAL | Age: 74
End: 2019-09-06

## 2019-09-06 ENCOUNTER — ANTICOAGULATION VISIT (OUTPATIENT)
Dept: PHARMACY | Facility: HOSPITAL | Age: 74
End: 2019-09-06

## 2019-09-06 ENCOUNTER — OFFICE VISIT (OUTPATIENT)
Dept: ONCOLOGY | Facility: CLINIC | Age: 74
End: 2019-09-06

## 2019-09-06 VITALS
WEIGHT: 149.1 LBS | SYSTOLIC BLOOD PRESSURE: 118 MMHG | DIASTOLIC BLOOD PRESSURE: 70 MMHG | RESPIRATION RATE: 16 BRPM | HEART RATE: 64 BPM | HEIGHT: 71 IN | TEMPERATURE: 98 F | BODY MASS INDEX: 20.87 KG/M2 | OXYGEN SATURATION: 100 %

## 2019-09-06 DIAGNOSIS — I48.91 ATRIAL FIBRILLATION, UNSPECIFIED TYPE (HCC): Primary | ICD-10-CM

## 2019-09-06 DIAGNOSIS — I48.19 PERSISTENT ATRIAL FIBRILLATION (HCC): ICD-10-CM

## 2019-09-06 DIAGNOSIS — Z86.79 HISTORY OF CHF (CONGESTIVE HEART FAILURE): ICD-10-CM

## 2019-09-06 DIAGNOSIS — D70.1 CHEMOTHERAPY-INDUCED NEUTROPENIA (HCC): ICD-10-CM

## 2019-09-06 DIAGNOSIS — I48.91 ATRIAL FIBRILLATION, UNSPECIFIED TYPE (HCC): ICD-10-CM

## 2019-09-06 DIAGNOSIS — Z45.2 FITTING AND ADJUSTMENT OF VASCULAR CATHETER: ICD-10-CM

## 2019-09-06 DIAGNOSIS — Z79.01 CURRENT USE OF LONG TERM ANTICOAGULATION: ICD-10-CM

## 2019-09-06 DIAGNOSIS — C01 SQUAMOUS CELL CARCINOMA OF BASE OF TONGUE (HCC): ICD-10-CM

## 2019-09-06 DIAGNOSIS — R78.81 MSSA BACTEREMIA: ICD-10-CM

## 2019-09-06 DIAGNOSIS — T82.7XXS: ICD-10-CM

## 2019-09-06 DIAGNOSIS — T45.1X5A ANEMIA ASSOCIATED WITH CHEMOTHERAPY: ICD-10-CM

## 2019-09-06 DIAGNOSIS — C01 SQUAMOUS CELL CARCINOMA OF BASE OF TONGUE (HCC): Primary | ICD-10-CM

## 2019-09-06 DIAGNOSIS — T45.1X5A CHEMOTHERAPY-INDUCED NEUTROPENIA (HCC): ICD-10-CM

## 2019-09-06 DIAGNOSIS — B95.61 MSSA BACTEREMIA: ICD-10-CM

## 2019-09-06 DIAGNOSIS — D64.81 ANEMIA ASSOCIATED WITH CHEMOTHERAPY: ICD-10-CM

## 2019-09-06 LAB
ALBUMIN SERPL-MCNC: 3.2 G/DL (ref 3.5–5.2)
ALBUMIN/GLOB SERPL: 0.7 G/DL
ALP SERPL-CCNC: 102 U/L (ref 39–117)
ALT SERPL W P-5'-P-CCNC: 16 U/L (ref 1–41)
ANION GAP SERPL CALCULATED.3IONS-SCNC: 6.6 MMOL/L (ref 5–15)
AST SERPL-CCNC: 41 U/L (ref 1–40)
BASOPHILS # BLD AUTO: 0.03 10*3/MM3 (ref 0–0.2)
BASOPHILS NFR BLD AUTO: 0.9 % (ref 0–1.5)
BILIRUB SERPL-MCNC: 0.5 MG/DL (ref 0.1–1.2)
BUN BLD-MCNC: 12 MG/DL (ref 8–23)
BUN/CREAT SERPL: 13.5 (ref 7–25)
CALCIUM SPEC-SCNC: 8.9 MG/DL (ref 8.6–10.5)
CHLORIDE SERPL-SCNC: 101 MMOL/L (ref 98–107)
CO2 SERPL-SCNC: 31.4 MMOL/L (ref 22–29)
CREAT BLD-MCNC: 0.89 MG/DL (ref 0.76–1.27)
DACRYOCYTES BLD QL SMEAR: NORMAL
DEPRECATED RDW RBC AUTO: 66.5 FL (ref 37–54)
EOSINOPHIL # BLD AUTO: 0.09 10*3/MM3 (ref 0–0.4)
EOSINOPHIL NFR BLD AUTO: 2.7 % (ref 0.3–6.2)
ERYTHROCYTE [DISTWIDTH] IN BLOOD BY AUTOMATED COUNT: 16.5 % (ref 12.3–15.4)
GFR SERPL CREATININE-BSD FRML MDRD: 83 ML/MIN/1.73
GLOBULIN UR ELPH-MCNC: 4.3 GM/DL
GLUCOSE BLD-MCNC: 99 MG/DL (ref 65–99)
HCT VFR BLD AUTO: 31.6 % (ref 37.5–51)
HGB BLD-MCNC: 10 G/DL (ref 13–17.7)
IMM GRANULOCYTES # BLD AUTO: 0.02 10*3/MM3 (ref 0–0.05)
IMM GRANULOCYTES NFR BLD AUTO: 0.6 % (ref 0–0.5)
INR PPP: 2
IRON 24H UR-MRATE: 75 MCG/DL (ref 59–158)
IRON SATN MFR SERPL: 37 % (ref 20–50)
LYMPHOCYTES # BLD AUTO: 0.52 10*3/MM3 (ref 0.7–3.1)
LYMPHOCYTES NFR BLD AUTO: 15.5 % (ref 19.6–45.3)
MCH RBC QN AUTO: 34.5 PG (ref 26.6–33)
MCHC RBC AUTO-ENTMCNC: 31.6 G/DL (ref 31.5–35.7)
MCV RBC AUTO: 109 FL (ref 79–97)
MONOCYTES # BLD AUTO: 0.35 10*3/MM3 (ref 0.1–0.9)
MONOCYTES NFR BLD AUTO: 10.4 % (ref 5–12)
NEUTROPHILS # BLD AUTO: 2.34 10*3/MM3 (ref 1.7–7)
NEUTROPHILS NFR BLD AUTO: 69.9 % (ref 42.7–76)
NRBC BLD AUTO-RTO: 0 /100 WBC (ref 0–0.2)
OVALOCYTES BLD QL SMEAR: NORMAL
PLAT MORPH BLD: NORMAL
PLATELET # BLD AUTO: 200 10*3/MM3 (ref 140–450)
PMV BLD AUTO: 9.5 FL (ref 6–12)
POTASSIUM BLD-SCNC: 5.4 MMOL/L (ref 3.5–5.2)
PROT SERPL-MCNC: 7.5 G/DL (ref 6–8.5)
PROTHROMBIN TIME: 24.3 SECONDS (ref 11–15)
RBC # BLD AUTO: 2.9 10*6/MM3 (ref 4.14–5.8)
SODIUM BLD-SCNC: 139 MMOL/L (ref 136–145)
SPHEROCYTES BLD QL SMEAR: NORMAL
TIBC SERPL-MCNC: 203 MCG/DL (ref 298–536)
TRANSFERRIN SERPL-MCNC: 136 MG/DL (ref 200–360)
WBC MORPH BLD: NORMAL
WBC NRBC COR # BLD: 3.35 10*3/MM3 (ref 3.4–10.8)

## 2019-09-06 PROCEDURE — 99214 OFFICE O/P EST MOD 30 MIN: CPT | Performed by: INTERNAL MEDICINE

## 2019-09-06 PROCEDURE — 36415 COLL VENOUS BLD VENIPUNCTURE: CPT

## 2019-09-06 PROCEDURE — 83540 ASSAY OF IRON: CPT | Performed by: INTERNAL MEDICINE

## 2019-09-06 PROCEDURE — 85610 PROTHROMBIN TIME: CPT

## 2019-09-06 PROCEDURE — 85007 BL SMEAR W/DIFF WBC COUNT: CPT | Performed by: INTERNAL MEDICINE

## 2019-09-06 PROCEDURE — 84466 ASSAY OF TRANSFERRIN: CPT | Performed by: INTERNAL MEDICINE

## 2019-09-06 PROCEDURE — 80053 COMPREHEN METABOLIC PANEL: CPT | Performed by: INTERNAL MEDICINE

## 2019-09-06 PROCEDURE — 85025 COMPLETE CBC W/AUTO DIFF WBC: CPT | Performed by: INTERNAL MEDICINE

## 2019-09-06 RX ORDER — LIDOCAINE HYDROCHLORIDE 20 MG/ML
5 SOLUTION OROPHARYNGEAL
Qty: 100 ML | Refills: 3 | Status: SHIPPED | OUTPATIENT
Start: 2019-09-06 | End: 2020-06-02

## 2019-09-06 NOTE — OUTREACH NOTE
Sepsis Week 3 Survey      Responses   Facility patient discharged from?  [place]   Does the patient have one of the following disease processes/diagnoses(primary or secondary)?  Sepsis   Week 3 attempt successful?  Yes   Call start time  1816   Call end time  1818   Discharge diagnosis  New onset CHF, afib., squamous cell carcinoma of neck, COPD, depression with anxiety, CAD, chemo induced neutropenia, Vasc. cath infection, chemo induced thrombocytopenia, acute renal injury, anemia of chemo, oral thrush, septic shock, hypokalemia   Is patient permission given to speak with other caregiver?  Yes   Meds reviewed with patient/caregiver?  Yes   Is the patient having any side effects they believe may be caused by any medication additions or changes?  No   Does the patient have all medications related to this admission filled (includes all antibiotics, inhalers, nebulizers,steroids,etc.)  Yes   Is the patient taking all medications as directed (includes completed medication regime)?  Yes   Does the patient have a primary care provider?   Yes   Does the patient have an appointment with their PCP within 7 days of discharge?  Yes   Has the patient kept scheduled appointments due by today?  Yes   What is the Home health agency?   [facility]   Has home health visited the patient within 72 hours of discharge?  Yes   Psychosocial issues?  No   Did the patient receive a copy of their discharge instructions?  Yes   Nursing interventions  Reviewed instructions with patient   What is the patient's perception of their health status since discharge?  Improving   Nursing interventions  Nurse provided patient education   Is the patient/caregiver able to teach back Sepsis?  S - Shivering,fever or very cold, S - Short of breath, I -   I feel like I might die-a feeling of hopelessness, S - Sleepy, difficult to arouse,confused, P - Pale or discolored skin, E - Extreme pain or generalized discomfort (worst ever,especially abdomen)   Nursing  interventions  Nurse provided reassurance to patient   Is patient/caregiver able to teach back steps to recovery at home?  Set small, achievable goals for return to baseline health, Rest and regain strength, Make a list of questions for PCP appoinment, Exercise as tolerated, Eat a balanced diet   Is the patient/caregiver able to teach back signs and symptoms of worsening condition:  Fever, Edema   Is the patient/caregiver able to teach back the hierarchy of who to call/visit for symptoms/problems? PCP, Specialist, Home health nurse, Urgent Care, ED, 911  Yes   Additional teach back comments  Says Abx finished last Monday, he is having mouth pain but feeling better.   Week 3 call completed?  Yes          Shazia Haque RN

## 2019-09-06 NOTE — PROGRESS NOTES
Subjective     REASON FOR FOLLOW UP:  1.  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   2.  Combined radiation and low-dose carboplatin and Taxol chemotherapy weekly initiated 6/17/2019.  3.  MediPort placed by Dr. Adolph Grigsby of vascular surgery 6/18/2019  4.  Methicillin sensitive staph septicemia felt to be associated with infected MediPort.  Port was removed and patient has completed a protracted course of antibiotics  5.  Generalized weakness and malnutrition  6.  Atrial fibrillation.  He is currently anticoagulated with Coumadin    HISTORY OF PRESENT ILLNESS:  The patient is a 74 y.o. year old male who is undergoing treatment with weekly carboplatin and Taxol along with radiation for his squamous cell carcinoma of the base of the tongue, HPV positive.  He is due for his fifth dose of carboplatin and Taxol along with radiation today.     He has been experiencing the expected toxicities with painful mucositis leading to decreased oral intake.    He has been using boost plus for nutrition.  He is wanting to avoid a feeding tube.  He has received IV fluids in the treatment area on several occasions.  He has had significant weight loss and required modification of his blood pressure medications due to low blood pressure.  He is on metoprolol 50 mg twice daily but was instructed to hold the metoprolol if his systolic is less than 120.  He took his metoprolol this morning and his systolic currently is less than 100.  I think we can further modify his metoprolol to 25 mg twice daily.    He developed significant myelosuppression which required holding his chemotherapy treatment on 7/15/2019 and again on 7/22/2019.  He returns today for reassessment and possibly to receive further chemotherapy.  This would be his fifth week of chemotherapy total.  His radiation was also on hold but was resumed on 7/25/2019.  He still has about 7 radiation treatments to go to complete his 33 planned  treatments.    He returns today after a lengthy hospitalization from 8/3/2019 to 8/28/2019 due to methicillin sensitive staph septicemia.  During the hospitalization he also had issues with rapid atrial fibrillation and is now anticoagulated with Coumadin.  He completed a protracted course of antibiotic therapy initially with IV vancomycin in the hospital and further IV Kefzol after discharge.  This was completed on 9/3/2019.  The source of his infection was felt to be his MediPort which was removed surgically during the hospitalization.    He looks much better in the office today.  He is ambulating with a walker.  He reports he still having some difficulty eating due to pain in his mouth and is requesting more lidocaine rinse.  His blood counts are improved with a hemoglobin today of 10.  His Coumadin level is therapeutic at 2.0      History of Present Illness     Past Medical History:   Diagnosis Date   • Acute renal injury (CMS/HCC)    • Anemia associated with chemotherapy    • Atrial fibrillation (CMS/HCC)    • CAD (coronary artery disease)    • CHF (congestive heart failure) (CMS/HCC)    • Chronic bronchitis (CMS/HCC)    • COPD (chronic obstructive pulmonary disease) (CMS/HCC)    • Cor pulmonale (chronic) (CMS/HCC)    • Daytime hypersomnia    • Depression with anxiety    • IRAHETA (dyspnea on exertion)    • Elevated cholesterol    • Enlarged prostate    • Frequent nocturnal awakening    • Gout    • H/O cardiac radiofrequency ablation 2006    [facility].   • Head and neck cancer (CMS/HCC)    • Hyperlipidemia    • Hypertension    • Hypokalemia    • Hypotension    • Insomnia    • Lumbar radicular pain    • SHAR (obstructive sleep apnea)    • Osteoarthritis    • Permanent atrial fibrillation (CMS/HCC)    • Shock, septic (CMS/HCC)    • Snoring    • Squamous cell carcinoma of neck    • SVT (supraventricular tachycardia) (CMS/HCC)    • Syncope    • Vitamin D deficiency    • Weight loss         Past Surgical History:  "  Procedure Laterality Date   • APPENDECTOMY     • CARDIAC ABLATION  2006    [facility].   • CARDIAC CATHETERIZATION N/A 8/29/2018    Procedure: Left Heart Cath;  Surgeon: Yousif Uribe MD;  [facility];  Service: Cardiology   • CARDIAC CATHETERIZATION N/A 8/29/2018    Procedure: Coronary angiography;  Surgeon: Yousif Uribe MD;  Location:  [facility] CATH INVASIVE LOCATION;  Service: Cardiology   • CARDIAC CATHETERIZATION N/A 8/29/2018    Procedure: Left ventriculography;  Surgeon: Yousif Uribe MD;  [facility];  Service: Cardiology   • FINGER SURGERY     • FOOT SURGERY     • HAND SURGERY     • VENOUS ACCESS DEVICE (PORT) INSERTION Right 6/18/2019    Procedure: MEDIPORT PLACEMENT;  Surgeon: Elvis Grigsby MD;  Location: [facility] MAIN OR;  Service: Vascular   • VENOUS ACCESS DEVICE (PORT) REMOVAL Right 8/5/2019    Procedure: REMOVAL VENOUS ACCESS DEVICE;  Surgeon: Prince Castaneda MD;  Location: [facility] MAIN OR;  Service: Vascular        ALLERGIES:    Allergies   Allergen Reactions   • Benadryl [Diphenhydramine Hcl] Other (See Comments) and Dizziness     \"I sleep for about a day\"        Review of Systems   Constitutional: Positive for appetite change (lack of appetite), fatigue and unexpected weight change. Negative for activity change, chills and fever.   HENT: Positive for mouth sores (improving'). Negative for trouble swallowing and voice change.    Eyes: Negative for pain and visual disturbance.   Respiratory: Negative for cough, shortness of breath and wheezing.    Cardiovascular: Negative for chest pain, palpitations and leg swelling.   Gastrointestinal: Positive for nausea. Negative for abdominal pain, constipation, diarrhea and vomiting.   Genitourinary: Negative for difficulty urinating, frequency and urgency.   Musculoskeletal: Positive for back pain. Negative for arthralgias and joint swelling.   Skin: Negative for rash.   Neurological: " "Positive for dizziness and weakness. Negative for seizures.   Hematological: Negative for adenopathy. Does not bruise/bleed easily.   Psychiatric/Behavioral: Negative for behavioral problems and confusion. The patient is not nervous/anxious.         Objective     Vitals:    09/06/19 1206   BP: 118/70   Pulse: 64   Resp: 16   Temp: 98 °F (36.7 °C)   TempSrc: Oral   SpO2: 100%   Weight: 67.6 kg (149 lb 1.6 oz)   Height: 180.3 cm (70.98\")   PainSc: 0-No pain     Current Status 9/6/2019   ECOG score 1       Physical Exam   Constitutional: He is oriented to person, place, and time. He appears well-developed and well-nourished. No distress.   HENT:   Head: Normocephalic.   Mouth/Throat: Oropharyngeal exudate (mucositits with thrush present) present.   Eyes: Conjunctivae and EOM are normal. Pupils are equal, round, and reactive to light. No scleral icterus.   Neck: Normal range of motion. Neck supple. No JVD present. No thyromegaly present.   Cardiovascular: Normal rate. An irregularly irregular rhythm present. Exam reveals no gallop and no friction rub.   No murmur heard.  Pulmonary/Chest: Effort normal and breath sounds normal. He has no wheezes. He has no rales.   Abdominal: Soft. He exhibits no distension and no mass. There is no tenderness.   Musculoskeletal: Normal range of motion. He exhibits edema. He exhibits no deformity.   trace ankle edema bilaterally   Lymphadenopathy:     He has no cervical adenopathy.   Neurological: He is alert and oriented to person, place, and time. He has normal reflexes. No cranial nerve deficit.   Skin: Skin is warm and dry. No rash noted. No erythema.   Psychiatric: He has a normal mood and affect. His behavior is normal. Judgment normal.         RECENT LABS:  Hematology WBC   Date Value Ref Range Status   09/06/2019 3.35 (L) 3.40 - 10.80 10*3/mm3 Final     RBC   Date Value Ref Range Status   09/06/2019 2.90 (L) 4.14 - 5.80 10*6/mm3 Final     Hemoglobin   Date Value Ref Range Status "   09/06/2019 10.0 (L) 13.0 - 17.7 g/dL Final     Hematocrit   Date Value Ref Range Status   09/06/2019 31.6 (L) 37.5 - 51.0 % Final     Platelets   Date Value Ref Range Status   09/06/2019 200 140 - 450 10*3/mm3 Final        Lab Results   Component Value Date    NEUTROABS 2.34 09/06/2019       Lab Results   Component Value Date    GLUCOSE 90 09/02/2019    BUN 8 09/02/2019    CREATININE 0.69 (L) 09/02/2019    EGFRIFNONA 112 09/02/2019    EGFRIFAFRI 107 04/23/2018    BCR 11.6 09/02/2019    K 2.9 (L) 09/02/2019    CO2 32.4 (H) 09/02/2019    CALCIUM 7.8 (L) 09/02/2019    PROTENTOTREF 7.2 04/23/2018    ALBUMIN 2.90 (L) 09/02/2019    LABIL2 1.3 04/23/2018    AST 42 (H) 09/02/2019    ALT <5 09/02/2019     Lab Results   Component Value Date    INR 2.00 09/06/2019    INR 3.50 09/03/2019    INR 3.10 08/30/2019    PROTIME 24.3 (H) 09/06/2019    PROTIME 39.5 (H) 08/29/2019    PROTIME 22.6 (H) 08/20/2019       PATHOLOGY 5/15/2019  Final Diagnosis   1.  Right Neck, FNA:                  A.  Positive for squamous cell carcinoma.     Flow Cytometry Report, Addendum   Utilizing appropriate positive and negative controls immunohistochemical stain p16 is performed on cell block material.  The tumor is positive for p16 by immunohistochemical staining which is a surrogate for HPV infection.         Assessment/Plan     1.  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  He is undergoing definitive treatment with radiation therapy and weekly low-dose carboplatin and Taxol chemotherapy. 2.  Comorbidities including ischemic heart disease with history of CHF and atrial fibrillation requiring Coumadin anticoagulation.   2.  Extremely poor tolerance to chemotherapy and radiation.  At this point we do not feel the patient can tolerate any further chemotherapy or radiation and will focus on letting him regain his strength and nutrition.  3.  Oral mucositis.     4.  Warfarin anticoagulation.  Patient's INR 2.0.    5.  Weight loss and poor nutrition.  The patient's weight has stabilized and he is utilizing boost at home and trying to hydrate with water and Gatorade.   6.  Atrial fibrillation with rapid ventricular response  7.  MediPort infection with methicillin sensitive staph septicemia.  This resulted in a lengthy hospitalization and stay in the critical care unit.  He now seems to be recovered and is completed his antibiotic therapy as an outpatient.    Plan  1.  No further plans for additional chemotherapy or radiation.  2.  Patient will be focusing on regaining his strength and improving his nutrition.  3.  We will schedule follow-up appointment in our office in 2 months and he will undergo repeat labs and CT scans of the neck chest abdomen and pelvis 1 week prior to that visit to assess the status of his disease radiographically.  4.  We refilled his prescription for lidocaine mouth rinse.  5.  His cardiology office will continue to manage his Coumadin anticoagulation for now.

## 2019-09-06 NOTE — TELEPHONE ENCOUNTER
----- Message from Viky Marcus sent at 9/6/2019  2:43 PM EDT -----  Contact: [phone]  [facility]   Pt needs verbal order for PT/INR called to them is at the pt home.      Called and s/w Ale at [facility]. She said she would like orders for pt's PT/INR to be drawn. Informed her that per Dr. Heaton's note, cardiology is managing this for now. She v/u.

## 2019-09-06 NOTE — TELEPHONE ENCOUNTER
----- Message from Pablo Heaton MD sent at 9/6/2019  3:45 PM EDT -----  Please call patient today and instruct him to stop taking his potassium supplement for now.      Called pt and informed him of his K level and of Dr. Heaton's instructions. He v/u.

## 2019-09-06 NOTE — PROGRESS NOTES
Anticoagulation Clinic Progress Note    Anticoagulation Summary  As of 2019    INR goal:   2.0-3.0   TTR:   45.4 % (9.5 mo)   INR used for dosin.00 (2019)   Warfarin maintenance plan:   2 mg every day   Weekly warfarin total:   14 mg   Plan last modified:   Mere Vincent [facility] (2019)   Next INR check:   2019   Priority:   Maintenance   Target end date:   Indefinite    Indications    Atrial fibrillation (CMS/HCC) [I48.91]             Anticoagulation Episode Summary     INR check location:       Preferred lab:       Send INR reminders to:   [facility] CLINICAL POOL    Comments:         Anticoagulation Care Providers     Provider Role Specialty Phone number    Myriam Omer MD Referring Cardiology [phone]    Nallely Stoll [facility] Responsible Pharmacy [phone]          Clinic Interview:      INR History:  Anticoagulation Monitoring 2019 9/3/2019 2019   INR 3.10 3.50 2.00   INR Date 2019 9/3/2019 2019   INR Goal 2.0-3.0 2.0-3.0 2.0-3.0   Trend Same Same Same   Last Week Total 18 mg 18 mg 13 mg   Next Week Total 14 mg 13 mg 14 mg   Sun 2 mg - 2 mg   Mon 2 mg - 2 mg   Tue - 1 mg (9/3) 2 mg   Wed - 2 mg -   Thu - 2 mg -   Fri 2 mg - 2 mg   Sat 2 mg - 2 mg   Visit Report - - -   Some recent data might be hidden       Plan:  1. INR is Therapeutic today- see above in Anticoagulation Summary.   Will instruct Cody Eldridge to Continue their warfarin regimen- see above in Anticoagulation Summary.  2. Follow up in 5 days--pt will check with [facility] to see if they will be doing lab that day.  3. Spoke with patient today.  Patient expresses understanding and has no further questions at this time.    Kristal Cowart [facility]

## 2019-09-11 ENCOUNTER — ANTICOAGULATION VISIT (OUTPATIENT)
Dept: PHARMACY | Facility: HOSPITAL | Age: 74
End: 2019-09-11

## 2019-09-11 LAB — INR PPP: 1.4

## 2019-09-11 NOTE — PROGRESS NOTES
Anticoagulation Clinic Progress Note    Anticoagulation Summary  As of 2019    INR goal:   2.0-3.0   TTR:   44.7 % (9.7 mo)   INR used for dosin.40! (2019)   Warfarin maintenance plan:   2 mg every day   Weekly warfarin total:   14 mg   Plan last modified:   Mere Vincent [facility] (2019)   Next INR check:   2019   Priority:   Maintenance   Target end date:   Indefinite    Indications    Atrial fibrillation (CMS/HCC) [I48.91]             Anticoagulation Episode Summary     INR check location:       Preferred lab:       Send INR reminders to:   [facility] CLINICAL POOL    Comments:         Anticoagulation Care Providers     Provider Role Specialty Phone number    Myriam Omer MD Referring Cardiology [phone]    Nallely Stoll [facility] Responsible Pharmacy [phone]          INR History:  Anticoagulation Monitoring 9/3/2019 2019 2019   INR 3.50 2.00 1.40   INR Date 9/3/2019 2019 2019   INR Goal 2.0-3.0 2.0-3.0 2.0-3.0   Trend Same Same Same   Last Week Total 18 mg 13 mg 14 mg   Next Week Total 13 mg 14 mg 18 mg   Sun - 2 mg 2 mg   Mon - 2 mg -   Tue 1 mg (9/3) 2 mg -   Wed 2 mg - 4 mg ()   Thu 2 mg - 4 mg ()   Fri - 2 mg 2 mg   Sat - 2 mg 2 mg   Visit Report - - -   Some recent data might be hidden       Plan:  1. INR is Subtherapeutic today- see above in Anticoagulation Summary.   Provided instructions to Essie with [facility] to Change their warfarin regimen- see above in Anticoagulation Summary.  2. Follow up in 5 days      Georges Guadalupe [facility]

## 2019-09-15 ENCOUNTER — READMISSION MANAGEMENT (OUTPATIENT)
Dept: CALL CENTER | Facility: HOSPITAL | Age: 74
End: 2019-09-15

## 2019-09-15 NOTE — OUTREACH NOTE
Sepsis Week 4 Survey      Responses   Facility patient discharged from?  [place]   Does the patient have one of the following disease processes/diagnoses(primary or secondary)?  Sepsis   Week 4 attempt successful?  Yes   Call start time  1519   Call end time  1522   Discharge diagnosis  New onset CHF, afib., squamous cell carcinoma of neck, COPD, depression with anxiety, CAD, chemo induced neutropenia, Vasc. cath infection, chemo induced thrombocytopenia, acute renal injury, anemia of chemo, oral thrush, septic shock, hypokalemia   Meds reviewed with patient/caregiver?  Yes   Is the patient having any side effects they believe may be caused by any medication additions or changes?  No   Is the patient taking all medications as directed (includes completed medication regime)?  Yes   Has the patient kept scheduled appointments due by today?  Yes   Comments  pt denies any difficulty getting to appointments   Is the patient still receiving Home Health Services?  Yes   Home health comments  HH doing INR checks, Port removed   Psychosocial issues?  No   What is the patient's perception of their health status since discharge?  Improving   Nursing interventions  Nurse provided patient education   Is patient/caregiver able to teach back steps to recovery at home?  Set small, achievable goals for return to baseline health, Rest and regain strength   Is the patient/caregiver able to teach back the hierarchy of who to call/visit for symptoms/problems? PCP, Specialist, Home health nurse, Urgent Care, ED, 911  Yes   Additional teach back comments  denies any complaints or concerns. States he is feeling better.    Week 4 call completed?  Yes   Would the patient like one additional call?  No   Graduated  Yes   Did the patient feel the follow up calls were helpful during their recovery period?  Yes   Was the number of calls appropriate?  Yes          Juliet Heaton RN

## 2019-09-16 ENCOUNTER — ANTICOAGULATION VISIT (OUTPATIENT)
Dept: PHARMACY | Facility: HOSPITAL | Age: 74
End: 2019-09-16

## 2019-09-16 ENCOUNTER — TELEPHONE (OUTPATIENT)
Dept: FAMILY MEDICINE CLINIC | Facility: CLINIC | Age: 74
End: 2019-09-16

## 2019-09-16 LAB — INR PPP: 1.5

## 2019-09-16 NOTE — PROGRESS NOTES
Anticoagulation Clinic Progress Note    Anticoagulation Summary  As of 2019    INR goal:   2.0-3.0   TTR:   43.9 % (9.8 mo)   INR used for dosin.50! (2019)   Warfarin maintenance plan:   4 mg every Mon, Wed, Fri; 2 mg all other days   Weekly warfarin total:   20 mg   Plan last modified:   Georges Guadalupe [facility] (2019)   Next INR check:   2019   Priority:   Maintenance   Target end date:   Indefinite    Indications    Atrial fibrillation (CMS/HCC) [I48.91]             Anticoagulation Episode Summary     INR check location:       Preferred lab:       Send INR reminders to:    [facility] CLINICAL POOL    Comments:         Anticoagulation Care Providers     Provider Role Specialty Phone number    Myriam Omer MD Referring Cardiology [phone]            Clinic Interview:  Patient Findings     Negatives:   Signs/symptoms of thrombosis, Signs/symptoms of bleeding,   Laboratory test error suspected, Change in health, Change in alcohol use,   Change in activity, Upcoming invasive procedure, Emergency department   visit, Upcoming dental procedure, Missed doses, Extra doses, Change in   medications, Change in diet/appetite, Hospital admission, Bruising, Other   complaints    Comments:   Discharged from [facility]. Previously maintained on 2 mg MF, 4 mg   rest of wk; however, more sensitive to INR following recent   hospitalization.      Clinical Outcomes     Negatives:   Major bleeding event, Thromboembolic event,   Anticoagulation-related hospital admission, Anticoagulation-related ED   visit, Anticoagulation-related fatality    Comments:   Discharged from [facility]. Previously maintained on 2 mg MF, 4 mg   rest of wk; however, more sensitive to INR following recent   hospitalization.        INR History:  Anticoagulation Monitoring 2019   INR 2.00 1.40 1.50   INR Date 2019   INR Goal 2.0-3.0 2.0-3.0 2.0-3.0   Trend Same Same Up   Last Week Total 13 mg 14 mg  18 mg   Next Week Total 14 mg 18 mg 20 mg   Sun 2 mg 2 mg 2 mg   Mon 2 mg - 4 mg   Tue 2 mg - 2 mg   Wed - 4 mg (9/11) 4 mg   Thu - 4 mg (9/12) 2 mg   Fri 2 mg 2 mg 4 mg   Sat 2 mg 2 mg 2 mg   Visit Report - - -   Some recent data might be hidden       Plan:  1. INR is Subtherapeutic today- see above in Anticoagulation Summary.   Will instruct Cody Eldridge to Increase their warfarin regimen- see above in Anticoagulation Summary.  2. Follow up in 1 week in clinic  3. They have been instructed to call if any changes in medications, doses, concerns, etc. Patient expresses understanding and has no further questions at this time.    Georges Guadalupe RP

## 2019-09-16 NOTE — TELEPHONE ENCOUNTER
Home Health Nurse Marga Thompson called to inform James Epley pt is being discharged from home health today 9/16/19. Pt no longer needs home health services.

## 2019-09-18 ENCOUNTER — DOCUMENTATION (OUTPATIENT)
Dept: NUTRITION | Facility: HOSPITAL | Age: 74
End: 2019-09-18

## 2019-09-18 NOTE — PROGRESS NOTES
"Outpatient Oncology Nutrition Follow Up:     Called patient at home today. No longer being followed by home health. Spoke to wife. Says he is eating well \"like a little pig\" able to eat regular food but not gaining any weight.  Will continue to be available as needed. Noted no plans for any further chemo.   "

## 2019-09-23 ENCOUNTER — OFFICE VISIT (OUTPATIENT)
Dept: CARDIOLOGY | Facility: CLINIC | Age: 74
End: 2019-09-23

## 2019-09-23 ENCOUNTER — TELEPHONE (OUTPATIENT)
Dept: CARDIOLOGY | Facility: CLINIC | Age: 74
End: 2019-09-23

## 2019-09-23 VITALS
BODY MASS INDEX: 21.28 KG/M2 | SYSTOLIC BLOOD PRESSURE: 110 MMHG | WEIGHT: 152 LBS | DIASTOLIC BLOOD PRESSURE: 52 MMHG | HEIGHT: 71 IN | HEART RATE: 59 BPM

## 2019-09-23 DIAGNOSIS — I50.20 SYSTOLIC HEART FAILURE, UNSPECIFIED HF CHRONICITY (HCC): Primary | ICD-10-CM

## 2019-09-23 DIAGNOSIS — I10 ESSENTIAL HYPERTENSION: ICD-10-CM

## 2019-09-23 DIAGNOSIS — I48.0 PAROXYSMAL ATRIAL FIBRILLATION (HCC): ICD-10-CM

## 2019-09-23 DIAGNOSIS — I25.10 CORONARY ARTERY DISEASE INVOLVING NATIVE CORONARY ARTERY OF NATIVE HEART WITHOUT ANGINA PECTORIS: ICD-10-CM

## 2019-09-23 PROBLEM — I50.21 ACUTE SYSTOLIC CHF (CONGESTIVE HEART FAILURE): Status: ACTIVE | Noted: 2019-09-23

## 2019-09-23 PROCEDURE — 99214 OFFICE O/P EST MOD 30 MIN: CPT | Performed by: NURSE PRACTITIONER

## 2019-09-23 PROCEDURE — 93000 ELECTROCARDIOGRAM COMPLETE: CPT | Performed by: NURSE PRACTITIONER

## 2019-09-23 RX ORDER — WARFARIN SODIUM 2 MG/1
TABLET ORAL
Qty: 70 TABLET | Refills: 0 | Status: SHIPPED | OUTPATIENT
Start: 2019-09-23 | End: 2020-03-09

## 2019-09-23 NOTE — TELEPHONE ENCOUNTER
I would have him call his primary care provider to see if this is a medication that needs to be continued.  It appears that it was started by nephrology during hospitalization for urinary retention.  If he would like a referral to nephrology please let me know and I can place this.  However would have him discuss with primary care in the meantime.

## 2019-09-23 NOTE — PROGRESS NOTES
Date of Office Visit: 2019  Encounter Provider: Angelique Espinal, JAMILAH, APRN  Place of Service: [facility] CARDIOLOGY  Patient Name: Cody Eldridge  :1945        Subjective:     Chief Complaint:  Follow-up, atrial fibrillation, combined systolic and diastolic heart failure, nonobstructive coronary disease.      History of Present Illness:  Cody Eldridge is a 74 y.o. male patient of Dr. Omer.  I am seeing this patient in the office today and I reviewed his records.    Patient has a history of coronary artery disease, congestive heart failure, atrial fibrillation, hypertension, hyperlipidemia, shortness of breath with exertion, depression, COPD, sepsis with subsequent port removal, chronic kidney disease, pulmonary hypertension.     In  patient had an SVT ablation at the [facility].  There is a questionable history of a non-ST elevation myocardial infarction in the past.  Patient had echocardiogram 2000 showing normal left ventricular systolic function with ejection fraction of 64%.  There was mild right and left atrial enlargement as well as mild mitral and tricuspid regurgitation with a right ventricular systolic pressure of 39 mmHg.  In 2017 patient had shortness of breath and leg swelling and was found to have an elevated BNP.  He was started on Lasix.  Chest x-ray showed mild cardiac enlargement.  CT scan of the chest revealed prominent changes of emphysema with scarring.  He had an echocardiogram done which showed normal left ventricular systolic function with an ejection fraction of 56%, mild to moderate left ventricular hypertrophy, moderate to severe dilation of the right ventricle with mildly reduced function, moderate to severe dilation of the right atrium, moderate tricuspid regurgitation with RVSP of 47 mmHg.  Patient was seen by pulmonology and his medications were adjusted.  Patient was seen 18 at this point it  was reported that he complained of chest pain and shortness of breath.  It was recommended that he have a left and right heart catheter.  His amlodipine was discontinued and metoprolol was increased to 100 mg twice daily in an attempt to get better rate control as his heart rate was 113 bpm.  On 6/13/18 patient without heart catheterization but this was aborted.  On 7/13/18 it was reported the patient went to the emergency department with increased shortness of breath and weight gain.  He was given one dose of IV Lasix 80 mg.  He was discharged in stable condition and was told to take an extra 20 mg of Lasix at home.  He then resumed his 40 mg twice a day of Lasix.  Patient was seen in office by Silvina Tejada 7/19/18.  At this point it was reported that patient shortness of breath continued however it had improved since ER visit.  His weights had slowly been trending down.  He did have some fatigue that was coming and going. Patient had a history of claustrophobia which is why previous heart catheter had to be aborted.  It was recommended that patient attempt another heart catheter with anesthesia.  Patient had cardiac catheterization done 8/29/18.  He was found to have mild nonobstructive coronary disease.  Continue treatment for atrial fibrillation as well as risk factor modification was recommended.    Patient presented to the hospital 8/3/2019 with worsening bilateral lower extremity edema associated with shortness of breath and chest discomfort.  It was reported that he is received IV fluids the day prior at [facility].  It is recommended that he go to the ER.  Chest x-ray showed cardiomegaly and atelectasis/infiltrate at the lung bases with left pleural effusion.  Troponin was negative.  BNP of 8857.  INR 3.97.  White blood cell count 1.09.  EKG showed heart rate of 143 and atrial fibrillation.  He received 5 mcg of digoxin x2, 40 mg IV Lasix, and 5 mg of IV Lopressor prior to admission.  He was placed on amiodarone  drip for heart rate control.  Patient underwent removal of port due to sepsis.  Echocardiogram 8/6/2019 showed decreased left ventricular systolic function with EF of 36 to 40%, hypokinesis of the apical lateral, mid inferior lateral, apical inferior, mid inferior, apical septal, basal inferior septal, mid inferior septal, mid anterior septal, basal inferior, and basal inferior septal walls.  Left atrial cavity was moderately dilated.  Moderate tricuspid regurgitation with moderate pulmonary hypertension was noted.  Digoxin was stopped due to elevated dig level 8/7.  He was not on anticoagulants due to severe thrombocytopenia.  He had been on warfarin prior to admission.      Patient presents to office today for follow-up appointment.  Patient reports he has been doing pretty well since hospital discharge.  He reports he still felt kind of rough for about a week after he got out of the hospital but now he is feeling improved and feels that he is continuing to improve slowly.  He has been able to gain a couple of pounds and is hoping to continue to gain back some more weight.  He had labs done recently that showed that his potassium level was high and he was told to stop his potassium supplements.  He reports he is getting labs rechecked next week to ensure that potassium level is back to normal.  He has some chronic mild shortness of breath which she attributes to his history of COPD and radiation, not new or worsening.  He gets some mild swelling in his feet at times.  He continues to have some fatigue.  He did have one fall not too long ago but reports that it was a fluke and denied injuries.  He does not normally fall and ambulate steadily with a cane.  He knows that if he were to fall and hit his head he would need to go to the ER for CT scan however he does not expect this to happen.  Continue to use cane and avoid falls.  He denies any abnormal bleeding.  Denies any chest discomfort, palpitations, racing  heartbeat sensation, dizziness, syncope, near syncope.  Blood pressure and heart rate are well controlled in the office today.  He reports blood pressure at home has been running closer to 1 50-1 60 systolic however this is before medications.  Recommended that he check blood pressure at least 1 to 2 hours after morning medications and after resting calmly 10 to 15 minutes, keep a log, and call if blood pressure staying greater than 130s over 80.  He reports that he has previously completed chemotherapy and may needs more radiation of his pelvis however they are doing a PET scan at the end of October to determine this.            Past Medical History:   Diagnosis Date   • Acute renal injury (CMS/HCC)    • Anemia associated with chemotherapy    • Atrial fibrillation (CMS/HCC)    • CAD (coronary artery disease)    • CHF (congestive heart failure) (CMS/HCC)    • Chronic bronchitis (CMS/HCC)    • COPD (chronic obstructive pulmonary disease) (CMS/HCC)    • Cor pulmonale (chronic) (CMS/HCC)    • Daytime hypersomnia    • Depression with anxiety    • IRAHETA (dyspnea on exertion)    • Elevated cholesterol    • Enlarged prostate    • Frequent nocturnal awakening    • Gout    • H/O cardiac radiofrequency ablation 2006    [facility].   • Head and neck cancer (CMS/HCC)    • Hyperlipidemia    • Hypertension    • Hypokalemia    • Hypotension    • Insomnia    • Lumbar radicular pain    • SHAR (obstructive sleep apnea)    • Osteoarthritis    • Permanent atrial fibrillation (CMS/HCC)    • Shock, septic (CMS/HCC)    • Snoring    • Squamous cell carcinoma of neck    • SVT (supraventricular tachycardia) (CMS/HCC)    • Syncope    • Vitamin D deficiency    • Weight loss      Past Surgical History:   Procedure Laterality Date   • APPENDECTOMY     • CARDIAC ABLATION  2006    [facility].   • CARDIAC CATHETERIZATION N/A 8/29/2018    Procedure: Left Heart Cath;  Surgeon: Yousif Uribe MD;  Location: [facility] CATH INVASIVE LOCATION;  Service:  Cardiology   • CARDIAC CATHETERIZATION N/A 8/29/2018    Procedure: Coronary angiography;  Surgeon: Yousif Uribe MD;  Location: [facility] CATH INVASIVE LOCATION;  Service: Cardiology   • CARDIAC CATHETERIZATION N/A 8/29/2018    Procedure: Left ventriculography;  Surgeon: Yousif Uribe MD;  Location: [facility] CATH INVASIVE LOCATION;  Service: Cardiology   • FINGER SURGERY     • FOOT SURGERY     • HAND SURGERY     • VENOUS ACCESS DEVICE (PORT) INSERTION Right 6/18/2019    Procedure: MEDIPORT PLACEMENT;  Surgeon: Elvis Grigsby MD;  Location: [facility] MAIN OR;  Service: Vascular   • VENOUS ACCESS DEVICE (PORT) REMOVAL Right 8/5/2019    Procedure: REMOVAL VENOUS ACCESS DEVICE;  Surgeon: Prince Casatneda MD;  Location: [facility] OR;  Service: Vascular     Outpatient Medications Prior to Visit   Medication Sig Dispense Refill   • albuterol (PROVENTIL HFA;VENTOLIN HFA) 108 (90 BASE) MCG/ACT inhaler Inhale 2 puffs. ProAir  (90 Base) MCG/ACT Inhalation Aerosol Solution; Patient Sig: ProAir  (90 Base) MCG/ACT Inhalation Aerosol Solution inhale 2 puffs by mouth every 4 hours if needed; 8.5; 11; 07-Apr-2014; Act     • ALPRAZolam (XANAX) 1 MG tablet Take 1 mg by mouth Daily As Needed for Anxiety (prior to radiation-takes 1/2).     • baclofen (LIORESAL) 10 MG tablet take 1 tablet by mouth three times a day 90 tablet 1   • diphenoxylate-atropine (LOMOTIL) 2.5-0.025 MG per tablet Take 1 tablet by mouth 4 (Four) Times a Day As Needed for Diarrhea. 60 tablet 1   • Lidocaine Viscous HCl (XYLOCAINE) 2 % solution Take 5 mL by mouth Every 3 (Three) Hours As Needed for Moderate Pain . 100 mL 3   • metoprolol tartrate (LOPRESSOR) 25 MG tablet metoprolol tartrate 25 mg tablet     • montelukast (SINGULAIR) 10 MG tablet Take 10 mg by mouth Every Night.     • potassium chloride (K-DUR,KLOR-CON) 20 MEQ CR tablet Take 3 tabs in the morning and 2 in the evenings until return on 9/6 60 tablet 0   • rosuvastatin (CRESTOR) 20 MG  tablet Take 20 mg by mouth Daily. 30 tablet 5   • sertraline (ZOLOFT) 50 MG tablet TAKE 1 TABLET BY MOUTH EVERY DAY 90 tablet 1   • silver sulfadiazine (SILVADENE) 1 % cream Apply  topically to the appropriate area as directed 2 (Two) Times a Day. 50 g 0   • warfarin (COUMADIN) 1 MG tablet Take 1 mg by mouth Every Other Day.     • warfarin (COUMADIN) 1 MG tablet 2 mg po every other day on days not taking 1 mg 30 tablet 0     No facility-administered medications prior to visit.        Allergies as of 2019 - Reviewed 2019   Allergen Reaction Noted   • Benadryl [diphenhydramine hcl] Other (See Comments) and Dizziness 2017     Social History     Socioeconomic History   • Marital status:      Spouse name: Elise Rai   • Number of children: Not on file   • Years of education: Some College   • Highest education level: Not on file   Occupational History     Employer: RETIRED   Tobacco Use   • Smoking status: Former Smoker     Packs/day: 3.00     Years: 30.00     Pack years: 90.00     Types: Cigarettes     Last attempt to quit: 2000     Years since quittin.7   • Smokeless tobacco: Never Used   • Tobacco comment: started age 11 x 53 years stopped 2000 / daily caffiene   Substance and Sexual Activity   • Alcohol use: Yes     Comment: quit 30 yeas ago   • Drug use: No   • Sexual activity: Defer     Family History   Problem Relation Age of Onset   • Heart attack Mother    • Diabetes Mother    • Heart disease Mother    • Hypertension Mother    • Diabetes Father    • Heart failure Father    • Heart disease Father    • Hypertension Father    • Cancer Brother         colon   • Hypertension Brother    • Colon cancer Brother 50   • Diabetes Sister    • Heart disease Sister    • Hypertension Sister    • COPD Other    • Heart failure Other    • Heart disease Other    • Malig Hyperthermia Neg Hx          Review of Systems   Constitution: Positive for malaise/fatigue. Negative for chills, fever, weight  "gain and weight loss.   HENT: Negative for ear pain, hearing loss, nosebleeds and sore throat.    Eyes: Negative for blurred vision, double vision, redness, vision loss in left eye and vision loss in right eye.   Cardiovascular:        SEE HPI.    Respiratory: Positive for shortness of breath. Negative for cough, snoring and wheezing.    Endocrine: Negative for cold intolerance and heat intolerance.   Skin: Negative for itching, rash and suspicious lesions.   Musculoskeletal: Positive for joint pain. Negative for joint swelling and myalgias.   Gastrointestinal: Negative for abdominal pain, diarrhea, hematemesis, melena, nausea and vomiting.   Genitourinary: Negative for dysuria, frequency and hematuria.   Neurological: Negative for dizziness, headaches, numbness, paresthesias and seizures.   Psychiatric/Behavioral: Negative for altered mental status and depression. The patient is not nervous/anxious.              Objective:     Vitals:    09/23/19 1345   BP: 110/52   BP Location: Right arm   Pulse: 59   Weight: 68.9 kg (152 lb)   Height: 180.3 cm (71\")     Body mass index is 21.2 kg/m².      PHYSICAL EXAM:  Physical Exam   Constitutional: He is oriented to person, place, and time. He appears well-developed and well-nourished. No distress.   HENT:   Head: Normocephalic and atraumatic.   Eyes: Pupils are equal, round, and reactive to light.   Neck: No JVD present.   Cardiovascular: Normal rate, regular rhythm, normal heart sounds and intact distal pulses. Exam reveals no gallop and no friction rub.   No murmur heard.  Pulses:       Radial pulses are 2+ on the right side, and 2+ on the left side.        Posterior tibial pulses are 2+ on the right side, and 2+ on the left side.   Pulmonary/Chest: Effort normal and breath sounds normal. No respiratory distress. He has no wheezes. He has no rales.   Abdominal: Soft. Bowel sounds are normal. He exhibits no distension. There is no tenderness.   Musculoskeletal: He exhibits " no edema or tenderness.   Neurological: He is alert and oriented to person, place, and time.   Skin: Skin is warm and dry. He is not diaphoretic. No erythema.   Psychiatric: He has a normal mood and affect. His behavior is normal. Judgment normal.           ECG 12 Lead  Date/Time: 9/23/2019 1:58 PM  Performed by: Angelique Espinal DNP, APRN  Authorized by: Angelique Espinal DNP, JONELLE   Comparison: compared with previous ECG from 9/20/2018  Rhythm: sinus rhythm  Rate: bradycardic  BPM: 59  Other findings: non-specific ST-T wave changes    Clinical impression: non-specific ECG        The above EKG reviewed with Dr. Negra MD.          Assessment:       Diagnosis Plan   1. Systolic heart failure, unspecified HF chronicity (CMS/HCC)     2. Paroxysmal atrial fibrillation (CMS/HCC)  ECG 12 Lead   3. Coronary artery disease involving native coronary artery of native heart without angina pectoris  ECG 12 Lead   4. Essential hypertension           Plan:     1. History of systolic congestive heart failure: In the setting of atrial fibrillation with RVR and sepsis.  Appears euvolemic in the office today.  Remains on beta-blocker therapy with metoprolol.  Continue goal-directed medical therapy.  Not on ACE or arm due to recent renal insufficiency/ JERICHO.  Denies anginal symptoms.  We will continue beta-blocker therapy and look at repeating an echocardiogram in a couple of months.     Heart Failure  Assessment  • Beta blocker prescribed  • Left ventricular function is moderately reduced by qualitative assessment    Plan  • The heart failure care plan was discussed with the patient today including: continuing the current program    Subjective/Objective  • The patient reports dyspnea    • Physical exam findings negative for rales and elevated JVP.      2. Paroxysmal atrial fibrillation: In sinus rhythm in the office today.  On beta-blocker therapy with metoprolol.  On anticoagulation therapy with warfarin.  Denies abnormal  bleeding.  3. Nonobstructive coronary artery disease: Mild on 8/2018 heart cath.  Denies anginal symptoms.  Continue risk factor modification.  4. Hypertension: Blood pressure well controlled in the office today.  Patient to continue to monitor.  5. COPD: Followed by Dr. Segovia.   6. Pulmonary hypertension  7. Obstructive sleep apnea: On CPAP therapy.  Using CPAP occasionally.  Recommended using it all night every night if possible.  8. History of squamous cell carcinoma: Continues to be followed by hematology/oncology.  9. MSSA sepsis status post port removal  10. Renal insufficiency  11. Recent hyperkalemia: Being managed by outside provider.  Patient reports potassium supplements were stopped and he is getting potassium levels rechecked next week.  He will be sure to get these labs done.    Patient to schedule 8 hospital follow-up appointment with Dr. Omer or follow-up sooner if needed for any new, recurrent, or worsening symptoms or other problems or concerns.    Greater than 13 minutes of 25-minute office visit spent face-to-face with patient discussing plan of treatment and testing, medications, blood pressure and heart rate goals and monitoring, needed follow-up with our office in outside offices, as well as signs/symptoms that warrant sooner call or follow-up with the office.           Your medication list           Accurate as of 9/23/19  2:27 PM. If you have any questions, ask your nurse or doctor.               CONTINUE taking these medications      Instructions Last Dose Given Next Dose Due   albuterol sulfate  (90 Base) MCG/ACT inhaler  Commonly known as:  PROVENTIL HFA;VENTOLIN HFA;PROAIR HFA      Inhale 2 puffs. ProAir  (90 Base) MCG/ACT Inhalation Aerosol Solution; Patient Sig: ProAir  (90 Base) MCG/ACT Inhalation Aerosol Solution inhale 2 puffs by mouth every 4 hours if needed; 8.5; 11; 07-Apr-2014; Act       ALPRAZolam 1 MG tablet  Commonly known as:  XANAX      Take 1 mg by  mouth Daily As Needed for Anxiety (prior to radiation-takes 1/2).       baclofen 10 MG tablet  Commonly known as:  LIORESAL      take 1 tablet by mouth three times a day       CRESTOR 20 MG tablet  Generic drug:  rosuvastatin      Take 20 mg by mouth Daily.       diphenoxylate-atropine 2.5-0.025 MG per tablet  Commonly known as:  LOMOTIL      Take 1 tablet by mouth 4 (Four) Times a Day As Needed for Diarrhea.       Lidocaine Viscous HCl 2 % solution  Commonly known as:  XYLOCAINE      Take 5 mL by mouth Every 3 (Three) Hours As Needed for Moderate Pain .       metoprolol tartrate 25 MG tablet  Commonly known as:  LOPRESSOR      metoprolol tartrate 25 mg tablet       montelukast 10 MG tablet  Commonly known as:  SINGULAIR      Take 10 mg by mouth Every Night.       potassium chloride 20 MEQ CR tablet  Commonly known as:  K-DUR,KLOR-CON      Take 3 tabs in the morning and 2 in the evenings until return on 9/6       sertraline 50 MG tablet  Commonly known as:  ZOLOFT      TAKE 1 TABLET BY MOUTH EVERY DAY       silver sulfadiazine 1 % cream  Commonly known as:  SILVADENE      Apply  topically to the appropriate area as directed 2 (Two) Times a Day.       warfarin 1 MG tablet  Commonly known as:  COUMADIN      Take 1 mg by mouth Every Other Day.       warfarin 1 MG tablet  Commonly known as:  COUMADIN      2 mg po every other day on days not taking 1 mg              I did not stop or change the above medications.  Patient's medication list was updated to reflect medications they are currently taking including medication changes made by other providers.        Thanks,    Angelique Espinal, JAMILAH, APRN  09/23/2019         Dictated utilizing Dragon dictation

## 2019-09-23 NOTE — TELEPHONE ENCOUNTER
9/23/19  Pt left [facility] - states he looked when he got home to see if he has rx for hytrin - he does not.  If he is to be taking this, he asked that Rx be sent in./yasmeen  Pt ph [phone]

## 2019-09-24 ENCOUNTER — ANTICOAGULATION VISIT (OUTPATIENT)
Dept: PHARMACY | Facility: HOSPITAL | Age: 74
End: 2019-09-24

## 2019-09-24 LAB
INR PPP: 1.3 (ref 0.91–1.09)
PROTHROMBIN TIME: 15.9 SECONDS (ref 10–13.8)

## 2019-09-24 PROCEDURE — G0463 HOSPITAL OUTPT CLINIC VISIT: HCPCS

## 2019-09-24 PROCEDURE — 36416 COLLJ CAPILLARY BLOOD SPEC: CPT

## 2019-09-24 PROCEDURE — 85610 PROTHROMBIN TIME: CPT

## 2019-09-24 NOTE — PROGRESS NOTES
Anticoagulation Clinic Progress Note    Anticoagulation Summary  As of 2019    INR goal:   2.0-3.0   TTR:   42.7 % (10.1 mo)   INR used for dosin.3! (2019)   Warfarin maintenance plan:   2 mg every Sun, Tue, Thu; 4 mg all other days   Weekly warfarin total:   22 mg   Plan last modified:   Kristal Cowart RPH (2019)   Next INR check:   10/1/2019   Priority:   Maintenance   Target end date:   Indefinite    Indications    Atrial fibrillation (CMS/HCC) [I48.91]             Anticoagulation Episode Summary     INR check location:       Preferred lab:       Send INR reminders to:    [facility]    Comments:         Anticoagulation Care Providers     Provider Role Specialty Phone number    Myriam Omer MD Referring Cardiology [phone]          Clinic Interview:      INR History:  Anticoagulation Monitoring 2019   INR 1.40 1.50 1.3   INR Date 2019   INR Goal 2.0-3.0 2.0-3.0 2.0-3.0   Trend Same Up Up   Last Week Total 14 mg 18 mg 20 mg   Next Week Total 18 mg 20 mg 24 mg   Sun 2 mg 2 mg 2 mg   Mon - 4 mg 4 mg   Tue - 2 mg 4 mg ()   Wed 4 mg () 4 mg 4 mg   Thu 4 mg () 2 mg 2 mg   Fri 2 mg 4 mg 4 mg   Sat 2 mg 2 mg 4 mg   Visit Report - - -   Some recent data might be hidden       Plan:  1. INR is Subtherapeutic today- see above in Anticoagulation Summary.  Will instruct Cody SAMSON Eldridge to Increase their warfarin regimen- see above in Anticoagulation Summary.  2. Follow up in 1 week  3. Patient declines warfarin refills.  4. Verbal and written information provided. Patient expresses understanding and has no further questions at this time.    Kristal Cowart RPH

## 2019-10-01 ENCOUNTER — ANTICOAGULATION VISIT (OUTPATIENT)
Dept: PHARMACY | Facility: HOSPITAL | Age: 74
End: 2019-10-01

## 2019-10-01 LAB
INR PPP: 1.5 (ref 0.91–1.09)
PROTHROMBIN TIME: 18.5 SECONDS (ref 10–13.8)

## 2019-10-01 PROCEDURE — 85610 PROTHROMBIN TIME: CPT

## 2019-10-01 PROCEDURE — 36416 COLLJ CAPILLARY BLOOD SPEC: CPT

## 2019-10-01 PROCEDURE — G0463 HOSPITAL OUTPT CLINIC VISIT: HCPCS

## 2019-10-01 NOTE — PROGRESS NOTES
Anticoagulation Clinic Progress Note    Anticoagulation Summary  As of 10/1/2019    INR goal:   2.0-3.0   TTR:   41.7 % (10.4 mo)   INR used for dosin.5! (10/1/2019)   Warfarin maintenance plan:   2 mg every Sun, Tue, Thu; 4 mg all other days   Weekly warfarin total:   22 mg   Plan last modified:   Kristal Cowart RPH (2019)   Next INR check:   10/8/2019   Priority:   Maintenance   Target end date:   Indefinite    Indications    Atrial fibrillation (CMS/HCC) [I48.91]             Anticoagulation Episode Summary     INR check location:       Preferred lab:       Send INR reminders to:   [facility]    Comments:         Anticoagulation Care Providers     Provider Role Specialty Phone number    Myriam Omer MD Referring Cardiology [phone]          Clinic Interview:  Patient Findings     Negatives:   Signs/symptoms of thrombosis, Signs/symptoms of bleeding,   Laboratory test error suspected, Change in health, Change in alcohol use,   Change in activity, Upcoming invasive procedure, Emergency department   visit, Upcoming dental procedure, Missed doses, Extra doses, Change in   medications, Change in diet/appetite, Hospital admission, Bruising, Other   complaints      Clinical Outcomes     Negatives:   Major bleeding event, Thromboembolic event,   Anticoagulation-related hospital admission, Anticoagulation-related ED   visit, Anticoagulation-related fatality        INR History:  Anticoagulation Monitoring 2019 2019 10/1/2019   INR 1.50 1.3 1.5   INR Date 2019 2019 10/1/2019   INR Goal 2.0-3.0 2.0-3.0 2.0-3.0   Trend Up Up Same   Last Week Total 18 mg 20 mg 24 mg   Next Week Total 20 mg 24 mg 26 mg   Sun 2 mg 2 mg 2 mg   Mon 4 mg 4 mg 4 mg   Tue 2 mg 4 mg () 6 mg (10/1)   Wed 4 mg 4 mg 4 mg   Thu 2 mg 2 mg 2 mg   Fri 4 mg 4 mg 4 mg   Sat 2 mg 4 mg 4 mg   Visit Report - - -   Some recent data might be hidden       Plan:  1. INR is Subtherapeutic today- see above in  Anticoagulation Summary.  Will instruct Cody Eldridge to Change their warfarin regimen- see above in Anticoagulation Summary.  2. Follow up in 1 week  3. Patient declines warfarin refills.  4. Verbal and written information provided. Patient expresses understanding and has no further questions at this time.    Álvaro Sam, PharmD

## 2019-10-08 ENCOUNTER — ANTICOAGULATION VISIT (OUTPATIENT)
Dept: PHARMACY | Facility: HOSPITAL | Age: 74
End: 2019-10-08

## 2019-10-08 LAB
INR PPP: 1.9 (ref 0.91–1.09)
PROTHROMBIN TIME: 23.2 SECONDS (ref 10–13.8)

## 2019-10-08 PROCEDURE — 36416 COLLJ CAPILLARY BLOOD SPEC: CPT

## 2019-10-08 PROCEDURE — 85610 PROTHROMBIN TIME: CPT

## 2019-10-08 PROCEDURE — G0463 HOSPITAL OUTPT CLINIC VISIT: HCPCS

## 2019-10-08 NOTE — PROGRESS NOTES
Anticoagulation Clinic Progress Note    Anticoagulation Summary  As of 10/8/2019    INR goal:   2.0-3.0   TTR:   40.8 % (10.6 mo)   INR used for dosin.9! (10/8/2019)   Warfarin maintenance plan:   2 mg every Sun, Tue, Thu; 4 mg all other days   Weekly warfarin total:   22 mg   Plan last modified:   Kristal Cowart RPH (2019)   Next INR check:   10/15/2019   Priority:   Maintenance   Target end date:   Indefinite    Indications    Atrial fibrillation (CMS/HCC) [I48.91]             Anticoagulation Episode Summary     INR check location:       Preferred lab:       Send INR reminders to:   [facility]    Comments:         Anticoagulation Care Providers     Provider Role Specialty Phone number    Myriam Omer MD Referring Cardiology [phone]          Clinic Interview:  Patient Findings     Negatives:   Signs/symptoms of thrombosis, Signs/symptoms of bleeding,   Laboratory test error suspected, Change in health, Change in alcohol use,   Change in activity, Upcoming invasive procedure, Emergency department   visit, Upcoming dental procedure, Missed doses, Extra doses, Change in   medications, Change in diet/appetite, Hospital admission, Bruising, Other   complaints      Clinical Outcomes     Negatives:   Major bleeding event, Thromboembolic event,   Anticoagulation-related hospital admission, Anticoagulation-related ED   visit, Anticoagulation-related fatality        INR History:  Anticoagulation Monitoring 2019 10/1/2019 10/8/2019   INR 1.3 1.5 1.9   INR Date 2019 10/1/2019 10/8/2019   INR Goal 2.0-3.0 2.0-3.0 2.0-3.0   Trend Up Same Same   Last Week Total 20 mg 24 mg 26 mg   Next Week Total 24 mg 26 mg 24 mg   Sun 2 mg 2 mg 2 mg   Mon 4 mg 4 mg 4 mg   Tue 4 mg () 6 mg (10/1) 4 mg (10/8)   Wed 4 mg 4 mg 4 mg   Thu 2 mg 2 mg 2 mg   Fri 4 mg 4 mg 4 mg   Sat 4 mg 4 mg 4 mg   Visit Report - - -   Some recent data might be hidden       Plan:  1. INR is Subtherapeutic today- see  above in Anticoagulation Summary.  Will instruct Cody Eldridge to Change their warfarin regimen- see above in Anticoagulation Summary. 4mg boost today, otherwise continue normal regimen   2. Follow up in 1 week  3. Patient declines warfarin refills.  4. Verbal and written information provided. Patient expresses understanding and has no further questions at this time.    Álvaro Sam, PharmD

## 2019-10-14 ENCOUNTER — APPOINTMENT (OUTPATIENT)
Dept: PHARMACY | Facility: HOSPITAL | Age: 74
End: 2019-10-14

## 2019-10-15 ENCOUNTER — ANTICOAGULATION VISIT (OUTPATIENT)
Dept: PHARMACY | Facility: HOSPITAL | Age: 74
End: 2019-10-15

## 2019-10-15 LAB
INR PPP: 1.8 (ref 0.91–1.09)
PROTHROMBIN TIME: 21.5 SECONDS (ref 10–13.8)

## 2019-10-15 PROCEDURE — 85610 PROTHROMBIN TIME: CPT

## 2019-10-15 PROCEDURE — G0463 HOSPITAL OUTPT CLINIC VISIT: HCPCS

## 2019-10-15 PROCEDURE — 36416 COLLJ CAPILLARY BLOOD SPEC: CPT

## 2019-10-15 NOTE — PROGRESS NOTES
Anticoagulation Clinic Progress Note    Anticoagulation Summary  As of 10/15/2019    INR goal:   2.0-3.0   TTR:   39.9 % (10.8 mo)   INR used for dosin.8! (10/15/2019)   Warfarin maintenance plan:   2 mg every Sun, Thu; 4 mg all other days   Weekly warfarin total:   24 mg   Plan last modified:   Álvaro Sam RPH (10/15/2019)   Next INR check:   10/29/2019   Priority:   Maintenance   Target end date:   Indefinite    Indications    Atrial fibrillation (CMS/HCC) [I48.91]             Anticoagulation Episode Summary     INR check location:       Preferred lab:       Send INR reminders to:   [facility]    Comments:         Anticoagulation Care Providers     Provider Role Specialty Phone number    Myriam Omer MD Referring Cardiology [phone]          Clinic Interview:  Patient Findings     Negatives:   Signs/symptoms of thrombosis, Signs/symptoms of bleeding,   Laboratory test error suspected, Change in health, Change in alcohol use,   Change in activity, Upcoming invasive procedure, Emergency department   visit, Upcoming dental procedure, Missed doses, Extra doses, Change in   medications, Change in diet/appetite, Hospital admission, Bruising, Other   complaints      Clinical Outcomes     Negatives:   Major bleeding event, Thromboembolic event,   Anticoagulation-related hospital admission, Anticoagulation-related ED   visit, Anticoagulation-related fatality        INR History:  Anticoagulation Monitoring 10/1/2019 10/8/2019 10/15/2019   INR 1.5 1.9 1.8   INR Date 10/1/2019 10/8/2019 10/15/2019   INR Goal 2.0-3.0 2.0-3.0 2.0-3.0   Trend Same Same Up   Last Week Total 24 mg 26 mg 24 mg   Next Week Total 26 mg 24 mg 24 mg   Sun 2 mg 2 mg 2 mg   Mon 4 mg 4 mg 4 mg   Tue 6 mg (10/1) 4 mg (10/8) 4 mg   Wed 4 mg 4 mg 4 mg   Thu 2 mg 2 mg 2 mg   Fri 4 mg 4 mg 4 mg   Sat 4 mg 4 mg 4 mg   Visit Report - - -   Some recent data might be hidden       Plan:  1. INR is Subtherapeutic today- see  above in Anticoagulation Summary.  Will instruct Cody Eldridge to Continue their warfarin regimen- see above in Anticoagulation Summary. Change tuesdays to 4mg moving forward, otherwise resume normal.   2. Follow up in 2 weeks  3. Patient declines warfarin refills.  4. Verbal and written information provided. Patient expresses understanding and has no further questions at this time.    Álvaro Sam, PharmD

## 2019-10-25 ENCOUNTER — APPOINTMENT (OUTPATIENT)
Dept: CT IMAGING | Facility: HOSPITAL | Age: 74
End: 2019-10-25

## 2019-10-29 ENCOUNTER — ANTICOAGULATION VISIT (OUTPATIENT)
Dept: PHARMACY | Facility: HOSPITAL | Age: 74
End: 2019-10-29

## 2019-10-29 ENCOUNTER — HOSPITAL ENCOUNTER (OUTPATIENT)
Dept: CT IMAGING | Facility: HOSPITAL | Age: 74
Discharge: HOME | End: 2019-10-29
Admitting: INTERNAL MEDICINE

## 2019-10-29 DIAGNOSIS — B95.61 MSSA BACTEREMIA: ICD-10-CM

## 2019-10-29 DIAGNOSIS — T45.1X5A ANEMIA ASSOCIATED WITH CHEMOTHERAPY: ICD-10-CM

## 2019-10-29 DIAGNOSIS — D70.1 CHEMOTHERAPY-INDUCED NEUTROPENIA (HCC): ICD-10-CM

## 2019-10-29 DIAGNOSIS — D64.81 ANEMIA ASSOCIATED WITH CHEMOTHERAPY: ICD-10-CM

## 2019-10-29 DIAGNOSIS — C01 SQUAMOUS CELL CARCINOMA OF BASE OF TONGUE (HCC): ICD-10-CM

## 2019-10-29 DIAGNOSIS — I48.19 PERSISTENT ATRIAL FIBRILLATION (HCC): ICD-10-CM

## 2019-10-29 DIAGNOSIS — T82.7XXS: ICD-10-CM

## 2019-10-29 DIAGNOSIS — R78.81 MSSA BACTEREMIA: ICD-10-CM

## 2019-10-29 DIAGNOSIS — T45.1X5A CHEMOTHERAPY-INDUCED NEUTROPENIA (HCC): ICD-10-CM

## 2019-10-29 LAB
CREAT BLDA-MCNC: 0.9 MG/DL (ref 0.6–1.3)
INR PPP: 2.2 (ref 0.91–1.09)
PROTHROMBIN TIME: 27 SECONDS (ref 10–13.8)

## 2019-10-29 PROCEDURE — 36416 COLLJ CAPILLARY BLOOD SPEC: CPT

## 2019-10-29 PROCEDURE — 82565 ASSAY OF CREATININE: CPT

## 2019-10-29 PROCEDURE — 71260 CT THORAX DX C+: CPT

## 2019-10-29 PROCEDURE — 25010000002 IOPAMIDOL 61 % SOLUTION: Performed by: INTERNAL MEDICINE

## 2019-10-29 PROCEDURE — 85610 PROTHROMBIN TIME: CPT

## 2019-10-29 PROCEDURE — 70491 CT SOFT TISSUE NECK W/DYE: CPT

## 2019-10-29 RX ADMIN — IOPAMIDOL 90 ML: 612 INJECTION, SOLUTION INTRAVENOUS at 11:24

## 2019-10-29 NOTE — PROGRESS NOTES
Anticoagulation Clinic Progress Note    Anticoagulation Summary  As of 10/29/2019    INR goal:   2.0-3.0   TTR:   40.3 % (11.3 mo)   INR used for dosin.2 (10/29/2019)   Warfarin maintenance plan:   2 mg every Sun, Thu; 4 mg all other days   Weekly warfarin total:   24 mg   No change documented:   Vira Altamirano   Plan last modified:   Álvaro Sam RP (10/15/2019)   Next INR check:   2019   Priority:   Maintenance   Target end date:   Indefinite    Indications    Atrial fibrillation (CMS/[facility]) [I48.91]             Anticoagulation Episode Summary     INR check location:       Preferred lab:       Send INR reminders to:    [facility]    Comments:         Anticoagulation Care Providers     Provider Role Specialty Phone number    Myriam Omer MD Referring Cardiology [phone]          Clinic Interview:  Patient Findings     Negatives:   Signs/symptoms of thrombosis, Signs/symptoms of bleeding,   Laboratory test error suspected, Change in health, Change in alcohol use,   Change in activity, Upcoming invasive procedure, Emergency department   visit, Upcoming dental procedure, Missed doses, Extra doses, Change in   medications, Change in diet/appetite, Hospital admission, Bruising, Other   complaints      Clinical Outcomes     Negatives:   Major bleeding event, Thromboembolic event,   Anticoagulation-related hospital admission, Anticoagulation-related ED   visit, Anticoagulation-related fatality        INR History:  Anticoagulation Monitoring 10/8/2019 10/15/2019 10/29/2019   INR 1.9 1.8 2.2   INR Date 10/8/2019 10/15/2019 10/29/2019   INR Goal 2.0-3.0 2.0-3.0 2.0-3.0   Trend Same Up Same   Last Week Total 26 mg 24 mg 24 mg   Next Week Total 24 mg 24 mg 24 mg   Sun 2 mg 2 mg 2 mg   Mon 4 mg 4 mg 4 mg   Tue 4 mg (10/8) 4 mg 4 mg   Wed 4 mg 4 mg 4 mg   Thu 2 mg 2 mg 2 mg   Fri 4 mg 4 mg 4 mg   Sat 4 mg 4 mg 4 mg   Visit Report - - -   Some recent data might be hidden       Plan:  1.  INR is therapeutic today- see above in Anticoagulation Summary.   Will instruct Cody SAMSON Eldridge to continue their warfarin regimen- see above in Anticoagulation Summary.  2. Follow up in 3 weeks.  3. Patient declines warfarin refills.  4. Verbal and written information provided. Patient expresses understanding and has no further questions at this time.    Vira Altamirano

## 2019-11-01 ENCOUNTER — OFFICE VISIT (OUTPATIENT)
Dept: ONCOLOGY | Facility: CLINIC | Age: 74
End: 2019-11-01

## 2019-11-01 ENCOUNTER — APPOINTMENT (OUTPATIENT)
Dept: OTHER | Facility: HOSPITAL | Age: 74
End: 2019-11-01

## 2019-11-01 VITALS
RESPIRATION RATE: 16 BRPM | HEART RATE: 68 BPM | BODY MASS INDEX: 21.56 KG/M2 | HEIGHT: 71 IN | DIASTOLIC BLOOD PRESSURE: 71 MMHG | SYSTOLIC BLOOD PRESSURE: 162 MMHG | TEMPERATURE: 97.9 F | OXYGEN SATURATION: 95 % | WEIGHT: 154 LBS

## 2019-11-01 DIAGNOSIS — C01 SQUAMOUS CELL CARCINOMA OF BASE OF TONGUE (HCC): Primary | ICD-10-CM

## 2019-11-01 DIAGNOSIS — D64.81 ANEMIA ASSOCIATED WITH CHEMOTHERAPY: ICD-10-CM

## 2019-11-01 DIAGNOSIS — T45.1X5A ANEMIA ASSOCIATED WITH CHEMOTHERAPY: ICD-10-CM

## 2019-11-01 DIAGNOSIS — I48.91 ATRIAL FIBRILLATION, UNSPECIFIED TYPE (HCC): ICD-10-CM

## 2019-11-01 PROBLEM — T82.7XXA VASCULAR CATHETER INFECTION (HCC): Status: RESOLVED | Noted: 2019-08-03 | Resolved: 2019-11-01

## 2019-11-01 PROBLEM — A41.9 SHOCK, SEPTIC: Status: RESOLVED | Noted: 2019-08-09 | Resolved: 2019-11-01

## 2019-11-01 PROBLEM — R65.21 SHOCK, SEPTIC: Status: RESOLVED | Noted: 2019-08-09 | Resolved: 2019-11-01

## 2019-11-01 PROCEDURE — 99214 OFFICE O/P EST MOD 30 MIN: CPT | Performed by: INTERNAL MEDICINE

## 2019-11-01 PROCEDURE — G0463 HOSPITAL OUTPT CLINIC VISIT: HCPCS | Performed by: INTERNAL MEDICINE

## 2019-11-01 RX ORDER — AMLODIPINE BESYLATE 5 MG/1
5 TABLET ORAL 2 TIMES DAILY
Refills: 3 | COMMUNITY
Start: 2019-09-08 | End: 2020-06-02

## 2019-11-01 RX ORDER — DIGOXIN 125 MCG
TABLET ORAL
COMMUNITY
End: 2019-12-03

## 2019-11-01 RX ORDER — HYDROCODONE BITARTRATE AND ACETAMINOPHEN 10; 325 MG/1; MG/1
TABLET ORAL
Refills: 0 | COMMUNITY
Start: 2019-10-19 | End: 2020-11-23 | Stop reason: ALTCHOICE

## 2019-11-01 RX ORDER — EPINEPHRINE 0.15 MG/.3ML
INJECTION INTRAMUSCULAR
COMMUNITY
End: 2020-03-05 | Stop reason: ALTCHOICE

## 2019-11-01 RX ORDER — INFLUENZA A VIRUS A/MICHIGAN/45/2015 X-275 (H1N1) ANTIGEN (FORMALDEHYDE INACTIVATED), INFLUENZA A VIRUS A/SINGAPORE/INFIMH-16-0019/2016 IVR-186 (H3N2) ANTIGEN (FORMALDEHYDE INACTIVATED), AND INFLUENZA B VIRUS B/MARYLAND/15/2016 BX-69A (A B/COLORADO/6/2017-LIKE VIRUS) ANTIGEN (FORMALDEHYDE INACTIVATED) 60; 60; 60 UG/.5ML; UG/.5ML; UG/.5ML
INJECTION, SUSPENSION INTRAMUSCULAR
Refills: 0 | COMMUNITY
Start: 2019-10-22 | End: 2020-01-06

## 2019-11-01 NOTE — PROGRESS NOTES
Subjective     REASON FOR FOLLOW UP:  1.  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   2.  Combined radiation and low-dose carboplatin and Taxol chemotherapy weekly initiated 6/17/2019.  3.  MediPort placed by Dr. Adolph Grigsby of vascular surgery 6/18/2019  4.  Methicillin sensitive staph septicemia felt to be associated with infected MediPort.  Port was removed and patient has completed a protracted course of antibiotics  5.  Generalized weakness and malnutrition  6.  Atrial fibrillation.  He is currently anticoagulated with Coumadin    HISTORY OF PRESENT ILLNESS:  The patient is a 74 y.o. year old male who is undergoing treatment with weekly carboplatin and Taxol along with radiation for his squamous cell carcinoma of the base of the tongue, HPV positive.      He was started on combined radiation and low-dose weekly carboplatin and Taxol chemotherapy but had tremendous problems with toxicity and tolerance to treatment.  He received his fifth week of chemotherapy and radiation on 7/31/2019 but subsequently required admission to the hospital with severe toxicity and development of methicillin sensitive staph septicemia.  His MediPort was felt to be infected and was removed.    After his recovery he opted not to finish out his remaining days of radiation and is being followed expectantly.  He underwent recent ENT evaluation with Dr. Slater and was felt to be doing well.  He underwent a CT scan of the neck and chest on 10/29/2019 showing significant improvement and no evidence of distant metastatic disease.    History of Present Illness     Past Medical History:   Diagnosis Date   • Acute renal injury (CMS/HCC)    • Anemia associated with chemotherapy    • Atrial fibrillation (CMS/HCC)    • CAD (coronary artery disease)    • CHF (congestive heart failure) (CMS/HCC)    • Chronic bronchitis (CMS/HCC)    • COPD (chronic obstructive pulmonary disease) (CMS/HCC)    • Cor pulmonale (chronic)  "(CMS/HCC)    • Daytime hypersomnia    • Depression with anxiety    • IRAHETA (dyspnea on exertion)    • Elevated cholesterol    • Enlarged prostate    • Frequent nocturnal awakening    • Gout    • H/O cardiac radiofrequency ablation 2006    [facility].   • Head and neck cancer (CMS/HCC)    • Hyperlipidemia    • Hypertension    • Hypokalemia    • Hypotension    • Insomnia    • Lumbar radicular pain    • SHAR (obstructive sleep apnea)    • Osteoarthritis    • Permanent atrial fibrillation    • Shock, septic (CMS/HCC)    • Snoring    • Squamous cell carcinoma of neck    • SVT (supraventricular tachycardia) (CMS/HCC)    • Syncope    • Vitamin D deficiency    • Weight loss         Past Surgical History:   Procedure Laterality Date   • APPENDECTOMY     • CARDIAC ABLATION  2006    [facility].   • CARDIAC CATHETERIZATION N/A 8/29/2018    Procedure: Left Heart Cath;  Surgeon: Yousif Uribe MD;  Location: [facility] CATH INVASIVE LOCATION;  Service: Cardiology   • CARDIAC CATHETERIZATION N/A 8/29/2018    Procedure: Coronary angiography;  Surgeon: Yousif Uribe MD;  Location: [facility] CATH INVASIVE LOCATION;  Service: Cardiology   • CARDIAC CATHETERIZATION N/A 8/29/2018    Procedure: Left ventriculography;  Surgeon: Yousif Uribe MD;  Location: [facility] CATH INVASIVE LOCATION;  Service: Cardiology   • FINGER SURGERY     • FOOT SURGERY     • HAND SURGERY     • VENOUS ACCESS DEVICE (PORT) INSERTION Right 6/18/2019    Procedure: MEDIPORT PLACEMENT;  Surgeon: Elvis Grigsby MD;  Location: [facility] OR;  Service: Vascular   • VENOUS ACCESS DEVICE (PORT) REMOVAL Right 8/5/2019    Procedure: REMOVAL VENOUS ACCESS DEVICE;  Surgeon: Prince Castaneda MD;  Location: [facility] OR;  Service: Vascular        ALLERGIES:    Allergies   Allergen Reactions   • Benadryl [Diphenhydramine Hcl] Other (See Comments) and Dizziness     \"I sleep for about a day\"        Review of Systems   Constitutional: Positive for appetite change (lack of " "appetite), fatigue and unexpected weight change. Negative for activity change, chills and fever.   HENT: Positive for mouth sores (improving'). Negative for trouble swallowing and voice change.    Eyes: Negative for pain and visual disturbance.   Respiratory: Negative for cough, shortness of breath and wheezing.    Cardiovascular: Negative for chest pain, palpitations and leg swelling.   Gastrointestinal: Positive for nausea. Negative for abdominal pain, constipation, diarrhea and vomiting.   Genitourinary: Negative for difficulty urinating, frequency and urgency.   Musculoskeletal: Positive for back pain. Negative for arthralgias and joint swelling.   Skin: Negative for rash.   Neurological: Positive for dizziness and weakness. Negative for seizures.   Hematological: Negative for adenopathy. Does not bruise/bleed easily.   Psychiatric/Behavioral: Negative for behavioral problems and confusion. The patient is not nervous/anxious.         Objective     Vitals:    11/01/19 1427   BP: 162/71   Pulse: 68   Resp: 16   Temp: 97.9 °F (36.6 °C)   TempSrc: Oral   SpO2: 95%   Weight: 69.9 kg (154 lb)   Height: 180.3 cm (70.98\")   PainSc:   8   PainLoc: Back  Comment: Low back pain     Current Status 11/1/2019   ECOG score 0       Physical Exam   Constitutional: He is oriented to person, place, and time. He appears well-developed and well-nourished. No distress.   HENT:   Head: Normocephalic.   Mouth/Throat: Oropharyngeal exudate (mucositits with thrush present) present.   Eyes: Conjunctivae and EOM are normal. Pupils are equal, round, and reactive to light. No scleral icterus.   Neck: Normal range of motion. Neck supple. No JVD present. No thyromegaly present.   Cardiovascular: Normal rate. An irregularly irregular rhythm present. Exam reveals no gallop and no friction rub.   No murmur heard.  Pulmonary/Chest: Effort normal and breath sounds normal. He has no wheezes. He has no rales.   Abdominal: Soft. He exhibits no " distension and no mass. There is no tenderness.   Musculoskeletal: Normal range of motion. He exhibits edema. He exhibits no deformity.   trace ankle edema bilaterally   Lymphadenopathy:     He has no cervical adenopathy.   Neurological: He is alert and oriented to person, place, and time. He has normal reflexes. No cranial nerve deficit.   Skin: Skin is warm and dry. No rash noted. No erythema.   Psychiatric: He has a normal mood and affect. His behavior is normal. Judgment normal.         RECENT LABS:  Hematology WBC   Date Value Ref Range Status   09/06/2019 3.35 (L) 3.40 - 10.80 10*3/mm3 Final     RBC   Date Value Ref Range Status   09/06/2019 2.90 (L) 4.14 - 5.80 10*6/mm3 Final     Hemoglobin   Date Value Ref Range Status   09/06/2019 10.0 (L) 13.0 - 17.7 g/dL Final     Hematocrit   Date Value Ref Range Status   09/06/2019 31.6 (L) 37.5 - 51.0 % Final     Platelets   Date Value Ref Range Status   09/06/2019 200 140 - 450 10*3/mm3 Final        Lab Results   Component Value Date    NEUTROABS 2.34 09/06/2019       Lab Results   Component Value Date    GLUCOSE 99 09/06/2019    BUN 12 09/06/2019    CREATININE 0.90 10/29/2019    EGFRIFNONA 83 09/06/2019    EGFRIFAFRI 107 04/23/2018    BCR 13.5 09/06/2019    K 5.4 (H) 09/06/2019    CO2 31.4 (H) 09/06/2019    CALCIUM 8.9 09/06/2019    PROTENTOTREF 7.2 04/23/2018    ALBUMIN 3.20 (L) 09/06/2019    LABIL2 1.3 04/23/2018    AST 41 (H) 09/06/2019    ALT 16 09/06/2019     Lab Results   Component Value Date    INR 2.2 (H) 10/29/2019    INR 1.8 (H) 10/15/2019    INR 1.9 (H) 10/08/2019    PROTIME 27.0 (H) 10/29/2019    PROTIME 21.5 (H) 10/15/2019    PROTIME 23.2 (H) 10/08/2019       CT SOFT TISSUE NECK W CONTRAST-, CT CHEST W CONTRAST- 10/29/2019  IMPRESSION:  Significant improvement since the preceding PET/CT study  dated 05/23/2019. An infiltrative mass involving the base of the tongue  has decreased in size and overall conspicuity considerably. A sakina mass  in the right  side of the neck is also decreased in size significantly.  There is no new evidence of metastatic disease in the neck. There is no  compelling evidence of metastatic disease within the chest or visualized  upper abdomen. Moderately severe emphysema is noted.      PATHOLOGY 5/15/2019  Final Diagnosis   1.  Right Neck, FNA:                  A.  Positive for squamous cell carcinoma.     Flow Cytometry Report, Addendum   Utilizing appropriate positive and negative controls immunohistochemical stain p16 is performed on cell block material.  The tumor is positive for p16 by immunohistochemical staining which is a surrogate for HPV infection.         Assessment/Plan     1.  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  He was undergoing definitive treatment with radiation therapy and weekly low-dose carboplatin and Taxol chemotherapy.  As noted above, he was unable to tolerate the full treatment and received about 5 weeks of therapy with his last treatment the week of 7/31/2019.  2.  Comorbidities including ischemic heart disease with history of CHF and atrial fibrillation requiring Coumadin anticoagulation.   3.  Extremely poor tolerance to chemotherapy and radiation.  At this point we do not feel the patient can tolerate any further chemotherapy or radiation and will focus on letting him regain his strength and nutrition.   4.  Warfarin anticoagulation.  Patient's INR 2.2 on 10/29/2019.    5.    MediPort infection with methicillin sensitive staph septicemia.  This resulted in a lengthy hospitalization and stay in the critical care unit.  He now seems to be recovered and is completed his antibiotic therapy as an outpatient.  His port was removed.    Plan  1.  No further plans for additional chemotherapy or radiation.  2.  Patient will be focusing on regaining his strength and improving his nutrition.  3.  We will schedule follow-up  appointment in our office in 3 months with PET scan ordered 1 week prior to the visit.  4.  His cardiology office will continue to manage his Coumadin anticoagulation for now.

## 2019-11-15 ENCOUNTER — OFFICE VISIT (OUTPATIENT)
Dept: FAMILY MEDICINE CLINIC | Facility: CLINIC | Age: 74
End: 2019-11-15

## 2019-11-15 VITALS
SYSTOLIC BLOOD PRESSURE: 176 MMHG | HEART RATE: 67 BPM | DIASTOLIC BLOOD PRESSURE: 82 MMHG | BODY MASS INDEX: 21.42 KG/M2 | HEIGHT: 71 IN | WEIGHT: 153 LBS | TEMPERATURE: 97.4 F | OXYGEN SATURATION: 95 %

## 2019-11-15 DIAGNOSIS — I10 ESSENTIAL HYPERTENSION: ICD-10-CM

## 2019-11-15 DIAGNOSIS — C01 SQUAMOUS CELL CARCINOMA OF BASE OF TONGUE (HCC): Primary | ICD-10-CM

## 2019-11-15 DIAGNOSIS — M25.511 ACUTE PAIN OF RIGHT SHOULDER: ICD-10-CM

## 2019-11-15 DIAGNOSIS — C44.42 SQUAMOUS CELL CARCINOMA OF NECK: ICD-10-CM

## 2019-11-15 PROCEDURE — 99214 OFFICE O/P EST MOD 30 MIN: CPT | Performed by: NURSE PRACTITIONER

## 2019-11-15 RX ORDER — MONTELUKAST SODIUM 10 MG/1
10 TABLET ORAL NIGHTLY
Qty: 90 TABLET | Refills: 1 | Status: SHIPPED | OUTPATIENT
Start: 2019-11-15 | End: 2020-08-13

## 2019-11-15 RX ORDER — AZITHROMYCIN 250 MG/1
TABLET, FILM COATED ORAL
Qty: 6 TABLET | Refills: 0 | Status: SHIPPED | OUTPATIENT
Start: 2019-11-15 | End: 2019-12-03

## 2019-11-15 NOTE — PATIENT INSTRUCTIONS
Discharge instructions      Recheck blood pressure  Daily  If consistently 170 or greater call for direction  If chest pain shortness of breath high fever emergency room    Heat as needed exercises right shoulder  Worsening pain return to office or see orthopedic or if not improving in several weeks consider Ortho  Call me next week if need be for any problems with your shoulder    If fever severe cough severe respiratory infection weakness shortness of breath flulike symptoms emergency room    If productive cough urgent recheck  And/or start Zithromax  If taking Zithromax need to repeat INR several days later  For 5 days later and a digoxin level

## 2019-11-17 NOTE — PROGRESS NOTES
"Subjective   Cody Eldridge is a 74 y.o. male.     Pleasant gentleman here who unfortunately this year was diagnosed with stage I squamous cell tongue cancer at the base involving at least one cervical node  Patient underwent radiation therapy chemoi belive as well .  Due to complications weakness dramatic weight loss he was hospitalized for several months.  Patient said he lost over 70 pounds  He was so weak he could not hardly walk independently.  He could not take in a longer he left but he completed all of his radiation treatments except 5 Ibelieve    His blood pressure is elevated today 176/82 recheck with similar results  Is been running pretty good at home he said no problems with this no headaches or dizziness he will recheck this today let me know.    He has had some right shoulder discomfort with range of motion the last couple weeks no specific injury  No chest pain nausea vomiting weakness no radiating pains  Increased pain at night with some decreased range of motion the last week he has not taken medications for this worse with movement  No upper arm or forearm swelling or tenderness or axilla tenderness  He said no deep vein thrombosis history         /82   Pulse 67   Temp 97.4 °F (36.3 °C) (Oral)   Ht 180.3 cm (70.98\")   Wt 69.4 kg (153 lb)   SpO2 95%   BMI 21.35 kg/m²       The following portions of the patient's history were reviewed and updated as appropriate: allergies, current medications, past family history, past medical history, past social history, past surgical history and problem list.    Review of Systems   Constitutional: Positive for appetite change, fatigue and unexpected weight loss. Negative for fever.   HENT: Negative.  Negative for trouble swallowing.    Eyes: Negative.    Respiratory: Negative.  Negative for cough and shortness of breath.    Cardiovascular: Negative for chest pain, palpitations and leg swelling.   Gastrointestinal: Negative.  Negative for abdominal " pain.   Genitourinary: Negative.    Musculoskeletal: Negative.         Right shoulder pain   Skin: Negative.    Neurological: Negative.  Negative for dizziness and confusion.   Psychiatric/Behavioral: Negative.        Objective   Physical Exam   Constitutional: He is oriented to person, place, and time.   Dramatic change, patient looks chronically ill weak but no distress   HENT:   Head: Normocephalic.   Right Ear: External ear normal.   Left Ear: External ear normal.   Pharynx dry no palpable cervical masses   Eyes: Conjunctivae are normal. Pupils are equal, round, and reactive to light. No scleral icterus.   Neck: No thyromegaly present.   Pulmonary/Chest: Effort normal.   Decreased breath sounds   Abdominal: Soft. Bowel sounds are normal. He exhibits no distension. There is no tenderness.   Musculoskeletal: Normal range of motion.   Lymphadenopathy:     He has no cervical adenopathy.   Neurological: He is alert and oriented to person, place, and time.   Skin: Skin is warm and dry.   Psychiatric: He has a normal mood and affect. His behavior is normal. Judgment and thought content normal.   Vitals reviewed.        Assessment/Plan   Cody was seen today for shoulder pain.    Diagnoses and all orders for this visit:    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)    Acute pain of right shoulder    Squamous cell carcinoma of neck    Essential hypertension    Other orders  -     montelukast (SINGULAIR) 10 MG tablet; Take 1 tablet by mouth Every Night.  -     azithromycin (ZITHROMAX Z-VIPIN) 250 MG tablet; Take 2 tablets the first day, then 1 tablet daily for 4 days.      Patient will follow-up with his oncologist  Range of motion exercises for his shoulder  Heat as needed  Tylenol as needed  If not improving in 2 to 3 weeks consider physical therapy and orthopedic referral  He will follow instructions below regarding his blood pressure today    Should he have a productive cough without any significant illness  shortness of breath increased fever chills is a history of COPD and is immune compromised from his  Cancer treatment he should take Zithromax but it should he have fever or  Shortness of breath or any significant illness he must be seen  Urgently in person for chest x-ray and proper evaluation              Patient Instructions   Discharge instructions      Recheck blood pressure  Daily  If consistently 170 or greater call for direction  If chest pain shortness of breath high fever emergency room    Heat as needed exercises right shoulder  Worsening pain return to office or see orthopedic or if not improving in several weeks consider Ortho  Call me next week if need be for any problems with your shoulder    If fever severe cough severe respiratory infection weakness shortness of breath flulike symptoms emergency room    If productive cough urgent recheck  And/or start Zithromax  If taking Zithromax need to repeat INR several days later  For 5 days later and a digoxin level

## 2019-11-18 ENCOUNTER — TELEPHONE (OUTPATIENT)
Dept: ONCOLOGY | Facility: HOSPITAL | Age: 74
End: 2019-11-18

## 2019-11-18 NOTE — TELEPHONE ENCOUNTER
Patient calling c/o right shoulder pain the last week or 2. He wanted to make sure it was not related to his cancer. Reviewed with Dr Heaton, he doesn't think this would be related. Pt already saw pcp who gave suggestions to help with pain and mobility in shoulder per office note in epic. Notified patient we didn't think this was related and to follow pcp instructions. Pt v/u

## 2019-11-19 ENCOUNTER — TELEPHONE (OUTPATIENT)
Dept: FAMILY MEDICINE CLINIC | Facility: CLINIC | Age: 74
End: 2019-11-19

## 2019-11-19 RX ORDER — ALPRAZOLAM 1 MG/1
1 TABLET ORAL DAILY PRN
Qty: 30 TABLET | Refills: 0 | Status: CANCELLED | OUTPATIENT
Start: 2019-11-19

## 2019-11-20 ENCOUNTER — APPOINTMENT (OUTPATIENT)
Dept: PHARMACY | Facility: HOSPITAL | Age: 74
End: 2019-11-20

## 2019-11-21 ENCOUNTER — ANTICOAGULATION VISIT (OUTPATIENT)
Dept: PHARMACY | Facility: HOSPITAL | Age: 74
End: 2019-11-21

## 2019-11-21 ENCOUNTER — TELEPHONE (OUTPATIENT)
Dept: FAMILY MEDICINE CLINIC | Facility: CLINIC | Age: 74
End: 2019-11-21

## 2019-11-21 DIAGNOSIS — I48.21 PERMANENT ATRIAL FIBRILLATION (HCC): ICD-10-CM

## 2019-11-21 LAB
INR PPP: 2.6 (ref 0.91–1.09)
PROTHROMBIN TIME: 31.7 SECONDS (ref 10–13.8)

## 2019-11-21 PROCEDURE — 36416 COLLJ CAPILLARY BLOOD SPEC: CPT

## 2019-11-21 PROCEDURE — 85610 PROTHROMBIN TIME: CPT

## 2019-11-21 NOTE — PROGRESS NOTES
Anticoagulation Clinic Progress Note    Anticoagulation Summary  As of 2019    INR goal:   2.0-3.0   TTR:   44.1 % (1 y)   INR used for dosin.6 (2019)   Warfarin maintenance plan:   2 mg every Sun, Thu; 4 mg all other days   Weekly warfarin total:   24 mg   No change documented:   Vira Altamirano   Plan last modified:   Álvaro Sam [facility] (10/15/2019)   Next INR check:   2019   Priority:   Maintenance   Target end date:   Indefinite    Indications    Atrial fibrillation (CMS/HCC) [I48.91]             Anticoagulation Episode Summary     INR check location:       Preferred lab:       Send INR reminders to:   [facility] CLINICAL POOL    Comments:         Anticoagulation Care Providers     Provider Role Specialty Phone number    Myriam Omer MD Referring Cardiology [phone]          Clinic Interview:      INR History:  Anticoagulation Monitoring 10/15/2019 10/29/2019 2019   INR 1.8 2.2 2.6   INR Date 10/15/2019 10/29/2019 2019   INR Goal 2.0-3.0 2.0-3.0 2.0-3.0   Trend Up Same Same   Last Week Total 24 mg 24 mg 24 mg   Next Week Total 24 mg 24 mg 24 mg   Sun 2 mg 2 mg 2 mg   Mon 4 mg 4 mg 4 mg   Tue 4 mg 4 mg 4 mg   Wed 4 mg 4 mg 4 mg   Thu 2 mg 2 mg 2 mg   Fri 4 mg 4 mg 4 mg   Sat 4 mg 4 mg 4 mg   Visit Report - - -   Some recent data might be hidden       Plan:  1. INR is therapeutic today- see above in Anticoagulation Summary.   Will instruct Cody Eldridge to continue their warfarin regimen- see above in Anticoagulation Summary.  2. Follow up in 4 weeks.  3. Patient declines warfarin refills.  4. Verbal and written information provided. Patient expresses understanding and has no further questions at this time.    Vira Altamirano

## 2019-11-22 ENCOUNTER — OFFICE VISIT (OUTPATIENT)
Dept: FAMILY MEDICINE CLINIC | Facility: CLINIC | Age: 74
End: 2019-11-22

## 2019-11-22 VITALS
DIASTOLIC BLOOD PRESSURE: 70 MMHG | BODY MASS INDEX: 21.42 KG/M2 | HEIGHT: 71 IN | WEIGHT: 153 LBS | SYSTOLIC BLOOD PRESSURE: 130 MMHG

## 2019-11-22 DIAGNOSIS — Z79.899 HIGH RISK MEDICATION USE: ICD-10-CM

## 2019-11-22 DIAGNOSIS — F41.8 DEPRESSION WITH ANXIETY: Primary | ICD-10-CM

## 2019-11-22 DIAGNOSIS — C01 SQUAMOUS CELL CARCINOMA OF BASE OF TONGUE (HCC): ICD-10-CM

## 2019-11-22 PROCEDURE — 99213 OFFICE O/P EST LOW 20 MIN: CPT | Performed by: NURSE PRACTITIONER

## 2019-11-22 RX ORDER — ALPRAZOLAM 0.5 MG/1
TABLET ORAL
Qty: 60 TABLET | Refills: 0 | Status: SHIPPED | OUTPATIENT
Start: 2019-11-22 | End: 2020-02-03 | Stop reason: SDUPTHER

## 2019-11-22 RX ORDER — SERTRALINE HYDROCHLORIDE 100 MG/1
100 TABLET, FILM COATED ORAL DAILY
Qty: 30 TABLET | Refills: 3 | Status: SHIPPED | OUTPATIENT
Start: 2019-11-22 | End: 2020-03-09 | Stop reason: SDUPTHER

## 2019-11-22 NOTE — PATIENT INSTRUCTIONS
Discharge instructions  Generic Xanax 0.5 mg 1/2 to 1 tablet twice daily as needed for anxiety please call approximately 10 days prior to Christmas break to make sure we have a good plan to get a refill during this time    Falls precaution    Do not mix with alcohol    Sertraline 50 mg 1-1/2 tablet daily x1 week  Then increase to 2 tablets daily but decrease to prior dose if sedation or problem  I would like a monthly follow-up call on your updated status how you are feeling and at any time any condition change please notify me promptly    Your new prescription will say sertraline 100 mg daily

## 2019-11-29 NOTE — PROGRESS NOTES
"Subjective   Cody Eldridge is a 74 y.o. male.     Follow-up moderate severe anxiety with exacerbation with a diagnosis of squamous cell tongue cancer and now presently status post multiple radiation treatments, he had chemotherapy as well.  Patient extensive treatment dramatic amount of weight loss.  Weakness.  He was discharged a couple weeks ago from the rehab facility on Xanax,  He is having no problems with the medication helps with anxiety  He has chronic back pain and takes an opiate  I discussed risk and benefit of Xanax as well as the increased risk of Xanax and opiate  I discussed other alternatives besides Xanax as well as risk of addiction dependence  Risk of overdose or severe respiratory injury brain injury when Xanax mixed with opiates or other sedatives and strategy to mitigate risk    Also do not prescribe long-term benzodiazepines as well and if patient should need this he would need to see psychiatry that said we discussed today again plan regarding sertraline SSRI discussed proper stop disposable    Storage [facility] urine drug screening as well as the intent of weaning slowly to decrease withdrawal symptoms  Patient is going to a serious and substantial illness of malignancy and cancer  We need to make sure we helping with the symptoms as well as to avoid complications      Anxiety   Symptoms include nervous/anxious behavior. Patient reports no chest pain, confusion, dizziness, palpitations or shortness of breath.            /70   Ht 180.3 cm (70.98\")   Wt 69.4 kg (153 lb)   BMI 21.35 kg/m²       The following portions of the patient's history were reviewed and updated as appropriate: allergies, current medications, past family history, past medical history, past social history, past surgical history and problem list.    Review of Systems   Constitutional: Positive for fatigue. Negative for fever.   HENT: Negative.  Negative for trouble swallowing.    Eyes: Negative.    Respiratory: " Negative.  Negative for cough and shortness of breath.    Cardiovascular: Negative for chest pain, palpitations and leg swelling.   Gastrointestinal: Negative.  Negative for abdominal pain.   Genitourinary: Negative.    Musculoskeletal: Negative.    Skin: Negative.    Neurological: Negative.  Negative for dizziness and confusion.   Psychiatric/Behavioral: The patient is nervous/anxious.        Objective   Physical Exam   Constitutional: He is oriented to person, place, and time. No distress.   Dramatic change from patient's baseline several months ago before treatment  Patient feels weak, pale but no distress   HENT:   Head: Normocephalic and atraumatic.   Nose: Nose normal.   Mouth/Throat: Oropharynx is clear and moist.   Eyes: Conjunctivae are normal. Pupils are equal, round, and reactive to light.   Neck: Neck supple. No JVD present.   Cardiovascular: Normal rate, regular rhythm and normal heart sounds.   No murmur heard.  Pulmonary/Chest: Effort normal and breath sounds normal. No respiratory distress. He has no wheezes.   Abdominal: Soft. Bowel sounds are normal. He exhibits no distension and no mass. There is no tenderness. There is no guarding. No hernia.   Musculoskeletal: He exhibits no edema or tenderness.   Lymphadenopathy:     He has no cervical adenopathy.   Neurological: He is alert and oriented to person, place, and time.   Skin: Skin is warm and dry. He is not diaphoretic.   Psychiatric: He has a normal mood and affect. His behavior is normal. Judgment and thought content normal.   Vitals reviewed.        Assessment/Plan   Cody was seen today for anxiety.    Diagnoses and all orders for this visit:    Depression with anxiety    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)    High risk medication use  -     ToxASSURE Select 13 (MW) - Urine, Clean Catch    Other orders  -     ALPRAZolam (XANAX) 0.5 MG tablet; 1/2 to 1 tablet twice daily for anxiety's  -     sertraline (ZOLOFT) 100 MG tablet;  Take 1 tablet by mouth Daily. For anxiety and depression relief    Cyrus, urine drug screen,  Controlled substance agreement  Decreased Xanax as above  Office visit 20 mg a percent counseling                Patient Instructions   Discharge instructions  Generic Xanax 0.5 mg 1/2 to 1 tablet twice daily as needed for anxiety please call approximately 10 days prior to Christmas break to make sure we have a good plan to get a refill during this time    Falls precaution    Do not mix with alcohol    Sertraline 50 mg 1-1/2 tablet daily x1 week  Then increase to 2 tablets daily but decrease to prior dose if sedation or problem  I would like a monthly follow-up call on your updated status how you are feeling and at any time any condition change please notify me promptly    Your new prescription will say sertraline 100 mg daily

## 2019-12-03 ENCOUNTER — OFFICE VISIT (OUTPATIENT)
Dept: CARDIOLOGY | Facility: CLINIC | Age: 74
End: 2019-12-03

## 2019-12-03 VITALS — WEIGHT: 151 LBS | HEIGHT: 70 IN | BODY MASS INDEX: 21.62 KG/M2

## 2019-12-03 DIAGNOSIS — E78.2 MIXED HYPERLIPIDEMIA: ICD-10-CM

## 2019-12-03 DIAGNOSIS — I48.19 OTHER PERSISTENT ATRIAL FIBRILLATION (HCC): Primary | ICD-10-CM

## 2019-12-03 DIAGNOSIS — I10 ESSENTIAL HYPERTENSION: ICD-10-CM

## 2019-12-03 DIAGNOSIS — I25.10 CORONARY ARTERY DISEASE INVOLVING NATIVE CORONARY ARTERY OF NATIVE HEART WITHOUT ANGINA PECTORIS: ICD-10-CM

## 2019-12-03 PROCEDURE — 93000 ELECTROCARDIOGRAM COMPLETE: CPT | Performed by: INTERNAL MEDICINE

## 2019-12-03 PROCEDURE — 99214 OFFICE O/P EST MOD 30 MIN: CPT | Performed by: INTERNAL MEDICINE

## 2019-12-03 RX ORDER — ASPIRIN 81 MG/1
81 TABLET, CHEWABLE ORAL DAILY
COMMUNITY

## 2019-12-03 NOTE — PROGRESS NOTES
PATIENTINFORMATION    Date of Office Visit: 19  Encounter Provider: yMriam Omer MD  Place of Service: [facility] CARDIOLOGY  Patient Name: Cody Eldridge  : 1945    Subjective:         Patient ID: Cody Eldridge is a 74 y.o. male.      History of Present Illness    This is a nice gentleman who saw Dr. Travis in the remote past. He is a difficult historian. Per Dr. Travis's note from  he had SVT with ablation in  [facility]. He also has COPD, hypertension and hyperlipidemia. To me, he denies coronary disease but his old records show that he has had a non-ST elevation myocardial infarction in the past. He had an echo in 10/2009, which showed normal left ventricular systolic function with an ejection fraction of 64%. There was mild right and left atrial enlargement and mild mitral and tricuspid regurgitation with a right ventricular systolic pressure of 39 mmHg.      In the spring of 2018, he was complaining of lots of shortness of breath.  He eventually had a heart catheterization in 2018 which showed mild nonobstructive coronary disease with a left ventricular end-diastolic pressure of 10.  He has had intermittent blood pressure issues.    Unfortunately, he was diagnosed with throat cancer.  I saw him in the office in 2019 and he was having a hard time staying hydrated.  He was hypotensive.  He was seen by Dr. Colin in the [facility] for poor fluid intake and weight loss.  She gave him some IV fluids and IV metoprolol and we tried oral amiodarone.  He was admitted to the hospital in 2019 for atrial fibrillation with rapid ventricular response and complications due to his squamous cell carcinoma of the neck.  He had MSSA sepsis from an infected chemotherapy port.  He was supposed to have been discharged on carvedilol and warfarin.      At some point, it looks like his nadolol was to thinks he is on amlodipine 5 mg a  "day only.  He looks much improved.  He is eating and drinking better.  He is finished chemotherapy and radiation.  He has a PET scan in January.  He is not having any palpitations, chest pain or dizziness.    Review of Systems   Constitution: Positive for weight gain. Negative for fever, malaise/fatigue and weight loss.   HENT: Negative for ear pain, hearing loss, nosebleeds and sore throat.    Eyes: Positive for blurred vision and pain. Negative for double vision, vision loss in left eye and vision loss in right eye.   Cardiovascular:        See history of present illness.   Respiratory: Negative for cough, shortness of breath, sleep disturbances due to breathing, snoring and wheezing.    Endocrine: Negative for cold intolerance, heat intolerance and polyuria.   Skin: Negative for itching, poor wound healing and rash.   Musculoskeletal: Positive for joint pain. Negative for joint swelling and myalgias.   Gastrointestinal: Negative for abdominal pain, diarrhea, hematochezia, nausea and vomiting.   Genitourinary: Negative for hematuria and hesitancy.   Neurological: Negative for numbness, paresthesias and seizures.   Psychiatric/Behavioral: Negative for depression. The patient is nervous/anxious.            ECG 12 Lead  Date/Time: 12/3/2019 2:57 PM  Performed by: Myriam Omer MD  Authorized by: Myriam Omer MD   Comparison: compared with previous ECG from 8/8/2019  Comparison to previous ECG: No longer in atrial fibrillation/flutter  Rhythm: sinus rhythm  BPM: 64  Conduction: conduction normal  ST Segments: ST segments normal  T Waves: T waves normal    Clinical impression: normal ECG               Objective:     Ht 177.8 cm (70\")   Wt 68.5 kg (151 lb)   BMI 21.67 kg/m²  Body mass index is 21.67 kg/m².     Physical Exam   Constitutional: He appears well-developed.   HENT:   Head: Normocephalic and atraumatic.   Eyes: Conjunctivae and lids are normal. Pupils are equal, round, and reactive to light. Lids " are everted and swept, no foreign bodies found.   Neck: Normal range of motion. No JVD present. Carotid bruit is not present. No tracheal deviation present. No thyroid mass present.   Cardiovascular: Normal rate, regular rhythm and normal heart sounds.   Pulses:       Dorsalis pedis pulses are 2+ on the right side, and 2+ on the left side.   Pulmonary/Chest: Effort normal and breath sounds normal.   Abdominal: Normal appearance and bowel sounds are normal.   Musculoskeletal: Normal range of motion.   Neurological: He is alert. He has normal strength.   Skin: Skin is warm, dry and intact.   Psychiatric: He has a normal mood and affect. His behavior is normal.   Vitals reviewed.          Assessment/Plan:       1.  Nonobstructive coronary artery disease.  Continue with risk factor management.  2.  Persistent atrial fibrillation.  He is anticoagulated with warfarin.  He has a chads 2 vascular score of 3.  He is currently in sinus rhythm.  He is off all AV sakina blocking agents.  3.  Lung disease/COPD with right heart failure.  This is followed by Dr. Segovia.  4.  Hypertension.    He brings in a list of his blood pressures today and they are all over the place.  I am not can adjust his medicines and some not 100% sure what he is on.  5.  Squamous cell cancer of the throat.  Currently completed all his treatment.    He is going to call and go over his meds with my medical assistant to make sure they are accurate.     He will see Regina in 3 months.    Orders Placed This Encounter   Procedures   • ECG 12 Lead     This order was created via procedure documentation        Discharge Medications           Accurate as of 12/3/19  2:57 PM. If you have any questions, ask your nurse or doctor.               Changes to Medications      Instructions Start Date   ALPRAZolam 0.5 MG tablet  Commonly known as:  XANAX  What changed:  Another medication with the same name was removed. Continue taking this medication, and follow the  directions you see here.  Changed by:  Myriam Omer MD   1/2 to 1 tablet twice daily for anxiety's         Continue These Medications      Instructions Start Date   albuterol sulfate  (90 Base) MCG/ACT inhaler  Commonly known as:  PROVENTIL HFA;VENTOLIN HFA;PROAIR HFA   2 puffs, Inhalation, ProAir  (90 Base) MCG/ACT Inhalation Aerosol Solution; Patient Sig: ProAir  (90 Base) MCG/ACT Inhalation Aerosol Solution inhale 2 puffs by mouth every 4 hours if needed; 8.5; 11; 07-Apr-2014; Act      amLODIPine 5 MG tablet  Commonly known as:  NORVASC   2 Times Daily      aspirin 81 MG chewable tablet   81 mg, Oral, Daily      baclofen 10 MG tablet  Commonly known as:  LIORESAL   take 1 tablet by mouth three times a day      CRESTOR 20 MG tablet  Generic drug:  rosuvastatin   20 mg, Oral, Daily      diphenoxylate-atropine 2.5-0.025 MG per tablet  Commonly known as:  LOMOTIL   1 tablet, Oral, 4 Times Daily PRN      EPINEPHrine 0.15 MG/0.3ML solution auto-injector injection  Commonly known as:  EPIPEN JR   epinephrine (Jr) 0.15 mg/0.3 mL injection,auto-injector      FLUZONE HIGH-DOSE 0.5 ML suspension prefilled syringe injection  Generic drug:  influenza vac split high-dose   ADM 0.5ML IM UTD      HYDROcodone-acetaminophen  MG per tablet  Commonly known as:  NORCO   TK 1 T PO QID      Lidocaine Viscous HCl 2 % solution  Commonly known as:  XYLOCAINE   5 mL, Oral, Every 3 Hours PRN      montelukast 10 MG tablet  Commonly known as:  SINGULAIR   10 mg, Oral, Nightly      sertraline 100 MG tablet  Commonly known as:  ZOLOFT   100 mg, Oral, Daily, For anxiety and depression relief      warfarin 2 MG tablet  Commonly known as:  COUMADIN   TAKE 2 TABLETS BY MOUTH ONCE DAILY         Stop These Medications    azithromycin 250 MG tablet  Commonly known as:  ZITHROMAX Z-VIPIN  Stopped by:  Myriam Omer MD     DIGOX 125 MCG tablet  Generic drug:  digoxin  Stopped by:  Myriam Omer MD     metoprolol  tartrate 25 MG tablet  Commonly known as:  LOPRESSOR  Stopped by:  MD Myriam Mandujano MD  12/03/19  2:57 PM

## 2019-12-10 RX ORDER — AMLODIPINE BESYLATE 5 MG/1
10 TABLET ORAL DAILY
Qty: 180 TABLET | Refills: 0 | OUTPATIENT
Start: 2019-12-10

## 2019-12-19 ENCOUNTER — APPOINTMENT (OUTPATIENT)
Dept: PHARMACY | Facility: HOSPITAL | Age: 74
End: 2019-12-19

## 2019-12-20 ENCOUNTER — ANTICOAGULATION VISIT (OUTPATIENT)
Dept: PHARMACY | Facility: HOSPITAL | Age: 74
End: 2019-12-20

## 2019-12-20 DIAGNOSIS — I48.19 OTHER PERSISTENT ATRIAL FIBRILLATION (HCC): ICD-10-CM

## 2019-12-20 LAB
INR PPP: 1.8 (ref 0.91–1.09)
PROTHROMBIN TIME: 21.8 SECONDS (ref 10–13.8)

## 2019-12-20 PROCEDURE — G0463 HOSPITAL OUTPT CLINIC VISIT: HCPCS

## 2019-12-20 PROCEDURE — 85610 PROTHROMBIN TIME: CPT

## 2019-12-20 PROCEDURE — 36416 COLLJ CAPILLARY BLOOD SPEC: CPT

## 2019-12-20 NOTE — PROGRESS NOTES
Anticoagulation Clinic Progress Note    Anticoagulation Summary  As of 2019    INR goal:   2.0-3.0   TTR:   46.4 % (1.1 y)   INR used for dosin.8! (2019)   Warfarin maintenance plan:   2 mg every Sun, Thu; 4 mg all other days   Weekly warfarin total:   24 mg   Plan last modified:   Álvaro Sam RPH (10/15/2019)   Next INR check:   1/3/2020   Priority:   Maintenance   Target end date:   Indefinite    Indications    Other persistent atrial fibrillation [I48.19]             Anticoagulation Episode Summary     INR check location:       Preferred lab:       Send INR reminders to:   [facility]IN[facility] POOL    Comments:         Anticoagulation Care Providers     Provider Role Specialty Phone number    Myriam Omer MD Referring Cardiology [phone]          Clinic Interview:  Patient Findings     Negatives:   Signs/symptoms of thrombosis, Signs/symptoms of bleeding,   Laboratory test error suspected, Change in health, Change in alcohol use,   Change in activity, Upcoming invasive procedure, Emergency department   visit, Upcoming dental procedure, Missed doses, Extra doses, Change in   medications, Change in diet/appetite, Hospital admission, Bruising, Other   complaints      Clinical Outcomes     Negatives:   Major bleeding event, Thromboembolic event,   Anticoagulation-related hospital admission, Anticoagulation-related ED   visit, Anticoagulation-related fatality        INR History:  Anticoagulation Monitoring 10/29/2019 2019 2019   INR 2.2 2.6 1.8   INR Date 10/29/2019 2019 2019   INR Goal 2.0-3.0 2.0-3.0 2.0-3.0   Trend Same Same Same   Last Week Total 24 mg 24 mg 24 mg   Next Week Total 24 mg 24 mg 26 mg   Sun 2 mg 2 mg 2 mg   Mon 4 mg 4 mg 4 mg   Tue 4 mg 4 mg 4 mg   Wed 4 mg 4 mg 4 mg   Thu 2 mg 2 mg 2 mg   Fri 4 mg 4 mg 6 mg (); Otherwise 4 mg   Sat 4 mg 4 mg 4 mg   Visit Report - - -   Some recent data might be hidden       Plan:  1. INR is  Subtherapeutic today- see above in Anticoagulation Summary.  Will instruct Cody SAMSON Eldridge to Continue their warfarin regimen. Adjust today's dose to 6 mg then resume with normal schedule- see above in Anticoagulation Summary.  2. Follow up in 2 weeks  3. Patient declines warfarin refills.  4. Verbal and written information provided. Patient expresses understanding and has no further questions at this time.    Penny Hernandez, Pharmacy Intern

## 2019-12-20 NOTE — PROGRESS NOTES
I have supervised and reviewed the notes, assessments, and/or procedures performed by our PharmD Candidate. The documented assessment and plan were developed cooperatively, and the plan was implemented in my presence. I concur with the documentation of this patient encounter.    Kristal Cowart [facility]

## 2020-01-03 ENCOUNTER — ANTICOAGULATION VISIT (OUTPATIENT)
Dept: PHARMACY | Facility: HOSPITAL | Age: 75
End: 2020-01-03

## 2020-01-03 DIAGNOSIS — I48.19 OTHER PERSISTENT ATRIAL FIBRILLATION (HCC): ICD-10-CM

## 2020-01-03 LAB
INR PPP: 2.4 (ref 0.91–1.09)
PROTHROMBIN TIME: 28.8 SECONDS (ref 10–13.8)

## 2020-01-03 PROCEDURE — 36416 COLLJ CAPILLARY BLOOD SPEC: CPT

## 2020-01-03 PROCEDURE — 85610 PROTHROMBIN TIME: CPT

## 2020-01-03 NOTE — PROGRESS NOTES
Anticoagulation Clinic Progress Note    Anticoagulation Summary  As of 1/3/2020    INR goal:   2.0-3.0   TTR:   47.1 % (1.1 y)   INR used for dosin.4 (1/3/2020)   Warfarin maintenance plan:   2 mg every Sun, Thu; 4 mg all other days   Weekly warfarin total:   24 mg   No change documented:   Vira Altamirano   Plan last modified:   Álvaro Sam [facility] (10/15/2019)   Next INR check:   2020   Priority:   Maintenance   Target end date:   Indefinite    Indications    Other persistent atrial fibrillation [I48.19]             Anticoagulation Episode Summary     INR check location:       Preferred lab:       Send INR reminders to:    [facility]INICAL POOL    Comments:         Anticoagulation Care Providers     Provider Role Specialty Phone number    Myriam Omer MD Referring Cardiology [phone]          Clinic Interview:  Patient Findings     Negatives:   Signs/symptoms of thrombosis, Signs/symptoms of bleeding,   Laboratory test error suspected, Change in health, Change in alcohol use,   Change in activity, Upcoming invasive procedure, Emergency department   visit, Upcoming dental procedure, Missed doses, Extra doses, Change in   medications, Change in diet/appetite, Hospital admission, Bruising, Other   complaints      Clinical Outcomes     Negatives:   Major bleeding event, Thromboembolic event,   Anticoagulation-related hospital admission, Anticoagulation-related ED   visit, Anticoagulation-related fatality        INR History:  Anticoagulation Monitoring 2019 2019 1/3/2020   INR 2.6 1.8 2.4   INR Date 2019 2019 1/3/2020   INR Goal 2.0-3.0 2.0-3.0 2.0-3.0   Trend Same Same Same   Last Week Total 24 mg 24 mg 24 mg   Next Week Total 24 mg 26 mg 24 mg   Sun 2 mg 2 mg 2 mg   Mon 4 mg 4 mg 4 mg   Tue 4 mg 4 mg 4 mg   Wed 4 mg 4 mg 4 mg   Thu 2 mg 2 mg 2 mg   Fri 4 mg 6 mg (); Otherwise 4 mg 4 mg   Sat 4 mg 4 mg 4 mg   Visit Report - - -   Some recent data might be  hidden       Plan:  1. INR is therapeutic today- see above in Anticoagulation Summary.   Will instruct Cody Eldridge to continue their warfarin regimen- see above in Anticoagulation Summary.  2. Follow up in 2.5 weeks.  3. Patient declines warfarin refills.  4. Verbal and written information provided. Patient expresses understanding and has no further questions at this time.    Vira Altamirano

## 2020-01-06 ENCOUNTER — OFFICE VISIT (OUTPATIENT)
Dept: FAMILY MEDICINE CLINIC | Facility: CLINIC | Age: 75
End: 2020-01-06

## 2020-01-06 VITALS
RESPIRATION RATE: 18 BRPM | WEIGHT: 154 LBS | HEIGHT: 70 IN | BODY MASS INDEX: 22.05 KG/M2 | HEART RATE: 62 BPM | TEMPERATURE: 98.1 F | DIASTOLIC BLOOD PRESSURE: 72 MMHG | SYSTOLIC BLOOD PRESSURE: 132 MMHG | OXYGEN SATURATION: 92 %

## 2020-01-06 DIAGNOSIS — J40 BRONCHITIS: ICD-10-CM

## 2020-01-06 DIAGNOSIS — J06.9 UPPER RESPIRATORY TRACT INFECTION, UNSPECIFIED TYPE: Primary | ICD-10-CM

## 2020-01-06 LAB
EXPIRATION DATE: NORMAL
FLUAV AG NPH QL: NEGATIVE
FLUBV AG NPH QL: NEGATIVE
INTERNAL CONTROL: NORMAL
Lab: NORMAL

## 2020-01-06 PROCEDURE — 99214 OFFICE O/P EST MOD 30 MIN: CPT | Performed by: NURSE PRACTITIONER

## 2020-01-06 PROCEDURE — 87804 INFLUENZA ASSAY W/OPTIC: CPT | Performed by: NURSE PRACTITIONER

## 2020-01-06 RX ORDER — AMOXICILLIN 875 MG/1
875 TABLET, COATED ORAL 2 TIMES DAILY
Qty: 14 TABLET | Refills: 0 | Status: SHIPPED | OUTPATIENT
Start: 2020-01-06 | End: 2020-01-13

## 2020-01-06 NOTE — PROGRESS NOTES
Subjective   Cody Eldridge is a 74 y.o. male.     Chief Complaint   Patient presents with   • URI     upper chest pain with deep breaths, snotty nose & productive blows      HPI New patient to me.  Here with appr 1 week of worsening URI sx. Feels rotten, little bit of cough-which is dry.  No wheezing or sputum..  Has had a little more dyspnea over last couple of days.   + Runny nose-yellow and thick  Taking mucinex which is helping cough a little  + COPD using albuterol Q4-5 hrs and helps with cough and dyspnea when he uses it.  Does not have other inhaler.    No recent tobacco, no vaping.  No secondhand tobacco exposure.     Has history of neck cancer and is in remission.  Port has been removed.     Social History     Tobacco Use   • Smoking status: Former Smoker     Packs/day: 3.00     Years: 30.00     Pack years: 90.00     Types: Cigarettes     Last attempt to quit: 2000     Years since quittin.0   • Smokeless tobacco: Never Used   • Tobacco comment: started age 11 x 53 years stopped 2000 / daily caffiene   Substance Use Topics   • Alcohol use: Yes     Alcohol/week: 2.0 standard drinks     Types: 1 Cans of beer, 1 Glasses of wine per week     Comment: occ   • Drug use: No       The following portions of the patient's history were reviewed and updated as appropriate: allergies, current medications, past family history, past medical history, past social history, past surgical history and problem list.    Review of Systems   Constitutional: Positive for activity change, appetite change, chills and fatigue. Negative for fever and unexpected weight change.   HENT: Positive for congestion, ear pain, postnasal drip, rhinorrhea and sore throat. Negative for ear discharge and trouble swallowing.    Respiratory: Positive for cough, chest tightness and shortness of breath. Negative for wheezing.    Cardiovascular: Negative for chest pain and palpitations.   Gastrointestinal: Negative for abdominal pain, diarrhea,  "nausea and vomiting.   Musculoskeletal: Negative for arthralgias and myalgias.   Neurological: Positive for weakness and headaches.       Objective   Blood pressure 132/72, pulse 62, temperature 98.1 °F (36.7 °C), temperature source Oral, resp. rate 18, height 177.8 cm (70\"), weight 69.9 kg (154 lb), SpO2 92 %.  Body mass index is 22.1 kg/m².    Physical Exam   Constitutional: He is oriented to person, place, and time. He appears well-developed and well-nourished. No distress.   HENT:   Head: Normocephalic and atraumatic.   Right Ear: External ear and ear canal normal. Tympanic membrane is not erythematous. A middle ear effusion is present.   Left Ear: External ear and ear canal normal. Tympanic membrane is not erythematous. A middle ear effusion is present.   Nose: Mucosal edema and rhinorrhea present. Right sinus exhibits maxillary sinus tenderness. Left sinus exhibits maxillary sinus tenderness.   Mouth/Throat: Posterior oropharyngeal erythema present. No tonsillar exudate.   Eyes: Conjunctivae are normal. Right eye exhibits no discharge. Left eye exhibits no discharge.   Neck: Neck supple.   Cardiovascular: Normal rate and regular rhythm.   Pulmonary/Chest: Effort normal. He has no wheezes. He has no rales.   BS hyperinflated sounding throughout   Abdominal: Soft. Bowel sounds are normal. There is no tenderness.   Musculoskeletal: He exhibits no deformity.   Gait smooth and steady   Lymphadenopathy:     He has no cervical adenopathy.   Neurological: He is alert and oriented to person, place, and time.   Skin: Skin is warm and dry. He is not diaphoretic.   Psychiatric: He has a normal mood and affect.   Nursing note and vitals reviewed.      Assessment   Problem List Items Addressed This Visit     None      Visit Diagnoses     Upper respiratory tract infection, unspecified type    -  Primary    Relevant Medications    amoxicillin (AMOXIL) 875 MG tablet    Other Relevant Orders    POC Influenza A / B (Completed) "    Bronchitis               Procedures           Impression and Plan:  Flu negative.  Will give higher dose amoxicillin as this seems to be more head and neck than COPD exacerbation.  Most likely does not have normal immune response due to recent cancer/chemotherapy.  Dont want to use z pack as he is on coumadin.  Aggressive use of albuterol Q4H WA x next 48-72 hrs recommended.  If he worsens he is to let me know right away.  Patient has been instructed to complete all antibiotics, even if feeling better. Call with any problems taking them.   We discussed s/s requiring emergent tx.       Had CT chest in 9/6/19 which showed moderate-severe emphysema.     Health Maintenance Due   Topic Date Due   • PNEUMOCOCCAL VACCINE (65+ HIGH RISK) (2 of 2 - PPSV23) 02/16/2015   • MEDICARE ANNUAL WELLNESS  01/28/2016   • COLONOSCOPY  01/28/2016   • ZOSTER VACCINE (2 of 3) 11/29/2016   • LIPID PANEL  10/04/2017              EMR Dragon/Transcription disclaimer:   Much of this encounter note is an electronic transcription/translation of spoken language to printed text. The electronic translation of spoken language may permit erroneous, or at times, nonsensical words or phrases to be inadvertently transcribed; Although I have reviewed the note for such errors, some may still exist.

## 2020-01-10 ENCOUNTER — HOSPITAL ENCOUNTER (OUTPATIENT)
Dept: PET IMAGING | Facility: HOSPITAL | Age: 75
End: 2020-01-10

## 2020-01-10 ENCOUNTER — APPOINTMENT (OUTPATIENT)
Dept: PET IMAGING | Facility: HOSPITAL | Age: 75
End: 2020-01-10

## 2020-01-14 ENCOUNTER — HOSPITAL ENCOUNTER (OUTPATIENT)
Dept: PET IMAGING | Facility: HOSPITAL | Age: 75
Discharge: HOME | End: 2020-01-14
Admitting: INTERNAL MEDICINE

## 2020-01-14 ENCOUNTER — HOSPITAL ENCOUNTER (OUTPATIENT)
Dept: PET IMAGING | Facility: HOSPITAL | Age: 75
Discharge: HOME | End: 2020-01-14

## 2020-01-14 DIAGNOSIS — I48.91 ATRIAL FIBRILLATION, UNSPECIFIED TYPE (HCC): ICD-10-CM

## 2020-01-14 DIAGNOSIS — D64.81 ANEMIA ASSOCIATED WITH CHEMOTHERAPY: ICD-10-CM

## 2020-01-14 DIAGNOSIS — C01 SQUAMOUS CELL CARCINOMA OF BASE OF TONGUE (HCC): ICD-10-CM

## 2020-01-14 DIAGNOSIS — T45.1X5A ANEMIA ASSOCIATED WITH CHEMOTHERAPY: ICD-10-CM

## 2020-01-14 LAB — GLUCOSE BLDC GLUCOMTR-MCNC: 97 MG/DL (ref 70–130)

## 2020-01-14 PROCEDURE — 84443 ASSAY THYROID STIM HORMONE: CPT | Performed by: INTERNAL MEDICINE

## 2020-01-14 PROCEDURE — 84439 ASSAY OF FREE THYROXINE: CPT | Performed by: INTERNAL MEDICINE

## 2020-01-14 PROCEDURE — 78815 PET IMAGE W/CT SKULL-THIGH: CPT

## 2020-01-14 PROCEDURE — A9552 F18 FDG: HCPCS | Performed by: INTERNAL MEDICINE

## 2020-01-14 PROCEDURE — 0 FLUDEOXYGLUCOSE F18 SOLUTION: Performed by: INTERNAL MEDICINE

## 2020-01-14 PROCEDURE — 82962 GLUCOSE BLOOD TEST: CPT

## 2020-01-14 RX ADMIN — FLUDEOXYGLUCOSE F18 1 DOSE: 300 INJECTION INTRAVENOUS at 12:30

## 2020-01-15 ENCOUNTER — TELEPHONE (OUTPATIENT)
Dept: ONCOLOGY | Facility: HOSPITAL | Age: 75
End: 2020-01-15

## 2020-01-15 RX ORDER — LEVOTHYROXINE SODIUM 0.05 MG/1
50 TABLET ORAL DAILY
Qty: 30 TABLET | Refills: 1 | Status: SHIPPED | OUTPATIENT
Start: 2020-01-15 | End: 2020-01-16

## 2020-01-15 NOTE — TELEPHONE ENCOUNTER
Called and s/w pt's wife and informed her of Dr. Norris orders. She v/u. Escribed levothyroxine.       ----- Message from Pablo Heaton MD sent at 1/15/2020 11:49 AM EST -----  Please contact patient and explain his labs indicate that he is becoming hypothyroid and we will need to start him on thyroid replacement with levothyroxine 50 mcg p.o. once daily.    Please send in prescription for the thyroid tablets and instruct him to start taking the tablets as soon as possible.    Thanks

## 2020-01-16 RX ORDER — LEVOTHYROXINE SODIUM 0.05 MG/1
50 TABLET ORAL DAILY
Qty: 90 TABLET | Refills: 0 | Status: SHIPPED | OUTPATIENT
Start: 2020-01-16 | End: 2020-03-30

## 2020-01-17 ENCOUNTER — OFFICE VISIT (OUTPATIENT)
Dept: ONCOLOGY | Facility: CLINIC | Age: 75
End: 2020-01-17

## 2020-01-17 ENCOUNTER — APPOINTMENT (OUTPATIENT)
Dept: OTHER | Facility: HOSPITAL | Age: 75
End: 2020-01-17

## 2020-01-17 VITALS
OXYGEN SATURATION: 96 % | RESPIRATION RATE: 16 BRPM | HEART RATE: 66 BPM | SYSTOLIC BLOOD PRESSURE: 163 MMHG | DIASTOLIC BLOOD PRESSURE: 74 MMHG | HEIGHT: 70 IN | WEIGHT: 150.9 LBS | TEMPERATURE: 98 F | BODY MASS INDEX: 21.6 KG/M2

## 2020-01-17 DIAGNOSIS — Z79.01 CURRENT USE OF LONG TERM ANTICOAGULATION: ICD-10-CM

## 2020-01-17 DIAGNOSIS — R93.89 ABNORMAL FINDINGS ON DIAGNOSTIC IMAGING OF OTHER SPECIFIED BODY STRUCTURES: ICD-10-CM

## 2020-01-17 DIAGNOSIS — C01 SQUAMOUS CELL CARCINOMA OF BASE OF TONGUE (HCC): Primary | ICD-10-CM

## 2020-01-17 DIAGNOSIS — Z86.79 HISTORY OF ATRIAL FIBRILLATION: ICD-10-CM

## 2020-01-17 PROCEDURE — 99214 OFFICE O/P EST MOD 30 MIN: CPT | Performed by: INTERNAL MEDICINE

## 2020-01-22 ENCOUNTER — ANTICOAGULATION VISIT (OUTPATIENT)
Dept: PHARMACY | Facility: HOSPITAL | Age: 75
End: 2020-01-22

## 2020-01-22 DIAGNOSIS — I48.19 OTHER PERSISTENT ATRIAL FIBRILLATION (HCC): ICD-10-CM

## 2020-01-22 LAB
INR PPP: 2.1 (ref 0.91–1.09)
PROTHROMBIN TIME: 25.4 SECONDS (ref 10–13.8)

## 2020-01-22 PROCEDURE — 36416 COLLJ CAPILLARY BLOOD SPEC: CPT

## 2020-01-22 PROCEDURE — 85610 PROTHROMBIN TIME: CPT

## 2020-01-22 NOTE — PROGRESS NOTES
Anticoagulation Clinic Progress Note    Anticoagulation Summary  As of 2020    INR goal:   2.0-3.0   TTR:   49.5 % (1.2 y)   INR used for dosin.1 (2020)   Warfarin maintenance plan:   2 mg every Sun, Thu; 4 mg all other days   Weekly warfarin total:   24 mg   No change documented:   Stephany Siu   Plan last modified:   Álvaro Sam [facility] (10/15/2019)   Next INR check:   2020   Priority:   Maintenance   Target end date:   Indefinite    Indications    Other persistent atrial fibrillation [I48.19]             Anticoagulation Episode Summary     INR check location:       Preferred lab:       [facility]    [facility]    Comments:         Anticoagulation Care Providers     Provider Role Specialty Phone number    Myriam Omer MD Referring Cardiology [phone]          Clinic Interview:  Patient Findings     Positives:   Change in medications        INR History:  Anticoagulation Monitoring 2019 1/3/2020 2020   INR 1.8 2.4 2.1   INR Date 2019 1/3/2020 2020   INR Goal 2.0-3.0 2.0-3.0 2.0-3.0   Trend Same Same Same   Last Week Total 24 mg 24 mg 24 mg   Next Week Total 26 mg 24 mg 24 mg   Sun 2 mg 2 mg 2 mg   Mon 4 mg 4 mg 4 mg   Tue 4 mg 4 mg 4 mg   Wed 4 mg 4 mg 4 mg   Thu 2 mg 2 mg 2 mg   Fri 6 mg (); Otherwise 4 mg 4 mg 4 mg   Sat 4 mg 4 mg 4 mg   Visit Report - - -   Some recent data might be hidden       Plan:  1. INR is therapeutic today- see above in Anticoagulation Summary.   Will instruct Cody Harringtonley to continue their warfarin regimen- see above in Anticoagulation Summary.  2. Follow up in 2 weeks.  3. Patient declines warfarin refills.  4. Verbal and written information provided. Patient expresses understanding and has no further questions at this time.    Stephany Siu

## 2020-01-31 ENCOUNTER — APPOINTMENT (OUTPATIENT)
Dept: PHARMACY | Facility: HOSPITAL | Age: 75
End: 2020-01-31

## 2020-02-03 RX ORDER — ALPRAZOLAM 0.5 MG/1
TABLET ORAL
Qty: 60 TABLET | Refills: 0 | Status: SHIPPED | OUTPATIENT
Start: 2020-02-03 | End: 2020-03-18

## 2020-02-05 ENCOUNTER — OFFICE VISIT (OUTPATIENT)
Dept: FAMILY MEDICINE CLINIC | Facility: CLINIC | Age: 75
End: 2020-02-05

## 2020-02-05 VITALS
RESPIRATION RATE: 16 BRPM | OXYGEN SATURATION: 96 % | HEART RATE: 65 BPM | BODY MASS INDEX: 21.47 KG/M2 | WEIGHT: 150 LBS | SYSTOLIC BLOOD PRESSURE: 124 MMHG | DIASTOLIC BLOOD PRESSURE: 68 MMHG | TEMPERATURE: 98 F | HEIGHT: 70 IN

## 2020-02-05 DIAGNOSIS — M25.511 RIGHT SHOULDER PAIN, UNSPECIFIED CHRONICITY: Primary | ICD-10-CM

## 2020-02-05 DIAGNOSIS — F41.8 DEPRESSION WITH ANXIETY: ICD-10-CM

## 2020-02-05 PROCEDURE — 99214 OFFICE O/P EST MOD 30 MIN: CPT | Performed by: NURSE PRACTITIONER

## 2020-02-05 RX ORDER — METHYLPREDNISOLONE 4 MG/1
TABLET ORAL
Qty: 21 TABLET | Refills: 0 | Status: SHIPPED | OUTPATIENT
Start: 2020-02-05 | End: 2020-03-05

## 2020-02-05 NOTE — PATIENT INSTRUCTIONS
Discharge instructions  Range of motion exercises most days of the week exercises 1520 minutes a day    Steroid pack as directed stop if any problems increased anxiety irritability  Follow-up Dr. Colvin to evaluate her shoulder for further treatment and evaluation if chest pain nausea vomiting shoulder pain      Emergency room  Continue present plan with sertraline 100 mg a day  For now no changes in Xanax  Likely will slowly start to decrease Xanax in the next several months      Shoulder Exercises  Ask your health care provider which exercises are safe for you. Do exercises exactly as told by your health care provider and adjust them as directed. It is normal to feel mild stretching, pulling, tightness, or discomfort as you do these exercises, but you should stop right away if you feel sudden pain or your pain gets worse. Do not begin these exercises until told by your health care provider.  Range of Motion Exercises              These exercises warm up your muscles and joints and improve the movement and flexibility of your shoulder. These exercises also help to relieve pain, numbness, and tingling. These exercises involve stretching your injured shoulder directly.  Exercise A: Pendulum  1. Stand near a wall or a surface that you can hold onto for balance.  2. Bend at the waist and let your left / right arm hang straight down. Use your other arm to support you. Keep your back straight and do not lock your knees.  3. Relax your left / right arm and shoulder muscles, and move your hips and your trunk so your left / right arm swings freely. Your arm should swing because of the motion of your body, not because you are using your arm or shoulder muscles.  4. Keep moving your body so your arm swings in the following directions, as told by your health care provider:  ? Side to side.  ? Forward and backward.  ? In clockwise and counterclockwise circles.  5. Continue each motion for __________ seconds, or for as long as  told by your health care provider.  6. Slowly return to the starting position.  Repeat __________ times. Complete this exercise __________ times a day.  Exercise B: Flexion, Standing  1. Stand and hold a broomstick, a cane, or a similar object. Place your hands a little more than shoulder-width apart on the object. Your left / right hand should be palm-up, and your other hand should be palm-down.  2. Keep your elbow straight and keep your shoulder muscles relaxed. Push the stick down with your healthy arm to raise your left / right arm in front of your body, and then over your head until you feel a stretch in your shoulder.  ? Avoid shrugging your shoulder while you raise your arm. Keep your shoulder blade tucked down toward the middle of your back.  3. Hold for __________ seconds.  4. Slowly return to the starting position.  Repeat __________ times. Complete this exercise __________ times a day.  Exercise C: Abduction, Standing  1. Stand and hold a broomstick, a cane, or a similar object. Place your hands a little more than shoulder-width apart on the object. Your left / right hand should be palm-up, and your other hand should be palm-down.  2. While keeping your elbow straight and your shoulder muscles relaxed, push the stick across your body toward your left / right side. Raise your left / right arm to the side of your body and then over your head until you feel a stretch in your shoulder.  ? Do not raise your arm above shoulder height, unless your health care provider tells you to do that.  ? Avoid shrugging your shoulder while you raise your arm. Keep your shoulder blade tucked down toward the middle of your back.  3. Hold for __________ seconds.  4. Slowly return to the starting position.  Repeat __________ times. Complete this exercise __________ times a day.  Exercise D: Internal Rotation  1. Place your left / right hand behind your back, palm-up.  2. Use your other hand to dangle an exercise band, a towel,  or a similar object over your shoulder. Grasp the band with your left / right hand so you are holding onto both ends.  3. Gently pull up on the band until you feel a stretch in the front of your left / right shoulder.  ? Avoid shrugging your shoulder while you raise your arm. Keep your shoulder blade tucked down toward the middle of your back.  4. Hold for __________ seconds.  5. Release the stretch by letting go of the band and lowering your hands.  Repeat __________ times. Complete this exercise __________ times a day.  Stretching Exercises    These exercises warm up your muscles and joints and improve the movement and flexibility of your shoulder. These exercises also help to relieve pain, numbness, and tingling. These exercises are done using your healthy shoulder to help stretch the muscles of your injured shoulder.  Exercise E: Corner Stretch (External Rotation and Abduction)  1.  a doorway with one of your feet slightly in front of the other. This is called a staggered stance. If you cannot reach your forearms to the door frame, stand facing a corner of a room.  2. Choose one of the following positions as told by your health care provider:  ? Place your hands and forearms on the door frame above your head.  ? Place your hands and forearms on the door frame at the height of your head.  ? Place your hands on the door frame at the height of your elbows.  3. Slowly move your weight onto your front foot until you feel a stretch across your chest and in the front of your shoulders. Keep your head and chest upright and keep your abdominal muscles tight.  4. Hold for __________ seconds.  5. To release the stretch, shift your weight to your back foot.  Repeat __________ times. Complete this stretch __________ times a day.  Exercise F: Extension, Standing  1. Stand and hold a broomstick, a cane, or a similar object behind your back.  ? Your hands should be a little wider than shoulder-width apart.  ? Your palms  should face away from your back.  2. Keeping your elbows straight and keeping your shoulder muscles relaxed, move the stick away from your body until you feel a stretch in your shoulder.  ? Avoid shrugging your shoulders while you move the stick. Keep your shoulder blade tucked down toward the middle of your back.  3. Hold for __________ seconds.  4. Slowly return to the starting position.  Repeat __________ times. Complete this exercise __________ times a day.  Strengthening Exercises                   These exercises build strength and endurance in your shoulder. Endurance is the ability to use your muscles for a long time, even after they get tired.  Exercise G: External Rotation  1. Sit in a stable chair without armrests.  2. Secure an exercise band at elbow height on your left / right side.  3. Place a soft object, such as a folded towel or a small pillow, between your left / right upper arm and your body to move your elbow a few inches away (about 10 cm) from your side.  4. Hold the end of the band so it is tight and there is no slack.  5. Keeping your elbow pressed against the soft object, move your left / right forearm out, away from your abdomen. Keep your body steady so only your forearm moves.  6. Hold for __________ seconds.  7. Slowly return to the starting position.  Repeat __________ times. Complete this exercise __________ times a day.  Exercise H: Shoulder Abduction  1. Sit in a stable chair without armrests, or stand.  2. Hold a __________ weight in your left / right hand, or hold an exercise band with both hands.  3. Start with your arms straight down and your left / right palm facing in, toward your body.  4. Slowly lift your left / right hand out to your side. Do not lift your hand above shoulder height unless your health care provider tells you that this is safe.  ? Keep your arms straight.  ? Avoid shrugging your shoulder while you do this movement. Keep your shoulder blade tucked down toward  "the middle of your back.  5. Hold for __________ seconds.  6. Slowly lower your arm, and return to the starting position.  Repeat __________ times. Complete this exercise __________ times a day.  Exercise I: Shoulder Extension  1. Sit in a stable chair without armrests, or stand.  2. Secure an exercise band to a stable object in front of you where it is at shoulder height.  3. Hold one end of the exercise band in each hand. Your palms should face each other.  4. Straighten your elbows and lift your hands up to shoulder height.  5. Step back, away from the secured end of the exercise band, until the band is tight and there is no slack.  6. Squeeze your shoulder blades together as you pull your hands down to the sides of your thighs. Stop when your hands are straight down by your sides. Do not let your hands go behind your body.  7. Hold for __________ seconds.  8. Slowly return to the starting position.  Repeat __________ times. Complete this exercise __________ times a day.  Exercise J: Standing Shoulder Row  1. Sit in a stable chair without armrests, or stand.  2. Secure an exercise band to a stable object in front of you so it is at waist height.  3. Hold one end of the exercise band in each hand. Your palms should be in a thumbs-up position.  4. Bend each of your elbows to an \"L\" shape (about 90 degrees) and keep your upper arms at your sides.  5. Step back until the band is tight and there is no slack.  6. Slowly pull your elbows back behind you.  7. Hold for __________ seconds.  8. Slowly return to the starting position.  Repeat __________ times. Complete this exercise __________ times a day.  Exercise K: Shoulder Press-Ups  1. Sit in a stable chair that has armrests. Sit upright, with your feet flat on the floor.  2. Put your hands on the armrests so your elbows are bent and your fingers are pointing forward. Your hands should be about even with the sides of your body.  3. Push down on the armrests and use " your arms to lift yourself off of the chair. Straighten your elbows and lift yourself up as much as you comfortably can.  ? Move your shoulder blades down, and avoid letting your shoulders move up toward your ears.  ? Keep your feet on the ground. As you get stronger, your feet should support less of your body weight as you lift yourself up.  4. Hold for __________ seconds.  5. Slowly lower yourself back into the chair.  Repeat __________ times. Complete this exercise __________ times a day.  Exercise L: Wall Push-Ups  1. Stand so you are facing a stable wall. Your feet should be about one arm-length away from the wall.  2. Lean forward and place your palms on the wall at shoulder height.  3. Keep your feet flat on the floor as you bend your elbows and lean forward toward the wall.  4. Hold for __________ seconds.  5. Straighten your elbows to push yourself back to the starting position.  Repeat __________ times. Complete this exercise __________ times a day.  This information is not intended to replace advice given to you by your health care provider. Make sure you discuss any questions you have with your health care provider.  Document Released: 11/01/2006 Document Revised: 04/23/2019 Document Reviewed: 08/28/2016  [employer] Interactive Patient Education © 2019 [employer]

## 2020-02-05 NOTE — PROGRESS NOTES
"Subjective   Cody Eldridge is a 74 y.o. male.     Patient complains mild to moderate sometimes moderate to severe right shoulder pain nearly a year since he started radiation treatments  For throat cancer  Does not recall an injury but has decreased range of motion and pain in his right shoulder no chest pain no thoracic pain or spinal pain  He is up-to-date with oncology he received chemo as well and he is doing well presently considering he lost a substantial amount of weight became extremely weak and had poor recovery after his chemotherapy.    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        /68   Pulse 65   Temp 98 °F (36.7 °C)   Resp 16   Ht 177.8 cm (70\")   Wt 68 kg (150 lb)   SpO2 96%   BMI 21.52 kg/m²       The following portions of the patient's history were reviewed and updated as appropriate: allergies, current medications, past family history, past medical history, past social history, past surgical history and problem list.    Review of Systems   Constitutional: Negative for fatigue and fever.   HENT: Negative.  Negative for trouble swallowing.    Eyes: Negative.    Respiratory: Negative.  Negative for cough and shortness of breath.    Cardiovascular: Negative for chest pain, " palpitations and leg swelling.   Gastrointestinal: Negative.  Negative for abdominal pain.   Genitourinary: Negative.    Musculoskeletal: Negative.         Right shoulder pain decreased range of motion chronically   Skin: Negative.    Neurological: Negative.  Negative for dizziness and confusion.   Psychiatric/Behavioral: Negative.        Objective   Physical Exam   Constitutional: He is oriented to person, place, and time. He appears well-developed and well-nourished.   Patient appears frail and chronically weak but overall he is actually appears 50% improved from his last overall visit   HENT:   Head: Normocephalic and atraumatic.   Eyes: Pupils are equal, round, and reactive to light. Conjunctivae are normal.   Neck: Neck supple.   Cardiovascular: Normal rate and regular rhythm.   Pulmonary/Chest: Effort normal.   Breath sounds decreased   Musculoskeletal: He exhibits no tenderness.   Significantly decreased range of motion able to lift his arms approximately 90 degrees much higher left  Decreased rotation  No AC joint tenderness  Positive tenderness posterior and right lateral shoulder no gross deformity neurovascular intact no swelling redness or tenderness   Lymphadenopathy:     He has no cervical adenopathy.   Neurological: He is alert and oriented to person, place, and time.   Skin: Skin is warm and dry. No rash noted. No erythema.   Psychiatric: He has a normal mood and affect. His behavior is normal. Judgment and thought content normal.   Vitals reviewed.        Assessment/Plan   Cody was seen today for shoulder pain and conjunctivitis.    Diagnoses and all orders for this visit:    Right shoulder pain, unspecified chronicity  -     Ambulatory Referral to Orthopedic Surgery    Depression with anxiety    Other orders  -     methylPREDNISolone (MEDROL, VIPIN,) 4 MG tablet; Take as directed on package instructions.    Risk-benefit Medrol follow instructions below  Evaluation orthopedic he will benefit from a   expert physical exam and opinion and possibly a steroid injection?  We will start physical exercises at home but he may be better off with physical therapy on hold off until he sees Orth    Reinforced precautions he has chronic lung disease he is high risk if he develops any  Chest infection increased cough or fever same day evaluation                Patient Instructions   Discharge instructions  Range of motion exercises most days of the week exercises 1520 minutes a day    Steroid pack as directed stop if any problems increased anxiety irritability  Follow-up Dr. Colvin to evaluate her shoulder for further treatment and evaluation if chest pain nausea vomiting shoulder pain      Emergency room  Continue present plan with sertraline 100 mg a day  For now no changes in Xanax  Likely will slowly start to decrease Xanax in the next several months      Shoulder Exercises  Ask your health care provider which exercises are safe for you. Do exercises exactly as told by your health care provider and adjust them as directed. It is normal to feel mild stretching, pulling, tightness, or discomfort as you do these exercises, but you should stop right away if you feel sudden pain or your pain gets worse. Do not begin these exercises until told by your health care provider.  Range of Motion Exercises              These exercises warm up your muscles and joints and improve the movement and flexibility of your shoulder. These exercises also help to relieve pain, numbness, and tingling. These exercises involve stretching your injured shoulder directly.  Exercise A: Pendulum  1. Stand near a wall or a surface that you can hold onto for balance.  2. Bend at the waist and let your left / right arm hang straight down. Use your other arm to support you. Keep your back straight and do not lock your knees.  3. Relax your left / right arm and shoulder muscles, and move your hips and your trunk so your left / right arm swings freely. Your  arm should swing because of the motion of your body, not because you are using your arm or shoulder muscles.  4. Keep moving your body so your arm swings in the following directions, as told by your health care provider:  ? Side to side.  ? Forward and backward.  ? In clockwise and counterclockwise circles.  5. Continue each motion for __________ seconds, or for as long as told by your health care provider.  6. Slowly return to the starting position.  Repeat __________ times. Complete this exercise __________ times a day.  Exercise B: Flexion, Standing  1. Stand and hold a broomstick, a cane, or a similar object. Place your hands a little more than shoulder-width apart on the object. Your left / right hand should be palm-up, and your other hand should be palm-down.  2. Keep your elbow straight and keep your shoulder muscles relaxed. Push the stick down with your healthy arm to raise your left / right arm in front of your body, and then over your head until you feel a stretch in your shoulder.  ? Avoid shrugging your shoulder while you raise your arm. Keep your shoulder blade tucked down toward the middle of your back.  3. Hold for __________ seconds.  4. Slowly return to the starting position.  Repeat __________ times. Complete this exercise __________ times a day.  Exercise C: Abduction, Standing  1. Stand and hold a broomstick, a cane, or a similar object. Place your hands a little more than shoulder-width apart on the object. Your left / right hand should be palm-up, and your other hand should be palm-down.  2. While keeping your elbow straight and your shoulder muscles relaxed, push the stick across your body toward your left / right side. Raise your left / right arm to the side of your body and then over your head until you feel a stretch in your shoulder.  ? Do not raise your arm above shoulder height, unless your health care provider tells you to do that.  ? Avoid shrugging your shoulder while you raise your  arm. Keep your shoulder blade tucked down toward the middle of your back.  3. Hold for __________ seconds.  4. Slowly return to the starting position.  Repeat __________ times. Complete this exercise __________ times a day.  Exercise D: Internal Rotation  1. Place your left / right hand behind your back, palm-up.  2. Use your other hand to dangle an exercise band, a towel, or a similar object over your shoulder. Grasp the band with your left / right hand so you are holding onto both ends.  3. Gently pull up on the band until you feel a stretch in the front of your left / right shoulder.  ? Avoid shrugging your shoulder while you raise your arm. Keep your shoulder blade tucked down toward the middle of your back.  4. Hold for __________ seconds.  5. Release the stretch by letting go of the band and lowering your hands.  Repeat __________ times. Complete this exercise __________ times a day.  Stretching Exercises    These exercises warm up your muscles and joints and improve the movement and flexibility of your shoulder. These exercises also help to relieve pain, numbness, and tingling. These exercises are done using your healthy shoulder to help stretch the muscles of your injured shoulder.  Exercise E: Corner Stretch (External Rotation and Abduction)  1.  a doorway with one of your feet slightly in front of the other. This is called a staggered stance. If you cannot reach your forearms to the door frame, stand facing a corner of a room.  2. Choose one of the following positions as told by your health care provider:  ? Place your hands and forearms on the door frame above your head.  ? Place your hands and forearms on the door frame at the height of your head.  ? Place your hands on the door frame at the height of your elbows.  3. Slowly move your weight onto your front foot until you feel a stretch across your chest and in the front of your shoulders. Keep your head and chest upright and keep your abdominal  muscles tight.  4. Hold for __________ seconds.  5. To release the stretch, shift your weight to your back foot.  Repeat __________ times. Complete this stretch __________ times a day.  Exercise F: Extension, Standing  1. Stand and hold a broomstick, a cane, or a similar object behind your back.  ? Your hands should be a little wider than shoulder-width apart.  ? Your palms should face away from your back.  2. Keeping your elbows straight and keeping your shoulder muscles relaxed, move the stick away from your body until you feel a stretch in your shoulder.  ? Avoid shrugging your shoulders while you move the stick. Keep your shoulder blade tucked down toward the middle of your back.  3. Hold for __________ seconds.  4. Slowly return to the starting position.  Repeat __________ times. Complete this exercise __________ times a day.  Strengthening Exercises                   These exercises build strength and endurance in your shoulder. Endurance is the ability to use your muscles for a long time, even after they get tired.  Exercise G: External Rotation  1. Sit in a stable chair without armrests.  2. Secure an exercise band at elbow height on your left / right side.  3. Place a soft object, such as a folded towel or a small pillow, between your left / right upper arm and your body to move your elbow a few inches away (about 10 cm) from your side.  4. Hold the end of the band so it is tight and there is no slack.  5. Keeping your elbow pressed against the soft object, move your left / right forearm out, away from your abdomen. Keep your body steady so only your forearm moves.  6. Hold for __________ seconds.  7. Slowly return to the starting position.  Repeat __________ times. Complete this exercise __________ times a day.  Exercise H: Shoulder Abduction  1. Sit in a stable chair without armrests, or stand.  2. Hold a __________ weight in your left / right hand, or hold an exercise band with both hands.  3. Start with  "your arms straight down and your left / right palm facing in, toward your body.  4. Slowly lift your left / right hand out to your side. Do not lift your hand above shoulder height unless your health care provider tells you that this is safe.  ? Keep your arms straight.  ? Avoid shrugging your shoulder while you do this movement. Keep your shoulder blade tucked down toward the middle of your back.  5. Hold for __________ seconds.  6. Slowly lower your arm, and return to the starting position.  Repeat __________ times. Complete this exercise __________ times a day.  Exercise I: Shoulder Extension  1. Sit in a stable chair without armrests, or stand.  2. Secure an exercise band to a stable object in front of you where it is at shoulder height.  3. Hold one end of the exercise band in each hand. Your palms should face each other.  4. Straighten your elbows and lift your hands up to shoulder height.  5. Step back, away from the secured end of the exercise band, until the band is tight and there is no slack.  6. Squeeze your shoulder blades together as you pull your hands down to the sides of your thighs. Stop when your hands are straight down by your sides. Do not let your hands go behind your body.  7. Hold for __________ seconds.  8. Slowly return to the starting position.  Repeat __________ times. Complete this exercise __________ times a day.  Exercise J: Standing Shoulder Row  1. Sit in a stable chair without armrests, or stand.  2. Secure an exercise band to a stable object in front of you so it is at waist height.  3. Hold one end of the exercise band in each hand. Your palms should be in a thumbs-up position.  4. Bend each of your elbows to an \"L\" shape (about 90 degrees) and keep your upper arms at your sides.  5. Step back until the band is tight and there is no slack.  6. Slowly pull your elbows back behind you.  7. Hold for __________ seconds.  8. Slowly return to the starting position.  Repeat __________ " times. Complete this exercise __________ times a day.  Exercise K: Shoulder Press-Ups  1. Sit in a stable chair that has armrests. Sit upright, with your feet flat on the floor.  2. Put your hands on the armrests so your elbows are bent and your fingers are pointing forward. Your hands should be about even with the sides of your body.  3. Push down on the armrests and use your arms to lift yourself off of the chair. Straighten your elbows and lift yourself up as much as you comfortably can.  ? Move your shoulder blades down, and avoid letting your shoulders move up toward your ears.  ? Keep your feet on the ground. As you get stronger, your feet should support less of your body weight as you lift yourself up.  4. Hold for __________ seconds.  5. Slowly lower yourself back into the chair.  Repeat __________ times. Complete this exercise __________ times a day.  Exercise L: Wall Push-Ups  1. Stand so you are facing a stable wall. Your feet should be about one arm-length away from the wall.  2. Lean forward and place your palms on the wall at shoulder height.  3. Keep your feet flat on the floor as you bend your elbows and lean forward toward the wall.  4. Hold for __________ seconds.  5. Straighten your elbows to push yourself back to the starting position.  Repeat __________ times. Complete this exercise __________ times a day.  This information is not intended to replace advice given to you by your health care provider. Make sure you discuss any questions you have with your health care provider.  Document Released: 11/01/2006 Document Revised: 04/23/2019 Document Reviewed: 08/28/2016  [employer]vier Interactive Patient Education © 2019 [employer]

## 2020-02-06 ENCOUNTER — ANTICOAGULATION VISIT (OUTPATIENT)
Dept: PHARMACY | Facility: HOSPITAL | Age: 75
End: 2020-02-06

## 2020-02-06 DIAGNOSIS — I48.19 OTHER PERSISTENT ATRIAL FIBRILLATION (HCC): ICD-10-CM

## 2020-02-06 LAB
INR PPP: 2.5 (ref 0.91–1.09)
PROTHROMBIN TIME: 29.7 SECONDS (ref 10–13.8)

## 2020-02-06 PROCEDURE — 36416 COLLJ CAPILLARY BLOOD SPEC: CPT

## 2020-02-06 PROCEDURE — 85610 PROTHROMBIN TIME: CPT

## 2020-02-06 NOTE — PROGRESS NOTES
Anticoagulation Clinic Progress Note    Anticoagulation Summary  As of 2020    INR goal:   2.0-3.0   TTR:   51.2 % (1.2 y)   INR used for dosin.5 (2020)   Warfarin maintenance plan:   2 mg every Sun, Thu; 4 mg all other days   Weekly warfarin total:   24 mg   No change documented:   Kristal Cowart RPH   Plan last modified:   Álvaro Sam RPH (10/15/2019)   Next INR check:   2/10/2020   Priority:   Maintenance   Target end date:   Indefinite    Indications    Other persistent atrial fibrillation [I48.19]             Anticoagulation Episode Summary     INR check location:       Preferred lab:       Send INR reminders to:   [facility]IN[facility] POOL    Comments:         Anticoagulation Care Providers     Provider Role Specialty Phone number    Myriam Omer MD Referring Cardiology [phone]          Clinic Interview:  Patient Findings     Positives:   Change in medications        INR History:  Anticoagulation Monitoring 1/3/2020 2020 2020   INR 2.4 2.1 2.5   INR Date 1/3/2020 2020 2020   INR Goal 2.0-3.0 2.0-3.0 2.0-3.0   Trend Same Same Same   Last Week Total 24 mg 24 mg 24 mg   Next Week Total 24 mg 24 mg 24 mg   Sun 2 mg 2 mg 2 mg   Mon 4 mg 4 mg -   Tue 4 mg 4 mg -   Wed 4 mg 4 mg -   Thu 2 mg 2 mg 2 mg   Fri 4 mg 4 mg 4 mg   Sat 4 mg 4 mg 4 mg   Visit Report - - -   Some recent data might be hidden       Plan:  1. INR is Therapeutic today- see above in Anticoagulation Summary.  Will instruct Cody Eldridge to Continue their warfarin regimen- see above in Anticoagulation Summary.  2. Follow up in 5 days--since will start Medrol Dose Bart  3. Patient declines warfarin refills.  4. Verbal and written information provided. Patient expresses understanding and has no further questions at this time.    Kristal Cowart RPH

## 2020-02-10 ENCOUNTER — ANTICOAGULATION VISIT (OUTPATIENT)
Dept: PHARMACY | Facility: HOSPITAL | Age: 75
End: 2020-02-10

## 2020-02-10 DIAGNOSIS — I48.19 OTHER PERSISTENT ATRIAL FIBRILLATION (HCC): ICD-10-CM

## 2020-02-10 LAB
INR PPP: 2.4 (ref 0.91–1.09)
PROTHROMBIN TIME: 29.3 SECONDS (ref 10–13.8)

## 2020-02-10 PROCEDURE — 85610 PROTHROMBIN TIME: CPT

## 2020-02-10 PROCEDURE — 36416 COLLJ CAPILLARY BLOOD SPEC: CPT

## 2020-02-10 NOTE — PROGRESS NOTES
Anticoagulation Clinic Progress Note    Anticoagulation Summary  As of 2/10/2020    INR goal:   2.0-3.0   TTR:   51.7 % (1.2 y)   INR used for dosin.4 (2/10/2020)   Warfarin maintenance plan:   2 mg every Sun, Thu; 4 mg all other days   Weekly warfarin total:   24 mg   No change documented:   Georges Guadalupe RPH   Plan last modified:   Álvaro Sam RPH (10/15/2019)   Next INR check:   3/9/2020   Priority:   Maintenance   Target end date:   Indefinite    Indications    Other persistent atrial fibrillation [I48.19]             Anticoagulation Episode Summary     INR check location:       Preferred lab:       Send INR reminders to:    [facility]    Comments:         Anticoagulation Care Providers     Provider Role Specialty Phone number    Myriam Omer MD Referring Cardiology [phone]          Clinic Interview:  Patient Findings     Positives:   Change in medications    Negatives:   Signs/symptoms of thrombosis, Signs/symptoms of bleeding,   Laboratory test error suspected, Change in health, Change in alcohol use,   Change in activity, Upcoming invasive procedure, Emergency department   visit, Upcoming dental procedure, Missed doses, Extra doses, Change in   diet/appetite, Hospital admission, Bruising, Other complaints    Comments:   1-2 days left of methylpred      Clinical Outcomes     Negatives:   Major bleeding event, Thromboembolic event,   Anticoagulation-related hospital admission, Anticoagulation-related ED   visit, Anticoagulation-related fatality    Comments:   1-2 days left of methylpred        INR History:  Anticoagulation Monitoring 2020 2020 2/10/2020   INR 2.1 2.5 2.4   INR Date 2020 2020 2/10/2020   INR Goal 2.0-3.0 2.0-3.0 2.0-3.0   Trend Same Same Same   Last Week Total 24 mg 24 mg 24 mg   Next Week Total 24 mg 24 mg 24 mg   Sun 2 mg 2 mg 2 mg   Mon 4 mg - 4 mg   Tue 4 mg - 4 mg   Wed 4 mg - 4 mg   Thu 2 mg 2 mg 2 mg   Fri 4 mg 4 mg 4 mg   Sat 4 mg  4 mg 4 mg   Visit Report - - -   Some recent data might be hidden       Plan:  1. INR is Therapeutic today- see above in Anticoagulation Summary.  Will instruct Cody SAMSON Eldridge to Continue their warfarin regimen- see above in Anticoagulation Summary.  2. Follow up in 4 weeks  3. Patient declines warfarin refills.  4. Verbal and written information provided. Patient expresses understanding and has no further questions at this time.    Georges Guadalupe [facility]

## 2020-02-24 ENCOUNTER — OFFICE VISIT (OUTPATIENT)
Dept: ORTHOPEDIC SURGERY | Facility: CLINIC | Age: 75
End: 2020-02-24

## 2020-02-24 VITALS — WEIGHT: 154 LBS | BODY MASS INDEX: 21.56 KG/M2 | TEMPERATURE: 98.3 F | HEIGHT: 71 IN

## 2020-02-24 DIAGNOSIS — M25.511 ACUTE PAIN OF RIGHT SHOULDER: Primary | ICD-10-CM

## 2020-02-24 PROCEDURE — 20610 DRAIN/INJ JOINT/BURSA W/O US: CPT | Performed by: NURSE PRACTITIONER

## 2020-02-24 PROCEDURE — 99213 OFFICE O/P EST LOW 20 MIN: CPT | Performed by: NURSE PRACTITIONER

## 2020-02-24 PROCEDURE — 73030 X-RAY EXAM OF SHOULDER: CPT | Performed by: NURSE PRACTITIONER

## 2020-02-24 RX ADMIN — METHYLPREDNISOLONE ACETATE 80 MG: 80 INJECTION, SUSPENSION INTRA-ARTICULAR; INTRALESIONAL; INTRAMUSCULAR; SOFT TISSUE at 14:48

## 2020-02-24 NOTE — PROGRESS NOTES
"Large Joint Arthrocentesis: R subacromial bursa  Date/Time: 2/24/2020 2:48 PM  Consent given by: patient  Site marked: site marked  Timeout: Immediately prior to procedure a time out was called to verify the correct patient, procedure, equipment, support staff and site/side marked as required   Supporting Documentation  Indications: pain   Procedure Details  Location: shoulder - R subacromial bursa  Preparation: Patient was prepped and draped in the usual sterile fashion  Needle gauge: 21g.  Approach: posterior  Medications administered: 2 mL lidocaine (cardiac); 80 mg methylPREDNISolone acetate 80 MG/ML  Patient tolerance: patient tolerated the procedure well with no immediate complications        Patient: Cody Eldridge    YOB: 1945    Medical Record Number: [id number]    Chief Complaints:   Right shoulder pain    History of Present Illness:     74 y.o. male patient who presents with right shoulder pain.  Onset of pain began approximately 6 weeks ago.  Denies injury or precipitating event.  Describes his pain as severe, constant, aching.  Pain is worse with cold and damp weather, overhead activities, and putting on a coat.  He takes Norco for back pain, but this does not seem to help his shoulder.  He has tried rest and oral steroids which helped somewhat, but his pain has persisted.  Reports associated swelling.  Denies numbness or tingling in hand or fingers.  Patient is right hand dominant.      Allergies:   Allergies   Allergen Reactions   • Benadryl [Diphenhydramine Hcl] Dizziness     \"I sleep for about a day\"     Home Medications:    Current Outpatient Medications:   •  albuterol (PROVENTIL HFA;VENTOLIN HFA) 108 (90 BASE) MCG/ACT inhaler, Inhale 2 puffs. ProAir  (90 Base) MCG/ACT Inhalation Aerosol Solution; Patient Sig: ProAir  (90 Base) MCG/ACT Inhalation Aerosol Solution inhale 2 puffs by mouth every 4 hours if needed; 8.5; 11; 07-Apr-2014; Act, Disp: , Rfl:   •  ALPRAZolam " (XANAX) 0.5 MG tablet, 1/2 to 1 tablet twice daily for anxiety's, Disp: 60 tablet, Rfl: 0  •  amLODIPine (NORVASC) 5 MG tablet, 2 (Two) Times a Day., Disp: , Rfl: 3  •  aspirin 81 MG chewable tablet, Chew 81 mg Daily., Disp: , Rfl:   •  baclofen (LIORESAL) 10 MG tablet, take 1 tablet by mouth three times a day, Disp: 90 tablet, Rfl: 1  •  diphenoxylate-atropine (LOMOTIL) 2.5-0.025 MG per tablet, Take 1 tablet by mouth 4 (Four) Times a Day As Needed for Diarrhea., Disp: 60 tablet, Rfl: 1  •  EPINEPHrine (EPIPEN JR) 0.15 MG/0.3ML solution auto-injector injection, epinephrine (Jr) 0.15 mg/0.3 mL injection,auto-injector, Disp: , Rfl:   •  HYDROcodone-acetaminophen (NORCO)  MG per tablet, TK 1 T PO QID, Disp: , Rfl: 0  •  levothyroxine (SYNTHROID, LEVOTHROID) 50 MCG tablet, TAKE 1 TABLET BY MOUTH DAILY, Disp: 90 tablet, Rfl: 0  •  Lidocaine Viscous HCl (XYLOCAINE) 2 % solution, Take 5 mL by mouth Every 3 (Three) Hours As Needed for Moderate Pain ., Disp: 100 mL, Rfl: 3  •  montelukast (SINGULAIR) 10 MG tablet, Take 1 tablet by mouth Every Night., Disp: 90 tablet, Rfl: 1  •  rosuvastatin (CRESTOR) 20 MG tablet, Take 20 mg by mouth Daily., Disp: 30 tablet, Rfl: 5  •  sertraline (ZOLOFT) 100 MG tablet, Take 1 tablet by mouth Daily. For anxiety and depression relief, Disp: 30 tablet, Rfl: 3  •  warfarin (COUMADIN) 2 MG tablet, TAKE 2 TABLETS BY MOUTH ONCE DAILY, Disp: 70 tablet, Rfl: 0  •  methylPREDNISolone (MEDROL, VIPIN,) 4 MG tablet, Take as directed on package instructions., Disp: 21 tablet, Rfl: 0    Past Medical History:   Diagnosis Date   • Acute renal injury (CMS/HCC)    • Anemia associated with chemotherapy    • Atrial fibrillation (CMS/HCC)    • CAD (coronary artery disease)    • CHF (congestive heart failure) (CMS/HCC)    • Chronic bronchitis (CMS/HCC)    • COPD (chronic obstructive pulmonary disease) (CMS/HCC)    • Cor pulmonale (chronic) (CMS/HCC)    • Daytime hypersomnia    • Depression with anxiety    •  IRAHETA (dyspnea on exertion)    • Elevated cholesterol    • Enlarged prostate    • Frequent nocturnal awakening    • Gout    • H/O cardiac radiofrequency ablation     [facility].   • Head and neck cancer (CMS/HCC)    • Hyperlipidemia    • Hypertension    • Hypokalemia    • Hypotension    • Insomnia    • Lumbar radicular pain    • SHAR (obstructive sleep apnea)    • Osteoarthritis    • Permanent atrial fibrillation    • Shock, septic (CMS/HCC)    • Snoring    • Squamous cell carcinoma of neck    • SVT (supraventricular tachycardia) (CMS/HCC)    • Syncope    • Vitamin D deficiency    • Weight loss        Past Surgical History:   Procedure Laterality Date   • APPENDECTOMY     • CARDIAC ABLATION      [facility].   • CARDIAC CATHETERIZATION N/A 2018    Procedure: Left Heart Cath;  Surgeon: Yousif Uribe MD;  Location: [facility] CATH INVASIVE LOCATION;  Service: Cardiology   • CARDIAC CATHETERIZATION N/A 2018    Procedure: Coronary angiography;  Surgeon: Yousif Uribe MD;  Location: [facility] CATH INVASIVE LOCATION;  Service: Cardiology   • CARDIAC CATHETERIZATION N/A 2018    Procedure: Left ventriculography;  Surgeon: Yousif Uribe MD;  Location: [facility] CATH INVASIVE LOCATION;  Service: Cardiology   • FINGER SURGERY     • FOOT SURGERY     • HAND SURGERY     • VENOUS ACCESS DEVICE (PORT) INSERTION Right 2019    Procedure: MEDIPORT PLACEMENT;  Surgeon: Elvis Grigsby MD;  Location: [facility] OR;  Service: Vascular   • VENOUS ACCESS DEVICE (PORT) REMOVAL Right 2019    Procedure: REMOVAL VENOUS ACCESS DEVICE;  Surgeon: Prince Castaneda MD;  Location: [facility] OR;  Service: Vascular       Social History     Occupational History     Employer: RETIRED   Tobacco Use   • Smoking status: Former Smoker     Packs/day: 3.00     Years: 30.00     Pack years: 90.00     Types: Cigarettes     Last attempt to quit: 2000     Years since quittin.1   • Smokeless tobacco: Never Used   •  "Tobacco comment: started age 11 x 53 years stopped 2000 / daily caffiene   Substance and Sexual Activity   • Alcohol use: Yes     Alcohol/week: 2.0 standard drinks     Types: 1 Cans of beer, 1 Glasses of wine per week     Comment: occ   • Drug use: No   • Sexual activity: Defer        Family History   Problem Relation Age of Onset   • Heart attack Mother    • Diabetes Mother    • Heart disease Mother    • Hypertension Mother    • Diabetes Father    • Heart failure Father    • Heart disease Father    • Hypertension Father    • Cancer Brother         colon   • Hypertension Brother    • Colon cancer Brother 50   • Diabetes Sister    • Heart disease Sister    • Hypertension Sister    • COPD Other    • Heart failure Other    • Heart disease Other    • Malig Hyperthermia Neg Hx      Review of Systems:      Constitutional: Denies fever, shaking or chills   Eyes: Denies change in visual acuity   HEENT: Denies nasal congestion or sore throat   Respiratory: Denies cough or shortness of breath   Cardiovascular: Denies chest pain or edema  Endocrine: Denies tremors, palpitations, intolerance of heat or cold, polyuria, polydipsia.  GI: Denies abdominal pain, nausea, vomiting, bloody stools or diarrhea  : Denies frequency, urgency, incontinence, retention, or nocturia.  Musculoskeletal: Denies numbness, tingling or loss of motor function except as above  Integument: Denies rash, lesion or ulceration   Neurologic: Denies headache or focal weakness, deficits  Heme: Denies spontaneous or excessive bleeding, epistaxis, hematuria, melena, fatigue, enlarged or tender lymph nodes.      All other pertinent positives and negatives as noted above in HPI.    Physical Exam: 74 y.o. male    Vitals:    02/24/20 1421   Temp: 98.3 °F (36.8 °C)   TempSrc: Temporal   Weight: 69.9 kg (154 lb)   Height: 180.3 cm (71\")     General:  Patient is awake and alert.  Appears in no acute distress or discomfort.    Psych:  Affect and demeanor are " appropriate.    Eyes:  Conjunctiva and sclera appear grossly normal.  Eyes track well and EOM seem to be intact.    Ears:  No gross abnormalities.  Hearing adequate for the exam.    Cardiovascular:  Regular rate and rhythm.    Lungs:  Good chest expansion.  Breathing unlabored.    Lymph:  No palpable adenopathy about neck or axilla.    Neck:  Supple.  Normal ROM.  Negative Spurling's for shoulder or arm pain.    Right upper extremity:  Skin is benign.  No gross abnormalities on inspection including any atrophy, swellings, or masses.  No palpable masses or adenopathy.  No focal areas of significant tenderness.  Active forward elevation to 155°, but I can passively get him to 170° albeit with discomfort. He has full motion with external and internal rotation compared to contralateral side.  No evident instability or apprehension.  Mildly positive Neer, Lockwood.  Negative active compression maneuvers.  Negative Speeds and Yergasons maneuver.  Negative belly press maneuver.  Weakness with forward elevation in the scapular plane.  Good strength with internal and external rotation.  Good strength in the deltoid, biceps, triceps, and .  Intact sensation throughout the arm.  Brisk cap refill.  Palpable radial pulse.  Good skin turgor.         Radiology:   AP, scapular Y, and axillary views of the right shoulder are ordered by myself and reviewed to evaluate the patient's complaint.  No comparison films are immediately available.  The x-rays show mild acromioclavicular degeneration.  Otherwise, no obvious acute abnormalities, lesions, masses, significant degenerative changes, or other concerning findings.  The acromiohumeral interval is normal.  Glenoid version appears normal as well.    Assessment/Plan:   Acute right shoulder pain, right rotator cuff tendinitis versus rotator cuff tear    We discussed the difference between rotator cuff tendinitis and a tear.  I have recommended that we start with a conservative  approach. With regards to conservative options, we discussed appropriate activity modifications, icing as needed, tylenol, physical therapy, and injections.  Patient has acknowledged understanding of this information and elected for physical therapy and a corticosteroid injection.  The risks and alternatives to an injection were thoroughly discussed.  Patient consented to proceed.  The injection was performed as noted.  I have entered a referral for physical therapy.  Patient will follow up in 6 weeks.    JONELLE Cervantes    02/24/2020    CC to Epley, James, APRN

## 2020-02-26 RX ORDER — METHYLPREDNISOLONE ACETATE 80 MG/ML
80 INJECTION, SUSPENSION INTRA-ARTICULAR; INTRALESIONAL; INTRAMUSCULAR; SOFT TISSUE
Status: COMPLETED | OUTPATIENT
Start: 2020-02-24 | End: 2020-02-24

## 2020-03-05 ENCOUNTER — OFFICE VISIT (OUTPATIENT)
Dept: CARDIOLOGY | Facility: CLINIC | Age: 75
End: 2020-03-05

## 2020-03-05 VITALS
WEIGHT: 152 LBS | HEART RATE: 60 BPM | SYSTOLIC BLOOD PRESSURE: 110 MMHG | BODY MASS INDEX: 21.28 KG/M2 | HEIGHT: 71 IN | DIASTOLIC BLOOD PRESSURE: 72 MMHG

## 2020-03-05 DIAGNOSIS — I42.9 CARDIOMYOPATHY, UNSPECIFIED TYPE (HCC): Primary | ICD-10-CM

## 2020-03-05 DIAGNOSIS — I50.20 SYSTOLIC CONGESTIVE HEART FAILURE, UNSPECIFIED HF CHRONICITY (HCC): ICD-10-CM

## 2020-03-05 DIAGNOSIS — I10 ESSENTIAL HYPERTENSION: ICD-10-CM

## 2020-03-05 DIAGNOSIS — I48.0 PAF (PAROXYSMAL ATRIAL FIBRILLATION) (HCC): ICD-10-CM

## 2020-03-05 PROCEDURE — 99214 OFFICE O/P EST MOD 30 MIN: CPT | Performed by: NURSE PRACTITIONER

## 2020-03-05 PROCEDURE — 93000 ELECTROCARDIOGRAM COMPLETE: CPT | Performed by: NURSE PRACTITIONER

## 2020-03-05 NOTE — PROGRESS NOTES
Date of Office Visit: 2020  Encounter Provider: Angelique Espinal, JAMILAH, APRN  Place of Service: [facility] CARDIOLOGY  Patient Name: Cody Eldridge  :1945        Subjective:     Chief Complaint:  Follow-up, history of systolic heart failure in the setting of sepsis and rapid A. fib, nonobstructive coronary disease, paroxysmal atrial fibrillation, hypertension.      History of Present Illness:  Cody Eldridge is a 74 y.o. male patient of Dr. Omer.  I am seeing this patient in the office today and I have reviewed his records.    Patient has a history of coronary artery disease,  systolic heart failure, atrial fibrillation, hypertension, hyperlipidemia, shortness of breath with exertion, depression, COPD, sepsis with subsequent port removal, chronic kidney disease, pulmonary hypertension.     In  patient had an SVT ablation at the [facility].  There is a questionable history of a non-ST elevation myocardial infarction in the past.  Patient had echocardiogram 2000 showing normal left ventricular systolic function with ejection fraction of 64%.  There was mild right and left atrial enlargement as well as mild mitral and tricuspid regurgitation with a right ventricular systolic pressure of 39 mmHg.  In 2017 patient had shortness of breath and leg swelling and was found to have an elevated BNP.  He was started on Lasix.  Chest x-ray showed mild cardiac enlargement.  CT scan of the chest revealed prominent changes of emphysema with scarring.  He had an echocardiogram done which showed normal left ventricular systolic function with an ejection fraction of 56%, mild to moderate left ventricular hypertrophy, moderate to severe dilation of the right ventricle with mildly reduced function, moderate to severe dilation of the right atrium, moderate tricuspid regurgitation with RVSP of 47 mmHg.  Patient was seen by pulmonology and his  medications were adjusted.  Patient was seen 5/29/18 at this point it was reported that he complained of chest pain and shortness of breath.  It was recommended that he have a left and right heart catheter.  His amlodipine was discontinued and metoprolol was increased to 100 mg twice daily in an attempt to get better rate control as his heart rate was 113 bpm.  On 6/13/18 patient without heart catheterization but this was aborted.  On 7/13/18 it was reported the patient went to the emergency department with increased shortness of breath and weight gain.  He was given one dose of IV Lasix 80 mg.  He was discharged in stable condition and was told to take an extra 20 mg of Lasix at home.  He then resumed his 40 mg twice a day of Lasix.  Patient was seen in office by Silvina Tejada 7/19/18.  At this point it was reported that patient shortness of breath continued however it had improved since ER visit.  His weights had slowly been trending down.  He did have some fatigue that was coming and going. Patient had a history of claustrophobia which is why previous heart catheter had to be aborted.  It was recommended that patient attempt another heart catheter with anesthesia.  Patient had cardiac catheterization done 8/29/18.  He was found to have mild nonobstructive coronary disease.  Continued treatment for atrial fibrillation as well as risk factor modification was recommended.  Patient was seen in the hospital 8/2019 for worsening bilateral lower extremity edema and shortness of breath after receiving IV fluids a day before at [facility].  EKG showed atrial fibrillation with rapid ventricular rate.  He was placed on amiodarone drip and given IV digoxin and IV diuretics and IV beta-blockers.  He underwent removal of port for sepsis.  Echocardiogram 8/6/2019 showed decreased left ventricular systolic function with EF of 36 to 40%, hypokinesis of septal walls, left atrial dilation, moderate tricuspid regurgitation with moderate  pulmonary hypertension.  Digoxin was stopped due to elevated digoxin level.  He was not on anticoagulants due to severe thrombocytopenia.  He had been on warfarin prior to admission.      Patient presents to office today for follow-up appointment.  Patient reports he has been doing well overall since last visit.  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  He denies any exertional symptoms or concerns.  He thinks that he gets greater than 10,000 steps a day.  He gets some rare lower extremity swelling however nothing new or worsening.  He denies any chest pain or discomfort, shortness of breath, shortness of breath with exertion, palpitations, racing heartbeat sensation, dizziness, syncope, near syncope, falls, or abnormal bleeding.  Blood pressure and heart rate in the office today well controlled.  He thinks that blood pressure outside the office is usually around 130s over 75-80 but he will continue to monitor and call if staying greater than 130/80 on average.        Past Medical History:   Diagnosis Date   • Acute renal injury (CMS/[facility])    • Anemia associated with chemotherapy    • Atrial fibrillation (CMS/[facility])    • CAD (coronary artery disease)    • CHF (congestive heart failure) (CMS/HCC)    • Chronic bronchitis (CMS/HCC)    • COPD (chronic obstructive pulmonary disease) (CMS/HCC)    • Cor pulmonale (chronic) (CMS/[facility])    • Daytime hypersomnia    • Depression with anxiety    • IRAHETA (dyspnea on exertion)    • Elevated cholesterol    • Enlarged prostate    • Frequent nocturnal awakening    • Gout    • H/O cardiac radiofrequency ablation 2006    [facility].   • Head and neck cancer (CMS/[facility])    • Hyperlipidemia    • Hypertension    • Hypokalemia    • Hypotension    • Insomnia    • Lumbar radicular pain    • SHAR (obstructive sleep apnea)    • Osteoarthritis    • Permanent atrial fibrillation    • Shock, septic (CMS/HCC)    •  Snoring    • Squamous cell carcinoma of neck    • SVT (supraventricular tachycardia) (CMS/HCC)    • Syncope    • Vitamin D deficiency    • Weight loss      Past Surgical History:   Procedure Laterality Date   • APPENDECTOMY     • CARDIAC ABLATION  2006    [facility].   • CARDIAC CATHETERIZATION N/A 8/29/2018    Procedure: Left Heart Cath;  Surgeon: Yousif Uribe MD;  Location: [facility] CATH INVASIVE LOCATION;  Service: Cardiology   • CARDIAC CATHETERIZATION N/A 8/29/2018    Procedure: Coronary angiography;  Surgeon: Yousif Uribe MD;  Location: [facility] CATH INVASIVE LOCATION;  Service: Cardiology   • CARDIAC CATHETERIZATION N/A 8/29/2018    Procedure: Left ventriculography;  Surgeon: Yousif Uribe MD;  Location: [facility] CATH INVASIVE LOCATION;  Service: Cardiology   • FINGER SURGERY     • FOOT SURGERY     • HAND SURGERY     • VENOUS ACCESS DEVICE (PORT) INSERTION Right 6/18/2019    Procedure: MEDIPORT PLACEMENT;  Surgeon: Elvis Grigsby MD;  Location: [facility] OR;  Service: Vascular   • VENOUS ACCESS DEVICE (PORT) REMOVAL Right 8/5/2019    Procedure: REMOVAL VENOUS ACCESS DEVICE;  Surgeon: Prince Castaneda MD;  Location: [facility] OR;  Service: Vascular     Outpatient Medications Prior to Visit   Medication Sig Dispense Refill   • albuterol (PROVENTIL HFA;VENTOLIN HFA) 108 (90 BASE) MCG/ACT inhaler Inhale 2 puffs. ProAir  (90 Base) MCG/ACT Inhalation Aerosol Solution; Patient Sig: ProAir  (90 Base) MCG/ACT Inhalation Aerosol Solution inhale 2 puffs by mouth every 4 hours if needed; 8.5; 11; 07-Apr-2014; Act     • ALPRAZolam (XANAX) 0.5 MG tablet 1/2 to 1 tablet twice daily for anxiety's 60 tablet 0   • amLODIPine (NORVASC) 5 MG tablet 2 (Two) Times a Day.  3   • aspirin 81 MG chewable tablet Chew 81 mg Daily.     • baclofen (LIORESAL) 10 MG tablet take 1 tablet by mouth three times a day 90 tablet 1   • diphenoxylate-atropine (LOMOTIL) 2.5-0.025 MG per tablet Take 1 tablet by mouth 4  (Four) Times a Day As Needed for Diarrhea. 60 tablet 1   • HYDROcodone-acetaminophen (NORCO)  MG per tablet TK 1 T PO QID  0   • levothyroxine (SYNTHROID, LEVOTHROID) 50 MCG tablet TAKE 1 TABLET BY MOUTH DAILY 90 tablet 0   • Lidocaine Viscous HCl (XYLOCAINE) 2 % solution Take 5 mL by mouth Every 3 (Three) Hours As Needed for Moderate Pain . 100 mL 3   • montelukast (SINGULAIR) 10 MG tablet Take 1 tablet by mouth Every Night. 90 tablet 1   • rosuvastatin (CRESTOR) 20 MG tablet Take 20 mg by mouth Daily. 30 tablet 5   • sertraline (ZOLOFT) 100 MG tablet Take 1 tablet by mouth Daily. For anxiety and depression relief 30 tablet 3   • warfarin (COUMADIN) 2 MG tablet TAKE 2 TABLETS BY MOUTH ONCE DAILY 70 tablet 0   • EPINEPHrine (EPIPEN JR) 0.15 MG/0.3ML solution auto-injector injection epinephrine (Jr) 0.15 mg/0.3 mL injection,auto-injector     • methylPREDNISolone (MEDROL, VIPIN,) 4 MG tablet Take as directed on package instructions. 21 tablet 0     No facility-administered medications prior to visit.        Allergies as of 2020 - Reviewed 2020   Allergen Reaction Noted   • Benadryl [diphenhydramine hcl] Dizziness 2017     Social History     Socioeconomic History   • Marital status:      Spouse name: Elise Rai   • Number of children: Not on file   • Years of education: Some College   • Highest education level: Not on file   Occupational History     Employer: RETIRED   Tobacco Use   • Smoking status: Former Smoker     Packs/day: 3.00     Years: 30.00     Pack years: 90.00     Types: Cigarettes     Last attempt to quit: 2000     Years since quittin.1   • Smokeless tobacco: Never Used   • Tobacco comment: started age 11 x 53 years stopped 2000 / daily caffiene   Substance and Sexual Activity   • Alcohol use: Yes     Alcohol/week: 2.0 standard drinks     Types: 1 Cans of beer, 1 Glasses of wine per week     Comment: occ   • Drug use: No   • Sexual activity: Defer     Family History  "  Problem Relation Age of Onset   • Heart attack Mother    • Diabetes Mother    • Heart disease Mother    • Hypertension Mother    • Diabetes Father    • Heart failure Father    • Heart disease Father    • Hypertension Father    • Cancer Brother         colon   • Hypertension Brother    • Colon cancer Brother 50   • Diabetes Sister    • Heart disease Sister    • Hypertension Sister    • COPD Other    • Heart failure Other    • Heart disease Other    • Malig Hyperthermia Neg Hx        Review of Systems   Constitution: Negative for chills, fever, malaise/fatigue, weight gain and weight loss.   HENT: Negative for ear pain, hearing loss, nosebleeds and sore throat.    Eyes: Negative for blurred vision, double vision, redness, vision loss in left eye, vision loss in right eye and visual disturbance.   Cardiovascular:        SEE HPI   Respiratory: Negative for cough, shortness of breath, snoring and wheezing.    Endocrine: Positive for cold intolerance. Negative for heat intolerance.   Skin: Negative for itching, rash and suspicious lesions.   Musculoskeletal: Positive for joint pain, joint swelling and myalgias.   Gastrointestinal: Negative for abdominal pain, diarrhea, hematemesis, melena, nausea and vomiting.   Genitourinary: Negative for dysuria, frequency and hematuria.   Neurological: Negative for dizziness, headaches, numbness, paresthesias and seizures.   Psychiatric/Behavioral: Positive for depression. Negative for altered mental status. The patient is nervous/anxious.           Objective:     Vitals:    03/05/20 1140   BP: 110/72   BP Location: Right arm   Pulse: 60   Weight: 68.9 kg (152 lb)   Height: 180.3 cm (71\")     Body mass index is 21.2 kg/m².      PHYSICAL EXAM:  Physical Exam   Constitutional: He is oriented to person, place, and time. He appears well-developed and well-nourished. No distress.   HENT:   Head: Normocephalic and atraumatic.   Eyes: Pupils are equal, round, and reactive to light.   Neck: " Neck supple. No JVD present. Carotid bruit is not present.   Cardiovascular: Normal rate, regular rhythm, normal heart sounds and intact distal pulses. Exam reveals no gallop and no friction rub.   No murmur heard.  Pulses:       Radial pulses are 2+ on the right side, and 2+ on the left side.        Posterior tibial pulses are 2+ on the right side, and 2+ on the left side.   Pulmonary/Chest: Effort normal and breath sounds normal. No respiratory distress. He has no wheezes. He has no rales.   Abdominal: Soft. Bowel sounds are normal. He exhibits no distension. There is no tenderness.   Musculoskeletal: He exhibits no edema or tenderness.   Neurological: He is alert and oriented to person, place, and time.   Skin: Skin is warm and dry. He is not diaphoretic. No erythema.   Psychiatric: He has a normal mood and affect. His behavior is normal. Judgment normal.           ECG 12 Lead  Date/Time: 3/5/2020 2:05 PM  Performed by: Angelique Espinal DNP, JONELLE  Authorized by: Angelique Espinal DNP, JONELLE   Comparison: compared with previous ECG from 9/23/2019  Similar to previous ECG  Rhythm: sinus rhythm  Rate: normal  BPM: 60  Other findings: non-specific ST-T wave changes  Comments: No significant changes from previous ECG              Assessment:       Diagnosis Plan   1. Cardiomyopathy, unspecified type (CMS/HCC)  Adult Transthoracic Echo Complete W/ Cont if Necessary Per Protocol   2. Systolic congestive heart failure, unspecified HF chronicity (CMS/HCC)  Adult Transthoracic Echo Complete W/ Cont if Necessary Per Protocol   3. PAF (paroxysmal atrial fibrillation) (CMS/HCC)  Adult Transthoracic Echo Complete W/ Cont if Necessary Per Protocol   4. Essential hypertension           Plan:     1. History of systolic congestive heart failure: In the setting of rapid atrial fibrillation and sepsis.  Appears euvolemic in the office.  Remains on beta-blocker therapy with metoprolol.  Not on ACE inhibitor or ARB due to renal  insufficiency/history JERICHO.  Denies anginal symptoms or concerns.  Blood pressure well controlled in the office today.  Will look at repeating an echocardiogram.  2. Paroxysmal atrial fibrillation: In sinus rhythm in the office today.  Anticoagulated with warfarin.  On beta-blocker therapy with metoprolol.  Denies falls or abnormal bleeding.  3. Nonobstructive coronary artery disease: Mild on 8/2018 heart cath.  Denies anginal symptoms or concerns.  4. Hypertension: Blood pressure well controlled in the office today.  Patient reports it has been running around 130s over 75-80 after medications.  He will monitor daily at least 1 to 2 hours after morning medications and after resting calmly 10 to 15 minutes, keep a log, and call with readings in 1 week.  We will adjust medications as needed.  5. COPD: Follows with Dr. Segovia.  6. Pulmonary hypertension: With history of COPD  7. Obstructive sleep apnea  8. MSSA sepsis status post port removal  9. Renal insufficiency  10. Hx hyperkalemia: Being managed by outside provider.    Patient to follow-up with Dr. Omer in 6 months or sooner if needed for any new, recurrent, or worsening symptoms or other problems/concerns.           Your medication list           Accurate as of March 5, 2020  2:07 PM. If you have any questions, ask your nurse or doctor.               CONTINUE taking these medications      Instructions Last Dose Given Next Dose Due   albuterol sulfate  (90 Base) MCG/ACT inhaler  Commonly known as:  PROVENTIL HFA;VENTOLIN HFA;PROAIR HFA      Inhale 2 puffs. ProAir  (90 Base) MCG/ACT Inhalation Aerosol Solution; Patient Sig: ProAir  (90 Base) MCG/ACT Inhalation Aerosol Solution inhale 2 puffs by mouth every 4 hours if needed; 8.5; 11; 07-Apr-2014; Act       ALPRAZolam 0.5 MG tablet  Commonly known as:  XANAX      1/2 to 1 tablet twice daily for anxiety's       amLODIPine 5 MG tablet  Commonly known as:  NORVASC      2 (Two) Times a Day.        aspirin 81 MG chewable tablet      Chew 81 mg Daily.       baclofen 10 MG tablet  Commonly known as:  LIORESAL      take 1 tablet by mouth three times a day       CRESTOR 20 MG tablet  Generic drug:  rosuvastatin      Take 20 mg by mouth Daily.       diphenoxylate-atropine 2.5-0.025 MG per tablet  Commonly known as:  LOMOTIL      Take 1 tablet by mouth 4 (Four) Times a Day As Needed for Diarrhea.       HYDROcodone-acetaminophen  MG per tablet  Commonly known as:  NORCO      TK 1 T PO QID       levothyroxine 50 MCG tablet  Commonly known as:  SYNTHROID, LEVOTHROID      TAKE 1 TABLET BY MOUTH DAILY       Lidocaine Viscous HCl 2 % solution  Commonly known as:  XYLOCAINE      Take 5 mL by mouth Every 3 (Three) Hours As Needed for Moderate Pain .       montelukast 10 MG tablet  Commonly known as:  SINGULAIR      Take 1 tablet by mouth Every Night.       sertraline 100 MG tablet  Commonly known as:  ZOLOFT      Take 1 tablet by mouth Daily. For anxiety and depression relief       warfarin 2 MG tablet  Commonly known as:  COUMADIN      TAKE 2 TABLETS BY MOUTH ONCE DAILY          STOP taking these medications    methylPREDNISolone 4 MG tablet  Commonly known as:  MEDROL (VIPIN)  Stopped by:  Angelique Espinal DNP, APRN               I did not stop or change the above medications.  Patient's medication list was updated to reflect medications they are currently taking including medication changes made by other providers.        Thanks,    Angelique Espinal DNP, APRN  03/05/2020         Dictated utilizing Dragon dictation

## 2020-03-09 ENCOUNTER — ANTICOAGULATION VISIT (OUTPATIENT)
Dept: PHARMACY | Facility: HOSPITAL | Age: 75
End: 2020-03-09

## 2020-03-09 DIAGNOSIS — I48.19 OTHER PERSISTENT ATRIAL FIBRILLATION (HCC): ICD-10-CM

## 2020-03-09 LAB
INR PPP: 2 (ref 0.91–1.09)
PROTHROMBIN TIME: 24.5 SECONDS (ref 10–13.8)

## 2020-03-09 PROCEDURE — 85610 PROTHROMBIN TIME: CPT

## 2020-03-09 PROCEDURE — 36416 COLLJ CAPILLARY BLOOD SPEC: CPT

## 2020-03-09 RX ORDER — WARFARIN SODIUM 2 MG/1
TABLET ORAL
Qty: 160 TABLET | Refills: 1 | Status: SHIPPED | OUTPATIENT
Start: 2020-03-09 | End: 2020-08-13 | Stop reason: SDUPTHER

## 2020-03-09 RX ORDER — SERTRALINE HYDROCHLORIDE 100 MG/1
100 TABLET, FILM COATED ORAL DAILY
Qty: 30 TABLET | Refills: 6 | Status: SHIPPED | OUTPATIENT
Start: 2020-03-09 | End: 2021-03-15

## 2020-03-09 NOTE — PROGRESS NOTES
Anticoagulation Clinic Progress Note    Anticoagulation Summary  As of 3/9/2020    INR goal:   2.0-3.0   TTR:   54.5 % (1.3 y)   INR used for dosin.0 (3/9/2020)   Warfarin maintenance plan:   2 mg every Sun, Thu; 4 mg all other days   Weekly warfarin total:   24 mg   No change documented:   Brigida Monique   Plan last modified:   Álvaro Sam, [facility] (10/15/2019)   Next INR check:   2020   Priority:   Maintenance   Target end date:   Indefinite    Indications    Other persistent atrial fibrillation [I48.19]             Anticoagulation Episode Summary     INR check location:       Preferred lab:       Send INR reminders to:    [facility]OL    Comments:         Anticoagulation Care Providers     Provider Role Specialty Phone number    Myriam Omer MD Referring Cardiology [phone]          Clinic Interview:      INR History:  Anticoagulation Monitoring 2020 2/10/2020 3/9/2020   INR 2.5 2.4 2.0   INR Date 2020 2/10/2020 3/9/2020   INR Goal 2.0-3.0 2.0-3.0 2.0-3.0   Trend Same Same Same   Last Week Total 24 mg 24 mg 24 mg   Next Week Total 24 mg 24 mg 24 mg   Sun 2 mg 2 mg 2 mg   Mon - 4 mg 4 mg   Tue - 4 mg 4 mg   Wed - 4 mg 4 mg   Thu 2 mg 2 mg 2 mg   Fri 4 mg 4 mg 4 mg   Sat 4 mg 4 mg 4 mg   Visit Report - - -   Some recent data might be hidden       Plan:  1. INR is therapeutic today- see above in Anticoagulation Summary.   Will instruct Cody Eldridge to continue their warfarin regimen- see above in Anticoagulation Summary.  2. Follow up in 5 weeks.  3. Patient declines warfarin refills.  4. Verbal and written information provided. Patient expresses understanding and has no further questions at this time.    Brigida Monique

## 2020-03-18 RX ORDER — ALPRAZOLAM 0.5 MG/1
TABLET ORAL
Qty: 60 TABLET | Refills: 0 | Status: SHIPPED | OUTPATIENT
Start: 2020-03-18 | End: 2020-04-20 | Stop reason: SDUPTHER

## 2020-03-18 NOTE — TELEPHONE ENCOUNTER
We will be weaning patient very soon due to pandemic I would prefer to do this after the initial search in a few weeks couple months.

## 2020-03-24 ENCOUNTER — HOSPITAL ENCOUNTER (OUTPATIENT)
Dept: CARDIOLOGY | Facility: HOSPITAL | Age: 75
Discharge: HOME | End: 2020-03-24
Admitting: NURSE PRACTITIONER

## 2020-03-24 ENCOUNTER — TELEPHONE (OUTPATIENT)
Dept: CARDIOLOGY | Facility: CLINIC | Age: 75
End: 2020-03-24

## 2020-03-24 VITALS
SYSTOLIC BLOOD PRESSURE: 106 MMHG | HEART RATE: 65 BPM | WEIGHT: 152 LBS | DIASTOLIC BLOOD PRESSURE: 59 MMHG | BODY MASS INDEX: 21.28 KG/M2 | OXYGEN SATURATION: 97 % | HEIGHT: 71 IN

## 2020-03-24 DIAGNOSIS — I42.9 CARDIOMYOPATHY, UNSPECIFIED TYPE (HCC): ICD-10-CM

## 2020-03-24 DIAGNOSIS — I48.0 PAF (PAROXYSMAL ATRIAL FIBRILLATION) (HCC): ICD-10-CM

## 2020-03-24 DIAGNOSIS — I50.20 SYSTOLIC CONGESTIVE HEART FAILURE, UNSPECIFIED HF CHRONICITY (HCC): ICD-10-CM

## 2020-03-24 LAB
AORTIC ROOT ANNULUS: 2.1 CM
ASCENDING AORTA: 3.3 CM
BH CV ECHO MEAS - ACS: 2.1 CM
BH CV ECHO MEAS - AO MAX PG (FULL): 3.2 MMHG
BH CV ECHO MEAS - AO MAX PG: 7.7 MMHG
BH CV ECHO MEAS - AO MEAN PG (FULL): 1 MMHG
BH CV ECHO MEAS - AO MEAN PG: 4 MMHG
BH CV ECHO MEAS - AO ROOT AREA (BSA CORRECTED): 1.7
BH CV ECHO MEAS - AO ROOT AREA: 8 CM^2
BH CV ECHO MEAS - AO ROOT DIAM: 3.2 CM
BH CV ECHO MEAS - AO V2 MAX: 139 CM/SEC
BH CV ECHO MEAS - AO V2 MEAN: 98.6 CM/SEC
BH CV ECHO MEAS - AO V2 VTI: 32.7 CM
BH CV ECHO MEAS - ASC AORTA: 3.3 CM
BH CV ECHO MEAS - AVA(I,A): 2.3 CM^2
BH CV ECHO MEAS - AVA(I,D): 2.3 CM^2
BH CV ECHO MEAS - AVA(V,A): 2.6 CM^2
BH CV ECHO MEAS - AVA(V,D): 2.6 CM^2
BH CV ECHO MEAS - BSA(HAYCOCK): 1.9 M^2
BH CV ECHO MEAS - BSA: 1.9 M^2
BH CV ECHO MEAS - BZI_BMI: 21.2 KILOGRAMS/M^2
BH CV ECHO MEAS - BZI_METRIC_HEIGHT: 180.3 CM
BH CV ECHO MEAS - BZI_METRIC_WEIGHT: 68.9 KG
BH CV ECHO MEAS - EDV(MOD-SP2): 70 ML
BH CV ECHO MEAS - EDV(MOD-SP4): 74 ML
BH CV ECHO MEAS - EDV(TEICH): 92.4 ML
BH CV ECHO MEAS - EF(CUBED): 70.4 %
BH CV ECHO MEAS - EF(MOD-BP): 69 %
BH CV ECHO MEAS - EF(MOD-SP2): 65.7 %
BH CV ECHO MEAS - EF(MOD-SP4): 66.2 %
BH CV ECHO MEAS - EF(TEICH): 62.1 %
BH CV ECHO MEAS - ESV(MOD-SP2): 24 ML
BH CV ECHO MEAS - ESV(MOD-SP4): 25 ML
BH CV ECHO MEAS - ESV(TEICH): 35 ML
BH CV ECHO MEAS - FS: 33.3 %
BH CV ECHO MEAS - IVS/LVPW: 1
BH CV ECHO MEAS - IVSD: 1.1 CM
BH CV ECHO MEAS - LAT PEAK E' VEL: 8 CM/SEC
BH CV ECHO MEAS - LV DIASTOLIC VOL/BSA (35-75): 39.4 ML/M^2
BH CV ECHO MEAS - LV MASS(C)D: 175 GRAMS
BH CV ECHO MEAS - LV MASS(C)DI: 93.3 GRAMS/M^2
BH CV ECHO MEAS - LV MAX PG: 4.5 MMHG
BH CV ECHO MEAS - LV MEAN PG: 3 MMHG
BH CV ECHO MEAS - LV SYSTOLIC VOL/BSA (12-30): 13.3 ML/M^2
BH CV ECHO MEAS - LV V1 MAX: 106 CM/SEC
BH CV ECHO MEAS - LV V1 MEAN: 76.1 CM/SEC
BH CV ECHO MEAS - LV V1 VTI: 21.3 CM
BH CV ECHO MEAS - LVIDD: 4.5 CM
BH CV ECHO MEAS - LVIDS: 3 CM
BH CV ECHO MEAS - LVLD AP2: 7 CM
BH CV ECHO MEAS - LVLD AP4: 7.5 CM
BH CV ECHO MEAS - LVLS AP2: 6.1 CM
BH CV ECHO MEAS - LVLS AP4: 5.9 CM
BH CV ECHO MEAS - LVOT AREA (M): 3.5 CM^2
BH CV ECHO MEAS - LVOT AREA: 3.5 CM^2
BH CV ECHO MEAS - LVOT DIAM: 2.1 CM
BH CV ECHO MEAS - LVPWD: 1.1 CM
BH CV ECHO MEAS - MED PEAK E' VEL: 6 CM/SEC
BH CV ECHO MEAS - MV A DUR: 0.09 SEC
BH CV ECHO MEAS - MV A MAX VEL: 64.3 CM/SEC
BH CV ECHO MEAS - MV DEC SLOPE: 327 CM/SEC^2
BH CV ECHO MEAS - MV DEC TIME: 0.21 SEC
BH CV ECHO MEAS - MV E MAX VEL: 69.2 CM/SEC
BH CV ECHO MEAS - MV E/A: 1.1
BH CV ECHO MEAS - MV MAX PG: 3.5 MMHG
BH CV ECHO MEAS - MV MEAN PG: 1 MMHG
BH CV ECHO MEAS - MV P1/2T MAX VEL: 94.1 CM/SEC
BH CV ECHO MEAS - MV P1/2T: 84.3 MSEC
BH CV ECHO MEAS - MV V2 MAX: 93.7 CM/SEC
BH CV ECHO MEAS - MV V2 MEAN: 51.2 CM/SEC
BH CV ECHO MEAS - MV V2 VTI: 31.2 CM
BH CV ECHO MEAS - MVA P1/2T LCG: 2.3 CM^2
BH CV ECHO MEAS - MVA(P1/2T): 2.6 CM^2
BH CV ECHO MEAS - MVA(VTI): 2.4 CM^2
BH CV ECHO MEAS - PA ACC TIME: 0.07 SEC
BH CV ECHO MEAS - PA MAX PG (FULL): 1.7 MMHG
BH CV ECHO MEAS - PA MAX PG: 4 MMHG
BH CV ECHO MEAS - PA PR(ACCEL): 49.3 MMHG
BH CV ECHO MEAS - PA V2 MAX: 100 CM/SEC
BH CV ECHO MEAS - PULM A REVS DUR: 0.06 SEC
BH CV ECHO MEAS - PULM A REVS VEL: 29.6 CM/SEC
BH CV ECHO MEAS - PULM DIAS VEL: 59.2 CM/SEC
BH CV ECHO MEAS - PULM S/D: 0.8
BH CV ECHO MEAS - PULM SYS VEL: 47.5 CM/SEC
BH CV ECHO MEAS - PVA(V,A): 2.9 CM^2
BH CV ECHO MEAS - PVA(V,D): 2.9 CM^2
BH CV ECHO MEAS - QP/QS: 0.96
BH CV ECHO MEAS - RAP SYSTOLE: 3 MMHG
BH CV ECHO MEAS - RV MAX PG: 2.3 MMHG
BH CV ECHO MEAS - RV MEAN PG: 1 MMHG
BH CV ECHO MEAS - RV V1 MAX: 76.1 CM/SEC
BH CV ECHO MEAS - RV V1 MEAN: 56.8 CM/SEC
BH CV ECHO MEAS - RV V1 VTI: 18.6 CM
BH CV ECHO MEAS - RVOT AREA: 3.8 CM^2
BH CV ECHO MEAS - RVOT DIAM: 2.2 CM
BH CV ECHO MEAS - RVSP: 41.2 MMHG
BH CV ECHO MEAS - SI(AO): 140.1 ML/M^2
BH CV ECHO MEAS - SI(CUBED): 34.2 ML/M^2
BH CV ECHO MEAS - SI(LVOT): 39.3 ML/M^2
BH CV ECHO MEAS - SI(MOD-SP2): 24.5 ML/M^2
BH CV ECHO MEAS - SI(MOD-SP4): 26.1 ML/M^2
BH CV ECHO MEAS - SI(TEICH): 30.6 ML/M^2
BH CV ECHO MEAS - SUP REN AO DIAM: 1.8 CM
BH CV ECHO MEAS - SV(AO): 263 ML
BH CV ECHO MEAS - SV(CUBED): 64.1 ML
BH CV ECHO MEAS - SV(LVOT): 73.8 ML
BH CV ECHO MEAS - SV(MOD-SP2): 46 ML
BH CV ECHO MEAS - SV(MOD-SP4): 49 ML
BH CV ECHO MEAS - SV(RVOT): 70.7 ML
BH CV ECHO MEAS - SV(TEICH): 57.4 ML
BH CV ECHO MEAS - TAPSE (>1.6): 2.4 CM2
BH CV ECHO MEAS - TR MAX VEL: 309 CM/SEC
BH CV ECHO MEASUREMENTS AVERAGE E/E' RATIO: 9.89
BH CV XLRA - RV BASE: 3.4 CM
BH CV XLRA - RV LENGTH: 7.7 CM
BH CV XLRA - RV MID: 3.9 CM
BH CV XLRA - TDI S': 12 CM/SEC
LEFT ATRIUM VOLUME INDEX: 36 ML/M2
SINUS: 3.7 CM
STJ: 3.4 CM

## 2020-03-24 PROCEDURE — 93356 MYOCRD STRAIN IMG SPCKL TRCK: CPT | Performed by: INTERNAL MEDICINE

## 2020-03-24 PROCEDURE — 93306 TTE W/DOPPLER COMPLETE: CPT

## 2020-03-24 PROCEDURE — 93356 MYOCRD STRAIN IMG SPCKL TRCK: CPT

## 2020-03-24 PROCEDURE — 93306 TTE W/DOPPLER COMPLETE: CPT | Performed by: INTERNAL MEDICINE

## 2020-03-24 NOTE — TELEPHONE ENCOUNTER
I called and spoke with patient.  He does not want to postpone echo today.  We discussed risk with coronavirus and ok to push out a month since feeling well but he is still wanting to do it today.  He reports he has a mask he will wear and will wash his hands.

## 2020-03-25 ENCOUNTER — TELEPHONE (OUTPATIENT)
Dept: CARDIOLOGY | Facility: CLINIC | Age: 75
End: 2020-03-25

## 2020-03-25 DIAGNOSIS — R93.1 ABNORMAL ECHOCARDIOGRAM: Primary | ICD-10-CM

## 2020-03-25 NOTE — TELEPHONE ENCOUNTER
Dr. Omer--  Please see patient's most recent echo results below.  EF has improved to normal now that he is not longer in atrial fibrillation with RVR and has been continued on medical therapy.    However findings are concerning for infiltrative cardiomyopathy.  Any additional testing or treatment you would recommend at this time given this finding?     Thanks,  JONELLE Lamas          Echocardiogram Findings     Left Ventricle Left ventricular systolic function is normal. Calculated EF = 69%. Estimated EF was in agreement with the calculated EF. Global Longitudinal LV strain = -20.9%. Strain data was reviewed and speckle tracking was considered accurate. Speckled appearance to myocardium concern for infiltrative cardiomyopathy. Although global longitudinal strain is within the range of normal the regional strain is markedly abnormal. There is low regional strain in the basal and mid segments with apical sparing consistent with picture of infiltrative cardiomyopathy, and more specifically cardiac amyloidosis.Normal left ventricular cavity size noted. All left ventricular wall segments contract normally. Left ventricular wall thickness is consistent with mild concentric hypertrophy. Left ventricular diastolic dysfunction is noted (grade II w/high LAP) consistent with pseudonormalization.   Right Ventricle Normal right ventricular cavity size and systolic function noted.   Left Atrium Left atrial cavity size is moderately dilated. Left atrial strain = 18%.   Right Atrium Right atrial cavity size is mildly dilated.   Aortic Valve The aortic valve is structurally normal. No aortic valve regurgitation is present. No aortic valve stenosis is present.   Mitral Valve The mitral valve is normal in structure. Mild mitral valve regurgitation is present. No significant mitral valve stenosis is present.   Tricuspid Valve The tricuspid valve is normal. Mild tricuspid valve regurgitation is present. Estimated right ventricular  systolic pressure from tricuspid regurgitation is mildly elevated (35-45 mmHg).   Pulmonic Valve The pulmonic valve is grossly normal in structure.   Greater Vessels Mild dilation of the aortic root is present. The inferior vena cava is normally sized. Normal IVC inspiratory collapse of greater than 50% noted.   Pericardium There is a trivial pericardial effusion. There is no evidence of cardiac tamponade.

## 2020-03-27 NOTE — TELEPHONE ENCOUNTER
"Just want to clarify that I have the correct order:    \"MRI cardiac function complete with and without morphology\"    I am guessing that this can be scheduled for 1-2 months from now after coronavirus risk decreases?      Thanks,  JONELLE Lamas  "

## 2020-03-30 RX ORDER — LEVOTHYROXINE SODIUM 0.05 MG/1
50 TABLET ORAL DAILY
Qty: 90 TABLET | Refills: 0 | Status: SHIPPED | OUTPATIENT
Start: 2020-03-30 | End: 2020-05-22 | Stop reason: DRUGHIGH

## 2020-03-30 NOTE — TELEPHONE ENCOUNTER
Called and discussed echo results with patient and recommendations to proceed with cardiac MRI in 1-2 months after coronavirus risks have decreased. Will also get candycane view to assess thoracic aorta to r/o aneurysm. Patient will discuss pulmonary hypertension with Dr. Segovia at next visit, which he reports is coming up within the next month.  BP ~ 130s systolic recently-- he will cut back on salt and call if continues to stay >130 systolic.

## 2020-04-10 ENCOUNTER — TELEPHONE (OUTPATIENT)
Dept: PHARMACY | Facility: HOSPITAL | Age: 75
End: 2020-04-10

## 2020-04-10 NOTE — TELEPHONE ENCOUNTER
Message sent to [facility] requesting mdINR enrollment form be completed. Thank you!

## 2020-04-13 ENCOUNTER — TELEPHONE (OUTPATIENT)
Dept: CARDIOLOGY | Facility: CLINIC | Age: 75
End: 2020-04-13

## 2020-04-13 NOTE — TELEPHONE ENCOUNTER
----- Message from Georges Guadalupe RPH sent at 4/10/2020  3:32 PM EDT -----  Regarding: Dr. Omer - Home INR Testing  Patient requests enrollment with home testing company for INR monitoring. Please complete ##[email or web address])## enrollment form and have the cardiologist sign. Once signed, please fax to the [facility] (F: [phone]). We will send the form alongside supporting documentation to the home testing company for processing.     ##Please note: The cardiologist name/npi on the form must be the cardiologist who signs the form.     Thank you!

## 2020-04-21 RX ORDER — ALPRAZOLAM 0.5 MG/1
TABLET ORAL
Qty: 60 TABLET | Refills: 0 | Status: SHIPPED | OUTPATIENT
Start: 2020-04-21 | End: 2020-06-24 | Stop reason: SDUPTHER

## 2020-05-08 LAB — INR PPP: 2.7

## 2020-05-14 ENCOUNTER — TELEPHONE (OUTPATIENT)
Dept: CARDIOLOGY | Facility: CLINIC | Age: 75
End: 2020-05-14

## 2020-05-14 ENCOUNTER — LAB (OUTPATIENT)
Dept: LAB | Facility: HOSPITAL | Age: 75
End: 2020-05-14

## 2020-05-14 ENCOUNTER — OFFICE VISIT (OUTPATIENT)
Dept: FAMILY MEDICINE CLINIC | Facility: CLINIC | Age: 75
End: 2020-05-14

## 2020-05-14 VITALS
HEART RATE: 94 BPM | SYSTOLIC BLOOD PRESSURE: 150 MMHG | WEIGHT: 151.4 LBS | DIASTOLIC BLOOD PRESSURE: 80 MMHG | BODY MASS INDEX: 21.19 KG/M2 | RESPIRATION RATE: 16 BRPM | HEIGHT: 71 IN | OXYGEN SATURATION: 99 % | TEMPERATURE: 97.5 F

## 2020-05-14 DIAGNOSIS — E78.2 MIXED HYPERLIPIDEMIA: ICD-10-CM

## 2020-05-14 DIAGNOSIS — I10 ESSENTIAL HYPERTENSION: ICD-10-CM

## 2020-05-14 DIAGNOSIS — T45.1X5A ANEMIA ASSOCIATED WITH CHEMOTHERAPY: ICD-10-CM

## 2020-05-14 DIAGNOSIS — C01 SQUAMOUS CELL CARCINOMA OF BASE OF TONGUE (HCC): ICD-10-CM

## 2020-05-14 DIAGNOSIS — D64.81 ANEMIA ASSOCIATED WITH CHEMOTHERAPY: ICD-10-CM

## 2020-05-14 DIAGNOSIS — E03.9 ACQUIRED HYPOTHYROIDISM: ICD-10-CM

## 2020-05-14 DIAGNOSIS — Z00.00 MEDICARE ANNUAL WELLNESS VISIT, SUBSEQUENT: Primary | ICD-10-CM

## 2020-05-14 LAB
ALBUMIN SERPL-MCNC: 3.7 G/DL (ref 3.5–5.2)
ALBUMIN/GLOB SERPL: 1.1 G/DL
ALP SERPL-CCNC: 71 U/L (ref 39–117)
ALT SERPL W P-5'-P-CCNC: 19 U/L (ref 1–41)
ANION GAP SERPL CALCULATED.3IONS-SCNC: 7.8 MMOL/L (ref 5–15)
AST SERPL-CCNC: 28 U/L (ref 1–40)
BASOPHILS # BLD AUTO: 0.04 10*3/MM3 (ref 0–0.2)
BASOPHILS NFR BLD AUTO: 0.8 % (ref 0–1.5)
BILIRUB SERPL-MCNC: 0.3 MG/DL (ref 0.1–1.2)
BUN BLD-MCNC: 20 MG/DL (ref 8–23)
BUN/CREAT SERPL: 17.1 (ref 7–25)
CALCIUM SPEC-SCNC: 8.8 MG/DL (ref 8.6–10.5)
CHLORIDE SERPL-SCNC: 104 MMOL/L (ref 98–107)
CO2 SERPL-SCNC: 29.2 MMOL/L (ref 22–29)
CREAT BLD-MCNC: 1.17 MG/DL (ref 0.76–1.27)
DEPRECATED RDW RBC AUTO: 45.5 FL (ref 37–54)
EOSINOPHIL # BLD AUTO: 0.12 10*3/MM3 (ref 0–0.4)
EOSINOPHIL NFR BLD AUTO: 2.5 % (ref 0.3–6.2)
ERYTHROCYTE [DISTWIDTH] IN BLOOD BY AUTOMATED COUNT: 13.2 % (ref 12.3–15.4)
GFR SERPL CREATININE-BSD FRML MDRD: 61 ML/MIN/1.73
GLOBULIN UR ELPH-MCNC: 3.3 GM/DL
GLUCOSE BLD-MCNC: 102 MG/DL (ref 65–99)
HCT VFR BLD AUTO: 33.2 % (ref 37.5–51)
HGB BLD-MCNC: 10.8 G/DL (ref 13–17.7)
IMM GRANULOCYTES # BLD AUTO: 0.01 10*3/MM3 (ref 0–0.05)
IMM GRANULOCYTES NFR BLD AUTO: 0.2 % (ref 0–0.5)
LYMPHOCYTES # BLD AUTO: 0.82 10*3/MM3 (ref 0.7–3.1)
LYMPHOCYTES NFR BLD AUTO: 17.4 % (ref 19.6–45.3)
MCH RBC QN AUTO: 31.1 PG (ref 26.6–33)
MCHC RBC AUTO-ENTMCNC: 32.5 G/DL (ref 31.5–35.7)
MCV RBC AUTO: 95.7 FL (ref 79–97)
MONOCYTES # BLD AUTO: 0.56 10*3/MM3 (ref 0.1–0.9)
MONOCYTES NFR BLD AUTO: 11.9 % (ref 5–12)
NEUTROPHILS # BLD AUTO: 3.16 10*3/MM3 (ref 1.7–7)
NEUTROPHILS NFR BLD AUTO: 67.2 % (ref 42.7–76)
NRBC BLD AUTO-RTO: 0 /100 WBC (ref 0–0.2)
PLATELET # BLD AUTO: 156 10*3/MM3 (ref 140–450)
PMV BLD AUTO: 9.1 FL (ref 6–12)
POTASSIUM BLD-SCNC: 4.6 MMOL/L (ref 3.5–5.2)
PROT SERPL-MCNC: 7 G/DL (ref 6–8.5)
RBC # BLD AUTO: 3.47 10*6/MM3 (ref 4.14–5.8)
SODIUM BLD-SCNC: 141 MMOL/L (ref 136–145)
TSH SERPL DL<=0.05 MIU/L-ACNC: 11.02 UIU/ML (ref 0.27–4.2)
WBC NRBC COR # BLD: 4.71 10*3/MM3 (ref 3.4–10.8)

## 2020-05-14 PROCEDURE — G0439 PPPS, SUBSEQ VISIT: HCPCS | Performed by: NURSE PRACTITIONER

## 2020-05-14 PROCEDURE — 85025 COMPLETE CBC W/AUTO DIFF WBC: CPT

## 2020-05-14 PROCEDURE — 99213 OFFICE O/P EST LOW 20 MIN: CPT | Performed by: NURSE PRACTITIONER

## 2020-05-14 PROCEDURE — 80053 COMPREHEN METABOLIC PANEL: CPT

## 2020-05-14 PROCEDURE — 84443 ASSAY THYROID STIM HORMONE: CPT

## 2020-05-14 PROCEDURE — 36415 COLL VENOUS BLD VENIPUNCTURE: CPT

## 2020-05-14 NOTE — PROGRESS NOTES
"Subjective   Cody Eldridge is a 74 y.o. male.     Follow-up essential hypertension previously controlled a little high more lately similar today 150/80  Recently called cardiology did not hear back  If he takes an extra dose of his medication extra half  Is too low diastolic no chest pain no shortness of breath no weakness overall feeling better    Patient strength is slowly coming back he is gained some weight after being treated last year for  Throat cancer last year patient had increased anxiety  Weight loss his health was very serious serious  And he nearly  according to patient he is feeling better and stronger but still does not have the strength that he had previously slowly gained his weight back    He is presently working but is very careful to take precautions to decrease risk of any potential exposure to cold bed he social distancing    Anxiety depression  Takes Zoloft 100 mg this helps during his hospitalization he was started on Xanax and due to his situation I have him continue this knowing that at some point we will slowly decrease this as he takes opiate for chronic pain as well  We discussed today options including increasing Zoloft then a slow wean or weaning without Zoloft increase  He prefers the latter             /80   Pulse 94   Temp 97.5 °F (36.4 °C)   Resp 16   Ht 180.3 cm (71\")   Wt 68.7 kg (151 lb 6.4 oz)   SpO2 99%   BMI 21.12 kg/m²       The following portions of the patient's history were reviewed and updated as appropriate: allergies, current medications, past family history, past medical history, past social history, past surgical history and problem list.    Review of Systems   Constitutional: Negative for fatigue and fever.   HENT: Negative.  Negative for trouble swallowing.    Eyes: Negative.    Respiratory: Negative.  Negative for cough and shortness of breath.    Cardiovascular: Negative for chest pain, palpitations and leg swelling.   Gastrointestinal: " Negative.  Negative for abdominal pain.   Genitourinary: Negative.    Musculoskeletal: Negative.    Skin: Negative.    Neurological: Negative.  Negative for dizziness and confusion.   Psychiatric/Behavioral: Negative.        Objective   Physical Exam   Constitutional: He is oriented to person, place, and time. No distress.   Improved overall appearance looks much stronger  80% of his baseline prior to malignancy estimation   HENT:   Head: Normocephalic and atraumatic.   Nose: Nose normal.   Mouth/Throat: Oropharynx is clear and moist.   Eyes: Pupils are equal, round, and reactive to light. Conjunctivae are normal.   Neck: Neck supple. No JVD present.   Cardiovascular: Normal rate, regular rhythm and normal heart sounds.   No murmur heard.  Murmur 1-2/6 without radiation left sternal border   Pulmonary/Chest: Effort normal and breath sounds normal. No respiratory distress. He has no wheezes.   Decreased breath sounds unlabored   Abdominal: Soft. Bowel sounds are normal. He exhibits no distension and no mass. There is no tenderness. There is no guarding. No hernia.   Musculoskeletal: He exhibits no edema or tenderness.   Lymphadenopathy:     He has no cervical adenopathy.   Neurological: He is alert and oriented to person, place, and time.   Skin: Skin is warm and dry. He is not diaphoretic.   Psychiatric: He has a normal mood and affect. His behavior is normal. Judgment and thought content normal.   Vitals reviewed.        Assessment/Plan   Cody was seen today for medicare wellness-subsequent.    Diagnoses and all orders for this visit:    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)  -     Comprehensive Metabolic Panel; Future  -     CBC & Differential; Future  -     TSH; Future    Essential hypertension  -     Comprehensive Metabolic Panel; Future  -     CBC & Differential; Future  -     TSH; Future    Mixed hyperlipidemia  -     Comprehensive Metabolic Panel; Future  -     CBC & Differential; Future  -      TSH; Future    Anemia associated with chemotherapy  -     Comprehensive Metabolic Panel; Future  -     CBC & Differential; Future  -     TSH; Future    Acquired hypothyroidism  -     Comprehensive Metabolic Panel; Future  -     CBC & Differential; Future  -     TSH; Future        Discharge instructions  Check blood pressure daily follow-up with cardiology next 1 to 2 weeks with some blood pressure numbers    Keep appointment with [facility]  Follow-up with me in 4 months  Continue sertraline 100 mg daily  May need to increase this to 150  Next refill will start to decrease Xanax slowly we will do this very deliberately and slowly to avoid any rebound anxiety as per weaning from benzodiazepines  Reviewed safety controlled substance [place]  Controlled steps agreement intact  Discontinued medication over next several months    In person office visit approximate 25-minute  Continue to social distance  Discussed strategy walking increasing activities while maintaining social distance              There are no Patient Instructions on file for this visit.

## 2020-05-14 NOTE — PROGRESS NOTES
The ABCs of the Annual Wellness Visit  Subsequent Medicare Wellness Visit    Chief Complaint   Patient presents with   • Medicare Wellness-subsequent       Subjective   History of Present Illness:  Cody Eldridge is a 74 y.o. male who presents for a Subsequent Medicare Wellness Visit.    HEALTH RISK ASSESSMENT    Recent Hospitalizations:  No hospitalization(s) within the last year.    Current Medical Providers:  Patient Care Team:  Epley, James, APRN as PCP - General (Family Medicine)  Payal Waters MD as Consulting Physician (Pain Medicine)  Lorna Chaidez (Nurse Practitioner)  Kristal Cowart RPH as Pharmacist  Fritz Slater MD as Referring Physician (Otolaryngology)  Pablo Heaton MD as Consulting Physician (Hematology and Oncology)  Annie Davila MD as Consulting Physician (Radiation Oncology)  Chetan Heaton MD as Consulting Physician (Urology)  Kristal Cowart RPH as Pharmacist (Pharmacy)  Georges Guadalupe RPH as Pharmacist (Pharmacy)    Smoking Status:  Social History     Tobacco Use   Smoking Status Former Smoker   • Packs/day: 3.00   • Years: 30.00   • Pack years: 90.00   • Types: Cigarettes   • Last attempt to quit:    • Years since quittin.3   Smokeless Tobacco Never Used   Tobacco Comment    started age 11 x 53 years stopped 2000 / daily caffiene       Alcohol Consumption:  Social History     Substance and Sexual Activity   Alcohol Use Yes   • Alcohol/week: 2.0 standard drinks   • Types: 1 Cans of beer, 1 Glasses of wine per week    Comment: occ       Depression Screen:   PHQ-2/PHQ-9 Depression Screening 2020   Little interest or pleasure in doing things 0   Feeling down, depressed, or hopeless 0   Total Score 0       Fall Risk Screen:  STEADI Fall Risk Assessment was completed, and patient is at LOW risk for falls.Assessment completed on:2020    Health Habits and Functional and Cognitive Screening:  Functional & Cognitive Status 2020   Do you have  difficulty preparing food and eating? No   Do you have difficulty bathing yourself, getting dressed or grooming yourself? No   Do you have difficulty using the toilet? No   Do you have difficulty moving around from place to place? No   Do you have trouble with steps or getting out of a bed or a chair? No   Current Diet Well Balanced Diet   Dental Exam Up to date   Eye Exam Up to date   Exercise (times per week) 6 times per week   Current Exercise Activities Include Walking   Do you need help using the phone?  No   Are you deaf or do you have serious difficulty hearing?  Yes   Do you need help with transportation? No   Do you need help shopping? No   Do you need help preparing meals?  No   Do you need help with housework?  No   Do you need help with laundry? No   Do you need help taking your medications? No   Do you need help managing money? No   Do you ever drive or ride in a car without wearing a seat belt? Yes   Have you felt unusual stress, anger or loneliness in the last month? No   Who do you live with? Spouse   If you need help, do you have trouble finding someone available to you? No   Have you been bothered in the last four weeks by sexual problems? No   Do you have difficulty concentrating, remembering or making decisions? No         Does the patient have evidence of cognitive impairment? No    Asprin use counseling:Taking ASA appropriately as indicated    Age-appropriate Screening Schedule:  Refer to the list below for future screening recommendations based on patient's age, sex and/or medical conditions. Orders for these recommended tests are listed in the plan section. The patient has been provided with a written plan.    Health Maintenance   Topic Date Due   • PNEUMOCOCCAL VACCINE (65+ HIGH RISK) (2 of 2 - PPSV23) 02/16/2015   • COLONOSCOPY  01/28/2016   • ZOSTER VACCINE (2 of 3) 11/29/2016   • LIPID PANEL  10/04/2017   • INFLUENZA VACCINE  08/01/2020   • TDAP/TD VACCINES (2 - Td) 01/01/2022           The following portions of the patient's history were reviewed and updated as appropriate: allergies, current medications, past family history, past medical history, past social history, past surgical history and problem list.    Outpatient Medications Prior to Visit   Medication Sig Dispense Refill   • albuterol (PROVENTIL HFA;VENTOLIN HFA) 108 (90 BASE) MCG/ACT inhaler Inhale 2 puffs. ProAir  (90 Base) MCG/ACT Inhalation Aerosol Solution; Patient Sig: ProAir  (90 Base) MCG/ACT Inhalation Aerosol Solution inhale 2 puffs by mouth every 4 hours if needed; 8.5; 11; 07-Apr-2014; Act     • ALPRAZolam (XANAX) 0.5 MG tablet TAKE 1/2 TO 1 TABLET BY MOUTH TWICE DAILY 60 tablet 0   • amLODIPine (NORVASC) 5 MG tablet 2 (Two) Times a Day.  3   • aspirin 81 MG chewable tablet Chew 81 mg Daily.     • baclofen (LIORESAL) 10 MG tablet take 1 tablet by mouth three times a day 90 tablet 1   • diphenoxylate-atropine (LOMOTIL) 2.5-0.025 MG per tablet Take 1 tablet by mouth 4 (Four) Times a Day As Needed for Diarrhea. 60 tablet 1   • HYDROcodone-acetaminophen (NORCO)  MG per tablet TK 1 T PO QID  0   • levothyroxine (SYNTHROID, LEVOTHROID) 50 MCG tablet TAKE 1 TABLET BY MOUTH DAILY 90 tablet 0   • Lidocaine Viscous HCl (XYLOCAINE) 2 % solution Take 5 mL by mouth Every 3 (Three) Hours As Needed for Moderate Pain . 100 mL 3   • montelukast (SINGULAIR) 10 MG tablet Take 1 tablet by mouth Every Night. 90 tablet 1   • rosuvastatin (CRESTOR) 20 MG tablet Take 20 mg by mouth Daily. 30 tablet 5   • sertraline (ZOLOFT) 100 MG tablet Take 1 tablet by mouth Daily. For anxiety and depression relief 30 tablet 6   • warfarin (COUMADIN) 2 MG tablet Take one tablet (2mg) on Sun & Thurs and take two tablets (4mg) on all other days or as directed by [facility] 160 tablet 1     No facility-administered medications prior to visit.        Patient Active Problem List   Diagnosis   • Atopic rhinitis   • Arthritis of hand   •  Coxitis   • Benign colonic polyp   • Neck pain   • Mixed anxiety depressive disorder   • Degeneration of intervertebral disc of lumbosacral region   • Elevated prostate specific antigen (PSA)   • Mixed hyperlipidemia   • Essential hypertension   • Insomnia   • Lumbar radiculopathy   • Osteoarthritis   • Vitamin D deficiency   • Abdominal pain   • Acute URI   • Myalgia   • Cramp of both lower extremities   • Gingivitis   • Other persistent atrial fibrillation   • Non-rheumatic tricuspid valve insufficiency   • SHAR (obstructive sleep apnea)   • Precordial pain   • IRAHETA (dyspnea on exertion)   • Coronary artery disease involving native coronary artery of native heart without angina pectoris   • Shortness of breath   • 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)   • Current use of long term anticoagulation   • Syncope   • Dehydration   • History of cancer   • History of atrial fibrillation   • History of CHF (congestive heart failure)   • Hypotension   • Fitting and adjustment of vascular catheter   • New onset of congestive heart failure (CMS/HCC)   • Atrial fibrillation (CMS/HCC)   • Squamous cell carcinoma of neck   • COPD (chronic obstructive pulmonary disease) (CMS/HCC)   • Depression with anxiety   • CAD (coronary artery disease)   • Chemotherapy-induced neutropenia (CMS/HCC)   • Chemotherapy-induced thrombocytopenia   • Acute renal injury (CMS/HCC)   • Anemia associated with chemotherapy   • Oral thrush   • MSSA bacteremia   • Hypokalemia   • Acute systolic CHF (congestive heart failure) (CMS/HCC)   • Systolic heart failure (CMS/HCC)       Advanced Care Planning:  ACP discussion was held with the patient during this visit. Patient does not have an advance directive, information provided.    Review of Systems    Compared to one year ago, the patient feels his physical health is better.  Compared to one year ago, the patient feels his mental health is better.    Reviewed chart for potential of high risk  "medication in the elderly: yes  Reviewed chart for potential of harmful drug interactions in the elderly:yes    Objective         Vitals:    05/14/20 1137   BP: 150/80   Pulse: 94   Resp: 16   Temp: 97.5 °F (36.4 °C)   SpO2: 99%   Weight: 68.7 kg (151 lb 6.4 oz)   Height: 180.3 cm (71\")       Body mass index is 21.12 kg/m².  Discussed the patient's BMI with him. The BMI is below average; BMI management plan is completed.    Physical Exam          Assessment/Plan   Medicare Risks and Personalized Health Plan  CMS Preventative Services Quick Reference  Advance Directive Discussion  Depression/Dysphoria  Fall Risk  Osteoprorosis Risk    The above risks/problems have been discussed with the patient.  Pertinent information has been shared with the patient in the After Visit Summary.  Follow up plans and orders are seen below in the Assessment/Plan Section.    Diagnoses and all orders for this visit:    1. 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) (Primary)  -     Comprehensive Metabolic Panel; Future  -     CBC & Differential; Future  -     TSH; Future    2. Essential hypertension  -     Comprehensive Metabolic Panel; Future  -     CBC & Differential; Future  -     TSH; Future    3. Mixed hyperlipidemia  -     Comprehensive Metabolic Panel; Future  -     CBC & Differential; Future  -     TSH; Future    4. Anemia associated with chemotherapy  -     Comprehensive Metabolic Panel; Future  -     CBC & Differential; Future  -     TSH; Future    5. Acquired hypothyroidism  -     Comprehensive Metabolic Panel; Future  -     CBC & Differential; Future  -     TSH; Future      Follow Up:  Return in about 3 months (around 8/14/2020).     An After Visit Summary and PPPS were given to the patient.             "

## 2020-05-14 NOTE — TELEPHONE ENCOUNTER
5/14/20  Order for ct came up as past due - Angelique had said ok to schedule in May or June due to covid-19.  Please schedule pt for this within the next month/yasmeen

## 2020-05-16 LAB — INR PPP: 2.2

## 2020-05-19 ENCOUNTER — APPOINTMENT (OUTPATIENT)
Dept: PHARMACY | Facility: HOSPITAL | Age: 75
End: 2020-05-19

## 2020-05-19 ENCOUNTER — ANTICOAGULATION VISIT (OUTPATIENT)
Dept: PHARMACY | Facility: HOSPITAL | Age: 75
End: 2020-05-19

## 2020-05-19 DIAGNOSIS — I48.19 OTHER PERSISTENT ATRIAL FIBRILLATION (HCC): ICD-10-CM

## 2020-05-19 NOTE — PROGRESS NOTES
Anticoagulation Clinic Progress Note    Anticoagulation Summary  As of 2020    INR goal:   2.0-3.0   TTR:   60.2 % (1.5 y)   INR used for dosin.20 (2020)   Warfarin maintenance plan:   2 mg every Sun, Thu; 4 mg all other days   Weekly warfarin total:   24 mg   Plan last modified:   Álvaro Sam RPH (10/15/2019)   Next INR check:   2020   Priority:   Maintenance   Target end date:   Indefinite    Indications    Other persistent atrial fibrillation [I48.19]             Anticoagulation Episode Summary     INR check location:       Preferred lab:       Send INR reminders to:    [facility]    Comments:   MD INR home danika      Anticoagulation Care Providers     Provider Role Specialty Phone number    Myriam Omer MD Referring Cardiology [phone]          Clinic Interview:  No pertinent clinical findings have been reported.    INR History:  Anticoagulation Monitoring 2/10/2020 3/9/2020 2020   INR 2.4 2.0 2.20   INR Date 2/10/2020 3/9/2020 2020   INR Goal 2.0-3.0 2.0-3.0 2.0-3.0   Trend Same Same Same   Last Week Total 24 mg 24 mg 24 mg   Next Week Total 24 mg 24 mg 24 mg   Sun 2 mg 2 mg -   Mon 4 mg 4 mg -   Tue 4 mg 4 mg 4 mg   Wed 4 mg 4 mg 4 mg   Thu 2 mg 2 mg 2 mg   Fri 4 mg 4 mg -   Sat 4 mg 4 mg -   Visit Report - - -   Some recent data might be hidden       Plan:  1. INR is therapeutic for first 2 INR checks on his home machine in the past two weeks-- see above in Anticoagulation Summary.    Cody Eldridge to continue their warfarin regimen- see above in Anticoagulation Summary.  2. Follow up in 1 week  3. Spoke with spouse and pt is set to recheck on . They have been instructed to call if any changes in medications, doses, concerns, etc.    Kristal Cowart RP

## 2020-05-22 DIAGNOSIS — E03.9 ACQUIRED HYPOTHYROIDISM: Primary | ICD-10-CM

## 2020-05-22 RX ORDER — LEVOTHYROXINE SODIUM 0.07 MG/1
75 TABLET ORAL DAILY
Qty: 90 TABLET | Refills: 1 | Status: SHIPPED | OUTPATIENT
Start: 2020-05-22 | End: 2020-06-22

## 2020-05-25 LAB — INR PPP: 2.1

## 2020-05-26 ENCOUNTER — ANTICOAGULATION VISIT (OUTPATIENT)
Dept: PHARMACY | Facility: HOSPITAL | Age: 75
End: 2020-05-26

## 2020-05-26 DIAGNOSIS — I48.19 OTHER PERSISTENT ATRIAL FIBRILLATION (HCC): ICD-10-CM

## 2020-05-26 NOTE — PROGRESS NOTES
Anticoagulation Clinic Progress Note    Anticoagulation Summary  As of 2020    INR goal:   2.0-3.0   TTR:   60.9 % (1.5 y)   INR used for dosin.10 (2020)   Warfarin maintenance plan:   2 mg every Sun, Thu; 4 mg all other days   Weekly warfarin total:   24 mg   No change documented:   Stephany Siu   Plan last modified:   Álvaro Sam [facility] (10/15/2019)   Next INR check:   2020   Priority:   Maintenance   Target end date:   Indefinite    Indications    Other persistent atrial fibrillation [I48.19]             Anticoagulation Episode Summary     INR check location:       Preferred lab:       Send INR reminders to:    [facility]    Comments:   MD INR home danika      Anticoagulation Care Providers     Provider Role Specialty Phone number    Myriam Omer MD Referring Cardiology [phone]          Clinic Interview:  No pertinent clinical findings have been reported.    INR History:  Anticoagulation Monitoring 3/9/2020 2020 2020   INR 2.0 2.20 2.10   INR Date 3/9/2020 2020 2020   INR Goal 2.0-3.0 2.0-3.0 2.0-3.0   Trend Same Same Same   Last Week Total 24 mg 24 mg 24 mg   Next Week Total 24 mg 24 mg 24 mg   Sun 2 mg - 2 mg   Mon 4 mg - -   Tue 4 mg 4 mg 4 mg   Wed 4 mg 4 mg 4 mg   Thu 2 mg 2 mg 2 mg   Fri 4 mg - 4 mg   Sat 4 mg - 4 mg   Visit Report - - -   Some recent data might be hidden       Plan:  1. INR is therapeutic today- see above in Anticoagulation Summary.    Cody Eldridge to continue their warfarin regimen- see above in Anticoagulation Summary.  2. Follow up in 1 week  3. Pt has agreed to only be called if INR out of range. They have been instructed to call if any changes in medications, doses, concerns, etc. Patient expresses understanding and has no further questions at this time.    Stephany Siu

## 2020-05-26 NOTE — TELEPHONE ENCOUNTER
5/26/20  Not sure why this hasn't been taken care of - so sending to you to see if we can get this scheduled - teresita/yasmeen

## 2020-05-27 ENCOUNTER — OFFICE VISIT (OUTPATIENT)
Dept: ORTHOPEDIC SURGERY | Facility: CLINIC | Age: 75
End: 2020-05-27

## 2020-05-27 ENCOUNTER — RESULTS ENCOUNTER (OUTPATIENT)
Dept: FAMILY MEDICINE CLINIC | Facility: CLINIC | Age: 75
End: 2020-05-27

## 2020-05-27 VITALS — BODY MASS INDEX: 22.62 KG/M2 | HEIGHT: 70 IN | TEMPERATURE: 98.4 F | WEIGHT: 158 LBS

## 2020-05-27 DIAGNOSIS — G89.29 CHRONIC RIGHT SHOULDER PAIN: Primary | ICD-10-CM

## 2020-05-27 DIAGNOSIS — M25.511 CHRONIC RIGHT SHOULDER PAIN: Primary | ICD-10-CM

## 2020-05-27 DIAGNOSIS — E03.9 ACQUIRED HYPOTHYROIDISM: ICD-10-CM

## 2020-05-27 PROCEDURE — 99212 OFFICE O/P EST SF 10 MIN: CPT | Performed by: NURSE PRACTITIONER

## 2020-05-27 PROCEDURE — 20610 DRAIN/INJ JOINT/BURSA W/O US: CPT | Performed by: NURSE PRACTITIONER

## 2020-05-27 RX ORDER — LEVOTHYROXINE SODIUM 0.05 MG/1
TABLET ORAL
COMMUNITY
End: 2020-06-02

## 2020-05-27 RX ORDER — METHYLPREDNISOLONE ACETATE 80 MG/ML
80 INJECTION, SUSPENSION INTRA-ARTICULAR; INTRALESIONAL; INTRAMUSCULAR; SOFT TISSUE
Status: COMPLETED | OUTPATIENT
Start: 2020-05-27 | End: 2020-05-27

## 2020-05-27 RX ADMIN — METHYLPREDNISOLONE ACETATE 80 MG: 80 INJECTION, SUSPENSION INTRA-ARTICULAR; INTRALESIONAL; INTRAMUSCULAR; SOFT TISSUE at 12:57

## 2020-05-27 NOTE — PROGRESS NOTES
Mr. Eldridge comes in today for follow-up.  The injection helped somewhat, but his pain has persisted.  Reports his pain is 8 out of 10, intermittent, and sharp with certain movements.  The pain frequently wakes him from sleep.  He is interested in trying a repeat injection today.    I recommended he try physical therapy and he agreed.  I have entered this referral.  The risks, benefits and alternatives were discussed and the patient consented.  He will follow-up with me in 6 weeks.    JONELLE Cervantes    05/27/2020      Large Joint Arthrocentesis: R subacromial bursa  Date/Time: 5/27/2020 12:57 PM  Consent given by: patient  Site marked: site marked  Timeout: Immediately prior to procedure a time out was called to verify the correct patient, procedure, equipment, support staff and site/side marked as required   Supporting Documentation  Indications: pain   Procedure Details  Location: shoulder - R subacromial bursa  Preparation: Patient was prepped and draped in the usual sterile fashion  Needle gauge: 21 G.  Approach: posterior  Medications administered: 2 mL lidocaine (cardiac); 80 mg methylPREDNISolone acetate 80 MG/ML  Patient tolerance: patient tolerated the procedure well with no immediate complications

## 2020-06-02 ENCOUNTER — OFFICE VISIT (OUTPATIENT)
Dept: CARDIOLOGY | Facility: CLINIC | Age: 75
End: 2020-06-02

## 2020-06-02 VITALS
SYSTOLIC BLOOD PRESSURE: 124 MMHG | OXYGEN SATURATION: 100 % | BODY MASS INDEX: 22.48 KG/M2 | DIASTOLIC BLOOD PRESSURE: 74 MMHG | HEART RATE: 61 BPM | WEIGHT: 157 LBS | HEIGHT: 70 IN

## 2020-06-02 DIAGNOSIS — I48.19 OTHER PERSISTENT ATRIAL FIBRILLATION (HCC): ICD-10-CM

## 2020-06-02 DIAGNOSIS — I36.1 NON-RHEUMATIC TRICUSPID VALVE INSUFFICIENCY: ICD-10-CM

## 2020-06-02 DIAGNOSIS — I25.10 CORONARY ARTERY DISEASE INVOLVING NATIVE CORONARY ARTERY OF NATIVE HEART WITHOUT ANGINA PECTORIS: Primary | ICD-10-CM

## 2020-06-02 DIAGNOSIS — E78.2 MIXED HYPERLIPIDEMIA: ICD-10-CM

## 2020-06-02 DIAGNOSIS — I10 ESSENTIAL HYPERTENSION: ICD-10-CM

## 2020-06-02 PROCEDURE — 99214 OFFICE O/P EST MOD 30 MIN: CPT | Performed by: INTERNAL MEDICINE

## 2020-06-02 PROCEDURE — 93000 ELECTROCARDIOGRAM COMPLETE: CPT | Performed by: INTERNAL MEDICINE

## 2020-06-02 RX ORDER — AMLODIPINE BESYLATE 5 MG/1
5 TABLET ORAL 2 TIMES DAILY
COMMUNITY
End: 2021-03-30

## 2020-06-02 RX ORDER — CARVEDILOL 25 MG/1
TABLET ORAL
COMMUNITY
Start: 2020-05-27 | End: 2020-07-09 | Stop reason: SDUPTHER

## 2020-06-02 NOTE — PROGRESS NOTES
PATIENTINFORMATION    Date of Office Visit: 20  Encounter Provider: Myriam Omer MD  Place of Service: [facility] CARDIOLOGY  Patient Name: Cody Eldridge  : 1945    Subjective:         Patient ID: Cody Eldridge is a 74 y.o. male.      History of Present Illness    This is a nice gentleman who saw Dr. Travis in the remote past. He is a difficult historian. Per Dr. Travis's note from  he had SVT with ablation in  at the [facility]. He also has COPD, hypertension and hyperlipidemia. To me, he denies coronary disease but his old records show that he has had a non-ST elevation myocardial infarction in the past. He had an echo in 10/2009, which showed normal left ventricular systolic function with an ejection fraction of 64%. There was mild right and left atrial enlargement and mild mitral and tricuspid regurgitation with a right ventricular systolic pressure of 39 mmHg.      In the spring of 2018, he was complaining of lots of shortness of breath.  He eventually had a heart catheterization in 2018 which showed mild nonobstructive coronary disease with a left ventricular end-diastolic pressure of 10.  He has had intermittent blood pressure issues.     Unfortunately, he was diagnosed with throat cancer.  I saw him in the office in 2019 and he was having a hard time staying hydrated.  He was hypotensive.  He was seen by Dr. Colin in the [facility] for poor fluid intake and weight loss.  She gave him some IV fluids and IV metoprolol and we tried oral amiodarone.  He was admitted to the hospital in 2019 for atrial fibrillation with rapid ventricular response and complications due to his squamous cell carcinoma of the neck.  He had MSSA sepsis from an infected chemotherapy port.  He was supposed to have been discharged on carvedilol and warfarin.      He has no new complaints today.  He denies chest pain, palpitations or shortness  "of breath.  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  He has some mild edema at the end of the day.    Review of Systems   Constitution: Positive for weight gain. Negative for fever, malaise/fatigue and weight loss.   HENT: Positive for hearing loss. Negative for ear pain, nosebleeds and sore throat.    Eyes: Negative for double vision, pain, vision loss in left eye and vision loss in right eye.   Cardiovascular:        See history of present illness.   Respiratory: Negative for cough, shortness of breath, sleep disturbances due to breathing, snoring and wheezing.    Endocrine: Negative for cold intolerance, heat intolerance and polyuria.   Skin: Positive for color change and poor wound healing. Negative for itching and rash.   Musculoskeletal: Positive for myalgias. Negative for joint pain and joint swelling.   Gastrointestinal: Negative for abdominal pain, diarrhea, hematochezia, nausea and vomiting.   Genitourinary: Negative for hematuria and hesitancy.   Neurological: Negative for numbness, paresthesias and seizures.   Psychiatric/Behavioral: Positive for depression. The patient is nervous/anxious.            ECG 12 Lead  Date/Time: 6/2/2020 2:49 PM  Performed by: Myriam mOer MD  Authorized by: Myriam Omer MD   Comparison: compared with previous ECG from 12/3/2019  Similar to previous ECG  Rhythm: sinus rhythm  BPM: 57  Conduction: conduction normal  ST Segments: ST segments normal  T Waves: T waves normal    Clinical impression: normal ECG               Objective:     /74 (BP Location: Left arm)   Pulse 61   Ht 177.8 cm (70\")   Wt 71.2 kg (157 lb)   SpO2 100%   BMI 22.53 kg/m²  Body mass index is 22.53 kg/m².     Physical Exam   Constitutional: He appears well-developed.   HENT:   Head: Normocephalic and atraumatic.   Eyes: Pupils are equal, round, and reactive to light. Conjunctivae and lids are normal. Lids are everted and " swept, no foreign bodies found.   Neck: Normal range of motion. No JVD present. Carotid bruit is not present. No tracheal deviation present. No thyroid mass present.   Cardiovascular: Normal rate, regular rhythm and normal heart sounds.   Pulses:       Dorsalis pedis pulses are 2+ on the right side, and 2+ on the left side.   Pulmonary/Chest: Effort normal and breath sounds normal.   Abdominal: Normal appearance and bowel sounds are normal.   Musculoskeletal: Normal range of motion.   Neurological: He is alert. He has normal strength.   Skin: Skin is warm, dry and intact.   Psychiatric: He has a normal mood and affect. His behavior is normal.   Vitals reviewed.      Lab Review: Labs including his INR were reviewed.      Assessment/Plan:       1.  Nonobstructive coronary artery disease.  Continue with risk factor management.  2.  Persistent atrial fibrillation.  He is anticoagulated with warfarin.  He has a chads 2 vascular score of 3.  He is currently in sinus rhythm.  He is off all AV sakina blocking agents.  He has some bruising in his arms but no other bleeding issues.  3.  Lung disease/COPD with right heart failure.  This is followed by Dr. Segovia.  4.  Hypertension.  Blood pressure is well controlled today.  5.  Squamous cell cancer of the throat.  Currently completed all his treatment.    I will see him back in a year unless he is having any problems sooner.       Orders Placed This Encounter   Procedures   • ECG 12 Lead     This order was created via procedure documentation        Discharge Medications           Accurate as of June 2, 2020  2:50 PM. If you have any questions, ask your nurse or doctor.               Changes to Medications      Instructions Start Date   amLODIPine 5 MG tablet  Commonly known as:  NORVASC  What changed:  Another medication with the same name was removed. Continue taking this medication, and follow the directions you see here.  Changed by:  Myriam Omer MD   5 mg, Oral, 2  Times Daily      levothyroxine 75 MCG tablet  Commonly known as:  Synthroid  What changed:  Another medication with the same name was removed. Continue taking this medication, and follow the directions you see here.  Changed by:  Myriam Omer MD   75 mcg, Oral, Daily         Continue These Medications      Instructions Start Date   albuterol sulfate  (90 Base) MCG/ACT inhaler  Commonly known as:  PROVENTIL HFA;VENTOLIN HFA;PROAIR HFA   2 puffs, Inhalation, ProAir  (90 Base) MCG/ACT Inhalation Aerosol Solution; Patient Sig: ProAir  (90 Base) MCG/ACT Inhalation Aerosol Solution inhale 2 puffs by mouth every 4 hours if needed; 8.5; 11; 07-Apr-2014; Act      ALPRAZolam 0.5 MG tablet  Commonly known as:  XANAX   TAKE 1/2 TO 1 TABLET BY MOUTH TWICE DAILY      aspirin 81 MG chewable tablet   81 mg, Oral, Daily      baclofen 10 MG tablet  Commonly known as:  LIORESAL   take 1 tablet by mouth three times a day      carvedilol 25 MG tablet  Commonly known as:  COREG   TK 1 T PO Q 12 H      Crestor 20 MG tablet  Generic drug:  rosuvastatin   20 mg, Oral, Daily      diphenoxylate-atropine 2.5-0.025 MG per tablet  Commonly known as:  LOMOTIL   1 tablet, Oral, 4 Times Daily PRN      HYDROcodone-acetaminophen  MG per tablet  Commonly known as:  NORCO   TK 1 T PO QID      montelukast 10 MG tablet  Commonly known as:  SINGULAIR   10 mg, Oral, Nightly      sertraline 100 MG tablet  Commonly known as:  ZOLOFT   100 mg, Oral, Daily, For anxiety and depression relief      warfarin 2 MG tablet  Commonly known as:  COUMADIN   Take one tablet (2mg) on Sun & Thurs and take two tablets (4mg) on all other days or as directed by [facility]         Stop These Medications    Lidocaine Viscous HCl 2 % solution  Commonly known as:  XYLOCAINE  Stopped by:  MD Myriam Mandujano MD  06/02/20  14:50

## 2020-06-05 ENCOUNTER — TREATMENT (OUTPATIENT)
Dept: PHYSICAL THERAPY | Facility: CLINIC | Age: 75
End: 2020-06-05

## 2020-06-05 DIAGNOSIS — G89.29 CHRONIC RIGHT SHOULDER PAIN: Primary | ICD-10-CM

## 2020-06-05 DIAGNOSIS — M25.511 CHRONIC RIGHT SHOULDER PAIN: Primary | ICD-10-CM

## 2020-06-05 DIAGNOSIS — Z74.09 LIMITED MOBILITY: ICD-10-CM

## 2020-06-05 PROCEDURE — 97161 PT EVAL LOW COMPLEX 20 MIN: CPT | Performed by: PHYSICAL THERAPIST

## 2020-06-05 PROCEDURE — 97110 THERAPEUTIC EXERCISES: CPT | Performed by: PHYSICAL THERAPIST

## 2020-06-05 NOTE — PROGRESS NOTES
"  Physical Therapy Initial Evaluation and Plan of Care      Patient: Cody Eldridge   : 1945  Diagnosis/ICD-10 Code:  Chronic right shoulder pain [M25.511, G89.29]  Referring practitioner: JONELLE Cervantes  Date of Initial Visit: 2020  Today's Date: 2020  Patient seen for 1 sessions           Subjective Questionnaire: QuickDASH: 47.73      Subjective Evaluation    History of Present Illness  Mechanism of injury: R shoulder pain began 2019. Was in treatment for throat cancer, hospitalized for 2 months (Dx 19).  Pain decreased with injection 20 for ~1 week. More relief with recent injection 20. States he is 40% better.   Describes pain in anterior shoulder. Pain with lifting a gallon of milk, combing hair, and dressing. Pain disturbs sleep. Sleeps on left side.   Takes hydrocodone and muscle relaxer daily with little pain relief.    Xray R shoulder 20 \"...mild acromioclavicular degeneration.  Otherwise, no obvious acute abnormalities, lesions, masses, significant degenerative changes, or other concerning findings.  The acromiohumeral interval is normal.  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    Treatments  Previous treatment: injection treatment  Current treatment: physical therapy  Patient Goals  Patient goals for therapy: decreased pain, increased strength, increased motion, independence with ADLs/IADLs, return to work and return to sport/leisure activities             Objective          Tenderness     Right " Shoulder  Tenderness in the biceps tendon (proximal), subacromial bursa and supraspinatus tendon.     Cervical/Thoracic Screen   Cervical range of motion within normal limits with the following exceptions: 0 extension  P! R side of neck w/ L rotation, WFL  P! L side of neck w/ R rotation, WFL  Flexion WFL    Neurological Testing     Additional Neurological Details  Denies N/T    Active Range of Motion   Left Shoulder   Flexion: 135 degrees   Abduction: 149 degrees   External rotation BTH: T3   Internal rotation BTB: T8     Right Shoulder   Flexion: 130 degrees with pain  Abduction: 134 degrees with pain  External rotation BTH: T3   Internal rotation BTB: L1 with pain    Strength/Myotome Testing     Left Shoulder     Planes of Motion   Flexion: 4+   Abduction: 4+   External rotation at 0°: 4+   Internal rotation at 0°: 4+     Isolated Muscles   Left biceps strength: P!     Right Shoulder     Planes of Motion   Flexion: 0   Abduction: 4+   External rotation at 0°: 4+ (P!)   Internal rotation at 0°: 4+     Isolated Muscles   Biceps: 4+ (P!)     Tests     Right Shoulder   Positive empty can, full can, Hawkin's and Neer's.           Assessment & Plan     Assessment  Impairments: abnormal or restricted ROM, activity intolerance, impaired physical strength, lacks appropriate home exercise program and pain with function  Assessment details: Pt presents w/ limited and painful R shoulder AROM, R shoulder weakness, R shoulder tenderness, and positive empty can, full can, Neer's and Hawkin's testing. Will benefit from skilled PT services in order to address listed impairments and increase tolerance to normal daily activities including ADLs, work, and recreational activities.    Prognosis: fair  Functional Limitations: lifting, sleeping, pulling, pushing, reaching behind back, reaching overhead and unable to perform repetitive tasks  Goals  Plan Goals: Short Term Goals: 2-4 weeks. Patient will:  1. Be independent with initial  HEP  2. Be instructed in posture and body mechanics.  3. Demonstrate R GH PROM WNL to allow for progressing of therapy exercises.    Long Term Goals: 4-8 weeks. Pt will:  1. Exhibit (R) shoulder AROM to WFL to allow for reaching overhead and out (ABD) without pain limiting function.  2. Demonstrate improved Left UE MMT of >/= 4+/5 to allow for performance of ADL's/household management/recreational activities.  3. Pt able to reach overhead and lift 10# to allow for return to doing home/yard/recreational activities with min to no pain.  4. Report perceived disability </=10% based on QuickDASH    Plan  Therapy options: will be seen for skilled physical therapy services  Planned modality interventions: cryotherapy, electrical stimulation/Russian stimulation, thermotherapy (hydrocollator packs), ultrasound and iontophoresis  Planned therapy interventions: abdominal trunk stabilization, ADL retraining, balance/weight-bearing training, body mechanics training, flexibility, functional ROM exercises, home exercise program, joint mobilization, manual therapy, neuromuscular re-education, postural training, soft tissue mobilization, spinal/joint mobilization, strengthening, stretching and therapeutic activities  Frequency: 2x week  Duration in weeks: 12  Treatment plan discussed with: patient        Manual Therapy:    0     mins  02656;  Therapeutic Exercise:    10     mins  20477;     Neuromuscular Ray:    0    mins  11492;    Therapeutic Activity:     0     mins  85467;     Gait Trainin     mins  50433;     Ultrasound:     8     mins  29032;    Electrical Stimulation:    0     mins  17405 ( );  Dry Needling     0     mins self-pay    Timed Treatment:   18   mins   Total Treatment:     60   mins    PT SIGNATURE: Joanne Romero PT   DATE TREATMENT INITIATED: 2020    Initial Certification  Certification Period: 9/3/2020  I certify that the therapy services are furnished while this patient is under my care.   The services outlined above are required by this patient, and will be reviewed every 90 days.     PHYSICIAN: Lisa Villafana, APRN      DATE:     Please sign and return via fax to [phone].. Thank you, [facility] Physical Therapy.

## 2020-06-07 LAB — INR PPP: 1.8

## 2020-06-08 ENCOUNTER — ANTICOAGULATION VISIT (OUTPATIENT)
Dept: PHARMACY | Facility: HOSPITAL | Age: 75
End: 2020-06-08

## 2020-06-08 ENCOUNTER — TREATMENT (OUTPATIENT)
Dept: PHYSICAL THERAPY | Facility: CLINIC | Age: 75
End: 2020-06-08

## 2020-06-08 DIAGNOSIS — M25.511 CHRONIC RIGHT SHOULDER PAIN: Primary | ICD-10-CM

## 2020-06-08 DIAGNOSIS — G89.29 CHRONIC RIGHT SHOULDER PAIN: Primary | ICD-10-CM

## 2020-06-08 DIAGNOSIS — Z74.09 LIMITED MOBILITY: ICD-10-CM

## 2020-06-08 DIAGNOSIS — I48.19 OTHER PERSISTENT ATRIAL FIBRILLATION (HCC): ICD-10-CM

## 2020-06-08 PROCEDURE — 97530 THERAPEUTIC ACTIVITIES: CPT | Performed by: PHYSICAL THERAPIST

## 2020-06-08 PROCEDURE — 97110 THERAPEUTIC EXERCISES: CPT | Performed by: PHYSICAL THERAPIST

## 2020-06-08 PROCEDURE — 97035 APP MDLTY 1+ULTRASOUND EA 15: CPT | Performed by: PHYSICAL THERAPIST

## 2020-06-08 NOTE — PROGRESS NOTES
Anticoagulation Clinic Progress Note    Anticoagulation Summary  As of 2020    INR goal:   2.0-3.0   TTR:   60.2 % (1.5 y)   INR used for dosin.80! (2020)   Warfarin maintenance plan:   2 mg every Sun, Thu; 4 mg all other days   Weekly warfarin total:   24 mg   Plan last modified:   Álvaro Sam RPH (10/15/2019)   Next INR check:   2020   Priority:   Maintenance   Target end date:   Indefinite    Indications    Other persistent atrial fibrillation (CMS/HCC) [I48.19]             Anticoagulation Episode Summary     INR check location:       Preferred lab:       Send INR reminders to:   [facility]    Comments:   MD INR home danika      Anticoagulation Care Providers     Provider Role Specialty Phone number    Myriam Omer MD Referring Cardiology [phone]          Clinic Interview:  Patient Findings     Negatives:   Signs/symptoms of thrombosis, Signs/symptoms of bleeding,   Laboratory test error suspected, Change in health, Change in alcohol use,   Change in activity, Upcoming invasive procedure, Emergency department   visit, Upcoming dental procedure, Missed doses, Extra doses, Change in   medications, Change in diet/appetite, Hospital admission, Bruising, Other   complaints      Clinical Outcomes     Negatives:   Major bleeding event, Thromboembolic event,   Anticoagulation-related hospital admission, Anticoagulation-related ED   visit, Anticoagulation-related fatality        INR History:  Anticoagulation Monitoring 2020   INR 2.20 2.10 1.80   INR Date 2020   INR Goal 2.0-3.0 2.0-3.0 2.0-3.0   Trend Same Same Same   Last Week Total 24 mg 24 mg 24 mg   Next Week Total 24 mg 24 mg 26 mg   Sun - 2 mg -   Mon - - 6 mg ()   Tue 4 mg 4 mg 4 mg   Wed 4 mg 4 mg 4 mg   Thu 2 mg 2 mg 2 mg   Fri - 4 mg -   Sat - 4 mg -   Visit Report - - -   Some recent data might be hidden       Plan:  1. INR was Subtherapeutic yesterday- see  above in Anticoagulation Summary.   Will instruct Cody Eldridge to Change their warfarin regimen- see above in Anticoagulation Summary.  2. Follow up in 1 week  3. They have been instructed to call if any changes in medications, doses, concerns, etc. Patient expresses understanding and has no further questions at this time.    Georges Guadalupe [facility]

## 2020-06-10 ENCOUNTER — TREATMENT (OUTPATIENT)
Dept: PHYSICAL THERAPY | Facility: CLINIC | Age: 75
End: 2020-06-10

## 2020-06-10 DIAGNOSIS — Z74.09 LIMITED MOBILITY: ICD-10-CM

## 2020-06-10 DIAGNOSIS — M25.511 CHRONIC RIGHT SHOULDER PAIN: Primary | ICD-10-CM

## 2020-06-10 DIAGNOSIS — G89.29 CHRONIC RIGHT SHOULDER PAIN: Primary | ICD-10-CM

## 2020-06-10 PROCEDURE — 97110 THERAPEUTIC EXERCISES: CPT | Performed by: PHYSICAL THERAPIST

## 2020-06-10 NOTE — PROGRESS NOTES
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 R shoulder was very sore.    Objective   See Exercise, Manual, and Modality Logs for complete treatment.       Assessment/Plan  PROM WNL. Excellent tolerance to progressions in strengthening. Updated HEP accordingly. Pt declined heat, ice, or modalities.         Manual Therapy:    5     mins  58192;  Therapeutic Exercise:    25     mins  38641;     Neuromuscular Ray:    0    mins  70942;    Therapeutic Activity:     0     mins  95045;     Gait Trainin     mins  66345;     Ultrasound:     0     mins  74150;    Work Hardening           0      mins 16701  Iontophoresis               0   mins 21046    Timed Treatment:   30   mins   Total Treatment:     45   mins    Joanne Romero, PT  Physical Therapist

## 2020-06-13 LAB — INR PPP: 2.6

## 2020-06-15 ENCOUNTER — ANTICOAGULATION VISIT (OUTPATIENT)
Dept: PHARMACY | Facility: HOSPITAL | Age: 75
End: 2020-06-15

## 2020-06-15 DIAGNOSIS — I48.19 OTHER PERSISTENT ATRIAL FIBRILLATION (HCC): ICD-10-CM

## 2020-06-15 NOTE — PROGRESS NOTES
Anticoagulation Clinic Progress Note    Anticoagulation Summary  As of 6/15/2020    INR goal:   2.0-3.0   TTR:   60.4 % (1.6 y)   INR used for dosin.60 (2020)   Warfarin maintenance plan:   2 mg every Sun, Thu; 4 mg all other days   Weekly warfarin total:   24 mg   No change documented:   Georges Guadalupe RPH   Plan last modified:   Álvaro Sam RPH (10/15/2019)   Next INR check:   2020   Priority:   Maintenance   Target end date:   Indefinite    Indications    Other persistent atrial fibrillation (CMS/HCC) [I48.19]             Anticoagulation Episode Summary     INR check location:       Preferred lab:       Send INR reminders to:    [facility]    Comments:   MD INR home danika      Anticoagulation Care Providers     Provider Role Specialty Phone number    Myriam Omer MD Referring Cardiology [phone]          Clinic Interview:  Patient Findings     Negatives:   Signs/symptoms of thrombosis, Signs/symptoms of bleeding,   Laboratory test error suspected, Change in health, Change in alcohol use,   Change in activity, Upcoming invasive procedure, Emergency department   visit, Upcoming dental procedure, Missed doses, Extra doses, Change in   medications, Change in diet/appetite, Hospital admission, Bruising, Other   complaints      Clinical Outcomes     Negatives:   Major bleeding event, Thromboembolic event,   Anticoagulation-related hospital admission, Anticoagulation-related ED   visit, Anticoagulation-related fatality        INR History:  Anticoagulation Monitoring 2020 2020 6/15/2020   INR 2.10 1.80 2.60   INR Date 2020   INR Goal 2.0-3.0 2.0-3.0 2.0-3.0   Trend Same Same Same   Last Week Total 24 mg 24 mg 26 mg   Next Week Total 24 mg 26 mg 24 mg   Sun 2 mg - 2 mg   Mon - 6 mg () 4 mg   Tue 4 mg 4 mg 4 mg   Wed 4 mg 4 mg 4 mg   Thu 2 mg 2 mg 2 mg   Fri 4 mg - 4 mg   Sat 4 mg - 4 mg   Visit Report - - -   Some recent data might be  hidden       Plan:  1. INR is Therapeutic today- see above in Anticoagulation Summary.   Will instruct Cody Eldridge to Continue their warfarin regimen- see above in Anticoagulation Summary.  2. Follow up in 2 weeks  3. They have been instructed to call if any changes in medications, doses, concerns, etc. Patient expresses understanding and has no further questions at this time.    Georges Guadalupe RP

## 2020-06-17 ENCOUNTER — TREATMENT (OUTPATIENT)
Dept: PHYSICAL THERAPY | Facility: CLINIC | Age: 75
End: 2020-06-17

## 2020-06-17 DIAGNOSIS — Z74.09 LIMITED MOBILITY: ICD-10-CM

## 2020-06-17 DIAGNOSIS — G89.29 CHRONIC RIGHT SHOULDER PAIN: Primary | ICD-10-CM

## 2020-06-17 DIAGNOSIS — M25.511 CHRONIC RIGHT SHOULDER PAIN: Primary | ICD-10-CM

## 2020-06-17 PROCEDURE — 97140 MANUAL THERAPY 1/> REGIONS: CPT | Performed by: PHYSICAL THERAPIST

## 2020-06-17 PROCEDURE — 97110 THERAPEUTIC EXERCISES: CPT | Performed by: PHYSICAL THERAPIST

## 2020-06-17 NOTE — PROGRESS NOTES
"Physical Therapy Daily Progress Note      Visit # 4      Subjective   Pt reports shoulder pain is \"not bad\".    Objective   See Exercise, Manual, and Modality Logs for complete treatment.       Assessment/Plan  Continues to have pain w/ PROM in all planes. Pain decreased with posterior mobilization of GH joint and passive release of pectoralis major. Updated HEP to include pec major stretch. Progress per POC.           Manual Therapy:    15     mins  67941;  Therapeutic Exercise:    20     mins  95017;     Neuromuscular Ray:    0    mins  08576;    Therapeutic Activity:     0     mins  19102;     Gait Trainin     mins  29743;     Ultrasound:     0     mins  80499;    Work Hardening           0      mins 86808  Iontophoresis               0   mins 88092    Timed Treatment:   35   mins   Total Treatment:     40   mins    Joanne Romero, PT  Physical Therapist  "

## 2020-06-18 ENCOUNTER — OFFICE VISIT (OUTPATIENT)
Dept: FAMILY MEDICINE CLINIC | Facility: CLINIC | Age: 75
End: 2020-06-18

## 2020-06-18 VITALS
OXYGEN SATURATION: 96 % | HEART RATE: 57 BPM | SYSTOLIC BLOOD PRESSURE: 159 MMHG | WEIGHT: 159.6 LBS | TEMPERATURE: 97.3 F | BODY MASS INDEX: 22.9 KG/M2 | DIASTOLIC BLOOD PRESSURE: 83 MMHG

## 2020-06-18 DIAGNOSIS — H00.024 HORDEOLUM INTERNUM OF LEFT UPPER EYELID: Primary | ICD-10-CM

## 2020-06-18 PROCEDURE — 99214 OFFICE O/P EST MOD 30 MIN: CPT | Performed by: NURSE PRACTITIONER

## 2020-06-18 RX ORDER — ERYTHROMYCIN 5 MG/G
OINTMENT OPHTHALMIC 4 TIMES DAILY
Qty: 1 EACH | Refills: 0 | Status: SHIPPED | OUTPATIENT
Start: 2020-06-18 | End: 2020-06-25

## 2020-06-18 NOTE — PATIENT INSTRUCTIONS
Warm compresses  4 times a day until better then erythromycin ointment  This should improve over the next 1 to 2 weeks  If not please let me know so I can send you to an eye doctor increased pain redness swelling vision change emergency room or emergent ophthalmology evaluation

## 2020-06-18 NOTE — PROGRESS NOTES
Subjective   Cody Eldridge is a 75 y.o. male.     Very pleasant gentleman here is had left upper lid eye irritation for a week  Some mild discomfort some crustiness in the morning no vision change no increased pain fever chills  So warm compresses as well in the morning  Nothing else making better or worse    Mild redness left upper lid             /83   Pulse 57   Temp 97.3 °F (36.3 °C) (Temporal)   Wt 72.4 kg (159 lb 9.6 oz)   SpO2 96%   BMI 22.90 kg/m²       The following portions of the patient's history were reviewed and updated as appropriate: allergies, current medications, past family history, past medical history, past social history, past surgical history and problem list.    Review of Systems   HENT:        Eyelid pain swelling       Objective   Physical Exam   Constitutional: He is oriented to person, place, and time. He appears well-developed and well-nourished.   Pleasant nontoxic overall his overall appearance is improving he appears more well after his cancer now   HENT:   Head: Normocephalic and atraumatic.   Eyes: Pupils are equal, round, and reactive to light. Conjunctivae are normal.   Left upper lid mildly erythematous  And some mild tenderness the midportion of his lid at the eyelash margin  Cornea appears clear sclera clear no periorbital cellulitis only trace edema at the lid margin  Lower lid normal   Neck: Neck supple.   Pulmonary/Chest: Effort normal.   Neurological: He is alert and oriented to person, place, and time.   Skin: Skin is warm and dry. No rash noted. No erythema.   Psychiatric: He has a normal mood and affect. His behavior is normal. Judgment and thought content normal.   Vitals reviewed.        Assessment/Plan   Cody was seen today for eye problem.    Diagnoses and all orders for this visit:    Hordeolum internum of left upper eyelid    Other orders  -     erythromycin (ROMYCIN) 5 MG/GM ophthalmic ointment; Administer  into the left eye 4 (Four) Times a Day  for 7 days.        No evidence of abscess or cellulitis or any periorbital  Cellulitis  Cornea appears clear he is having no vision change  And only mild discomfort of his lip mild swelling  He will follow instructions below a prompt evaluation ophthalmology should he have any worsening or emergency room            Patient Instructions   Warm compresses  4 times a day until better then erythromycin ointment  This should improve over the next 1 to 2 weeks  If not please let me know so I can send you to an eye doctor increased pain redness swelling vision change emergency room or emergent ophthalmology evaluation

## 2020-06-22 ENCOUNTER — LAB (OUTPATIENT)
Dept: LAB | Facility: HOSPITAL | Age: 75
End: 2020-06-22

## 2020-06-22 DIAGNOSIS — E03.9 ACQUIRED HYPOTHYROIDISM: Primary | ICD-10-CM

## 2020-06-22 LAB — TSH SERPL DL<=0.05 MIU/L-ACNC: 7.96 UIU/ML (ref 0.27–4.2)

## 2020-06-22 PROCEDURE — 84443 ASSAY THYROID STIM HORMONE: CPT | Performed by: NURSE PRACTITIONER

## 2020-06-22 PROCEDURE — 36415 COLL VENOUS BLD VENIPUNCTURE: CPT | Performed by: NURSE PRACTITIONER

## 2020-06-22 RX ORDER — LEVOTHYROXINE SODIUM 0.1 MG/1
100 TABLET ORAL DAILY
Qty: 30 TABLET | Refills: 2 | Status: SHIPPED | OUTPATIENT
Start: 2020-06-22 | End: 2020-10-05 | Stop reason: SDUPTHER

## 2020-06-23 ENCOUNTER — TREATMENT (OUTPATIENT)
Dept: PHYSICAL THERAPY | Facility: CLINIC | Age: 75
End: 2020-06-23

## 2020-06-23 DIAGNOSIS — G89.29 CHRONIC RIGHT SHOULDER PAIN: Primary | ICD-10-CM

## 2020-06-23 DIAGNOSIS — Z74.09 LIMITED MOBILITY: ICD-10-CM

## 2020-06-23 DIAGNOSIS — M25.511 CHRONIC RIGHT SHOULDER PAIN: Primary | ICD-10-CM

## 2020-06-23 PROCEDURE — 97140 MANUAL THERAPY 1/> REGIONS: CPT | Performed by: PHYSICAL THERAPIST

## 2020-06-23 PROCEDURE — 97110 THERAPEUTIC EXERCISES: CPT | Performed by: PHYSICAL THERAPIST

## 2020-06-23 NOTE — PROGRESS NOTES
Physical Therapy Daily Progress Note      Visit # 5      Subjective   Pt reports shoulder has been sore at rest and with movement.    Objective   TTP R proximal bicep tendon  See Exercise, Manual, and Modality Logs for complete treatment.       Assessment/Plan  Fair tolerance to glenohumeral joint mobilization despite anterior shoulder tenderness. Reports some discomfort with isometric strengthening. Responded well to ultrasound w/ reports of decreased pain. Progress per POC.         Manual Therapy:    8     mins  81151;  Therapeutic Exercise:    20     mins  79075;     Neuromuscular Ray:    0    mins  27478;    Therapeutic Activity:     0     mins  81682;     Gait Trainin     mins  05104;     Ultrasound:     8     mins  23512;    Work Hardening           0      mins 18397  Iontophoresis               0   mins 53243    Timed Treatment:   36   mins   Total Treatment:     40   mins    Joanne Romero, PT  Physical Therapist

## 2020-06-24 DIAGNOSIS — E03.9 ACQUIRED HYPOTHYROIDISM: Primary | ICD-10-CM

## 2020-06-24 RX ORDER — ALPRAZOLAM 0.5 MG/1
TABLET ORAL
Qty: 60 TABLET | Refills: 0 | Status: SHIPPED | OUTPATIENT
Start: 2020-06-24 | End: 2020-08-24 | Stop reason: SDUPTHER

## 2020-06-25 ENCOUNTER — TREATMENT (OUTPATIENT)
Dept: PHYSICAL THERAPY | Facility: CLINIC | Age: 75
End: 2020-06-25

## 2020-06-25 DIAGNOSIS — M25.511 CHRONIC RIGHT SHOULDER PAIN: Primary | ICD-10-CM

## 2020-06-25 DIAGNOSIS — Z74.09 LIMITED MOBILITY: ICD-10-CM

## 2020-06-25 DIAGNOSIS — G89.29 CHRONIC RIGHT SHOULDER PAIN: Primary | ICD-10-CM

## 2020-06-25 PROCEDURE — 97035 APP MDLTY 1+ULTRASOUND EA 15: CPT | Performed by: PHYSICAL THERAPIST

## 2020-06-25 PROCEDURE — 97110 THERAPEUTIC EXERCISES: CPT | Performed by: PHYSICAL THERAPIST

## 2020-06-25 PROCEDURE — 97140 MANUAL THERAPY 1/> REGIONS: CPT | Performed by: PHYSICAL THERAPIST

## 2020-06-25 NOTE — PROGRESS NOTES
Physical Therapy Daily Progress Note      Visit # 6      Subjective   Pt reports less pain after last visit. Ultrasound helped.    Objective   See Exercise, Manual, and Modality Logs for complete treatment.       Assessment/Plan  Continues to have tenderness of anterior shoulder. Fair tolerance to initiation of concentric IR and ER strengthening. Reports pain relief with ultrasound. Progress per POC.                 Manual Therapy:    8     mins  94447;  Therapeutic Exercise:    22     mins  25632;     Neuromuscular Ray:    0    mins  82929;    Therapeutic Activity:     0     mins  16983;     Gait Trainin     mins  01301;     Ultrasound:     8     mins  59965;    Work Hardening           0      mins 79715  Iontophoresis               0   mins 95265    Timed Treatment:   38   mins   Total Treatment:     45   mins    Joanne Romero, PT  Physical Therapist

## 2020-06-28 LAB — INR PPP: 2.5

## 2020-06-29 ENCOUNTER — ANTICOAGULATION VISIT (OUTPATIENT)
Dept: PHARMACY | Facility: HOSPITAL | Age: 75
End: 2020-06-29

## 2020-06-29 ENCOUNTER — RESULTS ENCOUNTER (OUTPATIENT)
Dept: FAMILY MEDICINE CLINIC | Facility: CLINIC | Age: 75
End: 2020-06-29

## 2020-06-29 ENCOUNTER — TREATMENT (OUTPATIENT)
Dept: PHYSICAL THERAPY | Facility: CLINIC | Age: 75
End: 2020-06-29

## 2020-06-29 DIAGNOSIS — M25.511 CHRONIC RIGHT SHOULDER PAIN: Primary | ICD-10-CM

## 2020-06-29 DIAGNOSIS — I48.19 OTHER PERSISTENT ATRIAL FIBRILLATION (HCC): ICD-10-CM

## 2020-06-29 DIAGNOSIS — E03.9 ACQUIRED HYPOTHYROIDISM: ICD-10-CM

## 2020-06-29 DIAGNOSIS — Z74.09 LIMITED MOBILITY: ICD-10-CM

## 2020-06-29 DIAGNOSIS — G89.29 CHRONIC RIGHT SHOULDER PAIN: Primary | ICD-10-CM

## 2020-06-29 PROCEDURE — 97140 MANUAL THERAPY 1/> REGIONS: CPT | Performed by: PHYSICAL THERAPIST

## 2020-06-29 PROCEDURE — 97035 APP MDLTY 1+ULTRASOUND EA 15: CPT | Performed by: PHYSICAL THERAPIST

## 2020-06-29 PROCEDURE — 97110 THERAPEUTIC EXERCISES: CPT | Performed by: PHYSICAL THERAPIST

## 2020-06-29 NOTE — PROGRESS NOTES
Anticoagulation Clinic Progress Note    Anticoagulation Summary  As of 2020    INR goal:   2.0-3.0   TTR:   61.4 % (1.6 y)   INR used for dosin.50 (2020)   Warfarin maintenance plan:   2 mg every Sun, Thu; 4 mg all other days   Weekly warfarin total:   24 mg   No change documented:   Stephany Siu   Plan last modified:   Álvaro Sam [facility] (10/15/2019)   Next INR check:   2020   Priority:   Maintenance   Target end date:   Indefinite    Indications    Other persistent atrial fibrillation (CMS/HCC) [I48.19]             Anticoagulation Episode Summary     INR check location:       Preferred lab:       Send INR reminders to:    [facility] [place]    Comments:   MD INR home danika      Anticoagulation Care Providers     Provider Role Specialty Phone number    Myriam Omer MD Referring Cardiology [phone]          Clinic Interview:  No pertinent clinical findings have been reported.    INR History:  Anticoagulation Monitoring 2020 6/15/2020 2020   INR 1.80 2.60 2.50   INR Date 2020   INR Goal 2.0-3.0 2.0-3.0 2.0-3.0   Trend Same Same Same   Last Week Total 24 mg 26 mg 24 mg   Next Week Total 26 mg 24 mg 24 mg   Sun - 2 mg 2 mg   Mon 6 mg () 4 mg 4 mg   Tue 4 mg 4 mg 4 mg   Wed 4 mg 4 mg 4 mg   Thu 2 mg 2 mg 2 mg   Fri - 4 mg 4 mg   Sat - 4 mg 4 mg   Visit Report - - -   Some recent data might be hidden       Plan:  1. INR is therapeutic today- see above in Anticoagulation Summary.    Cody Eldridge to continue their warfarin regimen- see above in Anticoagulation Summary.  2. Follow up in 1 week  3. Pt has agreed to only be called if INR out of range. They have been instructed to call if any changes in medications, doses, concerns, etc. Patient expresses understanding and has no further questions at this time.    Stephany Siu

## 2020-07-01 ENCOUNTER — TREATMENT (OUTPATIENT)
Dept: PHYSICAL THERAPY | Facility: CLINIC | Age: 75
End: 2020-07-01

## 2020-07-01 DIAGNOSIS — G89.29 CHRONIC RIGHT SHOULDER PAIN: Primary | ICD-10-CM

## 2020-07-01 DIAGNOSIS — M25.511 CHRONIC RIGHT SHOULDER PAIN: Primary | ICD-10-CM

## 2020-07-01 DIAGNOSIS — Z74.09 LIMITED MOBILITY: ICD-10-CM

## 2020-07-01 PROCEDURE — 97110 THERAPEUTIC EXERCISES: CPT | Performed by: PHYSICAL THERAPIST

## 2020-07-01 PROCEDURE — 97140 MANUAL THERAPY 1/> REGIONS: CPT | Performed by: PHYSICAL THERAPIST

## 2020-07-01 NOTE — PROGRESS NOTES
Physical Therapy Daily Progress Note      Visit # 8      Subjective   Pt reports continued improvement. He only has pain every once in a while.    Objective   R anterior shoulder non TTP  See Exercise, Manual, and Modality Logs for complete treatment.       Assessment/Plan  Subjective reports of pain much improved. Shoulder non-tender so ultrasound was deferred. Progress per POC.         Manual Therapy:    15     mins  82134;  Therapeutic Exercise:    20     mins  70697;     Neuromuscular Ray:    0    mins  56262;    Therapeutic Activity:     0     mins  10760;     Gait Trainin     mins  05863;     Ultrasound:     0     mins  90742;    Work Hardening           0      mins 13386  Iontophoresis               0   mins 41504    Timed Treatment:   35   mins   Total Treatment:     40   mins    Joanne Romero, PT  Physical Therapist

## 2020-07-06 ENCOUNTER — TREATMENT (OUTPATIENT)
Dept: PHYSICAL THERAPY | Facility: CLINIC | Age: 75
End: 2020-07-06

## 2020-07-06 DIAGNOSIS — M25.511 CHRONIC RIGHT SHOULDER PAIN: Primary | ICD-10-CM

## 2020-07-06 DIAGNOSIS — Z74.09 LIMITED MOBILITY: ICD-10-CM

## 2020-07-06 DIAGNOSIS — G89.29 CHRONIC RIGHT SHOULDER PAIN: Primary | ICD-10-CM

## 2020-07-06 PROCEDURE — 97110 THERAPEUTIC EXERCISES: CPT | Performed by: PHYSICAL THERAPIST

## 2020-07-06 PROCEDURE — 97140 MANUAL THERAPY 1/> REGIONS: CPT | Performed by: PHYSICAL THERAPIST

## 2020-07-06 NOTE — PROGRESS NOTES
Re-Assessment / Re-Certification        Patient: Cody Eldridge   : 1945  Diagnosis/ICD-10 Code:  Chronic right shoulder pain [M25.511, G89.29]  Referring practitioner: JONELLE Cervantes  Date of Initial Evaluation:  Type: THERAPY  Noted: 2020  Patient seen for 9 sessions      Subjective:   Cody Eldridge reports: He is sore after sitting still all weekend. He did do home exercises, but he did not work.    Subjective Questionnaire: QuickDASH: 9.09 vs 47.73 20  Clinical Progress: improved  Home Program Compliance: Yes  Treatment has included: therapeutic exercise, manual therapy and ultrasound    Subjective   Objective          Tenderness     Right Shoulder  Tenderness in the biceps tendon (proximal).     Active Range of Motion     Right Shoulder   Flexion: 142 degrees with pain  Abduction: 142 degrees   External rotation BTH: T2   Internal rotation BTB: T10     Strength/Myotome Testing     Right Shoulder     Planes of Motion   Flexion: 4+   Abduction: 4+ (P!)   Right shoulder external rotation strength at 0 degrees: 5-   Internal rotation at 0°: 5     Isolated Muscles   Biceps: 5   Triceps: 5       Assessment/Plan  Progress toward previous goals: Partially Met    Subjective reports of pain, AROM, and strength significantly improved since IE. Continues to have some pain at end range overhead and with resisted shoulder abduction. Progressing well. Progress per POC.      Recommendations: Continue as planned  Timeframe: 1 month  Prognosis to achieve goals: good    PT Signature: Joanne Romero PT      Based upon review of the patient's progress and continued therapy plan, it is my medical opinion that Cody Eldridge should continue physical therapy treatment at [facility]  [address] [place] [id number] [phone].    Signature: __________________________________  Lisa Villafana APRN    Manual Therapy:    10     mins   51268;  Therapeutic Exercise:    25     mins  90943;     Neuromuscular Ray:    0    mins  79625;    Therapeutic Activity:     0     mins  18218;     Gait Trainin     mins  63058;     Ultrasound:     8     mins  22980;    Work Hardening           0      mins 27054  Iontophoresis               0   mins 74543    Timed Treatment:   43   mins   Total Treatment:     55   mins

## 2020-07-08 ENCOUNTER — TREATMENT (OUTPATIENT)
Dept: PHYSICAL THERAPY | Facility: CLINIC | Age: 75
End: 2020-07-08

## 2020-07-08 ENCOUNTER — OFFICE VISIT (OUTPATIENT)
Dept: ORTHOPEDIC SURGERY | Facility: CLINIC | Age: 75
End: 2020-07-08

## 2020-07-08 VITALS — HEIGHT: 72 IN | WEIGHT: 158 LBS | BODY MASS INDEX: 21.4 KG/M2 | TEMPERATURE: 97.7 F

## 2020-07-08 DIAGNOSIS — G89.29 CHRONIC RIGHT SHOULDER PAIN: Primary | ICD-10-CM

## 2020-07-08 DIAGNOSIS — M17.10 ARTHRITIS OF KNEE: ICD-10-CM

## 2020-07-08 DIAGNOSIS — Z74.09 LIMITED MOBILITY: ICD-10-CM

## 2020-07-08 DIAGNOSIS — M25.511 CHRONIC RIGHT SHOULDER PAIN: Primary | ICD-10-CM

## 2020-07-08 DIAGNOSIS — M25.562 PAIN IN BOTH KNEES, UNSPECIFIED CHRONICITY: Primary | ICD-10-CM

## 2020-07-08 DIAGNOSIS — M25.561 PAIN IN BOTH KNEES, UNSPECIFIED CHRONICITY: Primary | ICD-10-CM

## 2020-07-08 PROCEDURE — 97140 MANUAL THERAPY 1/> REGIONS: CPT | Performed by: PHYSICAL THERAPIST

## 2020-07-08 PROCEDURE — 99214 OFFICE O/P EST MOD 30 MIN: CPT | Performed by: ORTHOPAEDIC SURGERY

## 2020-07-08 PROCEDURE — 97110 THERAPEUTIC EXERCISES: CPT | Performed by: PHYSICAL THERAPIST

## 2020-07-08 PROCEDURE — 97035 APP MDLTY 1+ULTRASOUND EA 15: CPT | Performed by: PHYSICAL THERAPIST

## 2020-07-08 PROCEDURE — 20610 DRAIN/INJ JOINT/BURSA W/O US: CPT | Performed by: ORTHOPAEDIC SURGERY

## 2020-07-08 PROCEDURE — 73562 X-RAY EXAM OF KNEE 3: CPT | Performed by: ORTHOPAEDIC SURGERY

## 2020-07-08 RX ORDER — METHYLPREDNISOLONE ACETATE 40 MG/ML
80 INJECTION, SUSPENSION INTRA-ARTICULAR; INTRALESIONAL; INTRAMUSCULAR; SOFT TISSUE
Status: COMPLETED | OUTPATIENT
Start: 2020-07-08 | End: 2020-07-08

## 2020-07-08 RX ORDER — LIDOCAINE HYDROCHLORIDE 20 MG/ML
SOLUTION OROPHARYNGEAL
COMMUNITY
Start: 2020-06-25 | End: 2021-03-30

## 2020-07-08 RX ADMIN — METHYLPREDNISOLONE ACETATE 80 MG: 40 INJECTION, SUSPENSION INTRA-ARTICULAR; INTRALESIONAL; INTRAMUSCULAR; SOFT TISSUE at 14:02

## 2020-07-08 NOTE — PROGRESS NOTES
Physical Therapy Daily Progress Note      Visit # 10      Subjective   Pt reports he is in very little pain.    Objective   See Exercise, Manual, and Modality Logs for complete treatment.       Assessment/Plan  Initially had pain w/ PROM overhead. Pain decreased with posterior mobilization and pectoralis major massage/release. Continues to respond well to ultrasound. Progress per POC.           Manual Therapy:    10     mins  64079;  Therapeutic Exercise:    22     mins  10372;     Neuromuscular Ray:    0    mins  17977;    Therapeutic Activity:     0     mins  66373;     Gait Trainin     mins  14129;     Ultrasound:     8     mins  33204;    Work Hardening           0      mins 48642  Iontophoresis               0   mins 29873    Timed Treatment:   40   mins   Total Treatment:     50   mins    Joanne Romero, PT  Physical Therapist

## 2020-07-08 NOTE — PROGRESS NOTES
Continued Stay Note  [facility]     Patient Name: Cody Eldridge  MRN: [id number]  Today's Date: 8/16/2019    Admit Date: 8/3/2019    Discharge Plan     Row Name 08/16/19 1645       Plan    Plan  Home with [facility]     Patient/Family in Agreement with Plan  yes    Plan Comments  Met with patient and wife at bedside. IV antbx until 9/3. [facility]. Wife voiced concerns about being able to manage home IV antbx and requested rehab. 1. [facility] not in network.  [place] [facility] [place] reason. Asked if willing to look at signature facilities -wife declined.  Partial packet in [facility] office.  Maria M with [facility] is following with teaching.  Will continue to monitor for new or changing discharge needs.        Discharge Codes    No documentation.             Pia Márquez RN     Tarsorrhaphy Text: A tarsorrhaphy was performed using Frost sutures.

## 2020-07-08 NOTE — PROGRESS NOTES
"  Patient: Cody Eldridge    YOB: 1945    Medical Record Number: [id number]    Chief Complaints: New complaint of bilateral knee pain    History of Present Illness:     75 y.o. male patient who presents for both knees.  This is a new problem.  I saw him for his right shoulder about 5 months ago.  He tells me the right shoulder is now doing great.  Both knees have been bothering him for years.  He had previously been seeing another physician.  He had multiple injections over the years.  The injections always provided some transient relief.  He was scheduled for a knee replacement a couple of years ago but this was called off due to concerns about his cardiac status.  He tells me his cardiologist was reluctant to clear him for surgery.  His pain is gotten worse over the years.  Current pain is moderate, constant and aching.  He reports associated clicking and popping.  Both knees bother him but the left is worse than the right.  He reports trouble standing and walking for prolonged periods of time.    Allergies:   Allergies   Allergen Reactions   • Benadryl [Diphenhydramine Hcl] Dizziness     \"I sleep for about a day\"       Home Medications:    Current Outpatient Medications:   •  albuterol (PROVENTIL HFA;VENTOLIN HFA) 108 (90 BASE) MCG/ACT inhaler, Inhale 2 puffs. ProAir  (90 Base) MCG/ACT Inhalation Aerosol Solution; Patient Sig: ProAir  (90 Base) MCG/ACT Inhalation Aerosol Solution inhale 2 puffs by mouth every 4 hours if needed; 8.5; 11; 07-Apr-2014; Act, Disp: , Rfl:   •  ALPRAZolam (XANAX) 0.5 MG tablet, TAKE 1/2 TO 1 TABLET BY MOUTH TWICE DAILY, Disp: 60 tablet, Rfl: 0  •  amLODIPine (NORVASC) 5 MG tablet, Take 5 mg by mouth 2 (Two) Times a Day., Disp: , Rfl:   •  aspirin 81 MG chewable tablet, Chew 81 mg Daily., Disp: , Rfl:   •  baclofen (LIORESAL) 10 MG tablet, take 1 tablet by mouth three times a day, Disp: 90 tablet, Rfl: 1  •  carvedilol (COREG) 25 MG tablet, TK 1 T PO Q 12 " H, Disp: , Rfl:   •  diphenoxylate-atropine (LOMOTIL) 2.5-0.025 MG per tablet, Take 1 tablet by mouth 4 (Four) Times a Day As Needed for Diarrhea., Disp: 60 tablet, Rfl: 1  •  HYDROcodone-acetaminophen (NORCO)  MG per tablet, TK 1 T PO QID, Disp: , Rfl: 0  •  levothyroxine (Synthroid) 100 MCG tablet, Take 1 tablet by mouth Daily. For low thyroid, Disp: 30 tablet, Rfl: 2  •  Lidocaine Viscous HCl (XYLOCAINE) 2 % solution, TK 5 MLS Q 3 H PRF MODERATE PAIN, Disp: , Rfl:   •  montelukast (SINGULAIR) 10 MG tablet, Take 1 tablet by mouth Every Night., Disp: 90 tablet, Rfl: 1  •  rosuvastatin (CRESTOR) 20 MG tablet, Take 20 mg by mouth Daily., Disp: 30 tablet, Rfl: 5  •  sertraline (ZOLOFT) 100 MG tablet, Take 1 tablet by mouth Daily. For anxiety and depression relief, Disp: 30 tablet, Rfl: 6  •  warfarin (COUMADIN) 2 MG tablet, Take one tablet (2mg) on Sun & Thurs and take two tablets (4mg) on all other days or as directed by [facility], Disp: 160 tablet, Rfl: 1    Past Medical History:   Diagnosis Date   • Acute renal injury (CMS/HCC)    • Anemia associated with chemotherapy    • Atrial fibrillation (CMS/HCC)    • CAD (coronary artery disease)    • CHF (congestive heart failure) (CMS/HCC)    • Chronic bronchitis (CMS/HCC)    • COPD (chronic obstructive pulmonary disease) (CMS/HCC)    • Cor pulmonale (chronic) (CMS/HCC)    • Daytime hypersomnia    • Depression with anxiety    • IRAHETA (dyspnea on exertion)    • Elevated cholesterol    • Enlarged prostate    • Frequent nocturnal awakening    • Gout    • H/O cardiac radiofrequency ablation 2006    [facility].   • Head and neck cancer (CMS/HCC)    • Hyperlipidemia    • Hypertension    • Hypokalemia    • Hypotension    • Insomnia    • Lumbar radicular pain    • SHAR (obstructive sleep apnea)    • Osteoarthritis    • Permanent atrial fibrillation (CMS/HCC)    • Shock, septic (CMS/HCC)    • Snoring    • Squamous cell carcinoma of neck    • SVT (supraventricular  tachycardia) (CMS/HCC)    • Syncope    • Vitamin D deficiency    • Weight loss        Past Surgical History:   Procedure Laterality Date   • APPENDECTOMY     • CARDIAC ABLATION      [facility].   • CARDIAC CATHETERIZATION N/A 2018    Procedure: Left Heart Cath;  Surgeon: Yousif Uribe MD;  Location: [facility] CATH INVASIVE LOCATION;  Service: Cardiology   • CARDIAC CATHETERIZATION N/A 2018    Procedure: Coronary angiography;  Surgeon: Yousif Uribe MD;  Location[facility] CATH INVASIVE LOC[facility];  Service: Cardiology   • CARDIAC CATHETERIZATION N/A 2018    Procedure: Left ventriculography;  Surgeon: Yousif Uribe MD;  Location: [facility] CATH INVASIVE LOCATION;  Service: Cardiology   • FINGER SURGERY     • FOOT SURGERY     • HAND SURGERY     • VENOUS ACCESS DEVICE (PORT) INSERTION Right 2019    Procedure: MEDIPORT PLACEMENT;  Surgeon: Elvis Grigsby MD;  Location: [facility] MAIN OR;  Service: Vascular   • VENOUS ACCESS DEVICE (PORT) REMOVAL Right 2019    Procedure: REMOVAL VENOUS ACCESS DEVICE;  Surgeon: Prince Castaneda MD;  Location: [facility] MAIN OR;  Service: Vascular       Social History     Occupational History     Employer: RETIRED   Tobacco Use   • Smoking status: Former Smoker     Packs/day: 3.00     Years: 30.00     Pack years: 90.00     Types: Cigarettes     Last attempt to quit: 2000     Years since quittin.5   • Smokeless tobacco: Never Used   • Tobacco comment: started age 11 x 53 years stopped 2000 / daily caffiene   Substance and Sexual Activity   • Alcohol use: Yes     Alcohol/week: 2.0 standard drinks     Types: 1 Cans of beer, 1 Glasses of wine per week     Comment: occ   • Drug use: No   • Sexual activity: Defer      Social History     Social History Narrative   • Not on file       Family History   Problem Relation Age of Onset   • Heart attack Mother    • Diabetes Mother    • Heart disease Mother    • Hypertension Mother    • Diabetes Father    • Heart failure  "Father    • Heart disease Father    • Hypertension Father    • Cancer Brother         colon   • Hypertension Brother    • Colon cancer Brother 50   • Diabetes Sister    • Heart disease Sister    • Hypertension Sister    • COPD Other    • Heart failure Other    • Heart disease Other    • Malig Hyperthermia Neg Hx        Review of Systems:      Constitutional: Denies fever, shaking or chills   Eyes: Denies change in visual acuity   HEENT: Denies nasal congestion or sore throat   Respiratory: Denies cough or shortness of breath   Cardiovascular: Denies chest pain or edema  Endocrine: Denies tremors, palpitations, intolerance of heat or cold, polyuria, polydipsia.  GI: Denies abdominal pain, nausea, vomiting, bloody stools or diarrhea  : Denies frequency, urgency, incontinence, retention, or nocturia.  Musculoskeletal: Denies numbness, tingling or loss of motor function except as above  Integument: Denies rash, lesion or ulceration   Neurologic: Denies headache or focal weakness, deficits  Heme: Denies spontaneous or excessive bleeding, epistaxis, hematuria, melena, fatigue, enlarged or tender lymph nodes.      All other pertinent positives and negatives as noted above in HPI.    Physical Exam: 75 y.o. male  Vitals:    07/08/20 1328   Temp: 97.7 °F (36.5 °C)   TempSrc: Temporal   Weight: 71.7 kg (158 lb)   Height: 182.9 cm (72\")     General:  Patient is awake and alert.  Appears in no acute distress or discomfort.    Psych:  Affect and demeanor are appropriate.    Eyes:  Conjunctiva and sclera appear grossly normal.  Eyes track well and EOM seem to be intact.    Ears:  No gross abnormalities.  Hearing adequate for the exam.    Cardiovascular:  Regular rate and rhythm.    Lungs:  Good chest expansion.  Breathing unlabored.    Extremities: Both knees are examined.  His exam is fairly symmetric.  Skin is benign.  No palpable masses or adenopathy.  Trace effusion on the left.  He has varus alignment on the left.  May be " some subtle varus alignment on the right but not as profound as that on the left.  He lacks 10 degrees of extension on the left and maybe 5 degrees of extension on the right.  Flexion is 110 degrees on the left and 120 on the right.  No instability of either knee.  Normal motor and sensory function throughout both legs.  Palpable pedal pulses bilaterally.  Brisk capillary refill.    Imaging: Bilateral AP, merchant's and lateral views of his knees are ordered and reviewed.  No comparison films are immediately available.  He has advanced medial compartment osteoarthritis bilaterally.  He has bone-on-bone on the left and near bone-on-bone on the right.  Mild bilateral patellofemoral arthritis as well.    Assessment/Plan: Bilateral knee osteoarthritis    We discussed options in detail.  He wants to try another round of injections.  I think that is reasonable, particularly given concerns over his cardiac status.  The risk, benefits and alternatives to injections were discussed.  He consented and the injections were performed as described below.  He will follow-up with me as needed.  I told him we can repeat the injections and 3 or 4 months, if needed.    Jose Colvin MD    07/08/2020    Large Joint Arthrocentesis: R knee  Date/Time: 7/8/2020 2:02 PM  Consent given by: patient  Site marked: site marked  Timeout: Immediately prior to procedure a time out was called to verify the correct patient, procedure, equipment, support staff and site/side marked as required   Supporting Documentation  Indications: pain and joint swelling   Procedure Details  Location: knee - R knee  Preparation: Patient was prepped and draped in the usual sterile fashion  Needle gauge: 21 G.  Approach: anterolateral  Medications administered: 2 mL lidocaine (cardiac); 80 mg methylPREDNISolone acetate 40 MG/ML  Patient tolerance: patient tolerated the procedure well with no immediate complications    Large Joint Arthrocentesis: L knee  Date/Time:  7/8/2020 2:02 PM  Consent given by: patient  Site marked: site marked  Timeout: Immediately prior to procedure a time out was called to verify the correct patient, procedure, equipment, support staff and site/side marked as required   Supporting Documentation  Indications: pain and joint swelling   Procedure Details  Location: knee - L knee  Preparation: Patient was prepped and draped in the usual sterile fashion  Needle gauge: 21 G.  Approach: anterolateral  Medications administered: 2 mL lidocaine (cardiac); 80 mg methylPREDNISolone acetate 40 MG/ML  Patient tolerance: patient tolerated the procedure well with no immediate complications

## 2020-07-09 ENCOUNTER — TELEPHONE (OUTPATIENT)
Dept: CARDIOLOGY | Facility: CLINIC | Age: 75
End: 2020-07-09

## 2020-07-09 RX ORDER — CARVEDILOL 25 MG/1
25 TABLET ORAL 2 TIMES DAILY WITH MEALS
Qty: 60 TABLET | Refills: 5 | Status: SHIPPED | OUTPATIENT
Start: 2020-07-09 | End: 2021-08-02

## 2020-07-09 RX ORDER — CARVEDILOL 25 MG/1
25 TABLET ORAL 2 TIMES DAILY WITH MEALS
Qty: 60 TABLET | Refills: 5 | Status: SHIPPED | OUTPATIENT
Start: 2020-07-09 | End: 2020-07-09 | Stop reason: SDUPTHER

## 2020-07-09 NOTE — TELEPHONE ENCOUNTER
I spoke with the patients wife and she said these pressure were take without him being on his carvedilol. He had ran out. I sent in a refill. Would you like him to take his medication and send in another weeks worth next week?

## 2020-07-09 NOTE — TELEPHONE ENCOUNTER
The patient came in with his BP readings    7/5  169/95  63       7/6   180/85  60     7/7    190/90  70    7/7    160/92  67     7/8    168/95  63    These were all recorded in the AM.

## 2020-07-09 NOTE — TELEPHONE ENCOUNTER
I reviewed my last note which was in June 2020.  At that time, his blood pressure was normal at 124/74.  We check and see if he has had any changes in his medications?  It does not look like it.  I would suggest we bring him in for a manual blood pressure check and have him bring his machine for comparison to make sure he is getting accurate readings at home before we adjust his medications.

## 2020-07-09 NOTE — TELEPHONE ENCOUNTER
Yes. Lets see what his reading are when he is back on his meds before asking him to come in for a reading

## 2020-07-09 NOTE — TELEPHONE ENCOUNTER
I spoke with his wife and she verbalized that she understood and will have him take his medication and record his BP and HR and report back next week with those readings. [email or web address]

## 2020-07-10 LAB — INR PPP: 2.6

## 2020-07-13 ENCOUNTER — TREATMENT (OUTPATIENT)
Dept: PHYSICAL THERAPY | Facility: CLINIC | Age: 75
End: 2020-07-13

## 2020-07-13 ENCOUNTER — ANTICOAGULATION VISIT (OUTPATIENT)
Dept: PHARMACY | Facility: HOSPITAL | Age: 75
End: 2020-07-13

## 2020-07-13 ENCOUNTER — TELEPHONE (OUTPATIENT)
Dept: FAMILY MEDICINE CLINIC | Facility: CLINIC | Age: 75
End: 2020-07-13

## 2020-07-13 DIAGNOSIS — M25.511 CHRONIC RIGHT SHOULDER PAIN: Primary | ICD-10-CM

## 2020-07-13 DIAGNOSIS — G89.29 CHRONIC RIGHT SHOULDER PAIN: Primary | ICD-10-CM

## 2020-07-13 DIAGNOSIS — I48.19 OTHER PERSISTENT ATRIAL FIBRILLATION (HCC): ICD-10-CM

## 2020-07-13 DIAGNOSIS — Z74.09 LIMITED MOBILITY: ICD-10-CM

## 2020-07-13 PROCEDURE — 97140 MANUAL THERAPY 1/> REGIONS: CPT | Performed by: PHYSICAL THERAPIST

## 2020-07-13 PROCEDURE — 97110 THERAPEUTIC EXERCISES: CPT | Performed by: PHYSICAL THERAPIST

## 2020-07-13 NOTE — PROGRESS NOTES
Anticoagulation Clinic Progress Note    Anticoagulation Summary  As of 2020    INR goal:   2.0-3.0   TTR:   62.2 % (1.6 y)   INR used for dosin.60 (7/10/2020)   Warfarin maintenance plan:   2 mg every Sun, Thu; 4 mg all other days   Weekly warfarin total:   24 mg   No change documented:   Georges Guadalupe RPH   Plan last modified:   Álvaro Sam RPH (10/15/2019)   Next INR check:   2020   Priority:   Maintenance   Target end date:   Indefinite    Indications    Other persistent atrial fibrillation (CMS/HCC) [I48.19]             Anticoagulation Episode Summary     INR check location:       Preferred lab:       Send INR reminders to:    [facility]    Comments:   MD INR home danika      Anticoagulation Care Providers     Provider Role Specialty Phone number    Myriam Omer MD Referring Cardiology [phone]          Clinic Interview:  Patient Findings     Negatives:   Signs/symptoms of thrombosis, Signs/symptoms of bleeding,   Laboratory test error suspected, Change in health, Change in alcohol use,   Change in activity, Upcoming invasive procedure, Emergency department   visit, Upcoming dental procedure, Missed doses, Extra doses, Change in   medications, Change in diet/appetite, Hospital admission, Bruising, Other   complaints      Clinical Outcomes     Negatives:   Major bleeding event, Thromboembolic event,   Anticoagulation-related hospital admission, Anticoagulation-related ED   visit, Anticoagulation-related fatality        INR History:  Anticoagulation Monitoring 6/15/2020 2020 2020   INR 2.60 2.50 2.60   INR Date 2020 2020 7/10/2020   INR Goal 2.0-3.0 2.0-3.0 2.0-3.0   Trend Same Same Same   Last Week Total 26 mg 24 mg 24 mg   Next Week Total 24 mg 24 mg 24 mg   Sun 2 mg 2 mg 2 mg   Mon 4 mg 4 mg 4 mg   Tue 4 mg 4 mg 4 mg   Wed 4 mg 4 mg 4 mg   Thu 2 mg 2 mg 2 mg   Fri 4 mg 4 mg 4 mg   Sat 4 mg 4 mg 4 mg   Visit Report - - -   Some recent data might  be hidden       Plan:  1. INR is Therapeutic today- see above in Anticoagulation Summary.   Will instruct Cody Eldridge to Continue their warfarin regimen- see above in Anticoagulation Summary.  2. Follow up in 2 weeks  3. They have been instructed to call if any changes in medications, doses, concerns, etc. Patient expresses understanding and has no further questions at this time.    Georges Guadalupe [facility]

## 2020-07-13 NOTE — TELEPHONE ENCOUNTER
Pt has been seeing physical therapy at [facility] for his shoulder. He states the physical therapist is wanting Ran to do an evaluation on his back now.

## 2020-07-13 NOTE — PROGRESS NOTES
Physical Therapy Daily Progress Note      Visit # 11      Subjective   No shoulder pain. Back pain is 5/10.    Objective   See Exercise, Manual, and Modality Logs for complete treatment.       Assessment/Plan  Minimal c/o shoulder pain w/ therapy and ADLs. Demonstrates good understanding of HEP. Declined ultrasound due to minimal pain. Discussed potential evaluation of lower back and transition to PT for chronic LBP.           Manual Therapy:    12     mins  33369;  Therapeutic Exercise:    28     mins  04514;     Neuromuscular Ray:    0    mins  41148;    Therapeutic Activity:     0     mins  96503;     Gait Trainin     mins  11630;     Ultrasound:     0     mins  87391;    Work Hardening           0      mins 52709  Iontophoresis               0   mins 70259    Timed Treatment:   40   mins   Total Treatment:     45   mins    Joanne Romero, PT  Physical Therapist

## 2020-07-15 ENCOUNTER — OFFICE VISIT (OUTPATIENT)
Dept: FAMILY MEDICINE CLINIC | Facility: CLINIC | Age: 75
End: 2020-07-15

## 2020-07-15 ENCOUNTER — TREATMENT (OUTPATIENT)
Dept: PHYSICAL THERAPY | Facility: CLINIC | Age: 75
End: 2020-07-15

## 2020-07-15 VITALS
OXYGEN SATURATION: 99 % | WEIGHT: 159 LBS | TEMPERATURE: 97.8 F | SYSTOLIC BLOOD PRESSURE: 110 MMHG | HEIGHT: 70 IN | DIASTOLIC BLOOD PRESSURE: 61 MMHG | HEART RATE: 55 BPM | BODY MASS INDEX: 22.76 KG/M2

## 2020-07-15 DIAGNOSIS — M51.37 DEGENERATION OF INTERVERTEBRAL DISC OF LUMBOSACRAL REGION: Primary | ICD-10-CM

## 2020-07-15 DIAGNOSIS — F41.9 ANXIETY: ICD-10-CM

## 2020-07-15 DIAGNOSIS — F13.20 BENZODIAZEPINE DEPENDENCE (HCC): ICD-10-CM

## 2020-07-15 DIAGNOSIS — M25.511 CHRONIC RIGHT SHOULDER PAIN: Primary | ICD-10-CM

## 2020-07-15 DIAGNOSIS — G89.29 CHRONIC RIGHT SHOULDER PAIN: Primary | ICD-10-CM

## 2020-07-15 DIAGNOSIS — Z74.09 LIMITED MOBILITY: ICD-10-CM

## 2020-07-15 PROCEDURE — 97110 THERAPEUTIC EXERCISES: CPT | Performed by: PHYSICAL THERAPIST

## 2020-07-15 PROCEDURE — 97140 MANUAL THERAPY 1/> REGIONS: CPT | Performed by: PHYSICAL THERAPIST

## 2020-07-15 PROCEDURE — 99213 OFFICE O/P EST LOW 20 MIN: CPT | Performed by: NURSE PRACTITIONER

## 2020-07-15 NOTE — PROGRESS NOTES
Physical Therapy Daily Progress Note      Visit # 12      Subjective   Pt reports no complaints in regards to shoulder. Has new appointment with MD for LBP.    Objective   See Exercise, Manual, and Modality Logs for complete treatment.       Assessment/Plan  Met all goals. Demonstrates independence with HEP for R shoulder. Will DC to HEP at this time.         Manual Therapy:    10     mins  03940;  Therapeutic Exercise:    25     mins  90636;     Neuromuscular Ray:    0    mins  34060;    Therapeutic Activity:     0     mins  30356;     Gait Trainin     mins  66783;     Ultrasound:     0     mins  33982;    Work Hardening           0      mins 18089  Iontophoresis               0   mins 37593    Timed Treatment:   35   mins   Total Treatment:     45   mins    Joanne Romero, PT  Physical Therapist

## 2020-07-15 NOTE — PATIENT INSTRUCTIONS
Pt exercises    Allergies eye    zaditor drop each eye daily as needed    Rewetting dropr as needed

## 2020-07-15 NOTE — PROGRESS NOTES
"Subjective   Cody Eldridge is a 75 y.o. male.     Patient has a history of degenerative disc disease chronic low back pain   He would like physical therapy he was told he needed to come here and get a referral  He has a chronic history he has no increase severe back pain unexplained weight loss night sweats he does have a history of malignancy he feels like this back pain is still within his range of previous back pain he does not wish any further work-up he does not wish x-rays or any MRI but he would like a referral to physical therapy which tends to help  Already sees pain management    Chronic anxiety depression  We discussed Xanax and will start weaning him  Last year he had severe malignancy throat cancer and underwent multiple radiation treatments and chemo he had weakness and severe gait instability global weakness and anxiety  I delayed the titration, as he was quite stable and gaining his strength back  But we discussed this today and at the next refill I will start weaning process  He was initiated in the hospital during his rehab stay which was quite prolonged  He understands risk and benefit is [place] been clear       /61   Pulse 55   Temp 97.8 °F (36.6 °C) (Temporal)   Ht 177.8 cm (70\")   Wt 72.1 kg (159 lb)   SpO2 99%   BMI 22.81 kg/m²       The following portions of the patient's history were reviewed and updated as appropriate: allergies, current medications, past family history, past medical history, past social history, past surgical history and problem list.    Review of Systems   Constitutional: Negative for fatigue and fever.   HENT: Negative.  Negative for trouble swallowing.    Eyes: Negative.    Respiratory: Negative.  Negative for cough and shortness of breath.    Cardiovascular: Negative for chest pain, palpitations and leg swelling.   Gastrointestinal: Negative.  Negative for abdominal pain.   Genitourinary: Negative.    Musculoskeletal: Positive for back pain.   Skin: " Negative.    Neurological: Negative.  Negative for dizziness and confusion.   Psychiatric/Behavioral: Negative.        Objective   Physical Exam   Constitutional: He is oriented to person, place, and time. He appears well-developed and well-nourished. No distress.   Pleasant getting stronger appears well now   HENT:   Head: Normocephalic and atraumatic.   Nose: Nose normal.   Mouth/Throat: Oropharynx is clear and moist.   Eyes: Pupils are equal, round, and reactive to light. Conjunctivae are normal.   Neck: Neck supple. No JVD present.   Cardiovascular: Normal rate, regular rhythm and normal heart sounds.   No murmur heard.  Pulmonary/Chest: Effort normal and breath sounds normal. No respiratory distress. He has no wheezes.   Abdominal: Soft. Bowel sounds are normal. He exhibits no distension and no mass. There is no tenderness. There is no guarding. No hernia.   Musculoskeletal: He exhibits no edema or tenderness.   Lymphadenopathy:     He has no cervical adenopathy.   Neurological: He is alert and oriented to person, place, and time.   Skin: Skin is warm and dry. He is not diaphoretic.   Psychiatric: He has a normal mood and affect. His behavior is normal. Judgment and thought content normal.   Vitals reviewed.        Assessment/Plan   Cody was seen today for referral to physical therapy.    Diagnoses and all orders for this visit:    Degeneration of intervertebral disc of lumbosacral region  -     Ambulatory Referral to Physical Therapy Evaluate and treat    Anxiety    Benzodiazepine dependence (CMS/[facility])    Physical therapy referral  Exercises  Walking daily  Will start decreasing Xanax slowly  Increase sertraline as needed  He is on montelukast he is doing well discussed black box warning he has had no problems wants to continue                There are no Patient Instructions on file for this visit.

## 2020-07-22 ENCOUNTER — LAB (OUTPATIENT)
Dept: LAB | Facility: HOSPITAL | Age: 75
End: 2020-07-22

## 2020-07-22 LAB
T4 FREE SERPL-MCNC: 1.26 NG/DL (ref 0.93–1.7)
TSH SERPL DL<=0.05 MIU/L-ACNC: 3.87 UIU/ML (ref 0.27–4.2)

## 2020-07-22 PROCEDURE — 84439 ASSAY OF FREE THYROXINE: CPT | Performed by: NURSE PRACTITIONER

## 2020-07-22 PROCEDURE — 84443 ASSAY THYROID STIM HORMONE: CPT | Performed by: NURSE PRACTITIONER

## 2020-07-22 PROCEDURE — 36415 COLL VENOUS BLD VENIPUNCTURE: CPT | Performed by: NURSE PRACTITIONER

## 2020-07-23 ENCOUNTER — OFFICE VISIT (OUTPATIENT)
Dept: CARDIOLOGY | Facility: CLINIC | Age: 75
End: 2020-07-23

## 2020-07-23 VITALS
HEART RATE: 55 BPM | WEIGHT: 159 LBS | HEIGHT: 70 IN | BODY MASS INDEX: 22.76 KG/M2 | DIASTOLIC BLOOD PRESSURE: 60 MMHG | OXYGEN SATURATION: 98 % | SYSTOLIC BLOOD PRESSURE: 120 MMHG

## 2020-07-23 DIAGNOSIS — I48.19 OTHER PERSISTENT ATRIAL FIBRILLATION (HCC): ICD-10-CM

## 2020-07-23 DIAGNOSIS — I10 ESSENTIAL HYPERTENSION: Primary | ICD-10-CM

## 2020-07-23 DIAGNOSIS — I25.10 CORONARY ARTERY DISEASE INVOLVING NATIVE CORONARY ARTERY OF NATIVE HEART WITHOUT ANGINA PECTORIS: ICD-10-CM

## 2020-07-23 DIAGNOSIS — I36.1 NON-RHEUMATIC TRICUSPID VALVE INSUFFICIENCY: ICD-10-CM

## 2020-07-23 DIAGNOSIS — I95.9 HYPOTENSION, UNSPECIFIED HYPOTENSION TYPE: ICD-10-CM

## 2020-07-23 DIAGNOSIS — E78.2 MIXED HYPERLIPIDEMIA: ICD-10-CM

## 2020-07-23 PROCEDURE — 93000 ELECTROCARDIOGRAM COMPLETE: CPT | Performed by: INTERNAL MEDICINE

## 2020-07-23 PROCEDURE — 99214 OFFICE O/P EST MOD 30 MIN: CPT | Performed by: INTERNAL MEDICINE

## 2020-07-23 NOTE — PROGRESS NOTES
PATIENTINFORMATION    Date of Office Visit: 20  Encounter Provider: Myriam Omer MD  Place of Service: [facility] CARDIOLOGY  Patient Name: Cody Eldridge  : 1945    Subjective:         Patient ID: Cody Eldridge is a 75 y.o. male.      History of Present Illness    This is a nice gentleman who saw Dr. Travis in the remote past. He is a difficult historian. Per Dr. Travis's note from  he had SVT with ablation in  at [facility]. He also has COPD, hypertension and hyperlipidemia. To me, he denies coronary disease but his old records show that he has had a non-ST elevation myocardial infarction in the past. He had an echo in 10/2009, which showed normal left ventricular systolic function with an ejection fraction of 64%. There was mild right and left atrial enlargement and mild mitral and tricuspid regurgitation with a right ventricular systolic pressure of 39 mmHg.      In the spring of 2018, he was complaining of lots of shortness of breath.  He eventually had a heart catheterization in 2018 which showed mild nonobstructive coronary disease with a left ventricular end-diastolic pressure of 10.  He has had intermittent blood pressure issues.     Unfortunately, he was diagnosed with throat cancer.  I saw him in the office in 2019 and he was having a hard time staying hydrated.  He was hypotensive.  He was seen by Dr. Colin in the [facility] for poor fluid intake and weight loss.  She gave him some IV fluids and IV metoprolol and we tried oral amiodarone.  He was admitted to the hospital in 2019 for atrial fibrillation with rapid ventricular response and complications due to his squamous cell carcinoma of the neck.  He had MSSA sepsis from an infected chemotherapy port.  He was supposed to have been discharged on carvedilol and warfarin.      He comes in today because of concerns about his blood pressure.  He brings in a  "list which shows wide fluctuations in his blood pressure from a systolic high of about 193 to a low of 110.  In our office today his blood pressure looks perfect.  He is not having chest pain.  He has some shortness of breath with exertion.  He has some edema if he is on his feet.  He denies any palpitations.    Review of Systems   Constitution: Negative for fever, malaise/fatigue, weight gain and weight loss.   HENT: Negative for ear pain, hearing loss, nosebleeds and sore throat.    Eyes: Negative for double vision, pain, vision loss in left eye and vision loss in right eye.   Cardiovascular:        See history of present illness.   Respiratory: Negative for cough, shortness of breath, sleep disturbances due to breathing, snoring and wheezing.    Endocrine: Negative for cold intolerance, heat intolerance and polyuria.   Skin: Negative for itching, poor wound healing and rash.   Musculoskeletal: Positive for joint pain. Negative for joint swelling and myalgias.   Gastrointestinal: Negative for abdominal pain, diarrhea, hematochezia, nausea and vomiting.   Genitourinary: Negative for hematuria and hesitancy.   Neurological: Negative for numbness, paresthesias and seizures.   Psychiatric/Behavioral: Negative for depression. The patient is not nervous/anxious.            ECG 12 Lead  Date/Time: 7/23/2020 11:01 AM  Performed by: Myriam Omer MD  Authorized by: Myriam Omer MD   Comparison: compared with previous ECG from 6/2/2020  Similar to previous ECG  Rhythm: sinus rhythm  BPM: 53  Conduction: conduction normal  ST Segments: ST segments normal  T Waves: T waves normal    Clinical impression: normal ECG  Comments: T waves are mildly peaked but this is normal for him.               Objective:     /60 (BP Location: Left arm)   Pulse 55   Ht 177.8 cm (70\")   Wt 72.1 kg (159 lb)   SpO2 98%   BMI 22.81 kg/m²  Body mass index is 22.81 kg/m².     Physical Exam   Constitutional: He appears " well-developed.   HENT:   Head: Normocephalic and atraumatic.   Eyes: Pupils are equal, round, and reactive to light. Conjunctivae and lids are normal. Lids are everted and swept, no foreign bodies found.   Neck: Normal range of motion. No JVD present. Carotid bruit is not present. No tracheal deviation present. No thyroid mass present.   Cardiovascular: Normal rate, regular rhythm and normal heart sounds.   Pulses:       Dorsalis pedis pulses are 2+ on the right side, and 2+ on the left side.   Pulmonary/Chest: Effort normal and breath sounds normal.   Abdominal: Normal appearance and bowel sounds are normal.   Musculoskeletal: Normal range of motion.   Neurological: He is alert. He has normal strength.   Skin: Skin is warm, dry and intact.   Psychiatric: He has a normal mood and affect. His behavior is normal.   Vitals reviewed.          Assessment/Plan:       1.  Nonobstructive coronary artery disease.  Continue with risk factor management.  2.  Persistent atrial fibrillation.  He is anticoagulated with warfarin.  He has a chads 2 vascular score of 3.  He is currently in sinus rhythm.  He has some bruising in his arms but no other bleeding issues.  3.  Lung disease/COPD with right heart failure.  This is followed by Dr. Segovia.  4.  Hypertension.  Blood pressure is well controlled today.  However at home he is getting variable numbers.  Chay educated him on how and when to check his blood pressure.  He is going to follow this advice, keep a list of his blood pressures and follow-up with Regina in 4 weeks.  5.  Squamous cell cancer of the throat.  Currently completed all his treatment.  He has a follow-up with Dr. Heaton tomorrow.    Follow-up with Regina in 4 weeks.     Orders Placed This Encounter   Procedures   • ECG 12 Lead     This order was created via procedure documentation        Discharge Medications           Accurate as of July 23, 2020 11:01 AM. If you have any questions, ask your nurse or doctor.                Continue These Medications      Instructions Start Date   albuterol sulfate  (90 Base) MCG/ACT inhaler  Commonly known as:  PROVENTIL HFA;VENTOLIN HFA;PROAIR HFA   2 puffs, Inhalation, ProAir  (90 Base) MCG/ACT Inhalation Aerosol Solution; Patient Sig: ProAir  (90 Base) MCG/ACT Inhalation Aerosol Solution inhale 2 puffs by mouth every 4 hours if needed; 8.5; 11; 07-Apr-2014; Act      ALPRAZolam 0.5 MG tablet  Commonly known as:  XANAX   TAKE 1/2 TO 1 TABLET BY MOUTH TWICE DAILY      amLODIPine 5 MG tablet  Commonly known as:  NORVASC   5 mg, Oral, 2 Times Daily      aspirin 81 MG chewable tablet   81 mg, Oral, Daily      baclofen 10 MG tablet  Commonly known as:  LIORESAL   take 1 tablet by mouth three times a day      carvedilol 25 MG tablet  Commonly known as:  COREG   25 mg, Oral, 2 Times Daily With Meals      Crestor 20 MG tablet  Generic drug:  rosuvastatin   20 mg, Oral, Daily      diphenoxylate-atropine 2.5-0.025 MG per tablet  Commonly known as:  LOMOTIL   1 tablet, Oral, 4 Times Daily PRN      HYDROcodone-acetaminophen  MG per tablet  Commonly known as:  NORCO   TK 1 T PO QID      levothyroxine 100 MCG tablet  Commonly known as:  Synthroid   100 mcg, Oral, Daily, For low thyroid      Lidocaine Viscous HCl 2 % solution  Commonly known as:  XYLOCAINE   TK 5 MLS Q 3 H PRF MODERATE PAIN      montelukast 10 MG tablet  Commonly known as:  SINGULAIR   10 mg, Oral, Nightly      sertraline 100 MG tablet  Commonly known as:  ZOLOFT   100 mg, Oral, Daily, For anxiety and depression relief      warfarin 2 MG tablet  Commonly known as:  COUMADIN   Take one tablet (2mg) on Sun & Thurs and take two tablets (4mg) on all other days or as directed by [facility]                    Myriam Omer MD  07/23/20  11:01

## 2020-07-24 ENCOUNTER — LAB (OUTPATIENT)
Dept: OTHER | Facility: HOSPITAL | Age: 75
End: 2020-07-24

## 2020-07-24 ENCOUNTER — OFFICE VISIT (OUTPATIENT)
Dept: ONCOLOGY | Facility: CLINIC | Age: 75
End: 2020-07-24

## 2020-07-24 VITALS
WEIGHT: 159.8 LBS | DIASTOLIC BLOOD PRESSURE: 68 MMHG | OXYGEN SATURATION: 97 % | HEART RATE: 58 BPM | RESPIRATION RATE: 16 BRPM | TEMPERATURE: 97.5 F | BODY MASS INDEX: 22.88 KG/M2 | SYSTOLIC BLOOD PRESSURE: 130 MMHG | HEIGHT: 70 IN

## 2020-07-24 DIAGNOSIS — D64.81 ANEMIA ASSOCIATED WITH CHEMOTHERAPY: ICD-10-CM

## 2020-07-24 DIAGNOSIS — R93.89 ABNORMAL FINDINGS ON DIAGNOSTIC IMAGING OF OTHER SPECIFIED BODY STRUCTURES: ICD-10-CM

## 2020-07-24 DIAGNOSIS — T45.1X5A CHEMOTHERAPY-INDUCED NEUTROPENIA (HCC): ICD-10-CM

## 2020-07-24 DIAGNOSIS — Z86.79 HISTORY OF ATRIAL FIBRILLATION: ICD-10-CM

## 2020-07-24 DIAGNOSIS — C01 SQUAMOUS CELL CARCINOMA OF BASE OF TONGUE (HCC): Primary | ICD-10-CM

## 2020-07-24 DIAGNOSIS — C01 SQUAMOUS CELL CARCINOMA OF BASE OF TONGUE (HCC): ICD-10-CM

## 2020-07-24 DIAGNOSIS — T45.1X5A CHEMOTHERAPY-INDUCED THROMBOCYTOPENIA: ICD-10-CM

## 2020-07-24 DIAGNOSIS — D70.1 CHEMOTHERAPY-INDUCED NEUTROPENIA (HCC): ICD-10-CM

## 2020-07-24 DIAGNOSIS — D69.59 CHEMOTHERAPY-INDUCED THROMBOCYTOPENIA: ICD-10-CM

## 2020-07-24 DIAGNOSIS — N17.9 ACUTE RENAL INJURY (HCC): ICD-10-CM

## 2020-07-24 DIAGNOSIS — Z79.01 CURRENT USE OF LONG TERM ANTICOAGULATION: ICD-10-CM

## 2020-07-24 DIAGNOSIS — C44.42 SQUAMOUS CELL CARCINOMA OF NECK: ICD-10-CM

## 2020-07-24 DIAGNOSIS — T45.1X5A ANEMIA ASSOCIATED WITH CHEMOTHERAPY: ICD-10-CM

## 2020-07-24 LAB
ALBUMIN SERPL-MCNC: 4.1 G/DL (ref 3.5–5.2)
ALBUMIN/GLOB SERPL: 1.4 G/DL
ALP SERPL-CCNC: 65 U/L (ref 39–117)
ALT SERPL W P-5'-P-CCNC: 18 U/L (ref 1–41)
ANION GAP SERPL CALCULATED.3IONS-SCNC: 5.2 MMOL/L (ref 5–15)
AST SERPL-CCNC: 23 U/L (ref 1–40)
BASOPHILS # BLD AUTO: 0.03 10*3/MM3 (ref 0–0.2)
BASOPHILS NFR BLD AUTO: 0.6 % (ref 0–1.5)
BILIRUB SERPL-MCNC: 0.4 MG/DL (ref 0–1.2)
BUN SERPL-MCNC: 16 MG/DL (ref 8–23)
BUN/CREAT SERPL: 14.8 (ref 7–25)
CALCIUM SPEC-SCNC: 8.7 MG/DL (ref 8.6–10.5)
CHLORIDE SERPL-SCNC: 103 MMOL/L (ref 98–107)
CO2 SERPL-SCNC: 28.8 MMOL/L (ref 22–29)
CREAT SERPL-MCNC: 1.08 MG/DL (ref 0.76–1.27)
DEPRECATED RDW RBC AUTO: 44.6 FL (ref 37–54)
EOSINOPHIL # BLD AUTO: 0.06 10*3/MM3 (ref 0–0.4)
EOSINOPHIL NFR BLD AUTO: 1.1 % (ref 0.3–6.2)
ERYTHROCYTE [DISTWIDTH] IN BLOOD BY AUTOMATED COUNT: 12.9 % (ref 12.3–15.4)
GFR SERPL CREATININE-BSD FRML MDRD: 66 ML/MIN/1.73
GLOBULIN UR ELPH-MCNC: 3 GM/DL
GLUCOSE SERPL-MCNC: 83 MG/DL (ref 65–99)
HCT VFR BLD AUTO: 36.6 % (ref 37.5–51)
HGB BLD-MCNC: 11.9 G/DL (ref 13–17.7)
IMM GRANULOCYTES # BLD AUTO: 0.04 10*3/MM3 (ref 0–0.05)
IMM GRANULOCYTES NFR BLD AUTO: 0.8 % (ref 0–0.5)
LYMPHOCYTES # BLD AUTO: 0.91 10*3/MM3 (ref 0.7–3.1)
LYMPHOCYTES NFR BLD AUTO: 17.2 % (ref 19.6–45.3)
MCH RBC QN AUTO: 30.9 PG (ref 26.6–33)
MCHC RBC AUTO-ENTMCNC: 32.5 G/DL (ref 31.5–35.7)
MCV RBC AUTO: 95.1 FL (ref 79–97)
MONOCYTES # BLD AUTO: 0.64 10*3/MM3 (ref 0.1–0.9)
MONOCYTES NFR BLD AUTO: 12.1 % (ref 5–12)
NEUTROPHILS NFR BLD AUTO: 3.61 10*3/MM3 (ref 1.7–7)
NEUTROPHILS NFR BLD AUTO: 68.2 % (ref 42.7–76)
NRBC BLD AUTO-RTO: 0 /100 WBC (ref 0–0.2)
PLATELET # BLD AUTO: 179 10*3/MM3 (ref 140–450)
PMV BLD AUTO: 10.3 FL (ref 6–12)
POTASSIUM SERPL-SCNC: 4.2 MMOL/L (ref 3.5–5.2)
PROT SERPL-MCNC: 7.1 G/DL (ref 6–8.5)
RBC # BLD AUTO: 3.85 10*6/MM3 (ref 4.14–5.8)
SODIUM SERPL-SCNC: 137 MMOL/L (ref 136–145)
TSH SERPL DL<=0.05 MIU/L-ACNC: 3.55 UIU/ML (ref 0.27–4.2)
WBC # BLD AUTO: 5.29 10*3/MM3 (ref 3.4–10.8)

## 2020-07-24 PROCEDURE — 84443 ASSAY THYROID STIM HORMONE: CPT | Performed by: INTERNAL MEDICINE

## 2020-07-24 PROCEDURE — 36415 COLL VENOUS BLD VENIPUNCTURE: CPT

## 2020-07-24 PROCEDURE — 80053 COMPREHEN METABOLIC PANEL: CPT | Performed by: INTERNAL MEDICINE

## 2020-07-24 PROCEDURE — 85025 COMPLETE CBC W/AUTO DIFF WBC: CPT | Performed by: INTERNAL MEDICINE

## 2020-07-24 PROCEDURE — 99213 OFFICE O/P EST LOW 20 MIN: CPT | Performed by: INTERNAL MEDICINE

## 2020-07-24 NOTE — PROGRESS NOTES
Subjective     REASON FOR FOLLOW UP:  1.  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   2.  Combined radiation and low-dose carboplatin and Taxol chemotherapy weekly initiated 6/17/2019.  3.  MediPort placed by Dr. Adolph Grigsby of vascular surgery 6/18/2019  4.  Methicillin sensitive staph septicemia felt to be associated with infected MediPort.  Port was removed and patient has completed a protracted course of antibiotics  5.  Generalized weakness and malnutrition  6.  Atrial fibrillation.  He is currently anticoagulated with Coumadin    HISTORY OF PRESENT ILLNESS:  The patient is a 75 y.o. year old male who is undergoing treatment with weekly carboplatin and Taxol along with radiation for his squamous cell carcinoma of the base of the tongue, HPV positive.      He was started on combined radiation and low-dose weekly carboplatin and Taxol chemotherapy but had tremendous problems with toxicity and tolerance to treatment.  He received his fifth week of chemotherapy and radiation on 7/31/2019 but subsequently required admission to the hospital with severe toxicity and development of methicillin sensitive staph septicemia.  His MediPort was felt to be infected and was removed.    After his recovery he opted not to finish out his remaining days of radiation and is being followed expectantly.  He underwent recent ENT evaluation with Dr. Slater and was felt to be doing well.  He underwent a CT scan of the neck and chest on 10/29/2019 showing significant improvement and no evidence of distant metastatic disease.    He has had ENT follow-up with Dr. Slater with no evidence of recurrence of disease.    History of Present Illness     Past Medical History:   Diagnosis Date   • Acute renal injury (CMS/HCC)    • Anemia associated with chemotherapy    • Atrial fibrillation (CMS/HCC)    • CAD (coronary artery disease)    • CHF (congestive heart failure) (CMS/HCC)    • Chronic bronchitis (CMS/HCC)    • COPD  "(chronic obstructive pulmonary disease) (CMS/HCC)    • Cor pulmonale (chronic) (CMS/HCC)    • Daytime hypersomnia    • Depression with anxiety    • IRAHETA (dyspnea on exertion)    • Elevated cholesterol    • Enlarged prostate    • Frequent nocturnal awakening    • Gout    • H/O cardiac radiofrequency ablation 2006    [facility].   • Head and neck cancer (CMS/HCC)    • Hyperlipidemia    • Hypertension    • Hypokalemia    • Hypotension    • Insomnia    • Lumbar radicular pain    • SHAR (obstructive sleep apnea)    • Osteoarthritis    • Permanent atrial fibrillation (CMS/HCC)    • Shock, septic (CMS/HCC)    • Snoring    • Squamous cell carcinoma of neck    • SVT (supraventricular tachycardia) (CMS/HCC)    • Syncope    • Vitamin D deficiency    • Weight loss         Past Surgical History:   Procedure Laterality Date   • APPENDECTOMY     • CARDIAC ABLATION  2006    [facility].   • CARDIAC CATHETERIZATION N/A 8/29/2018    Procedure: Left Heart Cath;  Surgeon: Yousif Uribe MD;  Location: [facility] CATH INVASIVE LOCATION;  Service: Cardiology   • CARDIAC CATHETERIZATION N/A 8/29/2018    Procedure: Coronary angiography;  Surgeon: Yousif Uribe MD;  Location: [facility] CATH INVASIVE LOCATION;  Service: Cardiology   • CARDIAC CATHETERIZATION N/A 8/29/2018    Procedure: Left ventriculography;  Surgeon: Yousif Uribe MD;  Location: [facility] CATH INVASIVE LOCATION;  Service: Cardiology   • FINGER SURGERY     • FOOT SURGERY     • HAND SURGERY     • VENOUS ACCESS DEVICE (PORT) INSERTION Right 6/18/2019    Procedure: MEDIPORT PLACEMENT;  Surgeon: Elvis Grigsby MD;  Location: [facility] OR;  Service: Vascular   • VENOUS ACCESS DEVICE (PORT) REMOVAL Right 8/5/2019    Procedure: REMOVAL VENOUS ACCESS DEVICE;  Surgeon: Prince Castaneda MD;  Location: [facility] OR;  Service: Vascular        ALLERGIES:    Allergies   Allergen Reactions   • Benadryl [Diphenhydramine Hcl] Dizziness     \"I sleep for about a day\"        Review of " "Systems   Constitutional: Negative for activity change, chills and fever.   HENT: Negative for trouble swallowing and voice change.    Eyes: Negative for pain and visual disturbance.   Respiratory: Negative for cough, shortness of breath and wheezing.    Cardiovascular: Negative for chest pain, palpitations and leg swelling.   Gastrointestinal: Negative for abdominal pain, constipation, diarrhea and vomiting.   Genitourinary: Negative for difficulty urinating, frequency and urgency.   Musculoskeletal: Negative for arthralgias and joint swelling.   Skin: Negative for rash.   Neurological: Negative for seizures.   Hematological: Negative for adenopathy. Does not bruise/bleed easily.   Psychiatric/Behavioral: Negative for behavioral problems and confusion. The patient is not nervous/anxious.         Objective     Vitals:    07/24/20 1312   BP: 130/68   Pulse: 58   Resp: 16   Temp: 97.5 °F (36.4 °C)   TempSrc: Skin   SpO2: 97%   Weight: 72.5 kg (159 lb 12.8 oz)   Height: 177.8 cm (70\")   PainSc: 0-No pain     Current Status 7/24/2020   ECOG score 0       Physical Exam   Constitutional: He is oriented to person, place, and time. He appears well-developed and well-nourished. No distress.   HENT:   Head: Normocephalic.   Mouth/Throat: Oropharyngeal exudate (mucositits with thrush present) present.   Eyes: Pupils are equal, round, and reactive to light. Conjunctivae and EOM are normal. No scleral icterus.   Neck: Normal range of motion. Neck supple. No JVD present. No thyromegaly present.   Cardiovascular: Normal rate. An irregularly irregular rhythm present. Exam reveals no gallop and no friction rub.   No murmur heard.  Pulmonary/Chest: Effort normal and breath sounds normal. He has no wheezes. He has no rales.   Abdominal: Soft. He exhibits no distension and no mass. There is no tenderness.   Musculoskeletal: Normal range of motion. He exhibits edema. He exhibits no deformity.   trace ankle edema bilaterally "   Lymphadenopathy:     He has no cervical adenopathy.   Neurological: He is alert and oriented to person, place, and time. He has normal reflexes. No cranial nerve deficit.   Skin: Skin is warm and dry. No rash noted. No erythema.   Psychiatric: He has a normal mood and affect. His behavior is normal. Judgment normal.         RECENT LABS:  Hematology WBC   Date Value Ref Range Status   07/24/2020 5.29 3.40 - 10.80 10*3/mm3 Final     RBC   Date Value Ref Range Status   07/24/2020 3.85 (L) 4.14 - 5.80 10*6/mm3 Final     Hemoglobin   Date Value Ref Range Status   07/24/2020 11.9 (L) 13.0 - 17.7 g/dL Final     Hematocrit   Date Value Ref Range Status   07/24/2020 36.6 (L) 37.5 - 51.0 % Final     Platelets   Date Value Ref Range Status   07/24/2020 179 140 - 450 10*3/mm3 Final        Lab Results   Component Value Date    NEUTROABS 3.61 07/24/2020       Lab Results   Component Value Date    GLUCOSE 102 (H) 05/14/2020    BUN 20 05/14/2020    CREATININE 1.17 05/14/2020    EGFRIFNONA 61 05/14/2020    EGFRIFAFRI 107 04/23/2018    BCR 17.1 05/14/2020    K 4.6 05/14/2020    CO2 29.2 (H) 05/14/2020    CALCIUM 8.8 05/14/2020    PROTENTOTREF 7.2 04/23/2018    ALBUMIN 3.70 05/14/2020    LABIL2 1.3 04/23/2018    AST 28 05/14/2020    ALT 19 05/14/2020     Lab Results   Component Value Date    INR 2.60 07/10/2020    INR 2.50 06/28/2020    INR 2.60 06/13/2020    PROTIME 24.5 (H) 03/09/2020    PROTIME 29.3 (H) 02/10/2020    PROTIME 29.7 (H) 02/06/2020     PET SCAN 1/14/2020  IMPRESSION:  1. Resolution of the hypermetabolic right tongue mass and the bulky  right cervical lymphadenopathy. There is no evidence for new metastatic  disease.  2. Interval decrease in the intensity of the asymmetric hypermetabolic  activity at the left cricoid cartilage.    Assessment/Plan     1.  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  He was undergoing definitive  treatment with radiation therapy and weekly low-dose carboplatin and Taxol chemotherapy.  As noted above, he was unable to tolerate the full treatment and received about 5 weeks of therapy with his last treatment the week of 7/31/2019.  As noted above, his posttreatment PET scan from 1/14/2020 showed complete metabolic response.  2.  Comorbidities including ischemic heart disease with history of CHF and atrial fibrillation requiring Coumadin anticoagulation.   3.  Extremely poor tolerance to chemotherapy and radiation.  At this point we do not feel the patient can tolerate any further chemotherapy or radiation and will focus on letting him regain his strength and nutrition.   4.  Warfarin anticoagulation.     5.  MediPort infection with methicillin sensitive staph septicemia.  This resulted in a lengthy hospitalization and stay in the critical care unit.  He now seems to be recovered and is completed his antibiotic therapy as an outpatient.  His port was removed.  6.  Hypothyroidism which developed following his radiation treatment.  He currently is on thyroid replacement with Synthroid 100 mcg daily    Plan  1.  No further plans for additional chemotherapy or radiation.  2.  His cardiology office will continue to manage his Coumadin anticoagulation for now.  3.  He will follow-up with Dr. Slater for further ENT exams.  4.  Continue levothyroxine 100 mcg daily  5.  We will schedule follow-up in our office in 6 months with CT scans and labs performed 1 week prior to the visit.

## 2020-07-27 ENCOUNTER — ANTICOAGULATION VISIT (OUTPATIENT)
Dept: PHARMACY | Facility: HOSPITAL | Age: 75
End: 2020-07-27

## 2020-07-27 DIAGNOSIS — I48.19 OTHER PERSISTENT ATRIAL FIBRILLATION (HCC): ICD-10-CM

## 2020-07-27 LAB
INR PPP: 2.9
INR PPP: 2.9

## 2020-07-27 NOTE — PROGRESS NOTES
Anticoagulation Clinic Progress Note    Anticoagulation Summary  As of 2020    INR goal:   2.0-3.0   TTR:   63.3 % (1.7 y)   INR used for dosin.90 (2020)   Warfarin maintenance plan:   2 mg every Sun, Thu; 4 mg all other days   Weekly warfarin total:   24 mg   No change documented:   Brigida Monique   Plan last modified:   Álvaro Sam [facility] (10/15/2019)   Next INR check:   8/3/2020   Priority:   High   Target end date:   Indefinite    Indications    Other persistent atrial fibrillation (CMS/HCC) [I48.19]             Anticoagulation Episode Summary     INR check location:       Preferred lab:       Send INR reminders to:    [facility]    Comments:   MD INR home danika      Anticoagulation Care Providers     Provider Role Specialty Phone number    Myriam Omer MD Referring Cardiology [phone]          Clinic Interview:  No pertinent clinical findings have been reported.    INR History:  Anticoagulation Monitoring 2020   INR 2.50 2.60 2.90   INR Date 2020 7/10/2020 2020   INR Goal 2.0-3.0 2.0-3.0 2.0-3.0   Trend Same Same Same   Last Week Total 24 mg 24 mg 24 mg   Next Week Total 24 mg 24 mg 24 mg   Sun 2 mg 2 mg 2 mg   Mon 4 mg 4 mg 4 mg   Tue 4 mg 4 mg 4 mg   Wed 4 mg 4 mg 4 mg   Thu 2 mg 2 mg 2 mg   Fri 4 mg 4 mg 4 mg   Sat 4 mg 4 mg 4 mg   Visit Report - - -   Some recent data might be hidden       Plan:  1. INR is therapeutic today- see above in Anticoagulation Summary.    Cody Eldridge to continue their warfarin regimen- see above in Anticoagulation Summary.  2. Follow up in 1 week  3. Pt has agreed to only be called if INR out of range. They have been instructed to call if any changes in medications, doses, concerns, etc. Patient expresses understanding and has no further questions at this time.    Brigida Monique

## 2020-07-29 ENCOUNTER — RESULTS ENCOUNTER (OUTPATIENT)
Dept: FAMILY MEDICINE CLINIC | Facility: CLINIC | Age: 75
End: 2020-07-29

## 2020-07-29 DIAGNOSIS — E03.9 ACQUIRED HYPOTHYROIDISM: ICD-10-CM

## 2020-07-30 ENCOUNTER — TREATMENT (OUTPATIENT)
Dept: PHYSICAL THERAPY | Facility: CLINIC | Age: 75
End: 2020-07-30

## 2020-07-30 DIAGNOSIS — M54.50 CHRONIC BILATERAL LOW BACK PAIN WITHOUT SCIATICA: Primary | ICD-10-CM

## 2020-07-30 DIAGNOSIS — G89.29 CHRONIC BILATERAL LOW BACK PAIN WITHOUT SCIATICA: Primary | ICD-10-CM

## 2020-07-30 PROCEDURE — 97110 THERAPEUTIC EXERCISES: CPT | Performed by: PHYSICAL THERAPIST

## 2020-07-30 PROCEDURE — 97161 PT EVAL LOW COMPLEX 20 MIN: CPT | Performed by: PHYSICAL THERAPIST

## 2020-07-30 PROCEDURE — 97140 MANUAL THERAPY 1/> REGIONS: CPT | Performed by: PHYSICAL THERAPIST

## 2020-07-30 NOTE — PROGRESS NOTES
Physical Therapy Initial Evaluation and Plan of Care      Patient: Cody Eldridge   : 1945  Diagnosis/ICD-10 Code:  Chronic bilateral low back pain without sciatica [M54.5, G89.29]  Referring practitioner: James Epley, APRN  Date of Initial Visit: 2020  Today's Date: 2020  Patient seen for 1 sessions           Subjective Questionnaire: Oswestry: 66/100      Subjective Evaluation    History of Present Illness  Mechanism of injury: Lower back pain for at least 10 years. Worse in the last year. Aggravated with all activity. Sitting and standing tolerance limited to 30 mins. Back hurts after walking 1 block. Calf cramps wake him up at night. Hx of lumbar injections. Most recent injection was last year.    Takes muscle relaxer and pain pills daily. Not sure if this is helping. Relief with hot shower.    Sleeps on L side      Patient Occupation: Manages apartments Pain  Current pain ratin  At best pain rating: 3  At worst pain rating: 10  Location: Belt line, L>R  Quality: dull ache and sharp  Relieving factors: heat, medications and rest  Aggravating factors: ambulation, lifting, prolonged positioning and standing  Progression: worsening    Social Support  Lives in: one-story house  Lives with: spouse    Diagnostic Tests  No diagnostic tests performed    Treatments  Previous treatment: injection treatment  Current treatment: physical therapy  Patient Goals  Patient goals for therapy: decreased pain, increased motion, increased strength and independence with ADLs/IADLs             Objective        Special Questions  Patient is experiencing night pain and disturbed sleep.       Postural Observations  Seated posture: fair    Additional Postural Observation Details  Fwd flexed, left shoulder elevated, left rib hump    Palpation   Left   Tenderness of the quadratus lumborum.     Tenderness     Lumbar Spine  Tenderness in the spinous process.     Additional Tenderness Details  L4/5, L5/S1, B  SIJs    Neurological Testing     Reflexes   Left   Left clonus sign: intermittent.    Additional Neurological Details  Describes numbness in posterior thighs, lower legs    Active Range of Motion     Lumbar   Flexion: with pain  Extension: with pain  Left lateral flexion: with pain  Right lateral flexion: with pain    Additional Active Range of Motion Details  Lumbar flexion limited 50%, moderate limitation in hamstring flexibility  0 Extension  SB, rotation limited 50% B    Strength/Myotome Testing     Left Hip   Planes of Motion   Flexion: 4  Abduction: 4    Right Hip   Planes of Motion   Flexion: 4  Abduction: 4    Tests       Thoracic   Negative slump.     Additional Tests Details  ZACHARIAH: positive B for LBP  Passive SLR: posterior thigh pain B @ ~70deg          Assessment & Plan     Assessment  Impairments: abnormal muscle tone, abnormal or restricted ROM, activity intolerance, impaired physical strength, lacks appropriate home exercise program and pain with function  Assessment details: Pt presents w/ c/o low back pain, L>R. Symptoms include limited and painful lumbar AROM, lumbar tenderness, limited hamstring flexibility, and positive ZACHARIAH bilaterally for LBP. Pt will benefit from skilled PT services in order to address listed impairments and increase tolerance to normal daily activities including ADLs, work, and recreational activities.    Prognosis: good  Functional Limitations: lifting, sleeping, walking, uncomfortable because of pain, sitting, standing and stooping  Goals  Plan Goals: Short Term Goals: 2-4 weeks. Patient will:  1. Be independent with initial HEP  2. Be instructed in posture and body mechanics  3. Demonstrate TrA contraction during exercises in clinic without need for cueing.  4. Report pain of </= 5/10 with daily activities    Long Term Goals: 6-12 weeks. Patient will:  1. Demonstrate improved Bilateral lower extremity/lower abdominal MMT of >/= 4+/5 to allow for performance of daily  activities without pain.  2. Demonstrate lower extremity flexibility and lumbar ROM WFL to allow for return to household & recreational activities w/o increased symptoms  3. Report pain of </= 1/10 with all daily activities.  4. Perceived disability </= 10% as measured by Oswestry Questionnaire.  5. Be independent with long term HEP    Plan  Therapy options: will be seen for skilled physical therapy services  Planned modality interventions: cryotherapy, electrical stimulation/Russian stimulation, thermotherapy (hydrocollator packs), traction and ultrasound  Other planned modality interventions: Dry needling  Planned therapy interventions: abdominal trunk stabilization, ADL retraining, body mechanics training, balance/weight-bearing training, flexibility, functional ROM exercises, gait training, home exercise program, joint mobilization, manual therapy, neuromuscular re-education, soft tissue mobilization, spinal/joint mobilization, strengthening, stretching and therapeutic activities  Frequency: 2x week  Duration in weeks: 12  Treatment plan discussed with: patient        Manual Therapy:    10     mins  05159;  Therapeutic Exercise:    15     mins  12012;     Neuromuscular Ray:    5    mins  16484;    Therapeutic Activity:     0     mins  09036;     Gait Trainin     mins  86875;     Ultrasound:     0     mins  02836;    Electrical Stimulation:    0     mins  52743 ( );  Dry Needling     0     mins self-pay    Timed Treatment:   30   mins   Total Treatment:     60   mins    PT SIGNATURE: Joanne Romero, MARICEL   DATE TREATMENT INITIATED: 2020    Initial Certification  Certification Period: 10/28/2020  I certify that the therapy services are furnished while this patient is under my care.  The services outlined above are required by this patient, and will be reviewed every 90 days.     PHYSICIAN: Epley, James, APRN      DATE:     Please sign and return via fax to [phone].. Thank you, [facility].

## 2020-08-04 ENCOUNTER — TELEPHONE (OUTPATIENT)
Dept: FAMILY MEDICINE CLINIC | Facility: CLINIC | Age: 75
End: 2020-08-04

## 2020-08-04 ENCOUNTER — TREATMENT (OUTPATIENT)
Dept: PHYSICAL THERAPY | Facility: CLINIC | Age: 75
End: 2020-08-04

## 2020-08-04 DIAGNOSIS — G89.29 CHRONIC BILATERAL LOW BACK PAIN WITHOUT SCIATICA: Primary | ICD-10-CM

## 2020-08-04 DIAGNOSIS — M54.50 CHRONIC BILATERAL LOW BACK PAIN WITHOUT SCIATICA: Primary | ICD-10-CM

## 2020-08-04 PROCEDURE — 97110 THERAPEUTIC EXERCISES: CPT | Performed by: PHYSICAL THERAPIST

## 2020-08-04 PROCEDURE — 97530 THERAPEUTIC ACTIVITIES: CPT | Performed by: PHYSICAL THERAPIST

## 2020-08-04 NOTE — PROGRESS NOTES
Physical Therapy Daily Progress Note        Cody Eldridge reports: He is sore from this weekend. His wife fell, and he tried to catch her and ended up breaking her fall. Says that his exercises are going well.    Subjective     Objective   See Exercise, Manual, and Modality Logs for complete treatment.   Exercise rationale/ pain free exercise performance  Anatomy and structure of affected musculature  Posture/Postural awareness  Lumbar support -towel roll  Sleeping positions with pillows  Alternate exercise positions - how to do some seated stretches for when he is at work.  Verbal/Tactile cues to ensure correct exercise performance/technique for proper TA contraction    Added: Supine 90/90 HS stretch, supine hip abd with green t band, ppt, supine bridges    Your Access Code  [email or web address]       Assessment/Plan   Pt reports to be doing okay with HEP with no increase of symptoms since last visit. Pt was educated on ways to incorporate exercises into daily life to further decrease his LBP. Pt continues to require verbal cues for proper TA contraction and exercises for proper technique and positioning.    Progress strengthening /stabilization /functional activity to include more weightbearing activites.           Manual Therapy:         mins  06081;  Therapeutic Exercise:    23     mins  65713;     Neuromuscular Ray:        mins  05391;    Therapeutic Activity:     15     mins  98035;     Gait Training:           mins  14509;     Ultrasound:          mins  01721;    Electrical Stimulation:         mins  03999 ( );      Timed Treatment:  38    mins   Total Treatment:     38   mins    Nicolas Boothe PTA    Physical Therapist Assistant [place] [id number]

## 2020-08-05 ENCOUNTER — ANTICOAGULATION VISIT (OUTPATIENT)
Dept: PHARMACY | Facility: HOSPITAL | Age: 75
End: 2020-08-05

## 2020-08-05 DIAGNOSIS — I48.19 OTHER PERSISTENT ATRIAL FIBRILLATION (HCC): ICD-10-CM

## 2020-08-05 LAB — INR PPP: 2.9

## 2020-08-05 NOTE — PROGRESS NOTES
Anticoagulation Clinic Progress Note    Anticoagulation Summary  As of 2020    INR goal:   2.0-3.0   TTR:   63.7 % (1.7 y)   INR used for dosin.90 (2020)   Warfarin maintenance plan:   2 mg every Sun, Thu; 4 mg all other days   Weekly warfarin total:   24 mg   No change documented:   Brigida Monique   Plan last modified:   Álvaro Sam [facility] (10/15/2019)   Next INR check:   2020   Priority:   High   Target end date:   Indefinite    Indications    Other persistent atrial fibrillation (CMS/HCC) [I48.19]             Anticoagulation Episode Summary     INR check location:       Preferred lab:       Send INR reminders to:    [facility]    Comments:   MD INR home danika      Anticoagulation Care Providers     Provider Role Specialty Phone number    Myriam Omer MD Referring Cardiology [phone]          Clinic Interview:  No pertinent clinical findings have been reported.    INR History:  Anticoagulation Monitoring 2020   INR 2.60 2.90 2.90   INR Date 7/10/2020 2020 2020   INR Goal 2.0-3.0 2.0-3.0 2.0-3.0   Trend Same Same Same   Last Week Total 24 mg 24 mg 24 mg   Next Week Total 24 mg 24 mg 24 mg   Sun 2 mg 2 mg 2 mg   Mon 4 mg 4 mg 4 mg   Tue 4 mg 4 mg 4 mg   Wed 4 mg 4 mg 4 mg   Thu 2 mg 2 mg 2 mg   Fri 4 mg 4 mg 4 mg   Sat 4 mg 4 mg 4 mg   Visit Report - - -   Some recent data might be hidden       Plan:  1. INR is therapeutic today- see above in Anticoagulation Summary.    Cody Eldridge to continue their warfarin regimen- see above in Anticoagulation Summary.  2. Follow up in 1 week  3. Pt has agreed to only be called if INR out of range. They have been instructed to call if any changes in medications, doses, concerns, etc. Patient expresses understanding and has no further questions at this time.    Brigida Monique

## 2020-08-06 ENCOUNTER — TREATMENT (OUTPATIENT)
Dept: PHYSICAL THERAPY | Facility: CLINIC | Age: 75
End: 2020-08-06

## 2020-08-06 ENCOUNTER — OFFICE VISIT (OUTPATIENT)
Dept: FAMILY MEDICINE CLINIC | Facility: CLINIC | Age: 75
End: 2020-08-06

## 2020-08-06 VITALS
OXYGEN SATURATION: 94 % | SYSTOLIC BLOOD PRESSURE: 118 MMHG | WEIGHT: 163 LBS | DIASTOLIC BLOOD PRESSURE: 75 MMHG | BODY MASS INDEX: 23.34 KG/M2 | TEMPERATURE: 97.1 F | HEIGHT: 70 IN | HEART RATE: 97 BPM

## 2020-08-06 DIAGNOSIS — R61 NIGHT SWEATS: Primary | ICD-10-CM

## 2020-08-06 DIAGNOSIS — M54.50 CHRONIC BILATERAL LOW BACK PAIN WITHOUT SCIATICA: Primary | ICD-10-CM

## 2020-08-06 DIAGNOSIS — G89.29 CHRONIC BILATERAL LOW BACK PAIN WITHOUT SCIATICA: Primary | ICD-10-CM

## 2020-08-06 DIAGNOSIS — E03.9 ACQUIRED HYPOTHYROIDISM: ICD-10-CM

## 2020-08-06 PROCEDURE — 99214 OFFICE O/P EST MOD 30 MIN: CPT | Performed by: NURSE PRACTITIONER

## 2020-08-06 PROCEDURE — 97530 THERAPEUTIC ACTIVITIES: CPT | Performed by: PHYSICAL THERAPIST

## 2020-08-06 PROCEDURE — 97110 THERAPEUTIC EXERCISES: CPT | Performed by: PHYSICAL THERAPIST

## 2020-08-06 NOTE — PROGRESS NOTES
"Physical Therapy Daily Progress Note      Cody Eldridge reports:  Decreased subjective complaints of LB discomfort, feels stretches are helping.    Subjective     Objective   - ambulates with narrow base of support.    See Exercise, Manual, and Modality Logs for complete treatment.   Exercise rationale/ pain free exercise performance  Posture/Postural awareness  Verbal/Tactile cues to ensure correct exercise performance/technique    Added: Sit to stands 23\" and 21\" hi/lo table, lateral walking in // bars for safety.      Assessment/Plan   Decreased subjective complaints of LB discomfort, feels stretches and other exercises are helping. Able to progress exercise regimen to include weightbearing activites without increased discomfort of LBP, just early fatigue of isolated hip abduction musculature. Benefits from manual hamstring stretch to increase flexibility. Benefits from verbal/tactile cues for TA contraction to ensure proper exercise performance, technique, positioning.  Should continue to improve with PT intervention.        Progress strengthening /stabilization /functional activity as symptoms allow. Incorporate more weightbearing activities in clinic as tolerated.           Manual Therapy:   5      mins  38586;  Therapeutic Exercise:    17     mins  75444;     Neuromuscular Ray:        mins  62029;    Therapeutic Activity:    20      mins  98282;     Gait Training:           mins  93440;     Ultrasound:          mins  89454;    Electrical Stimulation:         mins  03277 ( );      Timed Treatment:  42    mins   Total Treatment:    42    mins    Nicolas Boothe PTA    Physical Therapist Assistant [place] [facility]    "

## 2020-08-06 NOTE — PROGRESS NOTES
"Subjective   Cody Eldridge is a 75 y.o. male.     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 but has been doing better slowly regaining strength  No known COVID exposures  Does not think he has COVID  And had no myalgias or fevers    Blood pressures been elevated but today was 118/75 as essential hypertension we have had to decrease his medication due to his weight loss from previous year during treatment  No abdominal pain no urinary complaints  Chronic anxiety which was exacerbated during his treatments and now he is benzodiazepine dependent  We can start weaning him shortly    Relatively new hypothyroid  Requiring replacement recent TSH  Normal        Hypertension   Pertinent negatives include no chest pain or shortness of breath.   Night Sweats   Associated symptoms include diaphoresis. Pertinent negatives include no abdominal pain, chest pain or coughing.        /75   Pulse 97   Temp 97.1 °F (36.2 °C) (Temporal)   Ht 177.8 cm (70\")   Wt 73.9 kg (163 lb)   SpO2 94%   BMI 23.39 kg/m²       The following portions of the patient's history were reviewed and updated as appropriate: allergies, current medications, past family history, past medical history, past social history, past surgical history and problem list.    Review of Systems   Constitutional: Positive for diaphoresis and night sweats.   Respiratory: Negative for cough and shortness of breath.    Cardiovascular: Negative for chest pain.   Gastrointestinal: Negative for abdominal pain.   Genitourinary:        No urinary changes   All other systems reviewed and are negative.      Objective   Physical Exam   Constitutional: He is oriented to person, place, and time. He " appears well-developed and well-nourished. No distress.   Appears stronger overall no distress appears well   HENT:   Head: Normocephalic and atraumatic.   Nose: Nose normal.   Mouth/Throat: Oropharynx is clear and moist.   Eyes: Pupils are equal, round, and reactive to light. Conjunctivae are normal.   Neck: Neck supple. No JVD present.   Cardiovascular: Normal rate, regular rhythm and normal heart sounds.   No murmur heard.  Pulmonary/Chest: Effort normal and breath sounds normal. No respiratory distress. He has no wheezes.   Abdominal: Soft. Bowel sounds are normal. He exhibits no distension and no mass. There is no tenderness. There is no guarding. No hernia.   Musculoskeletal: He exhibits no edema or tenderness.   Lymphadenopathy:     He has no cervical adenopathy.   Neurological: He is alert and oriented to person, place, and time.   Skin: Skin is warm and dry. He is not diaphoretic.   Psychiatric: He has a normal mood and affect. His behavior is normal. Judgment and thought content normal.   Vitals reviewed.        Assessment/Plan   Cody was seen today for hypertension and night sweats.    Diagnoses and all orders for this visit:    Night sweats  -     XR Chest PA & Lateral  -     CBC & Differential  -     TSH  -     CBC Auto Differential    Acquired hypothyroidism  -     XR Chest PA & Lateral  -     CBC & Differential  -     TSH  -     CBC Auto Differential      Outpatient chest x-ray to rule out pneumonia any chest pain shortness of breath high fever cough congestion sore throat body aches fever emergency room  We will repeat TSH to make sure is not hyperthyroid  He will follow-up in a week sooner for problems            There are no Patient Instructions on file for this visit.

## 2020-08-06 NOTE — PATIENT INSTRUCTIONS
Discharge instructions    Continue present medication plenty of fluids  If your morning blood pressures are still elevated when you awaken  Take amlodipine 10 mg at night instead of divided dose  Continue carvedilol no change    Fever chills chest pain weakness  Emergency room  Recheck in 2 weeks if symptoms do not resolve in the office    Follow-up oncology hematology

## 2020-08-07 ENCOUNTER — HOSPITAL ENCOUNTER (OUTPATIENT)
Dept: GENERAL RADIOLOGY | Facility: HOSPITAL | Age: 75
Discharge: HOME | End: 2020-08-07
Admitting: NURSE PRACTITIONER

## 2020-08-07 ENCOUNTER — LAB (OUTPATIENT)
Dept: LAB | Facility: HOSPITAL | Age: 75
End: 2020-08-07

## 2020-08-07 LAB
BASOPHILS # BLD AUTO: 0.03 10*3/MM3 (ref 0–0.2)
BASOPHILS NFR BLD AUTO: 0.6 % (ref 0–1.5)
DEPRECATED RDW RBC AUTO: 44.8 FL (ref 37–54)
EOSINOPHIL # BLD AUTO: 0.11 10*3/MM3 (ref 0–0.4)
EOSINOPHIL NFR BLD AUTO: 2 % (ref 0.3–6.2)
ERYTHROCYTE [DISTWIDTH] IN BLOOD BY AUTOMATED COUNT: 13.2 % (ref 12.3–15.4)
HCT VFR BLD AUTO: 36.5 % (ref 37.5–51)
HGB BLD-MCNC: 12 G/DL (ref 13–17.7)
IMM GRANULOCYTES # BLD AUTO: 0.01 10*3/MM3 (ref 0–0.05)
IMM GRANULOCYTES NFR BLD AUTO: 0.2 % (ref 0–0.5)
LYMPHOCYTES # BLD AUTO: 0.63 10*3/MM3 (ref 0.7–3.1)
LYMPHOCYTES NFR BLD AUTO: 11.6 % (ref 19.6–45.3)
MCH RBC QN AUTO: 31.1 PG (ref 26.6–33)
MCHC RBC AUTO-ENTMCNC: 32.9 G/DL (ref 31.5–35.7)
MCV RBC AUTO: 94.6 FL (ref 79–97)
MONOCYTES # BLD AUTO: 0.59 10*3/MM3 (ref 0.1–0.9)
MONOCYTES NFR BLD AUTO: 10.9 % (ref 5–12)
NEUTROPHILS NFR BLD AUTO: 4.05 10*3/MM3 (ref 1.7–7)
NEUTROPHILS NFR BLD AUTO: 74.7 % (ref 42.7–76)
NRBC BLD AUTO-RTO: 0 /100 WBC (ref 0–0.2)
PLATELET # BLD AUTO: 149 10*3/MM3 (ref 140–450)
PMV BLD AUTO: 10.5 FL (ref 6–12)
RBC # BLD AUTO: 3.86 10*6/MM3 (ref 4.14–5.8)
TSH SERPL DL<=0.05 MIU/L-ACNC: 4.89 UIU/ML (ref 0.27–4.2)
WBC # BLD AUTO: 5.42 10*3/MM3 (ref 3.4–10.8)

## 2020-08-07 PROCEDURE — 85025 COMPLETE CBC W/AUTO DIFF WBC: CPT | Performed by: NURSE PRACTITIONER

## 2020-08-07 PROCEDURE — 84443 ASSAY THYROID STIM HORMONE: CPT | Performed by: NURSE PRACTITIONER

## 2020-08-07 PROCEDURE — 36415 COLL VENOUS BLD VENIPUNCTURE: CPT | Performed by: NURSE PRACTITIONER

## 2020-08-07 PROCEDURE — 71046 X-RAY EXAM CHEST 2 VIEWS: CPT

## 2020-08-10 ENCOUNTER — TELEPHONE (OUTPATIENT)
Dept: PHYSICAL THERAPY | Facility: CLINIC | Age: 75
End: 2020-08-10

## 2020-08-12 ENCOUNTER — TREATMENT (OUTPATIENT)
Dept: PHYSICAL THERAPY | Facility: CLINIC | Age: 75
End: 2020-08-12

## 2020-08-12 DIAGNOSIS — G89.29 CHRONIC BILATERAL LOW BACK PAIN WITHOUT SCIATICA: Primary | ICD-10-CM

## 2020-08-12 DIAGNOSIS — M25.511 CHRONIC RIGHT SHOULDER PAIN: ICD-10-CM

## 2020-08-12 DIAGNOSIS — G89.29 CHRONIC RIGHT SHOULDER PAIN: ICD-10-CM

## 2020-08-12 DIAGNOSIS — M54.50 CHRONIC BILATERAL LOW BACK PAIN WITHOUT SCIATICA: Primary | ICD-10-CM

## 2020-08-12 DIAGNOSIS — Z74.09 LIMITED MOBILITY: ICD-10-CM

## 2020-08-12 PROCEDURE — 97110 THERAPEUTIC EXERCISES: CPT | Performed by: PHYSICAL THERAPIST

## 2020-08-12 PROCEDURE — 97140 MANUAL THERAPY 1/> REGIONS: CPT | Performed by: PHYSICAL THERAPIST

## 2020-08-12 PROCEDURE — G0283 ELEC STIM OTHER THAN WOUND: HCPCS | Performed by: PHYSICAL THERAPIST

## 2020-08-12 NOTE — PROGRESS NOTES
Physical Therapy Daily Progress Note      Visit # 4      Subjective   Pt reports his back has felt worse since increasing exercise. ADLs are more limited. Rates pain 9/10 across lower back.    Objective     TTP L L5/S1, SIJ    See Exercise, Manual, and Modality Logs for complete treatment.       Assessment/Plan  Tender in region of L SIJ. Responded well to manual traction and massage w/ reports of decreased pain. Issued modified HEP to limit pain. Pt reported pain relief with estim and ice. Rated pain 7/10 at end of session.  Progress per POC.         Manual Therapy:    15     mins  31772;  Therapeutic Exercise:    15     mins  84771;     Neuromuscular Ray:    0    mins  27086;    Therapeutic Activity:     0     mins  10189;     Gait Trainin     mins  15313;     Ultrasound:     0     mins  25927;    Work Hardening           0      mins 34697  Iontophoresis               0   mins 57799  Estim 15 mins    Timed Treatment:   30   mins   Total Treatment:     50   mins    Joanne Romero, PT  Physical Therapist

## 2020-08-13 RX ORDER — WARFARIN SODIUM 2 MG/1
TABLET ORAL
Qty: 155 TABLET | Refills: 1 | Status: SHIPPED | OUTPATIENT
Start: 2020-08-13 | End: 2021-04-08 | Stop reason: SDUPTHER

## 2020-08-13 RX ORDER — MONTELUKAST SODIUM 10 MG/1
TABLET ORAL
Qty: 90 TABLET | Refills: 1 | Status: SHIPPED | OUTPATIENT
Start: 2020-08-13 | End: 2021-02-22

## 2020-08-16 ENCOUNTER — TELEPHONE (OUTPATIENT)
Dept: CARDIOLOGY | Facility: CLINIC | Age: 75
End: 2020-08-16

## 2020-08-16 LAB — INR PPP: 5.7

## 2020-08-16 NOTE — TELEPHONE ENCOUNTER
Patient INR called from the company was 5.7 today. Called and spoke with Dr. Peterson she asked me to hold his coumadin today and notify the anticoagulation clinic tomorrow for further instructions. I called and spoke with his wife since he was not at home and asked to make sure he didn't take any coumadin today and call the AC clinic tomorrow. She verbalized understanding.

## 2020-08-17 ENCOUNTER — ANTICOAGULATION VISIT (OUTPATIENT)
Dept: PHARMACY | Facility: HOSPITAL | Age: 75
End: 2020-08-17

## 2020-08-17 DIAGNOSIS — I48.19 OTHER PERSISTENT ATRIAL FIBRILLATION (HCC): ICD-10-CM

## 2020-08-17 NOTE — PROGRESS NOTES
Anticoagulation Clinic Progress Note    Anticoagulation Summary  As of 2020    INR goal:   2.0-3.0   TTR:   62.6 % (1.7 y)   INR used for dosin.70! (2020)   Warfarin maintenance plan:   2 mg every Sun, Thu; 4 mg all other days   Weekly warfarin total:   24 mg   Plan last modified:   Álvaro Sam RPH (10/15/2019)   Next INR check:   2020   Priority:   High   Target end date:   Indefinite    Indications    Other persistent atrial fibrillation (CMS/HCC) [I48.19]             Anticoagulation Episode Summary     INR check location:       Preferred lab:       Send INR reminders to:   [facility]    Comments:   MD INR home danika      Anticoagulation Care Providers     Provider Role Specialty Phone number    Myriam Omer MD Referring Cardiology [phone]          Clinic Interview:  Patient Findings     Positives:   Change in health, Missed doses, Other complaints    Negatives:   Signs/symptoms of thrombosis, Signs/symptoms of bleeding,   Laboratory test error suspected, Change in alcohol use, Change in   activity, Upcoming invasive procedure, Emergency department visit,   Upcoming dental procedure, Extra doses, Change in medications, Change in   diet/appetite, Hospital admission, Bruising    Comments:   Reports bad allergies recently; denies OTCs. Reports   blueberry/pomegranate drink, but reports he has occasionally had this for   a long time. Self-held dose yesterday after seeing high INR.      Clinical Outcomes     Negatives:   Major bleeding event, Thromboembolic event,   Anticoagulation-related hospital admission, Anticoagulation-related ED   visit, Anticoagulation-related fatality    Comments:   Reports bad allergies recently; denies OTCs. Reports   blueberry/pomegranate drink, but reports he has occasionally had this for   a long time. Self-held dose yesterday after seeing high INR.        INR History:  Anticoagulation Monitoring 2020   INR  2.90 2.90 5.70   INR Date 7/27/2020 8/4/2020 8/16/2020   INR Goal 2.0-3.0 2.0-3.0 2.0-3.0   Trend Same Same Same   Last Week Total 24 mg 24 mg 22 mg   Next Week Total 24 mg 24 mg 20 mg   Sun 2 mg 2 mg -   Mon 4 mg 4 mg Hold (8/17)   Tue 4 mg 4 mg 4 mg   Wed 4 mg 4 mg -   Thu 2 mg 2 mg -   Fri 4 mg 4 mg -   Sat 4 mg 4 mg -   Visit Report - - -   Some recent data might be hidden       Plan:  1. INR is Supratherapeutic today- see above in Anticoagulation Summary.   Will instruct Cody DAVIES Chente to Change their warfarin regimen- see above in Anticoagulation Summary.  2. Follow up in 2 days  3. They have been instructed to call if any changes in medications, doses, concerns, etc. To seek immediate medical attention if s/sx of bleeding develop or fall occurs. Patient expresses understanding and has no further questions at this time.    Georges Guadalupe [facility]

## 2020-08-18 ENCOUNTER — TREATMENT (OUTPATIENT)
Dept: PHYSICAL THERAPY | Facility: CLINIC | Age: 75
End: 2020-08-18

## 2020-08-18 DIAGNOSIS — Z74.09 LIMITED MOBILITY: ICD-10-CM

## 2020-08-18 DIAGNOSIS — M54.50 CHRONIC BILATERAL LOW BACK PAIN WITHOUT SCIATICA: Primary | ICD-10-CM

## 2020-08-18 DIAGNOSIS — G89.29 CHRONIC BILATERAL LOW BACK PAIN WITHOUT SCIATICA: Primary | ICD-10-CM

## 2020-08-18 PROCEDURE — G0283 ELEC STIM OTHER THAN WOUND: HCPCS | Performed by: PHYSICAL THERAPIST

## 2020-08-18 PROCEDURE — 97140 MANUAL THERAPY 1/> REGIONS: CPT | Performed by: PHYSICAL THERAPIST

## 2020-08-18 PROCEDURE — 97110 THERAPEUTIC EXERCISES: CPT | Performed by: PHYSICAL THERAPIST

## 2020-08-18 NOTE — PROGRESS NOTES
"Physical Therapy Daily Progress Note      Visit # 5      Subjective   Pt reports low back pain improved since last visit. Rates pain 7/10 across lower back. Describes pain and cramping in bilateral anterior lower legs when in bed. \"My toes curl up towards my shins.\"    Objective   See Exercise, Manual, and Modality Logs for complete treatment.       Assessment/Plan  Subjective reports of low back pain improved. Tolerates exercise and manual interventions well. Reported decreased pain after estim and heat. Progress per POC.           Manual Therapy:    20     mins  18672;  Therapeutic Exercise:    20     mins  55817;     Neuromuscular Ray:    0    mins  86863;    Therapeutic Activity:     0     mins  70454;     Gait Trainin     mins  30520;     Ultrasound:     0     mins  86506;    Work Hardening           0      mins 59807  Iontophoresis               0   mins 30269  Estim 15 mins    Timed Treatment:   40   mins   Total Treatment:     60   mins    Joanne Romero, PT  Physical Therapist  "

## 2020-08-20 ENCOUNTER — TREATMENT (OUTPATIENT)
Dept: PHYSICAL THERAPY | Facility: CLINIC | Age: 75
End: 2020-08-20

## 2020-08-20 ENCOUNTER — OFFICE VISIT (OUTPATIENT)
Dept: FAMILY MEDICINE CLINIC | Facility: CLINIC | Age: 75
End: 2020-08-20

## 2020-08-20 VITALS
SYSTOLIC BLOOD PRESSURE: 116 MMHG | BODY MASS INDEX: 22.9 KG/M2 | TEMPERATURE: 96.7 F | DIASTOLIC BLOOD PRESSURE: 85 MMHG | WEIGHT: 160 LBS | HEART RATE: 77 BPM | OXYGEN SATURATION: 99 % | HEIGHT: 70 IN

## 2020-08-20 DIAGNOSIS — M54.16 LUMBAR RADICULOPATHY, ACUTE: Primary | ICD-10-CM

## 2020-08-20 DIAGNOSIS — Z74.09 LIMITED MOBILITY: ICD-10-CM

## 2020-08-20 DIAGNOSIS — M54.50 CHRONIC BILATERAL LOW BACK PAIN WITHOUT SCIATICA: Primary | ICD-10-CM

## 2020-08-20 DIAGNOSIS — G89.29 CHRONIC RIGHT SHOULDER PAIN: ICD-10-CM

## 2020-08-20 DIAGNOSIS — G89.29 CHRONIC BILATERAL LOW BACK PAIN WITHOUT SCIATICA: Primary | ICD-10-CM

## 2020-08-20 DIAGNOSIS — M25.511 CHRONIC RIGHT SHOULDER PAIN: ICD-10-CM

## 2020-08-20 LAB — INR PPP: 3.7

## 2020-08-20 PROCEDURE — 97110 THERAPEUTIC EXERCISES: CPT | Performed by: PHYSICAL THERAPIST

## 2020-08-20 PROCEDURE — 99213 OFFICE O/P EST LOW 20 MIN: CPT | Performed by: NURSE PRACTITIONER

## 2020-08-20 PROCEDURE — G0283 ELEC STIM OTHER THAN WOUND: HCPCS | Performed by: PHYSICAL THERAPIST

## 2020-08-20 PROCEDURE — 97140 MANUAL THERAPY 1/> REGIONS: CPT | Performed by: PHYSICAL THERAPIST

## 2020-08-20 RX ORDER — METHYLPREDNISOLONE 4 MG/1
TABLET ORAL
Qty: 21 TABLET | Refills: 0 | Status: SHIPPED | OUTPATIENT
Start: 2020-08-20 | End: 2020-08-31

## 2020-08-20 NOTE — PROGRESS NOTES
"Subjective   Cody Eldridge is a 75 y.o. male.     Very pleasant gentleman here today complains of severe bilateral leg pain starting with posterior knees down his calves and medial aspect of his legs to his toes  Pain is so bad he has to get up and walk and then has difficult time walking he has some nausea with the pain because the intensity but he has no fever chest pain shortness of breath no abdominal pain he has no weakness no bowel or bladder change or week tension no saddlebag paresthesias  He has a long history of degenerative changes in arthritis of his back and stenosis, he sees pain management for chronic back pain injections he has had no recent injections no fever chills he is having no focal back pain he has some night sweats a few weeks ago possibly related to his thyroid medication although his TSH was a little high but he is doing well these have completely resolved  He is not requesting any pain medication simply wants to talk about his new exacerbation of pain he is presently getting physical therapy they are doing treatments on his low back for his chronic degenerative changes.           /85   Pulse 77   Temp 96.7 °F (35.9 °C) (Temporal)   Ht 177.8 cm (70\")   Wt 72.6 kg (160 lb)   SpO2 99%   BMI 22.96 kg/m²       The following portions of the patient's history were reviewed and updated as appropriate: allergies, current medications, past family history, past medical history, past social history, past surgical history and problem list.    Review of Systems   Constitutional: Negative for fatigue and fever.   HENT: Negative.  Negative for trouble swallowing.    Eyes: Negative.    Respiratory: Negative.  Negative for cough and shortness of breath.    Cardiovascular: Negative for chest pain, palpitations and leg swelling.   Gastrointestinal: Negative.  Negative for abdominal pain.   Genitourinary: Negative.    Musculoskeletal: Negative.         Bilateral leg pain at night   Skin: " Negative.    Neurological: Negative.  Negative for dizziness and confusion.   Psychiatric/Behavioral: Negative.        Objective   Physical Exam   Constitutional: He is oriented to person, place, and time. He appears well-developed and well-nourished. No distress.   Pleasant patient appears well no distress   HENT:   Head: Normocephalic and atraumatic.   Nose: Nose normal.   Mouth/Throat: Oropharynx is clear and moist.   Eyes: Pupils are equal, round, and reactive to light. Conjunctivae are normal.   Neck: Neck supple. No JVD present.   Cardiovascular: Normal rate, regular rhythm and normal heart sounds.   No murmur heard.  Pulmonary/Chest: Effort normal and breath sounds normal. No respiratory distress. He has no wheezes.   Abdominal: Soft. Bowel sounds are normal. He exhibits no distension and no mass. There is no tenderness. There is no guarding. No hernia.   Musculoskeletal: He exhibits no edema or tenderness.   Negative straight leg raise plantar flexion dorsiflexion normal  Lower extremity neurovascular intact no swelling no edema no focal weakness   Lymphadenopathy:     He has no cervical adenopathy.   Neurological: He is alert and oriented to person, place, and time. He displays normal reflexes.   Skin: Skin is warm and dry. He is not diaphoretic.   Psychiatric: He has a normal mood and affect. His behavior is normal. Judgment and thought content normal.   Vitals reviewed.        Assessment/Plan   Cody was seen today for bilateral leg pain.    Diagnoses and all orders for this visit:    Lumbar radiculopathy, acute      Patient will follow instructions provided if he has weakness bowel or bladder change incontinence increase or retention groin paresthesias emergency room  If not improving after therapy we may need to repeat his MRI of the lumbar spine            There are no Patient Instructions on file for this visit.

## 2020-08-20 NOTE — PATIENT INSTRUCTIONS
Discharge instructions  Consider sleeping in a chair or sitting up or on your side  With some flexion of your knees which may decrease the back pain at night possibly  Medrol Dosepak now  If racing heart increased irritability  Agitation depression discontinue the medication    If severe uncontrolled pain weakness groin paresthesias  Back pain fever leg pain fever emergency room    Otherwise follow-up in the office in 1 month but send me a message in 1 week 1 update on how you are doing as well as follow-up with pain management as needed if your pain levels are uncontrolled  Physical therapy should work on your low back and discussed the pain down your legs which I suspect is a pinched nerve if you need a new referral please let me know

## 2020-08-20 NOTE — PROGRESS NOTES
Physical Therapy Daily Progress Note      Visit # 6      Subjective   Feeling better. Rates pain 6/10.    Objective   TTP SIJs, R>L    See Exercise, Manual, and Modality Logs for complete treatment.       Assessment/Plan  Pain rating continues to decrease. Responds well to manual lumbar traction and massage. Able to progress core strengthening w/o c/o pain. Pt reports further pain relief with estim and heat. Progress per POC.                 Manual Therapy:    15     mins  94991;  Therapeutic Exercise:    25     mins  78692;     Neuromuscular Ray:    0    mins  30281;    Therapeutic Activity:     0     mins  05604;     Gait Trainin     mins  91587;     Ultrasound:     0     mins  82732;    Work Hardening           0      mins 39463  Iontophoresis               0   mins 74170  Estim 15 mins    Timed Treatment:   40   mins   Total Treatment:     60   mins    Joanne Romero, PT  Physical Therapist

## 2020-08-21 ENCOUNTER — ANTICOAGULATION VISIT (OUTPATIENT)
Dept: PHARMACY | Facility: HOSPITAL | Age: 75
End: 2020-08-21

## 2020-08-21 DIAGNOSIS — I48.19 OTHER PERSISTENT ATRIAL FIBRILLATION (HCC): ICD-10-CM

## 2020-08-21 NOTE — PROGRESS NOTES
Anticoagulation Clinic Progress Note    Anticoagulation Summary  As of 8/21/2020    INR goal:   2.0-3.0   TTR:   62.2 % (1.7 y)   INR used for dosing:   3.70! (8/20/2020)   Warfarin maintenance plan:   2 mg every Sun, Thu; 4 mg all other days   Weekly warfarin total:   24 mg   Plan last modified:   Álvaro Sam RPH (10/15/2019)   Next INR check:   8/24/2020   Priority:   High   Target end date:   Indefinite    Indications    Other persistent atrial fibrillation (CMS/HCC) [I48.19]             Anticoagulation Episode Summary     INR check location:       Preferred lab:       Send INR reminders to:    [facility]    Comments:   MD INR home danika      Anticoagulation Care Providers     Provider Role Specialty Phone number    Myriam Omer MD Referring Cardiology [phone]          Drug interactions: has remained unchanged.  Diet: has remained unchanged.    Clinic Interview:  No pertinent clinical findings have been reported.    INR History:  Anticoagulation Monitoring 8/5/2020 8/17/2020 8/21/2020   INR 2.90 5.70 3.70   INR Date 8/4/2020 8/16/2020 8/20/2020   INR Goal 2.0-3.0 2.0-3.0 2.0-3.0   Trend Same Same Same   Last Week Total 24 mg 22 mg 16 mg   Next Week Total 24 mg 20 mg 22 mg   Sun 2 mg - 2 mg   Mon 4 mg Hold (8/17) -   Tue 4 mg 4 mg -   Wed 4 mg - -   Thu 2 mg - -   Fri 4 mg - 2 mg (8/21)   Sat 4 mg - 4 mg   Visit Report - - -   Some recent data might be hidden       Plan:  1. INR is 3.7 today- see above in Anticoagulation Summary.   Pt reports back pain last few wks, Medrol Dospak (6 day tx) started 8/20, decr to 2mg today,   Will instruct Cody Eldridge to take only 2mg today,  see above in Anticoagulation Summary.  2. Follow up on Monday 8/24  3. Pt has agreed to only be called if INR out of range. They have been instructed to call if any changes in medications, doses, concerns, etc. Patient expresses understanding and has no further questions at this time.    Mere Vincent,  RPH

## 2020-08-24 LAB — INR PPP: 1.5

## 2020-08-24 RX ORDER — ALPRAZOLAM 0.5 MG/1
TABLET ORAL
Qty: 45 TABLET | Refills: 0 | Status: SHIPPED | OUTPATIENT
Start: 2020-08-24 | End: 2020-10-27 | Stop reason: SDUPTHER

## 2020-08-24 NOTE — TELEPHONE ENCOUNTER
Please tell patient I made a small change  And his anxiety medicine this month mild decrease   I probably decrease his medicine a small amount every couple months only as he tolerates this thank you

## 2020-08-25 ENCOUNTER — ANTICOAGULATION VISIT (OUTPATIENT)
Dept: PHARMACY | Facility: HOSPITAL | Age: 75
End: 2020-08-25

## 2020-08-25 DIAGNOSIS — I48.19 OTHER PERSISTENT ATRIAL FIBRILLATION (HCC): ICD-10-CM

## 2020-08-25 NOTE — PROGRESS NOTES
Anticoagulation Clinic Progress Note    Anticoagulation Summary  As of 2020    INR goal:   2.0-3.0   TTR:   62.1 % (1.7 y)   INR used for dosin.50! (2020)   Warfarin maintenance plan:   2 mg every Sun, Thu; 4 mg all other days   Weekly warfarin total:   24 mg   No change documented:   Georges Guadalupe RPH   Plan last modified:   Álvaro Sam RPH (10/15/2019)   Next INR check:   2020   Priority:   High   Target end date:   Indefinite    Indications    Other persistent atrial fibrillation (CMS/HCC) [I48.19]             Anticoagulation Episode Summary     INR check location:       Preferred lab:       Send INR reminders to:    [facility] [place]    Comments:   MD INR home danika      Anticoagulation Care Providers     Provider Role Specialty Phone number    Myriam mOer MD Referring Cardiology [phone]          Clinic Interview:  Patient Findings     Positives:   Change in medications    Negatives:   Signs/symptoms of thrombosis, Signs/symptoms of bleeding,   Laboratory test error suspected, Change in health, Change in alcohol use,   Change in activity, Upcoming invasive procedure, Emergency department   visit, Upcoming dental procedure, Missed doses, Extra doses, Change in   diet/appetite, Hospital admission, Bruising, Other complaints    Comments:   Will finish medrol dosepak today.      Clinical Outcomes     Negatives:   Major bleeding event, Thromboembolic event,   Anticoagulation-related hospital admission, Anticoagulation-related ED   visit, Anticoagulation-related fatality    Comments:   Will finish medrol dosepak today.        INR History:  Anticoagulation Monitoring 2020   INR 5.70 3.70 1.50   INR Date 2020   INR Goal 2.0-3.0 2.0-3.0 2.0-3.0   Trend Same Same Same   Last Week Total 22 mg 16 mg 20 mg   Next Week Total 20 mg 22 mg 24 mg   Sun - 2 mg 2 mg   Mon Hold () - -   Tue 4 mg - 4 mg   Wed - - 4 mg   Thu - - 2 mg    Fri - 2 mg (8/21) 4 mg   Sat - 4 mg 4 mg   Visit Report - - -   Some recent data might be hidden       Plan:  1. INR is Subtherapeutic today- see above in Anticoagulation Summary.   Will instruct Cody Eldridge to resume their usual warfarin regimen- see above in Anticoagulation Summary.  2. Follow up in 1 week  3. They have been instructed to call if any changes in medications, doses, concerns, etc. Patient expresses understanding and has no further questions at this time.    Georges Guadalupe [facility]

## 2020-08-28 ENCOUNTER — TELEPHONE (OUTPATIENT)
Dept: FAMILY MEDICINE CLINIC | Facility: CLINIC | Age: 75
End: 2020-08-28

## 2020-08-28 NOTE — TELEPHONE ENCOUNTER
He can try a little Pepto-Bismol or Mylanta to soothe his stomach  Today only    Any severe complaints of pain fever chills urgent recheck you are  Thanks

## 2020-08-31 ENCOUNTER — OFFICE VISIT (OUTPATIENT)
Dept: CARDIOLOGY | Facility: CLINIC | Age: 75
End: 2020-08-31

## 2020-08-31 VITALS
BODY MASS INDEX: 23.22 KG/M2 | WEIGHT: 162.2 LBS | DIASTOLIC BLOOD PRESSURE: 70 MMHG | HEART RATE: 105 BPM | SYSTOLIC BLOOD PRESSURE: 140 MMHG | HEIGHT: 70 IN

## 2020-08-31 DIAGNOSIS — I48.0 PAF (PAROXYSMAL ATRIAL FIBRILLATION) (HCC): ICD-10-CM

## 2020-08-31 DIAGNOSIS — I25.10 CORONARY ARTERY DISEASE INVOLVING NATIVE CORONARY ARTERY OF NATIVE HEART WITHOUT ANGINA PECTORIS: ICD-10-CM

## 2020-08-31 DIAGNOSIS — Z79.01 CURRENT USE OF LONG TERM ANTICOAGULATION: ICD-10-CM

## 2020-08-31 DIAGNOSIS — I10 ESSENTIAL HYPERTENSION: Primary | ICD-10-CM

## 2020-08-31 PROCEDURE — 93000 ELECTROCARDIOGRAM COMPLETE: CPT | Performed by: NURSE PRACTITIONER

## 2020-08-31 PROCEDURE — 99214 OFFICE O/P EST MOD 30 MIN: CPT | Performed by: NURSE PRACTITIONER

## 2020-08-31 NOTE — PROGRESS NOTES
Date of Office Visit: 2020  Encounter Provider: Angelique Espinal, JAMILAH, APRN  Place of Service: [facility] CARDIOLOGY  Patient Name: Cody Eldridge  :1945        Subjective:     Chief Complaint:  Follow-up, hypertension, atrial fibrillation, coronary disease.      History of Present Illness:  Cody Eldridge is a 75 y.o. male patient of Dr. Omer.  I am seeing this patient in the office today and I have reviewed his records.    Patient has a history of coronary artery disease,  systolic heart failure, atrial fibrillation, hypertension, hyperlipidemia, shortness of breath with exertion, depression, COPD, sepsis with subsequent port removal, chronic kidney disease, pulmonary hypertension.     In  patient had an SVT ablation at the [facility].  There is a questionable history of a non-ST elevation myocardial infarction in the past.  Patient had echocardiogram 2000 showing normal left ventricular systolic function with ejection fraction of 64%.  There was mild right and left atrial enlargement as well as mild mitral and tricuspid regurgitation with a right ventricular systolic pressure of 39 mmHg.  In 2017 patient had shortness of breath and leg swelling and was found to have an elevated BNP.  He was started on Lasix.  Chest x-ray showed mild cardiac enlargement.  CT scan of the chest revealed prominent changes of emphysema with scarring.  He had an echocardiogram done which showed normal left ventricular systolic function with an ejection fraction of 56%, mild to moderate left ventricular hypertrophy, moderate to severe dilation of the right ventricle with mildly reduced function, moderate to severe dilation of the right atrium, moderate tricuspid regurgitation with RVSP of 47 mmHg.  Patient was seen by pulmonology and his medications were adjusted.  Patient was seen 18 at this point it was reported that he complained of chest  pain and shortness of breath.  It was recommended that he have a left and right heart catheter.  His amlodipine was discontinued and metoprolol was increased to 100 mg twice daily in an attempt to get better rate control as his heart rate was 113 bpm.  On 6/13/18 patient without heart catheterization but this was aborted.  On 7/13/18 it was reported the patient went to the emergency department with increased shortness of breath and weight gain.  He was given one dose of IV Lasix 80 mg.  He was discharged in stable condition and was told to take an extra 20 mg of Lasix at home.  He then resumed his 40 mg twice a day of Lasix.  Patient was seen in office by Silvina Tejada 7/19/18.  At this point it was reported that patient shortness of breath continued however it had improved since ER visit.  His weights had slowly been trending down.  He did have some fatigue that was coming and going. Patient had a history of claustrophobia which is why previous heart catheter had to be aborted.  It was recommended that patient attempt another heart catheter with anesthesia.  Patient had cardiac catheterization done 8/29/18.  He was found to have mild nonobstructive coronary disease.  Continued treatment for atrial fibrillation as well as risk factor modification was recommended.  Patient was seen in the hospital 8/2019 for worsening bilateral lower extremity edema and shortness of breath after receiving IV fluids a day before at [facility].  EKG showed atrial fibrillation with rapid ventricular rate.  He was placed on amiodarone drip and given IV digoxin and IV diuretics and IV beta-blockers.  He underwent removal of port for sepsis.  Echocardiogram 8/6/2019 showed decreased left ventricular systolic function with EF of 36 to 40%, hypokinesis of septal walls, left atrial dilation, moderate tricuspid regurgitation with moderate pulmonary hypertension.  Digoxin was stopped due to elevated digoxin level.  He was not on anticoagulants due to  severe thrombocytopenia.  He had been on warfarin prior to admission.  Anticoagulants were later resumed.  He was noted to have some bruising but no abnormal bleeding after resuming.  Seen 7/2020 blood pressure log showed labile readings though blood pressure was well controlled in the office.  He was instructed to follow-up with JONELLE in 4 weeks.      Patient presents to office today for follow-up appointment.  Patient reports he is doing well overall since last visit.  He gets some occasional shortness of breath relieved by his inhalers which is not new or worsening and he continues to follow with Dr. Paulson for COPD.  He denies any chest pain/discomfort, palpitations, racing heartbeat sensation, lower extremity edema, dizziness, syncope, falls, or abnormal bleeding.  He has blood pressure log with him in the office today showing blood pressure fluctuating /, most readings 110-140/60s-80s.  He reports he did get a new blood pressure machine after last visit, he believes it is an [employer] arm cuff.  He thinks he is checking after medications.        Past Medical History:   Diagnosis Date   • Acute renal injury (CMS/HCC)    • Anemia associated with chemotherapy    • Atrial fibrillation (CMS/HCC)    • CAD (coronary artery disease)    • CHF (congestive heart failure) (CMS/HCC)    • Chronic bronchitis (CMS/HCC)    • COPD (chronic obstructive pulmonary disease) (CMS/HCC)    • Cor pulmonale (chronic) (CMS/HCC)    • Daytime hypersomnia    • Depression with anxiety    • IRAHETA (dyspnea on exertion)    • Elevated cholesterol    • Enlarged prostate    • Frequent nocturnal awakening    • Gout    • H/O cardiac radiofrequency ablation 2006    [facility].   • Head and neck cancer (CMS/HCC)    • Hyperlipidemia    • Hypertension    • Hypokalemia    • Hypotension    • Insomnia    • Lumbar radicular pain    • SHAR (obstructive sleep apnea)    • Osteoarthritis    • Permanent atrial fibrillation (CMS/HCC)    • Shock, septic  (CMS/[facility])    • Snoring    • Squamous cell carcinoma of neck    • SVT (supraventricular tachycardia) (CMS/[facility])    • Syncope    • Vitamin D deficiency    • Weight loss      Past Surgical History:   Procedure Laterality Date   • APPENDECTOMY     • CARDIAC ABLATION  2006    [facility].   • CARDIAC CATHETERIZATION N/A 8/29/2018    Procedure: Left Heart Cath;  Surgeon: Yousif Uribe MD;  Location: [facility] CATH INVASIVE LOCATION;  Service: Cardiology   • CARDIAC CATHETERIZATION N/A 8/29/2018    Procedure: Coronary angiography;  Surgeon: Yousif Uribe MD;  [facility];  Service: Cardiology   • CARDIAC CATHETERIZATION N/A 8/29/2018    Procedure: Left ventriculography;  Surgeon: Yousif Uribe MD;  [facility];  Service: Cardiology   • FINGER SURGERY     • FOOT SURGERY     • HAND SURGERY     • VENOUS ACCESS DEVICE (PORT) INSERTION Right 6/18/2019    Procedure: MEDIPORT PLACEMENT;  Surgeon: Elvis Grigsby MD;  Location: [facility] MAIN OR;  Service: Vascular   • VENOUS ACCESS DEVICE (PORT) REMOVAL Right 8/5/2019    Procedure: REMOVAL VENOUS ACCESS DEVICE;  Surgeon: Prince Castaneda MD;  Location: [facility] MAIN OR;  Service: Vascular     Outpatient Medications Prior to Visit   Medication Sig Dispense Refill   • albuterol (PROVENTIL HFA;VENTOLIN HFA) 108 (90 BASE) MCG/ACT inhaler Inhale 2 puffs. ProAir  (90 Base) MCG/ACT Inhalation Aerosol Solution; Patient Sig: ProAir  (90 Base) MCG/ACT Inhalation Aerosol Solution inhale 2 puffs by mouth every 4 hours if needed; 8.5; 11; 07-Apr-2014; Act     • ALPRAZolam (XANAX) 0.5 MG tablet 1 tablet every morning by mouth, 1/2 tablet every evening 45 tablet 0   • amLODIPine (NORVASC) 5 MG tablet Take 5 mg by mouth 2 (Two) Times a Day.     • aspirin 81 MG chewable tablet Chew 81 mg Daily.     • baclofen (LIORESAL) 10 MG tablet take 1 tablet by mouth three times a day 90 tablet 1   • carvedilol (COREG) 25 MG tablet  Take 1 tablet by mouth 2 (Two) Times a Day With Meals. 60 tablet 5   • diphenoxylate-atropine (LOMOTIL) 2.5-0.025 MG per tablet Take 1 tablet by mouth 4 (Four) Times a Day As Needed for Diarrhea. 60 tablet 1   • HYDROcodone-acetaminophen (NORCO)  MG per tablet TK 1 T PO QID  0   • levothyroxine (Synthroid) 100 MCG tablet Take 1 tablet by mouth Daily. For low thyroid 30 tablet 2   • Lidocaine Viscous HCl (XYLOCAINE) 2 % solution TK 5 MLS Q 3 H PRF MODERATE PAIN     • montelukast (SINGULAIR) 10 MG tablet TAKE 1 TABLET BY MOUTH DAILY 90 tablet 1   • nystatin (MYCOSTATIN) 142174 UNIT/ML suspension Swish and swallow 5 mL 4 (Four) Times a Day. Until gone 120 mL 1   • rosuvastatin (CRESTOR) 20 MG tablet Take 20 mg by mouth Daily. 30 tablet 5   • sertraline (ZOLOFT) 100 MG tablet Take 1 tablet by mouth Daily. For anxiety and depression relief 30 tablet 6   • warfarin (COUMADIN) 2 MG tablet Take one tablet (2mg) on Sun & Thurs and take two tablets (4mg) on all other days or as directed by [facility] 155 tablet 1   • methylPREDNISolone (MEDROL) 4 MG dose pack Take as directed on package instructions. 21 tablet 0     No facility-administered medications prior to visit.        Allergies as of 2020 - Reviewed 2020   Allergen Reaction Noted   • Benadryl [diphenhydramine hcl] Dizziness 2017     Social History     Socioeconomic History   • Marital status:      Spouse name: Elise Rai   • Number of children: Not on file   • Years of education: Some College   • Highest education level: Not on file   Occupational History     Employer: RETIRED   Tobacco Use   • Smoking status: Former Smoker     Packs/day: 3.00     Years: 30.00     Pack years: 90.00     Types: Cigarettes     Last attempt to quit: 2000     Years since quittin.6   • Smokeless tobacco: Never Used   • Tobacco comment: started age 11 x 53 years stopped 2000 / daily caffiene   Substance and Sexual Activity   • Alcohol use:  "Yes     Alcohol/week: 2.0 standard drinks     Types: 1 Cans of beer, 1 Glasses of wine per week     Comment: occ   • Drug use: No   • Sexual activity: Defer     Family History   Problem Relation Age of Onset   • Heart attack Mother    • Diabetes Mother    • Heart disease Mother    • Hypertension Mother    • Diabetes Father    • Heart failure Father    • Heart disease Father    • Hypertension Father    • Cancer Brother         colon   • Hypertension Brother    • Colon cancer Brother 50   • Diabetes Sister    • Heart disease Sister    • Hypertension Sister    • COPD Other    • Heart failure Other    • Heart disease Other    • Malig Hyperthermia Neg Hx        Review of Systems   Constitution: Negative for chills, fever, malaise/fatigue, weight gain and weight loss.   HENT: Negative for ear pain, hearing loss, nosebleeds and sore throat.    Eyes: Negative for blurred vision, double vision, redness, vision loss in left eye, vision loss in right eye and visual disturbance.   Cardiovascular:        SEE HPI   Respiratory: Negative for cough, shortness of breath, snoring and wheezing.    Endocrine: Negative for cold intolerance and heat intolerance.   Skin: Negative for itching, rash and suspicious lesions.   Musculoskeletal: Negative for joint pain, joint swelling and myalgias.   Gastrointestinal: Negative for abdominal pain, diarrhea, hematemesis, melena, nausea and vomiting.   Genitourinary: Negative for dysuria, frequency and hematuria.   Neurological: Negative for dizziness, headaches, numbness, paresthesias and seizures.   Psychiatric/Behavioral: Positive for depression. Negative for altered mental status. The patient is nervous/anxious.           Objective:     Vitals:    08/31/20 0945   BP: 140/70   BP Location: Right arm   Pulse: 105   Weight: 73.6 kg (162 lb 3.2 oz)   Height: 177.8 cm (70\")     Body mass index is 23.27 kg/m².      PHYSICAL EXAM:  Physical Exam   Constitutional: He is oriented to person, place, and " time. He appears well-developed and well-nourished. No distress.   HENT:   Head: Normocephalic and atraumatic.   Eyes: Pupils are equal, round, and reactive to light.   Neck: Neck supple. No JVD present. Carotid bruit is not present.   Cardiovascular: Normal rate, normal heart sounds and intact distal pulses. An irregularly irregular rhythm present. Exam reveals no gallop and no friction rub.   No murmur heard.  Pulses:       Radial pulses are 2+ on the right side, and 2+ on the left side.        Posterior tibial pulses are 2+ on the right side, and 2+ on the left side.   Pulmonary/Chest: Effort normal and breath sounds normal. No respiratory distress. He has no wheezes. He has no rales.   Abdominal: Soft. Bowel sounds are normal. He exhibits no distension.   Musculoskeletal: He exhibits no edema, tenderness or deformity.   Neurological: He is alert and oriented to person, place, and time.   Skin: Skin is warm and dry. No rash noted. He is not diaphoretic. No erythema.   Psychiatric: He has a normal mood and affect. His behavior is normal. Judgment normal.           ECG 12 Lead  Date/Time: 8/31/2020 10:19 AM  Performed by: Angelique Espinal DNP, JONELLE  Authorized by: Angelique Espinal DNP, JONELLE   Comparison: compared with previous ECG from 7/23/2020  Comparison to previous ECG: Back in atrial fibrillation on EKG today  Rhythm: atrial fibrillation  Rate: tachycardic  BPM: 105  Other findings: non-specific ST-T wave changes  Other findings comments: Similar to previous EKG    Clinical impression: abnormal EKG              Assessment:       Diagnosis Plan   1. Essential hypertension     2. PAF (paroxysmal atrial fibrillation) (CMS/[facility], located within [facility])     3. Current use of long term anticoagulation     4. Coronary artery disease involving native coronary artery of native heart without angina pectoris           Plan:     1. Hypertension: Blood pressure slightly elevated in the office today however has not had medications yet.  Blood  pressure is well controlled in the office a month ago after medications.  His home blood pressure readings are fluctuating quite a bit.  I am not sure that his home cuff is accurate, possibly the atrial fibrillation is causing the electronic cuff to not accurately  blood pressure.  I am going to have him bring his blood pressure cuff and log in in 1 to 2 weeks for blood pressure check appointment so we can check his new cuff for accuracy against a manual reading as unfortunately he did not bring it in today.  Overall most of his blood pressure readings are well controlled.  Asked him to avoid high salt foods and be sure that he is checking at least 1 to 2 hours after morning medications and after sitting calmly about 10 to 15 minutes.  Make adjustments at blood pressure check appointment as indicated if machine is accurate.  2. Nonobstructive coronary artery disease: Continue with risk factor modification.  Denies anginal symptoms.  3. Paroxysmal atrial fibrillation: Anticoagulated with warfarin.  Denies falls or abnormal bleeding.  On beta-blocker therapy with carvedilol.  In atrial fibrillation with mildly tachycardic rate in the office today.  Has not taken medications yet today.  Heart rate at home overall well controlled 55 to 95 bpm with most readings 60s to 80s.  He is going to recheck heart rate at home after he takes his medications and will call if still elevated.  4. Lung disease/COPD with right heart failure: Followed by Dr. Segovia.  Appears euvolemic on exam today.  5. Squamous cell cancer of the throat: Followed by Dr. Heaton.    Blood pressure check appointment with cuff and blood pressure/heart rate log, 1 to 2 weeks.  Call sooner for high or low readings.    Patient to follow-up with Dr. Omer in 6 months or sooner if needed for any new, recurrent, or worsening symptoms or other issues/concerns.           Your medication list           Accurate as of August 31, 2020 10:18 AM. If you have any  questions, ask your nurse or doctor.               CONTINUE taking these medications      Instructions Last Dose Given Next Dose Due   albuterol sulfate  (90 Base) MCG/ACT inhaler  Commonly known as:  PROVENTIL HFA;VENTOLIN HFA;PROAIR HFA      Inhale 2 puffs. ProAir  (90 Base) MCG/ACT Inhalation Aerosol Solution; Patient Sig: ProAir  (90 Base) MCG/ACT Inhalation Aerosol Solution inhale 2 puffs by mouth every 4 hours if needed; 8.5; 11; 07-Apr-2014; Act       ALPRAZolam 0.5 MG tablet  Commonly known as:  XANAX      1 tablet every morning by mouth, 1/2 tablet every evening       amLODIPine 5 MG tablet  Commonly known as:  NORVASC      Take 5 mg by mouth 2 (Two) Times a Day.       aspirin 81 MG chewable tablet      Chew 81 mg Daily.       baclofen 10 MG tablet  Commonly known as:  LIORESAL      take 1 tablet by mouth three times a day       carvedilol 25 MG tablet  Commonly known as:  COREG      Take 1 tablet by mouth 2 (Two) Times a Day With Meals.       Crestor 20 MG tablet  Generic drug:  rosuvastatin      Take 20 mg by mouth Daily.       diphenoxylate-atropine 2.5-0.025 MG per tablet  Commonly known as:  LOMOTIL      Take 1 tablet by mouth 4 (Four) Times a Day As Needed for Diarrhea.       HYDROcodone-acetaminophen  MG per tablet  Commonly known as:  NORCO      TK 1 T PO QID       levothyroxine 100 MCG tablet  Commonly known as:  Synthroid      Take 1 tablet by mouth Daily. For low thyroid       Lidocaine Viscous HCl 2 % solution  Commonly known as:  XYLOCAINE      TK 5 MLS Q 3 H PRF MODERATE PAIN       montelukast 10 MG tablet  Commonly known as:  SINGULAIR      TAKE 1 TABLET BY MOUTH DAILY       nystatin 390840 UNIT/ML suspension  Commonly known as:  MYCOSTATIN      Swish and swallow 5 mL 4 (Four) Times a Day. Until gone       sertraline 100 MG tablet  Commonly known as:  ZOLOFT      Take 1 tablet by mouth Daily. For anxiety and depression relief       warfarin 2 MG tablet  Commonly  known as:  COUMADIN      Take one tablet (2mg) on Sun & Thurs and take two tablets (4mg) on all other days or as directed by [facility]          STOP taking these medications    methylPREDNISolone 4 MG dose pack  Commonly known as:  MEDROL  Stopped by:  Angelique Espinal DNP, APRN               I did not stop or change the above medications.  Patient's medication list was updated to reflect medications they are currently taking including medication changes made by other providers.            Thanks,    Angelique Espinal DNP, APRN  08/31/2020             Dictated utilizing Dragon dictation

## 2020-09-04 LAB — INR PPP: 2.9

## 2020-09-08 ENCOUNTER — CLINICAL SUPPORT (OUTPATIENT)
Dept: CARDIOLOGY | Facility: CLINIC | Age: 75
End: 2020-09-08

## 2020-09-08 ENCOUNTER — ANTICOAGULATION VISIT (OUTPATIENT)
Dept: PHARMACY | Facility: HOSPITAL | Age: 75
End: 2020-09-08

## 2020-09-08 ENCOUNTER — TELEPHONE (OUTPATIENT)
Dept: CARDIOLOGY | Facility: CLINIC | Age: 75
End: 2020-09-08

## 2020-09-08 VITALS — SYSTOLIC BLOOD PRESSURE: 157 MMHG | DIASTOLIC BLOOD PRESSURE: 101 MMHG | HEART RATE: 82 BPM | OXYGEN SATURATION: 96 %

## 2020-09-08 DIAGNOSIS — I48.19 OTHER PERSISTENT ATRIAL FIBRILLATION (HCC): ICD-10-CM

## 2020-09-08 DIAGNOSIS — I10 ESSENTIAL HYPERTENSION: Primary | ICD-10-CM

## 2020-09-08 DIAGNOSIS — Z01.30 BLOOD PRESSURE CHECK: Primary | ICD-10-CM

## 2020-09-08 DIAGNOSIS — I10 ESSENTIAL HYPERTENSION: ICD-10-CM

## 2020-09-08 RX ORDER — LISINOPRIL 10 MG/1
10 TABLET ORAL DAILY
Qty: 30 TABLET | Refills: 0 | Status: SHIPPED | OUTPATIENT
Start: 2020-09-08 | End: 2020-09-16

## 2020-09-08 NOTE — TELEPHONE ENCOUNTER
Lauryn Hill, RN   Registered Nurse      Progress Notes   Signed   Encounter Date:  9/8/2020               Signed             Patient presented to the office to monitor blood pressure due inconsistently with his readings from home and to measure his machine with office readings.  Home readings since 8/31/2020 range from   110's-150's/60's-100's   Noted that patient had inconsistent times listed for BP readings from home.Verified that patient does take BP 2 hours after medications. Patient states he wakes up at different times every day which is reason why inconsistent times.  Today  Office /101 HR 82  Patients cuff 166/104        Regina SAL notified of readings. Told patient that he would be notified if any changes.     Lauryn Hill RN  [facility]

## 2020-09-08 NOTE — PROGRESS NOTES
Anticoagulation Clinic Progress Note    Anticoagulation Summary  As of 2020    INR goal:   2.0-3.0   TTR:   62.1 % (1.8 y)   INR used for dosin.90 (2020)   Warfarin maintenance plan:   2 mg every Sun, Thu; 4 mg all other days   Weekly warfarin total:   24 mg   Plan last modified:   Álvaro Sam [facility] (10/15/2019)   Next INR check:   2020   Priority:   High   Target end date:   Indefinite    Indications    Other persistent atrial fibrillation (CMS/HCC) [I48.19]             Anticoagulation Episode Summary     INR check location:       Preferred lab:       Send INR reminders to:    [facility]    Comments:   MD INR home danika      Anticoagulation Care Providers     Provider Role Specialty Phone number    Myriam Omer MD Referring Cardiology [phone]          Clinic Interview:  No pertinent clinical findings have been reported.    INR History:  Anticoagulation Monitoring 2020   INR 3.70 1.50 2.90   INR Date 2020   INR Goal 2.0-3.0 2.0-3.0 2.0-3.0   Trend Same Same Same   Last Week Total 16 mg 20 mg 24 mg   Next Week Total 22 mg 24 mg 24 mg   Sun 2 mg 2 mg 2 mg   Mon - - 4 mg   Tue - 4 mg 4 mg   Wed - 4 mg 4 mg   Thu - 2 mg 2 mg   Fri 2 mg () 4 mg 4 mg   Sat 4 mg 4 mg 4 mg   Visit Report - - -   Some recent data might be hidden       Plan:  1. INR is therapeutic today- see above in Anticoagulation Summary.    Cody Eldridge to continue their warfarin regimen- see above in Anticoagulation Summary. Spoke w/ patient to continue maint plan.   2. Follow up in 2 weeks  3. Pt has agreed to only be called if INR out of range. They have been instructed to call if any changes in medications, doses, concerns, etc. Patient expresses understanding and has no further questions at this time.    Mere Vincent [facility]

## 2020-09-08 NOTE — PROGRESS NOTES
Patient presented to the office to monitor blood pressure due inconsistently with his readings from home and to measure his machine with office readings.  Home readings since 8/31/2020 range from   110's-150's/60's-100's   Noted that patient had inconsistent times listed for BP readings from home.Verified that patient does take BP 2 hours after medications. Patient states he wakes up at different times every day which is reason why inconsistent times.  Today  Office /101 HR 82  Patients cuff 166/104       Regina SAL notified of readings. Told patient that he would be notified if any changes.    Lauryn Hill RN  [facility]

## 2020-09-08 NOTE — TELEPHONE ENCOUNTER
I called and spoke with patient.  His blood pressure is fluctuating quite a bit.  We discussed proper use of blood pressure cuff and proper monitoring.  He does not recall having any issues with lisinopril in the past.  He is last several kidney functions have been normal as has potassium level.  We are going to start him on low-dose lisinopril with careful monitoring.  We will to recheck a BMP in 1 week.  He is going to check heart rate and blood pressure twice a day for the next week at least an hour after morning and evening medications and after resting calmly 10 to 15 minutes and call with heart rate and blood pressure readings.  He will call sooner for issues or concerns.  If he does not tolerate the low-dose ACE inhibitor then we will stop this and look at starting on hydralazine however would like to avoid 3 times daily medications given potential compliance issues with these.

## 2020-09-11 NOTE — PROGRESS NOTES
Discussed heart rate of 140 with Dr. Heaton. BP stable at this time. Order to continue treatment at this time. Will continue to monitor.    From [facility] note:  home health admission date changed to 9-17-20 per guardian/s request after she moves into [facility].

## 2020-09-15 NOTE — TELEPHONE ENCOUNTER
9/15/20  Instructed pt to get labs drawn before this will be refilled for 90 day supply - he will go tomorrow and call me after he has gone/yasmeen

## 2020-09-16 ENCOUNTER — LAB (OUTPATIENT)
Dept: LAB | Facility: HOSPITAL | Age: 75
End: 2020-09-16

## 2020-09-16 ENCOUNTER — TELEPHONE (OUTPATIENT)
Dept: CARDIOLOGY | Facility: CLINIC | Age: 75
End: 2020-09-16

## 2020-09-16 DIAGNOSIS — I10 ESSENTIAL HYPERTENSION: ICD-10-CM

## 2020-09-16 LAB
ANION GAP SERPL CALCULATED.3IONS-SCNC: 9.1 MMOL/L (ref 5–15)
BUN SERPL-MCNC: 19 MG/DL (ref 8–23)
BUN/CREAT SERPL: 19.6 (ref 7–25)
CALCIUM SPEC-SCNC: 8.2 MG/DL (ref 8.6–10.5)
CHLORIDE SERPL-SCNC: 103 MMOL/L (ref 98–107)
CO2 SERPL-SCNC: 24.9 MMOL/L (ref 22–29)
CREAT SERPL-MCNC: 0.97 MG/DL (ref 0.76–1.27)
GFR SERPL CREATININE-BSD FRML MDRD: 75 ML/MIN/1.73
GLUCOSE SERPL-MCNC: 109 MG/DL (ref 65–99)
POTASSIUM SERPL-SCNC: 3.6 MMOL/L (ref 3.5–5.2)
SODIUM SERPL-SCNC: 137 MMOL/L (ref 136–145)

## 2020-09-16 PROCEDURE — 36415 COLL VENOUS BLD VENIPUNCTURE: CPT

## 2020-09-16 PROCEDURE — 80048 BASIC METABOLIC PNL TOTAL CA: CPT

## 2020-09-16 RX ORDER — LISINOPRIL 10 MG/1
TABLET ORAL
Qty: 90 TABLET | Refills: 1 | Status: SHIPPED | OUTPATIENT
Start: 2020-09-16 | End: 2020-10-05

## 2020-09-16 NOTE — TELEPHONE ENCOUNTER
Please call patient and let him know that his kidney function appears stable/normal on low-dose lisinopril.  He continues to have some mildly elevated blood sugar as well as some mildly low calcium for which I like for him to discuss with primary care.  Please find out how heart rate and blood pressure running on low-dose lisinopril.  Please be sure he is checking after medications (at least 1 to 2 hours after) and after sitting calmly at least 10 minutes.  May need to adjust lisinopril dose further for better control.

## 2020-09-20 LAB — INR PPP: 2.6

## 2020-09-21 ENCOUNTER — ANTICOAGULATION VISIT (OUTPATIENT)
Dept: PHARMACY | Facility: HOSPITAL | Age: 75
End: 2020-09-21

## 2020-09-21 DIAGNOSIS — I48.19 OTHER PERSISTENT ATRIAL FIBRILLATION (HCC): ICD-10-CM

## 2020-09-21 NOTE — PROGRESS NOTES
Anticoagulation Clinic Progress Note    Anticoagulation Summary  As of 2020    INR goal:  2.0-3.0   TTR:  63.0 % (1.8 y)   INR used for dosin.60 (2020)   Warfarin maintenance plan:  2 mg every Sun, Thu; 4 mg all other days   Weekly warfarin total:  24 mg   No change documented:  Stephany Siu   Plan last modified:  Álvaro Sam [facility] (10/15/2019)   Next INR check:  2020   Priority:  High   Target end date:  Indefinite    Indications    Other persistent atrial fibrillation (CMS/HCC) [I48.19]             Anticoagulation Episode Summary     INR check location:      Preferred lab:      Send INR reminders to:   [facility]    Comments:  MD INR home danika      Anticoagulation Care Providers     Provider Role Specialty Phone number    Myriam Omer MD Referring Cardiology [phone]          Clinic Interview:  No pertinent clinical findings have been reported.    INR History:  Anticoagulation Monitoring 2020   INR 1.50 2.90 2.60   INR Date 2020   INR Goal 2.0-3.0 2.0-3.0 2.0-3.0   Trend Same Same Same   Last Week Total 20 mg 24 mg 24 mg   Next Week Total 24 mg 24 mg 24 mg   Sun 2 mg 2 mg -   Mon - 4 mg 4 mg   Tue 4 mg 4 mg 4 mg   Wed 4 mg 4 mg 4 mg   Thu 2 mg 2 mg 2 mg   Fri 4 mg 4 mg 4 mg   Sat 4 mg 4 mg 4 mg   Visit Report - - -   Some recent data might be hidden       Plan:  1. INR is therapeutic today- see above in Anticoagulation Summary.    Cody Eldridge to continue their warfarin regimen- see above in Anticoagulation Summary.  2. Follow up in 1 week  3. Pt has agreed to only be called if INR out of range. They have been instructed to call if any changes in medications, doses, concerns, etc. Patient expresses understanding and has no further questions at this time.    Stephany Siu

## 2020-09-22 ENCOUNTER — OFFICE VISIT (OUTPATIENT)
Dept: FAMILY MEDICINE CLINIC | Facility: CLINIC | Age: 75
End: 2020-09-22

## 2020-09-22 VITALS
HEART RATE: 71 BPM | WEIGHT: 159.7 LBS | OXYGEN SATURATION: 98 % | TEMPERATURE: 97.1 F | SYSTOLIC BLOOD PRESSURE: 140 MMHG | HEIGHT: 70 IN | DIASTOLIC BLOOD PRESSURE: 70 MMHG | BODY MASS INDEX: 22.86 KG/M2

## 2020-09-22 DIAGNOSIS — M54.9 EXACERBATION OF CHRONIC BACK PAIN: Primary | ICD-10-CM

## 2020-09-22 DIAGNOSIS — G89.29 EXACERBATION OF CHRONIC BACK PAIN: Primary | ICD-10-CM

## 2020-09-22 PROCEDURE — 99213 OFFICE O/P EST LOW 20 MIN: CPT | Performed by: NURSE PRACTITIONER

## 2020-09-22 RX ORDER — OXYCODONE AND ACETAMINOPHEN 7.5; 325 MG/1; MG/1
TABLET ORAL
Status: ON HOLD | COMMUNITY
Start: 2020-09-14 | End: 2023-03-22

## 2020-09-22 NOTE — PATIENT INSTRUCTIONS
Discharge instructions continue present care continue daily exercises  Follow-up pain management    Follow-up in the office in 4 months we will continue to wean    kn95 filtration mask recommend you get a few of these from [employer] evaluate your cellar make sure it is legitimate  They should be no more than 2 or $3 apiece make sure they say KN 9 5  On the mask

## 2020-09-22 NOTE — PROGRESS NOTES
"Subjective   Cody Eldridge is a 75 y.o. male.     Very pleasant gentleman here for follow-up he has exacerbation of chronic low back pain and radiculopathy pain.  He has been participating in physical therapy but he felt like it was exacerbating his pain making it worse and after a while he finally quit.  Pain management changed from hydrocodone to oxycodone and this is helped his pain substantially overall he is improving with his back  Previously 8 out of 10 now 6-1/2-7 and some improving  Patient feels like his back pain is getting better he does not feel like we need an MRI at this time but he will let me know should he have increased and worsening back pain and we should go ahead and proceed with the MRI    Patient is up-to-date with oncology  Follow-up ENT Dr. Slater tomorrow  He has been social distancing presently wearing a mask         Back Pain  Pertinent negatives include no abdominal pain, chest pain or fever.        /70   Pulse 71   Temp 97.1 °F (36.2 °C) (Temporal)   Ht 177.8 cm (70\")   Wt 72.4 kg (159 lb 11.2 oz)   SpO2 98%   BMI 22.91 kg/m²       The following portions of the patient's history were reviewed and updated as appropriate: allergies, past family history, past medical history, past social history, past surgical history and problem list.    Review of Systems   Constitutional: Negative for fatigue and fever.   HENT: Negative.  Negative for trouble swallowing.    Eyes: Negative.    Respiratory: Negative.  Negative for cough and shortness of breath.    Cardiovascular: Negative for chest pain, palpitations and leg swelling.   Gastrointestinal: Negative.  Negative for abdominal pain.   Genitourinary: Negative.    Musculoskeletal: Positive for back pain.   Skin: Negative.    Neurological: Negative.  Negative for dizziness and confusion.   Psychiatric/Behavioral: Negative.        Objective   Physical Exam  Vitals signs reviewed.   Constitutional:       Appearance: He is " well-developed.   HENT:      Head: Normocephalic.      Nose: Nose normal.   Eyes:      General: No scleral icterus.     Conjunctiva/sclera: Conjunctivae normal.      Pupils: Pupils are equal, round, and reactive to light.   Neck:      Musculoskeletal: Neck supple.      Thyroid: No thyromegaly.      Vascular: No JVD.   Cardiovascular:      Rate and Rhythm: Normal rate and regular rhythm.      Heart sounds: Normal heart sounds. No murmur. No friction rub. No gallop.    Pulmonary:      Effort: Pulmonary effort is normal. No respiratory distress.      Breath sounds: Normal breath sounds. No stridor. No wheezing or rales.   Abdominal:      General: Bowel sounds are normal. There is no distension.      Palpations: Abdomen is soft.      Tenderness: There is no abdominal tenderness.      Comments: No hepatosplenomegaly, no ascites,   Musculoskeletal:         General: No tenderness.      Comments: Gait a little slow without focal weakness patient's baseline able to squat partially without distress or weakness  Deferred straight leg raise plantar flexion dorsiflexion normal   Lymphadenopathy:      Cervical: No cervical adenopathy.   Skin:     General: Skin is warm and dry.      Findings: No erythema or rash.   Neurological:      Mental Status: He is alert and oriented to person, place, and time.      Deep Tendon Reflexes: Reflexes are normal and symmetric.   Psychiatric:         Behavior: Behavior normal.         Thought Content: Thought content normal.         Judgment: Judgment normal.           Assessment/Plan   Cody was seen today for back pain.    Diagnoses and all orders for this visit:    Exacerbation of chronic back pain      Continue exercises  Follow-up pain management  Recheck as needed if increasing back pain weakness worsening symptoms urgent recheck ER any severe weakness bowel bladder incontinence retention fever chills    Continue present medication present care follow-up in 4 months            There are no  Patient Instructions on file for this visit.

## 2020-10-03 DIAGNOSIS — I10 ESSENTIAL HYPERTENSION: ICD-10-CM

## 2020-10-05 ENCOUNTER — ANTICOAGULATION VISIT (OUTPATIENT)
Dept: PHARMACY | Facility: HOSPITAL | Age: 75
End: 2020-10-05

## 2020-10-05 DIAGNOSIS — I48.19 OTHER PERSISTENT ATRIAL FIBRILLATION (HCC): ICD-10-CM

## 2020-10-05 LAB — INR PPP: 4.6

## 2020-10-05 RX ORDER — LEVOTHYROXINE SODIUM 0.1 MG/1
100 TABLET ORAL DAILY
Qty: 90 TABLET | Refills: 0 | Status: SHIPPED | OUTPATIENT
Start: 2020-10-05 | End: 2021-02-03

## 2020-10-05 RX ORDER — LISINOPRIL 10 MG/1
TABLET ORAL
Qty: 90 TABLET | Refills: 1 | Status: SHIPPED | OUTPATIENT
Start: 2020-10-05 | End: 2020-11-10 | Stop reason: SDUPTHER

## 2020-10-05 NOTE — TELEPHONE ENCOUNTER
Spoke with pharmacy. They stated that it was refilled on 9/8/2020 with a 30 day supply. They requested that we send the refill for the patient.

## 2020-10-06 NOTE — PROGRESS NOTES
Anticoagulation Clinic Progress Note    Anticoagulation Summary  As of 10/5/2020    INR goal:  2.0-3.0   TTR:  62.1 % (1.9 y)   INR used for dosin.60 (10/5/2020)   Warfarin maintenance plan:  2 mg every Sun, Thu; 4 mg all other days   Weekly warfarin total:  24 mg   Plan last modified:  Álvaro Sam RPH (10/15/2019)   Next INR check:  10/9/2020   Priority:  High   Target end date:  Indefinite    Indications    Other persistent atrial fibrillation (CMS/HCC) [I48.19]             Anticoagulation Episode Summary     INR check location:      Preferred lab:      Send INR reminders to:   [facility]    Comments:  MD INR home danika      Anticoagulation Care Providers     Provider Role Specialty Phone number    Myriam Omer MD Referring Cardiology [phone]          Clinic Interview:  Patient Findings     Negatives:  Signs/symptoms of thrombosis, Signs/symptoms of bleeding,   Laboratory test error suspected, Change in health, Change in alcohol use,   Change in activity, Upcoming invasive procedure, Emergency department   visit, Upcoming dental procedure, Missed doses, Extra doses, Change in   medications, Change in diet/appetite, Hospital admission, Bruising, Other   complaints      Clinical Outcomes     Negatives:  Major bleeding event, Thromboembolic event,   Anticoagulation-related hospital admission, Anticoagulation-related ED   visit, Anticoagulation-related fatality        INR History:  Anticoagulation Monitoring 2020 2020 10/5/2020   INR 2.90 2.60 4.60   INR Date 2020 2020 10/5/2020   INR Goal 2.0-3.0 2.0-3.0 2.0-3.0   Trend Same Same Same   Last Week Total 24 mg 24 mg 24 mg   Next Week Total 24 mg 24 mg 18 mg   Sun 2 mg - -   Mon 4 mg 4 mg Hold (10/5)   Tue 4 mg 4 mg 2 mg (10/6)   Wed 4 mg 4 mg 4 mg   Thu 2 mg 2 mg 2 mg   Fri 4 mg 4 mg -   Sat 4 mg 4 mg -   Visit Report - - -   Some recent data might be hidden       Plan:  1. INR is Supratherapeutic today- see  above in Anticoagulation Summary.   Will instruct Cody Eldridge to Change their warfarin regimen- see above in Anticoagulation Summary.  2. Follow up in 4 days in clinic (pt wishes to stop home testing)  3. They have been instructed to call if any changes in medications, doses, concerns, etc. Patient expresses understanding and has no further questions at this time.    Georges Guadalupe [facility]

## 2020-10-07 ENCOUNTER — CLINICAL SUPPORT (OUTPATIENT)
Dept: ORTHOPEDIC SURGERY | Facility: CLINIC | Age: 75
End: 2020-10-07

## 2020-10-07 DIAGNOSIS — M17.10 ARTHRITIS OF KNEE: Primary | ICD-10-CM

## 2020-10-07 PROCEDURE — 20610 DRAIN/INJ JOINT/BURSA W/O US: CPT | Performed by: ORTHOPAEDIC SURGERY

## 2020-10-07 RX ORDER — METHYLPREDNISOLONE ACETATE 80 MG/ML
80 INJECTION, SUSPENSION INTRA-ARTICULAR; INTRALESIONAL; INTRAMUSCULAR; SOFT TISSUE
Status: COMPLETED | OUTPATIENT
Start: 2020-10-07 | End: 2020-10-07

## 2020-10-07 RX ADMIN — METHYLPREDNISOLONE ACETATE 80 MG: 80 INJECTION, SUSPENSION INTRA-ARTICULAR; INTRALESIONAL; INTRAMUSCULAR; SOFT TISSUE at 10:38

## 2020-10-07 NOTE — PROGRESS NOTES
Patient follows up today for both knees.  I injected him back in July.  The injection helped tremendously.  He would like to get those repeated.  The risk, benefits and alternatives were discussed.  He consented and the injections were performed as described below.  He will follow-up as needed.    Large Joint Arthrocentesis: R knee  Date/Time: 10/7/2020 10:38 AM  Consent given by: patient  Site marked: site marked  Timeout: Immediately prior to procedure a time out was called to verify the correct patient, procedure, equipment, support staff and site/side marked as required   Supporting Documentation  Indications: pain and joint swelling   Procedure Details  Location: knee - R knee  Preparation: Patient was prepped and draped in the usual sterile fashion  Needle gauge: 21 G.  Approach: anterolateral  Medications administered: 80 mg methylPREDNISolone acetate 80 MG/ML; 2 mL lidocaine (cardiac)  Patient tolerance: patient tolerated the procedure well with no immediate complications    Large Joint Arthrocentesis: L knee  Date/Time: 10/7/2020 10:38 AM  Consent given by: patient  Site marked: site marked  Timeout: Immediately prior to procedure a time out was called to verify the correct patient, procedure, equipment, support staff and site/side marked as required   Supporting Documentation  Indications: pain and joint swelling   Procedure Details  Location: knee - L knee  Preparation: Patient was prepped and draped in the usual sterile fashion  Needle gauge: 21 G.  Approach: anterolateral  Medications administered: 2 mL lidocaine (cardiac); 80 mg methylPREDNISolone acetate 80 MG/ML  Patient tolerance: patient tolerated the procedure well with no immediate complications

## 2020-10-14 ENCOUNTER — ANTICOAGULATION VISIT (OUTPATIENT)
Dept: PHARMACY | Facility: HOSPITAL | Age: 75
End: 2020-10-14

## 2020-10-14 DIAGNOSIS — I48.19 OTHER PERSISTENT ATRIAL FIBRILLATION (HCC): ICD-10-CM

## 2020-10-14 LAB
INR PPP: 2.1 (ref 0.91–1.09)
PROTHROMBIN TIME: 25.7 SECONDS (ref 10–13.8)

## 2020-10-14 PROCEDURE — 85610 PROTHROMBIN TIME: CPT

## 2020-10-14 PROCEDURE — 36416 COLLJ CAPILLARY BLOOD SPEC: CPT

## 2020-10-14 NOTE — PROGRESS NOTES
Anticoagulation Clinic Progress Note    Anticoagulation Summary  As of 10/14/2020    INR goal:  2.0-3.0   TTR:  61.7 % (1.9 y)   INR used for dosin.1 (10/14/2020)   Warfarin maintenance plan:  2 mg every Sun, Thu; 4 mg all other days   Weekly warfarin total:  24 mg   No change documented:  Vanita Lincoln, Pharmacy Intern   Plan last modified:  Álvaro Sam RP (10/15/2019)   Next INR check:  10/21/2020   Priority:  High   Target end date:  Indefinite    Indications    Other persistent atrial fibrillation (CMS/HCC) [I48.19]             Anticoagulation Episode Summary     INR check location:      Preferred lab:      Send INR reminders to:   [facility]    Comments:  MD INR home danika      Anticoagulation Care Providers     Provider Role Specialty Phone number    Myriam Omer MD Referring Cardiology [phone]          Clinic Interview:  Patient Findings     Negatives:  Signs/symptoms of thrombosis, Signs/symptoms of bleeding,   Laboratory test error suspected, Change in health, Change in alcohol use,   Change in activity, Upcoming invasive procedure, Emergency department   visit, Upcoming dental procedure, Missed doses, Extra doses, Change in   medications, Change in diet/appetite, Hospital admission, Bruising, Other   complaints      Clinical Outcomes     Negatives:  Major bleeding event, Thromboembolic event,   Anticoagulation-related hospital admission, Anticoagulation-related ED   visit, Anticoagulation-related fatality        INR History:  Anticoagulation Monitoring 2020 10/5/2020 10/14/2020   INR 2.60 4.60 2.1   INR Date 2020 10/5/2020 10/14/2020   INR Goal 2.0-3.0 2.0-3.0 2.0-3.0   Trend Same Same Same   Last Week Total 24 mg 24 mg 24 mg   Next Week Total 24 mg 18 mg 24 mg   Sun - - 2 mg   Mon 4 mg Hold (10/5) 4 mg   Tue 4 mg 2 mg (10/6) 4 mg   Wed 4 mg 4 mg 4 mg   Thu 2 mg 2 mg 2 mg   Fri 4 mg - 4 mg   Sat 4 mg - 4 mg   Visit Report - - -   Some recent data might be  hidden       Plan:  1. INR is Therapeutic today- see above in Anticoagulation Summary.  Will instruct Cody Eldridge to Continue their warfarin regimen- see above in Anticoagulation Summary.  2. Follow up in 1 week  3. Patient declines warfarin refills.  4. Verbal and written information provided. Patient expresses understanding and has no further questions at this time.    Vanita Lincoln, Pharmacy Intern

## 2020-10-15 ENCOUNTER — TELEPHONE (OUTPATIENT)
Dept: CARDIOLOGY | Facility: CLINIC | Age: 75
End: 2020-10-15

## 2020-10-15 DIAGNOSIS — I10 ESSENTIAL HYPERTENSION: Primary | ICD-10-CM

## 2020-10-15 NOTE — TELEPHONE ENCOUNTER
10/15/20  I spoke with patient - gave him these inst and confirmed that he is waiting about 2 hours after meds and resting prior - he will call his log back to me next Thurs, or sooner if they are staying above 130/80./yasmeen

## 2020-10-15 NOTE — TELEPHONE ENCOUNTER
Please let patient know that I received blood pressure log from September and blood pressure does not appear well controlled.  Please obtain updated heart rate and blood pressure readings.  Blood pressure goal is less than 130/80.  If it is staying greater than 130/80 despite checking at least 1 hour after morning medications after sitting calmly about 10 minutes and we will need to go up on lisinopril with continued renal function monitoring.

## 2020-10-15 NOTE — PROGRESS NOTES
I have supervised and reviewed the notes, assessments, and/or procedures performed by our Pharmacy Intern. The documented assessment and plan were developed cooperatively. I concur with the documentation of this patient encounter.    Georges Guadalupe RP

## 2020-10-21 ENCOUNTER — ANTICOAGULATION VISIT (OUTPATIENT)
Dept: PHARMACY | Facility: HOSPITAL | Age: 75
End: 2020-10-21

## 2020-10-21 DIAGNOSIS — I48.19 OTHER PERSISTENT ATRIAL FIBRILLATION (HCC): ICD-10-CM

## 2020-10-21 LAB
INR PPP: 3.3 (ref 0.91–1.09)
PROTHROMBIN TIME: 39 SECONDS (ref 10–13.8)

## 2020-10-21 PROCEDURE — 85610 PROTHROMBIN TIME: CPT

## 2020-10-21 PROCEDURE — G0463 HOSPITAL OUTPT CLINIC VISIT: HCPCS

## 2020-10-21 PROCEDURE — 36416 COLLJ CAPILLARY BLOOD SPEC: CPT

## 2020-10-21 NOTE — PROGRESS NOTES
Anticoagulation Clinic Progress Note    Anticoagulation Summary  As of 10/21/2020    INR goal:  2.0-3.0   TTR:  61.9 % (1.9 y)   INR used for dosing:  3.3 (10/21/2020)   Warfarin maintenance plan:  2 mg every Sun, Thu; 4 mg all other days   Weekly warfarin total:  24 mg   Plan last modified:  Álvaro Sam RPH (10/15/2019)   Next INR check:  10/29/2020   Priority:  High   Target end date:  Indefinite    Indications    Other persistent atrial fibrillation (CMS/HCC) [I48.19]             Anticoagulation Episode Summary     INR check location:      Preferred lab:      Send INR reminders to:   [facility]    Comments:  MD INR home danika      Anticoagulation Care Providers     Provider Role Specialty Phone number    Myriam Omer MD Referring Cardiology [phone]          Clinic Interview:  Patient Findings     Negatives:  Signs/symptoms of thrombosis, Signs/symptoms of bleeding,   Laboratory test error suspected, Change in health, Change in alcohol use,   Change in activity, Upcoming invasive procedure, Emergency department   visit, Upcoming dental procedure, Missed doses, Extra doses, Change in   medications, Change in diet/appetite, Hospital admission, Bruising, Other   complaints      Clinical Outcomes     Negatives:  Major bleeding event, Thromboembolic event,   Anticoagulation-related hospital admission, Anticoagulation-related ED   visit, Anticoagulation-related fatality     Comments: ate dried cranberries on mon       INR History:  Anticoagulation Monitoring 10/5/2020 10/14/2020 10/21/2020   INR 4.60 2.1 3.3   INR Date 10/5/2020 10/14/2020 10/21/2020   INR Goal 2.0-3.0 2.0-3.0 2.0-3.0   Trend Same Same Same   Last Week Total 24 mg 24 mg 24 mg   Next Week Total 18 mg 24 mg 22 mg   Sun - 2 mg 2 mg   Mon Hold (10/5) 4 mg 4 mg   Tue 2 mg (10/6) 4 mg 4 mg   Wed 4 mg 4 mg 2 mg (10/21); Otherwise 4 mg   Thu 2 mg 2 mg 2 mg   Fri - 4 mg 4 mg   Sat - 4 mg 4 mg   Visit Report - - -   Some recent  data might be hidden       Plan:  1. INR is Supratherapeutic today- see above in Anticoagulation Summary.  Will instruct Cody Eldridge to Change their warfarin regimen- see above in Anticoagulation Summary.  2. Follow up in 1 week  3. Patient declines warfarin refills.  4. Verbal and written information provided. Patient expresses understanding and has no further questions at this time.    Vanita Lincoln, Pharmacy Intern

## 2020-10-21 NOTE — PROGRESS NOTES
I have supervised and reviewed the notes, assessments, and/or procedures performed by our Pharmacy Intern. The documented assessment and plan were developed cooperatively, and the plan was implemented in my presence. I concur with the documentation of this patient encounter.    Georges Guadalupe [facility]

## 2020-10-27 RX ORDER — ALPRAZOLAM 0.5 MG/1
TABLET ORAL
Qty: 45 TABLET | Refills: 0 | Status: SHIPPED | OUTPATIENT
Start: 2020-10-27 | End: 2020-12-23

## 2020-10-27 NOTE — TELEPHONE ENCOUNTER
What dose of lisinopril is he taking?  It looks like Regina increased it on October 15th?  He probably will need to go up higher because his blood pressure is still running a bit too elevated.  If she has increased his lisinopril then he needs a basic metabolic panel prior to making any further adjustments.  I will go ahead and put the basic metabolic panel in the computer if you can try to clarify what dose he is taking and if that is an increase since he talked to Regina on the 15th    Bp 125//98

## 2020-10-27 NOTE — TELEPHONE ENCOUNTER
10/27/20  I spoke with patient - confirmed that he is taking lisinopril 10 mg qd.  He will drop off his bp log tomorrow when he goes to the lab to get bmp drawn./yasmeen

## 2020-10-28 ENCOUNTER — LAB (OUTPATIENT)
Dept: LAB | Facility: HOSPITAL | Age: 75
End: 2020-10-28

## 2020-10-28 DIAGNOSIS — I10 ESSENTIAL HYPERTENSION: ICD-10-CM

## 2020-10-28 LAB
ANION GAP SERPL CALCULATED.3IONS-SCNC: 5.8 MMOL/L (ref 5–15)
BUN SERPL-MCNC: 15 MG/DL (ref 8–23)
BUN/CREAT SERPL: 14.4 (ref 7–25)
CALCIUM SPEC-SCNC: 8.9 MG/DL (ref 8.6–10.5)
CHLORIDE SERPL-SCNC: 104 MMOL/L (ref 98–107)
CO2 SERPL-SCNC: 30.2 MMOL/L (ref 22–29)
CREAT SERPL-MCNC: 1.04 MG/DL (ref 0.76–1.27)
GFR SERPL CREATININE-BSD FRML MDRD: 69 ML/MIN/1.73
GLUCOSE SERPL-MCNC: 108 MG/DL (ref 65–99)
POTASSIUM SERPL-SCNC: 4.4 MMOL/L (ref 3.5–5.2)
SODIUM SERPL-SCNC: 140 MMOL/L (ref 136–145)

## 2020-10-28 PROCEDURE — 80048 BASIC METABOLIC PNL TOTAL CA: CPT

## 2020-10-28 PROCEDURE — 36415 COLL VENOUS BLD VENIPUNCTURE: CPT

## 2020-10-29 ENCOUNTER — ANTICOAGULATION VISIT (OUTPATIENT)
Dept: PHARMACY | Facility: HOSPITAL | Age: 75
End: 2020-10-29

## 2020-10-29 DIAGNOSIS — I48.19 OTHER PERSISTENT ATRIAL FIBRILLATION (HCC): ICD-10-CM

## 2020-10-29 LAB
INR PPP: 3.6 (ref 0.91–1.09)
PROTHROMBIN TIME: 42.7 SECONDS (ref 10–13.8)

## 2020-10-29 PROCEDURE — 85610 PROTHROMBIN TIME: CPT

## 2020-10-29 PROCEDURE — 36416 COLLJ CAPILLARY BLOOD SPEC: CPT

## 2020-10-29 PROCEDURE — G0463 HOSPITAL OUTPT CLINIC VISIT: HCPCS

## 2020-10-29 NOTE — PROGRESS NOTES
Anticoagulation Clinic Progress Note    Anticoagulation Summary  As of 10/29/2020    INR goal:  2.0-3.0   TTR:  61.1 % (1.9 y)   INR used for dosing:  3.6 (10/29/2020)   Warfarin maintenance plan:  2 mg every Sun, Tue, Thu; 4 mg all other days   Weekly warfarin total:  22 mg   Plan last modified:  Kristal Cowart RPH (10/29/2020)   Next INR check:  11/5/2020   Priority:  High   Target end date:  Indefinite    Indications    Other persistent atrial fibrillation (CMS/HCC) [I48.19]             Anticoagulation Episode Summary     INR check location:      Preferred lab:      Send INR reminders to:   [facility]    Comments:  MD INR home danika      Anticoagulation Care Providers     Provider Role Specialty Phone number    Myriam Omer MD Referring Cardiology [phone]          Clinic Interview:      INR History:  Anticoagulation Monitoring 10/14/2020 10/21/2020 10/29/2020   INR 2.1 3.3 3.6   INR Date 10/14/2020 10/21/2020 10/29/2020   INR Goal 2.0-3.0 2.0-3.0 2.0-3.0   Trend Same Same Down   Last Week Total 24 mg 24 mg 24 mg   Next Week Total 24 mg 22 mg 20 mg   Sun 2 mg 2 mg 2 mg   Mon 4 mg 4 mg 4 mg   Tue 4 mg 4 mg 2 mg   Wed 4 mg 2 mg (10/21); Otherwise 4 mg 4 mg   Thu 2 mg 2 mg Hold (10/29)   Fri 4 mg 4 mg 4 mg   Sat 4 mg 4 mg 4 mg   Visit Report - - -   Some recent data might be hidden       Plan:  1. INR is Supratherapeutic today- see above in Anticoagulation Summary.  Will instruct Cody SAMSON Eldridge to HOLD dose today, then change their warfarin regimen- see above in Anticoagulation Summary.  2. Follow up in 1 week  3. Patient declines warfarin refills.  4. Verbal and written information provided. Patient expresses understanding and has no further questions at this time.    Kristal Cowart RPH

## 2020-11-09 ENCOUNTER — TELEPHONE (OUTPATIENT)
Dept: CARDIOLOGY | Facility: CLINIC | Age: 75
End: 2020-11-09

## 2020-11-09 DIAGNOSIS — I10 ESSENTIAL HYPERTENSION: ICD-10-CM

## 2020-11-09 NOTE — TELEPHONE ENCOUNTER
Received patient's blood pressure log from mid October, most readings are still too high.  Please find out how heart rate and blood pressure running over the last couple of weeks.  If continues to stay greater than 130/80 on average then we will need to increase lisinopril dose.  Please let me know.

## 2020-11-10 DIAGNOSIS — I10 ESSENTIAL HYPERTENSION: ICD-10-CM

## 2020-11-10 RX ORDER — LISINOPRIL 20 MG/1
20 TABLET ORAL DAILY
Qty: 30 TABLET | Refills: 0 | Status: SHIPPED | OUTPATIENT
Start: 2020-11-10 | End: 2020-11-11

## 2020-11-10 NOTE — TELEPHONE ENCOUNTER
Blood pressure remains elevated, most readings 140s to 150s over 80s to 90s.  Please have him increase lisinopril to 20 mg daily.  Please have him continue to check at least 1 to 2 hours after morning medications and after sitting calmly 10 to 15 minutes, keep a log, and call with updated readings in 1 to 2 weeks or sooner for systolic blood pressure reading less than 110 which I do not expect.  We will need to stop by the outpatient lab and 2 weeks for repeat metabolic panel to ensure kidney function remained stable on increased dose.

## 2020-11-10 NOTE — TELEPHONE ENCOUNTER
11/10/20  Left these instructions with is wife - she will have him send bp log again in two weeks and get labs after increasing to the 20 mg lisinopril/yasmeen

## 2020-11-11 RX ORDER — LISINOPRIL 20 MG/1
20 TABLET ORAL DAILY
Qty: 90 TABLET | Refills: 0 | Status: SHIPPED | OUTPATIENT
Start: 2020-11-11 | End: 2021-01-06 | Stop reason: SDUPTHER

## 2020-11-13 ENCOUNTER — ANTICOAGULATION VISIT (OUTPATIENT)
Dept: PHARMACY | Facility: HOSPITAL | Age: 75
End: 2020-11-13

## 2020-11-13 DIAGNOSIS — I48.19 OTHER PERSISTENT ATRIAL FIBRILLATION (HCC): ICD-10-CM

## 2020-11-13 LAB
INR PPP: 2.2 (ref 0.91–1.09)
PROTHROMBIN TIME: 26.9 SECONDS (ref 10–13.8)

## 2020-11-13 PROCEDURE — 36416 COLLJ CAPILLARY BLOOD SPEC: CPT

## 2020-11-13 PROCEDURE — 85610 PROTHROMBIN TIME: CPT

## 2020-11-13 NOTE — PROGRESS NOTES
Anticoagulation Clinic Progress Note    Anticoagulation Summary  As of 2020    INR goal:  2.0-3.0   TTR:  61.1 % (2 y)   INR used for dosin.2 (2020)   Warfarin maintenance plan:  2 mg every Sun, Tue, Thu; 4 mg all other days   Weekly warfarin total:  22 mg   No change documented:  Stephany Siu   Plan last modified:  Kristal Cowart RPH (10/29/2020)   Next INR check:  2020   Priority:  High   Target end date:  Indefinite    Indications    Other persistent atrial fibrillation (CMS/HCC) [I48.19]             Anticoagulation Episode Summary     INR check location:      Preferred lab:      Send INR reminders to:   [facility]    Comments:  MD INR home danika      Anticoagulation Care Providers     Provider Role Specialty Phone number    Myriam Omer MD Referring Cardiology [phone]          Clinic Interview:  Patient Findings     Negatives:  Signs/symptoms of thrombosis, Signs/symptoms of bleeding,   Laboratory test error suspected, Change in health, Change in alcohol use,   Change in activity, Upcoming invasive procedure, Emergency department   visit, Upcoming dental procedure, Missed doses, Extra doses, Change in   medications, Change in diet/appetite, Hospital admission, Bruising, Other   complaints      Clinical Outcomes     Negatives:  Major bleeding event, Thromboembolic event,   Anticoagulation-related hospital admission, Anticoagulation-related ED   visit, Anticoagulation-related fatality        INR History:  Anticoagulation Monitoring 10/21/2020 10/29/2020 2020   INR 3.3 3.6 2.2   INR Date 10/21/2020 10/29/2020 2020   INR Goal 2.0-3.0 2.0-3.0 2.0-3.0   Trend Same Down Same   Last Week Total 24 mg 24 mg 22 mg   Next Week Total 22 mg 20 mg 22 mg   Sun 2 mg 2 mg 2 mg   Mon 4 mg 4 mg 4 mg   Tue 4 mg 2 mg 2 mg   Wed 2 mg (10/21); Otherwise 4 mg 4 mg 4 mg   Thu 2 mg Hold (10/29) 2 mg   Fri 4 mg 4 mg 4 mg   Sat 4 mg 4 mg 4 mg   Visit Report - - -    Some recent data might be hidden       Plan:  1. INR is therapeutic today- see above in Anticoagulation Summary.   Will instruct Cody Eldridge to continue their warfarin regimen- see above in Anticoagulation Summary.  2. Follow up in 2 weeks.  3. Patient declines warfarin refills.  4. Verbal and written information provided. Patient expresses understanding and has no further questions at this time.    Stephany Siu

## 2020-11-23 ENCOUNTER — OFFICE VISIT (OUTPATIENT)
Dept: INTERNAL MEDICINE | Facility: CLINIC | Age: 75
End: 2020-11-23

## 2020-11-23 ENCOUNTER — LAB (OUTPATIENT)
Dept: LAB | Facility: HOSPITAL | Age: 75
End: 2020-11-23

## 2020-11-23 VITALS
OXYGEN SATURATION: 99 % | HEART RATE: 73 BPM | DIASTOLIC BLOOD PRESSURE: 82 MMHG | BODY MASS INDEX: 22.41 KG/M2 | HEIGHT: 70 IN | WEIGHT: 156.5 LBS | SYSTOLIC BLOOD PRESSURE: 152 MMHG

## 2020-11-23 DIAGNOSIS — F41.8 MIXED ANXIETY DEPRESSIVE DISORDER: Primary | ICD-10-CM

## 2020-11-23 DIAGNOSIS — E78.2 MIXED HYPERLIPIDEMIA: ICD-10-CM

## 2020-11-23 DIAGNOSIS — E03.9 ACQUIRED HYPOTHYROIDISM: ICD-10-CM

## 2020-11-23 DIAGNOSIS — H10.403 CHRONIC CONJUNCTIVITIS OF BOTH EYES, UNSPECIFIED CHRONIC CONJUNCTIVITIS TYPE: ICD-10-CM

## 2020-11-23 DIAGNOSIS — H53.9 CHANGE IN VISION: ICD-10-CM

## 2020-11-23 LAB
ALT SERPL W P-5'-P-CCNC: 15 U/L (ref 1–41)
AST SERPL-CCNC: 19 U/L (ref 1–40)
CHOLEST SERPL-MCNC: 122 MG/DL (ref 0–200)
HDLC SERPL-MCNC: 51 MG/DL (ref 40–60)
LDLC SERPL CALC-MCNC: 58 MG/DL (ref 0–100)
LDLC/HDLC SERPL: 1.17 {RATIO}
T4 FREE SERPL-MCNC: 1.3 NG/DL (ref 0.93–1.7)
TRIGL SERPL-MCNC: 57 MG/DL (ref 0–150)
TSH SERPL DL<=0.05 MIU/L-ACNC: 3.6 UIU/ML (ref 0.27–4.2)
VLDLC SERPL-MCNC: 13 MG/DL (ref 5–40)

## 2020-11-23 PROCEDURE — 99214 OFFICE O/P EST MOD 30 MIN: CPT | Performed by: FAMILY MEDICINE

## 2020-11-23 PROCEDURE — 84439 ASSAY OF FREE THYROXINE: CPT | Performed by: FAMILY MEDICINE

## 2020-11-23 PROCEDURE — 84460 ALANINE AMINO (ALT) (SGPT): CPT | Performed by: FAMILY MEDICINE

## 2020-11-23 PROCEDURE — 84450 TRANSFERASE (AST) (SGOT): CPT | Performed by: FAMILY MEDICINE

## 2020-11-23 PROCEDURE — 36415 COLL VENOUS BLD VENIPUNCTURE: CPT | Performed by: FAMILY MEDICINE

## 2020-11-23 PROCEDURE — 80061 LIPID PANEL: CPT | Performed by: FAMILY MEDICINE

## 2020-11-23 PROCEDURE — 84443 ASSAY THYROID STIM HORMONE: CPT | Performed by: FAMILY MEDICINE

## 2020-11-23 NOTE — PROGRESS NOTES
Cody Eldridge is a 75 y.o. male.      Assessment/Plan   Problems Addressed this Visit        Cardiovascular and Mediastinum    Mixed hyperlipidemia    Relevant Orders    Lipid Panel    ALT    AST       Endocrine    Acquired hypothyroidism    Relevant Orders    TSH    T4, Free       Other    Mixed anxiety depressive disorder - Primary    Chronic conjunctivitis of both eyes    Relevant Orders    Ambulatory Referral to Ophthalmology    Change in vision    Relevant Orders    Ambulatory Referral to Ophthalmology      Diagnoses       Codes Comments    Mixed anxiety depressive disorder    -  Primary ICD-10-CM: F41.8  ICD-9-CM: 300.4     Mixed hyperlipidemia     ICD-10-CM: E78.2  ICD-9-CM: 272.2     Acquired hypothyroidism     ICD-10-CM: E03.9  ICD-9-CM: 244.9     Change in vision     ICD-10-CM: H53.9  ICD-9-CM: 368.9     Chronic conjunctivitis of both eyes, unspecified chronic conjunctivitis type     ICD-10-CM: H10.403  ICD-9-CM: 372.10            Hyperlipidemia follow-up results of blood work hypothyroidism follow-up repeat blood work vision change consult ophthalmology conjunctivitis trial of Claritin continue Zoloft for anxiety and depression follow-up otherwise as needed or    Return in about 6 months (around 2021), or if symptoms worsen or fail to improve.       Chief Complaint   Patient presents with   • transferring from [facility], APRN   • Hyperlipidemia   • Anxiety   • Hypothyroidism   • bilateral eye issue     Social History     Tobacco Use   • Smoking status: Former Smoker     Packs/day: 3.00     Years: 30.00     Pack years: 90.00     Types: Cigarettes     Quit date: 2000     Years since quittin.9   • Smokeless tobacco: Never Used   • Tobacco comment: started age 11 x 53 years stopped 2000 / daily caffiene   Substance Use Topics   • Alcohol use: Not Currently     Alcohol/week: 2.0 standard drinks     Types: 1 Glasses of wine, 1 Cans of beer per week     Comment: occ   • Drug use: No       History  "of Present Illness   Patient new to this office with chronic medical problems hyperlipidemia anxiety hypothyroidism that have been intermittently well controlled needs laboratory support for ongoing treatment no unwanted side effects of medication he has developed some reddening to the eyes and blurry vision over the last month no specific trauma or injury no elevated blood pressure readings presently he has minimal concern he does not have a significant history of allergies and uses montelukast for underlying COPD inhaler uses as needed smoking years ago  The following portions of the patient's history were reviewed and updated as appropriate:PMHroutine: Social history , Allergies, Current Medications, Active Problem List and Health Maintenance    Review of Systems   Constitutional: Negative.    Eyes: Positive for redness and visual disturbance.   Respiratory: Negative.    Cardiovascular: Negative.    Gastrointestinal: Negative.    Neurological: Negative.        Objective   Vitals:    11/23/20 0911   BP: 152/82   BP Location: Right arm   Patient Position: Sitting   Cuff Size: Adult   Pulse: 73   SpO2: 99%   Weight: 71 kg (156 lb 8 oz)   Height: 177.8 cm (70\")     Body mass index is 22.46 kg/m².  Physical Exam  Vitals signs and nursing note reviewed.   Constitutional:       Appearance: Normal appearance.   HENT:      Right Ear: Tympanic membrane normal.      Left Ear: Tympanic membrane normal.   Eyes:      Extraocular Movements: Extraocular movements intact.      Pupils: Pupils are equal, round, and reactive to light.      Comments: Conjunctiva erythema bilaterally   Neck:      Comments: No enlarged thyroid felt  Cardiovascular:      Rate and Rhythm: Normal rate. Rhythm irregular.      Pulses: Normal pulses.      Heart sounds: Normal heart sounds.   Pulmonary:      Effort: Pulmonary effort is normal.      Breath sounds: Normal breath sounds.   Skin:     General: Skin is warm.   Neurological:      Mental Status: He " is alert.   Psychiatric:         Mood and Affect: Mood normal.         Thought Content: Thought content normal.         Judgment: Judgment normal.       Reviewed Data:  Anticoagulation Visit on 11/13/2020   Component Date Value Ref Range Status   • Protime 11/13/2020 26.9* 10.0 - 13.8 seconds Final   • INR 11/13/2020 2.2* 0.91 - 1.09 Final   Anticoagulation Visit on 10/29/2020   Component Date Value Ref Range Status   • Protime 10/29/2020 42.7* 10.0 - 13.8 seconds Final   • INR 10/29/2020 3.6* 0.91 - 1.09 Final   Lab on 10/28/2020   Component Date Value Ref Range Status   • Glucose 10/28/2020 108* 65 - 99 mg/dL Final   • BUN 10/28/2020 15  8 - 23 mg/dL Final   • Creatinine 10/28/2020 1.04  0.76 - 1.27 mg/dL Final   • Sodium 10/28/2020 140  136 - 145 mmol/L Final   • Potassium 10/28/2020 4.4  3.5 - 5.2 mmol/L Final   • Chloride 10/28/2020 104  98 - 107 mmol/L Final   • CO2 10/28/2020 30.2* 22.0 - 29.0 mmol/L Final   • Calcium 10/28/2020 8.9  8.6 - 10.5 mg/dL Final   • eGFR Non African Amer 10/28/2020 69  >60 mL/min/1.73 Final   • BUN/Creatinine Ratio 10/28/2020 14.4  7.0 - 25.0 Final   • Anion Gap 10/28/2020 5.8  5.0 - 15.0 mmol/L Final

## 2020-11-24 NOTE — PROGRESS NOTES
Labs stable continue same medications, confirm levothyroxine at 100 mcg daily, follow-up in 6 months

## 2020-11-25 ENCOUNTER — ANTICOAGULATION VISIT (OUTPATIENT)
Dept: PHARMACY | Facility: HOSPITAL | Age: 75
End: 2020-11-25

## 2020-11-25 DIAGNOSIS — I48.19 OTHER PERSISTENT ATRIAL FIBRILLATION (HCC): ICD-10-CM

## 2020-11-25 LAB
INR PPP: 2.7 (ref 0.91–1.09)
PROTHROMBIN TIME: 32.8 SECONDS (ref 10–13.8)

## 2020-11-25 PROCEDURE — 85610 PROTHROMBIN TIME: CPT

## 2020-11-25 PROCEDURE — 36416 COLLJ CAPILLARY BLOOD SPEC: CPT

## 2020-11-25 NOTE — PROGRESS NOTES
Anticoagulation Clinic Progress Note    Anticoagulation Summary  As of 2020    INR goal:  2.0-3.0   TTR:  61.7 % (2 y)   INR used for dosin.7 (2020)   Warfarin maintenance plan:  2 mg every Sun, Tue, Thu; 4 mg all other days   Weekly warfarin total:  22 mg   No change documented:  Stephany Siu   Plan last modified:  Kristal Cowart RPH (10/29/2020)   Next INR check:  2020   Priority:  High   Target end date:  Indefinite    Indications    Other persistent atrial fibrillation (CMS/HCC) [I48.19]             Anticoagulation Episode Summary     INR check location:      Preferred lab:      Send INR reminders to:   [facility]    Comments:  MD INR home danika      Anticoagulation Care Providers     Provider Role Specialty Phone number    Myriam Omer MD Referring Cardiology [phone]          Clinic Interview:  Patient Findings     Negatives:  Signs/symptoms of thrombosis, Signs/symptoms of bleeding,   Laboratory test error suspected, Change in health, Change in alcohol use,   Change in activity, Upcoming invasive procedure, Emergency department   visit, Upcoming dental procedure, Missed doses, Extra doses, Change in   medications, Change in diet/appetite, Hospital admission, Bruising, Other   complaints      Clinical Outcomes     Negatives:  Major bleeding event, Thromboembolic event,   Anticoagulation-related hospital admission, Anticoagulation-related ED   visit, Anticoagulation-related fatality        INR History:  Anticoagulation Monitoring 10/29/2020 2020 2020   INR 3.6 2.2 2.7   INR Date 10/29/2020 2020 2020   INR Goal 2.0-3.0 2.0-3.0 2.0-3.0   Trend Down Same Same   Last Week Total 24 mg 22 mg 22 mg   Next Week Total 20 mg 22 mg 22 mg   Sun 2 mg 2 mg 2 mg   Mon 4 mg 4 mg 4 mg   Tue 2 mg 2 mg 2 mg   Wed 4 mg 4 mg 4 mg   Thu Hold (10/29) 2 mg 2 mg   Fri 4 mg 4 mg 4 mg   Sat 4 mg 4 mg 4 mg   Visit Report - - -   Some recent data might be  hidden       Plan:  1. INR is therapeutic today- see above in Anticoagulation Summary.   Will instruct Cody Eldridge to continue their warfarin regimen- see above in Anticoagulation Summary.  2. Follow up in 4 weeks.  3. Patient declines warfarin refills.  4. Verbal and written information provided. Patient expresses understanding and has no further questions at this time.    Stephany Siu

## 2020-12-07 ENCOUNTER — TELEPHONE (OUTPATIENT)
Dept: CARDIOLOGY | Facility: CLINIC | Age: 75
End: 2020-12-07

## 2020-12-07 DIAGNOSIS — I10 ESSENTIAL HYPERTENSION: Primary | ICD-10-CM

## 2020-12-07 NOTE — TELEPHONE ENCOUNTER
Received blood pressure log showing blood pressure fluctuating quite a bit though overall does appear to be getting some low readings on the higher dose of the lisinopril.  It is ranging 110s-140s/60s-90 with many readings 110s-130s/70s-80.    Please find out if he is consistently checking at least 1 to 2 hours after morning medication after sitting calmly 10 to 15 minutes and please find out if he has changed his blood pressure cuff batteries recently.  Would have him continue with the 20 mg of lisinopril and continue to monitor as above and call with updated readings in 1 to 2 weeks.  Really try to avoid high salt foods.  If blood pressure staying greater than 130/80 on average may need to increase lisinopril dose further.  Please have him go ahead and stop by the outpatient lab 1 day this week to get repeat metabolic panel checked.

## 2020-12-14 ENCOUNTER — TELEPHONE (OUTPATIENT)
Dept: CARDIOLOGY | Facility: CLINIC | Age: 75
End: 2020-12-14

## 2020-12-14 ENCOUNTER — LAB (OUTPATIENT)
Dept: LAB | Facility: HOSPITAL | Age: 75
End: 2020-12-14

## 2020-12-14 DIAGNOSIS — I10 ESSENTIAL HYPERTENSION: ICD-10-CM

## 2020-12-14 LAB
ANION GAP SERPL CALCULATED.3IONS-SCNC: 4.6 MMOL/L (ref 5–15)
BUN SERPL-MCNC: 19 MG/DL (ref 8–23)
BUN/CREAT SERPL: 16.8 (ref 7–25)
CALCIUM SPEC-SCNC: 8.7 MG/DL (ref 8.6–10.5)
CHLORIDE SERPL-SCNC: 105 MMOL/L (ref 98–107)
CO2 SERPL-SCNC: 28.4 MMOL/L (ref 22–29)
CREAT SERPL-MCNC: 1.13 MG/DL (ref 0.76–1.27)
GFR SERPL CREATININE-BSD FRML MDRD: 63 ML/MIN/1.73
GLUCOSE SERPL-MCNC: 115 MG/DL (ref 65–99)
POTASSIUM SERPL-SCNC: 4.9 MMOL/L (ref 3.5–5.2)
SODIUM SERPL-SCNC: 138 MMOL/L (ref 136–145)

## 2020-12-14 PROCEDURE — 36415 COLL VENOUS BLD VENIPUNCTURE: CPT

## 2020-12-14 PROCEDURE — 80048 BASIC METABOLIC PNL TOTAL CA: CPT

## 2020-12-14 NOTE — TELEPHONE ENCOUNTER
Please let patient know that renal function looks stable on current lisinopril dose.  Continue to monitor BP at home and call if staying > 130/80 on average.  Follow-up with PCP regarding elevated blood sugar to ensure no additional testing for diabetes needed.

## 2020-12-14 NOTE — TELEPHONE ENCOUNTER
12/14/20  I spoke with patient - gave him this information/instructions - he voiced his understanding/yasmeen

## 2020-12-16 NOTE — TELEPHONE ENCOUNTER
Called and spoke with patient.  Readings ranging 120s-160s/70s-90s, most 140s-150s systolic.  He denies any dietary indiscretions with salt.  He is checking at least 2 hours after medications and after sitting calmly about 10 minutes.  We will increase lisinopril to 30 mg daily in the morning.  He will provide updated readings 12/28.  If still elevated then will increase to 40 mg daily.  Also need to check some renal artery ultrasounds to rule out renal artery stenosis.  We will look at rechecking metabolic panel in 2 weeks but he will call back with readings first in case we need to increase lisinopril to 40.  He is also going to try to cut back on salt and increase his activity levels.  He will call sooner for any low readings or symptoms or concerns.

## 2020-12-22 ENCOUNTER — ANTICOAGULATION VISIT (OUTPATIENT)
Dept: PHARMACY | Facility: HOSPITAL | Age: 75
End: 2020-12-22

## 2020-12-22 DIAGNOSIS — I48.19 OTHER PERSISTENT ATRIAL FIBRILLATION (HCC): ICD-10-CM

## 2020-12-22 LAB
INR PPP: 3.2 (ref 0.91–1.09)
PROTHROMBIN TIME: 38.1 SECONDS (ref 10–13.8)

## 2020-12-22 PROCEDURE — 36416 COLLJ CAPILLARY BLOOD SPEC: CPT

## 2020-12-22 PROCEDURE — 85610 PROTHROMBIN TIME: CPT

## 2020-12-22 PROCEDURE — G0463 HOSPITAL OUTPT CLINIC VISIT: HCPCS

## 2020-12-22 NOTE — PROGRESS NOTES
Anticoagulation Clinic Progress Note    Anticoagulation Summary  As of 12/22/2020    INR goal:  2.0-3.0   TTR:  61.6 % (2.1 y)   INR used for dosing:  3.2 (12/22/2020)   Warfarin maintenance plan:  2 mg every Sun, Tue, Thu; 4 mg all other days   Weekly warfarin total:  22 mg   No change documented:  Georges Guadalupe RPH   Plan last modified:  Kristal Cowart RPH (10/29/2020)   Next INR check:  1/12/2021   Priority:  High   Target end date:  Indefinite    Indications    Other persistent atrial fibrillation (CMS/HCC) [I48.19]             Anticoagulation Episode Summary     INR check location:      Preferred lab:      Send INR reminders to:   [facility]    Comments:  MD INR home danika      Anticoagulation Care Providers     Provider Role Specialty Phone number    Myriam Omer MD Referring Cardiology [phone]          Clinic Interview:  Patient Findings     Negatives:  Signs/symptoms of thrombosis, Signs/symptoms of bleeding,   Laboratory test error suspected, Change in health, Change in alcohol use,   Change in activity, Upcoming invasive procedure, Emergency department   visit, Upcoming dental procedure, Missed doses, Extra doses, Change in   medications, Change in diet/appetite, Hospital admission, Bruising, Other   complaints      Clinical Outcomes     Negatives:  Major bleeding event, Thromboembolic event,   Anticoagulation-related hospital admission, Anticoagulation-related ED   visit, Anticoagulation-related fatality        INR History:  Anticoagulation Monitoring 11/13/2020 11/25/2020 12/22/2020   INR 2.2 2.7 3.2   INR Date 11/13/2020 11/25/2020 12/22/2020   INR Goal 2.0-3.0 2.0-3.0 2.0-3.0   Trend Same Same Same   Last Week Total 22 mg 22 mg 22 mg   Next Week Total 22 mg 22 mg 22 mg   Sun 2 mg 2 mg 2 mg   Mon 4 mg 4 mg 4 mg   Tue 2 mg 2 mg 2 mg   Wed 4 mg 4 mg 4 mg   Thu 2 mg 2 mg 2 mg   Fri 4 mg 4 mg 4 mg   Sat 4 mg 4 mg 4 mg   Visit Report - - -   Some recent data might be hidden        Plan:  1. INR is Supratherapeutic today- see above in Anticoagulation Summary.  Will instruct Cody Eldridge to Continue their warfarin regimen- see above in Anticoagulation Summary.  2. Follow up in 3 weeks at [facility] alongside other appt  3. Patient declines warfarin refills.  4. Verbal and written information provided. Patient expresses understanding and has no further questions at this time.    Georges Guadalupe RP

## 2020-12-23 RX ORDER — ALPRAZOLAM 0.5 MG/1
TABLET ORAL
Qty: 45 TABLET | Refills: 4 | Status: SHIPPED | OUTPATIENT
Start: 2020-12-23 | End: 2021-05-28

## 2021-01-05 ENCOUNTER — HOSPITAL ENCOUNTER (OUTPATIENT)
Dept: CARDIOLOGY | Facility: HOSPITAL | Age: 76
Discharge: HOME | End: 2021-01-05
Admitting: NURSE PRACTITIONER

## 2021-01-05 ENCOUNTER — TELEPHONE (OUTPATIENT)
Dept: CARDIOLOGY | Facility: CLINIC | Age: 76
End: 2021-01-05

## 2021-01-05 VITALS
DIASTOLIC BLOOD PRESSURE: 70 MMHG | SYSTOLIC BLOOD PRESSURE: 120 MMHG | HEIGHT: 67 IN | BODY MASS INDEX: 24.48 KG/M2 | WEIGHT: 156 LBS

## 2021-01-05 DIAGNOSIS — I10 ESSENTIAL HYPERTENSION: Primary | ICD-10-CM

## 2021-01-05 DIAGNOSIS — I10 ESSENTIAL HYPERTENSION: ICD-10-CM

## 2021-01-05 LAB
BH CV ECHO MEAS - DIST REN A EDV LEFT: 17.2 CM/SEC
BH CV ECHO MEAS - DIST REN A PSV LEFT: 49.7 CM/SEC
BH CV ECHO MEAS - DIST REN A RI LEFT: 0.65
BH CV ECHO MEAS - MID REN A EDV LEFT: -18.7 CM/SEC
BH CV ECHO MEAS - MID REN A PSV LEFT: -99.9 CM/SEC
BH CV ECHO MEAS - MID REN A RI LEFT: 0.81
BH CV ECHO MEAS - PROX REN A EDV LEFT: -36.2 CM/SEC
BH CV ECHO MEAS - PROX REN A PSV LEFT: -95.5 CM/SEC
BH CV ECHO MEAS - PROX REN A RI LEFT: 0.62
BH CV VAS KIDNEY HEIGHT LEFT: 5 CM
BH CV VAS RENAL AORTIC MID PSV: 176 CM/S
BH CV XLRA MEAS - KID L LEFT: 10.9 CM
BH CV XLRA MEAS DIST REN A EDV RIGHT: 16.4 CM/SEC
BH CV XLRA MEAS DIST REN A PSV RIGHT: 69.8 CM/SEC
BH CV XLRA MEAS DIST REN A RI RIGHT: 0.76
BH CV XLRA MEAS KID H RIGHT: 5.1 CM
BH CV XLRA MEAS KID L RIGHT: 10.9 CM
BH CV XLRA MEAS MID REN A EDV RIGHT: 23.8 CM/SEC
BH CV XLRA MEAS MID REN A PSV RIGHT: 74.7 CM/SEC
BH CV XLRA MEAS MID REN A RI RIGHT: 0.68
BH CV XLRA MEAS PROX REN A EDV RIGHT: 22.2 CM/SEC
BH CV XLRA MEAS PROX REN A PSV RIGHT: 82.1 CM/SEC
BH CV XLRA MEAS PROX REN A RI RIGHT: 0.73
BH CV XLRA MEAS RAR LEFT: 0.6
BH CV XLRA MEAS RAR RIGHT: 0.5

## 2021-01-05 PROCEDURE — 93975 VASCULAR STUDY: CPT | Performed by: INTERNAL MEDICINE

## 2021-01-05 PROCEDURE — 93975 VASCULAR STUDY: CPT

## 2021-01-05 NOTE — TELEPHONE ENCOUNTER
Please let patient know that renal artery ultrasounds look normal.  No evidence of tightly blocked renal arteries that could be contributing to high blood pressure.  Please find out how blood pressure running on current medication regimen.  If blood pressure remains elevated then will likely need to increase lisinopril dose further for better control.  Also recommend avoiding high salt foods and regular exercise.

## 2021-01-06 DIAGNOSIS — I10 ESSENTIAL HYPERTENSION: ICD-10-CM

## 2021-01-06 RX ORDER — LISINOPRIL 40 MG/1
40 TABLET ORAL DAILY
Qty: 30 TABLET | Refills: 0 | Status: SHIPPED | OUTPATIENT
Start: 2021-01-06 | End: 2021-01-07

## 2021-01-06 NOTE — TELEPHONE ENCOUNTER
Called and left a msg with pt's wife. She is going to have him call back when he gets home.    Thank you,    Regina Coppola RN  Triage [facility] [place]

## 2021-01-06 NOTE — TELEPHONE ENCOUNTER
Called pt. Went over results. He verbalized understanding.     B/P Readings:  12-30-20 159/98, HR 76  12-31-20 159/89, HR 88  1-1-21 149/101, HR 76  1-2-21 194/117, HR 62  1-3-21 120/72, HR 80  1-4-21 169/100, HR 89  1-5-21 159/98, HR 74    Thank you,    Regina Coppola, RN  Triage [facility]

## 2021-01-06 NOTE — TELEPHONE ENCOUNTER
Please asked him to increase his lisinopril to 40 mg a day.  Continue to monitor blood pressure at home at least 1 to 2 hours after morning medications and after sitting calmly 5 to 10 minutes, avoid high salt foods, increase exercise, and call with updated readings in 2 weeks or call sooner for systolic blood pressure less than 100 or if he develops symptoms or concerns.  Watch for signs of low blood pressure including lightheadedness or significant fatigue which point would recommend checking blood pressure and calling if systolic blood pressure less than 100 however with how high blood pressure has been recently I do not expect this to occur.  He will need to stop by the outpatient lab in 2 weeks as well to check a metabolic panel to ensure kidney enzymes remain stable on increased dose of lisinopril.

## 2021-01-07 RX ORDER — LISINOPRIL 40 MG/1
40 TABLET ORAL DAILY
Qty: 90 TABLET | Refills: 0 | Status: SHIPPED | OUTPATIENT
Start: 2021-01-07 | End: 2021-04-15 | Stop reason: SDUPTHER

## 2021-01-07 NOTE — TELEPHONE ENCOUNTER
Patient notified of results and recomendations and verbalized understanding  Lana Lim RN  Triage nurse

## 2021-01-12 ENCOUNTER — LAB (OUTPATIENT)
Dept: LAB | Facility: HOSPITAL | Age: 76
End: 2021-01-12

## 2021-01-12 ENCOUNTER — HOSPITAL ENCOUNTER (OUTPATIENT)
Dept: CT IMAGING | Facility: HOSPITAL | Age: 76
Discharge: HOME | End: 2021-01-12

## 2021-01-12 DIAGNOSIS — D70.1 CHEMOTHERAPY-INDUCED NEUTROPENIA (HCC): ICD-10-CM

## 2021-01-12 DIAGNOSIS — C44.42 SQUAMOUS CELL CARCINOMA OF NECK: ICD-10-CM

## 2021-01-12 DIAGNOSIS — T45.1X5A ANEMIA ASSOCIATED WITH CHEMOTHERAPY: ICD-10-CM

## 2021-01-12 DIAGNOSIS — R93.89 ABNORMAL FINDINGS ON DIAGNOSTIC IMAGING OF OTHER SPECIFIED BODY STRUCTURES: ICD-10-CM

## 2021-01-12 DIAGNOSIS — T45.1X5A CHEMOTHERAPY-INDUCED THROMBOCYTOPENIA: ICD-10-CM

## 2021-01-12 DIAGNOSIS — N17.9 ACUTE RENAL INJURY (HCC): ICD-10-CM

## 2021-01-12 DIAGNOSIS — T45.1X5A CHEMOTHERAPY-INDUCED NEUTROPENIA (HCC): ICD-10-CM

## 2021-01-12 DIAGNOSIS — D64.81 ANEMIA ASSOCIATED WITH CHEMOTHERAPY: ICD-10-CM

## 2021-01-12 DIAGNOSIS — C01 SQUAMOUS CELL CARCINOMA OF BASE OF TONGUE (HCC): ICD-10-CM

## 2021-01-12 DIAGNOSIS — D69.59 CHEMOTHERAPY-INDUCED THROMBOCYTOPENIA: ICD-10-CM

## 2021-01-12 LAB
ALBUMIN SERPL-MCNC: 3.7 G/DL (ref 3.5–5.2)
ALBUMIN/GLOB SERPL: 1.2 G/DL
ALP SERPL-CCNC: 75 U/L (ref 39–117)
ALT SERPL W P-5'-P-CCNC: 16 U/L (ref 1–41)
ANION GAP SERPL CALCULATED.3IONS-SCNC: 2.1 MMOL/L (ref 5–15)
AST SERPL-CCNC: 23 U/L (ref 1–40)
BILIRUB SERPL-MCNC: 0.8 MG/DL (ref 0–1.2)
BUN SERPL-MCNC: 15 MG/DL (ref 8–23)
BUN/CREAT SERPL: 16.1 (ref 7–25)
CALCIUM SPEC-SCNC: 8.9 MG/DL (ref 8.6–10.5)
CHLORIDE SERPL-SCNC: 104 MMOL/L (ref 98–107)
CO2 SERPL-SCNC: 29.9 MMOL/L (ref 22–29)
CREAT BLDA-MCNC: 0.8 MG/DL (ref 0.6–1.3)
CREAT SERPL-MCNC: 0.93 MG/DL (ref 0.76–1.27)
DEPRECATED RDW RBC AUTO: 51.8 FL (ref 37–54)
ERYTHROCYTE [DISTWIDTH] IN BLOOD BY AUTOMATED COUNT: 14.3 % (ref 12.3–15.4)
GFR SERPL CREATININE-BSD FRML MDRD: 79 ML/MIN/1.73
GLOBULIN UR ELPH-MCNC: 3 GM/DL
GLUCOSE SERPL-MCNC: 120 MG/DL (ref 65–99)
HCT VFR BLD AUTO: 38.3 % (ref 37.5–51)
HGB BLD-MCNC: 12.1 G/DL (ref 13–17.7)
MCH RBC QN AUTO: 30.9 PG (ref 26.6–33)
MCHC RBC AUTO-ENTMCNC: 31.6 G/DL (ref 31.5–35.7)
MCV RBC AUTO: 97.7 FL (ref 79–97)
PLATELET # BLD AUTO: 136 10*3/MM3 (ref 140–450)
PMV BLD AUTO: 10.5 FL (ref 6–12)
POTASSIUM SERPL-SCNC: 3.7 MMOL/L (ref 3.5–5.2)
PROT SERPL-MCNC: 6.7 G/DL (ref 6–8.5)
RBC # BLD AUTO: 3.92 10*6/MM3 (ref 4.14–5.8)
SODIUM SERPL-SCNC: 136 MMOL/L (ref 136–145)
TSH SERPL DL<=0.05 MIU/L-ACNC: 10.27 UIU/ML (ref 0.27–4.2)
WBC # BLD AUTO: 4.18 10*3/MM3 (ref 3.4–10.8)

## 2021-01-12 PROCEDURE — 71260 CT THORAX DX C+: CPT

## 2021-01-12 PROCEDURE — 82565 ASSAY OF CREATININE: CPT

## 2021-01-12 PROCEDURE — 80053 COMPREHEN METABOLIC PANEL: CPT | Performed by: INTERNAL MEDICINE

## 2021-01-12 PROCEDURE — 85027 COMPLETE CBC AUTOMATED: CPT | Performed by: INTERNAL MEDICINE

## 2021-01-12 PROCEDURE — 25010000002 IOPAMIDOL 61 % SOLUTION: Performed by: INTERNAL MEDICINE

## 2021-01-12 PROCEDURE — 70491 CT SOFT TISSUE NECK W/DYE: CPT

## 2021-01-12 PROCEDURE — 84443 ASSAY THYROID STIM HORMONE: CPT | Performed by: INTERNAL MEDICINE

## 2021-01-12 RX ADMIN — IOPAMIDOL 90 ML: 612 INJECTION, SOLUTION INTRAVENOUS at 14:33

## 2021-01-19 ENCOUNTER — APPOINTMENT (OUTPATIENT)
Dept: OTHER | Facility: HOSPITAL | Age: 76
End: 2021-01-19

## 2021-01-19 ENCOUNTER — OFFICE VISIT (OUTPATIENT)
Dept: ONCOLOGY | Facility: CLINIC | Age: 76
End: 2021-01-19

## 2021-01-19 VITALS
WEIGHT: 155.8 LBS | HEIGHT: 70 IN | HEART RATE: 67 BPM | TEMPERATURE: 97.5 F | BODY MASS INDEX: 22.3 KG/M2 | SYSTOLIC BLOOD PRESSURE: 153 MMHG | RESPIRATION RATE: 18 BRPM | DIASTOLIC BLOOD PRESSURE: 88 MMHG | OXYGEN SATURATION: 98 %

## 2021-01-19 DIAGNOSIS — E03.9 ACQUIRED HYPOTHYROIDISM: ICD-10-CM

## 2021-01-19 DIAGNOSIS — Z79.01 CURRENT USE OF LONG TERM ANTICOAGULATION: ICD-10-CM

## 2021-01-19 DIAGNOSIS — C44.42 SQUAMOUS CELL CARCINOMA OF NECK: ICD-10-CM

## 2021-01-19 DIAGNOSIS — C01 SQUAMOUS CELL CARCINOMA OF BASE OF TONGUE (HCC): Primary | ICD-10-CM

## 2021-01-19 DIAGNOSIS — Z85.9 HISTORY OF CANCER: ICD-10-CM

## 2021-01-19 PROCEDURE — 99214 OFFICE O/P EST MOD 30 MIN: CPT | Performed by: INTERNAL MEDICINE

## 2021-01-19 NOTE — PROGRESS NOTES
Subjective     REASON FOR FOLLOW UP:  1.  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   2.  Combined radiation and low-dose carboplatin and Taxol chemotherapy weekly initiated 6/17/2019.  3.  MediPort placed by Dr. Adolph Grigsby of vascular surgery 6/18/2019  4.  Methicillin sensitive staph septicemia felt to be associated with infected MediPort.  Port was removed and patient has completed a protracted course of antibiotics  5.  Generalized weakness and malnutrition  6.  Atrial fibrillation.  He is currently anticoagulated with Coumadin  7.  Hypothyroidism on levothyroxine replacement.    HISTORY OF PRESENT ILLNESS:  The patient is a 75 y.o. year old male who was previously treated with weekly carboplatin and Taxol along with radiation for his squamous cell carcinoma of the base of the tongue, HPV positive.      He was started on combined radiation and low-dose weekly carboplatin and Taxol chemotherapy but had tremendous problems with toxicity and tolerance to treatment.  He received his fifth week of chemotherapy and radiation on 7/31/2019 but subsequently required admission to the hospital with severe toxicity and development of methicillin sensitive staph septicemia.  His MediPort was felt to be infected and was removed.    After his recovery he opted not to finish out his remaining days of radiation and is being followed expectantly.  He underwent recent ENT evaluation with Dr. Slater and was felt to be doing well.     Surveillance CT scans performed 1/12/2021 as noted below show no evidence of recurrent or metastatic disease.    His labs also performed on 1/12/2021 were fairly unremarkable aside from a TSH level of 10.  We will forward these results to his primary care physician Dr. Diaz who is managing his levothyroxine thyroid replacement.    History of Present Illness     Past Medical History:   Diagnosis Date   • Acquired hypothyroidism    • Acute renal injury (CMS/HCC)    • Anemia  associated with chemotherapy    • Atrial fibrillation (CMS/HCC)    • CAD (coronary artery disease)    • CHF (congestive heart failure) (CMS/HCC)    • Chronic bronchitis (CMS/HCC)    • COPD (chronic obstructive pulmonary disease) (CMS/HCC)    • Cor pulmonale (chronic) (CMS/HCC)    • Daytime hypersomnia    • Depression with anxiety    • IRAHETA (dyspnea on exertion)    • Enlarged prostate    • Frequent nocturnal awakening    • Gout    • H/O cardiac radiofrequency ablation 2006    [facility].   • Head and neck cancer (CMS/HCC)    • Hypertension    • Hypotension    • Insomnia    • Lumbar radicular pain    • SHAR (obstructive sleep apnea)    • Osteoarthritis    • Permanent atrial fibrillation (CMS/HCC)    • Shock, septic (CMS/HCC)    • Snoring    • Squamous cell carcinoma of neck    • SVT (supraventricular tachycardia) (CMS/HCC)    • Syncope    • Vitamin D deficiency    • Weight loss         Past Surgical History:   Procedure Laterality Date   • APPENDECTOMY     • CARDIAC ABLATION  2006    [facility].   • CARDIAC CATHETERIZATION N/A 8/29/2018    Procedure: Left Heart Cath;  Surgeon: Yousif Uribe MD;  Location: [facility] INVASIVE LOCATION;  Service: Cardiology   • CARDIAC CATHETERIZATION N/A 8/29/2018    Procedure: Coronary angiography;  Surgeon: Yousif Uribe MD;  Location: [facility] CATH INVASIVE LOCATION;  Service: Cardiology   • CARDIAC CATHETERIZATION N/A 8/29/2018    Procedure: Left ventriculography;  Surgeon: Yousif Uribe MD;  Location: [facility] CATH INVASIVE LOCATION;  Service: Cardiology   • FINGER SURGERY     • FOOT SURGERY     • HAND SURGERY     • VENOUS ACCESS DEVICE (PORT) INSERTION Right 6/18/2019    Procedure: MEDIPORT PLACEMENT;  Surgeon: Elvis Grigsby MD;  Location: [facility] OR;  Service: Vascular   • VENOUS ACCESS DEVICE (PORT) REMOVAL Right 8/5/2019    Procedure: REMOVAL VENOUS ACCESS DEVICE;  Surgeon: Prince Castaneda MD;  Location: [facility] OR;  Service: Vascular        ALLERGIES:   "  Allergies   Allergen Reactions   • Benadryl [Diphenhydramine Hcl] Dizziness     \"I sleep for about a day\"        Review of Systems   Constitutional: Negative for activity change, chills and fever.   HENT: Negative for trouble swallowing and voice change.    Eyes: Negative for pain and visual disturbance.   Respiratory: Negative for cough, shortness of breath and wheezing.    Cardiovascular: Negative for chest pain, palpitations and leg swelling.   Gastrointestinal: Negative for abdominal pain, constipation, diarrhea and vomiting.   Genitourinary: Negative for difficulty urinating, frequency and urgency.   Musculoskeletal: Negative for arthralgias and joint swelling.   Skin: Negative for rash.   Neurological: Negative for seizures.   Hematological: Negative for adenopathy. Does not bruise/bleed easily.   Psychiatric/Behavioral: Negative for behavioral problems and confusion. The patient is not nervous/anxious.         Objective     Vitals:    01/19/21 1332   BP: 153/88   Pulse: 67   Resp: 18   Temp: 97.5 °F (36.4 °C)   TempSrc: Skin   SpO2: 98%   Weight: 70.7 kg (155 lb 12.8 oz)   Height: 177.8 cm (70\")   PainSc: 0-No pain     Current Status 7/24/2020   ECOG score 0       Physical Exam   Constitutional: He is oriented to person, place, and time. He appears well-developed. No distress.   HENT:   Head: Normocephalic.   Mouth/Throat: No oropharyngeal exudate.   Eyes: Pupils are equal, round, and reactive to light. Conjunctivae are normal. No scleral icterus.   Neck: Normal range of motion. Neck supple. No JVD present. No thyromegaly present.   Cardiovascular: Normal rate. An irregularly irregular rhythm present. Exam reveals no gallop and no friction rub.   No murmur heard.  Pulmonary/Chest: Effort normal and breath sounds normal. He has no wheezes. He has no rales.   Abdominal: Soft. He exhibits no distension and no mass. There is no abdominal tenderness.   Musculoskeletal: Normal range of motion. No deformity.      " Comments: trace ankle edema bilaterally   Lymphadenopathy:     He has no cervical adenopathy.   Neurological: He is alert and oriented to person, place, and time. He has normal reflexes. No cranial nerve deficit.   Skin: Skin is warm and dry. No rash noted. No erythema.   Psychiatric: His behavior is normal. Judgment normal.         RECENT LABS:  Hematology WBC   Date Value Ref Range Status   01/12/2021 4.18 3.40 - 10.80 10*3/mm3 Final     RBC   Date Value Ref Range Status   01/12/2021 3.92 (L) 4.14 - 5.80 10*6/mm3 Final     Hemoglobin   Date Value Ref Range Status   01/12/2021 12.1 (L) 13.0 - 17.7 g/dL Final     Hematocrit   Date Value Ref Range Status   01/12/2021 38.3 37.5 - 51.0 % Final     Platelets   Date Value Ref Range Status   01/12/2021 136 (L) 140 - 450 10*3/mm3 Final        Lab Results   Component Value Date    NEUTROABS 4.05 08/07/2020     Lab Results   Component Value Date    TSH 10.270 (H) 01/12/2021       Lab Results   Component Value Date    GLUCOSE 120 (H) 01/12/2021    BUN 15 01/12/2021    CREATININE 0.93 01/12/2021    EGFRIFNONA 79 01/12/2021    EGFRIFAFRI 107 04/23/2018    BCR 16.1 01/12/2021    K 3.7 01/12/2021    CO2 29.9 (H) 01/12/2021    CALCIUM 8.9 01/12/2021    PROTENTOTREF 7.2 04/23/2018    ALBUMIN 3.70 01/12/2021    LABIL2 1.3 04/23/2018    AST 23 01/12/2021    ALT 16 01/12/2021       CT NECK WITH CONTRAST AND CT CHEST WITH CONTRAST 01/12/2021  IMPRESSION:  1. Slight interval decrease in size of the area of increased density in  the posterior right genioglossus muscle of the right tongue base since  the previous study of 10/29/2019.  2. Interval decrease in size of the soft tissue thickening in the right  carotid space since the previous study as well.  3. One or 2 tiny ill-defined nodular densities in the right middle lobe.  4. Repeat CT of the neck and chest with contrast in approximately 4  months to 6 months recommended.      Assessment/Plan     1.  Stage I (T2, in 1; HPV positive)  squamous cell carcinoma of the base of the tongue with metastatic lymphadenopathy in the right neck.  He was undergoing definitive treatment with radiation therapy and weekly low-dose carboplatin and Taxol chemotherapy.  As noted above, he was unable to tolerate the full treatment and received about 5 weeks of therapy with his last treatment the week of 7/31/2019.  As noted above, his posttreatment PET scan from 1/14/2020 showed complete metabolic response.  2.  Comorbidities including ischemic heart disease with history of CHF and atrial fibrillation requiring Coumadin anticoagulation.   3.  Extremely poor tolerance to chemotherapy and radiation.  At this point we do not feel the patient can tolerate any further chemotherapy or radiation and will focus on letting him regain his strength and nutrition.   4.  Warfarin anticoagulation.     5.  MediPort infection with methicillin sensitive staph septicemia.  This resulted in a lengthy hospitalization and stay in the critical care unit.  He now seems to be recovered and is completed his antibiotic therapy as an outpatient.  His port was removed.  6.  Hypothyroidism which developed following his radiation treatment.  He currently is on thyroid replacement with Synthroid 100 mcg daily.  His TSH level from last week was elevated at 10 and we will fax this result to his primary care office.    Plan  1.  No further plans for additional chemotherapy or radiation.  2.  His cardiology office will continue to manage his Coumadin anticoagulation for now.  3.  He will follow-up with Dr. Slater for further ENT exams.  4.  His thyroid replacement is being managed by his primary care physician Dr. Patrick Diaz  5.  We will again schedule follow-up in our office in 6 months with CT scans and labs performed 1 week prior to the visit.

## 2021-01-21 ENCOUNTER — LAB (OUTPATIENT)
Dept: LAB | Facility: HOSPITAL | Age: 76
End: 2021-01-21

## 2021-01-21 ENCOUNTER — TELEPHONE (OUTPATIENT)
Dept: CARDIOLOGY | Facility: CLINIC | Age: 76
End: 2021-01-21

## 2021-01-21 DIAGNOSIS — I10 ESSENTIAL HYPERTENSION: ICD-10-CM

## 2021-01-21 LAB
ANION GAP SERPL CALCULATED.3IONS-SCNC: 9 MMOL/L (ref 5–15)
BUN SERPL-MCNC: 15 MG/DL (ref 8–23)
BUN/CREAT SERPL: 18.3 (ref 7–25)
CALCIUM SPEC-SCNC: 8.6 MG/DL (ref 8.6–10.5)
CHLORIDE SERPL-SCNC: 106 MMOL/L (ref 98–107)
CO2 SERPL-SCNC: 26 MMOL/L (ref 22–29)
CREAT SERPL-MCNC: 0.82 MG/DL (ref 0.76–1.27)
GFR SERPL CREATININE-BSD FRML MDRD: 91 ML/MIN/1.73
GLUCOSE SERPL-MCNC: 111 MG/DL (ref 65–99)
POTASSIUM SERPL-SCNC: 4.5 MMOL/L (ref 3.5–5.2)
SODIUM SERPL-SCNC: 141 MMOL/L (ref 136–145)

## 2021-01-21 PROCEDURE — 80048 BASIC METABOLIC PNL TOTAL CA: CPT

## 2021-01-21 PROCEDURE — 36415 COLL VENOUS BLD VENIPUNCTURE: CPT

## 2021-01-21 NOTE — TELEPHONE ENCOUNTER
Please let patient know that his blood pressure remains elevated above goal on increased dose of lisinopril though with some improvement.  Likely kidney enzymes look stable on lisinopril.  Lets see how he does taking the 10 mg of amlodipine all at once in the morning.  Would have him continue to check daily blood pressures at least an hour or 2 after morning medications and after sitting calmly 5 to 10 minutes and continue to keep a log.  Would also see if he can check an evening blood pressure after sitting calmly 5 to 10 minutes a couple of times in the next week to ensure her evening blood pressures are not too high with dose adjustment.  Would provide updated readings in 1 to 2 weeks or sooner for high or low readings.  Keep upcoming March follow-up with Dr. Omer as well and would again bring blood pressure cuff to this appointment so we can confirm accuracy.

## 2021-01-21 NOTE — TELEPHONE ENCOUNTER
1/21/21  No answer - will try later/or tomorrow/yasmeen  1/22/21  I spoke with patient give him these instructions/infor - he qill call back in 1-2 wks or drop off his bp log /yasmeen

## 2021-02-01 ENCOUNTER — TELEPHONE (OUTPATIENT)
Dept: PHARMACY | Facility: HOSPITAL | Age: 76
End: 2021-02-01

## 2021-02-03 ENCOUNTER — TELEPHONE (OUTPATIENT)
Dept: CARDIOLOGY | Facility: CLINIC | Age: 76
End: 2021-02-03

## 2021-02-03 RX ORDER — LEVOTHYROXINE SODIUM 0.12 MG/1
125 TABLET ORAL DAILY
Qty: 30 TABLET | Refills: 1 | Status: SHIPPED | OUTPATIENT
Start: 2021-02-03 | End: 2021-04-12

## 2021-02-03 NOTE — TELEPHONE ENCOUNTER
2/3/21 I spoke with patient - asked him to ck the bp in the evening too and send the bp log through in a couple of weeks/yasmeen

## 2021-02-03 NOTE — TELEPHONE ENCOUNTER
Received most recent blood pressure log and it looks like he is starting to get some improvement with taking the 10 mg of amlodipine in the morning.  He is getting many readings 120s over 70s and occasional of 140-150/80-90 but most 120s/70s, even a 106/63 one AM after meds.     Please see if he can occasionally check her blood pressure in the evening at least 1 to 2 hours after carvedilol and after sitting calmly 5 to 10-minute to ensure blood pressure is not getting too high in the evening (>130/80 on average) with recent adjustment.  Continue to avoid high salt foods and try to increase exercise regimen and call if blood pressure staying greater than 130/80 on average.

## 2021-02-04 ENCOUNTER — ANTICOAGULATION VISIT (OUTPATIENT)
Dept: PHARMACY | Facility: HOSPITAL | Age: 76
End: 2021-02-04

## 2021-02-04 DIAGNOSIS — I48.19 OTHER PERSISTENT ATRIAL FIBRILLATION (HCC): ICD-10-CM

## 2021-02-04 LAB
INR PPP: 1.6 (ref 0.91–1.09)
PROTHROMBIN TIME: 19 SECONDS (ref 10–13.8)

## 2021-02-04 PROCEDURE — 85610 PROTHROMBIN TIME: CPT

## 2021-02-04 PROCEDURE — 36416 COLLJ CAPILLARY BLOOD SPEC: CPT

## 2021-02-04 PROCEDURE — G0463 HOSPITAL OUTPT CLINIC VISIT: HCPCS

## 2021-02-04 NOTE — PROGRESS NOTES
Anticoagulation Clinic Progress Note    Anticoagulation Summary  As of 2021    INR goal:  2.0-3.0   TTR:  61.7 % (2.2 y)   INR used for dosin.6 (2021)   Warfarin maintenance plan:  2 mg every Sun, Tue, Thu; 4 mg all other days   Weekly warfarin total:  22 mg   Plan last modified:  Kristal Cowart RPH (10/29/2020)   Next INR check:  2021   Priority:  High   Target end date:  Indefinite    Indications    Other persistent atrial fibrillation (CMS/HCC) [I48.19]             Anticoagulation Episode Summary     INR check location:      Preferred lab:      Send INR reminders to:   [facility]    Comments:        Anticoagulation Care Providers     Provider Role Specialty Phone number    Myriam Omer MD Referring Cardiology [phone]          Clinic Interview:  Patient Findings     Positives:  Change in diet/appetite    Negatives:  Signs/symptoms of thrombosis, Signs/symptoms of bleeding,   Laboratory test error suspected, Change in health, Change in alcohol use,   Change in activity, Upcoming invasive procedure, Emergency department   visit, Upcoming dental procedure, Missed doses, Extra doses, Change in   medications, Hospital admission, Bruising, Other complaints     INR History:  Anticoagulation Monitoring 2020   INR 2.7 3.2 1.6   INR Date 2020   INR Goal 2.0-3.0 2.0-3.0 2.0-3.0   Trend Same Same Same   Last Week Total 22 mg 22 mg 22 mg   Next Week Total 22 mg 22 mg 24 mg   Sun 2 mg 2 mg 2 mg   Mon 4 mg 4 mg 4 mg   Tue 2 mg 2 mg 2 mg   Wed 4 mg 4 mg 4 mg   Thu 2 mg 2 mg 4 mg (); Otherwise 2 mg   Fri 4 mg 4 mg 4 mg   Sat 4 mg 4 mg 4 mg   Visit Report - - -   Some recent data might be hidden       Plan:  1. INR is Subtherapeutic today- see above in Anticoagulation Summary.  Will instruct Cody Eldridge to boost today only, then continue their warfarin regimen- see above in Anticoagulation Summary.  2. Follow up in 2 weeks  3.  Patient declines warfarin refills.  4. Verbal and written information provided. Patient expresses understanding and has no further questions at this time.    Kristal Cowart RP

## 2021-02-16 NOTE — TELEPHONE ENCOUNTER
I spoke with patient and he is taking his BP correctly.  He will bring his cuff and log with him to his appt with Dr. Omer./ STEFANIE

## 2021-02-16 NOTE — TELEPHONE ENCOUNTER
Blood pressure overall improved (many readings 120s to 130s over 70s to 80s) but still fluctuating quite a bit and still above goal at times (still with some readings 140s over 80s to 90s).  Please confirm that he is checking heart rate and blood pressure at least an hour or 2 after morning medication after sitting calmly 5 to 10 minutes.  He really needs to try to avoid fast food intake out and avoid high salt foods and try to get into more regular exercise routine.  Would have him bring his blood pressure cuff and log with him to his upcoming appointment with Dr. Omer and if it remains elevated additional medication adjustments may be needed.

## 2021-02-16 NOTE — TELEPHONE ENCOUNTER
Received fax copy of patient's recent BP readings.  I will place this in your inbox on your desk.  / STEFANIE     Patient can be reached at [phone]

## 2021-02-22 RX ORDER — MONTELUKAST SODIUM 10 MG/1
TABLET ORAL
Qty: 90 TABLET | Refills: 1 | Status: SHIPPED | OUTPATIENT
Start: 2021-02-22 | End: 2021-08-30

## 2021-03-02 ENCOUNTER — TELEPHONE (OUTPATIENT)
Dept: CARDIOLOGY | Facility: CLINIC | Age: 76
End: 2021-03-02

## 2021-03-02 DIAGNOSIS — I10 ESSENTIAL HYPERTENSION: Primary | ICD-10-CM

## 2021-03-02 RX ORDER — CHLORTHALIDONE 25 MG/1
25 TABLET ORAL DAILY
Qty: 30 TABLET | Refills: 1 | Status: SHIPPED | OUTPATIENT
Start: 2021-03-02 | End: 2021-05-05 | Stop reason: SDUPTHER

## 2021-03-02 NOTE — TELEPHONE ENCOUNTER
I reviewed a list of blood pressures that he brought into the office today.  According to the computer he is currently on lisinopril 40 mg a day, Coreg 25 mg twice a day and amlodipine 5 mg twice a day.  He has a couple numbers in the normal range but otherwise is pretty consistently elevated.    Component      Latest Ref Rng & Units 1/12/2021 1/21/2021           2:32 PM    Glucose      65 - 99 mg/dL 120 (H) 111 (H)   BUN      8 - 23 mg/dL 15 15   Creatinine      0.76 - 1.27 mg/dL 0.93 0.82   Sodium      136 - 145 mmol/L 136 141   Potassium      3.5 - 5.2 mmol/L 3.7 4.5   Chloride      98 - 107 mmol/L 104 106   CO2      22.0 - 29.0 mmol/L 29.9 (H) 26.0   Calcium      8.6 - 10.5 mg/dL 8.9 8.6   eGFR Non African Am      >60 mL/min/1.73 79 91   BUN/Creatinine Ratio      7.0 - 25.0 16.1 18.3   Anion Gap      5.0 - 15.0 mmol/L 2.1 (L) 9.0       I am going to add 25 mg of chlorthalidone to his regimen.  He will need a repeat basic metabolic panel the morning that he comes in to see me on March 16.  He should take this in the morning.  Continue to monitor blood pressure and bring a list into his follow-up appointment.  Avoid salt, + exercise.

## 2021-03-03 NOTE — TELEPHONE ENCOUNTER
I spoke with the patient and he is ok with taking the chlorthalidone and take his BP and bring them to his appointment. He will avoid salt and exercise

## 2021-03-08 ENCOUNTER — ANTICOAGULATION VISIT (OUTPATIENT)
Dept: PHARMACY | Facility: HOSPITAL | Age: 76
End: 2021-03-08

## 2021-03-08 DIAGNOSIS — I48.19 OTHER PERSISTENT ATRIAL FIBRILLATION (HCC): ICD-10-CM

## 2021-03-08 LAB
INR PPP: 2.6 (ref 0.91–1.09)
PROTHROMBIN TIME: 30.9 SECONDS (ref 10–13.8)

## 2021-03-08 PROCEDURE — 36416 COLLJ CAPILLARY BLOOD SPEC: CPT

## 2021-03-08 PROCEDURE — 85610 PROTHROMBIN TIME: CPT

## 2021-03-08 NOTE — PROGRESS NOTES
Anticoagulation Clinic Progress Note    Anticoagulation Summary  As of 3/8/2021    INR goal:  2.0-3.0   TTR:  61.6 % (2.3 y)   INR used for dosin.6 (3/8/2021)   Warfarin maintenance plan:  2 mg every Sun, Tue, Thu; 4 mg all other days   Weekly warfarin total:  22 mg   No change documented:  Georges Guadalupe RPH   Plan last modified:  Kristal Cowart RPH (10/29/2020)   Next INR check:  2021   Priority:  High   Target end date:  Indefinite    Indications    Other persistent atrial fibrillation (CMS/HCC) [I48.19]             Anticoagulation Episode Summary     INR check location:      Preferred lab:      Send INR reminders to:  [facility]    Comments:        Anticoagulation Care Providers     Provider Role Specialty Phone number    Myriam Omer MD Referring Cardiology [phone]          Clinic Interview:  Patient Findings     Negatives:  Signs/symptoms of thrombosis, Signs/symptoms of bleeding,   Laboratory test error suspected, Change in health, Change in alcohol use,   Change in activity, Upcoming invasive procedure, Emergency department   visit, Upcoming dental procedure, Missed doses, Extra doses, Change in   medications, Change in diet/appetite, Hospital admission, Bruising, Other   complaints      Clinical Outcomes     Negatives:  Major bleeding event, Thromboembolic event,   Anticoagulation-related hospital admission, Anticoagulation-related ED   visit, Anticoagulation-related fatality        INR History:  Anticoagulation Monitoring 2020 2021 3/8/2021   INR 3.2 1.6 2.6   INR Date 2020 2021 3/8/2021   INR Goal 2.0-3.0 2.0-3.0 2.0-3.0   Trend Same Same Same   Last Week Total 22 mg 22 mg 22 mg   Next Week Total 22 mg 24 mg 22 mg   Sun 2 mg 2 mg 2 mg   Mon 4 mg 4 mg 4 mg   Tue 2 mg 2 mg 2 mg   Wed 4 mg 4 mg 4 mg   Thu 2 mg 4 mg (); Otherwise 2 mg 2 mg   Fri 4 mg 4 mg 4 mg   Sat 4 mg 4 mg 4 mg   Visit Report - - -   Some recent data might be hidden       Plan:  1.  INR is Therapeutic today- see above in Anticoagulation Summary.  Will instruct Cody Harringtonley to Continue their warfarin regimen- see above in Anticoagulation Summary.  2. Follow up in 4 weeks  3. Patient declines warfarin refills.  4. Verbal and written information provided. Patient expresses understanding and has no further questions at this time.    Georges Guadalupe [facility]

## 2021-03-15 RX ORDER — SERTRALINE HYDROCHLORIDE 100 MG/1
TABLET, FILM COATED ORAL
Qty: 30 TABLET | Refills: 6 | Status: SHIPPED | OUTPATIENT
Start: 2021-03-15 | End: 2021-07-13

## 2021-03-30 ENCOUNTER — TELEPHONE (OUTPATIENT)
Dept: CARDIOLOGY | Facility: CLINIC | Age: 76
End: 2021-03-30

## 2021-03-30 ENCOUNTER — LAB (OUTPATIENT)
Dept: LAB | Facility: HOSPITAL | Age: 76
End: 2021-03-30

## 2021-03-30 ENCOUNTER — OFFICE VISIT (OUTPATIENT)
Dept: CARDIOLOGY | Facility: CLINIC | Age: 76
End: 2021-03-30

## 2021-03-30 VITALS
WEIGHT: 156 LBS | HEIGHT: 70 IN | TEMPERATURE: 96.1 F | DIASTOLIC BLOOD PRESSURE: 76 MMHG | HEART RATE: 69 BPM | OXYGEN SATURATION: 96 % | SYSTOLIC BLOOD PRESSURE: 128 MMHG | BODY MASS INDEX: 22.33 KG/M2

## 2021-03-30 DIAGNOSIS — Z79.01 CURRENT USE OF LONG TERM ANTICOAGULATION: ICD-10-CM

## 2021-03-30 DIAGNOSIS — I48.19 OTHER PERSISTENT ATRIAL FIBRILLATION (HCC): Primary | ICD-10-CM

## 2021-03-30 DIAGNOSIS — I10 ESSENTIAL HYPERTENSION: ICD-10-CM

## 2021-03-30 DIAGNOSIS — E78.2 MIXED HYPERLIPIDEMIA: ICD-10-CM

## 2021-03-30 LAB
ANION GAP SERPL CALCULATED.3IONS-SCNC: 5.5 MMOL/L (ref 5–15)
BUN SERPL-MCNC: 25 MG/DL (ref 8–23)
BUN/CREAT SERPL: 22.1 (ref 7–25)
CALCIUM SPEC-SCNC: 9.2 MG/DL (ref 8.6–10.5)
CHLORIDE SERPL-SCNC: 103 MMOL/L (ref 98–107)
CO2 SERPL-SCNC: 30.5 MMOL/L (ref 22–29)
CREAT SERPL-MCNC: 1.13 MG/DL (ref 0.76–1.27)
GFR SERPL CREATININE-BSD FRML MDRD: 63 ML/MIN/1.73
GLUCOSE SERPL-MCNC: 93 MG/DL (ref 65–99)
POTASSIUM SERPL-SCNC: 4.7 MMOL/L (ref 3.5–5.2)
SODIUM SERPL-SCNC: 139 MMOL/L (ref 136–145)

## 2021-03-30 PROCEDURE — 93000 ELECTROCARDIOGRAM COMPLETE: CPT | Performed by: INTERNAL MEDICINE

## 2021-03-30 PROCEDURE — 80048 BASIC METABOLIC PNL TOTAL CA: CPT

## 2021-03-30 PROCEDURE — 99214 OFFICE O/P EST MOD 30 MIN: CPT | Performed by: INTERNAL MEDICINE

## 2021-03-30 PROCEDURE — 36415 COLL VENOUS BLD VENIPUNCTURE: CPT

## 2021-03-30 NOTE — PROGRESS NOTES
CARDIOLOGY    Myriam Omer MD    ENCOUNTER DATE:  03/30/2021    Cody Eldridge / 75 y.o. / male        CHIEF COMPLAINT / REASON FOR OFFICE VISIT     Hypertension (6 month follow up)      HISTORY OF PRESENT ILLNESS       HPI    Cody Eldridge is a 75 y.o. male     This is a nice gentleman who saw Dr. Travis in the remote past. He is a difficult historian. Per Dr. Travis's note from 2009 he had SVT with ablation in 2006 at the [facility]. He also has COPD, hypertension and hyperlipidemia. To me, he denies coronary disease but his old records show that he has had a non-ST elevation myocardial infarction in the past. He had an echo in 10/2009, which showed normal left ventricular systolic function with an ejection fraction of 64%. There was mild right and left atrial enlargement and mild mitral and tricuspid regurgitation with a right ventricular systolic pressure of 39 mmHg.      In the spring of 2018, he was complaining of lots of shortness of breath.  He eventually had a heart catheterization in August 2018 which showed mild nonobstructive coronary disease with a left ventricular end-diastolic pressure of 10.  He has had intermittent blood pressure issues.     Unfortunately, he was diagnosed with throat cancer.  I saw him in the office in July 2019 and he was having a hard time staying hydrated.  He was hypotensive.  He was seen by Dr. Colin in the [facility] for poor fluid intake and weight loss.  She gave him some IV fluids and IV metoprolol and we tried oral amiodarone.  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  He had MSSA sepsis from an infected chemotherapy port.     His blood pressure was running a little bit high and I added chlorthalidone to his medication regimen in March 2021.  He comes in today for follow-up.  He brings in a list of blood pressure  "medications which shows blood pressure is well controlled as is his heart rate.  He denies chest pain, shortness of breath or palpitations.  He is not formally exercising but he is active and does a lot of walking and up and down stairs and is not having any issues.    REVIEW OF SYSTEMS     Review of Systems   Constitutional: Negative for chills, fever, weight gain and weight loss.   Cardiovascular: Negative for leg swelling.   Respiratory: Negative for cough, snoring and wheezing.    Hematologic/Lymphatic: Negative for bleeding problem. Does not bruise/bleed easily.   Skin: Negative for color change.   Musculoskeletal: Positive for joint pain and myalgias. Negative for falls.   Gastrointestinal: Negative for melena.   Genitourinary: Negative for hematuria.   Neurological: Negative for excessive daytime sleepiness.   Psychiatric/Behavioral: Positive for depression. The patient is nervous/anxious.          VITAL SIGNS     Visit Vitals  /76 (BP Location: Left arm)   Pulse 69   Temp 96.1 °F (35.6 °C)   Ht 177.8 cm (70\")   Wt 70.8 kg (156 lb)   SpO2 96%   BMI 22.38 kg/m²         Wt Readings from Last 3 Encounters:   03/30/21 70.8 kg (156 lb)   01/19/21 70.7 kg (155 lb 12.8 oz)   01/05/21 70.8 kg (156 lb)     Body mass index is 22.38 kg/m².      PHYSICAL EXAMINATION     Constitutional:       General: Not in acute distress.  Neck:      Vascular: No carotid bruit or JVD.   Pulmonary:      Effort: Pulmonary effort is normal.      Breath sounds: Normal breath sounds.   Cardiovascular:      Normal rate. Regularly irregular rhythm.      Murmurs: There is no murmur.   Psychiatric:         Mood and Affect: Mood and affect normal.           REVIEWED DATA       ECG 12 Lead    Date/Time: 3/30/2021 11:44 AM  Performed by: Myriam Omer MD  Authorized by: Myriam Omer MD   Comparison: compared with previous ECG from 8/31/2020  Similar to previous ECG  Comparison to previous ECG: Heart rate is slower  Rhythm: atrial " fibrillation  BPM: 75  Conduction: conduction normal  ST Segments: ST segments normal  T Waves: T waves normal    Clinical impression: abnormal EKG              Lipid Panel    Lipid Panel 11/23/20   Total Cholesterol 122   Triglycerides 57   HDL Cholesterol 51   VLDL Cholesterol 13   LDL Cholesterol  58   LDL/HDL Ratio 1.17             Lab Results   Component Value Date    GLUCOSE 111 (H) 01/21/2021    BUN 15 01/21/2021    CREATININE 0.82 01/21/2021    EGFRIFNONA 91 01/21/2021    EGFRIFAFRI 107 04/23/2018    BCR 18.3 01/21/2021    K 4.5 01/21/2021    CO2 26.0 01/21/2021    CALCIUM 8.6 01/21/2021    PROTENTOTREF 7.2 04/23/2018    ALBUMIN 3.70 01/12/2021    LABIL2 1.3 04/23/2018    AST 23 01/12/2021    ALT 16 01/12/2021       ASSESSMENT & PLAN     No diagnosis found.    1.  Nonobstructive coronary artery disease.  Continue with risk factor management.  2.  Persistent atrial fibrillation.  He is anticoagulated with warfarin.  He has a chads 2 vascular score of 3.  His INR has been well controlled.  He follows in the anticoagulation clinic.  3.  Lung disease/COPD with right heart failure.  This is followed by Dr. Segovia.  4.  Hypertension.  His blood pressure looks good on his current regimen.  We will get a basic metabolic panel today to make sure his kidney function and electrolytes look okay since starting chlorthalidone 3 weeks ago.  5.  Squamous cell cancer of the throat.  Currently completed all his treatment.  He has a follow-up with Dr. Heaton tomorrow.    Follow-up in 6 months.    Orders Placed This Encounter   Procedures   • ECG 12 Lead     This order was created via procedure documentation           MEDICATIONS         Discharge Medications          Accurate as of March 30, 2021 11:45 AM. If you have any questions, ask your nurse or doctor.            Continue These Medications      Instructions Start Date   albuterol sulfate  (90 Base) MCG/ACT inhaler  Commonly known as: PROVENTIL HFA;VENTOLIN  HFA;PROAIR HFA   2 puffs, Inhalation, ProAir  (90 Base) MCG/ACT Inhalation Aerosol Solution; Patient Sig: ProAir  (90 Base) MCG/ACT Inhalation Aerosol Solution inhale 2 puffs by mouth every 4 hours if needed; 8.5; 11; 07-Apr-2014; Act      ALPRAZolam 0.5 MG tablet  Commonly known as: XANAX   TAKE 1 TABLET BY MOUTH EVERY MORNING AND 1/2 TABLET BY MOUTH EVERY EVENING      amLODIPine 5 MG tablet  Commonly known as: NORVASC   5 mg, Oral, 2 Times Daily      aspirin 81 MG chewable tablet   81 mg, Oral, Daily      baclofen 10 MG tablet  Commonly known as: LIORESAL   take 1 tablet by mouth three times a day      carvedilol 25 MG tablet  Commonly known as: COREG   25 mg, Oral, 2 Times Daily With Meals      chlorthalidone 25 MG tablet  Commonly known as: HYGROTON   25 mg, Oral, Daily      Crestor 20 MG tablet  Generic drug: rosuvastatin   20 mg, Oral, Daily      diphenoxylate-atropine 2.5-0.025 MG per tablet  Commonly known as: LOMOTIL   1 tablet, Oral, 4 Times Daily PRN      levothyroxine 125 MCG tablet  Commonly known as: SYNTHROID, LEVOTHROID   125 mcg, Oral, Daily      lisinopril 40 MG tablet  Commonly known as: PRINIVIL,ZESTRIL   40 mg, Oral, Daily      montelukast 10 MG tablet  Commonly known as: SINGULAIR   TAKE 1 TABLET BY MOUTH DAILY      nystatin 918770 UNIT/ML suspension  Commonly known as: MYCOSTATIN   500,000 Units, Swish & Swallow, 4 Times Daily, Until gone      oxyCODONE-acetaminophen 7.5-325 MG per tablet  Commonly known as: PERCOCET   TK 1 T PO Q 6 H UTD      sertraline 100 MG tablet  Commonly known as: ZOLOFT   TAKE 1 TABLET BY MOUTH EVERY DAY FOR ANXIETY AND DEPRESSION RELIEF      warfarin 2 MG tablet  Commonly known as: COUMADIN   Take one tablet (2mg) on Sun & Thurs and take two tablets (4mg) on all other days or as directed by [facility]         Stop These Medications    Lidocaine Viscous HCl 2 % solution  Commonly known as: XYLOCAINE  Stopped by: Myriam Omer MD               Myriam Omer MD  03/30/21  11:45 EDT    **Bessy Disclaimer:   Much of this encounter note is an electronic transcription/translation of spoken language to printed text. The electronic translation of spoken language may permit erroneous, or at times, nonsensical words or phrases to be inadvertently transcribed. Although I have reviewed the note for such errors, some may still exist.

## 2021-03-30 NOTE — TELEPHONE ENCOUNTER
I spoke with the patient and informed him that his labs were fairly stable and that he should continue taking his medications as he currently has been.     He also informed me that he is no longer taking Amlodipine 5 mg. He was unsure of this at his appointment earlier. This has been corrected in the system.

## 2021-03-30 NOTE — TELEPHONE ENCOUNTER
----- Message from Myriam Omer MD sent at 3/30/2021  2:06 PM EDT -----  Let him know I reviewed his labs.  Everything appears fairly stable.  Continue with his medications as he is currently taking them.

## 2021-04-08 ENCOUNTER — APPOINTMENT (OUTPATIENT)
Dept: PHARMACY | Facility: HOSPITAL | Age: 76
End: 2021-04-08

## 2021-04-08 RX ORDER — WARFARIN SODIUM 2 MG/1
TABLET ORAL
Qty: 145 TABLET | Refills: 1 | Status: SHIPPED | OUTPATIENT
Start: 2021-04-08 | End: 2021-10-13 | Stop reason: SDUPTHER

## 2021-04-09 ENCOUNTER — ANTICOAGULATION VISIT (OUTPATIENT)
Dept: PHARMACY | Facility: HOSPITAL | Age: 76
End: 2021-04-09

## 2021-04-09 DIAGNOSIS — I48.19 OTHER PERSISTENT ATRIAL FIBRILLATION (HCC): ICD-10-CM

## 2021-04-09 LAB
INR PPP: 2.5 (ref 0.91–1.09)
PROTHROMBIN TIME: 30.5 SECONDS (ref 10–13.8)

## 2021-04-09 PROCEDURE — 85610 PROTHROMBIN TIME: CPT

## 2021-04-09 PROCEDURE — 36416 COLLJ CAPILLARY BLOOD SPEC: CPT

## 2021-04-09 NOTE — PROGRESS NOTES
Anticoagulation Clinic Progress Note    Anticoagulation Summary  As of 2021    INR goal:  2.0-3.0   TTR:  63.0 % (2.4 y)   INR used for dosin.5 (2021)   Warfarin maintenance plan:  2 mg every Sun, Tue, Thu; 4 mg all other days   Weekly warfarin total:  22 mg   No change documented:  Brigida Monique   Plan last modified:  Kristal Cowart [facility] (10/29/2020)   Next INR check:  2021   Priority:  Maintenance   Target end date:  Indefinite    Indications    Other persistent atrial fibrillation (CMS/HCC) [I48.19]             Anticoagulation Episode Summary     INR check location:      Preferred lab:      Send INR reminders to:   [facility] [place]    Comments:        Anticoagulation Care Providers     Provider Role Specialty Phone number    Myriam Omer MD Referring Cardiology [phone]          Clinic Interview:      INR History:  Anticoagulation Monitoring 2021 3/8/2021 2021   INR 1.6 2.6 2.5   INR Date 2021 3/8/2021 2021   INR Goal 2.0-3.0 2.0-3.0 2.0-3.0   Trend Same Same Same   Last Week Total 22 mg 22 mg 22 mg   Next Week Total 24 mg 22 mg 22 mg   Sun 2 mg 2 mg 2 mg   Mon 4 mg 4 mg 4 mg   Tue 2 mg 2 mg 2 mg   Wed 4 mg 4 mg 4 mg   Thu 4 mg (); Otherwise 2 mg 2 mg 2 mg   Fri 4 mg 4 mg 4 mg   Sat 4 mg 4 mg 4 mg   Visit Report - - -   Some recent data might be hidden       Plan:  1. INR is therapeutic today- see above in Anticoagulation Summary.   Will instruct Cody SAMSON Juliette to continue their warfarin regimen- see above in Anticoagulation Summary.  2. Follow up in 4 weeks.  3. Patient declines warfarin refills.  4. Verbal and written information provided. Patient expresses understanding and has no further questions at this time.    Brigida Monique

## 2021-04-12 RX ORDER — LEVOTHYROXINE SODIUM 0.12 MG/1
125 TABLET ORAL DAILY
Qty: 30 TABLET | Refills: 0 | Status: SHIPPED | OUTPATIENT
Start: 2021-04-12 | End: 2021-05-03

## 2021-04-15 DIAGNOSIS — I10 ESSENTIAL HYPERTENSION: ICD-10-CM

## 2021-04-15 RX ORDER — LISINOPRIL 40 MG/1
40 TABLET ORAL DAILY
Qty: 90 TABLET | Refills: 1 | Status: SHIPPED | OUTPATIENT
Start: 2021-04-15 | End: 2021-11-29 | Stop reason: SDUPTHER

## 2021-04-15 NOTE — TELEPHONE ENCOUNTER
4/15/21  Pt left [facility] in [facility] h and the pharmacy have been trying for a week to get his lisinopril filled and not one has called back.  He has been out of the med for 3 days.  I do not see any refill requests that have come in.  I called the patient and informed him of this but that I would send this through to the doctor to fill asap./yasmeen    Pt p h [phone]

## 2021-05-03 RX ORDER — LEVOTHYROXINE SODIUM 0.12 MG/1
125 TABLET ORAL DAILY
Qty: 30 TABLET | Refills: 3 | Status: SHIPPED | OUTPATIENT
Start: 2021-05-03 | End: 2021-09-02

## 2021-05-05 ENCOUNTER — TELEPHONE (OUTPATIENT)
Dept: INTERNAL MEDICINE | Facility: CLINIC | Age: 76
End: 2021-05-05

## 2021-05-05 RX ORDER — CHLORTHALIDONE 25 MG/1
25 TABLET ORAL DAILY
Qty: 30 TABLET | Refills: 1 | Status: SHIPPED | OUTPATIENT
Start: 2021-05-05 | End: 2021-05-05

## 2021-05-05 RX ORDER — CHLORTHALIDONE 25 MG/1
25 TABLET ORAL DAILY
Qty: 90 TABLET | OUTPATIENT
Start: 2021-05-05

## 2021-05-05 RX ORDER — CHLORTHALIDONE 25 MG/1
25 TABLET ORAL DAILY
Qty: 30 TABLET | Refills: 8 | Status: SHIPPED | OUTPATIENT
Start: 2021-05-05 | End: 2021-07-06

## 2021-05-25 ENCOUNTER — ANTICOAGULATION VISIT (OUTPATIENT)
Dept: PHARMACY | Facility: HOSPITAL | Age: 76
End: 2021-05-25

## 2021-05-25 DIAGNOSIS — I48.19 OTHER PERSISTENT ATRIAL FIBRILLATION (HCC): Primary | ICD-10-CM

## 2021-05-25 LAB
INR PPP: 3.1 (ref 0.91–1.09)
PROTHROMBIN TIME: 36.6 SECONDS (ref 10–13.8)

## 2021-05-25 PROCEDURE — 36416 COLLJ CAPILLARY BLOOD SPEC: CPT

## 2021-05-25 PROCEDURE — 85610 PROTHROMBIN TIME: CPT

## 2021-05-26 NOTE — PROGRESS NOTES
4Anticoagulation Clinic Progress Note    Anticoagulation Summary  As of 5/25/2021    INR goal:  2.0-3.0   TTR:  64.1 % (2.5 y)   INR used for dosing:  3.1 (5/25/2021)   Warfarin maintenance plan:  2 mg every Sun, Tue, Thu; 4 mg all other days   Weekly warfarin total:  22 mg   No change documented:  Vira Altamirano   Plan last modified:  Kristal Cowart RPH (10/29/2020)   Next INR check:  6/22/2021   Priority:  Maintenance   Target end date:  Indefinite    Indications    Other persistent atrial fibrillation (CMS/HCC) [I48.19]             Anticoagulation Episode Summary     INR check location:      Preferred lab:      Send INR reminders to:  [facility]    Comments:        Anticoagulation Care Providers     Provider Role Specialty Phone number    Myriam Omer MD Referring Cardiology [phone]          Clinic Interview:  Patient Findings     Negatives:  Signs/symptoms of thrombosis, Signs/symptoms of bleeding,   Laboratory test error suspected, Change in health, Change in alcohol use,   Change in activity, Upcoming invasive procedure, Emergency department   visit, Upcoming dental procedure, Missed doses, Extra doses, Change in   medications, Change in diet/appetite, Hospital admission, Bruising, Other   complaints      Clinical Outcomes     Negatives:  Major bleeding event, Thromboembolic event,   Anticoagulation-related hospital admission, Anticoagulation-related ED   visit, Anticoagulation-related fatality        INR History:  Anticoagulation Monitoring 3/8/2021 4/9/2021 5/25/2021   INR 2.6 2.5 3.1   INR Date 3/8/2021 4/9/2021 5/25/2021   INR Goal 2.0-3.0 2.0-3.0 2.0-3.0   Trend Same Same Same   Last Week Total 22 mg 22 mg 22 mg   Next Week Total 22 mg 22 mg 22 mg   Sun 2 mg 2 mg 2 mg   Mon 4 mg 4 mg 4 mg   Tue 2 mg 2 mg 2 mg   Wed 4 mg 4 mg 4 mg   Thu 2 mg 2 mg 2 mg   Fri 4 mg 4 mg 4 mg   Sat 4 mg 4 mg 4 mg   Visit Report - - -   Some recent data might be hidden       Plan:  1. INR is  therapeutic today- see above in Anticoagulation Summary.   Will instruct Cody Eldridge to continue their warfarin regimen- see above in Anticoagulation Summary.  2. Follow up in 4 weeks.  3. Patient declines warfarin refills.  4. Verbal and written information provided. Patient expresses understanding and has no further questions at this time.    Vira Altamirano

## 2021-05-28 ENCOUNTER — LAB (OUTPATIENT)
Dept: LAB | Facility: HOSPITAL | Age: 76
End: 2021-05-28

## 2021-05-28 ENCOUNTER — OFFICE VISIT (OUTPATIENT)
Dept: INTERNAL MEDICINE | Facility: CLINIC | Age: 76
End: 2021-05-28

## 2021-05-28 VITALS
DIASTOLIC BLOOD PRESSURE: 60 MMHG | OXYGEN SATURATION: 99 % | BODY MASS INDEX: 22.72 KG/M2 | HEART RATE: 56 BPM | HEIGHT: 70 IN | WEIGHT: 158.7 LBS | SYSTOLIC BLOOD PRESSURE: 110 MMHG

## 2021-05-28 DIAGNOSIS — E78.2 MIXED HYPERLIPIDEMIA: ICD-10-CM

## 2021-05-28 DIAGNOSIS — Z00.00 MEDICARE ANNUAL WELLNESS VISIT, SUBSEQUENT: ICD-10-CM

## 2021-05-28 DIAGNOSIS — F41.8 DEPRESSION WITH ANXIETY: Primary | ICD-10-CM

## 2021-05-28 DIAGNOSIS — E03.9 ACQUIRED HYPOTHYROIDISM: ICD-10-CM

## 2021-05-28 DIAGNOSIS — E55.9 VITAMIN D DEFICIENCY: ICD-10-CM

## 2021-05-28 DIAGNOSIS — Z12.11 COLON CANCER SCREENING: ICD-10-CM

## 2021-05-28 LAB
25(OH)D3 SERPL-MCNC: 28.9 NG/ML (ref 30–100)
CHOLEST SERPL-MCNC: 117 MG/DL (ref 0–200)
HDLC SERPL-MCNC: 46 MG/DL (ref 40–60)
LDLC SERPL CALC-MCNC: 55 MG/DL (ref 0–100)
LDLC/HDLC SERPL: 1.18 {RATIO}
T4 FREE SERPL-MCNC: 1.14 NG/DL (ref 0.93–1.7)
TRIGL SERPL-MCNC: 83 MG/DL (ref 0–150)
TSH SERPL DL<=0.05 MIU/L-ACNC: 0.64 UIU/ML (ref 0.27–4.2)
VLDLC SERPL-MCNC: 16 MG/DL (ref 5–40)

## 2021-05-28 PROCEDURE — 84443 ASSAY THYROID STIM HORMONE: CPT | Performed by: FAMILY MEDICINE

## 2021-05-28 PROCEDURE — 84439 ASSAY OF FREE THYROXINE: CPT | Performed by: FAMILY MEDICINE

## 2021-05-28 PROCEDURE — 36415 COLL VENOUS BLD VENIPUNCTURE: CPT | Performed by: FAMILY MEDICINE

## 2021-05-28 PROCEDURE — 99214 OFFICE O/P EST MOD 30 MIN: CPT | Performed by: FAMILY MEDICINE

## 2021-05-28 PROCEDURE — 80061 LIPID PANEL: CPT | Performed by: FAMILY MEDICINE

## 2021-05-28 PROCEDURE — 82306 VITAMIN D 25 HYDROXY: CPT | Performed by: FAMILY MEDICINE

## 2021-05-28 PROCEDURE — G0439 PPPS, SUBSEQ VISIT: HCPCS | Performed by: FAMILY MEDICINE

## 2021-05-28 RX ORDER — ALPRAZOLAM 0.5 MG/1
TABLET ORAL
Qty: 45 TABLET | Refills: 3 | Status: SHIPPED | OUTPATIENT
Start: 2021-05-28 | End: 2021-10-01

## 2021-05-28 NOTE — PROGRESS NOTES
"Chief Complaint  follow up to hyperlipidemia, follow up to hypothyroidism, follow up to depression, and Medicare Wellness-subsequent    Subjective          Cody Eldridge presents to [facility]  History of Present Illness  Follows up for ongoing management of hyperlipidemia hypothyroidism depression he feels that his medication is helping him he has no unwanted side effects of statin therapy thyroid is been therapeutic blood pressures well controlled he has no unwanted side effects of sertraline he is under increased stress with chronic problems with his wife with recurrent admissions to hospital  Objective   Vital Signs:   /60 (BP Location: Left arm, Patient Position: Sitting, Cuff Size: Adult)   Pulse 56   Ht 177.8 cm (70\")   Wt 72 kg (158 lb 11.2 oz)   SpO2 99%   BMI 22.77 kg/m²     Physical Exam  Constitutional:       Appearance: Normal appearance. He is normal weight.   HENT:      Head: Normocephalic and atraumatic.   Eyes:      General: No scleral icterus.  Cardiovascular:      Rate and Rhythm: Normal rate. Rhythm irregular.      Pulses: Normal pulses.      Heart sounds: Normal heart sounds.   Pulmonary:      Effort: Pulmonary effort is normal.      Breath sounds: Normal breath sounds.   Musculoskeletal:      Right lower leg: No edema.      Left lower leg: No edema.   Neurological:      Mental Status: He is alert.   Psychiatric:         Mood and Affect: Mood normal.         Behavior: Behavior normal.         Thought Content: Thought content normal.         Judgment: Judgment normal.        Result Review :     Common labs    Common Labsle 1/12/21 1/12/21 1/12/21 1/21/21 3/30/21    1425 1432 1432     Glucose   120 (A) 111 (A) 93   BUN   15 15 25 (A)   Creatinine 0.80  0.93 0.82 1.13   eGFR Non African Am   79 91 63   Sodium   136 141 139   Potassium   3.7 4.5 4.7   Chloride   104 106 103   Calcium   8.9 8.6 9.2   Albumin   3.70     Total Bilirubin   0.8     Alkaline " Phosphatase   75     AST (SGOT)   23     ALT (SGPT)   16     WBC  4.18      Hemoglobin  12.1 (A)      Hematocrit  38.3      Platelets  136 (A)      (A) Abnormal value       Comments are available for some flowsheets but are not being displayed.                     Assessment and Plan    Diagnoses and all orders for this visit:    1. Depression with anxiety (Primary)    2. Acquired hypothyroidism  -     TSH  -     T4, Free    3. Vitamin D deficiency  -     Vitamin D 25 Hydroxy    4. Mixed hyperlipidemia  -     Lipid Panel    5. Medicare annual wellness visit, subsequent    6. Colon cancer screening  -     Ambulatory Referral For Screening Colonoscopy    Up results of blood work for ongoing management of hypothyroidism hyper lipidemia vitamin D deficiency  Otherwise as needed or    Follow Up   Return in about 6 months (around 11/28/2021), or if symptoms worsen or fail to improve, for Recheck.  Patient was given instructions and counseling regarding his condition or for health maintenance advice. Please see specific information pulled into the AVS if appropriate.

## 2021-05-28 NOTE — PROGRESS NOTES
The ABCs of the Annual Wellness Visit  Subsequent Medicare Wellness Visit    Chief Complaint   Patient presents with   • follow up to hyperlipidemia   • follow up to hypothyroidism   • follow up to depression   • Medicare Wellness-subsequent       Subjective   History of Present Illness:  Cody Eldridge is a 75 y.o. male who presents for a Subsequent Medicare Wellness Visit.    HEALTH RISK ASSESSMENT    Recent Hospitalizations:  No hospitalization(s) within the last year.    Current Medical Providers:  Patient Care Team:  Patrick Diaz MD as PCP - General (Family Medicine)  Payal Waters MD as Consulting Physician (Pain Medicine)  Lorna Chaidez (Nurse Practitioner)  Kristal Cowart RPH as Pharmacist  Fritz Slater MD as Referring Physician (Otolaryngology)  Pablo Heaton MD as Consulting Physician (Hematology and Oncology)  Annie Davila MD as Consulting Physician (Radiation Oncology)  Chetan Heaton MD as Consulting Physician (Urology)  Kristal Cowart RPH as Pharmacist (Pharmacy)  Georges Guadalupe RPH as Pharmacist (Pharmacy)    Smoking Status:  Social History     Tobacco Use   Smoking Status Former Smoker   • Packs/day: 3.00   • Years: 30.00   • Pack years: 90.00   • Types: Cigarettes   • Quit date:    • Years since quittin.4   Smokeless Tobacco Never Used   Tobacco Comment    started age 11 x 53 years stopped 2000 / daily caffiene       Alcohol Consumption:  Social History     Substance and Sexual Activity   Alcohol Use Not Currently   • Alcohol/week: 2.0 standard drinks   • Types: 1 Glasses of wine, 1 Cans of beer per week    Comment: occ       Depression Screen:   PHQ-2/PHQ-9 Depression Screening 2021   Little interest or pleasure in doing things 0   Feeling down, depressed, or hopeless 0   Trouble falling or staying asleep, or sleeping too much -   Feeling tired or having little energy -   Poor appetite or overeating -   Feeling bad about yourself - or that  you are a failure or have let yourself or your family down -   Trouble concentrating on things, such as reading the newspaper or watching television -   Moving or speaking so slowly that other people could have noticed. Or the opposite - being so fidgety or restless that you have been moving around a lot more than usual -   Thoughts that you would be better off dead, or of hurting yourself in some way -   Total Score 0       Fall Risk Screen:  CHRISTIAN Fall Risk Assessment was completed, and patient is at LOW risk for falls.Assessment completed on:5/28/2021    Health Habits and Functional and Cognitive Screening:  Functional & Cognitive Status 5/28/2021   Do you have difficulty preparing food and eating? No   Do you have difficulty bathing yourself, getting dressed or grooming yourself? No   Do you have difficulty using the toilet? No   Do you have difficulty moving around from place to place? No   Do you have trouble with steps or getting out of a bed or a chair? No   Current Diet Well Balanced Diet   Dental Exam Up to date   Eye Exam Not up to date   Exercise (times per week) 2 times per week   Current Exercise Activities Include Light Weight/Kettebells   Do you need help using the phone?  No   Are you deaf or do you have serious difficulty hearing?  Yes   Do you need help with transportation? Yes   Do you need help shopping? No   Do you need help preparing meals?  No   Do you need help with housework?  No   Do you need help with laundry? No   Do you need help taking your medications? No   Do you need help managing money? No   Do you ever drive or ride in a car without wearing a seat belt? No   Have you felt unusual stress, anger or loneliness in the last month? Yes   Who do you live with? Spouse   If you need help, do you have trouble finding someone available to you? No   Have you been bothered in the last four weeks by sexual problems? No   Do you have difficulty concentrating, remembering or making decisions? No          Does the patient have evidence of cognitive impairment? No    Asprin use counseling:Taking ASA appropriately as indicated    Age-appropriate Screening Schedule:  Refer to the list below for future screening recommendations based on patient's age, sex and/or medical conditions. Orders for these recommended tests are listed in the plan section. The patient has been provided with a written plan.    Health Maintenance   Topic Date Due   • ZOSTER VACCINE (2 of 3) 11/29/2016   • INFLUENZA VACCINE  08/01/2021   • LIPID PANEL  11/23/2021   • TDAP/TD VACCINES (2 - Td or Tdap) 01/01/2022          The following portions of the patient's history were reviewed and updated as appropriate: allergies, current medications, past family history, past medical history, past social history, past surgical history and problem list.    Outpatient Medications Prior to Visit   Medication Sig Dispense Refill   • albuterol (PROVENTIL HFA;VENTOLIN HFA) 108 (90 BASE) MCG/ACT inhaler Inhale 2 puffs. ProAir  (90 Base) MCG/ACT Inhalation Aerosol Solution; Patient Sig: ProAir  (90 Base) MCG/ACT Inhalation Aerosol Solution inhale 2 puffs by mouth every 4 hours if needed; 8.5; 11; 07-Apr-2014; Act     • ALPRAZolam (XANAX) 0.5 MG tablet TAKE 1 TABLET BY MOUTH EVERY MORNING AND 0.5 TABLET BY MOUTH EVERY EVENING 45 tablet 3   • aspirin 81 MG chewable tablet Chew 81 mg Daily.     • baclofen (LIORESAL) 10 MG tablet take 1 tablet by mouth three times a day 90 tablet 1   • carvedilol (COREG) 25 MG tablet Take 1 tablet by mouth 2 (Two) Times a Day With Meals. 60 tablet 5   • chlorthalidone (HYGROTON) 25 MG tablet Take 1 tablet by mouth Daily. 30 tablet 8   • diphenoxylate-atropine (LOMOTIL) 2.5-0.025 MG per tablet Take 1 tablet by mouth 4 (Four) Times a Day As Needed for Diarrhea. 60 tablet 1   • levothyroxine (SYNTHROID, LEVOTHROID) 125 MCG tablet TAKE 1 TABLET BY MOUTH DAILY 30 tablet 3   • lisinopril (PRINIVIL,ZESTRIL) 40 MG tablet  Take 1 tablet by mouth Daily. 90 tablet 1   • montelukast (SINGULAIR) 10 MG tablet TAKE 1 TABLET BY MOUTH DAILY 90 tablet 1   • oxyCODONE-acetaminophen (PERCOCET) 7.5-325 MG per tablet TK 1 T PO Q 6 H UTD     • rosuvastatin (CRESTOR) 20 MG tablet Take 20 mg by mouth Daily. 30 tablet 5   • sertraline (ZOLOFT) 100 MG tablet TAKE 1 TABLET BY MOUTH EVERY DAY FOR ANXIETY AND DEPRESSION RELIEF 30 tablet 6   • warfarin (COUMADIN) 2 MG tablet Take one tablet (2 mg) by mouth on Sun, Tues, and Thurs, and take two tablets (4 mg) by mouth all other days or as directed. 145 tablet 1   • nystatin (MYCOSTATIN) 818947 UNIT/ML suspension Swish and swallow 5 mL 4 (Four) Times a Day. Until gone 120 mL 1     No facility-administered medications prior to visit.       Patient Active Problem List   Diagnosis   • Atopic rhinitis   • Arthritis of hand   • Coxitis   • Benign colonic polyp   • Neck pain   • Mixed anxiety depressive disorder   • Degeneration of intervertebral disc of lumbosacral region   • Elevated prostate specific antigen (PSA)   • Mixed hyperlipidemia   • Essential hypertension   • Insomnia   • Lumbar radiculopathy   • Osteoarthritis   • Vitamin D deficiency   • Abdominal pain   • Acute URI   • Myalgia   • Cramp of both lower extremities   • Gingivitis   • Other persistent atrial fibrillation (CMS/HCC)   • Non-rheumatic tricuspid valve insufficiency   • SHAR (obstructive sleep apnea)   • Precordial pain   • IRAHETA (dyspnea on exertion)   • Coronary artery disease involving native coronary artery of native heart without angina pectoris   • Shortness of breath   • 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)   • Current use of long term anticoagulation   • Syncope   • History of cancer   • History of CHF (congestive heart failure)   • Hypotension   • Medicare annual wellness visit, subsequent   • New onset of congestive heart failure (CMS/HCC)   • PAF (paroxysmal atrial fibrillation) (CMS/HCC)   • Squamous cell carcinoma  "of neck   • COPD (chronic obstructive pulmonary disease) (CMS/[facility])   • Depression with anxiety   • CAD (coronary artery disease)   • Chemotherapy-induced neutropenia (CMS/[facility])   • Chemotherapy-induced thrombocytopenia   • Acute renal injury (CMS/[facility])   • Anemia associated with chemotherapy   • Oral thrush   • MSSA bacteremia   • Acute systolic CHF (congestive heart failure) (CMS/[facility])   • Systolic heart failure (CMS/[facility])   • Acquired hypothyroidism   • Chronic conjunctivitis of both eyes   • Change in vision       Advanced Care Planning:  ACP discussion was held with the patient during this visit. Patient does not have an advance directive, information provided.    Review of Systems    Compared to one year ago, the patient feels his physical health is the same.  Compared to one year ago, the patient feels his mental health is the same.    Reviewed chart for potential of high risk medication in the elderly: yes  Reviewed chart for potential of harmful drug interactions in the elderly:yes    Objective         Vitals:    05/28/21 1424   BP: 110/60   BP Location: Left arm   Patient Position: Sitting   Cuff Size: Adult   Pulse: 56   SpO2: 99%   Weight: 72 kg (158 lb 11.2 oz)   Height: 177.8 cm (70\")   PainSc:   8       Body mass index is 22.77 kg/m².  Discussed the patient's BMI with him. The BMI is in the acceptable range.    Physical Exam          Assessment/Plan   Medicare Risks and Personalized Health Plan  CMS Preventative Services Quick Reference  Advance Directive Discussion  Chronic Pain     The above risks/problems have been discussed with the patient.  Pertinent information has been shared with the patient in the After Visit Summary.  Follow up plans and orders are seen below in the Assessment/Plan Section.    Diagnoses and all orders for this visit:    1. Depression with anxiety (Primary)    2. Acquired hypothyroidism  -     TSH  -     T4, Free    3. Vitamin D deficiency  -     Vitamin D 25 Hydroxy    4. Mixed " hyperlipidemia  -     Lipid Panel    5. Medicare annual wellness visit, subsequent    6. Colon cancer screening  -     Ambulatory Referral For Screening Colonoscopy      Follow Up:  Return in about 6 months (around 11/28/2021), or if symptoms worsen or fail to improve, for Recheck.     An After Visit Summary and PPPS were given to the patient.     Ongoing management of chronic medical problems

## 2021-07-06 RX ORDER — CHLORTHALIDONE 25 MG/1
25 TABLET ORAL DAILY
Qty: 30 TABLET | Refills: 8 | Status: CANCELLED | OUTPATIENT
Start: 2021-07-06

## 2021-07-06 RX ORDER — CHLORTHALIDONE 25 MG/1
25 TABLET ORAL DAILY
Qty: 30 TABLET | Refills: 8 | Status: SHIPPED | OUTPATIENT
Start: 2021-07-06 | End: 2021-10-25 | Stop reason: SDUPTHER

## 2021-07-12 ENCOUNTER — APPOINTMENT (OUTPATIENT)
Dept: CT IMAGING | Facility: HOSPITAL | Age: 76
End: 2021-07-12

## 2021-07-13 RX ORDER — SERTRALINE HYDROCHLORIDE 100 MG/1
TABLET, FILM COATED ORAL
Qty: 30 TABLET | Refills: 6 | Status: SHIPPED | OUTPATIENT
Start: 2021-07-13 | End: 2021-11-29 | Stop reason: SDUPTHER

## 2021-07-21 ENCOUNTER — TELEPHONE (OUTPATIENT)
Dept: PHARMACY | Facility: HOSPITAL | Age: 76
End: 2021-07-21

## 2021-07-22 ENCOUNTER — ANTICOAGULATION VISIT (OUTPATIENT)
Dept: PHARMACY | Facility: HOSPITAL | Age: 76
End: 2021-07-22

## 2021-07-22 DIAGNOSIS — I48.19 OTHER PERSISTENT ATRIAL FIBRILLATION (HCC): Primary | ICD-10-CM

## 2021-07-22 LAB
INR PPP: 2.9 (ref 0.91–1.09)
PROTHROMBIN TIME: 35 SECONDS (ref 10–13.8)

## 2021-07-22 PROCEDURE — 36416 COLLJ CAPILLARY BLOOD SPEC: CPT

## 2021-07-22 PROCEDURE — 85610 PROTHROMBIN TIME: CPT

## 2021-07-22 NOTE — PROGRESS NOTES
Anticoagulation Clinic Progress Note    Anticoagulation Summary  As of 2021    INR goal:  2.0-3.0   TTR:  63.2 % (2.7 y)   INR used for dosin.9 (2021)   Warfarin maintenance plan:  2 mg every Sun, e, Thu; 4 mg all other days   Weekly warfarin total:  22 mg   No change documented:  Minnie Gonzalez, PharmD   Plan last modified:  Kristal Cowart RPH (10/29/2020)   Next INR check:  2021   Priority:  Maintenance   Target end date:  Indefinite    Indications    Other persistent atrial fibrillation (CMS/[facility]) [I48.19]             Anticoagulation Episode Summary     INR check location:      Preferred lab:      Send INR reminders to:  JONNA ROLAND [facility]    Comments:        Anticoagulation Care Providers     Provider Role Specialty Phone number    Myriam Omer MD Referring Cardiology [phone]          Clinic Interview:  Patient Findings     Positives:  Bruising    Negatives:  Signs/symptoms of thrombosis, Signs/symptoms of bleeding,   Laboratory test error suspected, Change in health, Change in alcohol use,   Change in activity, Upcoming invasive procedure, Emergency department   visit, Upcoming dental procedure, Missed doses, Extra doses, Change in   medications, Change in diet/appetite, Hospital admission, Other complaints      Comments:  Some bruising reported by patients. Visible on his arms during   his visit today. Patient reports as soon as the bruises start to heal he   bumps into something and has a new bruise. I instructed patient to let us   know if the bruises are not healing or stay very dark in color or get   larger in size. Otherwise, no other signs or symptoms of bleeding.       Clinical Outcomes     Negatives:  Major bleeding event, Thromboembolic event,   Anticoagulation-related hospital admission, Anticoagulation-related ED   visit, Anticoagulation-related fatality    Comments:  Some bruising reported by patients. Visible on his arms during   his visit today. Patient  reports as soon as the bruises start to heal he   bumps into something and has a new bruise. I instructed patient to let us   know if the bruises are not healing or stay very dark in color or get   larger in size. Otherwise, no other signs or symptoms of bleeding.         INR History:  Anticoagulation Monitoring 4/9/2021 5/25/2021 7/22/2021   INR 2.5 3.1 2.9   INR Date 4/9/2021 5/25/2021 7/22/2021   INR Goal 2.0-3.0 2.0-3.0 2.0-3.0   Trend Same Same Same   Last Week Total 22 mg 22 mg 22 mg   Next Week Total 22 mg 22 mg 22 mg   Sun 2 mg 2 mg 2 mg   Mon 4 mg 4 mg 4 mg   Tue 2 mg 2 mg 2 mg   Wed 4 mg 4 mg 4 mg   Thu 2 mg 2 mg 2 mg   Fri 4 mg 4 mg 4 mg   Sat 4 mg 4 mg 4 mg   Visit Report - - -   Some recent data might be hidden       Plan:  1. INR is Therapeutic today- see above in Anticoagulation Summary.  Will instruct Cody SAMSON Eldridge to Continue their warfarin regimen- see above in Anticoagulation Summary.  2. Follow up in 6 weeks  3. Patient declines warfarin refills.  4. Verbal and written information provided. Patient expresses understanding and has no further questions at this time.    Minnie Gonzalez, JessicaD

## 2021-07-27 ENCOUNTER — LAB (OUTPATIENT)
Dept: LAB | Facility: HOSPITAL | Age: 76
End: 2021-07-27

## 2021-07-27 ENCOUNTER — HOSPITAL ENCOUNTER (OUTPATIENT)
Dept: CT IMAGING | Facility: HOSPITAL | Age: 76
Discharge: HOME | End: 2021-07-27

## 2021-07-27 DIAGNOSIS — C44.42 SQUAMOUS CELL CARCINOMA OF NECK: ICD-10-CM

## 2021-07-27 DIAGNOSIS — Z79.01 CURRENT USE OF LONG TERM ANTICOAGULATION: ICD-10-CM

## 2021-07-27 DIAGNOSIS — Z85.9 HISTORY OF CANCER: ICD-10-CM

## 2021-07-27 DIAGNOSIS — C01 SQUAMOUS CELL CARCINOMA OF BASE OF TONGUE (HCC): ICD-10-CM

## 2021-07-27 DIAGNOSIS — E03.9 ACQUIRED HYPOTHYROIDISM: ICD-10-CM

## 2021-07-27 LAB
ALBUMIN SERPL-MCNC: 3.8 G/DL (ref 3.5–5.2)
ALBUMIN/GLOB SERPL: 1.1 G/DL
ALP SERPL-CCNC: 62 U/L (ref 39–117)
ALT SERPL W P-5'-P-CCNC: 10 U/L (ref 1–41)
ANION GAP SERPL CALCULATED.3IONS-SCNC: 6.1 MMOL/L (ref 5–15)
AST SERPL-CCNC: 18 U/L (ref 1–40)
BILIRUB SERPL-MCNC: 0.6 MG/DL (ref 0–1.2)
BUN SERPL-MCNC: 26 MG/DL (ref 8–23)
BUN/CREAT SERPL: 20.6 (ref 7–25)
CALCIUM SPEC-SCNC: 9 MG/DL (ref 8.6–10.5)
CHLORIDE SERPL-SCNC: 103 MMOL/L (ref 98–107)
CO2 SERPL-SCNC: 25.9 MMOL/L (ref 22–29)
CREAT BLDA-MCNC: 1.2 MG/DL (ref 0.6–1.3)
CREAT SERPL-MCNC: 1.26 MG/DL (ref 0.76–1.27)
DEPRECATED RDW RBC AUTO: 45.4 FL (ref 37–54)
ERYTHROCYTE [DISTWIDTH] IN BLOOD BY AUTOMATED COUNT: 13.2 % (ref 12.3–15.4)
FERRITIN SERPL-MCNC: 184.1 NG/ML (ref 30–400)
GFR SERPL CREATININE-BSD FRML MDRD: 55 ML/MIN/1.73
GLOBULIN UR ELPH-MCNC: 3.4 GM/DL
GLUCOSE SERPL-MCNC: 121 MG/DL (ref 65–99)
HCT VFR BLD AUTO: 36.8 % (ref 37.5–51)
HGB BLD-MCNC: 11.9 G/DL (ref 13–17.7)
IRON 24H UR-MRATE: 72 MCG/DL (ref 59–158)
IRON SATN MFR SERPL: 29 % (ref 20–50)
MCH RBC QN AUTO: 30.6 PG (ref 26.6–33)
MCHC RBC AUTO-ENTMCNC: 32.3 G/DL (ref 31.5–35.7)
MCV RBC AUTO: 94.6 FL (ref 79–97)
PLATELET # BLD AUTO: 141 10*3/MM3 (ref 140–450)
PMV BLD AUTO: 10.5 FL (ref 6–12)
POTASSIUM SERPL-SCNC: 4.3 MMOL/L (ref 3.5–5.2)
PROT SERPL-MCNC: 7.2 G/DL (ref 6–8.5)
RBC # BLD AUTO: 3.89 10*6/MM3 (ref 4.14–5.8)
SODIUM SERPL-SCNC: 135 MMOL/L (ref 136–145)
TIBC SERPL-MCNC: 247 MCG/DL (ref 298–536)
TRANSFERRIN SERPL-MCNC: 166 MG/DL (ref 200–360)
TSH SERPL DL<=0.05 MIU/L-ACNC: 2.48 UIU/ML (ref 0.27–4.2)
WBC # BLD AUTO: 4.75 10*3/MM3 (ref 3.4–10.8)

## 2021-07-27 PROCEDURE — 80053 COMPREHEN METABOLIC PANEL: CPT | Performed by: INTERNAL MEDICINE

## 2021-07-27 PROCEDURE — 84466 ASSAY OF TRANSFERRIN: CPT

## 2021-07-27 PROCEDURE — 25010000002 IOPAMIDOL 61 % SOLUTION: Performed by: INTERNAL MEDICINE

## 2021-07-27 PROCEDURE — 82728 ASSAY OF FERRITIN: CPT | Performed by: INTERNAL MEDICINE

## 2021-07-27 PROCEDURE — 0 DIATRIZOATE MEGLUMINE & SODIUM PER 1 ML: Performed by: INTERNAL MEDICINE

## 2021-07-27 PROCEDURE — 71260 CT THORAX DX C+: CPT

## 2021-07-27 PROCEDURE — 74177 CT ABD & PELVIS W/CONTRAST: CPT

## 2021-07-27 PROCEDURE — 84443 ASSAY THYROID STIM HORMONE: CPT | Performed by: INTERNAL MEDICINE

## 2021-07-27 PROCEDURE — 36415 COLL VENOUS BLD VENIPUNCTURE: CPT | Performed by: INTERNAL MEDICINE

## 2021-07-27 PROCEDURE — 82565 ASSAY OF CREATININE: CPT

## 2021-07-27 PROCEDURE — 85027 COMPLETE CBC AUTOMATED: CPT | Performed by: INTERNAL MEDICINE

## 2021-07-27 PROCEDURE — 83540 ASSAY OF IRON: CPT

## 2021-07-27 PROCEDURE — 70491 CT SOFT TISSUE NECK W/DYE: CPT

## 2021-07-27 RX ADMIN — DIATRIZOATE MEGLUMINE AND DIATRIZOATE SODIUM 30 ML: 660; 100 LIQUID ORAL; RECTAL at 10:10

## 2021-07-27 RX ADMIN — IOPAMIDOL 90 ML: 612 INJECTION, SOLUTION INTRAVENOUS at 11:08

## 2021-08-02 RX ORDER — CARVEDILOL 25 MG/1
TABLET ORAL
Qty: 60 TABLET | Refills: 5 | Status: SHIPPED | OUTPATIENT
Start: 2021-08-02 | End: 2021-08-03 | Stop reason: SDUPTHER

## 2021-08-03 ENCOUNTER — APPOINTMENT (OUTPATIENT)
Dept: OTHER | Facility: HOSPITAL | Age: 76
End: 2021-08-03

## 2021-08-03 ENCOUNTER — OFFICE VISIT (OUTPATIENT)
Dept: ONCOLOGY | Facility: CLINIC | Age: 76
End: 2021-08-03

## 2021-08-03 VITALS
WEIGHT: 161.45 LBS | BODY MASS INDEX: 23.11 KG/M2 | RESPIRATION RATE: 18 BRPM | HEART RATE: 65 BPM | SYSTOLIC BLOOD PRESSURE: 131 MMHG | HEIGHT: 70 IN | TEMPERATURE: 97.3 F | OXYGEN SATURATION: 96 % | DIASTOLIC BLOOD PRESSURE: 73 MMHG

## 2021-08-03 DIAGNOSIS — C44.42 SQUAMOUS CELL CARCINOMA OF NECK: Primary | ICD-10-CM

## 2021-08-03 DIAGNOSIS — C01 SQUAMOUS CELL CARCINOMA OF BASE OF TONGUE (HCC): ICD-10-CM

## 2021-08-03 PROCEDURE — 99214 OFFICE O/P EST MOD 30 MIN: CPT | Performed by: INTERNAL MEDICINE

## 2021-08-03 RX ORDER — CARVEDILOL 25 MG/1
25 TABLET ORAL 2 TIMES DAILY WITH MEALS
Qty: 60 TABLET | Refills: 11 | Status: SHIPPED | OUTPATIENT
Start: 2021-08-03 | End: 2022-01-31 | Stop reason: SDUPTHER

## 2021-08-23 NOTE — PROGRESS NOTES
He came in for an EKG check today. I went in to see him. He is not having any heart symptoms. He is struggling with the chemotherapy and radiation. He is trying to stay hydrated but is having difficulty eating because of the radiation therapy. He does not have the blood pressure to really make much change to his medication. I am okay with him proceeding with the chemotherapy and radiation. I am fine with him having IV fluids beforehand if that helps him to tolerate the treatment for his cancer. I did put in a referral to Electrophysiology to see if there is anything else we can do to help with rate control. His blood pressure today was 96/60 with an oxygen saturation of 95%.     He is in no distress.  There is erythema over his throat.  He is tachycardic and irregularly irregular on auscultation.  
No tach,palp, soa, chest pain, no increase fatigue      Lightheaded only if standing too quickly   
Procedure     ECG 12 Lead  Date/Time: 7/11/2019 10:20 AM  Performed by: Myriam Omer MD  Authorized by: Myriam Omer MD   Comparison: compared with previous ECG from 7/2/2019  Similar to previous ECG  Rhythm: atrial fibrillation  BPM: 143  Other findings: left ventricular hypertrophy    Clinical impression: abnormal EKG             
IV discontinued, cath removed intact

## 2021-08-30 RX ORDER — MONTELUKAST SODIUM 10 MG/1
TABLET ORAL
Qty: 90 TABLET | Refills: 1 | Status: SHIPPED | OUTPATIENT
Start: 2021-08-30 | End: 2022-03-14

## 2021-08-30 NOTE — TELEPHONE ENCOUNTER
Rx Refill Note  Requested Prescriptions     Pending Prescriptions Disp Refills   • montelukast (SINGULAIR) 10 MG tablet [Pharmacy Med Name: MONTELUKAST 10MG TABLETS] 90 tablet 1     Sig: TAKE 1 TABLET BY MOUTH DAILY      Last office visit with prescribing clinician: 5/28/2021      Next office visit with prescribing clinician: 11/29/2021            Angelic Carey LPN  08/30/21, 10:02 EDT

## 2021-09-02 RX ORDER — LEVOTHYROXINE SODIUM 0.12 MG/1
125 TABLET ORAL DAILY
Qty: 30 TABLET | Refills: 6 | Status: SHIPPED | OUTPATIENT
Start: 2021-09-02 | End: 2022-03-25

## 2021-09-15 ENCOUNTER — TELEPHONE (OUTPATIENT)
Dept: PHARMACY | Facility: HOSPITAL | Age: 76
End: 2021-09-15

## 2021-09-20 ENCOUNTER — ANTICOAGULATION VISIT (OUTPATIENT)
Dept: PHARMACY | Facility: HOSPITAL | Age: 76
End: 2021-09-20

## 2021-09-20 DIAGNOSIS — I48.19 OTHER PERSISTENT ATRIAL FIBRILLATION (HCC): Primary | ICD-10-CM

## 2021-09-20 LAB
INR PPP: 4.7 (ref 0.91–1.09)
PROTHROMBIN TIME: 56.8 SECONDS (ref 10–13.8)

## 2021-09-20 PROCEDURE — 36416 COLLJ CAPILLARY BLOOD SPEC: CPT

## 2021-09-20 PROCEDURE — 85610 PROTHROMBIN TIME: CPT

## 2021-09-20 NOTE — PROGRESS NOTES
Anticoagulation Clinic Progress Note    Anticoagulation Summary  As of 2021    INR goal:  2.0-3.0   TTR:  59.9 % (2.8 y)   INR used for dosin.7 (2021)   Warfarin maintenance plan:  2 mg every Sun, Tue, Thu; 4 mg all other days   Weekly warfarin total:  22 mg   Plan last modified:  Kristal Cowart RPH (10/29/2020)   Next INR check:  2021   Priority:  Maintenance   Target end date:  Indefinite    Indications    Other persistent atrial fibrillation (CMS/HCC) [I48.19]             Anticoagulation Episode Summary     INR check location:      Preferred lab:      Send INR reminders to:   IMANI ROLAND [facility]    Comments:        Anticoagulation Care Providers     Provider Role Specialty Phone number    Myriam Omer MD Referring Cardiology [phone]          Clinic Interview:  Patient Findings     Negatives:  Signs/symptoms of thrombosis, Signs/symptoms of bleeding,   Laboratory test error suspected, Change in health, Change in alcohol use,   Change in activity, Upcoming invasive procedure, Emergency department   visit, Upcoming dental procedure, Missed doses, Extra doses, Change in   medications, Change in diet/appetite, Hospital admission, Bruising, Other   complaints      Clinical Outcomes     Negatives:  Major bleeding event, Thromboembolic event,   Anticoagulation-related hospital admission, Anticoagulation-related ED   visit, Anticoagulation-related fatality        INR History:  Anticoagulation Monitoring 2021   INR 3.1 2.9 4.7   INR Date 2021   INR Goal 2.0-3.0 2.0-3.0 2.0-3.0   Trend Same Same Same   Last Week Total 22 mg 22 mg 22 mg   Next Week Total 22 mg 22 mg 18 mg   Sun 2 mg 2 mg -   Mon 4 mg 4 mg Hold ()   Tue 2 mg 2 mg 2 mg   Wed 4 mg 4 mg 4 mg   Thu 2 mg 2 mg 2 mg   Fri 4 mg 4 mg -   Sat 4 mg 4 mg -   Visit Report - - -   Some recent data might be hidden       Plan:  1. INR is Supratherapeutic today- see above in  Anticoagulation Summary.  Will instruct Cody Eldridge to Change their warfarin regimen- see above in Anticoagulation Summary.  2. Follow up in 5 days  3. Patient declines warfarin refills.  4. Verbal and written information provided. Patient expresses understanding and has no further questions at this time.    Bernarda Lockwood, [facility]

## 2021-09-24 ENCOUNTER — ANTICOAGULATION VISIT (OUTPATIENT)
Dept: PHARMACY | Facility: HOSPITAL | Age: 76
End: 2021-09-24

## 2021-09-24 DIAGNOSIS — I48.19 OTHER PERSISTENT ATRIAL FIBRILLATION (HCC): Primary | ICD-10-CM

## 2021-09-24 LAB
INR PPP: 3.4 (ref 0.91–1.09)
PROTHROMBIN TIME: 40.6 SECONDS (ref 10–13.8)

## 2021-09-24 PROCEDURE — 36416 COLLJ CAPILLARY BLOOD SPEC: CPT

## 2021-09-24 PROCEDURE — G0463 HOSPITAL OUTPT CLINIC VISIT: HCPCS

## 2021-09-24 PROCEDURE — 85610 PROTHROMBIN TIME: CPT

## 2021-09-24 NOTE — PROGRESS NOTES
Anticoagulation Clinic Progress Note    Anticoagulation Summary  As of 9/24/2021    INR goal:  2.0-3.0   TTR:  59.6 % (2.8 y)   INR used for dosing:  3.4 (9/24/2021)   Warfarin maintenance plan:  2 mg every Sun, Tue, Thu; 4 mg all other days   Weekly warfarin total:  22 mg   Plan last modified:  Kristal Cowart RPH (10/29/2020)   Next INR check:  9/30/2021   Priority:  Maintenance   Target end date:  Indefinite    Indications    Other persistent atrial fibrillation (CMS/HCC) [I48.19]             Anticoagulation Episode Summary     INR check location:      Preferred lab:      Send INR reminders to:   [facility]    Comments:        Anticoagulation Care Providers     Provider Role Specialty Phone number    Myriam Omer MD Referring Cardiology [phone]          Clinic Interview:  Patient Findings     Negatives:  Signs/symptoms of thrombosis, Signs/symptoms of bleeding,   Laboratory test error suspected, Change in health, Change in alcohol use,   Change in activity, Upcoming invasive procedure, Emergency department   visit, Upcoming dental procedure, Missed doses, Extra doses, Change in   medications, Change in diet/appetite, Hospital admission, Bruising, Other   complaints      Clinical Outcomes     Negatives:  Major bleeding event, Thromboembolic event,   Anticoagulation-related hospital admission, Anticoagulation-related ED   visit, Anticoagulation-related fatality        INR History:  Anticoagulation Monitoring 7/22/2021 9/20/2021 9/24/2021   INR 2.9 4.7 3.4   INR Date 7/22/2021 9/20/2021 9/24/2021   INR Goal 2.0-3.0 2.0-3.0 2.0-3.0   Trend Same Same Same   Last Week Total 22 mg 22 mg 18 mg   Next Week Total 22 mg 18 mg 20 mg   Sun 2 mg - 2 mg   Mon 4 mg Hold (9/20) 4 mg   Tue 2 mg 2 mg 2 mg   Wed 4 mg 4 mg 4 mg   Thu 2 mg 2 mg -   Fri 4 mg - 2 mg (9/24)   Sat 4 mg - 4 mg   Visit Report - - -   Some recent data might be hidden       Plan:  1. INR is Supratherapeutic today- see above in  Anticoagulation Summary.  Will instruct Cody Eldridge to Change their warfarin regimen- see above in Anticoagulation Summary.  2. Follow up in 6 days  3. Patient declines warfarin refills.  4. Verbal and written information provided. Patient expresses understanding and has no further questions at this time.    Bernarda Lockwood, [facility]

## 2021-09-30 ENCOUNTER — OFFICE VISIT (OUTPATIENT)
Dept: CARDIOLOGY | Facility: CLINIC | Age: 76
End: 2021-09-30

## 2021-09-30 ENCOUNTER — ANTICOAGULATION VISIT (OUTPATIENT)
Dept: PHARMACY | Facility: HOSPITAL | Age: 76
End: 2021-09-30

## 2021-09-30 VITALS
WEIGHT: 161 LBS | HEART RATE: 70 BPM | DIASTOLIC BLOOD PRESSURE: 60 MMHG | SYSTOLIC BLOOD PRESSURE: 98 MMHG | HEIGHT: 70 IN | BODY MASS INDEX: 23.05 KG/M2

## 2021-09-30 DIAGNOSIS — I25.10 CORONARY ARTERY DISEASE INVOLVING NATIVE CORONARY ARTERY OF NATIVE HEART WITHOUT ANGINA PECTORIS: ICD-10-CM

## 2021-09-30 DIAGNOSIS — I48.19 OTHER PERSISTENT ATRIAL FIBRILLATION (HCC): Primary | ICD-10-CM

## 2021-09-30 DIAGNOSIS — I48.19 OTHER PERSISTENT ATRIAL FIBRILLATION (HCC): ICD-10-CM

## 2021-09-30 DIAGNOSIS — I10 ESSENTIAL HYPERTENSION: Primary | ICD-10-CM

## 2021-09-30 LAB
INR PPP: 3.6 (ref 0.91–1.09)
PROTHROMBIN TIME: 42.7 SECONDS (ref 10–13.8)

## 2021-09-30 PROCEDURE — 99214 OFFICE O/P EST MOD 30 MIN: CPT | Performed by: NURSE PRACTITIONER

## 2021-09-30 PROCEDURE — 93000 ELECTROCARDIOGRAM COMPLETE: CPT | Performed by: NURSE PRACTITIONER

## 2021-09-30 PROCEDURE — 36416 COLLJ CAPILLARY BLOOD SPEC: CPT

## 2021-09-30 PROCEDURE — 85610 PROTHROMBIN TIME: CPT

## 2021-09-30 PROCEDURE — G0463 HOSPITAL OUTPT CLINIC VISIT: HCPCS

## 2021-09-30 NOTE — PROGRESS NOTES
Date of Office Visit: 2021  Encounter Provider: Angelique Espinal, JAMILAH, APRN  Place of Service: [facility]  Patient Name: Cody Eldridge  :1945        Subjective:     Chief Complaint:  Persistent atrial fibrillation, hypertension, nonobstructive coronary artery disease      History of Present Illness:  Cody Eldridge is a 76 y.o. male patient of Dr. Omer.  I am seeing this patient in the office today and I reviewed his records.    Patient has a history of coronary artery disease,  systolic heart failure, atrial fibrillation, hypertension, hyperlipidemia, shortness of breath with exertion, depression, COPD, sepsis with subsequent port removal, chronic kidney disease, pulmonary hypertension.     In  patient had an SVT ablation at the [facility].  There is a questionable history of a non-ST elevation myocardial infarction in the past.  Patient had echocardiogram 2000 showing normal left ventricular systolic function with ejection fraction of 64%.  There was mild right and left atrial enlargement as well as mild mitral and tricuspid regurgitation with a right ventricular systolic pressure of 39 mmHg.  In 2017 patient had shortness of breath and leg swelling and was found to have an elevated BNP.  He was started on Lasix.  Chest x-ray showed mild cardiac enlargement.  CT scan of the chest revealed prominent changes of emphysema with scarring.  He had an echocardiogram done which showed normal left ventricular systolic function with an ejection fraction of 56%, mild to moderate left ventricular hypertrophy, moderate to severe dilation of the right ventricle with mildly reduced function, moderate to severe dilation of the right atrium, moderate tricuspid regurgitation with RVSP of 47 mmHg.  Patient was seen by pulmonology and his medications were adjusted.  Patient was seen 18 at this point it was reported that he  complained of chest pain and shortness of breath.  It was recommended that he have a left and right heart catheter.  His amlodipine was discontinued and metoprolol was increased to 100 mg twice daily in an attempt to get better rate control as his heart rate was 113 bpm.  On 6/13/18 patient without heart catheterization but this was aborted.  On 7/13/18 it was reported the patient went to the emergency department with increased shortness of breath and weight gain.  He was given one dose of IV Lasix 80 mg.  He was discharged in stable condition and was told to take an extra 20 mg of Lasix at home.  He then resumed his 40 mg twice a day of Lasix.  Patient was seen in office by Silvina Tejada 7/19/18.  At this point it was reported that patient shortness of breath continued however it had improved since ER visit.  His weights had slowly been trending down.  He did have some fatigue that was coming and going. Patient had a history of claustrophobia which is why previous heart catheter had to be aborted.  It was recommended that patient attempt another heart catheter with anesthesia.  Patient had cardiac catheterization done 8/29/18.  He was found to have mild nonobstructive coronary disease.  Continued treatment for atrial fibrillation as well as risk factor modification was recommended.  Patient was seen in the hospital 8/2019 for worsening bilateral lower extremity edema and shortness of breath after receiving IV fluids a day before at [facility].  EKG showed atrial fibrillation with rapid ventricular rate.  He was placed on amiodarone drip and given IV digoxin and IV diuretics and IV beta-blockers.  He underwent removal of port for sepsis.  Echocardiogram 8/6/2019 showed decreased left ventricular systolic function with EF of 36 to 40%, hypokinesis of septal walls, left atrial dilation, moderate tricuspid regurgitation with moderate pulmonary hypertension.  Digoxin was stopped due to elevated digoxin level.  He was not on  anticoagulants due to severe thrombocytopenia.  He had been on warfarin prior to admission.  Anticoagulants were later resumed.  He was noted to have some bruising but no abnormal bleeding after resuming.  Seen 7/2020 blood pressure log showed labile readings though blood pressure was well controlled in the office.      Blood pressure is running high and Dr. Omer ordered chlorthalidone to medication regimen 3/2021.  He was seen for follow-up by Dr. Peterson 3/30/2021 at which point blood pressure was well controlled and he denied cardiac symptoms.        Patient presents to office today for follow-up appointment.  Patient reports he is doing well since last visit. He is not doing formal exercise but he continues to stay very active at work, walking all day. He might get some occasional mild shortness of breath with exertion which she reports is chronic intermittent, nothing new or worsening or significant. He attributes it to his COPD. Denies any chest pain or discomfort, palpitations, racing heartbeat sensation, dizziness, syncope, near syncope, falls, fatigue, or abnormal bleeding. Does get some occasional bruising to his arms if he bumps something we discussed trying to wear long sleeve shirts and avoid this. Blood pressure at home usually around 112 over 70s though symptoms checked before medicines. Blood pressure is a little low in the office today, denies being on the heat recently. He gets some occasional mild bilateral swelling to his feet but nothing significant or new or worsening and none present on exam today.        Past Medical History:   Diagnosis Date   • Acquired hypothyroidism    • Acute renal injury (CMS/HCC)    • Anemia associated with chemotherapy    • Atrial fibrillation (CMS/HCC)    • CAD (coronary artery disease)    • CHF (congestive heart failure) (CMS/HCC)    • Chronic bronchitis (CMS/HCC)    • COPD (chronic obstructive pulmonary disease) (CMS/HCC)    • Cor pulmonale (chronic) (CMS/HCC)    •  Daytime hypersomnia    • Depression with anxiety    • IRAHETA (dyspnea on exertion)    • Enlarged prostate    • Frequent nocturnal awakening    • Gout    • H/O cardiac radiofrequency ablation 2006    [facility].   • Head and neck cancer (CMS/HCC)    • Hypertension    • Hypotension    • Insomnia    • Lumbar radicular pain    • SHAR (obstructive sleep apnea)    • Osteoarthritis    • Permanent atrial fibrillation (CMS/HCC)    • Shock, septic (CMS/HCC)    • Snoring    • Squamous cell carcinoma of neck    • SVT (supraventricular tachycardia) (CMS/HCC)    • Syncope    • Vitamin D deficiency    • Weight loss      Past Surgical History:   Procedure Laterality Date   • APPENDECTOMY     • CARDIAC ABLATION  2006    [facility].   • CARDIAC CATHETERIZATION N/A 8/29/2018    Procedure: Left Heart Cath;  Surgeon: Yousif Uribe MD;  Location: [facility] CATH INVASIVE LOCATION;  Service: Cardiology   • CARDIAC CATHETERIZATION N/A 8/29/2018    Procedure: Coronary angiography;  Surgeon: Yousif Uribe MD;  Location: [facility] CATH INVASIVE LOCATION;  Service: Cardiology   • CARDIAC CATHETERIZATION N/A 8/29/2018    Procedure: Left ventriculography;  Surgeon: Yousif Uribe MD;  Location: [facility] CATH INVASIVE LOCATION;  Service: Cardiology   • FINGER SURGERY     • FOOT SURGERY     • HAND SURGERY     • VENOUS ACCESS DEVICE (PORT) INSERTION Right 6/18/2019    Procedure: MEDIPORT PLACEMENT;  Surgeon: Elvis Grigsby MD;  Location: [facility] OR;  Service: Vascular   • VENOUS ACCESS DEVICE (PORT) REMOVAL Right 8/5/2019    Procedure: REMOVAL VENOUS ACCESS DEVICE;  Surgeon: Prince Castaneda MD;  Location: [facility] OR;  Service: Vascular     Outpatient Medications Prior to Visit   Medication Sig Dispense Refill   • albuterol (PROVENTIL HFA;VENTOLIN HFA) 108 (90 BASE) MCG/ACT inhaler Inhale 2 puffs. ProAir  (90 Base) MCG/ACT Inhalation Aerosol Solution; Patient Sig: ProAir  (90 Base) MCG/ACT Inhalation Aerosol Solution  inhale 2 puffs by mouth every 4 hours if needed; 8.5; 11; 07-Apr-2014; Act     • ALPRAZolam (XANAX) 0.5 MG tablet TAKE 1 TABLET BY MOUTH EVERY MORNING AND 0.5 TABLET BY MOUTH EVERY EVENING 45 tablet 3   • aspirin 81 MG chewable tablet Chew 81 mg Daily.     • baclofen (LIORESAL) 10 MG tablet take 1 tablet by mouth three times a day 90 tablet 1   • carvedilol (COREG) 25 MG tablet Take 1 tablet by mouth 2 (Two) Times a Day With Meals. 60 tablet 11   • chlorthalidone (HYGROTON) 25 MG tablet TAKE 1 TABLET BY MOUTH DAILY 30 tablet 8   • diphenoxylate-atropine (LOMOTIL) 2.5-0.025 MG per tablet Take 1 tablet by mouth 4 (Four) Times a Day As Needed for Diarrhea. 60 tablet 1   • levothyroxine (SYNTHROID, LEVOTHROID) 125 MCG tablet TAKE 1 TABLET BY MOUTH DAILY 30 tablet 6   • lisinopril (PRINIVIL,ZESTRIL) 40 MG tablet Take 1 tablet by mouth Daily. 90 tablet 1   • montelukast (SINGULAIR) 10 MG tablet TAKE 1 TABLET BY MOUTH DAILY 90 tablet 1   • nystatin (MYCOSTATIN) 447557 UNIT/ML suspension Swish and swallow 5 mL 4 (Four) Times a Day. Until gone 120 mL 1   • oxyCODONE-acetaminophen (PERCOCET) 7.5-325 MG per tablet TK 1 T PO Q 6 H UTD     • rosuvastatin (CRESTOR) 20 MG tablet Take 20 mg by mouth Daily. 30 tablet 5   • sertraline (ZOLOFT) 100 MG tablet TAKE 1 TABLET BY MOUTH EVERY DAY FOR ANXIETY AND DEPRESSION RELIEF 30 tablet 6   • warfarin (COUMADIN) 2 MG tablet Take one tablet (2 mg) by mouth on Sun, Tues, and Thurs, and take two tablets (4 mg) by mouth all other days or as directed. (Patient taking differently: Take one tablet by mouth: Sunday 2 mg, Monday 4mg, Tuesday 2mg , Wednesday 4mg, Thursday 2mg, Friday and Saturday 4mg) 145 tablet 1     No facility-administered medications prior to visit.       Allergies as of 09/30/2021 - Reviewed 09/30/2021   Allergen Reaction Noted   • Benadryl [diphenhydramine hcl] Dizziness 06/04/2017     Social History     Socioeconomic History   • Marital status:      Spouse name:  "Elise Rai   • Number of children: Not on file   • Years of education: Some College   • Highest education level: Not on file   Tobacco Use   • Smoking status: Former Smoker     Packs/day: 3.00     Years: 30.00     Pack years: 90.00     Types: Cigarettes     Quit date: 2000     Years since quittin.7   • Smokeless tobacco: Never Used   • Tobacco comment: started age 11 x 53 years stopped 2000 / daily caffiene   Substance and Sexual Activity   • Alcohol use: Not Currently     Alcohol/week: 2.0 standard drinks     Types: 1 Glasses of wine, 1 Cans of beer per week     Comment: occ   • Drug use: No   • Sexual activity: Yes     Partners: Female     Family History   Problem Relation Age of Onset   • Heart attack Mother    • Diabetes Mother    • Heart disease Mother    • Hypertension Mother    • Anxiety disorder Mother    • Depression Mother    • Diabetes Father    • Heart failure Father    • Heart disease Father    • Hypertension Father    • Cancer Brother         colon   • Hypertension Brother    • Colon cancer Brother 50   • Depression Brother    • Anxiety disorder Brother    • Alcohol abuse Brother    • Diabetes Sister    • Heart disease Sister    • Hypertension Sister    • COPD Other    • Heart failure Other    • Heart disease Other    • Kidney disease Sister    • Anxiety disorder Sister    • Malig Hyperthermia Neg Hx        Review of Systems   Constitutional: Negative for malaise/fatigue.   Cardiovascular:        SEE HPI   Respiratory: Negative for shortness of breath.    Hematologic/Lymphatic: Negative for bleeding problem.   Musculoskeletal: Negative for falls.   Gastrointestinal: Negative for melena.   Genitourinary: Negative for hematuria.   Neurological: Negative for dizziness.   Psychiatric/Behavioral: Negative for altered mental status.          Objective:     Vitals:    21 1144   BP: 98/60   Pulse: 70   Weight: 73 kg (161 lb)   Height: 177.8 cm (70\")   BP ON RECHECK 94/60    Body mass index is " 23.1 kg/m².        PHYSICAL EXAM:  Constitutional:       General: Not in acute distress.     Appearance: Well-developed. Not diaphoretic.   Eyes:      Pupils: Pupils are equal, round, and reactive to light.   HENT:      Head: Normocephalic and atraumatic.   Neck:      Vascular: No carotid bruit or JVD.   Pulmonary:      Effort: Pulmonary effort is normal. No respiratory distress.      Breath sounds: Normal breath sounds. No wheezing. No rales.   Cardiovascular:      Normal rate. Irregularly irregular rhythm.      Murmurs: There is no murmur.      No gallop. No click. No rub.   Edema:     Peripheral edema absent.   Abdominal:      General: Bowel sounds are normal. There is no distension.      Palpations: Abdomen is soft.   Musculoskeletal: Normal range of motion.         General: No tenderness or deformity.      Cervical back: Neck supple. Skin:     General: Skin is warm and dry.      Findings: No erythema or rash.   Neurological:      Mental Status: Alert and oriented to person, place, and time.   Psychiatric:         Behavior: Behavior normal.         Judgment: Judgment normal.             ECG 12 Lead    Date/Time: 9/30/2021 11:58 AM  Performed by: Angelique Espinal DNP, APRN  Authorized by: Angelique Espinal DNP, APRCAMERON   Comparison: compared with previous ECG from 3/30/2021  Rhythm: atrial fibrillation  BPM: 70  Other findings: non-specific ST-T wave changes  Comments: No significant changes from previous EKGs              Assessment:       Diagnosis Plan   1. Essential hypertension     2. Other persistent atrial fibrillation (CMS/HCC)     3. Coronary artery disease involving native coronary artery of native heart without angina pectoris           Plan:     1. Hypertension: Blood pressure low today after AM medications. Has been on the lower side of normal at home recently. Will decrease chlorthalidone to half a tablet daily but if blood pressure becomes elevated may need to increase back up to 1 full tablet  daily. He will send some updated heart rate and blood pressure readings in 1 week or call sooner for issues or concerns.  2. Persistent atrial fibrillation: Remains rate controlled.  On warfarin.  Denies falls or abnormal bleeding.  3. Nonobstructive coronary artery disease: Denies anginal symptoms.  Continue with risk factor modification.  4. Lung disease/COPD with right-sided heart failure: Follows with Dr. Segovia  5. Squamous cell cancer of the throat: Has completed treatment for this  6. Chronic anemia: Mild and overall unchanged on recent labs. Denies bleeding issues.    Patient to follow-up with Dr. Omer in 6 months or sooner if needed for any new, recurrent, or worsening symptoms or other issues or concerns. Discussed in detail signs/symptoms that warrant sooner call or follow-up to the office or ER visit.        Records reviewed including but not limited to 9/2021 INRs, 7/2021 CBC and CMP, 3/2021 EKG, 7/2020 EKG.           Your medication list          Accurate as of September 30, 2021 12:37 PM. If you have any questions, ask your nurse or doctor.            CHANGE how you take these medications      Instructions Last Dose Given Next Dose Due   warfarin 2 MG tablet  Commonly known as: COUMADIN  What changed: additional instructions      Take one tablet (2 mg) by mouth on Sun, Tues, and Thurs, and take two tablets (4 mg) by mouth all other days or as directed.          CONTINUE taking these medications      Instructions Last Dose Given Next Dose Due   albuterol sulfate  (90 Base) MCG/ACT inhaler  Commonly known as: PROVENTIL HFA;VENTOLIN HFA;PROAIR HFA      Inhale 2 puffs. ProAir  (90 Base) MCG/ACT Inhalation Aerosol Solution; Patient Sig: ProAir  (90 Base) MCG/ACT Inhalation Aerosol Solution inhale 2 puffs by mouth every 4 hours if needed; 8.5; 11; 07-Apr-2014; Act       ALPRAZolam 0.5 MG tablet  Commonly known as: XANAX      TAKE 1 TABLET BY MOUTH EVERY MORNING AND 0.5 TABLET BY MOUTH  EVERY EVENING       aspirin 81 MG chewable tablet      Chew 81 mg Daily.       baclofen 10 MG tablet  Commonly known as: LIORESAL      take 1 tablet by mouth three times a day       carvedilol 25 MG tablet  Commonly known as: COREG      Take 1 tablet by mouth 2 (Two) Times a Day With Meals.       chlorthalidone 25 MG tablet  Commonly known as: HYGROTON      TAKE 1 TABLET BY MOUTH DAILY       Crestor 20 MG tablet  Generic drug: rosuvastatin      Take 20 mg by mouth Daily.       diphenoxylate-atropine 2.5-0.025 MG per tablet  Commonly known as: LOMOTIL      Take 1 tablet by mouth 4 (Four) Times a Day As Needed for Diarrhea.       levothyroxine 125 MCG tablet  Commonly known as: SYNTHROID, LEVOTHROID      TAKE 1 TABLET BY MOUTH DAILY       lisinopril 40 MG tablet  Commonly known as: PRINIVIL,ZESTRIL      Take 1 tablet by mouth Daily.       montelukast 10 MG tablet  Commonly known as: SINGULAIR      TAKE 1 TABLET BY MOUTH DAILY       nystatin 100,000 unit/mL suspension  Commonly known as: MYCOSTATIN      Swish and swallow 5 mL 4 (Four) Times a Day. Until gone       oxyCODONE-acetaminophen 7.5-325 MG per tablet  Commonly known as: PERCOCET      TK 1 T PO Q 6 H UTD       sertraline 100 MG tablet  Commonly known as: ZOLOFT      TAKE 1 TABLET BY MOUTH EVERY DAY FOR ANXIETY AND DEPRESSION RELIEF              The above medication changes may not have been made by this provider.  Patient's medication list was updated to reflect medications they are currently taking including medication changes made by other providers.            Thanks,    Angelique Espinal, JAMILAH, APRN  09/30/2021             Dictated utilizing Dragon dictation

## 2021-09-30 NOTE — PROGRESS NOTES
Anticoagulation Clinic Progress Note    Anticoagulation Summary  As of 9/30/2021    INR goal:  2.0-3.0   TTR:  59.3 % (2.9 y)   INR used for dosing:  3.6 (9/30/2021)   Warfarin maintenance plan:  4 mg every Mon, Wed, Fri; 2 mg all other days   Weekly warfarin total:  20 mg   Plan last modified:  Bernarda Lockwood RPH (9/30/2021)   Next INR check:  10/7/2021   Priority:  Maintenance   Target end date:  Indefinite    Indications    Other persistent atrial fibrillation (CMS/HCC) [I48.19]             Anticoagulation Episode Summary     INR check location:      Preferred lab:      Send INR reminders to:   [facility]    Comments:        Anticoagulation Care Providers     Provider Role Specialty Phone number    Myriam Omer MD Referring Cardiology [phone]          Clinic Interview:  Patient Findings     Negatives:  Signs/symptoms of thrombosis, Signs/symptoms of bleeding,   Laboratory test error suspected, Change in health, Change in alcohol use,   Change in activity, Upcoming invasive procedure, Emergency department   visit, Upcoming dental procedure, Missed doses, Extra doses, Change in   medications, Change in diet/appetite, Hospital admission, Bruising, Other   complaints      Clinical Outcomes     Negatives:  Major bleeding event, Thromboembolic event,   Anticoagulation-related hospital admission, Anticoagulation-related ED   visit, Anticoagulation-related fatality        INR History:  Anticoagulation Monitoring 9/20/2021 9/24/2021 9/30/2021   INR 4.7 3.4 3.6   INR Date 9/20/2021 9/24/2021 9/30/2021   INR Goal 2.0-3.0 2.0-3.0 2.0-3.0   Trend Same Same Down   Last Week Total 22 mg 18 mg 20 mg   Next Week Total 18 mg 20 mg 18 mg   Sun - 2 mg 2 mg   Mon Hold (9/20) 4 mg 4 mg   Tue 2 mg 2 mg 2 mg   Wed 4 mg 4 mg 4 mg   Thu 2 mg - Hold (9/30)   Fri - 2 mg (9/24) 4 mg   Sat - 4 mg 2 mg   Visit Report - - -   Some recent data might be hidden       Plan:  1. INR is Supratherapeutic today- see above  in Anticoagulation Summary.  Will instruct Cody Eldridge to Change their warfarin regimen- see above in Anticoagulation Summary.  2. Follow up in 1 week  3. Patient declines warfarin refills.  4. Verbal and written information provided. Patient expresses understanding and has no further questions at this time.    Bernarda Lockwood, [facility]

## 2021-10-01 RX ORDER — ALPRAZOLAM 0.5 MG/1
TABLET ORAL
Qty: 45 TABLET | Refills: 1 | Status: SHIPPED | OUTPATIENT
Start: 2021-10-01 | End: 2021-12-06

## 2021-10-07 ENCOUNTER — ANTICOAGULATION VISIT (OUTPATIENT)
Dept: PHARMACY | Facility: HOSPITAL | Age: 76
End: 2021-10-07

## 2021-10-07 DIAGNOSIS — I48.19 OTHER PERSISTENT ATRIAL FIBRILLATION (HCC): Primary | ICD-10-CM

## 2021-10-07 LAB
INR PPP: 5.2 (ref 0.91–1.09)
PROTHROMBIN TIME: 62.3 SECONDS (ref 10–13.8)

## 2021-10-07 PROCEDURE — G0463 HOSPITAL OUTPT CLINIC VISIT: HCPCS

## 2021-10-07 PROCEDURE — 85610 PROTHROMBIN TIME: CPT

## 2021-10-07 PROCEDURE — 36416 COLLJ CAPILLARY BLOOD SPEC: CPT

## 2021-10-07 NOTE — PROGRESS NOTES
Anticoagulation Clinic Progress Note    Anticoagulation Summary  As of 10/7/2021    INR goal:  2.0-3.0   TTR:  58.9 % (2.9 y)   INR used for dosin.2 (10/7/2021)   Warfarin maintenance plan:  4 mg every Mon, Fri; 2 mg all other days   Weekly warfarin total:  18 mg   Plan last modified:  Bernarda Lockwood RPH (10/7/2021)   Next INR check:  10/13/2021   Priority:  Maintenance   Target end date:  Indefinite    Indications    Other persistent atrial fibrillation (HCC) [I48.19]             Anticoagulation Episode Summary     INR check location:      Preferred lab:      Send INR reminders to:   [facility]    Comments:        Anticoagulation Care Providers     Provider Role Specialty Phone number    Myriam Omer MD Referring Cardiology [phone]          Clinic Interview:  Patient Findings     Negatives:  Signs/symptoms of thrombosis, Signs/symptoms of bleeding,   Laboratory test error suspected, Change in health, Change in alcohol use,   Change in activity, Upcoming invasive procedure, Emergency department   visit, Upcoming dental procedure, Missed doses, Extra doses, Change in   medications, Change in diet/appetite, Hospital admission, Bruising, Other   complaints      Clinical Outcomes     Negatives:  Major bleeding event, Thromboembolic event,   Anticoagulation-related hospital admission, Anticoagulation-related ED   visit, Anticoagulation-related fatality        INR History:  Anticoagulation Monitoring 2021 2021 10/7/2021   INR 3.4 3.6 5.2   INR Date 2021 2021 10/7/2021   INR Goal 2.0-3.0 2.0-3.0 2.0-3.0   Trend Same Down Down   Last Week Total 18 mg 20 mg 18 mg   Next Week Total 20 mg 18 mg 12 mg   Sun 2 mg 2 mg 2 mg   Mon 4 mg 4 mg 4 mg   Tue 2 mg 2 mg 2 mg   Wed 4 mg 4 mg -   Thu - Hold () Hold (10/7)   Fri 2 mg () 4 mg Hold (10/8)   Sat 4 mg 2 mg 2 mg   Visit Report - - -   Some recent data might be hidden       Plan:  1. INR is Supratherapeutic today- see  above in Anticoagulation Summary.  Will instruct Cody Harringtonley to Change their warfarin regimen- see above in Anticoagulation Summary.  2. Previous holds of 1 day have been insufficient to reduce INR to goal so will hold 2 days and change regimen to 4 mg M,F and 2 mg AOD then recheck next Wednesday to see if further changes are needed.  3. Follow up on Wednesday  4. Patient declines warfarin refills.  5. Verbal and written information provided. Patient expresses understanding and has no further questions at this time.    Akira Perry, Pharmacy Intern

## 2021-10-13 ENCOUNTER — ANTICOAGULATION VISIT (OUTPATIENT)
Dept: PHARMACY | Facility: HOSPITAL | Age: 76
End: 2021-10-13

## 2021-10-13 DIAGNOSIS — I48.19 OTHER PERSISTENT ATRIAL FIBRILLATION (HCC): Primary | ICD-10-CM

## 2021-10-13 LAB
INR PPP: 3.7 (ref 0.91–1.09)
PROTHROMBIN TIME: 43.9 SECONDS (ref 10–13.8)

## 2021-10-13 PROCEDURE — 85610 PROTHROMBIN TIME: CPT

## 2021-10-13 PROCEDURE — G0463 HOSPITAL OUTPT CLINIC VISIT: HCPCS

## 2021-10-13 PROCEDURE — 36416 COLLJ CAPILLARY BLOOD SPEC: CPT

## 2021-10-13 RX ORDER — WARFARIN SODIUM 2 MG/1
TABLET ORAL
Qty: 100 TABLET | Refills: 1 | Status: SHIPPED | OUTPATIENT
Start: 2021-10-13 | End: 2022-01-05 | Stop reason: SDUPTHER

## 2021-10-13 NOTE — PROGRESS NOTES
I have supervised and reviewed the notes, assessments, and/or procedures performed by our Pharmacy Resident. The documented assessment and plan were developed cooperatively, and the plan was implemented in my presence. I concur with the documentation of this patient encounter.

## 2021-10-13 NOTE — PROGRESS NOTES
Anticoagulation Clinic Progress Note    Anticoagulation Summary  As of 10/13/2021    INR goal:  2.0-3.0   TTR:  58.6 % (2.9 y)   INR used for dosing:  3.7 (10/13/2021)   Warfarin maintenance plan:  4 mg every Mon; 2 mg all other days   Weekly warfarin total:  16 mg   Plan last modified:  Bernarda Lockwood RPH (10/13/2021)   Next INR check:  10/21/2021   Priority:  Maintenance   Target end date:  Indefinite    Indications    Other persistent atrial fibrillation (HCC) [I48.19]             Anticoagulation Episode Summary     INR check location:      Preferred lab:      Send INR reminders to:  [facility] [place]    Comments:        Anticoagulation Care Providers     Provider Role Specialty Phone number    Myriam Omer MD Referring Cardiology [phone]          Clinic Interview:  Patient Findings    Positives:  Signs/symptoms of bleeding, Bruising   Negatives:  Signs/symptoms of thrombosis, Laboratory test error   suspected, Change in health, Change in alcohol use, Change in activity,   Upcoming invasive procedure, Emergency department visit, Upcoming dental   procedure, Missed doses, Extra doses, Change in medications, Change in   diet/appetite, Hospital admission, Other complaints     Comments:  Patient reports waking up with bleeding from right arm near   elbow and bruising along both arm. Patient reports upcoming procedure for   pain in back-will know this Friday when it will be scheduled. Pt reports   was told by pain doctor/cardiologist did not have to stop warfarin for   this procedure      Clinical Outcomes    Negatives:  Major bleeding event, Thromboembolic event,   Anticoagulation-related hospital admission, Anticoagulation-related ED   visit, Anticoagulation-related fatality     INR History:  Anticoagulation Monitoring 9/30/2021 10/7/2021 10/13/2021   INR 3.6 5.2 3.7   INR Date 9/30/2021 10/7/2021 10/13/2021   INR Goal 2.0-3.0 2.0-3.0 2.0-3.0   Trend Down Down Down   Last Week Total 20 mg 18  mg 14 mg   Next Week Total 18 mg 12 mg 14 mg   Sun 2 mg 2 mg 2 mg   Mon 4 mg 4 mg 4 mg   Tue 2 mg 2 mg 2 mg   Wed 4 mg - Hold (10/13); Otherwise 2 mg   Thu Hold (9/30) Hold (10/7) 2 mg   Fri 4 mg Hold (10/8) 2 mg   Sat 2 mg 2 mg 2 mg   Visit Report - - -   Some recent data might be hidden       Plan:  1. INR is Supratherapeutic today- see above in Anticoagulation Summary.  Will instruct Cody Eldridge to Change their warfarin regimen-hold dose today and change Fridays from 4 mg to 2 mg- see above in Anticoagulation Summary.  2. Follow up in 1 week  3. Patient desires warfarin refills.  4. Verbal and written information provided. Patient expresses understanding and has no further questions at this time.    Rocael Martinze, PharmD  Pharmacy Resident

## 2021-10-21 ENCOUNTER — ANTICOAGULATION VISIT (OUTPATIENT)
Dept: PHARMACY | Facility: HOSPITAL | Age: 76
End: 2021-10-21

## 2021-10-21 DIAGNOSIS — I48.19 OTHER PERSISTENT ATRIAL FIBRILLATION (HCC): Primary | ICD-10-CM

## 2021-10-21 LAB
INR PPP: 2.4 (ref 0.91–1.09)
PROTHROMBIN TIME: 28.4 SECONDS (ref 10–13.8)

## 2021-10-21 PROCEDURE — 85610 PROTHROMBIN TIME: CPT

## 2021-10-21 PROCEDURE — 36416 COLLJ CAPILLARY BLOOD SPEC: CPT

## 2021-10-21 NOTE — PROGRESS NOTES
Anticoagulation Clinic Progress Note    Anticoagulation Summary  As of 10/21/2021    INR goal:  2.0-3.0   TTR:  58.5 % (2.9 y)   INR used for dosin.4 (10/21/2021)   Warfarin maintenance plan:  4 mg every Mon; 2 mg all other days   Weekly warfarin total:  16 mg   No change documented:  Rocael Martinez [facility]   Plan last modified:  Bernarda Lockwood RP (10/13/2021)   Next INR check:  2021   Priority:  Maintenance   Target end date:  Indefinite    Indications    Other persistent atrial fibrillation (HCC) [I48.19]             Anticoagulation Episode Summary     INR check location:      Preferred lab:      [facility] [place]    Comments:        Anticoagulation Care Providers     Provider Role Specialty Phone number    Myriam Omer MD Referring Cardiology [phone]          Clinic Interview:  Patient Findings     Positives:  Change in medications    Negatives:  Signs/symptoms of thrombosis, Signs/symptoms of bleeding,   Laboratory test error suspected, Change in health, Change in alcohol use,   Change in activity, Upcoming invasive procedure, Emergency department   visit, Upcoming dental procedure, Missed doses, Extra doses, Change in   diet/appetite, Hospital admission, Bruising, Other complaints     Comments:  Pt reports had flu shot and covid booster shot last week      Clinical Outcomes     Negatives:  Major bleeding event, Thromboembolic event,   Anticoagulation-related hospital admission, Anticoagulation-related ED   visit, Anticoagulation-related fatality     INR History:  Anticoagulation Monitoring 10/7/2021 10/13/2021 10/21/2021   INR 5.2 3.7 2.4   INR Date 10/7/2021 10/13/2021 10/21/2021   INR Goal 2.0-3.0 2.0-3.0 2.0-3.0   Trend Down Down Same   Last Week Total 18 mg 14 mg 16 mg   Next Week Total 12 mg 14 mg 16 mg   Sun 2 mg 2 mg 2 mg   Mon 4 mg 4 mg 4 mg   Tue 2 mg 2 mg 2 mg   Wed - Hold (10/13); Otherwise 2 mg 2 mg   Thu Hold (10/7) 2 mg 2 mg   Fri Hold  (10/8) 2 mg 2 mg   Sat 2 mg 2 mg 2 mg   Visit Report - - -   Some recent data might be hidden       Plan:  1. INR is Therapeutic today- see above in Anticoagulation Summary.  Will instruct Cody SAMSON Devine to Continue their warfarin regimen- see above in Anticoagulation Summary.  2. Follow up in 2 weeks  3. Patient declines warfarin refills.  4. Verbal and written information provided. Patient expresses understanding and has no further questions at this time.    Rocael Martinez, PharmD  Pharmacy Resident

## 2021-10-25 ENCOUNTER — TELEPHONE (OUTPATIENT)
Dept: CARDIOLOGY | Facility: CLINIC | Age: 76
End: 2021-10-25

## 2021-10-25 RX ORDER — CHLORTHALIDONE 25 MG/1
12.5 TABLET ORAL DAILY
Qty: 15 TABLET | Refills: 0
Start: 2021-10-25 | End: 2021-11-29 | Stop reason: SDUPTHER

## 2021-10-25 NOTE — TELEPHONE ENCOUNTER
Received blood pressure log from patient. Please let her know that blood pressure overall looks well controlled. Would have him call if staying greater than 130/80 on average or if he sees systolic blood pressure readings less than 100.

## 2021-10-25 NOTE — TELEPHONE ENCOUNTER
10/25/21  I spoke with patient -gave him this information/instructions.  He voiced his understanding/yasmeen

## 2021-10-29 ENCOUNTER — HOSPITAL ENCOUNTER (OUTPATIENT)
Dept: PET IMAGING | Facility: HOSPITAL | Age: 76
Discharge: HOME | End: 2021-10-29

## 2021-10-29 ENCOUNTER — LAB (OUTPATIENT)
Dept: LAB | Facility: HOSPITAL | Age: 76
End: 2021-10-29

## 2021-10-29 DIAGNOSIS — C01 SQUAMOUS CELL CARCINOMA OF BASE OF TONGUE (HCC): ICD-10-CM

## 2021-10-29 DIAGNOSIS — C44.42 SQUAMOUS CELL CARCINOMA OF NECK: ICD-10-CM

## 2021-10-29 LAB
ALBUMIN SERPL-MCNC: 3.9 G/DL (ref 3.5–5.2)
ALBUMIN/GLOB SERPL: 1.1 G/DL (ref 1.1–2.4)
ALP SERPL-CCNC: 73 U/L (ref 38–116)
ALT SERPL W P-5'-P-CCNC: 16 U/L (ref 0–41)
ANION GAP SERPL CALCULATED.3IONS-SCNC: 7.9 MMOL/L (ref 5–15)
AST SERPL-CCNC: 24 U/L (ref 0–40)
BASOPHILS # BLD AUTO: 0.05 10*3/MM3 (ref 0–0.2)
BASOPHILS NFR BLD AUTO: 0.6 % (ref 0–1.5)
BILIRUB SERPL-MCNC: 0.4 MG/DL (ref 0.2–1.2)
BUN SERPL-MCNC: 32 MG/DL (ref 6–20)
BUN/CREAT SERPL: 23 (ref 7.3–30)
CALCIUM SPEC-SCNC: 8.7 MG/DL (ref 8.5–10.2)
CHLORIDE SERPL-SCNC: 105 MMOL/L (ref 98–107)
CO2 SERPL-SCNC: 25.1 MMOL/L (ref 22–29)
CREAT SERPL-MCNC: 1.39 MG/DL (ref 0.7–1.3)
DEPRECATED RDW RBC AUTO: 46.8 FL (ref 37–54)
EOSINOPHIL # BLD AUTO: 0.21 10*3/MM3 (ref 0–0.4)
EOSINOPHIL NFR BLD AUTO: 2.7 % (ref 0.3–6.2)
ERYTHROCYTE [DISTWIDTH] IN BLOOD BY AUTOMATED COUNT: 13.3 % (ref 12.3–15.4)
GFR SERPL CREATININE-BSD FRML MDRD: 50 ML/MIN/1.73
GLOBULIN UR ELPH-MCNC: 3.6 GM/DL (ref 1.8–3.5)
GLUCOSE BLDC GLUCOMTR-MCNC: 93 MG/DL (ref 70–130)
GLUCOSE SERPL-MCNC: 99 MG/DL (ref 74–124)
HCT VFR BLD AUTO: 37 % (ref 37.5–51)
HGB BLD-MCNC: 11.9 G/DL (ref 13–17.7)
IMM GRANULOCYTES # BLD AUTO: 0.03 10*3/MM3 (ref 0–0.05)
IMM GRANULOCYTES NFR BLD AUTO: 0.4 % (ref 0–0.5)
LYMPHOCYTES # BLD AUTO: 0.66 10*3/MM3 (ref 0.7–3.1)
LYMPHOCYTES NFR BLD AUTO: 8.5 % (ref 19.6–45.3)
MCH RBC QN AUTO: 30.7 PG (ref 26.6–33)
MCHC RBC AUTO-ENTMCNC: 32.2 G/DL (ref 31.5–35.7)
MCV RBC AUTO: 95.6 FL (ref 79–97)
MONOCYTES # BLD AUTO: 0.76 10*3/MM3 (ref 0.1–0.9)
MONOCYTES NFR BLD AUTO: 9.8 % (ref 5–12)
NEUTROPHILS NFR BLD AUTO: 6.05 10*3/MM3 (ref 1.7–7)
NEUTROPHILS NFR BLD AUTO: 78 % (ref 42.7–76)
NRBC BLD AUTO-RTO: 0 /100 WBC (ref 0–0.2)
PLATELET # BLD AUTO: 179 10*3/MM3 (ref 140–450)
PMV BLD AUTO: 10.5 FL (ref 6–12)
POTASSIUM SERPL-SCNC: 4.7 MMOL/L (ref 3.5–4.7)
PROT SERPL-MCNC: 7.5 G/DL (ref 6.3–8)
RBC # BLD AUTO: 3.87 10*6/MM3 (ref 4.14–5.8)
SODIUM SERPL-SCNC: 138 MMOL/L (ref 134–145)
VIT B12 BLD-MCNC: 516 PG/ML (ref 211–946)
WBC # BLD AUTO: 7.76 10*3/MM3 (ref 3.4–10.8)

## 2021-10-29 PROCEDURE — 0 FLUDEOXYGLUCOSE F18 SOLUTION: Performed by: INTERNAL MEDICINE

## 2021-10-29 PROCEDURE — 78815 PET IMAGE W/CT SKULL-THIGH: CPT

## 2021-10-29 PROCEDURE — A9552 F18 FDG: HCPCS | Performed by: INTERNAL MEDICINE

## 2021-10-29 PROCEDURE — 82607 VITAMIN B-12: CPT | Performed by: INTERNAL MEDICINE

## 2021-10-29 PROCEDURE — 82962 GLUCOSE BLOOD TEST: CPT

## 2021-10-29 PROCEDURE — 85025 COMPLETE CBC W/AUTO DIFF WBC: CPT

## 2021-10-29 PROCEDURE — 80053 COMPREHEN METABOLIC PANEL: CPT

## 2021-10-29 PROCEDURE — 36415 COLL VENOUS BLD VENIPUNCTURE: CPT

## 2021-10-29 RX ADMIN — FLUDEOXYGLUCOSE F18 1 DOSE: 300 INJECTION INTRAVENOUS at 09:31

## 2021-10-31 LAB
FOLATE BLD-MCNC: 395 NG/ML
FOLATE RBC-MCNC: 1113 NG/ML
HCT VFR BLD AUTO: 35.5 % (ref 37.5–51)

## 2021-11-04 ENCOUNTER — OFFICE VISIT (OUTPATIENT)
Dept: INTERNAL MEDICINE | Facility: CLINIC | Age: 76
End: 2021-11-04

## 2021-11-04 ENCOUNTER — ANTICOAGULATION VISIT (OUTPATIENT)
Dept: PHARMACY | Facility: HOSPITAL | Age: 76
End: 2021-11-04

## 2021-11-04 VITALS
SYSTOLIC BLOOD PRESSURE: 108 MMHG | DIASTOLIC BLOOD PRESSURE: 66 MMHG | WEIGHT: 160.4 LBS | OXYGEN SATURATION: 99 % | BODY MASS INDEX: 22.96 KG/M2 | HEART RATE: 82 BPM | HEIGHT: 70 IN

## 2021-11-04 DIAGNOSIS — M79.10 MYALGIA: Primary | ICD-10-CM

## 2021-11-04 DIAGNOSIS — I48.19 OTHER PERSISTENT ATRIAL FIBRILLATION (HCC): Primary | ICD-10-CM

## 2021-11-04 LAB
INR PPP: 2.6 (ref 0.91–1.09)
PROTHROMBIN TIME: 30.8 SECONDS (ref 10–13.8)

## 2021-11-04 PROCEDURE — 99213 OFFICE O/P EST LOW 20 MIN: CPT | Performed by: FAMILY MEDICINE

## 2021-11-04 PROCEDURE — 85610 PROTHROMBIN TIME: CPT

## 2021-11-04 PROCEDURE — 36416 COLLJ CAPILLARY BLOOD SPEC: CPT

## 2021-11-04 RX ORDER — DOXYCYCLINE HYCLATE 50 MG/1
324 CAPSULE, GELATIN COATED ORAL
Qty: 90 TABLET | Refills: 0 | Status: SHIPPED | OUTPATIENT
Start: 2021-11-04 | End: 2023-03-14 | Stop reason: ALTCHOICE

## 2021-11-04 NOTE — PROGRESS NOTES
"Chief Complaint  bilateral leg spasms    Subjective          Cody Eldridge presents to [facility]  History of Present Illness  Patient follow-up appointment discuss leg cramps bilaterally he has chronic pain syndrome he is using narcotic pain medication as well as muscle relaxants Xanax he does not feel that the baclofen is doing much for his leg cramps he is on his feet throughout the day he says he drinks plenty of water urinates up to 60 times in 24 hours rinse clear.    Objective   Vital Signs:   /66 (BP Location: Left arm, Patient Position: Sitting, Cuff Size: Adult)   Pulse 82   Ht 177.8 cm (70\")   Wt 72.8 kg (160 lb 6.4 oz)   SpO2 99%   BMI 23.02 kg/m²     Physical Exam  Vitals and nursing note reviewed.   Constitutional:       Appearance: He is normal weight.   HENT:      Head: Normocephalic and atraumatic.   Cardiovascular:      Rate and Rhythm: Normal rate and regular rhythm.      Pulses: Normal pulses.      Heart sounds: Normal heart sounds.   Musculoskeletal:      Right lower leg: No edema.   Skin:     General: Skin is warm and dry.   Neurological:      Mental Status: He is alert.   Psychiatric:         Mood and Affect: Mood normal.         Behavior: Behavior normal.         Thought Content: Thought content normal.         Judgment: Judgment normal.        Result Review :     Common labs    Common Labsle 5/28/21 7/27/21 7/27/21 7/27/21 10/29/21 10/29/21 10/29/21     1024 1024 1028 0905 0905 0905   Glucose   121 (A)   99    BUN   26 (A)   32 (A)    Creatinine   1.26 1.20  1.39 (A)    eGFR Non African Am   55 (A)   50 (A)    Sodium   135 (A)   138    Potassium   4.3   4.7    Chloride   103   105    Calcium   9.0   8.7    Albumin   3.80   3.90    Total Bilirubin   0.6   0.4    Alkaline Phosphatase   62   73    AST (SGOT)   18   24    ALT (SGPT)   10   16    WBC  4.75   7.76     Hemoglobin  11.9 (A)   11.9 (A)     Hematocrit  36.8 (A)   37.0 (A)  35.5 (A) "   Platelets  141   179     Total Cholesterol 117         Triglycerides 83         HDL Cholesterol 46         LDL Cholesterol  55         (A) Abnormal value       Comments are available for some flowsheets but are not being displayed.                     Assessment and Plan    Diagnoses and all orders for this visit:    1. Myalgia (Primary)    Other orders  -     ferrous gluconate (FERGON) 324 MG tablet; Take 1 tablet by mouth Daily With Breakfast.  Dispense: 90 tablet; Refill: 0    Discontinue baclofen    Follow Up   Return in about 1 month (around 12/4/2021), or if symptoms worsen or fail to improve, for Recheck.  Patient was given instructions and counseling regarding his condition or for health maintenance advice. Please see specific information pulled into the AVS if appropriate.

## 2021-11-04 NOTE — PROGRESS NOTES
Anticoagulation Clinic Progress Note    Anticoagulation Summary  As of 2021    INR goal:  2.0-3.0   TTR:  59.0 % (2.9 y)   INR used for dosin.6 (2021)   Warfarin maintenance plan:  4 mg every Mon; 2 mg all other days   Weekly warfarin total:  16 mg   No change documented:  Vira Altamirano   Plan last modified:  Bernarda Lockwood RPH (10/13/2021)   Next INR check:  2021   Priority:  Maintenance   Target end date:  Indefinite    Indications    Other persistent atrial fibrillation (HCC) [I48.19]             Anticoagulation Episode Summary     INR check location:      Preferred lab:      Send INR reminders to:   [facility]    Comments:        Anticoagulation Care Providers     Provider Role Specialty Phone number    Myriam Omer MD Referring Cardiology [phone]          Clinic Interview:  Patient Findings     Negatives:  Signs/symptoms of thrombosis, Signs/symptoms of bleeding,   Laboratory test error suspected, Change in health, Change in alcohol use,   Change in activity, Upcoming invasive procedure, Emergency department   visit, Upcoming dental procedure, Missed doses, Extra doses, Change in   medications, Change in diet/appetite, Hospital admission, Bruising, Other   complaints      Clinical Outcomes     Negatives:  Major bleeding event, Thromboembolic event,   Anticoagulation-related hospital admission, Anticoagulation-related ED   visit, Anticoagulation-related fatality        INR History:  Anticoagulation Monitoring 10/13/2021 10/21/2021 2021   INR 3.7 2.4 2.6   INR Date 10/13/2021 10/21/2021 2021   INR Goal 2.0-3.0 2.0-3.0 2.0-3.0   Trend Down Same Same   Last Week Total 14 mg 16 mg 16 mg   Next Week Total 14 mg 16 mg 16 mg   Sun 2 mg 2 mg 2 mg   Mon 4 mg 4 mg 4 mg   Tue 2 mg 2 mg 2 mg   Wed Hold (10/13); Otherwise 2 mg 2 mg 2 mg   Thu 2 mg 2 mg 2 mg   Fri 2 mg 2 mg 2 mg   Sat 2 mg 2 mg 2 mg   Visit Report - - -   Some recent data might be hidden        Plan:  1. INR is therapeutic today- see above in Anticoagulation Summary.   Will instruct Cody Eldridge to continue their warfarin regimen- see above in Anticoagulation Summary.  2. Follow up in 4 weeks.  3. Patient declines warfarin refills.  4. Verbal and written information provided. Patient expresses understanding and has no further questions at this time.    Vira Altamirano

## 2021-11-05 ENCOUNTER — OFFICE VISIT (OUTPATIENT)
Dept: ONCOLOGY | Facility: CLINIC | Age: 76
End: 2021-11-05

## 2021-11-05 ENCOUNTER — APPOINTMENT (OUTPATIENT)
Dept: OTHER | Facility: HOSPITAL | Age: 76
End: 2021-11-05

## 2021-11-05 VITALS
OXYGEN SATURATION: 95 % | SYSTOLIC BLOOD PRESSURE: 116 MMHG | HEART RATE: 68 BPM | WEIGHT: 161.2 LBS | TEMPERATURE: 96.6 F | HEIGHT: 70 IN | RESPIRATION RATE: 16 BRPM | DIASTOLIC BLOOD PRESSURE: 54 MMHG | BODY MASS INDEX: 23.08 KG/M2

## 2021-11-05 DIAGNOSIS — C44.42 SQUAMOUS CELL CARCINOMA OF NECK: Primary | ICD-10-CM

## 2021-11-05 DIAGNOSIS — C01 SQUAMOUS CELL CARCINOMA OF BASE OF TONGUE (HCC): ICD-10-CM

## 2021-11-05 DIAGNOSIS — D64.81 ANEMIA ASSOCIATED WITH CHEMOTHERAPY: ICD-10-CM

## 2021-11-05 DIAGNOSIS — T45.1X5A ANEMIA ASSOCIATED WITH CHEMOTHERAPY: ICD-10-CM

## 2021-11-05 PROCEDURE — 99213 OFFICE O/P EST LOW 20 MIN: CPT | Performed by: INTERNAL MEDICINE

## 2021-11-05 NOTE — PROGRESS NOTES
Subjective     REASON FOR FOLLOW UP:  1.  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   2.  Combined radiation and low-dose carboplatin and Taxol chemotherapy weekly initiated 6/17/2019.  3.  MediPort placed by Dr. Adolph Grigsby of vascular surgery 6/18/2019  4.  Methicillin sensitive staph septicemia felt to be associated with infected MediPort.  Port was removed and patient has completed a protracted course of antibiotics  5.  Generalized weakness and malnutrition  6.  Atrial fibrillation.  He is currently anticoagulated with Coumadin  7.  Hypothyroidism on levothyroxine replacement.    HISTORY OF PRESENT ILLNESS:  The patient is a 76 y.o. year old male who was previously treated with weekly carboplatin and Taxol along with radiation for his squamous cell carcinoma of the base of the tongue, HPV positive.      He was started on combined radiation and low-dose weekly carboplatin and Taxol chemotherapy but had tremendous problems with toxicity and tolerance to treatment.  He received his fifth week of chemotherapy and radiation on 7/31/2019 but subsequently required admission to the hospital with severe toxicity and development of methicillin sensitive staph septicemia.  His MediPort was felt to be infected and was removed.    After his recovery he opted not to finish out his remaining days of radiation and is being followed expectantly.     Surveillance CT scans performed 7/27/2021 as noted below showed a somewhat vague area of enhancement in the left lobe of the liver of uncertain significance.  Because of that abnormality we had the patient return today with a follow-up PET CT scan that had been performed on 10/29/2021.  Thankfully, the PET scan did not show any suspicious hypermetabolic areas in the liver and the previously noted areas felt to be a benign hemangioma.    Patient reports he is feeling well.  He still has a very dry mouth.  He has mild chronic anemia and was recently  started on iron tablets by his primary care physician.    He remains on Coumadin which is managed by his cardiology office.  He denies any visible bleeding.    History of Present Illness     Past Medical History:   Diagnosis Date   • Acquired hypothyroidism    • Acute renal injury (HCC)    • Anemia associated with chemotherapy    • Atrial fibrillation (HCC)    • CAD (coronary artery disease)    • CHF (congestive heart failure) (HCC)    • Chronic bronchitis (HCC)    • COPD (chronic obstructive pulmonary disease) (HCC)    • Cor pulmonale (chronic) (HCC)    • Daytime hypersomnia    • Depression with anxiety    • IRAHETA (dyspnea on exertion)    • Enlarged prostate    • Frequent nocturnal awakening    • Gout    • H/O cardiac radiofrequency ablation 2006    [facility].   • Head and neck cancer (HCC)    • Hypertension    • Hypotension    • Insomnia    • Lumbar radicular pain    • SHAR (obstructive sleep apnea)    • Osteoarthritis    • Permanent atrial fibrillation (HCC)    • Shock, septic (HCC)    • Snoring    • Squamous cell carcinoma of neck    • SVT (supraventricular tachycardia) (HCC)    • Syncope    • Vitamin D deficiency    • Weight loss         Past Surgical History:   Procedure Laterality Date   • APPENDECTOMY     • CARDIAC ABLATION  2006    [facility].   • CARDIAC CATHETERIZATION N/A 8/29/2018    Procedure: Left Heart Cath;  Surgeon: Yousif Uribe MD;  Location: [facility] CATH INVASIVE LOCATION;  Service: Cardiology   • CARDIAC CATHETERIZATION N/A 8/29/2018    Procedure: Coronary angiography;  Surgeon: Yousif Uribe MD;  [facility];  Service: Cardiology   • CARDIAC CATHETERIZATION N/A 8/29/2018    Procedure: Left ventriculography;  Surgeon: Yousif Uribe MD;  Location: [facility] CATH INVASIVE LOCATION;  Service: Cardiology   • FINGER SURGERY     • FOOT SURGERY     • HAND SURGERY     • VENOUS ACCESS DEVICE (PORT) INSERTION Right 6/18/2019    Procedure: MEDIPORT PLACEMENT;  Surgeon:  "Elvis Grigsby MD;  Location: [facility];  Service: Vascular   • VENOUS ACCESS DEVICE (PORT) REMOVAL Right 8/5/2019    Procedure: REMOVAL VENOUS ACCESS DEVICE;  Surgeon: Prince Castaneda MD;  Location: [facility];  Service: Vascular        ALLERGIES:    Allergies   Allergen Reactions   • Benadryl [Diphenhydramine Hcl] Dizziness     \"I sleep for about a day\"        Review of Systems   Constitutional: Positive for fatigue. Negative for activity change, chills and fever.   HENT: Negative for trouble swallowing and voice change.    Eyes: Negative for pain and visual disturbance.   Respiratory: Negative for cough, shortness of breath and wheezing.    Cardiovascular: Negative for chest pain, palpitations and leg swelling.   Gastrointestinal: Negative for abdominal pain, constipation, diarrhea and vomiting.   Genitourinary: Negative for difficulty urinating, frequency and urgency.   Musculoskeletal: Negative for arthralgias and joint swelling.   Skin: Negative for rash.   Neurological: Negative for seizures.   Hematological: Negative for adenopathy. Bruises/bleeds easily.   Psychiatric/Behavioral: Negative for behavioral problems and confusion. The patient is not nervous/anxious.         Objective     Vitals:    11/05/21 1235   BP: 116/54   Pulse: 68   Resp: 16   Temp: 96.6 °F (35.9 °C)   TempSrc: Temporal   SpO2: 95%   Weight: 73.1 kg (161 lb 3.2 oz)   Height: 177.8 cm (70\")   PainSc: 0-No pain     Current Status 8/3/2021   ECOG score 0       Physical Exam   Constitutional: He is oriented to person, place, and time. He appears well-developed. No distress.   HENT:   Head: Normocephalic.   Mouth/Throat: No oropharyngeal exudate.   Eyes: Pupils are equal, round, and reactive to light. Conjunctivae are normal. No scleral icterus.   Neck: No JVD present. No thyromegaly present.   Cardiovascular: Normal rate. An irregularly irregular rhythm present. Exam reveals no gallop and no friction rub.   No murmur " heard.  Pulmonary/Chest: Effort normal and breath sounds normal. He has no wheezes. He has no rales.   Abdominal: Soft. He exhibits no distension and no mass. There is no abdominal tenderness.   Musculoskeletal: Normal range of motion. No deformity.      Comments: trace ankle edema bilaterally   Lymphadenopathy:     He has no cervical adenopathy.   Neurological: He is alert and oriented to person, place, and time. He has normal reflexes. No cranial nerve deficit.   Skin: Skin is warm and dry. Bruising noted. No rash noted. No erythema.   Senile purpura on the forearms and dorsal surfaces of the hands.   Psychiatric: His behavior is normal. Judgment normal.     I have reexamined the patient and the results are consistent with the previously documented exam. Pablo Heaton MD         RECENT LABS:  Hematology WBC   Date Value Ref Range Status   10/29/2021 7.76 3.40 - 10.80 10*3/mm3 Final     RBC   Date Value Ref Range Status   10/29/2021 3.87 (L) 4.14 - 5.80 10*6/mm3 Final     Hemoglobin   Date Value Ref Range Status   10/29/2021 11.9 (L) 13.0 - 17.7 g/dL Final     Hematocrit   Date Value Ref Range Status   10/29/2021 35.5 (L) 37.5 - 51.0 % Final   10/29/2021 37.0 (L) 37.5 - 51.0 % Final     Platelets   Date Value Ref Range Status   10/29/2021 179 140 - 450 10*3/mm3 Final        Lab Results   Component Value Date    NEUTROABS 6.05 10/29/2021     Lab Results   Component Value Date    TSH 2.480 07/27/2021     Lab Results   Component Value Date    IRON 72 07/27/2021    TIBC 247 (L) 07/27/2021    FERRITIN 184.10 07/27/2021         Lab Results   Component Value Date    GLUCOSE 99 10/29/2021    BUN 32 (H) 10/29/2021    CREATININE 1.39 (H) 10/29/2021    EGFRIFNONA 50 (L) 10/29/2021    EGFRIFAFRI 107 04/23/2018    BCR 23.0 10/29/2021    K 4.7 10/29/2021    CO2 25.1 10/29/2021    CALCIUM 8.7 10/29/2021    PROTENTOTREF 7.2 04/23/2018    ALBUMIN 3.90 10/29/2021    LABIL2 1.3 04/23/2018    AST 24 10/29/2021    ALT 16 10/29/2021          F-18 FDG PET SKULL BASE TO MID THIGH WITH PET CT FUSION. 10/29/2021  IMPRESSION:  There is no suspicious hypermetabolic activity or evidence  for metastatic disease. The hyperenhancing focus at the left hepatic  lobe on the recent CTs likely represents either a hemangioma or vascular  malformation.    CT OF THE CHEST, ABDOMEN AND PELVIS WITH CONTRAST 07/27/2021  IMPRESSION:  1. Two stable tiny right middle lobe nodules unchanged from the previous  study of 01/12/2021.  2. Small focus of hyperenhancement in the left hepatic lobe is  nonspecific. No other liver lesions are seen. Consider repeat PET/CT  scan or short-term follow-up CT of the abdomen in 3 months to 4 months.  3. Nonobstructing left renal stone and bilateral renal cysts.      Assessment/Plan     1.  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  He was undergoing definitive treatment with radiation therapy and weekly low-dose carboplatin and Taxol chemotherapy.  As noted above, he was unable to tolerate the full treatment and received about 5 weeks of therapy with his last treatment the week of 7/31/2019.  Posttreatment PET scan from 1/14/2020 showed complete metabolic response.  2.  Comorbidities including ischemic heart disease with history of CHF and atrial fibrillation requiring Coumadin anticoagulation.   3.  Extremely poor tolerance to chemotherapy and radiation.  Treatment was stopped somewhat prematurely due to the patient's extremely poor tolerance.  4.  Warfarin anticoagulation.     5.  MediPort infection with methicillin sensitive staph septicemia.  This resulted in a lengthy hospitalization and stay in the critical care unit.  He now seems to be recovered and is completed his antibiotic therapy as an outpatient.  His port was removed.  6.  Hypothyroidism which developed following his radiation treatment.  He currently is on thyroid replacement with  Synthroid 125 mcg daily.   7.  Mild chronic anemia which is stable and likely multifactorial.  There is no evidence of iron deficiency on his recent labs from 7/27/2021  8.  Vague hyperenhancing lesion in the left lobe of the liver on CT scan from 7/27/2021.  As noted above, the PET scan from 10/29/2021 does not show any hypermetabolic lesions within the liver to suggest metastatic disease.    Plan  1.  We discussed the results of the PET scan and labs with the patient at length in the office today and provided him a printed copy of the reports.  2.  His cardiology office will continue to manage his Coumadin anticoagulation for now.  His most recent INR from yesterday was therapeutic at 2.6.  3.  He will follow-up with Dr. Slater for further ENT exams.  4.  His thyroid replacement is being managed by his primary care physician Dr. Patrick Diaz.  His TSH 7/27/2021 was normal on his current dose of 125 mcg daily  5.  MD follow-up in 6 months with CT scan of the chest abdomen and pelvis and labs performed 1 week prior to that visit.

## 2021-11-29 ENCOUNTER — OFFICE VISIT (OUTPATIENT)
Dept: INTERNAL MEDICINE | Facility: CLINIC | Age: 76
End: 2021-11-29

## 2021-11-29 VITALS
BODY MASS INDEX: 22.45 KG/M2 | WEIGHT: 156.8 LBS | DIASTOLIC BLOOD PRESSURE: 70 MMHG | SYSTOLIC BLOOD PRESSURE: 118 MMHG | OXYGEN SATURATION: 99 % | HEIGHT: 70 IN | HEART RATE: 55 BPM

## 2021-11-29 DIAGNOSIS — F41.8 MIXED ANXIETY DEPRESSIVE DISORDER: ICD-10-CM

## 2021-11-29 DIAGNOSIS — I10 ESSENTIAL HYPERTENSION: Primary | ICD-10-CM

## 2021-11-29 PROCEDURE — 99214 OFFICE O/P EST MOD 30 MIN: CPT | Performed by: FAMILY MEDICINE

## 2021-11-29 RX ORDER — LISINOPRIL 20 MG/1
20 TABLET ORAL DAILY
Qty: 90 TABLET | Refills: 1
Start: 2021-11-29

## 2021-11-29 RX ORDER — SERTRALINE HYDROCHLORIDE 100 MG/1
100 TABLET, FILM COATED ORAL DAILY
Qty: 90 TABLET | Refills: 3 | Status: SHIPPED | OUTPATIENT
Start: 2021-11-29 | End: 2023-01-27

## 2021-11-30 NOTE — PROGRESS NOTES
"Chief Complaint  follow up to depression/anxiety and follow up to  hypertension    Subjective          Cody Eldridge presents to [facility]  History of Present Illness   patient follows up for ongoing management of chronic problems anxiety depression hypertension he has intermittent use of Xanax as well as sertraline he has no unwanted side effects  his blood pressure is well controlled with no chest pain shortness of breath or increased fatigue,.  Some low diastolic blood pressure readings  Objective   Vital Signs:   /70 (BP Location: Right arm, Patient Position: Sitting, Cuff Size: Adult)   Pulse 55   Ht 177.8 cm (70\")   Wt 71.1 kg (156 lb 12.8 oz)   SpO2 99%   BMI 22.50 kg/m²     Physical Exam  Vitals reviewed.   Constitutional:       Appearance: Normal appearance.   Cardiovascular:      Rate and Rhythm: Normal rate.      Pulses: Normal pulses.   Neurological:      Mental Status: He is alert.   Psychiatric:         Mood and Affect: Mood normal.         Behavior: Behavior normal.         Thought Content: Thought content normal.         Judgment: Judgment normal.        Result Review :     Common labs    Common Labsle 5/28/21 7/27/21 7/27/21 7/27/21 10/29/21 10/29/21 10/29/21     1024 1024 1028 0905 0905 0905   Glucose   121 (A)   99    BUN   26 (A)   32 (A)    Creatinine   1.26 1.20  1.39 (A)    eGFR Non African Am   55 (A)   50 (A)    Sodium   135 (A)   138    Potassium   4.3   4.7    Chloride   103   105    Calcium   9.0   8.7    Albumin   3.80   3.90    Total Bilirubin   0.6   0.4    Alkaline Phosphatase   62   73    AST (SGOT)   18   24    ALT (SGPT)   10   16    WBC  4.75   7.76     Hemoglobin  11.9 (A)   11.9 (A)     Hematocrit  36.8 (A)   37.0 (A)  35.5 (A)   Platelets  141   179     Total Cholesterol 117         Triglycerides 83         HDL Cholesterol 46         LDL Cholesterol  55         (A) Abnormal value       Comments are available for some flowsheets but " are not being displayed.                     Assessment and Plan    Diagnoses and all orders for this visit:    1. Essential hypertension (Primary)  -     lisinopril (PRINIVIL,ZESTRIL) 20 MG tablet; Take 1 tablet by mouth Daily.  Dispense: 90 tablet; Refill: 1    2. Mixed anxiety depressive disorder    Other orders  -     sertraline (ZOLOFT) 100 MG tablet; Take 1 tablet by mouth Daily.  Dispense: 90 tablet; Refill: 3  -     chlorthalidone (HYGROTEN) 15 MG tablet; Take 1 tablet by mouth Daily.  Dispense: 90 tablet; Refill: 1    Reduced [place]gro[place] to 15 mg from 25 mg follow-up as needed otherwise    Follow Up   Return in about 3 months (around 2/28/2022), or if symptoms worsen or fail to improve, for Recheck.  Patient was given instructions and counseling regarding his condition or for health maintenance advice. Please see specific information pulled into the AVS if appropriate.

## 2021-12-01 NOTE — TELEPHONE ENCOUNTER
PT CALLED STATING HE FINISHED THE ZPACK YESTERDAY AND STATES IT HELPED THE INFLAMATION IN HIS BACK BUT MADE HIS STOMACH UPSET    PLEASE ADVISE     PT CALL BACK   [phone]   No. NAYLA screening performed.  STOP BANG Legend: 0-2 = LOW Risk; 3-4 = INTERMEDIATE Risk; 5-8 = HIGH Risk

## 2021-12-06 RX ORDER — ALPRAZOLAM 0.5 MG/1
TABLET ORAL
Qty: 45 TABLET | Refills: 1 | Status: SHIPPED | OUTPATIENT
Start: 2021-12-06 | End: 2022-02-01 | Stop reason: SDUPTHER

## 2021-12-06 NOTE — TELEPHONE ENCOUNTER
SENT TO WRONG OFFICE      CONTROL CONTRACT NOT UP TO DATE  NO DRUG SCREEN ON FILE      Rx Refill Note  Requested Prescriptions     Pending Prescriptions Disp Refills   • ALPRAZolam (XANAX) 0.5 MG tablet [Pharmacy Med Name: ALPRAZOLAM 0.5MG TABLETS] 45 tablet      Sig: TAKE 1 TABLET BY MOUTH EVERY MORNING AND 1/2 TABLET BY MOUTH EVERY EVENING      Last office visit with prescribing clinician: 11/29/2021      Next office visit with prescribing clinician: 5/31/2022            Viky Thomas MA  12/06/21, 09:08 EST

## 2021-12-07 ENCOUNTER — ANTICOAGULATION VISIT (OUTPATIENT)
Dept: PHARMACY | Facility: HOSPITAL | Age: 76
End: 2021-12-07

## 2021-12-07 DIAGNOSIS — I48.19 OTHER PERSISTENT ATRIAL FIBRILLATION (HCC): Primary | ICD-10-CM

## 2021-12-07 LAB
INR PPP: 1.4 (ref 0.91–1.09)
PROTHROMBIN TIME: 17.1 SECONDS (ref 10–13.8)

## 2021-12-07 PROCEDURE — 36416 COLLJ CAPILLARY BLOOD SPEC: CPT

## 2021-12-07 PROCEDURE — G0463 HOSPITAL OUTPT CLINIC VISIT: HCPCS

## 2021-12-07 PROCEDURE — 85610 PROTHROMBIN TIME: CPT

## 2021-12-07 NOTE — PROGRESS NOTES
Anticoagulation Clinic Progress Note    Anticoagulation Summary  As of 2021    INR goal:  2.0-3.0   TTR:  58.7 % (3 y)   INR used for dosin.4 (2021)   Warfarin maintenance plan:  4 mg every Mon; 2 mg all other days   Weekly warfarin total:  16 mg   Plan last modified:  Bernarda Lockwood SAMSON (10/13/2021)   Next INR check:  2021   Priority:  Maintenance   Target end date:  Indefinite    Indications    Other persistent atrial fibrillation (HCC) [I48.19]             Anticoagulation Episode Summary     INR check location:      Preferred lab:      Send INR reminders to:   [facility] [place]    Comments:        Anticoagulation Care Providers     Provider Role Specialty Phone number    Myriam Omer MD Referring Cardiology [phone]          Clinic Interview:  Patient Findings     Negatives:  Signs/symptoms of thrombosis, Signs/symptoms of bleeding,   Laboratory test error suspected, Change in health, Change in alcohol use,   Change in activity, Upcoming invasive procedure, Emergency department   visit, Upcoming dental procedure, Missed doses, Extra doses, Change in   medications, Change in diet/appetite, Hospital admission, Bruising, Other   complaints      Clinical Outcomes     Negatives:  Major bleeding event, Thromboembolic event,   Anticoagulation-related hospital admission, Anticoagulation-related ED   visit, Anticoagulation-related fatality        INR History:  Anticoagulation Monitoring 10/21/2021 2021 2021   INR 2.4 2.6 1.4   INR Date 10/21/2021 2021 2021   INR Goal 2.0-3.0 2.0-3.0 2.0-3.0   Trend Same Same Same   Last Week Total 16 mg 16 mg 16 mg   Next Week Total 16 mg 16 mg 20 mg   Sun 2 mg 2 mg 2 mg   Mon 4 mg 4 mg 4 mg   Tue 2 mg 2 mg 6 mg ()   Wed 2 mg 2 mg 2 mg   Thu 2 mg 2 mg 2 mg   Fri 2 mg 2 mg 2 mg   Sat 2 mg 2 mg 2 mg   Visit Report - - -   Some recent data might be hidden       Plan:  1. INR is Subtherapeutic today- see above in  Anticoagulation Summary.  Will instruct Cody Eldridge to Increase their warfarin regimen- see above in Anticoagulation Summary.  2. Follow up in 1 week  3. Patient declines warfarin refills.  4. Verbal and written information provided. Patient expresses understanding and has no further questions at this time.    Bernarda Lockwood, [facility]

## 2021-12-14 ENCOUNTER — ANTICOAGULATION VISIT (OUTPATIENT)
Dept: PHARMACY | Facility: HOSPITAL | Age: 76
End: 2021-12-14

## 2021-12-14 DIAGNOSIS — I48.19 OTHER PERSISTENT ATRIAL FIBRILLATION (HCC): Primary | ICD-10-CM

## 2021-12-14 LAB
INR PPP: 2.3 (ref 0.91–1.09)
PROTHROMBIN TIME: 28 SECONDS (ref 10–13.8)

## 2021-12-14 PROCEDURE — 85610 PROTHROMBIN TIME: CPT

## 2021-12-14 PROCEDURE — 36416 COLLJ CAPILLARY BLOOD SPEC: CPT

## 2021-12-14 NOTE — PROGRESS NOTES
Anticoagulation Clinic Progress Note    Anticoagulation Summary  As of 2021    INR goal:  2.0-3.0   TTR:  58.6 % (3.1 y)   INR used for dosin.3 (2021)   Warfarin maintenance plan:  4 mg every Mon; 2 mg all other days   Weekly warfarin total:  16 mg   No change documented:  Vria Altamirano   Plan last modified:  Bernarda Lockwood RPH (10/13/2021)   Next INR check:  2021   Priority:  Maintenance   Target end date:  Indefinite    Indications    Other persistent atrial fibrillation (HCC) [I48.19]             Anticoagulation Episode Summary     INR check location:      Preferred lab:      [facility]    Comments:        Anticoagulation Care Providers     Provider Role Specialty Phone number    Myriam Omer MD Referring Cardiology [phone]          Clinic Interview:      INR History:  Anticoagulation Monitoring 2021   INR 2.6 1.4 2.3   INR Date 2021   INR Goal 2.0-3.0 2.0-3.0 2.0-3.0   Trend Same Same Same   Last Week Total 16 mg 16 mg 20 mg   Next Week Total 16 mg 20 mg 16 mg   Sun 2 mg 2 mg 2 mg   Mon 4 mg 4 mg 4 mg   Tue 2 mg 6 mg () 2 mg   Wed 2 mg 2 mg 2 mg   Thu 2 mg 2 mg 2 mg   Fri 2 mg 2 mg 2 mg   Sat 2 mg 2 mg 2 mg   Visit Report - - -   Some recent data might be hidden       Plan:  1. INR is therapeutic today- see above in Anticoagulation Summary.   Will instruct Cody Eldridge to continue their warfarin regimen- see above in Anticoagulation Summary.  2. Follow up in 2 weeks.  3. Patient declines warfarin refills.  4. Verbal and written information provided. Patient expresses understanding and has no further questions at this time.    Vira Altamirano

## 2022-01-05 NOTE — TELEPHONE ENCOUNTER
Rx Refill Note  Requested Prescriptions     Pending Prescriptions Disp Refills   • warfarin (COUMADIN) 2 MG tablet 100 tablet 1     Sig: Take one tablet (2 mg) by mouth on Tues, Wed, Thurs, Friday, Saturday, Sunday. Take 2 tablets (4 mg) on Mondays or as directed by medication management clinic      Last office visit with prescribing clinician: 3/30/2021      Next office visit with prescribing clinician: 3/30/2022            Vanita Pittman MA  01/05/22, 15:34 EST

## 2022-01-06 RX ORDER — WARFARIN SODIUM 2 MG/1
TABLET ORAL
Qty: 105 TABLET | Refills: 1 | Status: SHIPPED | OUTPATIENT
Start: 2022-01-06 | End: 2022-07-15 | Stop reason: SDUPTHER

## 2022-01-08 DIAGNOSIS — I10 ESSENTIAL HYPERTENSION: ICD-10-CM

## 2022-01-10 RX ORDER — LISINOPRIL 40 MG/1
40 TABLET ORAL DAILY
Qty: 90 TABLET | Refills: 1 | OUTPATIENT
Start: 2022-01-10

## 2022-01-13 DIAGNOSIS — I10 ESSENTIAL HYPERTENSION: ICD-10-CM

## 2022-01-14 RX ORDER — LISINOPRIL 40 MG/1
40 TABLET ORAL DAILY
Qty: 90 TABLET | Refills: 1 | OUTPATIENT
Start: 2022-01-14

## 2022-01-18 ENCOUNTER — TELEPHONE (OUTPATIENT)
Dept: CARDIOLOGY | Facility: CLINIC | Age: 77
End: 2022-01-18

## 2022-01-18 NOTE — TELEPHONE ENCOUNTER
Clearance:    Pt is having epidural injection with Yahaira Dalton NP. It is not schedule    Can pt hold his warfarin 5 days prior dn d 24 hours after epidural?    Fax#: [phone]  Ph#: [phone]

## 2022-01-24 NOTE — TELEPHONE ENCOUNTER
Discussed with Dr. Omer.  6 days is a long time to hold the warfarin for this patient who has persistent atrial fibrillation and [id number] score of 4.    MD recommending that we see if warfarin can be held only 3 days prior and okay to hold 24 hours after, or if needs to be held 5 days prior please see if they are okay with a Lovenox bridge which we can ask INR clinic to help arrange with last dose being in the morning only on the day prior to procedure to allow 24-hour anticoagulation free period before procedure.

## 2022-01-31 RX ORDER — CARVEDILOL 25 MG/1
25 TABLET ORAL 2 TIMES DAILY WITH MEALS
Qty: 180 TABLET | Refills: 3 | Status: SHIPPED | OUTPATIENT
Start: 2022-01-31 | End: 2022-03-30 | Stop reason: SDUPTHER

## 2022-02-01 NOTE — TELEPHONE ENCOUNTER
Caller: Cody Eldridge    Relationship: Self    Best call back number: [phone]    Requested Prescriptions:   Requested Prescriptions     Pending Prescriptions Disp Refills   • ALPRAZolam (XANAX) 0.5 MG tablet 45 tablet 1     Sig: Take  by mouth See Admin Instructions. Take 1 tablet by mouth every morning and then take 0.5 tablet by mouth every evening        Pharmacy where request should be sent: [id number] [address] AT [place] [phone] [place] [phone]      Additional details provided by patient: OUT OF MEDICATION     Does the patient have less than a 3 day supply:  [x] Yes  [] No    Medina Williamson, Ghassan Rep   02/01/22 11:16 EST

## 2022-02-02 RX ORDER — ALPRAZOLAM 0.5 MG/1
0.5 TABLET ORAL SEE ADMIN INSTRUCTIONS
Qty: 45 TABLET | Refills: 0 | Status: SHIPPED | OUTPATIENT
Start: 2022-02-02 | End: 2022-03-10 | Stop reason: SDUPTHER

## 2022-02-10 ENCOUNTER — ANTICOAGULATION VISIT (OUTPATIENT)
Dept: PHARMACY | Facility: HOSPITAL | Age: 77
End: 2022-02-10

## 2022-02-10 DIAGNOSIS — I48.19 OTHER PERSISTENT ATRIAL FIBRILLATION: Primary | ICD-10-CM

## 2022-02-10 LAB
INR PPP: 2.2 (ref 0.91–1.09)
PROTHROMBIN TIME: 26.2 SECONDS (ref 10–13.8)

## 2022-02-10 PROCEDURE — 36416 COLLJ CAPILLARY BLOOD SPEC: CPT

## 2022-02-10 PROCEDURE — 85610 PROTHROMBIN TIME: CPT

## 2022-02-10 NOTE — PROGRESS NOTES
Anticoagulation Clinic Progress Note    Anticoagulation Summary  As of 2/10/2022    INR goal:  2.0-3.0   TTR:  60.6 % (3.2 y)   INR used for dosin.2 (2/10/2022)   Warfarin maintenance plan:  4 mg every Mon; 2 mg all other days   Weekly warfarin total:  16 mg   No change documented:  Mere Vincent RPH   Plan last modified:  Bernarda Lockwood RPH (10/13/2021)   Next INR check:  2022   Priority:  Maintenance   Target end date:  Indefinite    Indications    Other persistent atrial fibrillation (HCC) [I48.19]             Anticoagulation Episode Summary     INR check location:      Preferred lab:      Send INR reminders to:  [facility] [place]    Comments:        Anticoagulation Care Providers     Provider Role Specialty Phone number    Myriam Omer MD Referring Cardiology [phone]          Clinic Interview:      INR History:  Anticoagulation Monitoring 2021 2021 2/10/2022   INR 1.4 2.3 2.2   INR Date 2021 2021 2/10/2022   INR Goal 2.0-3.0 2.0-3.0 2.0-3.0   Trend Same Same Same   Last Week Total 16 mg 20 mg 16 mg   Next Week Total 20 mg 16 mg 16 mg   Sun 2 mg 2 mg 2 mg   Mon 4 mg 4 mg 4 mg   Tue 6 mg () 2 mg 2 mg   Wed 2 mg 2 mg 2 mg   Thu 2 mg 2 mg 2 mg   Fri 2 mg 2 mg 2 mg   Sat 2 mg 2 mg 2 mg   Visit Report - - -   Some recent data might be hidden       Plan:  1. INR is Therapeutic today- see above in Anticoagulation Summary.  Will instruct Cody Eldridge to Continue their warfarin regimen- see above in Anticoagulation Summary.  2. Follow up in 2 weeks  3. Patient declines warfarin refills.  4. Verbal and written information provided. Patient expresses understanding and has no further questions at this time.    Mere Vincent RP

## 2022-02-28 NOTE — PROGRESS NOTES
REASON FOR CONSULTATION:  Neutropenic fever, port site cellulitis, prolonged protime no clinical bleeding, severe grade iii mucositis, atrial fibrilation rvr  Provide an opinion on any further workup or treatment                             INTERVAL HISTORY:  On 8/5/2019 in the early morning,Patient was moved to intensive care unit because of hypotension, atrial fibrillation with RVR and currently on pressor and amiodarone.     In the early afternoon of 8/5/2019, patient had portacatheter removed.     On 8/6/2019, nurse and family member reports patient is restless, patient himself is coherent and oriented.  Nurse reports patient had no urine output, bladder scan shows residual urine 150 mL.  It was 100 mL earlier this morning.  In the meantime patient has worsening renal function with creatinine 1.83 up from 1.17 the previous day.     The tip of portacatheter grew out Staphylococcus aureus within 24 hours.  Sensitivity study is not available.     On 8/7/2019, patient has further decreased platelets 41,000, and his WBC and neutrophils both actually slightly decreased at 1960 including ANC 1630.  His renal function is further worsening at creatinine 2.79.  Patient has been seen by nephrologist.  Patient is off pressor, and seems in sinus rhythm.     On 8/8/2019, patient had a further deteriorating platelets 29,000 without active bleeding.  Patient has resolution of neutropenia with ANC 5800 out of WBC 8000.  Slightly trending down hemoglobin 11.5.  Renal function no improvement creatinine 2.98.  Continues to have soreness in mouth, on tube feeding.  Patient was not able to tolerate Meghann's Magic portion for oral mucositis.     On 8/9/2019, relates further decreased at 21,000.  Hb stable 11.6, WBC further improved at 14,700 including ANC 13,000.  Patient is afebrile.  Was able to participate in rehabilitation.      HISTORY OF PRESENT ILLNESS:  The patient is a 74 y.o. year old male who is here for an opinion about the  The patient is Stable - Low risk of patient condition declining or worsening    Shift Goals  Clinical Goals: safety  Patient Goals: Gain strength and endurance      Problem: Diabetes Management  Goal: Patient's ability to maintain appropriate glucose levels will be maintained or improve  Outcome: Progressing: Pt's fsbs was 97 at bedtime, no insulin coverage needed. Pt was given his snack after his blood sugar check.      Problem: Pain - Standard  Goal: Alleviation of pain or a reduction in pain to the patient’s comfort goal  Outcome: Progressing: Pt declined any pain at bedtime but was informed to ring the call light anytime during the night if he needed any pain medication. Pt stated he understood.          above issue.     History of Present Illness I have been asked to see this patient in consultation today who is a 74-year-old white male who has been treated BY DR RONALDO WILLIAMSON FOR HEAD AND NECK CANCER WITH CHEMO AND RADIATION THERAPY In any event the patient has received radiation therapy to his NECK, he has been receiving IV fluids in the office in the last few days because of profound weakness and he has now developed atrial fibrillation with rapid ventricular response from a congestive heart failure, elevation of the proBNP in the 8000 category and further more significant sensitivity and redness in the skin of the anterior chest wall on the right side where the port is located that makes me to think that he has at least a cellulitis in this anatomical site. The patient has a severe degree of mucositis associated with radiation therapy. He is not able to swallow. Also the patient has developed diarrhea with no abdominal pain and in a stool culture enteropathogenic E-coli has been recovered. Clostridium was negative. The patient has lost substantial amount of weight. He has not been able to eat anything for the last 3-4 weeks. He has had swelling in his lower extremities. He has had cough and he has had shortness of breath. He has had fevers and chills as stated. He just feels terrible.            Medical History        Past Medical History:   Diagnosis Date   • Atrial fibrillation (CMS/HCC)     • CHF (congestive heart failure) (CMS/HCC)     • Chronic bronchitis (CMS/HCC)     • COPD (chronic obstructive pulmonary disease) (CMS/HCC)     • Cor pulmonale (chronic) (CMS/HCC)     • Daytime hypersomnia     • Depression with anxiety     • Elevated cholesterol     • Enlarged prostate     • Frequent nocturnal awakening     • Gout     • H/O cardiac radiofrequency ablation 2006     [facility].   • Head and neck cancer (CMS/HCC)     • Hyperlipidemia     • Hypertension     • Hypotension     • Insomnia     • Lumbar radicular  pain     • SHAR (obstructive sleep apnea)     • Osteoarthritis     • Permanent atrial fibrillation (CMS/HCC)     • Snoring     • Squamous cell carcinoma of neck     • SVT (supraventricular tachycardia) (CMS/HCC)     • Syncope     • Vitamin D deficiency     • Weight loss              Surgical History         Past Surgical History:   Procedure Laterality Date   • APPENDECTOMY       • CARDIAC ABLATION   2006     [facility].   • CARDIAC CATHETERIZATION N/A 8/29/2018     Procedure: Left Heart Cath;  Surgeon: Yousif Uribe MD;  [facility];  Service: Cardiology   • CARDIAC CATHETERIZATION N/A 8/29/2018     Procedure: Coronary angiography;  Surgeon: Yousif Uribe MD;  Location:  [facility] CATH INVASIVE LOCATION;  Service: Cardiology   • CARDIAC CATHETERIZATION N/A 8/29/2018     Procedure: Left ventriculography;  Surgeon: Yousif Uribe MD;  [facility];  Service: Cardiology   • FINGER SURGERY       • FOOT SURGERY       • HAND SURGERY       • VENOUS ACCESS DEVICE (PORT) INSERTION Right 6/18/2019     Procedure: MEDIPORT PLACEMENT;  Surgeon: Elvis Grigsby MD;  Location: [facility] OR;  Service: Vascular           ONCOLOGIC HISTORY DR RONALDO WILLIAMSON MD:     1.  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  He is undergoing definitive treatment with radiation therapy and weekly low-dose carboplatin and Taxol chemotherapy.  He was able to complete 4 weekly chemotherapy treatments in a row but required holding chemotherapy for the last 2 weeks due to myelosuppression.   he appears to be in rapid atrial fibrillation.  2.  Comorbidities including ischemic heart disease with history of CHF and atrial fibrillation requiring Coumadin anticoagulation.    3.  Oral mucositis.  He has a prescription for hydrocodone for pain control.  He does have Meghann's Magic mouth rinse to use as  "well.  4.  Warfarin anticoagulation.  Patient's INR 3.83, he will hold his coumadin today and tomorrow and have another INR checked Wednesday 7/17/20195.    5.  Weight loss and poor nutrition.  The patient's weight has stabilized and he is utilizing boost at home and trying to hydrate with water and Gatorade.  He does understand if his nutrition is not improving we will need to proceed with a G-tube for nutrition.  Ruthy Bautista, oncology dietitian been involved in his care also.  6.  Atrial fibrillation with rapid ventricular response     MEDICATIONS: see EMR.     ALLERGIES:          Allergies   Allergen Reactions   • Benadryl [Diphenhydramine Hcl] Other (See Comments) and Dizziness       \"I sleep for about a day\"       Social History no changes.      Family History;  No changes.        Review of Systems   Constitutional: Positive for activity change, appetite change, chills, diaphoresis, fatigue, fever and unexpected weight change.   HENT: Positive for mouth sores, sore throat, trouble swallowing and voice change.    Eyes: Negative.    Respiratory: Positive for cough and shortness of breath.    Cardiovascular: Positive for palpitations.   Gastrointestinal: Positive for abdominal distention and diarrhea.   Endocrine: Negative.    Genitourinary: Negative.    Musculoskeletal: Negative.    Skin: Positive for pallor.   Neurological: Negative.    Hematological: Negative.    Psychiatric/Behavioral: Negative.               Objective      Vitals:    08/10/19 0219 08/10/19 0524 08/10/19 0716 08/10/19 1243   BP: 122/68  144/76 128/82   BP Location:   Left arm Left arm   Patient Position:   Lying Lying   Pulse: 95  95 70   Resp:   18 18   Temp:    96.8 °F (36 °C)   TempSrc:   Oral Oral   SpO2:   92% 94%   Weight:  80 kg (176 lb 6.4 oz)     Height:          Physical Exam   Constitutional: He is oriented to person, place, and time. He has resolution of restless, lying in the bed comfortable, carries conversation well.   HENT: "   Head: Normocephalic.   Nose: Nose normal.  NG tube in place.  Mouth/Throat: Grade III mucositis.  There is oral thrush discovered on 8/8/2019.  Eyes: Conjunctivae and EOM are normal. No scleral icterus.   Neck: Normal range of motion. No tracheal deviation present. No thyromegaly present.   Erythema neck area of radiation   Cardiovascular:  Tachycardia, irregular irregular rhythm.   Pulmonary/Chest: Effort normal and breath sounds normal.   Abdominal: Soft. He exhibits no distension and no mass. There is no tenderness. There is no rebound and no guarding.   Musculoskeletal: None he exhibits edema.   Lymphadenopathy:     He has no cervical adenopathy.   Neurological: He is alert and oriented to person, place, and time.   Skin: Portacatheter has been removed.   Psychiatric: He has a normal mood and affect.  Patient seems restless not in acute distress. Judgment and thought content normal.          RECENT LABS:    Results from last 7 days   Lab Units 08/10/19  0517 08/09/19  0338 08/08/19  0430 08/07/19  0444   WBC 10*3/mm3 14.98* 14.73* 8.05 1.96*   HEMOGLOBIN g/dL 10.7* 11.6* 11.5* 12.6*   HEMATOCRIT % 33.3* 36.5* 36.1* 38.5   PLATELETS 10*3/mm3 23*  23* 21* 29*  29* 41*   MONOCYTES % %  --  5.0 25.0* 14.0*     Lab Results   Component Value Date    NEUTROABS 13.40 (H) 08/09/2019     IPF 14.8%   On 8/10/2019    Results from last 7 days   Lab Units 08/10/19  0517 08/09/19  0338 08/08/19  1906 08/08/19  0430  08/06/19  0511   SODIUM mmol/L 143 143 145 140   < > 151*   POTASSIUM mmol/L 3.4* 3.4* 4.2 3.8   < > 3.3*   CHLORIDE mmol/L 108* 108* 110* 106   < > 115*   CO2 mmol/L 26.9 21.8* 25.8 24.7   < > 22.7   BUN mg/dL 77* 71* 68* 64*   < > 33*   CREATININE mg/dL 2.80* 2.85* 2.91* 2.98*   < > 1.83*   CALCIUM mg/dL 7.3* 7.2* 8.0* 7.6*   < > 5.6*   BILIRUBIN mg/dL  --  0.7  --  0.7  --  2.5*   ALK PHOS U/L  --  99  --  75  --  58   ALT (SGPT) U/L  --  <5  --  <5  --  19   AST (SGOT) U/L  --  27  --  21  --  22   GLUCOSE  mg/dL 125* 125* 120* 149*   < > 133*    < > = values in this interval not displayed.     Results from last 7 days   Lab Units 08/09/19  0338 08/08/19  0430 08/07/19  0444   INR  1.12* 1.19* 1.33*      on 8/8/2019.   on 8/10/2019, magnesium 1.9.    Contains abnormal data Catheter Culture - Cath Tip, Chest, Right   Order: [id number]   Collected:  8/5/2019 14:47 Status:  Preliminary result   Visible to patient:  No (Not Released)   Specimen Information: Chest, Right; Cath Tip        CATHETER CULTURE >15 CFU/mL - Indicative of infection. Staphylococcus aureus Abnormal           Resulting Agency: [facility]         Specimen Collected: 08/05/19 14:47 Last Resulted: 08/06/19 09:40        Order Details       View Encounter       Lab and Collection Details                    Assessment/Plan   1.   In summary this patient is a 74-year-old who has been treated FOR HAND NECK CA He has received radiation therapy to the neck and he has been undergoing chemotherapy .  The patient has been admitted with rapid ventricular response, atrial fibrillation, fever, tremendous tenderness and erythema at the port site in anterior chest wall on the right side and associated with this diarrhea with a stool culture consistent with E-coli enteropathogenic. Also the patient has abnormal blood cultures.     Due to poor performance status, chemoradiotherapy will be on hold.    · On 8/7/2019, I discussed with the patient and his wife, will reevaluate in the future to see whether he needs more systematic chemotherapy after he recovered from sepsis and the improvement of performance status.    2. The patient has severe neutropenia secondary to chemotherapy.     · Continue currently on Neupogen daily.   · Improved neutrophil counts 2030 on 8/6/2019.   · Slightly worsening neutrophil at 1630 on 8/7/2019.  We will continue Neupogen and monitor.   · Significantly improved and normalized neutrophils 500 on 8/82019.  Neupogen will be  discontinued.  · On 8/9/2019, patient become leukocytosis WBC 14,700 and ANC 13,400 which is expected secondary to Neupogen.     3.  Sepsis with positive MSSA septicemia.  Patient has been followed by Dr. Cruz, was on nafcillin.  · PowerPort was removed earlier this afternoon on 8/5/2019.  · Catheter tip culture was positive for Staphylococcus aureus on 8/6/2019.   · Antibiotics being switched to cefazolin 8/6/2019.    · Patient is improving, off pressor on 8/7/2019.       4.  Acute renal injury, with creatinine 1.83 today, up from 1.17 yesterday.  Patient does not have urination.  Residual urine in the bladder is only 150 mL this afternoon on 8/6/2019.   · Patient has been seen by nephrology service.   · Creatinine 2.98 on 8/8/2019.  · Improved renal function creatinine 2.85 on 8/9/2019    5.  Thrombocytopenia secondary to chemotherapy.    · Worsening platelets at 79,000 on 8/6/2019.  No bleeding.    · Further deteriorating platelets 41,000 on 8/7/2019.  His thrombocytopenia possibly also contributed by his infection and now antibiotic.    · On 8/8/2019, platelets trending down and 29,000, with IPF 10.1%.  Patient has no active bleeding.    · On 8/9/2019, platelets decreased to 21,000.  No bleeding.  Continue to monitor.    · On 8/10/2019, platelets 23,000, IPF 14.8%, elevated.  Suspect the patient may have elements of ITP.  I recommended to start steroids.      6.  Anemia.  Hb 12.7, newly developed on 8/6/2019.   · Stable Hb 12.6 on 8/7/2019.   · Hemoglobin drops at 11.5 on 8/8/2019.    · Stable hemoglobin 11.6 on 8/09/2019.    · On 8/10/2019, hemoglobin trending down to 10.7.  LDH further increased at 580.  Suspect the patient may have elements of hemolysis.  We will start steroids also.  See the above section for thrombocytopenia.      7.  Oral mucositis grade 3, secondary to chemoradiation therapy.  Routine therapy will be on hold for now per Dr. Davila.   · Nurse reports 8/6/2019 lidocaine viscous is not  really helping.  We will try Magic mouthwash.   · Nurse reports patient was able to tolerate Magic mouthwash and lidocaine viscous today on 8/9/2019.     8.  Oral thrush discovered on 8/8/2019.  We started patient on Diflucan 200 mg daily for 3 days per feeding tube.    9.  Coagulopathy due to vitamin K deficiency.  This is likely due to poor oral intake.  Patient currently is on tube feeding.  · INR has further improved after starting tube feeding, currently 1.19 today on 8/8/2019.   · No need for daily INR check anymore.    10.  Atrial fibrillation with RVR.  Cardiology to follow.  Patient was on digoxin.   · Patient continues atrial fibrillation with heart rate in the 130s.  Off digoxin due to elevated level.  Per cardiology, will start the patient on metoprolol..    11.  Continue proton pump inhibitor lansoprazole.      RECOMMENDATIONS:  1.  Continue IV cefazolin, per ID service.  2.  Finished Diflucan 200 mg daily today for 3 days (3/3) for oral thrush.   3.  Start prednisone 60 mg daily per feeding tube.  4.   Continue tube feeding.    5.  Monitor CBC in the morning.  No need for WBC differentiation.  We will check IPF and LDH.     I discussed with patient and his wife.  I also spoke with his nurse today for patient care.    JONATHAN JAVIER M.D., Ph.D.    8/10/2019

## 2022-03-10 ENCOUNTER — TELEPHONE (OUTPATIENT)
Dept: INTERNAL MEDICINE | Facility: CLINIC | Age: 77
End: 2022-03-10

## 2022-03-10 RX ORDER — ALPRAZOLAM 0.5 MG/1
TABLET ORAL
Qty: 45 TABLET | Refills: 2 | Status: SHIPPED | OUTPATIENT
Start: 2022-03-10 | End: 2022-06-22 | Stop reason: SDUPTHER

## 2022-03-14 RX ORDER — MONTELUKAST SODIUM 10 MG/1
TABLET ORAL
Qty: 90 TABLET | Refills: 1 | Status: SHIPPED | OUTPATIENT
Start: 2022-03-14 | End: 2022-09-14

## 2022-03-14 NOTE — TELEPHONE ENCOUNTER
SENT TO WRONG OFFICE    Rx Refill Note  Requested Prescriptions     Pending Prescriptions Disp Refills   • montelukast (SINGULAIR) 10 MG tablet [Pharmacy Med Name: MONTELUKAST 10MG TABLETS] 90 tablet 1     Sig: TAKE 1 TABLET BY MOUTH DAILY      Last office visit with prescribing clinician: 11/29/2021      Next office visit with prescribing clinician: 5/31/2022            Viky Thomas MA  03/14/22, 10:18 EDT

## 2022-03-25 RX ORDER — LEVOTHYROXINE SODIUM 0.12 MG/1
125 TABLET ORAL DAILY
Qty: 30 TABLET | Refills: 3 | Status: SHIPPED | OUTPATIENT
Start: 2022-03-25 | End: 2022-08-31

## 2022-03-30 ENCOUNTER — OFFICE VISIT (OUTPATIENT)
Dept: CARDIOLOGY | Facility: CLINIC | Age: 77
End: 2022-03-30

## 2022-03-30 VITALS
BODY MASS INDEX: 23.19 KG/M2 | HEIGHT: 70 IN | DIASTOLIC BLOOD PRESSURE: 82 MMHG | HEART RATE: 102 BPM | WEIGHT: 162 LBS | SYSTOLIC BLOOD PRESSURE: 148 MMHG

## 2022-03-30 DIAGNOSIS — I48.19 OTHER PERSISTENT ATRIAL FIBRILLATION: ICD-10-CM

## 2022-03-30 DIAGNOSIS — I25.10 CORONARY ARTERY DISEASE INVOLVING NATIVE CORONARY ARTERY OF NATIVE HEART WITHOUT ANGINA PECTORIS: Primary | ICD-10-CM

## 2022-03-30 DIAGNOSIS — I50.32 CHRONIC DIASTOLIC CONGESTIVE HEART FAILURE: ICD-10-CM

## 2022-03-30 DIAGNOSIS — E78.2 MIXED HYPERLIPIDEMIA: ICD-10-CM

## 2022-03-30 DIAGNOSIS — I10 ESSENTIAL HYPERTENSION: ICD-10-CM

## 2022-03-30 PROBLEM — I50.21 ACUTE SYSTOLIC CHF (CONGESTIVE HEART FAILURE): Status: RESOLVED | Noted: 2019-09-23 | Resolved: 2022-03-30

## 2022-03-30 PROBLEM — I50.9 NEW ONSET OF CONGESTIVE HEART FAILURE (HCC): Status: RESOLVED | Noted: 2019-08-03 | Resolved: 2022-03-30

## 2022-03-30 PROBLEM — Z86.79 HISTORY OF CHF (CONGESTIVE HEART FAILURE): Status: RESOLVED | Noted: 2019-06-28 | Resolved: 2022-03-30

## 2022-03-30 PROBLEM — I50.20 SYSTOLIC HEART FAILURE: Status: RESOLVED | Noted: 2019-09-23 | Resolved: 2022-03-30

## 2022-03-30 PROBLEM — I48.0 PAF (PAROXYSMAL ATRIAL FIBRILLATION): Status: RESOLVED | Noted: 2019-08-03 | Resolved: 2022-03-30

## 2022-03-30 PROCEDURE — 99214 OFFICE O/P EST MOD 30 MIN: CPT | Performed by: INTERNAL MEDICINE

## 2022-03-30 PROCEDURE — 93000 ELECTROCARDIOGRAM COMPLETE: CPT | Performed by: INTERNAL MEDICINE

## 2022-03-30 RX ORDER — CARVEDILOL 25 MG/1
25 TABLET ORAL 2 TIMES DAILY WITH MEALS
Qty: 180 TABLET | Refills: 3 | Status: SHIPPED | OUTPATIENT
Start: 2022-03-30 | End: 2022-08-22 | Stop reason: SDUPTHER

## 2022-03-30 NOTE — PROGRESS NOTES
CARDIOLOGY    Myriam Omer MD    ENCOUNTER DATE:  03/30/2022    Cody Eldridge / 76 y.o. / male        CHIEF COMPLAINT / REASON FOR OFFICE VISIT     Atrial Fibrillation      HISTORY OF PRESENT ILLNESS       HPI    Cody Eldridge is a 76 y.o. male     This is a nice gentleman who saw Dr. Travis in the remote past. He is a difficult historian. Per Dr. Travis's note from 2009 he had SVT with ablation in 2006 at the [facility]. He also has COPD, hypertension and hyperlipidemia. To me, he denies coronary disease but his old records show that he has had a non-ST elevation myocardial infarction in the past. He had an echo in 10/2009, which showed normal left ventricular systolic function with an ejection fraction of 64%. There was mild right and left atrial enlargement and mild mitral and tricuspid regurgitation with a right ventricular systolic pressure of 39 mmHg.      In the spring of 2018, he was complaining of lots of shortness of breath.  He eventually had a heart catheterization in August 2018 which showed mild nonobstructive coronary disease with a left ventricular end-diastolic pressure of 10.  He has had intermittent blood pressure issues.     Unfortunately, he was diagnosed with throat cancer.  I saw him in the office in July 2019 and he was having a hard time staying hydrated.  He was hypotensive.  He was seen by Dr. Colin in the [facility] for poor fluid intake and weight loss.  She gave him some IV fluids and IV metoprolol and we tried oral amiodarone.  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  He had MSSA sepsis from an infected chemotherapy port.      His blood pressure was running a little bit high and I added chlorthalidone to his medication regimen in March 2021.      REVIEW OF SYSTEMS     Review of Systems   Constitutional: Negative for chills,  "fever, weight gain and weight loss.   Cardiovascular: Negative for leg swelling.   Respiratory: Negative for cough, snoring and wheezing.    Hematologic/Lymphatic: Negative for bleeding problem. Bruises/bleeds easily.   Skin: Negative for color change.   Musculoskeletal: Positive for joint pain and myalgias. Negative for falls.   Gastrointestinal: Negative for melena.   Genitourinary: Negative for hematuria.   Neurological: Negative for excessive daytime sleepiness.   Psychiatric/Behavioral: Positive for depression. The patient is nervous/anxious.          VITAL SIGNS     Visit Vitals  /82   Pulse 102   Ht 177.8 cm (70\")   Wt 73.5 kg (162 lb)   BMI 23.24 kg/m²         Wt Readings from Last 3 Encounters:   03/30/22 73.5 kg (162 lb)   11/29/21 71.1 kg (156 lb 12.8 oz)   11/05/21 73.1 kg (161 lb 3.2 oz)     Body mass index is 23.24 kg/m².      PHYSICAL EXAMINATION     Constitutional:       General: Not in acute distress.  Neck:      Vascular: No carotid bruit or JVD.   Pulmonary:      Effort: Pulmonary effort is normal.      Breath sounds: Normal breath sounds.   Cardiovascular:      Normal rate. Irregularly irregular rhythm.      Murmurs: There is no murmur.   Psychiatric:         Mood and Affect: Mood and affect normal.           REVIEWED DATA       ECG 12 Lead    Date/Time: 3/30/2022 11:21 AM  Performed by: Myriam Omer MD  Authorized by: Myriam Omer MD   Comparison: compared with previous ECG from 9/30/2021  Similar to previous ECG  Rhythm: atrial fibrillation  BPM: 102  Conduction: conduction normal  ST Segments: ST segments normal  T Waves: T waves normal    Clinical impression: abnormal EKG              Lipid Panel    Lipid Panel 5/28/21   Total Cholesterol 117   Triglycerides 83   HDL Cholesterol 46   VLDL Cholesterol 16   LDL Cholesterol  55   LDL/HDL Ratio 1.18             Lab Results   Component Value Date    GLUCOSE 99 10/29/2021    BUN 32 (H) 10/29/2021    CREATININE 1.39 (H) 10/29/2021    " EGFRIFNONA 50 (L) 10/29/2021    EGFRIFAFRI 107 04/23/2018    BCR 23.0 10/29/2021    K 4.7 10/29/2021    CO2 25.1 10/29/2021    CALCIUM 8.7 10/29/2021    PROTENTOTREF 7.2 04/23/2018    ALBUMIN 3.90 10/29/2021    LABIL2 1.3 04/23/2018    AST 24 10/29/2021    ALT 16 10/29/2021       ASSESSMENT & PLAN      Diagnosis Plan   1. Coronary artery disease involving native coronary artery of native heart without angina pectoris     2. Mixed hyperlipidemia     3. Other persistent atrial fibrillation (HCC)     4. Essential hypertension     5. Chronic diastolic congestive heart failure (HCC)         1.  Nonobstructive coronary artery disease.  Continue with risk factor management.  No anginal symptoms.  2.  Persistent atrial fibrillation.  He is anticoagulated with warfarin.  He has a chads 2 vascular score of 3.  His INR has been well controlled.  He follows in the anticoagulation clinic.  Overall his rate is controlled.  Continue same.  3.  Lung disease/COPD with right heart failure.  This is followed by Dr. Segovia.  4.  Hypertension.  His blood pressure looks good on his current regimen.   5.  Squamous cell cancer of the throat.  Currently completed all his treatment.  He has a follow-up with Dr. Heaton tomorrow.  6.  Chronic diastolic heart failure.  Last echo was March 2020 with ejection fraction 69% and normal global longitudinal strain rate of -20.9.  Grade 2 diastolic dysfunction.  7.  Hyperlipidemia.  I reviewed his lipid panel as above.  LDL is at goal.  Continue rosuvastatin.    Follow-up in 1 year unless he is having problems sooner.    Orders Placed This Encounter   Procedures   • ECG 12 Lead     This order was created via procedure documentation     Order Specific Question:   Release to patient     Answer:   Immediate           MEDICATIONS         Discharge Medications          Accurate as of March 30, 2022 11:22 AM. If you have any questions, ask your nurse or doctor.            Continue These Medications       Instructions Start Date   albuterol sulfate  (90 Base) MCG/ACT inhaler  Commonly known as: PROVENTIL HFA;VENTOLIN HFA;PROAIR HFA   2 puffs, Inhalation, ProAir  (90 Base) MCG/ACT Inhalation Aerosol Solution; Patient Sig: ProAir  (90 Base) MCG/ACT Inhalation Aerosol Solution inhale 2 puffs by mouth every 4 hours if needed; 8.5; 11; 07-Apr-2014; Act      ALPRAZolam 0.5 MG tablet  Commonly known as: XANAX   Take 1 tablet by mouth every morning and then take 0.5 tablet by mouth every evening      aspirin 81 MG chewable tablet   81 mg, Oral, Daily      baclofen 10 MG tablet  Commonly known as: LIORESAL   take 1 tablet by mouth three times a day      carvedilol 25 MG tablet  Commonly known as: COREG   25 mg, Oral, 2 Times Daily With Meals      chlorthalidone 15 MG tablet  Commonly known as: HYGROTEN   15 mg, Oral, Daily      Crestor 20 MG tablet  Generic drug: rosuvastatin   20 mg, Oral, Daily      diphenoxylate-atropine 2.5-0.025 MG per tablet  Commonly known as: LOMOTIL   1 tablet, Oral, 4 Times Daily PRN      ferrous gluconate 324 MG tablet  Commonly known as: FERGON   324 mg, Oral, Daily With Breakfast      levothyroxine 125 MCG tablet  Commonly known as: SYNTHROID, LEVOTHROID   125 mcg, Oral, Daily      lisinopril 20 MG tablet  Commonly known as: PRINIVIL,ZESTRIL   20 mg, Oral, Daily      montelukast 10 MG tablet  Commonly known as: SINGULAIR   TAKE 1 TABLET BY MOUTH DAILY      oxyCODONE-acetaminophen 7.5-325 MG per tablet  Commonly known as: PERCOCET   TK 1 T PO Q 6 H UTD      sertraline 100 MG tablet  Commonly known as: ZOLOFT   100 mg, Oral, Daily      warfarin 2 MG tablet  Commonly known as: COUMADIN   TAKE TWO TABLETS BY MOUTH ON MON AND TAKE ONE TABLET BY MOUTH ALL OTHER DAYS OR AS DIRECTED         Stop These Medications    nystatin 100,000 unit/mL suspension  Commonly known as: MYCOSTATIN  Stopped by: MD Myriam Mandujano MD  03/30/22  11:22 EDT    **Bessy  Disclaimer:   Much of this encounter note is an electronic transcription/translation of spoken language to printed text. The electronic translation of spoken language may permit erroneous, or at times, nonsensical words or phrases to be inadvertently transcribed. Although I have reviewed the note for such errors, some may still exist.

## 2022-04-01 ENCOUNTER — TELEPHONE (OUTPATIENT)
Dept: PHARMACY | Facility: HOSPITAL | Age: 77
End: 2022-04-01

## 2022-04-05 ENCOUNTER — ANTICOAGULATION VISIT (OUTPATIENT)
Dept: PHARMACY | Facility: HOSPITAL | Age: 77
End: 2022-04-05

## 2022-04-05 DIAGNOSIS — I48.19 OTHER PERSISTENT ATRIAL FIBRILLATION: Primary | ICD-10-CM

## 2022-04-05 LAB
INR PPP: 2 (ref 0.91–1.09)
PROTHROMBIN TIME: 24.2 SECONDS (ref 10–13.8)

## 2022-04-05 PROCEDURE — 85610 PROTHROMBIN TIME: CPT

## 2022-04-05 PROCEDURE — 36416 COLLJ CAPILLARY BLOOD SPEC: CPT

## 2022-04-05 NOTE — PROGRESS NOTES
Anticoagulation Clinic Progress Note    Anticoagulation Summary  As of 2022    INR goal:  2.0-3.0   TTR:  62.3 % (3.4 y)   INR used for dosin.0 (2022)   Warfarin maintenance plan:  4 mg every Mon; 2 mg all other days   Weekly warfarin total:  16 mg   No change documented:  Meghann Blakely [facility]   Plan last modified:  Bernarda Lockwood [facility] (10/13/2021)   Next INR check:  5/3/2022   Priority:  Maintenance   Target end date:  Indefinite    Indications    Other persistent atrial fibrillation (HCC) [I48.19]             Anticoagulation Episode Summary     INR check location:      Preferred lab:      Send INR reminders to:   [facility]    Comments:        Anticoagulation Care Providers     Provider Role Specialty Phone number    Myriam Omer MD Referring Cardiology [phone]          Clinic Interview:  Patient Findings     Negatives:  Signs/symptoms of thrombosis, Signs/symptoms of bleeding,   Laboratory test error suspected, Change in health, Change in alcohol use,   Change in activity, Upcoming invasive procedure, Emergency department   visit, Upcoming dental procedure, Missed doses, Extra doses, Change in   medications, Change in diet/appetite, Hospital admission, Bruising, Other   complaints      Clinical Outcomes     Negatives:  Major bleeding event, Thromboembolic event,   Anticoagulation-related hospital admission, Anticoagulation-related ED   visit, Anticoagulation-related fatality        INR History:  Anticoagulation Monitoring 2021 2/10/2022 2022   INR 2.3 2.2 2.0   INR Date 2021 2/10/2022 2022   INR Goal 2.0-3.0 2.0-3.0 2.0-3.0   Trend Same Same Same   Last Week Total 20 mg 16 mg 16 mg   Next Week Total 16 mg 16 mg 16 mg   Sun 2 mg 2 mg 2 mg   Mon 4 mg 4 mg 4 mg   Tue 2 mg 2 mg 2 mg   Wed 2 mg 2 mg 2 mg   Thu 2 mg 2 mg 2 mg   Fri 2 mg 2 mg 2 mg   Sat 2 mg 2 mg 2 mg   Visit Report - - -   Some recent data might be hidden       Plan:  1. INR is Therapeutic  today- see above in Anticoagulation Summary.  Will instruct Cody DAVIES Chente to Continue their warfarin regimen- see above in Anticoagulation Summary.  2. Follow up in 4 weeks  3. Patient declines warfarin refills.  4. Verbal and written information provided. Patient expresses understanding and has no further questions at this time.    Meghann Blakely, [facility]

## 2022-04-18 ENCOUNTER — OFFICE VISIT (OUTPATIENT)
Dept: ORTHOPEDIC SURGERY | Facility: CLINIC | Age: 77
End: 2022-04-18

## 2022-04-18 VITALS — HEIGHT: 71 IN | WEIGHT: 168.1 LBS | TEMPERATURE: 97.4 F | BODY MASS INDEX: 23.53 KG/M2

## 2022-04-18 DIAGNOSIS — M17.10 ARTHRITIS OF KNEE: ICD-10-CM

## 2022-04-18 DIAGNOSIS — M25.561 PAIN IN BOTH KNEES, UNSPECIFIED CHRONICITY: Primary | ICD-10-CM

## 2022-04-18 DIAGNOSIS — M25.562 PAIN IN BOTH KNEES, UNSPECIFIED CHRONICITY: Primary | ICD-10-CM

## 2022-04-18 PROCEDURE — 20610 DRAIN/INJ JOINT/BURSA W/O US: CPT | Performed by: ORTHOPAEDIC SURGERY

## 2022-04-18 PROCEDURE — 99214 OFFICE O/P EST MOD 30 MIN: CPT | Performed by: ORTHOPAEDIC SURGERY

## 2022-04-18 PROCEDURE — 73562 X-RAY EXAM OF KNEE 3: CPT | Performed by: ORTHOPAEDIC SURGERY

## 2022-04-18 RX ORDER — METHYLPREDNISOLONE ACETATE 80 MG/ML
80 INJECTION, SUSPENSION INTRA-ARTICULAR; INTRALESIONAL; INTRAMUSCULAR; SOFT TISSUE
Status: COMPLETED | OUTPATIENT
Start: 2022-04-18 | End: 2022-04-18

## 2022-04-18 RX ADMIN — METHYLPREDNISOLONE ACETATE 80 MG: 80 INJECTION, SUSPENSION INTRA-ARTICULAR; INTRALESIONAL; INTRAMUSCULAR; SOFT TISSUE at 09:32

## 2022-04-18 RX ADMIN — METHYLPREDNISOLONE ACETATE 80 MG: 80 INJECTION, SUSPENSION INTRA-ARTICULAR; INTRALESIONAL; INTRAMUSCULAR; SOFT TISSUE at 09:36

## 2022-04-18 NOTE — PROGRESS NOTES
CC:  Bilateral knee pain    Mr. Eldridge comes in today for evaluation of both knees.  I have treated his knees in the past but it has been a couple of years since I last saw him.  He reports worsening pain and dysfunction over the past few years.  Current pain is severe, constant and both aching and throbbing.  Localizes most of his pain to the medial aspect of his knees.  The left hurts more than the right.    Both knees are examined.  Skin is benign.  Slight varus alignment bilaterally.  He has a trace effusion on the left.  No effusion on the right.  He has moderate bilateral medial joint line tenderness.  He lacks 10 degrees of extension on the left and can flex to about 110.  He lacks maybe 5 degrees of extension on the right and can flex to about 115.  Both knees are stable.  He has intact motor and sensory function throughout both legs and feet.  Brisk capillary refill in both feet.    AP, merchant and lateral views of both knees are ordered and reviewed evaluate his complaint.  These compared to previous x-rays.  He has end-stage medial compartment osteoarthritis bilaterally with bone-on-bone, osteophyte formation and subchondral sclerosis.   The merchants view is off plane bilaterally but it looks like he has at least moderate patellofemoral arthritis as well.  There has been progression over the years compared to previous films.    Assessment: Bilateral knee end-stage osteoarthritis    Plan:  I showed him the x-rays and we discussed his options in detail.  Although he would like to consider a replacement, he is simply not in a position to consider doing that at this time.  He tells me that his wife is in poor health and he helps care for her.  He would like to try injections today.  The risk, benefits and alternatives were discussed including his elevated risk due to anticoagulation and bilateral injections.  He acknowledged understanding the information.  He consented and the injections were performed as  described below.  He will follow-up as needed.    Jose Colvin MD    Large Joint Arthrocentesis: R knee  Date/Time: 4/18/2022 9:32 AM  Consent given by: patient  Site marked: site marked  Timeout: Immediately prior to procedure a time out was called to verify the correct patient, procedure, equipment, support staff and site/side marked as required   Supporting Documentation  Indications: pain   Procedure Details  Location: knee - R knee  Preparation: Patient was prepped and draped in the usual sterile fashion  Needle gauge: 21.  Approach: anterolateral  Medications administered: 2 mL lidocaine (cardiac); 80 mg methylPREDNISolone acetate 80 MG/ML  Patient tolerance: patient tolerated the procedure well with no immediate complications    Large Joint Arthrocentesis: L knee  Date/Time: 4/18/2022 9:36 AM  Consent given by: patient  Site marked: site marked  Timeout: Immediately prior to procedure a time out was called to verify the correct patient, procedure, equipment, support staff and site/side marked as required   Supporting Documentation  Indications: pain   Procedure Details  Location: knee - L knee  Preparation: Patient was prepped and draped in the usual sterile fashion  Needle gauge: 21.  Approach: anterolateral  Medications administered: 2 mL lidocaine (cardiac); 80 mg methylPREDNISolone acetate 80 MG/ML  Patient tolerance: patient tolerated the procedure well with no immediate complications

## 2022-04-22 ENCOUNTER — APPOINTMENT (OUTPATIENT)
Dept: CT IMAGING | Facility: HOSPITAL | Age: 77
End: 2022-04-22

## 2022-04-29 ENCOUNTER — APPOINTMENT (OUTPATIENT)
Dept: OTHER | Facility: HOSPITAL | Age: 77
End: 2022-04-29

## 2022-05-03 ENCOUNTER — ANTICOAGULATION VISIT (OUTPATIENT)
Dept: PHARMACY | Facility: HOSPITAL | Age: 77
End: 2022-05-03

## 2022-05-03 DIAGNOSIS — I48.19 OTHER PERSISTENT ATRIAL FIBRILLATION: Primary | ICD-10-CM

## 2022-05-03 LAB
INR PPP: 2.3 (ref 0.91–1.09)
PROTHROMBIN TIME: 27.9 SECONDS (ref 10–13.8)

## 2022-05-03 PROCEDURE — 36416 COLLJ CAPILLARY BLOOD SPEC: CPT

## 2022-05-03 PROCEDURE — 85610 PROTHROMBIN TIME: CPT

## 2022-05-03 NOTE — PROGRESS NOTES
Anticoagulation Clinic Progress Note    Anticoagulation Summary  As of 5/3/2022    INR goal:  2.0-3.0   TTR:  63.1 % (3.4 y)   INR used for dosin.3 (5/3/2022)   Warfarin maintenance plan:  4 mg every Mon; 2 mg all other days   Weekly warfarin total:  16 mg   Plan last modified:  Bernarda Lockwood RPH (10/13/2021)   Next INR check:  2022   Priority:  Maintenance   Target end date:  Indefinite    Indications    Other persistent atrial fibrillation (HCC) [I48.19]             Anticoagulation Episode Summary     INR check location:      Preferred lab:      Send INR reminders to:   [facility] [place]    Comments:        Anticoagulation Care Providers     Provider Role Specialty Phone number    Myriam Omer MD Referring Cardiology [phone]          Clinic Interview:  Patient Findings     Negatives:  Signs/symptoms of thrombosis, Signs/symptoms of bleeding,   Laboratory test error suspected, Change in health, Change in alcohol use,   Change in activity, Upcoming invasive procedure, Emergency department   visit, Upcoming dental procedure, Missed doses, Extra doses, Change in   medications, Change in diet/appetite, Hospital admission, Bruising, Other   complaints      Clinical Outcomes     Negatives:  Major bleeding event, Thromboembolic event,   Anticoagulation-related hospital admission, Anticoagulation-related ED   visit, Anticoagulation-related fatality        INR History:  Anticoagulation Monitoring 2/10/2022 2022 5/3/2022   INR 2.2 2.0 2.3   INR Date 2/10/2022 2022 5/3/2022   INR Goal 2.0-3.0 2.0-3.0 2.0-3.0   Trend Same Same Same   Last Week Total 16 mg 16 mg 16 mg   Next Week Total 16 mg 16 mg 16 mg   Sun 2 mg 2 mg 2 mg   Mon 4 mg 4 mg 4 mg   Tue 2 mg 2 mg 2 mg   Wed 2 mg 2 mg 2 mg   Thu 2 mg 2 mg 2 mg   Fri 2 mg 2 mg 2 mg   Sat 2 mg 2 mg 2 mg   Visit Report - - -   Some recent data might be hidden       Plan:  1. INR is Therapeutic today- see above in Anticoagulation Summary.  Will  instruct Cody Eldridge to continue his current warfarin regimen- see above in Anticoagulation Summary.  2. Follow up in 4 weeks  3. Patient declines warfarin refills.  4. Verbal and written information provided. Patient expresses understanding and has no further questions at this time.    Meghann Blakely, [facility]

## 2022-05-11 ENCOUNTER — TELEPHONE (OUTPATIENT)
Dept: CARDIOLOGY | Facility: CLINIC | Age: 77
End: 2022-05-11

## 2022-05-11 NOTE — TELEPHONE ENCOUNTER
Nicol from [facility] is requesting to speak with someone from the [facility] about a Lovenox bridge for Mr Eldridge. Patient is currently on Warfarin.     [phone]

## 2022-05-11 NOTE — TELEPHONE ENCOUNTER
Spoke with Valeria at [facility]. Re-faxed 1/18/22 telephone note with clearance, as they report they never received the clearance. She indicated she would contact us again if enoxaparin bridge is needed.

## 2022-05-19 NOTE — TELEPHONE ENCOUNTER
Spoke with  Chente by phone. He reports that the procedure was canceled, and he is uncertain if/when it will be rescheduled. He is agreeable to letting our clinic know if it is ultimately rescheduled so that we can provider perioperative instructions for his anticoagulation.

## 2022-05-31 ENCOUNTER — ANTICOAGULATION VISIT (OUTPATIENT)
Dept: PHARMACY | Facility: HOSPITAL | Age: 77
End: 2022-05-31

## 2022-05-31 ENCOUNTER — OFFICE VISIT (OUTPATIENT)
Dept: INTERNAL MEDICINE | Facility: CLINIC | Age: 77
End: 2022-05-31

## 2022-05-31 ENCOUNTER — LAB (OUTPATIENT)
Dept: LAB | Facility: HOSPITAL | Age: 77
End: 2022-05-31

## 2022-05-31 VITALS
HEIGHT: 71 IN | SYSTOLIC BLOOD PRESSURE: 133 MMHG | WEIGHT: 166.2 LBS | DIASTOLIC BLOOD PRESSURE: 61 MMHG | BODY MASS INDEX: 23.27 KG/M2 | OXYGEN SATURATION: 96 % | HEART RATE: 57 BPM

## 2022-05-31 DIAGNOSIS — E78.2 MIXED HYPERLIPIDEMIA: Primary | ICD-10-CM

## 2022-05-31 DIAGNOSIS — I10 ESSENTIAL HYPERTENSION: ICD-10-CM

## 2022-05-31 DIAGNOSIS — E03.9 ACQUIRED HYPOTHYROIDISM: ICD-10-CM

## 2022-05-31 DIAGNOSIS — I48.19 OTHER PERSISTENT ATRIAL FIBRILLATION: Primary | ICD-10-CM

## 2022-05-31 DIAGNOSIS — Z12.11 COLON CANCER SCREENING: ICD-10-CM

## 2022-05-31 DIAGNOSIS — F41.8 DEPRESSION WITH ANXIETY: ICD-10-CM

## 2022-05-31 LAB
ALBUMIN SERPL-MCNC: 3.9 G/DL (ref 3.5–5.2)
ALBUMIN/GLOB SERPL: 1.3 G/DL
ALP SERPL-CCNC: 63 U/L (ref 39–117)
ALT SERPL W P-5'-P-CCNC: 10 U/L (ref 1–41)
ANION GAP SERPL CALCULATED.3IONS-SCNC: 10.2 MMOL/L (ref 5–15)
AST SERPL-CCNC: 22 U/L (ref 1–40)
BILIRUB SERPL-MCNC: 0.4 MG/DL (ref 0–1.2)
BUN SERPL-MCNC: 22 MG/DL (ref 8–23)
BUN/CREAT SERPL: 16.5 (ref 7–25)
CALCIUM SPEC-SCNC: 9 MG/DL (ref 8.6–10.5)
CHLORIDE SERPL-SCNC: 106 MMOL/L (ref 98–107)
CHOLEST SERPL-MCNC: 111 MG/DL (ref 0–200)
CO2 SERPL-SCNC: 24.8 MMOL/L (ref 22–29)
CREAT SERPL-MCNC: 1.33 MG/DL (ref 0.76–1.27)
EGFRCR SERPLBLD CKD-EPI 2021: 55.1 ML/MIN/1.73
GLOBULIN UR ELPH-MCNC: 2.9 GM/DL
GLUCOSE SERPL-MCNC: 95 MG/DL (ref 65–99)
HDLC SERPL-MCNC: 51 MG/DL (ref 40–60)
INR PPP: 1.9 (ref 0.91–1.09)
LDLC SERPL CALC-MCNC: 47 MG/DL (ref 0–100)
LDLC/HDLC SERPL: 0.96 {RATIO}
POTASSIUM SERPL-SCNC: 3.9 MMOL/L (ref 3.5–5.2)
PROT SERPL-MCNC: 6.8 G/DL (ref 6–8.5)
PROTHROMBIN TIME: 23.2 SECONDS (ref 10–13.8)
SODIUM SERPL-SCNC: 141 MMOL/L (ref 136–145)
T4 FREE SERPL-MCNC: 1.19 NG/DL (ref 0.93–1.7)
TRIGL SERPL-MCNC: 55 MG/DL (ref 0–150)
TSH SERPL DL<=0.05 MIU/L-ACNC: 1.26 UIU/ML (ref 0.27–4.2)
VLDLC SERPL-MCNC: 13 MG/DL (ref 5–40)

## 2022-05-31 PROCEDURE — 36416 COLLJ CAPILLARY BLOOD SPEC: CPT

## 2022-05-31 PROCEDURE — 36415 COLL VENOUS BLD VENIPUNCTURE: CPT | Performed by: FAMILY MEDICINE

## 2022-05-31 PROCEDURE — 84439 ASSAY OF FREE THYROXINE: CPT | Performed by: FAMILY MEDICINE

## 2022-05-31 PROCEDURE — 84443 ASSAY THYROID STIM HORMONE: CPT | Performed by: FAMILY MEDICINE

## 2022-05-31 PROCEDURE — G0439 PPPS, SUBSEQ VISIT: HCPCS | Performed by: FAMILY MEDICINE

## 2022-05-31 PROCEDURE — 80053 COMPREHEN METABOLIC PANEL: CPT | Performed by: FAMILY MEDICINE

## 2022-05-31 PROCEDURE — 1159F MED LIST DOCD IN RCRD: CPT | Performed by: FAMILY MEDICINE

## 2022-05-31 PROCEDURE — 80061 LIPID PANEL: CPT | Performed by: FAMILY MEDICINE

## 2022-05-31 PROCEDURE — 1170F FXNL STATUS ASSESSED: CPT | Performed by: FAMILY MEDICINE

## 2022-05-31 PROCEDURE — 85610 PROTHROMBIN TIME: CPT

## 2022-05-31 PROCEDURE — 1126F AMNT PAIN NOTED NONE PRSNT: CPT | Performed by: FAMILY MEDICINE

## 2022-05-31 NOTE — PROGRESS NOTES
I have supervised and reviewed the notes, assessments, and/or procedures performed by our  pharmacy student . The documented assessment and plan were developed cooperatively, and the plan was not implemented in my presence. I concur with the documentation of this patient encounter.

## 2022-05-31 NOTE — PROGRESS NOTES
Anticoagulation Clinic Progress Note    Anticoagulation Summary  As of 2022    INR goal:  2.0-3.0   TTR:  63.4 % (3.5 y)   INR used for dosin.9 (2022)   Warfarin maintenance plan:  4 mg every Mon; 2 mg all other days   Weekly warfarin total:  16 mg   No change documented:  Sharita Morgan, Pharmacy Intern   Plan last modified:  Bernarda Lockwood RPH (10/13/2021)   Next INR check:  2022   Priority:  Maintenance   Target end date:  Indefinite    Indications    Other persistent atrial fibrillation (HCC) [I48.19]             Anticoagulation Episode Summary     INR check location:      Preferred lab:      Send INR reminders to:   [facility]    Comments:        Anticoagulation Care Providers     Provider Role Specialty Phone number    Myriam Omer MD Referring Cardiology [phone]          Clinic Interview:  Patient Findings     Negatives:  Signs/symptoms of thrombosis, Signs/symptoms of bleeding,   Laboratory test error suspected, Change in health, Change in alcohol use,   Change in activity, Upcoming invasive procedure, Emergency department   visit, Upcoming dental procedure, Missed doses, Extra doses, Change in   medications, Change in diet/appetite, Hospital admission, Bruising, Other   complaints      Clinical Outcomes     Negatives:  Major bleeding event, Thromboembolic event,   Anticoagulation-related hospital admission, Anticoagulation-related ED   visit, Anticoagulation-related fatality   Other: Patient reports eating more Sauerkraut.        INR History:  Anticoagulation Monitoring 2022 5/3/2022 2022   INR 2.0 2.3 1.9   INR Date 2022 5/3/2022 2022   INR Goal 2.0-3.0 2.0-3.0 2.0-3.0   Trend Same Same Same   Last Week Total 16 mg 16 mg 16 mg   Next Week Total 16 mg 16 mg 16 mg   Sun 2 mg 2 mg 2 mg   Mon 4 mg 4 mg 4 mg   Tue 2 mg 2 mg 2 mg   Wed 2 mg 2 mg 2 mg   Thu 2 mg 2 mg 2 mg   Fri 2 mg 2 mg 2 mg   Sat 2 mg 2 mg 2 mg   Visit Report - - -   Some  recent data might be hidden       Plan:  1. INR is Subtherapeutic today- see above in Anticoagulation Summary. Patient reports eating more Sauerkraut.   Will instruct Cody Eldridge to Continue their warfarin regimen- see above in Anticoagulation Summary.  2. Follow up in 2 weeks  3. Patient declines warfarin refills.  4. Verbal and written information provided. Patient expresses understanding and has no further questions at this time.    Sharita Morgan, Pharmacy Intern

## 2022-05-31 NOTE — PROGRESS NOTES
The ABCs of the Annual Wellness Visit  Subsequent Medicare Wellness Visit    Chief Complaint   Patient presents with   • follow up to hypertension   • Medicare Wellness-subsequent      Subjective    History of Present Illness:  Cody Eldridge is a 76 y.o. male who presents for a Subsequent Medicare Wellness Visit.    The following portions of the patient's history were reviewed and   updated as appropriate: allergies, current medications, past family history, past medical history, past social history, past surgical history and problem list.     Compared to one year ago, the patient feels his physical   health is the same.    Compared to one year ago, the patient feels his mental   health is the same.    Recent Hospitalizations:  He was not admitted to the hospital during the last year.       Current Medical Providers:  Patient Care Team:  Patrick Diaz MD as PCP - General (Family Medicine)  Payal Waters MD as Consulting Physician (Pain Medicine)  Lorna Chaidez (Nurse Practitioner)  Kristal Cowart RPH as Pharmacist  Fritz Slater MD as Referring Physician (Otolaryngology)  Pablo Heaton MD as Consulting Physician (Hematology and Oncology)  Annie Davila MD (Inactive) as Consulting Physician (Radiation Oncology)  Chetan Heaton MD as Consulting Physician (Urology)  Kristal Cowart RPH as Pharmacist (Pharmacy)  Georges Guadalupe PharmD as Pharmacist (Pharmacy)    Outpatient Medications Prior to Visit   Medication Sig Dispense Refill   • albuterol (PROVENTIL HFA;VENTOLIN HFA) 108 (90 BASE) MCG/ACT inhaler Inhale 2 puffs. ProAir  (90 Base) MCG/ACT Inhalation Aerosol Solution; Patient Sig: ProAir  (90 Base) MCG/ACT Inhalation Aerosol Solution inhale 2 puffs by mouth every 4 hours if needed; 8.5; 11; 07-Apr-2014; Act     • ALPRAZolam (XANAX) 0.5 MG tablet Take 1 tablet by mouth every morning and then take 0.5 tablet by mouth every evening 45 tablet 2   • aspirin 81 MG  chewable tablet Chew 81 mg Daily.     • baclofen (LIORESAL) 10 MG tablet take 1 tablet by mouth three times a day 90 tablet 1   • carvedilol (COREG) 25 MG tablet Take 1 tablet by mouth 2 (Two) Times a Day With Meals. 180 tablet 3   • diphenoxylate-atropine (LOMOTIL) 2.5-0.025 MG per tablet Take 1 tablet by mouth 4 (Four) Times a Day As Needed for Diarrhea. 60 tablet 1   • ferrous gluconate (FERGON) 324 MG tablet Take 1 tablet by mouth Daily With Breakfast. 90 tablet 0   • levothyroxine (SYNTHROID, LEVOTHROID) 125 MCG tablet TAKE 1 TABLET BY MOUTH DAILY 30 tablet 3   • lisinopril (PRINIVIL,ZESTRIL) 20 MG tablet Take 1 tablet by mouth Daily. 90 tablet 1   • montelukast (SINGULAIR) 10 MG tablet TAKE 1 TABLET BY MOUTH DAILY 90 tablet 1   • oxyCODONE-acetaminophen (PERCOCET) 7.5-325 MG per tablet TK 1 T PO Q 6 H UTD     • rosuvastatin (CRESTOR) 20 MG tablet Take 20 mg by mouth Daily. 30 tablet 5   • sertraline (ZOLOFT) 100 MG tablet Take 1 tablet by mouth Daily. 90 tablet 3   • warfarin (COUMADIN) 2 MG tablet TAKE TWO TABLETS BY MOUTH ON MON AND TAKE ONE TABLET BY MOUTH ALL OTHER DAYS OR AS DIRECTED 105 tablet 1   • chlorthalidone (HYGROTEN) 15 MG tablet Take 1 tablet by mouth Daily. 90 tablet 1     No facility-administered medications prior to visit.       Opioid medication/s are on active medication list.  and I have evaluated his active treatment plan and pain score trends (see table).  Vitals:    05/31/22 1321   PainSc: 0-No pain     I have reviewed the chart for potential of high risk medication and harmful drug interactions in the elderly.            Aspirin is on active medication list. Aspirin use is indicated based on review of current medical condition/s. Pros and cons of this therapy have been discussed today. Benefits of this medication outweigh potential harm.  Patient has been encouraged to continue taking this medication.  .      Patient Active Problem List   Diagnosis   • Atopic rhinitis   • Arthritis of  "hand   • Coxitis   • Benign colonic polyp   • Neck pain   • Mixed anxiety depressive disorder   • Degeneration of intervertebral disc of lumbosacral region   • Elevated prostate specific antigen (PSA)   • Mixed hyperlipidemia   • Essential hypertension   • Insomnia   • Lumbar radiculopathy   • Osteoarthritis   • Vitamin D deficiency   • Abdominal pain   • Acute URI   • Myalgia   • Cramp of both lower extremities   • Gingivitis   • Other persistent atrial fibrillation (HCC)   • Non-rheumatic tricuspid valve insufficiency   • SHAR (obstructive sleep apnea)   • Precordial pain   • IRAHETA (dyspnea on exertion)   • Coronary artery disease involving native coronary artery of native heart without angina pectoris   • Shortness of breath   • 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)   • Current use of long term anticoagulation   • Syncope   • History of cancer   • Hypotension   • Medicare annual wellness visit, subsequent   • Squamous cell carcinoma of neck   • COPD (chronic obstructive pulmonary disease) (HCC)   • Depression with anxiety   • Chemotherapy-induced neutropenia (HCC)   • Chemotherapy-induced thrombocytopenia   • Acute renal injury (HCC)   • Anemia associated with chemotherapy   • Oral thrush   • MSSA bacteremia   • Acquired hypothyroidism   • Chronic conjunctivitis of both eyes   • Change in vision   • Chronic diastolic congestive heart failure (HCC)     Advance Care Planning   Advance Directive is not on file.  ACP discussion was held with the patient during this visit. Patient has an advance directive (not in EMR), copy requested.          Objective       Vitals:    05/31/22 1321   BP: 133/61   BP Location: Right arm   Patient Position: Sitting   Cuff Size: Adult   Pulse: 57   SpO2: 96%   Weight: 75.4 kg (166 lb 3.2 oz)   Height: 180.3 cm (71\")   PainSc: 0-No pain     BMI Readings from Last 1 Encounters:   05/31/22 23.18 kg/m²   BMI is within normal parameters. No follow-up required.    Does the patient " have evidence of cognitive impairment? No    Physical Exam            HEALTH RISK ASSESSMENT    Smoking Status:  Social History     Tobacco Use   Smoking Status Former Smoker   • Packs/day: 3.00   • Years: 30.00   • Pack years: 90.00   • Types: Cigarettes   • Quit date:    • Years since quittin.4   Smokeless Tobacco Never Used   Tobacco Comment    started age 11 x 53 years stopped  / daily caffiene     Alcohol Consumption:  Social History     Substance and Sexual Activity   Alcohol Use Not Currently   • Alcohol/week: 2.0 standard drinks   • Types: 1 Glasses of wine, 1 Cans of beer per week    Comment: occ     Fall Risk Screen:    STEADI Fall Risk Assessment was completed, and patient is at LOW risk for falls.Assessment completed on:2022    Depression Screening:  PHQ-2/PHQ-9 Depression Screening 2022   Retired PHQ-9 Total Score -   Retired Total Score -   Little Interest or Pleasure in Doing Things 0-->not at all   Feeling Down, Depressed or Hopeless 0-->not at all   PHQ-9: Brief Depression Severity Measure Score 0       Health Habits and Functional and Cognitive Screening:  Functional & Cognitive Status 2022   Do you have difficulty preparing food and eating? No   Do you have difficulty bathing yourself, getting dressed or grooming yourself? No   Do you have difficulty using the toilet? No   Do you have difficulty moving around from place to place? No   Do you have trouble with steps or getting out of a bed or a chair? No   Current Diet Well Balanced Diet   Dental Exam Up to date   Eye Exam Up to date   Exercise (times per week) 7 times per week   Current Exercises Include Walking   Current Exercise Activities Include -   Do you need help using the phone?  No   Are you deaf or do you have serious difficulty hearing?  No   Do you need help with transportation? No   Do you need help shopping? No   Do you need help preparing meals?  No   Do you need help with housework?  No   Do you need  help with laundry? No   Do you need help taking your medications? No   Do you need help managing money? No   Do you ever drive or ride in a car without wearing a seat belt? No   Have you felt unusual stress, anger or loneliness in the last month? No   Who do you live with? Spouse   If you need help, do you have trouble finding someone available to you? No   Have you been bothered in the last four weeks by sexual problems? No   Do you have difficulty concentrating, remembering or making decisions? No       Age-appropriate Screening Schedule:  Refer to the list below for future screening recommendations based on patient's age, sex and/or medical conditions. Orders for these recommended tests are listed in the plan section. The patient has been provided with a written plan.    Health Maintenance   Topic Date Due   • ZOSTER VACCINE (2 of 3) 11/29/2016   • TDAP/TD VACCINES (2 - Td or Tdap) 01/01/2022   • LIPID PANEL  05/28/2022   • INFLUENZA VACCINE  08/01/2022              Assessment & Plan     CMS Preventative Services Quick Reference  Risk Factors Identified During Encounter  Depression/Dysphoria  The above risks/problems have been discussed with the patient.  Follow up actions/plans if indicated are seen below in the Assessment/Plan Section.  Pertinent information has been shared with the patient in the After Visit Summary.    Diagnoses and all orders for this visit:    1. Mixed hyperlipidemia (Primary)  -     Lipid Panel    2. Essential hypertension  -     Comprehensive Metabolic Panel    3. Acquired hypothyroidism  -     TSH  -     T4, Free    4. Depression with anxiety    5. Colon cancer screening  -     Ambulatory Referral For Screening Colonoscopy        Follow Up: {Optional Link Wrapup :23}  Return in about 3 months (around 8/31/2022), or if symptoms worsen or fail to improve, for Recheck.     An After Visit Summary and PPPS were given to the patient.  Ongoing  management of chronic medical problems.

## 2022-05-31 NOTE — PROGRESS NOTES
"Chief Complaint  follow up to hypertension and Medicare Wellness-subsequent    Subjective        Cody Eldridge presents to [facility]  History of Present Illness  Patient follow-up appointment for ongoing management of chronic medical problems of hypertension tension hypothyroidism hyperlipidemia anxiety depression he is doing fairly well he has no acute concerns he has no pain blood pressure well controlled.  He has had Hemoccult positive stool and history of diarrhea diarrhea is now under much better control no black or bloody stool is in need of screening colonoscopy  Objective   Vital Signs:  /61 (BP Location: Right arm, Patient Position: Sitting, Cuff Size: Adult)   Pulse 57   Ht 180.3 cm (71\")   Wt 75.4 kg (166 lb 3.2 oz)   SpO2 96%   BMI 23.18 kg/m²     BMI is within normal parameters. No other follow-up for BMI required.      Physical Exam  Vitals and nursing note reviewed.   Constitutional:       Appearance: Normal appearance. He is normal weight.   HENT:      Head: Normocephalic and atraumatic.   Cardiovascular:      Rate and Rhythm: Normal rate.      Pulses: Normal pulses.      Heart sounds: Normal heart sounds.   Pulmonary:      Effort: Pulmonary effort is normal.      Breath sounds: Normal breath sounds.   Abdominal:      Tenderness: There is no right CVA tenderness or left CVA tenderness.   Musculoskeletal:      Right lower leg: No edema.   Skin:     General: Skin is warm and dry.   Neurological:      Mental Status: He is alert.   Psychiatric:         Mood and Affect: Mood normal.         Behavior: Behavior normal.         Thought Content: Thought content normal.         Judgment: Judgment normal.        Result Review :    Common labs    Common Labsle 7/27/21 7/27/21 7/27/21 10/29/21 10/29/21 10/29/21    1024 1024 1028 0905 0905 0905   Glucose  121 (A)   99    BUN  26 (A)   32 (A)    Creatinine  1.26 1.20  1.39 (A)    eGFR Non African Am  55 (A)   50 (A)  "   Sodium  135 (A)   138    Potassium  4.3   4.7    Chloride  103   105    Calcium  9.0   8.7    Albumin  3.80   3.90    Total Bilirubin  0.6   0.4    Alkaline Phosphatase  62   73    AST (SGOT)  18   24    ALT (SGPT)  10   16    WBC 4.75   7.76     Hemoglobin 11.9 (A)   11.9 (A)     Hematocrit 36.8 (A)   37.0 (A)  35.5 (A)   Platelets 141   179     (A) Abnormal value       Comments are available for some flowsheets but are not being displayed.                     Assessment and Plan   Diagnoses and all orders for this visit:    1. Mixed hyperlipidemia (Primary)  -     Lipid Panel    2. Essential hypertension  -     Comprehensive Metabolic Panel    3. Acquired hypothyroidism  -     TSH  -     T4, Free    4. Depression with anxiety    5. Colon cancer screening  -     Ambulatory Referral For Screening Colonoscopy      Depression continue zoloft   Follow-up results of blood work and colonoscopy otherwise as needed or    Follow Up   Return in about 3 months (around 8/31/2022), or if symptoms worsen or fail to improve, for Recheck.  Patient was given instructions and counseling regarding his condition or for health maintenance advice. Please see specific information pulled into the AVS if appropriate.

## 2022-06-10 ENCOUNTER — PRE-PROCEDURE SCREENING (OUTPATIENT)
Dept: GASTROENTEROLOGY | Facility: CLINIC | Age: 77
End: 2022-06-10

## 2022-06-22 RX ORDER — ALPRAZOLAM 0.5 MG/1
TABLET ORAL
Qty: 45 TABLET | Refills: 2 | Status: SHIPPED | OUTPATIENT
Start: 2022-06-22 | End: 2022-09-14

## 2022-06-22 RX ORDER — ALPRAZOLAM 0.5 MG/1
TABLET ORAL
Qty: 45 TABLET | OUTPATIENT
Start: 2022-06-22

## 2022-06-22 NOTE — TELEPHONE ENCOUNTER
OUT OF MEDS      Caller:     Relationship:   Cody Eldridge (Self) [phone] (H)         Best call back number:     Requested Prescriptions:   Requested Prescriptions     Pending Prescriptions Disp Refills   • ALPRAZolam (XANAX) 0.5 MG tablet 45 tablet 2     Sig: Take 1 tablet by mouth every morning and then take 0.5 tablet by mouth every evening        Pharmacy where request should be sent: [id number] [address] AT [facility] [phone] PH - [phone]      Additional details provided by patient: *    Does the patient have less than a 3 day supply:  [x] Yes  [] No    Ghassan Tavarez   06/22/22 10:42 EDT

## 2022-06-28 ENCOUNTER — ANTICOAGULATION VISIT (OUTPATIENT)
Dept: PHARMACY | Facility: HOSPITAL | Age: 77
End: 2022-06-28

## 2022-06-28 DIAGNOSIS — I48.19 OTHER PERSISTENT ATRIAL FIBRILLATION: Primary | ICD-10-CM

## 2022-06-28 LAB
INR PPP: 1.9 (ref 0.91–1.09)
PROTHROMBIN TIME: 22.3 SECONDS (ref 10–13.8)

## 2022-06-28 PROCEDURE — 36416 COLLJ CAPILLARY BLOOD SPEC: CPT

## 2022-06-28 PROCEDURE — G0463 HOSPITAL OUTPT CLINIC VISIT: HCPCS

## 2022-06-28 PROCEDURE — 85610 PROTHROMBIN TIME: CPT

## 2022-06-28 NOTE — PROGRESS NOTES
Anticoagulation Clinic Progress Note    Anticoagulation Summary  As of 2022    INR goal:  2.0-3.0   TTR:  62.0 % (3.6 y)   INR used for dosin.9 (2022)   Warfarin maintenance plan:  4 mg every Mon, Fri; 2 mg all other days   Weekly warfarin total:  18 mg   Plan last modified:  Meghann Blakely [facility] (2022)   Next INR check:  2022   Priority:  Maintenance   Target end date:  Indefinite    Indications    Other persistent atrial fibrillation (HCC) [I48.19]             Anticoagulation Episode Summary     INR check location:      Preferred lab:      Send INR reminders to:  [facility]    Comments:        Anticoagulation Care Providers     Provider Role Specialty Phone number    Myriam Omer MD Referring Cardiology [phone]          Clinic Interview:  Patient Findings     Negatives:  Signs/symptoms of thrombosis, Signs/symptoms of bleeding,   Laboratory test error suspected, Change in health, Change in alcohol use,   Change in activity, Upcoming invasive procedure, Emergency department   visit, Upcoming dental procedure, Missed doses, Extra doses, Change in   medications, Change in diet/appetite, Hospital admission, Bruising, Other   complaints      Clinical Outcomes     Negatives:  Major bleeding event, Thromboembolic event,   Anticoagulation-related hospital admission, Anticoagulation-related ED   visit, Anticoagulation-related fatality        INR History:  Anticoagulation Monitoring 5/3/2022 2022 2022   INR 2.3 1.9 1.9   INR Date 5/3/2022 2022 2022   INR Goal 2.0-3.0 2.0-3.0 2.0-3.0   Trend Same Same Up   Last Week Total 16 mg 16 mg 16 mg   Next Week Total 16 mg 16 mg 18 mg   Sun 2 mg 2 mg 2 mg   Mon 4 mg 4 mg 4 mg   Tue 2 mg 2 mg 2 mg   Wed 2 mg 2 mg 2 mg   Thu 2 mg 2 mg 2 mg   Fri 2 mg 2 mg 4 mg   Sat 2 mg 2 mg 2 mg   Visit Report - - -   Some recent data might be hidden       Plan:  1. INR is Subtherapeutic today- see above in Anticoagulation  Summary.  Will instruct Cody Eldridge to increase his weekly warfarin dose by 2mg - see above in Anticoagulation Summary.  2. Follow up in 2 weeks  3. Patient declines warfarin refills.  4. Verbal and written information provided. Patient expresses understanding and has no further questions at this time.    Meghann Blakely, [facility]

## 2022-07-14 ENCOUNTER — ANTICOAGULATION VISIT (OUTPATIENT)
Dept: PHARMACY | Facility: HOSPITAL | Age: 77
End: 2022-07-14

## 2022-07-14 DIAGNOSIS — I48.19 OTHER PERSISTENT ATRIAL FIBRILLATION: Primary | ICD-10-CM

## 2022-07-14 LAB
INR PPP: 3.9 (ref 0.91–1.09)
PROTHROMBIN TIME: 46.7 SECONDS (ref 10–13.8)

## 2022-07-14 PROCEDURE — 36416 COLLJ CAPILLARY BLOOD SPEC: CPT

## 2022-07-14 PROCEDURE — 85610 PROTHROMBIN TIME: CPT

## 2022-07-14 NOTE — PROGRESS NOTES
"Anticoagulation Clinic Progress Note    Anticoagulation Summary  As of 7/14/2022    INR goal:  2.0-3.0   TTR:  61.9 % (3.6 y)   INR used for dosing:  3.9 (7/14/2022)   Warfarin maintenance plan:  4 mg every Mon, Fri; 2 mg all other days   Weekly warfarin total:  18 mg   Plan last modified:  Meghann Blakely [facility] (6/28/2022)   Next INR check:  7/22/2022   Priority:  Maintenance   Target end date:  Indefinite    Indications    Other persistent atrial fibrillation (HCC) [I48.19]             Anticoagulation Episode Summary     INR check location:      Preferred lab:      Send INR reminders to:   [facility]    Comments:        Anticoagulation Care Providers     Provider Role Specialty Phone number    Myrima Omer MD Referring Cardiology [phone]          Clinic Interview:  Patient Findings     Positives:  Upcoming invasive procedure    Negatives:  Signs/symptoms of thrombosis, Signs/symptoms of bleeding,   Laboratory test error suspected, Change in health, Change in alcohol use,   Change in activity, Emergency department visit, Upcoming dental procedure,   Missed doses, Extra doses, Change in medications, Change in diet/appetite,   Hospital admission, Bruising, Other complaints    Comments:  Patient will be having cataract surgery w/ \"lens insertion\" on   7/26. Patient was not given any holding instructions so once patient   informs us of the surgeon, will reach out to verify.      Clinical Outcomes     Negatives:  Major bleeding event, Thromboembolic event,   Anticoagulation-related hospital admission, Anticoagulation-related ED   visit, Anticoagulation-related fatality    Comments:  Patient will be having cataract surgery w/ \"lens insertion\" on   7/26. Patient was not given any holding instructions so once patient   informs us of the surgeon, will reach out to verify.        INR History:  Anticoagulation Monitoring 5/31/2022 6/28/2022 7/14/2022   INR 1.9 1.9 3.9   INR Date 5/31/2022 " 6/28/2022 7/14/2022   INR Goal 2.0-3.0 2.0-3.0 2.0-3.0   Trend Same Up Same   Last Week Total 16 mg 16 mg 18 mg   Next Week Total 16 mg 18 mg 14 mg   Sun 2 mg 2 mg 2 mg   Mon 4 mg 4 mg 4 mg   Tue 2 mg 2 mg 2 mg   Wed 2 mg 2 mg 2 mg   Thu 2 mg 2 mg Hold (7/14); Otherwise 2 mg   Fri 2 mg 4 mg 2 mg (7/15)   Sat 2 mg 2 mg 2 mg   Visit Report - - -   Some recent data might be hidden       Plan:  1. INR is Supratherapeutic today- see above in Anticoagulation Summary.  Will instruct Cody Eldridge to Change their warfarin regimen- see above in Anticoagulation Summary. Instructed patient to hold tonight's dose and then partial tomorrow's dose to 2 mg and resume normal regimen afterwards.   2. Follow up in 1 week  3. Patient declines warfarin refills.  4. Reviewed with patient the s/sx of bleeding and when to seek medical attention.   5. Verbal and written information provided. Patient expresses understanding and has no further questions at this time.    Chetan Mcarthur, Pharmacy Intern

## 2022-07-14 NOTE — PROGRESS NOTES
I have supervised and reviewed the notes, assessments, and/or procedures performed by our Pharmacy Intern. The documented assessment and plan were developed cooperatively, and the plan was implemented in my presence. I concur with the documentation of this patient encounter.    Myriam Walton, PharmD

## 2022-07-14 NOTE — PROGRESS NOTES
Called [facility] (Dr Arzate) to clarify procedure date for eye surgery. Patient is not scheduled for surgery as of yet, but has a consult on 7/26 with Dr Arzate. Called patient to discuss and will follow up once surgery is scheduled.

## 2022-07-15 RX ORDER — WARFARIN SODIUM 2 MG/1
TABLET ORAL
Qty: 120 TABLET | Refills: 1 | Status: SHIPPED | OUTPATIENT
Start: 2022-07-15 | End: 2022-11-04 | Stop reason: SDUPTHER

## 2022-07-15 NOTE — TELEPHONE ENCOUNTER
Rx Refill Note  Requested Prescriptions     Pending Prescriptions Disp Refills   • warfarin (COUMADIN) 2 MG tablet 105 tablet 1     Sig: TAKE TWO TABLETS BY MOUTH ON MON AND TAKE ONE TABLET BY MOUTH ALL OTHER DAYS OR AS DIRECTED      Last office visit with prescribing clinician: 3/30/2022      Next office visit with prescribing clinician: 4/3/2023            Vanita Pittman MA  07/15/22, 14:39 EDT

## 2022-07-25 RX ORDER — CHLORTHALIDONE 25 MG/1
25 TABLET ORAL DAILY
Qty: 30 TABLET | Refills: 8 | OUTPATIENT
Start: 2022-07-25

## 2022-07-25 NOTE — TELEPHONE ENCOUNTER
Tracie-this was listed as discontinued in October 2021.  Please contact patient to see if he has been taking this otherwise I will refuse refill request

## 2022-07-25 NOTE — TELEPHONE ENCOUNTER
Chart says this was stopped in 10/2021. Please call patient to inquire if he has truly been taking this?

## 2022-07-26 RX ORDER — CHLORTHALIDONE 25 MG/1
12.5 TABLET ORAL DAILY
Qty: 30 TABLET | Refills: 5 | Status: SHIPPED | OUTPATIENT
Start: 2022-07-26 | End: 2022-12-29 | Stop reason: SDUPTHER

## 2022-07-26 NOTE — TELEPHONE ENCOUNTER
Kimi, I spoke with patient and he is still taking this medication.  He is however only taking 1/2 tablet daily. / STEFANIE

## 2022-07-29 ENCOUNTER — ANTICOAGULATION VISIT (OUTPATIENT)
Dept: PHARMACY | Facility: HOSPITAL | Age: 77
End: 2022-07-29

## 2022-07-29 DIAGNOSIS — I48.19 OTHER PERSISTENT ATRIAL FIBRILLATION: Primary | ICD-10-CM

## 2022-07-29 LAB
INR PPP: 3 (ref 0.91–1.09)
PROTHROMBIN TIME: 36.3 SECONDS (ref 10–13.8)

## 2022-07-29 PROCEDURE — 85610 PROTHROMBIN TIME: CPT

## 2022-07-29 PROCEDURE — 36416 COLLJ CAPILLARY BLOOD SPEC: CPT

## 2022-07-29 NOTE — PROGRESS NOTES
Anticoagulation Clinic Progress Note    Anticoagulation Summary  As of 7/29/2022    INR goal:  2.0-3.0   TTR:  61.2 % (3.7 y)   INR used for dosing:  3.0 (7/29/2022)   Warfarin maintenance plan:  4 mg every Mon, Fri; 2 mg all other days   Weekly warfarin total:  18 mg   No change documented:  Georges Guadalupe, PharmD   Plan last modified:  Meghann Blakely [facility] (6/28/2022)   Next INR check:  8/12/2022   Priority:  Maintenance   Target end date:  Indefinite    Indications    Other persistent atrial fibrillation (HCC) [I48.19]             Anticoagulation Episode Summary     INR check location:      Preferred lab:      Send INR reminders to:   [facility]    Comments:        Anticoagulation Care Providers     Provider Role Specialty Phone number    Myriam Omer MD Referring Cardiology [phone]          Clinic Interview:  Patient Findings     Negatives:  Signs/symptoms of thrombosis, Signs/symptoms of bleeding,   Laboratory test error suspected, Change in health, Change in alcohol use,   Change in activity, Upcoming invasive procedure, Emergency department   visit, Upcoming dental procedure, Missed doses, Extra doses, Change in   medications, Change in diet/appetite, Hospital admission, Bruising, Other   complaints      Clinical Outcomes     Negatives:  Major bleeding event, Thromboembolic event,   Anticoagulation-related hospital admission, Anticoagulation-related ED   visit, Anticoagulation-related fatality        INR History:  Anticoagulation Monitoring 6/28/2022 7/14/2022 7/29/2022   INR 1.9 3.9 3.0   INR Date 6/28/2022 7/14/2022 7/29/2022   INR Goal 2.0-3.0 2.0-3.0 2.0-3.0   Trend Up Same Same   Last Week Total 16 mg 18 mg 18 mg   Next Week Total 18 mg 14 mg 18 mg   Sun 2 mg 2 mg 2 mg   Mon 4 mg 4 mg 4 mg   Tue 2 mg 2 mg 2 mg   Wed 2 mg 2 mg 2 mg   Thu 2 mg Hold (7/14); Otherwise 2 mg 2 mg   Fri 4 mg 2 mg (7/15); Otherwise 4 mg 4 mg   Sat 2 mg 2 mg 2 mg   Visit Report - - -   Some recent data  might be hidden       Plan:  1. INR is Therapeutic today- see above in Anticoagulation Summary.  Will instruct Cody Eldridge to Continue their warfarin regimen- see above in Anticoagulation Summary.  2. Follow up in 2 weeks  3. Patient declines warfarin refills.  4. Verbal and written information provided. Patient expresses understanding and has no further questions at this time.    Georges Guadalupe, PharmD

## 2022-08-12 ENCOUNTER — ANTICOAGULATION VISIT (OUTPATIENT)
Dept: PHARMACY | Facility: HOSPITAL | Age: 77
End: 2022-08-12

## 2022-08-12 DIAGNOSIS — I48.19 OTHER PERSISTENT ATRIAL FIBRILLATION: Primary | ICD-10-CM

## 2022-08-12 LAB
INR PPP: 3.3 (ref 0.91–1.09)
PROTHROMBIN TIME: 39.7 SECONDS (ref 10–13.8)

## 2022-08-12 PROCEDURE — 85610 PROTHROMBIN TIME: CPT

## 2022-08-12 PROCEDURE — 36416 COLLJ CAPILLARY BLOOD SPEC: CPT

## 2022-08-22 RX ORDER — CARVEDILOL 25 MG/1
25 TABLET ORAL 2 TIMES DAILY WITH MEALS
Qty: 180 TABLET | Refills: 3 | Status: SHIPPED | OUTPATIENT
Start: 2022-08-22

## 2022-08-22 NOTE — TELEPHONE ENCOUNTER
Rx Refill Note  Requested Prescriptions     Pending Prescriptions Disp Refills   • carvedilol (COREG) 25 MG tablet 180 tablet 3     Sig: Take 1 tablet by mouth 2 (Two) Times a Day With Meals.      Last office visit with prescribing clinician: 3/30/2022      Next office visit with prescribing clinician: 4/3/2023            Vanita Pittman MA  08/22/22, 14:01 EDT

## 2022-08-25 NOTE — PROGRESS NOTES
[facility] Nutrition Follow Up:     Weight 166#, decreased 2#.   Patient reports diarrhea, several times a day. He is drinking Boost plus 3-4 per day. Drinking water and gatorade, eating eggs, mashed potatoes, soups, not much else. Says his mouth feels better with mouth rinse.   His wife is in the hospital. He has a cardiologist appointment this morning.   Prescription for lomotil being called in for him.  I asked him to continue the Boost plus as this is providing the majority of his caloric intake at this time. A PEG is not realistic for his home situation right now. Hopefully diarrhea will improve with lomotil. I encouraged him to continue to push fluids and to try to get 4-5 Boost plus in each day as well as frequent intake of the other foods he has been eating.   
No

## 2022-08-26 ENCOUNTER — ANTICOAGULATION VISIT (OUTPATIENT)
Dept: PHARMACY | Facility: HOSPITAL | Age: 77
End: 2022-08-26

## 2022-08-26 DIAGNOSIS — I48.19 OTHER PERSISTENT ATRIAL FIBRILLATION: Primary | ICD-10-CM

## 2022-08-26 LAB
INR PPP: 2.6 (ref 0.91–1.09)
PROTHROMBIN TIME: 31.6 SECONDS (ref 10–13.8)

## 2022-08-26 PROCEDURE — 85610 PROTHROMBIN TIME: CPT

## 2022-08-26 PROCEDURE — 36416 COLLJ CAPILLARY BLOOD SPEC: CPT

## 2022-08-26 NOTE — PROGRESS NOTES
Anticoagulation Clinic Progress Note    Anticoagulation Summary  As of 2022    INR goal:  2.0-3.0   TTR:  60.5 % (3.8 y)   INR used for dosin.6 (2022)   Warfarin maintenance plan:  4 mg every Mon; 2 mg all other days   Weekly warfarin total:  16 mg   No change documented:  Georges Guadalupe, PharmD   Plan last modified:  Bernarda Lockwood RPH (2022)   Next INR check:  2022   Priority:  Maintenance   Target end date:  Indefinite    Indications    Other persistent atrial fibrillation (HCC) [I48.19]             Anticoagulation Episode Summary     INR check location:      Preferred lab:      Send INR reminders to:   [facility]    Comments:        Anticoagulation Care Providers     Provider Role Specialty Phone number    Myriam Omer MD Referring Cardiology [phone]          Clinic Interview:  Patient Findings     Negatives:  Signs/symptoms of thrombosis, Signs/symptoms of bleeding,   Laboratory test error suspected, Change in health, Change in alcohol use,   Change in activity, Upcoming invasive procedure, Emergency department   visit, Upcoming dental procedure, Missed doses, Extra doses, Change in   medications, Change in diet/appetite, Hospital admission, Bruising, Other   complaints      Clinical Outcomes     Negatives:  Major bleeding event, Thromboembolic event,   Anticoagulation-related hospital admission, Anticoagulation-related ED   visit, Anticoagulation-related fatality        INR History:  Anticoagulation Monitoring 2022   INR 3.0 3.3 2.6   INR Date 2022   INR Goal 2.0-3.0 2.0-3.0 2.0-3.0   Trend Same Down Same   Last Week Total 18 mg 18 mg 16 mg   Next Week Total 18 mg 16 mg 16 mg   Sun 2 mg 2 mg 2 mg   Mon 4 mg 4 mg 4 mg   Tue 2 mg 2 mg 2 mg   Wed 2 mg 2 mg 2 mg   Thu 2 mg 2 mg 2 mg   Fri 4 mg 2 mg 2 mg   Sat 2 mg 2 mg 2 mg   Visit Report - - -   Some recent data might be hidden       Plan:  1. INR is Therapeutic  today- see above in Anticoagulation Summary.  Will instruct Cody DAVIES Chente to Continue their warfarin regimen- see above in Anticoagulation Summary.  2. Follow up in 2 weeks  3. Patient declines warfarin refills.  4. Verbal and written information provided. Patient expresses understanding and has no further questions at this time.    Georges Guadalupe, PharmD

## 2022-08-31 RX ORDER — LEVOTHYROXINE SODIUM 0.12 MG/1
125 TABLET ORAL DAILY
Qty: 90 TABLET | Refills: 2 | Status: SHIPPED | OUTPATIENT
Start: 2022-08-31

## 2022-09-13 ENCOUNTER — ANTICOAGULATION VISIT (OUTPATIENT)
Dept: PHARMACY | Facility: HOSPITAL | Age: 77
End: 2022-09-13

## 2022-09-13 DIAGNOSIS — I48.19 OTHER PERSISTENT ATRIAL FIBRILLATION: Primary | ICD-10-CM

## 2022-09-13 LAB
INR PPP: 1.9 (ref 0.91–1.09)
PROTHROMBIN TIME: 22.3 SECONDS (ref 10–13.8)

## 2022-09-13 PROCEDURE — 85610 PROTHROMBIN TIME: CPT

## 2022-09-13 PROCEDURE — 36416 COLLJ CAPILLARY BLOOD SPEC: CPT

## 2022-09-13 PROCEDURE — G0463 HOSPITAL OUTPT CLINIC VISIT: HCPCS

## 2022-09-13 NOTE — PROGRESS NOTES
Anticoagulation Clinic Progress Note    Anticoagulation Summary  As of 2022    INR goal:  2.0-3.0   TTR:  60.8 % (3.8 y)   INR used for dosin.9 (2022)   Warfarin maintenance plan:  4 mg every Mon, Fri; 2 mg all other days   Weekly warfarin total:  18 mg   Plan last modified:  Paul Feldman [facility] (2022)   Next INR check:     Priority:  Maintenance   Target end date:  Indefinite    Indications    Other persistent atrial fibrillation (HCC) [I48.19]             Anticoagulation Episode Summary     INR check location:      Preferred lab:      Send INR reminders to:   [facility] [place]    Comments:        Anticoagulation Care Providers     Provider Role Specialty Phone number    Myriam Omer MD Referring Cardiology [phone]          Clinic Interview:  Patient Findings     Negatives:  Signs/symptoms of thrombosis, Signs/symptoms of bleeding,   Laboratory test error suspected, Change in health, Change in alcohol use,   Change in activity, Upcoming invasive procedure, Emergency department   visit, Upcoming dental procedure, Missed doses, Extra doses, Change in   medications, Change in diet/appetite, Hospital admission, Bruising, Other   complaints    Comments:  Patient reports no changes to diet or medications. Mentions an   upcoming eye surgery.       Clinical Outcomes     Negatives:  Major bleeding event, Thromboembolic event,   Anticoagulation-related hospital admission, Anticoagulation-related ED   visit, Anticoagulation-related fatality    Comments:  Patient reports no changes to diet or medications. Mentions an   upcoming eye surgery.         INR History:  Anticoagulation Monitoring 2022   INR 3.3 2.6 1.9   INR Date 2022   INR Goal 2.0-3.0 2.0-3.0 2.0-3.0   Trend Down Same Up   Last Week Total 18 mg 16 mg 16 mg   Next Week Total 16 mg 16 mg 18 mg   Sun 2 mg 2 mg 2 mg   Mon 4 mg 4 mg 4 mg   Tue 2 mg 2 mg 2 mg   Wed 2 mg 2 mg 2  mg   Thu 2 mg 2 mg 2 mg   Fri 2 mg 2 mg 4 mg   Sat 2 mg 2 mg 2 mg   Visit Report - - -   Some recent data might be hidden       Plan:  1. INR is Subtherapeutic today- see above in Anticoagulation Summary.  Will instruct Cody Eldridge to Change their warfarin regimen- see above in Anticoagulation Summary.  2. Follow up in 2 weeks  3. Patient declines warfarin refills.  4. Verbal and written information provided. Patient expresses understanding and has no further questions at this time.    Paul Feldman, PharmD  Pharmacy Resident

## 2022-09-14 RX ORDER — ALPRAZOLAM 0.5 MG/1
TABLET ORAL
Qty: 45 TABLET | Refills: 1 | Status: SHIPPED | OUTPATIENT
Start: 2022-09-14 | End: 2022-09-30 | Stop reason: SDUPTHER

## 2022-09-14 RX ORDER — MONTELUKAST SODIUM 10 MG/1
TABLET ORAL
Qty: 90 TABLET | Refills: 1 | Status: SHIPPED | OUTPATIENT
Start: 2022-09-14 | End: 2023-03-16

## 2022-09-27 ENCOUNTER — ANTICOAGULATION VISIT (OUTPATIENT)
Dept: PHARMACY | Facility: HOSPITAL | Age: 77
End: 2022-09-27

## 2022-09-27 DIAGNOSIS — I48.19 OTHER PERSISTENT ATRIAL FIBRILLATION: Primary | ICD-10-CM

## 2022-09-27 LAB
INR PPP: 2.7 (ref 0.91–1.09)
PROTHROMBIN TIME: 32.2 SECONDS (ref 10–13.8)

## 2022-09-27 PROCEDURE — 36416 COLLJ CAPILLARY BLOOD SPEC: CPT

## 2022-09-27 PROCEDURE — 85610 PROTHROMBIN TIME: CPT

## 2022-09-27 NOTE — PROGRESS NOTES
Anticoagulation Clinic Progress Note    Anticoagulation Summary  As of 2022    INR goal:  2.0-3.0   TTR:  61.1 % (3.8 y)   INR used for dosin.7 (2022)   Warfarin maintenance plan:  4 mg every Mon, Fri; 2 mg all other days   Weekly warfarin total:  18 mg   Plan last modified:  Paul Feldman [facility] (2022)   Next INR check:  10/11/2022   Priority:  Maintenance   Target end date:  Indefinite    Indications    Other persistent atrial fibrillation (HCC) [I48.19]             Anticoagulation Episode Summary     INR check location:      Preferred lab:      Send INR reminders to:   [facility] [place]    Comments:        Anticoagulation Care Providers     Provider Role Specialty Phone number    Myriam Omer MD Referring Cardiology [phone]          Clinic Interview:  Patient Findings     Negatives:  Signs/symptoms of thrombosis, Signs/symptoms of bleeding,   Laboratory test error suspected, Change in health, Change in alcohol use,   Change in activity, Upcoming invasive procedure, Emergency department   visit, Upcoming dental procedure, Missed doses, Extra doses, Change in   medications, Change in diet/appetite, Hospital admission, Bruising, Other   complaints    Comments:  No change in diet. No upcoming procedures, medications and   health have not changes.      Clinical Outcomes     Negatives:  Major bleeding event, Thromboembolic event,   Anticoagulation-related hospital admission, Anticoagulation-related ED   visit, Anticoagulation-related fatality    Comments:  No change in diet. No upcoming procedures, medications and   health have not changes.        INR History:  Anticoagulation Monitoring 2022   INR 2.6 1.9 2.7   INR Date 2022   INR Goal 2.0-3.0 2.0-3.0 2.0-3.0   Trend Same Up Same   Last Week Total 16 mg 16 mg 18 mg   Next Week Total 16 mg 18 mg 18 mg   Sun 2 mg 2 mg 2 mg   Mon 4 mg 4 mg 4 mg   Tue 2 mg 2 mg 2 mg   Wed 2 mg  2 mg 2 mg   Thu 2 mg 2 mg 2 mg   Fri 2 mg 4 mg 4 mg   Sat 2 mg 2 mg 2 mg   Visit Report - - -   Some recent data might be hidden       Plan:  1. INR is Therapeutic today- see above in Anticoagulation Summary.  Will instruct Cody Eldridge to Continue their warfarin regimen- see above in Anticoagulation Summary.  2. Follow up in 2 weeks  3. Patient declines warfarin refills.  4. Verbal and written information provided. Patient expresses understanding and has no further questions at this time.    Emmanuel Lowry, Pharmacy Intern

## 2022-09-27 NOTE — PROGRESS NOTES
I have supervised and reviewed the notes, assessments, and/or procedures performed by our Pharmacy Intern. The documented assessment and plan were developed cooperatively. I concur with the documentation of this patient encounter.    Georges Guadalupe, PharmD

## 2022-09-30 ENCOUNTER — OFFICE VISIT (OUTPATIENT)
Dept: INTERNAL MEDICINE | Facility: CLINIC | Age: 77
End: 2022-09-30

## 2022-09-30 VITALS
OXYGEN SATURATION: 97 % | HEIGHT: 71 IN | WEIGHT: 161.8 LBS | BODY MASS INDEX: 22.65 KG/M2 | SYSTOLIC BLOOD PRESSURE: 118 MMHG | HEART RATE: 69 BPM | DIASTOLIC BLOOD PRESSURE: 50 MMHG

## 2022-09-30 DIAGNOSIS — R53.83 FATIGUE, UNSPECIFIED TYPE: Primary | ICD-10-CM

## 2022-09-30 PROCEDURE — 99213 OFFICE O/P EST LOW 20 MIN: CPT | Performed by: FAMILY MEDICINE

## 2022-09-30 RX ORDER — CYCLOBENZAPRINE HCL 5 MG
TABLET ORAL
COMMUNITY
End: 2022-09-30

## 2022-09-30 RX ORDER — ALPRAZOLAM 0.5 MG/1
0.25 TABLET ORAL NIGHTLY PRN
Qty: 30 TABLET | Refills: 1
Start: 2022-09-30 | End: 2022-11-28

## 2022-09-30 NOTE — PROGRESS NOTES
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    Weight as of this encounter: 73.4 kg (161 lb 12.8 oz).    BMI is within normal parameters. No other follow-up for BMI required.      Physical Exam  Vitals and nursing note reviewed.   HENT:      Head: Normocephalic and atraumatic.   Cardiovascular:      Rate and Rhythm: Normal rate and regular rhythm.      Pulses: Normal pulses.      Heart sounds: Normal heart sounds.   Pulmonary:      Effort: Pulmonary effort is normal.      Comments: Distant breath sounds  Musculoskeletal:      Right lower leg: No edema.      Left lower leg: No edema.   Skin:     General: Skin is warm and dry.   Neurological:      Mental Status: He is alert.   Psychiatric:         Mood and Affect: Mood normal.         Behavior: Behavior normal.         Thought Content: Thought content normal.         Judgment: Judgment normal.        Result Review :    Common labs    Common Labs 10/29/21 10/29/21 10/29/21 5/31/22 5/31/22    0905 0905 0905 1412 1412   Glucose  99  95    BUN  32 (A)  22    Creatinine  1.39 (A)  1.33 (A)    eGFR Non  Am  50 (A)    "   Sodium  138  141    Potassium  4.7  3.9    Chloride  105  106    Calcium  8.7  9.0    Albumin  3.90  3.90    Total Bilirubin  0.4  0.4    Alkaline Phosphatase  73  63    AST (SGOT)  24  22    ALT (SGPT)  16  10    WBC 7.76       Hemoglobin 11.9 (A)       Hematocrit 37.0 (A)  35.5 (A)     Platelets 179       Total Cholesterol     111   Triglycerides     55   HDL Cholesterol     51   LDL Cholesterol      47   (A) Abnormal value                      Assessment and Plan   Diagnoses and all orders for this visit:    1. Fatigue, unspecified type (Primary)  -     ALPRAZolam (XANAX) 0.5 MG tablet; Take 0.5 tablets by mouth At Night As Needed for Anxiety. TAKE 1 TABLET BY MOUTH EVERY MORNING THEN TAKE 1/2 TABLET BY MOUTH EVERY EVENING  Dispense: 30 tablet; Refill: 1  -     CBC & Differential    Decrease Xanax  Stop baclofen  Monitor blood pressure and heart rate  Increase fluids so his urine becomes more clear and colorless         Follow Up   Return in about 1 month (around 10/30/2022), or if symptoms worsen or fail to improve, for Recheck.  Patient was given instructions and counseling regarding his condition or for health maintenance advice. Please see specific information pulled into the AVS if appropriate.

## 2022-10-11 ENCOUNTER — ANTICOAGULATION VISIT (OUTPATIENT)
Dept: PHARMACY | Facility: HOSPITAL | Age: 77
End: 2022-10-11

## 2022-10-11 DIAGNOSIS — I48.19 OTHER PERSISTENT ATRIAL FIBRILLATION: Primary | ICD-10-CM

## 2022-10-11 LAB
INR PPP: 2.7 (ref 0.91–1.09)
PROTHROMBIN TIME: 32.6 SECONDS (ref 10–13.8)

## 2022-10-11 PROCEDURE — 85610 PROTHROMBIN TIME: CPT

## 2022-10-11 PROCEDURE — 36416 COLLJ CAPILLARY BLOOD SPEC: CPT

## 2022-10-11 NOTE — PROGRESS NOTES
Anticoagulation Clinic Progress Note    Anticoagulation Summary  As of 10/11/2022    INR goal:  2.0-3.0   TTR:  61.5 % (3.9 y)   INR used for dosin.7 (10/11/2022)   Warfarin maintenance plan:  4 mg every Mon, Fri; 2 mg all other days   Weekly warfarin total:  18 mg   Plan last modified:  Paul Feldman [facility] (2022)   Next INR check:  2022   Priority:  Maintenance   Target end date:  Indefinite    Indications    Other persistent atrial fibrillation (HCC) [I48.19]             Anticoagulation Episode Summary     INR check location:      Preferred lab:      Send INR reminders to:   [facility]    Comments:        Anticoagulation Care Providers     Provider Role Specialty Phone number    Myriam Omer MD Referring Cardiology [phone]          Clinic Interview:  Patient Findings     Negatives:  Signs/symptoms of thrombosis, Signs/symptoms of bleeding,   Laboratory test error suspected, Change in health, Change in alcohol use,   Change in activity, Upcoming invasive procedure, Emergency department   visit, Upcoming dental procedure, Missed doses, Extra doses, Change in   medications, Change in diet/appetite, Hospital admission, Bruising, Other   complaints      Clinical Outcomes     Negatives:  Major bleeding event, Thromboembolic event,   Anticoagulation-related hospital admission, Anticoagulation-related ED   visit, Anticoagulation-related fatality        INR History:  Anticoagulation Monitoring 2022 2022 10/11/2022   INR 1.9 2.7 2.7   INR Date 2022 2022 10/11/2022   INR Goal 2.0-3.0 2.0-3.0 2.0-3.0   Trend Up Same Same   Last Week Total 16 mg 18 mg 18 mg   Next Week Total 18 mg 18 mg 18 mg   Sun 2 mg 2 mg 2 mg   Mon 4 mg 4 mg 4 mg   Tue 2 mg 2 mg 2 mg   Wed 2 mg 2 mg 2 mg   Thu 2 mg 2 mg 2 mg   Fri 4 mg 4 mg 4 mg   Sat 2 mg 2 mg 2 mg   Visit Report - - -   Some recent data might be hidden       Plan:  1. INR is Therapeutic today- see above in  Anticoagulation Summary.  Will instruct oCdy Eldridge to continue his current warfarin regimen- see above in Anticoagulation Summary.  2. Follow up in 3 weeks  3. Patient declines warfarin refills.  4. Verbal and written information provided. Patient expresses understanding and has no further questions at this time.    Meghann Blakely, [facility]

## 2022-10-28 ENCOUNTER — OFFICE VISIT (OUTPATIENT)
Dept: ORTHOPEDIC SURGERY | Facility: CLINIC | Age: 77
End: 2022-10-28

## 2022-10-28 VITALS — WEIGHT: 161 LBS | BODY MASS INDEX: 22.54 KG/M2 | TEMPERATURE: 97.5 F | HEIGHT: 71 IN

## 2022-10-28 DIAGNOSIS — M17.0 BILATERAL PRIMARY OSTEOARTHRITIS OF KNEE: Primary | ICD-10-CM

## 2022-10-28 PROCEDURE — 20610 DRAIN/INJ JOINT/BURSA W/O US: CPT | Performed by: NURSE PRACTITIONER

## 2022-10-28 RX ADMIN — LIDOCAINE HYDROCHLORIDE 2 ML: 20 INJECTION, SOLUTION EPIDURAL; INFILTRATION; INTRACAUDAL; PERINEURAL at 14:20

## 2022-10-28 RX ADMIN — METHYLPREDNISOLONE ACETATE 80 MG: 80 INJECTION, SUSPENSION INTRA-ARTICULAR; INTRALESIONAL; INTRAMUSCULAR; SOFT TISSUE at 14:20

## 2022-10-29 RX ORDER — METHYLPREDNISOLONE ACETATE 80 MG/ML
80 INJECTION, SUSPENSION INTRA-ARTICULAR; INTRALESIONAL; INTRAMUSCULAR; SOFT TISSUE
Status: COMPLETED | OUTPATIENT
Start: 2022-10-28 | End: 2022-10-28

## 2022-10-29 RX ORDER — LIDOCAINE HYDROCHLORIDE 20 MG/ML
2 INJECTION, SOLUTION EPIDURAL; INFILTRATION; INTRACAUDAL; PERINEURAL
Status: COMPLETED | OUTPATIENT
Start: 2022-10-28 | End: 2022-10-28

## 2022-10-29 NOTE — PROGRESS NOTES
Mr. Eldridge comes in today for follow-up.  Injections have worked well in the past.  The patient would like to get repeat injections today.  The risks, benefits and alternatives were discussed and the patient consented.  Going forward, the patient will follow-up as needed.    JONELLE Cervantes    10/28/2022      Large Joint Arthrocentesis: R knee  Date/Time: 10/28/2022 2:20 PM  Consent given by: patient  Site marked: site marked  Timeout: Immediately prior to procedure a time out was called to verify the correct patient, procedure, equipment, support staff and site/side marked as required   Supporting Documentation  Indications: pain   Procedure Details  Location: knee - R knee  Preparation: Patient was prepped and draped in the usual sterile fashion  Needle gauge: 21 G.  Approach: anterolateral  Medications administered: 2 mL lidocaine PF 2% 2 %; 80 mg methylPREDNISolone acetate 80 MG/ML  Patient tolerance: patient tolerated the procedure well with no immediate complications    Large Joint Arthrocentesis: L knee  Date/Time: 10/28/2022 2:20 PM  Consent given by: patient  Site marked: site marked  Timeout: Immediately prior to procedure a time out was called to verify the correct patient, procedure, equipment, support staff and site/side marked as required   Supporting Documentation  Indications: pain   Procedure Details  Location: knee - L knee  Preparation: Patient was prepped and draped in the usual sterile fashion  Needle gauge: 21 G.  Approach: anterolateral  Medications administered: 2 mL lidocaine PF 2% 2 %; 80 mg methylPREDNISolone acetate 80 MG/ML  Patient tolerance: patient tolerated the procedure well with no immediate complications

## 2022-11-04 ENCOUNTER — ANTICOAGULATION VISIT (OUTPATIENT)
Dept: PHARMACY | Facility: HOSPITAL | Age: 77
End: 2022-11-04

## 2022-11-04 DIAGNOSIS — I48.19 OTHER PERSISTENT ATRIAL FIBRILLATION: Primary | ICD-10-CM

## 2022-11-04 LAB
INR PPP: 2.6 (ref 0.91–1.09)
PROTHROMBIN TIME: 30.7 SECONDS (ref 10–13.8)

## 2022-11-04 PROCEDURE — 85610 PROTHROMBIN TIME: CPT

## 2022-11-04 PROCEDURE — 36416 COLLJ CAPILLARY BLOOD SPEC: CPT

## 2022-11-04 RX ORDER — WARFARIN SODIUM 2 MG/1
TABLET ORAL
Qty: 120 TABLET | Refills: 1 | Status: ON HOLD | OUTPATIENT
Start: 2022-11-04 | End: 2023-03-24 | Stop reason: SDUPTHER

## 2022-11-04 NOTE — PROGRESS NOTES
Anticoagulation Clinic Progress Note    Anticoagulation Summary  As of 2022    INR goal:  2.0-3.0   TTR:  62.1 % (3.9 y)   INR used for dosin.6 (2022)   Warfarin maintenance plan:  4 mg every Mon, Fri; 2 mg all other days   Weekly warfarin total:  18 mg   No change documented:  Georges Guadalupe, PharmD   Plan last modified:  Paul Feldman [facility] (2022)   Next INR check:  2022   Priority:  Maintenance   Target end date:  Indefinite    Indications    Other persistent atrial fibrillation (HCC) [I48.19]             Anticoagulation Episode Summary     INR check location:      Preferred lab:      Send INR reminders to:   [facility]    Comments:        Anticoagulation Care Providers     Provider Role Specialty Phone number    Myriam Omer MD Referring Cardiology [phone]          Clinic Interview:  Patient Findings     Negatives:  Signs/symptoms of thrombosis, Signs/symptoms of bleeding,   Laboratory test error suspected, Change in health, Change in alcohol use,   Change in activity, Upcoming invasive procedure, Emergency department   visit, Upcoming dental procedure, Missed doses, Extra doses, Change in   medications, Change in diet/appetite, Hospital admission, Bruising, Other   complaints      Clinical Outcomes     Negatives:  Major bleeding event, Thromboembolic event,   Anticoagulation-related hospital admission, Anticoagulation-related ED   visit, Anticoagulation-related fatality        INR History:  Anticoagulation Monitoring 2022 10/11/2022 2022   INR 2.7 2.7 2.6   INR Date 2022 10/11/2022 2022   INR Goal 2.0-3.0 2.0-3.0 2.0-3.0   Trend Same Same Same   Last Week Total 18 mg 18 mg 18 mg   Next Week Total 18 mg 18 mg 18 mg   Sun 2 mg 2 mg 2 mg   Mon 4 mg 4 mg 4 mg   Tue 2 mg 2 mg 2 mg   Wed 2 mg 2 mg 2 mg   Thu 2 mg 2 mg 2 mg   Fri 4 mg 4 mg 4 mg   Sat 2 mg 2 mg 2 mg   Visit Report - - -   Some recent data might be hidden       Plan:  1. INR is  Therapeutic today- see above in Anticoagulation Summary.  Will instruct Cody DAVIES Chente to Continue their warfarin regimen- see above in Anticoagulation Summary.  2. Follow up in 4 weeks  3. Patient declines warfarin refills.  4. Verbal and written information provided. Patient expresses understanding and has no further questions at this time.    Georges Guadalupe, PharmD

## 2022-11-25 DIAGNOSIS — R53.83 FATIGUE, UNSPECIFIED TYPE: ICD-10-CM

## 2022-11-28 RX ORDER — ALPRAZOLAM 0.5 MG/1
TABLET ORAL
Qty: 45 TABLET | Refills: 0 | Status: SHIPPED | OUTPATIENT
Start: 2022-11-28 | End: 2022-12-29

## 2022-12-14 ENCOUNTER — TELEPHONE (OUTPATIENT)
Dept: CARDIOLOGY | Facility: CLINIC | Age: 77
End: 2022-12-14

## 2022-12-14 DIAGNOSIS — E78.2 MIXED HYPERLIPIDEMIA: ICD-10-CM

## 2022-12-14 DIAGNOSIS — I25.10 CORONARY ARTERY DISEASE INVOLVING NATIVE CORONARY ARTERY OF NATIVE HEART WITHOUT ANGINA PECTORIS: Primary | ICD-10-CM

## 2022-12-14 DIAGNOSIS — I50.32 CHRONIC DIASTOLIC CONGESTIVE HEART FAILURE: ICD-10-CM

## 2022-12-14 DIAGNOSIS — I10 ESSENTIAL HYPERTENSION: ICD-10-CM

## 2022-12-14 NOTE — TELEPHONE ENCOUNTER
Let him know that I want him to get blood work either tomorrow or prior to his appointment on Friday.  This will help me to figure out if his shortness of breath is more heart or lungs and how we can adjust his diuretics.  I placed the orders.

## 2022-12-14 NOTE — TELEPHONE ENCOUNTER
On previous phone call, Cody Eldridge stated that if he was not home, that it is ok to discuss recommendations with his spouse Elise.    Called Cody Eldridge to review recommendations, however he was not home.  Reviewed recommendations with Elise and she verbalized understanding.  Elise stated she will share with patient and have him come to outpatient lab tomorrow.    Thank you,  Shanna Thompson RN  [place] Nurse [facility] [place]

## 2022-12-14 NOTE — TELEPHONE ENCOUNTER
"Cody Eldridge called to request an appointment.  Patient reports worsening shortness of breath with even minimal exertion and occasional dizziness during exertion as well.  Patient reports he has \"lung problems\" but that he doesn't think his shortness of breath is because of his COPD.  Patient stated he does not think he is in atrial fibrillation.    Patient denies chest pain, lower extremity swelling, abdominal swelling, weight gain, cough and wheezing.  Patient stated he uses two pillows to sleep at night.    Oxygen sat: 97%  HR: 48 (on pulse ox)  /97, HR 72 (on bp machine)    Cardiac Meds  Aspirin 81 mg, daily  Warfarin  Carvedilol 25 mg, BID  Chlorthalidone 25 mg, 0.5 tablets daily  Lisinopril 20 mg, daily  Rosuvastatin 20 mg, daily    Patient expressed concern about ongoing shortness of breath and dizziness and is requesting an appointment today.  No openings today, however there is opening on 12/16 at 9:20am with Dr. Omer.  Patient is agreeable to this appointment and has been scheduled.    Please let me know if there is anything else you would like me to do for this patient.    Thank you,  Shanna Thompson RN  [place]riage Nurse [facility] [place]  "

## 2022-12-15 ENCOUNTER — LAB (OUTPATIENT)
Dept: LAB | Facility: HOSPITAL | Age: 77
End: 2022-12-15

## 2022-12-15 DIAGNOSIS — E78.2 MIXED HYPERLIPIDEMIA: ICD-10-CM

## 2022-12-15 DIAGNOSIS — I10 ESSENTIAL HYPERTENSION: ICD-10-CM

## 2022-12-15 DIAGNOSIS — I50.32 CHRONIC DIASTOLIC CONGESTIVE HEART FAILURE: ICD-10-CM

## 2022-12-15 DIAGNOSIS — I25.10 CORONARY ARTERY DISEASE INVOLVING NATIVE CORONARY ARTERY OF NATIVE HEART WITHOUT ANGINA PECTORIS: ICD-10-CM

## 2022-12-15 LAB
ALBUMIN SERPL-MCNC: 3.9 G/DL (ref 3.5–5.2)
ALBUMIN/GLOB SERPL: 1.3 G/DL
ALP SERPL-CCNC: 71 U/L (ref 39–117)
ALT SERPL W P-5'-P-CCNC: 14 U/L (ref 1–41)
ANION GAP SERPL CALCULATED.3IONS-SCNC: 6.6 MMOL/L (ref 5–15)
AST SERPL-CCNC: 21 U/L (ref 1–40)
BILIRUB SERPL-MCNC: 0.4 MG/DL (ref 0–1.2)
BUN SERPL-MCNC: 27 MG/DL (ref 8–23)
BUN/CREAT SERPL: 17.3 (ref 7–25)
CALCIUM SPEC-SCNC: 8.6 MG/DL (ref 8.6–10.5)
CHLORIDE SERPL-SCNC: 103 MMOL/L (ref 98–107)
CO2 SERPL-SCNC: 27.4 MMOL/L (ref 22–29)
CREAT SERPL-MCNC: 1.56 MG/DL (ref 0.76–1.27)
DEPRECATED RDW RBC AUTO: 43.6 FL (ref 37–54)
EGFRCR SERPLBLD CKD-EPI 2021: 45.2 ML/MIN/1.73
ERYTHROCYTE [DISTWIDTH] IN BLOOD BY AUTOMATED COUNT: 12.6 % (ref 12.3–15.4)
GLOBULIN UR ELPH-MCNC: 3.1 GM/DL
GLUCOSE SERPL-MCNC: 110 MG/DL (ref 65–99)
HCT VFR BLD AUTO: 37 % (ref 37.5–51)
HGB BLD-MCNC: 12.2 G/DL (ref 13–17.7)
MCH RBC QN AUTO: 30.9 PG (ref 26.6–33)
MCHC RBC AUTO-ENTMCNC: 33 G/DL (ref 31.5–35.7)
MCV RBC AUTO: 93.7 FL (ref 79–97)
NT-PROBNP SERPL-MCNC: 4866 PG/ML (ref 0–1800)
PLATELET # BLD AUTO: 156 10*3/MM3 (ref 140–450)
PMV BLD AUTO: 10.3 FL (ref 6–12)
POTASSIUM SERPL-SCNC: 4.1 MMOL/L (ref 3.5–5.2)
PROT SERPL-MCNC: 7 G/DL (ref 6–8.5)
RBC # BLD AUTO: 3.95 10*6/MM3 (ref 4.14–5.8)
SODIUM SERPL-SCNC: 137 MMOL/L (ref 136–145)
T-UPTAKE NFR SERPL: 0.92 TBI (ref 0.8–1.3)
T4 SERPL-MCNC: 4.95 MCG/DL (ref 4.5–11.7)
TSH SERPL DL<=0.05 MIU/L-ACNC: 4.16 UIU/ML (ref 0.27–4.2)
WBC NRBC COR # BLD: 5.82 10*3/MM3 (ref 3.4–10.8)

## 2022-12-15 PROCEDURE — 84436 ASSAY OF TOTAL THYROXINE: CPT

## 2022-12-15 PROCEDURE — 80053 COMPREHEN METABOLIC PANEL: CPT

## 2022-12-15 PROCEDURE — 84479 ASSAY OF THYROID (T3 OR T4): CPT

## 2022-12-15 PROCEDURE — 84443 ASSAY THYROID STIM HORMONE: CPT

## 2022-12-15 PROCEDURE — 36415 COLL VENOUS BLD VENIPUNCTURE: CPT

## 2022-12-15 PROCEDURE — 83880 ASSAY OF NATRIURETIC PEPTIDE: CPT

## 2022-12-15 PROCEDURE — 85027 COMPLETE CBC AUTOMATED: CPT

## 2022-12-16 ENCOUNTER — HOSPITAL ENCOUNTER (OUTPATIENT)
Dept: CARDIOLOGY | Facility: HOSPITAL | Age: 77
Discharge: HOME | End: 2022-12-16
Admitting: INTERNAL MEDICINE

## 2022-12-16 ENCOUNTER — OFFICE VISIT (OUTPATIENT)
Dept: CARDIOLOGY | Facility: CLINIC | Age: 77
End: 2022-12-16

## 2022-12-16 VITALS
HEIGHT: 71 IN | WEIGHT: 169 LBS | SYSTOLIC BLOOD PRESSURE: 120 MMHG | HEART RATE: 61 BPM | OXYGEN SATURATION: 99 % | BODY MASS INDEX: 23.66 KG/M2 | DIASTOLIC BLOOD PRESSURE: 64 MMHG | RESPIRATION RATE: 16 BRPM

## 2022-12-16 DIAGNOSIS — E78.2 MIXED HYPERLIPIDEMIA: ICD-10-CM

## 2022-12-16 DIAGNOSIS — I48.19 OTHER PERSISTENT ATRIAL FIBRILLATION: ICD-10-CM

## 2022-12-16 DIAGNOSIS — R06.09 DOE (DYSPNEA ON EXERTION): ICD-10-CM

## 2022-12-16 DIAGNOSIS — I36.1 NON-RHEUMATIC TRICUSPID VALVE INSUFFICIENCY: ICD-10-CM

## 2022-12-16 DIAGNOSIS — I25.10 CORONARY ARTERY DISEASE INVOLVING NATIVE CORONARY ARTERY OF NATIVE HEART WITHOUT ANGINA PECTORIS: ICD-10-CM

## 2022-12-16 DIAGNOSIS — I50.32 CHRONIC DIASTOLIC CONGESTIVE HEART FAILURE: Primary | ICD-10-CM

## 2022-12-16 PROCEDURE — 96374 THER/PROPH/DIAG INJ IV PUSH: CPT

## 2022-12-16 PROCEDURE — 93000 ELECTROCARDIOGRAM COMPLETE: CPT | Performed by: INTERNAL MEDICINE

## 2022-12-16 PROCEDURE — 36415 COLL VENOUS BLD VENIPUNCTURE: CPT

## 2022-12-16 PROCEDURE — 99214 OFFICE O/P EST MOD 30 MIN: CPT | Performed by: INTERNAL MEDICINE

## 2022-12-16 PROCEDURE — 25010000002 FUROSEMIDE PER 20 MG: Performed by: INTERNAL MEDICINE

## 2022-12-16 RX ORDER — FUROSEMIDE 10 MG/ML
40 INJECTION INTRAMUSCULAR; INTRAVENOUS ONCE
Status: COMPLETED | OUTPATIENT
Start: 2022-12-16 | End: 2022-12-16

## 2022-12-16 RX ADMIN — FUROSEMIDE 40 MG: 10 INJECTION, SOLUTION INTRAMUSCULAR; INTRAVENOUS at 10:04

## 2022-12-16 NOTE — PROGRESS NOTES
CARDIOLOGY    Myriam Omer MD    ENCOUNTER DATE:  12/16/2022    Cody Eldridge / 77 y.o. / male        CHIEF COMPLAINT / REASON FOR OFFICE VISIT     Heart Problem (Follow up for Shortness of breath )      HISTORY OF PRESENT ILLNESS       HPI    Cody Eldridge is a 77 y.o. male     This is a nice gentleman who saw Dr. Travis in the remote past. He is a difficult historian. Per Dr. Travis's note from 2009 he had SVT with ablation in 2006 at the [facility]. He also has COPD, hypertension and hyperlipidemia. To me, he denies coronary disease but his old records show that he has had a non-ST elevation myocardial infarction in the past. He had an echo in 10/2009, which showed normal left ventricular systolic function with an ejection fraction of 64%. There was mild right and left atrial enlargement and mild mitral and tricuspid regurgitation with a right ventricular systolic pressure of 39 mmHg.      In the spring of 2018, he was complaining of lots of shortness of breath.  He eventually had a heart catheterization in August 2018 which showed mild nonobstructive coronary disease with a left ventricular end-diastolic pressure of 10.  He has had intermittent blood pressure issues.     Unfortunately, he was diagnosed with throat cancer.  I saw him in the office in July 2019 and he was having a hard time staying hydrated.  He was hypotensive.  He was seen by Dr. Colin in the [facility] for poor fluid intake and weight loss.  She gave him some IV fluids and IV metoprolol and we tried oral amiodarone.  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  He had MSSA sepsis from an infected chemotherapy port.  Chlorthalidone was added to his medication 2021 for better blood pressure control.    He called in the other day complaining of shortness of breath with exertion.  He did not  "feel it was related to his COPD.  I had him go for blood work prior to this appointment and his BNP was elevated at 4866 but was less than where it was in 2019 at 8000 and approximately where it was in 2018.  Thyroid tests were normal.  CBC was consistent with where he has been in the past.  CMP shows creatinine was at 1.56 which was elevated from 1.33 in May 2022.     His biggest complaint is shortness of breath.  No lower extremity edema.  No chest pain.  Blood pressure controlled    REVIEW OF SYSTEMS     Review of Systems   Constitutional: Negative for chills, fever, weight gain and weight loss.   Cardiovascular: Negative for leg swelling.   Respiratory: Negative for cough, snoring and wheezing.    Hematologic/Lymphatic: Negative for bleeding problem. Does not bruise/bleed easily.   Skin: Negative for color change.   Musculoskeletal: Negative for falls, joint pain and myalgias.   Gastrointestinal: Negative for melena.   Genitourinary: Negative for hematuria.   Neurological: Negative for excessive daytime sleepiness.   Psychiatric/Behavioral: Negative for depression. The patient is not nervous/anxious.          VITAL SIGNS     Visit Vitals  /64 (BP Location: Left arm, Patient Position: Sitting, Cuff Size: Adult)   Pulse 61   Resp 16   Ht 180.3 cm (71\")   Wt 76.7 kg (169 lb)   SpO2 99%   BMI 23.57 kg/m²         Wt Readings from Last 3 Encounters:   12/16/22 76.7 kg (169 lb)   10/28/22 73 kg (161 lb)   09/30/22 73.4 kg (161 lb 12.8 oz)     Body mass index is 23.57 kg/m².      PHYSICAL EXAMINATION     Constitutional:       General: Not in acute distress.  Neck:      Vascular: No carotid bruit or JVD.   Pulmonary:      Effort: Pulmonary effort is normal.      Breath sounds: Examination of the right-lower field reveals wheezing. Examination of the left-lower field reveals wheezing. Wheezing present.   Cardiovascular:      Normal rate. Regular rhythm.      Murmurs: There is no murmur.   Psychiatric:         Mood " and Affect: Mood and affect normal.           REVIEWED DATA       ECG 12 Lead    Date/Time: 12/16/2022 9:56 AM  Performed by: Myriam Omer MD  Authorized by: Myriam Omer MD   Comparison: compared with previous ECG from 3/30/2022  Similar to previous ECG  Rhythm: atrial fibrillation  BPM: 64  Conduction: conduction normal  ST Segments: ST segments normal    Clinical impression: abnormal EKG              Lipid Panel    Lipid Panel 5/31/22   Total Cholesterol 111   Triglycerides 55   HDL Cholesterol 51   VLDL Cholesterol 13   LDL Cholesterol  47   LDL/HDL Ratio 0.96             Lab Results   Component Value Date    GLUCOSE 110 (H) 12/15/2022    BUN 27 (H) 12/15/2022    CREATININE 1.56 (H) 12/15/2022    EGFRIFNONA 50 (L) 10/29/2021    EGFRIFAFRI 107 04/23/2018    BCR 17.3 12/15/2022    K 4.1 12/15/2022    CO2 27.4 12/15/2022    CALCIUM 8.6 12/15/2022    PROTENTOTREF 7.2 04/23/2018    ALBUMIN 3.90 12/15/2022    LABIL2 1.3 04/23/2018    AST 21 12/15/2022    ALT 14 12/15/2022       ASSESSMENT & PLAN      Diagnosis Plan   1. Chronic diastolic congestive heart failure (HCC)  Adult Transthoracic Echo Complete W/ Cont if Necessary Per Protocol    furosemide (LASIX) injection 40 mg    Basic Metabolic Panel      2. Coronary artery disease involving native coronary artery of native heart without angina pectoris  Adult Transthoracic Echo Complete W/ Cont if Necessary Per Protocol    furosemide (LASIX) injection 40 mg    Basic Metabolic Panel      3. Mixed hyperlipidemia  Adult Transthoracic Echo Complete W/ Cont if Necessary Per Protocol    furosemide (LASIX) injection 40 mg    Basic Metabolic Panel      4. Non-rheumatic tricuspid valve insufficiency  Adult Transthoracic Echo Complete W/ Cont if Necessary Per Protocol    furosemide (LASIX) injection 40 mg    Basic Metabolic Panel      5. Other persistent atrial fibrillation (HCC)  Adult Transthoracic Echo Complete W/ Cont if Necessary Per Protocol    furosemide (LASIX)  injection 40 mg    Basic Metabolic Panel      6. IRAHETA (dyspnea on exertion)  Adult Transthoracic Echo Complete W/ Cont if Necessary Per Protocol    furosemide (LASIX) injection 40 mg    Basic Metabolic Panel          1.  Nonobstructive coronary artery disease.  Continue with risk factor management.  No anginal symptoms.  2.  Persistent atrial fibrillation.  He is anticoagulated with warfarin.  He has a chads 2 vascular score of 3.  His INR has been well controlled.  He follows in the anticoagulation clinic.  Overall his rate is controlled.  Continue same.  3.  Lung disease/COPD with right heart failure.  This is followed by Dr. Segovia.  4.  Hypertension.  His blood pressure looks good on his current regimen.   5.  Squamous cell cancer of the throat.  Currently completed all his treatment.  He has a follow-up with Dr. Heaton tomorrow.  6.    Acute on chronic diastolic heart failure.  BNP is elevated.  I am going to give him 40 mg of IV Lasix in the office today.  Check an echocardiogram and a BN P next week and follow-up with one of her nurse practitioners to see if his shortness of breath seems to be due to diastolic heart failure.  If not, he may need to follow-up with  for pulmonary issues  7.  Hyperlipidemia.  I reviewed his lipid panel as above.  LDL is at goal.  Continue rosuvastatin.    1 week with a nurse practitioner also a be MP and echo at that time.    Orders Placed This Encounter   Procedures   • Basic Metabolic Panel     Standing Status:   Future     Standing Expiration Date:   12/16/2023     Order Specific Question:   Release to patient     Answer:   Routine Release   • ECG 12 Lead     This order was created via procedure documentation     Order Specific Question:   Release to patient     Answer:   Routine Release   • Adult Transthoracic Echo Complete W/ Cont if Necessary Per Protocol     Standing Status:   Future     Standing Expiration Date:   12/16/2023     Order Specific Question:   Reason  for exam?     Answer:   Dyspnea           MEDICATIONS         Discharge Medications          Accurate as of December 16, 2022  9:58 AM. If you have any questions, ask your nurse or doctor.            Continue These Medications      Instructions Start Date   albuterol sulfate  (90 Base) MCG/ACT inhaler  Commonly known as: PROVENTIL HFA;VENTOLIN HFA;PROAIR HFA   2 puffs, Inhalation, ProAir  (90 Base) MCG/ACT Inhalation Aerosol Solution; Patient Sig: ProAir  (90 Base) MCG/ACT Inhalation Aerosol Solution inhale 2 puffs by mouth every 4 hours if needed; 8.5; 11; 07-Apr-2014; Act      ALPRAZolam 0.5 MG tablet  Commonly known as: XANAX   TAKE 1 TABLET BY MOUTH EVERY MORNING THEN TAKE 1/2 TABLET BY MOUTH EVERY EVENING      aspirin 81 MG chewable tablet   81 mg, Oral, Daily      carvedilol 25 MG tablet  Commonly known as: COREG   25 mg, Oral, 2 Times Daily With Meals      chlorthalidone 25 MG tablet  Commonly known as: HYGROTON   12.5 mg, Oral, Daily      diphenoxylate-atropine 2.5-0.025 MG per tablet  Commonly known as: LOMOTIL   1 tablet, Oral, 4 Times Daily PRN      ferrous gluconate 324 MG tablet  Commonly known as: FERGON   324 mg, Oral, Daily With Breakfast      levothyroxine 125 MCG tablet  Commonly known as: SYNTHROID, LEVOTHROID   125 mcg, Oral, Daily      lisinopril 20 MG tablet  Commonly known as: PRINIVIL,ZESTRIL   20 mg, Oral, Daily      montelukast 10 MG tablet  Commonly known as: SINGULAIR   TAKE 1 TABLET BY MOUTH DAILY      oxyCODONE-acetaminophen 7.5-325 MG per tablet  Commonly known as: PERCOCET   TK 1 T PO Q 6 H UTD      rosuvastatin 20 MG tablet  Commonly known as: CRESTOR   20 mg, Oral, Daily      sertraline 100 MG tablet  Commonly known as: ZOLOFT   100 mg, Oral, Daily      warfarin 2 MG tablet  Commonly known as: COUMADIN   TAKE TWO TABLETS BY MOUTH ON MON and FRI AND TAKE ONE TABLET BY MOUTH ALL OTHER DAYS OR AS DIRECTED               Myriam Omer MD  12/16/22  09:58  EST    Part of this note may be an electronic transcription/translation of spoken language to printed text using the Dragon dictation system.

## 2022-12-21 ENCOUNTER — TELEPHONE (OUTPATIENT)
Dept: PHARMACY | Facility: HOSPITAL | Age: 77
End: 2022-12-21
Payer: MEDICARE

## 2022-12-21 ENCOUNTER — OFFICE VISIT (OUTPATIENT)
Dept: ORTHOPEDIC SURGERY | Facility: CLINIC | Age: 77
End: 2022-12-21

## 2022-12-21 VITALS — HEIGHT: 71 IN | WEIGHT: 167.6 LBS | TEMPERATURE: 98.6 F | BODY MASS INDEX: 23.46 KG/M2

## 2022-12-21 DIAGNOSIS — M17.0 BILATERAL PRIMARY OSTEOARTHRITIS OF KNEE: Primary | ICD-10-CM

## 2022-12-21 PROCEDURE — 99213 OFFICE O/P EST LOW 20 MIN: CPT | Performed by: NURSE PRACTITIONER

## 2022-12-24 NOTE — PROGRESS NOTES
"Mr. Eldridge follows up for both knees today.  Patient has a long history of worsening symptoms.  He says the left knee seems worse than the right.  Reports the injections are not working as well as they once did.  His last injections were 2 months ago.  Describes the pain as moderate to severe, constant, and achy.  He has tried and failed prolonged conservative treatment.  He is struggling with routine daily activities including caring for his wife.  This has become a significant issue for overall.  He cannot walk any prolonged distances without having to rest.     Vitals:    12/21/22 1511   Temp: 98.6 °F (37 °C)   TempSrc: Temporal   Weight: 76 kg (167 lb 9.6 oz)   Height: 180.3 cm (71\")     Exam:   Both knees are examined:  Skin is benign and intact without evidence for swelling, masses or atrophy.  No palpable masses.  Focal tenderness noted over medial joint line bilaterally.  ROM is from 5-125°.   Knees are stable on exam.  Good strength throughout the lower leg and foot.  Intact sensation throughout.  Palpable pedal pulses with brisk cap refill.    Imaging:  No new x-rays today.  Together we reviewed his previous x-rays from 4/18/22 which show end-stage medial compartment degenerative arthritis bilaterally.  He has advanced patellofemoral compartment degeneration as well left worse than right.     Assessment:  Bilateral knee end-stage osteoarthritis    Plan:  We briefly discussed a total knee arthroplasty and all that would entail.  He is leaning toward this option, but first would like to discuss this with his wife.  Additionally, he would need to make arrangements with his family so his wife is properly cared for during his recovery period.  Going forward, he will follow up with me once he decides how he would like to proceed.  If he decides to postpone having surgery, I am happy to see him back next month for repeat injections.    Lisa Villafana, APRN    12/21/2022      "

## 2022-12-28 ENCOUNTER — ANTICOAGULATION VISIT (OUTPATIENT)
Dept: PHARMACY | Facility: HOSPITAL | Age: 77
End: 2022-12-28

## 2022-12-28 DIAGNOSIS — I48.19 OTHER PERSISTENT ATRIAL FIBRILLATION: Primary | ICD-10-CM

## 2022-12-28 DIAGNOSIS — R53.83 FATIGUE, UNSPECIFIED TYPE: ICD-10-CM

## 2022-12-28 LAB
INR PPP: 3.1 (ref 0.91–1.09)
PROTHROMBIN TIME: 37.3 SECONDS (ref 10–13.8)

## 2022-12-28 PROCEDURE — 36416 COLLJ CAPILLARY BLOOD SPEC: CPT

## 2022-12-28 PROCEDURE — 85610 PROTHROMBIN TIME: CPT

## 2022-12-28 NOTE — PROGRESS NOTES
Anticoagulation Clinic Progress Note    Anticoagulation Summary  As of 12/28/2022    INR goal:  2.0-3.0   TTR:  62.8 % (4.1 y)   INR used for dosing:  3.1 (12/28/2022)   Warfarin maintenance plan:  4 mg every Mon, Fri; 2 mg all other days; Starting 12/28/2022   Weekly warfarin total:  18 mg   No change documented:  Cheryl Bond, Pharmacy Intern   Plan last modified:  Paul Feldman RPH (9/13/2022)   Next INR check:  1/25/2023   Priority:  Maintenance   Target end date:  Indefinite    Indications    Other persistent atrial fibrillation (HCC) [I48.19]             Anticoagulation Episode Summary     INR check location:      Preferred lab:      Send INR reminders to:   [facility]    Comments:        Anticoagulation Care Providers     Provider Role Specialty Phone number    Myriam Omer MD Referring Cardiology [phone]          Clinic Interview:  Patient Findings     Negatives:  Signs/symptoms of thrombosis, Signs/symptoms of bleeding,   Laboratory test error suspected, Change in health, Change in alcohol use,   Change in activity, Upcoming invasive procedure, Emergency department   visit, Upcoming dental procedure, Missed doses, Extra doses, Change in   medications, Change in diet/appetite, Hospital admission, Bruising, Other   complaints    Comments:  Patient having heart doppler on 1/9/23      Clinical Outcomes     Negatives:  Major bleeding event, Thromboembolic event,   Anticoagulation-related hospital admission, Anticoagulation-related ED   visit, Anticoagulation-related fatality    Comments:  Patient having heart doppler on 1/9/23        INR History:  Anticoagulation Monitoring 10/11/2022 11/4/2022 12/28/2022   INR 2.7 2.6 3.1   INR Date 10/11/2022 11/4/2022 12/28/2022   INR Goal 2.0-3.0 2.0-3.0 2.0-3.0   Trend Same Same Same   Last Week Total 18 mg 18 mg 18 mg   Next Week Total 18 mg 18 mg 18 mg   Sun 2 mg 2 mg 2 mg   Mon 4 mg 4 mg 4 mg   Tue 2 mg 2 mg 2 mg   Wed 2 mg 2 mg 2 mg    Thu 2 mg 2 mg 2 mg   Fri 4 mg 4 mg 4 mg   Sat 2 mg 2 mg 2 mg   Visit Report - - -   Some recent data might be hidden       Plan:  1. INR is Supratherapeutic today- see above in Anticoagulation Summary.  Will instruct Cody Eldridge to Continue their warfarin regimen- see above in Anticoagulation Summary.  2. Follow up in 4 weeks  3. Patient declines warfarin refills.  4. Verbal and written information provided. Patient expresses understanding and has no further questions at this time.    Cheryl Bond, Pharmacy Intern

## 2022-12-28 NOTE — PROGRESS NOTES
I have supervised and reviewed the notes, assessments, and/or procedures performed by our Pharmacy Intern. The documented assessment and plan were developed cooperatively, and the plan was implemented in my presence. I concur with the documentation of this patient encounter.    Bernarda Lockwood [facility]

## 2022-12-29 RX ORDER — ALPRAZOLAM 0.5 MG/1
TABLET ORAL
Qty: 45 TABLET | Refills: 1 | Status: SHIPPED | OUTPATIENT
Start: 2022-12-29 | End: 2023-02-28

## 2022-12-29 NOTE — TELEPHONE ENCOUNTER
Caller: Cody Eldridge    Relationship: Self    Best call back number: [phone]    Requested Prescriptions:   Requested Prescriptions     Pending Prescriptions Disp Refills   • chlorthalidone (HYGROTON) 25 MG tablet 30 tablet 5     Sig: Take 0.5 tablets by mouth Daily.        Pharmacy where request should be sent: [id number] [address] AT [facility] [phone] [facility] [phone]      Additional details provided by patient: PATIENT IS OUT OF THIS MEDICATION    Does the patient have less than a 3 day supply:  [x] Yes  [] No    Would you like a call back once the refill request has been completed: [x] Yes [] No    If the office needs to give you a call back, can they leave a voicemail: [x] Yes [] No    Ghassan Munosn Rep   12/29/22 15:53 EST

## 2022-12-30 RX ORDER — CHLORTHALIDONE 25 MG/1
12.5 TABLET ORAL DAILY
Qty: 30 TABLET | Refills: 5 | Status: SHIPPED | OUTPATIENT
Start: 2022-12-30 | End: 2023-01-31

## 2023-01-09 ENCOUNTER — HOSPITAL ENCOUNTER (OUTPATIENT)
Dept: CARDIOLOGY | Facility: HOSPITAL | Age: 78
Discharge: HOME | End: 2023-01-09
Admitting: INTERNAL MEDICINE
Payer: MEDICARE

## 2023-01-09 VITALS
DIASTOLIC BLOOD PRESSURE: 90 MMHG | WEIGHT: 167 LBS | SYSTOLIC BLOOD PRESSURE: 150 MMHG | HEART RATE: 123 BPM | HEIGHT: 71 IN | BODY MASS INDEX: 23.38 KG/M2

## 2023-01-09 DIAGNOSIS — E78.2 MIXED HYPERLIPIDEMIA: ICD-10-CM

## 2023-01-09 DIAGNOSIS — I48.19 OTHER PERSISTENT ATRIAL FIBRILLATION: ICD-10-CM

## 2023-01-09 DIAGNOSIS — I25.10 CORONARY ARTERY DISEASE INVOLVING NATIVE CORONARY ARTERY OF NATIVE HEART WITHOUT ANGINA PECTORIS: ICD-10-CM

## 2023-01-09 DIAGNOSIS — R06.09 DOE (DYSPNEA ON EXERTION): ICD-10-CM

## 2023-01-09 DIAGNOSIS — I36.1 NON-RHEUMATIC TRICUSPID VALVE INSUFFICIENCY: ICD-10-CM

## 2023-01-09 DIAGNOSIS — I50.32 CHRONIC DIASTOLIC CONGESTIVE HEART FAILURE: ICD-10-CM

## 2023-01-09 LAB
AORTIC ARCH: 2.5 CM
ASCENDING AORTA: 2.9 CM
BH CV ECHO MEAS - ACS: 1.87 CM
BH CV ECHO MEAS - AO MAX PG: 3.3 MMHG
BH CV ECHO MEAS - AO MEAN PG: 1.85 MMHG
BH CV ECHO MEAS - AO ROOT DIAM: 3.2 CM
BH CV ECHO MEAS - AO V2 MAX: 91.1 CM/SEC
BH CV ECHO MEAS - AO V2 VTI: 18.9 CM
BH CV ECHO MEAS - AVA(I,D): 2.04 CM2
BH CV ECHO MEAS - EDV(CUBED): 46 ML
BH CV ECHO MEAS - EDV(MOD-SP2): 91 ML
BH CV ECHO MEAS - EDV(MOD-SP4): 83 ML
BH CV ECHO MEAS - EF(MOD-BP): 58.8 %
BH CV ECHO MEAS - EF(MOD-SP2): 59.3 %
BH CV ECHO MEAS - EF(MOD-SP4): 55.4 %
BH CV ECHO MEAS - ESV(CUBED): 19.1 ML
BH CV ECHO MEAS - ESV(MOD-SP2): 37 ML
BH CV ECHO MEAS - ESV(MOD-SP4): 37 ML
BH CV ECHO MEAS - FS: 25.4 %
BH CV ECHO MEAS - IVS/LVPW: 1.06 CM
BH CV ECHO MEAS - IVSD: 1.15 CM
BH CV ECHO MEAS - LAT PEAK E' VEL: 19.4 CM/SEC
BH CV ECHO MEAS - LV DIASTOLIC VOL/BSA (35-75): 42.5 CM2
BH CV ECHO MEAS - LV MASS(C)D: 125.7 GRAMS
BH CV ECHO MEAS - LV MAX PG: 1.04 MMHG
BH CV ECHO MEAS - LV MEAN PG: 0.49 MMHG
BH CV ECHO MEAS - LV SYSTOLIC VOL/BSA (12-30): 18.9 CM2
BH CV ECHO MEAS - LV V1 MAX: 51 CM/SEC
BH CV ECHO MEAS - LV V1 VTI: 10.2 CM
BH CV ECHO MEAS - LVIDD: 3.6 CM
BH CV ECHO MEAS - LVIDS: 2.7 CM
BH CV ECHO MEAS - LVOT AREA: 3.8 CM2
BH CV ECHO MEAS - LVOT DIAM: 2.19 CM
BH CV ECHO MEAS - LVPWD: 1.08 CM
BH CV ECHO MEAS - MED PEAK E' VEL: 9.2 CM/SEC
BH CV ECHO MEAS - MR MAX PG: 140.3 MMHG
BH CV ECHO MEAS - MR MAX VEL: 592.3 CM/SEC
BH CV ECHO MEAS - MR MEAN PG: 91.6 MMHG
BH CV ECHO MEAS - MR MEAN VEL: 451.6 CM/SEC
BH CV ECHO MEAS - MR VTI: 170.1 CM
BH CV ECHO MEAS - MV DEC SLOPE: 662.8 CM/SEC2
BH CV ECHO MEAS - MV DEC TIME: 0.12 MSEC
BH CV ECHO MEAS - MV E MAX VEL: 82.3 CM/SEC
BH CV ECHO MEAS - MV MAX PG: 6.1 MMHG
BH CV ECHO MEAS - MV MEAN PG: 1.48 MMHG
BH CV ECHO MEAS - MV P1/2T: 57 MSEC
BH CV ECHO MEAS - MV V2 VTI: 33.2 CM
BH CV ECHO MEAS - MVA(P1/2T): 3.9 CM2
BH CV ECHO MEAS - MVA(VTI): 1.16 CM2
BH CV ECHO MEAS - PA ACC TIME: 0.07 SEC
BH CV ECHO MEAS - PA PR(ACCEL): 48.1 MMHG
BH CV ECHO MEAS - PA V2 MAX: 62.5 CM/SEC
BH CV ECHO MEAS - QP/QS: 1.64
BH CV ECHO MEAS - RAP SYSTOLE: 15 MMHG
BH CV ECHO MEAS - RV MAX PG: 0.99 MMHG
BH CV ECHO MEAS - RV V1 MAX: 49.7 CM/SEC
BH CV ECHO MEAS - RV V1 VTI: 10.9 CM
BH CV ECHO MEAS - RVOT DIAM: 2.7 CM
BH CV ECHO MEAS - RVSP: 75.8 MMHG
BH CV ECHO MEAS - SI(MOD-SP2): 27.6 ML/M2
BH CV ECHO MEAS - SI(MOD-SP4): 23.5 ML/M2
BH CV ECHO MEAS - SUP REN AO DIAM: 2.3 CM
BH CV ECHO MEAS - SV(LVOT): 38.5 ML
BH CV ECHO MEAS - SV(MOD-SP2): 54 ML
BH CV ECHO MEAS - SV(MOD-SP4): 46 ML
BH CV ECHO MEAS - SV(RVOT): 62.9 ML
BH CV ECHO MEAS - TAPSE (>1.6): 1.47 CM
BH CV ECHO MEAS - TR MAX PG: 60.8 MMHG
BH CV ECHO MEAS - TR MAX VEL: 389.9 CM/SEC
BH CV ECHO MEASUREMENTS AVERAGE E/E' RATIO: 5.76
BH CV XLRA - RV BASE: 3.6 CM
BH CV XLRA - RV LENGTH: 6.5 CM
BH CV XLRA - RV MID: 2.8 CM
BH CV XLRA - TDI S': 11.7 CM/SEC
MAXIMAL PREDICTED HEART RATE: 142 BPM
SINUS: 3.2 CM
STJ: 2.8 CM
STRESS TARGET HR: 121 BPM

## 2023-01-09 PROCEDURE — 93306 TTE W/DOPPLER COMPLETE: CPT | Performed by: INTERNAL MEDICINE

## 2023-01-09 PROCEDURE — 93306 TTE W/DOPPLER COMPLETE: CPT

## 2023-01-10 ENCOUNTER — TELEPHONE (OUTPATIENT)
Dept: CARDIOLOGY | Facility: CLINIC | Age: 78
End: 2023-01-10
Payer: MEDICARE

## 2023-01-10 NOTE — TELEPHONE ENCOUNTER
Patient was supposed to have an appointment with the nurse practitioner this week with his echocardiogram.  No appointment is scheduled.  Please get him into see a nurse practitioner this week.  Reviewed echo.  Diastolic dysfunction.  Needs to be seen to determine further treatment management

## 2023-01-19 NOTE — TELEPHONE ENCOUNTER
Patient was in with his wife  Complained of right neck tender lump without fever chills times 1 week  No difficulty swallowing or breathing no night sweats or unexplained weight loss  Sore throat negative    He has a large tender lymph node right lateral C-spine pharynx is clear neck is supple  Soft  Without abscess    He showed his neck to me while was walking out of the room  As I was recommending urgent care since we were full    Amoxil 500 3 times daily times 10 days  Increased pain redness swelling difficulty breathing swallowing worsening symptoms emergency room  He should recheck in 1 week and go ahead and schedule appointment  We need to make sure this resolves very important  Promptly       Body Location Override (Optional - Billing Will Still Be Based On Selected Body Map Location If Applicable): left nasal sidewall

## 2023-01-27 RX ORDER — SERTRALINE HYDROCHLORIDE 100 MG/1
100 TABLET, FILM COATED ORAL DAILY
Qty: 90 TABLET | Refills: 3 | Status: SHIPPED | OUTPATIENT
Start: 2023-01-27

## 2023-01-31 ENCOUNTER — OFFICE VISIT (OUTPATIENT)
Dept: CARDIOLOGY | Facility: CLINIC | Age: 78
End: 2023-01-31
Payer: MEDICARE

## 2023-01-31 VITALS
BODY MASS INDEX: 24 KG/M2 | HEIGHT: 71 IN | SYSTOLIC BLOOD PRESSURE: 120 MMHG | HEART RATE: 64 BPM | WEIGHT: 171.4 LBS | DIASTOLIC BLOOD PRESSURE: 60 MMHG | OXYGEN SATURATION: 97 %

## 2023-01-31 DIAGNOSIS — I50.813 ACUTE ON CHRONIC RIGHT-SIDED HEART FAILURE: ICD-10-CM

## 2023-01-31 DIAGNOSIS — I27.20 PULMONARY HYPERTENSION: ICD-10-CM

## 2023-01-31 DIAGNOSIS — J43.9 PULMONARY EMPHYSEMA, UNSPECIFIED EMPHYSEMA TYPE: Primary | ICD-10-CM

## 2023-01-31 DIAGNOSIS — Z51.81 ENCOUNTER FOR THERAPEUTIC DRUG LEVEL MONITORING: ICD-10-CM

## 2023-01-31 PROCEDURE — 99214 OFFICE O/P EST MOD 30 MIN: CPT | Performed by: NURSE PRACTITIONER

## 2023-01-31 RX ORDER — CYCLOBENZAPRINE HCL 5 MG
TABLET ORAL EVERY 24 HOURS
Status: ON HOLD | COMMUNITY
End: 2023-03-22

## 2023-01-31 RX ORDER — FUROSEMIDE 40 MG/1
40 TABLET ORAL DAILY
Qty: 30 TABLET | Refills: 3 | Status: SHIPPED | OUTPATIENT
Start: 2023-01-31 | End: 2023-03-14 | Stop reason: SDUPTHER

## 2023-01-31 NOTE — PROGRESS NOTES
Date of Office Visit: 23  Encounter Provider: JONELLE Polanco  Place of Service: [facility] CARDIOLOGY  Patient Name: Cody Eldridge  :1945    Chief Complaint   Patient presents with   • Follow-up   • Hypertension   • Hyperlipidemia   • Congestive Heart Failure   • Coronary Artery Disease   • Atrial Fibrillation   • Tricuspid valve insufficiency   :     HPI: Cody Eldridge is a 77 y.o. male  with hypertension, hyperlipidemia, supraventricular tachycardia, syncope, coronary artery disease with History of myocardial infarction, congestive heart failure, COPD, depression and anxiety.    He is followed by Dr. Omer from visit with him in follow-up and have reviewed his medical record.     In  patient had a SVT ablation at the [facility].   He was started on Lasix in  due to elevated proBNP.  Echocardiogram revealed normal left ventricle systolic function with EF 56%, mild to moderate left ventricular hypertrophy.  Right ventricle is moderate to severely dilated with mildly reduced function.  The right atrium was also moderate to severely dilated.  There was moderate tricuspid regurgitation with RVSP of 47 mmHg.  He saw Dr. Segovia with Pulmonary and his medications were adjusted.   He was given IV Lasix and his oral Lasix at home was increased.    He then reported increased shortness of breath in 2022.  proBNP was 4866.  He was given a dose of IV Lasix in the office and was set up for an echocardiogram which was completed 2023 showing normal left ventricular systolic function, grade 2 diastolic dysfunction, severe biatrial enlargement, myxomatous changes of the mitral valve with moderate mitral valve regurgitation.  There was severe tricuspid valve regurgitation RVSP measuring 76 mmHg  He presents today for reassessment.  He has no chest pain.  He denies no more than usual shortness of breath.  He reportedly had a lot of urine  "output after he received Lasix in our office last month.  He is walking 2 days a week up to 30 minutes without chest pain or dizziness or palpitations.  He has not seen his pulmonologist in quite some time.  He has an INR appointment this Friday.  Allergies   Allergen Reactions   • Benadryl [Diphenhydramine Hcl] Dizziness     \"I sleep for about a day\"           Family and social history reviewed.     ROS  All other systems were reviewed and are negative          Objective:     Vitals:    01/31/23 0956   BP: 120/60   BP Location: Left arm   Patient Position: Sitting   Cuff Size: Adult   Pulse: 64   SpO2: 97%   Weight: 77.7 kg (171 lb 6.4 oz)   Height: 180.3 cm (71\")     Body mass index is 23.91 kg/m².    PHYSICAL EXAM:  Pulmonary:      Breath sounds: Examination of the right-lower field reveals rhonchi. Examination of the left-lower field reveals rhonchi. Decreased breath sounds present. Rhonchi present.   Cardiovascular:      Normal rate. Irregularly irregular rhythm.         Procedures      Current Outpatient Medications   Medication Sig Dispense Refill   • albuterol (PROVENTIL HFA;VENTOLIN HFA) 108 (90 BASE) MCG/ACT inhaler Inhale 2 puffs. ProAir  (90 Base) MCG/ACT Inhalation Aerosol Solution; Patient Sig: ProAir  (90 Base) MCG/ACT Inhalation Aerosol Solution inhale 2 puffs by mouth every 4 hours if needed; 8.5; 11; 07-Apr-2014; Act     • ALPRAZolam (XANAX) 0.5 MG tablet TAKE 1 TABLET BY MOUTH EVERY MORNING THEN TAKE 1/2 TABLET BY MOUTH EVERY EVENING 45 tablet 1   • aspirin 81 MG chewable tablet Chew 81 mg Daily.     • carvedilol (COREG) 25 MG tablet Take 1 tablet by mouth 2 (Two) Times a Day With Meals. 180 tablet 3   • diphenoxylate-atropine (LOMOTIL) 2.5-0.025 MG per tablet Take 1 tablet by mouth 4 (Four) Times a Day As Needed for Diarrhea. 60 tablet 1   • ferrous gluconate (FERGON) 324 MG tablet Take 1 tablet by mouth Daily With Breakfast. 90 tablet 0   • levothyroxine (SYNTHROID, LEVOTHROID) 125 " MCG tablet TAKE 1 TABLET BY MOUTH DAILY 90 tablet 2   • lisinopril (PRINIVIL,ZESTRIL) 20 MG tablet Take 1 tablet by mouth Daily. 90 tablet 1   • montelukast (SINGULAIR) 10 MG tablet TAKE 1 TABLET BY MOUTH DAILY 90 tablet 1   • oxyCODONE-acetaminophen (PERCOCET) 7.5-325 MG per tablet TK 1 T PO Q 6 H UTD     • rosuvastatin (CRESTOR) 20 MG tablet Take 20 mg by mouth Daily. 30 tablet 5   • sertraline (ZOLOFT) 100 MG tablet TAKE 1 TABLET BY MOUTH DAILY 90 tablet 3   • warfarin (COUMADIN) 2 MG tablet TAKE TWO TABLETS BY MOUTH ON MON and FRI AND TAKE ONE TABLET BY MOUTH ALL OTHER DAYS OR AS DIRECTED 120 tablet 1   • cyclobenzaprine (FLEXERIL) 5 MG tablet Daily.     • furosemide (LASIX) 40 MG tablet Take 1 tablet by mouth Daily. 30 tablet 3     No current facility-administered medications for this visit.     Assessment:       Diagnosis Plan   1. Pulmonary emphysema, unspecified emphysema type (HCC)  Ambulatory Referral to Pulmonology      2. Pulmonary hypertension (HCC)  Ambulatory Referral to Pulmonology      3. Encounter for therapeutic drug level monitoring  Basic Metabolic Panel      4. Acute on chronic right-sided heart failure (HCC)  Basic Metabolic Panel           Orders Placed This Encounter   Procedures   • Basic Metabolic Panel     Standing Status:   Future     Standing Expiration Date:   1/31/2024     Order Specific Question:   Release to patient     Answer:   Routine Release   • Ambulatory Referral to Pulmonology     Referral Priority:   Routine     Referral Type:   Consultation     Referral Reason:   Specialty Services Required     Referred to Provider:   Javier Segovia MD     Requested Specialty:   Pulmonary Disease     Number of Visits Requested:   1         Plan:   1.  77-year-old gentleman with chronic atrial fibrillation.  CHADS2 Vascor of 4.  He is rate controlled and maintained on warfarin.  He follows in our INR clinic  2.  History of coronary artery disease with remote myocardial infarction.  He  initially had complaints of chest discomfort in May 2018 and was set up for right and left heart catheterization.  This was aborted due to claustrophobia.  He has no angina  3.  Diastolic congestive heart failure with right-sided heart failure.  He had recent elevated proBNP and responded well in clinic to IV Lasix last month.  I am going to start him on Lasix 40 mg daily and stop chlorthalidone.  We will have BMP to recheck renal function and electrolytes in 2 weeks.  If his breathing improves we will continue him on Lasix and plan repeat echocardiogram in 3 months  4.  Pulmonary hypertension.  RVSP was previously approximately 50 mg of mercury but most recently measured 76 mmHg.  He has not seen his pulmonologist so I will place referral to get him back in.  He has no cough or fever or symptoms of lung infection  5.  COPD with emphysema.  Normally follows with Dr. Segovia  6.  History of SVT ablation  7.  Obstructive sleep apnea-not using Pap device  8. Normal renal artery duplex, bilateral in 2021  9.  Moderate mitral valve regurgitation and severe tricuspid regurgitation on echo June 2023  Follow-up with me in 6 weeks.          It has been a pleasure to participate in this patient's care.      Thank you,  JONELLE Polanco      **I used Dragon to dictate this note:**

## 2023-02-03 ENCOUNTER — ANTICOAGULATION VISIT (OUTPATIENT)
Dept: PHARMACY | Facility: HOSPITAL | Age: 78
End: 2023-02-03
Payer: MEDICARE

## 2023-02-03 DIAGNOSIS — I48.19 OTHER PERSISTENT ATRIAL FIBRILLATION: Primary | ICD-10-CM

## 2023-02-03 LAB
INR PPP: 1.5 (ref 0.91–1.09)
PROTHROMBIN TIME: 17.8 SECONDS (ref 10–13.8)

## 2023-02-03 PROCEDURE — G0463 HOSPITAL OUTPT CLINIC VISIT: HCPCS

## 2023-02-03 PROCEDURE — 36416 COLLJ CAPILLARY BLOOD SPEC: CPT

## 2023-02-03 PROCEDURE — 85610 PROTHROMBIN TIME: CPT

## 2023-02-03 NOTE — PROGRESS NOTES
Anticoagulation Clinic Progress Note    Anticoagulation Summary  As of 2/3/2023    INR goal:  2.0-3.0   TTR:  62.7 % (4.2 y)   INR used for dosin.5 (2/3/2023)   Warfarin maintenance plan:  4 mg every Mon, Fri; 2 mg all other days; Starting 2/3/2023   Weekly warfarin total:  18 mg   Plan last modified:  Paul Feldman [facility] (2022)   Next INR check:  2023   Priority:  Maintenance   Target end date:  Indefinite    Indications    Other persistent atrial fibrillation (HCC) [I48.19]             Anticoagulation Episode Summary     INR check location:      Preferred lab:      Send INR reminders to:   [facility] [place]    Comments:        Anticoagulation Care Providers     Provider Role Specialty Phone number    Myriam Oemr MD Referring Cardiology [phone]          Clinic Interview:  Patient Findings     Positives:  Change in medications    Negatives:  Signs/symptoms of thrombosis, Signs/symptoms of bleeding,   Laboratory test error suspected, Change in health, Change in alcohol use,   Change in activity, Upcoming invasive procedure, Emergency department   visit, Upcoming dental procedure, Missed doses, Extra doses, Change in   diet/appetite, Hospital admission, Bruising, Other complaints    Comments:  Lasix increased 2 days ago       Clinical Outcomes     Negatives:  Major bleeding event, Thromboembolic event,   Anticoagulation-related hospital admission, Anticoagulation-related ED   visit, Anticoagulation-related fatality    Comments:  Lasix increased 2 days ago         INR History:  Anticoagulation Monitoring 2022 2022 2/3/2023   INR 2.6 3.1 1.5   INR Date 2022 2022 2/3/2023   INR Goal 2.0-3.0 2.0-3.0 2.0-3.0   Trend Same Same Same   Last Week Total 18 mg 18 mg 18 mg   Next Week Total 18 mg 18 mg 20 mg   Sun 2 mg 2 mg 2 mg   Mon 4 mg 4 mg 4 mg   Tue 2 mg 2 mg 2 mg   Wed 2 mg 2 mg 2 mg   Thu 2 mg 2 mg 2 mg   Fri 4 mg 4 mg 6 mg (2/3); Otherwise 4 mg   Sat 2 mg 2  mg 2 mg   Visit Report - - -   Some recent data might be hidden       Plan:  1. INR is Subtherapeutic today- see above in Anticoagulation Summary.  Will instruct Cody Eldridge to Increase their warfarin regimen- see above in Anticoagulation Summary.  2. Follow up in 2 weeks  3. Patient declines warfarin refills.  4. Verbal and written information provided. Patient expresses understanding and has no further questions at this time.    Bernarda Lockwood [facility]

## 2023-02-17 ENCOUNTER — ANTICOAGULATION VISIT (OUTPATIENT)
Dept: PHARMACY | Facility: HOSPITAL | Age: 78
End: 2023-02-17
Payer: MEDICARE

## 2023-02-17 DIAGNOSIS — I48.19 OTHER PERSISTENT ATRIAL FIBRILLATION: Primary | ICD-10-CM

## 2023-02-17 LAB
INR PPP: 3 (ref 0.91–1.09)
PROTHROMBIN TIME: 36.4 SECONDS (ref 10–13.8)

## 2023-02-17 PROCEDURE — 85610 PROTHROMBIN TIME: CPT

## 2023-02-17 PROCEDURE — 36416 COLLJ CAPILLARY BLOOD SPEC: CPT

## 2023-02-17 NOTE — PROGRESS NOTES
Anticoagulation Clinic Progress Note    Anticoagulation Summary  As of 2/17/2023    INR goal:  2.0-3.0   TTR:  62.8 % (4.2 y)   INR used for dosing:  3.0 (2/17/2023)   Warfarin maintenance plan:  4 mg every Mon, Fri; 2 mg all other days; Starting 2/17/2023   Weekly warfarin total:  18 mg   No change documented:  Stephany Siu   Plan last modified:  Paul Feldman RP (9/13/2022)   Next INR check:  3/3/2023   Priority:  Maintenance   Target end date:  Indefinite    Indications    Other persistent atrial fibrillation (HCC) [I48.19]             Anticoagulation Episode Summary     INR check location:      Preferred lab:      Send INR reminders to:   [facility]    Comments:        Anticoagulation Care Providers     Provider Role Specialty Phone number    Myriam Omer MD Referring Cardiology [phone]          Clinic Interview:  Patient Findings     Negatives:  Signs/symptoms of thrombosis, Signs/symptoms of bleeding,   Laboratory test error suspected, Change in health, Change in alcohol use,   Change in activity, Upcoming invasive procedure, Emergency department   visit, Upcoming dental procedure, Missed doses, Extra doses, Change in   medications, Change in diet/appetite, Hospital admission, Bruising, Other   complaints      Clinical Outcomes     Negatives:  Major bleeding event, Thromboembolic event,   Anticoagulation-related hospital admission, Anticoagulation-related ED   visit, Anticoagulation-related fatality        INR History:  Anticoagulation Monitoring 12/28/2022 2/3/2023 2/17/2023   INR 3.1 1.5 3.0   INR Date 12/28/2022 2/3/2023 2/17/2023   INR Goal 2.0-3.0 2.0-3.0 2.0-3.0   Trend Same Same Same   Last Week Total 18 mg 18 mg 18 mg   Next Week Total 18 mg 20 mg 18 mg   Sun 2 mg 2 mg 2 mg   Mon 4 mg 4 mg 4 mg   Tue 2 mg 2 mg 2 mg   Wed 2 mg 2 mg 2 mg   Thu 2 mg 2 mg 2 mg   Fri 4 mg 6 mg (2/3); Otherwise 4 mg 4 mg   Sat 2 mg 2 mg 2 mg   Visit Report - - -   Some recent  data might be hidden       Plan:  1. INR is therapeutic today- see above in Anticoagulation Summary.   Will instruct Cody Eldridge to continue their warfarin regimen- see above in Anticoagulation Summary.  2. Follow up in 2 weeks.  3. Patient declines warfarin refills.  4. Verbal and written information provided. Patient expresses understanding and has no further questions at this time.    Stephany Siu

## 2023-02-20 ENCOUNTER — CLINICAL SUPPORT (OUTPATIENT)
Dept: ORTHOPEDIC SURGERY | Facility: CLINIC | Age: 78
End: 2023-02-20
Payer: MEDICARE

## 2023-02-20 VITALS — HEIGHT: 72 IN | BODY MASS INDEX: 23.04 KG/M2 | WEIGHT: 170.1 LBS | TEMPERATURE: 97.4 F

## 2023-02-20 DIAGNOSIS — M25.561 CHRONIC PAIN OF BOTH KNEES: Primary | ICD-10-CM

## 2023-02-20 DIAGNOSIS — G89.29 CHRONIC PAIN OF BOTH KNEES: Primary | ICD-10-CM

## 2023-02-20 DIAGNOSIS — M17.0 BILATERAL PRIMARY OSTEOARTHRITIS OF KNEE: ICD-10-CM

## 2023-02-20 DIAGNOSIS — M25.562 CHRONIC PAIN OF BOTH KNEES: Primary | ICD-10-CM

## 2023-02-20 NOTE — PROGRESS NOTES
Mr. Eldridge comes in today for repeat bilateral knee injections.  He says he is interested in trying gel injections.  We discussed both cortisone versus gel injections.  His insurance requires a prior authorization for the gel.  I have entered this referral for him.  He will follow up in clinic next week for the Synvisc gel injections.     Lisa Villafana, APRN    02/20/2023

## 2023-02-26 DIAGNOSIS — R53.83 FATIGUE, UNSPECIFIED TYPE: ICD-10-CM

## 2023-02-27 ENCOUNTER — CLINICAL SUPPORT (OUTPATIENT)
Dept: ORTHOPEDIC SURGERY | Facility: CLINIC | Age: 78
End: 2023-02-27
Payer: MEDICARE

## 2023-02-27 VITALS — TEMPERATURE: 97.4 F | BODY MASS INDEX: 23.3 KG/M2 | WEIGHT: 172 LBS | HEIGHT: 72 IN

## 2023-02-27 DIAGNOSIS — G89.29 CHRONIC PAIN OF BOTH KNEES: Primary | ICD-10-CM

## 2023-02-27 DIAGNOSIS — M25.561 CHRONIC PAIN OF BOTH KNEES: Primary | ICD-10-CM

## 2023-02-27 DIAGNOSIS — M25.562 CHRONIC PAIN OF BOTH KNEES: Primary | ICD-10-CM

## 2023-02-27 DIAGNOSIS — M17.0 BILATERAL PRIMARY OSTEOARTHRITIS OF KNEE: ICD-10-CM

## 2023-02-27 PROCEDURE — 20610 DRAIN/INJ JOINT/BURSA W/O US: CPT | Performed by: NURSE PRACTITIONER

## 2023-02-27 RX ORDER — LIDOCAINE HYDROCHLORIDE 10 MG/ML
2 INJECTION, SOLUTION EPIDURAL; INFILTRATION; INTRACAUDAL; PERINEURAL
Status: COMPLETED | OUTPATIENT
Start: 2023-02-27 | End: 2023-02-27

## 2023-02-27 RX ORDER — CHLORTHALIDONE 25 MG/1
25 TABLET ORAL DAILY
COMMUNITY
Start: 2023-02-24 | End: 2023-03-24 | Stop reason: HOSPADM

## 2023-02-27 RX ADMIN — LIDOCAINE HYDROCHLORIDE 2 ML: 10 INJECTION, SOLUTION EPIDURAL; INFILTRATION; INTRACAUDAL; PERINEURAL at 07:46

## 2023-02-27 NOTE — PROGRESS NOTES
Mr. Eldridge comes in today for follow-up.  He does not feel the cortisone injections are helping as much as they once did.  He is interested in trying Synvisc injections today.  The risks, benefits and alternatives were discussed and the patient consented.  Going forward, the patient will follow-up as needed.    Lisa MICHELLE Segaljennifer, APRN    02/27/2023    Large Joint Arthrocentesis: R knee  Date/Time: 2/27/2023 7:46 AM  Consent given by: patient  Site marked: site marked  Timeout: Immediately prior to procedure a time out was called to verify the correct patient, procedure, equipment, support staff and site/side marked as required   Supporting Documentation  Indications: pain   Procedure Details  Location: knee - R knee  Preparation: Patient was prepped and draped in the usual sterile fashion  Needle gauge: 21 G.  Approach: anterolateral  Medications administered: 48 mg hylan 48 MG/6ML; 2 mL lidocaine PF 1% 1 %  Patient tolerance: patient tolerated the procedure well with no immediate complications    Large Joint Arthrocentesis: L knee  Date/Time: 2/27/2023 7:46 AM  Consent given by: patient  Site marked: site marked  Timeout: Immediately prior to procedure a time out was called to verify the correct patient, procedure, equipment, support staff and site/side marked as required   Supporting Documentation  Indications: pain   Procedure Details  Location: knee - L knee  Preparation: Patient was prepped and draped in the usual sterile fashion  Needle gauge: 21 G.  Approach: anterolateral  Medications administered: 48 mg hylan 48 MG/6ML; 2 mL lidocaine PF 1% 1 %  Patient tolerance: patient tolerated the procedure well with no immediate complications

## 2023-02-28 RX ORDER — ALPRAZOLAM 0.5 MG/1
TABLET ORAL
Qty: 45 TABLET | Refills: 0 | Status: ON HOLD | OUTPATIENT
Start: 2023-02-28 | End: 2023-03-24 | Stop reason: SDUPTHER

## 2023-03-03 ENCOUNTER — ANTICOAGULATION VISIT (OUTPATIENT)
Dept: PHARMACY | Facility: HOSPITAL | Age: 78
End: 2023-03-03
Payer: MEDICARE

## 2023-03-03 DIAGNOSIS — I48.19 OTHER PERSISTENT ATRIAL FIBRILLATION: Primary | ICD-10-CM

## 2023-03-03 LAB
INR PPP: 3.2 (ref 0.91–1.09)
PROTHROMBIN TIME: 38.1 SECONDS (ref 10–13.8)

## 2023-03-03 PROCEDURE — G0463 HOSPITAL OUTPT CLINIC VISIT: HCPCS

## 2023-03-03 PROCEDURE — 85610 PROTHROMBIN TIME: CPT

## 2023-03-03 PROCEDURE — 36416 COLLJ CAPILLARY BLOOD SPEC: CPT

## 2023-03-03 NOTE — PROGRESS NOTES
Anticoagulation Clinic Progress Note    Anticoagulation Summary  As of 3/3/2023    INR goal:  2.0-3.0   TTR:  62.2 % (4.3 y)   INR used for dosing:  3.2 (3/3/2023)   Warfarin maintenance plan:  4 mg every Mon, Fri; 2 mg all other days; Starting 3/3/2023   Weekly warfarin total:  18 mg   Plan last modified:  Paul Feldman [facility] (9/13/2022)   Next INR check:  3/14/2023   Priority:  Maintenance   Target end date:  Indefinite    Indications    Other persistent atrial fibrillation (HCC) [I48.19]             Anticoagulation Episode Summary     INR check location:      Preferred lab:      Send INR reminders to:   [facility]    Comments:        Anticoagulation Care Providers     Provider Role Specialty Phone number    Myriam Omer MD Referring Cardiology [phone]          Clinic Interview:  Patient Findings     Negatives:  Signs/symptoms of thrombosis, Signs/symptoms of bleeding,   Laboratory test error suspected, Change in health, Change in alcohol use,   Change in activity, Upcoming invasive procedure, Emergency department   visit, Upcoming dental procedure, Missed doses, Extra doses, Change in   medications, Change in diet/appetite, Hospital admission, Bruising, Other   complaints      Clinical Outcomes     Negatives:  Major bleeding event, Thromboembolic event,   Anticoagulation-related hospital admission, Anticoagulation-related ED   visit, Anticoagulation-related fatality        INR History:  Anticoagulation Monitoring 2/3/2023 2/17/2023 3/3/2023   INR 1.5 3.0 3.2   INR Date 2/3/2023 2/17/2023 3/3/2023   INR Goal 2.0-3.0 2.0-3.0 2.0-3.0   Trend Same Same Same   Last Week Total 18 mg 18 mg 18 mg   Next Week Total 20 mg 18 mg 16 mg   Sun 2 mg 2 mg 2 mg   Mon 4 mg 4 mg 4 mg   Tue 2 mg 2 mg 2 mg   Wed 2 mg 2 mg 2 mg   Thu 2 mg 2 mg 2 mg   Fri 6 mg (2/3); Otherwise 4 mg 4 mg 2 mg (3/3); Otherwise 4 mg   Sat 2 mg 2 mg 2 mg   Visit Report - - -   Some recent data might be hidden        Plan:  1. INR is Supratherapeutic today- see above in Anticoagulation Summary.  Will instruct Coyd Eldridge to Change their warfarin regimen- see above in Anticoagulation Summary.  2. Follow up in 2 weeks  3. Patient declines warfarin refills.  4. Verbal and written information provided. Patient expresses understanding and has no further questions at this time.    Myriam Walton, PharmD

## 2023-03-14 ENCOUNTER — LAB (OUTPATIENT)
Dept: LAB | Facility: HOSPITAL | Age: 78
End: 2023-03-14
Payer: MEDICARE

## 2023-03-14 ENCOUNTER — ANTICOAGULATION VISIT (OUTPATIENT)
Dept: PHARMACY | Facility: HOSPITAL | Age: 78
End: 2023-03-14
Payer: MEDICARE

## 2023-03-14 ENCOUNTER — TELEPHONE (OUTPATIENT)
Dept: CARDIOLOGY | Facility: CLINIC | Age: 78
End: 2023-03-14

## 2023-03-14 ENCOUNTER — OFFICE VISIT (OUTPATIENT)
Dept: CARDIOLOGY | Facility: CLINIC | Age: 78
End: 2023-03-14
Payer: MEDICARE

## 2023-03-14 VITALS
SYSTOLIC BLOOD PRESSURE: 110 MMHG | HEART RATE: 71 BPM | OXYGEN SATURATION: 95 % | HEIGHT: 72 IN | BODY MASS INDEX: 22.89 KG/M2 | DIASTOLIC BLOOD PRESSURE: 50 MMHG | WEIGHT: 169 LBS

## 2023-03-14 DIAGNOSIS — Z51.81 ENCOUNTER FOR THERAPEUTIC DRUG LEVEL MONITORING: ICD-10-CM

## 2023-03-14 DIAGNOSIS — Z51.81 ENCOUNTER FOR THERAPEUTIC DRUG LEVEL MONITORING: Primary | ICD-10-CM

## 2023-03-14 DIAGNOSIS — I48.19 OTHER PERSISTENT ATRIAL FIBRILLATION: Primary | ICD-10-CM

## 2023-03-14 DIAGNOSIS — I50.813 ACUTE ON CHRONIC RIGHT-SIDED HEART FAILURE: ICD-10-CM

## 2023-03-14 DIAGNOSIS — R06.09 DOE (DYSPNEA ON EXERTION): ICD-10-CM

## 2023-03-14 LAB
ANION GAP SERPL CALCULATED.3IONS-SCNC: 10.3 MMOL/L (ref 5–15)
BUN SERPL-MCNC: 29 MG/DL (ref 8–23)
BUN/CREAT SERPL: 17 (ref 7–25)
CALCIUM SPEC-SCNC: 9.2 MG/DL (ref 8.6–10.5)
CHLORIDE SERPL-SCNC: 96 MMOL/L (ref 98–107)
CO2 SERPL-SCNC: 31.7 MMOL/L (ref 22–29)
CREAT SERPL-MCNC: 1.71 MG/DL (ref 0.76–1.27)
EGFRCR SERPLBLD CKD-EPI 2021: 40.5 ML/MIN/1.73
GLUCOSE SERPL-MCNC: 130 MG/DL (ref 65–99)
INR PPP: 3 (ref 0.91–1.09)
NT-PROBNP SERPL-MCNC: 2397 PG/ML (ref 0–1800)
POTASSIUM SERPL-SCNC: 3.5 MMOL/L (ref 3.5–5.2)
PROTHROMBIN TIME: 35.6 SECONDS (ref 10–13.8)
SODIUM SERPL-SCNC: 138 MMOL/L (ref 136–145)

## 2023-03-14 PROCEDURE — 3074F SYST BP LT 130 MM HG: CPT | Performed by: NURSE PRACTITIONER

## 2023-03-14 PROCEDURE — 36416 COLLJ CAPILLARY BLOOD SPEC: CPT

## 2023-03-14 PROCEDURE — 1159F MED LIST DOCD IN RCRD: CPT | Performed by: NURSE PRACTITIONER

## 2023-03-14 PROCEDURE — 83880 ASSAY OF NATRIURETIC PEPTIDE: CPT | Performed by: NURSE PRACTITIONER

## 2023-03-14 PROCEDURE — 36415 COLL VENOUS BLD VENIPUNCTURE: CPT

## 2023-03-14 PROCEDURE — 85610 PROTHROMBIN TIME: CPT

## 2023-03-14 PROCEDURE — 1160F RVW MEDS BY RX/DR IN RCRD: CPT | Performed by: NURSE PRACTITIONER

## 2023-03-14 PROCEDURE — 99214 OFFICE O/P EST MOD 30 MIN: CPT | Performed by: NURSE PRACTITIONER

## 2023-03-14 PROCEDURE — 80048 BASIC METABOLIC PNL TOTAL CA: CPT

## 2023-03-14 PROCEDURE — 3078F DIAST BP <80 MM HG: CPT | Performed by: NURSE PRACTITIONER

## 2023-03-14 RX ORDER — FUROSEMIDE 40 MG/1
20 TABLET ORAL DAILY
Qty: 30 TABLET | Refills: 3
Start: 2023-03-14

## 2023-03-14 NOTE — PROGRESS NOTES
I have supervised and reviewed the notes, assessments, and/or procedures performed by our Pharmacy Resident. I concur with the documentation of this patient encounter.    Georges Guadalupe, PharmD

## 2023-03-14 NOTE — PROGRESS NOTES
"Date of Office Visit: 23  Encounter Provider: JONELLE Polanco  Place of Service: [facility] CARDIOLOGY  Patient Name: Cody Eldridge  :1945    Chief Complaint   Patient presents with   • Coronary artery disease involving native coronary artery of   • Chronic diastolic congestive heart failure (HCC)   • Pulmonary emphysema, unspecified emphysema type (HCC)   • LUX ASKEWN Essential hypertension    • Hyperlipidemia   • Sleep Apnea   • Follow-up   :     HPI: Cody Eldridge is a 77 y.o. male  with               Allergies   Allergen Reactions   • Benadryl [Diphenhydramine Hcl] Dizziness     \"I sleep for about a day\"           Family and social history reviewed.     ROS  All other systems were reviewed and are negative          Objective:     Vitals:    23 1106   BP: 110/50   BP Location: Left arm   Patient Position: Sitting   Pulse: 71   Weight: 76.7 kg (169 lb)   Height: 182.9 cm (72\")     Body mass index is 22.92 kg/m².    PHYSICAL EXAM:  Physical Exam    Procedures      Current Outpatient Medications   Medication Sig Dispense Refill   • albuterol (PROVENTIL HFA;VENTOLIN HFA) 108 (90 BASE) MCG/ACT inhaler Inhale 2 puffs. ProAir  (90 Base) MCG/ACT Inhalation Aerosol Solution; Patient Sig: ProAir  (90 Base) MCG/ACT Inhalation Aerosol Solution inhale 2 puffs by mouth every 4 hours if needed; 8.5; 11; -2014; Act     • ALPRAZolam (XANAX) 0.5 MG tablet TAKE 1 TABLET BY MOUTH EVERY MORNING THEN TAKE 1/2 TABLET BY MOUTH EVERY EVENING 45 tablet 0   • aspirin 81 MG chewable tablet Chew 1 tablet Daily.     • carvedilol (COREG) 25 MG tablet Take 1 tablet by mouth 2 (Two) Times a Day With Meals. 180 tablet 3   • chlorthalidone (HYGROTON) 25 MG tablet      • cyclobenzaprine (FLEXERIL) 5 MG tablet Daily.     • diphenoxylate-atropine (LOMOTIL) 2.5-0.025 MG per tablet Take 1 tablet by mouth 4 (Four) Times a Day As Needed for Diarrhea. 60 tablet 1   • furosemide " (LASIX) 40 MG tablet Take 1 tablet by mouth Daily. 30 tablet 3   • levothyroxine (SYNTHROID, LEVOTHROID) 125 MCG tablet TAKE 1 TABLET BY MOUTH DAILY 90 tablet 2   • lisinopril (PRINIVIL,ZESTRIL) 20 MG tablet Take 1 tablet by mouth Daily. 90 tablet 1   • montelukast (SINGULAIR) 10 MG tablet TAKE 1 TABLET BY MOUTH DAILY 90 tablet 1   • oxyCODONE-acetaminophen (PERCOCET) 7.5-325 MG per tablet TK 1 T PO Q 6 H UTD     • rosuvastatin (CRESTOR) 20 MG tablet Take 1 tablet by mouth Daily. 30 tablet 5   • sertraline (ZOLOFT) 100 MG tablet TAKE 1 TABLET BY MOUTH DAILY 90 tablet 3   • warfarin (COUMADIN) 2 MG tablet TAKE TWO TABLETS BY MOUTH ON MON and FRI AND TAKE ONE TABLET BY MOUTH ALL OTHER DAYS OR AS DIRECTED 120 tablet 1   • ferrous gluconate (FERGON) 324 MG tablet Take 1 tablet by mouth Daily With Breakfast. 90 tablet 0     No current facility-administered medications for this visit.     Assessment:       Diagnosis Plan   1. Encounter for therapeutic drug level monitoring  Basic Metabolic Panel    proBNP      2. Acute on chronic right-sided heart failure (HCC)  Basic Metabolic Panel    proBNP      3. IRAHETA (dyspnea on exertion)  proBNP           Orders Placed This Encounter   Procedures   • Basic Metabolic Panel     Standing Status:   Future     Standing Expiration Date:   3/14/2024     Order Specific Question:   Release to patient     Answer:   Routine Release   • proBNP     Order Specific Question:   Release to patient     Answer:   Routine Release         Plan:             Plan of care reviewed with  ***  Follow up in ****            It has been a pleasure to participate in this patient's care.      Thank you,  JONELLE Polanco      **I used Dragon to dictate this note:**

## 2023-03-14 NOTE — TELEPHONE ENCOUNTER
Patient still outside per wife. She said she will have him call us back. Gave number and said to ask for triage.     Susan Scott RN  Triage [facility]

## 2023-03-14 NOTE — TELEPHONE ENCOUNTER
I tried to call but patient's wife state he is outside working and should be back in 10-15 minutes. Please relay that his probnp is lower but not normalized. His kidney function is worse that his last value so I want him to decrease lasix to a half tablet daily. Patient also c/o dry mouth so this should help. I have updated lasix on his list to be a half  40 mg tablet. New dose 20 mg daily.

## 2023-03-14 NOTE — PROGRESS NOTES
Date of Office Visit: 23  Encounter Provider: JONELLE Polanco  Place of Service: [facility] CARDIOLOGY  Patient Name: Cody Eldridge  :1945    Chief Complaint   Patient presents with   • Coronary artery disease involving native coronary artery of   • Chronic diastolic congestive heart failure (HCC)   • Pulmonary emphysema, unspecified emphysema type (HCC)   • JAMILAH, APRN Essential hypertension    • Hyperlipidemia   • Sleep Apnea   • Follow-up   :     HPI: Cody Eldridge is a 77 y.o. male  with  hypertension, hyperlipidemia, supraventricular tachycardia, syncope, coronary artery disease with History of myocardial infarction, congestive heart failure, COPD, depression and anxiety.    He is followed by Dr. Omer from visit with him in follow-up and have reviewed his medical record.     In  patient had a SVT ablation at the [facility].   He was started on Lasix in  due to elevated proBNP.  Echocardiogram revealed normal left ventricle systolic function with EF 56%, mild to moderate left ventricular hypertrophy.  Right ventricle is moderate to severely dilated with mildly reduced function.  The right atrium was also moderate to severely dilated.  There was moderate tricuspid regurgitation with RVSP of 47 mmHg.  He saw Dr. Segovia with Pulmonary and his medications were adjusted.   He was given IV Lasix and his oral Lasix at home was increased.     He then reported increased shortness of breath in 2022.  proBNP was 4866.  He was given a dose of IV Lasix in the office and was set up for an echocardiogram which was completed 2023 showing normal left ventricular systolic function, grade 2 diastolic dysfunction, severe biatrial enlargement, myxomatous changes of the mitral valve with moderate mitral valve regurgitation.  There was severe tricuspid valve regurgitation RVSP measuring 76 mmHg he then was switched from chlorthalidone to  "furosemide 40mg February 2023.    He presents today for reassessment.  He states his mouth feels dry on occasion but overall his breathing has seemed slightly better after being on Lasix several months.  He denies chest pain tightness or pressure palpitations or edema.  He has no blood in the urine or stool.  He follows with our INR clinic closely and has follow-up today.          Allergies   Allergen Reactions   • Benadryl [Diphenhydramine Hcl] Dizziness     \"I sleep for about a day\"           Family and social history reviewed.     ROS  All other systems were reviewed and are negative          Objective:     Vitals:    03/14/23 1106   BP: 110/50   BP Location: Left arm   Patient Position: Sitting   Pulse: 71   Weight: 76.7 kg (169 lb)   Height: 182.9 cm (72\")     Body mass index is 22.92 kg/m².    PHYSICAL EXAM:  Pulmonary:      Breath sounds: Decreased air movement present. Examination of the right-lower field reveals decreased breath sounds. Examination of the left-lower field reveals decreased breath sounds. Decreased breath sounds present. Rhonchi present.   Cardiovascular:      Normal rate.      Murmurs: There is a grade 2/6 systolic murmur at the LLSB.         Procedures      Current Outpatient Medications   Medication Sig Dispense Refill   • albuterol (PROVENTIL HFA;VENTOLIN HFA) 108 (90 BASE) MCG/ACT inhaler Inhale 2 puffs. ProAir  (90 Base) MCG/ACT Inhalation Aerosol Solution; Patient Sig: ProAir  (90 Base) MCG/ACT Inhalation Aerosol Solution inhale 2 puffs by mouth every 4 hours if needed; 8.5; 11; 07-Apr-2014; Act     • ALPRAZolam (XANAX) 0.5 MG tablet TAKE 1 TABLET BY MOUTH EVERY MORNING THEN TAKE 1/2 TABLET BY MOUTH EVERY EVENING 45 tablet 0   • aspirin 81 MG chewable tablet Chew 1 tablet Daily.     • carvedilol (COREG) 25 MG tablet Take 1 tablet by mouth 2 (Two) Times a Day With Meals. 180 tablet 3   • chlorthalidone (HYGROTON) 25 MG tablet      • cyclobenzaprine (FLEXERIL) 5 MG tablet " Daily.     • diphenoxylate-atropine (LOMOTIL) 2.5-0.025 MG per tablet Take 1 tablet by mouth 4 (Four) Times a Day As Needed for Diarrhea. 60 tablet 1   • furosemide (LASIX) 40 MG tablet Take 1 tablet by mouth Daily. 30 tablet 3   • levothyroxine (SYNTHROID, LEVOTHROID) 125 MCG tablet TAKE 1 TABLET BY MOUTH DAILY 90 tablet 2   • lisinopril (PRINIVIL,ZESTRIL) 20 MG tablet Take 1 tablet by mouth Daily. 90 tablet 1   • montelukast (SINGULAIR) 10 MG tablet TAKE 1 TABLET BY MOUTH DAILY 90 tablet 1   • oxyCODONE-acetaminophen (PERCOCET) 7.5-325 MG per tablet TK 1 T PO Q 6 H UTD     • rosuvastatin (CRESTOR) 20 MG tablet Take 1 tablet by mouth Daily. 30 tablet 5   • sertraline (ZOLOFT) 100 MG tablet TAKE 1 TABLET BY MOUTH DAILY 90 tablet 3   • warfarin (COUMADIN) 2 MG tablet TAKE TWO TABLETS BY MOUTH ON MON and FRI AND TAKE ONE TABLET BY MOUTH ALL OTHER DAYS OR AS DIRECTED 120 tablet 1   • ferrous gluconate (FERGON) 324 MG tablet Take 1 tablet by mouth Daily With Breakfast. 90 tablet 0     No current facility-administered medications for this visit.     Assessment:       Diagnosis Plan   1. Encounter for therapeutic drug level monitoring  Basic Metabolic Panel    proBNP      2. Acute on chronic right-sided heart failure (HCC)  Basic Metabolic Panel    proBNP      3. IRAHETA (dyspnea on exertion)  proBNP           Orders Placed This Encounter   Procedures   • Basic Metabolic Panel     Standing Status:   Future     Standing Expiration Date:   3/14/2024     Order Specific Question:   Release to patient     Answer:   Routine Release   • proBNP     Order Specific Question:   Release to patient     Answer:   Routine Release         Plan:       1.  77-year-old gentleman with chronic atrial fibrillation.  CHADS2 Vascor of 4.  He is rate controlled and maintained on warfarin.  He follows in our INR clinic and he has follow up there today after our visit.   2.  History of coronary artery disease with remote myocardial infarction.  He  initially had complaints of chest discomfort in May 2018 and was set up for right and left heart catheterization.  This was aborted due to claustrophobia.  He has no angina  3.  Diastolic congestive heart failure with right-sided heart failure.  He had elevated proBNP 12/2022  and responded wellto IV lasix and has been maintained on Lasix 40 mg daily.  I will check BMP  And repeat PRoBNP today given his continued IRAHETA. He also c/o dry mouth and wants to cut back on lasix. See follow up telephone note   4.  Pulmonary hypertension.  RVSP was previously approximately 50 mg of mercury but most recently measured 76 mmHg.  He still has not seen his pulmonologist   5.  COPD with emphysema.  Normally follows with Dr. Segovia  6.  History of SVT ablation  7.  Obstructive sleep apnea-not using Pap device  8. Normal renal artery duplex, bilateral in 2021  9.  Moderate mitral valve regurgitation and severe tricuspid regurgitation on echo June 2023      Follow up in 3-4 months   Call with questions or concerns.             It has been a pleasure to participate in this patient's care.      Thank you,  JONELLE Polanco      **I used Dragon to dictate this note:**

## 2023-03-14 NOTE — PROGRESS NOTES
Anticoagulation Clinic Progress Note    Anticoagulation Summary  As of 3/14/2023    INR goal:  2.0-3.0   TTR:  61.8 % (4.3 y)   INR used for dosing:  3.0 (3/14/2023)   Warfarin maintenance plan:  4 mg every Mon, Fri; 2 mg all other days; Starting 3/14/2023   Weekly warfarin total:  18 mg   No change documented:  Mary Saleem, PharmD   Plan last modified:  Paul Feldman RP (9/13/2022)   Next INR check:  3/28/2023   Priority:  Maintenance   Target end date:  Indefinite    Indications    Other persistent atrial fibrillation (HCC) [I48.19]             Anticoagulation Episode Summary     INR check location:      Preferred lab:      Send INR reminders to:   [facility]    Comments:        Anticoagulation Care Providers     Provider Role Specialty Phone number    Myriam Omer MD Referring Cardiology [phone]          Clinic Interview:  Patient Findings     Negatives:  Signs/symptoms of thrombosis, Signs/symptoms of bleeding,   Laboratory test error suspected, Change in health, Change in alcohol use,   Change in activity, Upcoming invasive procedure, Emergency department   visit, Upcoming dental procedure, Missed doses, Extra doses, Change in   medications, Change in diet/appetite, Hospital admission, Bruising, Other   complaints      Clinical Outcomes     Negatives:  Major bleeding event, Thromboembolic event,   Anticoagulation-related hospital admission, Anticoagulation-related ED   visit, Anticoagulation-related fatality        INR History:  Anticoagulation Monitoring 2/17/2023 3/3/2023 3/14/2023   INR 3.0 3.2 3.0   INR Date 2/17/2023 3/3/2023 3/14/2023   INR Goal 2.0-3.0 2.0-3.0 2.0-3.0   Trend Same Same Same   Last Week Total 18 mg 18 mg 18 mg   Next Week Total 18 mg 16 mg 18 mg   Sun 2 mg 2 mg 2 mg   Mon 4 mg 4 mg 4 mg   Tue 2 mg 2 mg 2 mg   Wed 2 mg 2 mg 2 mg   Thu 2 mg 2 mg 2 mg   Fri 4 mg 2 mg (3/3); Otherwise 4 mg 4 mg   Sat 2 mg 2 mg 2 mg   Visit Report - - -   Some recent  data might be hidden       Plan:  1. INR is Therapeutic today- see above in Anticoagulation Summary.  Will instruct Cody Eldridge to Continue their warfarin regimen- see above in Anticoagulation Summary.  2. Follow up in 2 weeks  3. Patient declines warfarin refills.  4. Verbal and written information provided. Patient expresses understanding and has no further questions at this time.    Mary Saleem, PharmD

## 2023-03-15 NOTE — TELEPHONE ENCOUNTER
Notified patient of results/recommendations. Patient verbalized understanding.    Susan Scott RN  Triage [facility] [place]

## 2023-03-16 RX ORDER — MONTELUKAST SODIUM 10 MG/1
TABLET ORAL
Qty: 90 TABLET | Refills: 1 | Status: SHIPPED | OUTPATIENT
Start: 2023-03-16

## 2023-03-22 ENCOUNTER — APPOINTMENT (OUTPATIENT)
Dept: CT IMAGING | Facility: HOSPITAL | Age: 78
End: 2023-03-22
Payer: MEDICARE

## 2023-03-22 ENCOUNTER — HOSPITAL ENCOUNTER (OUTPATIENT)
Facility: HOSPITAL | Age: 78
Setting detail: OBSERVATION
Discharge: HOME | End: 2023-03-24
Attending: EMERGENCY MEDICINE | Admitting: HOSPITALIST
Payer: MEDICARE

## 2023-03-22 ENCOUNTER — OFFICE VISIT (OUTPATIENT)
Dept: INTERNAL MEDICINE | Facility: CLINIC | Age: 78
End: 2023-03-22
Payer: MEDICARE

## 2023-03-22 DIAGNOSIS — S09.90XA INJURY OF HEAD, INITIAL ENCOUNTER: Primary | ICD-10-CM

## 2023-03-22 DIAGNOSIS — I95.9 HYPOTENSION, UNSPECIFIED HYPOTENSION TYPE: ICD-10-CM

## 2023-03-22 DIAGNOSIS — R40.20 LOC (LOSS OF CONSCIOUSNESS): ICD-10-CM

## 2023-03-22 DIAGNOSIS — S05.90XA EYE INJURY, INITIAL ENCOUNTER: ICD-10-CM

## 2023-03-22 DIAGNOSIS — R53.83 FATIGUE, UNSPECIFIED TYPE: ICD-10-CM

## 2023-03-22 DIAGNOSIS — R55 SYNCOPE AND COLLAPSE: Primary | ICD-10-CM

## 2023-03-22 DIAGNOSIS — S09.90XA CLOSED HEAD INJURY, INITIAL ENCOUNTER: ICD-10-CM

## 2023-03-22 DIAGNOSIS — N17.9 AKI (ACUTE KIDNEY INJURY): ICD-10-CM

## 2023-03-22 PROBLEM — I95.1 ORTHOSTATIC HYPOTENSION: Status: ACTIVE | Noted: 2023-03-22

## 2023-03-22 LAB
ALBUMIN SERPL-MCNC: 4.4 G/DL (ref 3.5–5.2)
ALBUMIN/GLOB SERPL: 1.1 G/DL
ALP SERPL-CCNC: 78 U/L (ref 39–117)
ALT SERPL W P-5'-P-CCNC: 15 U/L (ref 1–41)
ANION GAP SERPL CALCULATED.3IONS-SCNC: 9.4 MMOL/L (ref 5–15)
AST SERPL-CCNC: 22 U/L (ref 1–40)
BACTERIA UR QL AUTO: NORMAL /HPF
BASOPHILS # BLD AUTO: 0.04 10*3/MM3 (ref 0–0.2)
BASOPHILS NFR BLD AUTO: 0.5 % (ref 0–1.5)
BILIRUB SERPL-MCNC: 0.7 MG/DL (ref 0–1.2)
BILIRUB UR QL STRIP: NEGATIVE
BUN SERPL-MCNC: 60 MG/DL (ref 8–23)
BUN/CREAT SERPL: 21.9 (ref 7–25)
CALCIUM SPEC-SCNC: 9.2 MG/DL (ref 8.6–10.5)
CHLORIDE SERPL-SCNC: 94 MMOL/L (ref 98–107)
CLARITY UR: CLEAR
CO2 SERPL-SCNC: 31.6 MMOL/L (ref 22–29)
COLOR UR: YELLOW
CREAT SERPL-MCNC: 2.74 MG/DL (ref 0.76–1.27)
DEPRECATED RDW RBC AUTO: 41 FL (ref 37–54)
EGFRCR SERPLBLD CKD-EPI 2021: 23 ML/MIN/1.73
EOSINOPHIL # BLD AUTO: 0.08 10*3/MM3 (ref 0–0.4)
EOSINOPHIL NFR BLD AUTO: 1.1 % (ref 0.3–6.2)
ERYTHROCYTE [DISTWIDTH] IN BLOOD BY AUTOMATED COUNT: 12.3 % (ref 12.3–15.4)
GLOBULIN UR ELPH-MCNC: 4.1 GM/DL
GLUCOSE SERPL-MCNC: 102 MG/DL (ref 65–99)
GLUCOSE UR STRIP-MCNC: NEGATIVE MG/DL
HCT VFR BLD AUTO: 42.3 % (ref 37.5–51)
HGB BLD-MCNC: 14.8 G/DL (ref 13–17.7)
HGB UR QL STRIP.AUTO: ABNORMAL
HYALINE CASTS UR QL AUTO: NORMAL /LPF
IMM GRANULOCYTES # BLD AUTO: 0.02 10*3/MM3 (ref 0–0.05)
IMM GRANULOCYTES NFR BLD AUTO: 0.3 % (ref 0–0.5)
INR PPP: 7.28 (ref 0.9–1.1)
KETONES UR QL STRIP: NEGATIVE
LEUKOCYTE ESTERASE UR QL STRIP.AUTO: NEGATIVE
LIPASE SERPL-CCNC: 45 U/L (ref 13–60)
LYMPHOCYTES # BLD AUTO: 1.28 10*3/MM3 (ref 0.7–3.1)
LYMPHOCYTES NFR BLD AUTO: 17 % (ref 19.6–45.3)
MCH RBC QN AUTO: 31.7 PG (ref 26.6–33)
MCHC RBC AUTO-ENTMCNC: 35 G/DL (ref 31.5–35.7)
MCV RBC AUTO: 90.6 FL (ref 79–97)
MONOCYTES # BLD AUTO: 0.84 10*3/MM3 (ref 0.1–0.9)
MONOCYTES NFR BLD AUTO: 11.2 % (ref 5–12)
NEUTROPHILS NFR BLD AUTO: 5.26 10*3/MM3 (ref 1.7–7)
NEUTROPHILS NFR BLD AUTO: 69.9 % (ref 42.7–76)
NITRITE UR QL STRIP: NEGATIVE
NRBC BLD AUTO-RTO: 0 /100 WBC (ref 0–0.2)
PH UR STRIP.AUTO: 6 [PH] (ref 5–8)
PLATELET # BLD AUTO: 189 10*3/MM3 (ref 140–450)
PMV BLD AUTO: 10.4 FL (ref 6–12)
POTASSIUM SERPL-SCNC: 3.5 MMOL/L (ref 3.5–5.2)
PROT SERPL-MCNC: 8.5 G/DL (ref 6–8.5)
PROT UR QL STRIP: NEGATIVE
PROTHROMBIN TIME: 63.5 SECONDS (ref 11.7–14.2)
QT INTERVAL: 366 MS
RBC # BLD AUTO: 4.67 10*6/MM3 (ref 4.14–5.8)
RBC # UR STRIP: NORMAL /HPF
REF LAB TEST METHOD: NORMAL
SODIUM SERPL-SCNC: 135 MMOL/L (ref 136–145)
SP GR UR STRIP: 1.01 (ref 1–1.03)
SQUAMOUS #/AREA URNS HPF: NORMAL /HPF
UROBILINOGEN UR QL STRIP: ABNORMAL
WBC # UR STRIP: NORMAL /HPF
WBC NRBC COR # BLD: 7.52 10*3/MM3 (ref 3.4–10.8)

## 2023-03-22 PROCEDURE — G0378 HOSPITAL OBSERVATION PER HR: HCPCS

## 2023-03-22 PROCEDURE — 93005 ELECTROCARDIOGRAM TRACING: CPT | Performed by: EMERGENCY MEDICINE

## 2023-03-22 PROCEDURE — 96361 HYDRATE IV INFUSION ADD-ON: CPT

## 2023-03-22 PROCEDURE — 99285 EMERGENCY DEPT VISIT HI MDM: CPT

## 2023-03-22 PROCEDURE — 96374 THER/PROPH/DIAG INJ IV PUSH: CPT

## 2023-03-22 PROCEDURE — 85610 PROTHROMBIN TIME: CPT | Performed by: EMERGENCY MEDICINE

## 2023-03-22 PROCEDURE — 81001 URINALYSIS AUTO W/SCOPE: CPT | Performed by: EMERGENCY MEDICINE

## 2023-03-22 PROCEDURE — 80053 COMPREHEN METABOLIC PANEL: CPT | Performed by: EMERGENCY MEDICINE

## 2023-03-22 PROCEDURE — 85025 COMPLETE CBC W/AUTO DIFF WBC: CPT | Performed by: EMERGENCY MEDICINE

## 2023-03-22 PROCEDURE — 83690 ASSAY OF LIPASE: CPT | Performed by: EMERGENCY MEDICINE

## 2023-03-22 PROCEDURE — 93010 ELECTROCARDIOGRAM REPORT: CPT | Performed by: INTERNAL MEDICINE

## 2023-03-22 PROCEDURE — 70450 CT HEAD/BRAIN W/O DYE: CPT

## 2023-03-22 RX ORDER — SODIUM CHLORIDE 0.9 % (FLUSH) 0.9 %
10 SYRINGE (ML) INJECTION EVERY 12 HOURS SCHEDULED
Status: DISCONTINUED | OUTPATIENT
Start: 2023-03-22 | End: 2023-03-24 | Stop reason: HOSPADM

## 2023-03-22 RX ORDER — ROSUVASTATIN CALCIUM 10 MG/1
10 TABLET, COATED ORAL DAILY
Status: DISCONTINUED | OUTPATIENT
Start: 2023-03-23 | End: 2023-03-24 | Stop reason: HOSPADM

## 2023-03-22 RX ORDER — FUROSEMIDE 20 MG/1
20 TABLET ORAL DAILY
Status: DISCONTINUED | OUTPATIENT
Start: 2023-03-23 | End: 2023-03-23

## 2023-03-22 RX ORDER — ALPRAZOLAM 0.25 MG/1
0.25 TABLET ORAL NIGHTLY
Status: DISCONTINUED | OUTPATIENT
Start: 2023-03-23 | End: 2023-03-24 | Stop reason: HOSPADM

## 2023-03-22 RX ORDER — ONDANSETRON 4 MG/1
4 TABLET, FILM COATED ORAL EVERY 6 HOURS PRN
Status: DISCONTINUED | OUTPATIENT
Start: 2023-03-22 | End: 2023-03-24 | Stop reason: HOSPADM

## 2023-03-22 RX ORDER — SODIUM CHLORIDE 9 MG/ML
75 INJECTION, SOLUTION INTRAVENOUS CONTINUOUS
Status: DISCONTINUED | OUTPATIENT
Start: 2023-03-22 | End: 2023-03-24 | Stop reason: HOSPADM

## 2023-03-22 RX ORDER — OXYCODONE AND ACETAMINOPHEN 7.5; 325 MG/1; MG/1
1 TABLET ORAL EVERY 6 HOURS PRN
Status: DISCONTINUED | OUTPATIENT
Start: 2023-03-22 | End: 2023-03-24 | Stop reason: HOSPADM

## 2023-03-22 RX ORDER — CHLORTHALIDONE 25 MG/1
25 TABLET ORAL DAILY
Status: DISCONTINUED | OUTPATIENT
Start: 2023-03-23 | End: 2023-03-23

## 2023-03-22 RX ORDER — SODIUM CHLORIDE 0.9 % (FLUSH) 0.9 %
10 SYRINGE (ML) INJECTION AS NEEDED
Status: DISCONTINUED | OUTPATIENT
Start: 2023-03-22 | End: 2023-03-24 | Stop reason: HOSPADM

## 2023-03-22 RX ORDER — LISINOPRIL 20 MG/1
20 TABLET ORAL DAILY
Status: DISCONTINUED | OUTPATIENT
Start: 2023-03-23 | End: 2023-03-23

## 2023-03-22 RX ORDER — SERTRALINE HYDROCHLORIDE 100 MG/1
100 TABLET, FILM COATED ORAL DAILY
Status: DISCONTINUED | OUTPATIENT
Start: 2023-03-23 | End: 2023-03-24 | Stop reason: HOSPADM

## 2023-03-22 RX ORDER — ACETAMINOPHEN 160 MG/5ML
650 SOLUTION ORAL EVERY 4 HOURS PRN
Status: DISCONTINUED | OUTPATIENT
Start: 2023-03-22 | End: 2023-03-24 | Stop reason: HOSPADM

## 2023-03-22 RX ORDER — ACETAMINOPHEN 325 MG/1
650 TABLET ORAL EVERY 4 HOURS PRN
Status: DISCONTINUED | OUTPATIENT
Start: 2023-03-22 | End: 2023-03-24 | Stop reason: HOSPADM

## 2023-03-22 RX ORDER — SODIUM CHLORIDE 9 MG/ML
40 INJECTION, SOLUTION INTRAVENOUS AS NEEDED
Status: DISCONTINUED | OUTPATIENT
Start: 2023-03-22 | End: 2023-03-24 | Stop reason: HOSPADM

## 2023-03-22 RX ORDER — ACETAMINOPHEN 650 MG/1
650 SUPPOSITORY RECTAL EVERY 4 HOURS PRN
Status: DISCONTINUED | OUTPATIENT
Start: 2023-03-22 | End: 2023-03-24 | Stop reason: HOSPADM

## 2023-03-22 RX ORDER — NITROGLYCERIN 0.4 MG/1
0.4 TABLET SUBLINGUAL
Status: DISCONTINUED | OUTPATIENT
Start: 2023-03-22 | End: 2023-03-24 | Stop reason: HOSPADM

## 2023-03-22 RX ORDER — ALBUTEROL SULFATE 2.5 MG/3ML
2.5 SOLUTION RESPIRATORY (INHALATION) EVERY 6 HOURS PRN
Status: DISCONTINUED | OUTPATIENT
Start: 2023-03-22 | End: 2023-03-24 | Stop reason: HOSPADM

## 2023-03-22 RX ORDER — ONDANSETRON 2 MG/ML
4 INJECTION INTRAMUSCULAR; INTRAVENOUS EVERY 6 HOURS PRN
Status: DISCONTINUED | OUTPATIENT
Start: 2023-03-22 | End: 2023-03-24 | Stop reason: HOSPADM

## 2023-03-22 RX ORDER — LEVOTHYROXINE SODIUM 0.12 MG/1
125 TABLET ORAL
Status: DISCONTINUED | OUTPATIENT
Start: 2023-03-23 | End: 2023-03-24 | Stop reason: HOSPADM

## 2023-03-22 RX ORDER — ALPRAZOLAM 0.5 MG/1
0.5 TABLET ORAL
Status: DISCONTINUED | OUTPATIENT
Start: 2023-03-23 | End: 2023-03-23

## 2023-03-22 RX ORDER — ASPIRIN 81 MG/1
81 TABLET, CHEWABLE ORAL DAILY
Status: DISCONTINUED | OUTPATIENT
Start: 2023-03-23 | End: 2023-03-24 | Stop reason: HOSPADM

## 2023-03-22 RX ORDER — BACLOFEN 10 MG/1
TABLET ORAL
Status: ON HOLD | COMMUNITY
End: 2023-03-22

## 2023-03-22 RX ORDER — CARVEDILOL 25 MG/1
25 TABLET ORAL 2 TIMES DAILY WITH MEALS
Status: DISCONTINUED | OUTPATIENT
Start: 2023-03-23 | End: 2023-03-23

## 2023-03-22 RX ORDER — OXYCODONE AND ACETAMINOPHEN 7.5; 325 MG/1; MG/1
1 TABLET ORAL EVERY 6 HOURS PRN
COMMUNITY

## 2023-03-22 RX ADMIN — METOPROLOL TARTRATE 5 MG: 1 INJECTION, SOLUTION INTRAVENOUS at 20:26

## 2023-03-22 RX ADMIN — Medication 10 ML: at 22:55

## 2023-03-22 RX ADMIN — SODIUM CHLORIDE 500 ML: 9 INJECTION, SOLUTION INTRAVENOUS at 18:34

## 2023-03-22 RX ADMIN — SODIUM CHLORIDE 125 ML/HR: 9 INJECTION, SOLUTION INTRAVENOUS at 23:06

## 2023-03-22 RX ADMIN — SODIUM CHLORIDE 125 ML/HR: 9 INJECTION, SOLUTION INTRAVENOUS at 18:56

## 2023-03-22 NOTE — PROGRESS NOTES
"Chief Complaint  Fall (Low blood pressure)    Subjective        Cody Eldridge presents to [facility]  History of Present Illness    Patient is a pleasant 77 year old male who is new to me and he presents to the office today with c/o fall x 5 days ago (wife states he lost consciousness). Right orbital injury.   C/o low blood pressure and dizziness. Is on ASA and warfarin therapy. Denies SOB or chest pain.     Objective   Vital Signs:  BP 90/60   Pulse 62   Temp 97.3 °F (36.3 °C)   Resp 16   Ht 182.9 cm (72\")   Wt 74.8 kg (165 lb)   BMI 22.38 kg/m²   Estimated body mass index is 22.38 kg/m² as calculated from the following:    Height as of this encounter: 182.9 cm (72\").    Weight as of this encounter: 74.8 kg (165 lb).       BMI is within normal parameters. No other follow-up for BMI required.      Physical Exam  Vitals and nursing note reviewed.   Constitutional:       Appearance: Normal appearance.      Comments: Dizziness    HENT:      Head: Normocephalic.   Eyes:      Comments: R eye injury x 5 days ago d/t fall   Cardiovascular:      Rate and Rhythm: Normal rate and regular rhythm.      Pulses: Normal pulses.      Heart sounds: Normal heart sounds.   Pulmonary:      Effort: Pulmonary effort is normal. No respiratory distress.      Breath sounds: Normal breath sounds. No stridor. No wheezing, rhonchi or rales.      Comments: Denies SOB  Chest:      Chest wall: No tenderness.   Skin:     General: Skin is warm.      Capillary Refill: Capillary refill takes less than 2 seconds.      Findings: Bruising and signs of injury present.             Comments: R eye injury due to fall x 5 days ago   Neurological:      Mental Status: He is alert and oriented to person, place, and time.   Psychiatric:         Behavior: Behavior normal.        Result Review :    Common labs    Common Labs 5/31/22 5/31/22 12/15/22 12/15/22 3/14/23    1412 1412 0932 0932    Glucose 95   110 (A) 130 (A) "   BUN 22   27 (A) 29 (A)   Creatinine 1.33 (A)   1.56 (A) 1.71 (A)   Sodium 141   137 138   Potassium 3.9   4.1 3.5   Chloride 106   103 96 (A)   Calcium 9.0   8.6 9.2   Albumin 3.90   3.90    Total Bilirubin 0.4   0.4    Alkaline Phosphatase 63   71    AST (SGOT) 22   21    ALT (SGPT) 10   14    WBC   5.82     Hemoglobin   12.2 (A)     Hematocrit   37.0 (A)     Platelets   156     Total Cholesterol  111      Triglycerides  55      HDL Cholesterol  51      LDL Cholesterol   47      (A) Abnormal value              UC with Michael Oleary MD (03/17/2023)             Assessment and Plan   Diagnoses and all orders for this visit:    1. Injury of head, initial encounter (Primary)    2. LOC (loss of consciousness) (HCC)    3. Eye injury, initial encounter    4. Hypotension, unspecified hypotension type    Go to ER now for further evaluation and treatment.   He denies any transport, no family to help drive him.   Denies Ambulance. AMA form completed by patient.   Instructed to go directly to ER.          Follow Up   No follow-ups on file.  Patient was given instructions and counseling regarding his condition or for health maintenance advice. Please see specific information pulled into the AVS if appropriate.

## 2023-03-22 NOTE — ED PROVIDER NOTES
" EMERGENCY DEPARTMENT ENCOUNTER    Room Number:  S619/1  Date seen:  3/24/2023  PCP: Patrick Diaz MD  Historian: Patient and patient's wife at bedside  Relevant information provided by sources other than the patient, review of external records, and social determinants of health may be included in the HPI section.      HPI:  Chief Complaint: Weakness and orthostasis  Additional historical features obtained directly from: Patient's wife provides history that he was diagnosed with the flu last week by his PCP and has been taking \"some pills for nausea\".  She also states that he got very weak and passed out over the weekend and hit his head.  He is anticoagulated.  She reports that he went today to the PCP because he felt so weak and they found him to be dehydrated and orthostatic so they referred him to the ED  Context: Cody Eldridge is a 77 y.o. male who presents to the ED c/o generalized weakness, abnormal labs, dehydration and a syncopal episode prior to going to the PCP today.  Please see the information above for the remainder of the history, but the patient does not have a headache or any postconcussive symptoms.  He does have evidence of trauma to the right periorbital area from his fall but states he did not lose consciousness and has been without symptoms of that.  He does say that he has been eating and drinking appropriately, but he does look a little dry clinically.        Initial impression including social determinants which will impact the assessment patient really has 2 issues of concern, the first and most obvious is the head trauma that he sustained 3 days ago.  He is chronically anticoagulated and has visible evidence of the trauma and will need to get cross-sectional imaging for that.  Second is the syncopal episode, and we will need to look for multiple etiologies including anemia, dehydration, cardiac arrhythmia among others.  I certainly believe that admission to the hospital is a " possibility in this case, but the findings will direct her care.          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   • Benign colonic polyp 01/19/2016   • Neck pain 01/19/2016   • Mixed anxiety depressive disorder 01/19/2016   • Degeneration of intervertebral disc of lumbosacral region 01/19/2016   • Elevated prostate specific antigen (PSA) 01/19/2016   • Mixed hyperlipidemia 01/19/2016   • Essential hypertension 01/19/2016   • Insomnia 01/19/2016   • Lumbar radiculopathy 01/19/2016   • Osteoarthritis 01/19/2016   • Vitamin D deficiency 01/19/2016   • Abdominal pain 10/04/2016   • Acute URI 01/09/2017   • Myalgia 05/05/2017   • Cramp of both lower extremities 05/05/2017   • Gingivitis 08/08/2017   • Other persistent atrial fibrillation (HCC) 09/25/2017   • Non-rheumatic tricuspid valve insufficiency 10/24/2017   • SHAR (obstructive sleep apnea) 11/28/2017   • Precordial pain 05/29/2018   • IRAHETA (dyspnea on exertion) 05/29/2018   • Coronary artery disease involving native coronary artery of native heart without angina pectoris 05/29/2018   • Shortness of breath 08/02/2018   • Squamous cell carcinoma of base of tongue ([facility]) 06/07/2019   • Current use of long term anticoagulation 06/24/2019   • Syncope 06/28/2019   • History of cancer 06/28/2019   • Hypotension 06/28/2019   • Medicare annual wellness visit, subsequent 07/01/2019   • Squamous cell carcinoma of neck 08/03/2019   • COPD (chronic obstructive pulmonary disease) (HCC) 08/03/2019   • Depression with anxiety 08/03/2019   • Chemotherapy-induced neutropenia (HCC) 08/03/2019   • Chemotherapy-induced thrombocytopenia 08/05/2019   • Acute renal injury (HCC) 08/06/2019   • Anemia associated with chemotherapy 08/06/2019   • Oral thrush 08/08/2019   • MSSA bacteremia 08/09/2019   • Acquired hypothyroidism    • Chronic conjunctivitis of both eyes  11/23/2020   • Change in vision 11/23/2020   • Chronic diastolic congestive heart failure (HCC) 03/30/2022     Resolved Ambulatory Problems     Diagnosis Date Noted   • Chronic obstructive pulmonary disease (HCC) 01/19/2016   • Cough 01/09/2017   • History of CHF (congestive heart failure) 06/28/2019   • New onset of congestive heart failure (HCC) 08/03/2019   • PAF (paroxysmal atrial fibrillation) (HCC) 08/03/2019   • CAD (coronary artery disease) 08/03/2019   • Vascular catheter infection (HCC) 08/03/2019   • Shock, septic (HCC) 08/09/2019   • Acute systolic CHF (congestive heart failure) (HCC) 09/23/2019   • Systolic heart failure (HCC) 09/23/2019     Past Medical History:   Diagnosis Date   • Atrial fibrillation (HCC)    • CHF (congestive heart failure) (HCC)    • Chronic bronchitis (HCC)    • Cor pulmonale (chronic) (HCC)    • Daytime hypersomnia    • Enlarged prostate    • Frequent nocturnal awakening    • Gout    • H/O cardiac radiofrequency ablation 2006   • Head and neck cancer (HCC)    • Hypertension    • Lumbar radicular pain    • Permanent atrial fibrillation (HCC)    • Snoring    • SVT (supraventricular tachycardia) (HCC)    • Weight loss          PAST SURGICAL HISTORY  Past Surgical History:   Procedure Laterality Date   • APPENDECTOMY     • CARDIAC ABLATION  2006    [facility].   • CARDIAC CATHETERIZATION N/A 8/29/2018    Procedure: Left Heart Cath;  Surgeon: Yousif Uribe MD;  Location: [facility] CATH INVASIVE LOCATION;  Service: Cardiology   • CARDIAC CATHETERIZATION N/A 8/29/2018    Procedure: Coronary angiography;  Surgeon: Yousif Uribe MD;  Location: [facility] CATH INVASIVE LOCATION;  Service: Cardiology   • CARDIAC CATHETERIZATION N/A 8/29/2018    Procedure: Left ventriculography;  Surgeon: Yousif Uribe MD;  Location: [facility] CATH INVASIVE LOCATION;  Service: Cardiology   • FINGER SURGERY     • FOOT SURGERY     • HAND SURGERY     • VENOUS ACCESS DEVICE (PORT) INSERTION Right 6/18/2019     Procedure: MEDIPORT PLACEMENT;  Surgeon: Elvis Grigsby MD;  Location: [facility] OR;  Service: Vascular   • VENOUS ACCESS DEVICE (PORT) REMOVAL Right 2019    Procedure: REMOVAL VENOUS ACCESS DEVICE;  Surgeon: Prince Castaneda MD;  Location: [facility] OR;  Service: Vascular         FAMILY HISTORY  Family History   Problem Relation Age of Onset   • Heart attack Mother    • Diabetes Mother    • Heart disease Mother    • Hypertension Mother    • Anxiety disorder Mother    • Depression Mother    • Diabetes Father    • Heart failure Father    • Heart disease Father    • Hypertension Father    • Cancer Brother         colon   • Hypertension Brother    • Colon cancer Brother 50   • Depression Brother    • Anxiety disorder Brother    • Alcohol abuse Brother    • Diabetes Sister    • Heart disease Sister    • Hypertension Sister    • COPD Other    • Heart failure Other    • Heart disease Other    • Kidney disease Sister    • Anxiety disorder Sister    • Malig Hyperthermia Neg Hx          SOCIAL HISTORY  Social History     Socioeconomic History   • Marital status:      Spouse name: Elise Rai   • Years of education: Some College   Tobacco Use   • Smoking status: Former     Packs/day: 3.00     Years: 30.00     Pack years: 90.00     Types: Cigarettes     Quit date:      Years since quittin.2   • Smokeless tobacco: Never   • Tobacco comments:     started age 11 x 53 years stopped 2000 / daily caffeine- Coke   Vaping Use   • Vaping Use: Never used   Substance and Sexual Activity   • Alcohol use: Not Currently     Alcohol/week: 2.0 standard drinks     Types: 1 Glasses of wine, 1 Cans of beer per week     Comment: occ   • Drug use: No   • Sexual activity: Yes     Partners: Female         ALLERGIES  Benadryl [diphenhydramine hcl]          PHYSICAL EXAM  ED Triage Vitals [23 1703]   Temp Heart Rate Resp BP SpO2   96.5 °F (35.8 °C) 65 18 123/76 95 %      Temp src Heart Rate Source Patient  "Position BP Location FiO2 (%)   Tympanic -- -- -- --         Physical Exam      GENERAL: Awake and alert, oriented and nontoxic-appearing  HENT: nares patent, moderate periorbital ecchymosis on the right but no proptosis, no bony tenderness, no extraocular entrapment.  Neck is nontender.  Mucous membranes are dry  EYES: no scleral icterus, PERRL, EOMI  CV: regular rhythm, normal rate, distal pulses symmetric and palpable  RESPIRATORY: normal effort, CTA bilaterally with no respiratory distress  ABDOMEN: soft, nondistended nontender with normal bowel sound  MUSCULOSKELETAL: no deformity  NEURO: alert, moves all extremities, follows commands, GCS 15 with no focal deficits  PSYCH:  calm, cooperative  SKIN: warm, dry with no rash        LAB RESULTS  Recent Results (from the past 24 hour(s))   Basic Metabolic Panel    Collection Time: 03/24/23  6:49 AM    Specimen: Blood   Result Value Ref Range    Glucose 103 (H) 65 - 99 mg/dL    BUN 34 (H) 8 - 23 mg/dL    Creatinine 1.27 0.76 - 1.27 mg/dL    Sodium 137 136 - 145 mmol/L    Potassium 3.6 3.5 - 5.2 mmol/L    Chloride 102 98 - 107 mmol/L    CO2 26.7 22.0 - 29.0 mmol/L    Calcium 9.1 8.6 - 10.5 mg/dL    BUN/Creatinine Ratio 26.8 (H) 7.0 - 25.0    Anion Gap 8.3 5.0 - 15.0 mmol/L    eGFR 57.8 (L) >60.0 mL/min/1.73   Protime-INR    Collection Time: 03/24/23  6:49 AM    Specimen: Hand, Left; Blood   Result Value Ref Range    Protime 38.9 (H) 11.7 - 14.2 Seconds    INR 3.90 (H) 0.90 - 1.10       Ordered the above labs and my independent interpretation of these results are discussed in the section labeled \"ED course\" below        RADIOLOGY  No Radiology Exams Resulted Within Past 24 Hours    Ordered the above noted radiological studies.  Independently interpreted by me in PACS.  My independent interpretation of these results are discussed in the section below labeled \"ED course\"        PROCEDURES  Procedures          MEDICATIONS GIVEN IN ER  Medications   sodium chloride 0.9 % " flush 10 mL (has no administration in time range)   sodium chloride 0.9 % infusion (0 mL/hr Intravenous Stopped 3/24/23 1254)   sodium chloride 0.9 % flush 10 mL (10 mL Intravenous Given 3/24/23 0818)   sodium chloride 0.9 % flush 10 mL (has no administration in time range)   sodium chloride 0.9 % infusion 40 mL (has no administration in time range)   nitroglycerin (NITROSTAT) SL tablet 0.4 mg (has no administration in time range)   acetaminophen (TYLENOL) tablet 650 mg (has no administration in time range)     Or   acetaminophen (TYLENOL) 160 MG/5ML solution 650 mg (has no administration in time range)     Or   acetaminophen (TYLENOL) suppository 650 mg (has no administration in time range)   ondansetron (ZOFRAN) tablet 4 mg (has no administration in time range)     Or   ondansetron (ZOFRAN) injection 4 mg (has no administration in time range)   albuterol (PROVENTIL) nebulizer solution 0.083% 2.5 mg/3mL (has no administration in time range)   ALPRAZolam (XANAX) tablet 0.25 mg (0.25 mg Oral Given 3/23/23 2101)   aspirin chewable tablet 81 mg (81 mg Oral Given 3/24/23 0818)   levothyroxine (SYNTHROID, LEVOTHROID) tablet 125 mcg (125 mcg Oral Given 3/24/23 0614)   oxyCODONE-acetaminophen (PERCOCET) 7.5-325 MG per tablet 1 tablet (has no administration in time range)   rosuvastatin (CRESTOR) tablet 10 mg (10 mg Oral Given 3/24/23 0818)   sertraline (ZOLOFT) tablet 100 mg (100 mg Oral Given 3/24/23 0818)   Pharmacy to dose warfarin (has no administration in time range)   carvedilol (COREG) tablet 25 mg (25 mg Oral Given 3/24/23 0818)   amLODIPine (NORVASC) tablet 5 mg (5 mg Oral Given 3/24/23 0818)   ALPRAZolam (XANAX) tablet 0.25 mg ( Oral Canceled Entry 3/23/23 2102)   montelukast (SINGULAIR) tablet 10 mg (10 mg Oral Given 3/23/23 2101)   sodium chloride 0.9 % bolus 500 mL (0 mL Intravenous Stopped 3/22/23 1856)   metoprolol tartrate (LOPRESSOR) injection 5 mg (5 mg Intravenous Given 3/22/23 2026)   potassium chloride  "(K-DUR,KLOR-CON) ER tablet 40 mEq (40 mEq Oral Given 3/23/23 1616)                   MEDICAL DECISION MAKING and INDEPENDENT INTERPRETATIONS      Everything documented below this statement is the \"ED course\" section which I have referred to above.  Everything discussed in the following section constitutes MY INDEPENDENT INTERPRETATIONS of the lab work, EKGs, and radiology studies, and all of my personal conversations with other providers, and all of my independent decision making regarding this patient are discussed below.      ED Course as of 03/24/23 1458   u Mar 23, 2023   0014 I spoke with Dr. Toledo who agrees to admit the patient to a telemetry bed for further.  We will hydrate him for his JERICHO, he has not had his home medications today so his blood pressure is up and I have treated that. [DP]   0014 CT scan shows no intracranial hemorrhage, but he is coagulopathic with an INR of 7.3.  I do not believe he needs emergent reversal at this time and I will leave that up to the primary team. [DP]   Fri Mar 24, 2023   1455 CBC shows normal white blood cell count and hemoglobin.  Chemistry shows a BUN of 16 creatinine of 2.7 which is elevated compared to 10 days ago when his creatinine was 1.7 which is a record I found in epic [DP]   1455 His INR is 7.3, he is on Coumadin and this likely could be contributory in the renal failure [DP]   1455 Lipase is normal [DP]   1455 Urinalysis has no sign of infection [DP]   1456 CT scan of the head shows no intracranial hemorrhage, contusion or other sign of significant trauma [DP]   1456 EKG on arrival  Atrial fibrillation in the 80s  Normal QT, no acute ST segment changes are present to suggest ischemia, and there are no significant changes when compared to most recent previous from December 16, 2022.  I found this comparison in epic    Atrial fibrillation is chronic and hence the anticoagulation [DP]   1457 I do not see any clinical evidence of acute infection or " "pneumonia    I do not see any clinical signs of stroke    The BUN and creatinine suggest prerenal etiology, but may be superimposed by supratherapeutic INR.  Reversal of the INR is not necessary at this time as there is no active bleeding, but we will hold the Coumadin    I have given him a dose of IV Lopressor for his blood pressure and heart rate    Spoke with Dr. Toledo who agrees to admit the patient to a telemetry bed for further evaluation and treatment. [DP]      ED Course User Index  [DP] Jose Buckner MD         Everything documented above with this statement, in the ED course section constitutes my independent interpretation of lab work EKG and radiology studies along with my personal conversations with other providers and my independent medical decision making.  This statement will not be repeated before each data point, but they are all my independent interpretation needs contained within the \"ED course\" section.             All appropriate hygiene and PPE requirements were satisfied with this patient encounter      FINAL DIAGNOSES  Final diagnoses:   Syncope and collapse   JERICHO (acute kidney injury) (HCC)   Closed head injury, initial encounter           DISPOSITION  Admit to telemetry          Latest Documented Vital Signs:  As of 14:58 EDT  BP- 160/88 HR- 95 Temp- 97.8 °F (36.6 °C) (Oral) O2 sat- 97%        --    Please note that portions of this were completed with a voice recognition program.       Note Disclaimer: At [facility], we believe that sharing information builds trust and better relationships. You are receiving this note because you are receiving care at [facility] or recently visited. It is possible you will see health information before a provider has talked with you about it. This kind of information can be easy to misunderstand. To help you fully understand what it      Jose Buckner MD  03/24/23 6098    "

## 2023-03-22 NOTE — ED TRIAGE NOTES
"Pt saw JONELLE Hernandez to be seen for hypotension and a fall.    Pt was directed to ED for \"dehydration.\" Pt states he doesn't really feel he needs to be seen for the fall.   "

## 2023-03-23 VITALS
HEART RATE: 62 BPM | RESPIRATION RATE: 16 BRPM | HEIGHT: 72 IN | WEIGHT: 165 LBS | BODY MASS INDEX: 22.35 KG/M2 | DIASTOLIC BLOOD PRESSURE: 60 MMHG | SYSTOLIC BLOOD PRESSURE: 90 MMHG | TEMPERATURE: 97.3 F

## 2023-03-23 PROBLEM — N18.31 STAGE 3A CHRONIC KIDNEY DISEASE (HCC): Status: ACTIVE | Noted: 2023-03-23

## 2023-03-23 PROBLEM — N17.9 AKI (ACUTE KIDNEY INJURY): Status: ACTIVE | Noted: 2023-03-23

## 2023-03-23 LAB
ANION GAP SERPL CALCULATED.3IONS-SCNC: 6 MMOL/L (ref 5–15)
BASOPHILS # BLD AUTO: 0.03 10*3/MM3 (ref 0–0.2)
BASOPHILS NFR BLD AUTO: 0.5 % (ref 0–1.5)
BUN SERPL-MCNC: 46 MG/DL (ref 8–23)
BUN/CREAT SERPL: 25.3 (ref 7–25)
CALCIUM SPEC-SCNC: 8.6 MG/DL (ref 8.6–10.5)
CHLORIDE SERPL-SCNC: 100 MMOL/L (ref 98–107)
CK SERPL-CCNC: 87 U/L (ref 20–200)
CO2 SERPL-SCNC: 29 MMOL/L (ref 22–29)
CREAT SERPL-MCNC: 1.82 MG/DL (ref 0.76–1.27)
DEPRECATED RDW RBC AUTO: 40.8 FL (ref 37–54)
EGFRCR SERPLBLD CKD-EPI 2021: 37.6 ML/MIN/1.73
EOSINOPHIL # BLD AUTO: 0.07 10*3/MM3 (ref 0–0.4)
EOSINOPHIL NFR BLD AUTO: 1.2 % (ref 0.3–6.2)
ERYTHROCYTE [DISTWIDTH] IN BLOOD BY AUTOMATED COUNT: 12.3 % (ref 12.3–15.4)
GLUCOSE SERPL-MCNC: 104 MG/DL (ref 65–99)
HCT VFR BLD AUTO: 37.3 % (ref 37.5–51)
HGB BLD-MCNC: 12.6 G/DL (ref 13–17.7)
IMM GRANULOCYTES # BLD AUTO: 0.02 10*3/MM3 (ref 0–0.05)
IMM GRANULOCYTES NFR BLD AUTO: 0.4 % (ref 0–0.5)
INR PPP: 5.57 (ref 0.9–1.1)
LYMPHOCYTES # BLD AUTO: 0.81 10*3/MM3 (ref 0.7–3.1)
LYMPHOCYTES NFR BLD AUTO: 14.3 % (ref 19.6–45.3)
MCH RBC QN AUTO: 30.6 PG (ref 26.6–33)
MCHC RBC AUTO-ENTMCNC: 33.8 G/DL (ref 31.5–35.7)
MCV RBC AUTO: 90.5 FL (ref 79–97)
MONOCYTES # BLD AUTO: 0.66 10*3/MM3 (ref 0.1–0.9)
MONOCYTES NFR BLD AUTO: 11.6 % (ref 5–12)
NEUTROPHILS NFR BLD AUTO: 4.09 10*3/MM3 (ref 1.7–7)
NEUTROPHILS NFR BLD AUTO: 72 % (ref 42.7–76)
NRBC BLD AUTO-RTO: 0 /100 WBC (ref 0–0.2)
PLATELET # BLD AUTO: 143 10*3/MM3 (ref 140–450)
PMV BLD AUTO: 11.1 FL (ref 6–12)
POTASSIUM SERPL-SCNC: 3.3 MMOL/L (ref 3.5–5.2)
PROTHROMBIN TIME: 51.4 SECONDS (ref 11.7–14.2)
RBC # BLD AUTO: 4.12 10*6/MM3 (ref 4.14–5.8)
SODIUM SERPL-SCNC: 135 MMOL/L (ref 136–145)
WBC NRBC COR # BLD: 5.68 10*3/MM3 (ref 3.4–10.8)

## 2023-03-23 PROCEDURE — 97162 PT EVAL MOD COMPLEX 30 MIN: CPT

## 2023-03-23 PROCEDURE — 99222 1ST HOSP IP/OBS MODERATE 55: CPT | Performed by: NURSE PRACTITIONER

## 2023-03-23 PROCEDURE — 82550 ASSAY OF CK (CPK): CPT | Performed by: HOSPITALIST

## 2023-03-23 PROCEDURE — 97530 THERAPEUTIC ACTIVITIES: CPT

## 2023-03-23 PROCEDURE — 85610 PROTHROMBIN TIME: CPT | Performed by: HOSPITALIST

## 2023-03-23 PROCEDURE — G0378 HOSPITAL OBSERVATION PER HR: HCPCS

## 2023-03-23 PROCEDURE — 80048 BASIC METABOLIC PNL TOTAL CA: CPT | Performed by: HOSPITALIST

## 2023-03-23 PROCEDURE — 85025 COMPLETE CBC W/AUTO DIFF WBC: CPT | Performed by: HOSPITALIST

## 2023-03-23 RX ORDER — POTASSIUM CHLORIDE 750 MG/1
40 TABLET, FILM COATED, EXTENDED RELEASE ORAL ONCE
Status: COMPLETED | OUTPATIENT
Start: 2023-03-23 | End: 2023-03-23

## 2023-03-23 RX ORDER — ALPRAZOLAM 0.25 MG/1
0.25 TABLET ORAL DAILY PRN
Status: DISCONTINUED | OUTPATIENT
Start: 2023-03-23 | End: 2023-03-24 | Stop reason: HOSPADM

## 2023-03-23 RX ORDER — MONTELUKAST SODIUM 10 MG/1
10 TABLET ORAL NIGHTLY
Status: DISCONTINUED | OUTPATIENT
Start: 2023-03-23 | End: 2023-03-24 | Stop reason: HOSPADM

## 2023-03-23 RX ORDER — CARVEDILOL 25 MG/1
25 TABLET ORAL 2 TIMES DAILY WITH MEALS
Status: DISCONTINUED | OUTPATIENT
Start: 2023-03-23 | End: 2023-03-24 | Stop reason: HOSPADM

## 2023-03-23 RX ORDER — AMLODIPINE BESYLATE 5 MG/1
5 TABLET ORAL
Status: DISCONTINUED | OUTPATIENT
Start: 2023-03-23 | End: 2023-03-24 | Stop reason: HOSPADM

## 2023-03-23 RX ADMIN — CARVEDILOL 25 MG: 25 TABLET, FILM COATED ORAL at 06:44

## 2023-03-23 RX ADMIN — MONTELUKAST SODIUM 10 MG: 10 TABLET, FILM COATED ORAL at 21:01

## 2023-03-23 RX ADMIN — Medication 10 ML: at 10:25

## 2023-03-23 RX ADMIN — ALPRAZOLAM 0.25 MG: 0.25 TABLET ORAL at 21:01

## 2023-03-23 RX ADMIN — CARVEDILOL 25 MG: 25 TABLET, FILM COATED ORAL at 17:05

## 2023-03-23 RX ADMIN — ASPIRIN 81 MG: 81 TABLET, CHEWABLE ORAL at 10:24

## 2023-03-23 RX ADMIN — ALPRAZOLAM 0.5 MG: 0.5 TABLET ORAL at 10:24

## 2023-03-23 RX ADMIN — AMLODIPINE BESYLATE 5 MG: 5 TABLET ORAL at 10:26

## 2023-03-23 RX ADMIN — LEVOTHYROXINE SODIUM 125 MCG: 0.12 TABLET ORAL at 06:41

## 2023-03-23 RX ADMIN — SODIUM CHLORIDE 75 ML/HR: 9 INJECTION, SOLUTION INTRAVENOUS at 21:02

## 2023-03-23 RX ADMIN — SERTRALINE 100 MG: 100 TABLET, FILM COATED ORAL at 10:24

## 2023-03-23 RX ADMIN — Medication 10 ML: at 21:01

## 2023-03-23 RX ADMIN — POTASSIUM CHLORIDE 40 MEQ: 750 TABLET, EXTENDED RELEASE ORAL at 16:16

## 2023-03-23 NOTE — CONSULTS
Patient Name: Cody Eldridge  :1945  77 y.o.    Date of Admission: 3/22/2023  Date of Consultation:  23  Encounter Provider: JONELLE Sanders  Place of Service: [facility] CARDIOLOGY  Referring Provider: [place]*  Patient Care Team:  Patrick Diaz MD as PCP - General (Family Medicine)  Payal Waters MD as Consulting Physician (Pain Medicine)  Lorna Chaidez APRN (Nurse Practitioner)  Kristal Cowart RPH as Pharmacist  Fritz Slater MD as Referring Physician (Otolaryngology)  Pablo Heaton MD as Consulting Physician (Hematology and Oncology)  Annie Davila MD as Consulting Physician (Radiation Oncology)  Chetan Heaton MD as Consulting Physician (Urology)  Kristal Cowart RPH as Pharmacist (Pharmacy)  Georges Guadalupe PharmD as Pharmacist (Pharmacy)      Chief complaint: near syncope, supra therapuetic INR.     History of Present Illness: Mr. Eldridge is a 77 year old man with permanent atrial fibrillation on warfarin. He had a cardiac cath in 2018 that showed mild non obstructive coronary artery disease. He has chronic diastolic heart failure, hypertension, hyperlipidemia. He has a remote [facility]. He has pulmonary hypertension. He is followed by Dr. Omer.    He saw Silvina on 3/14. He complained of dyspnea on exertion. He also reported dry mouth. Labs checked and he had worsening kidney function. Lasix was reduced to 20 mg daily.     He was seen at an urgent care center on 3/17/23 for diarrhea, nausea and vomiting. Negative for flu and covid. Diagnosed with gastroenteritis. He was given zofran and encouraged to maintain adequate PO intake. He had a severe dizziness episode. He fell and hit his face/head. He did not pass out.    He saw his primary care provider yesterday and was sent to the ER for JERICHO. INR is supra therapeutic. Creatinine 2.74 (previously 1.7 on 3/14/23). He was  admitted for further evaluation. Treated with IVF. Repeat labs pending. Ct head without bleeding. Warfarin held.     He is resting in bed. He does not really have any complaints. He confirms again he did not pass out or lose consciousness when he feel last week. He felt very dizzy.     Echo 1/9/2023    •  Left ventricular systolic function is normal. Calculated left ventricular EF = 58.8%  •  Left ventricular diastolic function is consistent with (grade II w/high LAP) pseudonormalization.  •  There is severe biatrial enlargement.  •  There are myxomatous changes of the mitral valve apparatus present.Moderate mitral valve regurgitation is present.  •  Severe tricuspid valve regurgitation is present.  •  Calculated right ventricular systolic pressure from tricuspid regurgitation is 76 mmHg.Severe pulmonary hypertension is present.      Cardiac cath 2018  FINDINGS:     LEFT VENTRICULOGRAPHY: The LV pressure was 100/10 .  There was a small inferobasilar hypokinetic area overall ejection fraction lower limits of normal between 45 and 50% mild calcium seen in the coronaries .  There was no mitral insufficiency or gradient across the aortic valve on pullback.     CORONARY ANGIOGRAPHY:  Left main: Normal  Left anterior descending: Normal throughout the vessel diagonals are also normal  Ramus intermedius:Not present  Circumflex: This is a dominant vessel with a 30% mid lesion  RCA: This is a small nondominant and otherwise normal     SUMMARY: Mild nonobstructive coronary disease we had some difficulty exchanging over the wire and try to do a right heart so we abandon that his LV function is lower limits of normal        RECOMMENDATIONS: Continue treatment for his A. fib and risk factor modification      Past Medical History:   Diagnosis Date   • Acquired hypothyroidism    • Acute renal injury (HCC)    • Anemia associated with chemotherapy    • Atrial fibrillation (HCC)    • CAD (coronary artery disease)    • CHF (congestive  heart failure) (HCC)    • Chronic bronchitis (HCC)    • COPD (chronic obstructive pulmonary disease) (HCC)    • Cor pulmonale (chronic) (HCC)    • Daytime hypersomnia    • Depression with anxiety    • IRAHETA (dyspnea on exertion)    • Enlarged prostate    • Frequent nocturnal awakening    • Gout    • H/O cardiac radiofrequency ablation 2006    [facility].   • Head and neck cancer (HCC)    • Hypertension    • Hypotension    • Insomnia    • Lumbar radicular pain    • SHAR (obstructive sleep apnea)    • Osteoarthritis    • Permanent atrial fibrillation (HCC)    • Shock, septic (HCC)    • Snoring    • Squamous cell carcinoma of neck    • SVT (supraventricular tachycardia) (HCC)    • Syncope    • Vitamin D deficiency    • Weight loss        Past Surgical History:   Procedure Laterality Date   • APPENDECTOMY     • CARDIAC ABLATION  2006    [facility].   • CARDIAC CATHETERIZATION N/A 8/29/2018    Procedure: Left Heart Cath;  Surgeon: Yousif Uribe MD;  Location: [facility] CATH INVASIVE LOCATION;  Service: Cardiology   • CARDIAC CATHETERIZATION N/A 8/29/2018    Procedure: Coronary angiography;  Surgeon: Yousif Uribe MD;  Location: [facility] CATH INVASIVE LOCATION;  Service: Cardiology   • CARDIAC CATHETERIZATION N/A 8/29/2018    Procedure: Left ventriculography;  Surgeon: Yousif Uribe MD;  Location: [facility] CATH INVASIVE LOCATION;  Service: Cardiology   • FINGER SURGERY     • FOOT SURGERY     • HAND SURGERY     • VENOUS ACCESS DEVICE (PORT) INSERTION Right 6/18/2019    Procedure: MEDIPORT PLACEMENT;  Surgeon: Elvis Grigsby MD;  Location: [facility] OR;  Service: Vascular   • VENOUS ACCESS DEVICE (PORT) REMOVAL Right 8/5/2019    Procedure: REMOVAL VENOUS ACCESS DEVICE;  Surgeon: Prince Castaneda MD;  Location: [facility] OR;  Service: Vascular         Prior to Admission medications    Medication Sig Start Date End Date Taking? Authorizing Provider   albuterol (PROVENTIL HFA;VENTOLIN HFA) 108 (90 BASE) MCG/ACT  "inhaler Inhale 2 puffs Every 4 (Four) Hours As Needed. ProAir  (90 Base) MCG/ACT Inhalation Aerosol Solution; Patient Sig: ProAir  (90 Base) MCG/ACT Inhalation Aerosol Solution inhale 2 puffs by mouth every 4 hours if needed; 8.5; 11; 07-Apr-2014; Act 4/7/14  Yes Mona Kirk MD   ALPRAZolam (XANAX) 0.5 MG tablet TAKE 1 TABLET BY MOUTH EVERY MORNING THEN TAKE 1/2 TABLET BY MOUTH EVERY EVENING 2/28/23  Yes Patrick Diaz MD   aspirin 81 MG chewable tablet Chew 1 tablet Daily.   Yes ProviderJosette MD   carvedilol (COREG) 25 MG tablet Take 1 tablet by mouth 2 (Two) Times a Day With Meals. 8/22/22  Yes Myriam Omer MD   chlorthalidone (HYGROTON) 25 MG tablet Take 1 tablet by mouth Daily. 2/24/23  Yes ProviderJosette MD   furosemide (LASIX) 40 MG tablet Take 0.5 tablets by mouth Daily. 3/14/23  Yes Silvina Tejada APRN   levothyroxine (SYNTHROID, LEVOTHROID) 125 MCG tablet TAKE 1 TABLET BY MOUTH DAILY 8/31/22  Yes Patrick Diaz MD   lisinopril (PRINIVIL,ZESTRIL) 20 MG tablet Take 1 tablet by mouth Daily. 11/29/21  Yes Patrick Diaz MD   montelukast (SINGULAIR) 10 MG tablet TAKE 1 TABLET BY MOUTH DAILY 3/16/23  Yes Patrick Diaz MD   oxyCODONE-acetaminophen (PERCOCET) 7.5-325 MG per tablet Take 1 tablet by mouth Every 6 (Six) Hours As Needed.   Yes ProviderJosette MD   rosuvastatin (CRESTOR) 20 MG tablet Take 10 mg by mouth Daily. 5/5/17  Yes Epley, James, APRN   sertraline (ZOLOFT) 100 MG tablet TAKE 1 TABLET BY MOUTH DAILY 1/27/23  Yes Patrick Diaz MD   warfarin (COUMADIN) 2 MG tablet TAKE TWO TABLETS BY MOUTH ON MON and FRI AND TAKE ONE TABLET BY MOUTH ALL OTHER DAYS OR AS DIRECTED 11/4/22  Yes Myriam Omer MD       Allergies   Allergen Reactions   • Benadryl [Diphenhydramine Hcl] Dizziness     \"I sleep for about a day\"       Social History     Socioeconomic History   • Marital status:      Spouse name: Elise Rai   • Years of education: " Some College   Tobacco Use   • Smoking status: Former     Packs/day: 3.00     Years: 30.00     Pack years: 90.00     Types: Cigarettes     Quit date: 2000     Years since quittin.2   • Smokeless tobacco: Never   • Tobacco comments:     started age 11 x 53 years stopped 2000 / daily caffeine- Coke   Vaping Use   • Vaping Use: Never used   Substance and Sexual Activity   • Alcohol use: Not Currently     Alcohol/week: 2.0 standard drinks     Types: 1 Glasses of wine, 1 Cans of beer per week     Comment: occ   • Drug use: No   • Sexual activity: Yes     Partners: Female       Family History   Problem Relation Age of Onset   • Heart attack Mother    • Diabetes Mother    • Heart disease Mother    • Hypertension Mother    • Anxiety disorder Mother    • Depression Mother    • Diabetes Father    • Heart failure Father    • Heart disease Father    • Hypertension Father    • Cancer Brother         colon   • Hypertension Brother    • Colon cancer Brother 50   • Depression Brother    • Anxiety disorder Brother    • Alcohol abuse Brother    • Diabetes Sister    • Heart disease Sister    • Hypertension Sister    • COPD Other    • Heart failure Other    • Heart disease Other    • Kidney disease Sister    • Anxiety disorder Sister    • Malig Hyperthermia Neg Hx        REVIEW OF SYSTEMS:   All systems reviewed.  Pertinent positives identified in HPI.  All other systems are negative.      Objective:     Vitals:    23 0214 23 0543 23 0635 23 0733   BP: 115/69  (!) 182/114 (!) 131/102   BP Location: Right arm  Right arm Left arm   Patient Position: Lying  Lying Lying   Pulse: 90  86 82   Resp: 16  16 16   Temp: 98.1 °F (36.7 °C)   97.4 °F (36.3 °C)   TempSrc: Oral   Oral   SpO2: 95%  93%    Weight:  77 kg (169 lb 12.1 oz)     Height:         Body mass index is 23.02 kg/m².    Physical Exam:  Constitutional: He is oriented to person, place, and time. He appears well-developed. He does not appear ill.   HENT:    Head: Normocephalic and atraumatic. Head is without contusion.   Right Ear: Hearing normal. No drainage.   Left Ear: Hearing normal. No drainage.   Nose: No nasal deformity. No epistaxis.   Eyes: Lids are normal. Right eye exhibits no exudate. Left eye exhibits no exudate.  Neck: No JVD present.   Cardiovascular: Normal rate, regular rhythm and normal heart sounds.    Pulses:       Posterior tibial pulses are 2+ on the right side, and 2+ on the left side.   Pulmonary/Chest: Effort normal and breath sounds normal.   Abdominal: Soft. Normal appearance and bowel sounds are normal. There is no tenderness.   Musculoskeletal: Normal range of motion.        Right shoulder: He exhibits no deformity.        Left shoulder: He exhibits no deformity.   Neurological: He is alert and oriented to person, place, and time. He has normal strength.   Skin: Skin is warm, dry and intact. No rash noted.   Psychiatric: He has a normal mood and affect. His behavior is normal. Thought content normal.   Vitals reviewed      Lab Review:     Results from last 7 days   Lab Units 03/22/23  1820   SODIUM mmol/L 135*   POTASSIUM mmol/L 3.5   CHLORIDE mmol/L 94*   CO2 mmol/L 31.6*   BUN mg/dL 60*   CREATININE mg/dL 2.74*   CALCIUM mg/dL 9.2   BILIRUBIN mg/dL 0.7   ALK PHOS U/L 78   ALT (SGPT) U/L 15   AST (SGOT) U/L 22   GLUCOSE mg/dL 102*         Results from last 7 days   Lab Units 03/22/23  1820   WBC 10*3/mm3 7.52   HEMOGLOBIN g/dL 14.8   HEMATOCRIT % 42.3   PLATELETS 10*3/mm3 189     Results from last 7 days   Lab Units 03/22/23  1820   INR  7.28*                             Current Facility-Administered Medications:   •  acetaminophen (TYLENOL) tablet 650 mg, 650 mg, Oral, Q4H PRN **OR** acetaminophen (TYLENOL) 160 MG/5ML solution 650 mg, 650 mg, Oral, Q4H PRN **OR** acetaminophen (TYLENOL) suppository 650 mg, 650 mg, Rectal, Q4H PRN, Jaymie Vizcaino APRN  •  albuterol (PROVENTIL) nebulizer solution 0.083% 2.5 mg/3mL, 2.5 mg,  Nebulization, Q6H PRN, Jaymie Vizcaino APRN  •  ALPRAZolam (XANAX) tablet 0.25 mg, 0.25 mg, Oral, Nightly, Jaymie Vizcaino APRN  •  ALPRAZolam (XANAX) tablet 0.5 mg, 0.5 mg, Oral, Daily With Breakfast, Jaymie Vizcaino APRN, 0.5 mg at 03/23/23 1024  •  amLODIPine (NORVASC) tablet 5 mg, 5 mg, Oral, Q24H, Dwight Farrar MD, 5 mg at 03/23/23 1026  •  aspirin chewable tablet 81 mg, 81 mg, Oral, Daily, Jaymie Vizcaino APRN, 81 mg at 03/23/23 1024  •  carvedilol (COREG) tablet 25 mg, 25 mg, Oral, BID With Meals, Rafaela Matos APRN, 25 mg at 03/23/23 0644  •  levothyroxine (SYNTHROID, LEVOTHROID) tablet 125 mcg, 125 mcg, Oral, Q AM, Jaymie Vizcaino APRN, 125 mcg at 03/23/23 0641  •  nitroglycerin (NITROSTAT) SL tablet 0.4 mg, 0.4 mg, Sublingual, Q5 Min PRN, Jaymie Vizcaino APRN  •  ondansetron (ZOFRAN) tablet 4 mg, 4 mg, Oral, Q6H PRN **OR** ondansetron (ZOFRAN) injection 4 mg, 4 mg, Intravenous, Q6H PRN, Jaymie Vizcaino APRN  •  oxyCODONE-acetaminophen (PERCOCET) 7.5-325 MG per tablet 1 tablet, 1 tablet, Oral, Q6H PRN, Jaymie Vizcaino APRN  •  Pharmacy to dose warfarin, , Does not apply, Continuous PRN, Jaymie Vizcaino APRN  •  rosuvastatin (CRESTOR) tablet 10 mg, 10 mg, Oral, Daily, Jaymie Vizcaino APRN  •  sertraline (ZOLOFT) tablet 100 mg, 100 mg, Oral, Daily, Jaymie Vizcaino APRN, 100 mg at 03/23/23 1024  •  [COMPLETED] Insert Peripheral IV, , , Once **AND** sodium chloride 0.9 % flush 10 mL, 10 mL, Intravenous, PRN, Jose Buckner MD  •  sodium chloride 0.9 % flush 10 mL, 10 mL, Intravenous, Q12H, Jaymie Vizcaino APRN, 10 mL at 03/23/23 1025  •  sodium chloride 0.9 % flush 10 mL, 10 mL, Intravenous, PRN, Jaymie Vizcaino APRN  •  sodium chloride 0.9 % infusion 40 mL, 40 mL, Intravenous, PRN, Jaymie Vizcaino APRN  •  sodium chloride 0.9 % infusion, 75 mL/hr, Intravenous, Continuous, Rafaela Matos  M, APRN, Last Rate: 75 mL/hr at 03/23/23 0641, 75 mL/hr at 03/23/23 0641    Assessment and Plan:       Active Hospital Problems    Diagnosis  POA   • **Syncope and collapse [R55]  Yes   • JERICHO (acute kidney injury) (HCC) [N17.9]  Unknown   • Orthostatic hypotension [I95.1]  Yes   • Acquired hypothyroidism [E03.9]  Yes   • COPD (chronic obstructive pulmonary disease) (HCC) [J44.9]  Yes   • History of cancer [Z85.9]  Not Applicable   • Coronary artery disease involving native coronary artery of native heart without angina pectoris [I25.10]  Yes   • SHAR (obstructive sleep apnea) [G47.33]  Yes   • Other persistent atrial fibrillation (HCC) [I48.19]  Yes   • Mixed hyperlipidemia [E78.2]  Yes   • Essential hypertension [I10]  Yes   • Osteoarthritis [M19.90]  Yes      Resolved Hospital Problems   No resolved problems to display.     1. Near syncope - he denies loss of consciousness. This occurred a week ago in the setting of JERICHO and gastroenteritis with decreased oral intake.     2. Acute on chronic kidney disease, volume depletion - volume resuscitation with IVF. Repeat labs pending.     3. Permanent atrial fibrillation , HR well controlled. Warfarin on hold.     4. Supra therapeutic INR, likely due to nutrition and decreased intake. Warfarin held. He did hit his head when he fell, fortunately, CT head was without bleeding.     5. Chronic diastolic heart failure, volume depleted. Diuretics on hold.     6. Mild non obstructive coronary artery disease by cardiac catheterization 2018.    7. Pulmonary hypertension, RVSP 76 mmHg by transthoracic echocardiogram in January 2023.    8. [facility]     9. Moderate mitral valve regurgitaiton    10. Hypertension with recent hypotension. meds held.     11. Obstructive sleep apnea    He had a recent echocardiogram, no indication to repeat at this time. Do not suspect arrhythmic etiology of near syncope. This was due to volume depletion and likely  orthostatic hypotension.     He HR is well controlled.     Warfarin is on hold. Fortunately no bleeding noted on CT head.     Kaity Rodrigues, JONELLE  03/23/23  12:03 EDT

## 2023-03-23 NOTE — H&P
"    Patient Name:  Cody Eldridge  YOB: 1945  MRN:  [id number]  Admit Date:  3/22/2023  Patient Care Team:  Patrick Diaz MD as PCP - General (Family Medicine)  Payal Waters MD as Consulting Physician (Pain Medicine)  Lorna Chaidez APRN (Nurse Practitioner)  Kristal Cowart RPH as Pharmacist  Fritz Slater MD as Referring Physician (Otolaryngology)  Pablo Heaton MD as Consulting Physician (Hematology and Oncology)  Annie Davila MD as Consulting Physician (Radiation Oncology)  Chetan Heaton MD as Consulting Physician (Urology)  rKistal Cowart RPH as Pharmacist (Pharmacy)  Georges Guadalupe PharmD as Pharmacist (Pharmacy)      Subjective   History Present Illness     Chief Complaint   Patient presents with   • Abnormal Lab     History of Present Illness   Mr. Eldridge is a 77-year-old male with history of hyperlipidemia, hypertension, osteoarthritis, A-fib, obstructive sleep apnea, CAD, COPD, hypothyroidism who presents to the emergency room after having abnormal labs at his PCP office.  Patient states last Friday he got very dizzy and lightheaded and fell, actually injuring his right eye and having a black eye.  There is somewhat conflicting information in the chart what patient is telling me, no one else is at bedside, patient states that he felt lightheaded and dizzy last week and went to urgent care, they diagnosed him with \"stomach flu\" given him some Zofran.  He states he did have 1 episode of vomiting but did not have any diarrhea.  In review of the notes his COVID test and flu test were both negative at urgent care, but he was given Zofran for gastroenteritis.  Patient states at some point over the weekend he had some dizziness and lightheadedness and fell hitting his head and right eye and having a black eye.  He is on chronic Coumadin for A-fib.  He states he was starting to feel better but just had an appoint with his PCP for follow-up today.  He states " that his primary care provider told him that he was dehydrated although he had been drinking Gatorade 0 as instructed by urgent care and has not had any further nausea or vomiting.  Patient was reported to have positive orthostatic blood pressures in the emergency room.  In the emergency room patient's glucose was 102, sodium 135, creatinine 2.74, BUN 60, his last labs done on 3/14/2023 showed creatinine 1.7 and a BUN of 29.  Patient's INR today is 7.28, hemoglobin 14.8, hematocrit 42.3, platelets 189, white blood cell count 7.5, EKG showed A-fib with rate of 83.  Patient denies having any chest pain or shortness of breath.  CT of his head shows no acute intracranial hemorrhage or hydrocephalus.  Patient does have significant bruising around his right eye from fall, but he denies any vision changes.    Review of Systems   Constitutional: Negative for appetite change and fever.   HENT: Negative for nosebleeds and trouble swallowing.    Eyes: Negative for photophobia, redness and visual disturbance.   Respiratory: Negative for cough, chest tightness, shortness of breath and wheezing.    Cardiovascular: Negative for chest pain, palpitations and leg swelling.   Gastrointestinal: Negative for abdominal distention, abdominal pain, nausea and vomiting.   Endocrine: Negative.    Genitourinary: Negative.    Musculoskeletal: Negative for gait problem and joint swelling.   Skin: Negative.    Neurological: Positive for dizziness and light-headedness. Negative for seizures, speech difficulty and headaches.   Hematological: Negative.    Psychiatric/Behavioral: Negative for behavioral problems and confusion.        Personal History     Past Medical History:   Diagnosis Date   • Acquired hypothyroidism    • Acute renal injury (HCC)    • Anemia associated with chemotherapy    • Atrial fibrillation (HCC)    • CAD (coronary artery disease)    • CHF (congestive heart failure) (HCC)    • Chronic bronchitis (HCC)    • COPD (chronic  obstructive pulmonary disease) (HCC)    • Cor pulmonale (chronic) (HCC)    • Daytime hypersomnia    • Depression with anxiety    • IRAHETA (dyspnea on exertion)    • Enlarged prostate    • Frequent nocturnal awakening    • Gout    • H/O cardiac radiofrequency ablation 2006    [facility].   • Head and neck cancer (HCC)    • Hypertension    • Hypotension    • Insomnia    • Lumbar radicular pain    • SHAR (obstructive sleep apnea)    • Osteoarthritis    • Permanent atrial fibrillation (HCC)    • Shock, septic (HCC)    • Snoring    • Squamous cell carcinoma of neck    • SVT (supraventricular tachycardia) (HCC)    • Syncope    • Vitamin D deficiency    • Weight loss      Past Surgical History:   Procedure Laterality Date   • APPENDECTOMY     • CARDIAC ABLATION  2006    [facility].   • CARDIAC CATHETERIZATION N/A 8/29/2018    Procedure: Left Heart Cath;  Surgeon: Yousif Uribe MD;  Location: [facility] CATH INVASIVE LOCATION;  Service: Cardiology   • CARDIAC CATHETERIZATION N/A 8/29/2018    Procedure: Coronary angiography;  Surgeon: Yousif Uribe MD;  Location: [facility] CATH INVASIVE LOCATION;  Service: Cardiology   • CARDIAC CATHETERIZATION N/A 8/29/2018    Procedure: Left ventriculography;  Surgeon: Yousif Uribe MD;  Location: [facility] CATH INVASIVE LOCATION;  Service: Cardiology   • FINGER SURGERY     • FOOT SURGERY     • HAND SURGERY     • VENOUS ACCESS DEVICE (PORT) INSERTION Right 6/18/2019    Procedure: MEDIPORT PLACEMENT;  Surgeon: Elvis Grigsby MD;  Location: [facility] OR;  Service: Vascular   • VENOUS ACCESS DEVICE (PORT) REMOVAL Right 8/5/2019    Procedure: REMOVAL VENOUS ACCESS DEVICE;  Surgeon: Prince Castaneda MD;  Location: [facility] OR;  Service: Vascular     Family History   Problem Relation Age of Onset   • Heart attack Mother    • Diabetes Mother    • Heart disease Mother    • Hypertension Mother    • Anxiety disorder Mother    • Depression Mother    • Diabetes Father    • Heart failure  Father    • Heart disease Father    • Hypertension Father    • Cancer Brother         colon   • Hypertension Brother    • Colon cancer Brother 50   • Depression Brother    • Anxiety disorder Brother    • Alcohol abuse Brother    • Diabetes Sister    • Heart disease Sister    • Hypertension Sister    • COPD Other    • Heart failure Other    • Heart disease Other    • Kidney disease Sister    • Anxiety disorder Sister    • Malig Hyperthermia Neg Hx      Social History     Tobacco Use   • Smoking status: Former     Packs/day: 3.00     Years: 30.00     Pack years: 90.00     Types: Cigarettes     Quit date: 2000     Years since quittin.2   • Smokeless tobacco: Never   • Tobacco comments:     started age 11 x 53 years stopped 2000 / daily caffeine- Coke   Vaping Use   • Vaping Use: Never used   Substance Use Topics   • Alcohol use: Not Currently     Alcohol/week: 2.0 standard drinks     Types: 1 Glasses of wine, 1 Cans of beer per week     Comment: occ   • Drug use: No     No current facility-administered medications on file prior to encounter.     Current Outpatient Medications on File Prior to Encounter   Medication Sig Dispense Refill   • albuterol (PROVENTIL HFA;VENTOLIN HFA) 108 (90 BASE) MCG/ACT inhaler Inhale 2 puffs Every 4 (Four) Hours As Needed. ProAir  (90 Base) MCG/ACT Inhalation Aerosol Solution; Patient Sig: ProAir  (90 Base) MCG/ACT Inhalation Aerosol Solution inhale 2 puffs by mouth every 4 hours if needed; 8.5; 11; -2014; Act     • ALPRAZolam (XANAX) 0.5 MG tablet TAKE 1 TABLET BY MOUTH EVERY MORNING THEN TAKE 1/2 TABLET BY MOUTH EVERY EVENING 45 tablet 0   • aspirin 81 MG chewable tablet Chew 1 tablet Daily.     • carvedilol (COREG) 25 MG tablet Take 1 tablet by mouth 2 (Two) Times a Day With Meals. 180 tablet 3   • chlorthalidone (HYGROTON) 25 MG tablet Take 1 tablet by mouth Daily.     • furosemide (LASIX) 40 MG tablet Take 0.5 tablets by mouth Daily. 30 tablet 3   •  "levothyroxine (SYNTHROID, LEVOTHROID) 125 MCG tablet TAKE 1 TABLET BY MOUTH DAILY 90 tablet 2   • lisinopril (PRINIVIL,ZESTRIL) 20 MG tablet Take 1 tablet by mouth Daily. 90 tablet 1   • montelukast (SINGULAIR) 10 MG tablet TAKE 1 TABLET BY MOUTH DAILY 90 tablet 1   • oxyCODONE-acetaminophen (PERCOCET) 7.5-325 MG per tablet Take 1 tablet by mouth Every 6 (Six) Hours As Needed.     • rosuvastatin (CRESTOR) 20 MG tablet Take 10 mg by mouth Daily. 30 tablet 5   • sertraline (ZOLOFT) 100 MG tablet TAKE 1 TABLET BY MOUTH DAILY 90 tablet 3   • warfarin (COUMADIN) 2 MG tablet TAKE TWO TABLETS BY MOUTH ON MON and FRI AND TAKE ONE TABLET BY MOUTH ALL OTHER DAYS OR AS DIRECTED 120 tablet 1     Allergies   Allergen Reactions   • Benadryl [Diphenhydramine Hcl] Dizziness     \"I sleep for about a day\"       Objective    Objective     Vital Signs  Temp:  [96.5 °F (35.8 °C)-98.1 °F (36.7 °C)] 98.1 °F (36.7 °C)  Heart Rate:  [] 90  Resp:  [16-18] 16  BP: ()/() 115/69  SpO2:  [92 %-100 %] 95 %  on   ;   Device (Oxygen Therapy): room air  Body mass index is 23.02 kg/m².    Physical Exam  Vitals and nursing note reviewed.   Constitutional:       General: He is not in acute distress.     Appearance: He is well-developed.   HENT:      Head: Normocephalic.   Neck:      Vascular: No JVD.   Cardiovascular:      Rate and Rhythm: Normal rate and regular rhythm.      Comments: A-fib with rate controlled in the 80s during my exam, he denies any chest pain or shortness of breath, no peripheral edema  Pulmonary:      Effort: Pulmonary effort is normal.      Breath sounds: Normal breath sounds.      Comments: Lung sounds diminished but clear, sats 98% on room air during my exam  Abdominal:      General: Bowel sounds are normal. There is no distension.      Palpations: Abdomen is soft.      Tenderness: There is no abdominal tenderness.   Musculoskeletal:         General: Normal range of motion.      Cervical back: Normal range of " motion.   Skin:     General: Skin is warm and dry.      Capillary Refill: Capillary refill takes less than 2 seconds.          Neurological:      General: No focal deficit present.      Mental Status: He is alert and oriented to person, place, and time.   Psychiatric:         Behavior: Behavior normal.         Results Review:  I reviewed the patient's new clinical results.  I reviewed the patient's new imaging results and agree with the interpretation.  I reviewed the patient's other test results and agree with the interpretation  I personally viewed and interpreted the patient's EKG/Telemetry data  Discussed with ED provider.    Lab Results (last 24 hours)     Procedure Component Value Units Date/Time    CBC & Differential [[id number]]  (Abnormal) Collected: 03/22/23 1820    Specimen: Blood Updated: 03/22/23 1832    Narrative:      The following orders were created for panel order CBC & Differential.  Procedure                               Abnormality         Status                     ---------                               -----------         ------                     CBC Auto Differential[[id number]]        Abnormal            Final result                 Please view results for these tests on the individual orders.    Comprehensive Metabolic Panel [[id number]]  (Abnormal) Collected: 03/22/23 1820    Specimen: Blood Updated: 03/22/23 1850     Glucose 102 mg/dL      BUN 60 mg/dL      Creatinine 2.74 mg/dL      Sodium 135 mmol/L      Potassium 3.5 mmol/L      Chloride 94 mmol/L      CO2 31.6 mmol/L      Calcium 9.2 mg/dL      Total Protein 8.5 g/dL      Albumin 4.4 g/dL      ALT (SGPT) 15 U/L      AST (SGOT) 22 U/L      Alkaline Phosphatase 78 U/L      Total Bilirubin 0.7 mg/dL      Globulin 4.1 gm/dL      A/G Ratio 1.1 g/dL      BUN/Creatinine Ratio 21.9     Anion Gap 9.4 mmol/L      eGFR 23.0 mL/min/1.73     Narrative:      GFR Normal >60  Chronic Kidney Disease <60  Kidney Failure <15    The GFR formula is only  valid for adults with stable renal function between ages 18 and 70.    Lipase [[id number]]  (Normal) Collected: 03/22/23 1820    Specimen: Blood Updated: 03/22/23 1850     Lipase 45 U/L     Protime-INR [[id number]]  (Abnormal) Collected: 03/22/23 1820    Specimen: Blood Updated: 03/22/23 1930     Protime 63.5 Seconds      INR 7.28    CBC Auto Differential [[id number]]  (Abnormal) Collected: 03/22/23 1820    Specimen: Blood Updated: 03/22/23 1832     WBC 7.52 10*3/mm3      RBC 4.67 10*6/mm3      Hemoglobin 14.8 g/dL      Hematocrit 42.3 %      MCV 90.6 fL      MCH 31.7 pg      MCHC 35.0 g/dL      RDW 12.3 %      RDW-SD 41.0 fl      MPV 10.4 fL      Platelets 189 10*3/mm3      Neutrophil % 69.9 %      Lymphocyte % 17.0 %      Monocyte % 11.2 %      Eosinophil % 1.1 %      Basophil % 0.5 %      Immature Grans % 0.3 %      Neutrophils, Absolute 5.26 10*3/mm3      Lymphocytes, Absolute 1.28 10*3/mm3      Monocytes, Absolute 0.84 10*3/mm3      Eosinophils, Absolute 0.08 10*3/mm3      Basophils, Absolute 0.04 10*3/mm3      Immature Grans, Absolute 0.02 10*3/mm3      nRBC 0.0 /100 WBC     Urinalysis With Microscopic If Indicated (No Culture) - Urine, Clean Catch [[id number]]  (Abnormal) Collected: 03/22/23 1921    Specimen: Urine, Clean Catch Updated: 03/22/23 2001     Color, UA Yellow     Appearance, UA Clear     pH, UA 6.0     Specific Gravity, UA 1.013     Glucose, UA Negative     Ketones, UA Negative     Bilirubin, UA Negative     Blood, UA Trace     Protein, UA Negative     Leuk Esterase, UA Negative     Nitrite, UA Negative     Urobilinogen, UA 1.0 E.U./dL    Urinalysis, Microscopic Only - Urine, Clean Catch [[id number]] Collected: 03/22/23 1921    Specimen: Urine, Clean Catch Updated: 03/22/23 2041     RBC, UA 0-2 /HPF      WBC, UA None Seen /HPF      Bacteria, UA None Seen /HPF      Squamous Epithelial Cells, UA 0-2 /HPF      Hyaline Casts, UA 0-2 /LPF      Methodology Manual Light Microscopy          Imaging Results  (Last 24 Hours)     Procedure Component Value Units Date/Time    CT Head Without Contrast [[id number]] Collected: 03/22/23 2029     Updated: 03/22/23 2041    Narrative:      CT HEAD WO CONTRAST-     INDICATIONS: Head injury     TECHNIQUE: Radiation dose reduction techniques were utilized, including  automated exposure control and exposure modulation based on body size.  Noncontrast head CT     COMPARISON: 06/28/2019     FINDINGS:        No acute intracranial hemorrhage, midline shift or mass effect. No acute  territorial infarct is identified.     Arterial calcifications are seen at the base of the brain. Ventricles,  cisterns, cerebral sulci are unremarkable for patient age.     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 Mild paranasal sinus  mucosal thickening is present. The visualized paranasal sinuses, orbits,  mastoid air cells are otherwise unremarkable.          Impression:         No acute intracranial hemorrhage or hydrocephalus. If there is further  clinical concern, MRI could be considered for further evaluation.     This report was finalized on 3/22/2023 8:38 PM by Dr. Josep Garcia M.D.             Results for orders placed during the hospital encounter of 01/09/23    Adult Transthoracic Echo Complete W/ Cont if Necessary Per Protocol    Interpretation Summary  •  Left ventricular systolic function is normal. Calculated left ventricular EF = 58.8%  •  Left ventricular diastolic function is consistent with (grade II w/high LAP) pseudonormalization.  •  There is severe biatrial enlargement.  •  There are myxomatous changes of the mitral valve apparatus present.Moderate mitral valve regurgitation is present.  •  Severe tricuspid valve regurgitation is present.  •  Calculated right ventricular systolic pressure from tricuspid regurgitation is 76 mmHg.Severe pulmonary hypertension is present.      ECG 12 Lead Syncope   Final Result   HEART RATE= 83  bpm   RR Interval= 723  ms   MS Interval=    ms   P Horizontal Axis=   deg   P Front Axis=   deg   QRSD Interval= 105  ms   QT Interval= 366  ms   QRS Axis= -38  deg   T Wave Axis= 23  deg   - ABNORMAL ECG -   Atrial fibrillation - rate has slowed   Left axis deviation   POOR R WAVE PROGRESSION   Minimal ST depression, lateral leads   Electronically Signed By: Leobardo Carmona ([facility]) 22-Mar-2023 21:56:55   Date and Time of Study: 2023-03-22 20:33:32           Assessment/Plan     Active Hospital Problems    Diagnosis  POA   • **Syncope and collapse [R55]  Yes   • JERICHO (acute kidney injury) (HCC) [N17.9]  Unknown   • Orthostatic hypotension [I95.1]  Yes   • Acquired hypothyroidism [E03.9]  Yes   • COPD (chronic obstructive pulmonary disease) (HCC) [J44.9]  Yes   • History of cancer [Z85.9]  Not Applicable   • Coronary artery disease involving native coronary artery of native heart without angina pectoris [I25.10]  Yes     Added automatically from request for surgery [id number]     • SHAR (obstructive sleep apnea) [G47.33]  Yes   • Other persistent atrial fibrillation (HCC) [I48.19]  Yes   • Mixed hyperlipidemia [E78.2]  Yes   • Essential hypertension [I10]  Yes   • Osteoarthritis [M19.90]  Yes     Mr. Eldridge is a 77-year-old male with history of hyperlipidemia, hypertension, osteoarthritis, A-fib, obstructive sleep apnea, CAD, COPD, hypothyroidism who presents to the emergency room after having abnormal labs at his PCP office.     Syncope/orthostatic hypotension  -Check orthostatic blood pressures every shift  -Telemetry unit for monitoring  -Consult cardiology  -Restart medications with parameters, defer to cardiology for any changes  -Hold diuretics at this time  -Normal saline at 100 cc an hour overnight  -Recheck BMP, CBC in a.m.    Acute kidney injury  -Normal saline at 100 cc an hour overnight  -Monitor I's and O's  -Recheck BMP in a.m.    Persistent A-fib/chronic anticoagulation  -Supratherapeutic INR of 7.2, hold Coumadin  -Recheck INR in a.m.  -Pharmacy to dose  Coumadin once INR is therapeutic    COPD/obstructive sleep apnea  -Chronic conditions stable  -O2 to keep sats above 90%  -Continue home medications when med rec completed    Hypertension/hyperlipidemia  -Chronic conditions stable  -Continue home medications with parameters due to hypotension    · I discussed the patient's findings and my recommendations with patient.    VTE Prophylaxis - Warfarin (home med).  Code Status - Full code.       JONELLE Knight  [facility]  03/23/23  03:28 EDT

## 2023-03-23 NOTE — THERAPY EVALUATION
Patient Name: Cody Eldridge  : 1945    MRN: [id number]                              Today's Date: 3/23/2023       Admit Date: 3/22/2023    Visit Dx:     ICD-10-CM ICD-9-CM   1. Syncope and collapse  R55 780.2   2. JERICHO (acute kidney injury) (Formerly [facility])  N17.9 584.9   3. Closed head injury, initial encounter  S09.90XA 959.01     Patient Active Problem List   Diagnosis   • Atopic rhinitis   • Arthritis of hand   • Coxitis   • Benign colonic polyp   • Neck pain   • Mixed anxiety depressive disorder   • Degeneration of intervertebral disc of lumbosacral region   • Elevated prostate specific antigen (PSA)   • Mixed hyperlipidemia   • Essential hypertension   • Insomnia   • Lumbar radiculopathy   • Osteoarthritis   • Vitamin D deficiency   • Abdominal pain   • Acute URI   • Myalgia   • Cramp of both lower extremities   • Gingivitis   • Other persistent atrial fibrillation (HCC)   • Non-rheumatic tricuspid valve insufficiency   • SHAR (obstructive sleep apnea)   • Precordial pain   • IRAHETA (dyspnea on exertion)   • Coronary artery disease involving native coronary artery of native heart without angina pectoris   • Shortness of breath   • 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)   • Current use of long term anticoagulation   • Syncope   • History of cancer   • Hypotension   • Medicare annual wellness visit, subsequent   • Squamous cell carcinoma of neck   • COPD (chronic obstructive pulmonary disease) (HCC)   • Depression with anxiety   • Chemotherapy-induced neutropenia (HCC)   • Chemotherapy-induced thrombocytopenia   • Acute renal injury (HCC)   • Anemia associated with chemotherapy   • Oral thrush   • MSSA bacteremia   • Acquired hypothyroidism   • Chronic conjunctivitis of both eyes   • Change in vision   • Chronic diastolic congestive heart failure (HCC)   • Syncope and collapse   • Orthostatic hypotension   • JERICHO (acute kidney injury) (HCC)     Past Medical History:   Diagnosis Date   • Acquired  hypothyroidism    • Acute renal injury (HCC)    • Anemia associated with chemotherapy    • Atrial fibrillation (HCC)    • CAD (coronary artery disease)    • CHF (congestive heart failure) (HCC)    • Chronic bronchitis (HCC)    • COPD (chronic obstructive pulmonary disease) (HCC)    • Cor pulmonale (chronic) (HCC)    • Daytime hypersomnia    • Depression with anxiety    • IRAHETA (dyspnea on exertion)    • Enlarged prostate    • Frequent nocturnal awakening    • Gout    • H/O cardiac radiofrequency ablation 2006    [facility].   • Head and neck cancer (HCC)    • Hypertension    • Hypotension    • Insomnia    • Lumbar radicular pain    • SHAR (obstructive sleep apnea)    • Osteoarthritis    • Permanent atrial fibrillation (HCC)    • Shock, septic (HCC)    • Snoring    • Squamous cell carcinoma of neck    • SVT (supraventricular tachycardia) (HCC)    • Syncope    • Vitamin D deficiency    • Weight loss      Past Surgical History:   Procedure Laterality Date   • APPENDECTOMY     • CARDIAC ABLATION  2006    [facility].   • CARDIAC CATHETERIZATION N/A 8/29/2018    Procedure: Left Heart Cath;  Surgeon: Yousif Uribe MD;  Location: [facility] CATH INVASIVE LOCATION;  Service: Cardiology   • CARDIAC CATHETERIZATION N/A 8/29/2018    Procedure: Coronary angiography;  Surgeon: Yousif Uribe MD;  Location: [facility] CATH INVASIVE LOCATION;  Service: Cardiology   • CARDIAC CATHETERIZATION N/A 8/29/2018    Procedure: Left ventriculography;  Surgeon: Yousif Uribe MD;  Location: [facility] CATH INVASIVE LOCATION;  Service: Cardiology   • FINGER SURGERY     • FOOT SURGERY     • HAND SURGERY     • VENOUS ACCESS DEVICE (PORT) INSERTION Right 6/18/2019    Procedure: MEDIPORT PLACEMENT;  Surgeon: Elvis Grigsby MD;  Location: [facility] OR;  Service: Vascular   • VENOUS ACCESS DEVICE (PORT) REMOVAL Right 8/5/2019    Procedure: REMOVAL VENOUS ACCESS DEVICE;  Surgeon: Prince Castaneda MD;  Location: [facility] OR;  Service: Vascular       General Information     Row Name 03/23/23 1139          Physical Therapy Time and Intention    Document Type evaluation  -EM     Mode of Treatment individual therapy;physical therapy  -EM     Row Name 03/23/23 1139          General Information    Patient Profile Reviewed yes  -EM     Prior Level of Function independent:  -EM     Existing Precautions/Restrictions fall  -EM     Row Name 03/23/23 1139          Living Environment    People in Home spouse  -EM     Row Name 03/23/23 1139          Home Main Entrance    Number of Stairs, Main Entrance none  -EM     Row Name 03/23/23 1139          Stairs Within Home, Primary    Number of Stairs, Within Home, Primary none  -EM     Row Name 03/23/23 1139          Cognition    Orientation Status (Cognition) oriented x 4  -EM     Row Name 03/23/23 1139          Safety Issues, Functional Mobility    Impairments Affecting Function (Mobility) endurance/activity tolerance  -EM           User Key  (r) = Recorded By, (t) = Taken By, (c) = Cosigned By    Initials Name Provider Type    Yessi Lund PT Physical Therapist               Mobility     Row Name 03/23/23 1140          Bed Mobility    Bed Mobility supine-sit  -EM     Supine-Sit [place] (Bed Mobility) modified independence  -EM     Assistive Device (Bed Mobility) head of bed elevated  -EM     Row Name 03/23/23 1140          Sit-Stand Transfer    Sit-Stand [place] (Transfers) supervision  -EM     Row Name 03/23/23 1140          Gait/Stairs (Locomotion)    [place] Level (Gait) standby assist  -EM     Distance in Feet (Gait) 200  -EM     Comment, (Gait/Stairs) slightly unsteady but able to self correct  -EM           User Key  (r) = Recorded By, (t) = Taken By, (c) = Cosigned By    Initials Name Provider Type    Yessi Lund PT Physical Therapist               Obj/Interventions     Row Name 03/23/23 1140          Range of Motion Comprehensive    General Range of Motion no range of motion  deficits identified  -EM     Row Name 03/23/23 1140          Strength Comprehensive (MMT)    General Manual Muscle Testing (MMT) Assessment no strength deficits identified  -EM     Row Name 03/23/23 1140          Balance    Balance Assessment sitting static balance;sitting dynamic balance;standing static balance;standing dynamic balance  -EM     Static Sitting Balance independent  -EM     Dynamic Sitting Balance independent  -EM     Position, Sitting Balance sitting edge of bed  -EM     Static Standing Balance standby assist  -EM     Dynamic Standing Balance standby assist  -EM     Row Name 03/23/23 1140          Sensory Assessment (Somatosensory)    Sensory Assessment (Somatosensory) sensation intact  -EM           User Key  (r) = Recorded By, (t) = Taken By, (c) = Cosigned By    Initials Name Provider Type    EM Yessi Jackson, PT Physical Therapist               Goals/Plan    No documentation.                Clinical Impression     Row Name 03/23/23 1141          Pain    Pretreatment Pain Rating 0/10 - no pain  -EM     Row Name 03/23/23 1141          Plan of Care Review    Plan of Care Reviewed With patient  -EM     Outcome Evaluation Pt is 76 yo male admitted to [facility] for syncope and collapse, hypotension. PMH significant for HLD, HTN, oa, afib, SHAR, COPD. Patient lives with spouse, independent without use of AD prior to admission. Today, pt performed bed mobility independently, sit to stand with supervision, ambulated around nurses unit with SBA. Patient appears to be at baseline level of function, no indication for continued acute skilled PT. Patient encouraged to mobilize with nursing staff throughout hospitalization in order to maintain mobility in anticipation to home.  -EM     Row Name 03/23/23 1141          Therapy Assessment/Plan (PT)    Patient/Family Therapy Goals Statement (PT) go home  -EM     Criteria for Skilled Interventions Met (PT) no;no problems identified which require skilled intervention   -EM     Therapy Frequency (PT) evaluation only  -EM     Row Name 03/23/23 1141          Positioning and Restraints    Pre-Treatment Position in bed  -EM     Post Treatment Position bed  -EM     In Bed sitting EOB;call light within reach;exit alarm on  -EM           User Key  (r) = Recorded By, (t) = Taken By, (c) = Cosigned By    Initials Name Provider Type    EM Yessi Jackosn PT Physical Therapist               Outcome Measures     Row Name 03/23/23 1143          How much help from another person do you currently need...    Turning from your back to your side while in flat bed without using bedrails? 4  -EM     Moving from lying on back to sitting on the side of a flat bed without bedrails? 4  -EM     Moving to and from a bed to a chair (including a wheelchair)? 4  -EM     Standing up from a chair using your arms (e.g., wheelchair, bedside chair)? 4  -EM     Climbing 3-5 steps with a railing? 3  -EM     To walk in hospital room? 3  -EM     AM-PAC 6 Clicks Score (PT) 22  -EM           User Key  (r) = Recorded By, (t) = Taken By, (c) = Cosigned By    Initials Name Provider Type    EM Yessi Jackson PT Physical Therapist                             Physical Therapy Education     Title: PT OT SLP Therapies (In Progress)     Topic: Physical Therapy (In Progress)     Point: Mobility training (Done)     Learning Progress Summary           Patient AcceptanceLIBIA VU, DU by EM at 3/23/2023 1144                   Point: Home exercise program (Not Started)     Learner Progress:  Not documented in this visit.          Point: Body mechanics (Not Started)     Learner Progress:  Not documented in this visit.          Point: Precautions (Not Started)     Learner Progress:  Not documented in this visit.                      User Key     Initials Effective Dates Name Provider Type Discipline    EM 06/16/21 -  Yessi Jackson PT Physical Therapist PT              PT Recommendation and Plan     Plan of Care  Reviewed With: patient  Outcome Evaluation: Pt is 76 yo male admitted to [facility] for syncope and collapse, hypotension. PMH significant for HLD, HTN, oa, afib, SHAR, COPD. Patient lives with spouse, independent without use of AD prior to admission. Today, pt performed bed mobility independently, sit to stand with supervision, ambulated around nurses unit with SBA. Patient appears to be at baseline level of function, no indication for continued acute skilled PT. Patient encouraged to mobilize with nursing staff throughout hospitalization in order to maintain mobility in anticipation to home.     Time Calculation:    PT Charges     Row Name 03/23/23 1144             Time Calculation    Start Time 1015  -EM      Stop Time 1035  -EM      Time Calculation (min) 20 min  -EM      PT Received On 03/23/23  -EM         Time Calculation- PT    Total Timed Code Minutes- PT 10 minute(s)  -EM         Timed Charges    97748 - PT Therapeutic Activity Minutes 10  -EM         Total Minutes    Timed Charges Total Minutes 10  -EM       Total Minutes 10  -EM            User Key  (r) = Recorded By, (t) = Taken By, (c) = Cosigned By    Initials Name Provider Type    EM Yessi Jackson PT Physical Therapist              Therapy Charges for Today     Code Description Service Date Service Provider Modifiers Qty    37031548284 HC PT THERAPEUTIC ACT EA 15 MIN 3/23/2023 Yessi Jackson, PT GP 1    75050229082 HC PT EVAL MOD COMPLEXITY 1 3/23/2023 Yessi Jackson PT GP 1          PT G-Codes  AM-PAC 6 Clicks Score (PT): 22       Yessi Jackson PT  3/23/2023

## 2023-03-23 NOTE — PAYOR COMM NOTE
"Cody Eldridge (77 y.o. Male)     PLEASE SEE ATTACHED FOR OBS AUTH    PLEASE CALL   OR  [phone]    THANK YOU    ANTONETTE KNUTSON LPN CCP    Date of Birth   1945    Social Security Number       Address   [address] [place] [address]    Home Phone   [phone]    MRN   [id number]       Nondenominational   Taoist    Marital Status                               Admission Date   3/22/23    Admission Type   Emergency    Admitting Provider   Elvis Lozano MD    Attending Provider   Elvis Lozano MD    Department, Room/Bed   [address], S619/1       Discharge Date       Discharge Disposition       Discharge Destination                               Attending Provider: Elvis Lozano MD    Allergies: Benadryl [Diphenhydramine Hcl]    Isolation: None   Infection: None   Code Status: CPR    Ht: 182.9 cm (72\")   Wt: 77 kg (169 lb 12.1 oz)    Admission Cmt: None   Principal Problem: Syncope and collapse [R55]                 Active Insurance as of 3/22/2023     Primary Coverage     Payor Plan Insurance Group Employer/Plan Group    [facility] MEDICARE REPLACEMENT [employer] MEDICARE REPLACEMENT [id number]     Payor Plan Address Payor Plan Phone Number Payor Plan Fax Number Effective Dates     [address] [address] [phone]  1/20/2016 - None Entered    [facility] [id number]       Subscriber Name Subscriber Birth Date Member ID       CODY ELDRIDGE 1945 [id number]                 Emergency Contacts      (Rel.) Home Phone Work Phone Mobile Phone    Elise Rai (Significant Other) [phone] -- --    Jo Graves (Grandchild) [phone] -- --    CHAD FERRARA (Daughter) [phone] -- --            [place]: [facility] [id number]  Tax ID [id number]     History & Physical      Rafaela Matos, APRN at 03/23/23 0049              Patient Name:  Cody Eldridge  YOB: 1945  MRN:  " "[id number]  Admit Date:  3/22/2023  Patient Care Team:  Patrick Diaz MD as PCP - General (Family Medicine)  Payal Waters MD as Consulting Physician (Pain Medicine)  Lorna Chaidez APRN (Nurse Practitioner)  Kristal Cowart [facility] as Pharmacist  Fritz Slater MD as Referring Physician (Otolaryngology)  Pablo Heaton MD as Consulting Physician (Hematology and Oncology)  Annie Davila MD as Consulting Physician (Radiation Oncology)  Chetan Heaton MD as Consulting Physician (Urology)  Kristal Cowart RPH as Pharmacist (Pharmacy)  Georges Guadalupe PharmD as Pharmacist (Pharmacy)      Subjective    History Present Illness     Chief Complaint   Patient presents with   • Abnormal Lab     History of Present Illness   Mr. Eldridge is a 77-year-old male with history of hyperlipidemia, hypertension, osteoarthritis, A-fib, obstructive sleep apnea, CAD, COPD, hypothyroidism who presents to the emergency room after having abnormal labs at his PCP office.  Patient states last Friday he got very dizzy and lightheaded and fell, actually injuring his right eye and having a black eye.  There is somewhat conflicting information in the chart what patient is telling me, no one else is at bedside, patient states that he felt lightheaded and dizzy last week and went to urgent care, they diagnosed him with \"stomach flu\" given him some Zofran.  He states he did have 1 episode of vomiting but did not have any diarrhea.  In review of the notes his COVID test and flu test were both negative at urgent care, but he was given Zofran for gastroenteritis.  Patient states at some point over the weekend he had some dizziness and lightheadedness and fell hitting his head and right eye and having a black eye.  He is on chronic Coumadin for A-fib.  He states he was starting to feel better but just had an appoint with his PCP for follow-up today.  He states that his primary care provider told him that he was dehydrated " although he had been drinking Gatorade 0 as instructed by urgent care and has not had any further nausea or vomiting.  Patient was reported to have positive orthostatic blood pressures in the emergency room.  In the emergency room patient's glucose was 102, sodium 135, creatinine 2.74, BUN 60, his last labs done on 3/14/2023 showed creatinine 1.7 and a BUN of 29.  Patient's INR today is 7.28, hemoglobin 14.8, hematocrit 42.3, platelets 189, white blood cell count 7.5, EKG showed A-fib with rate of 83.  Patient denies having any chest pain or shortness of breath.  CT of his head shows no acute intracranial hemorrhage or hydrocephalus.  Patient does have significant bruising around his right eye from fall, but he denies any vision changes.    Review of Systems   Constitutional: Negative for appetite change and fever.   HENT: Negative for nosebleeds and trouble swallowing.    Eyes: Negative for photophobia, redness and visual disturbance.   Respiratory: Negative for cough, chest tightness, shortness of breath and wheezing.    Cardiovascular: Negative for chest pain, palpitations and leg swelling.   Gastrointestinal: Negative for abdominal distention, abdominal pain, nausea and vomiting.   Endocrine: Negative.    Genitourinary: Negative.    Musculoskeletal: Negative for gait problem and joint swelling.   Skin: Negative.    Neurological: Positive for dizziness and light-headedness. Negative for seizures, speech difficulty and headaches.   Hematological: Negative.    Psychiatric/Behavioral: Negative for behavioral problems and confusion.        Personal History     Past Medical History:   Diagnosis Date   • Acquired hypothyroidism    • Acute renal injury (HCC)    • Anemia associated with chemotherapy    • Atrial fibrillation (HCC)    • CAD (coronary artery disease)    • CHF (congestive heart failure) (HCC)    • Chronic bronchitis (HCC)    • COPD (chronic obstructive pulmonary disease) (HCC)    • Cor pulmonale (chronic)  (HCC)    • Daytime hypersomnia    • Depression with anxiety    • IRAHETA (dyspnea on exertion)    • Enlarged prostate    • Frequent nocturnal awakening    • Gout    • H/O cardiac radiofrequency ablation 2006    [facility].   • Head and neck cancer (HCC)    • Hypertension    • Hypotension    • Insomnia    • Lumbar radicular pain    • SHAR (obstructive sleep apnea)    • Osteoarthritis    • Permanent atrial fibrillation (HCC)    • Shock, septic (HCC)    • Snoring    • Squamous cell carcinoma of neck    • SVT (supraventricular tachycardia) (HCC)    • Syncope    • Vitamin D deficiency    • Weight loss      Past Surgical History:   Procedure Laterality Date   • APPENDECTOMY     • CARDIAC ABLATION  2006    [facility].   • CARDIAC CATHETERIZATION N/A 8/29/2018    Procedure: Left Heart Cath;  Surgeon: Yousif Uribe MD;  Location: [facility] CATH INVASIVE LOCATION;  Service: Cardiology   • CARDIAC CATHETERIZATION N/A 8/29/2018    Procedure: Coronary angiography;  Surgeon: Yousif Uribe MD;  Location: [facility] CATH INVASIVE LOCATION;  Service: Cardiology   • CARDIAC CATHETERIZATION N/A 8/29/2018    Procedure: Left ventriculography;  Surgeon: Yousif Uribe MD;  Location: [facility] CATH INVASIVE LOCATION;  Service: Cardiology   • FINGER SURGERY     • FOOT SURGERY     • HAND SURGERY     • VENOUS ACCESS DEVICE (PORT) INSERTION Right 6/18/2019    Procedure: MEDIPORT PLACEMENT;  Surgeon: Elvis Grigsby MD;  Location: [facility] OR;  Service: Vascular   • VENOUS ACCESS DEVICE (PORT) REMOVAL Right 8/5/2019    Procedure: REMOVAL VENOUS ACCESS DEVICE;  Surgeon: Prince Castaneda MD;  Location: [facility] OR;  Service: Vascular     Family History   Problem Relation Age of Onset   • Heart attack Mother    • Diabetes Mother    • Heart disease Mother    • Hypertension Mother    • Anxiety disorder Mother    • Depression Mother    • Diabetes Father    • Heart failure Father    • Heart disease Father    • Hypertension Father    • Cancer  Brother         colon   • Hypertension Brother    • Colon cancer Brother 50   • Depression Brother    • Anxiety disorder Brother    • Alcohol abuse Brother    • Diabetes Sister    • Heart disease Sister    • Hypertension Sister    • COPD Other    • Heart failure Other    • Heart disease Other    • Kidney disease Sister    • Anxiety disorder Sister    • Malig Hyperthermia Neg Hx      Social History     Tobacco Use   • Smoking status: Former     Packs/day: 3.00     Years: 30.00     Pack years: 90.00     Types: Cigarettes     Quit date: 2000     Years since quittin.2   • Smokeless tobacco: Never   • Tobacco comments:     started age 11 x 53 years stopped 2000 / daily caffeine- Coke   Vaping Use   • Vaping Use: Never used   Substance Use Topics   • Alcohol use: Not Currently     Alcohol/week: 2.0 standard drinks     Types: 1 Glasses of wine, 1 Cans of beer per week     Comment: occ   • Drug use: No     No current facility-administered medications on file prior to encounter.     Current Outpatient Medications on File Prior to Encounter   Medication Sig Dispense Refill   • albuterol (PROVENTIL HFA;VENTOLIN HFA) 108 (90 BASE) MCG/ACT inhaler Inhale 2 puffs Every 4 (Four) Hours As Needed. ProAir  (90 Base) MCG/ACT Inhalation Aerosol Solution; Patient Sig: ProAir  (90 Base) MCG/ACT Inhalation Aerosol Solution inhale 2 puffs by mouth every 4 hours if needed; 8.5; 112014; Act     • ALPRAZolam (XANAX) 0.5 MG tablet TAKE 1 TABLET BY MOUTH EVERY MORNING THEN TAKE 1/2 TABLET BY MOUTH EVERY EVENING 45 tablet 0   • aspirin 81 MG chewable tablet Chew 1 tablet Daily.     • carvedilol (COREG) 25 MG tablet Take 1 tablet by mouth 2 (Two) Times a Day With Meals. 180 tablet 3   • chlorthalidone (HYGROTON) 25 MG tablet Take 1 tablet by mouth Daily.     • furosemide (LASIX) 40 MG tablet Take 0.5 tablets by mouth Daily. 30 tablet 3   • levothyroxine (SYNTHROID, LEVOTHROID) 125 MCG tablet TAKE 1 TABLET BY MOUTH  "DAILY 90 tablet 2   • lisinopril (PRINIVIL,ZESTRIL) 20 MG tablet Take 1 tablet by mouth Daily. 90 tablet 1   • montelukast (SINGULAIR) 10 MG tablet TAKE 1 TABLET BY MOUTH DAILY 90 tablet 1   • oxyCODONE-acetaminophen (PERCOCET) 7.5-325 MG per tablet Take 1 tablet by mouth Every 6 (Six) Hours As Needed.     • rosuvastatin (CRESTOR) 20 MG tablet Take 10 mg by mouth Daily. 30 tablet 5   • sertraline (ZOLOFT) 100 MG tablet TAKE 1 TABLET BY MOUTH DAILY 90 tablet 3   • warfarin (COUMADIN) 2 MG tablet TAKE TWO TABLETS BY MOUTH ON MON and FRI AND TAKE ONE TABLET BY MOUTH ALL OTHER DAYS OR AS DIRECTED 120 tablet 1     Allergies   Allergen Reactions   • Benadryl [Diphenhydramine Hcl] Dizziness     \"I sleep for about a day\"       Objective     Objective     Vital Signs  Temp:  [96.5 °F (35.8 °C)-98.1 °F (36.7 °C)] 98.1 °F (36.7 °C)  Heart Rate:  [] 90  Resp:  [16-18] 16  BP: ()/() 115/69  SpO2:  [92 %-100 %] 95 %  on   ;   Device (Oxygen Therapy): room air  Body mass index is 23.02 kg/m².    Physical Exam  Vitals and nursing note reviewed.   Constitutional:       General: He is not in acute distress.     Appearance: He is well-developed.   HENT:      Head: Normocephalic.   Neck:      Vascular: No JVD.   Cardiovascular:      Rate and Rhythm: Normal rate and regular rhythm.      Comments: A-fib with rate controlled in the 80s during my exam, he denies any chest pain or shortness of breath, no peripheral edema  Pulmonary:      Effort: Pulmonary effort is normal.      Breath sounds: Normal breath sounds.      Comments: Lung sounds diminished but clear, sats 98% on room air during my exam  Abdominal:      General: Bowel sounds are normal. There is no distension.      Palpations: Abdomen is soft.      Tenderness: There is no abdominal tenderness.   Musculoskeletal:         General: Normal range of motion.      Cervical back: Normal range of motion.   Skin:     General: Skin is warm and dry.      Capillary Refill: " Capillary refill takes less than 2 seconds.          Neurological:      General: No focal deficit present.      Mental Status: He is alert and oriented to person, place, and time.   Psychiatric:         Behavior: Behavior normal.         Results Review:  I reviewed the patient's new clinical results.  I reviewed the patient's new imaging results and agree with the interpretation.  I reviewed the patient's other test results and agree with the interpretation  I personally viewed and interpreted the patient's EKG/Telemetry data  Discussed with ED provider.    Lab Results (last 24 hours)     Procedure Component Value Units Date/Time    CBC & Differential [[id number]]  (Abnormal) Collected: 03/22/23 1820    Specimen: Blood Updated: 03/22/23 1832    Narrative:      The following orders were created for panel order CBC & Differential.  Procedure                               Abnormality         Status                     ---------                               -----------         ------                     CBC Auto Differential[[id number]]        Abnormal            Final result                 Please view results for these tests on the individual orders.    Comprehensive Metabolic Panel [[id number]]  (Abnormal) Collected: 03/22/23 1820    Specimen: Blood Updated: 03/22/23 1850     Glucose 102 mg/dL      BUN 60 mg/dL      Creatinine 2.74 mg/dL      Sodium 135 mmol/L      Potassium 3.5 mmol/L      Chloride 94 mmol/L      CO2 31.6 mmol/L      Calcium 9.2 mg/dL      Total Protein 8.5 g/dL      Albumin 4.4 g/dL      ALT (SGPT) 15 U/L      AST (SGOT) 22 U/L      Alkaline Phosphatase 78 U/L      Total Bilirubin 0.7 mg/dL      Globulin 4.1 gm/dL      A/G Ratio 1.1 g/dL      BUN/Creatinine Ratio 21.9     Anion Gap 9.4 mmol/L      eGFR 23.0 mL/min/1.73     Narrative:      GFR Normal >60  Chronic Kidney Disease <60  Kidney Failure <15    The GFR formula is only valid for adults with stable renal function between ages 18 and 70.     Lipase [[id number]]  (Normal) Collected: 03/22/23 1820    Specimen: Blood Updated: 03/22/23 1850     Lipase 45 U/L     Protime-INR [[id number]]  (Abnormal) Collected: 03/22/23 1820    Specimen: Blood Updated: 03/22/23 1930     Protime 63.5 Seconds      INR 7.28    CBC Auto Differential [[id number]]  (Abnormal) Collected: 03/22/23 1820    Specimen: Blood Updated: 03/22/23 1832     WBC 7.52 10*3/mm3      RBC 4.67 10*6/mm3      Hemoglobin 14.8 g/dL      Hematocrit 42.3 %      MCV 90.6 fL      MCH 31.7 pg      MCHC 35.0 g/dL      RDW 12.3 %      RDW-SD 41.0 fl      MPV 10.4 fL      Platelets 189 10*3/mm3      Neutrophil % 69.9 %      Lymphocyte % 17.0 %      Monocyte % 11.2 %      Eosinophil % 1.1 %      Basophil % 0.5 %      Immature Grans % 0.3 %      Neutrophils, Absolute 5.26 10*3/mm3      Lymphocytes, Absolute 1.28 10*3/mm3      Monocytes, Absolute 0.84 10*3/mm3      Eosinophils, Absolute 0.08 10*3/mm3      Basophils, Absolute 0.04 10*3/mm3      Immature Grans, Absolute 0.02 10*3/mm3      nRBC 0.0 /100 WBC     Urinalysis With Microscopic If Indicated (No Culture) - Urine, Clean Catch [[id number]]  (Abnormal) Collected: 03/22/23 1921    Specimen: Urine, Clean Catch Updated: 03/22/23 2001     Color, UA Yellow     Appearance, UA Clear     pH, UA 6.0     Specific Gravity, UA 1.013     Glucose, UA Negative     Ketones, UA Negative     Bilirubin, UA Negative     Blood, UA Trace     Protein, UA Negative     Leuk Esterase, UA Negative     Nitrite, UA Negative     Urobilinogen, UA 1.0 E.U./dL    Urinalysis, Microscopic Only - Urine, Clean Catch [[id number]] Collected: 03/22/23 1921    Specimen: Urine, Clean Catch Updated: 03/22/23 2041     RBC, UA 0-2 /HPF      WBC, UA None Seen /HPF      Bacteria, UA None Seen /HPF      Squamous Epithelial Cells, UA 0-2 /HPF      Hyaline Casts, UA 0-2 /LPF      Methodology Manual Light Microscopy          Imaging Results (Last 24 Hours)     Procedure Component Value Units Date/Time    CT Head  Without Contrast [[id number]] Collected: 03/22/23 2029     Updated: 03/22/23 2041    Narrative:      CT HEAD WO CONTRAST-     INDICATIONS: Head injury     TECHNIQUE: Radiation dose reduction techniques were utilized, including  automated exposure control and exposure modulation based on body size.  Noncontrast head CT     COMPARISON: 06/28/2019     FINDINGS:        No acute intracranial hemorrhage, midline shift or mass effect. No acute  territorial infarct is identified.     Arterial calcifications are seen at the base of the brain. Ventricles,  cisterns, cerebral sulci are unremarkable for patient age.     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 Mild paranasal sinus  mucosal thickening is present. The visualized paranasal sinuses, orbits,  mastoid air cells are otherwise unremarkable.          Impression:         No acute intracranial hemorrhage or hydrocephalus. If there is further  clinical concern, MRI could be considered for further evaluation.     This report was finalized on 3/22/2023 8:38 PM by Dr. Josep Garcia M.D.             Results for orders placed during the hospital encounter of 01/09/23    Adult Transthoracic Echo Complete W/ Cont if Necessary Per Protocol    Interpretation Summary  •  Left ventricular systolic function is normal. Calculated left ventricular EF = 58.8%  •  Left ventricular diastolic function is consistent with (grade II w/high LAP) pseudonormalization.  •  There is severe biatrial enlargement.  •  There are myxomatous changes of the mitral valve apparatus present.Moderate mitral valve regurgitation is present.  •  Severe tricuspid valve regurgitation is present.  •  Calculated right ventricular systolic pressure from tricuspid regurgitation is 76 mmHg.Severe pulmonary hypertension is present.      ECG 12 Lead Syncope   Final Result   HEART RATE= 83  bpm   RR Interval= 723  ms   HI Interval=   ms   P Horizontal Axis=   deg   P Front Axis=   deg   QRSD Interval= 105   ms   QT Interval= 366  ms   QRS Axis= -38  deg   T Wave Axis= 23  deg   - ABNORMAL ECG -   Atrial fibrillation - rate has slowed   Left axis deviation   POOR R WAVE PROGRESSION   Minimal ST depression, lateral leads   Electronically Signed By: Leobardo Carmona ([facility]) 22-Mar-2023 21:56:55   Date and Time of Study: 2023-03-22 20:33:32          Assessment/Plan     Active Hospital Problems    Diagnosis  POA   • **Syncope and collapse [R55]  Yes   • JERICHO (acute kidney injury) (HCC) [N17.9]  Unknown   • Orthostatic hypotension [I95.1]  Yes   • Acquired hypothyroidism [E03.9]  Yes   • COPD (chronic obstructive pulmonary disease) (HCC) [J44.9]  Yes   • History of cancer [Z85.9]  Not Applicable   • Coronary artery disease involving native coronary artery of native heart without angina pectoris [I25.10]  Yes     Added automatically from request for surgery [id number]     • SHAR (obstructive sleep apnea) [G47.33]  Yes   • Other persistent atrial fibrillation (HCC) [I48.19]  Yes   • Mixed hyperlipidemia [E78.2]  Yes   • Essential hypertension [I10]  Yes   • Osteoarthritis [M19.90]  Yes     Mr. Eldridge is a 77-year-old male with history of hyperlipidemia, hypertension, osteoarthritis, A-fib, obstructive sleep apnea, CAD, COPD, hypothyroidism who presents to the emergency room after having abnormal labs at his PCP office.     Syncope/orthostatic hypotension  -Check orthostatic blood pressures every shift  -Telemetry unit for monitoring  -Consult cardiology  -Restart medications with parameters, defer to cardiology for any changes  -Hold diuretics at this time  -Normal saline at 100 cc an hour overnight  -Recheck BMP, CBC in a.m.    Acute kidney injury  -Normal saline at 100 cc an hour overnight  -Monitor I's and O's  -Recheck BMP in a.m.    Persistent A-fib/chronic anticoagulation  -Supratherapeutic INR of 7.2, hold Coumadin  -Recheck INR in a.m.  -Pharmacy to dose Coumadin once INR is therapeutic    COPD/obstructive sleep apnea  -Chronic  "conditions stable  -O2 to keep sats above 90%  -Continue home medications when med rec completed    Hypertension/hyperlipidemia  -Chronic conditions stable  -Continue home medications with parameters due to hypotension    · I discussed the patient's findings and my recommendations with patient.    VTE Prophylaxis - Warfarin (home med).  Code Status - Full code.       JONELLE Knight  [facility]  03/23/23  03:28 EDT      Electronically signed by Rafaela Matos APRN at 03/23/23 0331          Emergency Department Notes      Cuca Fay, RN at 03/22/23 1702        Pt saw JONELLE Hernandez to be seen for hypotension and a fall.    Pt was directed to ED for \"dehydration.\" Pt states he doesn't really feel he needs to be seen for the fall.     Electronically signed by Cuca Fay, RN at 03/22/23 1702       "

## 2023-03-23 NOTE — PROGRESS NOTES
[facility] Clinical Pharmacy Services: Warfarin Dosing/Monitoring Consult    Cody Eldridge is a 77 y.o. male, estimated creatinine clearance is 36.4 mL/min (A) (by C-G formula based on SCr of 1.82 mg/dL (H)). weighing 77 kg (169 lb 12.1 oz).    Results from last 7 days   Lab Units 03/23/23  1143 03/22/23  1820   INR  5.57* 7.28*   HEMOGLOBIN g/dL 12.6* 14.8   HEMATOCRIT % 37.3* 42.3   PLATELETS 10*3/mm3 143 189     Prior to admission anticoagulation: [facility] pt    INR goal:  2.0-3.0   TTR:  61.8 % (4.3 y)   INR used for dosing:  3.0 (3/14/2023)   Warfarin maintenance plan:  4 mg every Mon, Fri; 2 mg all other days; Starting 3/14/2023   Weekly warfarin total:  18 mg          Hospital Anticoagulation:  Consulting provider: Gustavo  Start date: 3/22  Indication: A Fib - requiring full anticoagulation  Target INR: 2 - 3  Expected duration: life   Bridge Therapy: No      Potential food or drug interactions:   aspirin (PD interaction, inc risk bleeding)   levothyroxine (home med)   sertraline (PD interaction, inc risk bleeding)   APAP, not taking though    Education complete?/Date: Yes; [facility] pt     Assessment/Plan:  Dose: warfarin on hold for elevated INR   Monitor for any signs or symptoms of bleeding  Follow up daily INRs and dose adjustments    Pharmacy will continue to follow until discharge or discontinuation of warfarin.     Leora Covington [facility]  Clinical Pharmacist

## 2023-03-23 NOTE — PLAN OF CARE
Goal Outcome Evaluation:  Plan of Care Reviewed With: patient        Progress: no change  Outcome Evaluation: BP was elevated.  other vitals stable.  APRN notified.  pt denied pain.  meds given per orders.  the pt passed his bedside swallow eval.  cardiology consulted.  will continue to monitor.

## 2023-03-23 NOTE — PLAN OF CARE
Goal Outcome Evaluation:  Plan of Care Reviewed With: patient           Outcome Evaluation: Pt is 76 yo male admitted to [facility] for syncope and collapse, hypotension. PMH significant for HLD, HTN, oa, afib, SHAR, COPD. Patient lives with spouse, independent without use of AD prior to admission. Today, pt performed bed mobility independently, sit to stand with supervision, ambulated around nurses unit with SBA. Patient appears to be at baseline level of function, no indication for continued acute skilled PT. Patient encouraged to mobilize with nursing staff throughout hospitalization in order to maintain mobility in anticipation to home.

## 2023-03-24 ENCOUNTER — READMISSION MANAGEMENT (OUTPATIENT)
Dept: CALL CENTER | Facility: HOSPITAL | Age: 78
End: 2023-03-24
Payer: MEDICARE

## 2023-03-24 VITALS
WEIGHT: 157.19 LBS | BODY MASS INDEX: 21.29 KG/M2 | SYSTOLIC BLOOD PRESSURE: 160 MMHG | RESPIRATION RATE: 16 BRPM | DIASTOLIC BLOOD PRESSURE: 88 MMHG | HEART RATE: 95 BPM | OXYGEN SATURATION: 97 % | TEMPERATURE: 97.8 F | HEIGHT: 72 IN

## 2023-03-24 PROBLEM — T45.511A COUMADIN TOXICITY: Status: RESOLVED | Noted: 2023-03-24 | Resolved: 2023-03-24

## 2023-03-24 PROBLEM — T45.511A COUMADIN TOXICITY: Status: ACTIVE | Noted: 2023-03-24

## 2023-03-24 PROBLEM — N17.9 AKI (ACUTE KIDNEY INJURY): Status: RESOLVED | Noted: 2023-03-23 | Resolved: 2023-03-24

## 2023-03-24 LAB
ANION GAP SERPL CALCULATED.3IONS-SCNC: 8.3 MMOL/L (ref 5–15)
BUN SERPL-MCNC: 34 MG/DL (ref 8–23)
BUN/CREAT SERPL: 26.8 (ref 7–25)
CALCIUM SPEC-SCNC: 9.1 MG/DL (ref 8.6–10.5)
CHLORIDE SERPL-SCNC: 102 MMOL/L (ref 98–107)
CO2 SERPL-SCNC: 26.7 MMOL/L (ref 22–29)
CREAT SERPL-MCNC: 1.27 MG/DL (ref 0.76–1.27)
EGFRCR SERPLBLD CKD-EPI 2021: 57.8 ML/MIN/1.73
GLUCOSE SERPL-MCNC: 103 MG/DL (ref 65–99)
INR PPP: 3.9 (ref 0.9–1.1)
POTASSIUM SERPL-SCNC: 3.6 MMOL/L (ref 3.5–5.2)
PROTHROMBIN TIME: 38.9 SECONDS (ref 11.7–14.2)
SODIUM SERPL-SCNC: 137 MMOL/L (ref 136–145)

## 2023-03-24 PROCEDURE — 85610 PROTHROMBIN TIME: CPT | Performed by: HOSPITALIST

## 2023-03-24 PROCEDURE — G0378 HOSPITAL OBSERVATION PER HR: HCPCS

## 2023-03-24 PROCEDURE — 80048 BASIC METABOLIC PNL TOTAL CA: CPT | Performed by: HOSPITALIST

## 2023-03-24 PROCEDURE — 99232 SBSQ HOSP IP/OBS MODERATE 35: CPT | Performed by: INTERNAL MEDICINE

## 2023-03-24 RX ORDER — ALPRAZOLAM 0.5 MG/1
0.25 TABLET ORAL 2 TIMES DAILY PRN
Qty: 45 TABLET | Refills: 0
Start: 2023-03-24 | End: 2023-04-05 | Stop reason: SDUPTHER

## 2023-03-24 RX ORDER — WARFARIN SODIUM 2 MG/1
TABLET ORAL
Qty: 120 TABLET | Refills: 1 | Status: SHIPPED | OUTPATIENT
Start: 2023-03-24

## 2023-03-24 RX ADMIN — AMLODIPINE BESYLATE 5 MG: 5 TABLET ORAL at 08:18

## 2023-03-24 RX ADMIN — Medication 10 ML: at 08:18

## 2023-03-24 RX ADMIN — SERTRALINE 100 MG: 100 TABLET, FILM COATED ORAL at 08:18

## 2023-03-24 RX ADMIN — LEVOTHYROXINE SODIUM 125 MCG: 0.12 TABLET ORAL at 06:14

## 2023-03-24 RX ADMIN — ROSUVASTATIN CALCIUM 10 MG: 10 TABLET, FILM COATED ORAL at 08:18

## 2023-03-24 RX ADMIN — CARVEDILOL 25 MG: 25 TABLET, FILM COATED ORAL at 08:18

## 2023-03-24 RX ADMIN — ASPIRIN 81 MG: 81 TABLET, CHEWABLE ORAL at 08:18

## 2023-03-24 NOTE — DISCHARGE SUMMARY
Patient Name: Cody Eldridge  : 1945  MRN: [id number]    Date of Admission: 3/22/2023  Date of Discharge:  3/24/2023  Primary Care Physician: Patrick Diaz MD      Chief Complaint:   Abnormal Lab      Discharge Diagnoses     Active Hospital Problems    Diagnosis  POA   • **Syncope and collapse [R55]  Yes   • Stage 3a chronic kidney disease (HCC) [N18.31]  Yes   • Orthostatic hypotension [I95.1]  Yes   • Chronic diastolic congestive heart failure (HCC) [I50.32]  Yes   • Acquired hypothyroidism [E03.9]  Yes   • COPD (chronic obstructive pulmonary disease) (HCC) [J44.9]  Yes   • History of cancer [Z85.9]  Not Applicable   • Coronary artery disease involving native coronary artery of native heart without angina pectoris [I25.10]  Yes   • SHAR (obstructive sleep apnea) [G47.33]  Yes   • Other persistent atrial fibrillation (HCC) [I48.19]  Yes   • Mixed hyperlipidemia [E78.2]  Yes   • Essential hypertension [I10]  Yes   • Osteoarthritis [M19.90]  Yes      Resolved Hospital Problems    Diagnosis Date Resolved POA   • Coumadin toxicity [T45.511A] 2023 Yes   • JERICHO (acute kidney injury) (HCC) [N17.9] 2023 Yes        Hospital Course     Mr. Eldridge is a 77 y.o. male with a history of CKD 3A, diastolic CHF, CAD, A-fib on warfarin who presented to [facility] initially on advice of his PCP after some abnormal labs were discovered.  Please see the admitting history and physical for further details.  He had fallen several days prior to admission after a probable syncopal episode and sustained some facial trauma.  He did not seek medical care at the time.  He had been having a gastrointestinal illness with some nausea and vomiting and likely was dehydrated and orthostatic.  Labs revealed acute kidney injury with creatinine of 2.7 whereas usually runs between 1.2 and 1.5.  He also was markedly supratherapeutic with his INR greater than 7.  H&H was stable.  Coumadin was held and INR was  allowed to trend downward on its own.  He was volume resuscitated and creatinine returned down to prior baselines.  He was seen by cardiology who did not recommend any additional work-up in regards to the syncope and feels as to why that this was probably orthostatic in nature.  Patient actually is still orthostatic on his vital signs but BP is now running higher and he is no longer symptomatic.  He is very adamant about being discharged today because he takes care of his wife who has apparently fallen a couple of times since he has been in the hospital.  Hopefully some other family can assist him as well.  Case management saw him and offered assistance but he declined.  INR was down to 3.9 today.  Instructed to hold his warfarin today, resume tomorrow at 2 mg daily dose and have his INR checked Tuesday or Wednesday of next week at the earliest.  I also discussed his care with Dr. Ellis today who agreed with discharge with medications adjusted as below.  We felt it is safe to resume his lisinopril since his creatinine is back to normal and his issues were likely prerenal/dehydration in nature.  Lisinopril might be less prone to contribute to orthostasis than amlodipine as well.  Likely we will have to accept some degree of supine hypertension to prevent him from being symptomatic with standing.      Day of Discharge     Subjective:  No complaints, says he feels better and wants to go    Physical Exam:  Temp:  [97.5 °F (36.4 °C)-98.1 °F (36.7 °C)] 97.8 °F (36.6 °C)  Heart Rate:  [] 95  Resp:  [16-18] 16  BP: (119-179)/() 160/88  Body mass index is 21.32 kg/m².  Physical Exam  Vitals reviewed.   HENT:      Head:      Comments: Extensive right periorbital bruising, unchanged  Cardiovascular:      Rate and Rhythm: Normal rate. Rhythm irregular.   Pulmonary:      Effort: Respiratory distress present.      Breath sounds: Normal breath sounds.   Abdominal:      General: Bowel sounds are normal. There is no  distension.      Palpations: Abdomen is soft.      Tenderness: There is no abdominal tenderness.   Musculoskeletal:      Right lower leg: No edema.      Left lower leg: No edema.   Skin:     General: Skin is warm and dry.   Neurological:      General: No focal deficit present.      Mental Status: He is alert.   Psychiatric:         Mood and Affect: Mood normal.         Consultants     Consult Orders (all) (From admission, onward)     Start     Ordered    03/23/23 0702  Inpatient Cardiology Consult  IN AM        Specialty:  Cardiology  Provider:  Myriam Omer MD    03/22/23 2343 03/22/23 2344  Inpatient Cardiology Consult  Once,   Status:  Canceled        Specialty:  Cardiology  Provider:  Myriam Omer MD    03/22/23 2343 03/22/23 2019  [facility] (on-call MD unless specified) Details  Once        Specialty:  Hospitalist  Provider:  (Not yet assigned)    03/22/23 2018              Procedures         Imaging Results (All)     Procedure Component Value Units Date/Time    CT Head Without Contrast [[id number]] Collected: 03/22/23 2029     Updated: 03/22/23 2041    Narrative:      CT HEAD WO CONTRAST-     INDICATIONS: Head injury     TECHNIQUE: Radiation dose reduction techniques were utilized, including  automated exposure control and exposure modulation based on body size.  Noncontrast head CT     COMPARISON: 06/28/2019     FINDINGS:        No acute intracranial hemorrhage, midline shift or mass effect. No acute  territorial infarct is identified.     Arterial calcifications are seen at the base of the brain. Ventricles,  cisterns, cerebral sulci are unremarkable for patient age.     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 Mild paranasal sinus  mucosal thickening is present. The visualized paranasal sinuses, orbits,  mastoid air cells are otherwise unremarkable.          Impression:         No acute intracranial hemorrhage or hydrocephalus. If there is further  clinical concern, MRI could be  considered for further evaluation.     This report was finalized on 3/22/2023 8:38 PM by Dr. Josep Garcia M.D.               Pertinent Labs     Results from last 7 days   Lab Units 03/23/23  1143 03/22/23  1820   WBC 10*3/mm3 5.68 7.52   HEMOGLOBIN g/dL 12.6* 14.8   PLATELETS 10*3/mm3 143 189     Results from last 7 days   Lab Units 03/24/23  0649 03/23/23  1143 03/22/23  1820   SODIUM mmol/L 137 135* 135*   POTASSIUM mmol/L 3.6 3.3* 3.5   CHLORIDE mmol/L 102 100 94*   CO2 mmol/L 26.7 29.0 31.6*   BUN mg/dL 34* 46* 60*   CREATININE mg/dL 1.27 1.82* 2.74*   GLUCOSE mg/dL 103* 104* 102*   EGFR mL/min/1.73 57.8* 37.6* 23.0*     Results from last 7 days   Lab Units 03/22/23  1820   ALBUMIN g/dL 4.4   BILIRUBIN mg/dL 0.7   ALK PHOS U/L 78   AST (SGOT) U/L 22   ALT (SGPT) U/L 15     Results from last 7 days   Lab Units 03/24/23  0649 03/23/23  1143 03/22/23  1820   CALCIUM mg/dL 9.1 8.6 9.2   ALBUMIN g/dL  --   --  4.4     Results from last 7 days   Lab Units 03/22/23  1820   LIPASE U/L 45     Results from last 7 days   Lab Units 03/23/23  1143   CK TOTAL U/L 87           Invalid input(s): LDLCALC          Test Results Pending at Discharge       Discharge Details        Discharge Medications      Changes to Medications      Instructions Start Date   ALPRAZolam 0.5 MG tablet  Commonly known as: XANAX  What changed: See the new instructions.   0.25 mg, Oral, 2 Times Daily PRN      warfarin 2 MG tablet  Commonly known as: COUMADIN  What changed: additional instructions   TAKE ONE TABLET BY MOUTH DAILY OR AS DIRECTED         Continue These Medications      Instructions Start Date   albuterol sulfate  (90 Base) MCG/ACT inhaler  Commonly known as: PROVENTIL HFA;VENTOLIN HFA;PROAIR HFA   2 puffs, Inhalation, Every 4 Hours PRN, ProAir  (90 Base) MCG/ACT Inhalation Aerosol Solution; Patient Sig: ProAir  (90 Base) MCG/ACT Inhalation Aerosol Solution inhale 2 puffs by mouth every 4 hours if needed; 8.5;  "11; 07-Apr-2014; Act      aspirin 81 MG chewable tablet   81 mg, Oral, Daily      carvedilol 25 MG tablet  Commonly known as: COREG   25 mg, Oral, 2 Times Daily With Meals      furosemide 40 MG tablet  Commonly known as: LASIX   20 mg, Oral, Daily      levothyroxine 125 MCG tablet  Commonly known as: SYNTHROID, LEVOTHROID   125 mcg, Oral, Daily      lisinopril 20 MG tablet  Commonly known as: PRINIVIL,ZESTRIL   20 mg, Oral, Daily      montelukast 10 MG tablet  Commonly known as: SINGULAIR   TAKE 1 TABLET BY MOUTH DAILY      oxyCODONE-acetaminophen 7.5-325 MG per tablet  Commonly known as: PERCOCET   1 tablet, Oral, Every 6 Hours PRN      rosuvastatin 20 MG tablet  Commonly known as: CRESTOR   10 mg, Oral, Daily      sertraline 100 MG tablet  Commonly known as: ZOLOFT   100 mg, Oral, Daily         Stop These Medications    chlorthalidone 25 MG tablet  Commonly known as: HYGROTON            Allergies   Allergen Reactions   • Benadryl [Diphenhydramine Hcl] Dizziness     \"I sleep for about a day\"       Discharge Disposition:  Home or Self Care      Discharge Diet:  Diet Order   Procedures   • Diet: Cardiac Diets; Healthy Heart (2-3 Na+); Texture: Regular Texture (IDDSI 7); Fluid Consistency: Thin (IDDSI 0)       Discharge Activity:       CODE STATUS:    Code Status and Medical Interventions:   Ordered at: 03/22/23 2102     Code Status (Patient has no pulse and is not breathing):    CPR (Attempt to Resuscitate)     Medical Interventions (Patient has pulse or is breathing):    Full Support       Future Appointments   Date Time Provider Department Center   3/28/2023 10:45 AM ANTI COAG CLINIC [facility] None   4/3/2023 11:00 AM Patrick Diaz MD [facility]   7/17/2023  2:20 PM Myriam Omer MD [facility]     Additional Instructions for the Follow-ups that You Need to Schedule     Discharge Follow-up with PCP   As directed       Currently Documented PCP:    Patrick Diaz MD    PCP Phone Number: "    [phone]     Follow Up Details: 1 WEEK         Discharge Follow-up with Specialty: cardiology per their recs   As directed      Specialty: cardiology per their recs            Follow-up Information     Patrick Diaz MD. Go on 4/3/2023.    Specialty: Family Medicine  Why: Appointment:  April 3 @ 11am [phone]  Contact information:  [address] [facility] [address]  [place] [address]  [phone]                         Additional Instructions for the Follow-ups that You Need to Schedule     Discharge Follow-up with PCP   As directed       Currently Documented PCP:    Patrick Diaz MD    PCP Phone Number:    [phone]     Follow Up Details: 1 WEEK         Discharge Follow-up with Specialty: cardiology per their recs   As directed      Specialty: cardiology per their recs           Time Spent on Discharge:  Greater than 30 minutes      Elvis Lozano MD  [facility]  03/24/23  15:41 EDT

## 2023-03-24 NOTE — PROGRESS NOTES
LOS: 0 days   Patient Care Team:  Patrick Diaz MD as PCP - General (Family Medicine)  Payal Waters MD as Consulting Physician (Pain Medicine)  Lorna Chaidez APRN (Nurse Practitioner)  Kristal Cowart RPH as Pharmacist  Fritz Slater MD as Referring Physician (Otolaryngology)  Pablo Heaton MD as Consulting Physician (Hematology and Oncology)  Annie Davila MD as Consulting Physician (Radiation Oncology)  Chetan Heaton MD as Consulting Physician (Urology)  Kristal Cowart RPH as Pharmacist (Pharmacy)  Georges Guadalupe PharmD as Pharmacist (Pharmacy)    Chief Complaint: Follow-up near syncope, persistent atrial fibrillation, supratherapeutic INR, chronic diastolic CHF.    Interval History: No further events.  Blood pressure is better.  No chest pain or shortness of breath overnight.    Vital Signs:  Temp:  [97.5 °F (36.4 °C)-98.1 °F (36.7 °C)] 97.8 °F (36.6 °C)  Heart Rate:  [] 95  Resp:  [16-18] 16  BP: (119-179)/() 160/88    Intake/Output Summary (Last 24 hours) at 3/24/2023 1530  Last data filed at 3/24/2023 1327  Gross per 24 hour   Intake 450 ml   Output 1400 ml   Net -950 ml       Physical Exam:   General Appearance:    No acute distress, alert and oriented x4, right periorbital ecchymosis   Lungs:     Clear to auscultation bilaterally     Heart:   Irregularly irregular rhythm and normal rate.  II/VI SM LLSB.   Abdomen:     Soft, nontender, nondistended.    Extremities:   No clubbing, cyanosis, or edema.     Results Review:    Results from last 7 days   Lab Units 03/24/23  0649   SODIUM mmol/L 137   POTASSIUM mmol/L 3.6   CHLORIDE mmol/L 102   CO2 mmol/L 26.7   BUN mg/dL 34*   CREATININE mg/dL 1.27   GLUCOSE mg/dL 103*   CALCIUM mg/dL 9.1     Results from last 7 days   Lab Units 03/23/23  1143   CK TOTAL U/L 87     Results from last 7 days   Lab Units 03/23/23  1143   WBC 10*3/mm3 5.68   HEMOGLOBIN g/dL 12.6*   HEMATOCRIT % 37.3*   PLATELETS 10*3/mm3 143      Results from last 7 days   Lab Units 03/24/23  0649 03/23/23  1143 03/22/23  1820   INR  3.90* 5.57* 7.28*                   I reviewed the patient's new clinical results.        Assessment:  1.  Near syncope likely secondary to volume depletion  2.  Acute on chronic kidney disease  3.  Persistent atrial fibrillation  4.  Supratherapeutic INR  5.  Chronic diastolic CHF  6.  Mild nonobstructive coronary disease by catheterization in 2018  7.  Severe tricuspid regurgitation with likely pulmonary hypertension  8.  Moderate mitral regurgitation    Plan:  -Discussed with Dr. Lozano.  I think he is fine to go home.  We will resume his Lasix starting tomorrow.  He will resume his warfarin tomorrow and have it checked sometime next week.  I do not think that he should be on chlorthalidone as an outpatient anymore.  I would continue his other medications as well.    -Discussed with the patient in detail.    Lorenzo Ellis MD  03/24/23  15:30 EDT

## 2023-03-24 NOTE — PLAN OF CARE
Goal Outcome Evaluation:               No complaints of pain. NS infusing per order. BP elevated, [facility] notified. Up with stand by assistance to the bathroom. VSS.

## 2023-03-24 NOTE — OUTREACH NOTE
Prep Survey    Flowsheet Row Responses   [facility] facility patient discharged from? [place]   Is LACE score < 7 ? No   Eligibility [facility]   Date of Admission 03/22/23   Date of Discharge 03/24/23   Discharge Disposition Home or Self Care   Discharge diagnosis Syncope and collapse, JERICHO, Coumadin toxicity   Does the patient have one of the following disease processes/diagnoses(primary or secondary)? Other   Does the patient have Home health ordered? No   Is there a DME ordered? No   Prep survey completed? Yes          Juliet KING - Registered Nurse

## 2023-03-24 NOTE — CASE MANAGEMENT/SOCIAL WORK
Continued Stay Note  [facility]     Patient Name: Cody Eldridge  MRN: [id number]  Today's Date: 3/24/2023    Admit Date: 3/22/2023    Plan: Home, plans to drive self.   Discharge Plan     Row Name 03/24/23 0953       Plan    Plan Home, plans to drive self.    Patient/Family in Agreement with Plan yes    Plan Comments Spoke with patient at bedside. Patient is very eager to return home to help care for his wife. Patient plans to drive self when discharged.               Discharge Codes    No documentation.               Expected Discharge Date and Time     Expected Discharge Date Expected Discharge Time    Mar 24, 2023             Shanna Reed RN

## 2023-03-26 ENCOUNTER — TRANSITIONAL CARE MANAGEMENT TELEPHONE ENCOUNTER (OUTPATIENT)
Dept: CALL CENTER | Facility: HOSPITAL | Age: 78
End: 2023-03-26
Payer: MEDICARE

## 2023-03-26 NOTE — OUTREACH NOTE
Call Center TCM Note    Flowsheet Row Responses   [facility] patient discharged from? [place]   Does the patient have one of the following disease processes/diagnoses(primary or secondary)? Other   TCM attempt successful? No   Unsuccessful attempts Attempt 1          Shazia Velazquez RN    3/26/2023, 16:33 EDT

## 2023-03-27 ENCOUNTER — TRANSITIONAL CARE MANAGEMENT TELEPHONE ENCOUNTER (OUTPATIENT)
Dept: CALL CENTER | Facility: HOSPITAL | Age: 78
End: 2023-03-27
Payer: MEDICARE

## 2023-03-27 NOTE — OUTREACH NOTE
Call Center TCM Note    Flowsheet Row Responses   [facility] patient discharged from? [place]   Does the patient have one of the following disease processes/diagnoses(primary or secondary)? Other   TCM attempt successful? Yes   Call start time 0906   Call end time 0909   Discharge diagnosis Syncope and collapse, JERICHO, Coumadin toxicity   Meds reviewed with patient/caregiver? Yes   Is the patient having any side effects they believe may be caused by any medication additions or changes? No   Does the patient have all medications ordered at discharge? No   Is the patient taking all medications as directed (includes completed medication regime)? N/A   Comments Hospital d/c f/u appt on 4/5/23 @11am   Does the patient have an appointment with their PCP within 7 days of discharge? Yes   Has home health visited the patient within 72 hours of discharge? N/A   Psychosocial issues? No   Did the patient receive a copy of their discharge instructions? Yes   Nursing interventions Reviewed instructions with patient   What is the patient's perception of their health status since discharge? Improving   Is the patient/caregiver able to teach back the hierarchy of who to call/visit for symptoms/problems? PCP, Specialist, Home health nurse, Urgent Care, ED, 911 Yes   TCM call completed? Yes   Call end time 0909   Would this patient benefit from a Referral to Amb Social Work? No   Is the patient interested in additional calls from an ambulatory ?  NOTE:  applies to high risk patients requiring additional follow-up. No          Patricia Beard RN    3/27/2023, 09:10 EDT

## 2023-03-27 NOTE — CASE MANAGEMENT/SOCIAL WORK
Case Management Discharge Note      Final Note: d/c'd home with family         Selected Continued Care - Discharged on 3/24/2023 Admission date: 3/22/2023 - Discharge disposition: Home or Self Care    Destination    No services have been selected for the patient.              Durable Medical Equipment    No services have been selected for the patient.              Dialysis/Infusion    No services have been selected for the patient.              Home Medical Care    No services have been selected for the patient.              Therapy    No services have been selected for the patient.              Community Resources    No services have been selected for the patient.              Community & DME    No services have been selected for the patient.

## 2023-03-28 ENCOUNTER — ANTICOAGULATION VISIT (OUTPATIENT)
Dept: PHARMACY | Facility: HOSPITAL | Age: 78
End: 2023-03-28
Payer: MEDICARE

## 2023-03-28 DIAGNOSIS — I48.19 OTHER PERSISTENT ATRIAL FIBRILLATION: Primary | ICD-10-CM

## 2023-03-28 DIAGNOSIS — R53.83 FATIGUE, UNSPECIFIED TYPE: ICD-10-CM

## 2023-03-28 LAB
INR PPP: 1.2 (ref 0.91–1.09)
PROTHROMBIN TIME: 13.9 SECONDS (ref 10–13.8)

## 2023-03-28 PROCEDURE — 85610 PROTHROMBIN TIME: CPT

## 2023-03-28 PROCEDURE — 36416 COLLJ CAPILLARY BLOOD SPEC: CPT

## 2023-03-28 PROCEDURE — G0463 HOSPITAL OUTPT CLINIC VISIT: HCPCS

## 2023-03-28 RX ORDER — ALPRAZOLAM 0.5 MG/1
TABLET ORAL
Qty: 45 TABLET | OUTPATIENT
Start: 2023-03-28

## 2023-03-28 NOTE — PROGRESS NOTES
Anticoagulation Clinic Progress Note    Anticoagulation Summary  As of 3/28/2023    INR goal:  2.0-3.0   TTR:  61.3 % (4.3 y)   INR used for dosin.2 (3/28/2023)   Warfarin maintenance plan:  4 mg every Mon, Fri; 2 mg all other days; Starting 3/28/2023   Weekly warfarin total:  18 mg   Plan last modified:  Paul Feldman RP (2022)   Next INR check:  3/31/2023   Priority:  Maintenance   Target end date:  Indefinite    Indications    Other persistent atrial fibrillation (HCC) [I48.19]             Anticoagulation Episode Summary     INR check location:      Preferred lab:      Send INR reminders to:   [facility]    Comments:        Anticoagulation Care Providers     Provider Role Specialty Phone number    Myriam Omer MD Referring Cardiology [phone]          Clinic Interview:  Patient Findings     Positives:  Signs/symptoms of bleeding, Change in health, Emergency   department visit, Missed doses, Bruising    Comments:  Patient diagnosed with the flu 2 weeks ago by PCP. Patient   presented to the ED last weak due to orthostasis and weakness. Patient has   an injury to the right eye due to falling the weekend prior. Initial INR   upon ED arrival was 7.28. Warfarin was held for 2 nights while in the   hospital. Per discharge note, patient was to restart warfarin 3/25.   Patient states he was told to hold warfarin until his follow-up with the   [facility]. Warfarin was held for a total of 6 days.        Clinical Outcomes     Comments:  Patient diagnosed with the flu 2 weeks ago by PCP. Patient   presented to the ED last weak due to orthostasis and weakness. Patient has   an injury to the right eye due to falling the weekend prior. Initial INR   upon ED arrival was 7.28. Warfarin was held for 2 nights while in the   hospital. Per discharge note, patient was to restart warfarin 3/25.   Patient states he was told to hold warfarin until his follow-up with the    [facility]. Warfarin was held for a total of 6 days.         INR History:  Anticoagulation Monitoring 3/3/2023 3/14/2023 3/28/2023   INR 3.2 3.0 1.2   INR Date 3/3/2023 3/14/2023 3/28/2023   INR Goal 2.0-3.0 2.0-3.0 2.0-3.0   Trend Same Same Same   Last Week Total 18 mg 18 mg 2 mg   Next Week Total 16 mg 18 mg 22 mg   Sun 2 mg 2 mg -   Mon 4 mg 4 mg -   Tue 2 mg 2 mg 6 mg (3/28)   Wed 2 mg 2 mg 2 mg   Thu 2 mg 2 mg 2 mg   Fri 2 mg (3/3); Otherwise 4 mg 4 mg -   Sat 2 mg 2 mg -   Visit Report - - -   Some recent data might be hidden       Plan:  1. INR is Subtherapeutic today- see above in Anticoagulation Summary.  Will instruct Cody DAVIES Chente to boost with 6 mg today then continue their warfarin regimen- see above in Anticoagulation Summary.  2. Follow up in 3 days  3. Patient declines warfarin refills.  4. Verbal and written information provided. Patient expresses understanding and has no further questions at this time.    Mane Carpenter, Pharmacy Intern

## 2023-03-28 NOTE — PROGRESS NOTES
I have supervised and reviewed the notes, assessments, and/or procedures performed by our Pharmacy Intern. The documented assessment and plan were developed cooperatively, and the plan was implemented in my presence. I concur with the documentation of this patient encounter.    Mary Saleem, PharmD

## 2023-03-31 ENCOUNTER — ANTICOAGULATION VISIT (OUTPATIENT)
Dept: PHARMACY | Facility: HOSPITAL | Age: 78
End: 2023-03-31
Payer: MEDICARE

## 2023-03-31 DIAGNOSIS — I48.19 OTHER PERSISTENT ATRIAL FIBRILLATION: Primary | ICD-10-CM

## 2023-03-31 LAB
INR PPP: 1.7 (ref 0.91–1.09)
PROTHROMBIN TIME: 20.6 SECONDS (ref 10–13.8)

## 2023-03-31 PROCEDURE — 85610 PROTHROMBIN TIME: CPT

## 2023-03-31 PROCEDURE — 36416 COLLJ CAPILLARY BLOOD SPEC: CPT

## 2023-03-31 NOTE — PROGRESS NOTES
I have supervised and reviewed the notes, assessments, and/or procedures performed by our Pharmacy Intern. The documented assessment and plan were developed cooperatively, and the plan was implemented in my presence. I concur with the documentation of this patient encounter.    Bernarda Lockwood [facility]

## 2023-03-31 NOTE — PROGRESS NOTES
Anticoagulation Clinic Progress Note    Anticoagulation Summary  As of 3/31/2023    INR goal:  2.0-3.0   TTR:  61.2 % (4.3 y)   INR used for dosin.7 (3/31/2023)   Warfarin maintenance plan:  4 mg every Mon, Fri; 2 mg all other days; Starting 3/31/2023   Weekly warfarin total:  18 mg   No change documented:  Viar, Mane, Pharmacy Intern   Plan last modified:  Paul Feldman RPH (2022)   Next INR check:  2023   Priority:  Maintenance   Target end date:  Indefinite    Indications    Other persistent atrial fibrillation (HCC) [I48.19]             Anticoagulation Episode Summary     INR check location:      Preferred lab:      Send INR reminders to:   [facility]    Comments:        Anticoagulation Care Providers     Provider Role Specialty Phone number    Myriam Omer MD Referring Cardiology [phone]          Clinic Interview:  Patient Findings     Negatives:  Signs/symptoms of thrombosis, Signs/symptoms of bleeding,   Laboratory test error suspected, Change in health, Change in alcohol use,   Change in activity, Upcoming invasive procedure, Emergency department   visit, Upcoming dental procedure, Missed doses, Extra doses, Change in   medications, Change in diet/appetite, Hospital admission, Bruising, Other   complaints    Comments:  No new signs/symptoms of bleeding, eye is still bruised from   fall, but looks to be healing appropriately      Clinical Outcomes     Negatives:  Major bleeding event, Thromboembolic event,   Anticoagulation-related hospital admission, Anticoagulation-related ED   visit, Anticoagulation-related fatality    Comments:  No new signs/symptoms of bleeding, eye is still bruised from   fall, but looks to be healing appropriately        INR History:  Anticoagulation Monitoring 3/14/2023 3/28/2023 3/31/2023   INR 3.0 1.2 1.7   INR Date 3/14/2023 3/28/2023 3/31/2023   INR Goal 2.0-3.0 2.0-3.0 2.0-3.0   Trend Same Same Same   Last Week Total 18 mg 2 mg  10 mg   Next Week Total 18 mg 22 mg 18 mg   Sun 2 mg - 2 mg   Mon 4 mg - 4 mg   Tue 2 mg 6 mg (3/28) 2 mg   Wed 2 mg 2 mg 2 mg   Thu 2 mg 2 mg -   Fri 4 mg - 4 mg   Sat 2 mg - 2 mg   Visit Report - - -   Some recent data might be hidden       Plan:  1. INR is Subtherapeutic today- see above in Anticoagulation Summary.  Will instruct Cody SAMSON Eldridge to Continue their warfarin regimen- see above in Anticoagulation Summary.  2. Follow up in 1 week  3. Patient declines warfarin refills.  4. Verbal and written information provided. Patient expresses understanding and has no further questions at this time.    Mane Carpenter, Pharmacy Intern

## 2023-04-05 ENCOUNTER — OFFICE VISIT (OUTPATIENT)
Dept: INTERNAL MEDICINE | Facility: CLINIC | Age: 78
End: 2023-04-05
Payer: MEDICARE

## 2023-04-05 VITALS
HEART RATE: 114 BPM | SYSTOLIC BLOOD PRESSURE: 122 MMHG | OXYGEN SATURATION: 97 % | HEIGHT: 72 IN | DIASTOLIC BLOOD PRESSURE: 70 MMHG | BODY MASS INDEX: 23.13 KG/M2 | WEIGHT: 170.8 LBS

## 2023-04-05 DIAGNOSIS — F41.8 DEPRESSION WITH ANXIETY: ICD-10-CM

## 2023-04-05 DIAGNOSIS — R53.83 FATIGUE, UNSPECIFIED TYPE: ICD-10-CM

## 2023-04-05 DIAGNOSIS — Z79.01 CURRENT USE OF LONG TERM ANTICOAGULATION: ICD-10-CM

## 2023-04-05 DIAGNOSIS — I10 ESSENTIAL HYPERTENSION: ICD-10-CM

## 2023-04-05 DIAGNOSIS — I50.32 CHRONIC DIASTOLIC CONGESTIVE HEART FAILURE: Primary | ICD-10-CM

## 2023-04-05 PROBLEM — J06.9 ACUTE URI: Status: RESOLVED | Noted: 2017-01-09 | Resolved: 2023-04-05

## 2023-04-05 RX ORDER — ALPRAZOLAM 0.25 MG/1
0.25 TABLET ORAL 2 TIMES DAILY PRN
Qty: 60 TABLET | Refills: 1 | Status: SHIPPED | OUTPATIENT
Start: 2023-04-05

## 2023-04-05 NOTE — PROGRESS NOTES
Transitional Care Follow Up Visit  Subjective     Cody Eldridge is a 77 y.o. male who presents for a transitional care management visit.    Within 48 business hours after discharge our office contacted him via telephone to coordinate his care and needs.      I reviewed and discussed the details of that call along with the discharge summary, hospital problems, inpatient lab results, inpatient diagnostic studies, and consultation reports with Cody.     Current outpatient and discharge medications have been reconciled for the patient.  Reviewed by: Patrick Diaz MD      Date of TCM Phone Call 3/24/2023   [facility]   Date of Admission 3/22/2023   Date of Discharge 3/24/2023   Discharge Disposition Home or Self Care     Risk for Readmission (LACE) Score: 8 (3/24/2023  6:00 AM)      History of Present Illness   Course During Hospital Stay:  presented to [facility]  after some abnormal labs were discovered. He had fallen several days prior to admission after a probable syncopal episode and sustained some facial trauma.  He did not seek medical care at the time.  He had been having a gastrointestinal illness with some nausea and vomiting and likely was dehydrated and orthostatic.  Labs revealed acute kidney injury with creatinine of 2.7 whereas usually runs between 1.2 and 1.5.  He also was markedly supratherapeutic with his INR greater than 7.  H&H was stable.  Coumadin was held and INR was allowed to trend downward on its own.  He was volume resuscitated and creatinine returned down to prior baselines.  He was seen by cardiology who did not recommend any additional work-up in regards to the syncope and feels as to why that this was probably orthostatic in nature.  Patient actually is still orthostatic on his vital signs but BP is now running higher and he is no longer symptomatic.  He is very adamant about being discharged today because he takes care of his wife who has apparently fallen a  couple of times since he has been in the hospital.  Hopefully some other family can assist him as well.  Case management saw him and offered assistance but he declined.  INR was down to 3.9 today.  Instructed to hold his warfarin today, resume tomorrow at 2 mg daily dose and have his INR checked Tuesday or Wednesday of next week at the earliest.   Dr. Ellis  agreed with discharge with medications adjusted as below.  We felt it is safe to resume his lisinopril since his creatinine is back to normal and his issues were likely prerenal/dehydration in nature.  Lisinopril might be less prone to contribute to orthostasis than amlodipine as well.  Likely we will have to accept some degree of supine hypertension to prevent him from being symptomatic with standing.       CT scan No acute intracranial hemorrhage or hydrocephalus.  The following portions of the patient's history were reviewed and updated as appropriate: allergies, current medications, past family history, past medical history, past social history, past surgical history and problem list.    Review of Systems   HENT: Negative.    Eyes: Negative.    Respiratory: Negative.    Cardiovascular: Negative.    Gastrointestinal: Negative.    Genitourinary: Negative.    Musculoskeletal: Negative.    Skin: Negative.    Neurological: Positive for weakness.   Psychiatric/Behavioral: The patient is nervous/anxious.        Objective   Physical Exam  Vitals reviewed.   Constitutional:       Appearance: Normal appearance.   HENT:      Head: Normocephalic.   Eyes:      Extraocular Movements: Extraocular movements intact.      Comments: Ecchymotic right orbit   Cardiovascular:      Rate and Rhythm: Rhythm irregular.   Pulmonary:      Effort: Pulmonary effort is normal.      Breath sounds: Normal breath sounds.   Musculoskeletal:      Right lower leg: No edema.      Left lower leg: No edema.   Neurological:      Mental Status: He is alert.   Psychiatric:         Mood and  Affect: Mood normal.         Behavior: Behavior normal.         Thought Content: Thought content normal.         Judgment: Judgment normal.         Assessment & Plan   Diagnoses and all orders for this visit:    1. Chronic diastolic congestive heart failure (Primary)    2. Fatigue, unspecified type  -     ALPRAZolam (XANAX) 0.25 MG tablet; Take 1 tablet by mouth 2 (Two) Times a Day As Needed for Anxiety.  Dispense: 60 tablet; Refill: 1    3. Essential hypertension    4. Current use of long term anticoagulation    5. Depression with anxiety      Continue Coreg furosemide thyroid lisinopril  Depression continue sertraline refill alprazolam at a lower dose  Continue anticoagulation therapy with warfarin and checking INR clinic  Otherwise as needed or 3 months

## 2023-04-06 ENCOUNTER — ANTICOAGULATION VISIT (OUTPATIENT)
Dept: PHARMACY | Facility: HOSPITAL | Age: 78
End: 2023-04-06
Payer: MEDICARE

## 2023-04-06 DIAGNOSIS — I48.19 OTHER PERSISTENT ATRIAL FIBRILLATION: Primary | ICD-10-CM

## 2023-04-06 LAB — INR PPP: 1.9 (ref 0.9–1.1)

## 2023-04-06 PROCEDURE — 36416 COLLJ CAPILLARY BLOOD SPEC: CPT

## 2023-04-06 PROCEDURE — 85610 PROTHROMBIN TIME: CPT

## 2023-04-06 NOTE — PROGRESS NOTES
I have supervised and reviewed the notes, assessments, and/or procedures performed by our Pharmacy Intern. The documented assessment and plan were developed cooperatively, and the plan was implemented in my presence. I concur with the documentation of this patient encounter.    Bernarda Lockwood [facility]

## 2023-04-06 NOTE — PROGRESS NOTES
Anticoagulation Clinic Progress Note    Anticoagulation Summary  As of 2023    INR goal:  2.0-3.0   TTR:  61.0 % (4.4 y)   INR used for dosin.90 (2023)   Warfarin maintenance plan:  4 mg every Mon, Fri; 2 mg all other days; Starting 2023   Weekly warfarin total:  18 mg   Plan last modified:  Paul Feldman [facility] (2022)   Next INR check:  2023   Priority:  Maintenance   Target end date:  Indefinite    Indications    Other persistent atrial fibrillation (HCC) [I48.19]             Anticoagulation Episode Summary     INR check location:      Preferred lab:      Send INR reminders to:   [facility]    Comments:        Anticoagulation Care Providers     Provider Role Specialty Phone number    Myriam Omer MD Referring Cardiology [phone]          Clinic Interview:  Patient Findings     Negatives:  Signs/symptoms of thrombosis, Signs/symptoms of bleeding,   Laboratory test error suspected, Change in health, Change in alcohol use,   Change in activity, Upcoming invasive procedure, Emergency department   visit, Upcoming dental procedure, Missed doses, Extra doses, Change in   medications, Change in diet/appetite, Hospital admission, Bruising, Other   complaints      Clinical Outcomes     Negatives:  Major bleeding event, Thromboembolic event,   Anticoagulation-related hospital admission, Anticoagulation-related ED   visit, Anticoagulation-related fatality        INR History:  Anticoagulation Monitoring 3/28/2023 3/31/2023 2023   INR 1.2 1.7 1.90   INR Date 3/28/2023 3/31/2023 2023   INR Goal 2.0-3.0 2.0-3.0 2.0-3.0   Trend Same Same Same   Last Week Total 2 mg 10 mg 18 mg   Next Week Total 22 mg 18 mg 18 mg   Sun - 2 mg 2 mg   Mon - 4 mg 4 mg   Tue 6 mg (3/28) 2 mg 2 mg   Wed 2 mg 2 mg 2 mg   Thu 2 mg - 2 mg   Fri - 4 mg 4 mg   Sat - 2 mg 2 mg   Visit Report - - -   Some recent data might be hidden       Plan:  1. INR is subtherapeutic today- see above in  Anticoagulation Summary.  Will instruct Cody Eldridge to Continue their warfarin regimen- see above in Anticoagulation Summary.  2. Follow up in 2 weeks  3. Patient declines warfarin refills.  4. Verbal and written information provided. Patient expresses understanding and has no further questions at this time.    Mane Carpenter, Pharmacy Intern

## 2023-04-20 ENCOUNTER — ANTICOAGULATION VISIT (OUTPATIENT)
Dept: PHARMACY | Facility: HOSPITAL | Age: 78
End: 2023-04-20
Payer: MEDICARE

## 2023-04-20 DIAGNOSIS — I48.19 OTHER PERSISTENT ATRIAL FIBRILLATION: Primary | ICD-10-CM

## 2023-04-20 LAB
INR PPP: 4.6 (ref 0.91–1.09)
PROTHROMBIN TIME: 54.9 SECONDS (ref 10–13.8)

## 2023-04-20 PROCEDURE — G0463 HOSPITAL OUTPT CLINIC VISIT: HCPCS

## 2023-04-20 PROCEDURE — 36416 COLLJ CAPILLARY BLOOD SPEC: CPT

## 2023-04-20 PROCEDURE — 85610 PROTHROMBIN TIME: CPT

## 2023-04-20 NOTE — PROGRESS NOTES
Anticoagulation Clinic Progress Note    Anticoagulation Summary  As of 2023    INR goal:  2.0-3.0   TTR:  60.8 % (4.4 y)   INR used for dosin.6 (2023)   Warfarin maintenance plan:  2 mg every day; Starting 2023   Weekly warfarin total:  14 mg   Plan last modified:  Jaki Srinivasan, PharmD (2023)   Next INR check:  2023   Priority:  Maintenance   Target end date:  Indefinite    Indications    Other persistent atrial fibrillation (HCC) [I48.19]             Anticoagulation Episode Summary     INR check location:      Preferred lab:      Send INR reminders to:  [facility]    Comments:        Anticoagulation Care Providers     Provider Role Specialty Phone number    Myriam Omer MD Referring Cardiology [phone]          Clinic Interview:  Patient Findings     Positives:  Change in medications    Negatives:  Signs/symptoms of thrombosis, Signs/symptoms of bleeding,   Laboratory test error suspected, Change in health, Change in alcohol use,   Change in activity, Upcoming invasive procedure, Emergency department   visit, Upcoming dental procedure, Missed doses, Extra doses, Change in   diet/appetite, Hospital admission, Bruising, Other complaints    Comments:  Patient reports stopping lasix around 1 week ago per his MD.       Clinical Outcomes     Negatives:  Major bleeding event, Thromboembolic event,   Anticoagulation-related hospital admission, Anticoagulation-related ED   visit, Anticoagulation-related fatality    Comments:  Patient reports stopping lasix around 1 week ago per his MD.         INR History:      Latest Ref Rng & Units 3/22/2023     6:20 PM 3/23/2023    11:43 AM 3/24/2023     6:49 AM 3/28/2023    10:45 AM 3/31/2023    11:30 AM 2023     2:30 PM 2023     1:00 PM   Anticoagulation Monitoring   INR     1.2 1.7 1.90 4.6   INR Date     3/28/2023 3/31/2023 2023 2023   INR Goal     2.0-3.0 2.0-3.0 2.0-3.0 2.0-3.0   Trend     Same Same Same  Down   Last Week Total     2 mg 10 mg 18 mg 18 mg   Next Week Total     22 mg 18 mg 18 mg 12 mg   Sun     - 2 mg 2 mg 2 mg   Mon     - 4 mg 4 mg 2 mg   Tue     6 mg (3/28) 2 mg 2 mg -   Wed     2 mg 2 mg 2 mg -   Thu     2 mg - 2 mg Hold (4/20)   Fri     - 4 mg 4 mg 2 mg   Sat     - 2 mg 2 mg 2 mg   Historical INR 0.90 - 1.10 7.28   5.57   3.90         Visit Report  Report             Plan:  1. INR is Supratherapeutic today- see above in Anticoagulation Summary.  Will instruct Cody Eldridge to Change their warfarin regimen (hold today and decrease to 2mg daily)- see above in Anticoagulation Summary.  2. Follow up in 5 days  3. Patient declines warfarin refills.  4. Verbal and written information provided. Patient expresses understanding and has no further questions at this time.  5. Counseled patient on increased risk of bleeding and to go to the ED if he experiences abnormal bleeding, has a fall, or cannot get bleeding to stop.     Jaki Srinivasan, PharmD

## 2023-04-25 ENCOUNTER — ANTICOAGULATION VISIT (OUTPATIENT)
Dept: PHARMACY | Facility: HOSPITAL | Age: 78
End: 2023-04-25
Payer: MEDICARE

## 2023-04-25 DIAGNOSIS — I48.19 OTHER PERSISTENT ATRIAL FIBRILLATION: Primary | ICD-10-CM

## 2023-04-25 LAB
INR PPP: 4.2 (ref 0.91–1.09)
PROTHROMBIN TIME: 50.2 SECONDS (ref 10–13.8)

## 2023-04-25 PROCEDURE — G0463 HOSPITAL OUTPT CLINIC VISIT: HCPCS

## 2023-04-25 PROCEDURE — 85610 PROTHROMBIN TIME: CPT

## 2023-04-25 PROCEDURE — 36416 COLLJ CAPILLARY BLOOD SPEC: CPT

## 2023-04-25 NOTE — PROGRESS NOTES
Anticoagulation Clinic Progress Note    Anticoagulation Summary  As of 2023    INR goal:  2.0-3.0   TTR:  60.6 % (4.4 y)   INR used for dosin.2 (2023)   Warfarin maintenance plan:  2 mg every day; Starting 2023   Weekly warfarin total:  14 mg   Plan last modified:  Jaki Srinivasan, PharmD (2023)   Next INR check:  2023   Priority:  Maintenance   Target end date:  Indefinite    Indications    Other persistent atrial fibrillation (HCC) [I48.19]             Anticoagulation Episode Summary     INR check location:      Preferred lab:      Send INR reminders to:  [facility]    Comments:        Anticoagulation Care Providers     Provider Role Specialty Phone number    Myriam Omer MD Referring Cardiology [phone]          Clinic Interview:  Patient Findings     Positives:  Other complaints    Negatives:  Signs/symptoms of thrombosis, Signs/symptoms of bleeding,   Laboratory test error suspected, Change in health, Change in alcohol use,   Change in activity, Upcoming invasive procedure, Emergency department   visit, Upcoming dental procedure, Missed doses, Extra doses, Change in   medications, Change in diet/appetite, Hospital admission, Bruising    Comments:  High stress; wife was in hospital and subacute rehab and is   now home on IV anitbiotic infusions and he is her caregiver.       Clinical Outcomes     Negatives:  Major bleeding event, Thromboembolic event,   Anticoagulation-related hospital admission, Anticoagulation-related ED   visit, Anticoagulation-related fatality    Comments:  High stress; wife was in hospital and subacute rehab and is   now home on IV anitbiotic infusions and he is her caregiver.         INR History:      Latest Ref Rng & Units 3/23/2023    11:43 AM 3/24/2023     6:49 AM 3/28/2023    10:45 AM 3/31/2023    11:30 AM 2023     2:30 PM 2023     1:00 PM 2023     2:30 PM   Anticoagulation Monitoring   INR    1.2 1.7 1.90 4.6 4.2    INR Date    3/28/2023 3/31/2023 4/6/2023 4/20/2023 4/25/2023   INR Goal    2.0-3.0 2.0-3.0 2.0-3.0 2.0-3.0 2.0-3.0   Trend    Same Same Same Down Same   Last Week Total    2 mg 10 mg 18 mg 18 mg 12 mg   Next Week Total    22 mg 18 mg 18 mg 12 mg 10 mg   Sun    - 2 mg 2 mg 2 mg 2 mg   Mon    - 4 mg 4 mg 2 mg 2 mg   Tue    6 mg (3/28) 2 mg 2 mg - Hold (4/25); Otherwise 2 mg   Wed    2 mg 2 mg 2 mg - Hold (4/26); Otherwise 2 mg   Thu    2 mg - 2 mg Hold (4/20) 2 mg   Fri    - 4 mg 4 mg 2 mg 2 mg   Sat    - 2 mg 2 mg 2 mg 2 mg   Historical INR 0.90 - 1.10 5.57   3.90              Plan:  1. INR is Supratherapeutic today- see above in Anticoagulation Summary.  Will instruct Cody Eldridge to Change their warfarin regimen- see above in Anticoagulation Summary.  Hold for 2 doses, then resume 2 mg daily.   2. Follow up in 1 week  3. Patient declines warfarin refills.  4. Verbal and written information provided. Patient expresses understanding and has no further questions at this time.    Myriam Walton, PharmD

## 2023-05-03 ENCOUNTER — ANTICOAGULATION VISIT (OUTPATIENT)
Dept: PHARMACY | Facility: HOSPITAL | Age: 78
End: 2023-05-03
Payer: MEDICARE

## 2023-05-03 DIAGNOSIS — I48.19 OTHER PERSISTENT ATRIAL FIBRILLATION: Primary | ICD-10-CM

## 2023-05-03 LAB
INR PPP: 2 (ref 0.91–1.09)
PROTHROMBIN TIME: 23.4 SECONDS (ref 10–13.8)

## 2023-05-03 PROCEDURE — 85610 PROTHROMBIN TIME: CPT

## 2023-05-03 PROCEDURE — 36416 COLLJ CAPILLARY BLOOD SPEC: CPT

## 2023-05-03 NOTE — PROGRESS NOTES
Anticoagulation Clinic Progress Note    Anticoagulation Summary  As of 5/3/2023    INR goal:  2.0-3.0   TTR:  60.5 % (4.4 y)   INR used for dosin.0 (5/3/2023)   Warfarin maintenance plan:  2 mg every day; Starting 5/3/2023   Weekly warfarin total:  14 mg   No change documented:  Myriam Walton PharmD   Plan last modified:  Jaki Srinivasan PharmD (2023)   Next INR check:  2023   Priority:  Maintenance   Target end date:  Indefinite    Indications    Other persistent atrial fibrillation (HCC) [I48.19]             Anticoagulation Episode Summary     INR check location:      Preferred lab:      Send INR reminders to:   [facility]    Comments:        Anticoagulation Care Providers     Provider Role Specialty Phone number    Myriam Omer MD Referring Cardiology [phone]          Clinic Interview:  Patient Findings     Negatives:  Signs/symptoms of thrombosis, Signs/symptoms of bleeding,   Laboratory test error suspected, Change in health, Change in alcohol use,   Change in activity, Upcoming invasive procedure, Emergency department   visit, Upcoming dental procedure, Missed doses, Extra doses, Change in   medications, Change in diet/appetite, Hospital admission, Bruising, Other   complaints      Clinical Outcomes     Negatives:  Major bleeding event, Thromboembolic event,   Anticoagulation-related hospital admission, Anticoagulation-related ED   visit, Anticoagulation-related fatality        INR History:      Latest Ref Rng & Units 3/24/2023     6:49 AM 3/28/2023    10:45 AM 3/31/2023    11:30 AM 2023     2:30 PM 2023     1:00 PM 2023     2:30 PM 5/3/2023     1:30 PM   Anticoagulation Monitoring   INR   1.2 1.7 1.90 4.6 4.2 2.0   INR Date   3/28/2023 3/31/2023 2023 2023 2023 5/3/2023   INR Goal   2.0-3.0 2.0-3.0 2.0-3.0 2.0-3.0 2.0-3.0 2.0-3.0   Trend   Same Same Same Down Same Same   Last Week Total   2 mg 10 mg 18 mg 18 mg 12 mg 12 mg   Next Week  Total   22 mg 18 mg 18 mg 12 mg 10 mg 14 mg   Sun   - 2 mg 2 mg 2 mg 2 mg 2 mg   Mon   - 4 mg 4 mg 2 mg 2 mg 2 mg   Tue   6 mg (3/28) 2 mg 2 mg - Hold (4/25); Otherwise 2 mg 2 mg   Wed   2 mg 2 mg 2 mg - Hold (4/26); Otherwise 2 mg 2 mg   Thu   2 mg - 2 mg Hold (4/20) 2 mg 2 mg   Fri   - 4 mg 4 mg 2 mg 2 mg 2 mg   Sat   - 2 mg 2 mg 2 mg 2 mg 2 mg   Historical INR 0.90 - 1.10 3.90               Plan:  1. INR is Therapeutic today- see above in Anticoagulation Summary.  Will instruct Cody Eldridge to Continue their warfarin regimen- see above in Anticoagulation Summary.  2. Follow up in 2 weeks  3. Patient declines warfarin refills.  4. Verbal and written information provided. Patient expresses understanding and has no further questions at this time.    Myriam Walton, PharmD

## 2023-05-26 NOTE — TELEPHONE ENCOUNTER
Armstrong, Kimberly J Epley, James, APRN             I have spoken with 4 people at [facility] today since the CT  has been denied by [facility] you will either have to wait 45 days to order it again or you will need to write an appeal letter and fax it to them @ [phone] or call and speak with a nurse reviewer @ [phone]     Please let me know what you decide to do     Thank You   Joan        Attempted to get authorization  From patient's insurance today  Call the number given  Spoke to a representative  Explained patient has suspicious neck mass x2 with vascularity needs further characterization radiologist requesting CT soft tissue with contrast    The original one over 2 weeks ago was declined  Change to ultrasound per insurance    Unfortunately,  She was unable to help me as I did not have my NPI number at the time of the call  I am calling during lunch    Again this is delayed per insurance    Please call  Authorization  Please get the person I need to speak to on the phone, to transfer call to me  To assist me with patient's approval for CT soft tissue with contrast  For suspicious mass x2    Thanks                
I spoke w/ kun at [facility] I have faxed over the order , office note and ultrasound report he is going to expedite the appeal for me   
Abdomen soft, non-tender, no guarding.

## 2023-05-31 DIAGNOSIS — R53.83 FATIGUE, UNSPECIFIED TYPE: ICD-10-CM

## 2023-06-02 RX ORDER — LEVOTHYROXINE SODIUM 0.12 MG/1
125 TABLET ORAL DAILY
Qty: 90 TABLET | Refills: 0 | Status: SHIPPED | OUTPATIENT
Start: 2023-06-02

## 2023-06-02 RX ORDER — ALPRAZOLAM 0.25 MG/1
TABLET ORAL
Qty: 60 TABLET | Refills: 0 | Status: SHIPPED | OUTPATIENT
Start: 2023-06-02

## 2023-06-06 NOTE — NURSING NOTE
RN talked to patient and patient said he wanted to leave AMA. Notified MD.   
Offered and patient declined

## 2023-06-08 ENCOUNTER — HOSPITAL ENCOUNTER (OUTPATIENT)
Facility: HOSPITAL | Age: 78
Setting detail: OBSERVATION
Discharge: HOME | End: 2023-06-10
Attending: EMERGENCY MEDICINE | Admitting: INTERNAL MEDICINE
Payer: MEDICARE

## 2023-06-08 ENCOUNTER — TELEPHONE (OUTPATIENT)
Dept: CARDIOLOGY | Facility: CLINIC | Age: 78
End: 2023-06-08
Payer: MEDICARE

## 2023-06-08 ENCOUNTER — APPOINTMENT (OUTPATIENT)
Dept: GENERAL RADIOLOGY | Facility: HOSPITAL | Age: 78
End: 2023-06-08
Payer: MEDICARE

## 2023-06-08 DIAGNOSIS — I50.9 ACUTE CONGESTIVE HEART FAILURE, UNSPECIFIED HEART FAILURE TYPE: Primary | ICD-10-CM

## 2023-06-08 DIAGNOSIS — I10 ESSENTIAL HYPERTENSION: ICD-10-CM

## 2023-06-08 DIAGNOSIS — R77.8 ELEVATED TROPONIN: ICD-10-CM

## 2023-06-08 DIAGNOSIS — N28.9 RENAL INSUFFICIENCY: ICD-10-CM

## 2023-06-08 PROBLEM — I07.1 SEVERE TRICUSPID REGURGITATION BY PRIOR ECHOCARDIOGRAM: Status: ACTIVE | Noted: 2017-10-24

## 2023-06-08 PROBLEM — I16.1 HYPERTENSIVE EMERGENCY: Status: ACTIVE | Noted: 2023-06-08

## 2023-06-08 PROBLEM — I27.20 SEVERE PULMONARY HYPERTENSION: Chronic | Status: ACTIVE | Noted: 2023-06-08

## 2023-06-08 PROBLEM — I50.33 ACUTE ON CHRONIC DIASTOLIC HEART FAILURE: Chronic | Status: ACTIVE | Noted: 2023-06-08

## 2023-06-08 PROBLEM — I38 VALVULAR HEART DISEASE: Chronic | Status: ACTIVE | Noted: 2023-06-08

## 2023-06-08 LAB
ALBUMIN SERPL-MCNC: 3.7 G/DL (ref 3.5–5.2)
ALBUMIN/GLOB SERPL: 1.1 G/DL
ALP SERPL-CCNC: 83 U/L (ref 39–117)
ALT SERPL W P-5'-P-CCNC: 17 U/L (ref 1–41)
ANION GAP SERPL CALCULATED.3IONS-SCNC: 10.4 MMOL/L (ref 5–15)
AST SERPL-CCNC: 22 U/L (ref 1–40)
BACTERIA UR QL AUTO: NORMAL /HPF
BASOPHILS # BLD AUTO: 0.04 10*3/MM3 (ref 0–0.2)
BASOPHILS NFR BLD AUTO: 0.7 % (ref 0–1.5)
BILIRUB SERPL-MCNC: 1.1 MG/DL (ref 0–1.2)
BILIRUB UR QL STRIP: NEGATIVE
BUN SERPL-MCNC: 26 MG/DL (ref 8–23)
BUN/CREAT SERPL: 17.1 (ref 7–25)
CALCIUM SPEC-SCNC: 9.1 MG/DL (ref 8.6–10.5)
CHLORIDE SERPL-SCNC: 101 MMOL/L (ref 98–107)
CLARITY UR: CLEAR
CO2 SERPL-SCNC: 22.6 MMOL/L (ref 22–29)
COLOR UR: YELLOW
CREAT SERPL-MCNC: 1.52 MG/DL (ref 0.76–1.27)
DEPRECATED RDW RBC AUTO: 41.9 FL (ref 37–54)
EGFRCR SERPLBLD CKD-EPI 2021: 46.6 ML/MIN/1.73
EOSINOPHIL # BLD AUTO: 0.05 10*3/MM3 (ref 0–0.4)
EOSINOPHIL NFR BLD AUTO: 0.9 % (ref 0.3–6.2)
ERYTHROCYTE [DISTWIDTH] IN BLOOD BY AUTOMATED COUNT: 12.2 % (ref 12.3–15.4)
FLUAV SUBTYP SPEC NAA+PROBE: NOT DETECTED
FLUBV RNA ISLT QL NAA+PROBE: NOT DETECTED
GEN 5 2HR TROPONIN T REFLEX: 41 NG/L
GLOBULIN UR ELPH-MCNC: 3.3 GM/DL
GLUCOSE SERPL-MCNC: 128 MG/DL (ref 65–99)
GLUCOSE UR STRIP-MCNC: NEGATIVE MG/DL
HCT VFR BLD AUTO: 40.8 % (ref 37.5–51)
HGB BLD-MCNC: 13.7 G/DL (ref 13–17.7)
HGB UR QL STRIP.AUTO: NEGATIVE
HOLD SPECIMEN: NORMAL
HOLD SPECIMEN: NORMAL
HYALINE CASTS UR QL AUTO: NORMAL /LPF
INR PPP: 1.7 (ref 0.9–1.1)
KETONES UR QL STRIP: NEGATIVE
LEUKOCYTE ESTERASE UR QL STRIP.AUTO: NEGATIVE
LYMPHOCYTES # BLD AUTO: 0.72 10*3/MM3 (ref 0.7–3.1)
LYMPHOCYTES NFR BLD AUTO: 12.8 % (ref 19.6–45.3)
MCH RBC QN AUTO: 31.3 PG (ref 26.6–33)
MCHC RBC AUTO-ENTMCNC: 33.6 G/DL (ref 31.5–35.7)
MCV RBC AUTO: 93.2 FL (ref 79–97)
MONOCYTES # BLD AUTO: 0.52 10*3/MM3 (ref 0.1–0.9)
MONOCYTES NFR BLD AUTO: 9.2 % (ref 5–12)
NEUTROPHILS NFR BLD AUTO: 4.27 10*3/MM3 (ref 1.7–7)
NEUTROPHILS NFR BLD AUTO: 75.9 % (ref 42.7–76)
NITRITE UR QL STRIP: NEGATIVE
NT-PROBNP SERPL-MCNC: 7702 PG/ML (ref 0–1800)
PH UR STRIP.AUTO: 5.5 [PH] (ref 5–8)
PLATELET # BLD AUTO: 140 10*3/MM3 (ref 140–450)
PMV BLD AUTO: 11 FL (ref 6–12)
POTASSIUM SERPL-SCNC: 4.3 MMOL/L (ref 3.5–5.2)
PROT SERPL-MCNC: 7 G/DL (ref 6–8.5)
PROT UR QL STRIP: ABNORMAL
PROTHROMBIN TIME: 20.3 SECONDS (ref 11.7–14.2)
QT INTERVAL: 365 MS
RBC # BLD AUTO: 4.38 10*6/MM3 (ref 4.14–5.8)
RBC # UR STRIP: NORMAL /HPF
REF LAB TEST METHOD: NORMAL
SARS-COV-2 RNA RESP QL NAA+PROBE: NOT DETECTED
SODIUM SERPL-SCNC: 134 MMOL/L (ref 136–145)
SP GR UR STRIP: 1.02 (ref 1–1.03)
SQUAMOUS #/AREA URNS HPF: NORMAL /HPF
TROPONIN T DELTA: 3 NG/L
TROPONIN T SERPL HS-MCNC: 38 NG/L
TROPONIN T SERPL HS-MCNC: 49 NG/L
UROBILINOGEN UR QL STRIP: ABNORMAL
WBC # UR STRIP: NORMAL /HPF
WBC NRBC COR # BLD: 5.63 10*3/MM3 (ref 3.4–10.8)
WHOLE BLOOD HOLD COAG: NORMAL
WHOLE BLOOD HOLD SPECIMEN: NORMAL

## 2023-06-08 PROCEDURE — 25010000002 FUROSEMIDE PER 20 MG: Performed by: PHYSICIAN ASSISTANT

## 2023-06-08 PROCEDURE — 84484 ASSAY OF TROPONIN QUANT: CPT | Performed by: INTERNAL MEDICINE

## 2023-06-08 PROCEDURE — 96374 THER/PROPH/DIAG INJ IV PUSH: CPT

## 2023-06-08 PROCEDURE — 87636 SARSCOV2 & INF A&B AMP PRB: CPT | Performed by: PHYSICIAN ASSISTANT

## 2023-06-08 PROCEDURE — G0378 HOSPITAL OBSERVATION PER HR: HCPCS

## 2023-06-08 PROCEDURE — 93005 ELECTROCARDIOGRAM TRACING: CPT

## 2023-06-08 PROCEDURE — 80053 COMPREHEN METABOLIC PANEL: CPT | Performed by: PHYSICIAN ASSISTANT

## 2023-06-08 PROCEDURE — 94799 UNLISTED PULMONARY SVC/PX: CPT

## 2023-06-08 PROCEDURE — 81001 URINALYSIS AUTO W/SCOPE: CPT | Performed by: PHYSICIAN ASSISTANT

## 2023-06-08 PROCEDURE — 85610 PROTHROMBIN TIME: CPT | Performed by: PHYSICIAN ASSISTANT

## 2023-06-08 PROCEDURE — 84484 ASSAY OF TROPONIN QUANT: CPT | Performed by: PHYSICIAN ASSISTANT

## 2023-06-08 PROCEDURE — 99284 EMERGENCY DEPT VISIT MOD MDM: CPT

## 2023-06-08 PROCEDURE — 36415 COLL VENOUS BLD VENIPUNCTURE: CPT

## 2023-06-08 PROCEDURE — 85025 COMPLETE CBC W/AUTO DIFF WBC: CPT | Performed by: PHYSICIAN ASSISTANT

## 2023-06-08 PROCEDURE — 93010 ELECTROCARDIOGRAM REPORT: CPT | Performed by: INTERNAL MEDICINE

## 2023-06-08 PROCEDURE — 71045 X-RAY EXAM CHEST 1 VIEW: CPT

## 2023-06-08 PROCEDURE — 83880 ASSAY OF NATRIURETIC PEPTIDE: CPT | Performed by: PHYSICIAN ASSISTANT

## 2023-06-08 RX ORDER — IPRATROPIUM BROMIDE AND ALBUTEROL SULFATE 2.5; .5 MG/3ML; MG/3ML
3 SOLUTION RESPIRATORY (INHALATION)
Status: COMPLETED | OUTPATIENT
Start: 2023-06-08 | End: 2023-06-09

## 2023-06-08 RX ORDER — ONDANSETRON 2 MG/ML
4 INJECTION INTRAMUSCULAR; INTRAVENOUS EVERY 6 HOURS PRN
Status: DISCONTINUED | OUTPATIENT
Start: 2023-06-08 | End: 2023-06-10 | Stop reason: HOSPADM

## 2023-06-08 RX ORDER — BISACODYL 10 MG
10 SUPPOSITORY, RECTAL RECTAL DAILY PRN
Status: DISCONTINUED | OUTPATIENT
Start: 2023-06-08 | End: 2023-06-10 | Stop reason: HOSPADM

## 2023-06-08 RX ORDER — ACETAMINOPHEN 325 MG/1
650 TABLET ORAL EVERY 4 HOURS PRN
Status: DISCONTINUED | OUTPATIENT
Start: 2023-06-08 | End: 2023-06-10 | Stop reason: HOSPADM

## 2023-06-08 RX ORDER — ROSUVASTATIN CALCIUM 10 MG/1
10 TABLET, COATED ORAL DAILY
Status: DISCONTINUED | OUTPATIENT
Start: 2023-06-08 | End: 2023-06-10 | Stop reason: HOSPADM

## 2023-06-08 RX ORDER — CALCIUM CARBONATE 500 MG/1
2 TABLET, CHEWABLE ORAL 2 TIMES DAILY PRN
Status: DISCONTINUED | OUTPATIENT
Start: 2023-06-08 | End: 2023-06-10 | Stop reason: HOSPADM

## 2023-06-08 RX ORDER — NALOXONE HCL 0.4 MG/ML
0.4 VIAL (ML) INJECTION
Status: DISCONTINUED | OUTPATIENT
Start: 2023-06-08 | End: 2023-06-10 | Stop reason: HOSPADM

## 2023-06-08 RX ORDER — ASPIRIN 81 MG/1
81 TABLET, CHEWABLE ORAL DAILY
Status: DISCONTINUED | OUTPATIENT
Start: 2023-06-09 | End: 2023-06-10 | Stop reason: HOSPADM

## 2023-06-08 RX ORDER — OXYCODONE HYDROCHLORIDE AND ACETAMINOPHEN 5; 325 MG/1; MG/1
1 TABLET ORAL EVERY 6 HOURS PRN
Status: DISCONTINUED | OUTPATIENT
Start: 2023-06-08 | End: 2023-06-10 | Stop reason: HOSPADM

## 2023-06-08 RX ORDER — FUROSEMIDE 10 MG/ML
60 INJECTION INTRAMUSCULAR; INTRAVENOUS ONCE
Status: COMPLETED | OUTPATIENT
Start: 2023-06-08 | End: 2023-06-08

## 2023-06-08 RX ORDER — SERTRALINE HYDROCHLORIDE 100 MG/1
100 TABLET, FILM COATED ORAL DAILY
Status: DISCONTINUED | OUTPATIENT
Start: 2023-06-08 | End: 2023-06-10 | Stop reason: HOSPADM

## 2023-06-08 RX ORDER — AMOXICILLIN 250 MG
2 CAPSULE ORAL 2 TIMES DAILY
Status: DISCONTINUED | OUTPATIENT
Start: 2023-06-08 | End: 2023-06-10 | Stop reason: HOSPADM

## 2023-06-08 RX ORDER — SODIUM CHLORIDE 0.9 % (FLUSH) 0.9 %
10 SYRINGE (ML) INJECTION AS NEEDED
Status: DISCONTINUED | OUTPATIENT
Start: 2023-06-08 | End: 2023-06-08

## 2023-06-08 RX ORDER — BISACODYL 5 MG/1
5 TABLET, DELAYED RELEASE ORAL DAILY PRN
Status: DISCONTINUED | OUTPATIENT
Start: 2023-06-08 | End: 2023-06-10 | Stop reason: HOSPADM

## 2023-06-08 RX ORDER — ONDANSETRON 4 MG/1
4 TABLET, FILM COATED ORAL EVERY 6 HOURS PRN
Status: DISCONTINUED | OUTPATIENT
Start: 2023-06-08 | End: 2023-06-10 | Stop reason: HOSPADM

## 2023-06-08 RX ORDER — FUROSEMIDE 40 MG/1
40 TABLET ORAL 2 TIMES DAILY
Status: DISCONTINUED | OUTPATIENT
Start: 2023-06-08 | End: 2023-06-10 | Stop reason: HOSPADM

## 2023-06-08 RX ORDER — WARFARIN SODIUM 4 MG/1
4 TABLET ORAL
Status: COMPLETED | OUTPATIENT
Start: 2023-06-08 | End: 2023-06-08

## 2023-06-08 RX ORDER — POLYETHYLENE GLYCOL 3350 17 G/17G
17 POWDER, FOR SOLUTION ORAL DAILY PRN
Status: DISCONTINUED | OUTPATIENT
Start: 2023-06-08 | End: 2023-06-10 | Stop reason: HOSPADM

## 2023-06-08 RX ORDER — NITROGLYCERIN 0.4 MG/1
0.4 TABLET SUBLINGUAL
Status: DISCONTINUED | OUTPATIENT
Start: 2023-06-08 | End: 2023-06-08

## 2023-06-08 RX ORDER — SODIUM CHLORIDE 9 MG/ML
40 INJECTION, SOLUTION INTRAVENOUS AS NEEDED
Status: DISCONTINUED | OUTPATIENT
Start: 2023-06-08 | End: 2023-06-10 | Stop reason: HOSPADM

## 2023-06-08 RX ORDER — SODIUM CHLORIDE 0.9 % (FLUSH) 0.9 %
10 SYRINGE (ML) INJECTION AS NEEDED
Status: DISCONTINUED | OUTPATIENT
Start: 2023-06-08 | End: 2023-06-10 | Stop reason: HOSPADM

## 2023-06-08 RX ORDER — NITROGLYCERIN 0.4 MG/1
0.4 TABLET SUBLINGUAL
Status: DISCONTINUED | OUTPATIENT
Start: 2023-06-08 | End: 2023-06-10 | Stop reason: HOSPADM

## 2023-06-08 RX ORDER — ALBUTEROL SULFATE 2.5 MG/3ML
2.5 SOLUTION RESPIRATORY (INHALATION) EVERY 6 HOURS PRN
Status: DISCONTINUED | OUTPATIENT
Start: 2023-06-08 | End: 2023-06-10 | Stop reason: HOSPADM

## 2023-06-08 RX ORDER — CARVEDILOL 25 MG/1
25 TABLET ORAL 2 TIMES DAILY WITH MEALS
Status: DISCONTINUED | OUTPATIENT
Start: 2023-06-08 | End: 2023-06-10 | Stop reason: HOSPADM

## 2023-06-08 RX ORDER — WARFARIN SODIUM 2 MG/1
2 TABLET ORAL SEE ADMIN INSTRUCTIONS
Status: DISCONTINUED | OUTPATIENT
Start: 2023-06-08 | End: 2023-06-08 | Stop reason: DRUGHIGH

## 2023-06-08 RX ORDER — WARFARIN SODIUM 2 MG/1
4 TABLET ORAL SEE ADMIN INSTRUCTIONS
COMMUNITY

## 2023-06-08 RX ORDER — SODIUM CHLORIDE 0.9 % (FLUSH) 0.9 %
10 SYRINGE (ML) INJECTION EVERY 12 HOURS SCHEDULED
Status: DISCONTINUED | OUTPATIENT
Start: 2023-06-08 | End: 2023-06-10 | Stop reason: HOSPADM

## 2023-06-08 RX ORDER — ACETAMINOPHEN 160 MG/5ML
650 SOLUTION ORAL EVERY 4 HOURS PRN
Status: DISCONTINUED | OUTPATIENT
Start: 2023-06-08 | End: 2023-06-10 | Stop reason: HOSPADM

## 2023-06-08 RX ORDER — LEVOTHYROXINE SODIUM 0.12 MG/1
125 TABLET ORAL DAILY
Status: DISCONTINUED | OUTPATIENT
Start: 2023-06-08 | End: 2023-06-10 | Stop reason: HOSPADM

## 2023-06-08 RX ORDER — ALPRAZOLAM 0.25 MG/1
0.25 TABLET ORAL 2 TIMES DAILY PRN
Status: DISCONTINUED | OUTPATIENT
Start: 2023-06-08 | End: 2023-06-10 | Stop reason: HOSPADM

## 2023-06-08 RX ORDER — MORPHINE SULFATE 2 MG/ML
2 INJECTION, SOLUTION INTRAMUSCULAR; INTRAVENOUS EVERY 4 HOURS PRN
Status: DISCONTINUED | OUTPATIENT
Start: 2023-06-08 | End: 2023-06-10 | Stop reason: HOSPADM

## 2023-06-08 RX ORDER — LISINOPRIL 20 MG/1
20 TABLET ORAL DAILY
Status: DISCONTINUED | OUTPATIENT
Start: 2023-06-08 | End: 2023-06-09

## 2023-06-08 RX ADMIN — ROSUVASTATIN CALCIUM 10 MG: 10 TABLET, FILM COATED ORAL at 20:24

## 2023-06-08 RX ADMIN — WARFARIN 4 MG: 4 TABLET ORAL at 20:17

## 2023-06-08 RX ADMIN — Medication 10 ML: at 20:19

## 2023-06-08 RX ADMIN — CARVEDILOL 25 MG: 25 TABLET, FILM COATED ORAL at 20:18

## 2023-06-08 RX ADMIN — IPRATROPIUM BROMIDE AND ALBUTEROL SULFATE 3 ML: .5; 3 SOLUTION RESPIRATORY (INHALATION) at 21:53

## 2023-06-08 RX ADMIN — FUROSEMIDE 60 MG: 20 INJECTION, SOLUTION INTRAMUSCULAR; INTRAVENOUS at 13:42

## 2023-06-08 RX ADMIN — SERTRALINE 100 MG: 100 TABLET, FILM COATED ORAL at 20:24

## 2023-06-08 RX ADMIN — FUROSEMIDE 40 MG: 40 TABLET ORAL at 20:18

## 2023-06-08 RX ADMIN — LISINOPRIL 20 MG: 20 TABLET ORAL at 20:18

## 2023-06-08 NOTE — ED PROVIDER NOTES
MD ATTESTATION NOTE    The NOEMI and I have discussed this patient's history, physical exam, and treatment plan.  I have reviewed the documentation and personally had a face to face interaction with the patient. I affirm the documentation and agree with the treatment and plan.  The attached note describes my personal findings.      I provided a substantive portion of the care of the patient.  I personally performed the physical exam in its entirety, and below are my findings.  For this patient encounter, the patient wore surgical mask, I wore full protective PPE including N95 and eye protection.      Brief HPI: Patient presents for evaluation of shortness of breath with exertion.  Patient is here visiting wife.  Patient does get short of breath with exertion.  Is had no lower extremity swelling.  Has had no fevers or chills.  No chest pain pressure tightness.    PHYSICAL EXAM  ED Triage Vitals   Temp Heart Rate Resp BP SpO2   06/08/23 1147 06/08/23 1147 06/08/23 1147 06/08/23 1209 06/08/23 1147   96.3 °F (35.7 °C) 112 18 (!) 151/114 94 %      Temp src Heart Rate Source Patient Position BP Location FiO2 (%)   06/08/23 1147 06/08/23 1147 06/08/23 1209 06/08/23 1209 --   Tympanic Monitor Sitting Left arm          GENERAL: no acute distress  HENT: nares patent  EYES: no scleral icterus  CV: regular rhythm, normal rate  RESPIRATORY: normal effort  ABDOMEN: soft  MUSCULOSKELETAL: no deformity  NEURO: alert, moves all extremities, follows commands  PSYCH:  calm, cooperative  SKIN: warm, dry    Vital signs and nursing notes reviewed.        Plan: Chest x-ray EKG lab evaluation       Partha Holcomb MD  06/08/23 1250

## 2023-06-08 NOTE — H&P
PCP: Patrick Diaz MD    Chief complaint   Chief Complaint   Patient presents with    Fatigue     X 1 week, family nurse told pt to be seen in ER    Shortness of Breath     Told to be seen by cardio       HPI  Patient is a 77 y.o. male presents with h/o CHF, Afib, SHAR, CHF, who presented to the ER due to increasing fatigue for a week and shortness of breath.  Patient's wife has been in the hospital and he has had increasing fatigue and shortness of breath.  Family states that he is more short of breath although patient can sometimes deny.  He states he watches his weight every morning.  Denies any cough.  His troponins are elevated but denies any chest pain.  He supposedly quit taking Lasix 4/13.  An echo in January showed an EF of 59% with moderate MR and grade 2 diastolic dysfunction, severe TR and severe pulmonary hypertension.  Blood pressure in the ER was 191/138.  Family states that he is more confused as well.  He cannot remember which medications he is already taken today    PAST MEDICAL HISTORY  Past Medical History:   Diagnosis Date    Acquired hypothyroidism     Acute renal injury     Anemia associated with chemotherapy     Atrial fibrillation     CAD (coronary artery disease)     CHF (congestive heart failure)     Chronic bronchitis     COPD (chronic obstructive pulmonary disease)     Cor pulmonale (chronic)     Daytime hypersomnia     Depression with anxiety     IRAHETA (dyspnea on exertion)     Enlarged prostate     Frequent nocturnal awakening     Gout     H/O cardiac radiofrequency ablation 2006    [facility].    Head and neck cancer     Hypertension     Hypotension     Insomnia     Lumbar radicular pain     SHAR (obstructive sleep apnea)     Osteoarthritis     Permanent atrial fibrillation     Shock, septic     Snoring     Squamous cell carcinoma of neck     SVT (supraventricular tachycardia)     Syncope     Vitamin D deficiency     Weight loss        PAST SURGICAL HISTORY  Past Surgical History:    Procedure Laterality Date    APPENDECTOMY      CARDIAC ABLATION      [facility]    CARDIAC CATHETERIZATION N/A 2018    Procedure: Left Heart Cath;  Surgeon: Yousif Uribe MD;  Location[facility];  Service: Cardiology    CARDIAC CATHETERIZATION N/A 2018    Procedure: Coronary angiography;  Surgeon: Yousif Uribe MD;  [facility];  Service: Cardiology    CARDIAC CATHETERIZATION N/A 2018    Procedure: Left ventriculography;  Surgeon: Yousif Uribe MD;  [facility];  Service: Cardiology    FINGER SURGERY      FOOT SURGERY      HAND SURGERY      VENOUS ACCESS DEVICE (PORT) INSERTION Right 2019    Procedure: MEDIPORT PLACEMENT;  Surgeon: Elvis Grigsby MD;  Location: [facility] MAIN OR;  Service: Vascular    VENOUS ACCESS DEVICE (PORT) REMOVAL Right 2019    Procedure: REMOVAL VENOUS ACCESS DEVICE;  Surgeon: Prince Castaneda MD;  Location: [facility] MAIN OR;  Service: Vascular       FAMILY HISTORY  Family History   Problem Relation Age of Onset    Heart attack Mother     Diabetes Mother     Heart disease Mother     Hypertension Mother     Anxiety disorder Mother     Depression Mother     Diabetes Father     Heart failure Father     Heart disease Father     Hypertension Father     Cancer Brother         colon    Hypertension Brother     Colon cancer Brother 50    Depression Brother     Anxiety disorder Brother     Alcohol abuse Brother     Diabetes Sister     Heart disease Sister     Hypertension Sister     COPD Other     Heart failure Other     Heart disease Other     Kidney disease Sister     Anxiety disorder Sister     Malig Hyperthermia Neg Hx        SOCIAL HISTORY  Social History     Tobacco Use    Smoking status: Former     Packs/day: 3.00     Years: 30.00     Pack years: 90.00     Types: Cigarettes     Quit date: 2000     Years since quittin.4    Smokeless tobacco: Never    Tobacco comments:      started age 11 x 53 years stopped 2000 / daily caffeine- Coke   Vaping Use    Vaping Use: Never used   Substance Use Topics    Alcohol use: Not Currently     Alcohol/week: 2.0 standard drinks     Types: 1 Glasses of wine, 1 Cans of beer per week     Comment: occ    Drug use: No       MEDICATIONS:  Medications Prior to Admission   Medication Sig Dispense Refill Last Dose    ALPRAZolam (XANAX) 0.25 MG tablet TAKE 1 TABLET BY MOUTH TWICE DAILY AS NEEDED FOR ANXIETY (Patient taking differently: Take 1 tablet by mouth 2 (Two) Times a Day As Needed.) 60 tablet 0 6/8/2023    aspirin 81 MG chewable tablet Chew 1 tablet Daily.   6/8/2023    carvedilol (COREG) 25 MG tablet Take 1 tablet by mouth 2 (Two) Times a Day With Meals. 180 tablet 3 6/8/2023    levothyroxine (SYNTHROID, LEVOTHROID) 125 MCG tablet TAKE 1 TABLET BY MOUTH DAILY 90 tablet 0 6/8/2023    lisinopril (PRINIVIL,ZESTRIL) 20 MG tablet Take 1 tablet by mouth Daily. 90 tablet 1 6/8/2023    oxyCODONE-acetaminophen (PERCOCET) 7.5-325 MG per tablet Take 1 tablet by mouth Every 6 (Six) Hours As Needed.   6/8/2023    rosuvastatin (CRESTOR) 20 MG tablet Take 10 mg by mouth Daily. 30 tablet 5 6/7/2023    sertraline (ZOLOFT) 100 MG tablet TAKE 1 TABLET BY MOUTH DAILY 90 tablet 3 6/7/2023    warfarin (COUMADIN) 2 MG tablet TAKE ONE TABLET BY MOUTH DAILY OR AS DIRECTED (Patient taking differently: Take 1 tablet by mouth See Admin Instructions. 1 tablet daily on Sunday, Monday, Wednesday, Thursday, and Saturday) 120 tablet 1 6/7/2023    warfarin (COUMADIN) 2 MG tablet Take 2 tablets by mouth See Admin Instructions. 2 tablets (4 mg) On Tuesdays and Fridays   Past Week    albuterol (PROVENTIL HFA;VENTOLIN HFA) 108 (90 BASE) MCG/ACT inhaler Inhale 2 puffs Every 4 (Four) Hours As Needed.   More than a month       Allergies:  Benadryl [diphenhydramine hcl]    Review of Systems:  Unable to obtain due to confusion    Vital Signs  Temp:  [96.3 °F (35.7 °C)-97.5 °F (36.4 °C)] 97.5 °F  "(36.4 °C)  Heart Rate:  [] 106  Resp:  [16-18] 16  BP: (151-182)/() 162/96  Flowsheet Rows      Flowsheet Row First Filed Value   Admission Height 182.9 cm (72\") Documented at 06/08/2023 1209   Admission Weight 82.1 kg (181 lb) Documented at 06/08/2023 1209           Body mass index is 22.42 kg/m².  91% on room air    Physical Exam:  General Appearance:    awake, cooperative, in no acute distress, irritated and wants to go home   Head:    Normocephalic, without obvious abnormality, atraumatic   Eyes:         conjunctivae and sclerae normal, no icterus, PERRLA   ENT:    Ears grossly intact, oral mucosa moist,   Neck:   No adenopathy, supple, trachea midline,        Lungs:   Creased breath sounds in the bases, rhonchi, tachypneic,,respirations regular, even and           labored    Heart:  Irregularlty regular and normal rate,  no murmur, normal S1 and S2,   Abdomen:     Normal bowel sounds, no masses,  soft non-tender, non-distended,    Extremities:   Moves all extremities , no cyanosis, 1+  edema,             Pulses:   Pulses palpable and equal bilaterally   Skin:   No bleeding, rash,   +bruising    Neurologic:    Psych:   Cranial nerves 2 - 12 grossly intact, sensation grossly intact,     Moves all extremities well, equal bilateral strength 4/5    Alert and Oriented x 2, Normal Affect    I washed/sanitized my hands before entering the room and immediately upon leaving the room.    LABS:  Admission on 06/08/2023   Component Date Value Ref Range Status    QT Interval 06/08/2023 365  ms Final    Glucose 06/08/2023 128 (H)  65 - 99 mg/dL Final    BUN 06/08/2023 26 (H)  8 - 23 mg/dL Final    Creatinine 06/08/2023 1.52 (H)  0.76 - 1.27 mg/dL Final    Sodium 06/08/2023 134 (L)  136 - 145 mmol/L Final    Potassium 06/08/2023 4.3  3.5 - 5.2 mmol/L Final    Chloride 06/08/2023 101  98 - 107 mmol/L Final    CO2 06/08/2023 22.6  22.0 - 29.0 mmol/L Final    Calcium 06/08/2023 9.1  8.6 - 10.5 mg/dL Final    Total " Protein 06/08/2023 7.0  6.0 - 8.5 g/dL Final    Albumin 06/08/2023 3.7  3.5 - 5.2 g/dL Final    ALT (SGPT) 06/08/2023 17  1 - 41 U/L Final    AST (SGOT) 06/08/2023 22  1 - 40 U/L Final    Alkaline Phosphatase 06/08/2023 83  39 - 117 U/L Final    Total Bilirubin 06/08/2023 1.1  0.0 - 1.2 mg/dL Final    Globulin 06/08/2023 3.3  gm/dL Final    A/G Ratio 06/08/2023 1.1  g/dL Final    BUN/Creatinine Ratio 06/08/2023 17.1  7.0 - 25.0 Final    Anion Gap 06/08/2023 10.4  5.0 - 15.0 mmol/L Final    eGFR 06/08/2023 46.6 (L)  >60.0 mL/min/1.73 Final    proBNP 06/08/2023 7,702.0 (H)  0.0 - 1,800.0 pg/mL Final    HS Troponin T 06/08/2023 49 (H)  <15 ng/L Final    Color, UA 06/08/2023 Yellow  Yellow, Straw Final    Appearance, UA 06/08/2023 Clear  Clear Final    pH, UA 06/08/2023 5.5  5.0 - 8.0 Final    Specific Gravity, UA 06/08/2023 1.019  1.005 - 1.030 Final    Glucose, UA 06/08/2023 Negative  Negative Final    Ketones, UA 06/08/2023 Negative  Negative Final    Bilirubin, UA 06/08/2023 Negative  Negative Final    Blood, UA 06/08/2023 Negative  Negative Final    Protein, UA 06/08/2023 100 mg/dL (2+) (A)  Negative Final    Leuk Esterase, UA 06/08/2023 Negative  Negative Final    Nitrite, UA 06/08/2023 Negative  Negative Final    Urobilinogen, UA 06/08/2023 1.0 E.U./dL  0.2 - 1.0 E.U./dL Final    COVID19 06/08/2023 Not Detected  Not Detected - Ref. Range Final    Influenza A PCR 06/08/2023 Not Detected  Not Detected Final    Influenza B PCR 06/08/2023 Not Detected  Not Detected Final    Protime 06/08/2023 20.3 (H)  11.7 - 14.2 Seconds Final    INR 06/08/2023 1.70 (H)  0.90 - 1.10 Final    Extra Tube 06/08/2023 Hold for add-ons.   Final    Auto resulted.    Extra Tube 06/08/2023 hold for add-on   Final    Auto resulted    Extra Tube 06/08/2023 Hold for add-ons.   Final    Auto resulted.    Extra Tube 06/08/2023 Hold for add-ons.   Final    Auto resulted    WBC 06/08/2023 5.63  3.40 - 10.80 10*3/mm3 Final    RBC 06/08/2023 4.38   4.14 - 5.80 10*6/mm3 Final    Hemoglobin 06/08/2023 13.7  13.0 - 17.7 g/dL Final    Hematocrit 06/08/2023 40.8  37.5 - 51.0 % Final    MCV 06/08/2023 93.2  79.0 - 97.0 fL Final    MCH 06/08/2023 31.3  26.6 - 33.0 pg Final    MCHC 06/08/2023 33.6  31.5 - 35.7 g/dL Final    RDW 06/08/2023 12.2 (L)  12.3 - 15.4 % Final    RDW-SD 06/08/2023 41.9  37.0 - 54.0 fl Final    MPV 06/08/2023 11.0  6.0 - 12.0 fL Final    Platelets 06/08/2023 140  140 - 450 10*3/mm3 Final    Neutrophil % 06/08/2023 75.9  42.7 - 76.0 % Final    Lymphocyte % 06/08/2023 12.8 (L)  19.6 - 45.3 % Final    Monocyte % 06/08/2023 9.2  5.0 - 12.0 % Final    Eosinophil % 06/08/2023 0.9  0.3 - 6.2 % Final    Basophil % 06/08/2023 0.7  0.0 - 1.5 % Final    Neutrophils, Absolute 06/08/2023 4.27  1.70 - 7.00 10*3/mm3 Final    Lymphocytes, Absolute 06/08/2023 0.72  0.70 - 3.10 10*3/mm3 Final    Monocytes, Absolute 06/08/2023 0.52  0.10 - 0.90 10*3/mm3 Final    Eosinophils, Absolute 06/08/2023 0.05  0.00 - 0.40 10*3/mm3 Final    Basophils, Absolute 06/08/2023 0.04  0.00 - 0.20 10*3/mm3 Final    RBC, UA 06/08/2023 None Seen  None Seen, 0-2 /HPF Final    WBC, UA 06/08/2023 0-2  None Seen, 0-2 /HPF Final    Bacteria, UA 06/08/2023 None Seen  None Seen /HPF Final    Squamous Epithelial Cells, UA 06/08/2023 0-2  None Seen, 0-2 /HPF Final    Hyaline Casts, UA 06/08/2023 3-6  None Seen /LPF Final    Methodology 06/08/2023 Manual Light Microscopy   Final    HS Troponin T 06/08/2023 38 (H)  <15 ng/L Final         DIAGNOSTICS:  XR Chest 1 View    Result Date: 6/8/2023  Minimal likely atelectasis in left lower lung. Borderline heart size with slight prominence of vascular markings.  This report was finalized on 6/8/2023 12:59 PM by Dr. Josep Garcia M.D.            Results Review:   I reviewed the patient's new clinical results.  Discussed with ER physician  Old records reviewed / Medical Decision Making High Complexity  I personally viewed and interpreted the  patient's EKG/Telemetry data- sinus rhythm      ASSESSMENT AND PLAN    Acute on chronic diastolic heart failure    Mixed hyperlipidemia    Essential hypertension    Other persistent atrial fibrillation (HCC)    Severe tricuspid regurgitation by prior echocardiogram    SHAR (obstructive sleep apnea)    COPD (chronic obstructive pulmonary disease)    Acquired hypothyroidism    Stage 3a chronic kidney disease    Severe pulmonary hypertension    Valvular heart disease     Worsening IRAHETA/ fatigue  with Severe pulmonary hypertension and copd and CHF  -  Acute on chronic diastolic heart failure with worsening TR now severe with worsening pulm HTN  -pt stopped lasix 4/13/23, and visiting wife (hospitalized here) and likely eating higher salt foods and BP severely elevated (poss cause or could be effect of volume overload)  -s/p lasix 60 IV-has been off Lasix since April  -restart BB  -added isordil prn for heart failure  -Consider having nutrition to see the patient for discharge    HTN emergency  -191/138, 182/132, 151/114   -BP meds given  on coreg, lisinopril   -added bidil prn     Elev Trop possibly 2/2 to ckd  -monitor. Recheck.       Stage 3a chronic kidney disease  -adjust meds as needed.       Severe tricuspid regurgitation by prior echocardiogram  -cardiology following-echo done in January.  May need to have reevaluated will defer to cardiology     Persistent atrial fibrillation   on coreg,  and coumadin.     Chronic medical conditions being monitored- stable, continue medical management  - SHAR (obstructive sleep apnea)  - COPD (chronic obstructive pulmonary disease)    +DVT proph:    coumadin  + CODE STATUS: Full     I discussed the patient's findings and my recommendations with the patient and/or family.  Please reference all orders placed.    Bety Cho MD  06/08/23  17:45 EDT      This document is intended for medical expert use only. Reading of this document by patients and/or patient's family without  participating in medical staff guidance may result in misinterpretation and unintended morbidity. Any interpretation of such data is the responsibility of the patient and/or family member responsible for the patient in concert with their primary or specialist providers, and NOT to be left for sources of online searches such as [employer], [employer] or similar queries. Relying on these approaches to knowledge may result in misinterpretation, misguided goals of care and even death should patients or family members try recommendations outside of the realm of professional medical care in a supervised way.    Dictated utilizing Voice dictation:  Parts of this note may be an electronic transcription/translation of spoke language to printed text using Dragon dictation system.

## 2023-06-08 NOTE — TELEPHONE ENCOUNTER
Daughter called back and her dad is refusing to go to urgent care or ER.  The patients granddaughter is a APRN and saw this patient this morning and said his color is gray and she was afraid he was not getting enough oxygen.  She advised him to go to the ER but he refused.  Apparently he has been SOA for days but today is worse.    I spoke with the patient personally and let him know we recommend the ER.  He said he can't leave because he has to take care of his wife who is currently in surgery at [facility].  I explained to the patient that if something happens to him, he won't be able to care for his wife.  He said he will consider going to ER.      Viky Benites RN  [facility] T[facility]  06/08/23 11:32 EDT    Callback # [phone]

## 2023-06-08 NOTE — ED PROVIDER NOTES
EMERGENCY DEPARTMENT ENCOUNTER    Room Number:  03/03  Date of encounter:  6/8/2023  PCP: Patrick Diaz MD  Historian: Patient  Full history not obtainable due to: None    HPI:  Chief Complaint: Fatigue    Context: Cody Eldridge is a 77 y.o. male with a PMH significant for COPD, SVT, atrial fibrillation, chronic cor pulmonale, congestive heart failure, CAD who presents to the ED c/o generalized fatigue.  The patient was in this hospital visiting his wife who is recovering from surgery and was complaining of some generalized fatigue in the hospital as he was visiting.  His daughter recognized that he seemed more fatigued than normal and was getting short of breath with some ambulation and decided to bring him here for further evaluation.  He claims that his shortness of breath is only mildly increased from his baseline but he is more fatigued than normal.  Denies fever, chills, nausea, vomiting, chest pain, urinary symptoms, changes to bowel habits.      MEDICAL RECORD REVIEW:    Upon review of the medical record it appears the patient was evaluated in the office with internal medicine on 4/5/2023 for chronic diastolic congestive heart failure.  The patient has normal CK on 3/23/2023 and a normal lipase on 3/22/2023.    PAST MEDICAL HISTORY    Active Ambulatory Problems     Diagnosis Date Noted    Atopic rhinitis 01/19/2016    Arthritis of hand 01/19/2016    Coxitis 01/19/2016    Benign colonic polyp 01/19/2016    Neck pain 01/19/2016    Mixed anxiety depressive disorder 01/19/2016    Degeneration of intervertebral disc of lumbosacral region 01/19/2016    Elevated prostate specific antigen (PSA) 01/19/2016    Mixed hyperlipidemia 01/19/2016    Essential hypertension 01/19/2016    Insomnia 01/19/2016    Lumbar radiculopathy 01/19/2016    Osteoarthritis 01/19/2016    Vitamin D deficiency 01/19/2016    Abdominal pain 10/04/2016    Myalgia 05/05/2017    Cramp of both lower extremities 05/05/2017    Gingivitis  08/08/2017    Other persistent atrial fibrillation (HCC) 09/25/2017    Non-rheumatic tricuspid valve insufficiency 10/24/2017    SHAR (obstructive sleep apnea) 11/28/2017    Precordial pain 05/29/2018    IRAHETA (dyspnea on exertion) 05/29/2018    Coronary artery disease involving native coronary artery of native heart without angina pectoris 05/29/2018    Shortness of breath 08/02/2018    Squamous cell carcinoma of base of tongue 06/07/2019    Current use of long term anticoagulation 06/24/2019    Syncope 06/28/2019    History of cancer 06/28/2019    Hypotension 06/28/2019    Medicare annual wellness visit, subsequent 07/01/2019    Squamous cell carcinoma of neck 08/03/2019    COPD (chronic obstructive pulmonary disease) 08/03/2019    Depression with anxiety 08/03/2019    Chemotherapy-induced neutropenia 08/03/2019    Chemotherapy-induced thrombocytopenia 08/05/2019    Acute renal injury 08/06/2019    Anemia associated with chemotherapy 08/06/2019    Oral thrush 08/08/2019    MSSA bacteremia 08/09/2019    Acquired hypothyroidism     Chronic conjunctivitis of both eyes 11/23/2020    Change in vision 11/23/2020    Chronic diastolic congestive heart failure 03/30/2022    Syncope and collapse 03/22/2023    Orthostatic hypotension 03/22/2023    Stage 3a chronic kidney disease 03/23/2023     Resolved Ambulatory Problems     Diagnosis Date Noted    Chronic obstructive pulmonary disease 01/19/2016    Acute URI 01/09/2017    Cough 01/09/2017    History of CHF (congestive heart failure) 06/28/2019    New onset of congestive heart failure (HCC) 08/03/2019    PAF (paroxysmal atrial fibrillation) (HCC) 08/03/2019    CAD (coronary artery disease) 08/03/2019    Vascular catheter infection 08/03/2019    Shock, septic 08/09/2019    Acute systolic CHF (congestive heart failure) (HCC) 09/23/2019    Systolic heart failure (HCC) 09/23/2019    JERICHO (acute kidney injury) 03/23/2023    Coumadin toxicity 03/24/2023     Past Medical History:    Diagnosis Date    Atrial fibrillation     CHF (congestive heart failure)     Chronic bronchitis     Cor pulmonale (chronic)     Daytime hypersomnia     Enlarged prostate     Frequent nocturnal awakening     Gout     H/O cardiac radiofrequency ablation 2006    Head and neck cancer     Hypertension     Lumbar radicular pain     Permanent atrial fibrillation     Snoring     SVT (supraventricular tachycardia)     Weight loss          PAST SURGICAL HISTORY  Past Surgical History:   Procedure Laterality Date    APPENDECTOMY      CARDIAC ABLATION  2006    [facility]    CARDIAC CATHETERIZATION N/A 8/29/2018    Procedure: Left Heart Cath;  Surgeon: Yousif Uribe MD;  Location: [facility] CATH INVASIVE LOCATION;  Service: Cardiology    CARDIAC CATHETERIZATION N/A 8/29/2018    Procedure: Coronary angiography;  Surgeon: Yousif Uribe MD;  Location: [facility] CATH INVASIVE LOCATION;  Service: Cardiology    CARDIAC CATHETERIZATION N/A 8/29/2018    Procedure: Left ventriculography;  Surgeon: Yousif Uribe MD;  Location: [facility] CATH INVASIVE LOCATION;  Service: Cardiology    FINGER SURGERY      FOOT SURGERY      HAND SURGERY      VENOUS ACCESS DEVICE (PORT) INSERTION Right 6/18/2019    Procedure: MEDIPORT PLACEMENT;  Surgeon: Elvis Grigsby MD;  Location: [facility] MAIN OR;  Service: Vascular    VENOUS ACCESS DEVICE (PORT) REMOVAL Right 8/5/2019    Procedure: REMOVAL VENOUS ACCESS DEVICE;  Surgeon: Prince Castaneda MD;  Location: [facility] MAIN OR;  Service: Vascular         FAMILY HISTORY  Family History   Problem Relation Age of Onset    Heart attack Mother     Diabetes Mother     Heart disease Mother     Hypertension Mother     Anxiety disorder Mother     Depression Mother     Diabetes Father     Heart failure Father     Heart disease Father     Hypertension Father     Cancer Brother         colon    Hypertension Brother     Colon cancer Brother 50    Depression Brother     Anxiety disorder Brother     Alcohol abuse  Brother     Diabetes Sister     Heart disease Sister     Hypertension Sister     COPD Other     Heart failure Other     Heart disease Other     Kidney disease Sister     Anxiety disorder Sister     Malig Hyperthermia Neg Hx          SOCIAL HISTORY  Social History     Socioeconomic History    Marital status:      Spouse name: Elise Rai    Years of education: Some College   Tobacco Use    Smoking status: Former     Packs/day: 3.00     Years: 30.00     Pack years: 90.00     Types: Cigarettes     Quit date: 2000     Years since quittin.4    Smokeless tobacco: Never    Tobacco comments:     started age 11 x 53 years stopped 2000 / daily caffeine- Coke   Vaping Use    Vaping Use: Never used   Substance and Sexual Activity    Alcohol use: Not Currently     Alcohol/week: 2.0 standard drinks     Types: 1 Glasses of wine, 1 Cans of beer per week     Comment: occ    Drug use: No    Sexual activity: Yes     Partners: Female         ALLERGIES  Benadryl [diphenhydramine hcl]        REVIEW OF SYSTEMS    All systems reviewed and marked as negative except as listed in HPI     PHYSICAL EXAM    I have reviewed the triage vital signs and nursing notes.    ED Triage Vitals [23 1147]   Temp Heart Rate Resp BP SpO2   96.3 °F (35.7 °C) 112 18 -- 94 %      Temp src Heart Rate Source Patient Position BP Location FiO2 (%)   Tympanic Monitor -- -- --       Physical Exam  Constitutional:       General: He is not in acute distress.     Appearance: He is well-developed.   HENT:      Head: Normocephalic and atraumatic.   Eyes:      General: No scleral icterus.     Conjunctiva/sclera: Conjunctivae normal.   Neck:      Trachea: No tracheal deviation.   Cardiovascular:      Rate and Rhythm: Normal rate and regular rhythm.   Pulmonary:      Effort: Pulmonary effort is normal.      Breath sounds: Normal breath sounds.   Abdominal:      Palpations: Abdomen is soft.      Tenderness: There is no abdominal tenderness. There is no  guarding.   Musculoskeletal:         General: No deformity.      Cervical back: Normal range of motion.   Lymphadenopathy:      Cervical: No cervical adenopathy.   Skin:     General: Skin is warm and dry.   Neurological:      Mental Status: He is alert and oriented to person, place, and time.      Sensory: Sensation is intact.      Motor: Motor function is intact.   Psychiatric:         Behavior: Behavior normal.       Vital signs and nursing notes reviewed.            LAB RESULTS  Recent Results (from the past 24 hour(s))   ECG 12 Lead Dyspnea    Collection Time: 06/08/23 11:55 AM   Result Value Ref Range    QT Interval 365 ms   Urinalysis With Microscopic If Indicated (No Culture) - Urine, Clean Catch    Collection Time: 06/08/23 12:48 PM    Specimen: Urine, Clean Catch   Result Value Ref Range    Color, UA Yellow Yellow, Straw    Appearance, UA Clear Clear    pH, UA 5.5 5.0 - 8.0    Specific Gravity, UA 1.019 1.005 - 1.030    Glucose, UA Negative Negative    Ketones, UA Negative Negative    Bilirubin, UA Negative Negative    Blood, UA Negative Negative    Protein,  mg/dL (2+) (A) Negative    Leuk Esterase, UA Negative Negative    Nitrite, UA Negative Negative    Urobilinogen, UA 1.0 E.U./dL 0.2 - 1.0 E.U./dL   Urinalysis, Microscopic Only - Urine, Clean Catch    Collection Time: 06/08/23 12:48 PM    Specimen: Urine, Clean Catch   Result Value Ref Range    RBC, UA None Seen None Seen, 0-2 /HPF    WBC, UA 0-2 None Seen, 0-2 /HPF    Bacteria, UA None Seen None Seen /HPF    Squamous Epithelial Cells, UA 0-2 None Seen, 0-2 /HPF    Hyaline Casts, UA 3-6 None Seen /LPF    Methodology Manual Light Microscopy    Comprehensive Metabolic Panel    Collection Time: 06/08/23 12:49 PM    Specimen: Blood   Result Value Ref Range    Glucose 128 (H) 65 - 99 mg/dL    BUN 26 (H) 8 - 23 mg/dL    Creatinine 1.52 (H) 0.76 - 1.27 mg/dL    Sodium 134 (L) 136 - 145 mmol/L    Potassium 4.3 3.5 - 5.2 mmol/L    Chloride 101 98 - 107  mmol/L    CO2 22.6 22.0 - 29.0 mmol/L    Calcium 9.1 8.6 - 10.5 mg/dL    Total Protein 7.0 6.0 - 8.5 g/dL    Albumin 3.7 3.5 - 5.2 g/dL    ALT (SGPT) 17 1 - 41 U/L    AST (SGOT) 22 1 - 40 U/L    Alkaline Phosphatase 83 39 - 117 U/L    Total Bilirubin 1.1 0.0 - 1.2 mg/dL    Globulin 3.3 gm/dL    A/G Ratio 1.1 g/dL    BUN/Creatinine Ratio 17.1 7.0 - 25.0    Anion Gap 10.4 5.0 - 15.0 mmol/L    eGFR 46.6 (L) >60.0 mL/min/1.73   Single High Sensitivity Troponin T    Collection Time: 06/08/23 12:49 PM    Specimen: Blood   Result Value Ref Range    HS Troponin T 49 (H) <15 ng/L   Protime-INR    Collection Time: 06/08/23 12:49 PM    Specimen: Blood   Result Value Ref Range    Protime 20.3 (H) 11.7 - 14.2 Seconds    INR 1.70 (H) 0.90 - 1.10   Green Top (Gel)    Collection Time: 06/08/23 12:49 PM   Result Value Ref Range    Extra Tube Hold for add-ons.    Lavender Top    Collection Time: 06/08/23 12:49 PM   Result Value Ref Range    Extra Tube hold for add-on    Gold Top - SST    Collection Time: 06/08/23 12:49 PM   Result Value Ref Range    Extra Tube Hold for add-ons.    Light Blue Top    Collection Time: 06/08/23 12:49 PM   Result Value Ref Range    Extra Tube Hold for add-ons.    CBC Auto Differential    Collection Time: 06/08/23 12:49 PM    Specimen: Blood   Result Value Ref Range    WBC 5.63 3.40 - 10.80 10*3/mm3    RBC 4.38 4.14 - 5.80 10*6/mm3    Hemoglobin 13.7 13.0 - 17.7 g/dL    Hematocrit 40.8 37.5 - 51.0 %    MCV 93.2 79.0 - 97.0 fL    MCH 31.3 26.6 - 33.0 pg    MCHC 33.6 31.5 - 35.7 g/dL    RDW 12.2 (L) 12.3 - 15.4 %    RDW-SD 41.9 37.0 - 54.0 fl    MPV 11.0 6.0 - 12.0 fL    Platelets 140 140 - 450 10*3/mm3    Neutrophil % 75.9 42.7 - 76.0 %    Lymphocyte % 12.8 (L) 19.6 - 45.3 %    Monocyte % 9.2 5.0 - 12.0 %    Eosinophil % 0.9 0.3 - 6.2 %    Basophil % 0.7 0.0 - 1.5 %    Neutrophils, Absolute 4.27 1.70 - 7.00 10*3/mm3    Lymphocytes, Absolute 0.72 0.70 - 3.10 10*3/mm3    Monocytes, Absolute 0.52 0.10 - 0.90  10*3/mm3    Eosinophils, Absolute 0.05 0.00 - 0.40 10*3/mm3    Basophils, Absolute 0.04 0.00 - 0.20 10*3/mm3   COVID-19 and FLU A/B PCR - Swab, Nasopharynx    Collection Time: 06/08/23  1:27 PM    Specimen: Nasopharynx; Swab   Result Value Ref Range    COVID19 Not Detected Not Detected - Ref. Range    Influenza A PCR Not Detected Not Detected    Influenza B PCR Not Detected Not Detected       Ordered the above labs and independently reviewed the results.        RADIOLOGY  XR Chest 1 View    Result Date: 6/8/2023  XR CHEST 1 VW-  HISTORY: Male who is 77 years-old,  congestive heart failure  TECHNIQUE: Frontal view of the chest  COMPARISON: 8/7/2020  FINDINGS: The heart size is borderline with slight prominence of vascular markings. Aorta is calcified. Pulmonary vasculature is unremarkable. Minimal likely atelectasis in left lower lung. No pleural effusion or pneumothorax. No acute osseous process.      Minimal likely atelectasis in left lower lung. Borderline heart size with slight prominence of vascular markings.  This report was finalized on 6/8/2023 12:59 PM by Dr. Josep Garcia M.D.       I ordered the above noted radiological studies. Independently reviewed by me and discussed with radiologist.  See dictation above for official radiology interpretation.      PROCEDURES    Procedures        MEDICATIONS GIVEN IN ER    Medications   sodium chloride 0.9 % flush 10 mL (has no administration in time range)   furosemide (LASIX) injection 60 mg (60 mg Intravenous Given 6/8/23 1342)         PROGRESS, DATA ANALYSIS, CONSULTS, AND MEDICAL DECISION MAKING    All labs have been independently interpreted by me.  All radiology studies have been interpreted by me.  Discussion below represents my analysis of pertinent findings related to patient's condition, differential diagnosis, treatment plan and final disposition.    Patient presentation and evaluation consistent with acute exacerbation of congestive heart failure.   With the severity of his symptoms and elevated troponin on labs today I feel he is most appropriately managed as an inpatient with IV diuretics and possible cardiology consult.  Patient and family are agreeable to this plan and all questions have been answered.    - Chronic or social conditions impacting care: None      DIFFERENTIAL DIAGNOSIS INCLUDE BUT NOT LIMITED TO:     Differential diagnosis includes but is not limited to:  -COVID  -CHF  -acute coronary syndrome  -pulmonary embolism  -pneumothorax  -pneumonia  -asthma/COPD  -deconditioning  -anemia  -anxiety       Orders placed during this visit:  Orders Placed This Encounter   Procedures    COVID-19 and FLU A/B PCR - Swab, Nasopharynx    XR Chest 1 View    [facility] Draw    Comprehensive Metabolic Panel    BNP    Single High Sensitivity Troponin T    Urinalysis With Microscopic If Indicated (No Culture) - Urine, Clean Catch    Protime-INR    CBC Auto Differential    Urinalysis, Microscopic Only - Urine, Clean Catch    Continuous Pulse Oximetry    Vital Signs    LHA (on-call MD unless specified) Details    Oxygen Therapy- Nasal Cannula; Titrate 1-6 LPM Per SpO2; 90 - 95%    ECG 12 Lead Dyspnea    Insert Peripheral IV    CBC & Differential    Green Top (Gel)    Lavender Top    Gold Top - SST    Light Blue Top         ED Course as of 06/08/23 1454   Thu Jun 08, 2023   1326 Per my independent interpretation of the chest x-ray, there is no obvious pneumothorax.   [DC]   6568 I discussed the case with MD Marquis with A at this time regarding the patient.  I discussed work-up, results, concerns.  I discussed the consulting provider's desire for tele obs admit.   [DC]      ED Course User Index  [DC] Denton Almeida, PA     3177 I had a lengthy discussion with the patient at this time regarding the risks of leaving the hospital instead of admitting to the hospital as an inpatient.  He strongly desires to be discharged home.  States that he is feeling much better since  the Lasix medication.  I strongly encouraged him to stay in the hospital but states that he is obligations outside the hospital and plans to return if symptoms worsen.  He understands the risks of leaving include worsening condition and possible death.  Plan to discharge the patient home with instructions to follow-up closely with PCP and cardiology.  Close return precautions given at this time.    AS OF 14:39 EDT VITALS:    BP - (!) 164/109  HR - 96  TEMP - 96.3 °F (35.7 °C) (Tympanic)  02 SATS - 94%      1439 I rechecked the patient.  I discussed the patient's labs, radiology findings (including all incidental findings), diagnosis, and plan for admission. The patient understands and agrees with the plan.      DIAGNOSIS  Final diagnoses:   Acute congestive heart failure, unspecified heart failure type   Elevated troponin   Renal insufficiency         DISPOSITION  D/c    Pt masked in first look. I wore a surgical mask throughout my encounters with the pt. I performed hand hygiene on entry into the pt room and upon exit.     Dictated utilizing Dragon dictation     Note Disclaimer: At [facility], we believe that sharing information builds trust and better relationships. You are receiving this note because you recently visited [facility]. It is possible you will see health information before a provider has talked with you about it. This kind of information can be easy to misunderstand. To help you fully understand what it means for your health, we urge you to discuss this note with your provider.      Denton Almeida PA  06/08/23 5708       Denton Almeida PA  06/08/23 5953

## 2023-06-08 NOTE — PROGRESS NOTES
Clinical Pharmacy Services: Medication History    Cody Eldridge is a 77 y.o. male presenting to [facility] for   Chief Complaint   Patient presents with    Fatigue     X 1 week, family nurse told pt to be seen in ER    Shortness of Breath     Told to be seen by cardio       He  has a past medical history of Acquired hypothyroidism, Acute renal injury, Anemia associated with chemotherapy, Atrial fibrillation, CAD (coronary artery disease), CHF (congestive heart failure), Chronic bronchitis, COPD (chronic obstructive pulmonary disease), Cor pulmonale (chronic), Daytime hypersomnia, Depression with anxiety, IRAHETA (dyspnea on exertion), Enlarged prostate, Frequent nocturnal awakening, Gout, H/O cardiac radiofrequency ablation (2006), Head and neck cancer, Hypertension, Hypotension, Insomnia, Lumbar radicular pain, SHAR (obstructive sleep apnea), Osteoarthritis, Permanent atrial fibrillation, Shock, septic, Snoring, Squamous cell carcinoma of neck, SVT (supraventricular tachycardia), Syncope, Vitamin D deficiency, and Weight loss.    Allergies as of 06/08/2023 - Reviewed 06/08/2023   Allergen Reaction Noted    Benadryl [diphenhydramine hcl] Dizziness 06/04/2017       Medication information was obtained from: Patient   Pharmacy and Phone Number:     Prior to Admission Medications       Prescriptions Last Dose Informant Patient Reported? Taking?    ALPRAZolam (XANAX) 0.25 MG tablet 6/8/2023 Self No Yes    TAKE 1 TABLET BY MOUTH TWICE DAILY AS NEEDED FOR ANXIETY    Patient taking differently:  Take 1 tablet by mouth 2 (Two) Times a Day As Needed.    aspirin 81 MG chewable tablet 6/8/2023 Self, Pharmacy Yes Yes    Chew 1 tablet Daily.    carvedilol (COREG) 25 MG tablet 6/8/2023 Self, Pharmacy No Yes    Take 1 tablet by mouth 2 (Two) Times a Day With Meals.    levothyroxine (SYNTHROID, LEVOTHROID) 125 MCG tablet 6/8/2023 Self, Pharmacy No Yes    TAKE 1 TABLET BY MOUTH DAILY    lisinopril (PRINIVIL,ZESTRIL) 20  MG tablet 6/8/2023 Self, Pharmacy No Yes    Take 1 tablet by mouth Daily.    oxyCODONE-acetaminophen (PERCOCET) 7.5-325 MG per tablet 6/8/2023 Self, Pharmacy Yes Yes    Take 1 tablet by mouth Every 6 (Six) Hours As Needed.    rosuvastatin (CRESTOR) 20 MG tablet 6/7/2023 Self, Pharmacy Yes Yes    Take 10 mg by mouth Daily.    sertraline (ZOLOFT) 100 MG tablet 6/7/2023 Self, Pharmacy No Yes    TAKE 1 TABLET BY MOUTH DAILY    warfarin (COUMADIN) 2 MG tablet 6/7/2023 Pharmacy, Self No Yes    TAKE ONE TABLET BY MOUTH DAILY OR AS DIRECTED    Patient taking differently:  Take 1 tablet by mouth See Admin Instructions. 1 tablet daily on Sunday, Monday, Wednesday, Thursday, and Saturday    warfarin (COUMADIN) 2 MG tablet Past Week Pharmacy, Self Yes Yes    Take 2 tablets by mouth See Admin Instructions. 2 tablets (4 mg) On Tuesdays and Fridays    albuterol (PROVENTIL HFA;VENTOLIN HFA) 108 (90 BASE) MCG/ACT inhaler More than a month Self Yes No    Inhale 2 puffs Every 4 (Four) Hours As Needed.              Medication notes:     This medication list is complete to the best of my knowledge as of 6/8/2023    Please call if questions.    Charlie Smith  Medication History Technician  [phone]    6/8/2023 15:16 EDT

## 2023-06-08 NOTE — PROGRESS NOTES
[facility] Clinical Pharmacy Services: Warfarin Dosing/Monitoring Consult    Cody Eldridge is a 77 y.o. male, estimated creatinine clearance is 42.5 mL/min (A) (by C-G formula based on SCr of 1.52 mg/dL (H)). weighing 75 kg (165 lb 4.8 oz).    Results from last 7 days   Lab Units 06/08/23  1249   INR  1.70*   HEMOGLOBIN g/dL 13.7   HEMATOCRIT % 40.8   PLATELETS 10*3/mm3 140     Prior to admission anticoagulation: warfarin 2 mg daily    Hospital Anticoagulation:  Consulting provider: Dr. Cho  Start date: 6/8  Indication: A Fib - requiring full anticoagulation  Target INR: 2 - 3  Expected duration: tbd   Bridge Therapy: No      Potential food or drug interactions: none at this time    Education complete?/Date: No; plan for follow up TBD    Assessment/Plan:  Dose: 4 mg x once tonight - will reassess after AM labs tomorrow  Monitor for any signs or symptoms of bleeding  Follow up daily INRs and dose adjustments    Pharmacy will continue to follow until discharge or discontinuation of warfarin.     Rita Overton [facility]  Clinical Pharmacist

## 2023-06-08 NOTE — TELEPHONE ENCOUNTER
I spoke with them again to let them know that you agree that he needs to go to ER.  Patient has agreed and they are on the way now to ER.    Thanks ZITA Benites RN  [facility] Triage  06/08/23 11:44 EDT

## 2023-06-08 NOTE — ED NOTES
"Nursing report ED to floor  Cody Eldridge  77 y.o.  male    HPI :   Chief Complaint   Patient presents with    Fatigue     X 1 week, family nurse told pt to be seen in ER    Shortness of Breath     Told to be seen by cardio       Admitting doctor:   Bety Cho MD    Admitting diagnosis:   The primary encounter diagnosis was Acute congestive heart failure, unspecified heart failure type. Diagnoses of Elevated troponin and Renal insufficiency were also pertinent to this visit.    Code status:   Current Code Status       Date Active Code Status Order ID Comments User Context       Prior            Allergies:   Benadryl [diphenhydramine hcl]    Isolation:   No active isolations    Intake and Output  No intake or output data in the 24 hours ending 06/08/23 1509    Weight:       06/08/23  1209   Weight: 82.1 kg (181 lb)       Most recent vitals:   Vitals:    06/08/23 1209 06/08/23 1212 06/08/23 1342 06/08/23 1406   BP: (!) 151/114 (!) 161/108 (!) 177/124 (!) 164/109   BP Location: Left arm      Patient Position: Sitting      Pulse: 88 89 85 96   Resp: 18      Temp:       TempSrc:       SpO2: 95% 95%  94%   Weight: 82.1 kg (181 lb)      Height: 182.9 cm (72\")          Active LDAs/IV Access:   Lines, Drains & Airways       Active LDAs       Name Placement date Placement time Site Days    Peripheral IV 06/08/23 1203 Right Antecubital 06/08/23  1203  Antecubital  less than 1                    Labs (abnormal labs have a star):   Labs Reviewed   COMPREHENSIVE METABOLIC PANEL - Abnormal; Notable for the following components:       Result Value    Glucose 128 (*)     BUN 26 (*)     Creatinine 1.52 (*)     Sodium 134 (*)     eGFR 46.6 (*)     All other components within normal limits    Narrative:     GFR Normal >60  Chronic Kidney Disease <60  Kidney Failure <15    The GFR formula is only valid for adults with stable renal function between ages 18 and 70.   SINGLE HSTROPONIN T - Abnormal; Notable for the following " components:    HS Troponin T 49 (*)     All other components within normal limits    Narrative:     High Sensitive Troponin T Reference Range:  <10.0 ng/L- Negative Female for AMI  <15.0 ng/L- Negative Male for AMI  >=10 - Abnormal Female indicating possible myocardial injury.  >=15 - Abnormal Male indicating possible myocardial injury.   Clinicians would have to utilize clinical acumen, EKG, Troponin, and serial changes to determine if it is an Acute Myocardial Infarction or myocardial injury due to an underlying chronic condition.        URINALYSIS W/ MICROSCOPIC IF INDICATED (NO CULTURE) - Abnormal; Notable for the following components:    Protein,  mg/dL (2+) (*)     All other components within normal limits   PROTIME-INR - Abnormal; Notable for the following components:    Protime 20.3 (*)     INR 1.70 (*)     All other components within normal limits   CBC WITH AUTO DIFFERENTIAL - Abnormal; Notable for the following components:    RDW 12.2 (*)     Lymphocyte % 12.8 (*)     All other components within normal limits   COVID-19 AND FLU A/B, NP SWAB IN TRANSPORT MEDIA 8-12 HR TAT - Normal    Narrative:     Fact sheet for providers: https://www.fda.gov/media/394331/download    Fact sheet for patients: https://www.fda.gov/media/732635/download    Test performed by PCR.   RAINBOW DRAW    Narrative:     The following orders were created for panel order [place] Draw.  Procedure                               Abnormality         Status                     ---------                               -----------         ------                     Green Top (Gel)[[id number]]                                  Final result               Lavender Top[[id number]]                                     Final result               Gold Top - SST[[id number]]                                   Final result               Light Blue Top[[id number]]                                   Final result                 Please view results for these  tests on the individual orders.   URINALYSIS, MICROSCOPIC ONLY   BNP (IN-HOUSE)   CBC AND DIFFERENTIAL    Narrative:     The following orders were created for panel order CBC & Differential.  Procedure                               Abnormality         Status                     ---------                               -----------         ------                     CBC Auto Differential[[id number]]        Abnormal            Final result                 Please view results for these tests on the individual orders.   GREEN TOP   LAVENDER TOP   GOLD TOP - SST   LIGHT BLUE TOP       EKG:   ECG 12 Lead Dyspnea   Final Result   HEART RATE= 96  bpm   RR Interval= 625  ms   PA Interval=   ms   P Horizontal Axis=   deg   P Front Axis=   deg   QRSD Interval= 90  ms   QT Interval= 365  ms   QRS Axis= -59  deg   T Wave Axis= 68  deg   - ABNORMAL ECG -   Atrial fibrillation   Left anterior fascicular block   Anterior infarct, old   No change from previous tracing   Electronically Signed By: Alize Calero ([facility]) 2023 12:52:38   Date and Time of Study: 2023 11:55:18          Meds given in ED:   Medications   sodium chloride 0.9 % flush 10 mL (has no administration in time range)   furosemide (LASIX) injection 60 mg (60 mg Intravenous Given 23 1342)       Imaging results:  XR Chest 1 View    Result Date: 2023  Minimal likely atelectasis in left lower lung. Borderline heart size with slight prominence of vascular markings.  This report was finalized on 2023 12:59 PM by Dr. Josep Garcia M.D.       Ambulatory status:   Paartial assist    Social issues:   Social History     Socioeconomic History    Marital status:      Spouse name: Elise Rai    Years of education: Some College   Tobacco Use    Smoking status: Former     Packs/day: 3.00     Years: 30.00     Pack years: 90.00     Types: Cigarettes     Quit date: 2000     Years since quittin.4    Smokeless tobacco: Never    Tobacco comments:      started age 11 x 53 years stopped 2000 / daily caffeine- Coke   Vaping Use    Vaping Use: Never used   Substance and Sexual Activity    Alcohol use: Not Currently     Alcohol/week: 2.0 standard drinks     Types: 1 Glasses of wine, 1 Cans of beer per week     Comment: occ    Drug use: No    Sexual activity: Yes     Partners: Female       NIH Stroke Scale:         Shaylee Ramirez RN  06/08/23 15:09 EDT

## 2023-06-08 NOTE — TELEPHONE ENCOUNTER
"Pt's daughter called the office this am to try to schedule an appt with Dr. Omer today. She said her dad is \"gray, weak and having trouble breathing.\" I asked if he would go to the ER and his daughter said \"no\". I asked about calling 911. She said he told her he would open the door and tell them they aren't needed if they come to his home.    I attempted to call patient. The phone just rang and I didn't get a VM.    Called pt's daughter back to let her know. She called me a few minutes later stating she had reached him and he agreed to go to Urgent Care.    Thank you,    Regina Coppola RN  T[facility]  06/08/23 10:16 EDT    "

## 2023-06-08 NOTE — PLAN OF CARE
Goal Outcome Evaluation:  Plan of Care Reviewed With: patient       Problem: Adult Inpatient Plan of Care  Goal: Plan of Care Review  Outcome: Ongoing, Progressing  Flowsheets (Taken 6/8/2023 1714)  Progress: no change  Plan of Care Reviewed With: patient  Outcome Evaluation: Pt admitted to . BP and HR are elevated, requested dose of PRN hydralazine from pharmacy. Otherwise, vitals stable. Pt able to ambulate from stretcher to bed and to bathroom. Pt needs met and questions answered. Will continue to monitor.

## 2023-06-08 NOTE — Clinical Note
Level of Care: Telemetry [5]  Diagnosis: Acute congestive heart failure, unspecified heart failure type [[id number]]  Admitting Physician: KEITH KAISER [[id number]]  Attending Physician: KEITH KAISER [[id number]]

## 2023-06-09 LAB
ALBUMIN SERPL-MCNC: 3.1 G/DL (ref 3.5–5.2)
ALBUMIN/GLOB SERPL: 1 G/DL
ALP SERPL-CCNC: 78 U/L (ref 39–117)
ALT SERPL W P-5'-P-CCNC: 15 U/L (ref 1–41)
ANION GAP SERPL CALCULATED.3IONS-SCNC: 13.3 MMOL/L (ref 5–15)
AST SERPL-CCNC: 21 U/L (ref 1–40)
BASOPHILS # BLD AUTO: 0.05 10*3/MM3 (ref 0–0.2)
BASOPHILS NFR BLD AUTO: 0.8 % (ref 0–1.5)
BILIRUB SERPL-MCNC: 1.2 MG/DL (ref 0–1.2)
BUN SERPL-MCNC: 26 MG/DL (ref 8–23)
BUN/CREAT SERPL: 18.7 (ref 7–25)
CALCIUM SPEC-SCNC: 9 MG/DL (ref 8.6–10.5)
CHLORIDE SERPL-SCNC: 101 MMOL/L (ref 98–107)
CHOLEST SERPL-MCNC: 110 MG/DL (ref 0–200)
CO2 SERPL-SCNC: 24.7 MMOL/L (ref 22–29)
CREAT SERPL-MCNC: 1.39 MG/DL (ref 0.76–1.27)
DEPRECATED RDW RBC AUTO: 41.9 FL (ref 37–54)
EGFRCR SERPLBLD CKD-EPI 2021: 51.9 ML/MIN/1.73
EOSINOPHIL # BLD AUTO: 0.08 10*3/MM3 (ref 0–0.4)
EOSINOPHIL NFR BLD AUTO: 1.3 % (ref 0.3–6.2)
ERYTHROCYTE [DISTWIDTH] IN BLOOD BY AUTOMATED COUNT: 12.4 % (ref 12.3–15.4)
FERRITIN SERPL-MCNC: 91.1 NG/ML (ref 30–400)
GLOBULIN UR ELPH-MCNC: 3.1 GM/DL
GLUCOSE SERPL-MCNC: 92 MG/DL (ref 65–99)
HBA1C MFR BLD: 5.9 % (ref 4.8–5.6)
HCT VFR BLD AUTO: 40.1 % (ref 37.5–51)
HDLC SERPL-MCNC: 38 MG/DL (ref 40–60)
HGB BLD-MCNC: 13.4 G/DL (ref 13–17.7)
IMM GRANULOCYTES # BLD AUTO: 0.02 10*3/MM3 (ref 0–0.05)
IMM GRANULOCYTES NFR BLD AUTO: 0.3 % (ref 0–0.5)
INR PPP: 1.9 (ref 0.9–1.1)
LDLC SERPL CALC-MCNC: 53 MG/DL (ref 0–100)
LDLC/HDLC SERPL: 1.37 {RATIO}
LYMPHOCYTES # BLD AUTO: 0.84 10*3/MM3 (ref 0.7–3.1)
LYMPHOCYTES NFR BLD AUTO: 13.9 % (ref 19.6–45.3)
MAGNESIUM SERPL-MCNC: 1.6 MG/DL (ref 1.6–2.4)
MCH RBC QN AUTO: 30.7 PG (ref 26.6–33)
MCHC RBC AUTO-ENTMCNC: 33.4 G/DL (ref 31.5–35.7)
MCV RBC AUTO: 92 FL (ref 79–97)
MONOCYTES # BLD AUTO: 0.75 10*3/MM3 (ref 0.1–0.9)
MONOCYTES NFR BLD AUTO: 12.4 % (ref 5–12)
NEUTROPHILS NFR BLD AUTO: 4.32 10*3/MM3 (ref 1.7–7)
NEUTROPHILS NFR BLD AUTO: 71.3 % (ref 42.7–76)
NRBC BLD AUTO-RTO: 0 /100 WBC (ref 0–0.2)
PHOSPHATE SERPL-MCNC: 2.9 MG/DL (ref 2.5–4.5)
PLATELET # BLD AUTO: 141 10*3/MM3 (ref 140–450)
PMV BLD AUTO: 10.9 FL (ref 6–12)
POTASSIUM SERPL-SCNC: 3.6 MMOL/L (ref 3.5–5.2)
PROT SERPL-MCNC: 6.2 G/DL (ref 6–8.5)
PROTHROMBIN TIME: 22.2 SECONDS (ref 11.7–14.2)
RBC # BLD AUTO: 4.36 10*6/MM3 (ref 4.14–5.8)
SODIUM SERPL-SCNC: 139 MMOL/L (ref 136–145)
TRIGL SERPL-MCNC: 99 MG/DL (ref 0–150)
TROPONIN T SERPL HS-MCNC: 43 NG/L
TSH SERPL DL<=0.05 MIU/L-ACNC: 12.4 UIU/ML (ref 0.27–4.2)
VLDLC SERPL-MCNC: 19 MG/DL (ref 5–40)
WBC NRBC COR # BLD: 6.06 10*3/MM3 (ref 3.4–10.8)

## 2023-06-09 PROCEDURE — 84100 ASSAY OF PHOSPHORUS: CPT | Performed by: INTERNAL MEDICINE

## 2023-06-09 PROCEDURE — 97530 THERAPEUTIC ACTIVITIES: CPT

## 2023-06-09 PROCEDURE — 94761 N-INVAS EAR/PLS OXIMETRY MLT: CPT

## 2023-06-09 PROCEDURE — 94664 DEMO&/EVAL PT USE INHALER: CPT

## 2023-06-09 PROCEDURE — 82728 ASSAY OF FERRITIN: CPT | Performed by: INTERNAL MEDICINE

## 2023-06-09 PROCEDURE — 94760 N-INVAS EAR/PLS OXIMETRY 1: CPT

## 2023-06-09 PROCEDURE — 84132 ASSAY OF SERUM POTASSIUM: CPT | Performed by: HOSPITALIST

## 2023-06-09 PROCEDURE — 84443 ASSAY THYROID STIM HORMONE: CPT | Performed by: INTERNAL MEDICINE

## 2023-06-09 PROCEDURE — 80061 LIPID PANEL: CPT | Performed by: INTERNAL MEDICINE

## 2023-06-09 PROCEDURE — 83735 ASSAY OF MAGNESIUM: CPT | Performed by: INTERNAL MEDICINE

## 2023-06-09 PROCEDURE — 94799 UNLISTED PULMONARY SVC/PX: CPT

## 2023-06-09 PROCEDURE — 80053 COMPREHEN METABOLIC PANEL: CPT | Performed by: INTERNAL MEDICINE

## 2023-06-09 PROCEDURE — 97162 PT EVAL MOD COMPLEX 30 MIN: CPT

## 2023-06-09 PROCEDURE — 85610 PROTHROMBIN TIME: CPT | Performed by: INTERNAL MEDICINE

## 2023-06-09 PROCEDURE — 97165 OT EVAL LOW COMPLEX 30 MIN: CPT

## 2023-06-09 PROCEDURE — 83036 HEMOGLOBIN GLYCOSYLATED A1C: CPT | Performed by: INTERNAL MEDICINE

## 2023-06-09 PROCEDURE — 84484 ASSAY OF TROPONIN QUANT: CPT | Performed by: INTERNAL MEDICINE

## 2023-06-09 PROCEDURE — 85025 COMPLETE CBC W/AUTO DIFF WBC: CPT | Performed by: INTERNAL MEDICINE

## 2023-06-09 PROCEDURE — G0378 HOSPITAL OBSERVATION PER HR: HCPCS

## 2023-06-09 RX ORDER — POTASSIUM CHLORIDE 750 MG/1
40 TABLET, FILM COATED, EXTENDED RELEASE ORAL EVERY 4 HOURS
Status: COMPLETED | OUTPATIENT
Start: 2023-06-09 | End: 2023-06-09

## 2023-06-09 RX ORDER — LISINOPRIL 20 MG/1
10 TABLET ORAL DAILY
Qty: 90 TABLET | Refills: 1
Start: 2023-06-09

## 2023-06-09 RX ORDER — WARFARIN SODIUM 2 MG/1
2 TABLET ORAL
Status: DISCONTINUED | OUTPATIENT
Start: 2023-06-09 | End: 2023-06-10 | Stop reason: HOSPADM

## 2023-06-09 RX ORDER — AMLODIPINE BESYLATE 5 MG/1
5 TABLET ORAL
Status: DISCONTINUED | OUTPATIENT
Start: 2023-06-09 | End: 2023-06-09

## 2023-06-09 RX ORDER — LISINOPRIL 10 MG/1
10 TABLET ORAL DAILY
Status: DISCONTINUED | OUTPATIENT
Start: 2023-06-10 | End: 2023-06-10 | Stop reason: HOSPADM

## 2023-06-09 RX ADMIN — POTASSIUM CHLORIDE 40 MEQ: 750 TABLET, EXTENDED RELEASE ORAL at 21:00

## 2023-06-09 RX ADMIN — IPRATROPIUM BROMIDE AND ALBUTEROL SULFATE 3 ML: .5; 3 SOLUTION RESPIRATORY (INHALATION) at 07:42

## 2023-06-09 RX ADMIN — WARFARIN 2 MG: 2 TABLET ORAL at 18:27

## 2023-06-09 RX ADMIN — IPRATROPIUM BROMIDE AND ALBUTEROL SULFATE 3 ML: .5; 3 SOLUTION RESPIRATORY (INHALATION) at 15:45

## 2023-06-09 RX ADMIN — Medication 10 ML: at 09:46

## 2023-06-09 RX ADMIN — FUROSEMIDE 40 MG: 40 TABLET ORAL at 21:00

## 2023-06-09 RX ADMIN — LISINOPRIL 20 MG: 20 TABLET ORAL at 09:45

## 2023-06-09 RX ADMIN — ASPIRIN 81 MG: 81 TABLET, CHEWABLE ORAL at 09:45

## 2023-06-09 RX ADMIN — EMPAGLIFLOZIN 10 MG: 10 TABLET, FILM COATED ORAL at 09:46

## 2023-06-09 RX ADMIN — IPRATROPIUM BROMIDE AND ALBUTEROL SULFATE 3 ML: .5; 3 SOLUTION RESPIRATORY (INHALATION) at 13:40

## 2023-06-09 RX ADMIN — Medication 10 ML: at 21:02

## 2023-06-09 RX ADMIN — FUROSEMIDE 40 MG: 40 TABLET ORAL at 09:45

## 2023-06-09 RX ADMIN — SERTRALINE 100 MG: 100 TABLET, FILM COATED ORAL at 09:46

## 2023-06-09 RX ADMIN — CARVEDILOL 25 MG: 25 TABLET, FILM COATED ORAL at 21:00

## 2023-06-09 RX ADMIN — CARVEDILOL 25 MG: 25 TABLET, FILM COATED ORAL at 09:46

## 2023-06-09 RX ADMIN — ROSUVASTATIN CALCIUM 10 MG: 10 TABLET, FILM COATED ORAL at 09:45

## 2023-06-09 RX ADMIN — POTASSIUM CHLORIDE 40 MEQ: 750 TABLET, EXTENDED RELEASE ORAL at 16:02

## 2023-06-09 RX ADMIN — LEVOTHYROXINE SODIUM 125 MCG: 0.12 TABLET ORAL at 09:46

## 2023-06-09 NOTE — NURSING NOTE
Pt's bp 94/59, 84/59 on the automatic bp reading. Bp- 82/60, pt asymptomatic. JONELLE Polanco aware and states ok to d/c around 4 pm today if sbp > 90.

## 2023-06-09 NOTE — CONSULTS
Date of Hospital Visit: 23  Encounter Provider: JONELLE Polanco  Place of Service: [facility] CARDIOLOGY  Patient Name: Cody Eldridge  :1945  Referral Provider: Bety Cho,*    Chief complaint: dyspnea    History of Present Illness    Mr. Eldridge  is a 77 y.o. male  with  hypertension, hyperlipidemia, supraventricular tachycardia, syncope, coronary artery disease with History of myocardial infarction, diastolic congestive heart failure, COPD, depression and anxiety.        He is followed by Dr. Omer and was last seen by me in March. He wanted to lower his lasix at that time so lasix was decreased from 40 mg to 20 mg.     His last echo was 2023 showing normal left ventricular systolic function, grade 2 diastolic dysfunction, severe biatrial enlargement, myxomatous changes of the mitral valve with moderate mitral valve regurgitation.  There was severe tricuspid valve regurgitation RVSP measuring 76 mmHg.    He is admitted with elevated probnp and acute on chronic diastolic chf. He received IV lasix and was started on amlodipine for hypertension. Chest xray 2023 showed Minimal likely atelectasis in left lower lung. Borderline  heart size with slight prominence of vascular markings.    He tells me he's had no chest pain  or tightness this preceding week. He denies dizziness and his swelling has improved overnight. He tells me he took lasix 20 mg daily PTA.   CBC stable  HsTn is elevated but flat.   TSH elevated at 12.4   sCr improved overnight to 1.39, BUN 26.       Past Medical History:   Diagnosis Date    Acquired hypothyroidism     Acute renal injury     Anemia associated with chemotherapy     Atrial fibrillation     CAD (coronary artery disease)     CHF (congestive heart failure)     Chronic bronchitis     COPD (chronic obstructive pulmonary disease)     Cor pulmonale (chronic)     Daytime hypersomnia     Depression with anxiety     IRAHETA (dyspnea on  exertion)     Enlarged prostate     Frequent nocturnal awakening     Gout     H/O cardiac radiofrequency ablation 2006    [facility].    Head and neck cancer     Hypertension     Hypotension     Insomnia     Lumbar radicular pain     SHAR (obstructive sleep apnea)     Osteoarthritis     Permanent atrial fibrillation     Shock, septic     Snoring     Squamous cell carcinoma of neck     SVT (supraventricular tachycardia)     Syncope     Vitamin D deficiency     Weight loss        Past Surgical History:   Procedure Laterality Date    APPENDECTOMY      CARDIAC ABLATION  2006    [facility].    CARDIAC CATHETERIZATION N/A 8/29/2018    Procedure: Left Heart Cath;  Surgeon: Yousif Uribe MD;  Location: [facility] CATH INVASIVE LOCATION;  Service: Cardiology    CARDIAC CATHETERIZATION N/A 8/29/2018    Procedure: Coronary angiography;  Surgeon: Yousif Uribe MD;  Location: [facility] CATH INVASIVE LOCATION;  Service: Cardiology    CARDIAC CATHETERIZATION N/A 8/29/2018    Procedure: Left ventriculography;  Surgeon: Yousif Uribe MD;  Location: [facility] CATH INVASIVE LOCATION;  Service: Cardiology    FINGER SURGERY      FOOT SURGERY      HAND SURGERY      VENOUS ACCESS DEVICE (PORT) INSERTION Right 6/18/2019    Procedure: MEDIPORT PLACEMENT;  Surgeon: Elvis Grigsby MD;  Location: [facility] MAIN OR;  Service: Vascular    VENOUS ACCESS DEVICE (PORT) REMOVAL Right 8/5/2019    Procedure: REMOVAL VENOUS ACCESS DEVICE;  Surgeon: Prince Castaneda MD;  Location: [facility] MAIN OR;  Service: Vascular       Prior to Admission medications    Medication Sig Start Date End Date Taking? Authorizing Provider   ALPRAZolam (XANAX) 0.25 MG tablet TAKE 1 TABLET BY MOUTH TWICE DAILY AS NEEDED FOR ANXIETY  Patient taking differently: Take 1 tablet by mouth 2 (Two) Times a Day As Needed. 6/2/23  Yes Patrick Diaz MD   aspirin 81 MG chewable tablet Chew 1 tablet Daily.   Yes Provider, MD Josette   carvedilol (COREG) 25 MG tablet Take 1  tablet by mouth 2 (Two) Times a Day With Meals. 22  Yes Myriam Omer MD   levothyroxine (SYNTHROID, LEVOTHROID) 125 MCG tablet TAKE 1 TABLET BY MOUTH DAILY 23  Yes Patrick Diaz MD   lisinopril (PRINIVIL,ZESTRIL) 20 MG tablet Take 1 tablet by mouth Daily. 21  Yes Patrick Diaz MD   oxyCODONE-acetaminophen (PERCOCET) 7.5-325 MG per tablet Take 1 tablet by mouth Every 6 (Six) Hours As Needed.   Yes ProviderJosette MD   rosuvastatin (CRESTOR) 20 MG tablet Take 10 mg by mouth Daily. 17  Yes Epley, James, APRN   sertraline (ZOLOFT) 100 MG tablet TAKE 1 TABLET BY MOUTH DAILY 23  Yes Patrick Diaz MD   warfarin (COUMADIN) 2 MG tablet TAKE ONE TABLET BY MOUTH DAILY OR AS DIRECTED  Patient taking differently: Take 1 tablet by mouth See Admin Instructions. 1 tablet daily on , Monday, Wednesday, Thursday, and Saturday 3/24/23  Yes Elvis Lozano MD   warfarin (COUMADIN) 2 MG tablet Take 2 tablets by mouth See Admin Instructions. 2 tablets (4 mg) On  and    Yes ProviderJosette MD   albuterol (PROVENTIL HFA;VENTOLIN HFA) 108 (90 BASE) MCG/ACT inhaler Inhale 2 puffs Every 4 (Four) Hours As Needed. 14   Mona Kirk MD       Social History     Socioeconomic History    Marital status:      Spouse name: Elise Rai    Years of education: Some College   Tobacco Use    Smoking status: Former     Packs/day: 3.00     Years: 30.00     Pack years: 90.00     Types: Cigarettes     Quit date: 2000     Years since quittin.4    Smokeless tobacco: Never    Tobacco comments:     started age 11 x 53 years stopped 2000 / daily caffeine- Coke   Vaping Use    Vaping Use: Never used   Substance and Sexual Activity    Alcohol use: Not Currently     Alcohol/week: 2.0 standard drinks     Types: 1 Glasses of wine, 1 Cans of beer per week     Comment: occ    Drug use: No    Sexual activity: Yes     Partners: Female       Family History  "  Problem Relation Age of Onset    Heart attack Mother     Diabetes Mother     Heart disease Mother     Hypertension Mother     Anxiety disorder Mother     Depression Mother     Diabetes Father     Heart failure Father     Heart disease Father     Hypertension Father     Cancer Brother         colon    Hypertension Brother     Colon cancer Brother 50    Depression Brother     Anxiety disorder Brother     Alcohol abuse Brother     Diabetes Sister     Heart disease Sister     Hypertension Sister     COPD Other     Heart failure Other     Heart disease Other     Kidney disease Sister     Anxiety disorder Sister     Malig Hyperthermia Neg Hx        Review of Systems   Respiratory:  Positive for shortness of breath.    Cardiovascular:  Positive for leg swelling.      Objective:     Vitals:    06/09/23 0500 06/09/23 0730 06/09/23 0743 06/09/23 0945   BP:  (!) 155/107     BP Location:  Left arm     Patient Position:  Lying     Pulse:  100 108 105   Resp:  18 16    Temp:  97.8 °F (36.6 °C)     TempSrc:  Oral     SpO2:  95% 90%    Weight: 72.5 kg (159 lb 14.4 oz)      Height:         Body mass index is 21.69 kg/m².    Last Weight and Admission Weight        06/09/23  0500   Weight: 72.5 kg (159 lb 14.4 oz)     Flowsheet Rows      Flowsheet Row First Filed Value   Admission Height 182.9 cm (72\") Documented at 06/08/2023 1209   Admission Weight 82.1 kg (181 lb) Documented at 06/08/2023 1209              Intake/Output Summary (Last 24 hours) at 6/9/2023 1152  Last data filed at 6/9/2023 1104  Gross per 24 hour   Intake 0 ml   Output 3375 ml   Net -3375 ml         Physical Exam  Constitutional:       Appearance: Normal appearance.   HENT:      Head: Normocephalic.   Cardiovascular:      Rate and Rhythm: Tachycardia present. Rhythm irregular.   Pulmonary:      Effort: Pulmonary effort is normal.      Comments: Decreased bases  Abdominal:      General: Bowel sounds are normal.   Musculoskeletal:      Cervical back: Normal range of " motion.   Skin:     General: Skin is warm and dry.   Neurological:      Mental Status: He is alert. Mental status is at baseline.               Lab Review:                Results from last 7 days   Lab Units 06/09/23  0351   SODIUM mmol/L 139   POTASSIUM mmol/L 3.6   CHLORIDE mmol/L 101   CO2 mmol/L 24.7   BUN mg/dL 26*   CREATININE mg/dL 1.39*   GLUCOSE mg/dL 92   CALCIUM mg/dL 9.0     Results from last 7 days   Lab Units 06/09/23  0351 06/08/23  1829 06/08/23  1622   HSTROP T ng/L 43* 41* 38*     Results from last 7 days   Lab Units 06/09/23  0351   WBC 10*3/mm3 6.06   HEMOGLOBIN g/dL 13.4   HEMATOCRIT % 40.1   PLATELETS 10*3/mm3 141     Results from last 7 days   Lab Units 06/09/23  0352 06/08/23  1249   INR  1.90* 1.70*     Results from last 7 days   Lab Units 06/09/23  0351   CHOLESTEROL mg/dL 110     Results from last 7 days   Lab Units 06/09/23  0351   MAGNESIUM mg/dL 1.6     Results from last 7 days   Lab Units 06/09/23  0351   CHOLESTEROL mg/dL 110   TRIGLYCERIDES mg/dL 99   HDL CHOL mg/dL 38*   LDL CHOL mg/dL 53         I personally viewed and interpreted the patient's EKG/Telemetry data    Assessment/Plan:         Acute on chronic diastolic heart failure    Mixed hyperlipidemia    Essential hypertension    Other persistent atrial fibrillation (HCC)    Severe tricuspid regurgitation by prior echocardiogram    SHAR (obstructive sleep apnea)    COPD (chronic obstructive pulmonary disease)    Acquired hypothyroidism    Stage 3a chronic kidney disease    Severe pulmonary hypertension    Valvular heart disease    Hypertensive emergency  Atrial fibrillation . CHADS2 Vascor of 4.         Thyroid function/management will need outpatient follow up     He is feeling better and on room air after IV lasix. I agree with higher dose oral lasix at 40 BID. He was taking 20 mg daily PTA.    His BP is now low. Will DC amlodipine and he will keep a daily BP and pulse and weight log for me. I will decrease lisinopril from 20  mg to 10 mg at discharge.     Continue carvedilol for rate support and warfarin for anticoagulation.     Discussed with Dr. Miller who plans to keep him overnight to observe BP trends.     I have sent a message to hospital scheduling to get him in with me in 1 week to follow up.    Call with questions or concerns. We will follow.    Thank you for consult,      JONELLE Polanco  [facility]   [address] [place] [address] [address]  [place] [address]  Ph: [phone]  Fax: [phone]

## 2023-06-09 NOTE — PLAN OF CARE
Problem: Adult Inpatient Plan of Care  Goal: Plan of Care Review  Outcome: Ongoing, Progressing   Goal Outcome Evaluation:   Bp elevated this am, then dropped around 1220 to 82/60, pt asymptomatic,MD aware.Last bp- 92/51.Continue to monitor, possible d/c in am.No c/o pain or n/v.

## 2023-06-09 NOTE — CASE MANAGEMENT/SOCIAL WORK
Discharge Planning Assessment  [facility]     Patient Name: Cody Eldridge  MRN: [id number]  Today's Date: 6/9/2023    Admit Date: 6/8/2023    Plan: Home, grandchildren will transport   Discharge Needs Assessment       Row Name 06/09/23 1435       Living Environment    People in Home spouse    Name(s) of People in Home Elise Rai [phone]    Unique Family Situation Pt states that his spouse is currently hospitalized on 7P and will be going to rehab when she is discharged    Current Living Arrangements apartment    Potentially Unsafe Housing Conditions none    Provides Primary Care For no one    Family Caregiver if Needed none    Quality of Family Relationships unable to assess    Able to Return to Prior Arrangements yes       Resource/Environmental Concerns    Resource/Environmental Concerns none    Transportation Concerns none       Food Insecurity    Within the past 12 months, you worried that your food would run out before you got the money to buy more. Never true    Within the past 12 months, the food you bought just didn't last and you didn't have money to get more. Never true       Transition Planning    Patient/Family Anticipates Transition to home    Patient/Family Anticipated Services at Transition none    Transportation Anticipated car, drives self;family or friend will provide       Discharge Needs Assessment    Equipment Currently Used at Home none    Concerns to be Addressed no discharge needs identified;denies needs/concerns at this time    Anticipated Changes Related to Illness none    Equipment Needed After Discharge none    Provided Post Acute Provider List? N/A    Provided Post Acute Provider Quality & Resource List? N/A                   Discharge Plan       Row Name 06/09/23 1439       Plan    Plan Home, grandchildren will transport    Patient/Family in Agreement with Plan yes    Provided Post Acute Provider List? N/A    Provided Post Acute Provider Quality & Resource List? N/A     Plan Comments Met with pt at bedside. Explained role of . Face sheet verified, and pts PCP is Patrick Diaz. Pt denies any difficulty paying for medications and obtains his medications from [facility]. Pt lives with his spouse, who is currently in the hospital at [facility]. States his grandchildren could help him if any care needs arise. Pt does not have any steps to maneuver. Pt is independent in ADL's and denies the need for any assistive devices. Pt states that he has used [facility] in the past, but denies the need for home health or rehab services at this time. Upon discharge, pt states one of his grandchildren will transport home. Explained that CCP would follow for any discharge needs.                  Continued Care and Services - Admitted Since 6/8/2023    Coordination has not been started for this encounter.       Expected Discharge Date and Time       Expected Discharge Date Expected Discharge Time    Kilo 10, 2023            Demographic Summary    No documentation.                  Functional Status    No documentation.                  Psychosocial    No documentation.                  Abuse/Neglect    No documentation.                  Legal    No documentation.                  Substance Abuse    No documentation.                  Patient Forms    No documentation.                     Karina Mccormick RN

## 2023-06-09 NOTE — PLAN OF CARE
Goal Outcome Evaluation:  Plan of Care Reviewed With: patient    Pt is 78 y/o M admitted to [facility] on 6/8/23 with fatigue and SOB. At baseline pt is indep with mobility, does not use AD. Lives with his wife at home with 3 NESTOR and sunk in living. Pt states he did have a fall recently but he was assisting his wife when she fell and he was unable to maintain his balance at that time. Also reports chronic back pain which limites his mobility. Pt currently demos SBA for transfers and ambulation up to 200' without AD. Some lateral sway noted without LOB. Did trial walker for improved comfort and balance with significant improvement noted and pt reporting some relief with back pain. States he has walker at home but he prefers not use it despite education for improved overall mobility and comfort with walker. Pt appears near baseline at this time, no acute PT goals identified. Anticipate pt will return home with family assist at d/c.

## 2023-06-09 NOTE — PROGRESS NOTES
[facility]: Warfarin Dosing/Monitoring Consult    Cody Eldridge is a 77 y.o. male, estimated creatinine clearance is 44.9 mL/min (A) (by C-G formula based on SCr of 1.39 mg/dL (H)). weighing 75 kg (165 lb 4.8 oz).    Results from last 7 days   Lab Units 06/09/23  0352 06/09/23  0351 06/08/23  1249   INR  1.90*  --  1.70*   HEMOGLOBIN g/dL  --  13.4 13.7   HEMATOCRIT %  --  40.1 40.8   PLATELETS 10*3/mm3  --  141 140     Prior to admission anticoagulation:   Follows at [facility] clinic, last visit 5/3/23  warfarin 2 mg daily    Hospital Anticoagulation:  Consulting provider: Dr. Cho  Start date: 6/8  Indication: A Fib - requiring full anticoagulation  Target INR: 2 - 3  Expected duration: tbd   Bridge Therapy: No      Potential food or drug interactions: none at this time    Education complete?/Date: No; plan for follow up TBD    Assessment/Plan:  INR approaching therapeutic window - will resume home dose of 2mg daily today and continue to monitor  Monitor for any signs or symptoms of bleeding  Follow up daily INRs and dose adjustments    Pharmacy will continue to follow until discharge or discontinuation of warfarin.     Yahaira Lopez PharmD, BCPS

## 2023-06-09 NOTE — PLAN OF CARE
Goal Outcome Evaluation:  Plan of Care Reviewed With: patient           Outcome Evaluation: Pt is a 77 y.o male admitted with acute on chronic CHF. He was in the hospital to visit his wife following her orthopedic surgery when he became weak/SOB. Today he is able to complete ADLs and mobility in room SBA-SV level. He denies any weakness or fatique with activity. He is on RA and O2 sats above 90% throughout. He does have baseline balance/gait deficit which he reports he manages at home. PT consulted as well this hospital stay. No further OT needs at this time. Recommend home at AL.

## 2023-06-09 NOTE — THERAPY EVALUATION
Patient Name: Cody Eldridge  : 1945    MRN: [id number]                              Today's Date: 2023       Admit Date: 2023    Visit Dx:     ICD-10-CM ICD-9-CM   1. Acute congestive heart failure, unspecified heart failure type  I50.9 428.0   2. Elevated troponin  R77.8 790.6   3. Renal insufficiency  N28.9 593.9     Patient Active Problem List   Diagnosis    Atopic rhinitis    Arthritis of hand    Coxitis    Benign colonic polyp    Neck pain    Mixed anxiety depressive disorder    Degeneration of intervertebral disc of lumbosacral region    Elevated prostate specific antigen (PSA)    Mixed hyperlipidemia    Essential hypertension    Insomnia    Lumbar radiculopathy    Osteoarthritis    Vitamin D deficiency    Abdominal pain    Myalgia    Cramp of both lower extremities    Gingivitis    Other persistent atrial fibrillation (HCC)    Severe tricuspid regurgitation by prior echocardiogram    SHAR (obstructive sleep apnea)    Precordial pain    IRAHETA (dyspnea on exertion)    Coronary artery disease involving native coronary artery of native heart without angina pectoris    Shortness of breath    Squamous cell carcinoma of base of tongue    Current use of long term anticoagulation    Syncope    History of cancer    Hypotension    Medicare annual wellness visit, subsequent    Squamous cell carcinoma of neck    COPD (chronic obstructive pulmonary disease)    Depression with anxiety    Chemotherapy-induced neutropenia    Chemotherapy-induced thrombocytopenia    Acute renal injury    Anemia associated with chemotherapy    Oral thrush    MSSA bacteremia    Acquired hypothyroidism    Chronic conjunctivitis of both eyes    Change in vision    Chronic diastolic congestive heart failure    Syncope and collapse    Orthostatic hypotension    Stage 3a chronic kidney disease    Acute on chronic diastolic heart failure    Severe pulmonary hypertension    Valvular heart disease    Hypertensive emergency     Past  Medical History:   Diagnosis Date    Acquired hypothyroidism     Acute renal injury     Anemia associated with chemotherapy     Atrial fibrillation     CAD (coronary artery disease)     CHF (congestive heart failure)     Chronic bronchitis     COPD (chronic obstructive pulmonary disease)     Cor pulmonale (chronic)     Daytime hypersomnia     Depression with anxiety     IRAHETA (dyspnea on exertion)     Enlarged prostate     Frequent nocturnal awakening     Gout     H/O cardiac radiofrequency ablation 2006    [facility]    Head and neck cancer     Hypertension     Hypotension     Insomnia     Lumbar radicular pain     SHAR (obstructive sleep apnea)     Osteoarthritis     Permanent atrial fibrillation     Shock, septic     Snoring     Squamous cell carcinoma of neck     SVT (supraventricular tachycardia)     Syncope     Vitamin D deficiency     Weight loss      Past Surgical History:   Procedure Laterality Date    APPENDECTOMY      CARDIAC ABLATION  2006    [facility]    CARDIAC CATHETERIZATION N/A 8/29/2018    Procedure: Left Heart Cath;  Surgeon: Yousif Uribe MD;  Location: [facility] CATH INVASIVE LOCATION;  Service: Cardiology    CARDIAC CATHETERIZATION N/A 8/29/2018    Procedure: Coronary angiography;  Surgeon: Yousif Uribe MD;  Location: [facility] CATH INVASIVE LOCATION;  Service: Cardiology    CARDIAC CATHETERIZATION N/A 8/29/2018    Procedure: Left ventriculography;  Surgeon: Yousif Uribe MD;  Location: [facility] CATH INVASIVE LOCATION;  Service: Cardiology    FINGER SURGERY      FOOT SURGERY      HAND SURGERY      VENOUS ACCESS DEVICE (PORT) INSERTION Right 6/18/2019    Procedure: MEDIPORT PLACEMENT;  Surgeon: Elvis Grigsby MD;  Location: [facility] MAIN OR;  Service: Vascular    VENOUS ACCESS DEVICE (PORT) REMOVAL Right 8/5/2019    Procedure: REMOVAL VENOUS ACCESS DEVICE;  Surgeon: Prince Castaneda MD;  Location: [facility] MAIN OR;  Service: Vascular      General Information       Row Name 06/09/23 1031           Physical Therapy Time and Intention    Document Type evaluation  -     Mode of Treatment individual therapy;physical therapy  -[facility] Name 06/09/23 1031          General Information    Patient Profile Reviewed yes  -ST     Prior Level of Function independent:  -ST     Existing Precautions/Restrictions fall  -ST     Barriers to Rehab previous functional deficit  -[facility] Name 06/09/23 1031          Living Environment    People in Home spouse  -ST       Row Name 06/09/23 1031          Home Main Entrance    Number of Stairs, Main Entrance two  -ST     Stair Railings, Main Entrance railings safe and in good condition  -ST       Row Name 06/09/23 1031          Stairs Within Home, Primary    Number of Stairs, Within Home, Primary two  -ST     Stair Railings, Within Home, Primary railings safe and in good condition  -ST       Row Name 06/09/23 1031          Cognition    Orientation Status (Cognition) oriented x 4  -[facility] Name 06/09/23 1031          Safety Issues, Functional Mobility    Impairments Affecting Function (Mobility) balance;pain  -ST     Comment, Safety Issues/Impairments (Mobility) gait belt, nonskid socks donned  -               User Key  (r) = Recorded By, (t) = Taken By, (c) = Cosigned By      Initials Name Provider Type    ST Lisa Huitron PT Physical Therapist                   Mobility       [facility] Name 06/09/23 1032          Bed Mobility    Comment, (Bed Mobility) [facility] upon arrival  -[facility] 06/09/23 1032          Sit-Stand Transfer    Sit-Stand [place] (Transfers) standby assist  -ST       Row Name 06/09/23 1032          Gait/Stairs (Locomotion)    [place] Level (Gait) standby assist;supervision  -     Assistive Device (Gait) walker, front-wheeled  -     Distance in Feet (Gait) 200' SBA no AD, 25' supervision with rwx  -ST     Deviations/Abnormal Patterns (Gait) bilateral deviations;eric decreased;festinating/shuffling;gait speed  decreased;stride length decreased  -ST     Bilateral Gait Deviations heel strike decreased  -ST     [place] Level (Stairs) not tested  -ST     Comment, (Gait/Stairs) pt reports he has chronic back pain that limits his ambulation - states lateral sway is baseline for him. greatly improved with use of walker but pt states he has one at home and prefers not to use it.  -ST               User Key  (r) = Recorded By, (t) = Taken By, (c) = Cosigned By      Initials Name Provider Type    Lisa Julian, MARICEL Physical Therapist                   Obj/Interventions       [facility] Name 06/09/23 1033          Range of Motion Comprehensive    General Range of Motion no range of motion deficits identified  -ST       Row Name 06/09/23 1033          Strength Comprehensive (MMT)    Comment, General Manual Muscle Testing (MMT) Assessment bilat LE grossly 4/5  -[facility] Name 06/09/23 1033          Balance    Comment, Balance SBA for dynamic balance, mild lateral sway noted but no LOB  -ST               User Key  (r) = Recorded By, (t) = Taken By, (c) = Cosigned By      Initials Name Provider Type    Lisa Julian, MARICEL Physical Therapist                   Goals/Plan    No documentation.                  Clinical Impression       [facility] Name 06/09/23 1033          Pain    Pain Location - back  -     Pre/Posttreatment Pain Comment does not rate back pain - states chronic  -ST     Pain Intervention(s) Repositioned;Ambulation/increased activity  -[facility] Name 06/09/23 1033          Plan of Care Review    Plan of Care Reviewed With patient  -ST       Row Name 06/09/23 1033          Therapy Assessment/Plan (PT)    Patient/Family Therapy Goals Statement (PT) Pt is 78 y/o M admitted to [facility] on 6/8/23 with fatigue and SOB. At baseline pt is indep with mobility, does not use AD. Lives with his wife at home with 3 [employer]ST[employer] and sunk in living. Pt states he did have a fall recently but he was assisting his wife when she fell and he  was unable to maintain his balance at that time. Also reports chronic back pain which limites his mobility. Pt currently demos SBA for transfers and ambulation up to 200' without AD. Some lateral sway noted without LOB. Did trial walker for improved comfort and balance with significant improvement noted and pt reporting some relief with back pain. States he has walker at home but he prefers not use it despite education for improved overall mobility and comfort with walker. Pt appears near baseline at this time, no acute PT goals identified. Anticipate pt will return home with family assist at d/c.  -ST     Criteria for Skilled Interventions Met (PT) no  -ST     Therapy Frequency (PT) evaluation only  -ST       Row Name 06/09/23 1033          Positioning and Restraints    Pre-Treatment Position sitting in chair/recliner  -ST     Post Treatment Position chair  -ST     In Chair reclined;call light within reach;encouraged to call for assist;exit alarm on  -ST               User Key  (r) = Recorded By, (t) = Taken By, (c) = Cosigned By      Initials Name Provider Type    Lisa Julian, PT Physical Therapist                   Outcome Measures       Row Name 06/09/23 1034          How much help from another person do you currently need...    Turning from your back to your side while in flat bed without using bedrails? 4  -ST     Moving from lying on back to sitting on the side of a flat bed without bedrails? 4  -ST     Moving to and from a bed to a chair (including a wheelchair)? 3  -ST     Standing up from a chair using your arms (e.g., wheelchair, bedside chair)? 4  -ST     Climbing 3-5 steps with a railing? 3  -ST     To walk in hospital room? 3  -ST     AM-PAC 6 Clicks Score (PT) 21  -ST     Highest level of mobility 6 --> Walked 10 steps or more  -ST       Row Name 06/09/23 1034          Functional Assessment    Outcome Measure Options AM-PAC 6 Clicks Basic Mobility (PT)  -ST               User Key  (r) =  Recorded By, (t) = Taken By, (c) = Cosigned By      Initials Name Provider Type    Lisa Julian, PT Physical Therapist                                   PT Recommendation and Plan     Plan of Care Reviewed With: patient     Time Calculation:    PT Charges       Row Name 06/09/23 1037             Time Calculation    Start Time 1001  -ST      Stop Time 1024  -ST      Time Calculation (min) 23 min  -ST      PT Received On 06/09/23  -ST         Time Calculation- PT    Total Timed Code Minutes- PT 13 minute(s)  -ST         Timed Charges    08490 - PT Therapeutic Activity Minutes 13  -ST         Total Minutes    Timed Charges Total Minutes 13  -ST       Total Minutes 13  -ST                User Key  (r) = Recorded By, (t) = Taken By, (c) = Cosigned By      Initials Name Provider Type    Lisa Julian PT Physical Therapist                  Therapy Charges for Today       Code Description Service Date Service Provider Modifiers Qty    22370325213 HC PT THERAPEUTIC ACT EA 15 MIN 6/9/2023 Lisa Huitron, PT GP 1    31187971659 HC PT EVAL MOD COMPLEXITY 1 6/9/2023 Lisa Huitron PT GP 1            PT G-Codes  Outcome Measure Options: AM-PAC 6 Clicks Basic Mobility (PT)  AM-PAC 6 Clicks Score (PT): 21  PT Discharge Summary  Anticipated Discharge Disposition (PT): home with assist    Lisa Huitron PT  6/9/2023

## 2023-06-09 NOTE — THERAPY TREATMENT NOTE
Patient Name: Cody Eldridge  : 1945    MRN: [id number]                              Today's Date: 2023       Admit Date: 2023    Visit Dx:     ICD-10-CM ICD-9-CM   1. Acute congestive heart failure, unspecified heart failure type  I50.9 428.0   2. Elevated troponin  R77.8 790.6   3. Renal insufficiency  N28.9 593.9   4. Essential hypertension  I10 401.9     Patient Active Problem List   Diagnosis   • Atopic rhinitis   • Arthritis of hand   • Coxitis   • Benign colonic polyp   • Neck pain   • Mixed anxiety depressive disorder   • Degeneration of intervertebral disc of lumbosacral region   • Elevated prostate specific antigen (PSA)   • Mixed hyperlipidemia   • Essential hypertension   • Insomnia   • Lumbar radiculopathy   • Osteoarthritis   • Vitamin D deficiency   • Abdominal pain   • Myalgia   • Cramp of both lower extremities   • Gingivitis   • Other persistent atrial fibrillation (HCC)   • Severe tricuspid regurgitation by prior echocardiogram   • SHAR (obstructive sleep apnea)   • Precordial pain   • IRAHETA (dyspnea on exertion)   • Coronary artery disease involving native coronary artery of native heart without angina pectoris   • Shortness of breath   • Squamous cell carcinoma of base of tongue   • Current use of long term anticoagulation   • Syncope   • History of cancer   • Hypotension   • Medicare annual wellness visit, subsequent   • Squamous cell carcinoma of neck   • COPD (chronic obstructive pulmonary disease)   • Depression with anxiety   • Chemotherapy-induced neutropenia   • Chemotherapy-induced thrombocytopenia   • Acute renal injury   • Anemia associated with chemotherapy   • Oral thrush   • MSSA bacteremia   • Acquired hypothyroidism   • Chronic conjunctivitis of both eyes   • Change in vision   • Chronic diastolic congestive heart failure   • Syncope and collapse   • Orthostatic hypotension   • Stage 3a chronic kidney disease   • Acute on chronic diastolic heart failure   • Severe  pulmonary hypertension   • Valvular heart disease   • Hypertensive emergency     Past Medical History:   Diagnosis Date   • Acquired hypothyroidism    • Acute renal injury    • Anemia associated with chemotherapy    • Atrial fibrillation    • CAD (coronary artery disease)    • CHF (congestive heart failure)    • Chronic bronchitis    • COPD (chronic obstructive pulmonary disease)    • Cor pulmonale (chronic)    • Daytime hypersomnia    • Depression with anxiety    • IRAHETA (dyspnea on exertion)    • Enlarged prostate    • Frequent nocturnal awakening    • Gout    • H/O cardiac radiofrequency ablation 2006    [facility].   • Head and neck cancer    • Hypertension    • Hypotension    • Insomnia    • Lumbar radicular pain    • SHAR (obstructive sleep apnea)    • Osteoarthritis    • Permanent atrial fibrillation    • Shock, septic    • Snoring    • Squamous cell carcinoma of neck    • SVT (supraventricular tachycardia)    • Syncope    • Vitamin D deficiency    • Weight loss      Past Surgical History:   Procedure Laterality Date   • APPENDECTOMY     • CARDIAC ABLATION  2006    [facility].   • CARDIAC CATHETERIZATION N/A 8/29/2018    Procedure: Left Heart Cath;  Surgeon: Yousif Uribe MD;  Location: [facility] CATH INVASIVE LOCATION;  Service: Cardiology   • CARDIAC CATHETERIZATION N/A 8/29/2018    Procedure: Coronary angiography;  Surgeon: Yousif Uribe MD;  Location: [facility] CATH INVASIVE LOCATION;  Service: Cardiology   • CARDIAC CATHETERIZATION N/A 8/29/2018    Procedure: Left ventriculography;  Surgeon: Yousif Uribe MD;  Location: [facility] CATH INVASIVE LOCATION;  Service: Cardiology   • FINGER SURGERY     • FOOT SURGERY     • HAND SURGERY     • VENOUS ACCESS DEVICE (PORT) INSERTION Right 6/18/2019    Procedure: MEDIPORT PLACEMENT;  Surgeon: Elvis Grigsby MD;  Location: [facility] OR;  Service: Vascular   • VENOUS ACCESS DEVICE (PORT) REMOVAL Right 8/5/2019    Procedure: REMOVAL VENOUS ACCESS DEVICE;   Surgeon: Prince Castaneda MD;  Location: [facility] MAIN OR;  Service: Vascular      General Information     Row Name 06/09/23 1336          OT Time and Intention    Document Type discharge evaluation/summary  -     Mode of Treatment occupational therapy;individual therapy  -     Row Name 06/09/23 1336          General Information    Patient Profile Reviewed yes  -     Prior Level of Function independent:;ADL's;all household mobility  -     Existing Precautions/Restrictions fall  -     Row Name 06/09/23 1336          Occupational Profile    Environmental Supports and Barriers (Occupational Profile) does use AD/DME at baseline  -     Row Name 06/09/23 1336          Living Environment    People in Home spouse  -     Row Name 06/09/23 1336          Home Main Entrance    Number of Stairs, Main Entrance two  -     Row Name 06/09/23 1336          Cognition    Orientation Status (Cognition) oriented x 4  -     Row Name 06/09/23 1336          Safety Issues, Functional Mobility    Impairments Affecting Function (Mobility) balance  -           User Key  (r) = Recorded By, (t) = Taken By, (c) = Cosigned By    Initials Name Provider Type     Jaki Reno OT Occupational Therapist                 Mobility/ADL's     Row Name 06/09/23 1337          Bed Mobility    Bed Mobility supine-sit  -     Supine-Sit [place] (Bed Mobility) standby assist  -     Row Name 06/09/23 1337          Sit-Stand Transfer    Sit-Stand [place] (Transfers) standby assist  -[facility] Name 06/09/23 1337          Functional Mobility    Functional Mobility- Ind. Level standby assist  -     Functional Mobility-Distance (Feet) --  30  -     Row Name 06/09/23 1337          Activities of Daily Living    BADL Assessment/Intervention lower body dressing;grooming;toileting  -     Row Name 06/09/23 1337          Lower Body Dressing Assessment/Training    [place] Level (Lower Body Dressing) don;socks;set up  -      Position (Lower Body Dressing) supported sitting  -     Row Name 06/09/23 1337          Grooming Assessment/Training    [place] Level (Grooming) oral care regimen;supervision  -     Position (Grooming) sink side;unsupported standing  -     Row Name 06/09/23 1337          Toileting Assessment/Training    [place] Level (Toileting) supervision  -           User Key  (r) = Recorded By, (t) = Taken By, (c) = Cosigned By    Initials Name Provider Type    Jaki El OT Occupational Therapist               Obj/Interventions     Row Name 06/09/23 1337          Range of Motion Comprehensive    General Range of Motion bilateral upper extremity ROM WFL  -     Row Name 06/09/23 1337          Strength Comprehensive (MMT)    Comment, General Manual Muscle Testing (MMT) Assessment BUE 4/5 MMT  -     Row Name 06/09/23 1337          Motor Skills    Motor Skills functional endurance  -     Functional Endurance WFL for ADLs  -     Row Name 06/09/23 1337          Balance    Balance Assessment standing static balance;standing dynamic balance  -     Static Standing Balance supervision  -     Dynamic Standing Balance standby assist  -     Position/Device Used, Standing Balance unsupported  -           User Key  (r) = Recorded By, (t) = Taken By, (c) = Cosigned By    Initials Name Provider Type    Jaki El OT Occupational Therapist               Goals/Plan     Row Name 06/09/23 1340          Problem Specific Goal 1 (OT)    Problem Specific Goal 1 (OT) Pt to complete ADLs and transfers without hands on assist in prep to safe dc home.  -     Time Frame (Problem Specific Goal 1, OT) short term goal (STG);by discharge  -     Progress/Outcome (Problem Specific Goal 1, OT) goal met  -           User Key  (r) = Recorded By, (t) = Taken By, (c) = Cosigned By    Initials Name Provider Type    Jaki El OT Occupational Therapist               Clinical Impression     Row  Name 06/09/23 1338          Pain Assessment    Pretreatment Pain Rating 0/10 - no pain  -SM     Posttreatment Pain Rating 0/10 - no pain  -SM     Row Name 06/09/23 1338          Plan of Care Review    Plan of Care Reviewed With patient  -     Outcome Evaluation Pt is a 77 y.o male admitted with acute on chronic CHF. He was in the hospital to visit his wife following her orthopedic surgery when he became weak/SOB. Today he is able to complete ADLs and mobility in room SBA-SV level. He denies any weakness or fatique with activity. He is on RA and O2 sats above 90% throughout. He does have baseline balance/gait deficit which he reports he manages at home. PT consulted as well this hospital stay. No further OT needs at this time. Recommend home at [place].  -     Row Name 06/09/23 1338          Therapy Assessment/Plan (OT)    Therapy Frequency (OT) evaluation only  -     Row Name 06/09/23 1338          Therapy Plan Review/Discharge Plan (OT)    Anticipated Discharge Disposition (OT) home  -     Row Name 06/09/23 1338          Vital Signs    O2 Delivery Pre Treatment room air  -SM     O2 Delivery Intra Treatment room air  -SM     O2 Delivery Post Treatment room air  -     Row Name 06/09/23 1338          Positioning and Restraints    Pre-Treatment Position in bed  -     Post Treatment Position chair  -SM     In Chair reclined;call light within reach;encouraged to call for assist;notified nsg;exit alarm on  -           User Key  (r) = Recorded By, (t) = Taken By, (c) = Cosigned By    Initials Name Provider Type     Jaki Reno, OT Occupational Therapist               Outcome Measures     Row Name 06/09/23 1348          How much help from another is currently needed...    Putting on and taking off regular lower body clothing? 4  -SM     Bathing (including washing, rinsing, and drying) 4  -SM     Toileting (which includes using toilet bed pan or urinal) 4  -SM     Putting on and taking off regular upper body  clothing 4  -SM     Taking care of personal grooming (such as brushing teeth) 4  -SM     Eating meals 4  -SM     AM-PAC 6 Clicks Score (OT) 24  -SM     Row Name 06/09/23 1034          How much help from another person do you currently need...    Turning from your back to your side while in flat bed without using bedrails? 4  -ST     Moving from lying on back to sitting on the side of a flat bed without bedrails? 4  -ST     Moving to and from a bed to a chair (including a wheelchair)? 3  -ST     Standing up from a chair using your arms (e.g., wheelchair, bedside chair)? 4  -ST     Climbing 3-5 steps with a railing? 3  -ST     To walk in hospital room? 3  -ST     AM-PAC 6 Clicks Score (PT) 21  -ST     Highest level of mobility 6 --> Walked 10 steps or more  -ST     Row Name 06/09/23 1341 06/09/23 1034       Functional Assessment    Outcome Measure Options AM-PAC 6 Clicks Daily Activity (OT)  - AM-PAC 6 Clicks Basic Mobility (PT)  -          User Key  (r) = Recorded By, (t) = Taken By, (c) = Cosigned By    Initials Name Provider Type    Jaki El OT Occupational Therapist    Lisa Julian, PT Physical Therapist                Occupational Therapy Education     Title: PT OT SLP Therapies (In Progress)     Topic: Occupational Therapy (In Progress)     Point: ADL training (Done)     Description:   Instruct learner(s) on proper safety adaptation and remediation techniques during self care or transfers.   Instruct in proper use of assistive devices.              Learning Progress Summary           Patient Acceptance, E, VU by  at 6/9/2023 1341    Comment: safety with ADLs at [place], chronic management of CHF for ADLs/mobilty                   Point: Home exercise program (Not Started)     Description:   Instruct learner(s) on appropriate technique for monitoring, assisting and/or progressing therapeutic exercises/activities.              Learner Progress:  Not documented in this visit.          Point:  Precautions (Not Started)     Description:   Instruct learner(s) on prescribed precautions during self-care and functional transfers.              Learner Progress:  Not documented in this visit.          Point: Body mechanics (Not Started)     Description:   Instruct learner(s) on proper positioning and spine alignment during self-care, functional mobility activities and/or exercises.              Learner Progress:  Not documented in this visit.                      User Key     Initials Effective Dates Name Provider Type [facility] 04/02/20 -  Jaki Reno OT Occupational Therapist OT              OT Recommendation and Plan  Therapy Frequency (OT): evaluation only  Plan of Care Review  Plan of Care Reviewed With: patient  Outcome Evaluation: Pt is a 77 y.o male admitted with acute on chronic CHF. He was in the hospital to visit his wife following her orthopedic surgery when he became weak/SOB. Today he is able to complete ADLs and mobility in room SBA-SV level. He denies any weakness or fatique with activity. He is on RA and O2 sats above 90% throughout. He does have baseline balance/gait deficit which he reports he manages at home. PT consulted as well this hospital stay. No further OT needs at this time. Recommend home at [place].     Time Calculation:    Time Calculation- OT     Row Name 06/09/23 1346             Time Calculation- OT    OT Start Time 0907  -SM      OT Stop Time 0926  -SM      OT Time Calculation (min) 19 min  -SM      OT Received On 06/09/23  -         Untimed Charges    OT Eval/Re-eval Minutes 19  -SM         Total Minutes    Untimed Charges Total Minutes 19  -SM       Total Minutes 19  -SM            User Key  (r) = Recorded By, (t) = Taken By, (c) = Cosigned By    Initials Name Provider Type     Jaki Reno OT Occupational Therapist              Therapy Charges for Today     Code Description Service Date Service Provider Modifiers Qty    55247140290 HC OT EVAL LOW  COMPLEXITY 3 6/9/2023 Jaki Reno, OT GO 1               Jaki Reno OT  6/9/2023

## 2023-06-09 NOTE — PROGRESS NOTES
"    DAILY PROGRESS NOTE  [facility]    Patient Identification:  Name: Cody Eldridge  Age: 77 y.o.  Sex: male  :  1945  MRN: [id number]         Primary Care Physician: Patrick Diaz MD    Subjective:  Interval History: Feeling and breathing much better.  Denies chest pain problems swallowing nausea vomiting.  Denies any fever or chills.  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    Objective: Resting in bed in no apparent respiratory distress.  Conversational with no dyspnea noted.  Nontoxic.  No family present    Scheduled Meds:aspirin, 81 mg, Oral, Daily  carvedilol, 25 mg, Oral, BID With Meals  empagliflozin, 10 mg, Oral, Daily  furosemide, 40 mg, Oral, BID  ipratropium-albuterol, 3 mL, Nebulization, 4x Daily - RT  levothyroxine, 125 mcg, Oral, Daily  lisinopril, 20 mg, Oral, Daily  rosuvastatin, 10 mg, Oral, Daily  senna-docusate sodium, 2 tablet, Oral, BID  sertraline, 100 mg, Oral, Daily  sodium chloride, 10 mL, Intravenous, Q12H      Continuous Infusions:Pharmacy to dose warfarin,         Vital signs in last 24 hours:  Temp:  [96.3 °F (35.7 °C)-97.9 °F (36.6 °C)] 97.8 °F (36.6 °C)  Heart Rate:  [] 108  Resp:  [16-18] 16  BP: (151-182)/() 155/107    Intake/Output:    Intake/Output Summary (Last 24 hours) at 2023 0904  Last data filed at 2023 0730  Gross per 24 hour   Intake 0 ml   Output 3375 ml   Net -3375 ml       Exam:  BP (!) 155/107 (BP Location: Left arm, Patient Position: Lying)   Pulse 108   Temp 97.8 °F (36.6 °C) (Oral)   Resp 16   Ht 182.9 cm (72\")   Wt 72.5 kg (159 lb 14.4 oz)   SpO2 90%   BMI 21.69 kg/m²     General Appearance:    Alert, cooperative, AAOx3                         Throat:   Oral mucosa pink and moist                           Neck:   Supple, symmetrical, trachea midline, no JVD                         Lungs:   Decreased bases with improved air entry to auscultation bilaterally, " respirations unlabored with conversation                 Chest Wall:    No tenderness or deformity                          Heart:    Irregularly irregular rate and rhythm, S1 and S2 normal, no murmur, no rub or gallop                  Abdomen:     Soft, nontender, bowel sounds active,                 Extremities:   Trace edema                        Pulses:   Pulses palpable in lower extremities                               Data Review:  Labs in chart were reviewed.    Assessment:  Active Hospital Problems    Diagnosis  POA    **Acute on chronic diastolic heart failure [I50.33]  Yes    Severe pulmonary hypertension [I27.20]  Yes    Valvular heart disease [I38]  Yes    Hypertensive emergency [I16.1]  Yes    Stage 3a chronic kidney disease [N18.31]  Yes    Acquired hypothyroidism [E03.9]  Yes    COPD (chronic obstructive pulmonary disease) [J44.9]  Yes    SHAR (obstructive sleep apnea) [G47.33]  Yes    Severe tricuspid regurgitation by prior echocardiogram [I07.1]  Yes    Other persistent atrial fibrillation (HCC) [I48.19]  Yes    Essential hypertension [I10]  Yes    Mixed hyperlipidemia [E78.2]  Yes      Resolved Hospital Problems   No resolved problems to display.       Plan:    Acute on chronic diastolic CHF with valvular disease and severe pulmonary hypertension/COPD   -IV Lasix already transition to p.o.   -Currently on room air and clinically feels much better denies dyspnea      Hypertensive emergency resolved though pressure still poorly controlled   -Driven by medical noncompliance   -Continue Coreg lisinopril -add amlodipine   -Current /107   -Affected also by CKD 3a   -Question compliance with CPAP which would also drive refractory hypertension    PAF-rate not optimized-AC with INR of 1.9    HLD on statin    Hypothyroid -probably noncompliant with levothyroxine -currently dosed at 125 mcg and holding any changes today.  Will check T3 and T4 in a.m. if patient remains in house otherwise will defer  that to PCP    Disposition -patient claims to be stable on feet and claims to be back to baseline in regards to shortness of breath.  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  I do think he clinically looks much more stable and are awaiting further cardiac input in regards to possible discharge today        Addendum -notified by RN that patient's systolic numbers are now in the 80s.  He did not receive the amlodipine as I did add that to his regimen today and he is currently on Coreg and lisinopril.  I think we need to watch this ricardo overnight to see how he trends regarding these pressures    Malick Miller MD  6/9/2023  09:04 EDT

## 2023-06-09 NOTE — PLAN OF CARE
Goal Outcome Evaluation: Patient being given diuretics and he's having a lot of urine out put. BP on the high side latest was 152/104 due medication given. No complaints made ,will continue to monitor.

## 2023-06-09 NOTE — PAYOR COMM NOTE
"Cody Eldridge (77 y.o. Male)          OBSERVATION REQUEST FOR [id number]  DX - N28.9    [facility] NPI - [id number]    ATTENDING MD MALICK BARRAGAN, NPI - [id number]    CONTACT JAZZY KOVACS# [phone]             Date of Birth   1945    Social Security Number       Address   [address] [place] [address] [address] [place] [address]    Home Phone   [phone]    MRN   [id number]       [place]   [facility]    Marital Status                               Admission Date   6/8/23    Admission Type   Emergency    Admitting Provider   Bety Cho MD    Attending Provider   Malick Barragan MD    Department, Room/Bed   [facility] 4 EAST, E462/1       Discharge Date       Discharge Disposition       Discharge Destination                                 Attending Provider: Malick Barragan MD    Allergies: Benadryl [Diphenhydramine Hcl]    Isolation: None   Infection: None   Code Status: CPR    Ht: 182.9 cm (72\")   Wt: 72.5 kg (159 lb 14.4 oz)    Admission Cmt: None   Principal Problem: Acute on chronic diastolic heart failure [I50.33]                   Active Insurance as of 6/8/2023       Primary Coverage       Payor Plan Insurance Group Employer/Plan Group    [facility] MEDICARE REPLACEMENT [facility] MEDICARE REPLACEMENT [id number]       Payor Plan Address Payor Plan Phone Number Payor Plan Fax Number Effective Dates     [address] [address] [phone]  1/20/2016 - None Entered    [facility] [address]         Subscriber Name Subscriber Birth Date Member ID       CODY ELDRIDGE 1945 [id number]                     Emergency Contacts        (Rel.) Home Phone Work Phone Mobile Phone    Elise Rai (Significant Other) [phone] -- --    Jo Graves (Grandchild) [phone] -- --    CHAD FERRARA (Daughter) [phone] -- --              [place]: NPI [id number]  Tax ID [id number]     History & Physical        Bety Cho MD at 06/08/23 1745        "   PCP: Patrick Diaz MD    Chief complaint   Chief Complaint   Patient presents with    Fatigue     X 1 week, family nurse told pt to be seen in ER    Shortness of Breath     Told to be seen by cardio       HPI  Patient is a 77 y.o. male presents with h/o CHF, Afib, SHAR, CHF, who presented to the ER due to increasing fatigue for a week and shortness of breath.  Patient's wife has been in the hospital and he has had increasing fatigue and shortness of breath.  Family states that he is more short of breath although patient can sometimes deny.  He states he watches his weight every morning.  Denies any cough.  His troponins are elevated but denies any chest pain.  He supposedly quit taking Lasix 4/13.  An echo in January showed an EF of 59% with moderate MR and grade 2 diastolic dysfunction, severe TR and severe pulmonary hypertension.  Blood pressure in the ER was 191/138.  Family states that he is more confused as well.  He cannot remember which medications he is already taken today    PAST MEDICAL HISTORY  Past Medical History:   Diagnosis Date    Acquired hypothyroidism     Acute renal injury     Anemia associated with chemotherapy     Atrial fibrillation     CAD (coronary artery disease)     CHF (congestive heart failure)     Chronic bronchitis     COPD (chronic obstructive pulmonary disease)     Cor pulmonale (chronic)     Daytime hypersomnia     Depression with anxiety     IRAHETA (dyspnea on exertion)     Enlarged prostate     Frequent nocturnal awakening     Gout     H/O cardiac radiofrequency ablation 2006    [facility].    Head and neck cancer     Hypertension     Hypotension     Insomnia     Lumbar radicular pain     SHAR (obstructive sleep apnea)     Osteoarthritis     Permanent atrial fibrillation     Shock, septic     Snoring     Squamous cell carcinoma of neck     SVT (supraventricular tachycardia)     Syncope     Vitamin D deficiency     Weight loss        PAST SURGICAL HISTORY  Past Surgical History:    Procedure Laterality Date    APPENDECTOMY      CARDIAC ABLATION      [facility]    CARDIAC CATHETERIZATION N/A 2018    Procedure: Left Heart Cath;  Surgeon: Yousif Uribe MD;  [facility];  Service: Cardiology    CARDIAC CATHETERIZATION N/A 2018    Procedure: Coronary angiography;  Surgeon: Yousif Urieb MD;  [facility];  Service: Cardiology    CARDIAC CATHETERIZATION N/A 2018    Procedure: Left ventriculography;  Surgeon: Yousif Uribe MD;  [facility];  Service: Cardiology    FINGER SURGERY      FOOT SURGERY      HAND SURGERY      VENOUS ACCESS DEVICE (PORT) INSERTION Right 2019    Procedure: MEDIPORT PLACEMENT;  Surgeon: Elvis Grigsby MD;  Location: [facility] MAIN OR;  Service: Vascular    VENOUS ACCESS DEVICE (PORT) REMOVAL Right 2019    Procedure: REMOVAL VENOUS ACCESS DEVICE;  Surgeon: Prince Castaneda MD;  Location: [facility] MAIN OR;  Service: Vascular       FAMILY HISTORY  Family History   Problem Relation Age of Onset    Heart attack Mother     Diabetes Mother     Heart disease Mother     Hypertension Mother     Anxiety disorder Mother     Depression Mother     Diabetes Father     Heart failure Father     Heart disease Father     Hypertension Father     Cancer Brother         colon    Hypertension Brother     Colon cancer Brother 50    Depression Brother     Anxiety disorder Brother     Alcohol abuse Brother     Diabetes Sister     Heart disease Sister     Hypertension Sister     COPD Other     Heart failure Other     Heart disease Other     Kidney disease Sister     Anxiety disorder Sister     Malig Hyperthermia Neg Hx        SOCIAL HISTORY  Social History     Tobacco Use    Smoking status: Former     Packs/day: 3.00     Years: 30.00     Pack years: 90.00     Types: Cigarettes     Quit date: 2000     Years since quittin.4    Smokeless tobacco: Never    Tobacco comments:      started age 11 x 53 years stopped 2000 / daily caffeine- Coke   Vaping Use    Vaping Use: Never used   Substance Use Topics    Alcohol use: Not Currently     Alcohol/week: 2.0 standard drinks     Types: 1 Glasses of wine, 1 Cans of beer per week     Comment: occ    Drug use: No       MEDICATIONS:  Medications Prior to Admission   Medication Sig Dispense Refill Last Dose    ALPRAZolam (XANAX) 0.25 MG tablet TAKE 1 TABLET BY MOUTH TWICE DAILY AS NEEDED FOR ANXIETY (Patient taking differently: Take 1 tablet by mouth 2 (Two) Times a Day As Needed.) 60 tablet 0 6/8/2023    aspirin 81 MG chewable tablet Chew 1 tablet Daily.   6/8/2023    carvedilol (COREG) 25 MG tablet Take 1 tablet by mouth 2 (Two) Times a Day With Meals. 180 tablet 3 6/8/2023    levothyroxine (SYNTHROID, LEVOTHROID) 125 MCG tablet TAKE 1 TABLET BY MOUTH DAILY 90 tablet 0 6/8/2023    lisinopril (PRINIVIL,ZESTRIL) 20 MG tablet Take 1 tablet by mouth Daily. 90 tablet 1 6/8/2023    oxyCODONE-acetaminophen (PERCOCET) 7.5-325 MG per tablet Take 1 tablet by mouth Every 6 (Six) Hours As Needed.   6/8/2023    rosuvastatin (CRESTOR) 20 MG tablet Take 10 mg by mouth Daily. 30 tablet 5 6/7/2023    sertraline (ZOLOFT) 100 MG tablet TAKE 1 TABLET BY MOUTH DAILY 90 tablet 3 6/7/2023    warfarin (COUMADIN) 2 MG tablet TAKE ONE TABLET BY MOUTH DAILY OR AS DIRECTED (Patient taking differently: Take 1 tablet by mouth See Admin Instructions. 1 tablet daily on Sunday, Monday, Wednesday, Thursday, and Saturday) 120 tablet 1 6/7/2023    warfarin (COUMADIN) 2 MG tablet Take 2 tablets by mouth See Admin Instructions. 2 tablets (4 mg) On Tuesdays and Fridays   Past Week    albuterol (PROVENTIL HFA;VENTOLIN HFA) 108 (90 BASE) MCG/ACT inhaler Inhale 2 puffs Every 4 (Four) Hours As Needed.   More than a month       Allergies:  Benadryl [diphenhydramine hcl]    Review of Systems:  Unable to obtain due to confusion    Vital Signs  Temp:  [96.3 °F (35.7 °C)-97.5 °F (36.4 °C)] 97.5 °F  "(36.4 °C)  Heart Rate:  [] 106  Resp:  [16-18] 16  BP: (151-182)/() 162/96  Flowsheet Rows      Flowsheet Row First Filed Value   Admission Height 182.9 cm (72\") Documented at 06/08/2023 1209   Admission Weight 82.1 kg (181 lb) Documented at 06/08/2023 1209           Body mass index is 22.42 kg/m².  91% on room air    Physical Exam:  General Appearance:    awake, cooperative, in no acute distress, irritated and wants to go home   Head:    Normocephalic, without obvious abnormality, atraumatic   Eyes:         conjunctivae and sclerae normal, no icterus, PERRLA   ENT:    Ears grossly intact, oral mucosa moist,   Neck:   No adenopathy, supple, trachea midline,        Lungs:   Creased breath sounds in the bases, rhonchi, tachypneic,,respirations regular, even and           labored    Heart:  Irregularlty regular and normal rate,  no murmur, normal S1 and S2,   Abdomen:     Normal bowel sounds, no masses,  soft non-tender, non-distended,    Extremities:   Moves all extremities , no cyanosis, 1+  edema,             Pulses:   Pulses palpable and equal bilaterally   Skin:   No bleeding, rash,   +bruising    Neurologic:    Psych:   Cranial nerves 2 - 12 grossly intact, sensation grossly intact,     Moves all extremities well, equal bilateral strength 4/5    Alert and Oriented x 2, Normal Affect    I washed/sanitized my hands before entering the room and immediately upon leaving the room.    LABS:  Admission on 06/08/2023   Component Date Value Ref Range Status    QT Interval 06/08/2023 365  ms Final    Glucose 06/08/2023 128 (H)  65 - 99 mg/dL Final    BUN 06/08/2023 26 (H)  8 - 23 mg/dL Final    Creatinine 06/08/2023 1.52 (H)  0.76 - 1.27 mg/dL Final    Sodium 06/08/2023 134 (L)  136 - 145 mmol/L Final    Potassium 06/08/2023 4.3  3.5 - 5.2 mmol/L Final    Chloride 06/08/2023 101  98 - 107 mmol/L Final    CO2 06/08/2023 22.6  22.0 - 29.0 mmol/L Final    Calcium 06/08/2023 9.1  8.6 - 10.5 mg/dL Final    Total " Protein 06/08/2023 7.0  6.0 - 8.5 g/dL Final    Albumin 06/08/2023 3.7  3.5 - 5.2 g/dL Final    ALT (SGPT) 06/08/2023 17  1 - 41 U/L Final    AST (SGOT) 06/08/2023 22  1 - 40 U/L Final    Alkaline Phosphatase 06/08/2023 83  39 - 117 U/L Final    Total Bilirubin 06/08/2023 1.1  0.0 - 1.2 mg/dL Final    Globulin 06/08/2023 3.3  gm/dL Final    A/G Ratio 06/08/2023 1.1  g/dL Final    BUN/Creatinine Ratio 06/08/2023 17.1  7.0 - 25.0 Final    Anion Gap 06/08/2023 10.4  5.0 - 15.0 mmol/L Final    eGFR 06/08/2023 46.6 (L)  >60.0 mL/min/1.73 Final    proBNP 06/08/2023 7,702.0 (H)  0.0 - 1,800.0 pg/mL Final    HS Troponin T 06/08/2023 49 (H)  <15 ng/L Final    Color, UA 06/08/2023 Yellow  Yellow, Straw Final    Appearance, UA 06/08/2023 Clear  Clear Final    pH, UA 06/08/2023 5.5  5.0 - 8.0 Final    Specific Gravity, UA 06/08/2023 1.019  1.005 - 1.030 Final    Glucose, UA 06/08/2023 Negative  Negative Final    Ketones, UA 06/08/2023 Negative  Negative Final    Bilirubin, UA 06/08/2023 Negative  Negative Final    Blood, UA 06/08/2023 Negative  Negative Final    Protein, UA 06/08/2023 100 mg/dL (2+) (A)  Negative Final    Leuk Esterase, UA 06/08/2023 Negative  Negative Final    Nitrite, UA 06/08/2023 Negative  Negative Final    Urobilinogen, UA 06/08/2023 1.0 E.U./dL  0.2 - 1.0 E.U./dL Final    COVID19 06/08/2023 Not Detected  Not Detected - Ref. Range Final    Influenza A PCR 06/08/2023 Not Detected  Not Detected Final    Influenza B PCR 06/08/2023 Not Detected  Not Detected Final    Protime 06/08/2023 20.3 (H)  11.7 - 14.2 Seconds Final    INR 06/08/2023 1.70 (H)  0.90 - 1.10 Final    Extra Tube 06/08/2023 Hold for add-ons.   Final    Auto resulted.    Extra Tube 06/08/2023 hold for add-on   Final    Auto resulted    Extra Tube 06/08/2023 Hold for add-ons.   Final    Auto resulted.    Extra Tube 06/08/2023 Hold for add-ons.   Final    Auto resulted    WBC 06/08/2023 5.63  3.40 - 10.80 10*3/mm3 Final    RBC 06/08/2023 4.38   4.14 - 5.80 10*6/mm3 Final    Hemoglobin 06/08/2023 13.7  13.0 - 17.7 g/dL Final    Hematocrit 06/08/2023 40.8  37.5 - 51.0 % Final    MCV 06/08/2023 93.2  79.0 - 97.0 fL Final    MCH 06/08/2023 31.3  26.6 - 33.0 pg Final    MCHC 06/08/2023 33.6  31.5 - 35.7 g/dL Final    RDW 06/08/2023 12.2 (L)  12.3 - 15.4 % Final    RDW-SD 06/08/2023 41.9  37.0 - 54.0 fl Final    MPV 06/08/2023 11.0  6.0 - 12.0 fL Final    Platelets 06/08/2023 140  140 - 450 10*3/mm3 Final    Neutrophil % 06/08/2023 75.9  42.7 - 76.0 % Final    Lymphocyte % 06/08/2023 12.8 (L)  19.6 - 45.3 % Final    Monocyte % 06/08/2023 9.2  5.0 - 12.0 % Final    Eosinophil % 06/08/2023 0.9  0.3 - 6.2 % Final    Basophil % 06/08/2023 0.7  0.0 - 1.5 % Final    Neutrophils, Absolute 06/08/2023 4.27  1.70 - 7.00 10*3/mm3 Final    Lymphocytes, Absolute 06/08/2023 0.72  0.70 - 3.10 10*3/mm3 Final    Monocytes, Absolute 06/08/2023 0.52  0.10 - 0.90 10*3/mm3 Final    Eosinophils, Absolute 06/08/2023 0.05  0.00 - 0.40 10*3/mm3 Final    Basophils, Absolute 06/08/2023 0.04  0.00 - 0.20 10*3/mm3 Final    RBC, UA 06/08/2023 None Seen  None Seen, 0-2 /HPF Final    WBC, UA 06/08/2023 0-2  None Seen, 0-2 /HPF Final    Bacteria, UA 06/08/2023 None Seen  None Seen /HPF Final    Squamous Epithelial Cells, UA 06/08/2023 0-2  None Seen, 0-2 /HPF Final    Hyaline Casts, UA 06/08/2023 3-6  None Seen /LPF Final    Methodology 06/08/2023 Manual Light Microscopy   Final    HS Troponin T 06/08/2023 38 (H)  <15 ng/L Final         DIAGNOSTICS:  XR Chest 1 View    Result Date: 6/8/2023  Minimal likely atelectasis in left lower lung. Borderline heart size with slight prominence of vascular markings.  This report was finalized on 6/8/2023 12:59 PM by Dr. Josep Garcia M.D.            Results Review:   I reviewed the patient's new clinical results.  Discussed with ER physician  Old records reviewed / Medical Decision Making High Complexity  I personally viewed and interpreted the  patient's EKG/Telemetry data- sinus rhythm      ASSESSMENT AND PLAN    Acute on chronic diastolic heart failure    Mixed hyperlipidemia    Essential hypertension    Other persistent atrial fibrillation (HCC)    Severe tricuspid regurgitation by prior echocardiogram    SHAR (obstructive sleep apnea)    COPD (chronic obstructive pulmonary disease)    Acquired hypothyroidism    Stage 3a chronic kidney disease    Severe pulmonary hypertension    Valvular heart disease     Worsening IRAHETA/ fatigue  with Severe pulmonary hypertension and copd and CHF  -  Acute on chronic diastolic heart failure with worsening TR now severe with worsening pulm HTN  -pt stopped lasix 4/13/23, and visiting wife (hospitalized here) and likely eating higher salt foods and BP severely elevated (poss cause or could be effect of volume overload)  -s/p lasix 60 IV-has been off Lasix since April  -restart BB  -added isordil prn for heart failure  -Consider having nutrition to see the patient for discharge    HTN emergency  -191/138, 182/132, 151/114   -BP meds given  on coreg, lisinopril   -added bidil prn     Elev Trop possibly 2/2 to ckd  -monitor. Recheck.       Stage 3a chronic kidney disease  -adjust meds as needed.       Severe tricuspid regurgitation by prior echocardiogram  -cardiology following-echo done in January.  May need to have reevaluated will defer to cardiology     Persistent atrial fibrillation   on coreg,  and coumadin.     Chronic medical conditions being monitored- stable, continue medical management  - SHAR (obstructive sleep apnea)  - COPD (chronic obstructive pulmonary disease)    +DVT proph:    coumadin  + CODE STATUS: Full     I discussed the patient's findings and my recommendations with the patient and/or family.  Please reference all orders placed.    Bety hCo MD  06/08/23  17:45 EDT      This document is intended for medical expert use only. Reading of this document by patients and/or patient's family without  participating in medical staff guidance may result in misinterpretation and unintended morbidity. Any interpretation of such data is the responsibility of the patient and/or family member responsible for the patient in concert with their primary or specialist providers, and NOT to be left for sources of online searches such as [employer], [employer] or similar queries. Relying on these approaches to knowledge may result in misinterpretation, misguided goals of care and even death should patients or family members try recommendations outside of the realm of professional medical care in a supervised way.    Dictated utilizing Voice dictation:  Parts of this note may be an electronic transcription/translation of spoke language to printed text using Dragon dictation system.    Electronically signed by Bety Cho MD at 06/08/23 5685

## 2023-06-10 ENCOUNTER — READMISSION MANAGEMENT (OUTPATIENT)
Dept: CALL CENTER | Facility: HOSPITAL | Age: 78
End: 2023-06-10
Payer: MEDICARE

## 2023-06-10 ENCOUNTER — HOME HEALTH ADMISSION (OUTPATIENT)
Dept: HOME HEALTH SERVICES | Facility: HOME HEALTHCARE | Age: 78
End: 2023-06-10
Payer: MEDICARE

## 2023-06-10 VITALS
OXYGEN SATURATION: 95 % | WEIGHT: 150.7 LBS | HEIGHT: 72 IN | SYSTOLIC BLOOD PRESSURE: 142 MMHG | BODY MASS INDEX: 20.41 KG/M2 | RESPIRATION RATE: 20 BRPM | TEMPERATURE: 97.5 F | HEART RATE: 118 BPM | DIASTOLIC BLOOD PRESSURE: 98 MMHG

## 2023-06-10 LAB
ANION GAP SERPL CALCULATED.3IONS-SCNC: 9.8 MMOL/L (ref 5–15)
BUN SERPL-MCNC: 27 MG/DL (ref 8–23)
BUN/CREAT SERPL: 18.6 (ref 7–25)
CALCIUM SPEC-SCNC: 8.9 MG/DL (ref 8.6–10.5)
CHLORIDE SERPL-SCNC: 97 MMOL/L (ref 98–107)
CO2 SERPL-SCNC: 29.2 MMOL/L (ref 22–29)
CREAT SERPL-MCNC: 1.45 MG/DL (ref 0.76–1.27)
EGFRCR SERPLBLD CKD-EPI 2021: 49.3 ML/MIN/1.73
GLUCOSE SERPL-MCNC: 103 MG/DL (ref 65–99)
INR PPP: 2.3 (ref 0.9–1.1)
POTASSIUM SERPL-SCNC: 3.5 MMOL/L (ref 3.5–5.2)
POTASSIUM SERPL-SCNC: 3.7 MMOL/L (ref 3.5–5.2)
PROTHROMBIN TIME: 25.8 SECONDS (ref 11.7–14.2)
SODIUM SERPL-SCNC: 136 MMOL/L (ref 136–145)
T3FREE SERPL-MCNC: 1.34 PG/ML (ref 2–4.4)
T4 FREE SERPL-MCNC: 0.81 NG/DL (ref 0.93–1.7)
URATE SERPL-MCNC: 7 MG/DL (ref 3.4–7)

## 2023-06-10 PROCEDURE — 85610 PROTHROMBIN TIME: CPT | Performed by: INTERNAL MEDICINE

## 2023-06-10 PROCEDURE — 84481 FREE ASSAY (FT-3): CPT | Performed by: HOSPITALIST

## 2023-06-10 PROCEDURE — 84550 ASSAY OF BLOOD/URIC ACID: CPT | Performed by: HOSPITALIST

## 2023-06-10 PROCEDURE — 84439 ASSAY OF FREE THYROXINE: CPT | Performed by: HOSPITALIST

## 2023-06-10 PROCEDURE — 80048 BASIC METABOLIC PNL TOTAL CA: CPT | Performed by: HOSPITALIST

## 2023-06-10 PROCEDURE — G0378 HOSPITAL OBSERVATION PER HR: HCPCS

## 2023-06-10 RX ORDER — LISINOPRIL 10 MG/1
10 TABLET ORAL DAILY
Start: 2023-06-11 | End: 2023-06-16 | Stop reason: SDUPTHER

## 2023-06-10 RX ORDER — FUROSEMIDE 40 MG/1
40 TABLET ORAL 2 TIMES DAILY
Qty: 60 TABLET | Refills: 0 | Status: SHIPPED | OUTPATIENT
Start: 2023-06-10 | End: 2023-06-16 | Stop reason: SDUPTHER

## 2023-06-10 RX ADMIN — FUROSEMIDE 40 MG: 40 TABLET ORAL at 08:32

## 2023-06-10 RX ADMIN — ASPIRIN 81 MG: 81 TABLET, CHEWABLE ORAL at 10:59

## 2023-06-10 RX ADMIN — ROSUVASTATIN CALCIUM 10 MG: 10 TABLET, FILM COATED ORAL at 08:32

## 2023-06-10 RX ADMIN — CARVEDILOL 25 MG: 25 TABLET, FILM COATED ORAL at 08:32

## 2023-06-10 RX ADMIN — Medication 10 ML: at 09:00

## 2023-06-10 RX ADMIN — LEVOTHYROXINE SODIUM 125 MCG: 0.12 TABLET ORAL at 08:32

## 2023-06-10 RX ADMIN — LISINOPRIL 10 MG: 10 TABLET ORAL at 08:32

## 2023-06-10 RX ADMIN — SERTRALINE 100 MG: 100 TABLET, FILM COATED ORAL at 08:32

## 2023-06-10 RX ADMIN — EMPAGLIFLOZIN 10 MG: 10 TABLET, FILM COATED ORAL at 08:32

## 2023-06-10 NOTE — OUTREACH NOTE
Prep Survey      Flowsheet Row Responses   [facility] [place] patient discharged from? [place]   Is LACE score < 7 ? No   Eligibility [facility]   Date of Admission 06/08/23   Date of Discharge 06/10/23   Discharge Disposition Home or Self Care   Discharge diagnosis Acute on chronic diastolic heart failure   Does the patient have one of the following disease processes/diagnoses(primary or secondary)? CHF   Does the patient have Home health ordered? Yes   What is the Home health agency?  [facility]   Is there a DME ordered? No   Prep survey completed? Yes            Leslye BELTRAN - Registered Nurse

## 2023-06-10 NOTE — CASE MANAGEMENT/SOCIAL WORK
Continued Stay Note  [facility]     Patient Name: Cody Eldridge  MRN: [id number]  Today's Date: 6/10/2023    Admit Date: 6/8/2023    Plan: Home with [facility], [facility]   Discharge Plan       Row Name 06/10/23 1101       Plan    Plan Home with [facility]    Patient/Family in Agreement with Plan yes    Plan Comments CCP noted orders DC home with home health. Called into pt room and spoke with pt, introduced self and explained CCP role. Pt agreeable to home health, would like to start with [facility] referral but ok with others if they do not accept. Referral placed, accepted per [place]. Fran CCP                   Discharge Codes    No documentation.                 Expected Discharge Date and Time       Expected Discharge Date Expected Discharge Time    Kilo 10, 2023               Fran Rodriguez RN

## 2023-06-10 NOTE — DISCHARGE PLACEMENT REQUEST
"Cody Eldridge (77 y.o. Male)       Date of Birth   1945    Social Security Number       Address   [address] [place] [address]    Home Phone   [phone]    MRN   [id number]       [facility]    Marital Status                               Admission Date   6/8/23    Admission Type   Emergency    Admitting Provider   Bety Cho MD    Attending Provider   Malick Miller MD    Department, Room/Bed   [address], E462/1       Discharge Date       Discharge Disposition   Home or Self Care    Discharge Destination                                 Attending Provider: Malick Miller MD    Allergies: Benadryl [Diphenhydramine Hcl]    Isolation: None   Infection: None   Code Status: CPR    Ht: 182.9 cm (72\")   Wt: 68.4 kg (150 lb 11.2 oz)    Admission Cmt: None   Principal Problem: Acute on chronic diastolic heart failure [I50.33]                   Active Insurance as of 6/8/2023       Primary Coverage       Payor Plan Insurance Group Employer/Plan Group    [facility] MEDICARE REPLACEMENT [facility] MEDICARE REPLACEMENT [id number]       Payor Plan Address Payor Plan Phone Number Payor Plan Fax Number Effective Dates    [address] [address] [phone]  1/20/2016 - None Entered    [facility] [address]         Subscriber Name Subscriber Birth Date Member ID       CODY ELDRIDGE 1945 [id number]                     Emergency Contacts        (Rel.) Home Phone Work Phone Mobile Phone    Elise Rai (Significant Other) [phone] -- --    Jo Graves (Grandchild) [phone] -- --    CHAD FERRARA (Daughter) [phone] -- --                "

## 2023-06-10 NOTE — DISCHARGE SUMMARY
[facility]    Date of Discharge:  6/10/2023    PCP: Patrick Diaz MD    Discharge Diagnosis:   Active Hospital Problems    Diagnosis  POA    **Acute on chronic diastolic heart failure [I50.33]  Yes    Severe pulmonary hypertension [I27.20]  Yes    Valvular heart disease [I38]  Yes    Hypertensive emergency [I16.1]  Yes    Stage 3a chronic kidney disease [N18.31]  Yes    Acquired hypothyroidism [E03.9]  Yes    COPD (chronic obstructive pulmonary disease) [J44.9]  Yes    SHAR (obstructive sleep apnea) [G47.33]  Yes    Severe tricuspid regurgitation by prior echocardiogram [I07.1]  Yes    Other persistent atrial fibrillation (HCC) [I48.19]  Yes    Essential hypertension [I10]  Yes    Mixed hyperlipidemia [E78.2]  Yes      Resolved Hospital Problems   No resolved problems to display.          Consults       Date and Time Order Name Status Description    6/8/2023  5:39 PM Inpatient Cardiology Consult Completed           Hospital Course  77 y.o. male initially admitted for shortness of breath and fatigue.  Etiology is secondary to acute on chronic diastolic CHF made worse by valvular disease and severe pulmonary hypertension/COPD without acute exacerbation.  Patient was diuresed with IV Lasix and has been transitioned to p.o. per cardiology recommendations with Lasix increased in dosing and frequency.  Aspects of hypertensive emergency have resolved and is blood pressure control has been very labile.  It was quite elevated and I was going to add amlodipine but then the patient's blood pressure started to bottom out where his systolic was in the 80s and while I was prepping for discharge that day, I elected to hold the patient overnight to ensure that his vital I stabilized prior to discharge.  He definitely has come back up in regards to his blood pressure which is more reassuring in regards to discharge as it was 145/98 this morning.  Cardiology is okay with  discharge from their position continuing the patient on Coreg and lisinopril which is also been adjusted due to blood pressure trends as well as CKD 3A.  There is concern about medical noncompliance in an outpatient setting but he reassures me he is taking all his medications correctly.  I am not so sure it is definitely an issue of noncompliance as he tries to set his medications up correctly but I am just not sure he is doing things correctly so I am ordering home health for PT at discharge as well as nursing to help with medication compliance.  His paroxysmal A-fib is anticoagulated with Coumadin with a therapeutic INR.  His HLD is stable on a statin and I am not making any further changes to his thyroid medication as he is currently supposed to be on 125 mcg but his labs are very consistent with hypothyroidism which would can contribute to his above symptoms.  I would like to see how this patient does with home health for medication education over the next 4 weeks and will defer to PCP to recheck TSH and T3 and T4 levels at that time and make further adjustments or increase in levothyroxine if needed but I discharged him to home on his normal dose at 125 mcg.  PT is evaluated the patient and CCP has evaluated there for discharge needs and I will order home health as stated above.      Temp:  [97.5 °F (36.4 °C)-98 °F (36.7 °C)] 97.5 °F (36.4 °C)  Heart Rate:  [] 118  Resp:  [16-20] 20  BP: ()/(51-98) 142/98  Body mass index is 20.44 kg/m².    Physical Exam  Constitutional:       Comments: Conversational pleasant and appreciative   HENT:      Head: Normocephalic.      Nose: Nose normal.      Mouth/Throat:      Mouth: Mucous membranes are moist.      Pharynx: Oropharynx is clear.   Cardiovascular:      Rate and Rhythm: Normal rate and regular rhythm.   Pulmonary:      Effort: Pulmonary effort is normal. No respiratory distress.      Breath sounds: Normal breath sounds.   Abdominal:      General: Bowel  sounds are normal. There is no distension.      Palpations: Abdomen is soft.      Tenderness: There is no abdominal tenderness.   Musculoskeletal:         General: No swelling.   Skin:     General: Skin is warm and dry.      Coloration: Skin is not jaundiced.   Neurological:      Mental Status: He is alert and oriented to person, place, and time. Mental status is at baseline.      Cranial Nerves: No cranial nerve deficit.   Psychiatric:         Mood and Affect: Mood normal.         Behavior: Behavior normal.         Thought Content: Thought content normal.         Judgment: Judgment normal.     Disposition: Home or Self Care       Discharge Medications        New Medications        Instructions Start Date   empagliflozin 10 MG tablet tablet  Commonly known as: JARDIANCE   10 mg, Oral, Daily   Start Date: June 11, 2023     furosemide 40 MG tablet  Commonly known as: LASIX   40 mg, Oral, 2 Times Daily             Changes to Medications        Instructions Start Date   ALPRAZolam 0.25 MG tablet  Commonly known as: XANAX  What changed: additional instructions   TAKE 1 TABLET BY MOUTH TWICE DAILY AS NEEDED FOR ANXIETY      lisinopril 10 MG tablet  Commonly known as: PRINIVIL,ZESTRIL  What changed:   medication strength  how much to take   10 mg, Oral, Daily   Start Date: June 11, 2023     warfarin 2 MG tablet  Commonly known as: COUMADIN  What changed: Another medication with the same name was changed. Make sure you understand how and when to take each.   4 mg, Oral, See Admin Instructions, 2 tablets (4 mg) On Tuesdays and Fridays       warfarin 2 MG tablet  Commonly known as: COUMADIN  What changed:   how much to take  how to take this  when to take this  additional instructions   TAKE ONE TABLET BY MOUTH DAILY OR AS DIRECTED             Continue These Medications        Instructions Start Date   albuterol sulfate  (90 Base) MCG/ACT inhaler  Commonly known as: PROVENTIL HFA;VENTOLIN HFA;PROAIR HFA   2 puffs,  Inhalation, Every 4 Hours PRN      aspirin 81 MG chewable tablet   81 mg, Oral, Daily      carvedilol 25 MG tablet  Commonly known as: COREG   25 mg, Oral, 2 Times Daily With Meals      levothyroxine 125 MCG tablet  Commonly known as: SYNTHROID, LEVOTHROID   125 mcg, Oral, Daily      oxyCODONE-acetaminophen 7.5-325 MG per tablet  Commonly known as: PERCOCET   1 tablet, Oral, Every 6 Hours PRN      rosuvastatin 20 MG tablet  Commonly known as: CRESTOR   10 mg, Oral, Daily      sertraline 100 MG tablet  Commonly known as: ZOLOFT   100 mg, Oral, Daily              Diet Instructions       Diet: Cardiac Diets; Low Sodium (2g)      Discharge Diet: Cardiac Diets    Cardiac Diet: Low Sodium (2g)    Texture: Regular Texture (IDDSI 7)    Fluid Consistency: Thin (IDDSI 0)            Additional Instructions for the Follow-ups that You Need to Schedule       Discharge Follow-up with PCP   As directed       Currently Documented PCP:    Patrick Diaz MD    PCP Phone Number:    [phone]     Follow Up Details: Cardiology per their recommendations.  PCP 2 weeks.                Follow-up Information       Patrick Diaz MD. Schedule an appointment as soon as possible for a visit in 2 days.    Specialty: Family Medicine  Contact information:  [address] [facility] [address]  [place] [id number] [phone]               Sarah Peterson MD. Schedule an appointment as soon as possible for a visit in 2 days.    Specialty: Cardiology  Contact information:  [address] [facility] [address]  [place] [id number] [phone]               Patrick Diaz MD .    Specialty: Family Medicine  Why: Cardiology per their recommendations.  PCP 2 weeks.  Contact information:  [address] [facility] [address]  [place] [address]  [phone]                            Future Appointments   Date Time Provider Department Center   6/16/2023  3:30 PM Silvina Tejada APRN [facility]   8/1/2023  1:40 PM Myriam Omer MD [facility]  Darrell Miller MD  [facility]  06/10/23    Discharge time spent greater than 30 minutes.

## 2023-06-10 NOTE — PROGRESS NOTES
[facility] Clinical Pharmacy Services: Clinical Pharmacy Consult - Warfarin Dosing/Monitoring    Results from last 7 days   Lab Units 06/10/23  0413 06/09/23  0352 06/08/23  1249   INR  2.30* 1.90* 1.70*     Dose Ordered: 2mg daily  Comments: Follows at the [facility]. Crcl down a little from yesterday but level therapeutic. No changes to medication.    Pharmacy will continue to follow until discharge or discontinuation of warfarin.    Bethany Babinski, PharmD  Clinical Pharmacist

## 2023-06-10 NOTE — PROGRESS NOTES
Patient is discharging today . Spoke to patient who is agreeable to home health and reports no current home health . Face sheet information is correct and orders for home health in [facility]. Per [facility] pharmacist we are to check INR Monday 6/12.23 with results to [facility] / Dr Myriam Omer/ cardiologist/ Thank you !

## 2023-06-10 NOTE — PLAN OF CARE
Goal Outcome Evaluation:  Plan of Care Reviewed With: patient        Progress: improving  Outcome Evaluation: VSS. Pt denies pain. Monitoring intake and output. Adequate urine output -voids per urinal. Daily weight and obtained AM weight via standing scale. No c/o shortness of breath. Room air entire shift. Afib on tele-coumadin for vte prophylaxis. Falls precautions/safety maintained. Standy by assistance. Possible d/c home today. No complaints or concerns per patient. Will continue to monitor.

## 2023-06-11 ENCOUNTER — TRANSITIONAL CARE MANAGEMENT TELEPHONE ENCOUNTER (OUTPATIENT)
Dept: CALL CENTER | Facility: HOSPITAL | Age: 78
End: 2023-06-11
Payer: MEDICARE

## 2023-06-11 NOTE — PAYOR COMM NOTE
"Cody Eldridge (77 y.o. Male)          DC SUMMARY FOR [id number]          Date of Birth   1945    Social Security Number       Address   [address] [place] [address]    Home Phone   [phone]    MRN   [id number]       [facility]    Marital Status                               Admission Date   6/8/23    Admission Type   Emergency    Admitting Provider   Bety Cho MD    Attending Provider       Department, Room/Bed   [address], E462/1       Discharge Date   6/10/2023    Discharge Disposition   Home or Self Care    Discharge Destination                                 Attending Provider: (none)   Allergies: Benadryl [Diphenhydramine Hcl]    Isolation: None   Infection: None   Code Status: Prior    Ht: 182.9 cm (72\")   Wt: 68.4 kg (150 lb 11.2 oz)    Admission Cmt: None   Principal Problem: Acute on chronic diastolic heart failure [I50.33]                   Active Insurance as of 6/8/2023       Primary Coverage       Payor Plan Insurance Group Employer/Plan Group    [facility] MEDICARE REPLACEMENT [facility] MEDICARE REPLACEMENT [id number]       Payor Plan Address Payor Plan Phone Number Payor Plan Fax Number Effective Dates    [address] [address] [phone]  1/20/2016 - None Entered    [facility] [id number]         Subscriber Name Subscriber Birth Date Member ID       CODY ELDRIDGE 1945 [id number]                     Emergency Contacts        (Rel.) Home Phone Work Phone Mobile Phone    Elise Rai (Significant Other) [phone] -- --    Jo Graves (Grandchild) [phone] -- --    CHAD FERRARA (Daughter) [phone] -- --                 Discharge Summary        Malick Miller MD at 06/10/23 0925                              [facility]    Date of Discharge:  6/10/2023    PCP: Patrick Diaz MD    Discharge Diagnosis:   Active Hospital Problems    Diagnosis  POA "    **Acute on chronic diastolic heart failure [I50.33]  Yes    Severe pulmonary hypertension [I27.20]  Yes    Valvular heart disease [I38]  Yes    Hypertensive emergency [I16.1]  Yes    Stage 3a chronic kidney disease [N18.31]  Yes    Acquired hypothyroidism [E03.9]  Yes    COPD (chronic obstructive pulmonary disease) [J44.9]  Yes    SHAR (obstructive sleep apnea) [G47.33]  Yes    Severe tricuspid regurgitation by prior echocardiogram [I07.1]  Yes    Other persistent atrial fibrillation (HCC) [I48.19]  Yes    Essential hypertension [I10]  Yes    Mixed hyperlipidemia [E78.2]  Yes      Resolved Hospital Problems   No resolved problems to display.          Consults       Date and Time Order Name Status Description    6/8/2023  5:39 PM Inpatient Cardiology Consult Completed           Hospital Course  77 y.o. male initially admitted for shortness of breath and fatigue.  Etiology is secondary to acute on chronic diastolic CHF made worse by valvular disease and severe pulmonary hypertension/COPD without acute exacerbation.  Patient was diuresed with IV Lasix and has been transitioned to p.o. per cardiology recommendations with Lasix increased in dosing and frequency.  Aspects of hypertensive emergency have resolved and is blood pressure control has been very labile.  It was quite elevated and I was going to add amlodipine but then the patient's blood pressure started to bottom out where his systolic was in the 80s and while I was prepping for discharge that day, I elected to hold the patient overnight to ensure that his vital I stabilized prior to discharge.  He definitely has come back up in regards to his blood pressure which is more reassuring in regards to discharge as it was 145/98 this morning.  Cardiology is okay with discharge from their position continuing the patient on Coreg and lisinopril which is also been adjusted due to blood pressure trends as well as CKD 3A.  There is concern about medical noncompliance in  an outpatient setting but he reassures me he is taking all his medications correctly.  I am not so sure it is definitely an issue of noncompliance as he tries to set his medications up correctly but I am just not sure he is doing things correctly so I am ordering home health for PT at discharge as well as nursing to help with medication compliance.  His paroxysmal A-fib is anticoagulated with Coumadin with a therapeutic INR.  His HLD is stable on a statin and I am not making any further changes to his thyroid medication as he is currently supposed to be on 125 mcg but his labs are very consistent with hypothyroidism which would can contribute to his above symptoms.  I would like to see how this patient does with home health for medication education over the next 4 weeks and will defer to PCP to recheck TSH and T3 and T4 levels at that time and make further adjustments or increase in levothyroxine if needed but I discharged him to home on his normal dose at 125 mcg.  PT is evaluated the patient and CCP has evaluated there for discharge needs and I will order home health as stated above.      Temp:  [97.5 °F (36.4 °C)-98 °F (36.7 °C)] 97.5 °F (36.4 °C)  Heart Rate:  [] 118  Resp:  [16-20] 20  BP: ()/(51-98) 142/98  Body mass index is 20.44 kg/m².    Physical Exam  Constitutional:       Comments: Conversational pleasant and appreciative   HENT:      Head: Normocephalic.      Nose: Nose normal.      Mouth/Throat:      Mouth: Mucous membranes are moist.      Pharynx: Oropharynx is clear.   Cardiovascular:      Rate and Rhythm: Normal rate and regular rhythm.   Pulmonary:      Effort: Pulmonary effort is normal. No respiratory distress.      Breath sounds: Normal breath sounds.   Abdominal:      General: Bowel sounds are normal. There is no distension.      Palpations: Abdomen is soft.      Tenderness: There is no abdominal tenderness.   Musculoskeletal:         General: No swelling.   Skin:     General: Skin  is warm and dry.      Coloration: Skin is not jaundiced.   Neurological:      Mental Status: He is alert and oriented to person, place, and time. Mental status is at baseline.      Cranial Nerves: No cranial nerve deficit.   Psychiatric:         Mood and Affect: Mood normal.         Behavior: Behavior normal.         Thought Content: Thought content normal.         Judgment: Judgment normal.     Disposition: Home or Self Care       Discharge Medications        New Medications        Instructions Start Date   empagliflozin 10 MG tablet tablet  Commonly known as: JARDIANCE   10 mg, Oral, Daily   Start Date: June 11, 2023     furosemide 40 MG tablet  Commonly known as: LASIX   40 mg, Oral, 2 Times Daily             Changes to Medications        Instructions Start Date   ALPRAZolam 0.25 MG tablet  Commonly known as: XANAX  What changed: additional instructions   TAKE 1 TABLET BY MOUTH TWICE DAILY AS NEEDED FOR ANXIETY      lisinopril 10 MG tablet  Commonly known as: PRINIVILZESTRIL  What changed:   medication strength  how much to take   10 mg, Oral, Daily   Start Date: June 11, 2023     warfarin 2 MG tablet  Commonly known as: COUMADIN  What changed: Another medication with the same name was changed. Make sure you understand how and when to take each.   4 mg, Oral, See Admin Instructions, 2 tablets (4 mg) On Tuesdays and Fridays       warfarin 2 MG tablet  Commonly known as: COUMADIN  What changed:   how much to take  how to take this  when to take this  additional instructions   TAKE ONE TABLET BY MOUTH DAILY OR AS DIRECTED             Continue These Medications        Instructions Start Date   albuterol sulfate  (90 Base) MCG/ACT inhaler  Commonly known as: PROVENTIL HFA;VENTOLIN HFA;PROAIR HFA   2 puffs, Inhalation, Every 4 Hours PRN      aspirin 81 MG chewable tablet   81 mg, Oral, Daily      carvedilol 25 MG tablet  Commonly known as: COREG   25 mg, Oral, 2 Times Daily With Meals      levothyroxine 125 MCG  tablet  Commonly known as: SYNTHROID, LEVOTHROID   125 mcg, Oral, Daily      oxyCODONE-acetaminophen 7.5-325 MG per tablet  Commonly known as: PERCOCET   1 tablet, Oral, Every 6 Hours PRN      rosuvastatin 20 MG tablet  Commonly known as: CRESTOR   10 mg, Oral, Daily      sertraline 100 MG tablet  Commonly known as: ZOLOFT   100 mg, Oral, Daily              Diet Instructions       Diet: Cardiac Diets; Low Sodium (2g)      Discharge Diet: Cardiac Diets    Cardiac Diet: Low Sodium (2g)    Texture: Regular Texture (IDDSI 7)    Fluid Consistency: Thin (IDDSI 0)            Additional Instructions for the Follow-ups that You Need to Schedule       Discharge Follow-up with PCP   As directed       Currently Documented PCP:    Patrick Diaz MD    PCP Phone Number:    [phone]     Follow Up Details: Cardiology per their recommendations.  PCP 2 weeks.                Follow-up Information       Patrick Diaz MD. Schedule an appointment as soon as possible for a visit in 2 days.    Specialty: Family Medicine  Contact information:  [address] [place] [id number] [phone]               Sarah Peterson MD. Schedule an appointment as soon as possible for a visit in 2 days.    Specialty: Cardiology  Contact information:  [address] [facility] [address]  [facility] [address]  [phone]               Patrick Diaz MD .    Specialty: Family Medicine  Why: Cardiology per their recommendations.  PCP 2 weeks.  Contact information:  [address] [place] [address]  [phone]                            Future Appointments   Date Time Provider Department Center   6/16/2023  3:30 PM Silvina Tejada APRN [facility]   8/1/2023  1:40 PM Myriam Omer MD [facility]        Malick Miller MD  [facility]  06/10/23    Discharge time spent greater than 30 minutes.      Electronically signed by Malick Miller MD at 06/10/23 8826

## 2023-06-11 NOTE — OUTREACH NOTE
Call Center TCM Note      Flowsheet Row Responses   [facility] patient discharged from? [place]   Does the patient have one of the following disease processes/diagnoses(primary or secondary)? CHF   TCM attempt successful? Yes   Call start time 1121   Call end time 1129   Discharge diagnosis Acute on chronic diastolic heart failure   List who call center can speak with pt   Meds reviewed with patient/caregiver? Yes   Is the patient having any side effects they believe may be caused by any medication additions or changes? No   Does the patient have all medications ordered at discharge? Yes   Is the patient taking all medications as directed (includes completed medication regime)? Yes   Comments Hosptial f/u scheduled for June 19 2023 @ 11 am.   Does the patient have an appointment with their PCP within 7 days of discharge? Yes   Has home health visited the patient within 72 hours of discharge? Call prior to 72 hours   Pulse Ox monitoring Intermittent   Pulse Ox device source Patient   O2 Sat comments 02 sats are 97% on ra.   Psychosocial issues? No   Did the patient receive a copy of their discharge instructions? Yes   Nursing interventions Reviewed instructions with patient   What is the patient's perception of their health status since discharge? Improving   Nursing interventions Nurse provided patient education   Is the patient able to teach back signs and symptoms of worsening condition? (i.e. weight gain, shortness of air, etc.) Yes   Is the patient/caregiver able to teach back the hierarchy of who to call/visit for symptoms/problems? PCP, Specialist, Home health nurse, Urgent Care, ED, 911 Yes   Is the patient able to teach back Heart Failure Zones? Yes   CHF Zone this Call Green Zone   Green Zone Patient reports doing well, No new or worsening shortness of breath   Green Zone Interventions Daily weight check   TCM call completed? Yes   Wrap up additional comments Pt feeling well on this day.   Call end  time 1129             Nicol Rivas RN    6/11/2023, 11:31 EDT

## 2023-06-12 ENCOUNTER — HOME CARE VISIT (OUTPATIENT)
Dept: HOME HEALTH SERVICES | Facility: HOME HEALTHCARE | Age: 78
End: 2023-06-12
Payer: MEDICARE

## 2023-06-12 NOTE — CASE COMMUNICATION
Pt is not homebound, pt will be a non-admit at this time. Please call our office with any questions. [phone]

## 2023-06-14 ENCOUNTER — ANTICOAGULATION VISIT (OUTPATIENT)
Dept: PHARMACY | Facility: HOSPITAL | Age: 78
End: 2023-06-14
Payer: MEDICARE

## 2023-06-14 DIAGNOSIS — I48.19 OTHER PERSISTENT ATRIAL FIBRILLATION: Primary | ICD-10-CM

## 2023-06-14 LAB
INR PPP: 2.7 (ref 0.91–1.09)
PROTHROMBIN TIME: 32.3 SECONDS (ref 10–13.8)

## 2023-06-14 PROCEDURE — 85610 PROTHROMBIN TIME: CPT

## 2023-06-14 PROCEDURE — 36416 COLLJ CAPILLARY BLOOD SPEC: CPT

## 2023-06-14 NOTE — PROGRESS NOTES
Anticoagulation Clinic Progress Note    Anticoagulation Summary  As of 2023      INR goal:  2.0-3.0   TTR:  59.2 % (4.6 y)   INR used for dosin.7 (2023)   Warfarin maintenance plan:  4 mg every Tue, Fri; 2 mg all other days; Starting 2023   Weekly warfarin total:  18 mg   Plan last modified:  Rachelle Baer, Pharmacy Intern (2023)   Next INR check:  2023   Priority:  Maintenance   Target end date:  Indefinite    Indications    Other persistent atrial fibrillation [I48.19]                 Anticoagulation Episode Summary       INR check location:      Preferred lab:      Send INR reminders to:   [facility]    Comments:            Anticoagulation Care Providers       Provider Role Specialty Phone number    Myriam Omer MD Referring Cardiology [phone]            Clinic Interview:  Patient Findings     Positives:  Extra doses, Hospital admission    Negatives:  Signs/symptoms of thrombosis, Signs/symptoms of bleeding,   Laboratory test error suspected, Change in health, Change in alcohol use,   Change in activity, Upcoming invasive procedure, Emergency department   visit, Upcoming dental procedure, Missed doses, Change in medications,   Change in diet/appetite, Bruising, Other complaints    Comments:    - Patient was at the ED from -6/10 for shortness of breath   and fatigue, he had 2 low INRs while at the hospital. He reports feeling   better now.    - Instead of taking 2 mg daily which was the dose we recommended for him,   he has gt taking  4mg Tus, Fri & 2 mg other days of the week which was   his regimen back in April.       Clinical Outcomes     Negatives:  Major bleeding event, Thromboembolic event,   Anticoagulation-related hospital admission, Anticoagulation-related ED   visit, Anticoagulation-related fatality    Comments:  - Patient was at the ED from -6/10 for shortness of breath   and fatigue, he had 2 low INRs while at the hospital. He reports  feeling   better now.    - Instead of taking 2 mg daily which was the dose we recommended for him,   he has been taking  4mg Tus, Fri & 2 mg other days of the week which was   his regimen back in April.         INR History:      Latest Ref Rng & Units 4/20/2023     1:00 PM 4/25/2023     2:30 PM 5/3/2023     1:30 PM 6/8/2023    12:49 PM 6/9/2023     3:52 AM 6/10/2023     4:13 AM 6/14/2023     1:30 PM   Anticoagulation Monitoring   INR  4.6 4.2 2.0    2.7   INR Date  4/20/2023 4/25/2023 5/3/2023    6/14/2023   INR Goal  2.0-3.0 2.0-3.0 2.0-3.0    2.0-3.0   Trend  Down Same Same    Up   Last Week Total  18 mg 12 mg 12 mg    16 mg   Next Week Total  12 mg 10 mg 14 mg    18 mg   Sun  2 mg 2 mg 2 mg    2 mg   Mon  2 mg 2 mg 2 mg    2 mg   Tue  - Hold (4/25); Otherwise 2 mg 2 mg    4 mg   Wed  - Hold (4/26); Otherwise 2 mg 2 mg    2 mg   Thu  Hold (4/20) 2 mg 2 mg    2 mg   Fri  2 mg 2 mg 2 mg    4 mg   Sat  2 mg 2 mg 2 mg    2 mg   Historical INR 0.90 - 1.10    1.70  1.90  2.30         Plan:  1. INR is Therapeutic today- see above in Anticoagulation Summary.  Will instruct Cody Eldridge to Change their warfarin regimen- see above in Anticoagulation Summary.  His INR was good today, However Since he has being taking a difference dose ( 4 mg Tue, Fri, & 2 mg AOD) other than what recommended  and it worked well for him, he was instructed to continue the same dose.   2. Follow up in 6 weeks  3. Patient declines warfarin refills.  4. Verbal and written information provided. Patient expresses understanding and has no further questions at this time.    Rachelle Baer, Pharmacy Intern

## 2023-06-16 ENCOUNTER — OFFICE VISIT (OUTPATIENT)
Dept: CARDIOLOGY | Facility: CLINIC | Age: 78
End: 2023-06-16
Payer: MEDICARE

## 2023-06-16 VITALS
HEART RATE: 89 BPM | BODY MASS INDEX: 21.16 KG/M2 | DIASTOLIC BLOOD PRESSURE: 70 MMHG | SYSTOLIC BLOOD PRESSURE: 127 MMHG | HEIGHT: 72 IN | WEIGHT: 156.2 LBS | OXYGEN SATURATION: 98 %

## 2023-06-16 DIAGNOSIS — E78.2 MIXED HYPERLIPIDEMIA: ICD-10-CM

## 2023-06-16 DIAGNOSIS — I48.19 OTHER PERSISTENT ATRIAL FIBRILLATION: ICD-10-CM

## 2023-06-16 DIAGNOSIS — I25.10 CORONARY ARTERY DISEASE INVOLVING NATIVE CORONARY ARTERY OF NATIVE HEART WITHOUT ANGINA PECTORIS: ICD-10-CM

## 2023-06-16 DIAGNOSIS — I10 ESSENTIAL HYPERTENSION: ICD-10-CM

## 2023-06-16 DIAGNOSIS — I50.813 ACUTE ON CHRONIC RIGHT-SIDED HEART FAILURE: Primary | ICD-10-CM

## 2023-06-16 DIAGNOSIS — J43.9 PULMONARY EMPHYSEMA, UNSPECIFIED EMPHYSEMA TYPE: ICD-10-CM

## 2023-06-16 RX ORDER — FUROSEMIDE 40 MG/1
40 TABLET ORAL 2 TIMES DAILY
Qty: 60 TABLET | Refills: 2 | Status: SHIPPED | OUTPATIENT
Start: 2023-06-16

## 2023-06-16 RX ORDER — LISINOPRIL 10 MG/1
10 TABLET ORAL DAILY
Qty: 90 TABLET | Refills: 1 | Status: SHIPPED | OUTPATIENT
Start: 2023-06-16

## 2023-06-16 NOTE — PROGRESS NOTES
Date of Office Visit: 23  Encounter Provider: JONELLE Polanco  Place of Service: [facility] CARDIOLOGY  Patient Name: Cody Eldridge  :1945    Chief Complaint   Patient presents with    Follow-up   :     HPI: Cody Eldridge is a 77 y.o. male  with  hypertension, hyperlipidemia, supraventricular tachycardia, syncope, coronary artery disease with History of myocardial infarction, congestive heart failure, COPD, depression and anxiety.    He is followed by Dr. Omer from visit with him in follow-up and have reviewed his medical record.     In  patient had a SVT ablation at [facility].   He was started on Lasix in  due to elevated proBNP.  Echocardiogram revealed normal left ventricle systolic function with EF 56%, mild to moderate left ventricular hypertrophy.  Right ventricle is moderate to severely dilated with mildly reduced function.  The right atrium was also moderate to severely dilated.  There was moderate tricuspid regurgitation with RVSP of 47 mmHg.  He saw Dr. Segovia with Pulmonary and his medications were adjusted.   He was given IV Lasix and his oral Lasix at home was increased.     He then reported increased shortness of breath in 2022.  proBNP was 4866.  He was given a dose of IV Lasix in the office and was set up for an echocardiogram which was completed 2023 showing normal left ventricular systolic function, grade 2 diastolic dysfunction, severe biatrial enlargement, myxomatous changes of the mitral valve with moderate mitral valve regurgitation.  There was severe tricuspid valve regurgitation RVSP measuring 76 mmHg he then was switched from chlorthalidone to furosemide 40mg 2023.  He then wanted to decrease Lasix outpatient and I agreed to decrease Lasix to 20 mg daily 2023.  He then had gastroenteritis in 2023 and then was admitted with syncope and collapse 2023 and was  "found to have acute kidney injury and dehydration so chlorthalidone was stopped and he received IV fluid.  He was discharged on Lasix 20 mg again.  He then had acute dyspnea and presented to the hospital June 2023 with acute congestive heart failure.  He responded well to IV diuretic but then had low blood pressure so he was kept overnight.  His lisinopril was decreased and he was ultimately discharged on Lasix 40 mg twice daily in addition to Jardiance 10 mg.  He presents today for hospital discharge follow-up and he has been checking his weights and blood pressure at home.  He tells me his blood pressure at home is very similar to our value today.  He has no edema.  His breathing is now back to normal.  No chest pain tightness or pressure palpitations.  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              Allergies   Allergen Reactions    Benadryl [Diphenhydramine Hcl] Dizziness     \"I sleep for about a day\"           Family and social history reviewed.     ROS  All other systems were reviewed and are negative          Objective:     Vitals:    06/16/23 1405   BP: 127/70   BP Location: Left arm   Patient Position: Sitting   Cuff Size: Adult   Pulse: 89   SpO2: 98%   Weight: 70.9 kg (156 lb 3.2 oz)   Height: 182.9 cm (72\")     Body mass index is 21.18 kg/m².    PHYSICAL EXAM:  Pulmonary:      Breath sounds: Examination of the right-lower field reveals decreased breath sounds. Examination of the left-lower field reveals decreased breath sounds. Decreased breath sounds present.   Cardiovascular:      Normal rate. Irregularly irregular rhythm.       Procedures      Current Outpatient Medications   Medication Sig Dispense Refill    albuterol (PROVENTIL HFA;VENTOLIN HFA) 108 (90 BASE) MCG/ACT inhaler Inhale 2 puffs Every 4 (Four) Hours As Needed.      ALPRAZolam (XANAX) 0.25 MG tablet TAKE 1 TABLET BY MOUTH TWICE DAILY AS NEEDED FOR ANXIETY (Patient taking differently: Take 1 tablet by mouth " 2 (Two) Times a Day As Needed.) 60 tablet 0    aspirin 81 MG chewable tablet Chew 1 tablet Daily.      carvedilol (COREG) 25 MG tablet Take 1 tablet by mouth 2 (Two) Times a Day With Meals. 180 tablet 3    empagliflozin (JARDIANCE) 10 MG tablet tablet Take 1 tablet by mouth Daily. 30 tablet 5    furosemide (LASIX) 40 MG tablet Take 1 tablet by mouth 2 (Two) Times a Day. 60 tablet 2    levothyroxine (SYNTHROID, LEVOTHROID) 125 MCG tablet TAKE 1 TABLET BY MOUTH DAILY 90 tablet 0    lisinopril (PRINIVIL,ZESTRIL) 10 MG tablet Take 1 tablet by mouth Daily. 90 tablet 1    oxyCODONE-acetaminophen (PERCOCET) 7.5-325 MG per tablet Take 1 tablet by mouth Every 6 (Six) Hours As Needed.      rosuvastatin (CRESTOR) 20 MG tablet Take 10 mg by mouth Daily. 30 tablet 5    sertraline (ZOLOFT) 100 MG tablet TAKE 1 TABLET BY MOUTH DAILY 90 tablet 3    warfarin (COUMADIN) 2 MG tablet TAKE ONE TABLET BY MOUTH DAILY OR AS DIRECTED (Patient taking differently: Take 1 tablet by mouth See Admin Instructions. 1 tablet daily on Sunday, Monday, Wednesday, Thursday, and Saturday) 120 tablet 1    warfarin (COUMADIN) 2 MG tablet Take 2 tablets by mouth See Admin Instructions. 2 tablets (4 mg) On Tuesdays and Fridays       No current facility-administered medications for this visit.     Assessment:       Diagnosis Plan   1. Acute on chronic right-sided heart failure        2. Pulmonary emphysema, unspecified emphysema type        3. Essential hypertension        4. Coronary artery disease involving native coronary artery of native heart without angina pectoris        5. Mixed hyperlipidemia        6. Other persistent atrial fibrillation             No orders of the defined types were placed in this encounter.        Plan:       1.  77-year-old gentleman with chronic atrial fibrillation.  CHADS2 Vascor of 4.  He is rate controlled and maintained on warfarin.  He has no bleeding issues  2.  History of coronary artery disease with remote myocardial  infarction.  He initially had complaints of chest discomfort in May 2018 and was set up for right and left heart catheterization.  This was aborted due to claustrophobia.  He has no angina  3.  Diastolic congestive heart failure with right-sided heart failure.  At this time, he will continue on Lasix 40 mg twice daily as well as Jardiance 10 mg daily.  We discussed that it is too soon to cut back on his diuretics.  He still reports increased urine output volume.  We may consider decreasing Lasix to once daily in 6 weeks however I did not make any changes today.  I encouraged her to call if he gains 3 pounds overnight or 5 pounds in a week.  4.  Pulmonary hypertension.  He has an upcoming appointment with pulmonary  5.  COPD with emphysema.  Normally follows with Dr. Segovia  6.  History of SVT ablation  7.  Obstructive sleep apnea-not using Pap device  8. Normal renal artery duplex, bilateral in 2021  9.  Moderate mitral valve regurgitation and severe tricuspid regurgitation on echo January 2023     Follow-up in 6 weeks as scheduled with Dr. Omer.  Call with any issues.            It has been a pleasure to participate in this patient's care.      Thank you,  JONELLE Polanco      **I used Dragon to dictate this note:**

## 2023-06-19 ENCOUNTER — OFFICE VISIT (OUTPATIENT)
Dept: INTERNAL MEDICINE | Facility: CLINIC | Age: 78
End: 2023-06-19
Payer: MEDICARE

## 2023-06-19 VITALS
BODY MASS INDEX: 21.31 KG/M2 | SYSTOLIC BLOOD PRESSURE: 124 MMHG | DIASTOLIC BLOOD PRESSURE: 72 MMHG | OXYGEN SATURATION: 97 % | HEIGHT: 72 IN | WEIGHT: 157.3 LBS | HEART RATE: 75 BPM

## 2023-06-19 DIAGNOSIS — I50.33 ACUTE ON CHRONIC DIASTOLIC HEART FAILURE: Chronic | ICD-10-CM

## 2023-06-19 DIAGNOSIS — I10 ESSENTIAL HYPERTENSION: ICD-10-CM

## 2023-06-19 DIAGNOSIS — E03.9 ACQUIRED HYPOTHYROIDISM: ICD-10-CM

## 2023-06-19 DIAGNOSIS — M25.531 WRIST PAIN, ACUTE, RIGHT: Primary | ICD-10-CM

## 2023-06-19 LAB
T4 FREE SERPL-MCNC: 1.23 NG/DL (ref 0.93–1.7)
TSH SERPL DL<=0.05 MIU/L-ACNC: 6.63 UIU/ML (ref 0.27–4.2)

## 2023-06-19 PROCEDURE — 36415 COLL VENOUS BLD VENIPUNCTURE: CPT | Performed by: FAMILY MEDICINE

## 2023-06-19 NOTE — PROGRESS NOTES
"Chief Complaint  Congestive Heart Failure (Hospital follow up ) and Wrist Injury (Not able to move )    Subjective        Cody Eldridge presents to [facility]  History of Present Illness  Patient follows up for ongoing management of chronic problems of CHF hypothyroidism right wrist pain  Pain developed over the last couple days difficulty removing does not member falling he does help with his wife moving her around and he feels he may have over strained it he has tenderness throughout he is not take any specific treatment  As well as with CHF diuresed and requires follow-up monitoring of thyroid function  He relates he does not miss any doses of his medication  Objective   Vital Signs:  /72   Pulse 75   Ht 182.9 cm (72.01\")   Wt 71.4 kg (157 lb 4.8 oz)   SpO2 97%   BMI 21.33 kg/m²   Estimated body mass index is 21.33 kg/m² as calculated from the following:    Height as of this encounter: 182.9 cm (72.01\").    Weight as of this encounter: 71.4 kg (157 lb 4.8 oz).       BMI is within normal parameters. No other follow-up for BMI required.      Physical Exam  Vitals reviewed.   Constitutional:       Appearance: Normal appearance.   Cardiovascular:      Rate and Rhythm: Normal rate and regular rhythm.      Pulses: Normal pulses.   Pulmonary:      Effort: Pulmonary effort is normal.      Breath sounds: Normal breath sounds.   Musculoskeletal:      Right wrist: Swelling, deformity, tenderness and bony tenderness present. No effusion, lacerations or snuff box tenderness. Decreased range of motion. Normal pulse.   Neurological:      Mental Status: He is alert.   Psychiatric:         Mood and Affect: Mood normal.         Behavior: Behavior normal.         Thought Content: Thought content normal.         Judgment: Judgment normal.      Result Review :    TSH          12/15/2022    09:32 6/9/2023    03:51   TSH   TSH 4.160  12.400      Data reviewed : Recent hospitalization notes   " admit date 6/ 8 discharge date 6/10 reason for admission acute on chronic CHF diuresed with IV Lasix transition to p.o. found to have elevated TSH and low T4 he has chronic renal insufficiency and has Coumadin monitored through anticoagulation clinic was discharged on levothyroxine 112 mcg with recommended follow-up also initiated on Jardiance             Assessment and Plan   Diagnoses and all orders for this visit:    1. Wrist pain, acute, right (Primary)  -     XR Wrist 3+ View Right    2. Acquired hypothyroidism  -     TSH  -     T4, Free    3. Acute on chronic diastolic heart failure    4. Essential hypertension    Hypertension continue lisinopril furosemide carvedilol  Heart failure and Lasix 40 mg twice a day  Follow-up results of testing otherwise as needed or       Follow Up   Return in about 3 months (around 9/19/2023), or if symptoms worsen or fail to improve, for Recheck.  Patient was given instructions and counseling regarding his condition or for health maintenance advice. Please see specific information pulled into the AVS if appropriate.

## 2023-06-21 NOTE — PROGRESS NOTES
Responded by phone call to report that he has arthritis recommend diclofenac gel and range of motion exercises

## 2023-07-26 ENCOUNTER — ANTICOAGULATION VISIT (OUTPATIENT)
Dept: PHARMACY | Facility: HOSPITAL | Age: 78
End: 2023-07-26
Payer: MEDICARE

## 2023-07-26 DIAGNOSIS — I48.19 OTHER PERSISTENT ATRIAL FIBRILLATION: Primary | ICD-10-CM

## 2023-07-26 LAB
INR PPP: 3 (ref 0.91–1.09)
PROTHROMBIN TIME: 35.5 SECONDS (ref 10–13.8)

## 2023-07-26 PROCEDURE — 36416 COLLJ CAPILLARY BLOOD SPEC: CPT

## 2023-07-26 PROCEDURE — 85610 PROTHROMBIN TIME: CPT

## 2023-07-26 RX ORDER — WARFARIN SODIUM 2 MG/1
TABLET ORAL
Qty: 120 TABLET | Refills: 1 | Status: SHIPPED | OUTPATIENT
Start: 2023-07-26

## 2023-07-26 NOTE — PROGRESS NOTES
Anticoagulation Clinic Progress Note    Anticoagulation Summary  As of 7/26/2023      INR goal:  2.0-3.0   TTR:  60.4 % (4.7 y)   INR used for dosing:  3.0 (7/26/2023)   Warfarin maintenance plan:  4 mg every Tue, Fri; 2 mg all other days   Weekly warfarin total:  18 mg   No change documented:  Stephany Siu   Plan last modified:  Rachelle Baer, Pharmacy Intern (6/14/2023)   Next INR check:  9/6/2023   Priority:  Maintenance   Target end date:  Indefinite    Indications    Other persistent atrial fibrillation [I48.19]                 Anticoagulation Episode Summary       INR check location:      Preferred lab:      Send INR reminders to:   [facility]    Comments:            Anticoagulation Care Providers       Provider Role Specialty Phone number    Myriam Omer MD Referring Cardiology [phone]            Clinic Interview:  Patient Findings     Negatives:  Signs/symptoms of thrombosis, Signs/symptoms of bleeding,   Laboratory test error suspected, Change in health, Change in alcohol use,   Change in activity, Upcoming invasive procedure, Emergency department   visit, Upcoming dental procedure, Missed doses, Extra doses, Change in   medications, Change in diet/appetite, Hospital admission, Bruising, Other   complaints      Clinical Outcomes     Negatives:  Major bleeding event, Thromboembolic event,   Anticoagulation-related hospital admission, Anticoagulation-related ED   visit, Anticoagulation-related fatality        INR History:      Latest Ref Rng & Units 4/25/2023     2:30 PM 5/3/2023     1:30 PM 6/8/2023    12:49 PM 6/9/2023     3:52 AM 6/10/2023     4:13 AM 6/14/2023     1:30 PM 7/26/2023     1:00 PM   Anticoagulation Monitoring   INR  4.2 2.0    2.7 3.0   INR Date  4/25/2023 5/3/2023    6/14/2023 7/26/2023   INR Goal  2.0-3.0 2.0-3.0    2.0-3.0 2.0-3.0   Trend  Same Same    Up Same   Last Week Total  12 mg 12 mg    16 mg 18 mg   Next Week Total  10 mg 14 mg    18 mg 18  mg   Sun  2 mg 2 mg    2 mg 2 mg   Mon  2 mg 2 mg    2 mg 2 mg   Tue  Hold (4/25); Otherwise 2 mg 2 mg    4 mg 4 mg   Wed  Hold (4/26); Otherwise 2 mg 2 mg    2 mg 2 mg   Thu  2 mg 2 mg    2 mg 2 mg   Fri  2 mg 2 mg    4 mg 4 mg   Sat  2 mg 2 mg    2 mg 2 mg   Historical INR 0.90 - 1.10   1.70  1.90  2.30          Plan:  1. INR is therapeutic today- see above in Anticoagulation Summary.   Will instruct Cody Eldridge to continue their warfarin regimen- see above in Anticoagulation Summary.  2. Follow up in 6 weeks.  3. Patient declines warfarin refills.  4. Verbal and written information provided. Patient expresses understanding and has no further questions at this time.    Stephany Siu

## 2023-08-01 ENCOUNTER — OFFICE VISIT (OUTPATIENT)
Dept: CARDIOLOGY | Facility: CLINIC | Age: 78
End: 2023-08-01
Payer: MEDICARE

## 2023-08-01 VITALS
DIASTOLIC BLOOD PRESSURE: 72 MMHG | HEIGHT: 72 IN | BODY MASS INDEX: 21.67 KG/M2 | SYSTOLIC BLOOD PRESSURE: 122 MMHG | WEIGHT: 160 LBS | HEART RATE: 85 BPM

## 2023-08-01 DIAGNOSIS — I25.10 CORONARY ARTERY DISEASE INVOLVING NATIVE CORONARY ARTERY OF NATIVE HEART WITHOUT ANGINA PECTORIS: ICD-10-CM

## 2023-08-01 DIAGNOSIS — I50.32 CHRONIC DIASTOLIC CONGESTIVE HEART FAILURE: ICD-10-CM

## 2023-08-01 DIAGNOSIS — I48.19 OTHER PERSISTENT ATRIAL FIBRILLATION: Primary | ICD-10-CM

## 2023-08-01 PROCEDURE — 93000 ELECTROCARDIOGRAM COMPLETE: CPT | Performed by: NURSE PRACTITIONER

## 2023-08-01 PROCEDURE — 1159F MED LIST DOCD IN RCRD: CPT | Performed by: NURSE PRACTITIONER

## 2023-08-01 PROCEDURE — 99214 OFFICE O/P EST MOD 30 MIN: CPT | Performed by: NURSE PRACTITIONER

## 2023-08-01 PROCEDURE — 3078F DIAST BP <80 MM HG: CPT | Performed by: NURSE PRACTITIONER

## 2023-08-01 PROCEDURE — 1160F RVW MEDS BY RX/DR IN RCRD: CPT | Performed by: NURSE PRACTITIONER

## 2023-08-01 PROCEDURE — 3074F SYST BP LT 130 MM HG: CPT | Performed by: NURSE PRACTITIONER

## 2023-08-09 DIAGNOSIS — R53.83 FATIGUE, UNSPECIFIED TYPE: ICD-10-CM

## 2023-08-11 RX ORDER — ALPRAZOLAM 0.25 MG/1
TABLET ORAL
Qty: 60 TABLET | Refills: 1 | Status: SHIPPED | OUTPATIENT
Start: 2023-08-11

## 2023-08-28 RX ORDER — CARVEDILOL 25 MG/1
TABLET ORAL
Qty: 180 TABLET | Refills: 1 | Status: SHIPPED | OUTPATIENT
Start: 2023-08-28

## 2023-08-28 RX ORDER — LEVOTHYROXINE SODIUM 137 UG/1
125 TABLET ORAL DAILY
Qty: 30 TABLET | Refills: 1 | Status: SHIPPED | OUTPATIENT
Start: 2023-08-28

## 2023-09-06 ENCOUNTER — TELEPHONE (OUTPATIENT)
Dept: ORTHOPEDIC SURGERY | Facility: CLINIC | Age: 78
End: 2023-09-06

## 2023-09-06 ENCOUNTER — TELEPHONE (OUTPATIENT)
Age: 78
End: 2023-09-06
Payer: MEDICARE

## 2023-09-06 DIAGNOSIS — I50.32 CHRONIC DIASTOLIC CONGESTIVE HEART FAILURE: Primary | ICD-10-CM

## 2023-09-06 NOTE — TELEPHONE ENCOUNTER
Patient significant other is requesting to speak with Dr bryan or Triage in regards to patients current state. She can be reached at [phone]    Thanks  MICHELLE Waldron   09/06/2023   Initial (On Arrival)

## 2023-09-06 NOTE — TELEPHONE ENCOUNTER
Sounds like it's the wife who is concerned.  Can you request a copy of the CXR from Dr. Segovia's office?

## 2023-09-06 NOTE — TELEPHONE ENCOUNTER
Called office and they are faxing the report. Will continue to FU on this.     Susan Scott RN  Triage [facility] [place]

## 2023-09-06 NOTE — TELEPHONE ENCOUNTER
"Dr. Omer,    I'm not sure what to make of this.  I just saw him on 8/1 and he really had no complaints.  Appeared euvolemic.  It sounds like the wife is the one concerned.  The CXR from Dr. Segovia read \"cardiac size moderately enlarged with pulmonary vascular congestion and interstitial edema.\"    Do you think he needs another appointment?    "

## 2023-09-06 NOTE — TELEPHONE ENCOUNTER
Last BNP was in June and it was significantly higher than it had been in the past.  I would consider repeating some lab work including a BMP and a BN P.  And then assess his symptoms and decide if he needs more diuretic based upon the lab work and his symptoms.

## 2023-09-06 NOTE — TELEPHONE ENCOUNTER
"I attempted to call the wife, but she was sleeping. I spoke to the patient since he answered the phone and asked if there was something going on. He stated that last week Dr. Segovia did a chest xray and it showed fluid around his heart and lungs. He states he got this chest xray done at [facility], but I do not see anything. I asked him what Dr. Segovia told him to do and he said \"he said just do whatever I think is best.\" He denies any increase in SOA, swelling, or weight gain. He is currently taking Lasix 40mg BID.     Susan Scott RN  Triage [facility]    "

## 2023-09-06 NOTE — TELEPHONE ENCOUNTER
Caller: Cody Eldridge    Relationship to patient: Self    Best call back number: [phone]    Chief complaint: SAVANAH KNEE    Type of visit: INJECTION     Requested date: ASAP

## 2023-09-07 NOTE — TELEPHONE ENCOUNTER
Please let the wife and/or patient know I discussed with Dr. Omer.  We are recommending he come in for some lab work to help determine whether we should increase the diuretics. Orders are in, and we will touch base with results.

## 2023-09-07 NOTE — TELEPHONE ENCOUNTER
Notified patient of recommendations. Patient verbalized understanding.    Susan Scott RN  Triage [facility]

## 2023-09-08 ENCOUNTER — CLINICAL SUPPORT (OUTPATIENT)
Dept: ORTHOPEDIC SURGERY | Facility: CLINIC | Age: 78
End: 2023-09-08
Payer: MEDICARE

## 2023-09-08 VITALS — BODY MASS INDEX: 22.94 KG/M2 | HEIGHT: 72 IN | WEIGHT: 169.4 LBS | TEMPERATURE: 97.3 F

## 2023-09-08 DIAGNOSIS — M17.0 BILATERAL PRIMARY OSTEOARTHRITIS OF KNEE: ICD-10-CM

## 2023-09-08 DIAGNOSIS — M25.561 CHRONIC PAIN OF BOTH KNEES: Primary | ICD-10-CM

## 2023-09-08 DIAGNOSIS — G89.29 CHRONIC PAIN OF BOTH KNEES: Primary | ICD-10-CM

## 2023-09-08 DIAGNOSIS — M25.562 CHRONIC PAIN OF BOTH KNEES: Primary | ICD-10-CM

## 2023-09-08 RX ORDER — FLUTICASONE FUROATE, UMECLIDINIUM BROMIDE AND VILANTEROL TRIFENATATE 100; 62.5; 25 UG/1; UG/1; UG/1
1 POWDER RESPIRATORY (INHALATION) DAILY
COMMUNITY
Start: 2023-08-29

## 2023-09-08 RX ORDER — CHLORTHALIDONE 25 MG/1
0.5 TABLET ORAL DAILY
COMMUNITY

## 2023-09-08 RX ORDER — BACLOFEN 10 MG/1
1 TABLET ORAL 3 TIMES DAILY
COMMUNITY

## 2023-09-08 RX ADMIN — LIDOCAINE HYDROCHLORIDE 2 ML: 10 INJECTION, SOLUTION EPIDURAL; INFILTRATION; INTRACAUDAL; PERINEURAL at 16:15

## 2023-09-08 RX ADMIN — METHYLPREDNISOLONE ACETATE 80 MG: 80 INJECTION, SUSPENSION INTRA-ARTICULAR; INTRALESIONAL; INTRAMUSCULAR; SOFT TISSUE at 16:15

## 2023-09-08 NOTE — PROGRESS NOTES
Mr. Eldridge comes in today for follow-up.  Injections have worked well in the past.  The patient would like to get repeat injections today.      Imaging: Bilateral standing AP views, bilateral merchants views and a lateral view of both knees are ordered by myself and reviewed to evaluate the patient's complaint.  These are compared to previous x-rays.  The x-rays show end stage degenerative arthritis including bone on bone degeneration, osteophyte and subchondral sclerosis.  The majority of the degenerative changes appear to involve the medial compartment bilaterally.    We briefly discussed a total knee arthroplasty.  He is not interested in pursuing surgery at this time.  The risks, benefits and alternatives were discussed and the patient consented.  Going forward, the patient will follow-up as needed.    JONELLE Cervantes    09/08/2023      Large Joint Arthrocentesis: L knee  Date/Time: 9/8/2023 4:15 PM  Consent given by: patient  Site marked: site marked  Timeout: Immediately prior to procedure a time out was called to verify the correct patient, procedure, equipment, support staff and site/side marked as required   Supporting Documentation  Indications: pain and joint swelling   Procedure Details  Location: knee - L knee  Preparation: Patient was prepped and draped in the usual sterile fashion  Needle gauge: 21 G.  Approach: anterolateral  Medications administered: 80 mg methylPREDNISolone acetate 80 MG/ML; 2 mL lidocaine PF 1% 1 %  Patient tolerance: patient tolerated the procedure well with no immediate complications      Large Joint Arthrocentesis: R knee  Date/Time: 9/8/2023 4:15 PM  Consent given by: patient  Site marked: site marked  Timeout: Immediately prior to procedure a time out was called to verify the correct patient, procedure, equipment, support staff and site/side marked as required   Supporting Documentation  Indications: pain and joint swelling   Procedure Details  Location: knee - R  knee  Preparation: Patient was prepped and draped in the usual sterile fashion  Needle gauge: 21 G.  Approach: anterolateral  Medications administered: 80 mg methylPREDNISolone acetate 80 MG/ML; 2 mL lidocaine PF 1% 1 %  Patient tolerance: patient tolerated the procedure well with no immediate complications

## 2023-09-10 RX ORDER — METHYLPREDNISOLONE ACETATE 80 MG/ML
80 INJECTION, SUSPENSION INTRA-ARTICULAR; INTRALESIONAL; INTRAMUSCULAR; SOFT TISSUE
Status: COMPLETED | OUTPATIENT
Start: 2023-09-08 | End: 2023-09-08

## 2023-09-10 RX ORDER — LIDOCAINE HYDROCHLORIDE 10 MG/ML
2 INJECTION, SOLUTION EPIDURAL; INFILTRATION; INTRACAUDAL; PERINEURAL
Status: COMPLETED | OUTPATIENT
Start: 2023-09-08 | End: 2023-09-08

## 2023-09-14 ENCOUNTER — TELEPHONE (OUTPATIENT)
Dept: CARDIOLOGY | Facility: CLINIC | Age: 78
End: 2023-09-14
Payer: MEDICARE

## 2023-09-14 NOTE — TELEPHONE ENCOUNTER
Caller: Cody Eldridge    Relationship to patient: Self    Best call back number: [phone]    Chief complaint: TIRED WHEN WALKING    Type of visit: FOLLOW UP    Requested date: ASAP     If rescheduling, when is the original appointment: 2/5/23     Additional notes: PT IS CALLING IN TO SEE IF HE CAN SCHEDULE A SOONER APPT WITH DR. GRIMALDO. WIFE SAID HE TAKES A FEW STEPS TO GO SOMEWHERE AND GETS TIRED AND HAS TO TAKE A BREAK. SHE SAID HE'S ALSO BEEN HAVING BURNING IN HIS STOMACH AND LOW OXYGEN LEVELS. SHE SAID PT DOES NOT WANT TO GO TO ER.

## 2023-09-15 NOTE — TELEPHONE ENCOUNTER
Caller: Cody Eldridge     Relationship to patient: Self     Best call back number: [phone]    PATIENT'S LUNG DOCTOR WANTS TO START NEW PAIN MEDICATION. PLEASE REACH OUT FOR DISCUSSION.

## 2023-09-15 NOTE — TELEPHONE ENCOUNTER
Please follow up. According to the telephone note from 9/6, we recommended labs which he has still not completed.  He probably just needs another appt early next week and can get labs at that time. If symptoms worsen over the weekend he will need to go to the ER.

## 2023-09-15 NOTE — TELEPHONE ENCOUNTER
Notified patient of recommendations. Patient verbalized understanding. Appointment scheduled for Monday.    Susan Scott RN  Triage [facility]

## 2023-09-15 NOTE — TELEPHONE ENCOUNTER
Attempted to call patient, but no answer and no option to leave VM. Will continue to try and reach patient.     Susan Scott RN  Triage [place]

## 2023-09-15 NOTE — TELEPHONE ENCOUNTER
Pt is wanting to know if it is ok to take Trelegy Ellipta inhaler for a stand point?    PT is having some SOA when walking short distance (10-15 steps), He weigh 170 lbs, he usually weighs 160lb. He does not weigh daily. He denies any edema.    Pt is taking furosemide 20 MG daily.     PT#: [phone]

## 2023-09-18 ENCOUNTER — OFFICE VISIT (OUTPATIENT)
Dept: CARDIOLOGY | Facility: CLINIC | Age: 78
End: 2023-09-18
Payer: MEDICARE

## 2023-09-18 ENCOUNTER — HOSPITAL ENCOUNTER (OUTPATIENT)
Dept: CARDIOLOGY | Facility: HOSPITAL | Age: 78
Discharge: HOME | End: 2023-09-18
Admitting: NURSE PRACTITIONER
Payer: MEDICARE

## 2023-09-18 VITALS
DIASTOLIC BLOOD PRESSURE: 100 MMHG | HEART RATE: 92 BPM | WEIGHT: 166.8 LBS | BODY MASS INDEX: 22.59 KG/M2 | HEIGHT: 72 IN | SYSTOLIC BLOOD PRESSURE: 140 MMHG

## 2023-09-18 DIAGNOSIS — I50.32 CHRONIC DIASTOLIC CONGESTIVE HEART FAILURE: ICD-10-CM

## 2023-09-18 DIAGNOSIS — I50.32 CHRONIC DIASTOLIC CONGESTIVE HEART FAILURE: Primary | ICD-10-CM

## 2023-09-18 DIAGNOSIS — I48.19 OTHER PERSISTENT ATRIAL FIBRILLATION: ICD-10-CM

## 2023-09-18 DIAGNOSIS — R06.09 DOE (DYSPNEA ON EXERTION): ICD-10-CM

## 2023-09-18 LAB
ANION GAP SERPL CALCULATED.3IONS-SCNC: 9.5 MMOL/L (ref 5–15)
BUN SERPL-MCNC: 28 MG/DL (ref 8–23)
BUN/CREAT SERPL: 14.8 (ref 7–25)
CALCIUM SPEC-SCNC: 8.6 MG/DL (ref 8.6–10.5)
CHLORIDE SERPL-SCNC: 110 MMOL/L (ref 98–107)
CO2 SERPL-SCNC: 24.5 MMOL/L (ref 22–29)
CREAT SERPL-MCNC: 1.89 MG/DL (ref 0.76–1.27)
DEPRECATED RDW RBC AUTO: 47.5 FL (ref 37–54)
EGFRCR SERPLBLD CKD-EPI 2021: 35.7 ML/MIN/1.73
ERYTHROCYTE [DISTWIDTH] IN BLOOD BY AUTOMATED COUNT: 13.6 % (ref 12.3–15.4)
GLUCOSE SERPL-MCNC: 103 MG/DL (ref 65–99)
HCT VFR BLD AUTO: 40.5 % (ref 37.5–51)
HGB BLD-MCNC: 13.4 G/DL (ref 13–17.7)
MCH RBC QN AUTO: 30.9 PG (ref 26.6–33)
MCHC RBC AUTO-ENTMCNC: 33.1 G/DL (ref 31.5–35.7)
MCV RBC AUTO: 93.5 FL (ref 79–97)
NT-PROBNP SERPL-MCNC: 8481 PG/ML (ref 0–1800)
PLATELET # BLD AUTO: 117 10*3/MM3 (ref 140–450)
PMV BLD AUTO: 10.9 FL (ref 6–12)
POTASSIUM SERPL-SCNC: 4.8 MMOL/L (ref 3.5–5.2)
RBC # BLD AUTO: 4.33 10*6/MM3 (ref 4.14–5.8)
SODIUM SERPL-SCNC: 144 MMOL/L (ref 136–145)
WBC NRBC COR # BLD: 5.72 10*3/MM3 (ref 3.4–10.8)

## 2023-09-18 PROCEDURE — 85027 COMPLETE CBC AUTOMATED: CPT | Performed by: NURSE PRACTITIONER

## 2023-09-18 PROCEDURE — 1159F MED LIST DOCD IN RCRD: CPT | Performed by: NURSE PRACTITIONER

## 2023-09-18 PROCEDURE — 93000 ELECTROCARDIOGRAM COMPLETE: CPT | Performed by: NURSE PRACTITIONER

## 2023-09-18 PROCEDURE — 36415 COLL VENOUS BLD VENIPUNCTURE: CPT

## 2023-09-18 PROCEDURE — 3080F DIAST BP >= 90 MM HG: CPT | Performed by: NURSE PRACTITIONER

## 2023-09-18 PROCEDURE — 3077F SYST BP >= 140 MM HG: CPT | Performed by: NURSE PRACTITIONER

## 2023-09-18 PROCEDURE — 1160F RVW MEDS BY RX/DR IN RCRD: CPT | Performed by: NURSE PRACTITIONER

## 2023-09-18 PROCEDURE — 99214 OFFICE O/P EST MOD 30 MIN: CPT | Performed by: NURSE PRACTITIONER

## 2023-09-18 PROCEDURE — 80048 BASIC METABOLIC PNL TOTAL CA: CPT | Performed by: NURSE PRACTITIONER

## 2023-09-18 PROCEDURE — 83880 ASSAY OF NATRIURETIC PEPTIDE: CPT | Performed by: NURSE PRACTITIONER

## 2023-09-18 NOTE — PROGRESS NOTES
Date of Office Visit: 2023  Encounter Provider: JONELLE Rodriges  Place of Service: [facility] CARDIOLOGY  Patient Name: Cody Eldridge  :1945    Chief Complaint   Patient presents with    Fatigue   :     HPI: Cody Eldridge is a 78 y.o. male patient of Dr. Omer's whom we follow for hypertension, hyperlipidemia, chronic diastolic CHF, SVT (status post ablation), persistent atrial fibrillation, tricuspid regurgitation, pulmonary hypertension, and nonobstructive coronary artery disease.    In  of this year, he was admitted with acute on chronic diastolic CHF.  He was diuresed with improvement.    I last saw him in the office on  at which time he was overall doing well.  He did report 2 episodes of not being able to catch his breath for 20 or 30 seconds.  Overall I felt he was stable.  He was advised to follow-up in 6 months.    On , the wife called the office to report concerns regarding an abnormal chest x-ray at his pulmonologist's.  A proBNP and a BMP were ordered.  However, these were not completed.  Ultimately, he was scheduled to see me today.    He continues to deny any significant dyspnea.  It is his wife who is concerned.  Although she is not here with him today, he tells me she thinks he has to stop frequently with walking.  He denies any PND, orthopnea, or edema.  He denies any chest pain, palpitations, dizziness, syncope, bleeding difficulties or melena.  He says his blood pressure is normally better at home.  He did not bring a log with him today.  He thinks he is on too much Lasix because he spends a large amount of time in the bathroom.    Past Medical History:   Diagnosis Date    Acquired hypothyroidism     Acute renal injury     Anemia associated with chemotherapy     Atrial fibrillation     CAD (coronary artery disease)     CHF (congestive heart failure)     Chronic bronchitis     COPD (chronic obstructive pulmonary disease)     Cor  pulmonale (chronic)     Daytime hypersomnia     Depression with anxiety     IRAHETA (dyspnea on exertion)     Enlarged prostate     Frequent nocturnal awakening     Gout     H/O cardiac radiofrequency ablation     [facility].    Head and neck cancer     Hypertension     Hypotension     Insomnia     Lumbar radicular pain     SHAR (obstructive sleep apnea)     Osteoarthritis     Permanent atrial fibrillation     Shock, septic     Snoring     Squamous cell carcinoma of neck     SVT (supraventricular tachycardia)     Syncope     Vitamin D deficiency     Weight loss        Past Surgical History:   Procedure Laterality Date    APPENDECTOMY      CARDIAC ABLATION      [facility].    CARDIAC CATHETERIZATION N/A 2018    Procedure: Left Heart Cath;  Surgeon: Yousif Uribe MD;  Location: [facility] CATH INVASIVE LOCATION;  Service: Cardiology    CARDIAC CATHETERIZATION N/A 2018    Procedure: Coronary angiography;  Surgeon: Yousif Uribe MD;  Location: [facility] CATH INVASIVE LOCATION;  Service: Cardiology    CARDIAC CATHETERIZATION N/A 2018    Procedure: Left ventriculography;  Surgeon: Yousif Uribe MD;  Location: [facility] CATH INVASIVE LOCATION;  Service: Cardiology    FINGER SURGERY      FOOT SURGERY      HAND SURGERY      VENOUS ACCESS DEVICE (PORT) INSERTION Right 2019    Procedure: MEDIPORT PLACEMENT;  Surgeon: Elvis Grigsby MD;  Location: [facility] OR;  Service: Vascular    VENOUS ACCESS DEVICE (PORT) REMOVAL Right 2019    Procedure: REMOVAL VENOUS ACCESS DEVICE;  Surgeon: Prince Castaneda MD;  Location: [facility] MAIN OR;  Service: Vascular       Social History     Socioeconomic History    Marital status:      Spouse name: Elise Rai    Years of education: Some College   Tobacco Use    Smoking status: Former     Packs/day: 3.00     Years: 30.00     Pack years: 90.00     Types: Cigarettes     Quit date:      Years since quittin.7    Smokeless tobacco: Never     "Tobacco comments:     started age 11 x 53 years stopped 2000 / daily caffeine- Coke   Vaping Use    Vaping Use: Never used   Substance and Sexual Activity    Alcohol use: Not Currently     Alcohol/week: 2.0 standard drinks     Types: 1 Glasses of wine, 1 Cans of beer per week     Comment: occ    Drug use: No    Sexual activity: Yes     Partners: Female       Family History   Problem Relation Age of Onset    Heart attack Mother     Diabetes Mother     Heart disease Mother     Hypertension Mother     Anxiety disorder Mother     Depression Mother     Diabetes Father     Heart failure Father     Heart disease Father     Hypertension Father     Cancer Brother         colon    Hypertension Brother     Colon cancer Brother 50    Depression Brother     Anxiety disorder Brother     Alcohol abuse Brother     Diabetes Sister     Heart disease Sister     Hypertension Sister     COPD Other     Heart failure Other     Heart disease Other     Kidney disease Sister     Anxiety disorder Sister     Malig Hyperthermia Neg Hx        Review of Systems   Constitutional: Negative.   Cardiovascular:  Positive for dyspnea on exertion. Negative for chest pain, leg swelling, orthopnea, paroxysmal nocturnal dyspnea and syncope.   Respiratory: Negative.     Hematologic/Lymphatic: Negative for bleeding problem.   Musculoskeletal:  Negative for falls.   Gastrointestinal:  Negative for melena.   Neurological:  Negative for dizziness and light-headedness.     Allergies   Allergen Reactions    Benadryl [Diphenhydramine Hcl] Dizziness     \"I sleep for about a day\"         Current Outpatient Medications:     albuterol (PROVENTIL HFA;VENTOLIN HFA) 108 (90 BASE) MCG/ACT inhaler, Inhale 2 puffs Every 4 (Four) Hours As Needed., Disp: , Rfl:     ALPRAZolam (XANAX) 0.25 MG tablet, TAKE 1 TABLET BY MOUTH TWICE DAILY AS NEEDED FOR ANXIETY, Disp: 60 tablet, Rfl: 1    aspirin 81 MG chewable tablet, Chew 1 tablet Daily., Disp: , Rfl:     baclofen (LIORESAL) 10 " "MG tablet, Take 1 tablet by mouth 3 (Three) Times a Day., Disp: , Rfl:     carvedilol (COREG) 25 MG tablet, TAKE 1 TABLET BY MOUTH TWICE DAILY WITH MEALS, Disp: 180 tablet, Rfl: 1    chlorthalidone (HYGROTON) 25 MG tablet, Take 0.5 tablets by mouth Daily., Disp: , Rfl:     empagliflozin (JARDIANCE) 10 MG tablet tablet, Take 1 tablet by mouth Daily., Disp: 30 tablet, Rfl: 5    furosemide (LASIX) 40 MG tablet, Take 1 tablet by mouth 2 (Two) Times a Day., Disp: 60 tablet, Rfl: 2    levothyroxine (SYNTHROID, LEVOTHROID) 137 MCG tablet, TAKE 1 TABLET BY MOUTH DAILY, Disp: 30 tablet, Rfl: 1    lisinopril (PRINIVIL,ZESTRIL) 10 MG tablet, Take 1 tablet by mouth Daily., Disp: 90 tablet, Rfl: 1    oxyCODONE-acetaminophen (PERCOCET) 7.5-325 MG per tablet, Take 1 tablet by mouth Every 6 (Six) Hours As Needed., Disp: , Rfl:     rosuvastatin (CRESTOR) 20 MG tablet, Take 0.5 tablets by mouth Daily., Disp: 30 tablet, Rfl: 5    sertraline (ZOLOFT) 100 MG tablet, TAKE 1 TABLET BY MOUTH DAILY, Disp: 90 tablet, Rfl: 3    Trelegy Ellipta 100-62.5-25 MCG/ACT inhaler, Inhale 1 puff Daily., Disp: , Rfl:     warfarin (COUMADIN) 2 MG tablet, Take 2 tablets by mouth See Admin Instructions. 2 tablets (4 mg) On Tuesdays and Fridays, Disp: , Rfl:     warfarin (COUMADIN) 2 MG tablet, TAKE two tablets (4 mg) by mouth on Tuesday and Friday and take one tablet (2 mg) on all other days, Disp: 120 tablet, Rfl: 1      Objective:     Vitals:    09/18/23 1019   BP: 140/100   Pulse: 92   Weight: 75.7 kg (166 lb 12.8 oz)   Height: 182.9 cm (72\")     Body mass index is 22.62 kg/m².    PHYSICAL EXAM:    Neck:      Vascular: No JVD.   Pulmonary:      Effort: Pulmonary effort is normal.      Breath sounds: Normal breath sounds.   Cardiovascular:      Normal rate. Irregular rhythm.      Murmurs: There is no murmur.      No gallop.  No click. No rub.   Pulses:     Intact distal pulses.         ECG 12 Lead    Date/Time: 9/18/2023 10:29 AM  Performed by: [place]" "JONELLE Pacheco  Authorized by: Cyndee Kessler APRN   Comparison: compared with previous ECG from 8/1/2023  Similar to previous ECG  Rhythm: atrial fibrillation  Rate: normal  BPM: 92  Q waves: V1, V2 and V3    QRS axis: left          Assessment:       Diagnosis Plan   1. Chronic diastolic congestive heart failure  ECG 12 Lead      2. IRAHETA (dyspnea on exertion)  CBC (No Diff)      3. Other persistent atrial fibrillation          Orders Placed This Encounter   Procedures    CBC (No Diff)     Standing Status:   Future     Standing Expiration Date:   9/18/2024     Order Specific Question:   Release to patient     Answer:   Routine Release [[id number]]    ECG 12 Lead     This order was created via procedure documentation     Order Specific Question:   Release to patient     Answer:   Routine Release [[id number]]          Plan:       1.  Chronic diastolic CHF/dyspnea.  It is a little difficult to tell.  He appears euvolemic on exam.  He actually denies any worsening dyspnea.  It is his wife who is concerned.  Recent chest x-ray from Dr. Segovia's office read \"cardiac size moderately enlarged with pulmonary vascular congestion and interstitial edema.\"  Ultimately, I have recommended proceeding with the labs I have ordered.  If his proBNP is elevated, we will need to increase the Lasix.      2.  Persistent atrial fibrillation.  He is rate controlled with carvedilol and anticoagulated with warfarin.      Overall I think he is stable.  Further recommendations will be made pending the results of the labs today.      As always, it has been a pleasure to participate in your patient's care.      Sincerely,         OJNELLE Guillory  "

## 2023-10-03 DIAGNOSIS — R53.83 FATIGUE, UNSPECIFIED TYPE: ICD-10-CM

## 2023-10-04 ENCOUNTER — LAB (OUTPATIENT)
Dept: LAB | Facility: HOSPITAL | Age: 78
End: 2023-10-04
Payer: MEDICARE

## 2023-10-04 DIAGNOSIS — I50.32 CHRONIC DIASTOLIC CONGESTIVE HEART FAILURE: ICD-10-CM

## 2023-10-04 LAB
ANION GAP SERPL CALCULATED.3IONS-SCNC: 5.4 MMOL/L (ref 5–15)
BUN SERPL-MCNC: 20 MG/DL (ref 8–23)
BUN/CREAT SERPL: 15.9 (ref 7–25)
CALCIUM SPEC-SCNC: 8.9 MG/DL (ref 8.6–10.5)
CHLORIDE SERPL-SCNC: 106 MMOL/L (ref 98–107)
CO2 SERPL-SCNC: 27.6 MMOL/L (ref 22–29)
CREAT SERPL-MCNC: 1.26 MG/DL (ref 0.76–1.27)
EGFRCR SERPLBLD CKD-EPI 2021: 58 ML/MIN/1.73
GLUCOSE SERPL-MCNC: 132 MG/DL (ref 65–99)
NT-PROBNP SERPL-MCNC: 6238 PG/ML (ref 0–1800)
POTASSIUM SERPL-SCNC: 4.7 MMOL/L (ref 3.5–5.2)
SODIUM SERPL-SCNC: 139 MMOL/L (ref 136–145)

## 2023-10-04 PROCEDURE — 36415 COLL VENOUS BLD VENIPUNCTURE: CPT

## 2023-10-04 PROCEDURE — 83880 ASSAY OF NATRIURETIC PEPTIDE: CPT

## 2023-10-04 PROCEDURE — 80048 BASIC METABOLIC PNL TOTAL CA: CPT

## 2023-10-04 RX ORDER — ALPRAZOLAM 0.25 MG/1
TABLET ORAL
Qty: 60 TABLET | Refills: 1 | Status: SHIPPED | OUTPATIENT
Start: 2023-10-04

## 2023-10-05 ENCOUNTER — ANTICOAGULATION VISIT (OUTPATIENT)
Dept: PHARMACY | Facility: HOSPITAL | Age: 78
End: 2023-10-05
Payer: MEDICARE

## 2023-10-05 DIAGNOSIS — I48.19 OTHER PERSISTENT ATRIAL FIBRILLATION: Primary | ICD-10-CM

## 2023-10-05 LAB
INR PPP: 2.3 (ref 0.91–1.09)
PROTHROMBIN TIME: 27.1 SECONDS (ref 10–13.8)

## 2023-10-05 PROCEDURE — 85610 PROTHROMBIN TIME: CPT

## 2023-10-05 PROCEDURE — 36416 COLLJ CAPILLARY BLOOD SPEC: CPT

## 2023-10-05 NOTE — PROGRESS NOTES
Anticoagulation Clinic Progress Note    Anticoagulation Summary  As of 10/5/2023      INR goal:  2.0-3.0   TTR:  62.0 % (4.9 y)   INR used for dosin.3 (10/5/2023)   Warfarin maintenance plan:  4 mg every e, Fri; 2 mg all other days   Weekly warfarin total:  18 mg   No change documented:  Ruthy Sethi, Pharmacy Technician   Plan last modified:  Rachelle Baer, Pharmacy Intern (2023)   Next INR check:  2023   Priority:  Maintenance   Target end date:  Indefinite    Indications    Other persistent atrial fibrillation [I48.19]                 Anticoagulation Episode Summary       INR check location:      Preferred lab:      Send INR reminders to:   [facility] [place]    Comments:            Anticoagulation Care Providers       Provider Role Specialty Phone number    Myriam Omer MD Referring Cardiology [phone]            Clinic Interview:  Patient Findings     Negatives:  Signs/symptoms of thrombosis, Signs/symptoms of bleeding,   Laboratory test error suspected, Change in health, Change in alcohol use,   Change in activity, Upcoming invasive procedure, Emergency department   visit, Upcoming dental procedure, Missed doses, Extra doses, Change in   medications, Change in diet/appetite, Hospital admission, Bruising, Other   complaints      Clinical Outcomes     Negatives:  Major bleeding event, Thromboembolic event,   Anticoagulation-related hospital admission, Anticoagulation-related ED   visit, Anticoagulation-related fatality        INR History:      Latest Ref Rng & Units 5/3/2023     1:30 PM 2023    12:49 PM 2023     3:52 AM 6/10/2023     4:13 AM 2023     1:30 PM 2023     1:00 PM 10/5/2023     2:00 PM   Anticoagulation Monitoring   INR  2.0    2.7 3.0 2.3   INR Date  5/3/2023    2023 2023 10/5/2023   INR Goal  2.0-3.0    2.0-3.0 2.0-3.0 2.0-3.0   Trend  Same    Up Same Same   Last Week Total  12 mg    16 mg 18 mg 18 mg   Next Week Total  14 mg    18  mg 18 mg 18 mg   Sun  2 mg    2 mg 2 mg 2 mg   Mon  2 mg    2 mg 2 mg 2 mg   Tue  2 mg    4 mg 4 mg 4 mg   Wed  2 mg    2 mg 2 mg 2 mg   Thu  2 mg    2 mg 2 mg 2 mg   Fri  2 mg    4 mg 4 mg 4 mg   Sat  2 mg    2 mg 2 mg 2 mg   Historical INR 0.90 - 1.10  1.70  1.90  2.30           Plan:  1. INR is therapeutic today- see above in Anticoagulation Summary.   Will instruct Cody Eldridge to continue their warfarin regimen- see above in Anticoagulation Summary.  2. Follow up in 6 weeks.  3. Patient declines warfarin refills.  4. Verbal and written information provided. Patient expresses understanding and has no further questions at this time.    Ruthy Sethi, Pharmacy Technician

## 2023-10-06 ENCOUNTER — TELEPHONE (OUTPATIENT)
Dept: CARDIOLOGY | Facility: CLINIC | Age: 78
End: 2023-10-06
Payer: MEDICARE

## 2023-10-06 NOTE — TELEPHONE ENCOUNTER
----- Message from JONELLE Mcgovern sent at 10/5/2023  3:32 PM EDT -----  Let patient know that his labs are stable.  His lab that checks for fluid overload is a little lower, but it is still elevated. can you ask him if his weight is coming down?  ----- Message -----  From: Lab, Background User  Sent: 10/4/2023   4:21 PM EDT  To: JONELLE Mcdaniels

## 2023-10-31 ENCOUNTER — TELEPHONE (OUTPATIENT)
Dept: CARDIOLOGY | Facility: CLINIC | Age: 78
End: 2023-10-31
Payer: MEDICARE

## 2023-10-31 NOTE — TELEPHONE ENCOUNTER
I would recommend increasing the Lasix to 40 mg BID and scheduling follow up in the office next week with repeat labs.

## 2023-10-31 NOTE — TELEPHONE ENCOUNTER
Notified patient of recommendations. Patient verbalized understanding. FU scheduled.     Susan Scott RN  Triage [facility] [place]

## 2023-10-31 NOTE — TELEPHONE ENCOUNTER
Spoke to patient he states that he hasn't had any improvement in his symptoms since he saw you back in September. He is having SOA on exertion, edema in his feet, and dizziness with walking. Weight this AM is 167.6lb. /89 HR 67. When reviewing his medications he stated he has only been taking Lasix 40mg daily instead of BID like his med list states. I told him that may be why he is not having any improvement in his symptoms. I told him I would clarify with you first on if any changes need to be made.     Susan Scott RN  Triage [facility]

## 2023-10-31 NOTE — TELEPHONE ENCOUNTER
Caller: Cody Eldridge    Relationship to patient: Self    Best call back number: [phone]    Chief complaint: SOB    Type of visit: FOLLOW UP     Requested date: ASAP     If rescheduling, when is the original appointment: 2/ 5/24    Additional notes: PT IS CALLING TO SCHEDULE AN APPT WITH DR. GRIMALDO BECAUSE HE'S BEEN HAVING DIZZINESS, TROUBLE WALKING, SOB

## 2023-11-06 ENCOUNTER — OFFICE VISIT (OUTPATIENT)
Dept: CARDIOLOGY | Facility: CLINIC | Age: 78
End: 2023-11-06
Payer: MEDICARE

## 2023-11-06 VITALS
DIASTOLIC BLOOD PRESSURE: 82 MMHG | OXYGEN SATURATION: 98 % | HEART RATE: 88 BPM | WEIGHT: 166.8 LBS | BODY MASS INDEX: 22.59 KG/M2 | SYSTOLIC BLOOD PRESSURE: 132 MMHG | HEIGHT: 72 IN

## 2023-11-06 DIAGNOSIS — I50.31 ACUTE DIASTOLIC CHF (CONGESTIVE HEART FAILURE): ICD-10-CM

## 2023-11-06 DIAGNOSIS — R06.09 DOE (DYSPNEA ON EXERTION): Primary | ICD-10-CM

## 2023-11-06 DIAGNOSIS — I48.19 OTHER PERSISTENT ATRIAL FIBRILLATION: ICD-10-CM

## 2023-11-06 PROCEDURE — 93000 ELECTROCARDIOGRAM COMPLETE: CPT | Performed by: NURSE PRACTITIONER

## 2023-11-06 PROCEDURE — 99214 OFFICE O/P EST MOD 30 MIN: CPT | Performed by: NURSE PRACTITIONER

## 2023-11-06 PROCEDURE — 3079F DIAST BP 80-89 MM HG: CPT | Performed by: NURSE PRACTITIONER

## 2023-11-06 PROCEDURE — 1159F MED LIST DOCD IN RCRD: CPT | Performed by: NURSE PRACTITIONER

## 2023-11-06 PROCEDURE — 3075F SYST BP GE 130 - 139MM HG: CPT | Performed by: NURSE PRACTITIONER

## 2023-11-06 PROCEDURE — 1160F RVW MEDS BY RX/DR IN RCRD: CPT | Performed by: NURSE PRACTITIONER

## 2023-11-06 RX ORDER — LISINOPRIL 10 MG/1
10 TABLET ORAL DAILY
Qty: 90 TABLET | Refills: 1 | Status: SHIPPED | OUTPATIENT
Start: 2023-11-06

## 2023-11-06 RX ORDER — FUROSEMIDE 10 MG/ML
40 INJECTION INTRAMUSCULAR; INTRAVENOUS ONCE
Status: COMPLETED | OUTPATIENT
Start: 2023-11-09 | End: 2023-11-09

## 2023-11-06 NOTE — PROGRESS NOTES
Date of Office Visit: 23  Encounter Provider: JONELLE Polanco  Place of Service: [facility] CARDIOLOGY  Patient Name: Cody Eldridge  :1945    Chief Complaint   Patient presents with    Acute on chronic right sided heart failure      Patient is in the office today for his routine follow up.    :     HPI: Cody Eldridge is a 78 y.o. male  with  hypertension, hyperlipidemia, supraventricular tachycardia, syncope, coronary artery disease with History of myocardial infarction, congestive heart failure, COPD, depression and anxiety.    He is followed by Dr. Omer from visit with him in follow-up and have reviewed his medical record.     In  patient had a SVT ablation at the [facility].   He was started on Lasix in  due to elevated proBNP.  Echocardiogram revealed normal left ventricle systolic function with EF 56%, mild to moderate left ventricular hypertrophy.  Right ventricle is moderate to severely dilated with mildly reduced function.  The right atrium was also moderate to severely dilated.  There was moderate tricuspid regurgitation with RVSP of 47 mmHg.  He saw Dr. Segovia with Pulmonary and his medications were adjusted.   He was given IV Lasix and his oral Lasix at home was increased.     He then reported increased shortness of breath in 2022.  proBNP was 4866.  He was given a dose of IV Lasix in the office and was set up for an echocardiogram which was completed 2023 showing normal left ventricular systolic function, grade 2 diastolic dysfunction, severe biatrial enlargement, myxomatous changes of the mitral valve with moderate mitral valve regurgitation.  There was severe tricuspid valve regurgitation RVSP measuring 76 mmHg he then was switched from chlorthalidone to furosemide 40mg 2023.  He then wanted to decrease Lasix outpatient and I agreed to decrease Lasix to 20 mg daily 2023.  He then had  "gastroenteritis in March 2023 and then was admitted with syncope and collapse March 22, 2023 and was found to have acute kidney injury and dehydration so chlorthalidone was stopped and he received IV fluid.  He was discharged on Lasix 20 mg again.  He then had acute dyspnea and presented to the hospital June 2023 with acute congestive heart failure.  He responded well to IV diuretic but then had low blood pressure so he was kept overnight.  His lisinopril was decreased and he was ultimately discharged on Lasix 40 mg twice daily in addition to Jardiance 10 mg.      He presents today for evaluation of recent dizziness, shortness of breath and edema.  On 10/31/2023 he was advised to increase Lasix from once daily to twice daily dosing.  He had a chest x-ray on August 29, 2023 showing pulmonary edema.  Patient states at that time he was only taking Lasix once a day.  He has been feeling fatigued.  He denies chest pain tightness pressure palpitations.  His lower extremity edema has improved slightly since increasing Lasix last week.  No near-syncope or syncope.  He occasionally had a little dizziness which occurs after he gets up and then takes about 3 steps.  He has no falls or near syncope with this.  He reports balance issues      Allergies   Allergen Reactions    Benadryl [Diphenhydramine Hcl] Dizziness     \"I sleep for about a day\"           Family and social history reviewed.     ROS  All other systems were reviewed and are negative          Objective:     Vitals:    11/06/23 1313   BP: 132/82   Pulse: 88   SpO2: 98%   Weight: 75.7 kg (166 lb 12.8 oz)   Height: 182.9 cm (72\")     Body mass index is 22.62 kg/m².    PHYSICAL EXAM:  Pulmonary:      Effort: Pulmonary effort is normal.      Breath sounds: Decreased breath sounds present. Rales present.   Cardiovascular:      Regularly irregular rhythm.   Edema:     Ankle: trace edema of the left ankle and 1+ edema of the right ankle.          ECG 12 Lead    Date/Time: " 11/6/2023 2:14 PM  Performed by: Silvina Tejada APRN    Authorized by: Silvina Tejada APRN  Comparison: compared with previous ECG   Similar to previous ECG  Rhythm: atrial fibrillation  Rate: normal  QRS axis: left    Clinical impression: abnormal EKG            Current Outpatient Medications   Medication Sig Dispense Refill    albuterol (PROVENTIL HFA;VENTOLIN HFA) 108 (90 BASE) MCG/ACT inhaler Inhale 2 puffs Every 4 (Four) Hours As Needed.      ALPRAZolam (XANAX) 0.25 MG tablet TAKE 1 TABLET BY MOUTH TWICE DAILY AS NEEDED FOR ANXIETY 60 tablet 1    aspirin 81 MG chewable tablet Chew 1 tablet Daily.      baclofen (LIORESAL) 10 MG tablet Take 1 tablet by mouth 3 (Three) Times a Day.      carvedilol (COREG) 25 MG tablet TAKE 1 TABLET BY MOUTH TWICE DAILY WITH MEALS 180 tablet 1    empagliflozin (JARDIANCE) 10 MG tablet tablet Take 1 tablet by mouth Daily. 30 tablet 5    furosemide (LASIX) 40 MG tablet Take 1 tablet by mouth 2 (Two) Times a Day. 60 tablet 2    levothyroxine (SYNTHROID, LEVOTHROID) 137 MCG tablet TAKE 1 TABLET BY MOUTH DAILY 30 tablet 1    lisinopril (PRINIVIL,ZESTRIL) 10 MG tablet TAKE 1 TABLET BY MOUTH DAILY 90 tablet 1    oxyCODONE-acetaminophen (PERCOCET) 7.5-325 MG per tablet Take 1 tablet by mouth Every 6 (Six) Hours As Needed.      rosuvastatin (CRESTOR) 20 MG tablet Take 0.5 tablets by mouth Daily. 30 tablet 5    sertraline (ZOLOFT) 100 MG tablet TAKE 1 TABLET BY MOUTH DAILY 90 tablet 3    Trelegy Ellipta 100-62.5-25 MCG/ACT inhaler Inhale 1 puff Daily.      warfarin (COUMADIN) 2 MG tablet Take 2 tablets by mouth See Admin Instructions. 2 tablets (4 mg) On Tuesdays and Fridays       Current Facility-Administered Medications   Medication Dose Route Frequency Provider Last Rate Last Admin    [START ON 11/9/2023] furosemide (LASIX) injection 40 mg  40 mg Intravenous Once Silvina Tejada APRN         Assessment:       Diagnosis Plan   1. IRAHETA (dyspnea on exertion)  XR Chest 2 View    proBNP     Comprehensive Metabolic Panel    proBNP    furosemide (LASIX) injection 40 mg      2. Acute diastolic CHF (congestive heart failure)  proBNP    Comprehensive Metabolic Panel    proBNP    furosemide (LASIX) injection 40 mg           Orders Placed This Encounter   Procedures    XR Chest 2 View     Standing Status:   Future     Standing Expiration Date:   11/6/2024     Order Specific Question:   Reason for Exam:     Answer:   follow up pulmonary edema.     Order Specific Question:   Release to patient     Answer:   Routine Release [[id number]]    proBNP     Order Specific Question:   Release to patient     Answer:   Routine Release [[id number]]    Comprehensive Metabolic Panel     Standing Status:   Future     Standing Expiration Date:   11/6/2024     Order Specific Question:   Release to patient     Answer:   Routine Release [[id number]]    proBNP     Order Specific Question:   Release to patient     Answer:   Routine Release [[id number]]    ECG 12 Lead     This order was created via procedure documentation     Order Specific Question:   Release to patient     Answer:   Routine Release [[id number]]         Plan:       1.  77-year-old gentleman with chronic atrial fibrillation.  CHADS2 Vascor of 4.  He is rate controlled and maintained on warfarin.  He has no bleeding issues-we will continue the same  2.  History of coronary artery disease with remote myocardial infarction.  He initially had complaints of chest discomfort in May 2018 and was set up for right and left heart catheterization.  This was aborted due to claustrophobia.  He has no angina  3.  Acute on chronic diastolic congestive heart failure with right-sided heart failure.  He just resume Lasix 40 mg twice daily last week and since then has had improvement in his swelling.  We will continue Lasix 40 mg twice daily and arrange for follow-up chest x-ray two-view to reassess pulmonary edema and fluid retention.  We will also plan for a dose of IV Lasix which  she did not have time for today but plans to return on Thursday.  At that time, we will also obtain blood work including proBNP and CMP   4.  Pulmonary hypertension.  5.  COPD with emphysema.  Normally follows with Dr. Segovia  6.  History of SVT ablation  7.  Obstructive sleep apnea-not using Pap device  8. Normal renal artery duplex, bilateral in 2021  9.  Moderate mitral valve regurgitation and severe tricuspid regurgitation on echo January 2023          It has been a pleasure to participate in this patient's care.      Thank you,  JONELLE Polanco      **I used Dragon to dictate this note:**

## 2023-11-09 ENCOUNTER — HOSPITAL ENCOUNTER (OUTPATIENT)
Dept: GENERAL RADIOLOGY | Facility: HOSPITAL | Age: 78
Discharge: HOME | End: 2023-11-09
Payer: MEDICARE

## 2023-11-09 ENCOUNTER — TELEPHONE (OUTPATIENT)
Dept: CARDIOLOGY | Facility: CLINIC | Age: 78
End: 2023-11-09
Payer: MEDICARE

## 2023-11-09 ENCOUNTER — HOSPITAL ENCOUNTER (OUTPATIENT)
Dept: CARDIOLOGY | Facility: HOSPITAL | Age: 78
Discharge: HOME | End: 2023-11-09
Payer: MEDICARE

## 2023-11-09 DIAGNOSIS — I50.31 ACUTE DIASTOLIC CHF (CONGESTIVE HEART FAILURE): ICD-10-CM

## 2023-11-09 DIAGNOSIS — R06.09 DOE (DYSPNEA ON EXERTION): ICD-10-CM

## 2023-11-09 LAB
ALBUMIN SERPL-MCNC: 3.6 G/DL (ref 3.5–5.2)
ALBUMIN/GLOB SERPL: 1.2 G/DL
ALP SERPL-CCNC: 81 U/L (ref 39–117)
ALT SERPL W P-5'-P-CCNC: 13 U/L (ref 1–41)
ANION GAP SERPL CALCULATED.3IONS-SCNC: 6 MMOL/L (ref 5–15)
AST SERPL-CCNC: 23 U/L (ref 1–40)
BILIRUB SERPL-MCNC: 0.6 MG/DL (ref 0–1.2)
BUN SERPL-MCNC: 19 MG/DL (ref 8–23)
BUN/CREAT SERPL: 15 (ref 7–25)
CALCIUM SPEC-SCNC: 8.5 MG/DL (ref 8.6–10.5)
CHLORIDE SERPL-SCNC: 104 MMOL/L (ref 98–107)
CO2 SERPL-SCNC: 29 MMOL/L (ref 22–29)
CREAT SERPL-MCNC: 1.27 MG/DL (ref 0.76–1.27)
EGFRCR SERPLBLD CKD-EPI 2021: 57.5 ML/MIN/1.73
GLOBULIN UR ELPH-MCNC: 3 GM/DL
GLUCOSE SERPL-MCNC: 84 MG/DL (ref 65–99)
NT-PROBNP SERPL-MCNC: 5208 PG/ML (ref 0–1800)
POTASSIUM SERPL-SCNC: 3.9 MMOL/L (ref 3.5–5.2)
PROT SERPL-MCNC: 6.6 G/DL (ref 6–8.5)
SODIUM SERPL-SCNC: 139 MMOL/L (ref 136–145)

## 2023-11-09 PROCEDURE — 36415 COLL VENOUS BLD VENIPUNCTURE: CPT

## 2023-11-09 PROCEDURE — 80053 COMPREHEN METABOLIC PANEL: CPT | Performed by: NURSE PRACTITIONER

## 2023-11-09 PROCEDURE — 25010000002 FUROSEMIDE PER 20 MG: Performed by: NURSE PRACTITIONER

## 2023-11-09 PROCEDURE — 83880 ASSAY OF NATRIURETIC PEPTIDE: CPT | Performed by: NURSE PRACTITIONER

## 2023-11-09 PROCEDURE — 71046 X-RAY EXAM CHEST 2 VIEWS: CPT

## 2023-11-09 PROCEDURE — 96374 THER/PROPH/DIAG INJ IV PUSH: CPT

## 2023-11-09 RX ADMIN — FUROSEMIDE 40 MG: 10 INJECTION, SOLUTION INTRAMUSCULAR; INTRAVENOUS at 12:01

## 2023-11-09 NOTE — TELEPHONE ENCOUNTER
Results and recommendations called to pt.  Instructed to call with any further questions or concerns.  Verbalized understanding.    Kayla Ochoa RN  Triage Nurse, [facility] [place]  11/09/23 14:13 EST

## 2023-11-09 NOTE — TELEPHONE ENCOUNTER
please inform patient I have reviewed his lab work and proBNP is improving and we are getting rid of extra fluid.  Renal function is very stable.  He also had IV Lasix 40 mg in clinic today.  To the IV Lasix seem to make him pee off more volume today?.  I will continue 40 mg twice daily Lasix at home. Chest x-ray was completed and shows improvement in fluid.  Okay for him to keep February appointment

## 2023-11-13 ENCOUNTER — DOCUMENTATION (OUTPATIENT)
Age: 78
End: 2023-11-13
Payer: MEDICARE

## 2023-11-13 NOTE — PROGRESS NOTES
Patient walked into clinic with complaint of left arm discomfort and decreased range of motion since IV Lasix.  I personally looked at his arm which is swollen and red in the left antecubital space just above IV puncture location.  Also warm to touch.  I suspect IV injection infiltrated.  Distal pulses intact.  I recommend elevation on 1-2 pillows while resting as well as warm compress twice daily.  We discussed this will improve over time and to call with any worsening symptoms.  He verbalized understanding

## 2023-11-16 ENCOUNTER — ANTICOAGULATION VISIT (OUTPATIENT)
Dept: PHARMACY | Facility: HOSPITAL | Age: 78
End: 2023-11-16
Payer: MEDICARE

## 2023-11-16 DIAGNOSIS — I48.19 OTHER PERSISTENT ATRIAL FIBRILLATION: Primary | ICD-10-CM

## 2023-11-16 LAB
INR PPP: 1.7 (ref 0.91–1.09)
PROTHROMBIN TIME: 20.3 SECONDS (ref 10–13.8)

## 2023-11-16 PROCEDURE — 36416 COLLJ CAPILLARY BLOOD SPEC: CPT

## 2023-11-16 PROCEDURE — G0463 HOSPITAL OUTPT CLINIC VISIT: HCPCS

## 2023-11-16 PROCEDURE — 85610 PROTHROMBIN TIME: CPT

## 2023-11-16 NOTE — PROGRESS NOTES
Anticoagulation Clinic Progress Note    Anticoagulation Summary  As of 2023      INR goal:  2.0-3.0   TTR:  61.7% (5 y)   INR used for dosin.7 (2023)   Warfarin maintenance plan:  4 mg every Tu, Fri; 2 mg all other days   Weekly warfarin total:  18 mg   Plan last modified:  Rachelle Baer, Pharmacy Intern (2023)   Next INR check:  2023   Priority:  Maintenance   Target end date:  Indefinite    Indications    Other persistent atrial fibrillation [I48.19]                 Anticoagulation Episode Summary       INR check location:      Preferred lab:      Send INR reminders to:  [facility]    Comments:            Anticoagulation Care Providers       Provider Role Specialty Phone number    Myriam Omer MD Referring Cardiology [phone]            Clinic Interview:  Patient Findings     Negatives:  Signs/symptoms of thrombosis, Signs/symptoms of bleeding,   Laboratory test error suspected, Change in health, Change in alcohol use,   Change in activity, Upcoming invasive procedure, Emergency department   visit, Upcoming dental procedure, Missed doses, Extra doses, Change in   medications, Change in diet/appetite, Hospital admission, Bruising, Other   complaints      Clinical Outcomes     Negatives:  Major bleeding event, Thromboembolic event,   Anticoagulation-related hospital admission, Anticoagulation-related ED   visit, Anticoagulation-related fatality        INR History:      Latest Ref Rng & Units 2023    12:49 PM 2023     3:52 AM 6/10/2023     4:13 AM 2023     1:30 PM 2023     1:00 PM 10/5/2023     2:00 PM 2023     2:30 PM   Anticoagulation Monitoring   INR     2.7 3.0 2.3 1.7   INR Date     2023 2023 10/5/2023 2023   INR Goal     2.0-3.0 2.0-3.0 2.0-3.0 2.0-3.0   Trend     Up Same Same Same   Last Week Total     16 mg 18 mg 18 mg 18 mg   Next Week Total     18 mg 18 mg 18 mg 20 mg   Sun     2 mg 2 mg 2 mg 2 mg   Mon     2  mg 2 mg 2 mg 2 mg   Tue     4 mg 4 mg 4 mg 4 mg   Wed     2 mg 2 mg 2 mg 2 mg   Thu     2 mg 2 mg 2 mg 4 mg (11/16); Otherwise 2 mg   Fri     4 mg 4 mg 4 mg 4 mg   Sat     2 mg 2 mg 2 mg 2 mg   Historical INR 0.90 - 1.10 1.70  1.90  2.30            Plan:  1. INR is Subtherapeutic today- see above in Anticoagulation Summary.  Will instruct Cody Eldridge to Change their warfarin regimen- see above in Anticoagulation Summary.  2. Follow up in 2 weeks  3. Patient declines warfarin refills.  4. Verbal and written information provided. Patient expresses understanding and has no further questions at this time.    Myriam Walton, PharmD

## 2023-11-20 ENCOUNTER — TELEPHONE (OUTPATIENT)
Dept: CARDIOLOGY | Facility: CLINIC | Age: 78
End: 2023-11-20
Payer: MEDICARE

## 2023-11-20 NOTE — TELEPHONE ENCOUNTER
Please call patient to get an update.  He had IV Lasix in the [facility] and then presented a few days later with swelling and redness at the IV site suggestive of infiltration.  I advised warm compress and elevation.  Is his arm swelling and discomfort a little better?

## 2023-11-20 NOTE — TELEPHONE ENCOUNTER
Spoke to patient. He states that his arm is better.     Susna Scott RN  [place] [facility]

## 2023-11-27 ENCOUNTER — TELEPHONE (OUTPATIENT)
Dept: CARDIOLOGY | Facility: CLINIC | Age: 78
End: 2023-11-27
Payer: MEDICARE

## 2023-11-27 DIAGNOSIS — R53.83 FATIGUE, UNSPECIFIED TYPE: ICD-10-CM

## 2023-11-27 DIAGNOSIS — C80.1 CANCER: Primary | ICD-10-CM

## 2023-11-27 NOTE — TELEPHONE ENCOUNTER
Hub staff attempted to follow warm transfer process and was unsuccessful   Caller: Cody Eldridge    Relationship: Self    Best call back number:     [phone]       What is the medical concern/diagnosis: THROAT CANCER     What specialty or service is being requested: ONCOLOGIST     What is the provider, practice or medical service name: DR. WILLIAMSON    What is the office location: [place] [address]     What is the office phone number: [phone]    Any additional details: PATIENT HAS NOT SEEN HIS ONCOLOGIST IN OVER A YEAR AND IS NEEDING A NEW REFERRAL IN ORDER TO GET A NEW APPOINTMENT.

## 2023-11-28 NOTE — TELEPHONE ENCOUNTER
Caller: Alex Eldridge    Relationship: Self    Best call back number: [phone]    Requested Prescriptions:   Requested Prescriptions     Pending Prescriptions Disp Refills    ALPRAZolam (XANAX) 0.25 MG tablet [Pharmacy Med Name: ALPRAZOLAM 0.25MG TABLETS] 60 tablet      Sig: TAKE 1 TABLET BY MOUTH TWICE DAILY AS NEEDED FOR ANXIETY    levothyroxine (SYNTHROID, LEVOTHROID) 137 MCG tablet 30 tablet 1     Sig: Take 1 tablet by mouth Daily.        Pharmacy where request should be sent: [facility][id number] [address] AT [facility] [phone] [facility] [phone]      Last office visit with prescribing clinician: 6/19/2023   Last telemedicine visit with prescribing clinician: Visit date not found   Next office visit with prescribing clinician: 12/4/2023     Additional details provided by patient: ALEX WOULD ALSO LIKE TO KNOW IF DR TERRY STILL WANTS HIM ON       Does the patient have less than a 3 day supply:  [x] Yes  [] No    Ghassan Nunez Rep   11/28/23 12:22 EST

## 2023-11-29 NOTE — TELEPHONE ENCOUNTER
PATIENT CALLING TODAY TO GET STATUS ON THESE 2 REFILLS STATING HE HAS BEEN TRYING TO GET THEM FOR A WEEK.    PATIENT WANTS A CALLBACK ABOUT THESE REFILLS STATING HE IS OUT OF MEDICATION.     [phone].

## 2023-11-29 NOTE — TELEPHONE ENCOUNTER
Rx Refill Note  Requested Prescriptions     Pending Prescriptions Disp Refills    ALPRAZolam (XANAX) 0.25 MG tablet [Pharmacy Med Name: ALPRAZOLAM 0.25MG TABLETS] 60 tablet      Sig: TAKE 1 TABLET BY MOUTH TWICE DAILY AS NEEDED FOR ANXIETY    levothyroxine (SYNTHROID, LEVOTHROID) 137 MCG tablet 30 tablet 1     Sig: Take 1 tablet by mouth Daily.      Last office visit with prescribing clinician: 6/19/2023   Last telemedicine visit with prescribing clinician: Visit date not found   Next office visit with prescribing clinician: 12/4/2023   [facility] SCANNED IN.    Everette Patterson MA  11/29/23, 15:13 EST

## 2023-11-30 RX ORDER — ALPRAZOLAM 0.25 MG/1
TABLET ORAL
Qty: 60 TABLET | Refills: 1 | Status: SHIPPED | OUTPATIENT
Start: 2023-11-30

## 2023-11-30 RX ORDER — LEVOTHYROXINE SODIUM 137 UG/1
125 TABLET ORAL DAILY
Qty: 30 TABLET | Refills: 1 | Status: SHIPPED | OUTPATIENT
Start: 2023-11-30

## 2023-12-01 ENCOUNTER — TELEPHONE (OUTPATIENT)
Dept: ONCOLOGY | Facility: CLINIC | Age: 78
End: 2023-12-01

## 2023-12-01 NOTE — TELEPHONE ENCOUNTER
Caller: CHAD FERRARA    Relationship: Emergency Contact    Best call back number: [phone]    What is the best time to reach you: ANY.LEAVE VM    Who are you requesting to speak with (clinical staff, provider,  specific staff member): SCHEDULING      What was the call regarding: PATIENT'S DAUGHTER CALLED TO SCHEDULE APPT WITH DR WILLIAMSON FOR HER FATHER ALEX. ALEX THINKS HIS CANCER IS BACK AND WANTS TO GET IT CHECKED OUT ASAP    Is it okay if the provider responds through MyChart: NO

## 2023-12-02 RX ORDER — LEVOTHYROXINE SODIUM 137 UG/1
125 TABLET ORAL DAILY
Qty: 90 TABLET | OUTPATIENT
Start: 2023-12-02

## 2023-12-04 ENCOUNTER — OFFICE VISIT (OUTPATIENT)
Dept: INTERNAL MEDICINE | Facility: CLINIC | Age: 78
End: 2023-12-04
Payer: MEDICARE

## 2023-12-04 ENCOUNTER — LAB (OUTPATIENT)
Dept: LAB | Facility: HOSPITAL | Age: 78
End: 2023-12-04
Payer: MEDICARE

## 2023-12-04 ENCOUNTER — TELEPHONE (OUTPATIENT)
Dept: ONCOLOGY | Facility: CLINIC | Age: 78
End: 2023-12-04
Payer: MEDICARE

## 2023-12-04 VITALS
BODY MASS INDEX: 20.86 KG/M2 | HEART RATE: 93 BPM | WEIGHT: 154 LBS | DIASTOLIC BLOOD PRESSURE: 78 MMHG | HEIGHT: 72 IN | SYSTOLIC BLOOD PRESSURE: 136 MMHG | TEMPERATURE: 98.3 F | OXYGEN SATURATION: 99 %

## 2023-12-04 DIAGNOSIS — R63.4 WEIGHT LOSS: ICD-10-CM

## 2023-12-04 DIAGNOSIS — E78.2 MIXED HYPERLIPIDEMIA: ICD-10-CM

## 2023-12-04 DIAGNOSIS — F51.01 PRIMARY INSOMNIA: ICD-10-CM

## 2023-12-04 DIAGNOSIS — R64 CACHEXIA: ICD-10-CM

## 2023-12-04 DIAGNOSIS — E03.9 ACQUIRED HYPOTHYROIDISM: ICD-10-CM

## 2023-12-04 DIAGNOSIS — Z00.00 MEDICARE ANNUAL WELLNESS VISIT, SUBSEQUENT: Primary | ICD-10-CM

## 2023-12-04 DIAGNOSIS — I10 ESSENTIAL HYPERTENSION: ICD-10-CM

## 2023-12-04 DIAGNOSIS — R53.82 CHRONIC FATIGUE: ICD-10-CM

## 2023-12-04 LAB
ALBUMIN SERPL-MCNC: 3.4 G/DL (ref 3.5–5.2)
ALBUMIN/GLOB SERPL: 1.1 G/DL
ALP SERPL-CCNC: 84 U/L (ref 39–117)
ALT SERPL W P-5'-P-CCNC: 14 U/L (ref 1–41)
ANION GAP SERPL CALCULATED.3IONS-SCNC: 4.9 MMOL/L (ref 5–15)
AST SERPL-CCNC: 19 U/L (ref 1–40)
BASOPHILS # BLD AUTO: 0.06 10*3/MM3 (ref 0–0.2)
BASOPHILS NFR BLD AUTO: 1 % (ref 0–1.5)
BILIRUB SERPL-MCNC: 0.5 MG/DL (ref 0–1.2)
BUN SERPL-MCNC: 18 MG/DL (ref 8–23)
BUN/CREAT SERPL: 14.3 (ref 7–25)
CALCIUM SPEC-SCNC: 8.8 MG/DL (ref 8.6–10.5)
CHLORIDE SERPL-SCNC: 104 MMOL/L (ref 98–107)
CO2 SERPL-SCNC: 28.1 MMOL/L (ref 22–29)
CREAT SERPL-MCNC: 1.26 MG/DL (ref 0.76–1.27)
DEPRECATED RDW RBC AUTO: 44.4 FL (ref 37–54)
EGFRCR SERPLBLD CKD-EPI 2021: 58 ML/MIN/1.73
EOSINOPHIL # BLD AUTO: 0.09 10*3/MM3 (ref 0–0.4)
EOSINOPHIL NFR BLD AUTO: 1.5 % (ref 0.3–6.2)
ERYTHROCYTE [DISTWIDTH] IN BLOOD BY AUTOMATED COUNT: 13.1 % (ref 12.3–15.4)
GLOBULIN UR ELPH-MCNC: 3.2 GM/DL
GLUCOSE SERPL-MCNC: 98 MG/DL (ref 65–99)
HCT VFR BLD AUTO: 38.3 % (ref 37.5–51)
HGB BLD-MCNC: 12.5 G/DL (ref 13–17.7)
IMM GRANULOCYTES # BLD AUTO: 0.01 10*3/MM3 (ref 0–0.05)
IMM GRANULOCYTES NFR BLD AUTO: 0.2 % (ref 0–0.5)
IRON 24H UR-MRATE: 53 MCG/DL (ref 59–158)
LYMPHOCYTES # BLD AUTO: 1.03 10*3/MM3 (ref 0.7–3.1)
LYMPHOCYTES NFR BLD AUTO: 16.7 % (ref 19.6–45.3)
MCH RBC QN AUTO: 30.8 PG (ref 26.6–33)
MCHC RBC AUTO-ENTMCNC: 32.6 G/DL (ref 31.5–35.7)
MCV RBC AUTO: 94.3 FL (ref 79–97)
MONOCYTES # BLD AUTO: 0.67 10*3/MM3 (ref 0.1–0.9)
MONOCYTES NFR BLD AUTO: 10.9 % (ref 5–12)
NEUTROPHILS NFR BLD AUTO: 4.31 10*3/MM3 (ref 1.7–7)
NEUTROPHILS NFR BLD AUTO: 69.7 % (ref 42.7–76)
NRBC BLD AUTO-RTO: 0.2 /100 WBC (ref 0–0.2)
PLATELET # BLD AUTO: 185 10*3/MM3 (ref 140–450)
PMV BLD AUTO: 10 FL (ref 6–12)
POTASSIUM SERPL-SCNC: 4.9 MMOL/L (ref 3.5–5.2)
PROT SERPL-MCNC: 6.6 G/DL (ref 6–8.5)
RBC # BLD AUTO: 4.06 10*6/MM3 (ref 4.14–5.8)
SODIUM SERPL-SCNC: 137 MMOL/L (ref 136–145)
TSH SERPL DL<=0.05 MIU/L-ACNC: 45.9 UIU/ML (ref 0.27–4.2)
VIT B12 BLD-MCNC: 704 PG/ML (ref 211–946)
WBC NRBC COR # BLD AUTO: 6.17 10*3/MM3 (ref 3.4–10.8)

## 2023-12-04 PROCEDURE — 84443 ASSAY THYROID STIM HORMONE: CPT | Performed by: FAMILY MEDICINE

## 2023-12-04 PROCEDURE — 36415 COLL VENOUS BLD VENIPUNCTURE: CPT | Performed by: FAMILY MEDICINE

## 2023-12-04 PROCEDURE — 80053 COMPREHEN METABOLIC PANEL: CPT | Performed by: FAMILY MEDICINE

## 2023-12-04 PROCEDURE — 82652 VIT D 1 25-DIHYDROXY: CPT | Performed by: FAMILY MEDICINE

## 2023-12-04 PROCEDURE — 82607 VITAMIN B-12: CPT | Performed by: FAMILY MEDICINE

## 2023-12-04 PROCEDURE — 85025 COMPLETE CBC W/AUTO DIFF WBC: CPT | Performed by: FAMILY MEDICINE

## 2023-12-04 PROCEDURE — 83540 ASSAY OF IRON: CPT | Performed by: FAMILY MEDICINE

## 2023-12-04 NOTE — TELEPHONE ENCOUNTER
----- Message from Pablo Williamson MD sent at 12/4/2023  1:17 PM EST -----  I haven't seen him in 2 years. I would be happy to see him again as a 2Unit MD appt w lab ( CBC, CMP) but if he needs to get in before [place], he may need to see NP first. Has he seen his ENT or primary care physician yet?  ----- Message -----  From: Mana Rivera  Sent: 12/4/2023  12:25 PM EST  To: Pablo Williamsno MD; Maria M Robbins, RN; #    SEE PHONECALL - PLEASE ADVISE            Caller: CHAD FERRARA     Relationship: Emergency Contact     Best call back number: [phone]     What is the best time to reach you: ANY.LEAVE VM     Who are you requesting to speak with (clinical staff, provider,  specific staff member): SCHEDULING        What was the call regarding: PATIENT'S DAUGHTER CALLED TO SCHEDULE APPT WITH DR WILLIAMSON FOR HER FATHER ALEX. ALEX THINKS HIS CANCER IS BACK AND WANTS TO GET IT CHECKED OUT ASAP     Is it okay if the provider responds through MyChart: NO

## 2023-12-04 NOTE — PROGRESS NOTES
The ABCs of the Annual Wellness Visit  Subsequent Medicare Wellness Visit    Subjective      Cody Eldridge is a 78 y.o. male who presents for a Subsequent Medicare Wellness Visit.    The following portions of the patient's history were reviewed and   updated as appropriate: allergies, current medications, past family history, past medical history, past social history, past surgical history, and problem list.    Compared to one year ago, the patient feels his physical   health is worse.    Compared to one year ago, the patient feels his mental   health is the same.    Recent Hospitalizations:  This patient has had a [facility] admission record on file within the last 365 days.    Current Medical Providers:  Patient Care Team:  Patrick Diaz MD as PCP - General (Family Medicine)  Payal Rocha MD as Consulting Physician (Pain Medicine)  Lorna Chaidez APRN (Nurse Practitioner)  Kristal Cowart RPH as Pharmacist  Fritz Slater MD as Referring Physician (Otolaryngology)  Pablo Heaton MD as Consulting Physician (Hematology and Oncology)  Annie Davila MD as Consulting Physician (Radiation Oncology)  Chetan Heaton MD as Consulting Physician (Urology)  Kristal Cowart RPH as Pharmacist (Pharmacy)  Georges Guadalupe PharmD as Pharmacist (Pharmacy)    Outpatient Medications Prior to Visit   Medication Sig Dispense Refill    albuterol (PROVENTIL HFA;VENTOLIN HFA) 108 (90 BASE) MCG/ACT inhaler Inhale 2 puffs Every 4 (Four) Hours As Needed.      ALPRAZolam (XANAX) 0.25 MG tablet TAKE 1 TABLET BY MOUTH TWICE DAILY AS NEEDED FOR ANXIETY 60 tablet 1    aspirin 81 MG chewable tablet Chew 1 tablet Daily.      baclofen (LIORESAL) 10 MG tablet Take 1 tablet by mouth 3 (Three) Times a Day.      carvedilol (COREG) 25 MG tablet TAKE 1 TABLET BY MOUTH TWICE DAILY WITH MEALS 180 tablet 1    empagliflozin (JARDIANCE) 10 MG tablet tablet Take 1 tablet by mouth Daily. 30 tablet 5    furosemide (LASIX)  40 MG tablet Take 1 tablet by mouth 2 (Two) Times a Day. 60 tablet 2    levothyroxine (SYNTHROID, LEVOTHROID) 137 MCG tablet Take 1 tablet by mouth Daily. 30 tablet 1    lisinopril (PRINIVIL,ZESTRIL) 10 MG tablet TAKE 1 TABLET BY MOUTH DAILY 90 tablet 1    oxyCODONE-acetaminophen (PERCOCET) 7.5-325 MG per tablet Take 1 tablet by mouth Every 6 (Six) Hours As Needed.      rosuvastatin (CRESTOR) 20 MG tablet Take 0.5 tablets by mouth Daily. 30 tablet 5    sertraline (ZOLOFT) 100 MG tablet TAKE 1 TABLET BY MOUTH DAILY 90 tablet 3    Trelegy Ellipta 100-62.5-25 MCG/ACT inhaler Inhale 1 puff Daily.      warfarin (COUMADIN) 2 MG tablet Take 2 tablets by mouth See Admin Instructions. 2 tablets (4 mg) On Tuesdays and Fridays       No facility-administered medications prior to visit.       Opioid medication/s are on active medication list.  and I have evaluated his active treatment plan and pain score trends (see table).  There were no vitals filed for this visit.  I have reviewed the chart for potential of high risk medication and harmful drug interactions in the elderly.          Aspirin is on active medication list. Aspirin use is contraindicated for this patient due to: current use of warfarin.  Patient has been instructed to discontinue this medication. .      Patient Active Problem List   Diagnosis    Atopic rhinitis    Arthritis of hand    Coxitis    Benign colonic polyp    Neck pain    Mixed anxiety depressive disorder    Degeneration of intervertebral disc of lumbosacral region    Elevated prostate specific antigen (PSA)    Mixed hyperlipidemia    Essential hypertension    Insomnia    Lumbar radiculopathy    Osteoarthritis    Vitamin D deficiency    Abdominal pain    Myalgia    Cramp of both lower extremities    Gingivitis    Other persistent atrial fibrillation    Severe tricuspid regurgitation by prior echocardiogram    SHAR (obstructive sleep apnea)    Precordial pain    IRAHETA (dyspnea on exertion)    Coronary artery  "disease involving native coronary artery of native heart without angina pectoris    Shortness of breath    Squamous cell carcinoma of base of tongue    Current use of long term anticoagulation    Syncope    History of cancer    Hypotension    Medicare annual wellness visit, subsequent    Squamous cell carcinoma of neck    COPD (chronic obstructive pulmonary disease)    Depression with anxiety    Chemotherapy-induced neutropenia    Chemotherapy-induced thrombocytopenia    Acute renal injury    Anemia associated with chemotherapy    Oral thrush    MSSA bacteremia    Acquired hypothyroidism    Chronic conjunctivitis of both eyes    Change in vision    Chronic diastolic congestive heart failure    Syncope and collapse    Orthostatic hypotension    Stage 3a chronic kidney disease    Acute on chronic diastolic heart failure    Severe pulmonary hypertension    Valvular heart disease    Hypertensive emergency    Chronic fatigue    Weight loss     Advance Care Planning   Advance Care Planning     Advance Directive is not on file.  ACP discussion was held with the patient during this visit. Patient does not have an advance directive, information provided.     Objective    Vitals:    23 1313   BP: 136/78   Pulse: 93   Temp: 98.3 °F (36.8 °C)   SpO2: 99%   Weight: 69.9 kg (154 lb)   Height: 182.9 cm (72.01\")     Estimated body mass index is 20.88 kg/m² as calculated from the following:    Height as of this encounter: 182.9 cm (72.01\").    Weight as of this encounter: 69.9 kg (154 lb).    BMI is within normal parameters. No other follow-up for BMI required.      Does the patient have evidence of cognitive impairment?   No            HEALTH RISK ASSESSMENT    Smoking Status:  Social History     Tobacco Use   Smoking Status Former    Packs/day: 3.00    Years: 42.00    Additional pack years: 0.00    Total pack years: 126.00    Types: Cigarettes    Start date:     Quit date: 2000    Years since quittin.9   Smokeless " Tobacco Never   Tobacco Comments    started age 11 x 53 years stopped  / daily caffeine- Coke     Alcohol Consumption:  Social History     Substance and Sexual Activity   Alcohol Use Not Currently    Alcohol/week: 2.0 standard drinks of alcohol    Types: 1 Glasses of wine, 1 Cans of beer per week    Comment: occ     Fall Risk Screen:    CHRISTIAN Fall Risk Assessment has not been completed.    Depression Screenin/4/2023     1:19 PM   PHQ-2/PHQ-9 Depression Screening   Little Interest or Pleasure in Doing Things 0-->not at all   Feeling Down, Depressed or Hopeless 0-->not at all   PHQ-9: Brief Depression Severity Measure Score 0       Health Habits and Functional and Cognitive Screenin/4/2023     1:00 PM   Functional & Cognitive Status   Do you have difficulty preparing food and eating? No   Do you have difficulty bathing yourself, getting dressed or grooming yourself? No   Do you have difficulty using the toilet? No   Do you have difficulty moving around from place to place? No   Do you have trouble with steps or getting out of a bed or a chair? No   Current Diet Well Balanced Diet   Dental Exam Up to date   Eye Exam Up to date   Exercise (times per week) 3 times per week   Current Exercises Include Yard Work;House Cleaning   Do you need help using the phone?  No   Are you deaf or do you have serious difficulty hearing?  Yes   Do you need help to go to places out of walking distance? Yes   Do you need help shopping? No   Do you need help preparing meals?  No   Do you need help with housework?  No   Do you need help with laundry? No   Do you need help taking your medications? No   Do you need help managing money? No   Do you ever drive or ride in a car without wearing a seat belt? No   Have you felt unusual stress, anger or loneliness in the last month? No   Who do you live with? Spouse   If you need help, do you have trouble finding someone available to you? No   Have you been bothered in the  last four weeks by sexual problems? No   Do you have difficulty concentrating, remembering or making decisions? No       Age-appropriate Screening Schedule:  Refer to the list below for future screening recommendations based on patient's age, sex and/or medical conditions. Orders for these recommended tests are listed in the plan section. The patient has been provided with a written plan.    Health Maintenance   Topic Date Due    Pneumococcal Vaccine 65+ (2 - PPSV23 or PCV20) 02/16/2015    HEPATITIS C SCREENING  Never done    ZOSTER VACCINE (2 of 3) 11/29/2016    COLORECTAL CANCER SCREENING  08/15/2019    TDAP/TD VACCINES (2 - Td or Tdap) 01/01/2022    ANNUAL WELLNESS VISIT  05/31/2023    INFLUENZA VACCINE  08/01/2023    COVID-19 Vaccine (5 - 2023-24 season) 09/01/2023    LIPID PANEL  06/09/2024                  CMS Preventative Services Quick Reference  Risk Factors Identified During Encounter:    Immunizations Discussed/Encouraged: Influenza, Prevnar 20 (Pneumococcal 20-valent conjugate), and Shingrix    The above risks/problems have been discussed with the patient.  Pertinent information has been shared with the patient in the After Visit Summary.    Diagnoses and all orders for this visit:    1. Medicare annual wellness visit, subsequent (Primary)    2. Mixed hyperlipidemia    3. Essential hypertension  -     Comprehensive metabolic panel    4. Acquired hypothyroidism  -     TSH    5. Primary insomnia    6. Chronic fatigue  -     CBC and Differential  -     Iron  -     Vitamin B12  -     Vitamin D 1,25 dihydroxy    7. Cachexia  -     Iron    8. Weight loss        Follow Up:   Next Medicare Wellness visit to be scheduled in 1 year.      An After Visit Summary and PPPS were made available to the patient.

## 2023-12-04 NOTE — TELEPHONE ENCOUNTER
Pt's daughter returned my call. Reviewed referral from Dr. Omer's office to our office which has already been handled by [facility] as [facility] does not accept pt's insurance and referring practice was advised to refer pt to [facility] or [facility]. Reviewed this with pt's daughter; pt was seen by PCP today but daughter was not at appt with her dad today. Discussed with daughter that referral has already been initiated by Dr. Omer (pt's cardiologist) and she should notify them of their preference for either [facility] or [facility]. Daughter verbalized concern of waiting to be seen, however I assured her he would likely get in to an oncologist at [facility] or [facility] within 2 weeks. She v/u.

## 2023-12-04 NOTE — PROGRESS NOTES
"Chief Complaint  Fatigue    Subjective        Cody Eldridge presents to [facility]  Fatigue  Associated symptoms include fatigue.     Patient appointment to discuss fatigue is chronic medical problems including hyperlipidemia hypertension hypothyroidism COPD feels that he just is not as vigorous as he has been although he does not have much energy does not walk at all sits around his house most the time takes care of his wife with a take care of each other  Sleep is fair  Needs laboratory monitoring of medication to determine therapeutic level specifically for hypothyroidism and has not had a recent CBC  Objective   Vital Signs:  /78   Pulse 93   Temp 98.3 °F (36.8 °C)   Ht 182.9 cm (72.01\")   Wt 69.9 kg (154 lb)   SpO2 99%   BMI 20.88 kg/m²   Estimated body mass index is 20.88 kg/m² as calculated from the following:    Height as of this encounter: 182.9 cm (72.01\").    Weight as of this encounter: 69.9 kg (154 lb).       BMI is within normal parameters. No other follow-up for BMI required.      Physical Exam  Vitals reviewed.   Constitutional:       Appearance: Normal appearance.   HENT:      Head: Normocephalic.   Eyes:      General: No scleral icterus.     Extraocular Movements: Extraocular movements intact.   Cardiovascular:      Rate and Rhythm: Rhythm irregular.   Pulmonary:      Effort: Pulmonary effort is normal.      Breath sounds: Normal breath sounds.   Abdominal:      Tenderness: There is no right CVA tenderness.   Musculoskeletal:      Right lower leg: No edema.      Left lower leg: No edema.   Neurological:      Mental Status: He is alert.   Psychiatric:         Mood and Affect: Mood normal.         Behavior: Behavior normal.         Thought Content: Thought content normal.         Judgment: Judgment normal.        Result Review :    Common labs          9/18/2023    10:42 10/4/2023    14:54 11/9/2023    11:51   Common Labs   Glucose 103  132  84    BUN 28  " 20  19    Creatinine 1.89  1.26  1.27    Sodium 144  139  139    Potassium 4.8  4.7  3.9    Chloride 110  106  104    Calcium 8.6  8.9  8.5    Albumin   3.6    Total Bilirubin   0.6    Alkaline Phosphatase   81    AST (SGOT)   23    ALT (SGPT)   13    WBC 5.72      Hemoglobin 13.4      Hematocrit 40.5      Platelets 117                     Assessment and Plan   Diagnoses and all orders for this visit:    1. Weight loss    2. Mixed hyperlipidemia    3. Essential hypertension  -     Comprehensive metabolic panel    4. Acquired hypothyroidism  -     TSH    5. Primary insomnia    6. Chronic fatigue  -     CBC and Differential  -     Iron  -     Vitamin B12  -     Vitamin D 1,25 dihydroxy    7. Cachexia  -     Iron    Multifactorial etiologies of fatigue follow-up results of blood work monitor blood pressure with goal less than 140/90 greater than 110/60  Recommend nutritional supplements as patient has had some weight loss  Follow-up office appointment as needed or             Follow Up   Return in about 1 month (around 1/4/2024), or if symptoms worsen or fail to improve, for Recheck.  Patient was given instructions and counseling regarding his condition or for health maintenance advice. Please see specific information pulled into the AVS if appropriate.

## 2023-12-04 NOTE — TELEPHONE ENCOUNTER
Left message with my direct number for pt's daughter to call back to discuss follow up in our office. Pt was also seen by PCP today per chart and will discuss with daughter the outcome of that appt as well as if follow up is needed with ENT.

## 2023-12-05 ENCOUNTER — TELEPHONE (OUTPATIENT)
Dept: CARDIOLOGY | Facility: CLINIC | Age: 78
End: 2023-12-05
Payer: MEDICARE

## 2023-12-05 DIAGNOSIS — I48.19 OTHER PERSISTENT ATRIAL FIBRILLATION: Primary | ICD-10-CM

## 2023-12-05 DIAGNOSIS — I50.31 ACUTE DIASTOLIC CHF (CONGESTIVE HEART FAILURE): ICD-10-CM

## 2023-12-05 RX ORDER — LEVOTHYROXINE SODIUM 175 UG/1
1 CAPSULE ORAL DAILY
Qty: 30 CAPSULE | Refills: 1 | Status: SHIPPED | OUTPATIENT
Start: 2023-12-05

## 2023-12-05 NOTE — TELEPHONE ENCOUNTER
Caller: Cody Eldridge    Relationship: Self    Best call back number: [phone]     What specialty or service is being requested: CARDIOLOGY    What is the provider, practice or medical service name: [facility]     Any additional details: PT STATES HE HAS SPOKEN WITH INSURANCE AND HE IS NEEDING A REFERRAL FROM HIS CURRENT CARDIOLOGIST TO BE SENT TO [facility] - PT UNSURE OF OFFICE NAME AND FAX

## 2023-12-07 LAB — 1,25(OH)2D SERPL-MCNC: 24.6 PG/ML (ref 24.8–81.5)

## 2023-12-08 ENCOUNTER — CLINICAL SUPPORT (OUTPATIENT)
Dept: ORTHOPEDIC SURGERY | Facility: CLINIC | Age: 78
End: 2023-12-08
Payer: MEDICARE

## 2023-12-08 VITALS — WEIGHT: 158.8 LBS | TEMPERATURE: 96.6 F | BODY MASS INDEX: 22.23 KG/M2 | HEIGHT: 71 IN

## 2023-12-08 DIAGNOSIS — M25.562 CHRONIC PAIN OF BOTH KNEES: Primary | ICD-10-CM

## 2023-12-08 DIAGNOSIS — M17.0 BILATERAL PRIMARY OSTEOARTHRITIS OF KNEE: ICD-10-CM

## 2023-12-08 DIAGNOSIS — G89.29 CHRONIC PAIN OF BOTH KNEES: Primary | ICD-10-CM

## 2023-12-08 DIAGNOSIS — E03.9 ACQUIRED HYPOTHYROIDISM: Primary | ICD-10-CM

## 2023-12-08 DIAGNOSIS — M25.561 CHRONIC PAIN OF BOTH KNEES: Primary | ICD-10-CM

## 2023-12-08 RX ORDER — METHYLPREDNISOLONE ACETATE 80 MG/ML
1 INJECTION, SUSPENSION INTRA-ARTICULAR; INTRALESIONAL; INTRAMUSCULAR; SOFT TISSUE
Status: COMPLETED | OUTPATIENT
Start: 2023-12-08 | End: 2023-12-08

## 2023-12-08 RX ORDER — LIDOCAINE HYDROCHLORIDE 10 MG/ML
2 INJECTION, SOLUTION EPIDURAL; INFILTRATION; INTRACAUDAL; PERINEURAL
Status: COMPLETED | OUTPATIENT
Start: 2023-12-08 | End: 2023-12-08

## 2023-12-08 RX ADMIN — METHYLPREDNISOLONE ACETATE 1 ML: 80 INJECTION, SUSPENSION INTRA-ARTICULAR; INTRALESIONAL; INTRAMUSCULAR; SOFT TISSUE at 14:18

## 2023-12-08 RX ADMIN — LIDOCAINE HYDROCHLORIDE 2 ML: 10 INJECTION, SOLUTION EPIDURAL; INFILTRATION; INTRACAUDAL; PERINEURAL at 14:18

## 2023-12-08 NOTE — PROGRESS NOTES
Mr. Eldridge comes in today for follow-up.  Injections have worked well in the past.  The patient would like to get repeat injections today.  The risks, benefits and alternatives were discussed and the patient consented.  Going forward, the patient will follow-up as needed.    JONELLE Cervantes    12/08/2023    Large Joint Arthrocentesis: R knee  Date/Time: 12/8/2023 2:18 PM  Consent given by: patient  Site marked: site marked  Timeout: Immediately prior to procedure a time out was called to verify the correct patient, procedure, equipment, support staff and site/side marked as required   Supporting Documentation  Indications: pain   Procedure Details  Location: knee - R knee  Preparation: Patient was prepped and draped in the usual sterile fashion  Needle gauge: 21G.  Approach: anterolateral  Medications administered: 1 mL methylPREDNISolone acetate 80 MG/ML; 2 mL lidocaine PF 1% 1 %  Patient tolerance: patient tolerated the procedure well with no immediate complications      Large Joint Arthrocentesis: L knee  Date/Time: 12/8/2023 2:18 PM  Consent given by: patient  Site marked: site marked  Timeout: Immediately prior to procedure a time out was called to verify the correct patient, procedure, equipment, support staff and site/side marked as required   Supporting Documentation  Indications: pain   Procedure Details  Location: knee - L knee  Preparation: Patient was prepped and draped in the usual sterile fashion  Needle gauge: 21G.  Approach: anterolateral  Medications administered: 1 mL methylPREDNISolone acetate 80 MG/ML; 2 mL lidocaine PF 1% 1 %  Patient tolerance: patient tolerated the procedure well with no immediate complications

## 2023-12-11 NOTE — TELEPHONE ENCOUNTER
Pt's daughter called inquiring about referral to [facility]. I  told her that order was placed on 12/5/23. Daughter verbalized understanding and will follow up with [facility] to schedule an appt for her dad.

## 2023-12-11 NOTE — TELEPHONE ENCOUNTER
Dr. Omer, The Pt's daughter called to say that [facility] needs the referral to specify which Doctor the Pt needs to see before they can get him scheduled.  Alcides

## 2023-12-11 NOTE — TELEPHONE ENCOUNTER
Dr Lewis the Daughter called back to say that [facility] will not schedule her dad unless  another referral is sent specifying which Doctor the Pt needs to see.

## 2023-12-15 RX ORDER — MONTELUKAST SODIUM 10 MG/1
TABLET ORAL
Qty: 90 TABLET | Refills: 1 | OUTPATIENT
Start: 2023-12-15

## 2023-12-28 ENCOUNTER — TELEPHONE (OUTPATIENT)
Dept: CARDIOLOGY | Facility: CLINIC | Age: 78
End: 2023-12-28
Payer: MEDICARE

## 2023-12-28 NOTE — TELEPHONE ENCOUNTER
Dr. Omer,     Mr. Eldridge has history of atrial fibrillation with CHADS-VASc score of 4. No documented history of CVA or VTE. Would recommend withholding warfarin 5 days prior to procedure with resumption of warfarin as soon as possible post-operatively. Would recommend proceeding without enoxaparin bridge.    Please advise if you have alternative recommendation or additional considerations. Thank you!  Georges

## 2023-12-28 NOTE — TELEPHONE ENCOUNTER
[facility] is requesting cardiac clearance for Mr Eldridge. 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 He will need to have a 45 Minute direct laryngoscopy with biopsy and esophagoscopy. The procedure will be performed by Dr Alice Weston MD.     Mr Eldridge currently takes Aspirin 81 MG  as well as Warfarin  MG tablets. [facility] is requesting instructions on how patient should hold his blood thinners prior to the procedure.     Clearance can be faxed back to Lauryn at [phone]    Please advise  MICHELLE Waldron   12/28/2023   [place]

## 2024-01-04 ENCOUNTER — ANTICOAGULATION VISIT (OUTPATIENT)
Dept: PHARMACY | Facility: HOSPITAL | Age: 79
End: 2024-01-04
Payer: MEDICARE

## 2024-01-04 ENCOUNTER — TELEPHONE (OUTPATIENT)
Dept: CARDIOLOGY | Facility: CLINIC | Age: 79
End: 2024-01-04
Payer: MEDICARE

## 2024-01-04 DIAGNOSIS — I48.19 OTHER PERSISTENT ATRIAL FIBRILLATION: Primary | ICD-10-CM

## 2024-01-04 NOTE — PROGRESS NOTES
Anticoagulation Clinic Progress Note    Anticoagulation Summary  As of 2024      INR goal:  2.0-3.0   TTR:  61.7% (5 y)   INR used for dosin.6 (2024)   Warfarin maintenance plan:  4 mg every Tue, Fri; 2 mg all other days   Weekly warfarin total:  18 mg   Plan last modified:  Rachelle Baer, Pharmacy Intern (2023)   Next INR check:  2024   Priority:  Maintenance   Target end date:  Indefinite    Indications    Other persistent atrial fibrillation [I48.19]                 Anticoagulation Episode Summary       INR check location:      Preferred lab:      Send INR reminders to:  [facility] [place]    Comments:            Anticoagulation Care Providers       Provider Role Specialty Phone number    Myriam Omer MD Referring Cardiology [phone]            Clinic Interview:  Patient Findings     Positives:  Upcoming invasive procedure    Negatives:  Signs/symptoms of thrombosis, Signs/symptoms of bleeding,   Laboratory test error suspected, Change in health, Change in alcohol use,   Change in activity, Emergency department visit, Upcoming dental procedure,   Missed doses, Extra doses, Change in medications, Change in diet/appetite,   Hospital admission, Bruising, Other complaints    Comments:  Laryngoscopy with biopsy is scheduled for 24. Dr. Omer has   approved holding warfarin 5 days prior without enoxaparin bridge (see   23 telephone note).       Clinical Outcomes     Negatives:  Major bleeding event, Thromboembolic event,   Anticoagulation-related hospital admission, Anticoagulation-related ED   visit, Anticoagulation-related fatality    Comments:  Laryngoscopy with biopsy is scheduled for 24. Dr. Omer has   approved holding warfarin 5 days prior without enoxaparin bridge (see   23 telephone note).         INR History:      Latest Ref Rng & Units 2023     3:52 AM 6/10/2023     4:13 AM 2023     1:30 PM 2023     1:00 PM 10/5/2023     2:00 PM  11/16/2023     2:30 PM 1/4/2024    12:48 PM   Anticoagulation Monitoring   INR    2.7 3.0 2.3 1.7 5.6   INR Date    6/14/2023 7/26/2023 10/5/2023 11/16/2023 1/4/2024   INR Goal    2.0-3.0 2.0-3.0 2.0-3.0 2.0-3.0 2.0-3.0   Trend    Up Same Same Same Same   Last Week Total    16 mg 18 mg 18 mg 18 mg 18 mg   Next Week Total    18 mg 18 mg 18 mg 20 mg 8 mg   Sun    2 mg 2 mg 2 mg 2 mg Hold (1/7); Otherwise 2 mg   Mon    2 mg 2 mg 2 mg 2 mg Hold (1/8); Otherwise 2 mg   Tue    4 mg 4 mg 4 mg 4 mg 4 mg   Wed    2 mg 2 mg 2 mg 2 mg 4 mg (1/10); Otherwise 2 mg   Thu    2 mg 2 mg 2 mg 4 mg (11/16); Otherwise 2 mg Hold (1/4), 4 mg (1/11)   Fri    4 mg 4 mg 4 mg 4 mg Hold (1/5); Otherwise 4 mg   Sat    2 mg 2 mg 2 mg 2 mg Hold (1/6); Otherwise 2 mg   Historical INR 0.90 - 1.10 1.90  2.30             Plan:  1. INR is  unknown  today- see above in Anticoagulation Summary.   Will instruct Cody Eldridge to Change their warfarin regimen- see above in Anticoagulation Summary. Advised to increase vit k intake in diet the next couple days. HOLD warfarin the next 5 days. Take extra 2 mg x 2 days post-op, then resume usual dose of 4 mg Tues/Fri, 2 mg all other days.   2. Follow up in clinic on 1/18/24.   3. They have been instructed to call if any changes in medications, doses, concerns, etc. To seek immediate medical attention if s/sx of bleeding develop or fall occurs. Patient expresses understanding and has no further questions at this time.    Georges Guadalupe, PharmD

## 2024-01-04 NOTE — TELEPHONE ENCOUNTER
Pt is scheduled for a SCC removal on his tongue on 1/9/24, was instructed by our office to hold warfarin for 5 days prior to surgery- today being the last day for him to take medication.  At PAT today, his labs came back:    INR 5.6  PT 63.2  PTT 57.7    [facility] wanted you to be aware of elevated INR.  Do you have any recommendations for this patient?    Thank you,    Coretta SEAMAN RN  [facility] [place]  01/04/24  13:43 EST

## 2024-01-16 ENCOUNTER — TELEPHONE (OUTPATIENT)
Dept: CARDIOLOGY | Facility: CLINIC | Age: 79
End: 2024-01-16

## 2024-01-17 ENCOUNTER — TELEPHONE (OUTPATIENT)
Dept: PHARMACY | Facility: HOSPITAL | Age: 79
End: 2024-01-17
Payer: MEDICARE

## 2024-01-17 NOTE — TELEPHONE ENCOUNTER
Patient called to inform us that he kept [employer] replacement plan for this year and can no longer see [facility] providers or our clinic. He is reaching out to [place] to attempt to get set up with a Cardiologist/anticoag clinic there.

## 2024-01-18 ENCOUNTER — TELEPHONE (OUTPATIENT)
Dept: CARDIOLOGY | Facility: CLINIC | Age: 79
End: 2024-01-18
Payer: MEDICARE

## 2024-01-18 NOTE — TELEPHONE ENCOUNTER
Erika Financial Counselor called to say that Pt had to cancel Anticoag appt scheduled for today due to no longer having continuity of care through his [employer]/medicare insurance.   She asked for Pt to be contacted to see what could be done regarding this.   She also left her contact number for any questions/concerns:    Erika [phone]    Thank you!    Alcides ESCOTO

## 2024-01-26 DIAGNOSIS — R53.83 FATIGUE, UNSPECIFIED TYPE: ICD-10-CM

## 2024-01-26 RX ORDER — ALPRAZOLAM 0.25 MG/1
TABLET ORAL
Qty: 60 TABLET | Refills: 0 | OUTPATIENT
Start: 2024-01-26

## 2024-01-26 NOTE — TELEPHONE ENCOUNTER
Rx Refill Note  Requested Prescriptions     Pending Prescriptions Disp Refills    ALPRAZolam (XANAX) 0.25 MG tablet [Pharmacy Med Name: ALPRAZOLAM 0.25MG TABLETS] 60 tablet 0     Sig: TAKE 1 TABLET BY MOUTH TWICE DAILY AS NEEDED FOR ANXIETY      Last office visit with prescribing clinician: 12/4/2023   Last telemedicine visit with prescribing clinician: Visit date not found   Next office visit with prescribing clinician: Visit date not found   {TIP  Encounters:23}    {TIP  Please add Last Relevant Lab Date if appropriate:23}              {TIP  Is Refill Pharmacy correct?:23}    Would you like a call back once the refill request has been completed: [] Yes [] No    If the office needs to give you a call back, can they leave a voicemail: [] Yes [] No    Kaley Grissom MA  01/26/24, 12:20 EST

## 2024-01-31 DIAGNOSIS — R53.83 FATIGUE, UNSPECIFIED TYPE: ICD-10-CM

## 2024-01-31 NOTE — TELEPHONE ENCOUNTER
PATIENT CALLED FOR MEDICATION REFILL OF    ALPRAZolam (XANAX) 0.25 MG tablet     HE HAS BEEN OUT FOR 4-5 DAYS    [id number] - [address] AT [facility] - [phone] [facility] [phone]  [phone]     CALL BACK NUMBER [phone]

## 2024-01-31 NOTE — TELEPHONE ENCOUNTER
UNABLE TO WARM TRANSFER    Caller: Elise Rai    Relationship to patient: Emergency Contact    Best call back number:[phone]     Patient is needing: PATIENT HAS BEEN OUT OF MEDICATION/XANAX SINCE LAST FRIDAY 1/26.  PLEASE CALL AND ADVISE.

## 2024-02-01 NOTE — TELEPHONE ENCOUNTER
PATIENT HAS BEEN OUT OF THIS MEDICATION FOR OVER A WEEK. IS THERE ANY WAY THAT THIS CAN BE CALLED IN?

## 2024-02-06 RX ORDER — ALPRAZOLAM 0.25 MG/1
0.25 TABLET ORAL 2 TIMES DAILY PRN
Qty: 60 TABLET | Refills: 1 | OUTPATIENT
Start: 2024-02-06

## 2024-02-06 RX ORDER — ALPRAZOLAM 0.25 MG/1
TABLET ORAL
Qty: 60 TABLET | Refills: 1 | Status: SHIPPED | OUTPATIENT
Start: 2024-02-06

## 2024-03-05 ENCOUNTER — ANTICOAGULATION VISIT (OUTPATIENT)
Dept: PHARMACY | Facility: HOSPITAL | Age: 79
End: 2024-03-05
Payer: MEDICARE

## 2024-03-05 DIAGNOSIS — I48.19 OTHER PERSISTENT ATRIAL FIBRILLATION: Primary | ICD-10-CM

## 2024-03-05 NOTE — PROGRESS NOTES
This encounter is for documentation purposes only. The patient transferred care to [facility] due to insurance. The medication management clinic will no longer be following. It has been a pleasure being a part of his care.

## 2024-12-17 NOTE — ANESTHESIA POSTPROCEDURE EVALUATION
"Patient: Cody Eldridge    Procedure Summary     Date:  08/05/19 Room / Location:  [facility] OR  / [facility] MAIN OR    Anesthesia Start:  1424 Anesthesia Stop:  1507    Procedure:  REMOVAL VENOUS ACCESS DEVICE (Right ) Diagnosis:  Vascular catheter infection (CMS/HCC)    Surgeon:  Prince Castaneda MD Provider:  Kusum Heaton MD    Anesthesia Type:  MAC ASA Status:  4          Anesthesia Type: MAC  Last vitals  BP   112/76 (08/05/19 1520)   Temp   36.8 °C (98.2 °F) (08/05/19 1520)   Pulse   116 (08/05/19 1520)   Resp   16 (08/05/19 1520)     SpO2   94 % (08/05/19 1520)     Post Anesthesia Care and Evaluation    Patient location during evaluation: PACU  Patient participation: complete - patient participated  Level of consciousness: awake and alert  Pain management: adequate  Airway patency: patent  Anesthetic complications: No anesthetic complications    Cardiovascular status: acceptable  Respiratory status: acceptable  Hydration status: acceptable    Comments: /76 (BP Location: Right arm, Patient Position: Lying)   Pulse 116   Temp 36.8 °C (98.2 °F) (Oral)   Resp 16   Ht 180.3 cm (71\")   Wt 70.5 kg (155 lb 6.8 oz)   SpO2 94%   BMI 21.68 kg/m²               " I did not receive any messages from her PCP, though by reading their chart it seems as though they are okay with it.  Jardiance 10 mg daily sent to pharmacy

## 2025-06-23 NOTE — PROGRESS NOTES
PATIENTINFORMATION    Date of Office Visit: 2019  Encounter Provider: Myriam Omer MD  Place of Service: [facility] CARDIOLOGY  Patient Name: Cody Eldridge  : 1945    Subjective:     Encounter Date:2019      Patient ID: Cody Eldridge is a 74 y.o. male.      History of Present Illness    This is a nice gentleman who saw Dr. Travis in the remote past. He is a difficult historian. Per Dr. Travis's note from  he had SV[facility] with [facility] in  [facility]. He also has COPD, hypertension and hyperlipidemia. To me, he denies coronary disease but his old records show that he has had a non-ST elevation myocardial infarction in the past. He had an echo in 10/2009, which showed normal left ventricular systolic function with an ejection fraction of 64%. There was mild right and left atrial enlargement and mild mitral and tricuspid regurgitation with a right ventricular systolic pressure of 39 mmHg.      In the spring of 2018, he was complaining of lots of shortness of breath.  He eventually had a heart catheterization in 2018 which showed mild nonobstructive coronary disease with a left ventricular end-diastolic pressure of 10.  He has had intermittent blood pressure issues.    He is currently being treated for throat cancer.  He was briefly admitted to the hospital for dehydration and thrush.  There was some confusion with his medications at discharge and he has not been taking metoprolol.    Review of Systems   Constitution: Positive for malaise/fatigue. Negative for fever, weight gain and weight loss.   HENT: Positive for odynophagia and sore throat. Negative for ear pain, hearing loss and nosebleeds.    Eyes: Negative for double vision, pain, vision loss in left eye and vision loss in right eye.   Cardiovascular:        See history of present illness.   Respiratory: Negative for cough, shortness of breath, sleep disturbances  "due to breathing, snoring and wheezing.    Endocrine: Negative for cold intolerance, heat intolerance and polyuria.   Skin: Negative for itching, poor wound healing and rash.   Musculoskeletal: Negative for joint pain, joint swelling and myalgias.   Gastrointestinal: Negative for abdominal pain, diarrhea, hematochezia, nausea and vomiting.   Genitourinary: Negative for hematuria and hesitancy.   Neurological: Negative for numbness, paresthesias and seizures.   Psychiatric/Behavioral: Negative for depression. The patient is not nervous/anxious.            ECG 12 Lead  Date/Time: 7/2/2019 2:26 PM  Performed by: Myriam Omer MD  Authorized by: Myriam Omer MD   Comparison: compared with previous ECG from 6/28/2019  Comparison to previous ECG: Heart rate is faster  Rhythm: atrial fibrillation  BPM: 132  Conduction: conduction normal    Clinical impression: abnormal EKG               Objective:     /70 (BP Location: Left arm, Patient Position: Sitting, Cuff Size: Adult)   Pulse (!) 132   Resp 16   Ht 182.9 cm (72\")   Wt 80.3 kg (177 lb)   BMI 24.01 kg/m²  Body mass index is 24.01 kg/m².     Physical Exam   Constitutional: He appears well-developed.   HENT:   Head: Normocephalic and atraumatic.   Eyes: Conjunctivae and lids are normal. Pupils are equal, round, and reactive to light. Lids are everted and swept, no foreign bodies found.   Neck: Normal range of motion. No JVD present. Carotid bruit is not present. No tracheal deviation present. No thyroid mass present.   Cardiovascular: Normal heart sounds. An irregularly irregular rhythm present. Tachycardia present.   Pulses:       Dorsalis pedis pulses are 2+ on the right side, and 2+ on the left side.   Pulmonary/Chest: Effort normal and breath sounds normal.   Abdominal: Normal appearance and bowel sounds are normal.   Musculoskeletal: Normal range of motion.   Neurological: He is alert. He has normal strength.   Skin: Skin is warm, dry and intact. "   Psychiatric: He has a normal mood and affect. His behavior is normal.   Vitals reviewed.          Assessment/Plan:       1.  Nonobstructive coronary artery disease.  Continue with risk factor management.  Coronary Artery Disease     2.  Possible obstructive sleep apnea.   3.  Lung disease/COPD with right heart failure.  This is followed by Dr. Segovia.  4.  Right heart failure  5. Atrial Fibrillation and Atrial Flutter  Assessment  • The patient has permanent atrial fibrillation  • The patient's CHADS2-VASc score is 3  • A [id number] score of 2 or more is considered a high risk for a thromboembolic event     Plan  • Continue in atrial fibrillation with rate control  • Continue warfarin for antithrombotic therapy, bleeding issues discussed    I am taking him off of amlodipine.  I am putting him on metoprolol tartrate 50 mg twice a day.  His warfarin is on hold because of supratherapeutic INR.  This is being followed by the INR clinic.    6.  Hypertension.  His blood pressure is now low.  I want him off all antihypertensive medications except for metoprolol tartrate.    He is can come back in next week for blood pressure check and an EKG.  I will plan on seeing him back in 6 weeks.       Orders Placed This Encounter   Procedures   • ECG 12 Lead     This order was created via procedure documentation        Discharge Medications           Accurate as of 7/2/19  2:52 PM. If you have any questions, ask your nurse or doctor.               Changes to Medications      Instructions Start Date   metoprolol tartrate 50 MG tablet  Commonly known as:  LOPRESSOR  What changed:    · medication strength  · See the new instructions.  Changed by:  Mryiam Omer MD   50 mg, Oral, Every 12 Hours Scheduled      warfarin 2 MG tablet  Commonly known as:  COUMADIN  What changed:    · how much to take  · how to take this  · when to take this   TAKE 2 TABLETS BY MOUTH ONCE DAILY      warfarin 2 MG tablet  Commonly known as:   COUMADIN  What changed:    · how much to take  · how to take this  · when to take this   TAKE 2 TABLETS BY MOUTH ONCE DAILY         Continue These Medications      Instructions Start Date   albuterol sulfate  (90 Base) MCG/ACT inhaler  Commonly known as:  PROVENTIL HFA;VENTOLIN HFA;PROAIR HFA   2 puffs, Inhalation, ProAir  (90 Base) MCG/ACT Inhalation Aerosol Solution; Patient Sig: ProAir  (90 Base) MCG/ACT Inhalation Aerosol Solution inhale 2 puffs by mouth every 4 hours if needed; 8.5; 11; 07-Apr-2014; Act      ALPRAZolam 2 MG tablet  Commonly known as:  XANAX   2 mg, Oral, Daily - RT      aluminum-magnesium hydroxide 200-200 MG/5ML suspension, diphenhydrAMINE 12.5 MG/5ML liquid, lidocaine viscous 2 % solution, nystatin [id number] UNIT/ML suspension   10 mL, Swish & Swallow, 4 Times Daily Before Meals & Nightly      baclofen 10 MG tablet  Commonly known as:  LIORESAL   take 1 tablet by mouth three times a day      CRESTOR 20 MG tablet  Generic drug:  rosuvastatin   20 mg, Oral, Daily      fluconazole 200 MG tablet  Commonly known as:  DIFLUCAN   200 mg, Oral, Every 24 Hours      HYDROcodone-acetaminophen 7.5-325 MG per tablet  Commonly known as:  NORCO   1 tablet, Oral, 4 Times Daily PRN      HYDROcodone-acetaminophen 5-325 MG per tablet  Commonly known as:  NORCO   1-2 tablets, Oral, Every 4 Hours PRN      montelukast 10 MG tablet  Commonly known as:  SINGULAIR   10 mg, Oral, Daily      ondansetron ODT 8 MG disintegrating tablet  Commonly known as:  ZOFRAN-ODT   8 mg, Oral, Every 8 Hours PRN      sertraline 50 MG tablet  Commonly known as:  ZOLOFT   TAKE 1 TABLET BY MOUTH EVERY DAY         Stop These Medications    aspirin 81 MG EC tablet  Stopped by:  Myriam Omer MD     carvedilol 25 MG tablet  Commonly known as:  COREG  Stopped by:  MD Myriam Mandujano MD  07/02/19  2:52 PM   Alert and oriented to person, place and time/Patient baseline mental status/Awake

## (undated) DEVICE — LOU MINOR PROCEDURE: Brand: MEDLINE INDUSTRIES, INC.

## (undated) DEVICE — KT MANIFLD CARDIAC

## (undated) DEVICE — SWAN-GANZ THERMODILUTION CATHETER: Brand: SWAN-GANZ

## (undated) DEVICE — BALN PRESS WEDGE 5F 110CM

## (undated) DEVICE — GLIDESHEATH BASIC HYDROPHILIC COATED INTRODUCER SHEATH: Brand: GLIDESHEATH

## (undated) DEVICE — CATH VENT MIV RADL PIG ST TIP 4F 110CM

## (undated) DEVICE — GLV SURG BIOGEL LTX PF 7 1/2

## (undated) DEVICE — CULT AER/ANAEROB FASTIDIOUS BACT

## (undated) DEVICE — GW HITORQUE/BAL MID/WT J W/HCOAT .014 3X190CM

## (undated) DEVICE — DECANT BG O JET

## (undated) DEVICE — GW EMR FIX EXCHG J STD .035 3MM 260CM

## (undated) DEVICE — Device

## (undated) DEVICE — CATH DIAG IMPULSE FR4 5F 100CM

## (undated) DEVICE — SYR LUERLOK 20CC BX/50

## (undated) DEVICE — 3M™ MEDIPORE™ H SOFT CLOTH SURGICAL TAPE 2862, 2 INCH X 10 YARD (5CM X 9,1M), 12 ROLLS/CASE: Brand: 3M™ MEDIPORE™

## (undated) DEVICE — APPL CHLORAPREP W/TINT 10.5ML PERC STRL

## (undated) DEVICE — DRP C/ARM 41X74IN

## (undated) DEVICE — ANTIBACTERIAL UNDYED BRAIDED (POLYGLACTIN 910), SYNTHETIC ABSORBABLE SUTURE: Brand: COATED VICRYL

## (undated) DEVICE — GOWN,NON-REINFORCED,SIRUS,SET IN SLV,XXL: Brand: MEDLINE

## (undated) DEVICE — APPL CHLORAPREP W/TINT 26ML ORNG

## (undated) DEVICE — NDL HYPO PRECISIONGLIDE REG 25G 1 1/2

## (undated) DEVICE — SUT SILK 2/0 TIES 18IN A185H

## (undated) DEVICE — DRSNG SURESITE WNDW 4X4.5

## (undated) DEVICE — ADHS SKIN DERMABOND TOP ADVANCED

## (undated) DEVICE — SPNG GZ WOVN 4X4IN 12PLY 10/BX STRL

## (undated) DEVICE — SUT PROLN 3/0 SH D/A 36IN 8522H

## (undated) DEVICE — INTRO SHEATH ART/FEM ENGAGE .035 7F12CM

## (undated) DEVICE — PK CATH CARD 40

## (undated) DEVICE — CATH DIAG IMPULSE FL3.5 5F 100CM

## (undated) DEVICE — CVR PROB 96IN LF STRL